# Patient Record
Sex: FEMALE | Race: WHITE | Employment: OTHER
[De-identification: names, ages, dates, MRNs, and addresses within clinical notes are randomized per-mention and may not be internally consistent; named-entity substitution may affect disease eponyms.]

---

## 2017-01-30 DIAGNOSIS — J30.9 ALLERGIC RHINITIS, UNSPECIFIED ALLERGIC RHINITIS TRIGGER, UNSPECIFIED RHINITIS SEASONALITY: Primary | ICD-10-CM

## 2017-01-30 DIAGNOSIS — I10 ESSENTIAL HYPERTENSION: ICD-10-CM

## 2017-01-30 RX ORDER — HYDROCHLOROTHIAZIDE 25 MG/1
25 TABLET ORAL DAILY
Qty: 90 TABLET | Refills: 1 | Status: SHIPPED | OUTPATIENT
Start: 2017-01-30 | End: 2017-02-23 | Stop reason: SDUPTHER

## 2017-01-30 RX ORDER — LORATADINE 10 MG/1
TABLET ORAL
Qty: 90 TABLET | Refills: 1 | Status: SHIPPED | OUTPATIENT
Start: 2017-01-30 | End: 2017-02-23 | Stop reason: SDUPTHER

## 2017-02-08 ENCOUNTER — TELEPHONE (OUTPATIENT)
Dept: FAMILY MEDICINE CLINIC | Facility: CLINIC | Age: 67
End: 2017-02-08

## 2017-02-08 DIAGNOSIS — G89.29 CHRONIC BILATERAL LOW BACK PAIN WITHOUT SCIATICA: Primary | ICD-10-CM

## 2017-02-08 DIAGNOSIS — M54.50 CHRONIC BILATERAL LOW BACK PAIN WITHOUT SCIATICA: Primary | ICD-10-CM

## 2017-02-09 RX ORDER — ACETAMINOPHEN AND CODEINE PHOSPHATE 300; 30 MG/1; MG/1
1 TABLET ORAL EVERY 6 HOURS PRN
Qty: 6 TABLET | Refills: 0 | Status: SHIPPED | OUTPATIENT
Start: 2017-02-09 | End: 2017-02-23 | Stop reason: SDUPTHER

## 2017-02-23 ENCOUNTER — OFFICE VISIT (OUTPATIENT)
Dept: FAMILY MEDICINE CLINIC | Facility: CLINIC | Age: 67
End: 2017-02-23

## 2017-02-23 VITALS
HEIGHT: 64 IN | OXYGEN SATURATION: 95 % | WEIGHT: 235 LBS | BODY MASS INDEX: 40.12 KG/M2 | DIASTOLIC BLOOD PRESSURE: 62 MMHG | HEART RATE: 81 BPM | SYSTOLIC BLOOD PRESSURE: 103 MMHG | TEMPERATURE: 98 F

## 2017-02-23 DIAGNOSIS — I10 ESSENTIAL HYPERTENSION: Primary | ICD-10-CM

## 2017-02-23 DIAGNOSIS — M54.50 CHRONIC BILATERAL LOW BACK PAIN WITHOUT SCIATICA: ICD-10-CM

## 2017-02-23 DIAGNOSIS — Z12.11 COLON CANCER SCREENING: ICD-10-CM

## 2017-02-23 DIAGNOSIS — E55.9 VITAMIN D DEFICIENCY: ICD-10-CM

## 2017-02-23 DIAGNOSIS — E78.5 HYPERLIPIDEMIA WITH TARGET LDL LESS THAN 100: ICD-10-CM

## 2017-02-23 DIAGNOSIS — M25.561 CHRONIC PAIN OF BOTH KNEES: ICD-10-CM

## 2017-02-23 DIAGNOSIS — Z91.81 AT HIGH RISK FOR FALLS: ICD-10-CM

## 2017-02-23 DIAGNOSIS — M81.0 OSTEOPOROSIS: ICD-10-CM

## 2017-02-23 DIAGNOSIS — Z11.4 SCREENING FOR HIV (HUMAN IMMUNODEFICIENCY VIRUS): ICD-10-CM

## 2017-02-23 DIAGNOSIS — G89.29 CHRONIC BILATERAL LOW BACK PAIN WITHOUT SCIATICA: ICD-10-CM

## 2017-02-23 DIAGNOSIS — Z23 NEED FOR PNEUMOCOCCAL VACCINATION: ICD-10-CM

## 2017-02-23 DIAGNOSIS — M25.562 CHRONIC PAIN OF BOTH KNEES: ICD-10-CM

## 2017-02-23 DIAGNOSIS — G89.29 CHRONIC PAIN OF BOTH KNEES: ICD-10-CM

## 2017-02-23 DIAGNOSIS — E03.9 ACQUIRED HYPOTHYROIDISM: ICD-10-CM

## 2017-02-23 DIAGNOSIS — Z12.39 ENCOUNTER FOR BREAST CANCER SCREENING OTHER THAN MAMMOGRAM: ICD-10-CM

## 2017-02-23 DIAGNOSIS — Z11.59 NEED FOR HEPATITIS C SCREENING TEST: ICD-10-CM

## 2017-02-23 DIAGNOSIS — Z29.9 PREVENTIVE MEASURE: ICD-10-CM

## 2017-02-23 DIAGNOSIS — J30.9 ALLERGIC RHINITIS, UNSPECIFIED ALLERGIC RHINITIS TRIGGER, UNSPECIFIED RHINITIS SEASONALITY: ICD-10-CM

## 2017-02-23 DIAGNOSIS — K59.01 SLOW TRANSIT CONSTIPATION: ICD-10-CM

## 2017-02-23 DIAGNOSIS — E66.01 MORBID OBESITY WITH BMI OF 40.0-44.9, ADULT (HCC): ICD-10-CM

## 2017-02-23 PROCEDURE — 90471 IMMUNIZATION ADMIN: CPT | Performed by: FAMILY MEDICINE

## 2017-02-23 PROCEDURE — 90670 PCV13 VACCINE IM: CPT | Performed by: FAMILY MEDICINE

## 2017-02-23 PROCEDURE — 99215 OFFICE O/P EST HI 40 MIN: CPT | Performed by: FAMILY MEDICINE

## 2017-02-23 RX ORDER — ALBUTEROL SULFATE 90 UG/1
2 AEROSOL, METERED RESPIRATORY (INHALATION) EVERY 6 HOURS PRN
Qty: 18 G | Refills: 2 | Status: CANCELLED | OUTPATIENT
Start: 2017-02-23

## 2017-02-23 RX ORDER — ACETAMINOPHEN AND CODEINE PHOSPHATE 300; 30 MG/1; MG/1
1 TABLET ORAL DAILY PRN
Qty: 30 TABLET | Refills: 0 | Status: SHIPPED | OUTPATIENT
Start: 2017-02-23 | End: 2017-09-14

## 2017-02-23 RX ORDER — SIMVASTATIN 40 MG
TABLET ORAL
Qty: 90 TABLET | Refills: 3 | Status: SHIPPED | OUTPATIENT
Start: 2017-02-23 | End: 2018-04-12 | Stop reason: SDUPTHER

## 2017-02-23 RX ORDER — ASPIRIN 81 MG/1
TABLET ORAL
Qty: 90 TABLET | Refills: 4 | Status: SHIPPED | OUTPATIENT
Start: 2017-02-23 | End: 2017-09-14

## 2017-02-23 RX ORDER — LIDOCAINE 40 MG/G
CREAM TOPICAL
Qty: 15 G | Refills: 11 | Status: SHIPPED | OUTPATIENT
Start: 2017-02-23 | End: 2017-04-20 | Stop reason: SDUPTHER

## 2017-02-23 RX ORDER — MELOXICAM 15 MG/1
TABLET ORAL
Qty: 30 TABLET | Refills: 0 | Status: CANCELLED | OUTPATIENT
Start: 2017-02-23

## 2017-02-23 RX ORDER — ALENDRONATE SODIUM 70 MG/1
TABLET ORAL
Qty: 8 TABLET | Refills: 11 | Status: SHIPPED | OUTPATIENT
Start: 2017-02-23 | End: 2017-03-13 | Stop reason: SDUPTHER

## 2017-02-23 RX ORDER — LOSARTAN POTASSIUM 50 MG/1
50 TABLET ORAL DAILY
Qty: 90 TABLET | Refills: 3 | Status: SHIPPED | OUTPATIENT
Start: 2017-02-23 | End: 2017-11-30 | Stop reason: SDUPTHER

## 2017-02-23 RX ORDER — LORATADINE 10 MG/1
TABLET ORAL
Qty: 90 TABLET | Refills: 3 | Status: SHIPPED | OUTPATIENT
Start: 2017-02-23 | End: 2018-03-05 | Stop reason: SDUPTHER

## 2017-02-23 RX ORDER — LEVOTHYROXINE SODIUM 0.07 MG/1
TABLET ORAL
Qty: 90 TABLET | Refills: 0 | Status: SHIPPED | OUTPATIENT
Start: 2017-02-23 | End: 2017-05-24 | Stop reason: SDUPTHER

## 2017-02-23 RX ORDER — HYDROCHLOROTHIAZIDE 25 MG/1
25 TABLET ORAL DAILY
Qty: 90 TABLET | Refills: 3 | Status: SHIPPED | OUTPATIENT
Start: 2017-02-23 | End: 2017-11-30 | Stop reason: SINTOL

## 2017-02-23 RX ORDER — DOCUSATE SODIUM 100 MG/1
100 CAPSULE, LIQUID FILLED ORAL 2 TIMES DAILY PRN
Qty: 60 CAPSULE | Refills: 2 | Status: SHIPPED | OUTPATIENT
Start: 2017-02-23 | End: 2017-03-13 | Stop reason: SDUPTHER

## 2017-02-23 ASSESSMENT — PATIENT HEALTH QUESTIONNAIRE - PHQ9
SUM OF ALL RESPONSES TO PHQ QUESTIONS 1-9: 0
1. LITTLE INTEREST OR PLEASURE IN DOING THINGS: 0
2. FEELING DOWN, DEPRESSED OR HOPELESS: 0
SUM OF ALL RESPONSES TO PHQ9 QUESTIONS 1 & 2: 0

## 2017-02-23 ASSESSMENT — ENCOUNTER SYMPTOMS
CONSTIPATION: 1
NAUSEA: 0
ABDOMINAL PAIN: 0
COUGH: 0
ABDOMINAL DISTENTION: 0
DIARRHEA: 0
WHEEZING: 0
BACK PAIN: 1
VOMITING: 0
SHORTNESS OF BREATH: 0
CHEST TIGHTNESS: 0

## 2017-03-13 DIAGNOSIS — K59.01 SLOW TRANSIT CONSTIPATION: ICD-10-CM

## 2017-03-13 DIAGNOSIS — M81.0 OSTEOPOROSIS: ICD-10-CM

## 2017-03-13 RX ORDER — DOCUSATE SODIUM 100 MG/1
100 CAPSULE, LIQUID FILLED ORAL 2 TIMES DAILY PRN
Qty: 60 CAPSULE | Refills: 2 | Status: ON HOLD | OUTPATIENT
Start: 2017-03-13 | End: 2017-10-25

## 2017-03-13 RX ORDER — ALENDRONATE SODIUM 70 MG/1
TABLET ORAL
Qty: 8 TABLET | Refills: 11 | Status: SHIPPED | OUTPATIENT
Start: 2017-03-13 | End: 2017-07-27 | Stop reason: HOSPADM

## 2017-03-20 ENCOUNTER — TELEPHONE (OUTPATIENT)
Dept: FAMILY MEDICINE CLINIC | Age: 67
End: 2017-03-20

## 2017-03-20 DIAGNOSIS — N63.20 LEFT BREAST LUMP: Primary | ICD-10-CM

## 2017-03-20 DIAGNOSIS — Z12.11 COLON CANCER SCREENING: ICD-10-CM

## 2017-03-20 DIAGNOSIS — R92.2 DENSE BREAST TISSUE ON MAMMOGRAM: ICD-10-CM

## 2017-03-20 PROBLEM — R92.30 DENSE BREAST TISSUE ON MAMMOGRAM: Status: ACTIVE | Noted: 2017-03-20

## 2017-03-20 LAB
CONTROL: PRESENT
HEMOCCULT STL QL: NEGATIVE

## 2017-03-20 PROCEDURE — 82274 ASSAY TEST FOR BLOOD FECAL: CPT | Performed by: FAMILY MEDICINE

## 2017-03-21 ENCOUNTER — HOSPITAL ENCOUNTER (OUTPATIENT)
Age: 67
Discharge: HOME | End: 2017-03-21
Payer: MEDICARE

## 2017-03-21 ENCOUNTER — HOSPITAL ENCOUNTER (OUTPATIENT)
Dept: GENERAL RADIOLOGY | Age: 67
Discharge: HOME | End: 2017-03-21
Payer: MEDICARE

## 2017-03-21 DIAGNOSIS — G89.29 CHRONIC BILATERAL LOW BACK PAIN WITHOUT SCIATICA: ICD-10-CM

## 2017-03-21 DIAGNOSIS — M54.50 CHRONIC BILATERAL LOW BACK PAIN WITHOUT SCIATICA: ICD-10-CM

## 2017-03-21 DIAGNOSIS — E55.9 VITAMIN D DEFICIENCY: ICD-10-CM

## 2017-03-21 DIAGNOSIS — Z11.59 NEED FOR HEPATITIS C SCREENING TEST: ICD-10-CM

## 2017-03-21 DIAGNOSIS — E03.9 ACQUIRED HYPOTHYROIDISM: ICD-10-CM

## 2017-03-21 DIAGNOSIS — R73.9 HYPERGLYCEMIA: ICD-10-CM

## 2017-03-21 DIAGNOSIS — I10 ESSENTIAL HYPERTENSION: ICD-10-CM

## 2017-03-21 DIAGNOSIS — Z11.4 SCREENING FOR HIV (HUMAN IMMUNODEFICIENCY VIRUS): ICD-10-CM

## 2017-03-21 DIAGNOSIS — R73.03 PREDIABETES: ICD-10-CM

## 2017-03-21 DIAGNOSIS — E55.9 VITAMIN D DEFICIENCY: Primary | ICD-10-CM

## 2017-03-21 DIAGNOSIS — E78.5 HYPERLIPIDEMIA WITH TARGET LDL LESS THAN 100: ICD-10-CM

## 2017-03-21 LAB
ALBUMIN SERPL-MCNC: 3.9 G/DL (ref 3.5–5.2)
ALBUMIN/GLOBULIN RATIO: ABNORMAL (ref 1–2.5)
ALP BLD-CCNC: 77 U/L (ref 35–104)
ALT SERPL-CCNC: 19 U/L (ref 5–33)
ANION GAP SERPL CALCULATED.3IONS-SCNC: 14 MMOL/L (ref 9–17)
AST SERPL-CCNC: 17 U/L
BILIRUB SERPL-MCNC: 0.63 MG/DL (ref 0.3–1.2)
BUN BLDV-MCNC: 14 MG/DL (ref 8–23)
BUN/CREAT BLD: ABNORMAL (ref 9–20)
CALCIUM SERPL-MCNC: 9.4 MG/DL (ref 8.6–10.4)
CHLORIDE BLD-SCNC: 99 MMOL/L (ref 98–107)
CHOLESTEROL/HDL RATIO: 3.4
CHOLESTEROL: 158 MG/DL
CO2: 27 MMOL/L (ref 20–31)
CREAT SERPL-MCNC: 0.72 MG/DL (ref 0.5–0.9)
GFR AFRICAN AMERICAN: >60 ML/MIN
GFR NON-AFRICAN AMERICAN: >60 ML/MIN
GFR SERPL CREATININE-BSD FRML MDRD: ABNORMAL ML/MIN/{1.73_M2}
GFR SERPL CREATININE-BSD FRML MDRD: ABNORMAL ML/MIN/{1.73_M2}
GLUCOSE BLD-MCNC: 125 MG/DL (ref 70–99)
HCT VFR BLD CALC: 41.6 % (ref 36–46)
HDLC SERPL-MCNC: 46 MG/DL
HEMOGLOBIN: 13.8 G/DL (ref 12–16)
HEPATITIS C ANTIBODY: NONREACTIVE
HIV AG/AB: NONREACTIVE
LDL CHOLESTEROL: 78 MG/DL (ref 0–130)
MCH RBC QN AUTO: 30 PG (ref 26–34)
MCHC RBC AUTO-ENTMCNC: 33.2 G/DL (ref 31–37)
MCV RBC AUTO: 90.3 FL (ref 80–100)
PDW BLD-RTO: 14 % (ref 11.5–14.9)
PLATELET # BLD: 254 K/UL (ref 150–450)
PMV BLD AUTO: 9.3 FL (ref 6–12)
POTASSIUM SERPL-SCNC: 4 MMOL/L (ref 3.7–5.3)
RBC # BLD: 4.61 M/UL (ref 4–5.2)
SODIUM BLD-SCNC: 140 MMOL/L (ref 135–144)
TOTAL PROTEIN: 7.6 G/DL (ref 6.4–8.3)
TRIGL SERPL-MCNC: 169 MG/DL
TSH SERPL DL<=0.05 MIU/L-ACNC: 1.8 MIU/L (ref 0.3–5)
VITAMIN D 25-HYDROXY: 13.1 NG/ML (ref 30–100)
VLDLC SERPL CALC-MCNC: ABNORMAL MG/DL (ref 1–30)
WBC # BLD: 10.2 K/UL (ref 3.5–11)

## 2017-03-21 PROCEDURE — 84443 ASSAY THYROID STIM HORMONE: CPT

## 2017-03-21 PROCEDURE — 82306 VITAMIN D 25 HYDROXY: CPT

## 2017-03-21 PROCEDURE — 83036 HEMOGLOBIN GLYCOSYLATED A1C: CPT

## 2017-03-21 PROCEDURE — 86803 HEPATITIS C AB TEST: CPT

## 2017-03-21 PROCEDURE — 85027 COMPLETE CBC AUTOMATED: CPT

## 2017-03-21 PROCEDURE — 80053 COMPREHEN METABOLIC PANEL: CPT

## 2017-03-21 PROCEDURE — 72100 X-RAY EXAM L-S SPINE 2/3 VWS: CPT

## 2017-03-21 PROCEDURE — 80061 LIPID PANEL: CPT

## 2017-03-21 PROCEDURE — 87389 HIV-1 AG W/HIV-1&-2 AB AG IA: CPT

## 2017-03-21 PROCEDURE — 36415 COLL VENOUS BLD VENIPUNCTURE: CPT

## 2017-03-21 RX ORDER — ERGOCALCIFEROL 1.25 MG/1
50000 CAPSULE ORAL WEEKLY
Qty: 4 CAPSULE | Refills: 2 | Status: SHIPPED | OUTPATIENT
Start: 2017-03-21 | End: 2017-12-18 | Stop reason: DRUGHIGH

## 2017-03-23 LAB
ESTIMATED AVERAGE GLUCOSE: 134 MG/DL
HBA1C MFR BLD: 6.3 % (ref 4–6)

## 2017-03-24 DIAGNOSIS — R73.03 PREDIABETES: Primary | ICD-10-CM

## 2017-03-24 RX ORDER — METFORMIN HYDROCHLORIDE 500 MG/1
500 TABLET, EXTENDED RELEASE ORAL
Qty: 30 TABLET | Refills: 3 | Status: SHIPPED | OUTPATIENT
Start: 2017-03-24 | End: 2017-08-28 | Stop reason: SDUPTHER

## 2017-03-28 ENCOUNTER — HOSPITAL ENCOUNTER (OUTPATIENT)
Dept: WOMENS IMAGING | Age: 67
Discharge: HOME | End: 2017-03-28
Payer: MEDICARE

## 2017-03-28 DIAGNOSIS — N63.20 LEFT BREAST LUMP: ICD-10-CM

## 2017-03-28 DIAGNOSIS — R92.2 DENSE BREAST TISSUE ON MAMMOGRAM: ICD-10-CM

## 2017-03-28 PROCEDURE — 76642 ULTRASOUND BREAST LIMITED: CPT

## 2017-03-28 PROCEDURE — G0204 DX MAMMO INCL CAD BI: HCPCS

## 2017-04-18 ENCOUNTER — TELEPHONE (OUTPATIENT)
Dept: FAMILY MEDICINE CLINIC | Age: 67
End: 2017-04-18

## 2017-04-19 ENCOUNTER — TELEPHONE (OUTPATIENT)
Dept: FAMILY MEDICINE CLINIC | Age: 67
End: 2017-04-19

## 2017-04-19 DIAGNOSIS — M15.9 OSTEOARTHRITIS INVOLVING MULTIPLE JOINTS ON BOTH SIDES OF BODY: Primary | ICD-10-CM

## 2017-04-19 DIAGNOSIS — M25.561 CHRONIC PAIN OF BOTH KNEES: ICD-10-CM

## 2017-04-19 DIAGNOSIS — M25.562 CHRONIC PAIN OF BOTH KNEES: ICD-10-CM

## 2017-04-19 DIAGNOSIS — G89.29 CHRONIC PAIN OF BOTH KNEES: ICD-10-CM

## 2017-04-19 DIAGNOSIS — M54.50 CHRONIC BILATERAL LOW BACK PAIN WITHOUT SCIATICA: ICD-10-CM

## 2017-04-19 DIAGNOSIS — G89.29 CHRONIC BILATERAL LOW BACK PAIN WITHOUT SCIATICA: ICD-10-CM

## 2017-04-19 DIAGNOSIS — M17.12 PRIMARY OSTEOARTHRITIS OF LEFT KNEE: ICD-10-CM

## 2017-04-19 RX ORDER — MELOXICAM 7.5 MG/1
7.5 TABLET ORAL DAILY PRN
Qty: 30 TABLET | Refills: 0 | Status: SHIPPED | OUTPATIENT
Start: 2017-04-19 | End: 2017-07-27 | Stop reason: HOSPADM

## 2017-04-20 ENCOUNTER — OFFICE VISIT (OUTPATIENT)
Dept: FAMILY MEDICINE CLINIC | Age: 67
End: 2017-04-20
Payer: MEDICARE

## 2017-04-20 VITALS
HEIGHT: 64 IN | HEART RATE: 76 BPM | SYSTOLIC BLOOD PRESSURE: 128 MMHG | BODY MASS INDEX: 38.65 KG/M2 | OXYGEN SATURATION: 96 % | DIASTOLIC BLOOD PRESSURE: 84 MMHG | WEIGHT: 226.4 LBS | TEMPERATURE: 97.5 F

## 2017-04-20 DIAGNOSIS — E03.9 ACQUIRED HYPOTHYROIDISM: ICD-10-CM

## 2017-04-20 DIAGNOSIS — M25.562 CHRONIC PAIN OF BOTH KNEES: ICD-10-CM

## 2017-04-20 DIAGNOSIS — R73.9 HYPERGLYCEMIA: ICD-10-CM

## 2017-04-20 DIAGNOSIS — I10 ESSENTIAL HYPERTENSION: Primary | ICD-10-CM

## 2017-04-20 DIAGNOSIS — K59.01 SLOW TRANSIT CONSTIPATION: ICD-10-CM

## 2017-04-20 DIAGNOSIS — Z91.81 AT HIGH RISK FOR FALLS: ICD-10-CM

## 2017-04-20 DIAGNOSIS — G89.29 CHRONIC BILATERAL LOW BACK PAIN WITHOUT SCIATICA: ICD-10-CM

## 2017-04-20 DIAGNOSIS — Z23 NEED FOR TDAP VACCINATION: ICD-10-CM

## 2017-04-20 DIAGNOSIS — G89.29 CHRONIC PAIN OF BOTH KNEES: ICD-10-CM

## 2017-04-20 DIAGNOSIS — M54.50 CHRONIC BILATERAL LOW BACK PAIN WITHOUT SCIATICA: ICD-10-CM

## 2017-04-20 DIAGNOSIS — E66.01 MORBID OBESITY WITH BMI OF 40.0-44.9, ADULT (HCC): ICD-10-CM

## 2017-04-20 DIAGNOSIS — M25.561 CHRONIC PAIN OF BOTH KNEES: ICD-10-CM

## 2017-04-20 DIAGNOSIS — E55.9 VITAMIN D DEFICIENCY: ICD-10-CM

## 2017-04-20 DIAGNOSIS — Z12.11 COLON CANCER SCREENING: ICD-10-CM

## 2017-04-20 DIAGNOSIS — M81.0 OSTEOPOROSIS: ICD-10-CM

## 2017-04-20 PROCEDURE — 99214 OFFICE O/P EST MOD 30 MIN: CPT | Performed by: FAMILY MEDICINE

## 2017-04-20 RX ORDER — LIDOCAINE 40 MG/G
CREAM TOPICAL
Qty: 45 G | Refills: 3 | Status: SHIPPED | OUTPATIENT
Start: 2017-04-20 | End: 2017-11-29 | Stop reason: SDUPTHER

## 2017-04-20 RX ORDER — ACETAMINOPHEN 500 MG
500 TABLET ORAL EVERY 6 HOURS PRN
Qty: 120 TABLET | Refills: 3 | Status: SHIPPED | OUTPATIENT
Start: 2017-04-20 | End: 2018-10-19 | Stop reason: SDUPTHER

## 2017-04-20 RX ORDER — BLOOD PRESSURE TEST KIT
KIT MISCELLANEOUS
Qty: 1 KIT | Refills: 0 | Status: SHIPPED | OUTPATIENT
Start: 2017-04-20 | End: 2017-05-25 | Stop reason: SDUPTHER

## 2017-04-20 RX ORDER — LIDOCAINE 40 MG/G
CREAM TOPICAL
Qty: 15 G | Refills: 11 | Status: CANCELLED | OUTPATIENT
Start: 2017-04-20

## 2017-04-20 ASSESSMENT — ENCOUNTER SYMPTOMS
NAUSEA: 0
BACK PAIN: 1
ABDOMINAL PAIN: 0
DIARRHEA: 0
VOMITING: 0
SHORTNESS OF BREATH: 0
ABDOMINAL DISTENTION: 0
CONSTIPATION: 1
CHEST TIGHTNESS: 0
COUGH: 0
WHEEZING: 0

## 2017-05-03 ENCOUNTER — HOSPITAL ENCOUNTER (OUTPATIENT)
Dept: PAIN MANAGEMENT | Age: 67
Discharge: HOME | End: 2017-05-03
Payer: MEDICARE

## 2017-05-03 VITALS
DIASTOLIC BLOOD PRESSURE: 70 MMHG | BODY MASS INDEX: 38.58 KG/M2 | HEART RATE: 67 BPM | OXYGEN SATURATION: 96 % | WEIGHT: 226 LBS | HEIGHT: 64 IN | SYSTOLIC BLOOD PRESSURE: 108 MMHG | TEMPERATURE: 98 F | RESPIRATION RATE: 17 BRPM

## 2017-05-03 DIAGNOSIS — E66.01 MORBID OBESITY WITH BMI OF 40.0-44.9, ADULT (HCC): ICD-10-CM

## 2017-05-03 DIAGNOSIS — M17.0 PRIMARY OSTEOARTHRITIS OF BOTH KNEES: ICD-10-CM

## 2017-05-03 DIAGNOSIS — M81.0 OSTEOPOROSIS: ICD-10-CM

## 2017-05-03 DIAGNOSIS — M41.9 KYPHOSCOLIOSIS DEFORMITY OF SPINE: ICD-10-CM

## 2017-05-03 DIAGNOSIS — E55.9 VITAMIN D DEFICIENCY: ICD-10-CM

## 2017-05-03 DIAGNOSIS — M47.816 ARTHROPATHY OF LUMBAR FACET JOINT: ICD-10-CM

## 2017-05-03 DIAGNOSIS — M47.816 SPONDYLOSIS OF LUMBAR REGION WITHOUT MYELOPATHY OR RADICULOPATHY: Primary | ICD-10-CM

## 2017-05-03 PROCEDURE — 99204 OFFICE O/P NEW MOD 45 MIN: CPT | Performed by: PAIN MEDICINE

## 2017-05-03 PROCEDURE — 99204 OFFICE O/P NEW MOD 45 MIN: CPT

## 2017-05-03 PROCEDURE — 80307 DRUG TEST PRSMV CHEM ANLYZR: CPT

## 2017-05-03 ASSESSMENT — PAIN DESCRIPTION - LOCATION: LOCATION: BACK;KNEE

## 2017-05-03 ASSESSMENT — PAIN DESCRIPTION - PAIN TYPE: TYPE: CHRONIC PAIN

## 2017-05-03 ASSESSMENT — ENCOUNTER SYMPTOMS
VOMITING: 0
HEARTBURN: 0
DOUBLE VISION: 0
NAUSEA: 0
CONSTIPATION: 1
BLURRED VISION: 1
SHORTNESS OF BREATH: 0
COUGH: 1
BACK PAIN: 1

## 2017-05-03 ASSESSMENT — PAIN DESCRIPTION - FREQUENCY: FREQUENCY: CONTINUOUS

## 2017-05-03 ASSESSMENT — PAIN DESCRIPTION - ORIENTATION: ORIENTATION: RIGHT;LEFT

## 2017-05-03 ASSESSMENT — PAIN DESCRIPTION - ONSET: ONSET: ON-GOING

## 2017-05-03 ASSESSMENT — PAIN SCALES - GENERAL: PAINLEVEL_OUTOF10: 6

## 2017-05-03 ASSESSMENT — PAIN DESCRIPTION - DESCRIPTORS: DESCRIPTORS: CONSTANT;ACHING;DULL

## 2017-05-03 ASSESSMENT — PAIN DESCRIPTION - PROGRESSION: CLINICAL_PROGRESSION: GRADUALLY WORSENING

## 2017-05-07 LAB
6-ACETYLMORPHINE, UR: NOT DETECTED
7-AMINOCLONAZEPAM, URINE: NOT DETECTED
ALPHA-OH-ALPRAZ, URINE: NOT DETECTED
ALPRAZOLAM, URINE: NOT DETECTED
AMPHETAMINES, URINE: NOT DETECTED
BARBITURATES, URINE: NOT DETECTED
BENZOYLECGONINE, UR: NOT DETECTED
BUPRENORPHINE URINE: NOT DETECTED
CARISOPRODOL, UR: NOT DETECTED
CLONAZEPAM, URINE: NOT DETECTED
CODEINE, URINE: NOT DETECTED
CREATININE URINE: 367.5 MG/DL (ref 20–400)
DIAZEPAM, URINE: NOT DETECTED
DRUGS EXPECTED, UR: NORMAL
EER HI RES INTERP UR: NORMAL
ETHYL GLUCURONIDE UR: NOT DETECTED
FENTANYL URINE: NOT DETECTED
HYDROCODONE, URINE: NOT DETECTED
HYDROMORPHONE, URINE: NOT DETECTED
LORAZEPAM, URINE: NOT DETECTED
MARIJUANA METAB, UR: NOT DETECTED
MDA, UR: NOT DETECTED
MDEA, EVE, UR: NOT DETECTED
MDMA URINE: NOT DETECTED
MEPERIDINE METAB, UR: NOT DETECTED
METHADONE, URINE: NOT DETECTED
METHAMPHETAMINE, URINE: NOT DETECTED
METHYLPHENIDATE: NOT DETECTED
MIDAZOLAM, URINE: NOT DETECTED
MORPHINE URINE: NOT DETECTED
NORBUPRENORPHINE, URINE: NOT DETECTED
NORDIAZEPAM, URINE: NOT DETECTED
NORFENTANYL, URINE: NOT DETECTED
NORHYDROCODONE, URINE: NOT DETECTED
NOROXYCODONE, URINE: NOT DETECTED
NOROXYMORPHONE, URINE: NOT DETECTED
OXAZEPAM, URINE: NOT DETECTED
OXYCODONE URINE: NOT DETECTED
OXYMORPHONE, URINE: NOT DETECTED
PAIN MANAGEMENT DRUG PANEL INTERP, URINE: NORMAL
PAIN MGT DRUG PANEL, HI RES, UR: NORMAL
PCP,URINE: NOT DETECTED
PHENTERMINE, UR: NOT DETECTED
PROPOXYPHENE, URINE: NOT DETECTED
TAPENTADOL, URINE: NOT DETECTED
TAPENTADOL-O-SULFATE, URINE: NOT DETECTED
TEMAZEPAM, URINE: NOT DETECTED
TRAMADOL, URINE: NOT DETECTED
ZOLPIDEM, URINE: NOT DETECTED

## 2017-05-15 ENCOUNTER — HOSPITAL ENCOUNTER (OUTPATIENT)
Dept: WOMENS IMAGING | Age: 67
Discharge: HOME | End: 2017-05-15
Payer: MEDICARE

## 2017-05-15 DIAGNOSIS — M81.0 OSTEOPOROSIS: ICD-10-CM

## 2017-05-15 PROCEDURE — 77080 DXA BONE DENSITY AXIAL: CPT

## 2017-05-16 ENCOUNTER — TELEPHONE (OUTPATIENT)
Dept: FAMILY MEDICINE CLINIC | Facility: CLINIC | Age: 67
End: 2017-05-16

## 2017-05-16 ENCOUNTER — TELEPHONE (OUTPATIENT)
Dept: FAMILY MEDICINE CLINIC | Age: 67
End: 2017-05-16

## 2017-05-16 DIAGNOSIS — M25.561 PAIN IN BOTH KNEES, UNSPECIFIED CHRONICITY: Primary | ICD-10-CM

## 2017-05-16 DIAGNOSIS — M25.562 PAIN IN BOTH KNEES, UNSPECIFIED CHRONICITY: Primary | ICD-10-CM

## 2017-05-17 ENCOUNTER — TELEPHONE (OUTPATIENT)
Dept: FAMILY MEDICINE CLINIC | Age: 67
End: 2017-05-17

## 2017-05-18 ENCOUNTER — HOSPITAL ENCOUNTER (OUTPATIENT)
Dept: GENERAL RADIOLOGY | Age: 67
Discharge: HOME | End: 2017-05-18
Payer: MEDICARE

## 2017-05-18 ENCOUNTER — HOSPITAL ENCOUNTER (OUTPATIENT)
Age: 67
Discharge: HOME | End: 2017-05-18
Payer: MEDICARE

## 2017-05-18 DIAGNOSIS — M25.561 PAIN IN BOTH KNEES, UNSPECIFIED CHRONICITY: ICD-10-CM

## 2017-05-18 DIAGNOSIS — M25.562 PAIN IN BOTH KNEES, UNSPECIFIED CHRONICITY: ICD-10-CM

## 2017-05-18 PROCEDURE — 73562 X-RAY EXAM OF KNEE 3: CPT

## 2017-05-23 ENCOUNTER — HOSPITAL ENCOUNTER (OUTPATIENT)
Dept: PHYSICAL THERAPY | Age: 67
Setting detail: THERAPIES SERIES
Discharge: HOME | End: 2017-05-23
Payer: MEDICARE

## 2017-05-23 PROCEDURE — 97162 PT EVAL MOD COMPLEX 30 MIN: CPT

## 2017-05-23 PROCEDURE — G8979 MOBILITY GOAL STATUS: HCPCS

## 2017-05-23 PROCEDURE — G8978 MOBILITY CURRENT STATUS: HCPCS

## 2017-05-24 DIAGNOSIS — E03.9 ACQUIRED HYPOTHYROIDISM: ICD-10-CM

## 2017-05-24 RX ORDER — LEVOTHYROXINE SODIUM 0.07 MG/1
TABLET ORAL
Qty: 30 TABLET | Refills: 3 | Status: SHIPPED | OUTPATIENT
Start: 2017-05-24 | End: 2017-10-09 | Stop reason: SDUPTHER

## 2017-05-24 ASSESSMENT — PAIN DESCRIPTION - LOCATION
LOCATION_3: BACK
LOCATION: KNEE

## 2017-05-24 ASSESSMENT — PAIN DESCRIPTION - INTENSITY
RATING_2: 5
RATING_3: 4

## 2017-05-24 ASSESSMENT — PAIN DESCRIPTION - ORIENTATION
ORIENTATION: LEFT
ORIENTATION_3: LOWER;MID
ORIENTATION_2: RIGHT

## 2017-05-24 ASSESSMENT — PAIN SCALES - GENERAL: PAINLEVEL_OUTOF10: 5

## 2017-05-25 ENCOUNTER — TELEPHONE (OUTPATIENT)
Dept: FAMILY MEDICINE CLINIC | Age: 67
End: 2017-05-25

## 2017-05-25 DIAGNOSIS — I10 ESSENTIAL HYPERTENSION: ICD-10-CM

## 2017-05-25 RX ORDER — BLOOD PRESSURE TEST KIT
KIT MISCELLANEOUS
Qty: 1 KIT | Refills: 0 | Status: SHIPPED | OUTPATIENT
Start: 2017-05-25 | End: 2017-07-27 | Stop reason: ALTCHOICE

## 2017-06-07 ENCOUNTER — HOSPITAL ENCOUNTER (OUTPATIENT)
Dept: PHYSICAL THERAPY | Age: 67
Setting detail: THERAPIES SERIES
Discharge: HOME | End: 2017-06-07
Payer: MEDICARE

## 2017-06-07 PROCEDURE — 97113 AQUATIC THERAPY/EXERCISES: CPT

## 2017-06-07 ASSESSMENT — PAIN DESCRIPTION - LOCATION
LOCATION_3: BACK
LOCATION_2: KNEE
LOCATION: KNEE

## 2017-06-07 ASSESSMENT — PAIN DESCRIPTION - ORIENTATION
ORIENTATION: LEFT
ORIENTATION_3: LOWER;MID
ORIENTATION_2: RIGHT

## 2017-06-07 ASSESSMENT — PAIN DESCRIPTION - INTENSITY
RATING_3: 1
RATING_2: 4

## 2017-06-07 ASSESSMENT — PAIN SCALES - GENERAL: PAINLEVEL_OUTOF10: 4

## 2017-06-09 ENCOUNTER — HOSPITAL ENCOUNTER (OUTPATIENT)
Age: 67
Setting detail: SPECIMEN
Discharge: HOME | End: 2017-06-09
Payer: MEDICARE

## 2017-06-09 ENCOUNTER — OFFICE VISIT (OUTPATIENT)
Dept: FAMILY MEDICINE CLINIC | Age: 67
End: 2017-06-09
Payer: MEDICARE

## 2017-06-09 ENCOUNTER — APPOINTMENT (OUTPATIENT)
Dept: PHYSICAL THERAPY | Age: 67
End: 2017-06-09
Payer: MEDICARE

## 2017-06-09 ENCOUNTER — TELEPHONE (OUTPATIENT)
Dept: FAMILY MEDICINE CLINIC | Age: 67
End: 2017-06-09

## 2017-06-09 VITALS
WEIGHT: 240 LBS | HEART RATE: 60 BPM | SYSTOLIC BLOOD PRESSURE: 134 MMHG | HEIGHT: 61 IN | OXYGEN SATURATION: 98 % | RESPIRATION RATE: 20 BRPM | TEMPERATURE: 97.4 F | DIASTOLIC BLOOD PRESSURE: 76 MMHG | BODY MASS INDEX: 45.31 KG/M2

## 2017-06-09 DIAGNOSIS — R53.82 CHRONIC FATIGUE: ICD-10-CM

## 2017-06-09 DIAGNOSIS — M17.0 PRIMARY OSTEOARTHRITIS OF BOTH KNEES: ICD-10-CM

## 2017-06-09 DIAGNOSIS — N76.1 SUBACUTE VAGINITIS: ICD-10-CM

## 2017-06-09 DIAGNOSIS — M25.562 CHRONIC PAIN OF BOTH KNEES: ICD-10-CM

## 2017-06-09 DIAGNOSIS — Z23 NEED FOR TDAP VACCINATION: ICD-10-CM

## 2017-06-09 DIAGNOSIS — B37.49 CANDIDIASIS OF PERINEUM: ICD-10-CM

## 2017-06-09 DIAGNOSIS — N93.9 ABNORMAL VAGINAL BLEEDING: Primary | ICD-10-CM

## 2017-06-09 DIAGNOSIS — N39.46 MIXED INCONTINENCE: ICD-10-CM

## 2017-06-09 DIAGNOSIS — G89.29 CHRONIC PAIN OF BOTH KNEES: ICD-10-CM

## 2017-06-09 DIAGNOSIS — Z12.4 CERVICAL CANCER SCREENING: ICD-10-CM

## 2017-06-09 DIAGNOSIS — M25.561 CHRONIC PAIN OF BOTH KNEES: ICD-10-CM

## 2017-06-09 DIAGNOSIS — R82.90 ABNORMAL URINE ODOR: ICD-10-CM

## 2017-06-09 DIAGNOSIS — N32.81 OAB (OVERACTIVE BLADDER): ICD-10-CM

## 2017-06-09 DIAGNOSIS — N93.9 ABNORMAL VAGINAL BLEEDING: ICD-10-CM

## 2017-06-09 LAB
DIRECT EXAM: NORMAL
HEMOCCULT STL QL: NEGATIVE
HEMOCCULT STL QL: NORMAL
HEMOCCULT STL QL: NORMAL
Lab: NORMAL
SPECIMEN DESCRIPTION: NORMAL
STATUS: NORMAL

## 2017-06-09 PROCEDURE — G8399 PT W/DXA RESULTS DOCUMENT: HCPCS | Performed by: FAMILY MEDICINE

## 2017-06-09 PROCEDURE — 99215 OFFICE O/P EST HI 40 MIN: CPT | Performed by: FAMILY MEDICINE

## 2017-06-09 PROCEDURE — G8417 CALC BMI ABV UP PARAM F/U: HCPCS | Performed by: FAMILY MEDICINE

## 2017-06-09 PROCEDURE — 1123F ACP DISCUSS/DSCN MKR DOCD: CPT | Performed by: FAMILY MEDICINE

## 2017-06-09 PROCEDURE — 1090F PRES/ABSN URINE INCON ASSESS: CPT | Performed by: FAMILY MEDICINE

## 2017-06-09 PROCEDURE — 1036F TOBACCO NON-USER: CPT | Performed by: FAMILY MEDICINE

## 2017-06-09 PROCEDURE — 3014F SCREEN MAMMO DOC REV: CPT | Performed by: FAMILY MEDICINE

## 2017-06-09 PROCEDURE — 0509F URINE INCON PLAN DOCD: CPT | Performed by: FAMILY MEDICINE

## 2017-06-09 PROCEDURE — 82272 OCCULT BLD FECES 1-3 TESTS: CPT | Performed by: FAMILY MEDICINE

## 2017-06-09 PROCEDURE — 4040F PNEUMOC VAC/ADMIN/RCVD: CPT | Performed by: FAMILY MEDICINE

## 2017-06-09 PROCEDURE — G8427 DOCREV CUR MEDS BY ELIG CLIN: HCPCS | Performed by: FAMILY MEDICINE

## 2017-06-09 PROCEDURE — 3017F COLORECTAL CA SCREEN DOC REV: CPT | Performed by: FAMILY MEDICINE

## 2017-06-09 RX ORDER — NYSTATIN 100000 [USP'U]/G
POWDER TOPICAL
Qty: 56.7 G | Refills: 3 | Status: SHIPPED | OUTPATIENT
Start: 2017-06-09 | End: 2017-07-27 | Stop reason: ALTCHOICE

## 2017-06-09 ASSESSMENT — ENCOUNTER SYMPTOMS
CHEST TIGHTNESS: 0
BACK PAIN: 1
ABDOMINAL PAIN: 0
ABDOMINAL DISTENTION: 0
BLOOD IN STOOL: 1
CONSTIPATION: 1
COUGH: 0
DIARRHEA: 0
VOMITING: 0
WHEEZING: 0
SHORTNESS OF BREATH: 0
NAUSEA: 0

## 2017-06-10 PROBLEM — N76.1 SUBACUTE VAGINITIS: Status: ACTIVE | Noted: 2017-06-10

## 2017-06-10 PROBLEM — N93.9 ABNORMAL VAGINAL BLEEDING: Status: ACTIVE | Noted: 2017-06-10

## 2017-06-10 PROBLEM — M17.0 PRIMARY OSTEOARTHRITIS OF BOTH KNEES: Status: ACTIVE | Noted: 2017-06-10

## 2017-06-10 PROBLEM — N32.81 OAB (OVERACTIVE BLADDER): Status: ACTIVE | Noted: 2017-06-10

## 2017-06-10 PROBLEM — R53.82 CHRONIC FATIGUE: Status: ACTIVE | Noted: 2017-06-10

## 2017-06-10 PROBLEM — B37.49 CANDIDIASIS OF PERINEUM: Status: ACTIVE | Noted: 2017-06-10

## 2017-06-12 ENCOUNTER — HOSPITAL ENCOUNTER (OUTPATIENT)
Age: 67
Discharge: HOME | End: 2017-06-12
Payer: MEDICARE

## 2017-06-12 ENCOUNTER — HOSPITAL ENCOUNTER (OUTPATIENT)
Dept: ULTRASOUND IMAGING | Age: 67
Discharge: HOME | End: 2017-06-12
Payer: MEDICARE

## 2017-06-12 ENCOUNTER — HOSPITAL ENCOUNTER (OUTPATIENT)
Dept: PHYSICAL THERAPY | Age: 67
Setting detail: THERAPIES SERIES
Discharge: HOME | End: 2017-06-12
Payer: MEDICARE

## 2017-06-12 ENCOUNTER — HOSPITAL ENCOUNTER (OUTPATIENT)
Dept: PREADMISSION TESTING | Age: 67
Discharge: HOME | End: 2017-06-12
Payer: MEDICARE

## 2017-06-12 VITALS
TEMPERATURE: 98.1 F | DIASTOLIC BLOOD PRESSURE: 70 MMHG | HEART RATE: 69 BPM | RESPIRATION RATE: 18 BRPM | BODY MASS INDEX: 45.5 KG/M2 | WEIGHT: 241 LBS | OXYGEN SATURATION: 99 % | HEIGHT: 61 IN | SYSTOLIC BLOOD PRESSURE: 128 MMHG

## 2017-06-12 DIAGNOSIS — N32.81 OAB (OVERACTIVE BLADDER): ICD-10-CM

## 2017-06-12 DIAGNOSIS — N93.9 ABNORMAL VAGINAL BLEEDING: ICD-10-CM

## 2017-06-12 DIAGNOSIS — R10.2 PELVIC PAIN IN FEMALE: ICD-10-CM

## 2017-06-12 DIAGNOSIS — R82.90 ABNORMAL URINE ODOR: ICD-10-CM

## 2017-06-12 DIAGNOSIS — N39.46 MIXED INCONTINENCE: ICD-10-CM

## 2017-06-12 LAB
-: ABNORMAL
ABSOLUTE EOS #: 0.2 K/UL (ref 0–0.4)
ABSOLUTE LYMPH #: 1.6 K/UL (ref 1–4.8)
ABSOLUTE MONO #: 0.5 K/UL (ref 0.1–1.3)
AMORPHOUS: ABNORMAL
ANION GAP SERPL CALCULATED.3IONS-SCNC: 13 MMOL/L (ref 9–17)
BACTERIA: ABNORMAL
BASOPHILS # BLD: 1 %
BASOPHILS ABSOLUTE: 0.1 K/UL (ref 0–0.2)
BILIRUBIN URINE: NEGATIVE
BUN BLDV-MCNC: 17 MG/DL (ref 8–23)
BUN/CREAT BLD: ABNORMAL (ref 9–20)
CALCIUM SERPL-MCNC: 9.5 MG/DL (ref 8.6–10.4)
CASTS UA: ABNORMAL /LPF
CHLORIDE BLD-SCNC: 96 MMOL/L (ref 98–107)
CO2: 28 MMOL/L (ref 20–31)
COLOR: YELLOW
COMMENT UA: ABNORMAL
CREAT SERPL-MCNC: 0.79 MG/DL (ref 0.5–0.9)
CRYSTALS, UA: ABNORMAL /HPF
DIFFERENTIAL TYPE: NORMAL
EOSINOPHILS RELATIVE PERCENT: 2 %
EPITHELIAL CELLS UA: ABNORMAL /HPF
GFR AFRICAN AMERICAN: >60 ML/MIN
GFR NON-AFRICAN AMERICAN: >60 ML/MIN
GFR SERPL CREATININE-BSD FRML MDRD: ABNORMAL ML/MIN/{1.73_M2}
GFR SERPL CREATININE-BSD FRML MDRD: ABNORMAL ML/MIN/{1.73_M2}
GLUCOSE BLD-MCNC: 116 MG/DL (ref 70–99)
GLUCOSE URINE: NEGATIVE
HCT VFR BLD CALC: 38.5 % (ref 36–46)
HEMOGLOBIN: 12.6 G/DL (ref 12–16)
KETONES, URINE: NEGATIVE
LEUKOCYTE ESTERASE, URINE: ABNORMAL
LYMPHOCYTES # BLD: 17 %
MCH RBC QN AUTO: 30.2 PG (ref 26–34)
MCHC RBC AUTO-ENTMCNC: 32.9 G/DL (ref 31–37)
MCV RBC AUTO: 92 FL (ref 80–100)
MONOCYTES # BLD: 5 %
MUCUS: ABNORMAL
NITRITE, URINE: NEGATIVE
OTHER OBSERVATIONS UA: ABNORMAL
PDW BLD-RTO: 13.7 % (ref 11.5–14.9)
PH UA: 6 (ref 5–8)
PLATELET # BLD: 255 K/UL (ref 150–450)
PLATELET ESTIMATE: NORMAL
PMV BLD AUTO: 9 FL (ref 6–12)
POTASSIUM SERPL-SCNC: 3.8 MMOL/L (ref 3.7–5.3)
PROTEIN UA: NEGATIVE
RBC # BLD: 4.18 M/UL (ref 4–5.2)
RBC # BLD: NORMAL 10*6/UL
RBC UA: ABNORMAL /HPF
RENAL EPITHELIAL, UA: ABNORMAL /HPF
SEG NEUTROPHILS: 75 %
SEGMENTED NEUTROPHILS ABSOLUTE COUNT: 7.4 K/UL (ref 1.3–9.1)
SODIUM BLD-SCNC: 137 MMOL/L (ref 135–144)
SPECIFIC GRAVITY UA: 1.02 (ref 1–1.03)
TRICHOMONAS: ABNORMAL
TURBIDITY: ABNORMAL
URINE HGB: ABNORMAL
UROBILINOGEN, URINE: NORMAL
WBC # BLD: 9.8 K/UL (ref 3.5–11)
WBC # BLD: NORMAL 10*3/UL
WBC UA: ABNORMAL /HPF
YEAST: ABNORMAL

## 2017-06-12 PROCEDURE — 93005 ELECTROCARDIOGRAM TRACING: CPT

## 2017-06-12 PROCEDURE — 87086 URINE CULTURE/COLONY COUNT: CPT

## 2017-06-12 PROCEDURE — 97113 AQUATIC THERAPY/EXERCISES: CPT

## 2017-06-12 PROCEDURE — 76830 TRANSVAGINAL US NON-OB: CPT

## 2017-06-12 PROCEDURE — 80048 BASIC METABOLIC PNL TOTAL CA: CPT

## 2017-06-12 PROCEDURE — 36415 COLL VENOUS BLD VENIPUNCTURE: CPT

## 2017-06-12 PROCEDURE — 85025 COMPLETE CBC W/AUTO DIFF WBC: CPT

## 2017-06-12 PROCEDURE — 76856 US EXAM PELVIC COMPLETE: CPT

## 2017-06-12 PROCEDURE — 81001 URINALYSIS AUTO W/SCOPE: CPT

## 2017-06-12 ASSESSMENT — PAIN DESCRIPTION - LOCATION: LOCATION: KNEE

## 2017-06-12 ASSESSMENT — PAIN SCALES - GENERAL
PAINLEVEL_OUTOF10: 3
PAINLEVEL_OUTOF10: 0

## 2017-06-12 ASSESSMENT — PAIN DESCRIPTION - ORIENTATION: ORIENTATION: RIGHT

## 2017-06-13 ENCOUNTER — ANESTHESIA EVENT (OUTPATIENT)
Dept: OPERATING ROOM | Age: 67
End: 2017-06-13
Payer: MEDICARE

## 2017-06-13 ENCOUNTER — TELEPHONE (OUTPATIENT)
Dept: FAMILY MEDICINE CLINIC | Age: 67
End: 2017-06-13

## 2017-06-13 LAB
CHLAMYDIA BY THIN PREP: NEGATIVE
CULTURE: NORMAL
CULTURE: NORMAL
Lab: NORMAL
N. GONORRHOEAE DNA, THIN PREP: NEGATIVE
SPECIMEN DESCRIPTION: NORMAL
SPECIMEN DESCRIPTION: NORMAL
STATUS: NORMAL

## 2017-06-14 ENCOUNTER — HOSPITAL ENCOUNTER (OUTPATIENT)
Dept: PHYSICAL THERAPY | Age: 67
Setting detail: THERAPIES SERIES
Discharge: HOME | End: 2017-06-14
Payer: MEDICARE

## 2017-06-14 LAB
CYTOLOGY REPORT: NORMAL
HPV SAMPLE: NORMAL
HPV SOURCE: NORMAL
HPV, GENOTYPE 16: NOT DETECTED
HPV, GENOTYPE 18: NOT DETECTED
HPV, HIGH RISK OTHER: NOT DETECTED
HPV, INTERPRETATION: NORMAL

## 2017-06-14 PROCEDURE — 97113 AQUATIC THERAPY/EXERCISES: CPT

## 2017-06-14 ASSESSMENT — PAIN SCALES - GENERAL: PAINLEVEL_OUTOF10: 3

## 2017-06-14 ASSESSMENT — PAIN DESCRIPTION - PAIN TYPE: TYPE: CHRONIC PAIN

## 2017-06-14 ASSESSMENT — PAIN DESCRIPTION - LOCATION: LOCATION: KNEE

## 2017-06-14 ASSESSMENT — PAIN DESCRIPTION - ORIENTATION: ORIENTATION: LEFT;RIGHT

## 2017-06-19 ENCOUNTER — HOSPITAL ENCOUNTER (OUTPATIENT)
Dept: PHYSICAL THERAPY | Age: 67
Setting detail: THERAPIES SERIES
Discharge: HOME | End: 2017-06-19
Payer: MEDICARE

## 2017-06-19 PROCEDURE — 97113 AQUATIC THERAPY/EXERCISES: CPT

## 2017-06-19 ASSESSMENT — PAIN DESCRIPTION - LOCATION: LOCATION: KNEE

## 2017-06-19 ASSESSMENT — PAIN DESCRIPTION - PAIN TYPE: TYPE: CHRONIC PAIN

## 2017-06-19 ASSESSMENT — PAIN DESCRIPTION - ORIENTATION: ORIENTATION: RIGHT;LEFT

## 2017-06-19 ASSESSMENT — PAIN SCALES - GENERAL: PAINLEVEL_OUTOF10: 3

## 2017-06-21 ENCOUNTER — HOSPITAL ENCOUNTER (OUTPATIENT)
Dept: PHYSICAL THERAPY | Age: 67
Setting detail: THERAPIES SERIES
Discharge: HOME | End: 2017-06-21
Payer: MEDICARE

## 2017-06-21 PROCEDURE — 97113 AQUATIC THERAPY/EXERCISES: CPT

## 2017-06-21 ASSESSMENT — PAIN SCALES - GENERAL: PAINLEVEL_OUTOF10: 3

## 2017-06-21 ASSESSMENT — PAIN DESCRIPTION - PAIN TYPE: TYPE: CHRONIC PAIN

## 2017-06-21 ASSESSMENT — PAIN DESCRIPTION - LOCATION: LOCATION: KNEE

## 2017-06-21 ASSESSMENT — PAIN DESCRIPTION - ORIENTATION: ORIENTATION: RIGHT;LEFT

## 2017-06-26 ENCOUNTER — ANESTHESIA (OUTPATIENT)
Dept: OPERATING ROOM | Age: 67
End: 2017-06-26
Payer: MEDICARE

## 2017-06-26 ENCOUNTER — HOSPITAL ENCOUNTER (OUTPATIENT)
Age: 67
Setting detail: OUTPATIENT SURGERY
Discharge: HOME | End: 2017-06-26
Attending: SURGERY | Admitting: SURGERY
Payer: MEDICARE

## 2017-06-26 VITALS
WEIGHT: 241 LBS | TEMPERATURE: 98.1 F | HEART RATE: 66 BPM | BODY MASS INDEX: 45.5 KG/M2 | OXYGEN SATURATION: 97 % | RESPIRATION RATE: 16 BRPM | DIASTOLIC BLOOD PRESSURE: 78 MMHG | HEIGHT: 61 IN | SYSTOLIC BLOOD PRESSURE: 140 MMHG

## 2017-06-26 VITALS — SYSTOLIC BLOOD PRESSURE: 138 MMHG | DIASTOLIC BLOOD PRESSURE: 77 MMHG | OXYGEN SATURATION: 100 %

## 2017-06-26 LAB — GLUCOSE BLD-MCNC: 122 MG/DL (ref 65–105)

## 2017-06-26 PROCEDURE — 2580000003 HC RX 258: Performed by: SURGERY

## 2017-06-26 PROCEDURE — 88305 TISSUE EXAM BY PATHOLOGIST: CPT

## 2017-06-26 PROCEDURE — 7100000030 HC ASPR PHASE II RECOVERY - FIRST 15 MIN: Performed by: SURGERY

## 2017-06-26 PROCEDURE — 3609010400 HC COLONOSCOPY POLYPECTOMY HOT BIOPSY: Performed by: SURGERY

## 2017-06-26 PROCEDURE — 2500000003 HC RX 250 WO HCPCS

## 2017-06-26 PROCEDURE — 82947 ASSAY GLUCOSE BLOOD QUANT: CPT

## 2017-06-26 PROCEDURE — 3700000000 HC ANESTHESIA ATTENDED CARE: Performed by: SURGERY

## 2017-06-26 PROCEDURE — 6360000002 HC RX W HCPCS

## 2017-06-26 PROCEDURE — 3700000001 HC ADD 15 MINUTES (ANESTHESIA): Performed by: SURGERY

## 2017-06-26 PROCEDURE — 7100000031 HC ASPR PHASE II RECOVERY - ADDTL 15 MIN: Performed by: SURGERY

## 2017-06-26 PROCEDURE — 7100000000 HC PACU RECOVERY - FIRST 15 MIN: Performed by: SURGERY

## 2017-06-26 PROCEDURE — 7100000001 HC PACU RECOVERY - ADDTL 15 MIN: Performed by: SURGERY

## 2017-06-26 RX ORDER — DIPHENHYDRAMINE HYDROCHLORIDE 50 MG/ML
12.5 INJECTION INTRAMUSCULAR; INTRAVENOUS
Status: DISCONTINUED | OUTPATIENT
Start: 2017-06-26 | End: 2017-06-26 | Stop reason: HOSPADM

## 2017-06-26 RX ORDER — MEPERIDINE HYDROCHLORIDE 25 MG/ML
12.5 INJECTION INTRAMUSCULAR; INTRAVENOUS; SUBCUTANEOUS EVERY 5 MIN PRN
Status: DISCONTINUED | OUTPATIENT
Start: 2017-06-26 | End: 2017-06-26 | Stop reason: HOSPADM

## 2017-06-26 RX ORDER — LIDOCAINE HYDROCHLORIDE 10 MG/ML
INJECTION, SOLUTION EPIDURAL; INFILTRATION; INTRACAUDAL; PERINEURAL PRN
Status: DISCONTINUED | OUTPATIENT
Start: 2017-06-26 | End: 2017-06-26 | Stop reason: SDUPTHER

## 2017-06-26 RX ORDER — LABETALOL HYDROCHLORIDE 5 MG/ML
5 INJECTION, SOLUTION INTRAVENOUS EVERY 10 MIN PRN
Status: DISCONTINUED | OUTPATIENT
Start: 2017-06-26 | End: 2017-06-26 | Stop reason: HOSPADM

## 2017-06-26 RX ORDER — OXYCODONE HYDROCHLORIDE AND ACETAMINOPHEN 5; 325 MG/1; MG/1
2 TABLET ORAL PRN
Status: DISCONTINUED | OUTPATIENT
Start: 2017-06-26 | End: 2017-06-26 | Stop reason: HOSPADM

## 2017-06-26 RX ORDER — OXYCODONE HYDROCHLORIDE AND ACETAMINOPHEN 5; 325 MG/1; MG/1
1 TABLET ORAL PRN
Status: DISCONTINUED | OUTPATIENT
Start: 2017-06-26 | End: 2017-06-26 | Stop reason: HOSPADM

## 2017-06-26 RX ORDER — PROPOFOL 10 MG/ML
INJECTION, EMULSION INTRAVENOUS CONTINUOUS PRN
Status: DISCONTINUED | OUTPATIENT
Start: 2017-06-26 | End: 2017-06-26 | Stop reason: SDUPTHER

## 2017-06-26 RX ORDER — PROMETHAZINE HYDROCHLORIDE 25 MG/ML
6.25 INJECTION, SOLUTION INTRAMUSCULAR; INTRAVENOUS
Status: DISCONTINUED | OUTPATIENT
Start: 2017-06-26 | End: 2017-06-26 | Stop reason: HOSPADM

## 2017-06-26 RX ORDER — SODIUM CHLORIDE 9 MG/ML
INJECTION, SOLUTION INTRAVENOUS CONTINUOUS
Status: DISCONTINUED | OUTPATIENT
Start: 2017-06-26 | End: 2017-06-26 | Stop reason: HOSPADM

## 2017-06-26 RX ORDER — MIDAZOLAM HYDROCHLORIDE 1 MG/ML
INJECTION INTRAMUSCULAR; INTRAVENOUS PRN
Status: DISCONTINUED | OUTPATIENT
Start: 2017-06-26 | End: 2017-06-26 | Stop reason: SDUPTHER

## 2017-06-26 RX ADMIN — MIDAZOLAM HYDROCHLORIDE 2 MG: 1 INJECTION, SOLUTION INTRAMUSCULAR; INTRAVENOUS at 08:31

## 2017-06-26 RX ADMIN — SODIUM CHLORIDE: 9 INJECTION, SOLUTION INTRAVENOUS at 06:48

## 2017-06-26 RX ADMIN — LIDOCAINE HYDROCHLORIDE 50 MG: 10 INJECTION, SOLUTION EPIDURAL; INFILTRATION; INTRACAUDAL; PERINEURAL at 08:34

## 2017-06-26 RX ADMIN — PROPOFOL 100 MCG/KG/MIN: 10 INJECTION, EMULSION INTRAVENOUS at 08:34

## 2017-06-26 ASSESSMENT — PAIN SCALES - GENERAL
PAINLEVEL_OUTOF10: 0

## 2017-06-26 ASSESSMENT — PAIN - FUNCTIONAL ASSESSMENT: PAIN_FUNCTIONAL_ASSESSMENT: 0-10

## 2017-06-27 ENCOUNTER — HOSPITAL ENCOUNTER (OUTPATIENT)
Dept: PHYSICAL THERAPY | Age: 67
Setting detail: THERAPIES SERIES
Discharge: HOME | End: 2017-06-27
Payer: MEDICARE

## 2017-06-27 LAB
EKG ATRIAL RATE: 65 BPM
EKG P AXIS: 51 DEGREES
EKG P-R INTERVAL: 170 MS
EKG Q-T INTERVAL: 414 MS
EKG QRS DURATION: 84 MS
EKG QTC CALCULATION (BAZETT): 430 MS
EKG R AXIS: 73 DEGREES
EKG T AXIS: 74 DEGREES
EKG VENTRICULAR RATE: 65 BPM
SURGICAL PATHOLOGY REPORT: NORMAL

## 2017-06-27 PROCEDURE — 97113 AQUATIC THERAPY/EXERCISES: CPT

## 2017-06-27 ASSESSMENT — PAIN DESCRIPTION - PAIN TYPE: TYPE: CHRONIC PAIN

## 2017-06-27 ASSESSMENT — PAIN DESCRIPTION - LOCATION: LOCATION: KNEE

## 2017-06-27 ASSESSMENT — PAIN DESCRIPTION - ORIENTATION: ORIENTATION: RIGHT;LEFT

## 2017-06-27 ASSESSMENT — PAIN SCALES - GENERAL: PAINLEVEL_OUTOF10: 3

## 2017-06-29 ENCOUNTER — HOSPITAL ENCOUNTER (OUTPATIENT)
Dept: PHYSICAL THERAPY | Age: 67
Setting detail: THERAPIES SERIES
Discharge: HOME | End: 2017-06-29
Payer: MEDICARE

## 2017-06-29 PROCEDURE — G8979 MOBILITY GOAL STATUS: HCPCS

## 2017-06-29 PROCEDURE — G8978 MOBILITY CURRENT STATUS: HCPCS

## 2017-06-29 PROCEDURE — 97113 AQUATIC THERAPY/EXERCISES: CPT

## 2017-06-29 ASSESSMENT — PAIN DESCRIPTION - DIRECTION
RADIATING_TOWARDS: MEDIAL AND LATERAL JOINT LINE
RADIATING_TOWARDS_3: T12

## 2017-06-29 ASSESSMENT — PAIN DESCRIPTION - PAIN TYPE
TYPE: CHRONIC PAIN
TYPE: CHRONIC PAIN

## 2017-06-29 ASSESSMENT — PAIN DESCRIPTION - LOCATION
LOCATION: KNEE
LOCATION: KNEE
LOCATION_2: KNEE
LOCATION_3: BACK

## 2017-06-29 ASSESSMENT — PAIN DESCRIPTION - INTENSITY
RATING_2: 4
RATING_3: 1

## 2017-06-29 ASSESSMENT — PAIN DESCRIPTION - ORIENTATION
ORIENTATION: LEFT
ORIENTATION_2: RIGHT
ORIENTATION: RIGHT;LEFT
ORIENTATION_3: LOWER;MID

## 2017-06-29 ASSESSMENT — PAIN SCALES - GENERAL
PAINLEVEL_OUTOF10: 3
PAINLEVEL_OUTOF10: 3

## 2017-07-05 ENCOUNTER — APPOINTMENT (OUTPATIENT)
Dept: PHYSICAL THERAPY | Age: 67
End: 2017-07-05
Payer: MEDICARE

## 2017-07-06 ENCOUNTER — HOSPITAL ENCOUNTER (OUTPATIENT)
Dept: PHYSICAL THERAPY | Age: 67
Setting detail: THERAPIES SERIES
Discharge: HOME | End: 2017-07-06
Payer: MEDICARE

## 2017-07-06 PROCEDURE — 97113 AQUATIC THERAPY/EXERCISES: CPT

## 2017-07-07 ENCOUNTER — HOSPITAL ENCOUNTER (OUTPATIENT)
Dept: PHYSICAL THERAPY | Age: 67
Setting detail: THERAPIES SERIES
Discharge: HOME | End: 2017-07-07
Payer: MEDICARE

## 2017-07-11 ENCOUNTER — HOSPITAL ENCOUNTER (OUTPATIENT)
Dept: PHYSICAL THERAPY | Age: 67
Setting detail: THERAPIES SERIES
Discharge: HOME | End: 2017-07-11
Payer: MEDICARE

## 2017-07-13 ENCOUNTER — HOSPITAL ENCOUNTER (OUTPATIENT)
Dept: PHYSICAL THERAPY | Age: 67
Setting detail: THERAPIES SERIES
Discharge: HOME | End: 2017-07-13
Payer: MEDICARE

## 2017-07-13 PROCEDURE — 97113 AQUATIC THERAPY/EXERCISES: CPT

## 2017-07-18 ENCOUNTER — HOSPITAL ENCOUNTER (OUTPATIENT)
Dept: PHYSICAL THERAPY | Age: 67
Setting detail: THERAPIES SERIES
Discharge: HOME | End: 2017-07-18
Payer: MEDICARE

## 2017-07-18 PROCEDURE — 97113 AQUATIC THERAPY/EXERCISES: CPT

## 2017-07-20 ENCOUNTER — OFFICE VISIT (OUTPATIENT)
Dept: OBGYN CLINIC | Age: 67
End: 2017-07-20
Payer: MEDICARE

## 2017-07-20 ENCOUNTER — TELEPHONE (OUTPATIENT)
Dept: FAMILY MEDICINE CLINIC | Age: 67
End: 2017-07-20

## 2017-07-20 VITALS
WEIGHT: 241 LBS | BODY MASS INDEX: 41.15 KG/M2 | DIASTOLIC BLOOD PRESSURE: 58 MMHG | HEART RATE: 92 BPM | SYSTOLIC BLOOD PRESSURE: 108 MMHG | HEIGHT: 64 IN

## 2017-07-20 DIAGNOSIS — N39.46 MIXED INCONTINENCE: Primary | ICD-10-CM

## 2017-07-20 DIAGNOSIS — R93.89 ENDOMETRIAL THICKENING ON ULTRA SOUND: ICD-10-CM

## 2017-07-20 DIAGNOSIS — N95.0 PMB (POSTMENOPAUSAL BLEEDING): ICD-10-CM

## 2017-07-20 PROCEDURE — 1090F PRES/ABSN URINE INCON ASSESS: CPT | Performed by: OBSTETRICS & GYNECOLOGY

## 2017-07-20 PROCEDURE — 1036F TOBACCO NON-USER: CPT | Performed by: OBSTETRICS & GYNECOLOGY

## 2017-07-20 PROCEDURE — 4040F PNEUMOC VAC/ADMIN/RCVD: CPT | Performed by: OBSTETRICS & GYNECOLOGY

## 2017-07-20 PROCEDURE — 3014F SCREEN MAMMO DOC REV: CPT | Performed by: OBSTETRICS & GYNECOLOGY

## 2017-07-20 PROCEDURE — 0509F URINE INCON PLAN DOCD: CPT | Performed by: OBSTETRICS & GYNECOLOGY

## 2017-07-20 PROCEDURE — 3017F COLORECTAL CA SCREEN DOC REV: CPT | Performed by: OBSTETRICS & GYNECOLOGY

## 2017-07-20 PROCEDURE — G8427 DOCREV CUR MEDS BY ELIG CLIN: HCPCS | Performed by: OBSTETRICS & GYNECOLOGY

## 2017-07-20 PROCEDURE — G8399 PT W/DXA RESULTS DOCUMENT: HCPCS | Performed by: OBSTETRICS & GYNECOLOGY

## 2017-07-20 PROCEDURE — 1123F ACP DISCUSS/DSCN MKR DOCD: CPT | Performed by: OBSTETRICS & GYNECOLOGY

## 2017-07-20 PROCEDURE — 99204 OFFICE O/P NEW MOD 45 MIN: CPT | Performed by: OBSTETRICS & GYNECOLOGY

## 2017-07-20 PROCEDURE — G8417 CALC BMI ABV UP PARAM F/U: HCPCS | Performed by: OBSTETRICS & GYNECOLOGY

## 2017-07-24 ENCOUNTER — TELEPHONE (OUTPATIENT)
Dept: FAMILY MEDICINE CLINIC | Age: 67
End: 2017-07-24

## 2017-07-25 ENCOUNTER — HOSPITAL ENCOUNTER (OUTPATIENT)
Dept: PHYSICAL THERAPY | Age: 67
Setting detail: THERAPIES SERIES
Discharge: HOME | End: 2017-07-25
Payer: MEDICARE

## 2017-07-25 PROCEDURE — G8978 MOBILITY CURRENT STATUS: HCPCS

## 2017-07-25 PROCEDURE — G8979 MOBILITY GOAL STATUS: HCPCS

## 2017-07-25 PROCEDURE — 97113 AQUATIC THERAPY/EXERCISES: CPT

## 2017-07-25 ASSESSMENT — PAIN DESCRIPTION - LOCATION
LOCATION: KNEE
LOCATION_2: KNEE

## 2017-07-25 ASSESSMENT — PAIN DESCRIPTION - INTENSITY: RATING_2: 2

## 2017-07-25 ASSESSMENT — PAIN SCALES - GENERAL: PAINLEVEL_OUTOF10: 3

## 2017-07-25 ASSESSMENT — PAIN DESCRIPTION - ORIENTATION
ORIENTATION: LEFT
ORIENTATION_2: RIGHT

## 2017-07-27 ENCOUNTER — HOSPITAL ENCOUNTER (OUTPATIENT)
Age: 67
Discharge: HOME | End: 2017-07-27
Payer: MEDICARE

## 2017-07-27 ENCOUNTER — OFFICE VISIT (OUTPATIENT)
Dept: FAMILY MEDICINE CLINIC | Age: 67
End: 2017-07-27
Payer: MEDICARE

## 2017-07-27 VITALS
HEART RATE: 71 BPM | WEIGHT: 241 LBS | SYSTOLIC BLOOD PRESSURE: 134 MMHG | HEIGHT: 61 IN | BODY MASS INDEX: 45.5 KG/M2 | OXYGEN SATURATION: 96 % | TEMPERATURE: 97.2 F | RESPIRATION RATE: 19 BRPM | DIASTOLIC BLOOD PRESSURE: 76 MMHG

## 2017-07-27 DIAGNOSIS — Z23 NEED FOR TDAP VACCINATION: ICD-10-CM

## 2017-07-27 DIAGNOSIS — N93.9 ABNORMAL VAGINAL BLEEDING: ICD-10-CM

## 2017-07-27 DIAGNOSIS — D12.5 ADENOMATOUS POLYP OF SIGMOID COLON: ICD-10-CM

## 2017-07-27 DIAGNOSIS — R73.9 HYPERGLYCEMIA: ICD-10-CM

## 2017-07-27 DIAGNOSIS — I10 ESSENTIAL HYPERTENSION: ICD-10-CM

## 2017-07-27 DIAGNOSIS — I10 ESSENTIAL HYPERTENSION: Primary | ICD-10-CM

## 2017-07-27 DIAGNOSIS — N39.46 MIXED INCONTINENCE: ICD-10-CM

## 2017-07-27 LAB
ALBUMIN SERPL-MCNC: 4 G/DL (ref 3.5–5.2)
ALBUMIN/GLOBULIN RATIO: ABNORMAL (ref 1–2.5)
ALP BLD-CCNC: 75 U/L (ref 35–104)
ALT SERPL-CCNC: 16 U/L (ref 5–33)
ANION GAP SERPL CALCULATED.3IONS-SCNC: 12 MMOL/L (ref 9–17)
AST SERPL-CCNC: 17 U/L
BILIRUB SERPL-MCNC: 0.31 MG/DL (ref 0.3–1.2)
BUN BLDV-MCNC: 24 MG/DL (ref 8–23)
BUN/CREAT BLD: ABNORMAL (ref 9–20)
CALCIUM SERPL-MCNC: 9.5 MG/DL (ref 8.6–10.4)
CHLORIDE BLD-SCNC: 98 MMOL/L (ref 98–107)
CO2: 28 MMOL/L (ref 20–31)
CREAT SERPL-MCNC: 0.92 MG/DL (ref 0.5–0.9)
GFR AFRICAN AMERICAN: >60 ML/MIN
GFR NON-AFRICAN AMERICAN: >60 ML/MIN
GFR SERPL CREATININE-BSD FRML MDRD: ABNORMAL ML/MIN/{1.73_M2}
GFR SERPL CREATININE-BSD FRML MDRD: ABNORMAL ML/MIN/{1.73_M2}
GLUCOSE BLD-MCNC: 100 MG/DL (ref 70–99)
HBA1C MFR BLD: 5.7 %
HCT VFR BLD CALC: 39.7 % (ref 36–46)
HEMOGLOBIN: 13.2 G/DL (ref 12–16)
MCH RBC QN AUTO: 30 PG (ref 26–34)
MCHC RBC AUTO-ENTMCNC: 33.3 G/DL (ref 31–37)
MCV RBC AUTO: 89.9 FL (ref 80–100)
PDW BLD-RTO: 13.4 % (ref 11.5–14.9)
PLATELET # BLD: 272 K/UL (ref 150–450)
PMV BLD AUTO: 8.9 FL (ref 6–12)
POTASSIUM SERPL-SCNC: 3.7 MMOL/L (ref 3.7–5.3)
RBC # BLD: 4.41 M/UL (ref 4–5.2)
SODIUM BLD-SCNC: 138 MMOL/L (ref 135–144)
TOTAL PROTEIN: 7.7 G/DL (ref 6.4–8.3)
WBC # BLD: 10.3 K/UL (ref 3.5–11)

## 2017-07-27 PROCEDURE — 1036F TOBACCO NON-USER: CPT | Performed by: FAMILY MEDICINE

## 2017-07-27 PROCEDURE — 1123F ACP DISCUSS/DSCN MKR DOCD: CPT | Performed by: FAMILY MEDICINE

## 2017-07-27 PROCEDURE — 4040F PNEUMOC VAC/ADMIN/RCVD: CPT | Performed by: FAMILY MEDICINE

## 2017-07-27 PROCEDURE — 0509F URINE INCON PLAN DOCD: CPT | Performed by: FAMILY MEDICINE

## 2017-07-27 PROCEDURE — 1090F PRES/ABSN URINE INCON ASSESS: CPT | Performed by: FAMILY MEDICINE

## 2017-07-27 PROCEDURE — 99214 OFFICE O/P EST MOD 30 MIN: CPT | Performed by: FAMILY MEDICINE

## 2017-07-27 PROCEDURE — 85027 COMPLETE CBC AUTOMATED: CPT

## 2017-07-27 PROCEDURE — G8427 DOCREV CUR MEDS BY ELIG CLIN: HCPCS | Performed by: FAMILY MEDICINE

## 2017-07-27 PROCEDURE — 83036 HEMOGLOBIN GLYCOSYLATED A1C: CPT | Performed by: FAMILY MEDICINE

## 2017-07-27 PROCEDURE — 36415 COLL VENOUS BLD VENIPUNCTURE: CPT

## 2017-07-27 PROCEDURE — G8399 PT W/DXA RESULTS DOCUMENT: HCPCS | Performed by: FAMILY MEDICINE

## 2017-07-27 PROCEDURE — G8417 CALC BMI ABV UP PARAM F/U: HCPCS | Performed by: FAMILY MEDICINE

## 2017-07-27 PROCEDURE — 3017F COLORECTAL CA SCREEN DOC REV: CPT | Performed by: FAMILY MEDICINE

## 2017-07-27 PROCEDURE — 80053 COMPREHEN METABOLIC PANEL: CPT

## 2017-07-27 PROCEDURE — 3014F SCREEN MAMMO DOC REV: CPT | Performed by: FAMILY MEDICINE

## 2017-07-27 ASSESSMENT — ENCOUNTER SYMPTOMS
CONSTIPATION: 0
SHORTNESS OF BREATH: 0
VOMITING: 0
COUGH: 0
CHEST TIGHTNESS: 0
ABDOMINAL PAIN: 0
NAUSEA: 0
ABDOMINAL DISTENTION: 0
WHEEZING: 0
DIARRHEA: 0
BLOOD IN STOOL: 0
BACK PAIN: 1

## 2017-07-30 PROBLEM — D12.5 ADENOMATOUS POLYP OF SIGMOID COLON: Status: ACTIVE | Noted: 2017-07-30

## 2017-07-31 ENCOUNTER — TELEPHONE (OUTPATIENT)
Dept: FAMILY MEDICINE CLINIC | Age: 67
End: 2017-07-31

## 2017-07-31 DIAGNOSIS — B37.49 CANDIDIASIS OF PERINEUM: ICD-10-CM

## 2017-07-31 DIAGNOSIS — N39.46 MIXED INCONTINENCE: Primary | ICD-10-CM

## 2017-07-31 RX ORDER — GREEN TEA/HOODIA GORDONII 315-12.5MG
1 CAPSULE ORAL DAILY
Qty: 30 TABLET | Refills: 3 | Status: SHIPPED | OUTPATIENT
Start: 2017-07-31 | End: 2020-10-30

## 2017-07-31 RX ORDER — PYRIDOXINE HCL (VITAMIN B6) 100 MG
500 TABLET ORAL DAILY
Qty: 30 CAPSULE | Refills: 3 | Status: SHIPPED | OUTPATIENT
Start: 2017-07-31 | End: 2019-10-03 | Stop reason: SDUPTHER

## 2017-08-01 ENCOUNTER — HOSPITAL ENCOUNTER (OUTPATIENT)
Dept: PHYSICAL THERAPY | Age: 67
Setting detail: THERAPIES SERIES
Discharge: HOME | End: 2017-08-01
Payer: MEDICARE

## 2017-08-01 PROCEDURE — 97113 AQUATIC THERAPY/EXERCISES: CPT

## 2017-08-01 ASSESSMENT — PAIN DESCRIPTION - LOCATION: LOCATION: KNEE

## 2017-08-01 ASSESSMENT — PAIN DESCRIPTION - ORIENTATION: ORIENTATION: LEFT

## 2017-08-01 ASSESSMENT — PAIN SCALES - GENERAL: PAINLEVEL_OUTOF10: 2

## 2017-08-01 ASSESSMENT — PAIN DESCRIPTION - PAIN TYPE: TYPE: CHRONIC PAIN

## 2017-08-03 ENCOUNTER — HOSPITAL ENCOUNTER (OUTPATIENT)
Dept: PHYSICAL THERAPY | Age: 67
Setting detail: THERAPIES SERIES
Discharge: HOME | End: 2017-08-03
Payer: MEDICARE

## 2017-08-03 PROCEDURE — 97113 AQUATIC THERAPY/EXERCISES: CPT

## 2017-08-05 DIAGNOSIS — E55.9 VITAMIN D DEFICIENCY: Primary | ICD-10-CM

## 2017-08-07 RX ORDER — METHOCARBAMOL 750 MG/1
TABLET ORAL
Qty: 4 CAPSULE | Refills: 0 | Status: SHIPPED | OUTPATIENT
Start: 2017-08-07 | End: 2017-09-11 | Stop reason: SDUPTHER

## 2017-08-08 ENCOUNTER — APPOINTMENT (OUTPATIENT)
Dept: PHYSICAL THERAPY | Age: 67
End: 2017-08-08
Payer: MEDICARE

## 2017-08-08 DIAGNOSIS — R73.9 HYPERGLYCEMIA: Primary | ICD-10-CM

## 2017-08-15 ENCOUNTER — TELEPHONE (OUTPATIENT)
Dept: OBGYN CLINIC | Age: 67
End: 2017-08-15

## 2017-08-15 ENCOUNTER — HOSPITAL ENCOUNTER (OUTPATIENT)
Dept: PHYSICAL THERAPY | Age: 67
Setting detail: THERAPIES SERIES
Discharge: HOME | End: 2017-08-15
Payer: MEDICARE

## 2017-08-15 PROCEDURE — 97113 AQUATIC THERAPY/EXERCISES: CPT

## 2017-08-17 ENCOUNTER — HOSPITAL ENCOUNTER (OUTPATIENT)
Dept: PHYSICAL THERAPY | Age: 67
Setting detail: THERAPIES SERIES
Discharge: HOME | End: 2017-08-17
Payer: MEDICARE

## 2017-08-17 PROCEDURE — G8980 MOBILITY D/C STATUS: HCPCS

## 2017-08-17 PROCEDURE — 97113 AQUATIC THERAPY/EXERCISES: CPT

## 2017-08-17 PROCEDURE — G8979 MOBILITY GOAL STATUS: HCPCS

## 2017-08-17 ASSESSMENT — PAIN DESCRIPTION - LOCATION
LOCATION_2: KNEE
LOCATION: KNEE
LOCATION_3: BACK

## 2017-08-17 ASSESSMENT — PAIN DESCRIPTION - ORIENTATION
ORIENTATION_2: RIGHT
ORIENTATION: LEFT
ORIENTATION_3: LOWER;MID

## 2017-08-17 ASSESSMENT — PAIN DESCRIPTION - INTENSITY
RATING_3: 1
RATING_2: 2

## 2017-08-17 ASSESSMENT — PAIN DESCRIPTION - DIRECTION: RADIATING_TOWARDS_3: T12

## 2017-08-17 ASSESSMENT — PAIN SCALES - GENERAL: PAINLEVEL_OUTOF10: 3

## 2017-08-17 ASSESSMENT — PAIN DESCRIPTION - PAIN TYPE: TYPE: CHRONIC PAIN

## 2017-08-22 ENCOUNTER — APPOINTMENT (OUTPATIENT)
Dept: PHYSICAL THERAPY | Age: 67
End: 2017-08-22
Payer: MEDICARE

## 2017-08-24 ENCOUNTER — APPOINTMENT (OUTPATIENT)
Dept: PHYSICAL THERAPY | Age: 67
End: 2017-08-24
Payer: MEDICARE

## 2017-08-28 ENCOUNTER — OFFICE VISIT (OUTPATIENT)
Dept: UROLOGY | Age: 67
End: 2017-08-28
Payer: MEDICARE

## 2017-08-28 VITALS
WEIGHT: 242.2 LBS | SYSTOLIC BLOOD PRESSURE: 121 MMHG | TEMPERATURE: 98.2 F | BODY MASS INDEX: 45.73 KG/M2 | HEIGHT: 61 IN | DIASTOLIC BLOOD PRESSURE: 64 MMHG | HEART RATE: 60 BPM

## 2017-08-28 DIAGNOSIS — R39.15 URGENCY OF URINATION: ICD-10-CM

## 2017-08-28 DIAGNOSIS — R35.1 NOCTURIA: ICD-10-CM

## 2017-08-28 DIAGNOSIS — R35.0 FREQUENCY OF URINATION: ICD-10-CM

## 2017-08-28 DIAGNOSIS — N39.46 MIXED INCONTINENCE: Primary | ICD-10-CM

## 2017-08-28 PROCEDURE — 3017F COLORECTAL CA SCREEN DOC REV: CPT | Performed by: UROLOGY

## 2017-08-28 PROCEDURE — 0509F URINE INCON PLAN DOCD: CPT | Performed by: UROLOGY

## 2017-08-28 PROCEDURE — G8417 CALC BMI ABV UP PARAM F/U: HCPCS | Performed by: UROLOGY

## 2017-08-28 PROCEDURE — 1123F ACP DISCUSS/DSCN MKR DOCD: CPT | Performed by: UROLOGY

## 2017-08-28 PROCEDURE — 1090F PRES/ABSN URINE INCON ASSESS: CPT | Performed by: UROLOGY

## 2017-08-28 PROCEDURE — 99204 OFFICE O/P NEW MOD 45 MIN: CPT | Performed by: UROLOGY

## 2017-08-28 PROCEDURE — 4040F PNEUMOC VAC/ADMIN/RCVD: CPT | Performed by: UROLOGY

## 2017-08-28 PROCEDURE — 3014F SCREEN MAMMO DOC REV: CPT | Performed by: UROLOGY

## 2017-08-28 PROCEDURE — 1036F TOBACCO NON-USER: CPT | Performed by: UROLOGY

## 2017-08-28 PROCEDURE — G8399 PT W/DXA RESULTS DOCUMENT: HCPCS | Performed by: UROLOGY

## 2017-08-28 PROCEDURE — G8427 DOCREV CUR MEDS BY ELIG CLIN: HCPCS | Performed by: UROLOGY

## 2017-08-28 RX ORDER — MIRABEGRON 50 MG/1
50 TABLET, FILM COATED, EXTENDED RELEASE ORAL DAILY
Qty: 30 TABLET | Refills: 11 | Status: SHIPPED | OUTPATIENT
Start: 2017-08-28 | End: 2018-12-03 | Stop reason: SDUPTHER

## 2017-08-28 ASSESSMENT — ENCOUNTER SYMPTOMS
COUGH: 0
VOMITING: 0
EYE PAIN: 0
COLOR CHANGE: 0
SHORTNESS OF BREATH: 0
NAUSEA: 0
ABDOMINAL PAIN: 0
EYE REDNESS: 0
WHEEZING: 0
BACK PAIN: 1

## 2017-08-29 ENCOUNTER — APPOINTMENT (OUTPATIENT)
Dept: PHYSICAL THERAPY | Age: 67
End: 2017-08-29
Payer: MEDICARE

## 2017-08-31 ENCOUNTER — APPOINTMENT (OUTPATIENT)
Dept: PHYSICAL THERAPY | Age: 67
End: 2017-08-31
Payer: MEDICARE

## 2017-09-06 ENCOUNTER — HOSPITAL ENCOUNTER (EMERGENCY)
Age: 67
Discharge: HOME | End: 2017-09-06
Attending: EMERGENCY MEDICINE
Payer: MEDICARE

## 2017-09-06 VITALS
TEMPERATURE: 98.3 F | HEIGHT: 61 IN | SYSTOLIC BLOOD PRESSURE: 143 MMHG | OXYGEN SATURATION: 95 % | HEART RATE: 73 BPM | WEIGHT: 244 LBS | RESPIRATION RATE: 17 BRPM | BODY MASS INDEX: 46.07 KG/M2 | DIASTOLIC BLOOD PRESSURE: 71 MMHG

## 2017-09-06 DIAGNOSIS — N93.9 VAGINAL BLEEDING: Primary | ICD-10-CM

## 2017-09-06 LAB
ABSOLUTE EOS #: 0.2 K/UL (ref 0–0.4)
ABSOLUTE LYMPH #: 1.8 K/UL (ref 1–4.8)
ABSOLUTE MONO #: 0.5 K/UL (ref 0.1–1.3)
BASOPHILS # BLD: 1 %
BASOPHILS ABSOLUTE: 0.1 K/UL (ref 0–0.2)
DIFFERENTIAL TYPE: NORMAL
EOSINOPHILS RELATIVE PERCENT: 2 %
HCT VFR BLD CALC: 39.1 % (ref 36–46)
HEMOGLOBIN: 12.9 G/DL (ref 12–16)
LYMPHOCYTES # BLD: 19 %
MCH RBC QN AUTO: 29.7 PG (ref 26–34)
MCHC RBC AUTO-ENTMCNC: 33.1 G/DL (ref 31–37)
MCV RBC AUTO: 89.8 FL (ref 80–100)
MONOCYTES # BLD: 5 %
PDW BLD-RTO: 13.5 % (ref 11.5–14.9)
PLATELET # BLD: 265 K/UL (ref 150–450)
PLATELET ESTIMATE: NORMAL
PMV BLD AUTO: 9 FL (ref 6–12)
RBC # BLD: 4.35 M/UL (ref 4–5.2)
RBC # BLD: NORMAL 10*6/UL
SEG NEUTROPHILS: 73 %
SEGMENTED NEUTROPHILS ABSOLUTE COUNT: 7.1 K/UL (ref 1.3–9.1)
WBC # BLD: 9.6 K/UL (ref 3.5–11)
WBC # BLD: NORMAL 10*3/UL

## 2017-09-06 PROCEDURE — 85025 COMPLETE CBC W/AUTO DIFF WBC: CPT

## 2017-09-06 PROCEDURE — 36415 COLL VENOUS BLD VENIPUNCTURE: CPT

## 2017-09-06 PROCEDURE — 99284 EMERGENCY DEPT VISIT MOD MDM: CPT

## 2017-09-06 ASSESSMENT — ENCOUNTER SYMPTOMS
BACK PAIN: 0
VOMITING: 0
EYE PAIN: 0
EYE REDNESS: 0
COUGH: 0
RHINORRHEA: 0
ABDOMINAL PAIN: 0
SHORTNESS OF BREATH: 0
NAUSEA: 0

## 2017-09-11 DIAGNOSIS — E55.9 VITAMIN D DEFICIENCY: ICD-10-CM

## 2017-09-11 RX ORDER — METHOCARBAMOL 750 MG/1
TABLET ORAL
Qty: 4 CAPSULE | Refills: 0 | Status: SHIPPED | OUTPATIENT
Start: 2017-09-11 | End: 2017-09-25 | Stop reason: SDUPTHER

## 2017-09-12 ENCOUNTER — OFFICE VISIT (OUTPATIENT)
Dept: OBGYN CLINIC | Age: 67
End: 2017-09-12
Payer: MEDICARE

## 2017-09-12 VITALS
HEART RATE: 92 BPM | DIASTOLIC BLOOD PRESSURE: 82 MMHG | SYSTOLIC BLOOD PRESSURE: 132 MMHG | HEIGHT: 64 IN | WEIGHT: 244 LBS | BODY MASS INDEX: 41.66 KG/M2

## 2017-09-12 DIAGNOSIS — N95.0 PMB (POSTMENOPAUSAL BLEEDING): Primary | ICD-10-CM

## 2017-09-12 DIAGNOSIS — R93.89 ENDOMETRIAL THICKENING ON ULTRA SOUND: ICD-10-CM

## 2017-09-12 PROCEDURE — 99213 OFFICE O/P EST LOW 20 MIN: CPT | Performed by: OBSTETRICS & GYNECOLOGY

## 2017-09-12 PROCEDURE — 4040F PNEUMOC VAC/ADMIN/RCVD: CPT | Performed by: OBSTETRICS & GYNECOLOGY

## 2017-09-12 PROCEDURE — 3017F COLORECTAL CA SCREEN DOC REV: CPT | Performed by: OBSTETRICS & GYNECOLOGY

## 2017-09-12 PROCEDURE — G8417 CALC BMI ABV UP PARAM F/U: HCPCS | Performed by: OBSTETRICS & GYNECOLOGY

## 2017-09-12 PROCEDURE — 1123F ACP DISCUSS/DSCN MKR DOCD: CPT | Performed by: OBSTETRICS & GYNECOLOGY

## 2017-09-12 PROCEDURE — G8399 PT W/DXA RESULTS DOCUMENT: HCPCS | Performed by: OBSTETRICS & GYNECOLOGY

## 2017-09-12 PROCEDURE — G8427 DOCREV CUR MEDS BY ELIG CLIN: HCPCS | Performed by: OBSTETRICS & GYNECOLOGY

## 2017-09-12 PROCEDURE — 1036F TOBACCO NON-USER: CPT | Performed by: OBSTETRICS & GYNECOLOGY

## 2017-09-12 PROCEDURE — 1090F PRES/ABSN URINE INCON ASSESS: CPT | Performed by: OBSTETRICS & GYNECOLOGY

## 2017-09-12 PROCEDURE — 3014F SCREEN MAMMO DOC REV: CPT | Performed by: OBSTETRICS & GYNECOLOGY

## 2017-09-13 ENCOUNTER — HOSPITAL ENCOUNTER (OUTPATIENT)
Dept: PREADMISSION TESTING | Age: 67
Discharge: HOME | End: 2017-09-13
Payer: MEDICARE

## 2017-09-13 VITALS
BODY MASS INDEX: 41.66 KG/M2 | HEIGHT: 64 IN | WEIGHT: 244 LBS | HEART RATE: 62 BPM | SYSTOLIC BLOOD PRESSURE: 134 MMHG | TEMPERATURE: 97.8 F | RESPIRATION RATE: 16 BRPM | DIASTOLIC BLOOD PRESSURE: 86 MMHG | OXYGEN SATURATION: 98 %

## 2017-09-13 LAB
-: ABNORMAL
ABO/RH: NORMAL
ABSOLUTE EOS #: 0.2 K/UL (ref 0–0.4)
ABSOLUTE LYMPH #: 1.9 K/UL (ref 1–4.8)
ABSOLUTE MONO #: 0.5 K/UL (ref 0.1–1.3)
AMORPHOUS: ABNORMAL
ANION GAP SERPL CALCULATED.3IONS-SCNC: 13 MMOL/L (ref 9–17)
ANTIBODY SCREEN: NEGATIVE
ARM BAND NUMBER: NORMAL
BACTERIA: ABNORMAL
BASOPHILS # BLD: 1 %
BASOPHILS ABSOLUTE: 0.1 K/UL (ref 0–0.2)
BILIRUBIN URINE: ABNORMAL
BLOOD BANK COMMENT: NORMAL
BUN BLDV-MCNC: 18 MG/DL (ref 8–23)
BUN/CREAT BLD: ABNORMAL (ref 9–20)
CALCIUM SERPL-MCNC: 9.4 MG/DL (ref 8.6–10.4)
CASTS UA: ABNORMAL /LPF
CHLORIDE BLD-SCNC: 99 MMOL/L (ref 98–107)
CO2: 27 MMOL/L (ref 20–31)
COLOR: YELLOW
COMMENT UA: ABNORMAL
CREAT SERPL-MCNC: 0.73 MG/DL (ref 0.5–0.9)
CRYSTALS, UA: ABNORMAL /HPF
DIFFERENTIAL TYPE: NORMAL
EOSINOPHILS RELATIVE PERCENT: 2 %
EPITHELIAL CELLS UA: ABNORMAL /HPF
EXPIRATION DATE: NORMAL
GFR AFRICAN AMERICAN: >60 ML/MIN
GFR NON-AFRICAN AMERICAN: >60 ML/MIN
GFR SERPL CREATININE-BSD FRML MDRD: ABNORMAL ML/MIN/{1.73_M2}
GFR SERPL CREATININE-BSD FRML MDRD: ABNORMAL ML/MIN/{1.73_M2}
GLUCOSE BLD-MCNC: 125 MG/DL (ref 70–99)
GLUCOSE URINE: NEGATIVE
HCT VFR BLD CALC: 38.4 % (ref 36–46)
HEMOGLOBIN: 12.7 G/DL (ref 12–16)
INR BLD: 0.9
KETONES, URINE: NEGATIVE
LEUKOCYTE ESTERASE, URINE: ABNORMAL
LYMPHOCYTES # BLD: 20 %
MCH RBC QN AUTO: 29.8 PG (ref 26–34)
MCHC RBC AUTO-ENTMCNC: 33 G/DL (ref 31–37)
MCV RBC AUTO: 90.4 FL (ref 80–100)
MONOCYTES # BLD: 6 %
MUCUS: ABNORMAL
NITRITE, URINE: NEGATIVE
OTHER OBSERVATIONS UA: ABNORMAL
PARTIAL THROMBOPLASTIN TIME: 25.9 SEC (ref 23–31)
PDW BLD-RTO: 13.8 % (ref 11.5–14.9)
PH UA: 7 (ref 5–8)
PLATELET # BLD: 273 K/UL (ref 150–450)
PLATELET ESTIMATE: NORMAL
PMV BLD AUTO: 8.7 FL (ref 6–12)
POTASSIUM SERPL-SCNC: 3.8 MMOL/L (ref 3.7–5.3)
PROTEIN UA: NEGATIVE
PROTHROMBIN TIME: 10 SEC (ref 9.7–12)
RBC # BLD: 4.25 M/UL (ref 4–5.2)
RBC # BLD: NORMAL 10*6/UL
RBC UA: ABNORMAL /HPF
RENAL EPITHELIAL, UA: ABNORMAL /HPF
SEG NEUTROPHILS: 71 %
SEGMENTED NEUTROPHILS ABSOLUTE COUNT: 6.7 K/UL (ref 1.3–9.1)
SODIUM BLD-SCNC: 139 MMOL/L (ref 135–144)
SPECIFIC GRAVITY UA: 1.02 (ref 1–1.03)
TRICHOMONAS: ABNORMAL
TURBIDITY: ABNORMAL
URINE HGB: ABNORMAL
UROBILINOGEN, URINE: NORMAL
WBC # BLD: 9.3 K/UL (ref 3.5–11)
WBC # BLD: NORMAL 10*3/UL
WBC UA: ABNORMAL /HPF
YEAST: ABNORMAL

## 2017-09-13 PROCEDURE — 36415 COLL VENOUS BLD VENIPUNCTURE: CPT

## 2017-09-13 PROCEDURE — 81001 URINALYSIS AUTO W/SCOPE: CPT

## 2017-09-13 PROCEDURE — 86900 BLOOD TYPING SEROLOGIC ABO: CPT

## 2017-09-13 PROCEDURE — 86901 BLOOD TYPING SEROLOGIC RH(D): CPT

## 2017-09-13 PROCEDURE — 80048 BASIC METABOLIC PNL TOTAL CA: CPT

## 2017-09-13 PROCEDURE — 85730 THROMBOPLASTIN TIME PARTIAL: CPT

## 2017-09-13 PROCEDURE — 85610 PROTHROMBIN TIME: CPT

## 2017-09-13 PROCEDURE — 87086 URINE CULTURE/COLONY COUNT: CPT

## 2017-09-13 PROCEDURE — 86850 RBC ANTIBODY SCREEN: CPT

## 2017-09-13 PROCEDURE — 85025 COMPLETE CBC W/AUTO DIFF WBC: CPT

## 2017-09-13 ASSESSMENT — PAIN DESCRIPTION - LOCATION: LOCATION: KNEE;HIP

## 2017-09-13 ASSESSMENT — PAIN DESCRIPTION - PAIN TYPE: TYPE: CHRONIC PAIN

## 2017-09-13 ASSESSMENT — PAIN SCALES - GENERAL: PAINLEVEL_OUTOF10: 2

## 2017-09-14 ENCOUNTER — HOSPITAL ENCOUNTER (EMERGENCY)
Age: 67
Discharge: HOME | End: 2017-09-14
Attending: EMERGENCY MEDICINE
Payer: MEDICARE

## 2017-09-14 ENCOUNTER — PREP FOR PROCEDURE (OUTPATIENT)
Dept: OBGYN CLINIC | Age: 67
End: 2017-09-14

## 2017-09-14 ENCOUNTER — ANESTHESIA EVENT (OUTPATIENT)
Dept: OPERATING ROOM | Age: 67
End: 2017-09-14
Payer: MEDICARE

## 2017-09-14 ENCOUNTER — APPOINTMENT (OUTPATIENT)
Dept: GENERAL RADIOLOGY | Age: 67
End: 2017-09-14
Payer: MEDICARE

## 2017-09-14 VITALS
HEIGHT: 61 IN | TEMPERATURE: 98 F | HEART RATE: 66 BPM | WEIGHT: 240 LBS | OXYGEN SATURATION: 97 % | BODY MASS INDEX: 45.31 KG/M2 | DIASTOLIC BLOOD PRESSURE: 73 MMHG | RESPIRATION RATE: 18 BRPM | SYSTOLIC BLOOD PRESSURE: 168 MMHG

## 2017-09-14 DIAGNOSIS — M25.562 CHRONIC PAIN OF BOTH KNEES: Primary | ICD-10-CM

## 2017-09-14 DIAGNOSIS — M17.0 OSTEOARTHRITIS OF BOTH KNEES, UNSPECIFIED OSTEOARTHRITIS TYPE: ICD-10-CM

## 2017-09-14 DIAGNOSIS — G89.29 CHRONIC PAIN OF BOTH KNEES: Primary | ICD-10-CM

## 2017-09-14 DIAGNOSIS — M25.561 CHRONIC PAIN OF BOTH KNEES: Primary | ICD-10-CM

## 2017-09-14 DIAGNOSIS — Z01.818 PREOP TESTING: Primary | ICD-10-CM

## 2017-09-14 LAB
CULTURE: NORMAL
CULTURE: NORMAL
Lab: NORMAL
SPECIMEN DESCRIPTION: NORMAL
SPECIMEN DESCRIPTION: NORMAL
STATUS: NORMAL

## 2017-09-14 PROCEDURE — 73562 X-RAY EXAM OF KNEE 3: CPT

## 2017-09-14 PROCEDURE — 99283 EMERGENCY DEPT VISIT LOW MDM: CPT

## 2017-09-14 PROCEDURE — 6370000000 HC RX 637 (ALT 250 FOR IP): Performed by: EMERGENCY MEDICINE

## 2017-09-14 RX ORDER — ACETAMINOPHEN AND CODEINE PHOSPHATE 300; 30 MG/1; MG/1
1 TABLET ORAL 3 TIMES DAILY PRN
Qty: 10 TABLET | Refills: 0 | Status: SHIPPED | OUTPATIENT
Start: 2017-09-14 | End: 2017-12-28 | Stop reason: ALTCHOICE

## 2017-09-14 RX ORDER — SODIUM CHLORIDE 0.9 % (FLUSH) 0.9 %
10 SYRINGE (ML) INJECTION EVERY 12 HOURS SCHEDULED
Status: CANCELLED | OUTPATIENT
Start: 2017-09-14

## 2017-09-14 RX ORDER — SODIUM CHLORIDE, SODIUM LACTATE, POTASSIUM CHLORIDE, CALCIUM CHLORIDE 600; 310; 30; 20 MG/100ML; MG/100ML; MG/100ML; MG/100ML
INJECTION, SOLUTION INTRAVENOUS CONTINUOUS
Status: CANCELLED | OUTPATIENT
Start: 2017-09-14

## 2017-09-14 RX ORDER — SODIUM CHLORIDE 0.9 % (FLUSH) 0.9 %
10 SYRINGE (ML) INJECTION PRN
Status: CANCELLED | OUTPATIENT
Start: 2017-09-14

## 2017-09-14 RX ORDER — LIDOCAINE HYDROCHLORIDE 10 MG/ML
1 INJECTION, SOLUTION EPIDURAL; INFILTRATION; INTRACAUDAL; PERINEURAL
Status: CANCELLED | OUTPATIENT
Start: 2017-09-14 | End: 2017-09-14

## 2017-09-14 RX ORDER — ACETAMINOPHEN AND CODEINE PHOSPHATE 300; 30 MG/1; MG/1
1 TABLET ORAL ONCE
Status: COMPLETED | OUTPATIENT
Start: 2017-09-14 | End: 2017-09-14

## 2017-09-14 RX ADMIN — ACETAMINOPHEN AND CODEINE PHOSPHATE 1 TABLET: 300; 30 TABLET ORAL at 09:27

## 2017-09-14 ASSESSMENT — ENCOUNTER SYMPTOMS
EYE PAIN: 0
COUGH: 0
NAUSEA: 0
SHORTNESS OF BREATH: 0
VOMITING: 0
DIARRHEA: 0
BACK PAIN: 0
SORE THROAT: 0
ABDOMINAL PAIN: 0

## 2017-09-14 ASSESSMENT — PAIN DESCRIPTION - PAIN TYPE: TYPE: CHRONIC PAIN

## 2017-09-14 ASSESSMENT — PAIN DESCRIPTION - ONSET: ONSET: ON-GOING

## 2017-09-14 ASSESSMENT — PAIN DESCRIPTION - FREQUENCY: FREQUENCY: INTERMITTENT

## 2017-09-14 ASSESSMENT — PAIN DESCRIPTION - DESCRIPTORS: DESCRIPTORS: SHARP

## 2017-09-14 ASSESSMENT — PAIN DESCRIPTION - ORIENTATION: ORIENTATION: RIGHT;LEFT

## 2017-09-14 ASSESSMENT — PAIN SCALES - GENERAL
PAINLEVEL_OUTOF10: 8
PAINLEVEL_OUTOF10: 8

## 2017-09-14 ASSESSMENT — PAIN DESCRIPTION - LOCATION: LOCATION: KNEE

## 2017-09-20 ENCOUNTER — ANESTHESIA (OUTPATIENT)
Dept: OPERATING ROOM | Age: 67
End: 2017-09-20
Payer: MEDICARE

## 2017-09-20 ENCOUNTER — HOSPITAL ENCOUNTER (OUTPATIENT)
Age: 67
Setting detail: OUTPATIENT SURGERY
Discharge: HOME | End: 2017-09-20
Attending: OBSTETRICS & GYNECOLOGY | Admitting: OBSTETRICS & GYNECOLOGY
Payer: MEDICARE

## 2017-09-20 VITALS — SYSTOLIC BLOOD PRESSURE: 96 MMHG | DIASTOLIC BLOOD PRESSURE: 56 MMHG | OXYGEN SATURATION: 95 %

## 2017-09-20 VITALS
DIASTOLIC BLOOD PRESSURE: 79 MMHG | HEART RATE: 68 BPM | WEIGHT: 244 LBS | BODY MASS INDEX: 41.66 KG/M2 | HEIGHT: 64 IN | TEMPERATURE: 97.7 F | RESPIRATION RATE: 16 BRPM | OXYGEN SATURATION: 99 % | SYSTOLIC BLOOD PRESSURE: 136 MMHG

## 2017-09-20 PROBLEM — Z98.890 S/P DILATION AND CURETTAGE: Status: ACTIVE | Noted: 2017-09-20

## 2017-09-20 PROBLEM — N95.0 POSTMENOPAUSAL BLEEDING: Status: ACTIVE | Noted: 2017-09-20

## 2017-09-20 PROBLEM — R93.89 THICKENED ENDOMETRIUM: Status: ACTIVE | Noted: 2017-09-20

## 2017-09-20 LAB
GLUCOSE BLD-MCNC: 109 MG/DL (ref 65–105)
GLUCOSE BLD-MCNC: 112 MG/DL (ref 65–105)

## 2017-09-20 PROCEDURE — 7100000031 HC ASPR PHASE II RECOVERY - ADDTL 15 MIN: Performed by: OBSTETRICS & GYNECOLOGY

## 2017-09-20 PROCEDURE — 58558 HYSTEROSCOPY BIOPSY: CPT | Performed by: OBSTETRICS & GYNECOLOGY

## 2017-09-20 PROCEDURE — 82947 ASSAY GLUCOSE BLOOD QUANT: CPT

## 2017-09-20 PROCEDURE — 2580000003 HC RX 258: Performed by: ANESTHESIOLOGY

## 2017-09-20 PROCEDURE — 6360000002 HC RX W HCPCS

## 2017-09-20 PROCEDURE — 7100000001 HC PACU RECOVERY - ADDTL 15 MIN: Performed by: OBSTETRICS & GYNECOLOGY

## 2017-09-20 PROCEDURE — 2720000010 HC SURG SUPPLY STERILE: Performed by: OBSTETRICS & GYNECOLOGY

## 2017-09-20 PROCEDURE — 3600000002 HC SURGERY LEVEL 2 BASE: Performed by: OBSTETRICS & GYNECOLOGY

## 2017-09-20 PROCEDURE — 3700000001 HC ADD 15 MINUTES (ANESTHESIA): Performed by: OBSTETRICS & GYNECOLOGY

## 2017-09-20 PROCEDURE — 88305 TISSUE EXAM BY PATHOLOGIST: CPT

## 2017-09-20 PROCEDURE — 3600000012 HC SURGERY LEVEL 2 ADDTL 15MIN: Performed by: OBSTETRICS & GYNECOLOGY

## 2017-09-20 PROCEDURE — 7100000000 HC PACU RECOVERY - FIRST 15 MIN: Performed by: OBSTETRICS & GYNECOLOGY

## 2017-09-20 PROCEDURE — 2500000003 HC RX 250 WO HCPCS

## 2017-09-20 PROCEDURE — 7100000030 HC ASPR PHASE II RECOVERY - FIRST 15 MIN: Performed by: OBSTETRICS & GYNECOLOGY

## 2017-09-20 PROCEDURE — 3700000000 HC ANESTHESIA ATTENDED CARE: Performed by: OBSTETRICS & GYNECOLOGY

## 2017-09-20 RX ORDER — OXYCODONE HYDROCHLORIDE AND ACETAMINOPHEN 5; 325 MG/1; MG/1
1 TABLET ORAL PRN
Status: DISCONTINUED | OUTPATIENT
Start: 2017-09-20 | End: 2017-09-20 | Stop reason: HOSPADM

## 2017-09-20 RX ORDER — SODIUM CHLORIDE, SODIUM LACTATE, POTASSIUM CHLORIDE, CALCIUM CHLORIDE 600; 310; 30; 20 MG/100ML; MG/100ML; MG/100ML; MG/100ML
INJECTION, SOLUTION INTRAVENOUS CONTINUOUS
Status: DISCONTINUED | OUTPATIENT
Start: 2017-09-20 | End: 2017-09-20 | Stop reason: HOSPADM

## 2017-09-20 RX ORDER — DIPHENHYDRAMINE HYDROCHLORIDE 50 MG/ML
12.5 INJECTION INTRAMUSCULAR; INTRAVENOUS
Status: DISCONTINUED | OUTPATIENT
Start: 2017-09-20 | End: 2017-09-20 | Stop reason: HOSPADM

## 2017-09-20 RX ORDER — MORPHINE SULFATE 2 MG/ML
1 INJECTION, SOLUTION INTRAMUSCULAR; INTRAVENOUS EVERY 5 MIN PRN
Status: DISCONTINUED | OUTPATIENT
Start: 2017-09-20 | End: 2017-09-20 | Stop reason: HOSPADM

## 2017-09-20 RX ORDER — LIDOCAINE HYDROCHLORIDE 10 MG/ML
INJECTION, SOLUTION EPIDURAL; INFILTRATION; INTRACAUDAL; PERINEURAL PRN
Status: DISCONTINUED | OUTPATIENT
Start: 2017-09-20 | End: 2017-09-20 | Stop reason: SDUPTHER

## 2017-09-20 RX ORDER — ROCURONIUM BROMIDE 10 MG/ML
INJECTION, SOLUTION INTRAVENOUS PRN
Status: DISCONTINUED | OUTPATIENT
Start: 2017-09-20 | End: 2017-09-20 | Stop reason: SDUPTHER

## 2017-09-20 RX ORDER — SODIUM CHLORIDE 0.9 % (FLUSH) 0.9 %
10 SYRINGE (ML) INJECTION PRN
Status: DISCONTINUED | OUTPATIENT
Start: 2017-09-20 | End: 2017-09-20 | Stop reason: HOSPADM

## 2017-09-20 RX ORDER — DEXAMETHASONE SODIUM PHOSPHATE 4 MG/ML
INJECTION, SOLUTION INTRA-ARTICULAR; INTRALESIONAL; INTRAMUSCULAR; INTRAVENOUS; SOFT TISSUE PRN
Status: DISCONTINUED | OUTPATIENT
Start: 2017-09-20 | End: 2017-09-20 | Stop reason: SDUPTHER

## 2017-09-20 RX ORDER — HYDROCODONE BITARTRATE AND ACETAMINOPHEN 5; 325 MG/1; MG/1
1 TABLET ORAL EVERY 6 HOURS PRN
Qty: 5 TABLET | Refills: 0 | Status: SHIPPED | OUTPATIENT
Start: 2017-09-20 | End: 2017-09-27

## 2017-09-20 RX ORDER — MIDAZOLAM HYDROCHLORIDE 1 MG/ML
INJECTION INTRAMUSCULAR; INTRAVENOUS PRN
Status: DISCONTINUED | OUTPATIENT
Start: 2017-09-20 | End: 2017-09-20 | Stop reason: SDUPTHER

## 2017-09-20 RX ORDER — SODIUM CHLORIDE 0.9 % (FLUSH) 0.9 %
10 SYRINGE (ML) INJECTION EVERY 12 HOURS SCHEDULED
Status: DISCONTINUED | OUTPATIENT
Start: 2017-09-20 | End: 2017-09-20 | Stop reason: HOSPADM

## 2017-09-20 RX ORDER — MEPERIDINE HYDROCHLORIDE 25 MG/ML
12.5 INJECTION INTRAMUSCULAR; INTRAVENOUS; SUBCUTANEOUS EVERY 5 MIN PRN
Status: DISCONTINUED | OUTPATIENT
Start: 2017-09-20 | End: 2017-09-20 | Stop reason: HOSPADM

## 2017-09-20 RX ORDER — PROPOFOL 10 MG/ML
INJECTION, EMULSION INTRAVENOUS PRN
Status: DISCONTINUED | OUTPATIENT
Start: 2017-09-20 | End: 2017-09-20 | Stop reason: SDUPTHER

## 2017-09-20 RX ORDER — IBUPROFEN 800 MG/1
800 TABLET ORAL EVERY 8 HOURS PRN
Qty: 30 TABLET | Refills: 0 | Status: SHIPPED | OUTPATIENT
Start: 2017-09-20 | End: 2017-09-25 | Stop reason: HOSPADM

## 2017-09-20 RX ORDER — FENTANYL CITRATE 50 UG/ML
25 INJECTION, SOLUTION INTRAMUSCULAR; INTRAVENOUS EVERY 5 MIN PRN
Status: DISCONTINUED | OUTPATIENT
Start: 2017-09-20 | End: 2017-09-20 | Stop reason: HOSPADM

## 2017-09-20 RX ORDER — SUCCINYLCHOLINE CHLORIDE 20 MG/ML
INJECTION INTRAMUSCULAR; INTRAVENOUS PRN
Status: DISCONTINUED | OUTPATIENT
Start: 2017-09-20 | End: 2017-09-20 | Stop reason: SDUPTHER

## 2017-09-20 RX ORDER — LIDOCAINE HYDROCHLORIDE 10 MG/ML
1 INJECTION, SOLUTION EPIDURAL; INFILTRATION; INTRACAUDAL; PERINEURAL
Status: DISCONTINUED | OUTPATIENT
Start: 2017-09-20 | End: 2017-09-20 | Stop reason: HOSPADM

## 2017-09-20 RX ORDER — ONDANSETRON 2 MG/ML
INJECTION INTRAMUSCULAR; INTRAVENOUS PRN
Status: DISCONTINUED | OUTPATIENT
Start: 2017-09-20 | End: 2017-09-20 | Stop reason: SDUPTHER

## 2017-09-20 RX ORDER — OXYCODONE HYDROCHLORIDE AND ACETAMINOPHEN 5; 325 MG/1; MG/1
2 TABLET ORAL PRN
Status: DISCONTINUED | OUTPATIENT
Start: 2017-09-20 | End: 2017-09-20 | Stop reason: HOSPADM

## 2017-09-20 RX ORDER — PROMETHAZINE HYDROCHLORIDE 25 MG/ML
6.25 INJECTION, SOLUTION INTRAMUSCULAR; INTRAVENOUS
Status: DISCONTINUED | OUTPATIENT
Start: 2017-09-20 | End: 2017-09-20 | Stop reason: HOSPADM

## 2017-09-20 RX ADMIN — SUCCINYLCHOLINE CHLORIDE 140 MG: 20 INJECTION, SOLUTION INTRAMUSCULAR; INTRAVENOUS at 11:56

## 2017-09-20 RX ADMIN — PROPOFOL 170 MG: 10 INJECTION, EMULSION INTRAVENOUS at 11:56

## 2017-09-20 RX ADMIN — SODIUM CHLORIDE, POTASSIUM CHLORIDE, SODIUM LACTATE AND CALCIUM CHLORIDE: 600; 310; 30; 20 INJECTION, SOLUTION INTRAVENOUS at 09:37

## 2017-09-20 RX ADMIN — ROCURONIUM BROMIDE 2.5 MG: 10 INJECTION INTRAVENOUS at 11:56

## 2017-09-20 RX ADMIN — ONDANSETRON 4 MG: 2 INJECTION INTRAMUSCULAR; INTRAVENOUS at 12:22

## 2017-09-20 RX ADMIN — DEXAMETHASONE SODIUM PHOSPHATE 4 MG: 4 INJECTION, SOLUTION INTRAMUSCULAR; INTRAVENOUS at 12:22

## 2017-09-20 RX ADMIN — MIDAZOLAM 2 MG: 1 INJECTION INTRAMUSCULAR; INTRAVENOUS at 11:53

## 2017-09-20 RX ADMIN — LIDOCAINE HYDROCHLORIDE 50 MG: 10 INJECTION, SOLUTION EPIDURAL; INFILTRATION; INTRACAUDAL; PERINEURAL at 11:56

## 2017-09-20 ASSESSMENT — PAIN DESCRIPTION - DESCRIPTORS
DESCRIPTORS: DISCOMFORT
DESCRIPTORS: DISCOMFORT

## 2017-09-20 ASSESSMENT — PAIN DESCRIPTION - FREQUENCY
FREQUENCY: INTERMITTENT
FREQUENCY: INTERMITTENT

## 2017-09-20 ASSESSMENT — PAIN SCALES - GENERAL
PAINLEVEL_OUTOF10: 0
PAINLEVEL_OUTOF10: 0
PAINLEVEL_OUTOF10: 2
PAINLEVEL_OUTOF10: 2

## 2017-09-20 ASSESSMENT — PAIN - FUNCTIONAL ASSESSMENT: PAIN_FUNCTIONAL_ASSESSMENT: 0-10

## 2017-09-20 ASSESSMENT — PAIN DESCRIPTION - PROGRESSION
CLINICAL_PROGRESSION: NOT CHANGED
CLINICAL_PROGRESSION: NOT CHANGED

## 2017-09-20 ASSESSMENT — PAIN DESCRIPTION - PAIN TYPE
TYPE: SURGICAL PAIN
TYPE: SURGICAL PAIN

## 2017-09-20 ASSESSMENT — PAIN DESCRIPTION - LOCATION
LOCATION: ABDOMEN
LOCATION: ABDOMEN

## 2017-09-25 ENCOUNTER — OFFICE VISIT (OUTPATIENT)
Dept: FAMILY MEDICINE CLINIC | Age: 67
End: 2017-09-25
Payer: MEDICARE

## 2017-09-25 VITALS
WEIGHT: 247 LBS | TEMPERATURE: 97.5 F | HEART RATE: 64 BPM | SYSTOLIC BLOOD PRESSURE: 122 MMHG | DIASTOLIC BLOOD PRESSURE: 76 MMHG | OXYGEN SATURATION: 97 % | HEIGHT: 61 IN | BODY MASS INDEX: 46.63 KG/M2 | RESPIRATION RATE: 18 BRPM

## 2017-09-25 DIAGNOSIS — E55.9 VITAMIN D DEFICIENCY: ICD-10-CM

## 2017-09-25 DIAGNOSIS — Z23 NEED FOR TDAP VACCINATION: ICD-10-CM

## 2017-09-25 DIAGNOSIS — M17.12 PRIMARY OSTEOARTHRITIS OF LEFT KNEE: ICD-10-CM

## 2017-09-25 DIAGNOSIS — R73.03 PREDIABETES: ICD-10-CM

## 2017-09-25 DIAGNOSIS — I10 ESSENTIAL HYPERTENSION: Primary | ICD-10-CM

## 2017-09-25 DIAGNOSIS — Z91.81 AT HIGH RISK FOR FALLS: ICD-10-CM

## 2017-09-25 DIAGNOSIS — N39.46 MIXED STRESS AND URGE URINARY INCONTINENCE: ICD-10-CM

## 2017-09-25 PROCEDURE — 3014F SCREEN MAMMO DOC REV: CPT | Performed by: FAMILY MEDICINE

## 2017-09-25 PROCEDURE — G8399 PT W/DXA RESULTS DOCUMENT: HCPCS | Performed by: FAMILY MEDICINE

## 2017-09-25 PROCEDURE — 4040F PNEUMOC VAC/ADMIN/RCVD: CPT | Performed by: FAMILY MEDICINE

## 2017-09-25 PROCEDURE — 3017F COLORECTAL CA SCREEN DOC REV: CPT | Performed by: FAMILY MEDICINE

## 2017-09-25 PROCEDURE — 99214 OFFICE O/P EST MOD 30 MIN: CPT | Performed by: FAMILY MEDICINE

## 2017-09-25 PROCEDURE — 0509F URINE INCON PLAN DOCD: CPT | Performed by: FAMILY MEDICINE

## 2017-09-25 PROCEDURE — G8427 DOCREV CUR MEDS BY ELIG CLIN: HCPCS | Performed by: FAMILY MEDICINE

## 2017-09-25 PROCEDURE — G8417 CALC BMI ABV UP PARAM F/U: HCPCS | Performed by: FAMILY MEDICINE

## 2017-09-25 PROCEDURE — 1123F ACP DISCUSS/DSCN MKR DOCD: CPT | Performed by: FAMILY MEDICINE

## 2017-09-25 PROCEDURE — 1036F TOBACCO NON-USER: CPT | Performed by: FAMILY MEDICINE

## 2017-09-25 PROCEDURE — 1090F PRES/ABSN URINE INCON ASSESS: CPT | Performed by: FAMILY MEDICINE

## 2017-09-25 ASSESSMENT — ENCOUNTER SYMPTOMS
VOMITING: 0
WHEEZING: 0
NAUSEA: 0
ABDOMINAL DISTENTION: 0
CHEST TIGHTNESS: 0
CONSTIPATION: 0
DIARRHEA: 0
ABDOMINAL PAIN: 0
SHORTNESS OF BREATH: 0
COUGH: 0

## 2017-09-26 PROBLEM — N39.46 MIXED STRESS AND URGE URINARY INCONTINENCE: Status: ACTIVE | Noted: 2017-09-26

## 2017-09-26 PROBLEM — N76.1 SUBACUTE VAGINITIS: Status: RESOLVED | Noted: 2017-06-10 | Resolved: 2017-09-26

## 2017-09-28 LAB — SURGICAL PATHOLOGY REPORT: NORMAL

## 2017-10-04 ENCOUNTER — TELEPHONE (OUTPATIENT)
Dept: ONCOLOGY | Age: 67
End: 2017-10-04

## 2017-10-04 NOTE — TELEPHONE ENCOUNTER
Spoke with [facility] Staff, Dr. Kyle Derek office, notified pt's case assigned to navigator & inquired if pt aware of positive bx results. Per [facility] Staff, pt scheduled for f/u tomorrow @ [phone] to discuss bx results. Requested writer notified after pt learns of results & notified of tx plan. Pam Arthur once pt aware of dx writer will  reach out to pt to navigate oncology care. Will continue to follow.

## 2017-10-05 ENCOUNTER — TELEPHONE (OUTPATIENT)
Dept: ONCOLOGY | Age: 67
End: 2017-10-05

## 2017-10-05 ENCOUNTER — OFFICE VISIT (OUTPATIENT)
Dept: OBGYN CLINIC | Age: 67
End: 2017-10-05

## 2017-10-05 ENCOUNTER — TELEPHONE (OUTPATIENT)
Dept: FAMILY MEDICINE CLINIC | Age: 67
End: 2017-10-05

## 2017-10-05 VITALS
DIASTOLIC BLOOD PRESSURE: 70 MMHG | HEART RATE: 64 BPM | WEIGHT: 244 LBS | BODY MASS INDEX: 41.66 KG/M2 | SYSTOLIC BLOOD PRESSURE: 118 MMHG | HEIGHT: 64 IN

## 2017-10-05 DIAGNOSIS — C54.1 ADENOCARCINOMA OF ENDOMETRIUM, STAGE 1 (HCC): Primary | ICD-10-CM

## 2017-10-05 DIAGNOSIS — C54.1 ADENOCARCINOMA OF ENDOMETRIUM, STAGE 1 (HCC): ICD-10-CM

## 2017-10-05 DIAGNOSIS — Z09 POSTOP CHECK: Primary | ICD-10-CM

## 2017-10-05 PROCEDURE — 99024 POSTOP FOLLOW-UP VISIT: CPT | Performed by: OBSTETRICS & GYNECOLOGY

## 2017-10-05 NOTE — MR AVS SNAPSHOT
After Visit Summary             Terry Aquino   10/5/2017 11:15 AM   Office Visit    Description:  Female : 1950   Provider:  Dennis Glass DO   Department:  [address] [facility]              Your Follow-Up and Future Appointments         Below is a list of your follow-up and future appointments. This may not be a complete list as you may have made appointments directly with providers that we are not aware of or your providers may have made some for you. Please call your providers to confirm appointments. It is important to keep your appointments. Please bring your current insurance card, photo ID, co-pay, and all medication bottles to your appointment. If self-pay, payment is expected at the time of service. Your To-Do List     Future Appointments Provider Department Dept Phone    2017 10:40 AM Paula Renee MD [facility] [phone]    Please arrive 15 minutes prior to appointment, bring photo ID and insurance card. 2017 10:45 AM Ute Gallegos MD [facility] [employer] [facility] [phone]    Please arrive 15 minutes prior to appointment, bring photo ID and insurance card. Information from Your Visit        Department     Name Address Phone Fax    [address] [facility] [address] [facility] [facility] [address] [address] [phone]      You Were Seen for:         Comments    Postop check   [[id number]]         Vital Signs     Blood Pressure Pulse Height Weight Last Menstrual Period Breastfeeding? 118/70 (Site: Right Arm, Position: Sitting, Cuff Size: Large Adult) 64 5' 4\" (1.626 m) 244 lb (110.7 kg) (LMP Unknown) No    Body Mass Index Smoking Status                41.88 kg/m2 Never Smoker          Additional Information about your Body Mass Index (BMI)           Your BMI as listed above is considered obese (30 or more).  BMI is an estimate of body fat, calculated from your height and weight. The higher your BMI, the greater your risk of heart disease, high blood pressure, type 2 diabetes, stroke, gallstones, arthritis, sleep apnea, and certain cancers. BMI is not perfect. It may overestimate body fat in athletes and people who are more muscular. Even a small weight loss (between 5 and 10 percent of your current weight) by decreasing your calorie intake and becoming more physically active will help lower your risk of developing or worsening diseases associated with obesity.      Learn more at: [employer]             Medications and Orders      Your Current Medications Are              acetaminophen-codeine (TYLENOL/CODEINE #3) 300-30 MG per tablet Take 1 tablet by mouth 3 times daily as needed for Pain    MYRBETRIQ 50 MG TB24 Take 50 mg by mouth daily    metFORMIN (GLUCOPHAGE) 500 MG tablet Take 1 tablet by mouth 2 times daily (with meals)    Cranberry 500 MG CAPS Take 500 mg by mouth daily    Lactobacillus (PROBIOTIC ACIDOPHILUS) TABS Take 1 tablet by mouth daily    levothyroxine (SYNTHROID) 75 MCG tablet TAKE ONE TABLET BY MOUTH DAILY    acetaminophen (APAP EXTRA STRENGTH) 500 MG tablet Take 1 tablet by mouth every 6 hours as needed for Pain or Fever    lidocaine (LMX) 4 % cream Apply topically every 8 hrs as needed for pain    vitamin D (ERGOCALCIFEROL) 45376 UNITS CAPS capsule Take 1 capsule by mouth once a week    docusate sodium (STOOL SOFTENER) 100 MG capsule Take 1 capsule by mouth 2 times daily as needed for Constipation    hydrochlorothiazide (HYDRODIURIL) 25 MG tablet Take 1 tablet by mouth daily    loratadine (CLARITIN) 10 MG tablet TAKE ONE TABLET BY MOUTH EVERY DAY    losartan (COZAAR) 50 MG tablet Take 1 tablet by mouth daily    simvastatin (ZOCOR) 40 MG tablet TAKE ONE TABLET BY MOUTH ONE TIME A DAY      Allergies              Sulfa Antibiotics Nausea Only    Penicillins Rash

## 2017-10-05 NOTE — PROGRESS NOTES
03/21/2017    Left breast lump 03/20/2017    Dense breast tissue on mammogram 03/20/2017    Chronic pain of both knees 02/23/2017    Slow transit constipation 02/23/2017    Allergic rhinitis 02/23/2017    Hyperlipidemia with target LDL less than 100 02/23/2017    At high risk for falls 02/23/2017    Mixed incontinence 01/09/2016    Vitamin D deficiency 01/08/2016    Prediabetes 09/10/2015    Left knee DJD 12/05/2013    Osteoarthritis involving multiple joints on both sides of body 04/24/2012    Osteopenia determined by x-ray      Per DEXA      Hypothyroidism     History of TIA (transient ischemic attack)     Lower back pain     Essential hypertension           POD# 2 weeks   Procedure: D&C/ Hysteroscopy/ Myosure resection polyp   Stable   Pathology reviewed and found to be benign.  No adenocarcinoma well differentiated FIGO grade 1    Plan:  Referral to Dr. Andry Jerome  Further treatment/ follow up per Gyn/ Oncology

## 2017-10-05 NOTE — TELEPHONE ENCOUNTER
Received VM from Ratna Whitman, Dr. Lenin Schulte, requesting writer call back. Spoke with Ratna Whitman via telephone, per Ratna Whitman positive bx results given to pt today per Dr. Lenin Schulte & okay to contact pt. Ratna Whitman stated referral for Dr. Camelia Alvarez sent today. Introductory phone call made to patient, no answer, VM left instructed patient may call writer with any questions/concerns prn @ [phone]. [address] written materials mailed to patient. Dr. Majo Bull, pt's PCP, notified writer navigating oncology care via [facility] message. Will continue to follow.

## 2017-10-09 ENCOUNTER — TELEPHONE (OUTPATIENT)
Dept: ONCOLOGY | Age: 67
End: 2017-10-09

## 2017-10-09 DIAGNOSIS — E03.9 ACQUIRED HYPOTHYROIDISM: ICD-10-CM

## 2017-10-10 RX ORDER — LEVOTHYROXINE SODIUM 0.07 MG/1
TABLET ORAL
Qty: 30 TABLET | Refills: 5 | Status: SHIPPED | OUTPATIENT
Start: 2017-10-10 | End: 2017-12-28 | Stop reason: SDUPTHER

## 2017-10-10 NOTE — TELEPHONE ENCOUNTER
Please Approve or Refuse. Next Visit Date:  12/28/2017    Hemoglobin A1C (%)   Date Value   07/27/2017 5.7   03/21/2017 6.3 (H)   09/10/2015 5.9             ( goal A1C is < 7)   Microalb/Crt.  Ratio (mcg/mg creat)   Date Value   04/14/2015 5     LDL Cholesterol (mg/dL)   Date Value   03/21/2017 78       (goal LDL is <100)   AST (U/L)   Date Value   07/27/2017 17     ALT (U/L)   Date Value   07/27/2017 16     BUN (mg/dL)   Date Value   09/13/2017 18     BP Readings from Last 3 Encounters:   10/05/17 118/70   09/25/17 122/76   09/20/17 136/79          (goal 120/80)        Patient Active Problem List:     Hypothyroidism     History of TIA (transient ischemic attack)     Lower back pain     Essential hypertension     Osteopenia determined by x-ray     Osteoarthritis involving multiple joints on both sides of body     Left knee DJD     Prediabetes     Vitamin D deficiency     Mixed incontinence     Chronic pain of both knees     Slow transit constipation     Allergic rhinitis     Hyperlipidemia with target LDL less than 100     Morbid obesity with BMI of 40.0-44.9, adult (HCC)     At high risk for falls     Left breast lump     Dense breast tissue on mammogram     Hyperglycemia     Spondylosis of lumbar region without myelopathy or radiculopathy     Abnormal vaginal bleeding     OAB (overactive bladder)     Candidiasis of perineum     Primary osteoarthritis of both knees     Chronic fatigue      Adenomatous polyp of sigmoid colon, 6/26/17     Postmenopausal bleeding     Thickened endometrium     S/P h-scope, Myosure 9/20/17     Mixed stress and urge urinary incontinence     Adenocarcinoma of endometrium, stage 1 ([facility] [address]

## 2017-10-12 ENCOUNTER — OFFICE VISIT (OUTPATIENT)
Dept: GYNECOLOGIC ONCOLOGY | Age: 67
End: 2017-10-12
Payer: MEDICARE

## 2017-10-12 ENCOUNTER — PREP FOR PROCEDURE (OUTPATIENT)
Dept: GYNECOLOGIC ONCOLOGY | Age: 67
End: 2017-10-12

## 2017-10-12 VITALS
TEMPERATURE: 97 F | HEART RATE: 63 BPM | DIASTOLIC BLOOD PRESSURE: 67 MMHG | OXYGEN SATURATION: 97 % | SYSTOLIC BLOOD PRESSURE: 118 MMHG | BODY MASS INDEX: 40.36 KG/M2 | HEIGHT: 64 IN | WEIGHT: 236.4 LBS

## 2017-10-12 DIAGNOSIS — C54.1 ENDOMETRIAL CANCER (HCC): Primary | ICD-10-CM

## 2017-10-12 PROCEDURE — 1090F PRES/ABSN URINE INCON ASSESS: CPT | Performed by: OBSTETRICS & GYNECOLOGY

## 2017-10-12 PROCEDURE — 3014F SCREEN MAMMO DOC REV: CPT | Performed by: OBSTETRICS & GYNECOLOGY

## 2017-10-12 PROCEDURE — G8484 FLU IMMUNIZE NO ADMIN: HCPCS | Performed by: OBSTETRICS & GYNECOLOGY

## 2017-10-12 PROCEDURE — 99202 OFFICE O/P NEW SF 15 MIN: CPT | Performed by: OBSTETRICS & GYNECOLOGY

## 2017-10-12 PROCEDURE — 4040F PNEUMOC VAC/ADMIN/RCVD: CPT | Performed by: OBSTETRICS & GYNECOLOGY

## 2017-10-12 PROCEDURE — 1036F TOBACCO NON-USER: CPT | Performed by: OBSTETRICS & GYNECOLOGY

## 2017-10-12 PROCEDURE — G8417 CALC BMI ABV UP PARAM F/U: HCPCS | Performed by: OBSTETRICS & GYNECOLOGY

## 2017-10-12 PROCEDURE — 3017F COLORECTAL CA SCREEN DOC REV: CPT | Performed by: OBSTETRICS & GYNECOLOGY

## 2017-10-12 PROCEDURE — G8427 DOCREV CUR MEDS BY ELIG CLIN: HCPCS | Performed by: OBSTETRICS & GYNECOLOGY

## 2017-10-12 RX ORDER — SODIUM CHLORIDE 9 MG/ML
INJECTION, SOLUTION INTRAVENOUS CONTINUOUS
Status: CANCELLED | OUTPATIENT
Start: 2017-10-12

## 2017-10-12 RX ORDER — SODIUM CHLORIDE 0.9 % (FLUSH) 0.9 %
10 SYRINGE (ML) INJECTION EVERY 12 HOURS SCHEDULED
Status: CANCELLED | OUTPATIENT
Start: 2017-10-12

## 2017-10-12 RX ORDER — SODIUM CHLORIDE 0.9 % (FLUSH) 0.9 %
10 SYRINGE (ML) INJECTION PRN
Status: CANCELLED | OUTPATIENT
Start: 2017-10-12

## 2017-10-12 ASSESSMENT — ENCOUNTER SYMPTOMS
CHOKING: 0
TROUBLE SWALLOWING: 0
ABDOMINAL DISTENTION: 0
ABDOMINAL PAIN: 0
COUGH: 0
VOICE CHANGE: 0

## 2017-10-12 NOTE — LETTER
[address]  [address]  [address] -  [facility] [address]  Phone: [phone]  Fax: [phone]    Socorro Norris        October 12, 2017       Patient: Syed Rossi   MR Number: [id number]   YOB: 1950   Date of Visit: 10/12/2017       Dear Dr. Karimi Kin: Thank you for the request for consultation for Jay Mitchell to me for the evaluation of Endometrial cancer. Below are the relevant portions of my assessment and plan of care. Assessment:     1. Endometrial cancer ([facility] [address]  CT ABDOMEN PELVIS W IV CONTRAST Additional Contrast? Oral    XR CHEST STANDARD (2 VW)   The patient has what appears to be a clinical grade 1 stage I endometrial cancer. We discussed treatment options of surgical extirpation versus high-dose progesterone therapy versus radiation therapy. At this time, the patient is inclined to proceed to surgical evaluation. I recommended a total laparoscopic hysterectomy with bilateral salpingo-oophorectomy, the organs will be submitted for frozen section and staging will be performed if indicated. The patient was counseled on the risks, benefits and alternatives of the procedure to include but not limited to: Infection, bleeding, blood transfusion, damage to internal organs such as bowel, bladder, ureters, blood vessels and nerves, deep vein thrombosis, pulmonary embolism, ICU care, anesthesia risks and death. Questions were answered, she voices understanding and desires to proceed. She will be scheduled at her earliest convenience. I have requested preoperative chest x-ray and CT the abdomen and pelvis to assess for any obvious metastatic or concurrent disease. Plan:     Return in about 2 weeks (around 10/26/2017) for 2 hour surgery, TLH/BSO, FS possible staging.   Orders Placed This Encounter   Procedures    CT ABDOMEN PELVIS W IV CONTRAST Additional Contrast? Oral    XR CHEST STANDARD (2 VW)

## 2017-10-12 NOTE — H&P
History and Physical      HPI:  HPI  74 y.o.   1 Para 1 woman, seen at the request of Dr. Angella Mckeon for endometrial cancer. The patient began experiencing spotting in May 2017. She presented for evaluation and a pelvic ultrasound was obtained on 2017. The uterus measured 7.8 x 3.5 x 4 cm and the endometrial stripe was thickened at 1.8 cm. The patient was taken for [facility]  on 2017 and the endometrial curetting showed grade 1 endometrial cancer in a background of complex atypical hyperplasia. The patient was referred for further evaluation and treatment recommendations. The pt reports before seeing Dr Angella Mckeon for a GYN exam that \"it's been so long I can't remember\" since she had a GYN exam.  She reports a hx of 1 abnormal pap back in the 1980s but she is not sure what was abnormal about the pap other than it was not cancer. The light spotting that started in May had increased to intermittent heavy bleeding. The pt is not sexually active. The patient reports that she had a normal mammogram and a normal colonoscopy in 2017. Pap smear 2017 was negative with negative high-risk HPV DNA. ROS:  Review of Systems   Constitutional: Negative for activity change and appetite change. HENT: Negative for trouble swallowing and voice change. Respiratory: Negative for cough and choking. Gastrointestinal: Negative for abdominal distention and abdominal pain. Genitourinary: Positive for difficulty urinating (urinary incontinence), frequency and vaginal bleeding. Negative for pelvic pain. Musculoskeletal: Positive for gait problem (depend on cane). Arthralgias: OA in ronan knees. Psychiatric/Behavioral: Negative for agitation and confusion.        Past Medical History:   Diagnosis Date    Allergic rhinitis 2017    At high risk for falls 2017    Colon polyp     Had colonoscopy done 20 yrs ago    Essential hypertension     History of TIA (transient ischemic Nose normal.   Eyes: Conjunctivae are normal. Pupils are equal, round, and reactive to light. No scleral icterus. Neck: Normal range of motion. Neck supple. No JVD present. No tracheal deviation present. No thyromegaly present. Cardiovascular: Normal rate, regular rhythm and normal heart sounds. Exam reveals no gallop and no friction rub. No murmur heard. Pulmonary/Chest: Effort normal and breath sounds normal. No respiratory distress. Abdominal: Soft. Bowel sounds are normal. She exhibits no distension and no mass. There is no hepatosplenomegaly. There is no tenderness. There is no rebound, no guarding and no CVA tenderness. No hernia. Hernia confirmed negative in the right inguinal area and confirmed negative in the left inguinal area. Genitourinary: Rectal exam shows no external hemorrhoid, no mass, no tenderness and anal tone normal. Pelvic exam was performed with patient supine. No labial fusion. There is no rash, tenderness or lesion on the right labia. There is no rash, tenderness or lesion on the left labia. Uterus is not deviated, not enlarged, not fixed and not tender. Cervix exhibits no motion tenderness, no discharge and no friability. Right adnexum displays no mass, no tenderness and no fullness. Left adnexum displays no mass, no tenderness and no fullness. There is bleeding (scant old blood) in the vagina. No vaginal discharge found. Genitourinary Comments: The patient has normal female genitalia including, vulva, urethra, vagina, Bartholin's and Vidor's. Bladder is non-tender, without mass; urethra is non-tender, without mass. Cervix is without lesion or nodule, uterus is normal size, shape and contour and is mobile, adnexae are non-tender and without mass. There are no palpable vaginal nodules,  pelvic masses or tenderness. Recto-vaginal examination is confirmatory.   There are no nodules of the rectovaginal septum, no rectal mass, sphincter tone is normal, no utero-sacral nodularity. Musculoskeletal: Normal range of motion. She exhibits no edema or tenderness. Lymphadenopathy:     She has no cervical adenopathy. Right: No inguinal adenopathy present. Left: No inguinal adenopathy present. Neurological: She is alert and oriented to person, place, and time. Skin: Skin is warm and dry. No rash noted. She is not diaphoretic. No erythema. Psychiatric: She has a normal mood and affect. Her behavior is normal. Judgment and thought content normal.   Vitals reviewed. Assessment:     1. Endometrial cancer ([address]  CT ABDOMEN PELVIS W IV CONTRAST Additional Contrast? Oral    XR CHEST STANDARD (2 VW)   The patient has what appears to be a clinical grade 1 stage I endometrial cancer. We discussed treatment options of surgical extirpation versus high-dose progesterone therapy versus radiation therapy. At this time, the patient is inclined to proceed to surgical evaluation. I recommended a total laparoscopic hysterectomy with bilateral salpingo-oophorectomy, the organs will be submitted for frozen section and staging will be performed if indicated. The patient was counseled on the risks, benefits and alternatives of the procedure to include but not limited to: Infection, bleeding, blood transfusion, damage to internal organs such as bowel, bladder, ureters, blood vessels and nerves, deep vein thrombosis, pulmonary embolism, ICU care, anesthesia risks and death. Questions were answered, she voices understanding and desires to proceed. She will be scheduled at her earliest convenience. I have requested preoperative chest x-ray and CT the abdomen and pelvis to assess for any obvious metastatic or concurrent disease. Plan:     Return in about 2 weeks (around 10/26/2017) for 2 hour surgery, TLH/BSO, FS possible staging.   Orders Placed This Encounter   Procedures    CT ABDOMEN PELVIS W IV CONTRAST Additional Contrast? Oral    XR CHEST STANDARD (2 VW)     No

## 2017-10-12 NOTE — COMMUNICATION BODY
Assessment:     1. Endometrial cancer ([facility] [address]  CT ABDOMEN PELVIS W IV CONTRAST Additional Contrast? Oral    XR CHEST STANDARD (2 VW)   The patient has what appears to be a clinical grade 1 stage I endometrial cancer. We discussed treatment options of surgical extirpation versus high-dose progesterone therapy versus radiation therapy. At this time, the patient is inclined to proceed to surgical evaluation. I recommended a total laparoscopic hysterectomy with bilateral salpingo-oophorectomy, the organs will be submitted for frozen section and staging will be performed if indicated. The patient was counseled on the risks, benefits and alternatives of the procedure to include but not limited to: Infection, bleeding, blood transfusion, damage to internal organs such as bowel, bladder, ureters, blood vessels and nerves, deep vein thrombosis, pulmonary embolism, ICU care, anesthesia risks and death. Questions were answered, she voices understanding and desires to proceed. She will be scheduled at her earliest convenience. I have requested preoperative chest x-ray and CT the abdomen and pelvis to assess for any obvious metastatic or concurrent disease. Plan:     Return in about 2 weeks (around 10/26/2017) for 2 hour surgery, TLH/BSO, FS possible staging. Orders Placed This Encounter   Procedures    CT ABDOMEN PELVIS W IV CONTRAST Additional Contrast? Oral    XR CHEST STANDARD (2 VW)     No orders of the defined types were placed in this encounter.

## 2017-10-12 NOTE — PROGRESS NOTES
Odalis Kiser is a 74 y.o. female that presents today for:    Chief Complaint   Patient presents with    New Patient     Adenocarcinoma of Endometrium/Spotting and passing clots       HPI:  HPI  74 y.o.   1 Para 1 woman, seen at the request of Dr. Mona Ford for endometrial cancer. The patient began experiencing spotting in May 2017. She presented for evaluation and a pelvic ultrasound was obtained on 2017. The uterus measured 7.8 x 3.5 x 4 cm and the endometrial stripe was thickened at 1.8 cm. The patient was taken for [facility]  on 2017 and the endometrial curetting showed grade 1 endometrial cancer in a background of complex atypical hyperplasia. The patient was referred for further evaluation and treatment recommendations. The pt reports before seeing Dr Mona Ford for a GYN exam that \"it's been so long I can't remember\" since she had a GYN exam.  She reports a hx of 1 abnormal pap back in the 1980s but she is not sure what was abnormal about the pap other than it was not cancer. The light spotting that started in May had increased to intermittent heavy bleeding. The pt is not sexually active. The patient reports that she had a normal mammogram and a normal colonoscopy in 2017. Pap smear 2017 was negative with negative high-risk HPV DNA. ROS:  Review of Systems   Constitutional: Negative for activity change and appetite change. HENT: Negative for trouble swallowing and voice change. Respiratory: Negative for cough and choking. Gastrointestinal: Negative for abdominal distention and abdominal pain. Genitourinary: Positive for difficulty urinating (urinary incontinence), frequency and vaginal bleeding. Negative for pelvic pain. Musculoskeletal: Positive for gait problem (depend on cane). Arthralgias: OA in ronan knees. Psychiatric/Behavioral: Negative for agitation and confusion.        Past Medical History:   Diagnosis Date    Allergic rhinitis 2/23/2017    At high risk for falls 2/23/2017    Colon polyp     Had colonoscopy done 20 yrs ago    Essential hypertension     History of TIA (transient ischemic attack)     Hyperglycemia 3/21/2017    Hyperlipidemia     Hypertension     Hypothyroidism 1980    sub total thyroidectomy goiter    Legally blind     Lower back pain     Mini stroke ([facility] [address]     Mixed incontinence 1/9/2016    Morbid obesity with BMI of 40.0-44.9, adult ([facility] [address] 2/23/2017    Osteoarthritis (arthritis due to wear and tear of joints)     ronan knee    Osteoarthritis involving multiple joints on both sides of body 4/24/2012    Osteoporosis     Tennis elbow     left       Past Surgical History:   Procedure Laterality Date    COLONOSCOPY      had polyp, she doesn't know where and when, \"20 yrs ago\"    COLONOSCOPY  06/26/2017    DILATION AND CURETTAGE OF UTERUS N/A 9/20/2017    HYSTEROSCOPY  WITH Odalys Fus performed by Heather Mejias DO at [address] [place] [address] Bilateral     CATARACTS     [facility]  [address] N/A 6/26/2017    COLONOSCOPY POLYPECTOMY / HOT SNARE performed by Pierre Christian DO at [facility]    subtotal 21 years ago   [place] TONSILLECTOMY         Family History   Problem Relation Age of Onset    Coronary Art Dis Father     Hypertension Father     Diabetes Father     High Blood Pressure Father     Thyroid Disease Father     Diabetes Mother     High Blood Pressure Mother     Thyroid Disease Mother     Thyroid Disease Other      sibs    Osteoporosis Other      family    Colon Cancer Neg Hx        Social History     Social History    Marital status: Single     Spouse name: N/A    Number of children: N/A    Years of education: N/A     Social History Main Topics    Smoking status: Never Smoker    Smokeless tobacco: Never Used    Alcohol use No    Drug use: No    Sexual activity: No     Other Topics Concern    None Social History Narrative    None       Past [address] does contribute to today's presenting complaint. Current Outpatient Prescriptions   Medication Sig Dispense Refill    levothyroxine (SYNTHROID) 75 MCG tablet TAKE ONE TABLET BY MOUTH DAILY 30 tablet 5    acetaminophen-codeine (TYLENOL/CODEINE #3) 300-30 MG per tablet Take 1 tablet by mouth 3 times daily as needed for Pain 10 tablet 0    MYRBETRIQ 50 MG TB24 Take 50 mg by mouth daily 30 tablet 11    metFORMIN (GLUCOPHAGE) 500 MG tablet Take 1 tablet by mouth 2 times daily (with meals) 60 tablet 5    Cranberry 500 MG CAPS Take 500 mg by mouth daily 30 capsule 3    Lactobacillus (PROBIOTIC ACIDOPHILUS) TABS Take 1 tablet by mouth daily 30 tablet 3    acetaminophen (APAP EXTRA STRENGTH) 500 MG tablet Take 1 tablet by mouth every 6 hours as needed for Pain or Fever 120 tablet 3    lidocaine (LMX) 4 % cream Apply topically every 8 hrs as needed for pain 45 g 3    vitamin D (ERGOCALCIFEROL) 40144 UNITS CAPS capsule Take 1 capsule by mouth once a week 4 capsule 2    docusate sodium (STOOL SOFTENER) 100 MG capsule Take 1 capsule by mouth 2 times daily as needed for Constipation 60 capsule 2    hydrochlorothiazide (HYDRODIURIL) 25 MG tablet Take 1 tablet by mouth daily 90 tablet 3    loratadine (CLARITIN) 10 MG tablet TAKE ONE TABLET BY MOUTH EVERY DAY 90 tablet 3    losartan (COZAAR) 50 MG tablet Take 1 tablet by mouth daily 90 tablet 3    simvastatin (ZOCOR) 40 MG tablet TAKE ONE TABLET BY MOUTH ONE TIME A DAY 90 tablet 3     No current facility-administered medications for this visit.         Allergies   Allergen Reactions    Sulfa Antibiotics Nausea Only    Penicillins Rash       Vitals:    10/12/17 0856   BP: 118/67   Site: Right Arm   Position: Sitting   Cuff Size: Large Adult   Pulse: 63   Temp: 97 °F (36.1 °C)   TempSrc: Oral   SpO2: 97%   Weight: 236 lb 6.4 oz (107.2 kg)   Height: 5' 4.02\" (1.626 m)       Physical Examination:  Physical Exam TLH/BSO, FS possible staging. Orders Placed This Encounter   Procedures    CT ABDOMEN PELVIS W IV CONTRAST Additional Contrast? Oral    XR CHEST STANDARD (2 VW)     No orders of the defined types were placed in this encounter.          Electronically signed by Susan Rice MD on 10/12/2017 at 10:03 AM

## 2017-10-18 ENCOUNTER — HOSPITAL ENCOUNTER (OUTPATIENT)
Dept: GENERAL RADIOLOGY | Age: 67
Discharge: HOME | End: 2017-10-18
Payer: MEDICARE

## 2017-10-18 ENCOUNTER — HOSPITAL ENCOUNTER (OUTPATIENT)
Dept: PREADMISSION TESTING | Age: 67
Discharge: HOME | End: 2017-10-18
Payer: MEDICARE

## 2017-10-18 ENCOUNTER — HOSPITAL ENCOUNTER (OUTPATIENT)
Dept: CT IMAGING | Age: 67
Discharge: HOME | End: 2017-10-18
Payer: MEDICARE

## 2017-10-18 VITALS
DIASTOLIC BLOOD PRESSURE: 73 MMHG | HEIGHT: 65 IN | TEMPERATURE: 97.6 F | HEART RATE: 60 BPM | RESPIRATION RATE: 16 BRPM | SYSTOLIC BLOOD PRESSURE: 166 MMHG | BODY MASS INDEX: 40.15 KG/M2 | WEIGHT: 241 LBS | OXYGEN SATURATION: 96 %

## 2017-10-18 DIAGNOSIS — C54.1 ENDOMETRIAL CANCER (HCC): ICD-10-CM

## 2017-10-18 LAB
-: ABNORMAL
ABO/RH: NORMAL
ABSOLUTE EOS #: 0.1 K/UL (ref 0–0.4)
ABSOLUTE IMMATURE GRANULOCYTE: NORMAL K/UL (ref 0–0.3)
ABSOLUTE LYMPH #: 1.7 K/UL (ref 1–4.8)
ABSOLUTE MONO #: 0.4 K/UL (ref 0.1–1.2)
ALBUMIN SERPL-MCNC: 4 G/DL (ref 3.5–5.2)
ALBUMIN/GLOBULIN RATIO: 1.1 (ref 1–2.5)
ALP BLD-CCNC: 65 U/L (ref 35–104)
ALT SERPL-CCNC: 16 U/L (ref 5–33)
AMORPHOUS: ABNORMAL
ANION GAP SERPL CALCULATED.3IONS-SCNC: 13 MMOL/L (ref 9–17)
ANTIBODY SCREEN: NEGATIVE
ARM BAND NUMBER: NORMAL
AST SERPL-CCNC: 27 U/L
BACTERIA: ABNORMAL
BASOPHILS # BLD: 0 %
BASOPHILS ABSOLUTE: 0 K/UL (ref 0–0.2)
BILIRUB SERPL-MCNC: 0.32 MG/DL (ref 0.3–1.2)
BILIRUBIN URINE: NEGATIVE
BUN BLDV-MCNC: 17 MG/DL (ref 8–23)
BUN/CREAT BLD: ABNORMAL (ref 9–20)
CALCIUM SERPL-MCNC: 9 MG/DL (ref 8.6–10.4)
CASTS UA: ABNORMAL /LPF (ref 0–2)
CHLORIDE BLD-SCNC: 98 MMOL/L (ref 98–107)
CO2: 25 MMOL/L (ref 20–31)
COLOR: ABNORMAL
COMMENT UA: ABNORMAL
CREAT SERPL-MCNC: 0.55 MG/DL (ref 0.5–0.9)
CRYSTALS, UA: ABNORMAL /HPF
DIFFERENTIAL TYPE: NORMAL
EOSINOPHILS RELATIVE PERCENT: 2 %
EPITHELIAL CELLS UA: ABNORMAL /HPF (ref 0–5)
EXPIRATION DATE: NORMAL
GFR AFRICAN AMERICAN: >60 ML/MIN
GFR NON-AFRICAN AMERICAN: >60 ML/MIN
GFR SERPL CREATININE-BSD FRML MDRD: ABNORMAL ML/MIN/{1.73_M2}
GFR SERPL CREATININE-BSD FRML MDRD: ABNORMAL ML/MIN/{1.73_M2}
GLUCOSE BLD-MCNC: 103 MG/DL (ref 70–99)
GLUCOSE URINE: NEGATIVE
HCT VFR BLD CALC: 38.7 % (ref 36–46)
HEMOGLOBIN: 12.9 G/DL (ref 12–16)
IMMATURE GRANULOCYTES: NORMAL %
INR BLD: 0.9
KETONES, URINE: NEGATIVE
LEUKOCYTE ESTERASE, URINE: NEGATIVE
LYMPHOCYTES # BLD: 21 %
MCH RBC QN AUTO: 29.3 PG (ref 26–34)
MCHC RBC AUTO-ENTMCNC: 33.2 G/DL (ref 31–37)
MCV RBC AUTO: 88.2 FL (ref 80–100)
MONOCYTES # BLD: 5 %
MUCUS: ABNORMAL
NITRITE, URINE: NEGATIVE
OTHER OBSERVATIONS UA: ABNORMAL
PARTIAL THROMBOPLASTIN TIME: 24.2 SEC (ref 21.3–31.3)
PDW BLD-RTO: 14.7 % (ref 12.5–15.4)
PH UA: 7.5 (ref 5–8)
PLATELET # BLD: 284 K/UL (ref 140–450)
PLATELET ESTIMATE: NORMAL
PMV BLD AUTO: 8.6 FL (ref 6–12)
POTASSIUM SERPL-SCNC: 4.3 MMOL/L (ref 3.7–5.3)
PROTEIN UA: NEGATIVE
PROTHROMBIN TIME: 10 SEC (ref 9.4–12.6)
RBC # BLD: 4.38 M/UL (ref 4–5.2)
RBC # BLD: NORMAL 10*6/UL
RBC UA: ABNORMAL /HPF (ref 0–2)
RENAL EPITHELIAL, UA: ABNORMAL /HPF
SEG NEUTROPHILS: 72 %
SEGMENTED NEUTROPHILS ABSOLUTE COUNT: 6.2 K/UL (ref 1.8–7.7)
SODIUM BLD-SCNC: 136 MMOL/L (ref 135–144)
SPECIFIC GRAVITY UA: <1.005 (ref 1–1.03)
TOTAL PROTEIN: 7.8 G/DL (ref 6.4–8.3)
TRICHOMONAS: ABNORMAL
TURBIDITY: CLEAR
URINE HGB: ABNORMAL
UROBILINOGEN, URINE: NORMAL
WBC # BLD: 8.4 K/UL (ref 3.5–11)
WBC # BLD: NORMAL 10*3/UL
WBC UA: ABNORMAL /HPF (ref 0–5)
YEAST: ABNORMAL

## 2017-10-18 PROCEDURE — 85025 COMPLETE CBC W/AUTO DIFF WBC: CPT

## 2017-10-18 PROCEDURE — 6360000004 HC RX CONTRAST MEDICATION: Performed by: OBSTETRICS & GYNECOLOGY

## 2017-10-18 PROCEDURE — 85730 THROMBOPLASTIN TIME PARTIAL: CPT

## 2017-10-18 PROCEDURE — 85610 PROTHROMBIN TIME: CPT

## 2017-10-18 PROCEDURE — 36415 COLL VENOUS BLD VENIPUNCTURE: CPT

## 2017-10-18 PROCEDURE — 86850 RBC ANTIBODY SCREEN: CPT

## 2017-10-18 PROCEDURE — 80053 COMPREHEN METABOLIC PANEL: CPT

## 2017-10-18 PROCEDURE — 71020 XR CHEST STANDARD TWO VW: CPT

## 2017-10-18 PROCEDURE — 81001 URINALYSIS AUTO W/SCOPE: CPT

## 2017-10-18 PROCEDURE — 74177 CT ABD & PELVIS W/CONTRAST: CPT

## 2017-10-18 PROCEDURE — 86900 BLOOD TYPING SEROLOGIC ABO: CPT

## 2017-10-18 PROCEDURE — 86901 BLOOD TYPING SEROLOGIC RH(D): CPT

## 2017-10-18 RX ORDER — CHLORHEXIDINE GLUCONATE 0.12 MG/ML
15 RINSE ORAL 2 TIMES DAILY
COMMUNITY
End: 2018-07-20 | Stop reason: ALTCHOICE

## 2017-10-18 RX ORDER — UREA 40 %
CREAM (GRAM) TOPICAL PRN
COMMUNITY
End: 2019-10-03 | Stop reason: ALTCHOICE

## 2017-10-18 RX ORDER — SODIUM CHLORIDE, SODIUM LACTATE, POTASSIUM CHLORIDE, CALCIUM CHLORIDE 600; 310; 30; 20 MG/100ML; MG/100ML; MG/100ML; MG/100ML
1000 INJECTION, SOLUTION INTRAVENOUS CONTINUOUS
Status: CANCELLED | OUTPATIENT
Start: 2017-10-18

## 2017-10-18 RX ADMIN — IOPAMIDOL 130 ML: 755 INJECTION, SOLUTION INTRAVENOUS at 08:58

## 2017-10-23 ENCOUNTER — ANESTHESIA EVENT (OUTPATIENT)
Dept: OPERATING ROOM | Age: 67
End: 2017-10-23
Payer: MEDICARE

## 2017-10-24 ENCOUNTER — ANESTHESIA (OUTPATIENT)
Dept: OPERATING ROOM | Age: 67
End: 2017-10-24
Payer: MEDICARE

## 2017-10-24 ENCOUNTER — HOSPITAL ENCOUNTER (OUTPATIENT)
Age: 67
Setting detail: OBSERVATION
Discharge: HOME | End: 2017-10-26
Attending: OBSTETRICS & GYNECOLOGY | Admitting: OBSTETRICS & GYNECOLOGY
Payer: MEDICARE

## 2017-10-24 VITALS — SYSTOLIC BLOOD PRESSURE: 134 MMHG | DIASTOLIC BLOOD PRESSURE: 76 MMHG | OXYGEN SATURATION: 100 % | TEMPERATURE: 96.5 F

## 2017-10-24 DIAGNOSIS — K59.01 SLOW TRANSIT CONSTIPATION: ICD-10-CM

## 2017-10-24 PROBLEM — Z90.710 S/P TOTAL HYSTERECTOMY AND BSO (BILATERAL SALPINGO-OOPHORECTOMY): Status: ACTIVE | Noted: 2017-10-24

## 2017-10-24 PROBLEM — Z90.722 S/P TOTAL HYSTERECTOMY AND BSO (BILATERAL SALPINGO-OOPHORECTOMY): Status: ACTIVE | Noted: 2017-10-24

## 2017-10-24 PROBLEM — Z90.79 S/P TOTAL HYSTERECTOMY AND BSO (BILATERAL SALPINGO-OOPHORECTOMY): Status: ACTIVE | Noted: 2017-10-24

## 2017-10-24 LAB
CASE NUMBER:: NORMAL
GFR NON-AFRICAN AMERICAN: >60 ML/MIN
GFR SERPL CREATININE-BSD FRML MDRD: >60 ML/MIN
GFR SERPL CREATININE-BSD FRML MDRD: NORMAL ML/MIN/{1.73_M2}
GLUCOSE BLD-MCNC: 109 MG/DL (ref 74–100)
GLUCOSE BLD-MCNC: 146 MG/DL (ref 65–105)
GLUCOSE BLD-MCNC: 151 MG/DL (ref 65–105)
GLUCOSE BLD-MCNC: 168 MG/DL (ref 65–105)
POC CREATININE: 0.78 MG/DL (ref 0.51–1.19)
POC POTASSIUM: 3.8 MMOL/L (ref 3.5–4.5)
SPECIMEN DESCRIPTION: NORMAL

## 2017-10-24 PROCEDURE — 88309 TISSUE EXAM BY PATHOLOGIST: CPT

## 2017-10-24 PROCEDURE — 88341 IMHCHEM/IMCYTCHM EA ADD ANTB: CPT

## 2017-10-24 PROCEDURE — 6370000000 HC RX 637 (ALT 250 FOR IP): Performed by: OBSTETRICS & GYNECOLOGY

## 2017-10-24 PROCEDURE — 88305 TISSUE EXAM BY PATHOLOGIST: CPT

## 2017-10-24 PROCEDURE — 7100000000 HC PACU RECOVERY - FIRST 15 MIN: Performed by: OBSTETRICS & GYNECOLOGY

## 2017-10-24 PROCEDURE — 6360000002 HC RX W HCPCS: Performed by: NURSE ANESTHETIST, CERTIFIED REGISTERED

## 2017-10-24 PROCEDURE — 88342 IMHCHEM/IMCYTCHM 1ST ANTB: CPT

## 2017-10-24 PROCEDURE — 82947 ASSAY GLUCOSE BLOOD QUANT: CPT

## 2017-10-24 PROCEDURE — 2720000010 HC SURG SUPPLY STERILE: Performed by: OBSTETRICS & GYNECOLOGY

## 2017-10-24 PROCEDURE — G0378 HOSPITAL OBSERVATION PER HR: HCPCS

## 2017-10-24 PROCEDURE — 6360000002 HC RX W HCPCS: Performed by: ANESTHESIOLOGY

## 2017-10-24 PROCEDURE — 2500000003 HC RX 250 WO HCPCS: Performed by: OBSTETRICS & GYNECOLOGY

## 2017-10-24 PROCEDURE — 7100000001 HC PACU RECOVERY - ADDTL 15 MIN: Performed by: OBSTETRICS & GYNECOLOGY

## 2017-10-24 PROCEDURE — 2500000003 HC RX 250 WO HCPCS: Performed by: NURSE ANESTHETIST, CERTIFIED REGISTERED

## 2017-10-24 PROCEDURE — 3700000000 HC ANESTHESIA ATTENDED CARE: Performed by: OBSTETRICS & GYNECOLOGY

## 2017-10-24 PROCEDURE — 87086 URINE CULTURE/COLONY COUNT: CPT

## 2017-10-24 PROCEDURE — 3600000004 HC SURGERY LEVEL 4 BASE: Performed by: OBSTETRICS & GYNECOLOGY

## 2017-10-24 PROCEDURE — 2580000003 HC RX 258: Performed by: OBSTETRICS & GYNECOLOGY

## 2017-10-24 PROCEDURE — 82565 ASSAY OF CREATININE: CPT

## 2017-10-24 PROCEDURE — 3700000001 HC ADD 15 MINUTES (ANESTHESIA): Performed by: OBSTETRICS & GYNECOLOGY

## 2017-10-24 PROCEDURE — 96374 THER/PROPH/DIAG INJ IV PUSH: CPT

## 2017-10-24 PROCEDURE — 81288 MLH1 GENE: CPT

## 2017-10-24 PROCEDURE — 88381 MICRODISSECTION MANUAL: CPT

## 2017-10-24 PROCEDURE — 88331 PATH CONSLTJ SURG 1 BLK 1SPC: CPT

## 2017-10-24 PROCEDURE — 6360000002 HC RX W HCPCS

## 2017-10-24 PROCEDURE — 2580000003 HC RX 258

## 2017-10-24 PROCEDURE — 2580000003 HC RX 258: Performed by: ANESTHESIOLOGY

## 2017-10-24 PROCEDURE — 99999 PR OFFICE/OUTPT VISIT,PROCEDURE ONLY: CPT | Performed by: OBSTETRICS & GYNECOLOGY

## 2017-10-24 PROCEDURE — 88307 TISSUE EXAM BY PATHOLOGIST: CPT

## 2017-10-24 PROCEDURE — 96372 THER/PROPH/DIAG INJ SC/IM: CPT

## 2017-10-24 PROCEDURE — 6360000002 HC RX W HCPCS: Performed by: OBSTETRICS & GYNECOLOGY

## 2017-10-24 PROCEDURE — 3600000014 HC SURGERY LEVEL 4 ADDTL 15MIN: Performed by: OBSTETRICS & GYNECOLOGY

## 2017-10-24 PROCEDURE — 64488 TAP BLOCK BI INJECTION: CPT | Performed by: ANESTHESIOLOGY

## 2017-10-24 PROCEDURE — 2500000003 HC RX 250 WO HCPCS

## 2017-10-24 PROCEDURE — 2780000010 HC IMPLANT OTHER: Performed by: OBSTETRICS & GYNECOLOGY

## 2017-10-24 PROCEDURE — S0028 INJECTION, FAMOTIDINE, 20 MG: HCPCS | Performed by: OBSTETRICS & GYNECOLOGY

## 2017-10-24 PROCEDURE — 96376 TX/PRO/DX INJ SAME DRUG ADON: CPT

## 2017-10-24 PROCEDURE — 96375 TX/PRO/DX INJ NEW DRUG ADDON: CPT

## 2017-10-24 PROCEDURE — 88112 CYTOPATH CELL ENHANCE TECH: CPT

## 2017-10-24 PROCEDURE — 84132 ASSAY OF SERUM POTASSIUM: CPT

## 2017-10-24 DEVICE — AGENT HEMOSTATIC SURGIFLOW MATRIX KIT W/THROMBIN: Type: IMPLANTABLE DEVICE | Status: FUNCTIONAL

## 2017-10-24 RX ORDER — SODIUM CHLORIDE 9 MG/ML
INJECTION, SOLUTION INTRAVENOUS CONTINUOUS
Status: DISCONTINUED | OUTPATIENT
Start: 2017-10-24 | End: 2017-10-24

## 2017-10-24 RX ORDER — SIMETHICONE 80 MG
80 TABLET,CHEWABLE ORAL EVERY 6 HOURS PRN
Status: DISCONTINUED | OUTPATIENT
Start: 2017-10-24 | End: 2017-10-26 | Stop reason: HOSPADM

## 2017-10-24 RX ORDER — HYDROCODONE BITARTRATE AND ACETAMINOPHEN 5; 325 MG/1; MG/1
2 TABLET ORAL EVERY 4 HOURS PRN
Status: DISCONTINUED | OUTPATIENT
Start: 2017-10-24 | End: 2017-10-26 | Stop reason: HOSPADM

## 2017-10-24 RX ORDER — PROPOFOL 10 MG/ML
INJECTION, EMULSION INTRAVENOUS PRN
Status: DISCONTINUED | OUTPATIENT
Start: 2017-10-24 | End: 2017-10-24 | Stop reason: SDUPTHER

## 2017-10-24 RX ORDER — ONDANSETRON 2 MG/ML
4 INJECTION INTRAMUSCULAR; INTRAVENOUS EVERY 8 HOURS PRN
Status: DISCONTINUED | OUTPATIENT
Start: 2017-10-24 | End: 2017-10-26 | Stop reason: HOSPADM

## 2017-10-24 RX ORDER — LORAZEPAM 2 MG/ML
0.5 INJECTION INTRAMUSCULAR
Status: ACTIVE | OUTPATIENT
Start: 2017-10-24 | End: 2017-10-24

## 2017-10-24 RX ORDER — SODIUM CHLORIDE 0.9 % (FLUSH) 0.9 %
10 SYRINGE (ML) INJECTION PRN
Status: DISCONTINUED | OUTPATIENT
Start: 2017-10-24 | End: 2017-10-26 | Stop reason: HOSPADM

## 2017-10-24 RX ORDER — HYDROCODONE BITARTRATE AND ACETAMINOPHEN 5; 325 MG/1; MG/1
1 TABLET ORAL EVERY 4 HOURS PRN
Status: DISCONTINUED | OUTPATIENT
Start: 2017-10-24 | End: 2017-10-26 | Stop reason: HOSPADM

## 2017-10-24 RX ORDER — FENTANYL CITRATE 50 UG/ML
25 INJECTION, SOLUTION INTRAMUSCULAR; INTRAVENOUS EVERY 5 MIN PRN
Status: DISCONTINUED | OUTPATIENT
Start: 2017-10-24 | End: 2017-10-24 | Stop reason: HOSPADM

## 2017-10-24 RX ORDER — BUPIVACAINE HYDROCHLORIDE AND EPINEPHRINE 5; 5 MG/ML; UG/ML
INJECTION, SOLUTION EPIDURAL; INTRACAUDAL; PERINEURAL PRN
Status: DISCONTINUED | OUTPATIENT
Start: 2017-10-24 | End: 2017-10-24 | Stop reason: HOSPADM

## 2017-10-24 RX ORDER — ACETAMINOPHEN 500 MG
1000 TABLET ORAL EVERY 6 HOURS PRN
Status: DISCONTINUED | OUTPATIENT
Start: 2017-10-24 | End: 2017-10-26 | Stop reason: HOSPADM

## 2017-10-24 RX ORDER — NICOTINE POLACRILEX 4 MG
15 LOZENGE BUCCAL PRN
Status: DISCONTINUED | OUTPATIENT
Start: 2017-10-24 | End: 2017-10-26 | Stop reason: HOSPADM

## 2017-10-24 RX ORDER — SODIUM CHLORIDE 0.9 % (FLUSH) 0.9 %
10 SYRINGE (ML) INJECTION EVERY 12 HOURS SCHEDULED
Status: DISCONTINUED | OUTPATIENT
Start: 2017-10-24 | End: 2017-10-24 | Stop reason: HOSPADM

## 2017-10-24 RX ORDER — ONDANSETRON 2 MG/ML
INJECTION INTRAMUSCULAR; INTRAVENOUS PRN
Status: DISCONTINUED | OUTPATIENT
Start: 2017-10-24 | End: 2017-10-24 | Stop reason: SDUPTHER

## 2017-10-24 RX ORDER — CETIRIZINE HYDROCHLORIDE 10 MG/1
10 TABLET ORAL DAILY
Status: DISCONTINUED | OUTPATIENT
Start: 2017-10-24 | End: 2017-10-26 | Stop reason: HOSPADM

## 2017-10-24 RX ORDER — DOCUSATE SODIUM 100 MG/1
100 CAPSULE, LIQUID FILLED ORAL 2 TIMES DAILY
Status: DISCONTINUED | OUTPATIENT
Start: 2017-10-24 | End: 2017-10-26 | Stop reason: HOSPADM

## 2017-10-24 RX ORDER — DEXTROSE MONOHYDRATE 50 MG/ML
100 INJECTION, SOLUTION INTRAVENOUS PRN
Status: DISCONTINUED | OUTPATIENT
Start: 2017-10-24 | End: 2017-10-26 | Stop reason: HOSPADM

## 2017-10-24 RX ORDER — ROCURONIUM BROMIDE 10 MG/ML
INJECTION, SOLUTION INTRAVENOUS PRN
Status: DISCONTINUED | OUTPATIENT
Start: 2017-10-24 | End: 2017-10-24 | Stop reason: SDUPTHER

## 2017-10-24 RX ORDER — SODIUM CHLORIDE, SODIUM LACTATE, POTASSIUM CHLORIDE, CALCIUM CHLORIDE 600; 310; 30; 20 MG/100ML; MG/100ML; MG/100ML; MG/100ML
1000 INJECTION, SOLUTION INTRAVENOUS CONTINUOUS
Status: DISCONTINUED | OUTPATIENT
Start: 2017-10-24 | End: 2017-10-24

## 2017-10-24 RX ORDER — SODIUM CHLORIDE 0.9 % (FLUSH) 0.9 %
10 SYRINGE (ML) INJECTION PRN
Status: DISCONTINUED | OUTPATIENT
Start: 2017-10-24 | End: 2017-10-24 | Stop reason: HOSPADM

## 2017-10-24 RX ORDER — SODIUM CHLORIDE, SODIUM LACTATE, POTASSIUM CHLORIDE, CALCIUM CHLORIDE 600; 310; 30; 20 MG/100ML; MG/100ML; MG/100ML; MG/100ML
INJECTION, SOLUTION INTRAVENOUS CONTINUOUS PRN
Status: DISCONTINUED | OUTPATIENT
Start: 2017-10-24 | End: 2017-10-24 | Stop reason: SDUPTHER

## 2017-10-24 RX ORDER — GLYCOPYRROLATE 0.2 MG/ML
INJECTION INTRAMUSCULAR; INTRAVENOUS PRN
Status: DISCONTINUED | OUTPATIENT
Start: 2017-10-24 | End: 2017-10-24 | Stop reason: SDUPTHER

## 2017-10-24 RX ORDER — SODIUM CHLORIDE 9 MG/ML
INJECTION, SOLUTION INTRAVENOUS CONTINUOUS
Status: DISCONTINUED | OUTPATIENT
Start: 2017-10-24 | End: 2017-10-25

## 2017-10-24 RX ORDER — FENTANYL CITRATE 50 UG/ML
50 INJECTION, SOLUTION INTRAMUSCULAR; INTRAVENOUS EVERY 5 MIN PRN
Status: DISCONTINUED | OUTPATIENT
Start: 2017-10-24 | End: 2017-10-24 | Stop reason: HOSPADM

## 2017-10-24 RX ORDER — ONDANSETRON 2 MG/ML
4 INJECTION INTRAMUSCULAR; INTRAVENOUS
Status: DISCONTINUED | OUTPATIENT
Start: 2017-10-24 | End: 2017-10-24 | Stop reason: HOSPADM

## 2017-10-24 RX ORDER — DEXAMETHASONE SODIUM PHOSPHATE 10 MG/ML
INJECTION INTRAMUSCULAR; INTRAVENOUS PRN
Status: DISCONTINUED | OUTPATIENT
Start: 2017-10-24 | End: 2017-10-24 | Stop reason: SDUPTHER

## 2017-10-24 RX ORDER — DEXTROSE MONOHYDRATE 25 G/50ML
12.5 INJECTION, SOLUTION INTRAVENOUS PRN
Status: DISCONTINUED | OUTPATIENT
Start: 2017-10-24 | End: 2017-10-26 | Stop reason: HOSPADM

## 2017-10-24 RX ORDER — CIPROFLOXACIN 2 MG/ML
400 INJECTION, SOLUTION INTRAVENOUS ONCE
Status: COMPLETED | OUTPATIENT
Start: 2017-10-24 | End: 2017-10-24

## 2017-10-24 RX ORDER — KETOROLAC TROMETHAMINE 30 MG/ML
15 INJECTION, SOLUTION INTRAMUSCULAR; INTRAVENOUS EVERY 6 HOURS
Status: COMPLETED | OUTPATIENT
Start: 2017-10-24 | End: 2017-10-25

## 2017-10-24 RX ORDER — MAGNESIUM HYDROXIDE 1200 MG/15ML
LIQUID ORAL CONTINUOUS PRN
Status: DISCONTINUED | OUTPATIENT
Start: 2017-10-24 | End: 2017-10-24 | Stop reason: HOSPADM

## 2017-10-24 RX ORDER — LEVOTHYROXINE SODIUM 0.07 MG/1
75 TABLET ORAL DAILY
Status: DISCONTINUED | OUTPATIENT
Start: 2017-10-25 | End: 2017-10-26 | Stop reason: HOSPADM

## 2017-10-24 RX ORDER — BISACODYL 10 MG
10 SUPPOSITORY, RECTAL RECTAL DAILY PRN
Status: DISCONTINUED | OUTPATIENT
Start: 2017-10-24 | End: 2017-10-26 | Stop reason: HOSPADM

## 2017-10-24 RX ORDER — LIDOCAINE HYDROCHLORIDE 10 MG/ML
INJECTION, SOLUTION EPIDURAL; INFILTRATION; INTRACAUDAL; PERINEURAL PRN
Status: DISCONTINUED | OUTPATIENT
Start: 2017-10-24 | End: 2017-10-24 | Stop reason: SDUPTHER

## 2017-10-24 RX ORDER — FENTANYL CITRATE 50 UG/ML
INJECTION, SOLUTION INTRAMUSCULAR; INTRAVENOUS PRN
Status: DISCONTINUED | OUTPATIENT
Start: 2017-10-24 | End: 2017-10-24 | Stop reason: SDUPTHER

## 2017-10-24 RX ADMIN — GLYCOPYRROLATE 0.2 MG: 0.2 INJECTION, SOLUTION INTRAMUSCULAR; INTRAVENOUS at 10:25

## 2017-10-24 RX ADMIN — SODIUM CHLORIDE, POTASSIUM CHLORIDE, SODIUM LACTATE AND CALCIUM CHLORIDE: 600; 310; 30; 20 INJECTION, SOLUTION INTRAVENOUS at 13:15

## 2017-10-24 RX ADMIN — INSULIN LISPRO 2 UNITS: 100 INJECTION, SOLUTION INTRAVENOUS; SUBCUTANEOUS at 18:40

## 2017-10-24 RX ADMIN — ROCURONIUM BROMIDE 10 MG: 10 INJECTION INTRAVENOUS at 11:20

## 2017-10-24 RX ADMIN — SODIUM CHLORIDE, POTASSIUM CHLORIDE, SODIUM LACTATE AND CALCIUM CHLORIDE 1000 ML: 600; 310; 30; 20 INJECTION, SOLUTION INTRAVENOUS at 09:59

## 2017-10-24 RX ADMIN — HYDROMORPHONE HYDROCHLORIDE 0.25 MG: 1 INJECTION, SOLUTION INTRAMUSCULAR; INTRAVENOUS; SUBCUTANEOUS at 14:53

## 2017-10-24 RX ADMIN — ONDANSETRON 4 MG: 2 INJECTION INTRAMUSCULAR; INTRAVENOUS at 12:57

## 2017-10-24 RX ADMIN — PHENYLEPHRINE HYDROCHLORIDE 100 MCG: 10 INJECTION INTRAMUSCULAR; INTRAVENOUS; SUBCUTANEOUS at 10:29

## 2017-10-24 RX ADMIN — PROPOFOL 200 MG: 10 INJECTION, EMULSION INTRAVENOUS at 10:14

## 2017-10-24 RX ADMIN — FLUCONAZOLE 150 MG: 100 TABLET ORAL at 20:39

## 2017-10-24 RX ADMIN — SODIUM CHLORIDE, POTASSIUM CHLORIDE, SODIUM LACTATE AND CALCIUM CHLORIDE: 600; 310; 30; 20 INJECTION, SOLUTION INTRAVENOUS at 10:10

## 2017-10-24 RX ADMIN — PHENYLEPHRINE HYDROCHLORIDE 100 MCG: 10 INJECTION INTRAMUSCULAR; INTRAVENOUS; SUBCUTANEOUS at 10:27

## 2017-10-24 RX ADMIN — FENTANYL CITRATE 100 MCG: 50 INJECTION INTRAMUSCULAR; INTRAVENOUS at 10:14

## 2017-10-24 RX ADMIN — ROCURONIUM BROMIDE 50 MG: 10 INJECTION INTRAVENOUS at 10:14

## 2017-10-24 RX ADMIN — DOCUSATE SODIUM 100 MG: 100 CAPSULE, LIQUID FILLED ORAL at 20:39

## 2017-10-24 RX ADMIN — ONDANSETRON 4 MG: 2 INJECTION INTRAMUSCULAR; INTRAVENOUS at 13:38

## 2017-10-24 RX ADMIN — PHENYLEPHRINE HYDROCHLORIDE 200 MCG: 10 INJECTION INTRAMUSCULAR; INTRAVENOUS; SUBCUTANEOUS at 10:30

## 2017-10-24 RX ADMIN — METRONIDAZOLE 500 MG: 500 INJECTION, SOLUTION INTRAVENOUS at 10:17

## 2017-10-24 RX ADMIN — LIDOCAINE HYDROCHLORIDE 50 MG: 10 INJECTION, SOLUTION EPIDURAL; INFILTRATION; INTRACAUDAL; PERINEURAL at 10:14

## 2017-10-24 RX ADMIN — FAMOTIDINE 20 MG: 10 INJECTION, SOLUTION INTRAVENOUS at 20:39

## 2017-10-24 RX ADMIN — CETIRIZINE HYDROCHLORIDE 10 MG: 10 TABLET ORAL at 20:41

## 2017-10-24 RX ADMIN — ROCURONIUM BROMIDE 20 MG: 10 INJECTION INTRAVENOUS at 10:40

## 2017-10-24 RX ADMIN — DEXAMETHASONE SODIUM PHOSPHATE 10 MG: 10 INJECTION INTRAMUSCULAR; INTRAVENOUS at 10:14

## 2017-10-24 RX ADMIN — GLYCOPYRROLATE 0.6 MG: 0.2 INJECTION, SOLUTION INTRAMUSCULAR; INTRAVENOUS at 13:15

## 2017-10-24 RX ADMIN — INSULIN LISPRO 1 UNITS: 100 INJECTION, SOLUTION INTRAVENOUS; SUBCUTANEOUS at 20:56

## 2017-10-24 RX ADMIN — FENTANYL CITRATE 50 MCG: 50 INJECTION INTRAMUSCULAR; INTRAVENOUS at 10:50

## 2017-10-24 RX ADMIN — CIPROFLOXACIN 400 MG: 2 INJECTION, SOLUTION INTRAVENOUS at 10:31

## 2017-10-24 RX ADMIN — NEOSTIGMINE METHYLSULFATE 4 MG: 1 INJECTION, SOLUTION INTRAMUSCULAR; INTRAVENOUS; SUBCUTANEOUS at 13:15

## 2017-10-24 RX ADMIN — HYDROMORPHONE HYDROCHLORIDE 0.25 MG: 1 INJECTION, SOLUTION INTRAMUSCULAR; INTRAVENOUS; SUBCUTANEOUS at 14:47

## 2017-10-24 RX ADMIN — SODIUM CHLORIDE: 9 INJECTION, SOLUTION INTRAVENOUS at 18:24

## 2017-10-24 RX ADMIN — KETOROLAC TROMETHAMINE 15 MG: 30 INJECTION, SOLUTION INTRAMUSCULAR at 18:23

## 2017-10-24 RX ADMIN — ENOXAPARIN SODIUM 30 MG: 30 INJECTION SUBCUTANEOUS at 20:39

## 2017-10-24 ASSESSMENT — PAIN SCALES - GENERAL
PAINLEVEL_OUTOF10: 5
PAINLEVEL_OUTOF10: 0
PAINLEVEL_OUTOF10: 4
PAINLEVEL_OUTOF10: 6
PAINLEVEL_OUTOF10: 3
PAINLEVEL_OUTOF10: 4

## 2017-10-24 ASSESSMENT — PAIN DESCRIPTION - PAIN TYPE: TYPE: SURGICAL PAIN

## 2017-10-24 ASSESSMENT — PAIN DESCRIPTION - LOCATION: LOCATION: ABDOMEN

## 2017-10-24 ASSESSMENT — ENCOUNTER SYMPTOMS: STRIDOR: 0

## 2017-10-24 ASSESSMENT — PAIN - FUNCTIONAL ASSESSMENT: PAIN_FUNCTIONAL_ASSESSMENT: 0-10

## 2017-10-24 NOTE — PROGRESS NOTES
Pt to room 4336 from surgery. Pt oriented to room and call light. Abdominal incisions are clean dry and intact. Family at bedside. Pt updated on plan of care.     Carol Montemayor RN

## 2017-10-24 NOTE — INTERVAL H&P NOTE
OB/GYN Pre-Op Note  [address] [facility]    Patient Name: Juanita Gage     Patient : 1950  [facility]  Admission Date/Time: 10/24/2017  8:37 AM  Primary Care Physician: Tiffanie Dalton MD  MRN: [id number]    Date: 10/24/2017  Time: 9:40 AM    The patient was seen in pre-op holding. She is here for scheduled surgery due to grade I endometrial cancer. She denies any new complaints today. CT abdomen/pelvis from 10/18/17 was previously reviewed and revealed a prominent right pelvic sidewall lymph node measuring 1.7 x1.0 cm. Imaging also illustrated an indeterminate 1.2 x 0.5cm right adrenal mass, mild nonspecific wall thickening in the distal esophagus, and gallbladder distention with gall stones. The procedure risks and complications were reviewed. The labs, Consent, and H&P were reviewed and updated. The patient was counseled on the possibility of  the need of a second surgery. The patient voiced understanding and had all of her questions answered. The possibility of incomplete removal of abnormal tissue was discussed. OBSTETRICAL HISTORY:   Obstetric History       T1      L1     SAB0   TAB0   Ectopic0   Molar0   Multiple0   Live Births0       # Outcome Date GA Lbr Gabino/2nd Weight Sex Delivery Anes PTL Lv   1 Term                   PAST MEDICAL HISTORY:   has a past medical history of Allergic rhinitis; At high risk for falls; Colon polyp; History of TIA (transient ischemic attack); Hyperglycemia; Hyperlipidemia; Hypertension; Hypothyroidism; Legally blind; Lower back pain; Mixed incontinence; Morbid obesity with BMI of 40.0-44.9, adult ([facility] [address] Osteoarthritis (arthritis due to wear and tear of joints); Osteoarthritis involving multiple joints on both sides of body; Osteoporosis; and Tennis elbow. PAST SURGICAL HISTORY:   has a past surgical history that includes Thyroid surgery (); Colonoscopy ();  Tonsillectomy; Colonoscopy (2017); colsc flx w/removal lesion by hot bx forceps (N/A, 6/26/2017); Dilation and curettage of uterus (N/A, 9/20/2017); Cataract removal with implant (Bilateral, 2015); and vitrectomy. ALLERGIES:  Allergies as of 10/12/2017 - Review Complete 10/12/2017   Allergen Reaction Noted    Sulfa antibiotics Nausea Only 04/11/2011    Penicillins Rash 04/11/2011       MEDICATIONS:  Current Facility-Administered Medications   Medication Dose Route Frequency Provider Last Rate Last Dose    lactated ringers infusion 1,000 mL  1,000 mL Intravenous Continuous Erika MD Sergio        0.9 % sodium chloride infusion   Intravenous Continuous Denita Keyes MD        ceFAZolin (ANCEF) 2 g in sterile water 20 mL IV syringe  2 g Intravenous On Call to [address] [place] [address] Avenue, MD        sodium chloride flush 0.9 % injection 10 mL  10 mL Intravenous 2 times per day Denita Keyes MD        sodium chloride flush 0.9 % injection 10 mL  10 mL Intravenous PRN Denita Keyes MD           FAMILY HISTORY:  family history includes Coronary Art Dis in her father; Diabetes in her father, maternal grandfather, and mother; Heart Attack in her father and paternal grandfather; High Blood Pressure in her brother, father, maternal grandmother, mother, and sister; Hypertension in her father; No Known Problems in her paternal grandmother; Thyroid Disease in her brother and mother. SOCIAL HISTORY:   reports that she has never smoked. She has never used smokeless tobacco. She reports that she does not drink alcohol or use drugs.     VITALS:  Vitals:    10/24/17 0928 10/24/17 0957   BP:  (!) 142/70   Pulse:  64   Resp:  17   Temp:  98.6 °F (37 °C)   TempSrc:  Temporal   SpO2:  96%   Weight: 244 lb 7.8 oz (110.9 kg)    Height: 5' 1\" (1.549 m)                                                                                                                                PHYSICAL EXAM:     Unchanged from Prior H&P    LAB Phosphatase 10/18/2017 65  35 - 104 U/L Final    ALT 10/18/2017 16  5 - 33 U/L Final    AST 10/18/2017 27  <32 U/L Final    Total Bilirubin 10/18/2017 0.32  0.3 - 1.2 mg/dL Final    Total Protein 10/18/2017 7.8  6.4 - 8.3 g/dL Final    Alb 10/18/2017 4.0  3.5 - 5.2 g/dL Final    Albumin/Globulin Ratio 10/18/2017 1.1  1.0 - 2.5 Final    GFR Non- 10/18/2017 >60  >60 mL/min Final    GFR  10/18/2017 >60  >60 mL/min Final    GFR Comment 10/18/2017        Final    Comment: Average GFR for 61-76 years old:   80 mL/min/1.73sq m  Chronic Kidney Disease:   <60 mL/min/1.73sq m  Kidney failure:   <15 mL/min/1.73sq m              eGFR calculated using average adult body mass. Additional eGFR calculator   available at:        [email or web address]        [address] [phone]      GFR Staging 10/18/2017 NOT REPORTED   Final    Protime 10/18/2017 10.0  9.4 - 12.6 sec Final    INR 10/18/2017 0.9   Final    Comment:       Therapeutic Range:    Moderate Anticoagulant Intensity:     INR = 2.0-3.0   High Anticoagulant Intensity:     INR = 2.5-3.5        [address] [phone]      Expiration Date 10/18/2017 10/27/2017   Final    Arm Band Number 10/18/2017 [id number]   Final    ABO/Rh 10/18/2017 A POSITIVE   Final    Antibody Screen 10/18/2017 NEGATIVE   Final    Color, UA 10/18/2017 STRAW* YEL Final    Turbidity UA 10/18/2017 CLEAR  CLEAR Final    Glucose, Ur 10/18/2017 NEGATIVE  NEG Final    Bilirubin Urine 10/18/2017 NEGATIVE  NEG Final    Ketones, Urine 10/18/2017 NEGATIVE  NEG Final    Specific Gravity, UA 10/18/2017 <1.005* 1.005 - 1.030 Final    Urine Hgb 10/18/2017 MODERATE* NEG Final    pH, UA 10/18/2017 7.5  5.0 - 8.0 Final    Protein, UA 10/18/2017 NEGATIVE  NEG Final    Urobilinogen, Urine 10/18/2017 Normal  NORM Final    Nitrite, Urine 10/18/2017 NEGATIVE NEG Final    Leukocyte Esterase, Urine 10/18/2017 NEGATIVE  NEG Final    Urinalysis Comments 10/18/2017 NOT REPORTED   Final    - 10/18/2017        Final    WBC, UA 10/18/2017 0 TO 2  0 - 5 /HPF Final    RBC, UA 10/18/2017 2 TO 5  0 - 2 /HPF Final    Casts UA 10/18/2017 NOT REPORTED  0 - 2 /LPF Final    Crystals UA 10/18/2017 NOT REPORTED  NONE /HPF Final    Epithelial Cells UA 10/18/2017 0 TO 2  0 - 5 /HPF Final    Renal Epithelial, Urine 10/18/2017 NOT REPORTED  0 /HPF Final    Bacteria, UA 10/18/2017 FEW* NONE Final    Mucus, UA 10/18/2017 NOT REPORTED  NONE Final    Trichomonas, UA 10/18/2017 NOT REPORTED  NONE Final    Amorphous, UA 10/18/2017 NOT REPORTED  NONE Final    Other Observations UA 10/18/2017 NOT REPORTED  NREQ Final    Yeast, UA 10/18/2017 NOT REPORTED  NONE Final       DIAGNOSTICS:    Xr Chest Standard (2 Vw)    Result Date: 10/18/2017  EXAMINATION: TWO VIEWS OF THE CHEST 10/18/2017 9:57 am COMPARISON: March 2, 2013 HISTORY: Reason for exam:->preop, endometrial cancer FINDINGS: The lungs are without acute focal process. There is no effusion or pneumothorax. Normal heart size. Calcification of the aortic knob. Mild degenerative change of the thoracic spine. Stable chest without acute process. Ct Abdomen Pelvis W Iv Contrast Additional Contrast? Oral    Result Date: 10/18/2017  EXAMINATION: CT OF THE ABDOMEN AND PELVIS WITH CONTRAST 10/18/2017 9:03 am TECHNIQUE: CT of the abdomen and pelvis was performed with the administration of intravenous contrast. Multiplanar reformatted images are provided for review. Dose modulation, iterative reconstruction, and/or weight based adjustment of the mA/kV was utilized to reduce the radiation dose to as low as reasonably achievable. COMPARISON: None.  HISTORY: ORDERING SYSTEM PROVIDED HISTORY: Endometrial cancer [facility] TECHNOLOGIST PROVIDED HISTORY: Additional Contrast?->Oral Reason for exam:->endometrial cancer staging 82-year-old female with endometrial cancer here for staging FINDINGS: Lower Chest: Mild bibasilar atelectasis with parenchymal scarring in the right lower lobe. No free intra-abdominal air identified. Small hiatal hernia. Mild wall thickening of the distal esophagus/GE junction. Organs: Diffuse fatty infiltration of the liver. Distention of the gallbladder. Probable radiolucent or cholesterol containing gallstones seen within the region of the gallbladder fundus. Spleen, pancreas, kidneys, and left adrenal gland grossly unremarkable in appearance. No hydronephrosis or hydroureter. Very small ovoid indeterminate right adrenal mass measuring 1.2 x 0.5 cm at the lateral limb of the right adrenal gland on image 77, series 601 and image 25, series 2. GI/Bowel: Oral contrast seen migrating throughout nondilated loops of small bowel without evidence for small bowel obstruction. Normal gas-filled appendix. Mild stool scattered throughout the visualized colonic segments. Pelvis:  No overt inguinal or pelvic sidewall lymphadenopathy identified. There is a prominent right pelvic side wall lymph node measuring 1.7 x 1.0 cm in greatest AP and transverse dimensions on image 81, series 2. Urinary bladder is relatively collapsed. Low-attenuation within the region of the endometrial canal may be related to patient's underlying endometrial cancer/malignancy. Visualized adnexa grossly unremarkable. No significant free fluid in the pelvis. Peritoneum/Retroperitoneum: Atherosclerotic calcification of the abdominal aorta. No retroperitoneal lymphadenopathy. Psoas muscles normal in size and symmetric in appearance. Bones/Soft Tissues: No suspicious osteolytic or osteoblastic lesions. No acute osseous abnormality identified. Small fat containing periumbilical hernia in the midline on image 59, series 2. Mild diffuse degenerative changes throughout the spine. Sclerotic focus within the L5 vertebral body likely represents a focal bone island.

## 2017-10-24 NOTE — PROGRESS NOTES
Gynecology Oncology Progress Note      Odalis Kiser is a 74 y.o. female , POD # 0 s/p TLHBSO, LND    Patient seen and examined. She is resting comfortably and reports that her pain is controlled. Patient is tolerating ice and water. Murray in place, draining clear urine. She denies any vaginal bleeding. She is not ambulating yet. She is not passing flatus. She denies Fever/Chills, Chest Pain, SOB, N/V, Calf Pain. Vitals:  Vitals:    10/24/17 1600 10/24/17 1630 10/24/17 1700 10/24/17 1719   BP: (!) 156/68  (!) 154/76 (!) 149/69   Pulse: 71 66 66 65   Resp: 16    Temp: 97.7 °F (36.5 °C)  97.5 °F (36.4 °C) 97.8 °F (36.6 °C)   TempSrc:    Oral   SpO2: 100%  100% 97%   Weight:       Height:           Intake/Output:   Last Shift: I/O last 3 completed shifts: In: 1500 [I.V.:1500]  Out: 625 [Urine:500; Blood:125]  Current Shift: I/O this shift:  In: 225 [I.V.:225]  Out: 115 [Urine:115]    650 clr UOP/ 3 hr    Physical Exam:  Gen: no acute distress, A&O x3  HEENT: NCAT, EOMI, MMM  Resp: CTABL, no WRR  Card: RRR S1S2  Abd: soft, appropriately tender, ND, no rebound, no guarding.  +hypoactive BS, abdominal binder in place  Incisions: clean, dry and intact  Ext: No LE edema, no calf tenderness or swelling, EPCs on and working      Assessment/Plan:  Odalis Kiser 74 y.o. female , POD #0 s/p TLHBSO, LND   - Doing well, vitals stable   - continue murray catheter, UOP appropriate   - Encourage ambulation and use of incentive spirometer   - Pain control: s/p TAP block, toradol and advance to norco/motrin with increased po intake   - Labs: CBC and BMP daily   - DVT Proph: Lovenox, SCDs   - Abx: s/p cipro and flagyl preop   - Diet : carb control, advance as tolerated   - IVF: NS @ 125ml/h   - Path: pending   - PT/OT/SW consulted    Prediabetes   - glucose AC/HS   - medium dose ISS   - home dose metformin held     Hypertension   - will continue to monitor BPs   - home meds held currently (HCTZ,

## 2017-10-24 NOTE — H&P (VIEW-ONLY)
History and Physical      HPI:  HPI  74 y.o.   1 Para 1 woman, seen at the request of Dr. Ceasrio Brewer for endometrial cancer. The patient began experiencing spotting in May 2017. She presented for evaluation and a pelvic ultrasound was obtained on 2017. The uterus measured 7.8 x 3.5 x 4 cm and the endometrial stripe was thickened at 1.8 cm. The patient was taken for [facility]  on 2017 and the endometrial curetting showed grade 1 endometrial cancer in a background of complex atypical hyperplasia. The patient was referred for further evaluation and treatment recommendations. The pt reports before seeing Dr Cesario Brewer for a GYN exam that \"it's been so long I can't remember\" since she had a GYN exam.  She reports a hx of 1 abnormal pap back in the 1980s but she is not sure what was abnormal about the pap other than it was not cancer. The light spotting that started in May had increased to intermittent heavy bleeding. The pt is not sexually active. The patient reports that she had a normal mammogram and a normal colonoscopy in 2017. Pap smear 2017 was negative with negative high-risk HPV DNA. ROS:  Review of Systems   Constitutional: Negative for activity change and appetite change. HENT: Negative for trouble swallowing and voice change. Respiratory: Negative for cough and choking. Gastrointestinal: Negative for abdominal distention and abdominal pain. Genitourinary: Positive for difficulty urinating (urinary incontinence), frequency and vaginal bleeding. Negative for pelvic pain. Musculoskeletal: Positive for gait problem (depend on cane). Arthralgias: OA in ronan knees. Psychiatric/Behavioral: Negative for agitation and confusion.        Past Medical History:   Diagnosis Date    Allergic rhinitis 2017    At high risk for falls 2017    Colon polyp     Had colonoscopy done 20 yrs ago    Essential hypertension     History of TIA (transient ischemic attack)     Hyperglycemia 3/21/2017    Hyperlipidemia     Hypertension     Hypothyroidism 1980    sub total thyroidectomy goiter    Legally blind     Lower back pain     Mini stroke ([facility] [address]     Mixed incontinence 1/9/2016    Morbid obesity with BMI of 40.0-44.9, adult ([facility] [address] 2/23/2017    Osteoarthritis (arthritis due to wear and tear of joints)     ronan knee    Osteoarthritis involving multiple joints on both sides of body 4/24/2012    Osteoporosis     Tennis elbow     left       Past Surgical History:   Procedure Laterality Date    COLONOSCOPY      had polyp, she doesn't know where and when, \"20 yrs ago\"    COLONOSCOPY  06/26/2017    DILATION AND CURETTAGE OF UTERUS N/A 9/20/2017    [facility]     CATARACTS     [facility] FLX W/REMOVAL LESION BY  [address] N/A 6/26/2017    COLONOSCOPY POLYPECTOMY / HOT SNARE performed by Courtney Mcarthur DO at [facility]    subtotal 21 years ago   Clydia Punt TONSILLECTOMY         Family History   Problem Relation Age of Onset    Coronary Art Dis Father     Hypertension Father     Diabetes Father     High Blood Pressure Father     Thyroid Disease Father     Diabetes Mother     High Blood Pressure Mother     Thyroid Disease Mother     Thyroid Disease Other      sibs    Osteoporosis Other      family    Colon Cancer Neg Hx        Social History     Social History    Marital status: Single     Spouse name: N/A    Number of children: N/A    Years of education: N/A     Social History Main Topics    Smoking status: Never Smoker    Smokeless tobacco: Never Used    Alcohol use No    Drug use: No    Sexual activity: No     Other Topics Concern    None     Social History Narrative    None       Past [address] does contribute to today's presenting complaint.       Current Outpatient Prescriptions   Medication Sig Dispense Refill Nose normal.   Eyes: Conjunctivae are normal. Pupils are equal, round, and reactive to light. No scleral icterus. Neck: Normal range of motion. Neck supple. No JVD present. No tracheal deviation present. No thyromegaly present. Cardiovascular: Normal rate, regular rhythm and normal heart sounds. Exam reveals no gallop and no friction rub. No murmur heard. Pulmonary/Chest: Effort normal and breath sounds normal. No respiratory distress. Abdominal: Soft. Bowel sounds are normal. She exhibits no distension and no mass. There is no hepatosplenomegaly. There is no tenderness. There is no rebound, no guarding and no CVA tenderness. No hernia. Hernia confirmed negative in the right inguinal area and confirmed negative in the left inguinal area. Genitourinary: Rectal exam shows no external hemorrhoid, no mass, no tenderness and anal tone normal. Pelvic exam was performed with patient supine. No labial fusion. There is no rash, tenderness or lesion on the right labia. There is no rash, tenderness or lesion on the left labia. Uterus is not deviated, not enlarged, not fixed and not tender. Cervix exhibits no motion tenderness, no discharge and no friability. Right adnexum displays no mass, no tenderness and no fullness. Left adnexum displays no mass, no tenderness and no fullness. There is bleeding (scant old blood) in the vagina. No vaginal discharge found. Genitourinary Comments: The patient has normal female genitalia including, vulva, urethra, vagina, Bartholin's and Marquand's. Bladder is non-tender, without mass; urethra is non-tender, without mass. Cervix is without lesion or nodule, uterus is normal size, shape and contour and is mobile, adnexae are non-tender and without mass. There are no palpable vaginal nodules,  pelvic masses or tenderness. Recto-vaginal examination is confirmatory.   There are no nodules of the rectovaginal septum, no rectal mass, sphincter tone is normal, no utero-sacral nodularity. Musculoskeletal: Normal range of motion. She exhibits no edema or tenderness. Lymphadenopathy:     She has no cervical adenopathy. Right: No inguinal adenopathy present. Left: No inguinal adenopathy present. Neurological: She is alert and oriented to person, place, and time. Skin: Skin is warm and dry. No rash noted. She is not diaphoretic. No erythema. Psychiatric: She has a normal mood and affect. Her behavior is normal. Judgment and thought content normal.   Vitals reviewed. Assessment:     1. Endometrial cancer ([address]  CT ABDOMEN PELVIS W IV CONTRAST Additional Contrast? Oral    XR CHEST STANDARD (2 VW)   The patient has what appears to be a clinical grade 1 stage I endometrial cancer. We discussed treatment options of surgical extirpation versus high-dose progesterone therapy versus radiation therapy. At this time, the patient is inclined to proceed to surgical evaluation. I recommended a total laparoscopic hysterectomy with bilateral salpingo-oophorectomy, the organs will be submitted for frozen section and staging will be performed if indicated. The patient was counseled on the risks, benefits and alternatives of the procedure to include but not limited to: Infection, bleeding, blood transfusion, damage to internal organs such as bowel, bladder, ureters, blood vessels and nerves, deep vein thrombosis, pulmonary embolism, ICU care, anesthesia risks and death. Questions were answered, she voices understanding and desires to proceed. She will be scheduled at her earliest convenience. I have requested preoperative chest x-ray and CT the abdomen and pelvis to assess for any obvious metastatic or concurrent disease. Plan:     Return in about 2 weeks (around 10/26/2017) for 2 hour surgery, TLH/BSO, FS possible staging.   Orders Placed This Encounter   Procedures    CT ABDOMEN PELVIS W IV CONTRAST Additional Contrast? Oral    XR CHEST STANDARD (2 VW)     No

## 2017-10-24 NOTE — DISCHARGE SUMMARY
Gyn Discharge Summary    Patient Name: Karina Saldana  Patient : 1950  Primary Care Physician: Marzena Esparza MD  Admit Date: 10/24/2017    Principal Diagnosis: grade I endometrial cancer    Other Diagnosis:   Postoperative state [Z98.890]  Patient Active Problem List   Diagnosis    Hypothyroidism    History of TIA (transient ischemic attack)    Lower back pain    Essential hypertension    Osteopenia determined by x-ray    Osteoarthritis involving multiple joints on both sides of body    Left knee DJD    Prediabetes    Vitamin D deficiency    Mixed incontinence    Chronic pain of both knees    Slow transit constipation    Allergic rhinitis    Hyperlipidemia with target LDL less than 100    BMI 46.3    At high risk for falls    Left breast lump    Dense breast tissue on mammogram    Hyperglycemia    Spondylosis of lumbar region without myelopathy or radiculopathy    Abnormal vaginal bleeding    OAB (overactive bladder)    Candidiasis of perineum    Primary osteoarthritis of both knees    Chronic fatigue     Adenomatous polyp of sigmoid colon, 17    Postmenopausal bleeding    Thickened endometrium    S/P h-scope, Myosure 17    Mixed stress and urge urinary incontinence    Adenocarcinoma of endometrium, stage 1 ([address]    TLHBSO, bilateral LND 10/24/17       Infection: No  Hospital Acquired: No    Surgical Operations & Procedures: TLHBSO, frozen section, bilateral LND      Consultations: Anesthesia    Pertinent Findings & Procedures:   Karina Saldana is a 74 y.o. female, admitted for scheduled surgery due to grade I endometrial cancer. She received cipro and flagyl preoperatively as well as general anesthesia. She underwent total laparoscopic hysterectomy, bilateral salpingo-oophorectomy, frozen section and staging on 10/24/17. TAP block was placed postoperatively. Post-op course normal, discharged home on POD#2. Follow up in 3-4 weeks.  Discharge instructions

## 2017-10-25 LAB
ANION GAP SERPL CALCULATED.3IONS-SCNC: 14 MMOL/L (ref 9–17)
BUN BLDV-MCNC: 15 MG/DL (ref 8–23)
BUN/CREAT BLD: ABNORMAL (ref 9–20)
CALCIUM SERPL-MCNC: 7.7 MG/DL (ref 8.6–10.4)
CHLORIDE BLD-SCNC: 97 MMOL/L (ref 98–107)
CO2: 22 MMOL/L (ref 20–31)
CREAT SERPL-MCNC: 0.76 MG/DL (ref 0.5–0.9)
CULTURE: NO GROWTH
CULTURE: NORMAL
GFR AFRICAN AMERICAN: >60 ML/MIN
GFR NON-AFRICAN AMERICAN: >60 ML/MIN
GFR SERPL CREATININE-BSD FRML MDRD: ABNORMAL ML/MIN/{1.73_M2}
GFR SERPL CREATININE-BSD FRML MDRD: ABNORMAL ML/MIN/{1.73_M2}
GLUCOSE BLD-MCNC: 105 MG/DL (ref 65–105)
GLUCOSE BLD-MCNC: 112 MG/DL (ref 70–99)
GLUCOSE BLD-MCNC: 117 MG/DL (ref 65–105)
GLUCOSE BLD-MCNC: 120 MG/DL (ref 65–105)
GLUCOSE BLD-MCNC: 127 MG/DL (ref 65–105)
HCT VFR BLD CALC: 32 % (ref 36–46)
HEMOGLOBIN: 10.4 G/DL (ref 12–16)
Lab: NORMAL
MCH RBC QN AUTO: 29.3 PG (ref 26–34)
MCHC RBC AUTO-ENTMCNC: 32.5 G/DL (ref 31–37)
MCV RBC AUTO: 90 FL (ref 80–100)
PDW BLD-RTO: 14.3 % (ref 12.5–15.4)
PLATELET # BLD: 214 K/UL (ref 140–450)
PMV BLD AUTO: 8.7 FL (ref 6–12)
POTASSIUM SERPL-SCNC: 3.6 MMOL/L (ref 3.7–5.3)
RBC # BLD: 3.55 M/UL (ref 4–5.2)
SODIUM BLD-SCNC: 133 MMOL/L (ref 135–144)
SPECIMEN DESCRIPTION: NORMAL
STATUS: NORMAL
SURGICAL PATHOLOGY REPORT: NORMAL
WBC # BLD: 12.7 K/UL (ref 3.5–11)

## 2017-10-25 PROCEDURE — 80048 BASIC METABOLIC PNL TOTAL CA: CPT

## 2017-10-25 PROCEDURE — 96376 TX/PRO/DX INJ SAME DRUG ADON: CPT

## 2017-10-25 PROCEDURE — 82947 ASSAY GLUCOSE BLOOD QUANT: CPT

## 2017-10-25 PROCEDURE — G8978 MOBILITY CURRENT STATUS: HCPCS

## 2017-10-25 PROCEDURE — 85027 COMPLETE CBC AUTOMATED: CPT

## 2017-10-25 PROCEDURE — G8980 MOBILITY D/C STATUS: HCPCS

## 2017-10-25 PROCEDURE — G0378 HOSPITAL OBSERVATION PER HR: HCPCS

## 2017-10-25 PROCEDURE — 2500000003 HC RX 250 WO HCPCS: Performed by: OBSTETRICS & GYNECOLOGY

## 2017-10-25 PROCEDURE — 99024 POSTOP FOLLOW-UP VISIT: CPT | Performed by: OBSTETRICS & GYNECOLOGY

## 2017-10-25 PROCEDURE — 2580000003 HC RX 258: Performed by: OBSTETRICS & GYNECOLOGY

## 2017-10-25 PROCEDURE — G8979 MOBILITY GOAL STATUS: HCPCS

## 2017-10-25 PROCEDURE — 6360000002 HC RX W HCPCS: Performed by: OBSTETRICS & GYNECOLOGY

## 2017-10-25 PROCEDURE — 36415 COLL VENOUS BLD VENIPUNCTURE: CPT

## 2017-10-25 PROCEDURE — 96372 THER/PROPH/DIAG INJ SC/IM: CPT

## 2017-10-25 PROCEDURE — 6370000000 HC RX 637 (ALT 250 FOR IP): Performed by: OBSTETRICS & GYNECOLOGY

## 2017-10-25 PROCEDURE — 97161 PT EVAL LOW COMPLEX 20 MIN: CPT

## 2017-10-25 PROCEDURE — S0028 INJECTION, FAMOTIDINE, 20 MG: HCPCS | Performed by: OBSTETRICS & GYNECOLOGY

## 2017-10-25 RX ORDER — IBUPROFEN 800 MG/1
800 TABLET ORAL EVERY 8 HOURS PRN
Status: DISCONTINUED | OUTPATIENT
Start: 2017-10-25 | End: 2017-10-26 | Stop reason: HOSPADM

## 2017-10-25 RX ORDER — DOCUSATE SODIUM 100 MG/1
100 CAPSULE, LIQUID FILLED ORAL 2 TIMES DAILY PRN
Qty: 60 CAPSULE | Refills: 0 | Status: SHIPPED | OUTPATIENT
Start: 2017-10-25 | End: 2017-10-30 | Stop reason: SDUPTHER

## 2017-10-25 RX ORDER — HYDROCODONE BITARTRATE AND ACETAMINOPHEN 5; 325 MG/1; MG/1
1 TABLET ORAL EVERY 4 HOURS PRN
Qty: 30 TABLET | Refills: 0 | Status: SHIPPED | OUTPATIENT
Start: 2017-10-25 | End: 2017-11-01

## 2017-10-25 RX ORDER — IBUPROFEN 800 MG/1
800 TABLET ORAL EVERY 8 HOURS PRN
Qty: 30 TABLET | Refills: 0 | Status: SHIPPED | OUTPATIENT
Start: 2017-10-25 | End: 2017-12-28 | Stop reason: ALTCHOICE

## 2017-10-25 RX ORDER — ONDANSETRON 4 MG/1
4 TABLET, FILM COATED ORAL DAILY PRN
Qty: 30 TABLET | Refills: 0 | Status: SHIPPED | OUTPATIENT
Start: 2017-10-25 | End: 2017-11-06 | Stop reason: DRUGHIGH

## 2017-10-25 RX ADMIN — Medication 10 ML: at 20:30

## 2017-10-25 RX ADMIN — KETOROLAC TROMETHAMINE 15 MG: 30 INJECTION, SOLUTION INTRAMUSCULAR at 00:08

## 2017-10-25 RX ADMIN — LEVOTHYROXINE SODIUM 75 MCG: 75 TABLET ORAL at 09:39

## 2017-10-25 RX ADMIN — SODIUM CHLORIDE: 9 INJECTION, SOLUTION INTRAVENOUS at 01:30

## 2017-10-25 RX ADMIN — FAMOTIDINE 20 MG: 10 INJECTION, SOLUTION INTRAVENOUS at 20:30

## 2017-10-25 RX ADMIN — CETIRIZINE HYDROCHLORIDE 10 MG: 10 TABLET ORAL at 09:39

## 2017-10-25 RX ADMIN — KETOROLAC TROMETHAMINE 15 MG: 30 INJECTION, SOLUTION INTRAMUSCULAR at 05:52

## 2017-10-25 RX ADMIN — DOCUSATE SODIUM 100 MG: 100 CAPSULE, LIQUID FILLED ORAL at 09:39

## 2017-10-25 RX ADMIN — DOCUSATE SODIUM 100 MG: 100 CAPSULE, LIQUID FILLED ORAL at 20:29

## 2017-10-25 RX ADMIN — ENOXAPARIN SODIUM 30 MG: 30 INJECTION SUBCUTANEOUS at 09:39

## 2017-10-25 RX ADMIN — HYDROCODONE BITARTRATE AND ACETAMINOPHEN 2 TABLET: 5; 325 TABLET ORAL at 23:16

## 2017-10-25 RX ADMIN — ENOXAPARIN SODIUM 30 MG: 30 INJECTION SUBCUTANEOUS at 20:29

## 2017-10-25 RX ADMIN — FAMOTIDINE 20 MG: 10 INJECTION, SOLUTION INTRAVENOUS at 09:39

## 2017-10-25 RX ADMIN — Medication 10 ML: at 09:43

## 2017-10-25 ASSESSMENT — PAIN SCALES - GENERAL
PAINLEVEL_OUTOF10: 0
PAINLEVEL_OUTOF10: 5
PAINLEVEL_OUTOF10: 0
PAINLEVEL_OUTOF10: 3
PAINLEVEL_OUTOF10: 0

## 2017-10-25 ASSESSMENT — PAIN DESCRIPTION - PAIN TYPE: TYPE: SURGICAL PAIN

## 2017-10-25 ASSESSMENT — PAIN DESCRIPTION - LOCATION: LOCATION: ABDOMEN

## 2017-10-25 NOTE — OP NOTE
[address]                               OPERATIVE REPORT    PATIENT NAME: Zarina Mcarthur                     :         1950  MED REC NO:   [id number]                             ROOM:       7288  ACCOUNT NO:   [de-identified]                           ADMIT DATE:  10/24/2017  PROVIDER:     Hannah Kelley    DATE OF PROCEDURE:  10/24/2017    PREOPERATIVE DIAGNOSES:  Grade 1 endometrial cancer and prominent  right pelvic lymph node on preoperative CT. POSTOPERATIVE DIAGNOSES:  Grade 1 endometrial cancer and prominent  right pelvic lymph node on preoperative CT. OPERATIONS PERFORMED:  Total laparoscopic hysterectomy, bilateral  salpingo-oophorectomy, pelvic and paraaortic lymphadenectomy, and  peritoneal biopsy. SURGEON:  Hannah Kelley MD    ASSISTANTS:  Nissa Long DO PGY-4; and Tatianna Connor MS-IV. ANESTHESIA:  General endotracheal and local with postoperative TAP  block. ESTIMATED BLOOD LOSS:  125 mL. COMPLICATIONS:  None. SPECIMENS SUBMITTED TO PATHOLOGY:  Included pelvic washings for  cytology, left pelvic sidewall, uterus with cervix, bilateral tubes  and ovaries, right pelvic lymph nodes, left pelvic lymph nodes, and  right and left aortic lymph nodes. INDICATIONS:  The patient is a 72-year-old  1, para 1 woman who  presented complaining of postmenopausal spotting since May of 2017. Ultrasound on 2017 showed a uterus measuring 7.8 x 3.5 x 4 cm  with a 1.8 cm endometrial stripe. D and C was performed on 2017  and showed a grade 1 endometrial cancer in a background of complex  atypical hyperplasia. CT of the abdomen and pelvis was performed on  10/18/2017 and showed a prominent right pelvic sidewall lymph node  measuring 1.7 x 1 cm, and no other evidence of obvious metastatic  disease. The patient was counseled to undergo surgical evaluation.     FINDINGS DURING THE LAPAROSCOPY:  The patient was status post  bilateral tubal ligation. The ovaries were normal, uterus serosa was  normal.  Appendix, liver edge, omentum appeared normal as did bowel  surfaces. The left pelvic sidewall and peritoneum appeared somewhat  whitened and thick; therefore, this was submitted for biopsy and was  negative for malignancy on frozen section. On frozen section of the  uterus, the uterus contained a 4 cm lesion with minimal myometrial  invasion, but given the size of the lesion and the findings of  prominent lymph node on preop CT, staging was performed. DESCRIPTION OF THE PROCEDURE IN DETAIL:  After informed consent was  obtained, the patient was brought to the operating room with an IV in  place. General endotracheal anesthesia was instilled. She was placed  in low anterior lithotomy position using Yellofins stirrups. She was  examined, prepped, and draped in the usual sterile fashion. A Cloud  catheter was placed to down drain, and a large Vcare uterine  manipulator was placed. After changing gloves, the abdomen was  entered in the sub-umbilical location. After intradermal injection of  Marcaine, a 5 mm incision was created at the base of the umbilicus. A  Veress needle was placed. After confirming proper placement, the  abdomen was inflated with carbon dioxide gas. Once an adequate  pneumoperitoneum was achieved, a 5 mL trocar with sleeve was advanced  under direct visualization with prompt visualization of the  intraabdominal contents. Additional trocars were placed under direct  visualization after intradermal injection of Marcaine including 5 mm  trocars in the left and right lower quadrants and an 11 mm trocar with  sleeve in the suprapubic location. Pelvic washings were obtained and  submitted for cytology. The abdomen and pelvis were surveyed with  findings as noted. Attention was then turned to the hysterectomy.    The round ligaments were serially cauterized and collected separately using the Gyrus cutting forceps,  placed into an Endo Catch bag, removed, and submitted to pathology. Attention was then turned to the aortic lymphadenectomy. The  peritoneum overlying the anterior surface of the aorta was divided  with Gyrus cutting forceps. The lymph nodes were mobilized on the  right hand side with the boundaries of the right aortic lymph node  dissection being in the retroperitoneal duodenum superiorly, psoas  muscle laterally, the anterior surface of the aorta medially, and the  bifurcation of the common iliac artery inferiorly. Left hand  dissection, boundaries were the psoas muscle laterally, the anterior  surface of the aorta medially, the inferior mesenteric artery  superiorly, and the bifurcation of the common iliac artery inferiorly. Once the lymphadenectomy had been completed, the aortic lymph node  dissection bed was coated with Surgiflo. The abdomen and pelvis were  irrigated copiously with warm normal saline, inspected and found to be  hemostatic. The suprapubic port site was closed with a needle-point  suture passer using 0 Vicryl with good approximation of the fascia  noted. The carbon dioxide gas was allowed to escape. The remaining  ports were withdrawn. The skin edges were closed with subcuticular  stitch of 4-0 Monocryl. Dermaflex was applied. Anesthesia then  performed a TAP block for postoperative pain control. The patient was  awakened and extubated, taken to postanesthesia care unit in stable  condition. Suri Benavides    D: 10/24/2017 13:50:06       T: 10/24/2017 20:26:55     [email or web address]  Job#: [id number]     Doc#: [id number]    CC:    Primary care. Referral physician.

## 2017-10-25 NOTE — PLAN OF CARE
Problem: Falls - Risk of  Goal: Absence of falls  Outcome: Met This Shift  Pt uses call light appropriately      Problem: MOBILITY  Goal: Early mobilization is achieved  Outcome: Ongoing      Problem: SKIN INTEGRITY  Goal: Skin integrity is maintained or improved  Outcome: Ongoing

## 2017-10-25 NOTE — PROGRESS NOTES
Physical Therapy    Facility/Department: [facility]  Initial Assessment    NAME: Kallie Mendez  : 1950  MRN: [id number]    Date of Service: 10/25/2017  Pre-Op Diagnosis: ADENOCARCINOMA OF ENDOMETRIUM   Procedure(s): 10/24/2017  TOTAL LAPAROSCOPIC HYSTERECTOMY, BSO, F.S. STAGING, GYRUS G400     Patient Diagnosis(es): The encounter diagnosis was Slow transit constipation. has a past medical history of Allergic rhinitis; At high risk for falls; Colon polyp; History of TIA (transient ischemic attack); Hyperglycemia; Hyperlipidemia; Hypertension; Hypothyroidism; Legally blind; Lower back pain; Mixed incontinence; Morbid obesity with BMI of 40.0-44.9, adult ([facility] [address] Osteoarthritis (arthritis due to wear and tear of joints); Osteoarthritis involving multiple joints on both sides of body; Osteoporosis; and Tennis elbow. has a past surgical history that includes Thyroid surgery (); Colonoscopy (); Tonsillectomy; Colonoscopy (2017); colsc flx w/removal lesion by hot bx forceps (N/A, 2017); Dilation and curettage of uterus (N/A, 2017); Cataract removal with implant (Bilateral, ); vitrectomy; julieta and bso (cervix removed) (10/24/2017); and Hysterectomy (N/A, 10/24/2017).     Restrictions  Restrictions/Precautions  Restrictions/Precautions: Surgical Protocols  Position Activity Restriction  Other position/activity restrictions: Abdominal Binder  Vision/Hearing  Vision: Within Functional Limits  Hearing: Within functional limits     Subjective  General  Patient assessed for rehabilitation services?: Yes  Family / Caregiver Present: No  Follows Commands: Within Functional Limits  Pain Screening  Patient Currently in Pain: Denies  Vital Signs  Patient Currently in Pain: Denies       Orientation  Orientation  Overall Orientation Status: Within Normal Limits    Social/Functional History  Social/Functional History  Lives With: Alone  Type of Home: Apartment  Home Layout: One level  Home Access: Elevator  Home Equipment: Quad cane  Receives Help From: Home health, Family  ADL Assistance: Independent  Ambulation Assistance: Independent  Transfer Assistance: Independent  Active : No  Patient's  Info: [address] [place] [address], Sister, or Wiliam  Objective    AROM RLE (degrees)  RLE AROM: WFL  AROM LLE (degrees)  LLE AROM : WFL  AROM RUE (degrees)  RUE AROM : WFL  AROM LUE (degrees)  LUE AROM : WFL  Strength RLE  Strength RLE: WFL  Strength LLE  Strength LLE: WFL  Strength RUE  Strength RUE: WFL  Strength LUE  Strength LUE: WFL  Motor Control  Gross Motor?: WFL  Sensation  Overall Sensation Status: WFL  Bed mobility  Supine to Sit: Supervision  Scooting: Supervision  Transfers  Sit to Stand: Independent  Stand to sit: Independent  Ambulation  Ambulation?: Yes  Ambulation 1  Surface: level tile  Device: Rolling Walker  Assistance: Supervision  Distance: 10 ft to bathroom  Pt then ambulated to chair without RW. Pt states she feels safer with RW. Pt declined to ambulate further d/t wanting to watch her \"show. \"  Balance  Posture: Good  Sitting - Static: Good  Sitting - Dynamic: Good  Standing - Static: Good  Standing - Dynamic: Good (-)        Assessment   Prognosis: Good  Decision Making: Low Complexity  Patient Education: PT POC  No Skilled PT: No PT goals identified  REQUIRES PT FOLLOW UP: No  Activity Tolerance  Activity Tolerance: Patient Tolerated treatment well  PT Equipment Recommendations  Equipment Needed: Yes  Mobility Devices: Clarissa Mars: Rolling     Discharge Recommendations:  Home with assist PRN, Home with Home health PT      Plan   Plan  Plan Comment: Pt is discharged from PT  Safety Devices  Type of devices: Left in chair, Call light within reach    G-Code  PT G-Codes  Functional Assessment Tool Used: [place] Tool  Score: 28  Functional Limitation: Mobility: Walking and moving around  Mobility: Walking and Moving Around Current Status (): 0 percent impaired, limited or restricted  Mobility: Walking and Moving Around Goal Status (): 0 percent impaired, limited or restricted  Mobility: Walking and Moving Around Discharge Status (): 0 percent impaired, limited or restricted  Goals  Patient Goals   Patient goals : Go Home       Therapy Time   Individual Concurrent Group Co-treatment   Time In 1310         Time Out [facility],

## 2017-10-25 NOTE — CARE COORDINATION
Case Management Initial Discharge Jeane Godoy,         Readmission Risk              Readmission Risk:        12.5       Age 72 or Greater:  1    Admitted from SNF or Requires Paid or Family Care:  0    Currently has CHF,COPD,ARF,CRI,or is on dialysis:  0    Takes more than 5 Prescription Medications:  4    Takes Digoxin,Insulin,Anticoagulants,Narcotics or ASA/Plavix:  [address] in Past 12 Months:  0    On Disability:  3    Patient Considers own Health:  2.5            Met with:patient to discuss discharge plans. Information verified: address, contacts, phone number, , insurance Yes  PCP: Moriah Davis MD  Date of last visit: 17    Insurance Provider: Gilda Simpson, Medicaid    Discharge Planning  Current Residence:  Private Residence  Living Arrangements:  Alone   Home has 1 stories/0 stairs to climb - apartment with elevator  Support Systems:  Children  Current Services PTA:  Durable Medical Equipment, Home Care Supplier:    Patient able to perform ADL's:Independent  DME used to aid ambulation prior to admission: cane /during admission: walker    Potential Assistance Needed:  N/A    Pharmacy: [facility] on [address] [facility] Medications:  No  Does patient want to participate in local refill/ meds to beds program?  No    Patient agreeable to home care: Yes  Freedom of choice provided:  yes      Type of Home Care Services:  Nursing Services, Aide Services  Patient expects to be discharged to:  Home    Prior SNF/Rehab Placement and Facility: No  Agreeable to SNF/Rehab: No  [place] of choice provided: n/a   Evaluation: n/a    Expected Discharge date:  10/26/17  Follow Up Appointment: Best Day/ Time: Thursday PM    Transportation provider: [address] [place] [address] 72, Sister, or Daughter  Transportation arrangements needed for discharge: No - family    Discharge Plan: Home with [address] [facility]        Electronically signed by Ehsan Valdovinos RN on 10/25/17 at 10:03 AM

## 2017-10-25 NOTE — PROGRESS NOTES
Gynecology Oncology Progress Note      Leo Gutierrez is a 74 y.o. female , POD # 1 s/p TLHBSO, bilateral pelvic and para-aortic LND    Patient seen and examined. She is sitting up eating an omelette for breakfast. Reports that her pain is controlled. She is tolerating po without any n/v. Cloud in place, draining clear urine. She denies any vaginal bleeding. She is ambulating without difficulty. She is not passing flatus but reports feeling the urge to defacate. She denies Fever/Chills, Chest Pain, SOB, Calf Pain. Vitals:  Vitals:    10/24/17 1700 10/24/17 1719 10/24/17 2030 10/25/17 0322   BP: (!) 154/76 (!) 149/69 139/61 (!) 108/58   Pulse: 66 65 64 64   Resp:  16   Temp: 97.5 °F (36.4 °C) 97.8 °F (36.6 °C) 99 °F (37.2 °C) 98.1 °F (36.7 °C)   TempSrc:  Oral Oral Oral   SpO2: 100% 97% 99% 95%   Weight:       Height:           Intake/Output:   Last Shift: I/O last 3 completed shifts: In: 1725 [I.V.:1725]  Out: 840 [Urine:715; Blood:125]  Current Shift: I/O this shift:  In:  [I.V.:]  Out: 325 [Urine:325]    425 clr UOP/ 11 hr    Physical Exam:  Gen: no acute distress, A&O x3  HEENT: NCAT, EOMI, MMM  Resp: CTABL, no WRR  Card: RRR S1S2  Abd: soft, appropriately tender, ND, no rebound, no guarding.  +normal BS, abdominal binder in place  Incisions: clean, dry and intact with slight ecchymoses surrounding laparoscopic port sites  Ext: No LE edema, no calf tenderness or swelling, EPCs on and working    Recent Results (from the past 6 hour(s))   Basic metabolic panel    Collection Time: 10/25/17  6:04 AM   Result Value Ref Range    Glucose 112 (H) 70 - 99 mg/dL    BUN 15 8 - 23 mg/dL    CREATININE 0.76 0.50 - 0.90 mg/dL    Bun/Cre Ratio NOT REPORTED 9 - 20    Calcium 7.7 (L) 8.6 - 10.4 mg/dL    Sodium 133 (L) 135 - 144 mmol/L    Potassium 3.6 (L) 3.7 - 5.3 mmol/L    Chloride 97 (L) 98 - 107 mmol/L    CO2 22 20 - 31 mmol/L    Anion Gap 14 9 - 17 mmol/L    GFR Non-African American >60 >60 mL/min GFR African American >60 >60 mL/min    GFR Comment          GFR Staging NOT REPORTED    CBC    Collection Time: 10/25/17  6:04 AM   Result Value Ref Range    WBC 12.7 (H) 3.5 - 11.0 k/uL    RBC 3.55 (L) 4.0 - 5.2 m/uL    Hemoglobin 10.4 (L) 12.0 - 16.0 g/dL    Hematocrit 32.0 (L) 36 - 46 %    MCV 90.0 80 - 100 fL    MCH 29.3 26 - 34 pg    MCHC 32.5 31 - 37 g/dL    RDW 14.3 12.5 - 15.4 %    Platelets 021 001 - 324 k/uL    MPV 8.7 6.0 - 12.0 fL         Assessment/Plan:  Barber Thao 74 y.o. female , POD #1 s/p TLHBSO, bilateral pelvic and para-aortic LND   - Doing well, vitals stable   - Discontinue murray catheter and saline lock IV   - Encourage ambulation and use of incentive spirometer   - Pain control: norco/motrin    - Labs: CBC and BMP daily -- reviewed    - DVT Proph: Lovenox, SCDs   - Abx: s/p cipro and flagyl preop   - Diet : carb control    - Final path: pending   - PT/OT/SW consulted    Prediabetes   - glucose AC/HS   - medium dose ISS   - home dose metformin held     Hypertension   - will continue to monitor BPs   - home meds held currently (HCTZ, losartan)    Hypothyroidism   - continue home dose synthroid 75mcg daily      Principal Problem:    TLHBSO, bilateral LND 10/24/17  Active Problems:    BMI 46.3    Hypothyroidism    History of TIA (transient ischemic attack)    Essential hypertension    Prediabetes    Adenocarcinoma of endometrium, stage 1 ([address]      Please page the resident named below with questions and concerns. See-Rachelle Long, DO   OB/Gyn PGY4  Pager: [facility] [place]  10/25/2017 6:53 AM     Pt seen and examined, Denies nausea, vomiting has had positive flatus, pain adequately controlled. Has not yet voided status post removal Murray catheter. Abdomen: Normal bowel sounds, soft, nontender, nondistended. Ecchymosis at port sites, no erythema or induration, no separation.   Reviewed chart, vitals and labs- stable, appropriate, intraoperative findings discussed with patient. Agree with above exam, assessment and treatment plan. Possible EDC later today if bladder function returns to normal.  Discharge precautions reviewed. Will call when final pathology results are available.

## 2017-10-26 VITALS
TEMPERATURE: 98 F | OXYGEN SATURATION: 96 % | HEIGHT: 61 IN | HEART RATE: 54 BPM | DIASTOLIC BLOOD PRESSURE: 64 MMHG | WEIGHT: 244.49 LBS | SYSTOLIC BLOOD PRESSURE: 123 MMHG | BODY MASS INDEX: 46.16 KG/M2 | RESPIRATION RATE: 14 BRPM

## 2017-10-26 LAB
ANION GAP SERPL CALCULATED.3IONS-SCNC: 11 MMOL/L (ref 9–17)
BUN BLDV-MCNC: 17 MG/DL (ref 8–23)
BUN/CREAT BLD: ABNORMAL (ref 9–20)
CALCIUM SERPL-MCNC: 8.1 MG/DL (ref 8.6–10.4)
CHLORIDE BLD-SCNC: 101 MMOL/L (ref 98–107)
CO2: 24 MMOL/L (ref 20–31)
CREAT SERPL-MCNC: 0.8 MG/DL (ref 0.5–0.9)
GFR AFRICAN AMERICAN: >60 ML/MIN
GFR NON-AFRICAN AMERICAN: >60 ML/MIN
GFR SERPL CREATININE-BSD FRML MDRD: ABNORMAL ML/MIN/{1.73_M2}
GFR SERPL CREATININE-BSD FRML MDRD: ABNORMAL ML/MIN/{1.73_M2}
GLUCOSE BLD-MCNC: 106 MG/DL (ref 65–105)
GLUCOSE BLD-MCNC: 112 MG/DL (ref 70–99)
GLUCOSE BLD-MCNC: 152 MG/DL (ref 65–105)
HCT VFR BLD CALC: 31.7 % (ref 36–46)
HEMOGLOBIN: 10.4 G/DL (ref 12–16)
MCH RBC QN AUTO: 29.3 PG (ref 26–34)
MCHC RBC AUTO-ENTMCNC: 32.9 G/DL (ref 31–37)
MCV RBC AUTO: 88.9 FL (ref 80–100)
PDW BLD-RTO: 14 % (ref 12.5–15.4)
PLATELET # BLD: 224 K/UL (ref 140–450)
PMV BLD AUTO: 9.3 FL (ref 6–12)
POTASSIUM SERPL-SCNC: 3.6 MMOL/L (ref 3.7–5.3)
RBC # BLD: 3.57 M/UL (ref 4–5.2)
SODIUM BLD-SCNC: 136 MMOL/L (ref 135–144)
WBC # BLD: 9.3 K/UL (ref 3.5–11)

## 2017-10-26 PROCEDURE — 6360000002 HC RX W HCPCS: Performed by: OBSTETRICS & GYNECOLOGY

## 2017-10-26 PROCEDURE — 2580000003 HC RX 258: Performed by: OBSTETRICS & GYNECOLOGY

## 2017-10-26 PROCEDURE — 99024 POSTOP FOLLOW-UP VISIT: CPT | Performed by: OBSTETRICS & GYNECOLOGY

## 2017-10-26 PROCEDURE — G0378 HOSPITAL OBSERVATION PER HR: HCPCS

## 2017-10-26 PROCEDURE — G8987 SELF CARE CURRENT STATUS: HCPCS

## 2017-10-26 PROCEDURE — 82947 ASSAY GLUCOSE BLOOD QUANT: CPT

## 2017-10-26 PROCEDURE — S0028 INJECTION, FAMOTIDINE, 20 MG: HCPCS | Performed by: OBSTETRICS & GYNECOLOGY

## 2017-10-26 PROCEDURE — 36415 COLL VENOUS BLD VENIPUNCTURE: CPT

## 2017-10-26 PROCEDURE — G8988 SELF CARE GOAL STATUS: HCPCS

## 2017-10-26 PROCEDURE — 96375 TX/PRO/DX INJ NEW DRUG ADDON: CPT

## 2017-10-26 PROCEDURE — 6370000000 HC RX 637 (ALT 250 FOR IP): Performed by: OBSTETRICS & GYNECOLOGY

## 2017-10-26 PROCEDURE — 2500000003 HC RX 250 WO HCPCS: Performed by: OBSTETRICS & GYNECOLOGY

## 2017-10-26 PROCEDURE — 97535 SELF CARE MNGMENT TRAINING: CPT

## 2017-10-26 PROCEDURE — 85027 COMPLETE CBC AUTOMATED: CPT

## 2017-10-26 PROCEDURE — 97166 OT EVAL MOD COMPLEX 45 MIN: CPT

## 2017-10-26 PROCEDURE — 96372 THER/PROPH/DIAG INJ SC/IM: CPT

## 2017-10-26 PROCEDURE — 80048 BASIC METABOLIC PNL TOTAL CA: CPT

## 2017-10-26 RX ADMIN — HYDROCODONE BITARTRATE AND ACETAMINOPHEN 2 TABLET: 5; 325 TABLET ORAL at 03:25

## 2017-10-26 RX ADMIN — DOCUSATE SODIUM 100 MG: 100 CAPSULE, LIQUID FILLED ORAL at 08:02

## 2017-10-26 RX ADMIN — CETIRIZINE HYDROCHLORIDE 10 MG: 10 TABLET ORAL at 08:02

## 2017-10-26 RX ADMIN — Medication 10 ML: at 08:02

## 2017-10-26 RX ADMIN — ENOXAPARIN SODIUM 30 MG: 30 INJECTION SUBCUTANEOUS at 08:02

## 2017-10-26 RX ADMIN — ONDANSETRON 4 MG: 2 INJECTION, SOLUTION INTRAMUSCULAR; INTRAVENOUS at 09:46

## 2017-10-26 RX ADMIN — FAMOTIDINE 20 MG: 10 INJECTION, SOLUTION INTRAVENOUS at 08:02

## 2017-10-26 RX ADMIN — HYDROCODONE BITARTRATE AND ACETAMINOPHEN 2 TABLET: 5; 325 TABLET ORAL at 07:37

## 2017-10-26 RX ADMIN — HYDROCODONE BITARTRATE AND ACETAMINOPHEN 2 TABLET: 5; 325 TABLET ORAL at 12:02

## 2017-10-26 RX ADMIN — LEVOTHYROXINE SODIUM 75 MCG: 75 TABLET ORAL at 08:02

## 2017-10-26 ASSESSMENT — PAIN SCALES - GENERAL
PAINLEVEL_OUTOF10: 7
PAINLEVEL_OUTOF10: 3
PAINLEVEL_OUTOF10: 6
PAINLEVEL_OUTOF10: 3

## 2017-10-26 NOTE — PROGRESS NOTES
Gynecology Oncology Progress Note      Kristie Genao is a 74 y.o. female , POD # 2 s/p TLHBSO, bilateral pelvic and para-aortic LND    Patient seen and examined. She is resting comfortably asking if she can go home today. She reports sleeping well and that her pain is controlled. She is tolerating po. Ambulating and voiding without difficulty. She denies any vaginal bleeding. She is passing flatus. No BM. She denies Fever/Chills, Chest Pain, SOB, N/V, Calf Pain. Vitals:  Vitals:    10/25/17 0811 10/25/17 1345 10/25/17 1930 10/26/17 0718   BP: (!) 111/57 109/64 (!) 116/57 123/64   Pulse: 65 61 74 54   Resp: 16  14   Temp: 97.4 °F (36.3 °C) 97.4 °F (36.3 °C) 99 °F (37.2 °C) 98 °F (36.7 °C)   TempSrc: Oral Oral Oral Oral   SpO2: 96% 97% 97% 96%   Weight:       Height:           Intake/Output:   Last Shift: I/O last 3 completed shifts: In: 1440 [P.O.:1440]  Out: 50 [Urine:50]  Current Shift: No intake/output data recorded. Physical Exam:  Gen: no acute distress, A&O x3  HEENT: NCAT, EOMI, MMM  Resp: CTABL, no WRR  Card: RRR S1S2  Abd: soft, appropriately tender, ND, no rebound, no guarding.  +normal BS, abdominal binder in place  Incisions: clean, dry and intact with slight ecchymoses surrounding laparoscopic port sites  Ext: No LE edema, no calf tenderness or swelling, EPCs on and working    Recent Results (from the past 6 hour(s))   Basic metabolic panel    Collection Time: 10/26/17  5:53 AM   Result Value Ref Range    Glucose 112 (H) 70 - 99 mg/dL    BUN 17 8 - 23 mg/dL    CREATININE 0.80 0.50 - 0.90 mg/dL    Bun/Cre Ratio NOT REPORTED 9 - 20    Calcium 8.1 (L) 8.6 - 10.4 mg/dL    Sodium 136 135 - 144 mmol/L    Potassium 3.6 (L) 3.7 - 5.3 mmol/L    Chloride 101 98 - 107 mmol/L    CO2 24 20 - 31 mmol/L    Anion Gap 11 9 - 17 mmol/L    GFR Non-African American >60 >60 mL/min    GFR African American >60 >60 mL/min    GFR Comment          GFR Staging NOT REPORTED    CBC    Collection Time: 10/26/17 5:53 AM   Result Value Ref Range    WBC 9.3 3.5 - 11.0 k/uL    RBC 3.57 (L) 4.0 - 5.2 m/uL    Hemoglobin 10.4 (L) 12.0 - 16.0 g/dL    Hematocrit 31.7 (L) 36 - 46 %    MCV 88.9 80 - 100 fL    MCH 29.3 26 - 34 pg    MCHC 32.9 31 - 37 g/dL    RDW 14.0 12.5 - 15.4 %    Platelets 091 788 - 490 k/uL    MPV 9.3 6.0 - 12.0 fL         Assessment/Plan:  Unk Risen 74 y.o. female , POD #2 s/p TLHBSO, bilateral pelvic and para-aortic LND   - Doing well, vitals stable   - Encourage ambulation and use of incentive spirometer   - Pain control: norco/motrin    - Labs: CBC and BMP daily -- reviewed and stable    - DVT Proph: Lovenox, SCDs   - Abx: s/p cipro and flagyl preop   - Diet : carb control    - Final path: endometrioid adenocarcinoma with superficial myometrial invasion of inner one half, adenomyosis, leiomyomata, negative nodes   - PT/OT/SW consulted    Prediabetes   - glucose AC/HS   - medium dose ISS   - home dose metformin held     Hypertension   - will continue to monitor BPs   - home meds held currently (HCTZ, losartan)    Hypothyroidism   - continue home dose synthroid 75mcg daily      Principal Problem:    TLHBSO, bilateral LND 10/24/17  Active Problems:    BMI 46.3    Hypothyroidism    History of TIA (transient ischemic attack)    Essential hypertension    Prediabetes    Adenocarcinoma of endometrium, stage 1 ([address]    Anticipate discharge to home later today. Discharge instructions reviewed and all questions answered. Please page the resident named below with questions and concerns.     See-Rachelle oLng, DO   OB/Gyn PGY4  Pager: [phone], Lisa [address]el[address]  10/26/2017 7:37 AM     Pt seen and examined  Pos flatus, voiding without difficulty, no N/v, pain adequately controlled  Abdomen:  Soft, ND appropriately tender  Reviewed chart, vitals and labs- stable/appropriate  Pathology results discussed with pt, copy given to her for her records  Agree with above exam, assessment and treatment plan. D/c home, d/c precautions reviewed.   F/u 3-4 weeks or PRN

## 2017-10-26 NOTE — CARE COORDINATION
Discharge [id number] [facility]  Clinical Case Management Department  Written by: Shekhar Terrell RN    Patient Name: Kimberly Mckeon  Attending Provider: No att. providers found  Admit Date: 10/24/2017  8:37 AM  MRN: [id number]  Account: [de-identified]                     : 1950  Discharge Date: 10/26/2017      Disposition: home with [facility]  Espiridion Glance, [place] liaison, notified of discharge - will process referral for home visit.     Shekhar Terrell RN

## 2017-10-26 NOTE — PROGRESS NOTES
History  Social/Functional History  Lives With: Alone  [facility]  Home Layout: One level  Home Access: Elevator, Level entry  Bathroom Shower/Tub: Tub/Shower unit  Bathroom Toilet: Standard  Bathroom Equipment: Grab bars in shower, Shower chair  Bathroom Accessibility: Deshawn Daigle: Quad cane, Rolling walker (using quad cane prior to admission, RW delivered this AM to hospital room)  Receives Help From:  [place], Family ([facility] 2hrs/day, 3x/wk for assist with bathing and high level IADL tasks)  ADL Assistance: Needs assistance (Pt reports HHA provides setup and assists with LB bathing and dressing tasks)  Homemaking Responsibilities: Yes  Meal Prep Responsibility: Primary  Laundry Responsibility: Secondary (HHA assists)  Cleaning Responsibility: Secondary (HHA assists)  Bill Paying/Finance Responsibility: Primary  Shopping Responsibility: Primary ([facility] assists with getting groceries inside)  Health Care Management: Primary  Ambulation Assistance: Independent (with use of quad cane at all times)  Transfer Assistance: Independent  Active : No  Patient's  Info: [facility] has a bus for weekly errands including bank and grocery store, sister or dtr also drive  Mode of Transportation: Bus, Car, Family  Occupation: Retired  Leisure & Hobbies: Enjoys attending events at the [employer] at the [facility] including [employer] of [employer], [employer], and movies     Objective   Vision: Within Functional Limits  Hearing: Within functional limits    Orientation  Overall Orientation Status: Within Functional Limits     Balance  Sitting Balance: Supervision (unsupported seated EOB and seated on toilet)  Standing Balance: Stand by assistance (with use of RW)  Standing Balance  Time: ~10 minutes  Activity: functional mobility to/from bathroom, toilet transfer/toilet hygiene, standing sink side for simple frooming tasks  Comment: SBA with use of RW.  Pt reports improved stability and safety with use of RW. Pt with no LOB and no complaint of SOB/fatigue however pt with increasing complaints of nausea while standing sink side. RN notified. As RN was retrieving nausea meds, pt vommited. Pt then returned to seated in recliner chair with cool wash cloth while RN administered meds. Pt rested for ~3 minutes then transferred from chair to bed. ADL  Feeding: Independent  Grooming: Stand by assistance (standing sink side for hand hygiene, oral care, and to wash face)  UE Bathing: Stand by assistance  LE Bathing: Minimal assistance  UE Dressing: Stand by assistance (seated EOB to [facility] gown and don pullover shirt)  LE Dressing: Minimal assistance (to don/doff socks while seated EOB; standing to doff/don pants and underpants for toileting tasks)  Toileting: Stand by assistance (for pericare)    RUE Tone: Normotonic  LUE Tone: Normotonic  Movements Are Fluid And Coordinated: Yes     Bed mobility  Supine to Sit: Stand by assistance (with use of bed rail)  Sit to Supine: Stand by assistance  Comment: with min VCs for bed mobility techniques     Transfers  Stand Step Transfers: Stand by assistance  Sit to stand: Stand by assistance  Stand to sit: Stand by assistance  Transfer Comments: with use of RW. Pt required min VCs for proper hand placement during functional transfers with use of RW. Cognition  Overall Cognitive Status: WFL     Sensation  Overall Sensation Status: WFL (pt denies numbness/tingling)      LUE AROM : WFL  RUE AROM : WFL  Gross LUE Strength: WFL  Gross RUE Strength: WFL     Assessment   Performance deficits / Impairments: Decreased functional mobility ; Decreased endurance;Decreased high-level IADLs;Decreased ADL status  Prognosis: Good  Decision Making: Medium Complexity  Patient Education: OT Services/OT POC, Safety Awareness/Fall Prevention, Bed Mobility Techniques, ADL Techniques, RW Safety/Transfer Safety, Home Safety, good return  Discharge

## 2017-10-26 NOTE — PLAN OF CARE
Problem: Falls - Risk of  Goal: Absence of falls  Outcome: Ongoing      Problem: MOBILITY  Goal: Early mobilization is achieved  Outcome: Ongoing      Problem: SKIN INTEGRITY  Goal: Skin integrity is maintained or improved  Outcome: Ongoing

## 2017-10-27 ENCOUNTER — TELEPHONE (OUTPATIENT)
Dept: FAMILY MEDICINE CLINIC | Age: 67
End: 2017-10-27

## 2017-10-30 ENCOUNTER — TELEPHONE (OUTPATIENT)
Dept: ONCOLOGY | Age: 67
End: 2017-10-30

## 2017-10-30 DIAGNOSIS — K59.01 SLOW TRANSIT CONSTIPATION: ICD-10-CM

## 2017-10-30 RX ORDER — DOCUSATE SODIUM 100 MG
CAPSULE ORAL
Qty: 60 CAPSULE | Refills: 5 | Status: SHIPPED | OUTPATIENT
Start: 2017-10-30 | End: 2017-11-06 | Stop reason: SDUPTHER

## 2017-10-30 NOTE — TELEPHONE ENCOUNTER
Attempted to contact pt for f/u call, no answer, VM left instructed pt may call writer prn. Per EPIC chart pt scheduled for Dr. Serena Charles f/u 11/7/17. Will continue to follow.

## 2017-10-30 NOTE — TELEPHONE ENCOUNTER
Please refuse or deny    Health Maintenance   Topic Date Due    Flu vaccine (1) 09/01/2017    Pneumococcal low/med risk (2 of 2 - PPSV23) 02/23/2018    Breast cancer screen  03/28/2018    Colon cancer screen colonoscopy  06/26/2020    Diabetes screen  07/27/2020    Lipid screen  03/21/2022    DTaP/Tdap/Td vaccine (2 - Td) 09/28/2027    Zostavax vaccine  Addressed    DEXA (modify frequency per FRAX score)  Completed    Hepatitis C screen  Completed    HIV screen  Completed             (applicable per patient's age: Cancer Screenings, Depression Screening, Fall Risk Screening, Immunizations)    Hemoglobin A1C (%)   Date Value   07/27/2017 5.7   03/21/2017 6.3 (H)   09/10/2015 5.9     Microalb/Crt.  Ratio (mcg/mg creat)   Date Value   04/14/2015 5     LDL Cholesterol (mg/dL)   Date Value   03/21/2017 78     AST (U/L)   Date Value   10/18/2017 27     ALT (U/L)   Date Value   10/18/2017 16     BUN (mg/dL)   Date Value   10/26/2017 17      (goal A1C is < 7)   (goal LDL is <100) need 30-50% reduction from baseline     BP Readings from Last 3 Encounters:   10/26/17 123/64   10/24/17 134/76   10/18/17 (!) 166/73    (goal /80)      All Future Testing planned in [facility]:  Lab Frequency Next Occurrence   Basic metabolic panel Once 78/31/5443   CBC auto differential Once 11/17/2017   Protime-INR Once 11/17/2017   Type and screen Once 09/28/2017   Urinalysis Once 11/17/2017   Initiate PAT Protocol Once 09/28/2017   APTT Once 11/17/2017   Urine culture Once 09/14/2017   Vitamin D 25 Hydroxy Once 15/33/1456   Basic Metabolic Panel Once 70/43/0616       Next Visit Date:  Future Appointments  Date Time Provider Dante Amaya   11/7/2017 10:00 AM SCHEDULE, [facility]   11/8/2017 10:00 AM John Nelson MD  sc [facility]   12/4/2017 10:40 AM Sandra Cortes MD [facility]   12/28/2017 10:45 AM Jeff Escudero MD fp sc [facility]            Patient Active Problem List: Hypothyroidism     History of TIA (transient ischemic attack)     Lower back pain     Essential hypertension     Osteopenia determined by x-ray     Osteoarthritis involving multiple joints on both sides of body     Left knee DJD     Prediabetes     Vitamin D deficiency     Mixed incontinence     Chronic pain of both knees     Slow transit constipation     Allergic rhinitis     Hyperlipidemia with target LDL less than 100     BMI 46.3     At high risk for falls     Left breast lump     Dense breast tissue on mammogram     Hyperglycemia     Spondylosis of lumbar region without myelopathy or radiculopathy     Abnormal vaginal bleeding     OAB (overactive bladder)     Candidiasis of perineum     Primary osteoarthritis of both knees     Chronic fatigue      Adenomatous polyp of sigmoid colon, 6/26/17     Postmenopausal bleeding     Thickened endometrium     S/P h-scope, Myosure 9/20/17     Mixed stress and urge urinary incontinence     Adenocarcinoma of endometrium, stage 1 (HCC)     TLHBSO, bilateral LND 10/24/17

## 2017-11-06 ENCOUNTER — HOSPITAL ENCOUNTER (EMERGENCY)
Age: 67
Discharge: HOME | End: 2017-11-06
Attending: EMERGENCY MEDICINE
Payer: MEDICARE

## 2017-11-06 VITALS
DIASTOLIC BLOOD PRESSURE: 58 MMHG | WEIGHT: 240 LBS | RESPIRATION RATE: 17 BRPM | OXYGEN SATURATION: 96 % | SYSTOLIC BLOOD PRESSURE: 150 MMHG | TEMPERATURE: 98.2 F | HEART RATE: 72 BPM | HEIGHT: 61 IN | BODY MASS INDEX: 45.31 KG/M2

## 2017-11-06 DIAGNOSIS — K59.01 SLOW TRANSIT CONSTIPATION: ICD-10-CM

## 2017-11-06 DIAGNOSIS — I10 HYPERTENSION, UNSPECIFIED TYPE: ICD-10-CM

## 2017-11-06 DIAGNOSIS — R11.2 INTRACTABLE VOMITING WITH NAUSEA, UNSPECIFIED VOMITING TYPE: Primary | ICD-10-CM

## 2017-11-06 LAB
-: ABNORMAL
ABSOLUTE EOS #: 0 K/UL (ref 0–0.4)
ABSOLUTE IMMATURE GRANULOCYTE: ABNORMAL K/UL (ref 0–0.3)
ABSOLUTE LYMPH #: 0.8 K/UL (ref 1–4.8)
ABSOLUTE MONO #: 0.4 K/UL (ref 0.1–1.3)
AMORPHOUS: ABNORMAL
ANION GAP SERPL CALCULATED.3IONS-SCNC: 14 MMOL/L (ref 9–17)
BACTERIA: ABNORMAL
BASOPHILS # BLD: 1 %
BASOPHILS ABSOLUTE: 0.1 K/UL (ref 0–0.2)
BILIRUBIN URINE: NEGATIVE
BUN BLDV-MCNC: 15 MG/DL (ref 8–23)
BUN/CREAT BLD: ABNORMAL (ref 9–20)
CALCIUM SERPL-MCNC: 9 MG/DL (ref 8.6–10.4)
CASTS UA: ABNORMAL /LPF
CHLORIDE BLD-SCNC: 100 MMOL/L (ref 98–107)
CO2: 25 MMOL/L (ref 20–31)
COLOR: YELLOW
COMMENT UA: ABNORMAL
CREAT SERPL-MCNC: 0.57 MG/DL (ref 0.5–0.9)
CRYSTALS, UA: ABNORMAL /HPF
DIFFERENTIAL TYPE: ABNORMAL
EOSINOPHILS RELATIVE PERCENT: 0 %
EPITHELIAL CELLS UA: ABNORMAL /HPF
GFR AFRICAN AMERICAN: >60 ML/MIN
GFR NON-AFRICAN AMERICAN: >60 ML/MIN
GFR SERPL CREATININE-BSD FRML MDRD: ABNORMAL ML/MIN/{1.73_M2}
GFR SERPL CREATININE-BSD FRML MDRD: ABNORMAL ML/MIN/{1.73_M2}
GLUCOSE BLD-MCNC: 127 MG/DL (ref 70–99)
GLUCOSE URINE: NEGATIVE
HCT VFR BLD CALC: 36.6 % (ref 36–46)
HEMOGLOBIN: 12.3 G/DL (ref 12–16)
IMMATURE GRANULOCYTES: ABNORMAL %
KETONES, URINE: ABNORMAL
LEUKOCYTE ESTERASE, URINE: NEGATIVE
LYMPHOCYTES # BLD: 6 %
MCH RBC QN AUTO: 30 PG (ref 26–34)
MCHC RBC AUTO-ENTMCNC: 33.6 G/DL (ref 31–37)
MCV RBC AUTO: 89.4 FL (ref 80–100)
MONOCYTES # BLD: 3 %
MUCUS: ABNORMAL
NITRITE, URINE: NEGATIVE
OTHER OBSERVATIONS UA: ABNORMAL
PDW BLD-RTO: 13.8 % (ref 11.5–14.9)
PH UA: 6.5 (ref 5–8)
PLATELET # BLD: 252 K/UL (ref 150–450)
PLATELET ESTIMATE: ABNORMAL
PMV BLD AUTO: 8.7 FL (ref 6–12)
POTASSIUM SERPL-SCNC: 3.8 MMOL/L (ref 3.7–5.3)
PROTEIN UA: NEGATIVE
RBC # BLD: 4.1 M/UL (ref 4–5.2)
RBC # BLD: ABNORMAL 10*6/UL
RBC UA: ABNORMAL /HPF
RENAL EPITHELIAL, UA: ABNORMAL /HPF
SEG NEUTROPHILS: 90 %
SEGMENTED NEUTROPHILS ABSOLUTE COUNT: 11.3 K/UL (ref 1.3–9.1)
SODIUM BLD-SCNC: 139 MMOL/L (ref 135–144)
SPECIFIC GRAVITY UA: 1.02 (ref 1–1.03)
TRICHOMONAS: ABNORMAL
TURBIDITY: CLEAR
URINE HGB: ABNORMAL
UROBILINOGEN, URINE: NORMAL
WBC # BLD: 12.5 K/UL (ref 3.5–11)
WBC # BLD: ABNORMAL 10*3/UL
WBC UA: ABNORMAL /HPF
YEAST: ABNORMAL

## 2017-11-06 PROCEDURE — 99284 EMERGENCY DEPT VISIT MOD MDM: CPT

## 2017-11-06 PROCEDURE — 85025 COMPLETE CBC W/AUTO DIFF WBC: CPT

## 2017-11-06 PROCEDURE — 96374 THER/PROPH/DIAG INJ IV PUSH: CPT

## 2017-11-06 PROCEDURE — 36415 COLL VENOUS BLD VENIPUNCTURE: CPT

## 2017-11-06 PROCEDURE — 80048 BASIC METABOLIC PNL TOTAL CA: CPT

## 2017-11-06 PROCEDURE — 6360000002 HC RX W HCPCS: Performed by: EMERGENCY MEDICINE

## 2017-11-06 PROCEDURE — 81001 URINALYSIS AUTO W/SCOPE: CPT

## 2017-11-06 PROCEDURE — 87086 URINE CULTURE/COLONY COUNT: CPT

## 2017-11-06 PROCEDURE — 2580000003 HC RX 258: Performed by: EMERGENCY MEDICINE

## 2017-11-06 RX ORDER — 0.9 % SODIUM CHLORIDE 0.9 %
1000 INTRAVENOUS SOLUTION INTRAVENOUS ONCE
Status: COMPLETED | OUTPATIENT
Start: 2017-11-06 | End: 2017-11-06

## 2017-11-06 RX ORDER — DOCUSATE SODIUM 100 MG
CAPSULE ORAL
Qty: 60 CAPSULE | Refills: 0 | Status: SHIPPED | OUTPATIENT
Start: 2017-11-06 | End: 2019-02-12 | Stop reason: SDUPTHER

## 2017-11-06 RX ORDER — ONDANSETRON 4 MG/1
4 TABLET, FILM COATED ORAL EVERY 8 HOURS PRN
Qty: 20 TABLET | Refills: 0 | Status: SHIPPED | OUTPATIENT
Start: 2017-11-06 | End: 2017-12-28 | Stop reason: ALTCHOICE

## 2017-11-06 RX ORDER — ONDANSETRON 2 MG/ML
4 INJECTION INTRAMUSCULAR; INTRAVENOUS ONCE
Status: COMPLETED | OUTPATIENT
Start: 2017-11-06 | End: 2017-11-06

## 2017-11-06 RX ADMIN — SODIUM CHLORIDE 1000 ML: 9 INJECTION, SOLUTION INTRAVENOUS at 18:55

## 2017-11-06 RX ADMIN — ONDANSETRON 4 MG: 2 INJECTION INTRAMUSCULAR; INTRAVENOUS at 18:55

## 2017-11-06 NOTE — ED PROVIDER NOTES
[address]  Emergency Department Encounter  Emergency Medicine Resident     Pt Name: Sai Love  MRN: [id number]  [place] 1950  Date of evaluation: 11/6/17  PCP:  Carlitos Pineda MD    [address] [place] [address]       Chief Complaint   Patient presents with    Emesis    Nausea       HISTORY OF PRESENT ILLNESS  (Location/Symptom, Timing/Onset, Context/Setting, Quality, Duration, Modifying Factors, Severity.)      Sai Love is a 74 y.o. female who presents with recent history of endometrial cancer hysterectomy on the 24th of this month acute nausea/vomitting for the last couple of days. Phenergan x1 taken with no relief. Aggravated by nothing and relieved by nothing. Is tolerating food well. Stools have been normal with last normal bowel movement last night. Her symptoms are mild-to-moderate in severity intermittent in nature onset ×1 day acute on chronic     Patient denies any fevers, nuchal rigidity, trauma, chest pain, shortness of breath, medication changes, toxin ingestion, change in urination, abdominal pain, vaginal discharge  Has all of abdominal organs    PAST MEDICAL / SURGICAL / SOCIAL / FAMILY HISTORY      has a past medical history of Allergic rhinitis; At high risk for falls; Colon polyp; Diabetes mellitus ([facility] [address] Endometrial cancer ([facility] [address] History of TIA (transient ischemic attack); Hyperglycemia; Hyperlipidemia; Hypertension; Hypothyroidism; Legally blind; Lower back pain; Mixed incontinence; Morbid obesity with BMI of 40.0-44.9, adult ([address] Obesity; Osteoarthritis (arthritis due to wear and tear of joints); Osteoarthritis involving multiple joints on both sides of body; Osteoporosis; and Tennis elbow. has a past surgical history that includes Thyroid surgery (1980); Colonoscopy (1997); Tonsillectomy; Colonoscopy (06/26/2017); colsc flx w/removal lesion by hot bx forceps (N/A, 6/26/2017); Dilation and curettage of uterus (N/A, 9/20/2017);  Cataract removal with implant (Bilateral, (SYNTHROID) 75 MCG tablet TAKE ONE TABLET BY MOUTH DAILY 10/10/17   Ellis Hannah MD   acetaminophen-codeine (TYLENOL/CODEINE #3) 300-30 MG per tablet Take 1 tablet by mouth 3 times daily as needed for Pain 9/14/17   Jenny Low MD   MYRBETRIQ 50 MG TB24 Take 50 mg by mouth daily 8/28/17   Ok Marcus MD   metFORMIN (GLUCOPHAGE) 500 MG tablet Take 1 tablet by mouth 2 times daily (with meals)  Patient taking differently: Take 500 mg by mouth daily (with breakfast)  8/8/17   Ellis Hannah MD   Cranberry 500 MG CAPS Take 500 mg by mouth daily 7/31/17   Ellis Hannah MD   Lactobacillus (PROBIOTIC ACIDOPHILUS) TABS Take 1 tablet by mouth daily 7/31/17   Ellis Hannah MD   acetaminophen (APAP EXTRA STRENGTH) 500 MG tablet Take 1 tablet by mouth every 6 hours as needed for Pain or Fever 4/20/17   Ellis Hannah MD   lidocaine (LMX) 4 % cream Apply topically every 8 hrs as needed for pain 4/20/17   Ellis Hannah MD   vitamin D (ERGOCALCIFEROL) 79045 UNITS CAPS capsule Take 1 capsule by mouth once a week  Patient taking differently: Take 50,000 Units by mouth once a week  3/21/17   Ellis Hannah MD   hydrochlorothiazide (HYDRODIURIL) 25 MG tablet Take 1 tablet by mouth daily 2/23/17   Ellis Hannah MD   loratadine (CLARITIN) 10 MG tablet TAKE ONE TABLET BY MOUTH EVERY DAY  Patient taking differently: Take 10 mg by mouth daily as needed TAKE ONE TABLET BY MOUTH EVERY DAY 2/23/17   Ellis Hannah MD   losartan (COZAAR) 50 MG tablet Take 1 tablet by mouth daily 2/23/17   Ellis Hannah MD   simvastatin (ZOCOR) 40 MG tablet TAKE ONE TABLET BY MOUTH ONE TIME A DAY  Patient taking differently: Take 40 mg by mouth nightly TAKE ONE TABLET BY MOUTH ONE TIME A DAY 2/23/17   Ellis Hannah MD       REVIEW OF SYSTEMS    (2-9 systems for level 4, 10 or more for level 5)      ROS:  Constitutional: Denied fever, weight loss  Eyes: Denied change in vision, eye pain  ENT: mouth every 8 hours as needed for Nausea     Dispense:  20 tablet     Refill:  0       DDX: alcohol intox or withdrawal, other drugs, ingestions, chemo related, obstruction, infections, gastroenteritis, gastritis/ulcers, ischemia/perforation torsion, kidney stones, vertigo, meningitis increased icp, intracranial pressure, migraine, tumors, pregnancy, cardiac ischemia, acidosis, hyponatremia, hypoglycemia/hyperglycemia, dehydration , intussusception hernia, hydrocephalus, volvulus, ruptured ectopic, uremia, hypercalcemia, digoxin, theophylline,   DIAGNOSTIC RESULTS / EMERGENCY DEPARTMENT COURSE / MDM     LABS:  Results for orders placed or performed during the hospital encounter of 11/06/17   CBC Auto Differential   Result Value Ref Range    WBC 12.5 (H) 3.5 - 11.0 k/uL    RBC 4.10 4.0 - 5.2 m/uL    Hemoglobin 12.3 12.0 - 16.0 g/dL    Hematocrit 36.6 36 - 46 %    MCV 89.4 80 - 100 fL    MCH 30.0 26 - 34 pg    MCHC 33.6 31 - 37 g/dL    RDW 13.8 11.5 - 14.9 %    Platelets 601 091 - 794 k/uL    MPV 8.7 6.0 - 12.0 fL    Differential Type NOT REPORTED     Seg Neutrophils 90 %    Lymphocytes 6 %    Monocytes 3 %    Eosinophils % 0 %    Basophils 1 %    Immature Granulocytes NOT REPORTED 0 %    Segs Absolute 11.30 (H) 1.3 - 9.1 k/uL    Absolute Lymph # 0.80 (L) 1.0 - 4.8 k/uL    Absolute Mono # 0.40 0.1 - 1.3 k/uL    Absolute Eos # 0.00 0.0 - 0.4 k/uL    Basophils # 0.10 0.0 - 0.2 k/uL    Absolute Immature Granulocyte NOT REPORTED 0.00 - 0.30 k/uL    WBC Morphology NOT REPORTED     RBC Morphology NOT REPORTED     Platelet Estimate NOT REPORTED    Basic Metabolic Panel   Result Value Ref Range    Glucose 127 (H) 70 - 99 mg/dL    BUN 15 8 - 23 mg/dL    CREATININE 0.57 0.50 - 0.90 mg/dL    Bun/Cre Ratio NOT REPORTED 9 - 20    Calcium 9.0 8.6 - 10.4 mg/dL    Sodium 139 135 - 144 mmol/L    Potassium 3.8 3.7 - 5.3 mmol/L    Chloride 100 98 - 107 mmol/L    CO2 25 20 - 31 mmol/L    Anion Gap 14 9 - 17 mmol/L    GFR Non-African American >60 >60 mL/min    GFR African American >60 >60 mL/min    GFR Comment          GFR Staging NOT REPORTED    Urinalysis   Result Value Ref Range    Color, UA YELLOW YEL    Turbidity UA CLEAR CLEAR    Glucose, Ur NEGATIVE NEG    Bilirubin Urine NEGATIVE NEG    Ketones, Urine TRACE (A) NEG    Specific Gravity, UA 1.019 1.000 - 1.030    Urine Hgb MOD (A) NEG    pH, UA 6.5 5.0 - 8.0    Protein, UA NEGATIVE NEG    Urobilinogen, Urine Normal NORM    Nitrite, Urine NEGATIVE NEG    Leukocyte Esterase, Urine NEGATIVE NEG    Urinalysis Comments NOT REPORTED    Microscopic Urinalysis   Result Value Ref Range    -          WBC, UA 2 TO 5 /HPF    RBC, UA 5 TO 10 /HPF    Casts UA NOT REPORTED /LPF    Crystals UA NOT REPORTED NONE /HPF    Epithelial Cells UA NOT REPORTED /HPF    Renal Epithelial, Urine NOT REPORTED 0 /HPF    Bacteria, UA FEW (A) NONE    Mucus, UA NOT REPORTED NONE    Trichomonas, UA NOT REPORTED NONE    Amorphous, UA NOT REPORTED NONE    Other Observations UA NOT REPORTED NREQ    Yeast, UA NOT REPORTED NONE       IMPRESSION: Nausea vomiting    RADIOLOGY:  None    EKG  None     All EKG's are interpreted by the Emergency Department Physician who either signs or Co-signs this chart in the absence of a cardiologist.    EMERGENCY DEPARTMENT COURSE:  Checking urinalysis, CBC, BMP, giving IV fluids and Zofran  Patient reassessed at 1900 and symptoms nausea had improved  Patient was reassessed at 717 by mouth challenging patient patient passed what discharged with Zofran PCP follow-up    Pre-hypertension/Hypertension: You have been informed that you may have pre-hypertension or Hypertension based on a blood pressure reading in the emergency department. I recommend you call the primary care provider listed on your discharge instructions or a physician of your choice this week to arrange follow up for further evaluation of possible pre-hypertension or Hypertension.       PROCEDURES:  None    CONSULTS:  None    CRITICAL CARE:  None    FINAL IMPRESSION      1. Intractable vomiting with nausea, unspecified vomiting type    2. Hypertension, unspecified type          DISPOSITION / PLAN     DISPOSITION Decision to Discharge    Discharge instructions Take your medication as prescribed. Avoid drinking alcohol or drinks that have caffeine it. Drink plenty of water or fluids like Gatorade. Return to the Emergency Department for worsening of nausea or vomiting, fever > 101.5, abdominal pain, blood in stool, vomiting blood, unable to tolerate oral fluids, continue to have vomiting for more than 24 hours, any other care or concern.     PATIENT REFERRED TO:  Fina Gale MD  [address] [place] [address].  [facility]  [address] [place] [address]  [phone]      this week    [facility] ED  [facility] [address]  [address] [facility] [address]  [phone]    If symptoms worsen      DISCHARGE MEDICATIONS:  Discharge Medication List as of 11/6/2017  7:25 PM      START taking these medications    Details   ondansetron (ZOFRAN) 4 MG tablet Take 1 tablet by mouth every 8 hours as needed for Nausea, Disp-20 tablet, R-0Print             Paulo Cunningham MD  Emergency Medicine Resident    (Please note that portions of this note were completed with a voice recognition program.  Efforts were made to edit the dictations but occasionally words are mis-transcribed.)               Paulo Cunningham MD  Resident  11/06/17 1860

## 2017-11-06 NOTE — ED PROVIDER NOTES
[facility]     Emergency Department     Faculty Attestation    I performed a history and physical examination of the patient and discussed management with the resident. I reviewed the residents note and agree with the documented findings and plan of care. Any areas of disagreement are noted on the chart. I was personally present for the key portions of any procedures. I have documented in the chart those procedures where I was not present during the key portions. I have reviewed the emergency nurses triage note. I agree with the chief complaint, past medical history, past surgical history, allergies, medications, social and family history as documented unless otherwise noted below. Documentation of the HPI, Physical Exam and Medical Decision Making performed by medical students or scribes is based on my personal performance of the HPI, PE and MDM. For APC cases, I have personally evaluated and examined the patient in conjunction with the APC and agree with the assessment, treatment plan, and disposition of the patient as recorded by the APCAdditional findings are as noted. CHIEF COMPLAINT       Chief Complaint   Patient presents with    Emesis    Nausea       RECENT VITALS:   BP (!) 169/58   Pulse 72   Temp 98.2 °F (36.8 °C) (Oral)   Resp 17   Ht 5' 1\" (1.549 m)   Wt 240 lb (108.9 kg)   LMP  (LMP Unknown)   SpO2 96%   BMI 45.35 kg/m²       PERTINENT ATTENDING PHYSICIAN COMMENTS:       [facility]     Emergency Department     Faculty Attestation    I performed a history and physical examination of the patient and discussed management with the resident. I reviewed the residents note and agree with the documented findings and plan of care. Any areas of disagreement are noted on the chart. I was personally present for the key portions of any procedures. I have documented in the chart those procedures where I was not present during the key portions.  I

## 2017-11-06 NOTE — TELEPHONE ENCOUNTER
PLEASE APPROVE OR REFUSE. Next Visit Date: Future Appointments  Date Time Provider Dante Faulkneri   11/7/2017 10:00 AM SCHEDULE, P [facility] GYN Oncology [facility]   11/8/2017 10:00 AM Myrna Lobo MD fp sc [facility]   12/4/2017 10:40 AM MD [facility] [facility]   12/28/2017 10:45 AM Airam Mclaughlin MD fp sc [address] Maintenance   Topic Date Due    Flu vaccine (1) 09/01/2017    Pneumococcal low/med risk (2 of 2 - PPSV23) 02/23/2018    Breast cancer screen  03/28/2018    Colon cancer screen colonoscopy  06/26/2020    Diabetes screen  07/27/2020    Lipid screen  03/21/2022    DTaP/Tdap/Td vaccine (2 - Td) 09/28/2027    Zostavax vaccine  Addressed    DEXA (modify frequency per FRAX score)  Completed    Hepatitis C screen  Completed    HIV screen  Completed       Hemoglobin A1C (%)   Date Value   07/27/2017 5.7   03/21/2017 6.3 (H)   09/10/2015 5.9             ( goal A1C is < 7)   Microalb/Crt.  Ratio (mcg/mg creat)   Date Value   04/14/2015 5     LDL Cholesterol (mg/dL)   Date Value   03/21/2017 78       (goal LDL is <100)   AST (U/L)   Date Value   10/18/2017 27     ALT (U/L)   Date Value   10/18/2017 16     BUN (mg/dL)   Date Value   10/26/2017 17     BP Readings from Last 3 Encounters:   10/26/17 123/64   10/24/17 134/76   10/18/17 (!) 166/73          (goal 120/80)    All Future Testing planned in [facility]  Lab Frequency Next Occurrence   Basic metabolic panel Once 86/10/1416   CBC auto differential Once 11/17/2017   Protime-INR Once 11/17/2017   Type and screen Once 09/28/2017   Urinalysis Once 11/17/2017   Initiate PAT Protocol Once 09/28/2017   APTT Once 11/17/2017   Urine culture Once 09/14/2017   Vitamin D 25 Hydroxy Once 71/14/3101   Basic Metabolic Panel Once 92/38/4471         Patient Active Problem List:     Hypothyroidism     History of TIA (transient ischemic attack)     Lower back pain     Essential hypertension     Osteopenia determined by x-ray

## 2017-11-07 ENCOUNTER — OFFICE VISIT (OUTPATIENT)
Dept: GYNECOLOGIC ONCOLOGY | Age: 67
End: 2017-11-07

## 2017-11-07 VITALS
SYSTOLIC BLOOD PRESSURE: 133 MMHG | OXYGEN SATURATION: 96 % | TEMPERATURE: 97.9 F | BODY MASS INDEX: 49.84 KG/M2 | WEIGHT: 247.2 LBS | DIASTOLIC BLOOD PRESSURE: 66 MMHG | HEART RATE: 57 BPM | HEIGHT: 59 IN

## 2017-11-07 DIAGNOSIS — C54.1 ENDOMETRIAL CANCER (HCC): ICD-10-CM

## 2017-11-07 DIAGNOSIS — Z08 ENCOUNTER FOR POSTOPERATIVE EXAMINATION AFTER SURGERY FOR MALIGNANT NEOPLASM: Primary | ICD-10-CM

## 2017-11-07 LAB
CULTURE: NORMAL
CULTURE: NORMAL
Lab: NORMAL
SPECIMEN DESCRIPTION: NORMAL
SPECIMEN DESCRIPTION: NORMAL
STATUS: NORMAL
SURGICAL PATHOLOGY REPORT: NORMAL

## 2017-11-07 PROCEDURE — 99024 POSTOP FOLLOW-UP VISIT: CPT | Performed by: NURSE PRACTITIONER

## 2017-11-07 ASSESSMENT — ENCOUNTER SYMPTOMS
ABDOMINAL DISTENTION: 0
BLOOD IN STOOL: 0
VOMITING: 1
NAUSEA: 1
CONSTIPATION: 0
ANAL BLEEDING: 0
SHORTNESS OF BREATH: 0
TROUBLE SWALLOWING: 0
COUGH: 0
DIARRHEA: 0
ABDOMINAL PAIN: 0

## 2017-11-07 NOTE — PROGRESS NOTES
tear of joints)     ronan knee    Osteoarthritis involving multiple joints on both sides of body 4/24/2012    Osteoporosis     Tennis elbow     left       Past Surgical History:   Procedure Laterality Date    CATARACT REMOVAL WITH IMPLANT Bilateral 2015    COLONOSCOPY  1997    had polyp, she doesn't know where and when, \"20 yrs ago\"    COLONOSCOPY  06/26/2017    DILATION AND CURETTAGE OF UTERUS N/A 9/20/2017    HYSTEROSCOPY  WITH Buzz Sicilian performed by Francoise Montoya DO at [address] N/A 10/24/2017    TOTAL LAPAROSCOPIC HYSTERECTOMY, BSO, F.S.  STAGING, GYRUS G400 performed by Dhara Palmer MD at [address] FLX W/REMOVAL LESION BY  [address] N/A 6/26/2017    COLONOSCOPY POLYPECTOMY / [address] performed by Matthew Castanon DO at [address]  10/24/2017    with pelvic lymph node dissection    THYROID SURGERY  1980    subtotal 21 years ago   Carissa Sloop TONSILLECTOMY      VITRECTOMY      FLOATERS       Family History   Problem Relation Age of Onset    Coronary Art Dis Father     Hypertension Father     Diabetes Father     High Blood Pressure Father     Heart Attack Father     Diabetes Mother     High Blood Pressure Mother     Thyroid Disease Mother     High Blood Pressure Sister     Thyroid Disease Brother     High Blood Pressure Brother     High Blood Pressure Maternal Grandmother     Diabetes Maternal Grandfather     No Known Problems Paternal Grandmother     Heart Attack Paternal Grandfather     Colon Cancer Neg Hx        Social History     Social History    Marital status:       Spouse name: N/A    Number of children: 1    Years of education: N/A     Social History Main Topics    Smoking status: Never Smoker    Smokeless tobacco: Never Used    Alcohol use No    Drug use: No    Sexual activity: No     Other Topics Concern    None     Social History Narrative    None       Past [facility] does contribute to today's presenting losartan (COZAAR) 50 MG tablet Take 1 tablet by mouth daily 90 tablet 3    simvastatin (ZOCOR) 40 MG tablet TAKE ONE TABLET BY MOUTH ONE TIME A DAY (Patient taking differently: Take 40 mg by mouth nightly TAKE ONE TABLET BY MOUTH ONE TIME A DAY) 90 tablet 3     No current facility-administered medications for this visit. Allergies   Allergen Reactions    Sulfa Antibiotics Nausea Only    Penicillins Rash       Vitals:    11/07/17 1007   BP: 133/66   Pulse: 57   Temp: 97.9 °F (36.6 °C)   TempSrc: Oral   SpO2: 96%   Weight: 247 lb 3.2 oz (112.1 kg)   Height: 4' 11.06\" (1.5 m)       Physical Examination:  Physical Exam   Constitutional: She is oriented to person, place, and time. She appears well-developed. No distress. HENT:   Head: Normocephalic and atraumatic. Cardiovascular: Normal rate and regular rhythm. No murmur heard. Pulmonary/Chest: Effort normal and breath sounds normal. She has no wheezes. Abdominal: Soft. Bowel sounds are normal. She exhibits no distension. There is no tenderness. Genitourinary:   Genitourinary Comments: Ovaries, tubes, uterus, cervix surgically removed, no palpable vaginal nodules, pelvic masses or tenderness, vaginal cuff is intact without erythema, induration, separation or granulation tissue, rectal-vaginal exam is unremarkable, the pt has normal female genitalia including vulva, urethra,vagina, Bartholin's and [place]'s glands   Musculoskeletal: She exhibits no edema or deformity. Neurological: She is alert and oriented to person, place, and time. Skin: Skin is warm and dry. No rash noted. No erythema. Surgical port site well healed without swelling or drainage, no redness other than minimal redness at LLQ port site. Psychiatric: She has a normal mood and affect. Her behavior is normal.         Assessment:     1. Encounter for postoperative examination after surgery for malignant neoplasm     2.  Endometrial cancer ([address]            Plan:    Pt recovering

## 2017-11-10 ENCOUNTER — TELEPHONE (OUTPATIENT)
Dept: ONCOLOGY | Age: 67
End: 2017-11-10

## 2017-11-14 ENCOUNTER — TELEPHONE (OUTPATIENT)
Dept: GYNECOLOGIC ONCOLOGY | Age: 67
End: 2017-11-14

## 2017-11-17 ENCOUNTER — OFFICE VISIT (OUTPATIENT)
Dept: FAMILY MEDICINE CLINIC | Age: 67
End: 2017-11-17
Payer: MEDICARE

## 2017-11-17 VITALS
OXYGEN SATURATION: 96 % | RESPIRATION RATE: 20 BRPM | HEIGHT: 63 IN | BODY MASS INDEX: 43.12 KG/M2 | TEMPERATURE: 96.6 F | HEART RATE: 64 BPM | WEIGHT: 243.4 LBS | SYSTOLIC BLOOD PRESSURE: 124 MMHG | DIASTOLIC BLOOD PRESSURE: 80 MMHG

## 2017-11-17 DIAGNOSIS — I10 ESSENTIAL HYPERTENSION: ICD-10-CM

## 2017-11-17 DIAGNOSIS — D72.829 LEUKOCYTOSIS, UNSPECIFIED TYPE: ICD-10-CM

## 2017-11-17 DIAGNOSIS — R11.2 NAUSEA AND VOMITING, INTRACTABILITY OF VOMITING NOT SPECIFIED, UNSPECIFIED VOMITING TYPE: Primary | ICD-10-CM

## 2017-11-17 PROCEDURE — G8484 FLU IMMUNIZE NO ADMIN: HCPCS | Performed by: NURSE PRACTITIONER

## 2017-11-17 PROCEDURE — 1123F ACP DISCUSS/DSCN MKR DOCD: CPT | Performed by: NURSE PRACTITIONER

## 2017-11-17 PROCEDURE — 3014F SCREEN MAMMO DOC REV: CPT | Performed by: NURSE PRACTITIONER

## 2017-11-17 PROCEDURE — 1090F PRES/ABSN URINE INCON ASSESS: CPT | Performed by: NURSE PRACTITIONER

## 2017-11-17 PROCEDURE — G8399 PT W/DXA RESULTS DOCUMENT: HCPCS | Performed by: NURSE PRACTITIONER

## 2017-11-17 PROCEDURE — 4040F PNEUMOC VAC/ADMIN/RCVD: CPT | Performed by: NURSE PRACTITIONER

## 2017-11-17 PROCEDURE — G8417 CALC BMI ABV UP PARAM F/U: HCPCS | Performed by: NURSE PRACTITIONER

## 2017-11-17 PROCEDURE — 3017F COLORECTAL CA SCREEN DOC REV: CPT | Performed by: NURSE PRACTITIONER

## 2017-11-17 PROCEDURE — 99214 OFFICE O/P EST MOD 30 MIN: CPT | Performed by: NURSE PRACTITIONER

## 2017-11-17 PROCEDURE — G8427 DOCREV CUR MEDS BY ELIG CLIN: HCPCS | Performed by: NURSE PRACTITIONER

## 2017-11-17 PROCEDURE — 1036F TOBACCO NON-USER: CPT | Performed by: NURSE PRACTITIONER

## 2017-11-17 RX ORDER — MELOXICAM 7.5 MG/1
7.5 TABLET ORAL DAILY
COMMUNITY
End: 2018-10-19 | Stop reason: ALTCHOICE

## 2017-11-17 ASSESSMENT — ENCOUNTER SYMPTOMS
ABDOMINAL PAIN: 0
NAUSEA: 0
SHORTNESS OF BREATH: 0
VOMITING: 0
COUGH: 0

## 2017-11-29 DIAGNOSIS — G89.29 CHRONIC BILATERAL LOW BACK PAIN WITHOUT SCIATICA: ICD-10-CM

## 2017-11-29 DIAGNOSIS — M25.562 CHRONIC PAIN OF BOTH KNEES: ICD-10-CM

## 2017-11-29 DIAGNOSIS — M54.50 CHRONIC BILATERAL LOW BACK PAIN WITHOUT SCIATICA: ICD-10-CM

## 2017-11-29 DIAGNOSIS — M25.561 CHRONIC PAIN OF BOTH KNEES: ICD-10-CM

## 2017-11-29 DIAGNOSIS — G89.29 CHRONIC PAIN OF BOTH KNEES: ICD-10-CM

## 2017-11-29 RX ORDER — LIDOCAINE 40 MG/G
CREAM TOPICAL
Qty: 133 G | Refills: 5 | Status: SHIPPED | OUTPATIENT
Start: 2017-11-29 | End: 2017-12-28 | Stop reason: SDUPTHER

## 2017-11-30 ENCOUNTER — HOSPITAL ENCOUNTER (OUTPATIENT)
Age: 67
Discharge: HOME | End: 2017-11-30
Payer: MEDICARE

## 2017-11-30 DIAGNOSIS — I10 ESSENTIAL HYPERTENSION: ICD-10-CM

## 2017-11-30 DIAGNOSIS — D72.829 LEUKOCYTOSIS, UNSPECIFIED TYPE: ICD-10-CM

## 2017-11-30 DIAGNOSIS — E55.9 VITAMIN D DEFICIENCY: ICD-10-CM

## 2017-11-30 LAB
ABSOLUTE EOS #: 0.1 K/UL (ref 0–0.4)
ABSOLUTE IMMATURE GRANULOCYTE: ABNORMAL K/UL (ref 0–0.3)
ABSOLUTE LYMPH #: 1.5 K/UL (ref 1–4.8)
ABSOLUTE MONO #: 0.4 K/UL (ref 0.1–1.3)
ANION GAP SERPL CALCULATED.3IONS-SCNC: 18 MMOL/L (ref 9–17)
BASOPHILS # BLD: 1 % (ref 0–2)
BASOPHILS ABSOLUTE: 0.1 K/UL (ref 0–0.2)
BUN BLDV-MCNC: 22 MG/DL (ref 8–23)
BUN/CREAT BLD: ABNORMAL (ref 9–20)
CALCIUM SERPL-MCNC: 9.5 MG/DL (ref 8.6–10.4)
CHLORIDE BLD-SCNC: 98 MMOL/L (ref 98–107)
CO2: 23 MMOL/L (ref 20–31)
CREAT SERPL-MCNC: 0.81 MG/DL (ref 0.5–0.9)
DIFFERENTIAL TYPE: ABNORMAL
EOSINOPHILS RELATIVE PERCENT: 2 % (ref 0–4)
GFR AFRICAN AMERICAN: >60 ML/MIN
GFR NON-AFRICAN AMERICAN: >60 ML/MIN
GFR SERPL CREATININE-BSD FRML MDRD: ABNORMAL ML/MIN/{1.73_M2}
GFR SERPL CREATININE-BSD FRML MDRD: ABNORMAL ML/MIN/{1.73_M2}
GLUCOSE BLD-MCNC: 183 MG/DL (ref 70–99)
HCT VFR BLD CALC: 39.3 % (ref 36–46)
HEMOGLOBIN: 13.1 G/DL (ref 12–16)
IMMATURE GRANULOCYTES: ABNORMAL %
LYMPHOCYTES # BLD: 17 % (ref 24–44)
MCH RBC QN AUTO: 30.4 PG (ref 26–34)
MCHC RBC AUTO-ENTMCNC: 33.3 G/DL (ref 31–37)
MCV RBC AUTO: 91.3 FL (ref 80–100)
MONOCYTES # BLD: 4 % (ref 1–7)
PDW BLD-RTO: 14.1 % (ref 11.5–14.9)
PLATELET # BLD: 257 K/UL (ref 150–450)
PLATELET ESTIMATE: ABNORMAL
PMV BLD AUTO: 10.1 FL (ref 6–12)
POTASSIUM SERPL-SCNC: 3.4 MMOL/L (ref 3.7–5.3)
RBC # BLD: 4.31 M/UL (ref 4–5.2)
RBC # BLD: ABNORMAL 10*6/UL
SEG NEUTROPHILS: 76 % (ref 36–66)
SEGMENTED NEUTROPHILS ABSOLUTE COUNT: 6.7 K/UL (ref 1.3–9.1)
SODIUM BLD-SCNC: 139 MMOL/L (ref 135–144)
VITAMIN D 25-HYDROXY: 35.4 NG/ML (ref 30–100)
WBC # BLD: 8.8 K/UL (ref 3.5–11)
WBC # BLD: ABNORMAL 10*3/UL

## 2017-11-30 PROCEDURE — 85025 COMPLETE CBC W/AUTO DIFF WBC: CPT

## 2017-11-30 PROCEDURE — 36415 COLL VENOUS BLD VENIPUNCTURE: CPT

## 2017-11-30 PROCEDURE — 82306 VITAMIN D 25 HYDROXY: CPT

## 2017-11-30 PROCEDURE — 80048 BASIC METABOLIC PNL TOTAL CA: CPT

## 2017-11-30 RX ORDER — LOSARTAN POTASSIUM 100 MG/1
100 TABLET ORAL DAILY
Qty: 90 TABLET | Refills: 3 | Status: SHIPPED | OUTPATIENT
Start: 2017-11-30 | End: 2018-12-14 | Stop reason: SDUPTHER

## 2017-12-01 NOTE — PROGRESS NOTES
Pt was made aware of results. Pt has no other questions at this time.   Pt understood medication changes

## 2017-12-04 ENCOUNTER — OFFICE VISIT (OUTPATIENT)
Dept: UROLOGY | Age: 67
End: 2017-12-04
Payer: MEDICARE

## 2017-12-04 VITALS
DIASTOLIC BLOOD PRESSURE: 58 MMHG | BODY MASS INDEX: 42.97 KG/M2 | SYSTOLIC BLOOD PRESSURE: 103 MMHG | WEIGHT: 242.51 LBS | HEART RATE: 57 BPM | TEMPERATURE: 97.9 F | HEIGHT: 63 IN

## 2017-12-04 DIAGNOSIS — R35.0 FREQUENCY OF URINATION: ICD-10-CM

## 2017-12-04 DIAGNOSIS — N39.46 MIXED INCONTINENCE: Primary | ICD-10-CM

## 2017-12-04 DIAGNOSIS — R39.15 URGENCY OF URINATION: ICD-10-CM

## 2017-12-04 DIAGNOSIS — R35.1 NOCTURIA: ICD-10-CM

## 2017-12-04 PROCEDURE — G8417 CALC BMI ABV UP PARAM F/U: HCPCS | Performed by: UROLOGY

## 2017-12-04 PROCEDURE — G8399 PT W/DXA RESULTS DOCUMENT: HCPCS | Performed by: UROLOGY

## 2017-12-04 PROCEDURE — 1036F TOBACCO NON-USER: CPT | Performed by: UROLOGY

## 2017-12-04 PROCEDURE — 4040F PNEUMOC VAC/ADMIN/RCVD: CPT | Performed by: UROLOGY

## 2017-12-04 PROCEDURE — G8427 DOCREV CUR MEDS BY ELIG CLIN: HCPCS | Performed by: UROLOGY

## 2017-12-04 PROCEDURE — 3014F SCREEN MAMMO DOC REV: CPT | Performed by: UROLOGY

## 2017-12-04 PROCEDURE — 1090F PRES/ABSN URINE INCON ASSESS: CPT | Performed by: UROLOGY

## 2017-12-04 PROCEDURE — 99214 OFFICE O/P EST MOD 30 MIN: CPT | Performed by: UROLOGY

## 2017-12-04 PROCEDURE — 3017F COLORECTAL CA SCREEN DOC REV: CPT | Performed by: UROLOGY

## 2017-12-04 PROCEDURE — 1123F ACP DISCUSS/DSCN MKR DOCD: CPT | Performed by: UROLOGY

## 2017-12-04 PROCEDURE — G8484 FLU IMMUNIZE NO ADMIN: HCPCS | Performed by: UROLOGY

## 2017-12-04 PROCEDURE — 0509F URINE INCON PLAN DOCD: CPT | Performed by: UROLOGY

## 2017-12-04 ASSESSMENT — ENCOUNTER SYMPTOMS
EYE REDNESS: 0
WHEEZING: 0
SHORTNESS OF BREATH: 0
VOMITING: 0
COLOR CHANGE: 0
EYE PAIN: 0
ABDOMINAL PAIN: 0
BACK PAIN: 1
NAUSEA: 0
COUGH: 0

## 2017-12-05 DIAGNOSIS — R73.9 HYPERGLYCEMIA: ICD-10-CM

## 2017-12-07 ENCOUNTER — OFFICE VISIT (OUTPATIENT)
Dept: GYNECOLOGIC ONCOLOGY | Age: 67
End: 2017-12-07

## 2017-12-07 VITALS
OXYGEN SATURATION: 97 % | TEMPERATURE: 97 F | HEIGHT: 63 IN | DIASTOLIC BLOOD PRESSURE: 78 MMHG | SYSTOLIC BLOOD PRESSURE: 141 MMHG | BODY MASS INDEX: 43.73 KG/M2 | HEART RATE: 65 BPM | WEIGHT: 246.8 LBS

## 2017-12-07 DIAGNOSIS — Z08 ENCOUNTER FOR POSTOPERATIVE EXAMINATION AFTER SURGERY FOR MALIGNANT NEOPLASM: Primary | ICD-10-CM

## 2017-12-07 DIAGNOSIS — C54.1 ENDOMETRIAL CANCER (HCC): ICD-10-CM

## 2017-12-07 PROCEDURE — 99024 POSTOP FOLLOW-UP VISIT: CPT | Performed by: NURSE PRACTITIONER

## 2017-12-07 ASSESSMENT — ENCOUNTER SYMPTOMS
ABDOMINAL DISTENTION: 0
BLOOD IN STOOL: 0
CONSTIPATION: 0
ABDOMINAL PAIN: 0
ANAL BLEEDING: 0
COUGH: 0
SHORTNESS OF BREATH: 0
TROUBLE SWALLOWING: 0

## 2017-12-07 NOTE — PROGRESS NOTES
Vaishali Grady is a 74 y.o. female that presents today for:  No chief complaint on file. HPI:  HPI  74 y.o.    1 Para Gracie Martinez, originally seen  10/12/17 at the request of Dr. Isabella Stewart. Rayne Bucio patient began experiencing spotting in May 2017.  She presented for evaluation and a pelvic ultrasound was obtained on 2017.  The uterus measured 7.8 x 3.5 x 4 cm and the endometrial stripe was thickened at 1.8 cm.  The patient was taken for [facility]  on 2017 and the endometrial curetting showed grade 1 endometrial cancer in a background of complex atypical hyperplasia.       The pt reported before seeing Dr Elliott Flores for a GYN exam that \"it's been so long I can't remember\" since she had a GYN exam.  She reported a hx of 1 abnormal pap back in the 1980s but she is not sure what was abnormal about the pap other than it was not cancer. The light spotting that started in May had increased to intermittent heavy bleeding. The pt is not sexually active.     The patient  had a normal mammogram and a normal colonoscopy in 2017.  Pap smear 2017 was negative with negative high-risk HPV DNA.     On 10/24/17 she underwent  total laparoscopic hysterectomy, bilateralsalpingo-oophorectomy, pelvic and paraaortic lymphadenectomy, and peritoneal biopsy for Grade 1 endometrial cancer.     PATHOLOGIC STAGING (PTNM [FIGO]): pT1a N0 [IA]         Her Mismatch repair testing:  MISMATCH REPAIR BY IHC WITH MHL1:  ABNORMAL   MISMATCH REPAIR BY IHC WITH MSH2:  NORMAL   MISMATCH REPAIR BY IHC WITH MSH6:  NORMAL   MISMATCH REPAIR BY IHC WITH PMS2:  ABNORMAL    - REFLEX TESTING FOR MLH1 PROMOTER METHYLATION: - POSITIVE      POSTIVE MLH1 PROMOTER METHYLATION IS USUALLY ASSOCIATED WITH SPORADIC AND NOT SYNDORMAL OCCURRENCES OF ENDOMETRIAL ADENOCARCINOMA       She is here today for her final post op visit. She has no concerns or complaints today.  Pt denies any F/C,  N/V, SOB, unexpected weight BSO, F.S.  STAGING, GYRUS G400 performed by Esperanza Cross MD at [address] [facility] [address] FLX W/REMOVAL LESION BY  [address] N/A 6/26/2017    COLONOSCOPY POLYPECTOMY / [address] performed by Parmjit Mendiola DO at [address]  10/24/2017    with pelvic lymph node dissection    THYROID SURGERY  1980    subtotal 21 years ago    TONSILLECTOMY      VITRECTOMY      FLOATERS       Family History   Problem Relation Age of Onset    Coronary Art Dis Father     Hypertension Father     Diabetes Father     High Blood Pressure Father     Heart Attack Father     Diabetes Mother     High Blood Pressure Mother     Thyroid Disease Mother     High Blood Pressure Sister     Thyroid Disease Brother     High Blood Pressure Brother     High Blood Pressure Maternal Grandmother     Diabetes Maternal Grandfather     No Known Problems Paternal Grandmother     Heart Attack Paternal Grandfather     Colon Cancer Neg Hx        Social History     Social History    Marital status:      Spouse name: N/A    Number of children: 1    Years of education: N/A     Social History Main Topics    Smoking status: Never Smoker    Smokeless tobacco: Never Used    Alcohol use No    Drug use: No    Sexual activity: No     Other Topics Concern    Not on file     Social History Narrative    No narrative on file       Past [facility] does contribute to today's presenting complaint. Current Outpatient Prescriptions   Medication Sig Dispense Refill    metFORMIN (GLUCOPHAGE) 500 MG tablet Take 1 tablet by mouth 2 times daily (with meals) 60 tablet 11    losartan (COZAAR) 100 MG tablet Take 1 tablet by mouth daily **STOP HCTZ.  INCREASE DOSE OF LOSARTAN FROM 50  MG ON 11/30/2017 ** 90 tablet 3    lidocaine (LMX) 4 % cream APPLY TO AFFECTED AREA(S) EVERY 8 HOURS AS NEEDED FOR PAIN 133 g 5    meloxicam (MOBIC) 7.5 MG tablet Take 7.5 mg by mouth daily      STOOL SOFTENER 100 MG capsule TAKE ONE CAPSULE BY MOUTH TWICE A DAY  AS NEEDED FOR CONSTIPATION 60 capsule 0    ondansetron (ZOFRAN) 4 MG tablet Take 1 tablet by mouth every 8 hours as needed for Nausea 20 tablet 0    ibuprofen (ADVIL;MOTRIN) 800 MG tablet Take 1 tablet by mouth every 8 hours as needed (pain) 30 tablet 0    chlorhexidine (PERIDEX) 0.12 % solution Take 15 mLs by mouth 2 times daily      Urea (CARMOL) 40 % cream Apply topically as needed      levothyroxine (SYNTHROID) 75 MCG tablet TAKE ONE TABLET BY MOUTH DAILY 30 tablet 5    acetaminophen-codeine (TYLENOL/CODEINE #3) 300-30 MG per tablet Take 1 tablet by mouth 3 times daily as needed for Pain 10 tablet 0    MYRBETRIQ 50 MG TB24 Take 50 mg by mouth daily 30 tablet 11    Cranberry 500 MG CAPS Take 500 mg by mouth daily 30 capsule 3    Lactobacillus (PROBIOTIC ACIDOPHILUS) TABS Take 1 tablet by mouth daily 30 tablet 3    acetaminophen (APAP EXTRA STRENGTH) 500 MG tablet Take 1 tablet by mouth every 6 hours as needed for Pain or Fever 120 tablet 3    vitamin D (ERGOCALCIFEROL) 43243 UNITS CAPS capsule Take 1 capsule by mouth once a week (Patient taking differently: Take 50,000 Units by mouth once a week ) 4 capsule 2    loratadine (CLARITIN) 10 MG tablet TAKE ONE TABLET BY MOUTH EVERY DAY (Patient taking differently: Take 10 mg by mouth daily as needed TAKE ONE TABLET BY MOUTH EVERY DAY) 90 tablet 3    simvastatin (ZOCOR) 40 MG tablet TAKE ONE TABLET BY MOUTH ONE TIME A DAY (Patient taking differently: Take 40 mg by mouth nightly TAKE ONE TABLET BY MOUTH ONE TIME A DAY) 90 tablet 3     No current facility-administered medications for this visit. Allergies   Allergen Reactions    Sulfa Antibiotics Nausea Only    Penicillins Rash       There were no vitals filed for this visit. Physical Examination:  Physical Exam   Constitutional: She is oriented to person, place, and time. She appears well-developed. No distress. HENT:   Head: Normocephalic and atraumatic.    Cardiovascular: Normal rate and regular rhythm. No murmur heard. Pulmonary/Chest: Effort normal and breath sounds normal. She has no wheezes. Abdominal: Soft. Bowel sounds are normal. She exhibits no distension. There is no tenderness. Genitourinary:   Genitourinary Comments: Ovaries, tubes, uterus, cervix surgically removed, no palpable vaginal nodules, pelvic masses or tenderness, vaginal cuff is intact without erythema, induration, separation or granulation tissue, rectal-vaginal exam is unremarkable, the pt has normal female genitalia including vulva, urethra,vagina, Bartholin's and [place]'s glands   Musculoskeletal: She exhibits no edema or deformity. Neurological: She is alert and oriented to person, place, and time. Skin: Skin is warm and dry. No rash noted. No erythema. Psychiatric: She has a normal mood and affect. Her behavior is normal.         Assessment:     1. Encounter for postoperative examination after surgery for malignant neoplasm     2. Endometrial cancer ([facility] [address]            Plan:    The pt has recovered form her recent TLH/BSO for grade 1 stage 1 endometrial cancer. No adjuvant treatment was recommended. She may resume her normal activity level as tolerated. The pt denies any unanswered questions and voices understanding and agreement with Plan of Care. No Follow-up on file. No orders of the defined types were placed in this encounter. No orders of the defined types were placed in this encounter.          Electronically signed by Miles Doyle CNP on 12/7/2017 at 10:02 AM

## 2017-12-18 ENCOUNTER — TELEPHONE (OUTPATIENT)
Dept: FAMILY MEDICINE CLINIC | Age: 67
End: 2017-12-18

## 2017-12-18 DIAGNOSIS — E55.9 VITAMIN D DEFICIENCY: Primary | ICD-10-CM

## 2017-12-18 RX ORDER — METHOCARBAMOL 750 MG/1
TABLET ORAL
Qty: 4 CAPSULE | Refills: 0 | OUTPATIENT
Start: 2017-12-18

## 2017-12-18 NOTE — TELEPHONE ENCOUNTER
She has appointment coming up  I didn't see her in awhile, but saw [facility], however she didn't mention anything about pain, gait or her knees    Can I order physical therapy at her appointment?     Future Appointments  Date Time Provider Dante Monique   12/28/2017 10:45 AM [facility]   4/9/2018 11:00 AM SCHEDULE, [facility]   6/4/2018 10:20 AM Teo Julian MD [address] [place] [address]

## 2017-12-28 ENCOUNTER — OFFICE VISIT (OUTPATIENT)
Dept: FAMILY MEDICINE CLINIC | Age: 67
End: 2017-12-28
Payer: MEDICARE

## 2017-12-28 VITALS
OXYGEN SATURATION: 97 % | BODY MASS INDEX: 46.59 KG/M2 | WEIGHT: 253.2 LBS | SYSTOLIC BLOOD PRESSURE: 139 MMHG | TEMPERATURE: 97.8 F | HEIGHT: 62 IN | HEART RATE: 69 BPM | DIASTOLIC BLOOD PRESSURE: 71 MMHG

## 2017-12-28 DIAGNOSIS — E55.9 VITAMIN D DEFICIENCY: ICD-10-CM

## 2017-12-28 DIAGNOSIS — C54.1 ADENOCARCINOMA OF ENDOMETRIUM, STAGE 1 (HCC): ICD-10-CM

## 2017-12-28 DIAGNOSIS — M47.816 SPONDYLOSIS OF LUMBAR REGION WITHOUT MYELOPATHY OR RADICULOPATHY: ICD-10-CM

## 2017-12-28 DIAGNOSIS — M17.0 PRIMARY OSTEOARTHRITIS OF BOTH KNEES: Primary | ICD-10-CM

## 2017-12-28 DIAGNOSIS — M15.9 OSTEOARTHRITIS INVOLVING MULTIPLE JOINTS ON BOTH SIDES OF BODY: ICD-10-CM

## 2017-12-28 DIAGNOSIS — G89.29 CHRONIC BILATERAL LOW BACK PAIN WITHOUT SCIATICA: ICD-10-CM

## 2017-12-28 DIAGNOSIS — I10 ESSENTIAL HYPERTENSION: ICD-10-CM

## 2017-12-28 DIAGNOSIS — R73.9 HYPERGLYCEMIA: ICD-10-CM

## 2017-12-28 DIAGNOSIS — M54.50 CHRONIC BILATERAL LOW BACK PAIN WITHOUT SCIATICA: ICD-10-CM

## 2017-12-28 DIAGNOSIS — Z91.81 AT HIGH RISK FOR FALLS: ICD-10-CM

## 2017-12-28 DIAGNOSIS — R73.03 PREDIABETES: ICD-10-CM

## 2017-12-28 DIAGNOSIS — E03.9 ACQUIRED HYPOTHYROIDISM: ICD-10-CM

## 2017-12-28 PROBLEM — N93.9 ABNORMAL VAGINAL BLEEDING: Status: RESOLVED | Noted: 2017-06-10 | Resolved: 2017-12-28

## 2017-12-28 LAB — HBA1C MFR BLD: 5.3 %

## 2017-12-28 PROCEDURE — 83036 HEMOGLOBIN GLYCOSYLATED A1C: CPT | Performed by: FAMILY MEDICINE

## 2017-12-28 PROCEDURE — 4040F PNEUMOC VAC/ADMIN/RCVD: CPT | Performed by: FAMILY MEDICINE

## 2017-12-28 PROCEDURE — G8417 CALC BMI ABV UP PARAM F/U: HCPCS | Performed by: FAMILY MEDICINE

## 2017-12-28 PROCEDURE — 99215 OFFICE O/P EST HI 40 MIN: CPT | Performed by: FAMILY MEDICINE

## 2017-12-28 PROCEDURE — 1036F TOBACCO NON-USER: CPT | Performed by: FAMILY MEDICINE

## 2017-12-28 PROCEDURE — 3014F SCREEN MAMMO DOC REV: CPT | Performed by: FAMILY MEDICINE

## 2017-12-28 PROCEDURE — 1090F PRES/ABSN URINE INCON ASSESS: CPT | Performed by: FAMILY MEDICINE

## 2017-12-28 PROCEDURE — 3017F COLORECTAL CA SCREEN DOC REV: CPT | Performed by: FAMILY MEDICINE

## 2017-12-28 PROCEDURE — 1123F ACP DISCUSS/DSCN MKR DOCD: CPT | Performed by: FAMILY MEDICINE

## 2017-12-28 PROCEDURE — G8484 FLU IMMUNIZE NO ADMIN: HCPCS | Performed by: FAMILY MEDICINE

## 2017-12-28 PROCEDURE — G8399 PT W/DXA RESULTS DOCUMENT: HCPCS | Performed by: FAMILY MEDICINE

## 2017-12-28 PROCEDURE — G8427 DOCREV CUR MEDS BY ELIG CLIN: HCPCS | Performed by: FAMILY MEDICINE

## 2017-12-28 RX ORDER — LEVOTHYROXINE SODIUM 0.07 MG/1
TABLET ORAL
Qty: 30 TABLET | Refills: 5 | Status: SHIPPED | OUTPATIENT
Start: 2017-12-28 | End: 2018-08-25 | Stop reason: SDUPTHER

## 2017-12-28 RX ORDER — LIDOCAINE 40 MG/G
CREAM TOPICAL
Qty: 120 G | Refills: 3 | Status: SHIPPED | OUTPATIENT
Start: 2017-12-28 | End: 2018-10-19 | Stop reason: SDUPTHER

## 2017-12-28 ASSESSMENT — ENCOUNTER SYMPTOMS
ABDOMINAL PAIN: 0
VOMITING: 0
CHEST TIGHTNESS: 0
CONSTIPATION: 0
DIARRHEA: 0
SHORTNESS OF BREATH: 0
NAUSEA: 0
ABDOMINAL DISTENTION: 0
COUGH: 0
WHEEZING: 0
BACK PAIN: 1

## 2017-12-28 NOTE — PATIENT INSTRUCTIONS
Patient Education        Learning About High Blood Sugar  What is high blood sugar? Your body turns the food you eat into glucose (sugar), which it uses for energy. But if your body isn't able to use the sugar right away, it can build up in your blood and lead to high blood sugar. When the amount of sugar in your blood stays too high for too much of the time, you may have diabetes. Diabetes is a disease that can cause serious health problems. The good news is that lifestyle changes may help you get your blood sugar back to normal and avoid or delay diabetes. What causes high blood sugar? Sugar (glucose) can build up in your blood if you:  · Are overweight. · Have a family history of diabetes. · Take certain medicines, such as steroids. What are the symptoms? Having high blood sugar may not cause any symptoms at all. Or it may make you feel very thirsty or very hungry. You may also urinate more often than usual, have blurry vision, or lose weight without trying. How is high blood sugar treated? You can take steps to lower your blood sugar level if you understand what makes it get higher. Your doctor may want you to learn how to test your blood sugar level at home. Then you can see how illness, stress, or different kinds of food or medicine raise or lower your blood sugar level. Other tests may be needed to see if you have diabetes. How can you prevent high blood sugar? · Watch your weight. If you're overweight, losing just a small amount of weight may help. Reducing fat around your waist is most important. · Limit the amount of calories, sweets, and unhealthy fat you eat. Ask your doctor if a dietitian can help you. A registered dietitian can help you create meal plans that fit your lifestyle. · Get at least 30 minutes of exercise on most days of the week. Exercise helps control your blood sugar. It also helps you maintain a healthy weight. Walking is a good choice.  You also may want to do other good idea to know your test results and keep a list of the medicines you take. How can you prevent falls outdoors? · Wear shoes with firm soles and low heels. If you have to walk on an icy surface, use grippers that can be worn over your shoes in bad weather. · Be extra careful if weather is bad. Walk on the grass when the sidewalks are slick. If you live in a place that gets snow and ice in the winter, sprinkle salt on slippery stairs and sidewalks. · Be careful getting on or off buses and trains or getting in and out of cars. If handrails are available, use them. · Be careful when you cross the street. Look for crosswalks or places where curb cuts or ramps are present. · Try not to hurry, especially if you are carrying something. · Be cautious in parking lots or garages. There may be curbs or changes in pavement, or the height of the pavement may vary. · Make sure to wear the correct eyeglasses, if you need them. Reading glasses or bifocals can make it harder to see hazards that might be in your way. · If you are walking outdoors for exercise, try to:  ¨ Walk in well-lighted, well-maintained areas. These include high school or college tracks, shopping malls, and public spaces. ¨ Walk with a partner. ¨ Watch out for cracked sidewalks, curbs, changes in the height of the pavement, exposed tree roots, and debris such as fallen leaves or branches. Where can you learn more? Go to https://MediaBrixjovanna.BlogBus. org and sign in to your Xeros account. Enter J113 in the [employer] [facility] [facility] box to learn more about \"Preventing Outdoor Falls: Care Instructions. \"     If you do not have an account, please click on the \"Sign Up Now\" link. Current as of: May 12, 2017  Content Version: 11.4  © 7349-0827 Healthwise, Incorporated. Care instructions adapted under license by [facility] ([facility]).  If you have questions about a medical condition or this instruction, always ask your healthcare professional. [employer] disclaims any warranty or liability for your use of this information. Patient Education        Preventing Falls: Care Instructions  Your Care Instructions    Getting around your home safely can be a challenge if you have injuries or health problems that make it easy for you to fall. Loose rugs and furniture in walkways are among the dangers for many older people who have problems walking or who have poor eyesight. People who have conditions such as arthritis, osteoporosis, or dementia also have to be careful not to fall. You can make your home safer with a few simple measures. Follow-up care is a key part of your treatment and safety. Be sure to make and go to all appointments, and call your doctor if you are having problems. It's also a good idea to know your test results and keep a list of the medicines you take. How can you care for yourself at home? Taking care of yourself  · You may get dizzy if you do not drink enough water. To prevent dehydration, drink plenty of fluids, enough so that your urine is light yellow or clear like water. Choose water and other caffeine-free clear liquids. If you have kidney, heart, or liver disease and have to limit fluids, talk with your doctor before you increase the amount of fluids you drink. · Exercise regularly to improve your strength, muscle tone, and balance. Walk if you can. Swimming may be a good choice if you cannot walk easily. · Have your vision and hearing checked each year or any time you notice a change. If you have trouble seeing and hearing, you might not be able to avoid objects and could lose your balance. · Know the side effects of the medicines you take. Ask your doctor or pharmacist whether the medicines you take can affect your balance. Sleeping pills or sedatives can affect your balance. · Limit the amount of alcohol you drink. Alcohol can impair your balance and other senses.   · Ask your doctor whether calluses or corns on your feet need to be removed. If you wear loose-fitting shoes because of calluses or corns, you can lose your balance and fall. · Talk to your doctor if you have numbness in your feet. Preventing falls at home  · Remove raised doorway thresholds, throw rugs, and clutter. Repair loose carpet or raised areas in the floor. · Move furniture and electrical cords to keep them out of walking paths. · Use nonskid floor wax, and wipe up spills right away, especially on ceramic tile floors. · If you use a walker or cane, put rubber tips on it. If you use crutches, clean the bottoms of them regularly with an abrasive pad, such as steel wool. · Keep your house well lit, especially [place], and outside walkways. Use night-lights in areas such as hallways and bathrooms. Add extra light switches or use remote switches (such as switches that go on or off when you clap your hands) to make it easier to turn lights on if you have to get up during the night. · Install sturdy handrails on stairways. · Move items in your cabinets so that the things you use a lot are on the lower shelves (about waist level). · Keep a cordless phone and a flashlight with new batteries by your bed. If possible, put a phone in each of the main rooms of your house, or carry a cell phone in case you fall and cannot reach a phone. Or, you can wear a device around your neck or wrist. You push a button that sends a signal for help. · Wear low-heeled shoes that fit well and give your feet good support. Use footwear with nonskid soles. Check the heels and soles of your shoes for wear. Repair or replace worn heels or soles. · Do not wear socks without shoes on wood floors. · Walk on the grass when the sidewalks are slippery. If you live in an area that gets snow and ice in the winter, sprinkle salt on slippery steps and sidewalks.   Preventing falls in the bath  · Install grab bars and nonskid mats inside and outside your shower or tub and near the toilet and sinks. · Use shower chairs and bath benches. · Use a hand-held shower head that will allow you to sit while showering. · Get into a tub or shower by putting the weaker leg in first. Get out of a tub or shower with your strong side first.  · Repair loose toilet seats and consider installing a raised toilet seat to make getting on and off the toilet easier. · Keep your bathroom door unlocked while you are in the shower. Where can you learn more? Go to https://Twin Willows Constructionpepiceweb.soup.me. org and sign in to your [employer] account. Enter [id number] [id number] [phone] in the [employer] box to learn more about \"Preventing Falls: Care Instructions. \"     If you do not have an account, please click on the \"Sign Up Now\" link. Current as of: May 12, 2017  Content Version: 11.4  © 5160-6348 [employer]. Care instructions adapted under license by [facility] ([facility]). If you have questions about a medical condition or this instruction, always ask your healthcare professional. [place] [address] any warranty or liability for your use of this information. Patient Education        How to Get Up Safely After a Fall: Care Instructions  Your Care Instructions    If you have injuries, health problems, or other reasons that may make it easy for you to fall at home, it is a good idea to learn how to get up safely after a fall. Learning how to get up correctly can help you avoid making an injury worse. Also, knowing what to do if you cannot get up can help you stay safe until help arrives. Follow-up care is a key part of your treatment and safety. Be sure to make and go to all appointments, and call your doctor if you are having problems. It's also a good idea to know your test results and keep a list of the medicines you take. How can you care for yourself after a fall? If you think you can get up  First lie still for a few minutes and think about how you feel.  If your body feels okay and you think you

## 2017-12-28 NOTE — PROGRESS NOTES
 Breast cancer screen  03/28/2018    Colon cancer screen colonoscopy  06/26/2020    Diabetes screen  07/27/2020    Lipid screen  03/21/2022    DTaP/Tdap/Td vaccine (2 - Td) 09/28/2027    Zostavax vaccine  Addressed    DEXA (modify frequency per FRAX score)  Completed    Hepatitis C screen  Completed    HIV screen  Completed

## 2017-12-28 NOTE — PROGRESS NOTES
Chief Complaint   Patient presents with    Hypertension    Hyperglycemia    Arthritis     Knee pain bilaterally, and back pain       Dipika Anderson  here today for follow up on chronic medical problems, go over labs and/or diagnostic studies, and medication refills. Hypertension; Hyperglycemia; and Arthritis (Knee pain bilaterally, and back pain)    Roxane Warner is Requesting referral to aqua therapy . She doesn't drive. [facility] brought her in. Has home health aid 3 times/week. Using walker to ambulate. Uses Cane around the apartment. Lives on her own. Has emergency pendant. Has chronic back pain on and off. Seeing chiropractor for intermittent back pain which has been helping. Taking Tylenol 650 mg q6 hrs and has been helping. She doesn't take ibuprofen anymore. She does take meloxicam as needed, rarely. X-ray lumbar spine done on 3/21/17 shows mild to moderate degenerative disc disease in the lumbar spine and low thoracic spine    Has chronic bilateral knee pain, patellar, on and off. Today, Intensity of pain is 2/10. Denies swelling. Left knee is worse. Denies falls. X-rays of the knees done on 9/14/17 shows osteoarthritis. Patient was recently diagnosed with endometrial cancer, stage I. Had CAITLIN with BSO. She is cancer free now  Has follow-up with GYN oncology every 4 mo for 4 yrs. No adjuvant was recommended. Since she had the surgery, she says she doesn't have vaginal bleeding anymore. Has known Urine incontinence on Myrbetric and cranberry pills  Says she wears a pad, but symptoms have tremendously improved. Denies frequency, urgency of the dysuria. Hypertension: Patient here for follow-up of elevated blood pressure. She is not exercising and is adherent to low salt diet most of the times, except for when eating out. Blood pressure is well controlled At the [facility] when checking. Does not have blood pressure cuff.    Cardiac symptoms fatigue and lower Range   Deficiency              <20 ng/mL   Mild Deficiency       20-30 ng/mL   Sufficiency           ng/mL   Toxicity               >100 ng/mL  Performed at [address] [place] [address] [phone]      Glucose 11/30/2017 183* 70 - 99 mg/dL Final    BUN 11/30/2017 22  8 - 23 mg/dL Final    CREATININE 11/30/2017 0.81  0.50 - 0.90 mg/dL Final    Bun/Cre Ratio 11/30/2017 NOT REPORTED  9 - 20 Final    Calcium 11/30/2017 9.5  8.6 - 10.4 mg/dL Final    Sodium 11/30/2017 139  135 - 144 mmol/L Final    Potassium 11/30/2017 3.4* 3.7 - 5.3 mmol/L Final    Chloride 11/30/2017 98  98 - 107 mmol/L Final    CO2 11/30/2017 23  20 - 31 mmol/L Final    Anion Gap 11/30/2017 18* 9 - 17 mmol/L Final    GFR Non- 11/30/2017 >60  >60 mL/min Final    GFR  11/30/2017 >60  >60 mL/min Final    GFR Comment 11/30/2017        Final    Comment: Average GFR for 61-76 years old:   80 mL/min/1.73sq m  Chronic Kidney Disease:   <60 mL/min/1.73sq m  Kidney failure:   <15 mL/min/1.73sq m              eGFR calculated using average adult body mass. Additional eGFR calculator   available at:        [email or web address]        Performed at [facility]: [facility] [address]   [phone]      GFR Staging 11/30/2017 NOT REPORTED   Final    WBC 11/30/2017 8.8  3.5 - 11.0 k/uL Final    RBC 11/30/2017 4.31  4.0 - 5.2 m/uL Final    Hemoglobin 11/30/2017 13.1  12.0 - 16.0 g/dL Final    Hematocrit 11/30/2017 39.3  36 - 46 % Final    MCV 11/30/2017 91.3  80 - 100 fL Final    MCH 11/30/2017 30.4  26 - 34 pg Final    MCHC 11/30/2017 33.3  31 - 37 g/dL Final    RDW 11/30/2017 14.1  11.5 - 14.9 % Final    Platelets 82/99/1944 257  150 - 450 k/uL Final    MPV 11/30/2017 10.1  6.0 - 12.0 fL Final    Differential Type 11/30/2017 NOT REPORTED   Final    Seg Neutrophils 11/30/2017 76* 36 - 66 % Final    Lymphocytes TIMOTHYHDYVONNE 4.2 04/14/2015         No results found for: IGXPPQCP02  No results found for: FOLATE  Lab Results   Component Value Date    VITD25 35.4 11/30/2017             Current Outpatient Prescriptions   Medication Sig Dispense Refill    vitamin D (CHOLECALCIFEROL) 1000 UNIT TABS tablet Take 1 tablet by mouth daily 90 tablet 3    metFORMIN (GLUCOPHAGE) 500 MG tablet Take 1 tablet by mouth 2 times daily (with meals) 60 tablet 11    losartan (COZAAR) 100 MG tablet Take 1 tablet by mouth daily **STOP HCTZ.  INCREASE DOSE OF LOSARTAN FROM 50  MG ON 11/30/2017 ** 90 tablet 3    lidocaine (LMX) 4 % cream APPLY TO AFFECTED AREA(S) EVERY 8 HOURS AS NEEDED FOR PAIN 133 g 5    meloxicam (MOBIC) 7.5 MG tablet Take 7.5 mg by mouth daily      STOOL SOFTENER 100 MG capsule TAKE ONE CAPSULE BY MOUTH TWICE A DAY  AS NEEDED FOR CONSTIPATION 60 capsule 0    ondansetron (ZOFRAN) 4 MG tablet Take 1 tablet by mouth every 8 hours as needed for Nausea 20 tablet 0    ibuprofen (ADVIL;MOTRIN) 800 MG tablet Take 1 tablet by mouth every 8 hours as needed (pain) 30 tablet 0    chlorhexidine (PERIDEX) 0.12 % solution Take 15 mLs by mouth 2 times daily      Urea (CARMOL) 40 % cream Apply topically as needed      levothyroxine (SYNTHROID) 75 MCG tablet TAKE ONE TABLET BY MOUTH DAILY 30 tablet 5    acetaminophen-codeine (TYLENOL/CODEINE #3) 300-30 MG per tablet Take 1 tablet by mouth 3 times daily as needed for Pain 10 tablet 0    MYRBETRIQ 50 MG TB24 Take 50 mg by mouth daily 30 tablet 11    Cranberry 500 MG CAPS Take 500 mg by mouth daily 30 capsule 3    Lactobacillus (PROBIOTIC ACIDOPHILUS) TABS Take 1 tablet by mouth daily 30 tablet 3    acetaminophen (APAP EXTRA STRENGTH) 500 MG tablet Take 1 tablet by mouth every 6 hours as needed for Pain or Fever 120 tablet 3    loratadine (CLARITIN) 10 MG tablet TAKE ONE TABLET BY MOUTH EVERY DAY (Patient taking differently: Take 10 mg by mouth daily as needed TAKE ONE TABLET BY MOUTH EVERY DAY) 90 tablet 3    simvastatin (ZOCOR) 40 MG tablet TAKE ONE TABLET BY MOUTH ONE TIME A DAY (Patient taking differently: Take 40 mg by mouth nightly TAKE ONE TABLET BY MOUTH ONE TIME A DAY) 90 tablet 3     No current facility-administered medications for this visit. Past Surgical History:   Procedure Laterality Date    CATARACT REMOVAL WITH IMPLANT Bilateral 2015    COLONOSCOPY  1997    had polyp, she doesn't know where and when, \"20 yrs ago\"    COLONOSCOPY  06/26/2017    DILATION AND CURETTAGE OF UTERUS N/A 9/20/2017    HYSTEROSCOPY  WITH MYOSURE performed by Sean Roland DO at [address] N/A 10/24/2017    TOTAL LAPAROSCOPIC HYSTERECTOMY, BSO, F.S.  STAGING, GYRUS G400 performed by Korina Ernst MD at [address] N/A 6/26/2017    COLONOSCOPY POLYPECTOMY / HOT SNARE performed by Kristy Davidson DO at [address]  10/24/2017    with pelvic lymph node dissection    THYROID SURGERY  1980    subtotal 20 years ago    TONSILLECTOMY      VITRECTOMY      FLOATERS         Social History     Social History    Marital status:      Spouse name: N/A    Number of children: 1    Years of education: N/A     Occupational History    Not on file. Social History Main Topics    Smoking status: Never Smoker    Smokeless tobacco: Never Used    Alcohol use No    Drug use: No    Sexual activity: No     Other Topics Concern    Not on file     Social History Narrative    No narrative on file     Counseling given: Yes                   [x] Negative depression screening.    PHQ Scores 2/23/2017   PHQ2 Score 0   PHQ9 Score 0     Interpretation of Total Score Depression Severity: 1-4 = Minimal depression, 5-9 = Mild depression, 10-14 = Moderate depression, 15-19 = Moderately severe depression, 20-27 = Severe depression    The patient's past medical, surgical, social, and family history as well as her lidocaine (LMX) 4 % cream; Apply 2-3 times a day as needed for pain  Dispense: 120 g; Refill: 3    7. Adenocarcinoma of endometrium, stage 1 (HCC)  Resolved  status post CAITLIN with BSO  Has follow up every 4 mo with GYN oncology    8. Chronic bilateral low back pain without sciatica  - [facility]  - lidocaine (LMX) 4 % cream; Apply 2-3 times a day as needed for pain  Dispense: 120 g; Refill: 3    9. Prediabetes  Resolved  continue Metformin      10. Vitamin D deficiency  Continue Vitamin D 1000 units daily      11. Acquired hypothyroidism  - levothyroxine (SYNTHROID) 75 MCG tablet; TAKE ONE TABLET BY MOUTH DAILY  Dispense: 30 tablet; Refill: 5  - TSH without Reflex; Future      Will obtain flu shot record from [address] [place] [address] This Encounter   Procedures    Basic Metabolic Panel     Standing Status:   Future     Standing Expiration Date:   12/28/2018    TSH without Reflex     Standing Status:   Future     Standing Expiration Date:   12/29/2018   [address]     Referral Priority:   Routine     Referral Type:   Eval and Treat     Referral Reason:   Specialty Services Required     Referred to Provider:   Natasha Moncada PT     Requested Specialty:   Physical Therapy     Number of Visits Requested:   1    POCT glycosylated hemoglobin (Hb A1C)         Medications Discontinued During This Encounter   Medication Reason    acetaminophen-codeine (TYLENOL/CODEINE #3) 300-30 MG per tablet Therapy completed    ibuprofen (ADVIL;MOTRIN) 800 MG tablet Therapy completed    ondansetron (ZOFRAN) 4 MG tablet Therapy completed    lidocaine (LMX) 4 % cream Reorder    levothyroxine (SYNTHROID) 75 MCG tablet Reorder       Harper Buckley received counseling on the following healthy behaviors: nutrition, exercise, medication adherence and weight loss  Reviewed prior labs and health maintenance  Continue current medications, diet and exercise.   Discussed use, benefit, and side effects of prescribed medications. Barriers to medication compliance addressed. Patient given educational materials - see patient instructions  Was a self-tracking handout given in paper form or via [employer]? Yes    Requested Prescriptions     Signed Prescriptions Disp Refills    diclofenac sodium (VOLTAREN) 1 % GEL 1 Tube 3     Sig: Apply 2 g topically 4 times daily as needed for Pain    lidocaine (LMX) 4 % cream 120 g 3     Sig: Apply 2-3 times a day as needed for pain    levothyroxine (SYNTHROID) 75 MCG tablet 30 tablet 5     Sig: TAKE ONE TABLET BY MOUTH DAILY       All patient questions answered. Patient voiced understanding. Quality Measures    Body mass index is 46.31 kg/m². Elevated. Weight control planned discussed conventional weight loss and Healthy diet and regular exercise. BP: 139/71. Blood pressure is normal-high. Treatment plan consists of Weight Reduction, DASH Eating Plan, Dietary Sodium Restriction, Increased Physical Activity, Patient In-home Blood Pressure Monitoring and No treatment change needed. Fall Risk 2/23/2017   2 or more falls in past year? no   Fall with injury in past year? no     The patient does not have a history of falls. I did , complete a risk assessment for falls. A plan of care for falls in-office gait and balance testing performed using The Timed Up and Go Test was positive for increased falls risk, home safety tips provided, referral to physical therapy provided for strength and balance training      LDL controlled , continue statin   Lab Results   Component Value Date    LDLCHOLESTEROL 78 03/21/2017    (goal LDL reduction with dx if diabetes is 50% LDL reduction)      Negative depression screening.     PHQ Scores 2/23/2017   PHQ2 Score 0   PHQ9 Score 0     Interpretation of Total Score Depression Severity: 1-4 = Minimal depression, 5-9 = Mild depression, 10-14 = Moderate depression, 15-19 = Moderately severe depression, 20-27 = Severe depression          The

## 2018-01-04 ENCOUNTER — TELEPHONE (OUTPATIENT)
Dept: FAMILY MEDICINE CLINIC | Age: 68
End: 2018-01-04

## 2018-01-04 NOTE — TELEPHONE ENCOUNTER
Phone call received from Philip Jang from [facility]. They had health screening at [facility] where they met with this patient . Patient expressed that she would like to have some Home Care service from them( skilled nursing OT PT) . Fanta Jg is calling to find out if patient would qualify for these services? Please  Advice   [phone]  Next Visit Date:  Future Appointments  Date Time Provider Dante Amaya   1/11/2018 10:00 AM Tawanna Galeas, PT [facility] [address]   3/28/2018 10:30 AM Oscar Blankenship MD fp sc [facility]   4/9/2018 11:00 AM SCHEDULE, [facility] GYN ONCOLOGY GYN Oncology [facility]   6/4/2018 10:20 AM MD HELEN Jose 55 Maintenance   Topic Date Due    Flu vaccine (1) 11/30/2018 (Originally 9/1/2017)    Pneumococcal low/med risk (2 of 2 - PPSV23) 02/23/2018    TSH testing  03/21/2018    Breast cancer screen  03/28/2018    Potassium monitoring  10/24/2018    Creatinine monitoring  10/24/2018    A1C test (Diabetic or Prediabetic)  12/28/2018    Colon cancer screen colonoscopy  06/26/2020    Diabetes screen  12/28/2020    Lipid screen  03/21/2022    DTaP/Tdap/Td vaccine (2 - Td) 09/28/2027    Zostavax vaccine  Addressed    DEXA (modify frequency per FRAX score)  Completed    Hepatitis C screen  Completed    HIV screen  Completed       Hemoglobin A1C (%)   Date Value   12/28/2017 5.3   07/27/2017 5.7   03/21/2017 6.3 (H)             ( goal A1C is < 7)   Microalb/Crt.  Ratio (mcg/mg creat)   Date Value   04/14/2015 5     LDL Cholesterol (mg/dL)   Date Value   03/21/2017 78       (goal LDL is <100)   AST (U/L)   Date Value   10/18/2017 27     ALT (U/L)   Date Value   10/18/2017 16     BUN (mg/dL)   Date Value   11/30/2017 22     BP Readings from Last 3 Encounters:   12/28/17 139/71   12/07/17 (!) 141/78   12/04/17 (!) 103/58          (goal 120/80)    All Future Testing planned in [facility]  Lab Frequency Next Occurrence   CBC auto differential Once 11/17/2017   Protime-INR Once 11/17/2017   Type and screen Once 09/28/2017   Urinalysis Once 11/17/2017   Initiate PAT Protocol Once 09/28/2017   APTT Once 00/52/7089   Basic Metabolic Panel Once 80/93/9744   TSH without Reflex Once 03/28/2018               Patient Active Problem List:     Hypothyroidism     History of TIA (transient ischemic attack)     Lower back pain     Essential hypertension     Osteopenia determined by x-ray     Osteoarthritis involving multiple joints on both sides of body     Left knee DJD     Prediabetes     Vitamin D deficiency     Mixed incontinence     Chronic pain of both knees     Slow transit constipation     Allergic rhinitis     Hyperlipidemia with target LDL less than 100     BMI 46.3     At high risk for falls     Left breast lump     Dense breast tissue on mammogram     Hyperglycemia     Spondylosis of lumbar region without myelopathy or radiculopathy     OAB (overactive bladder)     Candidiasis of perineum     Primary osteoarthritis of both knees     Chronic fatigue      Adenomatous polyp of sigmoid colon, 6/26/17     Mixed stress and urge urinary incontinence     Adenocarcinoma of endometrium, stage 1 (HCC)     TLHBSO, bilateral LND 10/24/17

## 2018-01-04 NOTE — TELEPHONE ENCOUNTER
I spoke with patient at her last appointment and she wanted to go to physical therapy outpatient. As long as she goes to outpatient physical therapy, she doesn't qualify for home care as she is not homebound. She does have appointment with physical therapy. I would suggest her to continue with outpatient physical therapy and we'll reevaluate her at the next appointment.     Future Appointments  Date Time Provider Dante Monique   1/11/2018 10:00 AM Zoya Rogers PT [facility]   3/28/2018 10:30 AM Tonya Holcomb MD fp sc [facility]   4/9/2018 11:00 AM SCHEDULE, [facility] GYN ONCOLOGY GYN Oncology [facility]   6/4/2018 10:20 AM Jaimee Cabral MD [address] [place] [address]

## 2018-01-09 ENCOUNTER — TELEPHONE (OUTPATIENT)
Dept: FAMILY MEDICINE CLINIC | Age: 68
End: 2018-01-09

## 2018-01-10 NOTE — TELEPHONE ENCOUNTER
I did request all signed and faxed documents by us. Patient states she does go to PT tomorrow . \  Patient states she is legally blind and [facility] should have that record (per patient ).  Her next appt with [facility] is 2/16/18

## 2018-01-11 ENCOUNTER — HOSPITAL ENCOUNTER (OUTPATIENT)
Dept: PHYSICAL THERAPY | Age: 68
Setting detail: THERAPIES SERIES
Discharge: HOME | End: 2018-01-11
Payer: MEDICARE

## 2018-01-11 PROCEDURE — 97110 THERAPEUTIC EXERCISES: CPT

## 2018-01-11 PROCEDURE — 97162 PT EVAL MOD COMPLEX 30 MIN: CPT

## 2018-01-11 PROCEDURE — G8979 MOBILITY GOAL STATUS: HCPCS

## 2018-01-11 PROCEDURE — G8978 MOBILITY CURRENT STATUS: HCPCS

## 2018-01-11 NOTE — TELEPHONE ENCOUNTER
Please let her know I would gladly sign her paper if I have her documentation related to legally blind  I will addend my last note ASAP I get the documentation. FYI  We just need the documentation that she is legally blind from the ophthalmology or disability papers or from her .   Let's try to obtain the documentation for \"legally blind status\" that she apparently has per patient's report.

## 2018-01-12 ASSESSMENT — PAIN DESCRIPTION - INTENSITY
RATING_2: 4
RATING_3: 3

## 2018-01-12 ASSESSMENT — PAIN DESCRIPTION - ORIENTATION
ORIENTATION_3: MID;LOWER
ORIENTATION: LEFT
ORIENTATION_2: RIGHT

## 2018-01-12 ASSESSMENT — PAIN DESCRIPTION - LOCATION
LOCATION: KNEE
LOCATION_3: BACK
LOCATION_2: KNEE

## 2018-01-12 ASSESSMENT — PAIN SCALES - GENERAL: PAINLEVEL_OUTOF10: 3

## 2018-01-12 NOTE — PROGRESS NOTES
[facility] Fall Risk Assessment    Risk Factor Scale  Score   History of Falls [x] Yes  [] No 25  0 25   Secondary Diagnosis [x] Yes  [] No 15  0 15   Ambulatory Aid [] Furniture  [x] Crutches/cane/walker  [] None/bedrest/wheelchair/nurse 30  15  0 15   IV/Heparin Lock [] Yes  [x] No 20  0    Gait/Transferring [] Impaired  [] Weak  [x] Normal/bedrest/immobile 20  10  0    Mental Status [] Forgets limitations  [x] Oriented to own ability 15  0       Total:55     Based on the Assessment score: check the appropriate box.     []  No intervention needed   Low =   Score of 0-24    []  Use standard prevention interventions Moderate =  Score of 24-44   [] Give patient handout and discuss fall prevention strategies   [] Establish goal of education for patient/family RE: fall prevention strategies    [x]  Use high risk prevention interventions High = Score of 45 and higher   [] Give patient handout and discuss fall prevention strategies   [] Establish goal of education for patient/family Re: fall prevention strategies   [] Discuss lifeline / other resources    Electronically signed by:   Valerie Jaramillo PT  Date: 1/12/2018

## 2018-01-16 ENCOUNTER — HOSPITAL ENCOUNTER (OUTPATIENT)
Dept: PHYSICAL THERAPY | Age: 68
Setting detail: THERAPIES SERIES
Discharge: HOME | End: 2018-01-16
Payer: MEDICARE

## 2018-01-18 ENCOUNTER — HOSPITAL ENCOUNTER (OUTPATIENT)
Dept: PHYSICAL THERAPY | Age: 68
Setting detail: THERAPIES SERIES
Discharge: HOME | End: 2018-01-18
Payer: MEDICARE

## 2018-01-18 PROCEDURE — 97113 AQUATIC THERAPY/EXERCISES: CPT

## 2018-01-18 NOTE — PROGRESS NOTES
Precautions:  Medical Precautions:  [] C-9 dates  [] Occ Med   [] Medicare       Date 1/18/18       Visit # 2/12       Walk F/L/R 2 laps ea       Marching 10x       Squats 10x5\"       Heel toe raises 10x       SLR F/L/R 10x ea       HS Curl 10x       Step-Ups F/L Low 10x ea       Knee/Flex /Ext                Kickboard Ex. Iso Abd. verticle        Push-pull        Paddling                Balance        SLS        DEEP H2O        Cycling                Stretches        Achllies 2x20\"       Hamstring 2x20\"       Psoas        Quad                                Cool down 2 laps       Pain Rating 0         Assessment:  Treatment Diagnosis: (B) knee pain M25.561, M25,562; low back pain without sciatica M54.5  Prognosis: Good  Patient Education: Initial aquatics visit: Educated patient on PN and posture.   Instructed in warm-ups, exercises and stretches at patient's tolerance per log  REQUIRES PT FOLLOW UP: Yes  Activity Tolerance: Patient Tolerated treatment well (Slight pain in (B) knees noted with HS curls)    Plan:  Plan: Continue with current plan (Assess patient response and progress as able)    Therapy Time:  Time In: 1355  Time Out: 1432  Minutes: 37  Timed Code Treatment Minutes: 32 Minutes    Treatment Charges: Minutes Units   []  Ultrasound     []  Electrical-Stim     []  Iontophoresis     []  Traction     []  Massage       []  Eval     []  Gait     []  Ther Exercise       []  Manual Therapy       []  Ther Activities       [x]  Aquatics 32 2   []  Neuro Re-Ed       []  Other       Total Treatment Time: 32 2     Electronically signed by Erin Vaz PTA on 1/18/2018 at 2:55 PM

## 2018-01-23 ENCOUNTER — APPOINTMENT (OUTPATIENT)
Dept: PHYSICAL THERAPY | Age: 68
End: 2018-01-23
Payer: MEDICARE

## 2018-01-25 ENCOUNTER — HOSPITAL ENCOUNTER (OUTPATIENT)
Dept: PHYSICAL THERAPY | Age: 68
Setting detail: THERAPIES SERIES
Discharge: HOME | End: 2018-01-25
Payer: MEDICARE

## 2018-01-25 NOTE — PROGRESS NOTES
[facility]   Outpatient Physical Therapy  Cancel/No Show Note    Date: 18    Patient Name: Tony Skinner        MRN: [id number]  Account: [de-identified] : 1950      General Information:  Chart Reviewed: Yes  Patient assessed for rehabilitation services?: Yes  Additional Pertinent Hx: multiple year history of back pain without surgical intervention, recent increase in symptoms prompted MD appointment. Patient with insidious onset (B) anterior and medial knee pain. XRAYs showed significant arthritic issues of knee joint (B), back XRAY with issues in lower thoracic spine with minimal issues in lumbar spine.    Referring Practitioner: Caren Rojas MD  Diagnosis: (B) knee pain M25.561, M25,562; low back pain without sciatica M54.5  Follows Commands: Within Functional Limits  PT Insurance Information: Medicare/Caid 2  Total # of Visits Approved: 12  Total # of Visits to Date: 2  Plan of Care/Certification Expiration Date: 18  No Show: 0  Canceled Appointment: 2  Progress Note Counter: 2/10        Comments: Per  pt cancelled due to not having pool clothes with her    Mayelin Crews, PTA

## 2018-02-16 ENCOUNTER — OFFICE VISIT (OUTPATIENT)
Dept: FAMILY MEDICINE CLINIC | Age: 68
End: 2018-02-16
Payer: MEDICARE

## 2018-02-16 VITALS
HEIGHT: 62 IN | SYSTOLIC BLOOD PRESSURE: 136 MMHG | DIASTOLIC BLOOD PRESSURE: 68 MMHG | HEART RATE: 65 BPM | BODY MASS INDEX: 45.23 KG/M2 | WEIGHT: 245.8 LBS

## 2018-02-16 DIAGNOSIS — E66.01 MORBID OBESITY WITH BMI OF 40.0-44.9, ADULT (HCC): ICD-10-CM

## 2018-02-16 DIAGNOSIS — E03.9 ACQUIRED HYPOTHYROIDISM: Primary | ICD-10-CM

## 2018-02-16 DIAGNOSIS — R13.12 OROPHARYNGEAL DYSPHAGIA: ICD-10-CM

## 2018-02-16 PROCEDURE — G8427 DOCREV CUR MEDS BY ELIG CLIN: HCPCS | Performed by: NURSE PRACTITIONER

## 2018-02-16 PROCEDURE — 99214 OFFICE O/P EST MOD 30 MIN: CPT | Performed by: NURSE PRACTITIONER

## 2018-02-16 PROCEDURE — 3014F SCREEN MAMMO DOC REV: CPT | Performed by: NURSE PRACTITIONER

## 2018-02-16 PROCEDURE — 4040F PNEUMOC VAC/ADMIN/RCVD: CPT | Performed by: NURSE PRACTITIONER

## 2018-02-16 PROCEDURE — 1090F PRES/ABSN URINE INCON ASSESS: CPT | Performed by: NURSE PRACTITIONER

## 2018-02-16 PROCEDURE — 1123F ACP DISCUSS/DSCN MKR DOCD: CPT | Performed by: NURSE PRACTITIONER

## 2018-02-16 PROCEDURE — G8484 FLU IMMUNIZE NO ADMIN: HCPCS | Performed by: NURSE PRACTITIONER

## 2018-02-16 PROCEDURE — 1036F TOBACCO NON-USER: CPT | Performed by: NURSE PRACTITIONER

## 2018-02-16 PROCEDURE — 3017F COLORECTAL CA SCREEN DOC REV: CPT | Performed by: NURSE PRACTITIONER

## 2018-02-16 PROCEDURE — G8399 PT W/DXA RESULTS DOCUMENT: HCPCS | Performed by: NURSE PRACTITIONER

## 2018-02-16 PROCEDURE — G8417 CALC BMI ABV UP PARAM F/U: HCPCS | Performed by: NURSE PRACTITIONER

## 2018-02-16 ASSESSMENT — ENCOUNTER SYMPTOMS
ABDOMINAL PAIN: 0
WHEEZING: 0
VOMITING: 0
TROUBLE SWALLOWING: 1
SHORTNESS OF BREATH: 0
NAUSEA: 0

## 2018-02-16 NOTE — PROGRESS NOTES
Visit Information    Have you changed or started any medications since your last visit including any over-the-counter medicines, vitamins, or herbal medicines? no   Have you stopped taking any of your medications? Is so, why? -  no  Are you having any side effects from any of your medications? - no    Have you seen any other physician or provider since your last visit? yes - eye md   Have you had any other diagnostic tests since your last visit?  no   Have you been seen in the emergency room and/or had an admission in a hospital since we last saw you?  no   Have you had your routine dental cleaning in the past 6 months?  no     Do you have an active MyChart account? If no, what is the barrier?   Yes    Patient Care Team:  Rogelio Gotti MD as PCP - General (Family Medicine)  Marya Farrell MD as Referring Physician (Orthopedic Surgery)  Jennifer Bernard MD as Surgeon (Orthopedic Surgery)  Alexis Reyes MD as Consulting Physician (Endocrinology)  Lety Mccollum DO as Consulting Physician (Obstetrics & Gynecology)  Candie Cid DO as Consulting Physician (General Surgery)  Dario Cherry MD as Consulting Physician (Urology)  Karl Lin RN as Nurse Navigator (Oncology)  El Blackwell MD as Obstetrician (Gynecologic Oncology)  Laurie Tran CNP as Nurse Practitioner (Certified Nurse Practitioner)    Medical History Review  Past Medical, Family, and Social History reviewed and does contribute to the patient presenting condition    Health Maintenance   Topic Date Due    Flu vaccine (1) 11/30/2018 (Originally 9/1/2017)    Pneumococcal low/med risk (2 of 2 - PPSV23) 02/23/2018    TSH testing  03/21/2018    Breast cancer screen  03/28/2018    Potassium monitoring  11/30/2018    Creatinine monitoring  11/30/2018    A1C test (Diabetic or Prediabetic)  12/28/2018    Colon cancer screen colonoscopy  06/26/2020    Lipid screen  03/21/2022    DTaP/Tdap/Td vaccine (2 - Td) 09/28/2027    Zostavax vaccine  Addressed    DEXA (modify frequency per FRAX score)  Completed    Hepatitis C screen  Completed

## 2018-02-16 NOTE — PROGRESS NOTES
Subjective:      Patient ID: Stacey Funez is a 79 y.o. female. HPI  79year old white female presents with dysphagia which is going on for 2 weeks. States she feels something got stuck in her throat and has difficulty swallowing. States she has hx of goiter and had thyroid surgery. Currently is on levothyroxine. Hx of hysterectomy secondary to uterine cancer. Denies recent fever chills sore throat or any appetite change or weight loss   States she is increasing her activity and weight has been stable. Review of Systems   Constitutional: Negative for appetite change, chills, fever and unexpected weight change. HENT: Positive for trouble swallowing. Respiratory: Negative for shortness of breath and wheezing. Cardiovascular: Negative for chest pain and leg swelling. Gastrointestinal: Negative for abdominal pain, nausea and vomiting. Neurological: Negative for dizziness, weakness and numbness. Psychiatric/Behavioral: Negative for dysphoric mood. Objective:   Physical Exam   Constitutional: She appears well-nourished. No distress. HENT:   Nose: Nose normal.   Mouth/Throat: Oropharynx is clear and moist.   Eyes: Conjunctivae are normal.   Neck: Neck supple. Cardiovascular: Normal rate, regular rhythm and normal heart sounds. Pulmonary/Chest: Effort normal and breath sounds normal. No respiratory distress. Abdominal: Soft. There is no tenderness. Musculoskeletal: Normal range of motion. Ambulates with a walker   Lymphadenopathy:     She has no cervical adenopathy. Neurological: She is alert. No cranial nerve deficit. Skin: Skin is warm and dry. Psychiatric: She has a normal mood and affect. Her behavior is normal.   Nursing note and vitals reviewed. Assessment:      1. Acquired hypothyroidism    2. Oropharyngeal dysphagia    3.  Morbid obesity with BMI of 40.0-44.9, adult ([facility] [address]            Plan:      BP Readings from Last 3 Encounters:   02/16/18 136/68   12/28/17 139/71 12/07/17 (!) 141/78     /68   Pulse 65   Ht 5' 2\" (1.575 m)   Wt 245 lb 12.8 oz (111.5 kg)   LMP  (LMP Unknown)   Breastfeeding? No   BMI 44.96 kg/m²   Lab Results   Component Value Date    WBC 8.8 11/30/2017    HGB 13.1 11/30/2017    HCT 39.3 11/30/2017     11/30/2017    CHOL 158 03/21/2017    TRIG 169 (H) 03/21/2017    HDL 46 03/21/2017    ALT 16 10/18/2017    AST 27 10/18/2017     11/30/2017    K 3.4 (L) 11/30/2017    CL 98 11/30/2017    CREATININE 0.81 11/30/2017    BUN 22 11/30/2017    CO2 23 11/30/2017    TSH 1.80 03/21/2017    INR 0.9 10/18/2017    LABA1C 5.3 12/28/2017    LABMICR 5 04/14/2015     Lab Results   Component Value Date    CALCIUM 9.5 11/30/2017     Lab Results   Component Value Date    LDLCHOLESTEROL 78 03/21/2017         1. Acquired hypothyroidism  - blood tests reprinted out for pain    2. Oropharyngeal dysphagia  - US Head Neck Soft Tissue Thyroid; Future    3. Morbid obesity with BMI of 40.0-44.9, adult ([facility] [address]  - - extensive discussion with pt/parent about his current diet and exercise habits, and personalized advice was provided regarding recommended lifestyle changes, diet and exercise. Provider spent 10 minutes counseling patient    Requested Prescriptions      No prescriptions requested or ordered in this encounter       There are no discontinued medications. Art Farris received counseling on the following healthy behaviors: nutrition, exercise and weight reduction  Reviewed prior labs and health maintenance. Continue current medications, diet and exercise. Discussed use, benefit, and side effects of prescribed medications. Barriers to medication compliance addressed. Patient given educational materials - see patient instructions. All patient questions answered. Patient voiced understanding.

## 2018-02-19 ENCOUNTER — TELEPHONE (OUTPATIENT)
Dept: FAMILY MEDICINE CLINIC | Age: 68
End: 2018-02-19

## 2018-02-20 ENCOUNTER — TELEPHONE (OUTPATIENT)
Dept: FAMILY MEDICINE CLINIC | Age: 68
End: 2018-02-20

## 2018-02-21 ENCOUNTER — HOSPITAL ENCOUNTER (OUTPATIENT)
Dept: ULTRASOUND IMAGING | Age: 68
Discharge: HOME | End: 2018-02-23
Payer: MEDICARE

## 2018-02-21 ENCOUNTER — HOSPITAL ENCOUNTER (OUTPATIENT)
Age: 68
Discharge: HOME | End: 2018-02-21
Payer: MEDICARE

## 2018-02-21 DIAGNOSIS — E03.9 ACQUIRED HYPOTHYROIDISM: ICD-10-CM

## 2018-02-21 DIAGNOSIS — I10 ESSENTIAL HYPERTENSION: ICD-10-CM

## 2018-02-21 DIAGNOSIS — R13.12 OROPHARYNGEAL DYSPHAGIA: ICD-10-CM

## 2018-02-21 LAB
ANION GAP SERPL CALCULATED.3IONS-SCNC: 14 MMOL/L (ref 9–17)
BUN BLDV-MCNC: 18 MG/DL (ref 8–23)
BUN/CREAT BLD: ABNORMAL (ref 9–20)
CALCIUM SERPL-MCNC: 9.3 MG/DL (ref 8.6–10.4)
CHLORIDE BLD-SCNC: 98 MMOL/L (ref 98–107)
CO2: 25 MMOL/L (ref 20–31)
CREAT SERPL-MCNC: 0.78 MG/DL (ref 0.5–0.9)
GFR AFRICAN AMERICAN: >60 ML/MIN
GFR NON-AFRICAN AMERICAN: >60 ML/MIN
GFR SERPL CREATININE-BSD FRML MDRD: ABNORMAL ML/MIN/{1.73_M2}
GFR SERPL CREATININE-BSD FRML MDRD: ABNORMAL ML/MIN/{1.73_M2}
GLUCOSE BLD-MCNC: 104 MG/DL (ref 70–99)
POTASSIUM SERPL-SCNC: 4.1 MMOL/L (ref 3.7–5.3)
SODIUM BLD-SCNC: 137 MMOL/L (ref 135–144)
TSH SERPL DL<=0.05 MIU/L-ACNC: 2.03 MIU/L (ref 0.3–5)

## 2018-02-21 PROCEDURE — 84443 ASSAY THYROID STIM HORMONE: CPT

## 2018-02-21 PROCEDURE — 80048 BASIC METABOLIC PNL TOTAL CA: CPT

## 2018-02-21 PROCEDURE — 76536 US EXAM OF HEAD AND NECK: CPT

## 2018-02-21 PROCEDURE — 36415 COLL VENOUS BLD VENIPUNCTURE: CPT

## 2018-02-22 ENCOUNTER — TELEPHONE (OUTPATIENT)
Dept: ADMINISTRATIVE | Age: 68
End: 2018-02-22

## 2018-02-22 NOTE — PROGRESS NOTES
PLEASE NOTIFY PATIENT. The ultrasound thyroid is within normal limits except for a very small cyst in the thyroid, 4 mm, nothing to worry about. If she has difficulty swallowing , I would suggest referral to ENT. Please let me know and I'll make the referral.  I reviewed the note from Mrs. Seymour Loja

## 2018-02-23 DIAGNOSIS — R13.11 ORAL PHASE DYSPHAGIA: Primary | ICD-10-CM

## 2018-02-27 ENCOUNTER — TELEPHONE (OUTPATIENT)
Dept: FAMILY MEDICINE CLINIC | Age: 68
End: 2018-02-27

## 2018-02-27 DIAGNOSIS — M54.50 CHRONIC BILATERAL LOW BACK PAIN WITHOUT SCIATICA: ICD-10-CM

## 2018-02-27 DIAGNOSIS — M25.562 CHRONIC PAIN OF BOTH KNEES: ICD-10-CM

## 2018-02-27 DIAGNOSIS — H26.8 OTHER CATARACT OF BOTH EYES: ICD-10-CM

## 2018-02-27 DIAGNOSIS — M25.561 CHRONIC PAIN OF BOTH KNEES: ICD-10-CM

## 2018-02-27 DIAGNOSIS — G89.29 CHRONIC BILATERAL LOW BACK PAIN WITHOUT SCIATICA: ICD-10-CM

## 2018-02-27 DIAGNOSIS — H47.20 OPTIC ATROPHY OF BOTH EYES: Primary | ICD-10-CM

## 2018-02-27 DIAGNOSIS — M17.0 PRIMARY OSTEOARTHRITIS OF BOTH KNEES: ICD-10-CM

## 2018-02-27 DIAGNOSIS — G89.29 CHRONIC PAIN OF BOTH KNEES: ICD-10-CM

## 2018-02-27 PROBLEM — H26.9 CATARACTA: Status: ACTIVE | Noted: 2018-02-27

## 2018-02-27 NOTE — TELEPHONE ENCOUNTER
Spoke directly with the nurse at [facility], they did confirm that she is NOT considered legally blind and they stated that they also informed the patient of that. She does have visual impairment of 20/60 and 20/50 but this is not considered legally blind.

## 2018-03-05 DIAGNOSIS — J30.9 ALLERGIC RHINITIS: ICD-10-CM

## 2018-03-05 RX ORDER — LORATADINE 10 MG/1
TABLET ORAL
Qty: 90 TABLET | Refills: 3 | Status: SHIPPED | OUTPATIENT
Start: 2018-03-05 | End: 2019-02-12 | Stop reason: SDUPTHER

## 2018-03-07 ENCOUNTER — TELEPHONE (OUTPATIENT)
Dept: ADMINISTRATIVE | Age: 68
End: 2018-03-07

## 2018-03-14 ENCOUNTER — TELEPHONE (OUTPATIENT)
Dept: FAMILY MEDICINE CLINIC | Age: 68
End: 2018-03-14
Payer: MEDICARE

## 2018-03-14 DIAGNOSIS — H26.9 CATARACT OF BOTH EYES, UNSPECIFIED CATARACT TYPE: ICD-10-CM

## 2018-03-14 DIAGNOSIS — R73.03 PREDIABETES: ICD-10-CM

## 2018-03-14 DIAGNOSIS — H47.20 OPTIC ATROPHY OF BOTH EYES: ICD-10-CM

## 2018-03-14 DIAGNOSIS — M17.0 PRIMARY OSTEOARTHRITIS OF BOTH KNEES: Primary | ICD-10-CM

## 2018-03-14 DIAGNOSIS — Z91.81 AT HIGH RISK FOR FALLS: ICD-10-CM

## 2018-03-14 DIAGNOSIS — I10 ESSENTIAL HYPERTENSION: ICD-10-CM

## 2018-03-14 DIAGNOSIS — M15.9 OSTEOARTHRITIS INVOLVING MULTIPLE JOINTS ON BOTH SIDES OF BODY: ICD-10-CM

## 2018-03-14 PROCEDURE — G0180 MD CERTIFICATION HHA PATIENT: HCPCS | Performed by: FAMILY MEDICINE

## 2018-03-29 DIAGNOSIS — Z29.9 PREVENTIVE MEASURE: Primary | ICD-10-CM

## 2018-03-29 RX ORDER — ASPIRIN 81 MG/1
TABLET ORAL
Qty: 90 TABLET | Refills: 3 | Status: SHIPPED | OUTPATIENT
Start: 2018-03-29 | End: 2019-02-12 | Stop reason: SDUPTHER

## 2018-04-05 ENCOUNTER — TELEPHONE (OUTPATIENT)
Dept: GYNECOLOGIC ONCOLOGY | Age: 68
End: 2018-04-05

## 2018-04-05 DIAGNOSIS — Z13.89 SCREENING FOR GENITOURINARY CONDITION: ICD-10-CM

## 2018-04-05 DIAGNOSIS — C54.1 ENDOMETRIAL CANCER (HCC): Primary | ICD-10-CM

## 2018-04-05 DIAGNOSIS — E27.8 MASS OF RIGHT ADRENAL GLAND (HCC): ICD-10-CM

## 2018-04-12 ENCOUNTER — HOSPITAL ENCOUNTER (OUTPATIENT)
Dept: CT IMAGING | Age: 68
Discharge: HOME | End: 2018-04-14
Payer: MEDICARE

## 2018-04-12 ENCOUNTER — HOSPITAL ENCOUNTER (OUTPATIENT)
Age: 68
Discharge: HOME | End: 2018-04-12
Payer: MEDICARE

## 2018-04-12 DIAGNOSIS — E78.5 HYPERLIPIDEMIA WITH TARGET LDL LESS THAN 100: ICD-10-CM

## 2018-04-12 DIAGNOSIS — C54.1 ENDOMETRIAL CANCER (HCC): ICD-10-CM

## 2018-04-12 DIAGNOSIS — E27.8 MASS OF RIGHT ADRENAL GLAND (HCC): ICD-10-CM

## 2018-04-12 DIAGNOSIS — Z13.89 SCREENING FOR GENITOURINARY CONDITION: ICD-10-CM

## 2018-04-12 LAB
BUN BLDV-MCNC: 18 MG/DL (ref 8–23)
CREAT SERPL-MCNC: 0.76 MG/DL (ref 0.5–0.9)
GFR AFRICAN AMERICAN: >60 ML/MIN
GFR NON-AFRICAN AMERICAN: >60 ML/MIN
GFR SERPL CREATININE-BSD FRML MDRD: NORMAL ML/MIN/{1.73_M2}
GFR SERPL CREATININE-BSD FRML MDRD: NORMAL ML/MIN/{1.73_M2}

## 2018-04-12 PROCEDURE — 6360000004 HC RX CONTRAST MEDICATION: Performed by: NURSE PRACTITIONER

## 2018-04-12 PROCEDURE — 82565 ASSAY OF CREATININE: CPT

## 2018-04-12 PROCEDURE — 84520 ASSAY OF UREA NITROGEN: CPT

## 2018-04-12 PROCEDURE — 74177 CT ABD & PELVIS W/CONTRAST: CPT

## 2018-04-12 PROCEDURE — 71260 CT THORAX DX C+: CPT

## 2018-04-12 RX ADMIN — IOHEXOL 50 ML: 240 INJECTION, SOLUTION INTRATHECAL; INTRAVASCULAR; INTRAVENOUS; ORAL at 12:56

## 2018-04-12 RX ADMIN — IOPAMIDOL 75 ML: 755 INJECTION, SOLUTION INTRAVENOUS at 12:57

## 2018-04-13 RX ORDER — SIMVASTATIN 40 MG
TABLET ORAL
Qty: 90 TABLET | Refills: 1 | Status: SHIPPED | OUTPATIENT
Start: 2018-04-13 | End: 2018-04-20 | Stop reason: ALTCHOICE

## 2018-04-20 ENCOUNTER — OFFICE VISIT (OUTPATIENT)
Dept: FAMILY MEDICINE CLINIC | Age: 68
End: 2018-04-20
Payer: MEDICARE

## 2018-04-20 VITALS
TEMPERATURE: 97.7 F | HEART RATE: 69 BPM | HEIGHT: 62 IN | SYSTOLIC BLOOD PRESSURE: 139 MMHG | BODY MASS INDEX: 46.89 KG/M2 | DIASTOLIC BLOOD PRESSURE: 81 MMHG | WEIGHT: 254.8 LBS | OXYGEN SATURATION: 97 %

## 2018-04-20 DIAGNOSIS — G89.29 CHRONIC BILATERAL LOW BACK PAIN WITHOUT SCIATICA: ICD-10-CM

## 2018-04-20 DIAGNOSIS — Z90.710 S/P TOTAL HYSTERECTOMY AND BSO (BILATERAL SALPINGO-OOPHORECTOMY): ICD-10-CM

## 2018-04-20 DIAGNOSIS — Z91.81 AT HIGH RISK FOR FALLS: ICD-10-CM

## 2018-04-20 DIAGNOSIS — Z12.31 ENCOUNTER FOR SCREENING MAMMOGRAM FOR BREAST CANCER: ICD-10-CM

## 2018-04-20 DIAGNOSIS — M54.50 CHRONIC BILATERAL LOW BACK PAIN WITHOUT SCIATICA: ICD-10-CM

## 2018-04-20 DIAGNOSIS — M17.0 PRIMARY OSTEOARTHRITIS OF BOTH KNEES: ICD-10-CM

## 2018-04-20 DIAGNOSIS — R73.9 HYPERGLYCEMIA: ICD-10-CM

## 2018-04-20 DIAGNOSIS — E66.01 MORBID OBESITY WITH BMI OF 45.0-49.9, ADULT (HCC): ICD-10-CM

## 2018-04-20 DIAGNOSIS — Z90.79 S/P TOTAL HYSTERECTOMY AND BSO (BILATERAL SALPINGO-OOPHORECTOMY): ICD-10-CM

## 2018-04-20 DIAGNOSIS — I10 ESSENTIAL HYPERTENSION: Primary | ICD-10-CM

## 2018-04-20 DIAGNOSIS — C54.1 ADENOCARCINOMA OF ENDOMETRIUM, STAGE 1 (HCC): ICD-10-CM

## 2018-04-20 DIAGNOSIS — Z23 NEED FOR PROPHYLACTIC VACCINATION AND INOCULATION AGAINST VARICELLA: ICD-10-CM

## 2018-04-20 DIAGNOSIS — Z90.722 S/P TOTAL HYSTERECTOMY AND BSO (BILATERAL SALPINGO-OOPHORECTOMY): ICD-10-CM

## 2018-04-20 DIAGNOSIS — Z23 NEED FOR PROPHYLACTIC VACCINATION AGAINST STREPTOCOCCUS PNEUMONIAE (PNEUMOCOCCUS): ICD-10-CM

## 2018-04-20 DIAGNOSIS — R26.2 DIFFICULTY WALKING: ICD-10-CM

## 2018-04-20 DIAGNOSIS — E78.5 HYPERLIPIDEMIA WITH TARGET LDL LESS THAN 100: ICD-10-CM

## 2018-04-20 LAB — HBA1C MFR BLD: 5.8 %

## 2018-04-20 PROCEDURE — 3017F COLORECTAL CA SCREEN DOC REV: CPT | Performed by: FAMILY MEDICINE

## 2018-04-20 PROCEDURE — G8427 DOCREV CUR MEDS BY ELIG CLIN: HCPCS | Performed by: FAMILY MEDICINE

## 2018-04-20 PROCEDURE — 1090F PRES/ABSN URINE INCON ASSESS: CPT | Performed by: FAMILY MEDICINE

## 2018-04-20 PROCEDURE — G8399 PT W/DXA RESULTS DOCUMENT: HCPCS | Performed by: FAMILY MEDICINE

## 2018-04-20 PROCEDURE — 99214 OFFICE O/P EST MOD 30 MIN: CPT | Performed by: FAMILY MEDICINE

## 2018-04-20 PROCEDURE — 83036 HEMOGLOBIN GLYCOSYLATED A1C: CPT | Performed by: FAMILY MEDICINE

## 2018-04-20 PROCEDURE — 90732 PPSV23 VACC 2 YRS+ SUBQ/IM: CPT | Performed by: FAMILY MEDICINE

## 2018-04-20 PROCEDURE — G8417 CALC BMI ABV UP PARAM F/U: HCPCS | Performed by: FAMILY MEDICINE

## 2018-04-20 PROCEDURE — 1123F ACP DISCUSS/DSCN MKR DOCD: CPT | Performed by: FAMILY MEDICINE

## 2018-04-20 PROCEDURE — 1036F TOBACCO NON-USER: CPT | Performed by: FAMILY MEDICINE

## 2018-04-20 PROCEDURE — 4040F PNEUMOC VAC/ADMIN/RCVD: CPT | Performed by: FAMILY MEDICINE

## 2018-04-20 PROCEDURE — G0009 ADMIN PNEUMOCOCCAL VACCINE: HCPCS | Performed by: FAMILY MEDICINE

## 2018-04-20 RX ORDER — ATORVASTATIN CALCIUM 40 MG/1
40 TABLET, FILM COATED ORAL DAILY
Qty: 90 TABLET | Refills: 3 | Status: SHIPPED | OUTPATIENT
Start: 2018-04-20 | End: 2018-07-31 | Stop reason: SDUPTHER

## 2018-04-20 ASSESSMENT — ENCOUNTER SYMPTOMS
NAUSEA: 0
CHEST TIGHTNESS: 0
DIARRHEA: 0
ABDOMINAL PAIN: 0
WHEEZING: 0
ABDOMINAL DISTENTION: 0
CONSTIPATION: 0
SHORTNESS OF BREATH: 1
COUGH: 0
VOMITING: 0
BACK PAIN: 1

## 2018-04-20 ASSESSMENT — PATIENT HEALTH QUESTIONNAIRE - PHQ9
SUM OF ALL RESPONSES TO PHQ9 QUESTIONS 1 & 2: 0
2. FEELING DOWN, DEPRESSED OR HOPELESS: 0
SUM OF ALL RESPONSES TO PHQ QUESTIONS 1-9: 0
1. LITTLE INTEREST OR PLEASURE IN DOING THINGS: 0

## 2018-04-23 ENCOUNTER — OFFICE VISIT (OUTPATIENT)
Dept: GYNECOLOGIC ONCOLOGY | Age: 68
End: 2018-04-23
Payer: MEDICARE

## 2018-04-23 VITALS
OXYGEN SATURATION: 95 % | HEART RATE: 69 BPM | HEIGHT: 62 IN | TEMPERATURE: 98.7 F | DIASTOLIC BLOOD PRESSURE: 74 MMHG | SYSTOLIC BLOOD PRESSURE: 138 MMHG | WEIGHT: 250.8 LBS | BODY MASS INDEX: 46.15 KG/M2

## 2018-04-23 DIAGNOSIS — Z90.722 STATUS POST TOTAL HYSTERECTOMY AND BILATERAL SALPINGO-OOPHORECTOMY: ICD-10-CM

## 2018-04-23 DIAGNOSIS — Z85.42 PERSONAL HISTORY OF MALIGNANT NEOPLASM OF UTERUS: ICD-10-CM

## 2018-04-23 DIAGNOSIS — Z08 ENCOUNTER FOR FOLLOW-UP SURVEILLANCE OF ENDOMETRIAL CANCER: Primary | ICD-10-CM

## 2018-04-23 DIAGNOSIS — Z90.710 STATUS POST TOTAL HYSTERECTOMY AND BILATERAL SALPINGO-OOPHORECTOMY: ICD-10-CM

## 2018-04-23 DIAGNOSIS — Z85.42 ENCOUNTER FOR FOLLOW-UP SURVEILLANCE OF ENDOMETRIAL CANCER: Primary | ICD-10-CM

## 2018-04-23 DIAGNOSIS — Z90.79 STATUS POST TOTAL HYSTERECTOMY AND BILATERAL SALPINGO-OOPHORECTOMY: ICD-10-CM

## 2018-04-23 PROCEDURE — G8427 DOCREV CUR MEDS BY ELIG CLIN: HCPCS | Performed by: NURSE PRACTITIONER

## 2018-04-23 PROCEDURE — 3017F COLORECTAL CA SCREEN DOC REV: CPT | Performed by: NURSE PRACTITIONER

## 2018-04-23 PROCEDURE — 1123F ACP DISCUSS/DSCN MKR DOCD: CPT | Performed by: NURSE PRACTITIONER

## 2018-04-23 PROCEDURE — 1090F PRES/ABSN URINE INCON ASSESS: CPT | Performed by: NURSE PRACTITIONER

## 2018-04-23 PROCEDURE — 99213 OFFICE O/P EST LOW 20 MIN: CPT | Performed by: NURSE PRACTITIONER

## 2018-04-23 PROCEDURE — 4040F PNEUMOC VAC/ADMIN/RCVD: CPT | Performed by: NURSE PRACTITIONER

## 2018-04-23 PROCEDURE — G8399 PT W/DXA RESULTS DOCUMENT: HCPCS | Performed by: NURSE PRACTITIONER

## 2018-04-23 PROCEDURE — G8417 CALC BMI ABV UP PARAM F/U: HCPCS | Performed by: NURSE PRACTITIONER

## 2018-04-23 PROCEDURE — 1036F TOBACCO NON-USER: CPT | Performed by: NURSE PRACTITIONER

## 2018-04-23 ASSESSMENT — ENCOUNTER SYMPTOMS
ABDOMINAL DISTENTION: 0
SHORTNESS OF BREATH: 0
TROUBLE SWALLOWING: 0
ANAL BLEEDING: 0
COUGH: 0
BLOOD IN STOOL: 0
ABDOMINAL PAIN: 0
CONSTIPATION: 0

## 2018-04-24 ENCOUNTER — TELEPHONE (OUTPATIENT)
Dept: FAMILY MEDICINE CLINIC | Age: 68
End: 2018-04-24
Payer: MEDICARE

## 2018-04-24 DIAGNOSIS — M25.561 CHRONIC PAIN OF BOTH KNEES: ICD-10-CM

## 2018-04-24 DIAGNOSIS — E55.9 VITAMIN D DEFICIENCY: ICD-10-CM

## 2018-04-24 DIAGNOSIS — M54.50 CHRONIC BILATERAL LOW BACK PAIN WITHOUT SCIATICA: ICD-10-CM

## 2018-04-24 DIAGNOSIS — M17.0 PRIMARY OSTEOARTHRITIS OF BOTH KNEES: Primary | ICD-10-CM

## 2018-04-24 DIAGNOSIS — H47.20 OPTIC ATROPHY OF BOTH EYES: ICD-10-CM

## 2018-04-24 DIAGNOSIS — C54.1 ADENOCARCINOMA OF ENDOMETRIUM, STAGE 1 (HCC): ICD-10-CM

## 2018-04-24 DIAGNOSIS — M15.9 OSTEOARTHRITIS INVOLVING MULTIPLE JOINTS ON BOTH SIDES OF BODY: ICD-10-CM

## 2018-04-24 DIAGNOSIS — G89.29 CHRONIC BILATERAL LOW BACK PAIN WITHOUT SCIATICA: ICD-10-CM

## 2018-04-24 DIAGNOSIS — G89.29 CHRONIC PAIN OF BOTH KNEES: ICD-10-CM

## 2018-04-24 DIAGNOSIS — N39.46 MIXED INCONTINENCE: ICD-10-CM

## 2018-04-24 DIAGNOSIS — E66.01 MORBID OBESITY WITH BMI OF 45.0-49.9, ADULT (HCC): ICD-10-CM

## 2018-04-24 DIAGNOSIS — N32.81 OAB (OVERACTIVE BLADDER): ICD-10-CM

## 2018-04-24 DIAGNOSIS — R73.03 PREDIABETES: ICD-10-CM

## 2018-04-24 DIAGNOSIS — Z91.81 AT HIGH RISK FOR FALLS: ICD-10-CM

## 2018-04-24 DIAGNOSIS — H26.9 CATARACT OF BOTH EYES, UNSPECIFIED CATARACT TYPE: ICD-10-CM

## 2018-04-24 DIAGNOSIS — M25.562 CHRONIC PAIN OF BOTH KNEES: ICD-10-CM

## 2018-04-24 DIAGNOSIS — E78.5 HYPERLIPIDEMIA WITH TARGET LDL LESS THAN 100: ICD-10-CM

## 2018-04-24 PROBLEM — B37.49 CANDIDIASIS OF PERINEUM: Status: RESOLVED | Noted: 2017-06-10 | Resolved: 2018-04-24

## 2018-04-24 PROCEDURE — G0179 MD RECERTIFICATION HHA PT: HCPCS | Performed by: FAMILY MEDICINE

## 2018-04-28 ENCOUNTER — TELEPHONE (OUTPATIENT)
Dept: FAMILY MEDICINE CLINIC | Age: 68
End: 2018-04-28

## 2018-04-28 PROBLEM — R26.2 DIFFICULTY WALKING: Status: ACTIVE | Noted: 2018-04-28

## 2018-05-01 ENCOUNTER — APPOINTMENT (OUTPATIENT)
Dept: PHYSICAL THERAPY | Age: 68
End: 2018-05-01
Payer: MEDICARE

## 2018-05-03 ENCOUNTER — HOSPITAL ENCOUNTER (OUTPATIENT)
Dept: WOMENS IMAGING | Age: 68
Discharge: HOME | End: 2018-05-05
Payer: MEDICARE

## 2018-05-03 DIAGNOSIS — Z12.31 ENCOUNTER FOR SCREENING MAMMOGRAM FOR BREAST CANCER: ICD-10-CM

## 2018-05-03 PROCEDURE — 77063 BREAST TOMOSYNTHESIS BI: CPT

## 2018-05-08 ENCOUNTER — HOSPITAL ENCOUNTER (OUTPATIENT)
Dept: PHYSICAL THERAPY | Age: 68
Setting detail: THERAPIES SERIES
Discharge: HOME | End: 2018-05-08
Payer: MEDICARE

## 2018-05-08 PROCEDURE — G8979 MOBILITY GOAL STATUS: HCPCS

## 2018-05-08 PROCEDURE — G8978 MOBILITY CURRENT STATUS: HCPCS

## 2018-05-08 PROCEDURE — 97110 THERAPEUTIC EXERCISES: CPT

## 2018-05-08 PROCEDURE — 97162 PT EVAL MOD COMPLEX 30 MIN: CPT

## 2018-05-22 ENCOUNTER — HOSPITAL ENCOUNTER (OUTPATIENT)
Dept: PHYSICAL THERAPY | Age: 68
Setting detail: THERAPIES SERIES
Discharge: HOME | End: 2018-05-22
Payer: MEDICARE

## 2018-05-22 PROCEDURE — 97113 AQUATIC THERAPY/EXERCISES: CPT

## 2018-05-24 ENCOUNTER — HOSPITAL ENCOUNTER (OUTPATIENT)
Dept: PHYSICAL THERAPY | Age: 68
Setting detail: THERAPIES SERIES
Discharge: HOME | End: 2018-05-24
Payer: MEDICARE

## 2018-05-24 PROCEDURE — 97113 AQUATIC THERAPY/EXERCISES: CPT

## 2018-05-29 ENCOUNTER — APPOINTMENT (OUTPATIENT)
Dept: PHYSICAL THERAPY | Age: 68
End: 2018-05-29
Payer: MEDICARE

## 2018-05-31 ENCOUNTER — HOSPITAL ENCOUNTER (OUTPATIENT)
Dept: PHYSICAL THERAPY | Age: 68
Setting detail: THERAPIES SERIES
Discharge: HOME | End: 2018-05-31
Payer: MEDICARE

## 2018-05-31 PROCEDURE — 97113 AQUATIC THERAPY/EXERCISES: CPT

## 2018-06-04 ENCOUNTER — OFFICE VISIT (OUTPATIENT)
Dept: UROLOGY | Age: 68
End: 2018-06-04
Payer: MEDICARE

## 2018-06-04 VITALS
WEIGHT: 250.4 LBS | DIASTOLIC BLOOD PRESSURE: 75 MMHG | HEIGHT: 62 IN | BODY MASS INDEX: 46.08 KG/M2 | HEART RATE: 60 BPM | TEMPERATURE: 97.9 F | SYSTOLIC BLOOD PRESSURE: 141 MMHG

## 2018-06-04 DIAGNOSIS — R35.1 NOCTURIA: ICD-10-CM

## 2018-06-04 DIAGNOSIS — R39.15 URGENCY OF URINATION: ICD-10-CM

## 2018-06-04 DIAGNOSIS — N39.46 MIXED INCONTINENCE: Primary | ICD-10-CM

## 2018-06-04 DIAGNOSIS — R35.0 FREQUENCY OF URINATION: ICD-10-CM

## 2018-06-04 PROCEDURE — 3017F COLORECTAL CA SCREEN DOC REV: CPT | Performed by: UROLOGY

## 2018-06-04 PROCEDURE — 1123F ACP DISCUSS/DSCN MKR DOCD: CPT | Performed by: UROLOGY

## 2018-06-04 PROCEDURE — G8427 DOCREV CUR MEDS BY ELIG CLIN: HCPCS | Performed by: UROLOGY

## 2018-06-04 PROCEDURE — 99214 OFFICE O/P EST MOD 30 MIN: CPT | Performed by: UROLOGY

## 2018-06-04 PROCEDURE — 4040F PNEUMOC VAC/ADMIN/RCVD: CPT | Performed by: UROLOGY

## 2018-06-04 PROCEDURE — 1036F TOBACCO NON-USER: CPT | Performed by: UROLOGY

## 2018-06-04 PROCEDURE — 0509F URINE INCON PLAN DOCD: CPT | Performed by: UROLOGY

## 2018-06-04 PROCEDURE — 1090F PRES/ABSN URINE INCON ASSESS: CPT | Performed by: UROLOGY

## 2018-06-04 PROCEDURE — G8417 CALC BMI ABV UP PARAM F/U: HCPCS | Performed by: UROLOGY

## 2018-06-04 PROCEDURE — G8399 PT W/DXA RESULTS DOCUMENT: HCPCS | Performed by: UROLOGY

## 2018-06-04 ASSESSMENT — ENCOUNTER SYMPTOMS
BACK PAIN: 0
COUGH: 0
SHORTNESS OF BREATH: 0
EYE PAIN: 0
EYE REDNESS: 0
WHEEZING: 0
COLOR CHANGE: 0

## 2018-06-05 ENCOUNTER — TELEPHONE (OUTPATIENT)
Dept: ADMINISTRATIVE | Age: 68
End: 2018-06-05

## 2018-06-05 ENCOUNTER — HOSPITAL ENCOUNTER (OUTPATIENT)
Dept: PHYSICAL THERAPY | Age: 68
Setting detail: THERAPIES SERIES
Discharge: HOME | End: 2018-06-05
Payer: MEDICARE

## 2018-06-05 PROCEDURE — 97113 AQUATIC THERAPY/EXERCISES: CPT

## 2018-06-07 ENCOUNTER — HOSPITAL ENCOUNTER (OUTPATIENT)
Dept: PHYSICAL THERAPY | Age: 68
Setting detail: THERAPIES SERIES
Discharge: HOME | End: 2018-06-07
Payer: MEDICARE

## 2018-06-07 PROCEDURE — G8978 MOBILITY CURRENT STATUS: HCPCS

## 2018-06-07 PROCEDURE — 97110 THERAPEUTIC EXERCISES: CPT

## 2018-06-07 PROCEDURE — 97113 AQUATIC THERAPY/EXERCISES: CPT

## 2018-06-07 PROCEDURE — G8979 MOBILITY GOAL STATUS: HCPCS

## 2018-06-08 ENCOUNTER — HOSPITAL ENCOUNTER (OUTPATIENT)
Dept: GENERAL RADIOLOGY | Age: 68
Discharge: HOME | End: 2018-06-10
Payer: MEDICARE

## 2018-06-08 DIAGNOSIS — R13.12 DYSPHAGIA, OROPHARYNGEAL PHASE: ICD-10-CM

## 2018-06-08 PROCEDURE — 74220 X-RAY XM ESOPHAGUS 1CNTRST: CPT

## 2018-06-08 PROCEDURE — 2500000003 HC RX 250 WO HCPCS: Performed by: OTOLARYNGOLOGY

## 2018-06-08 RX ADMIN — BARIUM SULFATE 160 ML: 960 POWDER, FOR SUSPENSION ORAL at 09:53

## 2018-06-08 RX ADMIN — BARIUM SULFATE 140 ML: 980 POWDER, FOR SUSPENSION ORAL at 09:52

## 2018-06-12 ENCOUNTER — APPOINTMENT (OUTPATIENT)
Dept: PHYSICAL THERAPY | Age: 68
End: 2018-06-12
Payer: MEDICARE

## 2018-06-14 ENCOUNTER — HOSPITAL ENCOUNTER (OUTPATIENT)
Dept: PHYSICAL THERAPY | Age: 68
Setting detail: THERAPIES SERIES
Discharge: HOME | End: 2018-06-14
Payer: MEDICARE

## 2018-06-14 PROCEDURE — 97113 AQUATIC THERAPY/EXERCISES: CPT

## 2018-06-15 ENCOUNTER — TELEPHONE (OUTPATIENT)
Dept: ONCOLOGY | Age: 68
End: 2018-06-15

## 2018-06-19 ENCOUNTER — HOSPITAL ENCOUNTER (OUTPATIENT)
Dept: PHYSICAL THERAPY | Age: 68
Setting detail: THERAPIES SERIES
Discharge: HOME | End: 2018-06-19
Payer: MEDICARE

## 2018-06-21 ENCOUNTER — HOSPITAL ENCOUNTER (OUTPATIENT)
Dept: PHYSICAL THERAPY | Age: 68
Setting detail: THERAPIES SERIES
Discharge: HOME | End: 2018-06-21
Payer: MEDICARE

## 2018-06-21 PROCEDURE — 97113 AQUATIC THERAPY/EXERCISES: CPT

## 2018-06-26 ENCOUNTER — HOSPITAL ENCOUNTER (OUTPATIENT)
Dept: PHYSICAL THERAPY | Age: 68
Setting detail: THERAPIES SERIES
Discharge: HOME | End: 2018-06-26
Payer: MEDICARE

## 2018-06-26 PROCEDURE — 97113 AQUATIC THERAPY/EXERCISES: CPT

## 2018-06-28 ENCOUNTER — HOSPITAL ENCOUNTER (OUTPATIENT)
Dept: PHYSICAL THERAPY | Age: 68
Setting detail: THERAPIES SERIES
Discharge: HOME | End: 2018-06-28
Payer: MEDICARE

## 2018-06-28 PROCEDURE — 97113 AQUATIC THERAPY/EXERCISES: CPT

## 2018-07-03 ENCOUNTER — HOSPITAL ENCOUNTER (OUTPATIENT)
Dept: PHYSICAL THERAPY | Age: 68
Setting detail: THERAPIES SERIES
Discharge: HOME | End: 2018-07-03
Payer: MEDICARE

## 2018-07-03 PROCEDURE — 97113 AQUATIC THERAPY/EXERCISES: CPT

## 2018-07-03 PROCEDURE — 97110 THERAPEUTIC EXERCISES: CPT

## 2018-07-03 PROCEDURE — G8979 MOBILITY GOAL STATUS: HCPCS

## 2018-07-03 PROCEDURE — G8978 MOBILITY CURRENT STATUS: HCPCS

## 2018-07-03 NOTE — PROGRESS NOTES
Physical Therapy        [facility]   [address]   Phone: [phone]   Fax: [phone]    Physical Therapy Daily Treatment and Progress Note      Date:  7/3/2018  Patient Name:  Mary Ford    :  1950  MRN: [id number]  Physician: Melva Horta MD                               Insurance: Medicare              Eligibility Status:  Eligible        Secondary Insurance (if applicable) [place] Medicaid               Eligibility Status: Eligible        DOS: 18  Medical Diagnosis: OA bilateral Knees, chronic LBP, Fall risk, Difficulty walking                Rehab Codes: M54.5, M62.81, M25.561, M25.562, M25.461, M25.462, R26.2  Onset Date:     18                              Next 's appt: 18        MEDICARE CAP VISIT MAINTAINED IN NAVIGATOR      Visit# / total visits: -  Cancels/No Shows: 0/0    Subjective:  Patient states she is progressing with PT. Feels stronger with better tolerance for daily activity. States she is ready to transition to land based PT. Would like to do one pool and one land.   Pain:  [] Yes  [x] No Location:  N/A Pain Rating: (0-10 scale) 0/10  Pain altered Tx:  [] No  [] Yes  Action:  Comments:    Objective:        TINETTI BALANCE ASSESSMENT TOOL    Patient name:  Mary Ford     Date:  7/3/2018  Completed by:  Tico Chan    BALANCE SECTION    Patient is seated in hard, armless chair;  Sitting Balance     Score: [] 0 [x] 1  Leans or slides in chair = 0     Steady, safe = 1    Rises from chair     Score: [] 0 [x] 1 [] 2  Unable to without help = 0  Able, uses arms to help = 1  Able without use of arms = 2    Attempts to rise     Score: [] 0 [] 1 [x] 2  Unable to without help = 0  Able, requires > 1 attempt = 1  Able to rise, 1 attempt = 2    Immediate standing Balance (first 5 seconds) Score: [] 0 [x] 1 [] 2  Unsteady (staggers, moves feet, trunk sway) = 0  Steady but uses walker or other support = 1  Steady without walker or other support = 2    Standing balance     Score: [] 0 [x] 1 [] 2  Unsteady = 0  Steady but wide stance and uses support = 1  Narrow stance without support = 2    Nudged (sternal nudge)    Score: [] 0 [] 1 [x]   2  Begins to fall = 0  Staggers, grabs, catches self = 1  Steady = 2    Eyes closed      Score: [] 0 [x] 1  Unsteady = 0  Steady = 1    Turning 360 degrees    Score: [] 0 [x] 1  (A)  (A) Discontinuous steps = 0      (A) Continuous = 1  (B) Unsteady= 0     Score: [] 0 [x] 1  (B)  (B) Steady = 1    Sitting down     Score: [] 0 [x] 1 [] 2  Unsafe (misjudged distance, falls into chair) = 0  Uses arms or not a smooth motion = 1  Safe, smooth motion = 2    Balance Total Score    Score:    12 /16            GAIT SECTION    Patient stands with therapist, walks across room (+/- aids), first at usual pace, then at rapid pace. Indication of gait (Immediately after told to [facility] Score: [] 0 [x] 1  Any hesitancy or multiple attempts = 0  No hesitancy = 1    Step length and height    Score: [] 0 [x] L    [x] R  Step to = 0  Step through R = 1  Step through L = 1    Foot clearance     Score: [] 0 [x] L     [x] R  Foot drop = 0  L foot clears floor = 1  R foot clears floor = 1    Step symmetry     Score: [] 0 [x] 1  Right and left step length not equal = 0  Right and left step length appear equal = 1    Step continuity     Score: [] 0 [x] 1  Stopping or discontinuity between steps = 0  Steps appear continuous = 1    Path (Excursion)     Score: [] 0 [x] 1 [] 2  Marked deviation = 0  Mild/moderate deviation or uses w. aid = 1  Straight without w. aid = 2    Trunk       Score: [] 0 [x] 1 [] 2  Marked sway or uses w. aid = 0  No sway but flex. knees or back or  uses arms for stability = 1  No sway, flex. , use of arms or w. aid = 2    Walking time     Score: [x] 0 [] 1  Heels apart = 0  Heels almost touching while walking = 1    Gait Total Score     Score:     9 /12              TOTAL SCORE = BALANCE + GAIT  Score:    21 /28         Risk Indicators:   Score 18 or less = High risk of falls  Score 19-23 = Moderate risk of falls  Score 24 or more = Low risk of falls    Debbie AVERY, Isra Ayers, Rashaun R, Fall Risk Index for elderly patients based on number of chronic disabilities. Am J Med [id number]    Lumbar ROM:   ROM grossly 80% normal in all planes x extension 30 degrees. No pain reported. Strength:  Gross strength 5/5  Modalities:   Exercises:  Exercise Reps/ Time Weight/ Level Comments   Seated abd/add 10   Pillow and yellow tband   Seated hip flexion 10       TKE 10                           Other: Reassessment of progress toward goals in objective      Specific Instructions for next treatment:  Patient to transition to land PT and aqua to establish land based HEP and progress as tolerated. Treatment Charges: Mins Units   []  Modalities     [x]  Ther Exercise 25 2   []  Manual Therapy     []  Ther Activities     []  Aquatics     []  Neuromuscular     []  Other     Total Treatment time 25 2       Assessment: [x] Progressing toward goals. [] No change. [] Other:    STG/LTG  STG: (to be met in 10 treatments)  1. ? Pain:  Back and knee pain 4/10. Goal met  2. ? ROM:  Full ROM left and right knee, Lumbar ROM 60% normal with decreased pain. Goal met  3. ? Strength:  Bilateral LE and core 5- Goal met  4. ? Function:  LEFS 30%. Goal not met  5. Independent with Home Exercise Programs  6. Demonstrate Knowledge of fall prevention  LTG: (to be met in 18-20 treatments)  1. Back and knee pain 0-3/10. Ongoing  2. LEFS 15% or less. Ongoing  3. Tinetti 24/28. Ongoing     Patient goals:  Walk and move better        G-CODE     Functional Limitation: Mobility: walking and moving around  Functional Assessment Used: LEFS - 45%  Current Status Modifier: CK  Goal Status Modifier: CI  Discharge Status Modifier:        Pt.  Education:  [x] Yes  [] No  [] Reviewed Prior HEP/Ed  Method of

## 2018-07-10 ENCOUNTER — HOSPITAL ENCOUNTER (OUTPATIENT)
Dept: PHYSICAL THERAPY | Age: 68
Setting detail: THERAPIES SERIES
Discharge: HOME | End: 2018-07-10
Payer: MEDICARE

## 2018-07-10 PROCEDURE — 97110 THERAPEUTIC EXERCISES: CPT

## 2018-07-10 NOTE — FLOWSHEET NOTE
Aquatics     []  Neuromuscular     []  Other     Total Treatment time 40 3       Assessment: [x] Progressing toward goals. Patient demonstrates decreased endurance with therex. Also LOB with foam standing. Needs to continue to develop strength and balance in safe environment. Needs reinforcement with HEP. Back and knee pain post tx:  0/10    [] No change. [] Other:    STG/LTG  STG: (to be met in 10 treatments)  1. ? Pain:  Back and knee pain 4/10. Goal met  2. ? ROM:  Full ROM left and right knee, Lumbar ROM 60% normal with decreased pain. Goal met  3. ? Strength:  Bilateral LE and core 5- Goal met  4. ? Function:  LEFS 30%. Goal not met  5. Independent with Home Exercise Programs  6. Demonstrate Knowledge of fall prevention  LTG: (to be met in 18-20 treatments)  1. Back and knee pain 0-3/10. Ongoing  2. LEFS 15% or less. Ongoing  3. Tinetti 24/28. Ongoing     Patient goals:  Walk and move better        G-CODE     Functional Limitation: Mobility: walking and moving around  Functional Assessment Used: LEFS - 45%  Current Status Modifier: CK  Goal Status Modifier: CI  Discharge Status Modifier:        Pt. Education:  [x] Yes  [] No  [] Reviewed Prior HEP/Ed  Method of Education: [x] Verbal  [x] Demo  [x] Written  Comprehension of Education:  [x] Verbalizes understanding. [x] Demonstrates understanding. [x] Needs review. [] Demonstrates/verbalizes HEP/Ed previously given. Plan: [x] Continue per plan of care.    [] Other:      Time In:1115            Time Out: 1155    Electronically signed by:  Kvng Munoz PT

## 2018-07-12 ENCOUNTER — HOSPITAL ENCOUNTER (OUTPATIENT)
Dept: PHYSICAL THERAPY | Age: 68
Setting detail: THERAPIES SERIES
Discharge: HOME | End: 2018-07-12
Payer: MEDICARE

## 2018-07-12 PROCEDURE — 97113 AQUATIC THERAPY/EXERCISES: CPT

## 2018-07-12 NOTE — PROGRESS NOTES
Cool Down  2 Laps 2 Laps    Pain Rating  0 0        Specific Instructions for next treatment: Progress WB with LE exercise to challenge balance & core stability    Treatment Charges: Mins Units   []  Modalities     []  Ther Exercise     []  Manual Therapy     []  Ther Activities     [x]  Aquatics 38 3   []  Neuromuscular     []  Other     Total Treatment time 38 3       Assessment: [x] Progressing toward goals. [] No change. [] Other:     STG: (to be met in 10 treatments)  1. ? Pain:  Back and knee pain 4/10  2. ? ROM:  Full ROM left and right knee, Lumbar ROM 60% normal with decreased pain  3. ? Strength:  Bilateral LE and core 5-  4. ? Function:  LEFS 30%  5. Independent with Home Exercise Programs  6. Demonstrate Knowledge of fall prevention  LTG: (to be met in 18-20 treatments)  1. Back and knee pain 0-3/10  2. LEFS 15% or less  3. Tinetti 24/28     Patient goals:  Walk and move better       Pt. Education:  [x] Yes  [] No  [] Reviewed Prior HEP/Ed  Method of Education: [x] Verbal  [x] Demo  [] Written  Comprehension of Education:  [x] Verbalizes understanding. [x] Demonstrates understanding. [x] Needs review. [] Demonstrates/verbalizes HEP/Ed previously given. Plan: [x] Continue per plan of care.    [] Other:      Time In: 1013        Time Out: 1100    Electronically signed by:  Casie Oconnell PTA

## 2018-07-17 ENCOUNTER — HOSPITAL ENCOUNTER (OUTPATIENT)
Dept: PHYSICAL THERAPY | Age: 68
Setting detail: THERAPIES SERIES
Discharge: HOME | End: 2018-07-17
Payer: MEDICARE

## 2018-07-17 PROCEDURE — 97110 THERAPEUTIC EXERCISES: CPT

## 2018-07-17 NOTE — FLOWSHEET NOTE
Physical Therapy        [address] [facility]   [address]   Phone: [phone]   Fax: [phone]    Physical Therapy Daily Treatment Note      Date:  2018  Patient Name:  Jerome Tubbs    :  1950  MRN: [id number]  Physician: Marixa Benjamin MD                               Insurance: Medicare              Eligibility Status:  Eligible        Secondary Insurance (if applicable) [place] Medicaid               Eligibility Status: Eligible        DOS: 18  Medical Diagnosis: OA bilateral Knees, chronic LBP, Fall risk, Difficulty walking                Rehab Codes: M54.5, M62.81, M25.561, M25.562, M25.461, M25.462, R26.2  Onset Date:     18                              Next 's appt: 18        MEDICARE CAP VISIT MAINTAINED IN NAVIGATOR      Visit# / total visits: -  Cancels/No Shows: 0/0    Subjective:  No pain complaints today. No falls. Feeling pretty good. Pain:  [] Yes  [x] No Location:  N/A Pain Rating: (0-10 scale) 0/10  Pain altered Tx:  [] No  [] Yes  Action:  Comments:    Objective:    Lumbar ROM:   ROM grossly 80% normal in all planes x extension 30 degrees. No pain reported.     Strength:  Gross strength 5/5  Modalities:   Exercises:  Exercise Reps/ Time Weight/ Level Comments   NuStep 10 min L3 Hot pack               Seated abd/add 15   Pillow and red tband   Seated hip flexion 15    Not today   TKE 15        Hip IR/ER  15        Stand 3way hip      Parallel bars for stability   Foam 30sec x 2  CGA for balance   Fitter      Other:      Specific Instructions for next treatment:  Transition strength, flexibility, balance and proprioception in sitting and standing as tolerated    Treatment Charges: Mins Units   []  Modalities     [x]  Ther Exercise 40 3   []  Manual Therapy     []  Ther Activities     []  Aquatics     []  Neuromuscular     []  Other     Total Treatment time 40 3       Assessment: [x] Progressing toward goals.  Progressing with tolerance for therex. Standing therex with some SOB, right knee pain with squat. Progress as tolerated, continue to educate HEP, DC soon. Back and knee pain post tx:  0/10    [] No change. [] Other:    STG/LTG  STG: (to be met in 10 treatments)  1. ? Pain:  Back and knee pain 4/10. Goal met  2. ? ROM:  Full ROM left and right knee, Lumbar ROM 60% normal with decreased pain. Goal met  3. ? Strength:  Bilateral LE and core 5- Goal met  4. ? Function:  LEFS 30%. Goal not met  5. Independent with Home Exercise Programs  6. Demonstrate Knowledge of fall prevention  LTG: (to be met in 18-20 treatments)  1. Back and knee pain 0-3/10. Ongoing  2. LEFS 15% or less. Ongoing  3. Tinetti 24/28. Ongoing     Patient goals:  Walk and move better        G-CODE     Functional Limitation: Mobility: walking and moving around  Functional Assessment Used: LEFS - 45%  Current Status Modifier: CK  Goal Status Modifier: CI  Discharge Status Modifier:        Pt. Education:  [x] Yes  [] No  [] Reviewed Prior HEP/Ed  Method of Education: [x] Verbal  [x] Demo  [x] Written  Comprehension of Education:  [x] Verbalizes understanding. [x] Demonstrates understanding. [x] Needs review. [] Demonstrates/verbalizes HEP/Ed previously given. Plan: [x] Continue per plan of care.    [] Other:      Time In:1010            Time Out: 1050    Electronically signed by:  Kvng Munoz, PT

## 2018-07-19 ENCOUNTER — HOSPITAL ENCOUNTER (OUTPATIENT)
Dept: PHYSICAL THERAPY | Age: 68
Setting detail: THERAPIES SERIES
Discharge: HOME | End: 2018-07-19
Payer: MEDICARE

## 2018-07-19 PROCEDURE — 97113 AQUATIC THERAPY/EXERCISES: CPT

## 2018-07-20 ENCOUNTER — OFFICE VISIT (OUTPATIENT)
Dept: FAMILY MEDICINE CLINIC | Age: 68
End: 2018-07-20
Payer: MEDICARE

## 2018-07-20 VITALS
OXYGEN SATURATION: 98 % | DIASTOLIC BLOOD PRESSURE: 82 MMHG | HEIGHT: 62 IN | SYSTOLIC BLOOD PRESSURE: 138 MMHG | HEART RATE: 72 BPM | TEMPERATURE: 97.3 F | WEIGHT: 256.4 LBS | BODY MASS INDEX: 47.18 KG/M2

## 2018-07-20 DIAGNOSIS — R73.03 PREDIABETES: ICD-10-CM

## 2018-07-20 DIAGNOSIS — I10 ESSENTIAL HYPERTENSION: Primary | ICD-10-CM

## 2018-07-20 DIAGNOSIS — M54.50 CHRONIC BILATERAL LOW BACK PAIN WITHOUT SCIATICA: ICD-10-CM

## 2018-07-20 DIAGNOSIS — E03.9 ACQUIRED HYPOTHYROIDISM: ICD-10-CM

## 2018-07-20 DIAGNOSIS — G89.29 CHRONIC BILATERAL LOW BACK PAIN WITHOUT SCIATICA: ICD-10-CM

## 2018-07-20 DIAGNOSIS — E66.01 MORBID OBESITY WITH BMI OF 45.0-49.9, ADULT (HCC): ICD-10-CM

## 2018-07-20 DIAGNOSIS — R26.2 DIFFICULTY WALKING: ICD-10-CM

## 2018-07-20 DIAGNOSIS — Z91.81 AT HIGH RISK FOR FALLS: ICD-10-CM

## 2018-07-20 DIAGNOSIS — H47.20 OPTIC ATROPHY OF BOTH EYES: ICD-10-CM

## 2018-07-20 DIAGNOSIS — Z85.42 HISTORY OF UTERINE CANCER: ICD-10-CM

## 2018-07-20 DIAGNOSIS — E78.5 HYPERLIPIDEMIA WITH TARGET LDL LESS THAN 100: ICD-10-CM

## 2018-07-20 DIAGNOSIS — M17.0 PRIMARY OSTEOARTHRITIS OF BOTH KNEES: ICD-10-CM

## 2018-07-20 LAB — HBA1C MFR BLD: 6 %

## 2018-07-20 PROCEDURE — 1123F ACP DISCUSS/DSCN MKR DOCD: CPT | Performed by: FAMILY MEDICINE

## 2018-07-20 PROCEDURE — G8399 PT W/DXA RESULTS DOCUMENT: HCPCS | Performed by: FAMILY MEDICINE

## 2018-07-20 PROCEDURE — G8427 DOCREV CUR MEDS BY ELIG CLIN: HCPCS | Performed by: FAMILY MEDICINE

## 2018-07-20 PROCEDURE — 1036F TOBACCO NON-USER: CPT | Performed by: FAMILY MEDICINE

## 2018-07-20 PROCEDURE — 1101F PT FALLS ASSESS-DOCD LE1/YR: CPT | Performed by: FAMILY MEDICINE

## 2018-07-20 PROCEDURE — G8417 CALC BMI ABV UP PARAM F/U: HCPCS | Performed by: FAMILY MEDICINE

## 2018-07-20 PROCEDURE — 1090F PRES/ABSN URINE INCON ASSESS: CPT | Performed by: FAMILY MEDICINE

## 2018-07-20 PROCEDURE — 83036 HEMOGLOBIN GLYCOSYLATED A1C: CPT | Performed by: FAMILY MEDICINE

## 2018-07-20 PROCEDURE — 4040F PNEUMOC VAC/ADMIN/RCVD: CPT | Performed by: FAMILY MEDICINE

## 2018-07-20 PROCEDURE — 99214 OFFICE O/P EST MOD 30 MIN: CPT | Performed by: FAMILY MEDICINE

## 2018-07-20 PROCEDURE — 3017F COLORECTAL CA SCREEN DOC REV: CPT | Performed by: FAMILY MEDICINE

## 2018-07-20 ASSESSMENT — ENCOUNTER SYMPTOMS
CONSTIPATION: 0
SHORTNESS OF BREATH: 0
BACK PAIN: 1
WHEEZING: 0
ABDOMINAL PAIN: 0
VOMITING: 0
NAUSEA: 0
CHEST TIGHTNESS: 0
ABDOMINAL DISTENTION: 0
COUGH: 0
DIARRHEA: 0

## 2018-07-20 NOTE — PROGRESS NOTES
with metformin    Prediabetes is worsening    Prior A1c was 5.8 on 7/20/18  Lab Results   Component Value Date    LABA1C 6.0 07/20/2018       Wt Readings from Last 3 Encounters:   07/20/18 256 lb 6.4 oz (116.3 kg)   06/04/18 250 lb 6.4 oz (113.6 kg)   04/23/18 250 lb 12.8 oz (113.8 kg)       Wt Readings from Last 3 Encounters:   04/20/18 254 lb 12.8 oz (115.6 kg)   02/16/18 245 lb 12.8 oz (111.5 kg)   12/28/17 253 lb 3.2 oz (114.9 kg)     Patient has chronic back pain, located in the lower back, nonradiating, and bilateral knee pains secondary to osteoarthritis. She tells me that she doesn't  have back pain now, but when she has back pain can go as high as 8/10. [facility] and has been helping with pain and helping her be more active. Patient reports she has difficulty getting up from sitting position and she uses the walker at all times. Patient lives alone. Denies falls since the last time. She uses walker at all times. Has optic atrophy bilateral and visual impairment  Has Aid 3 times a week, helps her with shower, laundry and cleaning  Patient says she never received the heating pad but thinks it will help her back pain.           Allergies   Allergen Reactions    Sulfa Antibiotics Nausea Only    Penicillins Rash        Current Outpatient Prescriptions   Medication Sig Dispense Refill    ASPIR-LOW 81 MG EC tablet TAKE ONE TABLET BY MOUTH DAILY 90 tablet 3    loratadine (CLARITIN) 10 MG tablet TAKE ONE TABLET BY MOUTH DAILY 90 tablet 3    diclofenac sodium (VOLTAREN) 1 % GEL Apply 2 g topically 4 times daily as needed for Pain 1 Tube 3    lidocaine (LMX) 4 % cream Apply 2-3 times a day as needed for pain 120 g 3    levothyroxine (SYNTHROID) 75 MCG tablet TAKE ONE TABLET BY MOUTH DAILY 30 tablet 5    vitamin D (CHOLECALCIFEROL) 1000 UNIT TABS tablet Take 1 tablet by mouth daily 90 tablet 3    metFORMIN (GLUCOPHAGE) 500 MG tablet Take 1 tablet by mouth 2 times daily (with meals) 60 tablet 11    losartan (COZAAR) 100 MG tablet Take 1 tablet by mouth daily **STOP HCTZ. INCREASE DOSE OF LOSARTAN FROM 50  MG ON 11/30/2017 ** 90 tablet 3    meloxicam (MOBIC) 7.5 MG tablet Take 7.5 mg by mouth daily      STOOL SOFTENER 100 MG capsule TAKE ONE CAPSULE BY MOUTH TWICE A DAY  AS NEEDED FOR CONSTIPATION 60 capsule 0    chlorhexidine (PERIDEX) 0.12 % solution Take 15 mLs by mouth 2 times daily      Urea (CARMOL) 40 % cream Apply topically as needed      MYRBETRIQ 50 MG TB24 Take 50 mg by mouth daily 30 tablet 11    Cranberry 500 MG CAPS Take 500 mg by mouth daily 30 capsule 3    Lactobacillus (PROBIOTIC ACIDOPHILUS) TABS Take 1 tablet by mouth daily 30 tablet 3    acetaminophen (APAP EXTRA STRENGTH) 500 MG tablet Take 1 tablet by mouth every 6 hours as needed for Pain or Fever 120 tablet 3    Heating Pads (HEATING PAD DRY HEAT) PADS Needs heating pad 1 each 0    atorvastatin (LIPITOR) 40 MG tablet Take 1 tablet by mouth daily STOP SIMVASTATIN 90 tablet 3     No current facility-administered medications for this visit. Social History     Social History    Marital status:      Spouse name: N/A    Number of children: 1    Years of education: N/A     Occupational History    Not on file. Social History Main Topics    Smoking status: Never Smoker    Smokeless tobacco: Never Used    Alcohol use No    Drug use: No    Sexual activity: No     Other Topics Concern    Not on file     Social History Narrative    No narrative on file     Counseling given: Yes           [x] Negative depression screening.    PHQ Scores 4/20/2018 2/23/2017   PHQ2 Score 0 0   PHQ9 Score 0 0     Interpretation of Total Score Depression Severity: 1-4 = Minimal depression, 5-9 = Mild depression, 10-14 = Moderate depression, 15-19 = Moderately severe depression, 20-27 = Severe depression    The patient's past medical, surgical, social, and family history as well as her distal pulses. Pulmonary/Chest: Effort normal and breath sounds normal. No respiratory distress. She has no wheezes. She has no rales. She exhibits no tenderness. Abdominal: Soft. Bowel sounds are normal. She exhibits no distension. There is no tenderness. Obese abdomen   Musculoskeletal: She exhibits edema (trace pitting edema bilateral ankles) and tenderness. Right knee: Tenderness found. Patellar tendon tenderness noted. Left knee: Tenderness found. Patellar tendon tenderness noted. Lumbar back: She exhibits decreased range of motion, tenderness, bony tenderness, pain and spasm. Up and go test more than 12 seconds. High risk for falls. Unable to get up without physical support. She walks with walker at all times and needs physical support at all times. Very high risk for falls. Continue physical therapy. Coarse crepitus bilateral knees   Neurological: She is alert and oriented to person, place, and time. No cranial nerve deficit. She exhibits normal muscle tone. Skin: Skin is warm and dry. No rash noted. She is not diaphoretic. Psychiatric: She has a normal mood and affect. Her behavior is normal. Judgment and thought content normal.   Nursing note and vitals reviewed. Discussed testing with the patient and all questions fully answered. hyperglycemia   Prediabetes has been worsening.   She reports compliance with metformin    Lab Results   Component Value Date    LABA1C 6.0 07/20/2018     Lab Results   Component Value Date     03/21/2017       Office Visit on 04/20/2018   Component Date Value Ref Range Status    Hemoglobin A1C 04/20/2018 5.8  % Final       Lab Results   Component Value Date    WBC 8.8 11/30/2017    HGB 13.1 11/30/2017    HCT 39.3 11/30/2017    MCV 91.3 11/30/2017     11/30/2017       Lab Results   Component Value Date     02/21/2018    K 4.1 02/21/2018    CL 98 02/21/2018    CO2 25 02/21/2018    BUN 18 04/12/2018    CREATININE Future     Standing Expiration Date:   7/20/2019    Comprehensive Metabolic Panel     Standing Status:   Future     Standing Expiration Date:   7/20/2019    Lipid Panel     Standing Status:   Future     Standing Expiration Date:   7/20/2019     Order Specific Question:   Is Patient Fasting?/# of Hours     Answer:   8-10 Hours    POCT glycosylated hemoglobin (Hb A1C)         Medications Discontinued During This Encounter   Medication Reason    chlorhexidine (PERIDEX) 0.12 % solution Therapy completed    Heating Pads (HEATING PAD DRY HEAT) PADS REORDER    Heating Pads (HEATING PAD DRY HEAT) PADS REORDER       Everardo Heller received counseling on the following healthy behaviors: nutrition, exercise, medication adherence and weight loss    Reviewed prior labs and health maintenance  Continue current medications, diet and exercise. Discussed use, benefit, and side effects of prescribed medications. Barriers to medication compliance addressed. Patient given educational materials - see patient instructions  Was a self-tracking handout given in paper form or via [employer]? Yes    Requested Prescriptions     Signed Prescriptions Disp Refills    Heating Pads (HEATING PAD DRY HEAT) PADS 1 each 0     Sig: Needs heating pad       All patient questions answered. Patient voiced understanding. Quality Measures    Body mass index is 46.9 kg/m². Elevated. Weight control planned discussed conventional weight loss, Metformin, and Healthy diet and regular exercise. BP: 138/82. Blood pressure is normal. Treatment plan consists of Weight Reduction, DASH Eating Plan, Dietary Sodium Restriction, Increased Physical Activity, Patient In-home Blood Pressure Monitoring and No treatment change needed. Fall Risk 4/20/2018 2/23/2017   2 or more falls in past year? no no   Fall with injury in past year? no no     The patient does not have a history of falls. I did , complete a risk assessment for falls.  A plan of care for falls

## 2018-07-20 NOTE — PROGRESS NOTES
Visit Information    Have you changed or started any medications since your last visit including any over-the-counter medicines, vitamins, or herbal medicines? no   Have you stopped taking any of your medications? Is so, why? -  no  Are you having any side effects from any of your medications? - no    Have you seen any other physician or provider since your last visit?  no   Have you had any other diagnostic tests since your last visit?  no   Have you been seen in the emergency room and/or had an admission in a hospital since we last saw you?  no   Have you had your routine dental cleaning in the past 6 months?  no     Do you have an active MyChart account? If no, what is the barrier?   Yes    Patient Care Team:  Dave Florian MD as PCP - General (Family Medicine)  José Luis Diego MD as Referring Physician (Orthopedic Surgery)  Maverick Gamble MD as Surgeon (Orthopedic Surgery)  Susanne Redd MD as Consulting Physician (Endocrinology)  Talya Criag DO as Consulting Physician (Obstetrics & Gynecology)  Amish Mejía DO as Consulting Physician (General Surgery)  Sivan Pablo MD as Consulting Physician (Urology)  Elisabet Khan MD as Obstetrician (Gynecologic Oncology)  MAX Diaz CNP as Nurse Practitioner (Certified Nurse Practitioner)  Sukhi Parada MD as Consulting Physician (Otolaryngology)    Medical History Review  Past Medical, Family, and Social History reviewed and does contribute to the patient presenting condition    Health Maintenance   Topic Date Due    Shingles Vaccine (1 of 2 - 2 Dose Series) 01/08/2002    Flu vaccine (1) 11/30/2018 (Originally 9/1/2018)    TSH testing  02/21/2019    Potassium monitoring  02/21/2019    Creatinine monitoring  04/12/2019    A1C test (Diabetic or Prediabetic)  04/20/2019    Breast cancer screen  05/03/2019    Colon cancer screen colonoscopy  06/26/2020    Lipid screen  03/21/2022    DTaP/Tdap/Td vaccine (2 - Td) 09/28/2027    DEXA (modify frequency per FRAX score)  Completed    Pneumococcal low/med risk  Completed    Hepatitis C screen  Completed

## 2018-07-21 ENCOUNTER — TELEPHONE (OUTPATIENT)
Dept: FAMILY MEDICINE CLINIC | Age: 68
End: 2018-07-21

## 2018-07-21 PROBLEM — Z85.42 HISTORY OF UTERINE CANCER: Status: ACTIVE | Noted: 2018-07-21

## 2018-07-23 NOTE — TELEPHONE ENCOUNTER
Yes Tita Mcleod did give me order  I have it , just waiting for patient to call me back with her new medicare card ID #.   Patient told me on Friday she would call me back when she gets home , she never did , I left her 2 messages on Friday

## 2018-07-24 ENCOUNTER — HOSPITAL ENCOUNTER (OUTPATIENT)
Dept: PHYSICAL THERAPY | Age: 68
Setting detail: THERAPIES SERIES
Discharge: HOME | End: 2018-07-24
Payer: MEDICARE

## 2018-07-24 PROCEDURE — 97110 THERAPEUTIC EXERCISES: CPT

## 2018-07-24 NOTE — FLOWSHEET NOTE
Physical Therapy        [address] [facility]   [address] [place] [address]   Phone: [phone]   Fax: [phone]    Physical Therapy Daily Treatment Note      Date:  2018  Patient Name:  Jerome Tubbs    :  1950  MRN: [id number]  Physician: Marixa Benjamin MD                               Insurance: Medicare              Eligibility Status:  Eligible        Secondary Insurance (if applicable) [place] Medicaid               Eligibility Status: Eligible        DOS: 18  Medical Diagnosis: OA bilateral Knees, chronic LBP, Fall risk, Difficulty walking                Rehab Codes: M54.5, M62.81, M25.561, M25.562, M25.461, M25.462, R26.2  Onset Date:     18                              Next 's appt: 18        MEDICARE CAP VISIT MAINTAINED IN NAVIGATOR      Visit# / total visits: 15/18-  Cancels/No Shows: 0/0    Subjective:  Pt denies any pain this date. No new complaints this date. Pain:  [] Yes  [x] No Location:  N/A Pain Rating: (0-10 scale) 0/10  Pain altered Tx:  [] No  [] Yes  Action:  Comments:    Objective:    Lumbar ROM:   ROM grossly 80% normal in all planes x extension 30 degrees. No pain reported. Strength:  Gross strength 5/5  Modalities:   Exercises:  Exercise Reps/ Time Weight/ Level Comments   NuStep 10 min L3 Hot pack   Sit to/from stands 10  vc's for safe hand placement. Poor to fair carryover throughout all transfers this date. LAQ's 15     Seated abd/add 15   ball and orange tband   Seated hip flexion 15       TKE 15    Not today    Hip IR/ER  15    Not today    Stand 3way hip      Parallel bars for stability; BUE support required. vc's for technique with fair follow through. Foam 30sec x 2  CGA for balance; Gait belt. Pt reports increase L knee pain during second stand on foam. Pt requires seated rest break d/t pain. Pt requires intermittent BUE support on // bars to maintain standing stability.     Amb 30'x2 RW Pt educated Other:       Time In:1003            Time Out: 1100    Electronically signed by:  Dawood Adams PTA

## 2018-07-25 ENCOUNTER — TELEPHONE (OUTPATIENT)
Dept: FAMILY MEDICINE CLINIC | Age: 68
End: 2018-07-25

## 2018-07-26 ENCOUNTER — HOSPITAL ENCOUNTER (OUTPATIENT)
Dept: PHYSICAL THERAPY | Age: 68
Setting detail: THERAPIES SERIES
Discharge: HOME | End: 2018-07-26
Payer: MEDICARE

## 2018-07-26 PROCEDURE — 97113 AQUATIC THERAPY/EXERCISES: CPT

## 2018-07-26 NOTE — PROGRESS NOTES
Physical Therapy     [address] [facility]             [address]             Phone: [phone]             Fax: [phone]     Physical Therapy Daily Treatment Note     Date:  2018  Patient Name:  Renaldo Guo                        :  1950                   MRN: [id number]  Physician: Mili Castelan MD                               Insurance: Medicare- tracked in flowsheet              Eligibility Status:  Eligible        Secondary Insurance (if applicable) [place]               Eligibility Status: Eligible        DOS: 18  Medical Diagnosis: OA bilateral Knees, chronic LBP, Fall risk, Difficulty walking                Rehab Codes: M54.5, M62.81, M25.561, M25.562, M25.461, M25.462, R26.2  Onset Date:     18                              Next 's appt: 18  Visit# / total visits:              Cancels/No Shows: 0/0     Subjective:  Denies pain this AM.  Pain:  [] Yes  [x] No       Location: N/A  Pain Rating: (0-10 scale) 0/10  Pain altered Tx:  [x] No  [] Yes  Action:  Comments:Patient arrived 11' late for appt     Objective:  [address] [facility] Exercise Log  Aquatic, Hip & DLS Program- Phase 1     Date of Eval:                                Primary PT: Kyler Hickey PT  Diagnosis: Things to Focus On (goals):   Surgical Precautions:  Medical Precautions:   [] C-9 dates  [] Occ Med   [x] Medicare      Date 6/28/18 7/3/18 7/12/18 7/19/18    Visit # 10/18 11/18 13/18  15/18   Walk F/L/R 2 Laps 2 Laps 2 Laps  2 Laps   Marching 2 Laps 2 Laps 2 Laps  2 Laps     Low Box Low box Low box  Low Box   Squats 15x5\" 15x5\" 15x5\"  15x5\"   Step-Ups F/L Tall 15x Tall 15x Low 15x  Low 15x   Heel-toe raises 15x 15x 15x  15x   SLR F/L/R 15x 15x 15x  15x   Knee/Flex/Ext 15x 15x 15x  15x   F/L Lunges           Kickboard Ex.  Lg Lg Lg  Lg    Iso Abd. 15x5\" 15x5\" 15x5\"  15x5\"   Push-pull 20x 20x 20x  20x   Paddling 15x 15x

## 2018-07-31 ENCOUNTER — HOSPITAL ENCOUNTER (OUTPATIENT)
Dept: PHYSICAL THERAPY | Age: 68
Setting detail: THERAPIES SERIES
Discharge: HOME | End: 2018-07-31
Payer: MEDICARE

## 2018-07-31 ENCOUNTER — HOSPITAL ENCOUNTER (OUTPATIENT)
Age: 68
Setting detail: SPECIMEN
Discharge: HOME | End: 2018-07-31
Payer: MEDICARE

## 2018-07-31 DIAGNOSIS — E66.01 MORBID OBESITY WITH BMI OF 45.0-49.9, ADULT (HCC): ICD-10-CM

## 2018-07-31 DIAGNOSIS — E78.5 HYPERLIPIDEMIA WITH TARGET LDL LESS THAN 100: ICD-10-CM

## 2018-07-31 DIAGNOSIS — I10 ESSENTIAL HYPERTENSION: ICD-10-CM

## 2018-07-31 LAB
ALBUMIN SERPL-MCNC: 3.9 G/DL (ref 3.5–5.2)
ALBUMIN/GLOBULIN RATIO: 1.2 (ref 1–2.5)
ALP BLD-CCNC: 73 U/L (ref 35–104)
ALT SERPL-CCNC: 20 U/L (ref 5–33)
ANION GAP SERPL CALCULATED.3IONS-SCNC: 14 MMOL/L (ref 9–17)
AST SERPL-CCNC: 17 U/L
BILIRUB SERPL-MCNC: 0.44 MG/DL (ref 0.3–1.2)
BUN BLDV-MCNC: 18 MG/DL (ref 8–23)
BUN/CREAT BLD: ABNORMAL (ref 9–20)
CALCIUM SERPL-MCNC: 9.2 MG/DL (ref 8.6–10.4)
CHLORIDE BLD-SCNC: 102 MMOL/L (ref 98–107)
CHOLESTEROL/HDL RATIO: 6.5
CHOLESTEROL: 287 MG/DL
CO2: 25 MMOL/L (ref 20–31)
CREAT SERPL-MCNC: 0.79 MG/DL (ref 0.5–0.9)
GFR AFRICAN AMERICAN: >60 ML/MIN
GFR NON-AFRICAN AMERICAN: >60 ML/MIN
GFR SERPL CREATININE-BSD FRML MDRD: ABNORMAL ML/MIN/{1.73_M2}
GFR SERPL CREATININE-BSD FRML MDRD: ABNORMAL ML/MIN/{1.73_M2}
GLUCOSE BLD-MCNC: 111 MG/DL (ref 70–99)
HCT VFR BLD CALC: 41.7 % (ref 36.3–47.1)
HDLC SERPL-MCNC: 44 MG/DL
HEMOGLOBIN: 13.1 G/DL (ref 11.9–15.1)
LDL CHOLESTEROL: 179 MG/DL (ref 0–130)
MCH RBC QN AUTO: 28.5 PG (ref 25.2–33.5)
MCHC RBC AUTO-ENTMCNC: 31.4 G/DL (ref 28.4–34.8)
MCV RBC AUTO: 90.8 FL (ref 82.6–102.9)
NRBC AUTOMATED: 0 PER 100 WBC
PDW BLD-RTO: 13.8 % (ref 11.8–14.4)
PLATELET # BLD: 265 K/UL (ref 138–453)
PMV BLD AUTO: 10.8 FL (ref 8.1–13.5)
POTASSIUM SERPL-SCNC: 4 MMOL/L (ref 3.7–5.3)
RBC # BLD: 4.59 M/UL (ref 3.95–5.11)
SODIUM BLD-SCNC: 141 MMOL/L (ref 135–144)
TOTAL PROTEIN: 7.2 G/DL (ref 6.4–8.3)
TRIGL SERPL-MCNC: 318 MG/DL
VLDLC SERPL CALC-MCNC: ABNORMAL MG/DL (ref 1–30)
WBC # BLD: 9.1 K/UL (ref 3.5–11.3)

## 2018-07-31 PROCEDURE — G8980 MOBILITY D/C STATUS: HCPCS

## 2018-07-31 PROCEDURE — 97110 THERAPEUTIC EXERCISES: CPT

## 2018-07-31 PROCEDURE — G8979 MOBILITY GOAL STATUS: HCPCS

## 2018-07-31 RX ORDER — ATORVASTATIN CALCIUM 40 MG/1
40 TABLET, FILM COATED ORAL DAILY
Qty: 90 TABLET | Refills: 3 | Status: SHIPPED | OUTPATIENT
Start: 2018-07-31 | End: 2019-02-12 | Stop reason: SDUPTHER

## 2018-08-02 ENCOUNTER — TELEPHONE (OUTPATIENT)
Dept: FAMILY MEDICINE CLINIC | Age: 68
End: 2018-08-02

## 2018-08-14 ENCOUNTER — OFFICE VISIT (OUTPATIENT)
Dept: GYNECOLOGIC ONCOLOGY | Age: 68
End: 2018-08-14
Payer: MEDICARE

## 2018-08-14 VITALS
DIASTOLIC BLOOD PRESSURE: 74 MMHG | WEIGHT: 262.4 LBS | HEIGHT: 62 IN | OXYGEN SATURATION: 97 % | BODY MASS INDEX: 48.29 KG/M2 | HEART RATE: 68 BPM | SYSTOLIC BLOOD PRESSURE: 154 MMHG | TEMPERATURE: 97.3 F

## 2018-08-14 DIAGNOSIS — Z90.722 STATUS POST TOTAL HYSTERECTOMY AND BILATERAL SALPINGO-OOPHORECTOMY: ICD-10-CM

## 2018-08-14 DIAGNOSIS — Z85.42 PERSONAL HISTORY OF MALIGNANT NEOPLASM OF UTERUS: ICD-10-CM

## 2018-08-14 DIAGNOSIS — Z90.710 STATUS POST TOTAL HYSTERECTOMY AND BILATERAL SALPINGO-OOPHORECTOMY: ICD-10-CM

## 2018-08-14 DIAGNOSIS — Z85.42 ENCOUNTER FOR FOLLOW-UP SURVEILLANCE OF ENDOMETRIAL CANCER: Primary | ICD-10-CM

## 2018-08-14 DIAGNOSIS — Z08 ENCOUNTER FOR FOLLOW-UP SURVEILLANCE OF ENDOMETRIAL CANCER: Primary | ICD-10-CM

## 2018-08-14 DIAGNOSIS — Z90.79 STATUS POST TOTAL HYSTERECTOMY AND BILATERAL SALPINGO-OOPHORECTOMY: ICD-10-CM

## 2018-08-14 PROCEDURE — G8417 CALC BMI ABV UP PARAM F/U: HCPCS | Performed by: NURSE PRACTITIONER

## 2018-08-14 PROCEDURE — 3017F COLORECTAL CA SCREEN DOC REV: CPT | Performed by: NURSE PRACTITIONER

## 2018-08-14 PROCEDURE — 99213 OFFICE O/P EST LOW 20 MIN: CPT | Performed by: NURSE PRACTITIONER

## 2018-08-14 PROCEDURE — 1090F PRES/ABSN URINE INCON ASSESS: CPT | Performed by: NURSE PRACTITIONER

## 2018-08-14 PROCEDURE — 1036F TOBACCO NON-USER: CPT | Performed by: NURSE PRACTITIONER

## 2018-08-14 PROCEDURE — G8427 DOCREV CUR MEDS BY ELIG CLIN: HCPCS | Performed by: NURSE PRACTITIONER

## 2018-08-14 PROCEDURE — 1123F ACP DISCUSS/DSCN MKR DOCD: CPT | Performed by: NURSE PRACTITIONER

## 2018-08-14 PROCEDURE — G8399 PT W/DXA RESULTS DOCUMENT: HCPCS | Performed by: NURSE PRACTITIONER

## 2018-08-14 PROCEDURE — 1101F PT FALLS ASSESS-DOCD LE1/YR: CPT | Performed by: NURSE PRACTITIONER

## 2018-08-14 PROCEDURE — 4040F PNEUMOC VAC/ADMIN/RCVD: CPT | Performed by: NURSE PRACTITIONER

## 2018-08-14 ASSESSMENT — ENCOUNTER SYMPTOMS
CONSTIPATION: 0
ABDOMINAL PAIN: 0
ABDOMINAL DISTENTION: 0
SHORTNESS OF BREATH: 0
BLOOD IN STOOL: 0
TROUBLE SWALLOWING: 0
ANAL BLEEDING: 0
COUGH: 0

## 2018-08-14 NOTE — PROGRESS NOTES
Bela Andrew is a 79 y.o. female that presents today for:    Chief Complaint   Patient presents with    Follow-up     4 month surveillance       HPI:  HPI  79 y.o.  Dg Newell, originally seen  10/12/17 at the request of Dr. Aubree Alford Sacha Vidal patient began experiencing spotting in May 2017.  She presented for evaluation and a pelvic ultrasound was obtained on June 12, 2017.  The uterus measured 7.8 x 3.5 x 4 cm and the endometrial stripe was thickened at 1.8 cm.  The patient was taken for [facility]  on September 20, 2017 and the endometrial curetting showed grade 1 endometrial cancer in a background of complex atypical hyperplasia.         The patient Saffell Issact aa normal colonoscopy in 2017.  Pap smear June 9, 2017 was negative with negative high-risk HPV DNA. Her most recent mammogram done 5/3/18 was normal.     On 10/24/17 she underwent  total laparoscopic hysterectomy, bilateralsalpingo-oophorectomy, pelvic and paraaortic lymphadenectomy, and peritoneal biopsy for Grade 1 endometrial cancer.     PATHOLOGIC STAGING (PTNM [FIGO]): pT1a N0 [IA]         Her Mismatch repair testing:  MISMATCH REPAIR BY IHC WITH MHL1:  ABNORMAL   MISMATCH REPAIR BY IHC WITH MSH2:  NORMAL   MISMATCH REPAIR BY IHC WITH MSH6:  NORMAL   MISMATCH REPAIR BY IHC WITH PMS2:  ABNORMAL    - REFLEX TESTING FOR MLH1 PROMOTER METHYLATION: - POSITIVE      POSTIVE MLH1 PROMOTER METHYLATION IS USUALLY ASSOCIATED WITH SPORADIC AND NOT SYNDORMAL OCCURRENCES OF ENDOMETRIAL ADENOCARCINOMA. Per NCCN guidelines she did not require any adjuvant treatment.       She was last seen 4/23/18 and is here today for a routine surveillance visit. Pt denies any F/C,  N/V, SOB, unexpected weight loss, uncontrolled pain, difficulty or change with bladder or bowel function or vaginal bleeding.     She BP is slightly elevated today. She is asymptomatic and reports she is taking  her BP medications as prescribed.   She was encouraged to discuss her elevated BP with her PCP. ROS:  Review of Systems   Constitutional: Negative for chills, fatigue and fever. HENT: Negative for congestion and trouble swallowing. Respiratory: Negative for cough and shortness of breath. Cardiovascular: Negative for chest pain and leg swelling. Gastrointestinal: Negative for abdominal distention, abdominal pain, anal bleeding, blood in stool and constipation. Genitourinary: Negative for difficulty urinating, dysuria, urgency, vaginal bleeding and vaginal discharge. Neurological: Negative for dizziness and weakness. Psychiatric/Behavioral: Negative for confusion and dysphoric mood. Past Medical History:   Diagnosis Date    Adenocarcinoma of endometrium, stage 1 ([facility] [address] 10/5/2017    Well differentiated grade 1 adenocarcinoma arising in complex hyperplasia    Allergic rhinitis 2/23/2017    At high risk for falls 2/23/2017    Colon polyp     Had colonoscopy done 20 yrs ago    Diabetes mellitus ([facility] [address]     Endometrial cancer ([facility] [address]     History of TIA (transient ischemic attack) '80's    on Baby ASA    Hyperglycemia 3/21/2017    Hyperlipidemia     Hypertension since 2015    on Rx.     Hypothyroidism 1980    sub total thyroidectomy goiter    Legally blind since born    both eyes, cord prolapse; \"not legally blind\" per \"associated eye care\" please see telephone encounter from 2/27/18    Lower back pain     Mixed incontinence 1/9/2016    Morbid obesity with BMI of 40.0-44.9, adult ([facility] [address] 2/23/2017    Obesity     Oral phase dysphagia 2/23/2018    Osteoarthritis (arthritis due to wear and tear of joints)     ronan knee    Osteoarthritis involving multiple joints on both sides of body 4/24/2012    Osteoporosis     Postmenopausal bleeding 9/20/2017    S/P h-scope, Myosure 9/20/17 9/20/2017    Pathology pending    Tennis elbow     left    Thickened endometrium 9/20/2017    TLHBSO, bilateral LND 10/24/17 10/24/2017       Past Surgical History: Procedure Laterality Date    CATARACT REMOVAL WITH IMPLANT Bilateral 2015    COLONOSCOPY  1997    had polyp, she doesn't know where and when, \"20 yrs ago\"    COLONOSCOPY  06/26/2017    DILATION AND CURETTAGE OF UTERUS N/A 9/20/2017    HYSTEROSCOPY  WITH Marlon Sandhoff performed by Nisha Gupta DO at [address] N/A 10/24/2017    TOTAL LAPAROSCOPIC HYSTERECTOMY, BSO, F.S.  STAGING, GYRUS G400 performed by Junie Lee MD at [address] [facility] N/A 6/26/2017    COLONOSCOPY POLYPECTOMY / HOT SNARE performed by Manpreet Gale DO at [address]  10/24/2017    with pelvic lymph node dissection    THYROID SURGERY  1980    subtotal 21 years ago   Natasha Nightingale TONSILLECTOMY      VITRECTOMY      FLOATERS       Family History   Problem Relation Age of Onset    Coronary Art Dis Father     Hypertension Father     Diabetes Father     High Blood Pressure Father     Heart Attack Father     Diabetes Mother     High Blood Pressure Mother     Thyroid Disease Mother     High Blood Pressure Sister     Thyroid Disease Brother     High Blood Pressure Brother     High Blood Pressure Maternal Grandmother     Diabetes Maternal Grandfather     No Known Problems Paternal Grandmother     Heart Attack Paternal Grandfather     Colon Cancer Neg Hx        Social History     Social History    Marital status:      Spouse name: N/A    Number of children: 1    Years of education: N/A     Social History Main Topics    Smoking status: Never Smoker    Smokeless tobacco: Never Used    Alcohol use No    Drug use: No    Sexual activity: No     Other Topics Concern    None     Social History Narrative    None       Past [address] does contribute to today's presenting complaint.       Current Outpatient Prescriptions   Medication Sig Dispense Refill    atorvastatin (LIPITOR) 40 MG tablet Take 1 tablet by mouth daily STOP SIMVASTATIN 90 tablet 3   

## 2018-08-25 DIAGNOSIS — E03.9 ACQUIRED HYPOTHYROIDISM: ICD-10-CM

## 2018-08-27 RX ORDER — LEVOTHYROXINE SODIUM 0.07 MG/1
TABLET ORAL
Qty: 90 TABLET | Refills: 3 | Status: SHIPPED | OUTPATIENT
Start: 2018-08-27 | End: 2019-02-12 | Stop reason: SDUPTHER

## 2018-08-27 NOTE — TELEPHONE ENCOUNTER
Please Approve or Refuse.   Send to Pharmacy per Pt's Request:     Next Visit Date:  10/19/2018   Last Visit Date: 7/20/2018    Hemoglobin A1C (%)   Date Value   07/20/2018 6.0   04/20/2018 5.8   12/28/2017 5.3             ( goal A1C is < 7)   BP Readings from Last 3 Encounters:   08/14/18 (!) 154/74   07/20/18 138/82   06/04/18 (!) 141/75          (goal 120/80)  Lab Results   Component Value Date    BUN 18 07/31/2018     Lab Results   Component Value Date    CREATININE 0.79 07/31/2018     Lab Results   Component Value Date    K 4.0 07/31/2018     @[id number])@

## 2018-09-17 ENCOUNTER — TELEPHONE (OUTPATIENT)
Dept: FAMILY MEDICINE CLINIC | Age: 68
End: 2018-09-17

## 2018-09-17 DIAGNOSIS — J01.80 ACUTE NON-RECURRENT SINUSITIS OF OTHER SINUS: Primary | ICD-10-CM

## 2018-09-17 RX ORDER — AZITHROMYCIN 250 MG/1
TABLET, FILM COATED ORAL
Qty: 6 TABLET | Refills: 0 | Status: SHIPPED | OUTPATIENT
Start: 2018-09-17 | End: 2018-09-22

## 2018-09-17 RX ORDER — GUAIFENESIN 600 MG/1
600 TABLET, EXTENDED RELEASE ORAL 2 TIMES DAILY PRN
Qty: 30 TABLET | Refills: 0 | Status: SHIPPED | OUTPATIENT
Start: 2018-09-17 | End: 2019-10-03

## 2018-09-24 ENCOUNTER — TELEPHONE (OUTPATIENT)
Dept: FAMILY MEDICINE CLINIC | Age: 68
End: 2018-09-24

## 2018-09-24 DIAGNOSIS — J20.9 ACUTE BRONCHITIS DUE TO INFECTION: Primary | ICD-10-CM

## 2018-09-24 NOTE — TELEPHONE ENCOUNTER
Patient still has a cough , states she finished antibiotic yesterday. Patient wants to know if she should have another antibiotic or something else. She states she is taking her mucinex as well.

## 2018-09-26 ENCOUNTER — TELEPHONE (OUTPATIENT)
Dept: FAMILY MEDICINE CLINIC | Age: 68
End: 2018-09-26

## 2018-09-26 DIAGNOSIS — R82.90 ABNORMAL URINE ODOR: Primary | ICD-10-CM

## 2018-09-27 ENCOUNTER — HOSPITAL ENCOUNTER (OUTPATIENT)
Dept: GENERAL RADIOLOGY | Facility: CLINIC | Age: 68
Discharge: HOME | End: 2018-09-29
Payer: MEDICARE

## 2018-09-27 ENCOUNTER — HOSPITAL ENCOUNTER (OUTPATIENT)
Facility: CLINIC | Age: 68
Discharge: HOME | End: 2018-09-29
Payer: MEDICARE

## 2018-09-27 DIAGNOSIS — J20.9 ACUTE BRONCHITIS DUE TO INFECTION: ICD-10-CM

## 2018-09-27 PROCEDURE — 71046 X-RAY EXAM CHEST 2 VIEWS: CPT

## 2018-10-19 ENCOUNTER — TELEPHONE (OUTPATIENT)
Dept: FAMILY MEDICINE CLINIC | Age: 68
End: 2018-10-19

## 2018-10-19 ENCOUNTER — OFFICE VISIT (OUTPATIENT)
Dept: FAMILY MEDICINE CLINIC | Age: 68
End: 2018-10-19
Payer: COMMERCIAL

## 2018-10-19 VITALS
BODY MASS INDEX: 48.25 KG/M2 | DIASTOLIC BLOOD PRESSURE: 88 MMHG | TEMPERATURE: 97 F | SYSTOLIC BLOOD PRESSURE: 152 MMHG | HEART RATE: 66 BPM | WEIGHT: 262.2 LBS | HEIGHT: 62 IN | OXYGEN SATURATION: 97 %

## 2018-10-19 DIAGNOSIS — R73.9 HYPERGLYCEMIA: ICD-10-CM

## 2018-10-19 DIAGNOSIS — J98.01 COUGH DUE TO BRONCHOSPASM: ICD-10-CM

## 2018-10-19 DIAGNOSIS — E78.5 HYPERLIPIDEMIA WITH TARGET LDL LESS THAN 100: ICD-10-CM

## 2018-10-19 DIAGNOSIS — Z86.73 HISTORY OF TIA (TRANSIENT ISCHEMIC ATTACK): ICD-10-CM

## 2018-10-19 DIAGNOSIS — M17.0 PRIMARY OSTEOARTHRITIS OF BOTH KNEES: Primary | ICD-10-CM

## 2018-10-19 DIAGNOSIS — M54.50 CHRONIC BILATERAL LOW BACK PAIN WITHOUT SCIATICA: ICD-10-CM

## 2018-10-19 DIAGNOSIS — G89.29 CHRONIC BILATERAL LOW BACK PAIN WITHOUT SCIATICA: ICD-10-CM

## 2018-10-19 DIAGNOSIS — M25.561 CHRONIC PAIN OF BOTH KNEES: ICD-10-CM

## 2018-10-19 DIAGNOSIS — N39.46 MIXED STRESS AND URGE URINARY INCONTINENCE: ICD-10-CM

## 2018-10-19 DIAGNOSIS — E66.01 MORBID OBESITY WITH BMI OF 45.0-49.9, ADULT (HCC): ICD-10-CM

## 2018-10-19 DIAGNOSIS — M25.562 CHRONIC PAIN OF BOTH KNEES: ICD-10-CM

## 2018-10-19 DIAGNOSIS — N32.81 OAB (OVERACTIVE BLADDER): ICD-10-CM

## 2018-10-19 DIAGNOSIS — G89.29 CHRONIC PAIN OF BOTH KNEES: ICD-10-CM

## 2018-10-19 DIAGNOSIS — I10 ESSENTIAL HYPERTENSION: ICD-10-CM

## 2018-10-19 DIAGNOSIS — Z91.81 AT HIGH RISK FOR FALLS: ICD-10-CM

## 2018-10-19 PROCEDURE — 99215 OFFICE O/P EST HI 40 MIN: CPT | Performed by: FAMILY MEDICINE

## 2018-10-19 RX ORDER — LIDOCAINE 40 MG/G
CREAM TOPICAL
Qty: 120 G | Refills: 3 | Status: ON HOLD
Start: 2018-10-19 | End: 2020-06-22 | Stop reason: HOSPADM

## 2018-10-19 RX ORDER — BENZONATATE 100 MG/1
100 CAPSULE ORAL 3 TIMES DAILY PRN
Qty: 21 CAPSULE | Refills: 0 | Status: SHIPPED | OUTPATIENT
Start: 2018-10-19 | End: 2018-10-26

## 2018-10-19 RX ORDER — ACETAMINOPHEN 500 MG
500 TABLET ORAL EVERY 6 HOURS PRN
Qty: 120 TABLET | Refills: 3 | Status: SHIPPED | OUTPATIENT
Start: 2018-10-19 | End: 2019-02-12 | Stop reason: SDUPTHER

## 2018-10-19 RX ORDER — ALBUTEROL SULFATE 90 UG/1
2 AEROSOL, METERED RESPIRATORY (INHALATION) EVERY 6 HOURS PRN
Qty: 18 G | Refills: 0 | Status: SHIPPED | OUTPATIENT
Start: 2018-10-19 | End: 2020-01-21

## 2018-10-19 ASSESSMENT — ENCOUNTER SYMPTOMS
NAUSEA: 0
ABDOMINAL PAIN: 0
CONSTIPATION: 0
ABDOMINAL DISTENTION: 0
CHEST TIGHTNESS: 0
SHORTNESS OF BREATH: 0
DIARRHEA: 0
WHEEZING: 0
COUGH: 1
VOMITING: 0
BACK PAIN: 1

## 2018-10-19 NOTE — PROGRESS NOTES
Chief Complaint   Patient presents with    Back Pain     better, didn't get heating pad    Knee Pain     better    Hypertension    Discuss Labs    Cough       Vesta Lehman today for follow up on chronic medical problems, go over labs and/or diagnostic studies, and medication refills. Back Pain (better, didn't get heating pad); Knee Pain (better); Hypertension; Discuss Labs; and Cough    Comes alone. Has chronic knee pain and lower back pain. The knee pain is located over the patellar. The pain is worse after a period of prolonged sitting and when she first starts walking or when climbing up the stairs. He is avoiding stairs. Left knee is worse. Intensity of pain today is 2 out of 10, but goes up to 8 out of 10. Lower back pain is midline and bilaterally, across the lower back, nonradiating. Has difficulty getting up and initiating gait. Walks with small steps. Intensity of pain today is 2 out of 10 but goes up to 8/10. She never received the heating pad. Patient reports she is not getting any therapy anymore. She used to go for aquatherapy, but doesn't have transportation available. She uses a walker now at all times. She denies any falls recently. She says \"that is why I am using the walker\", to prevent falls. .  She also has difficulty with her vision and vision impairment, due to optic atrophy of both eyes per Ophthalmology evaluation. She lives a lone. Ambulates with walker. Says she has [employer] and [employer]. Fadia Dyer is providing her just aid 3 times a week. Idris No complains of cough. Onset of symptoms was 3 weeks ago. Symptoms have been unchanged since that time. Ryan Benton on 9/17/18, everythign got better, but the cough. She says the cough is mostly dry, but sometimes brings  clear and greenish sputum. The cough is productive of clear  and green sputum , and is aggravated by nothing. Associated symptoms include: sputum production.  She denies shortness MG capsule; Take 1 capsule by mouth 3 times daily as needed for Cough  Dispense: 21 capsule; Refill: 0  - albuterol sulfate HFA (PROAIR HFA) 108 (90 Base) MCG/ACT inhaler; Inhale 2 puffs into the lungs every 6 hours as needed for Wheezing or Shortness of Breath (cough)  Dispense: 18 g; Refill: 0  - [address]  Might have some acid reflux associated  Call or return if symptoms persist or fail to improve. 4. Essential hypertension  Not controlled  Discussed low salt diet and BP and pulse monitoring daily, BP log given  Continue current treatment. Needs nurse visit appointment for BP check in 1 week   - Comprehensive Metabolic Panel; Future  - [address]    5. At high risk for falls  - [address]    6. Hyperglycemia  - Hemoglobin A1C; Future  - [address]  Continue Metformin     7. History of TIA (transient ischemic attack)  - [address]  Continue statin and aspirin  Needs good BP control    8. Hyperlipidemia with target LDL less than 100  Worsening    - Lipid Panel; Future  - [address]  Continue lipitor 40 mg which was started in July    9. Chronic pain of both knees  - [address] acetaminophen (APAP EXTRA STRENGTH) 500 MG tablet; Take 1 tablet by mouth every 6 hours as needed for Pain or Fever  Dispense: 120 tablet; Refill: 3  - diclofenac sodium (VOLTAREN) 1 % GEL; Apply 2 g topically 4 times daily as needed for Pain  Dispense: 1 Tube; Refill: 3  - lidocaine (LMX) 4 % cream; Apply 2-3 times a day as needed for pain  Dispense: 120 g; Refill: 3    10.  Morbid obesity with BMI of 45.0-49.9, adult (HCC)  Worsening  Wt Readings from Last 3 Encounters:   10/19/18 262 lb 3.2 oz (118.9 kg)   08/14/18 262 lb 6.4 oz (119 kg)   07/20/18 256 lb 6.4 oz (116.3 kg)   - [address] -

## 2018-10-19 NOTE — PROGRESS NOTES
per FRAX score)  Completed    Flu vaccine  Completed    Pneumococcal low/med risk  Completed    Hepatitis C screen  Completed

## 2018-10-21 ENCOUNTER — TELEPHONE (OUTPATIENT)
Dept: FAMILY MEDICINE CLINIC | Age: 68
End: 2018-10-21

## 2018-10-22 NOTE — TELEPHONE ENCOUNTER
First time:4/30/18 Spoke with the pt. She has the order and is picking up the heating pad tomorrow. I told her to call us if she has any issues. Second time :7/21/18 Yes Hermilo Jack did give me order  I have it , just waiting for patient to call me back with her new medicare card ID #. Patient told me on Friday she would call me back when she gets home , she never did , I left her 2 messages on Friday         Today I faxed order to [facility], patient informed and phone # given to her to find out if they would deliver , this is closes [id number] [facility] for her .  Patient asked to call us if any issues getting it

## 2018-11-02 ENCOUNTER — TELEPHONE (OUTPATIENT)
Dept: FAMILY MEDICINE CLINIC | Age: 68
End: 2018-11-02

## 2018-11-02 NOTE — TELEPHONE ENCOUNTER
Esteban Bond from [facility] called letting us know that PT/OT going to be starting for the patient. They will be in a couple times a week. She did question if pt should be checking her sugars since she is taking metformin. Also wants to be sure pt is supposed to be taking myrbetriq but pt states she hasn't been taking it. Pharmacy will be requesting a refill.

## 2018-11-07 ENCOUNTER — TELEPHONE (OUTPATIENT)
Dept: FAMILY MEDICINE CLINIC | Age: 68
End: 2018-11-07

## 2018-11-07 NOTE — TELEPHONE ENCOUNTER
Malcolm Blackburn asking for verbal order for Lidocaine ointment and a list of allergies-ref # [id number]

## 2018-11-09 ENCOUNTER — TELEPHONE (OUTPATIENT)
Dept: FAMILY MEDICINE CLINIC | Age: 68
End: 2018-11-09

## 2018-11-14 ENCOUNTER — HOSPITAL ENCOUNTER (OUTPATIENT)
Age: 68
Discharge: HOME | End: 2018-11-14
Payer: COMMERCIAL

## 2018-11-14 ENCOUNTER — NURSE ONLY (OUTPATIENT)
Dept: FAMILY MEDICINE CLINIC | Age: 68
End: 2018-11-14
Payer: COMMERCIAL

## 2018-11-14 VITALS — SYSTOLIC BLOOD PRESSURE: 138 MMHG | OXYGEN SATURATION: 96 % | DIASTOLIC BLOOD PRESSURE: 82 MMHG | HEART RATE: 57 BPM

## 2018-11-14 DIAGNOSIS — R82.90 ABNORMAL URINE ODOR: ICD-10-CM

## 2018-11-14 DIAGNOSIS — R73.9 HYPERGLYCEMIA: ICD-10-CM

## 2018-11-14 DIAGNOSIS — I10 ESSENTIAL HYPERTENSION: ICD-10-CM

## 2018-11-14 DIAGNOSIS — E78.5 HYPERLIPIDEMIA WITH TARGET LDL LESS THAN 100: ICD-10-CM

## 2018-11-14 DIAGNOSIS — I10 ESSENTIAL HYPERTENSION: Primary | ICD-10-CM

## 2018-11-14 LAB
-: ABNORMAL
ALBUMIN SERPL-MCNC: 4.2 G/DL (ref 3.5–5.2)
ALBUMIN/GLOBULIN RATIO: ABNORMAL (ref 1–2.5)
ALP BLD-CCNC: 72 U/L (ref 35–104)
ALT SERPL-CCNC: 17 U/L (ref 5–33)
AMORPHOUS: ABNORMAL
ANION GAP SERPL CALCULATED.3IONS-SCNC: 11 MMOL/L (ref 9–17)
AST SERPL-CCNC: 13 U/L
BACTERIA: ABNORMAL
BILIRUB SERPL-MCNC: 0.21 MG/DL (ref 0.3–1.2)
BILIRUBIN URINE: NEGATIVE
BUN BLDV-MCNC: 24 MG/DL (ref 8–23)
BUN/CREAT BLD: ABNORMAL (ref 9–20)
CALCIUM SERPL-MCNC: 9.7 MG/DL (ref 8.6–10.4)
CASTS UA: ABNORMAL /LPF
CHLORIDE BLD-SCNC: 101 MMOL/L (ref 98–107)
CHOLESTEROL/HDL RATIO: 4
CHOLESTEROL: 188 MG/DL
CO2: 29 MMOL/L (ref 20–31)
COLOR: YELLOW
COMMENT UA: ABNORMAL
CREAT SERPL-MCNC: 1.12 MG/DL (ref 0.5–0.9)
CRYSTALS, UA: ABNORMAL /HPF
EPITHELIAL CELLS UA: ABNORMAL /HPF
ESTIMATED AVERAGE GLUCOSE: 128 MG/DL
GFR AFRICAN AMERICAN: 59 ML/MIN
GFR NON-AFRICAN AMERICAN: 49 ML/MIN
GFR SERPL CREATININE-BSD FRML MDRD: ABNORMAL ML/MIN/{1.73_M2}
GFR SERPL CREATININE-BSD FRML MDRD: ABNORMAL ML/MIN/{1.73_M2}
GLUCOSE BLD-MCNC: 110 MG/DL (ref 70–99)
GLUCOSE URINE: NEGATIVE
HBA1C MFR BLD: 6.1 % (ref 4–6)
HDLC SERPL-MCNC: 47 MG/DL
KETONES, URINE: NEGATIVE
LDL CHOLESTEROL: 105 MG/DL (ref 0–130)
LEUKOCYTE ESTERASE, URINE: NEGATIVE
MUCUS: ABNORMAL
NITRITE, URINE: NEGATIVE
OTHER OBSERVATIONS UA: ABNORMAL
PH UA: 6 (ref 5–8)
POTASSIUM SERPL-SCNC: 3.9 MMOL/L (ref 3.7–5.3)
PROTEIN UA: NEGATIVE
RBC UA: ABNORMAL /HPF
RENAL EPITHELIAL, UA: ABNORMAL /HPF
SODIUM BLD-SCNC: 141 MMOL/L (ref 135–144)
SPECIFIC GRAVITY UA: 1.02 (ref 1–1.03)
TOTAL PROTEIN: 7.6 G/DL (ref 6.4–8.3)
TRICHOMONAS: ABNORMAL
TRIGL SERPL-MCNC: 179 MG/DL
TURBIDITY: ABNORMAL
URINE HGB: ABNORMAL
UROBILINOGEN, URINE: NORMAL
VLDLC SERPL CALC-MCNC: ABNORMAL MG/DL (ref 1–30)
WBC UA: ABNORMAL /HPF
YEAST: ABNORMAL

## 2018-11-14 PROCEDURE — 83036 HEMOGLOBIN GLYCOSYLATED A1C: CPT

## 2018-11-14 PROCEDURE — 80053 COMPREHEN METABOLIC PANEL: CPT

## 2018-11-14 PROCEDURE — 99212 OFFICE O/P EST SF 10 MIN: CPT | Performed by: NURSE PRACTITIONER

## 2018-11-14 PROCEDURE — 81001 URINALYSIS AUTO W/SCOPE: CPT

## 2018-11-14 PROCEDURE — 36415 COLL VENOUS BLD VENIPUNCTURE: CPT

## 2018-11-14 PROCEDURE — 80061 LIPID PANEL: CPT

## 2018-12-03 ENCOUNTER — OFFICE VISIT (OUTPATIENT)
Dept: UROLOGY | Age: 68
End: 2018-12-03
Payer: COMMERCIAL

## 2018-12-03 VITALS — HEART RATE: 63 BPM | TEMPERATURE: 97.6 F | SYSTOLIC BLOOD PRESSURE: 125 MMHG | DIASTOLIC BLOOD PRESSURE: 67 MMHG

## 2018-12-03 DIAGNOSIS — R35.1 NOCTURIA: ICD-10-CM

## 2018-12-03 DIAGNOSIS — R39.15 URGENCY OF URINATION: ICD-10-CM

## 2018-12-03 DIAGNOSIS — R35.0 FREQUENCY OF URINATION: ICD-10-CM

## 2018-12-03 PROCEDURE — 99214 OFFICE O/P EST MOD 30 MIN: CPT | Performed by: UROLOGY

## 2018-12-03 ASSESSMENT — ENCOUNTER SYMPTOMS
EYE PAIN: 0
COLOR CHANGE: 0
SHORTNESS OF BREATH: 0
NAUSEA: 0
BACK PAIN: 1
COUGH: 0
EYE REDNESS: 0
ABDOMINAL PAIN: 0
VOMITING: 0
WHEEZING: 0

## 2018-12-14 DIAGNOSIS — I10 ESSENTIAL HYPERTENSION: ICD-10-CM

## 2018-12-14 RX ORDER — LOSARTAN POTASSIUM 100 MG/1
TABLET ORAL
Qty: 90 TABLET | Refills: 2 | Status: SHIPPED | OUTPATIENT
Start: 2018-12-14 | End: 2019-01-18 | Stop reason: RX

## 2018-12-14 NOTE — TELEPHONE ENCOUNTER
Please Approve or Refuse.   Send to Pharmacy per Pt's Request:      Next Visit Date:  1/22/2019   Last Visit Date: 10/19/2018    Hemoglobin A1C (%)   Date Value   11/14/2018 6.1 (H)   07/20/2018 6.0   04/20/2018 5.8             ( goal A1C is < 7)   BP Readings from Last 3 Encounters:   12/03/18 125/67   11/14/18 138/82   10/19/18 (!) 152/88          (goal 120/80)  BUN   Date Value Ref Range Status   11/14/2018 24 (H) 8 - 23 mg/dL Final     CREATININE   Date Value Ref Range Status   11/14/2018 1.12 (H) 0.50 - 0.90 mg/dL Final     Potassium   Date Value Ref Range Status   11/14/2018 3.9 3.7 - 5.3 mmol/L Final

## 2018-12-17 DIAGNOSIS — R73.9 HYPERGLYCEMIA: ICD-10-CM

## 2019-01-09 ENCOUNTER — TELEPHONE (OUTPATIENT)
Dept: FAMILY MEDICINE CLINIC | Age: 69
End: 2019-01-09

## 2019-01-09 DIAGNOSIS — I49.9 IRREGULAR HEART BEAT: Primary | ICD-10-CM

## 2019-01-09 DIAGNOSIS — E55.9 VITAMIN D DEFICIENCY: ICD-10-CM

## 2019-01-10 ENCOUNTER — HOSPITAL ENCOUNTER (OUTPATIENT)
Age: 69
Discharge: HOME | End: 2019-01-10
Payer: COMMERCIAL

## 2019-01-10 DIAGNOSIS — I49.9 IRREGULAR HEART BEAT: ICD-10-CM

## 2019-01-10 LAB
EKG ATRIAL RATE: 64 BPM
EKG P AXIS: 67 DEGREES
EKG P-R INTERVAL: 166 MS
EKG Q-T INTERVAL: 424 MS
EKG QRS DURATION: 88 MS
EKG QTC CALCULATION (BAZETT): 437 MS
EKG R AXIS: 78 DEGREES
EKG T AXIS: 82 DEGREES
EKG VENTRICULAR RATE: 64 BPM

## 2019-01-10 PROCEDURE — 93005 ELECTROCARDIOGRAM TRACING: CPT

## 2019-01-18 ENCOUNTER — TELEPHONE (OUTPATIENT)
Dept: FAMILY MEDICINE CLINIC | Age: 69
End: 2019-01-18

## 2019-01-18 DIAGNOSIS — I10 ESSENTIAL HYPERTENSION: Primary | ICD-10-CM

## 2019-01-18 RX ORDER — LISINOPRIL 40 MG/1
40 TABLET ORAL DAILY
Qty: 90 TABLET | Refills: 1 | Status: SHIPPED | OUTPATIENT
Start: 2019-01-18 | End: 2019-02-12 | Stop reason: SDUPTHER

## 2019-01-25 ENCOUNTER — APPOINTMENT (OUTPATIENT)
Dept: GENERAL RADIOLOGY | Age: 69
End: 2019-01-25
Payer: MEDICARE

## 2019-01-25 ENCOUNTER — HOSPITAL ENCOUNTER (EMERGENCY)
Age: 69
Discharge: HOME | End: 2019-01-25
Attending: EMERGENCY MEDICINE
Payer: MEDICARE

## 2019-01-25 VITALS
BODY MASS INDEX: 46.01 KG/M2 | RESPIRATION RATE: 16 BRPM | WEIGHT: 250 LBS | HEART RATE: 82 BPM | DIASTOLIC BLOOD PRESSURE: 75 MMHG | OXYGEN SATURATION: 98 % | HEIGHT: 62 IN | TEMPERATURE: 98.3 F | SYSTOLIC BLOOD PRESSURE: 185 MMHG

## 2019-01-25 DIAGNOSIS — M17.12 OSTEOARTHRITIS OF LEFT KNEE, UNSPECIFIED OSTEOARTHRITIS TYPE: Primary | ICD-10-CM

## 2019-01-25 PROCEDURE — 73562 X-RAY EXAM OF KNEE 3: CPT

## 2019-01-25 PROCEDURE — 6370000000 HC RX 637 (ALT 250 FOR IP): Performed by: NURSE PRACTITIONER

## 2019-01-25 PROCEDURE — 99283 EMERGENCY DEPT VISIT LOW MDM: CPT

## 2019-01-25 RX ORDER — ACETAMINOPHEN AND CODEINE PHOSPHATE 300; 30 MG/1; MG/1
1 TABLET ORAL EVERY 6 HOURS PRN
Qty: 10 TABLET | Refills: 0 | Status: SHIPPED | OUTPATIENT
Start: 2019-01-25 | End: 2019-01-28

## 2019-01-25 RX ORDER — HYDROCODONE BITARTRATE AND ACETAMINOPHEN 5; 325 MG/1; MG/1
2 TABLET ORAL ONCE
Status: COMPLETED | OUTPATIENT
Start: 2019-01-25 | End: 2019-01-25

## 2019-01-25 RX ADMIN — HYDROCODONE BITARTRATE AND ACETAMINOPHEN 2 TABLET: 5; 325 TABLET ORAL at 14:43

## 2019-01-25 ASSESSMENT — PAIN DESCRIPTION - ORIENTATION: ORIENTATION: LEFT

## 2019-01-25 ASSESSMENT — PAIN DESCRIPTION - PAIN TYPE: TYPE: ACUTE PAIN

## 2019-01-25 ASSESSMENT — ENCOUNTER SYMPTOMS
COUGH: 0
TROUBLE SWALLOWING: 0
NAUSEA: 0
SHORTNESS OF BREATH: 0
VOMITING: 0

## 2019-01-25 ASSESSMENT — PAIN SCALES - GENERAL
PAINLEVEL_OUTOF10: 6
PAINLEVEL_OUTOF10: 6

## 2019-01-25 ASSESSMENT — PAIN DESCRIPTION - DESCRIPTORS: DESCRIPTORS: ACHING

## 2019-01-25 ASSESSMENT — PAIN DESCRIPTION - FREQUENCY: FREQUENCY: CONTINUOUS

## 2019-01-25 ASSESSMENT — PAIN DESCRIPTION - LOCATION: LOCATION: KNEE

## 2019-01-28 ENCOUNTER — TELEPHONE (OUTPATIENT)
Dept: FAMILY MEDICINE CLINIC | Age: 69
End: 2019-01-28

## 2019-02-12 DIAGNOSIS — G89.29 CHRONIC BILATERAL LOW BACK PAIN WITHOUT SCIATICA: ICD-10-CM

## 2019-02-12 DIAGNOSIS — E78.5 HYPERLIPIDEMIA WITH TARGET LDL LESS THAN 100: ICD-10-CM

## 2019-02-12 DIAGNOSIS — M25.561 CHRONIC PAIN OF BOTH KNEES: ICD-10-CM

## 2019-02-12 DIAGNOSIS — M25.562 CHRONIC PAIN OF BOTH KNEES: ICD-10-CM

## 2019-02-12 DIAGNOSIS — Z29.9 PREVENTIVE MEASURE: ICD-10-CM

## 2019-02-12 DIAGNOSIS — I10 ESSENTIAL HYPERTENSION: ICD-10-CM

## 2019-02-12 DIAGNOSIS — E03.9 ACQUIRED HYPOTHYROIDISM: ICD-10-CM

## 2019-02-12 DIAGNOSIS — J30.89 NON-SEASONAL ALLERGIC RHINITIS DUE TO OTHER ALLERGIC TRIGGER: Primary | ICD-10-CM

## 2019-02-12 DIAGNOSIS — R11.0 NAUSEA: ICD-10-CM

## 2019-02-12 DIAGNOSIS — M17.0 PRIMARY OSTEOARTHRITIS OF BOTH KNEES: ICD-10-CM

## 2019-02-12 DIAGNOSIS — E55.9 VITAMIN D DEFICIENCY: ICD-10-CM

## 2019-02-12 DIAGNOSIS — G89.29 CHRONIC PAIN OF BOTH KNEES: ICD-10-CM

## 2019-02-12 DIAGNOSIS — M54.50 CHRONIC BILATERAL LOW BACK PAIN WITHOUT SCIATICA: ICD-10-CM

## 2019-02-12 DIAGNOSIS — K59.01 SLOW TRANSIT CONSTIPATION: ICD-10-CM

## 2019-02-12 RX ORDER — ONDANSETRON 4 MG/1
4 TABLET, FILM COATED ORAL EVERY 8 HOURS PRN
Qty: 60 TABLET | Refills: 3 | Status: SHIPPED | OUTPATIENT
Start: 2019-02-12 | End: 2020-01-14

## 2019-02-12 RX ORDER — LEVOTHYROXINE SODIUM 0.07 MG/1
75 TABLET ORAL
Qty: 90 TABLET | Refills: 3 | Status: SHIPPED | OUTPATIENT
Start: 2019-02-12 | End: 2020-01-21

## 2019-02-12 RX ORDER — ATORVASTATIN CALCIUM 40 MG/1
40 TABLET, FILM COATED ORAL DAILY
Qty: 90 TABLET | Refills: 3 | Status: SHIPPED | OUTPATIENT
Start: 2019-02-12 | End: 2019-03-08 | Stop reason: SDUPTHER

## 2019-02-12 RX ORDER — LISINOPRIL 40 MG/1
40 TABLET ORAL DAILY
Qty: 90 TABLET | Refills: 3 | Status: SHIPPED | OUTPATIENT
Start: 2019-02-12 | End: 2019-04-06 | Stop reason: SDUPTHER

## 2019-02-12 RX ORDER — DOCUSATE SODIUM 100 MG/1
100 CAPSULE, LIQUID FILLED ORAL 2 TIMES DAILY
Qty: 180 CAPSULE | Refills: 3 | Status: SHIPPED | OUTPATIENT
Start: 2019-02-12 | End: 2019-12-14 | Stop reason: SDUPTHER

## 2019-02-12 RX ORDER — LORATADINE 10 MG/1
10 TABLET ORAL DAILY
Qty: 90 TABLET | Refills: 3 | Status: SHIPPED | OUTPATIENT
Start: 2019-02-12 | End: 2020-01-21

## 2019-02-12 RX ORDER — ASPIRIN 81 MG/1
81 TABLET ORAL DAILY
Qty: 90 TABLET | Refills: 3 | Status: SHIPPED | OUTPATIENT
Start: 2019-02-12 | End: 2020-01-21

## 2019-02-12 RX ORDER — ACETAMINOPHEN 500 MG
500 TABLET ORAL EVERY 6 HOURS PRN
Qty: 360 TABLET | Refills: 3 | Status: SHIPPED | OUTPATIENT
Start: 2019-02-12 | End: 2020-09-23

## 2019-03-01 ENCOUNTER — TELEPHONE (OUTPATIENT)
Dept: FAMILY MEDICINE CLINIC | Age: 69
End: 2019-03-01

## 2019-03-01 ENCOUNTER — APPOINTMENT (OUTPATIENT)
Dept: GENERAL RADIOLOGY | Age: 69
End: 2019-03-01
Payer: MEDICARE

## 2019-03-01 ENCOUNTER — APPOINTMENT (OUTPATIENT)
Dept: CT IMAGING | Age: 69
End: 2019-03-01
Payer: MEDICARE

## 2019-03-01 ENCOUNTER — HOSPITAL ENCOUNTER (EMERGENCY)
Age: 69
Discharge: HOME | End: 2019-03-01
Attending: EMERGENCY MEDICINE
Payer: MEDICARE

## 2019-03-01 VITALS
WEIGHT: 250 LBS | RESPIRATION RATE: 20 BRPM | TEMPERATURE: 97.4 F | HEART RATE: 77 BPM | DIASTOLIC BLOOD PRESSURE: 77 MMHG | OXYGEN SATURATION: 96 % | SYSTOLIC BLOOD PRESSURE: 193 MMHG | HEIGHT: 62 IN | BODY MASS INDEX: 46.01 KG/M2

## 2019-03-01 DIAGNOSIS — R07.9 RIGHT-SIDED CHEST PAIN: Primary | ICD-10-CM

## 2019-03-01 DIAGNOSIS — K80.20 CALCULUS OF GALLBLADDER WITHOUT CHOLECYSTITIS WITHOUT OBSTRUCTION: ICD-10-CM

## 2019-03-01 LAB
ABSOLUTE EOS #: 0.2 K/UL (ref 0–0.4)
ABSOLUTE IMMATURE GRANULOCYTE: ABNORMAL K/UL (ref 0–0.3)
ABSOLUTE LYMPH #: 1.5 K/UL (ref 1–4.8)
ABSOLUTE MONO #: 0.6 K/UL (ref 0.1–1.3)
ALBUMIN SERPL-MCNC: 3.8 G/DL (ref 3.5–5.2)
ALBUMIN/GLOBULIN RATIO: ABNORMAL (ref 1–2.5)
ALP BLD-CCNC: 73 U/L (ref 35–104)
ALT SERPL-CCNC: 21 U/L (ref 5–33)
ANION GAP SERPL CALCULATED.3IONS-SCNC: 12 MMOL/L (ref 9–17)
AST SERPL-CCNC: 16 U/L
BASOPHILS # BLD: 1 % (ref 0–2)
BASOPHILS ABSOLUTE: 0.1 K/UL (ref 0–0.2)
BILIRUB SERPL-MCNC: 0.32 MG/DL (ref 0.3–1.2)
BUN BLDV-MCNC: 13 MG/DL (ref 8–23)
BUN/CREAT BLD: ABNORMAL (ref 9–20)
CALCIUM SERPL-MCNC: 9.5 MG/DL (ref 8.6–10.4)
CHLORIDE BLD-SCNC: 102 MMOL/L (ref 98–107)
CO2: 26 MMOL/L (ref 20–31)
CREAT SERPL-MCNC: 0.76 MG/DL (ref 0.5–0.9)
D-DIMER QUANTITATIVE: 0.8 MG/L FEU (ref 0–0.59)
DIFFERENTIAL TYPE: ABNORMAL
EOSINOPHILS RELATIVE PERCENT: 2 % (ref 0–4)
GFR AFRICAN AMERICAN: >60 ML/MIN
GFR NON-AFRICAN AMERICAN: >60 ML/MIN
GFR SERPL CREATININE-BSD FRML MDRD: ABNORMAL ML/MIN/{1.73_M2}
GFR SERPL CREATININE-BSD FRML MDRD: ABNORMAL ML/MIN/{1.73_M2}
GLUCOSE BLD-MCNC: 152 MG/DL (ref 70–99)
HCT VFR BLD CALC: 38.4 % (ref 36–46)
HEMOGLOBIN: 12.6 G/DL (ref 12–16)
IMMATURE GRANULOCYTES: ABNORMAL %
LYMPHOCYTES # BLD: 16 % (ref 24–44)
MCH RBC QN AUTO: 29.9 PG (ref 26–34)
MCHC RBC AUTO-ENTMCNC: 32.8 G/DL (ref 31–37)
MCV RBC AUTO: 91.4 FL (ref 80–100)
MONOCYTES # BLD: 6 % (ref 1–7)
NRBC AUTOMATED: ABNORMAL PER 100 WBC
PDW BLD-RTO: 14.3 % (ref 11.5–14.9)
PLATELET # BLD: 265 K/UL (ref 150–450)
PLATELET ESTIMATE: ABNORMAL
PMV BLD AUTO: 8.4 FL (ref 6–12)
POTASSIUM SERPL-SCNC: 3.7 MMOL/L (ref 3.7–5.3)
RBC # BLD: 4.2 M/UL (ref 4–5.2)
RBC # BLD: ABNORMAL 10*6/UL
SEG NEUTROPHILS: 75 % (ref 36–66)
SEGMENTED NEUTROPHILS ABSOLUTE COUNT: 7.2 K/UL (ref 1.3–9.1)
SODIUM BLD-SCNC: 140 MMOL/L (ref 135–144)
TOTAL PROTEIN: 7.2 G/DL (ref 6.4–8.3)
TROPONIN INTERP: NORMAL
TROPONIN INTERP: NORMAL
TROPONIN T: NORMAL NG/ML
TROPONIN T: NORMAL NG/ML
TROPONIN, HIGH SENSITIVITY: 11 NG/L (ref 0–14)
TROPONIN, HIGH SENSITIVITY: 12 NG/L (ref 0–14)
WBC # BLD: 9.6 K/UL (ref 3.5–11)
WBC # BLD: ABNORMAL 10*3/UL

## 2019-03-01 PROCEDURE — 71260 CT THORAX DX C+: CPT

## 2019-03-01 PROCEDURE — 93005 ELECTROCARDIOGRAM TRACING: CPT

## 2019-03-01 PROCEDURE — 71101 X-RAY EXAM UNILAT RIBS/CHEST: CPT

## 2019-03-01 PROCEDURE — 6370000000 HC RX 637 (ALT 250 FOR IP): Performed by: EMERGENCY MEDICINE

## 2019-03-01 PROCEDURE — 6360000004 HC RX CONTRAST MEDICATION: Performed by: EMERGENCY MEDICINE

## 2019-03-01 PROCEDURE — 84484 ASSAY OF TROPONIN QUANT: CPT

## 2019-03-01 PROCEDURE — 2580000003 HC RX 258: Performed by: EMERGENCY MEDICINE

## 2019-03-01 PROCEDURE — 85025 COMPLETE CBC W/AUTO DIFF WBC: CPT

## 2019-03-01 PROCEDURE — 99284 EMERGENCY DEPT VISIT MOD MDM: CPT

## 2019-03-01 PROCEDURE — 36415 COLL VENOUS BLD VENIPUNCTURE: CPT

## 2019-03-01 PROCEDURE — 80053 COMPREHEN METABOLIC PANEL: CPT

## 2019-03-01 PROCEDURE — 85379 FIBRIN DEGRADATION QUANT: CPT

## 2019-03-01 RX ORDER — ACETAMINOPHEN 325 MG/1
650 TABLET ORAL ONCE
Status: COMPLETED | OUTPATIENT
Start: 2019-03-01 | End: 2019-03-01

## 2019-03-01 RX ORDER — SODIUM CHLORIDE 0.9 % (FLUSH) 0.9 %
10 SYRINGE (ML) INJECTION PRN
Status: DISCONTINUED | OUTPATIENT
Start: 2019-03-01 | End: 2019-03-01 | Stop reason: HOSPADM

## 2019-03-01 RX ORDER — CYCLOBENZAPRINE HCL 10 MG
10 TABLET ORAL NIGHTLY PRN
Qty: 30 TABLET | Refills: 0 | Status: SHIPPED | OUTPATIENT
Start: 2019-03-01 | End: 2019-03-11

## 2019-03-01 RX ORDER — 0.9 % SODIUM CHLORIDE 0.9 %
80 INTRAVENOUS SOLUTION INTRAVENOUS ONCE
Status: COMPLETED | OUTPATIENT
Start: 2019-03-01 | End: 2019-03-01

## 2019-03-01 RX ORDER — HYDROCODONE BITARTRATE AND ACETAMINOPHEN 5; 325 MG/1; MG/1
2 TABLET ORAL ONCE
Status: DISCONTINUED | OUTPATIENT
Start: 2019-03-01 | End: 2019-03-01 | Stop reason: HOSPADM

## 2019-03-01 RX ADMIN — IOVERSOL 75 ML: 741 INJECTION INTRA-ARTERIAL; INTRAVENOUS at 11:37

## 2019-03-01 RX ADMIN — Medication 10 ML: at 11:37

## 2019-03-01 RX ADMIN — SODIUM CHLORIDE 80 ML: 9 INJECTION, SOLUTION INTRAVENOUS at 11:37

## 2019-03-01 RX ADMIN — ACETAMINOPHEN 650 MG: 325 TABLET, FILM COATED ORAL at 12:40

## 2019-03-01 ASSESSMENT — PAIN DESCRIPTION - ORIENTATION: ORIENTATION: RIGHT

## 2019-03-01 ASSESSMENT — ENCOUNTER SYMPTOMS
ABDOMINAL PAIN: 0
NAUSEA: 0
COUGH: 0
BACK PAIN: 0
SHORTNESS OF BREATH: 0
VOMITING: 0

## 2019-03-01 ASSESSMENT — PAIN SCALES - GENERAL
PAINLEVEL_OUTOF10: 5
PAINLEVEL_OUTOF10: 8

## 2019-03-01 ASSESSMENT — PAIN DESCRIPTION - LOCATION: LOCATION: RIB CAGE

## 2019-03-01 ASSESSMENT — PAIN DESCRIPTION - PAIN TYPE: TYPE: ACUTE PAIN

## 2019-03-02 LAB
EKG ATRIAL RATE: 76 BPM
EKG P AXIS: 46 DEGREES
EKG P-R INTERVAL: 158 MS
EKG Q-T INTERVAL: 398 MS
EKG QRS DURATION: 82 MS
EKG QTC CALCULATION (BAZETT): 447 MS
EKG R AXIS: 70 DEGREES
EKG T AXIS: 72 DEGREES
EKG VENTRICULAR RATE: 76 BPM

## 2019-03-08 ENCOUNTER — OFFICE VISIT (OUTPATIENT)
Dept: FAMILY MEDICINE CLINIC | Age: 69
End: 2019-03-08
Payer: MEDICARE

## 2019-03-08 ENCOUNTER — HOSPITAL ENCOUNTER (OUTPATIENT)
Age: 69
Discharge: HOME | End: 2019-03-08
Payer: COMMERCIAL

## 2019-03-08 VITALS
SYSTOLIC BLOOD PRESSURE: 130 MMHG | WEIGHT: 254 LBS | BODY MASS INDEX: 46.74 KG/M2 | HEIGHT: 62 IN | OXYGEN SATURATION: 98 % | HEART RATE: 70 BPM | DIASTOLIC BLOOD PRESSURE: 80 MMHG

## 2019-03-08 DIAGNOSIS — M17.0 PRIMARY OSTEOARTHRITIS OF BOTH KNEES: Primary | ICD-10-CM

## 2019-03-08 DIAGNOSIS — E66.01 MORBID OBESITY WITH BMI OF 45.0-49.9, ADULT (HCC): ICD-10-CM

## 2019-03-08 DIAGNOSIS — I10 ESSENTIAL HYPERTENSION: ICD-10-CM

## 2019-03-08 DIAGNOSIS — R73.9 HYPERGLYCEMIA: ICD-10-CM

## 2019-03-08 DIAGNOSIS — M89.9 BONE DISORDER: ICD-10-CM

## 2019-03-08 DIAGNOSIS — Z51.81 MEDICATION MONITORING ENCOUNTER: ICD-10-CM

## 2019-03-08 DIAGNOSIS — K80.20 CALCULUS OF GALLBLADDER WITHOUT CHOLECYSTITIS WITHOUT OBSTRUCTION: ICD-10-CM

## 2019-03-08 DIAGNOSIS — M17.0 PRIMARY OSTEOARTHRITIS OF BOTH KNEES: ICD-10-CM

## 2019-03-08 DIAGNOSIS — E03.9 ACQUIRED HYPOTHYROIDISM: ICD-10-CM

## 2019-03-08 DIAGNOSIS — Z12.31 ENCOUNTER FOR SCREENING MAMMOGRAM FOR BREAST CANCER: ICD-10-CM

## 2019-03-08 DIAGNOSIS — E78.5 HYPERLIPIDEMIA WITH TARGET LDL LESS THAN 100: ICD-10-CM

## 2019-03-08 DIAGNOSIS — Z91.81 AT HIGH RISK FOR FALLS: ICD-10-CM

## 2019-03-08 DIAGNOSIS — Z23 NEED FOR PROPHYLACTIC VACCINATION AND INOCULATION AGAINST VARICELLA: ICD-10-CM

## 2019-03-08 DIAGNOSIS — I70.0 AORTIC CALCIFICATION (HCC): ICD-10-CM

## 2019-03-08 LAB
FOLATE: 9.4 NG/ML
HBA1C MFR BLD: 5.4 %
TSH SERPL DL<=0.05 MIU/L-ACNC: 3.8 MIU/L (ref 0.3–5)
VITAMIN B-12: 229 PG/ML (ref 232–1245)
VITAMIN D 25-HYDROXY: 27.7 NG/ML (ref 30–100)

## 2019-03-08 PROCEDURE — 83036 HEMOGLOBIN GLYCOSYLATED A1C: CPT | Performed by: FAMILY MEDICINE

## 2019-03-08 PROCEDURE — 36415 COLL VENOUS BLD VENIPUNCTURE: CPT

## 2019-03-08 PROCEDURE — 99214 OFFICE O/P EST MOD 30 MIN: CPT | Performed by: FAMILY MEDICINE

## 2019-03-08 PROCEDURE — 82746 ASSAY OF FOLIC ACID SERUM: CPT

## 2019-03-08 PROCEDURE — 82607 VITAMIN B-12: CPT

## 2019-03-08 PROCEDURE — 82306 VITAMIN D 25 HYDROXY: CPT

## 2019-03-08 PROCEDURE — 84443 ASSAY THYROID STIM HORMONE: CPT

## 2019-03-08 ASSESSMENT — ENCOUNTER SYMPTOMS
VOMITING: 0
CHEST TIGHTNESS: 0
WHEEZING: 0
ABDOMINAL PAIN: 0
DIARRHEA: 0
SHORTNESS OF BREATH: 0
BACK PAIN: 1
ABDOMINAL DISTENTION: 0
NAUSEA: 0
COUGH: 0
CONSTIPATION: 0

## 2019-03-08 ASSESSMENT — PATIENT HEALTH QUESTIONNAIRE - PHQ9
SUM OF ALL RESPONSES TO PHQ9 QUESTIONS 1 & 2: 0
1. LITTLE INTEREST OR PLEASURE IN DOING THINGS: 0
SUM OF ALL RESPONSES TO PHQ QUESTIONS 1-9: 0
SUM OF ALL RESPONSES TO PHQ QUESTIONS 1-9: 0
2. FEELING DOWN, DEPRESSED OR HOPELESS: 0

## 2019-03-09 DIAGNOSIS — E53.8 VITAMIN B 12 DEFICIENCY: ICD-10-CM

## 2019-03-09 DIAGNOSIS — E55.9 VITAMIN D DEFICIENCY: Primary | ICD-10-CM

## 2019-03-09 RX ORDER — CHOLECALCIFEROL (VITAMIN D3) 50 MCG
2000 TABLET ORAL DAILY
Qty: 90 TABLET | Refills: 3 | Status: SHIPPED | OUTPATIENT
Start: 2019-03-09 | End: 2020-09-23 | Stop reason: SDUPTHER

## 2019-03-09 RX ORDER — LANOLIN ALCOHOL/MO/W.PET/CERES
1000 CREAM (GRAM) TOPICAL DAILY
Qty: 90 TABLET | Refills: 3 | Status: SHIPPED | OUTPATIENT
Start: 2019-03-09 | End: 2020-11-09 | Stop reason: SDUPTHER

## 2019-03-10 PROBLEM — K75.81 NASH (NONALCOHOLIC STEATOHEPATITIS): Status: ACTIVE | Noted: 2019-03-10

## 2019-03-10 PROBLEM — N63.20 LEFT BREAST LUMP: Status: RESOLVED | Noted: 2017-03-20 | Resolved: 2019-03-10

## 2019-03-10 PROBLEM — I70.0 AORTIC CALCIFICATION (HCC): Status: ACTIVE | Noted: 2019-03-10

## 2019-03-10 PROBLEM — K80.20 CALCULUS OF GALLBLADDER WITHOUT CHOLECYSTITIS WITHOUT OBSTRUCTION: Status: ACTIVE | Noted: 2019-03-10

## 2019-03-11 RX ORDER — ATORVASTATIN CALCIUM 40 MG/1
TABLET, FILM COATED ORAL
Qty: 90 TABLET | Refills: 3 | Status: SHIPPED | OUTPATIENT
Start: 2019-03-11 | End: 2020-02-19

## 2019-03-21 ENCOUNTER — OFFICE VISIT (OUTPATIENT)
Dept: GYNECOLOGIC ONCOLOGY | Age: 69
End: 2019-03-21
Payer: MEDICARE

## 2019-03-21 VITALS
TEMPERATURE: 97.1 F | BODY MASS INDEX: 46.01 KG/M2 | OXYGEN SATURATION: 96 % | HEART RATE: 63 BPM | DIASTOLIC BLOOD PRESSURE: 73 MMHG | HEIGHT: 62 IN | WEIGHT: 250 LBS | SYSTOLIC BLOOD PRESSURE: 137 MMHG

## 2019-03-21 DIAGNOSIS — Z85.42 ENCOUNTER FOR FOLLOW-UP SURVEILLANCE OF ENDOMETRIAL CANCER: Primary | ICD-10-CM

## 2019-03-21 DIAGNOSIS — Z08 ENCOUNTER FOR FOLLOW-UP SURVEILLANCE OF ENDOMETRIAL CANCER: Primary | ICD-10-CM

## 2019-03-21 PROCEDURE — 99214 OFFICE O/P EST MOD 30 MIN: CPT | Performed by: PHYSICIAN ASSISTANT

## 2019-03-21 ASSESSMENT — ENCOUNTER SYMPTOMS
EYES NEGATIVE: 1
RESPIRATORY NEGATIVE: 1
GASTROINTESTINAL NEGATIVE: 1

## 2019-04-06 DIAGNOSIS — I10 ESSENTIAL HYPERTENSION: ICD-10-CM

## 2019-04-08 RX ORDER — LISINOPRIL 40 MG/1
TABLET ORAL
Qty: 90 TABLET | Refills: 3 | Status: SHIPPED | OUTPATIENT
Start: 2019-04-08 | End: 2019-10-03 | Stop reason: ALTCHOICE

## 2019-04-08 NOTE — TELEPHONE ENCOUNTER
Please Approve or Refuse.   Send to Pharmacy per Pt's Request:      Next Visit Date:  6/18/2019   Last Visit Date: 3/8/2019    Hemoglobin A1C (%)   Date Value   03/08/2019 5.4   11/14/2018 6.1 (H)   07/20/2018 6.0             ( goal A1C is < 7)   BP Readings from Last 3 Encounters:   03/21/19 137/73   03/08/19 130/80   03/01/19 (!) 193/77          (goal 120/80)  BUN   Date Value Ref Range Status   03/01/2019 13 8 - 23 mg/dL Final     CREATININE   Date Value Ref Range Status   03/01/2019 0.76 0.50 - 0.90 mg/dL Final     Potassium   Date Value Ref Range Status   03/01/2019 3.7 3.7 - 5.3 mmol/L Final

## 2019-04-16 ENCOUNTER — HOSPITAL ENCOUNTER (OUTPATIENT)
Dept: PHYSICAL THERAPY | Age: 69
Setting detail: THERAPIES SERIES
Discharge: HOME | End: 2019-04-16
Payer: MEDICARE

## 2019-04-16 PROCEDURE — 97110 THERAPEUTIC EXERCISES: CPT

## 2019-04-16 PROCEDURE — 97161 PT EVAL LOW COMPLEX 20 MIN: CPT

## 2019-04-16 ASSESSMENT — PAIN DESCRIPTION - LOCATION: LOCATION: KNEE

## 2019-04-16 ASSESSMENT — PAIN DESCRIPTION - FREQUENCY: FREQUENCY: CONTINUOUS

## 2019-04-16 ASSESSMENT — PAIN DESCRIPTION - ORIENTATION: ORIENTATION: RIGHT;LEFT

## 2019-04-16 ASSESSMENT — PAIN DESCRIPTION - PAIN TYPE: TYPE: CHRONIC PAIN

## 2019-04-16 ASSESSMENT — PAIN SCALES - GENERAL: PAINLEVEL_OUTOF10: 8

## 2019-04-16 ASSESSMENT — PAIN DESCRIPTION - DESCRIPTORS: DESCRIPTORS: CONSTANT

## 2019-04-16 NOTE — PROGRESS NOTES
[facility] Fall Risk Assessment    Risk Factor Scale  Score   History of Falls [] Yes  [x] No 25  0 0   Secondary Diagnosis [] Yes  [x] No 15  0 0   Ambulatory Aid [] Furniture  [x] Crutches/cane/walker  [] None/bedrest/wheelchair/nurse 30  15  0 15   IV/Heparin Lock [] Yes  [x] No 20  0 0   Gait/Transferring [] Impaired  [x] Weak  [] Normal/bedrest/immobile 20  10  0 10   Mental Status [] Forgets limitations  [x] Oriented to own ability 15  0 0      Total:25     Based on the Assessment score: check the appropriate box.     []  No intervention needed   Low =   Score of 0-24    [x]  Use standard prevention interventions Moderate =  Score of 24-44   [x] Give patient handout and discuss fall prevention strategies   [x] Establish goal of education for patient/family RE: fall prevention strategies    []  Use high risk prevention interventions High = Score of 45 and higher   [] Give patient handout and discuss fall prevention strategies   [] Establish goal of education for patient/family Re: fall prevention strategies   [] Discuss lifeline / other resources    Electronically signed by:   Miladys Darby PT  Date: 4/16/2019

## 2019-04-16 NOTE — PROGRESS NOTES
Physical Therapy  Initial Assessment  Date: 2019  Patient Name: Blanca Hills  MRN: [id number]  : 1950     Treatment Diagnosis: stiffness B knees M25.661,M25.662 weakness B knees M62.561,M62.562 difficulty walking R26.2 NEC    Restrictions  Restrictions/Precautions  Restrictions/Precautions: Fall Risk    Subjective   General  Chart Reviewed: Yes  Patient assessed for rehabilitation services?: Yes  Additional Pertinent Hx: pain B knees worst in the last month,no injury  Family / Caregiver Present: No  Referring Practitioner: Dr Dewey Du  Referral Date : 19  Diagnosis: OA B knees M17.0 high risk for falls Z91.81  Follows Commands: Within Functional Limits  PT Visit Information  Onset Date: 19  PT Insurance Information: [facility] medicare  Total # of Visits Approved: 12  Total # of Visits to Date: 1  Subjective  Subjective: pain on both knees  Pain Screening  Patient Currently in Pain: Yes  Pain Assessment  Pain Assessment: 0-10  Pain Level: 8  Pain Type: Chronic pain  Pain Location: Knee  Pain Orientation: Right;Left  Pain Descriptors: Constant  Pain Frequency: Continuous  Vital Signs  Patient Currently in Pain: Yes    Vision/Hearing  Vision  Vision: Impaired(wear glasses)  Hearing  Hearing: Within functional limits    Orientation  Orientation  Overall Orientation Status: Within Normal Limits    Social/Functional History  Social/Functional History  Lives With: Alone  Type of Home: Apartment  Home Access: Elevator  Bathroom Shower/Tub: Shower chair without back  Bathroom Toilet: Handicap height  Bathroom Equipment: Grab bars in shower  Home Equipment: Rolling walker;Quad cane  Receives Help From: Home health  ADL Assistance: Needs assistance  Homemaking Assistance: Needs assistance  Ambulation Assistance: Independent  Transfer Assistance: Independent  Active : No  Mode of Transportation: [employer]  Additional Comments: lives in [place] [address] [address]    Objective  Observation/Palpation  Observation: B genu valgus  AROM RLE (degrees)  RLE AROM: Exceptions  R Knee Flexion 0-145: 0-100  R Knee Extension 0: 0  AROM LLE (degrees)  LLE AROM : Exceptions  L Knee Flexion 0-145: 10-95  L Knee Extension 0: 10  Strength RLE  Comment: hip 3/5 knee 4-/5 ankle 4/5  Strength LLE  Comment: hip 3/5 knee 4-/5 ankle 4/5  Bed mobility  Rolling to Left: Independent  Rolling to Right: Independent  Supine to Sit: Independent  Sit to Supine: Independent  Transfers  Sit to Stand: Independent  Stand to sit: Independent  Bed to Chair: Independent  Stand Pivot Transfers: Independent  Ambulation  Ambulation?: Yes  Ambulation 1  Surface: level tile  Device: Rolling Walker  Assistance: Independent  Comments: TUG 26 secs  Stairs/Curb  Stairs?: No     Exercises  Exercise 1: B LAQ's 10x 5\" hold  Exercise 2: standing holding on RW 3 way hip BLE 10x  Exercise 3: B heel/toe raise 20x holding on RW  Exercise 4: B marching 10x holding on RW  Exercise 5: B knee flexion 10x    Assessment   Conditions Requiring Skilled Therapeutic Intervention  Body structures, Functions, Activity limitations: Decreased functional mobility ; Decreased ADL status; Decreased ROM; Decreased strength; Increased Pain  Assessment: pain both knees limiting function  Treatment Diagnosis: stiffness B knees M25.661,M25.662 weakness B knees M62.561,M62.562 difficulty walking R26.2 NEC  Prognosis: Good  Decision Making: Low Complexity  History: pain both knees got worst 1 month ago,no injury  Exam: limited ROM and strength B knees  Clinical Presentation: optimal score 8/10  Patient Education: active ex B knees in sitting and standing  Barriers to Learning: none  REQUIRES PT FOLLOW UP: Yes  Treatment Initiated : therapeutic ex B knees  Discharge Recommendations: Home independently;Home with assist PRN  Activity Tolerance  Activity Tolerance: Patient Tolerated treatment well         Plan   Plan  Times per week: 2x/week  Plan weeks: 6 weeks  Specific instructions for Next Treatment: Aquatic PT knee protocol level 1  Current Treatment Recommendations: Aquatics, Strengthening, ROM, Home Exercise Program  Plan Comment: instructed in HEP    Goals  Short term goals  Time Frame for Short term goals: 6 visits  Short term goal 1: decrease pain on both knees by 50% so patient can tolerate walking longer than 10 min  Short term goal 2: increase AROM both knees by 10-20  Short term goal 3: increase strength BLE by 1/2 grade or better  Short term goal 4: indep with HEP  Long term goals  Time Frame for Long term goals : 12 visits  Long term goal 1: improve TUG from 26 secs to 10 secs to prevent falls  Long term goal 2: improve optimal score from 8/10 to 2/10( walk long distance 4 outdoor 4)  Patient Goals   Patient goals : relieve pain on both knees     Treatment Charges: Minutes Units   []  Ultrasound     []  Electrical-Stim     []  Iontophoresis     []  Traction     []  Massage       [x]  Eval 20 1   []  Gait     [x]  Ther Exercise 20  1    []  Manual Therapy       []  Ther Activities       []  Aquatics     []  Vasopneumatic Device     []  Neuro Re-Ed       []  Other       Total Treatment Time: 40 2        Therapy Time   Individual Concurrent Group Co-treatment   Time In 1020         Time Out 1100         Minutes 40         Timed Code Treatment Minutes: 20 Minutes    Patient Goals:relieve pain on both knees    Comments/Assessment:    Rehab Potential:  [x] Good  [] Fair  [] Poor   Suggested Professional Referral:  [x] No  [] Yes:  Barriers to Goal Achievement:  [] No  [x] Yes:chronic  Domestic Concerns:  [x] No  [] Yes:    Treatment Plan:  [x] Therapeutic Exercise    [] Modalities:  [] Therapeutic Activity    [] Ultrasound  [] Electrical Stimulation  [] Gait Training     [] Massage       [] Lumbar/Cervical Traction  [] Neuromuscular Re-education [] Cold/hot pack [] Other:  [x] Instruction in HEP              [] Work Conditioning                                  [] Manual Therapy                     [x] Aquatic Therapy     [] Vasocompression  [] Iontophoresis: 4 mg/mL                      Dexamethasone Sodium      Phosphate 40-80mAmin (Allergies Reviewed)     Frequency:         2  X/wk x        6  wk's      [x] Plans/Goals, Risk/Benefits discussed with pt/family  Comprehension of Education [x] yes  [] Needs Review  Pt/Family Education: [x] Verbal  [x] Demo  [] Written    More objective information is available upon request.  Thank you for this referral.        Medicare/Regulatory Requirements:  I have reviewed this plan of care and certify a need for   Medically necessary rehabilitation services.   [] Physician Signature    Date:     Electronically signed by: Lola Givens, [id number] @ [address], [facility].  Phone [phone]  Fax [phone]

## 2019-04-16 NOTE — PROGRESS NOTES
[facility] Services Exercise Log  Aquatic Knee phase 1    Date of Eval: 4/15/19                               Primary PT:Andres  Diagnosis:OA B knees  Things to Focus On (goals): relieve pain B knees  Surgical Precautions:  Medical Precautions:DM,HTN  [] C-9 dates  [] Occ Med   [x] Medicare       Date        Visit #        Walk F/L/R        Marching        Squats        Heel toe raises        SLR F/L/R        HS Curl        Step-Ups F/L        Knee/Flex /Ext                Kickboard Ex.         Iso Abd. verticle        Push-pull        Paddling                Balance        SLS        DEEP H2O        Cycling                Stretches        Achllies        Hamstring        Psoas        Quad                                                                                Pain Rating

## 2019-04-25 ENCOUNTER — HOSPITAL ENCOUNTER (OUTPATIENT)
Dept: PHYSICAL THERAPY | Age: 69
Setting detail: THERAPIES SERIES
Discharge: HOME | End: 2019-04-25
Payer: MEDICARE

## 2019-04-25 PROCEDURE — 97113 AQUATIC THERAPY/EXERCISES: CPT

## 2019-04-25 ASSESSMENT — PAIN DESCRIPTION - ORIENTATION: ORIENTATION: RIGHT;LEFT

## 2019-04-25 ASSESSMENT — PAIN DESCRIPTION - LOCATION: LOCATION: KNEE

## 2019-04-25 ASSESSMENT — PAIN SCALES - GENERAL: PAINLEVEL_OUTOF10: 3

## 2019-04-25 ASSESSMENT — PAIN DESCRIPTION - PAIN TYPE: TYPE: CHRONIC PAIN

## 2019-04-25 ASSESSMENT — PAIN DESCRIPTION - FREQUENCY: FREQUENCY: CONTINUOUS

## 2019-04-25 ASSESSMENT — PAIN DESCRIPTION - DESCRIPTORS: DESCRIPTORS: CONSTANT;ACHING

## 2019-04-25 NOTE — PROGRESS NOTES
Physical Therapy  [address]   Outpatient Physical Therapy  [address] [facility]. [address]  Phone: [phone]  Fax: [phone]  Daily Progress Note    Date: 19    Patient Name: Mickey Villalobos        MRN: [id number]  Account: [de-identified] : 1950      General Information:  Chart Reviewed: Yes  Patient assessed for rehabilitation services?: Yes  Additional Pertinent Hx: pain B knees worst in the last month,no injury  Family / Caregiver Present: No  Referring Practitioner: Dr Benji Edmondson  Referral Date : 19  Diagnosis: OA B knees M17.0 high risk for falls Z91.81  Follows Commands: Within Functional Limits  Onset Date: 19  PT Insurance Information: [employer] medicare  Total # of Visits Approved: 12  Total # of Visits to Date: 2  No Show: 0  Canceled Appointment: 0    Subjective:  Subjective: reports both knees hurting this morning but right is hurting a little more. states having trouble getting in and out of car/vechicles lately     Pain:  Patient Currently in Pain: Yes  Pain Assessment: 0-10  Pain Level: 3  Pain Type: Chronic pain  Pain Location: Knee  Pain Orientation: Right;Left(R>L)  Pain Descriptors: Constant; Aching  Pain Frequency: Continuous       Objective:    [address] Services Exercise Log  Aquatic Knee phase 1     Date of Eval: 4/15/19                               Primary PT:Andres  Diagnosis:OA B knees  Things to Focus On (goals): relieve pain B knees  Surgical Precautions:  Medical Precautions:DM,HTN  [] C-9 dates  [] Occ Med   [x] Medicare         Date 19            Visit # 2/12            Walk F/L/R  2 Laps           Marching  10x           Squats  10x5\"           Heel toe raises  10x           SLR F/L/R  10x           HS Curl  10x           Step-Ups F/L             Knee/Flex /Ext  10x                         Kickboard Ex.  Sm           Iso Abd. verticle  10x5\"           Push-pull  10x           Paddling  10x

## 2019-04-30 ENCOUNTER — TELEPHONE (OUTPATIENT)
Dept: UROLOGY | Age: 69
End: 2019-04-30

## 2019-04-30 ENCOUNTER — HOSPITAL ENCOUNTER (OUTPATIENT)
Dept: PHYSICAL THERAPY | Age: 69
Setting detail: THERAPIES SERIES
Discharge: HOME | End: 2019-04-30
Payer: MEDICARE

## 2019-04-30 PROCEDURE — 97113 AQUATIC THERAPY/EXERCISES: CPT

## 2019-04-30 ASSESSMENT — PAIN DESCRIPTION - LOCATION: LOCATION: KNEE

## 2019-04-30 ASSESSMENT — PAIN DESCRIPTION - DESCRIPTORS: DESCRIPTORS: CONSTANT;ACHING

## 2019-04-30 ASSESSMENT — PAIN DESCRIPTION - ORIENTATION: ORIENTATION: RIGHT;LEFT

## 2019-04-30 ASSESSMENT — PAIN DESCRIPTION - PAIN TYPE: TYPE: CHRONIC PAIN

## 2019-04-30 ASSESSMENT — PAIN SCALES - GENERAL: PAINLEVEL_OUTOF10: 2

## 2019-04-30 ASSESSMENT — PAIN DESCRIPTION - FREQUENCY: FREQUENCY: CONTINUOUS

## 2019-04-30 NOTE — PROGRESS NOTES
Physical Therapy      [address] [place] [address]   Outpatient Physical Therapy  [address] [place]. [address]  Phone: [phone]  Fax: [phone]  Daily Progress Note    Date: 19    Patient Name: Mick Varghese        MRN: [id number]  Account: [de-identified] : 1950      General Information:  Chart Reviewed: Yes  Patient assessed for rehabilitation services?: Yes  Additional Pertinent Hx: pain B knees worst in the last month,no injury  Referring Practitioner: Dr Aniceto Downey  Referral Date : 19  Diagnosis: OA B knees M17.0 high risk for falls Z91.81  Follows Commands: Within Functional Limits  Onset Date: 19  PT Insurance Information: [employer] medicare  Total # of Visits Approved: 12  Total # of Visits to Date: 3  No Show: 0  Canceled Appointment: 0    Subjective:  Subjective: PAtient reports low back hurting this morning more then (B) knees. states (B) knee have a mild ache today. Notes fo increased pain or soreness after initial aquatic therapy visit.  states felt better after the pool. Pain:  Patient Currently in Pain: Yes  Pain Assessment: 0-10  Pain Level: 2  Pain Type: Chronic pain  Pain Location: Knee  Pain Orientation: Right;Left  Pain Descriptors: Constant; Aching  Pain Frequency: Continuous     Objective:    [address] Services Exercise Log  Aquatic Knee phase 1     Date of Eval: 4/15/19                               Primary PT:Andres  Diagnosis:OA B knees  Things to Focus On (goals): relieve pain B knees  Surgical Precautions:  Medical Precautions:DM,HTN  [] C-9 dates  [] Occ Med   [x] Medicare         Date 19         Visit # 2/12   3/12         Walk F/L/R  2 Laps  2 Laps         Marching  10x  2 Laps         Squats  10x5\"  15x5\"         Heel toe raises  10x  15x         SLR F/L/R  10x  15x         HS Curl  10x  15x         Step-Ups F/L    Low 10x         Knee/Flex /Ext  10x  15x                       Kickboard Ex.  Sm  Sm         Iso Abd. verticle  10x5\"  15x5\"         Push-pull  10x  15x         Paddling  10x  15x                       Balance             SLS             DEEP H2O             Cycling                           Stretches             Achllies  2x20\"  2x20\"         Hamstring  2x20\"  2x20\"         Psoas             Quad                                                                                                                Cool Down 2 Laps   2 Laps                       Pain Rating  3 4-5            Comment:  Comments: reviewed postural awareness and core stability. Assessment: Body structures, Functions, Activity limitations: Decreased functional mobility ; Decreased ADL status; Decreased ROM; Decreased strength; Increased Pain  Treatment Diagnosis: stiffness B knees M25.661,M25.662 weakness B knees M62.561,M62.562 difficulty walking R26.2 NEC  Prognosis: Good  REQUIRES PT FOLLOW UP: Yes  Activity Tolerance: Patient Tolerated treatment well    Plan:  Plan: Continue with current plan    Therapy Time:  Time In: 1005  Time Out: 1040  Minutes: 35  Timed Code Treatment Minutes: 30 Minutes    Treatment Charges: Minutes Units   []  Ultrasound     []  Electrical-Stim     []  Iontophoresis     []  Traction     []  Massage       []  Eval     []  Gait     []  Vasopneumatic Device     []  Ther Exercise       []  Manual Therapy       []  Ther Activities       [x]  Aquatics 30 2   []  Neuro Re-Ed       []  Other       Total Treatment Time: 30  2     Billie Degroot PTA

## 2019-04-30 NOTE — TELEPHONE ENCOUNTER
Called pt regarding appt on 6/3/19. Due to provider being out of office, new appt is on 6/5/19 at 10:40 AM. Pt did not answer, left voicemail. Letter sent.

## 2019-05-02 ENCOUNTER — HOSPITAL ENCOUNTER (OUTPATIENT)
Dept: PHYSICAL THERAPY | Age: 69
Setting detail: THERAPIES SERIES
Discharge: HOME | End: 2019-05-02
Payer: MEDICARE

## 2019-05-02 PROCEDURE — 97113 AQUATIC THERAPY/EXERCISES: CPT

## 2019-05-02 NOTE — PROGRESS NOTES
Physical Therapy    [address]   Outpatient Physical Therapy  [address] [facility]. [address]  Phone: [phone]  Fax: [phone]  Daily Progress Note    Date: 19    Patient Name: Marimar Richards        MRN: [id number]  Account: [de-identified] : 1950      General Information:  Chart Reviewed: Yes  Patient assessed for rehabilitation services?: Yes  Additional Pertinent Hx: pain B knees worst in the last month,no injury  Referring Practitioner: Dr Adrienne Pedersen  Referral Date : 19  Diagnosis: OA B knees M17.0 high risk for falls Z91.81  Follows Commands: Within Functional Limits  Onset Date: 19  PT Insurance Information: [employer] medicare  Total # of Visits Approved: 12  Total # of Visits to Date: 4  No Show: 0  Canceled Appointment: 0    Subjective:  Subjective: Pt reports no pain today. states felt tired after last visit but no pain.      Pain:  Patient Currently in Pain: Denies     Objective:    [address] Services Exercise Log  Aquatic Knee phase 1     Date of Eval: 4/15/19                               Primary PT:Andres  Diagnosis:OA B knees  Things to Focus On (goals): relieve pain B knees  Surgical Precautions:  Medical Precautions:DM,HTN  [] C-9 dates  [] Occ Med   [x] Medicare         Date 19       Visit # 2/12   3/12 4/12        Walk F/L/R  2 Laps  2 Laps 2 Laps       Marching  10x  2 Laps  2 Laps       Squats  10x5\"  15x5\" 15x5\"       Heel toe raises  10x  15x 15x       SLR F/L/R  10x  15x 15x       HS Curl  10x  15x 15x       Step-Ups F/L    Low 10x Low 10x       Knee/Flex /Ext  10x  15x 15x                     Kickboard Ex.  Sm  Sm Sm       Iso Abd. verticle  10x5\"  15x5\" 15x5\"       Push-pull  10x  15x 15x       Paddling  10x  15x 15x                     Balance             SLS             DEEP H2O             Cycling                           Stretches             Achllies  2x20\"  2x20\" 2x20\"       Hamstring  2x20\"  2x20\" 2x20\"       Psoas             Quad                                          deep water cycling     1 noodle  2'                                                                 Cool Down 2 Laps   2 Laps 2 Laps                     Pain Rating  3 4-5  0          Assessment: Body structures, Functions, Activity limitations: Decreased functional mobility ; Decreased ADL status; Decreased ROM; Decreased strength; Increased Pain  Treatment Diagnosis: stiffness B knees M25.661,M25.662 weakness B knees M62.561,M62.562 difficulty walking R26.2 NEC  Prognosis: Good  REQUIRES PT FOLLOW UP: Yes  Activity Tolerance: Patient Tolerated treatment well    Plan:  Plan: Continue with current plan    Therapy Time:  Time In: 1008  Time Out: 1050  Minutes: 42  Timed Code Treatment Minutes: 30 Minutes    Treatment Charges: Minutes Units   []  Ultrasound     []  Electrical-Stim     []  Iontophoresis     []  Traction     []  Massage       []  Eval     []  Gait     []  Vasopneumatic Device     []  Ther Exercise       []  Manual Therapy       []  Ther Activities       [x]  Aquatics 30 2   []  Neuro Re-Ed       []  Other       Total Treatment Time: 30  2     Severiano Leonard, DON

## 2019-05-06 ENCOUNTER — HOSPITAL ENCOUNTER (OUTPATIENT)
Dept: WOMENS IMAGING | Age: 69
Discharge: HOME | End: 2019-05-08
Payer: MEDICARE

## 2019-05-06 ENCOUNTER — HOSPITAL ENCOUNTER (OUTPATIENT)
Dept: ULTRASOUND IMAGING | Age: 69
Discharge: HOME | End: 2019-05-08
Payer: MEDICARE

## 2019-05-06 DIAGNOSIS — K82.8 GALLBLADDER MASS: Primary | ICD-10-CM

## 2019-05-06 DIAGNOSIS — Z12.31 ENCOUNTER FOR SCREENING MAMMOGRAM FOR BREAST CANCER: ICD-10-CM

## 2019-05-06 DIAGNOSIS — K80.20 CALCULUS OF GALLBLADDER WITHOUT CHOLECYSTITIS WITHOUT OBSTRUCTION: ICD-10-CM

## 2019-05-06 PROCEDURE — 77063 BREAST TOMOSYNTHESIS BI: CPT

## 2019-05-06 PROCEDURE — 76705 ECHO EXAM OF ABDOMEN: CPT

## 2019-05-07 ENCOUNTER — HOSPITAL ENCOUNTER (OUTPATIENT)
Dept: PHYSICAL THERAPY | Age: 69
Setting detail: THERAPIES SERIES
Discharge: HOME | End: 2019-05-07
Payer: MEDICARE

## 2019-05-07 PROCEDURE — 97113 AQUATIC THERAPY/EXERCISES: CPT

## 2019-05-07 NOTE — PROGRESS NOTES
Physical Therapy  [address] [facility]   Outpatient Physical Therapy  [address] [facility]  Phone: [phone]  Fax: [phone]  Daily Progress Note    Date: 19    Patient Name: Linn Hoang        MRN: [id number]  Account: [de-identified] : 1950      General Information:  Chart Reviewed: Yes  Patient assessed for rehabilitation services?: Yes  Additional Pertinent Hx: pain B knees worst in the last month,no injury  Referring Practitioner: Dr Marie Phillip  Referral Date : 19  Diagnosis: OA B knees M17.0 high risk for falls Z91.81  Follows Commands: Within Functional Limits  Onset Date: 19  PT Insurance Information: [employer] medicare  Total # of Visits Approved: 12  Total # of Visits to Date: 5  No Show: 0  Canceled Appointment: 0    Subjective:  Subjective: Pt reports being fatigued after last visit.      Pain:  Patient Currently in Pain: Denies     Objective:      [address] Services Exercise Log  Aquatic Knee phase 1     Date of Eval: 4/15/19                               Primary PT:Andres  Diagnosis:OA B knees  Things to Focus On (goals): relieve pain B knees  Surgical Precautions:  Medical Precautions:DM,HTN  [] C-9 dates  [] Occ Med   [x] Medicare        Date 19     Visit # 2/12   3/12 4/12  5/12     Walk F/L/R  2 Laps  2 Laps 2 Laps  2 Laps     Marching  10x  2 Laps  2 Laps  2 Laps     Squats  10x5\"  15x5\" 15x5\"  15x5\"     Heel toe raises  10x  15x 15x  15x     SLR F/L/R  10x  15x 15x  15x     HS Curl  10x  15x 15x  15x     Step-Ups F/L    Low 10x Low 10x  Low 15x     Knee/Flex /Ext  10x  15x 15x  15x                   Kickboard Ex.  Sm  Sm Sm  Med     Iso Abd. verticle  10x5\"  15x5\" 15x5\" 15x5\"     Push-pull  10x  15x 15x  15x     Paddling  10x  15x 15x  15x                   Balance             SLS             DEEP H2O             Cycling                           Stretches             Achllies  2x20\"  2x20\" 2x20\"  2x20\"   Hamstring  2x20\"  2x20\" 2x20\"  2x20\"     Psoas             Quad                                          deep water cycling     1 noodle  2'   1 Noodle  2'                                                              Cool Down 2 Laps   2 Laps 2 Laps  2 Laps                   Pain Rating  3 4-5  0  0        Assessment: Body structures, Functions, Activity limitations: Decreased functional mobility ; Decreased ADL status; Decreased ROM; Decreased strength; Increased Pain  Treatment Diagnosis: stiffness B knees M25.661,M25.662 weakness B knees M62.561,M62.562 difficulty walking R26.2 NEC  Prognosis: Good  REQUIRES PT FOLLOW UP: Yes  Activity Tolerance: Patient Tolerated treatment well    Plan:  Plan: Continue with current plan    Therapy Time:  Time In: 1005  Time Out: 1045  Minutes: 40  Timed Code Treatment Minutes: 30 Minutes    Treatment Charges: Minutes Units   []  Ultrasound     []  Electrical-Stim     []  Iontophoresis     []  Traction     []  Massage       []  Eval     []  Gait     []  Vasopneumatic Device     []  Ther Exercise       []  Manual Therapy       []  Ther Activities       [x]  Aquatics 30 2   []  Neuro Re-Ed       []  Other       Total Treatment Time: 30 2      Frankey Kinsman Revill, PTA

## 2019-05-09 ENCOUNTER — HOSPITAL ENCOUNTER (OUTPATIENT)
Dept: PHYSICAL THERAPY | Age: 69
Setting detail: THERAPIES SERIES
Discharge: HOME | End: 2019-05-09
Payer: MEDICARE

## 2019-05-09 ENCOUNTER — TELEPHONE (OUTPATIENT)
Dept: FAMILY MEDICINE CLINIC | Age: 69
End: 2019-05-09

## 2019-05-09 DIAGNOSIS — K82.8 GALLBLADDER MASS: Primary | ICD-10-CM

## 2019-05-09 PROCEDURE — 97113 AQUATIC THERAPY/EXERCISES: CPT

## 2019-05-09 NOTE — PROGRESS NOTES
Physical Therapy    [address] [facility],[address]   Outpatient Physical Therapy  [address] [facility]. [address]  Phone: [phone]  Fax: [phone]  Daily Progress Note    Date: 19    Patient Name: Jony Bellamy        MRN: [id number]  Account: [de-identified] : 1950      General Information:  Chart Reviewed: Yes  Patient assessed for rehabilitation services?: Yes  Additional Pertinent Hx: pain B knees worst in the last month,no injury  Referring Practitioner: Dr Heydi Fischer  Referral Date : 19  Diagnosis: OA B knees M17.0 high risk for falls Z91.81  Follows Commands: Within Functional Limits  Onset Date: 19  PT Insurance Information: [employer] medicare  Total # of Visits Approved: 12  Total # of Visits to Date: 6  No Show: 0  Canceled Appointment: 0    Subjective:  Subjective: Pt reports no pain this morning. notes some fatigue after last therapy session.      Pain:  Patient Currently in Pain: Denies     Objective:    [facility].    Rehabilitation Services Exercise Log  Aquatic Knee phase 1     Date of Eval: 4/15/19                               Primary PT:Andres  Diagnosis:OA B knees  Things to Focus On (goals): relieve pain B knees  Surgical Precautions:  Medical Precautions:DM,HTN  [] C-9 dates  [] Occ Med   [x] Medicare         Date 19   Visit # 2/12   3/12 4/12  5/12  6/12   Walk F/L/R  2 Laps  2 Laps 2 Laps  2 Laps  2 Laps   Marching  10x  2 Laps  2 Laps  2 Laps  2 Laps   Squats  10x5\"  15x5\" 15x5\"  15x5\"  15x5\"   Heel toe raises  10x  15x 15x  15x  15x   SLR F/L/R  10x  15x 15x  15x  15x   HS Curl  10x  15x 15x  15x  15x   Step-Ups F/L    Low 10x Low 10x  Low 15x  Tall 10x   Knee/Flex /Ext  10x  15x 15x  15x  15x                 Kickboard Ex.  Sm  Sm Sm  Med  Med   Iso Abd. verticle  10x5\"  15x5\" 15x5\" 15x5\"  15x5\"   Push-pull  10x  15x 15x  15x  15x   Paddling  10x  15x 15x  15x  15x                 Balance             SLS             DEEP H2O             Cycling                           Stretches             Achllies  2x20\"  2x20\" 2x20\"  2x20\"  2x20\"   Hamstring  2x20\"  2x20\" 2x20\"  2x20\"  2x20\"   Psoas             Quad                                          deep water cycling     1 noodle  2'   1 Noodle  2' 1 Noodle  2'                                                            Cool Down 2 Laps   2 Laps 2 Laps  2 Laps 2 Laps                  Pain Rating  3 4-5  0  0  0        Assessment: Body structures, Functions, Activity limitations: Decreased functional mobility ; Decreased ADL status; Decreased ROM; Decreased strength; Increased Pain  Treatment Diagnosis: stiffness B knees M25.661,M25.662 weakness B knees M62.561,M62.562 difficulty walking R26.2 NEC  Prognosis: Good  REQUIRES PT FOLLOW UP: Yes  Activity Tolerance: Patient Tolerated treatment well    Plan:  Plan: Continue with current plan    Therapy Time:  Time In: 1000  Time Out: 1064  Minutes: 43       Treatment Charges: Minutes Units   []  Ultrasound     []  Electrical-Stim     []  Iontophoresis     []  Traction     []  Massage       []  Eval     []  Gait     []  Vasopneumatic Device     []  Ther Exercise       []  Manual Therapy       []  Ther Activities       [x]  Aquatics 38 3   []  Neuro Re-Ed       []  Other       Total Treatment Time: 38 3        Lonnie Albarran, DON

## 2019-05-09 NOTE — TELEPHONE ENCOUNTER
Please attach note to Dr. Louie Russell referral and close referral   New referral placed     Diagnosis Orders   1.  Gallbladder mass  Pilekrogen 53

## 2019-05-14 ENCOUNTER — HOSPITAL ENCOUNTER (OUTPATIENT)
Dept: PHYSICAL THERAPY | Age: 69
Setting detail: THERAPIES SERIES
Discharge: HOME | End: 2019-05-14
Payer: MEDICARE

## 2019-05-14 NOTE — PROGRESS NOTES
Physical Therapy  [facility]   Outpatient Physical Therapy  Cancel/No Show Note    Date: 19    Patient Name: Robyn Cespedes        MRN: [id number]  Account: [de-identified] : 1950      General Information:  Referring Practitioner: Dr Gomez Díaz  Referral Date : 19  Diagnosis: OA B knees M17.0 high risk for falls Z91.81  Onset Date: 19  PT Insurance Information: [employer] medicare  Total # of Visits Approved: 12  Total # of Visits to Date: 6  No Show: 0  Canceled Appointment: 1    Subjective: Pt cancelled appointment today.  No reason given        Luli Viera, PTA

## 2019-05-16 ENCOUNTER — HOSPITAL ENCOUNTER (OUTPATIENT)
Dept: PHYSICAL THERAPY | Age: 69
Setting detail: THERAPIES SERIES
Discharge: HOME | End: 2019-05-16
Payer: MEDICARE

## 2019-05-16 PROCEDURE — 97113 AQUATIC THERAPY/EXERCISES: CPT

## 2019-05-16 ASSESSMENT — PAIN DESCRIPTION - PAIN TYPE: TYPE: CHRONIC PAIN

## 2019-05-16 ASSESSMENT — PAIN DESCRIPTION - DESCRIPTORS: DESCRIPTORS: CONSTANT;ACHING

## 2019-05-16 ASSESSMENT — PAIN DESCRIPTION - LOCATION: LOCATION: KNEE

## 2019-05-16 ASSESSMENT — PAIN DESCRIPTION - FREQUENCY: FREQUENCY: CONTINUOUS

## 2019-05-16 ASSESSMENT — PAIN SCALES - GENERAL: PAINLEVEL_OUTOF10: 3

## 2019-05-16 ASSESSMENT — PAIN DESCRIPTION - ORIENTATION: ORIENTATION: RIGHT;LEFT

## 2019-05-21 ENCOUNTER — APPOINTMENT (OUTPATIENT)
Dept: PHYSICAL THERAPY | Age: 69
End: 2019-05-21
Payer: MEDICARE

## 2019-05-23 ENCOUNTER — HOSPITAL ENCOUNTER (OUTPATIENT)
Dept: PHYSICAL THERAPY | Age: 69
Setting detail: THERAPIES SERIES
Discharge: HOME | End: 2019-05-23
Payer: MEDICARE

## 2019-05-23 PROCEDURE — 97113 AQUATIC THERAPY/EXERCISES: CPT

## 2019-05-23 PROCEDURE — 97110 THERAPEUTIC EXERCISES: CPT

## 2019-05-23 NOTE — PROGRESS NOTES
Physical Therapy Re-evaluation Note    Date: 2019  Patient Name: Miki Meyer  MRN: [id number]  : 1950     Treatment Diagnosis: stiffness B knees M25.661,M25.662 weakness B knees M62.561,M62.562 difficulty walking R26.2 NEC    Subjective   General  Referring Practitioner: Dr Skinny Gates  Referral Date : 19  Diagnosis: OA B knees M17.0 high risk for falls Z91.81  PT Visit Information  Onset Date: 19  PT Insurance Information: [facility] medicare  Total # of Visits Approved: 12  Total # of Visits to Date: 8  Subjective  Subjective: no complains of pain on both knees,able to stand more than 10'  Pain Screening  Patient Currently in Pain: No  Vital Signs  Patient Currently in Pain: No    Objective  AROM RLE (degrees)  R Knee Flexion 0-145: 0-100  R Knee Extension 0: 0  AROM LLE (degrees)  L Knee Flexion 0-145:   L Knee Extension 0: 10  Strength RLE  Comment: hip 4-/5 knee 4/5 ankle 5/5  Strength LLE  Comment: hip 4-/5 knee 4/5 ankle 5/5  Ambulation 1  Surface: level tile  Device: Rolling Walker  Assistance: Independent  Comments: TUG 25 secs  Stairs/Curb  Stairs?: No     Assessment   Conditions Requiring Skilled Therapeutic Intervention  Assessment: SHORT TERM GOALS MET 3/4 LONG TERM GOAL MET 0/2  Treatment Diagnosis: stiffness B knees M25.661,M25.662 weakness B knees M62.561,M62.562 difficulty walking R26.2 NEC  REQUIRES PT FOLLOW UP: Yes  Discharge Recommendations: Home independently;Home with assist PRN  Activity Tolerance  Activity Tolerance: Patient Tolerated treatment well         Plan   Plan  Times per week: 2x/week  Current Treatment Recommendations: Aquatics, Strengthening, ROM, Home Exercise Program  Plan Comment: TO FINISH LAST 4 AQUATIC PT VISITS THEN DISCONTINUE PT WITH PATIENT TO CONTINUE HEP    Goals  Short term goals  Short term goal 1: decrease pain on both knees by 50% so patient can tolerate walking longer than 10 min(met)  Short term goal 2: increase AROM both knees by 10-20(not met)  Short term goal 3: increase strength BLE by 1/2 grade or better(met)  Short term goal 4: indep with HEP(met)  Long term goals  Long term goal 1: improve TUG from 26 secs to 10 secs to prevent falls(not met TUG 25 secs)  Long term goal 2: improve optimal score from 8/10 to 2/10( walk long distance 4 outdoor 4)not met optimal score 8/10     Treatment Charges: Minutes Units   []  Ultrasound     []  Electrical-Stim     []  Iontophoresis     []  Traction     []  Massage       []  Eval     []  Gait     [x]  Ther Exercise 15  1    []  Manual Therapy       []  Ther Activities       []  Aquatics     []  Vasopneumatic Device     []  Neuro Re-Ed       []  Other       Total Treatment Time: 15 1        Therapy Time   Individual Concurrent Group Co-treatment   Time In 1120         Time Out 1135         Minutes 15         Timed Code Treatment Minutes: 15 Minutes     Electronically signed by: Veleta Fabry, PT

## 2019-05-23 NOTE — PROGRESS NOTES
2x20\" 2x20\"  2x20\"   Psoas           Quad                                    deep water cycling  1 Noodle  2' 1 Noodle  2' 1 Noodle  3' 1 noodle  3'                                                    Cool Down  2 Laps 2 Laps  2 Laps  2 Laps               Pain Rating  0  0 3  0      Assessment: Body structures, Functions, Activity limitations: Decreased functional mobility ; Decreased ADL status; Decreased ROM; Decreased strength; Increased Pain  Treatment Diagnosis: stiffness B knees M25.661,M25.662 weakness B knees M62.561,M62.562 difficulty walking R26.2 NEC  Prognosis: Good  REQUIRES PT FOLLOW UP: Yes  Activity Tolerance: Patient Tolerated treatment well    Plan:  Plan: Continue with current plan    Therapy Time:  Time In: 1005  Time Out: 1048  Minutes: 43  Timed Code Treatment Minutes: 35 Minutes    Treatment Charges: Minutes Units   []  Ultrasound     []  Electrical-Stim     []  Iontophoresis     []  Traction     []  Massage       []  Eval     []  Gait     []  Vasopneumatic Device     []  Ther Exercise       []  Manual Therapy       []  Ther Activities       [x]  Aquatics 35 2   []  Neuro Re-Ed       []  Other       Total Treatment Time: 35  2     Ector Albarran, PTA

## 2019-05-28 ENCOUNTER — HOSPITAL ENCOUNTER (OUTPATIENT)
Dept: PHYSICAL THERAPY | Age: 69
Setting detail: THERAPIES SERIES
Discharge: HOME | End: 2019-05-28
Payer: MEDICARE

## 2019-05-28 NOTE — PROGRESS NOTES
Physical Therapy  [address]   Outpatient Physical Therapy  Cancel/No Show Note    Date: 19    Patient Name: [place] Jose        MRN: [id number]  Account: [de-identified] : 1950      General Information:  Referring Practitioner: Dr Andrea Vu  Referral Date : 19  Diagnosis: OA B knees M17.0 high risk for falls Z91.81  Onset Date: 19  PT Insurance Information: [employer] medicare  Total # of Visits Approved: 12  Total # of Visits to Date: 8  No Show: 0  Canceled Appointment: 2        Comments: Patient cancelled appointment today due to unable to make it today.     Marilee Kwon, PTA

## 2019-05-29 ENCOUNTER — INITIAL CONSULT (OUTPATIENT)
Dept: SURGERY | Age: 69
End: 2019-05-29
Payer: MEDICARE

## 2019-05-29 VITALS
HEIGHT: 62 IN | TEMPERATURE: 97.5 F | WEIGHT: 245 LBS | SYSTOLIC BLOOD PRESSURE: 103 MMHG | BODY MASS INDEX: 45.08 KG/M2 | HEART RATE: 71 BPM | OXYGEN SATURATION: 97 % | DIASTOLIC BLOOD PRESSURE: 66 MMHG

## 2019-05-29 DIAGNOSIS — K82.4 GALLBLADDER POLYP: Primary | ICD-10-CM

## 2019-05-29 PROCEDURE — G8417 CALC BMI ABV UP PARAM F/U: HCPCS | Performed by: STUDENT IN AN ORGANIZED HEALTH CARE EDUCATION/TRAINING PROGRAM

## 2019-05-29 PROCEDURE — 1036F TOBACCO NON-USER: CPT | Performed by: STUDENT IN AN ORGANIZED HEALTH CARE EDUCATION/TRAINING PROGRAM

## 2019-05-29 PROCEDURE — 3017F COLORECTAL CA SCREEN DOC REV: CPT | Performed by: STUDENT IN AN ORGANIZED HEALTH CARE EDUCATION/TRAINING PROGRAM

## 2019-05-29 PROCEDURE — 4040F PNEUMOC VAC/ADMIN/RCVD: CPT | Performed by: STUDENT IN AN ORGANIZED HEALTH CARE EDUCATION/TRAINING PROGRAM

## 2019-05-29 PROCEDURE — G8427 DOCREV CUR MEDS BY ELIG CLIN: HCPCS | Performed by: STUDENT IN AN ORGANIZED HEALTH CARE EDUCATION/TRAINING PROGRAM

## 2019-05-29 PROCEDURE — G8598 ASA/ANTIPLAT THER USED: HCPCS | Performed by: STUDENT IN AN ORGANIZED HEALTH CARE EDUCATION/TRAINING PROGRAM

## 2019-05-29 PROCEDURE — 1090F PRES/ABSN URINE INCON ASSESS: CPT | Performed by: STUDENT IN AN ORGANIZED HEALTH CARE EDUCATION/TRAINING PROGRAM

## 2019-05-29 PROCEDURE — 1123F ACP DISCUSS/DSCN MKR DOCD: CPT | Performed by: STUDENT IN AN ORGANIZED HEALTH CARE EDUCATION/TRAINING PROGRAM

## 2019-05-29 PROCEDURE — 99203 OFFICE O/P NEW LOW 30 MIN: CPT | Performed by: STUDENT IN AN ORGANIZED HEALTH CARE EDUCATION/TRAINING PROGRAM

## 2019-05-29 PROCEDURE — G8399 PT W/DXA RESULTS DOCUMENT: HCPCS | Performed by: STUDENT IN AN ORGANIZED HEALTH CARE EDUCATION/TRAINING PROGRAM

## 2019-05-29 NOTE — PATIENT INSTRUCTIONS
Thank you letting us take care of you today. We hope that all your questions were addressed. If a question was overlooked or something else comes to mind after you return home, please call our office at [phone]. If you need to cancel or change an appointment, surgery or procedure, please contact the office at [phone].

## 2019-05-30 ENCOUNTER — HOSPITAL ENCOUNTER (OUTPATIENT)
Dept: PHYSICAL THERAPY | Age: 69
Setting detail: THERAPIES SERIES
Discharge: HOME | End: 2019-05-30
Payer: MEDICARE

## 2019-05-30 PROCEDURE — 97113 AQUATIC THERAPY/EXERCISES: CPT

## 2019-05-30 NOTE — PROGRESS NOTES
Physical Therapy  [address]   Outpatient Physical Therapy  [address] [facility]  Phone: [phone]  Fax: [phone]  Daily Progress Note    Date: 19    Patient Name: Alexia Talavera        MRN: [id number]  Account: [de-identified] : 1950      General Information:  Referring Practitioner: Dr Toan Benjamin  Referral Date : 19  Diagnosis: OA B knees M17.0 high risk for falls Z91.81  Follows Commands: Within Functional Limits  Onset Date: 19  PT Insurance Information: [employer] medicare  Total # of Visits Approved: 12  Total # of Visits to Date: 9  No Show: 0  Canceled Appointment: 2    Subjective:  Subjective: Pt reports no pain today. Pain:  Patient Currently in Pain: No    Objective:     [address] Services Exercise Log  Aquatic Knee phase 1     Date of Eval: 4/15/19                               Primary PT:Andres  Diagnosis:OA B knees  Things to Focus On (goals): relieve pain B knees  Surgical Precautions:  Medical Precautions:DM,HTN  [] C-9 dates  [] Occ Med   [x] Medicare         Date 19    Visit # 7/12 8    Walk F/L/R 2 Laps  2 Laps 2 Laps    Marching 2 Laps  2 Laps 2 Laps    Squats 15x5\" 15x5\" 15x5\"    Heel toe raises 15x 15x 15x    SLR F/L/R 15x 15x 15x    HS Curl 15x 15x 15x    Step-Ups F/L Tall 10x Low 15x Low 15x    Knee/Flex /Ext 15x 15x 15x              Kickboard Ex. Med Med Med    Iso Abd. verticle 15x5\" 15x5\"     Push-pull 15x 15x     Paddling 15x 15x               Balance         SLS         DEEP H2O         Cycling                   Stretches         Achllies 2x20\"  2x20\"     Hamstring 2x20\"  2x20\"     Psoas         Quad                              deep water cycling 1 Noodle  3' 1 noodle  3'                                              Cool Down 2 Laps  2 Laps               Pain Rating 3  0          Assessment:   Body structures, Functions, Activity limitations: Decreased functional mobility ;Decreased ADL status; Decreased ROM; Decreased strength; Increased Pain  Treatment Diagnosis: stiffness B knees M25.661,M25.662 weakness B knees M62.561,M62.562 difficulty walking R26.2 NEC  Prognosis: Good  REQUIRES PT FOLLOW UP: Yes  Activity Tolerance: Patient Tolerated treatment well    Plan:  Plan: Continue with current plan    Therapy Time:  Time In: 1010  Time Out: 1100  Minutes: 50  Timed Code Treatment Minutes: 35 Minutes    Treatment Charges: Minutes Units   []  Ultrasound     []  Electrical-Stim     []  Iontophoresis     []  Traction     []  Massage       []  Eval     []  Gait     []  Vasopneumatic Device     []  Ther Exercise       []  Manual Therapy       []  Ther Activities       [x]  Aquatics 35 2   []  Neuro Re-Ed       []  Other       Total Treatment Time: 35  2     Ector Albarran, PTA

## 2019-06-04 ENCOUNTER — HOSPITAL ENCOUNTER (OUTPATIENT)
Dept: PHYSICAL THERAPY | Age: 69
Setting detail: THERAPIES SERIES
Discharge: HOME | End: 2019-06-04
Payer: MEDICARE

## 2019-06-04 PROCEDURE — 97113 AQUATIC THERAPY/EXERCISES: CPT

## 2019-06-04 NOTE — PROGRESS NOTES
Physical Therapy  [address] [facility]   Outpatient Physical Therapy  [address] [facility]  Phone: [phone]  Fax: [phone]  Daily Progress Note    Date: 19    Patient Name: Marimar Richards        MRN: [id number]  Account: [de-identified] : 1950      General Information:  Referring Practitioner: Dr Adrienne Pedersen  Referral Date : 19  Diagnosis: OA B knees M17.0 high risk for falls Z91.81  Follows Commands: Within Functional Limits  Onset Date: 19  PT Insurance Information: [employer] medicare  Total # of Visits Approved: 12  Total # of Visits to Date: 10  No Show: 0  Canceled Appointment: 2    Subjective:  Subjective: No complaints of pain today. Pain:  Patient Currently in Pain: No     Objective:        [facility] [employer] Exercise Log  Aquatic Knee phase 1     Date of Eval: 4/15/19                               Primary PT:Andres  Diagnosis:OA B knees  Things to Focus On (goals): relieve pain B knees  Surgical Precautions:  Medical Precautions:DM,HTN  [] C-9 dates  [] Occ Med   [x] Medicare         Date 19    Visit # 712 8/12  9/12  10/12   Walk F/L/R 2 Laps  2 Laps 2 Laps  2 Laps   Marching 2 Laps  2 Laps 2 Laps  2 Laps       Low box all LE exercises   Squats 15x5\" 15x5\" 15x5\" 15x5\"   Heel toe raises 15x 15x 15x 15x   SLR F/L/R 15x 15x 15x  15x   HS Curl 15x 15x 15x  15x   Step-Ups F/L Tall 10x Low 15x Low 15x Low 15x   Knee/Flex /Ext 15x 15x 15x  15x               Kickboard Ex.  Med Med Lg Lg   Iso Abd. verticle 15x5\" 15x5\"  15x5\"  15x5\"   Push-pull 15x 15x  15x  15x   Paddling 15x 15x  15x  15x               Balance           SLS           DEEP H2O           Cycling                       Stretches           Achllies 2x20\"  2x20\"    2x20\"   Hamstring 2x20\"  2x20\"    2x20\"   Psoas           Quad                                    deep water cycling 1 Noodle  3' 1 noodle  3'    1 Noodle  3'                                                  Cool Down 2 Laps  2 Laps    2 Laps               Pain Rating 3  0  0  0      Assessment: Body structures, Functions, Activity limitations: Decreased functional mobility ; Decreased ADL status; Decreased ROM; Decreased strength; Increased Pain  Treatment Diagnosis: stiffness B knees M25.661,M25.662 weakness B knees M62.561,M62.562 difficulty walking R26.2 NEC  Prognosis: Good  REQUIRES PT FOLLOW UP: Yes  Activity Tolerance: Patient Tolerated treatment well    Plan:  Plan: Continue with current plan    Therapy Time:  Time In: 1015  Time Out: 1100  Minutes: 45  Timed Code Treatment Minutes: 30 Minutes    Treatment Charges: Minutes Units   []  Ultrasound     []  Electrical-Stim     []  Iontophoresis     []  Traction     []  Massage       []  Eval     []  Gait     []  Vasopneumatic Device     []  Ther Exercise       []  Manual Therapy       []  Ther Activities       [x]  Aquatics 30 2   []  Neuro Re-Ed       []  Other       Total Treatment Time: 30 2      Landen Albarran, PTA

## 2019-06-05 ENCOUNTER — OFFICE VISIT (OUTPATIENT)
Dept: UROLOGY | Age: 69
End: 2019-06-05
Payer: MEDICARE

## 2019-06-05 VITALS
DIASTOLIC BLOOD PRESSURE: 66 MMHG | HEART RATE: 70 BPM | BODY MASS INDEX: 46.01 KG/M2 | WEIGHT: 250 LBS | SYSTOLIC BLOOD PRESSURE: 135 MMHG | TEMPERATURE: 98 F | HEIGHT: 62 IN

## 2019-06-05 DIAGNOSIS — R39.15 URGENCY OF URINATION: ICD-10-CM

## 2019-06-05 DIAGNOSIS — R35.1 NOCTURIA: ICD-10-CM

## 2019-06-05 DIAGNOSIS — R35.0 FREQUENCY OF URINATION: Primary | ICD-10-CM

## 2019-06-05 PROCEDURE — 4040F PNEUMOC VAC/ADMIN/RCVD: CPT | Performed by: UROLOGY

## 2019-06-05 PROCEDURE — 99214 OFFICE O/P EST MOD 30 MIN: CPT | Performed by: UROLOGY

## 2019-06-05 PROCEDURE — 1123F ACP DISCUSS/DSCN MKR DOCD: CPT | Performed by: UROLOGY

## 2019-06-05 PROCEDURE — 1090F PRES/ABSN URINE INCON ASSESS: CPT | Performed by: UROLOGY

## 2019-06-05 PROCEDURE — G8427 DOCREV CUR MEDS BY ELIG CLIN: HCPCS | Performed by: UROLOGY

## 2019-06-05 PROCEDURE — 3017F COLORECTAL CA SCREEN DOC REV: CPT | Performed by: UROLOGY

## 2019-06-05 PROCEDURE — G8399 PT W/DXA RESULTS DOCUMENT: HCPCS | Performed by: UROLOGY

## 2019-06-05 PROCEDURE — 1036F TOBACCO NON-USER: CPT | Performed by: UROLOGY

## 2019-06-05 PROCEDURE — G8417 CALC BMI ABV UP PARAM F/U: HCPCS | Performed by: UROLOGY

## 2019-06-05 PROCEDURE — G8598 ASA/ANTIPLAT THER USED: HCPCS | Performed by: UROLOGY

## 2019-06-05 ASSESSMENT — ENCOUNTER SYMPTOMS
ABDOMINAL PAIN: 0
SHORTNESS OF BREATH: 0
EYE PAIN: 0
NAUSEA: 0
COUGH: 0
VOMITING: 0
BACK PAIN: 1
EYE REDNESS: 0
WHEEZING: 0

## 2019-06-05 NOTE — PROGRESS NOTES
[facility]  [facility] UROLOGY SPECIALISTS - [place]  [address]  [address]  [address] [facility] [address]  Dept: [address] [facility] Urology Office Note - Established    Patient:  Jes Nicholas  YOB: 1950  Date: 6/5/2019    The patient is a 81 y.o. female whopresents today for evaluation of the following problems:   Chief Complaint   Patient presents with    Follow-up     6 month mybetriq        HPI  Overactive Bladder:  Patient is here today for an overactive bladder which started several year(s) ago. Recently the OAB symptoms: are improving  Current medical Rx for OAB: myrbetriq  Frequency: 2 hours  Nocturia x 2   Consumption of bladder irritants? no  Incontinence? Stress incontinence: Severity = not present. Urge Incontinence:  Severity = mild. Number of pads per day: 0                  Summary of old records: N/A    Additional History: N/A    Procedures Today: N/A    Urinalysis today:  No results found for this visit on 06/05/19.     Imaging Reviewed during this Office Visit: none  (results were independently reviewed by physician and radiology report verified)    AUA Symptom Score (6/5/2019):  INCOMPLETE EMPTYING: How often have you had the sensation of not emptying your bladder?: Not at all  FREQUENCY: How often do you have to urinate less than every two hours?: Less than 1 to 5 times  INTERMITTENCY: How often have you found you stopped and started again several times when you urinated?: Not at all  URGENCY: How often have you found it difficult to postpone urination?: Not at all  WEAK STREAM: How often have you had a weak urinary stream?: Not at all  STRAINING: How often have you had to strain to start  urination?: Not at all  @(0539)@  TOTAL I-PSS SCORE[de-identified] 3  How would you feel if you were to spend the rest of your life with your urinary condition?: Pleased    Last BUN and creatinine:  Lab Results   Component Value Date    BUN 13 03/01/2019     Lab Results   Component Value Date CREATININE 0.76 03/01/2019       Additional Lab/Culture results: none    PAST MEDICAL, FAMILY AND SOCIAL HISTORY UPDATE:  Past Medical History:   Diagnosis Date    Adenocarcinoma of endometrium, stage 1 ([facility] [address] 10/5/2017    Well differentiated grade 1 adenocarcinoma arising in complex hyperplasia    Allergic rhinitis 2/23/2017    At high risk for falls 2/23/2017    Colon polyp     Had colonoscopy done 20 yrs ago    Diabetes mellitus ([facility] [address]     Endometrial cancer ([facility] [address]     History of TIA (transient ischemic attack) '80's    on Baby ASA    Hyperglycemia 3/21/2017    Hyperlipidemia     Hypertension since 2015    on Rx.     Hypothyroidism 1980    sub total thyroidectomy goiter    Legally blind since born    both eyes, cord prolapse; \"not legally blind\" per \"associated eye care\" please see telephone encounter from 2/27/18    Lower back pain     Mixed incontinence 1/9/2016    Morbid obesity with BMI of 40.0-44.9, adult ([facility] [address] 2/23/2017    CLAROS (nonalcoholic steatohepatitis) 3/10/2019    Obesity     Oral phase dysphagia 2/23/2018    Osteoarthritis (arthritis due to wear and tear of joints)     ronan knee    Osteoarthritis involving multiple joints on both sides of body 4/24/2012    Osteoporosis     Postmenopausal bleeding 9/20/2017    S/P h-scope, Myosure 9/20/17 9/20/2017    Pathology pending    Tennis elbow     left    Thickened endometrium 9/20/2017    TLHBSO, bilateral LND 10/24/17 10/24/2017     Past Surgical History:   Procedure Laterality Date    CATARACT REMOVAL WITH IMPLANT Bilateral 2015    COLONOSCOPY  1997    had polyp, she doesn't know where and when, \"20 yrs ago\"    COLONOSCOPY  06/26/2017    DILATION AND CURETTAGE OF UTERUS N/A 9/20/2017    HYSTEROSCOPY  WITH MYOSURE performed by Leah Sorto DO at [address] N/A 10/24/2017    TOTAL LAPAROSCOPIC HYSTERECTOMY, BSO, F.S.  STAGING, GYRUS G400 performed by Tom Palmer MD at [address] [place] [address] W/REMOVAL LESION BY HOT BX FORCEPS N/A 6/26/2017    COLONOSCOPY POLYPECTOMY / HOT SNARE performed by Nathalie Li DO at [address]  10/24/2017    with pelvic lymph node dissection    THYROID SURGERY  1980    subtotal 20 years ago   [facility] TONSILLECTOMY      VITRECTOMY      FLOATERS     Family History   Problem Relation Age of Onset    Coronary Art Dis Father     Hypertension Father     Diabetes Father     High Blood Pressure Father     Heart Attack Father     Diabetes Mother     High Blood Pressure Mother     Thyroid Disease Mother     High Blood Pressure Sister     Thyroid Disease Brother     High Blood Pressure Brother     High Blood Pressure Maternal Grandmother     Diabetes Maternal Grandfather     No Known Problems Paternal Grandmother     Heart Attack Paternal Grandfather     Colon Cancer Neg Hx      Outpatient Medications Marked as Taking for the 6/5/19 encounter (Office Visit) with Carry MD Mickey   Medication Sig Dispense Refill    lisinopril (PRINIVIL;ZESTRIL) 40 MG tablet TAKE ONE TABLET BY MOUTH ONCE DAILY ---STOP LOSARTAN DUE TO RECALL 90 tablet 3    atorvastatin (LIPITOR) 40 MG tablet TAKE ONE TABLET BY MOUTH ONCE DAILY --STOP SIMVASTATIN-- 90 tablet 3    Cholecalciferol (VITAMIN D) 2000 units TABS tablet Take 1 tablet by mouth daily 90 tablet 3    vitamin B-12 (CYANOCOBALAMIN) 1000 MCG tablet Take 1 tablet by mouth daily 90 tablet 3    zoster recombinant adjuvanted vaccine (SHINGRIX) 50 MCG/0.5ML SUSR injection 50 MCG IM then repeat 2-6 months.  0.5 mL 1    acetaminophen (APAP EXTRA STRENGTH) 500 MG tablet Take 1 tablet by mouth every 6 hours as needed for Pain or Fever 360 tablet 3    aspirin (ASPIR-LOW) 81 MG EC tablet Take 1 tablet by mouth daily 90 tablet 3    levothyroxine (SYNTHROID) 75 MCG tablet Take 1 tablet by mouth every morning (before breakfast) 90 tablet 3    loratadine (CLARITIN) 10 MG tablet Take 1 tablet by mouth daily 90 tablet 3    docusate sodium (STOOL SOFTENER) 100 MG capsule Take 1 capsule by mouth 2 times daily 180 capsule 3    ondansetron (ZOFRAN) 4 MG tablet Take 1 tablet by mouth every 8 hours as needed for Nausea 60 tablet 3    metFORMIN (GLUCOPHAGE) 500 MG tablet TAKE ONE TABLET BY MOUTH TWICE A DAY WITH A MEAL 60 tablet 10    Mirabegron ER (MYRBETRIQ) 50 MG TB24 Take 50 mg by mouth daily 30 tablet 11    albuterol sulfate HFA (PROAIR HFA) 108 (90 Base) MCG/ACT inhaler Inhale 2 puffs into the lungs every 6 hours as needed for Wheezing or Shortness of Breath (cough) 18 g 0    lidocaine (LMX) 4 % cream Apply 2-3 times a day as needed for pain 120 g 3    guaiFENesin (MUCINEX) 600 MG extended release tablet Take 1 tablet by mouth 2 times daily as needed for Congestion 30 tablet 0    Urea (CARMOL) 40 % cream Apply topically as needed      Cranberry 500 MG CAPS Take 500 mg by mouth daily 30 capsule 3    Lactobacillus (PROBIOTIC ACIDOPHILUS) TABS Take 1 tablet by mouth daily 30 tablet 3      (All medications reviewed and updated by provider sincelast office visit or hospitalization)   Sulfa antibiotics and Penicillins  Social History     Tobacco Use   Smoking Status Never Smoker   Smokeless Tobacco Never Used      (If patient a smoker, smoking cessation counseling offered)     Social History     Substance and Sexual Activity   Alcohol Use No    Alcohol/week: 0.0 oz       REVIEW OF SYSTEMS:  Review of Systems      Physical Exam:      Vitals:    06/05/19 1037   BP: 135/66   Pulse: 70   Temp: 98 °F (36.7 °C)     Body mass index is 45.73 kg/m². Patient is a 81 y.o. female in noacute distress and alert and oriented to person, place and time. Physical Exam  Constitutional: Patient in no acute distress. Neuro: Alert andoriented to person, place and time.   Psych: Mood normal, affect normal  Skin: No rash noted  HEENT: Head: Normocephalic and atraumatic  Conjunctivae and EOM are normal. Pupils are equal, round  Nose: Normal  Right External Ear: Normal; Left External Ear: Normal  Mouth: Mucosa Moist  Neck: Supple  Lungs: Respiratory effort is normal  Cardiovascular: Warm & Pink  Abdomen: Soft, non-tender, non-distended with no CVA,  No flank tenderness,  Or hepatosplenomegaly   Lymphatics: No palpable lymphadenopathy. Bladder non-tender and not distended. Musculoskeletal: Normalgait and station      Assessment and Plan      1. Frequency of urination    2. Urgency of urination    3. Nocturia           Plan:   Cont myrbetriq  F/u 6 mo     Return in about 6 months (around 12/5/2019). Prescriptions Ordered:  No orders of the defined types were placed in this encounter. Orders Placed:  No orders of the defined types were placed in this encounter. Brooke Kern MD    Agree with the ROS entered by the MA.

## 2019-06-06 ENCOUNTER — HOSPITAL ENCOUNTER (OUTPATIENT)
Dept: PHYSICAL THERAPY | Age: 69
Setting detail: THERAPIES SERIES
Discharge: HOME | End: 2019-06-06
Payer: MEDICARE

## 2019-06-11 ENCOUNTER — HOSPITAL ENCOUNTER (OUTPATIENT)
Dept: PHYSICAL THERAPY | Age: 69
Setting detail: THERAPIES SERIES
Discharge: HOME | End: 2019-06-11
Payer: MEDICARE

## 2019-06-11 PROCEDURE — 97113 AQUATIC THERAPY/EXERCISES: CPT

## 2019-06-11 NOTE — PROGRESS NOTES
Physical Therapy   [address] [facility]   Outpatient Physical Therapy  [address] [facility]  Phone: [phone]  Fax: [phone]  Daily Progress Note    Date: 19    Patient Name: Mickey Villalobos        MRN: [id number]  Account: [de-identified] : 1950      General Information:  Referring Practitioner: Dr Benji Edmondson  Referral Date : 19  Diagnosis: OA B knees M17.0 high risk for falls Z91.81  Follows Commands: Within Functional Limits  Onset Date: 19  PT Insurance Information: [employer] medicare  Total # of Visits Approved: 12  Total # of Visits to Date: 11  No Show: 0  Canceled Appointment: 3    Subjective:  Subjective: Pt reports no pain today. notes decreased      Pain:  Patient Currently in Pain: No     Objective:         [facility] Exercise Log  Aquatic Knee phase 1     Date of Eval: 4/15/19                               Primary PT:Andres  Diagnosis:OA B knees  Things to Focus On (goals): relieve pain B knees  Surgical Precautions:  Medical Precautions:DM,HTN  [] C-9 dates  [] Occ Med   [x] Medicare         Date 19    Visit # 9/12  10/12 11/12    Walk F/L/R 2 Laps  2 Laps 2 Laps    Marching 2 Laps  2 Laps 2 Laps        Low box all LE exercises Low box All LE ex    Squats 15x5\" 15x5\" 15x5\"    Heel toe raises 15x 15x 15x    SLR F/L/R 15x  15x 15x    HS Curl 15x  15x 15x    Step-Ups F/L Low 15x Low 15x Tall 10x    Knee/Flex /Ext 15x  15x 15x              Kickboard Ex.  Lg Lg Lg    Iso Abd. verticle  15x5\"  15x5\" 15x5\"    Push-pull  15x  15x 15x    Paddling  15x  15x 15x              Balance         SLS         DEEP H2O         Cycling                   Stretches         Achllies    2x20\" 2x20\"    Hamstring    2x20\" 2x20\"    Psoas         Quad                              deep water cycling    1 Noodle  3' 1 noodle  3'                                             Cool Down    2 Laps 2 Laps               Pain Rating  0  0 0    Assessment: Body structures, Functions, Activity limitations: Decreased functional mobility ; Decreased ADL status; Decreased ROM; Decreased strength; Increased Pain  Treatment Diagnosis: stiffness B knees M25.661,M25.662 weakness B knees M62.561,M62.562 difficulty walking R26.2 NEC  Prognosis: Good  REQUIRES PT FOLLOW UP: Yes  Activity Tolerance: Patient Tolerated treatment well    Plan:  Plan: Continue with current plan    Therapy Time:  Time In: 1004  Time Out: 1045  Minutes: 41  Timed Code Treatment Minutes: 30 Minutes    Treatment Charges: Minutes Units   []  Ultrasound     []  Electrical-Stim     []  Iontophoresis     []  Traction     []  Massage       []  Eval     []  Gait     []  Vasopneumatic Device     []  Ther Exercise       []  Manual Therapy       []  Ther Activities       [x]  Aquatics 30 2   []  Neuro Re-Ed       []  Other       Total Treatment Time: 30  2     Ani Busch PTA

## 2019-06-13 ENCOUNTER — HOSPITAL ENCOUNTER (OUTPATIENT)
Dept: PHYSICAL THERAPY | Age: 69
Setting detail: THERAPIES SERIES
Discharge: HOME | End: 2019-06-13
Payer: MEDICARE

## 2019-06-13 PROCEDURE — 97113 AQUATIC THERAPY/EXERCISES: CPT

## 2019-06-13 NOTE — PROGRESS NOTES
Noodle  3' 1 noodle  3'                                                      Cool Down    2 Laps 2 Laps                  Pain Rating  0  0 0        Assessment: Body structures, Functions, Activity limitations: Decreased functional mobility ; Decreased ADL status; Decreased ROM; Decreased strength; Increased Pain  Treatment Diagnosis: stiffness B knees M25.661,M25.662 weakness B knees M62.561,M62.562 difficulty walking R26.2 NEC  Prognosis: Good  REQUIRES PT FOLLOW UP: Yes  Activity Tolerance: Patient Tolerated treatment well    Plan:  Plan: to discontinue PT and patient to continue HEP    Therapy Time:  Time In: 1003  Time Out: 1052  Minutes: 49  Timed Code Treatment Minutes: 30 Minutes    Treatment Charges: Minutes Units   []  Ultrasound     []  Electrical-Stim     []  Iontophoresis     []  Traction     []  Massage       []  Eval     []  Gait     []  Vasopneumatic Device     []  Ther Exercise       []  Manual Therapy       []  Ther Activities       [x]  Aquatics 30 2   []  Neuro Re-Ed       []  Other       Total Treatment Time: 30 2      Solange Albarran, DON

## 2019-06-18 ENCOUNTER — TELEPHONE (OUTPATIENT)
Dept: FAMILY MEDICINE CLINIC | Age: 69
End: 2019-06-18

## 2019-06-18 ENCOUNTER — OFFICE VISIT (OUTPATIENT)
Dept: FAMILY MEDICINE CLINIC | Age: 69
End: 2019-06-18
Payer: MEDICARE

## 2019-06-18 VITALS
SYSTOLIC BLOOD PRESSURE: 127 MMHG | DIASTOLIC BLOOD PRESSURE: 78 MMHG | WEIGHT: 251.2 LBS | BODY MASS INDEX: 46.22 KG/M2 | OXYGEN SATURATION: 97 % | HEIGHT: 62 IN | HEART RATE: 70 BPM

## 2019-06-18 DIAGNOSIS — E66.01 MORBID OBESITY WITH BMI OF 45.0-49.9, ADULT (HCC): ICD-10-CM

## 2019-06-18 DIAGNOSIS — K80.20 CALCULUS OF GALLBLADDER WITHOUT CHOLECYSTITIS WITHOUT OBSTRUCTION: ICD-10-CM

## 2019-06-18 DIAGNOSIS — I10 ESSENTIAL HYPERTENSION: Primary | ICD-10-CM

## 2019-06-18 DIAGNOSIS — Z91.81 AT HIGH RISK FOR FALLS: ICD-10-CM

## 2019-06-18 DIAGNOSIS — R73.03 PREDIABETES: ICD-10-CM

## 2019-06-18 DIAGNOSIS — M54.50 CHRONIC BILATERAL LOW BACK PAIN WITHOUT SCIATICA: ICD-10-CM

## 2019-06-18 DIAGNOSIS — G89.29 CHRONIC BILATERAL LOW BACK PAIN WITHOUT SCIATICA: ICD-10-CM

## 2019-06-18 DIAGNOSIS — R73.9 HYPERGLYCEMIA: ICD-10-CM

## 2019-06-18 DIAGNOSIS — M17.0 PRIMARY OSTEOARTHRITIS OF BOTH KNEES: ICD-10-CM

## 2019-06-18 DIAGNOSIS — E03.9 ACQUIRED HYPOTHYROIDISM: ICD-10-CM

## 2019-06-18 LAB — HBA1C MFR BLD: 6.1 %

## 2019-06-18 PROCEDURE — G8598 ASA/ANTIPLAT THER USED: HCPCS | Performed by: FAMILY MEDICINE

## 2019-06-18 PROCEDURE — G8417 CALC BMI ABV UP PARAM F/U: HCPCS | Performed by: FAMILY MEDICINE

## 2019-06-18 PROCEDURE — 1090F PRES/ABSN URINE INCON ASSESS: CPT | Performed by: FAMILY MEDICINE

## 2019-06-18 PROCEDURE — 83036 HEMOGLOBIN GLYCOSYLATED A1C: CPT | Performed by: FAMILY MEDICINE

## 2019-06-18 PROCEDURE — 99214 OFFICE O/P EST MOD 30 MIN: CPT | Performed by: FAMILY MEDICINE

## 2019-06-18 PROCEDURE — 1123F ACP DISCUSS/DSCN MKR DOCD: CPT | Performed by: FAMILY MEDICINE

## 2019-06-18 PROCEDURE — G8399 PT W/DXA RESULTS DOCUMENT: HCPCS | Performed by: FAMILY MEDICINE

## 2019-06-18 PROCEDURE — G8427 DOCREV CUR MEDS BY ELIG CLIN: HCPCS | Performed by: FAMILY MEDICINE

## 2019-06-18 PROCEDURE — 3017F COLORECTAL CA SCREEN DOC REV: CPT | Performed by: FAMILY MEDICINE

## 2019-06-18 PROCEDURE — 1036F TOBACCO NON-USER: CPT | Performed by: FAMILY MEDICINE

## 2019-06-18 PROCEDURE — 4040F PNEUMOC VAC/ADMIN/RCVD: CPT | Performed by: FAMILY MEDICINE

## 2019-06-18 ASSESSMENT — ENCOUNTER SYMPTOMS
ABDOMINAL DISTENTION: 0
NAUSEA: 0
SHORTNESS OF BREATH: 0
COUGH: 0
WHEEZING: 0
CONSTIPATION: 0
CHEST TIGHTNESS: 0
DIARRHEA: 0
ABDOMINAL PAIN: 0
VOMITING: 0
BACK PAIN: 1

## 2019-06-18 NOTE — PATIENT INSTRUCTIONS
Patient Education        Prediabetes: Care Instructions  Your Care Instructions    Prediabetes is a warning sign that you are at risk for getting type 2 diabetes. It means that your blood sugar is higher than it should be. The food you eat turns into sugar, which your body uses for energy. Normally, an organ called the pancreas makes insulin, which allows the sugar in your blood to get into your body's cells. But when your body can't use insulin the right way, the sugar doesn't move into cells. It stays in your blood instead. This is called insulin resistance. The buildup of sugar in the blood causes prediabetes. The good news is that lifestyle changes may help you get your blood sugar back to normal and help you avoid or delay diabetes. Follow-up care is a key part of your treatment and safety. Be sure to make and go to all appointments, and call your doctor if you are having problems. It's also a good idea to know your test results and keep a list of the medicines you take. How can you care for yourself at home? · Watch your weight. A healthy weight helps your body use insulin properly. · Limit the amount of calories, sweets, and unhealthy fat you eat. Ask your doctor if you should see a dietitian. A registered dietitian can help you create meal plans that fit your lifestyle. · Get at least 30 minutes of exercise on most days of the week. Exercise helps control your blood sugar. It also helps you maintain a healthy weight. Walking is a good choice. You also may want to do other activities, such as running, swimming, cycling, or playing tennis or team sports. · Do not smoke. Smoking can make prediabetes worse. If you need help quitting, talk to your doctor about stop-smoking programs and medicines. These can increase your chances of quitting for good. · If your doctor prescribed medicines, take them exactly as prescribed. Call your doctor if you think you are having a problem with your medicine.  You will get more details on the specific medicines your doctor prescribes. When should you call for help? Watch closely for changes in your health, and be sure to contact your doctor if:    · You have any symptoms of diabetes. These may include:  ? Being thirsty more often. ? Urinating more. ? Being hungrier. ? Losing weight. ? Being very tired. ? Having blurry vision.     · You have a wound that will not heal.     · You have an infection that will not go away.     · You have problems with your blood pressure.     · You want more information about diabetes and how you can keep from getting it. Where can you learn more? Go to https://JetabroadpeTRELYSeb.Unifysquare. org and sign in to your BitWall account. Enter I222 in the SimPrints box to learn more about \"Prediabetes: Care Instructions. \"     If you do not have an account, please click on the \"Sign Up Now\" link. Current as of: July 25, 2018  Content Version: 12.0  © 1787-2074 Healthwise, Incorporated. Care instructions adapted under license by [facility] ([facility]). If you have questions about a medical condition or this instruction, always ask your healthcare professional. [address] any warranty or liability for your use of this information.

## 2019-06-18 NOTE — RESULT ENCOUNTER NOTE
Addressed during office visit today, A1c 6.1, worsening, continue treatment recommended during the office visit

## 2019-06-18 NOTE — PROGRESS NOTES
screen  11/14/2023    DTaP/Tdap/Td vaccine (2 - Td) 09/28/2027    DEXA (modify frequency per FRAX score)  Completed    Flu vaccine  Completed    Pneumococcal 65+ years Vaccine  Completed    Hepatitis C screen  Completed

## 2019-06-18 NOTE — PROGRESS NOTES
Chief Complaint   Patient presents with    Hypertension    Discuss Labs    Results    Knee Pain    Lower Back Pain       Светлана Velasquez here today for follow up on chronic medical problems, go over labs and/or diagnostic studies, and medication refills. Hypertension; Discuss Labs; Results; Knee Pain; and Lower Back Pain      Hypertension: Patient here for follow-up of elevated blood pressure. She is exercising, doing home PT once a week, and some exercising at home,  and is adherent to low salt diet. Blood pressure is well controlled at home. Cardiac symptoms fatigue. Patient denies fatigue. Cardiovascular risk factors: advanced age (older than 54 for men, 72 for women), dyslipidemia, hypertension and obesity (BMI >= 30 kg/m2). Use of agents associated with hypertension: thyroid hormones. History of target organ damage: none. EKG done on 3/1/2019 was normal  BP Readings from Last 3 Encounters:   06/18/19 127/78   06/05/19 135/66   05/29/19 103/66      Pulse controlled. Pulse Readings from Last 3 Encounters:   06/18/19 70   06/05/19 70   05/29/19 71     She lost 3 pounds since the last appointment with me on 3/8/2019    Wt Readings from Last 3 Encounters:   06/18/19 251 lb 3.2 oz (113.9 kg)   06/05/19 250 lb (113.4 kg)   05/29/19 245 lb (111.1 kg)     She was referred to general surgery due to abnormal ultrasound of the gallbladder. Will have another ultrasound gallbladder in 6 mo for polyp  Denies pain, nausea, vomiting, diarrhea or constipation. Has gallstones      Has chronic back pain, midline and on the sides, doesn't radiate. Pain is worse with activities. Also has bilateral knee pain and osteoarthritis. The pain started several years ago. She has crepitus, the pain is on the patella and the lateral sides. The pain is aggravated when getting up from sitting position, when walking or standing for too long, or when climbing up and down the stairs.   Intensity of pain is 0/10, up to 8/10  She is currently doing home PT once a week and exercising at the [facility] every day of weekday  5/7     Has Walker without seat, which is broken and wobbly. Wants to have more aquatherapy. Wants a new walker with seat as sometimes needs to take breaks when walking. Tray Ortega Hypothyroidism: Recent symptoms: fatigue. She denies weight gain, cold intolerance, heat intolerance, hair loss, dry skin, constipation, diarrhea, edema, anxiety, tremor and palpitations. Patient is  taking her medication consistently on an empty stomach. No results found for: [facility]  Lab Results   Component Value Date    TSH 3.80 03/08/2019    TSH 2.03 02/21/2018    TSH 1.80 03/21/2017     Prediabetes is worsening  She does admit she likes her sweets.   She reports compliance with metformin    Lab Results   Component Value Date    LABA1C 6.1 06/18/2019    LABA1C 5.4 03/08/2019    LABA1C 6.1 (H) 11/14/2018         Allergies   Allergen Reactions    Sulfa Antibiotics Nausea Only    Penicillins Rash        Current Outpatient Medications   Medication Sig Dispense Refill    lisinopril (PRINIVIL;ZESTRIL) 40 MG tablet TAKE ONE TABLET BY MOUTH ONCE DAILY ---STOP LOSARTAN DUE TO RECALL 90 tablet 3    atorvastatin (LIPITOR) 40 MG tablet TAKE ONE TABLET BY MOUTH ONCE DAILY --STOP SIMVASTATIN-- 90 tablet 3    Cholecalciferol (VITAMIN D) 2000 units TABS tablet Take 1 tablet by mouth daily 90 tablet 3    vitamin B-12 (CYANOCOBALAMIN) 1000 MCG tablet Take 1 tablet by mouth daily 90 tablet 3    acetaminophen (APAP EXTRA STRENGTH) 500 MG tablet Take 1 tablet by mouth every 6 hours as needed for Pain or Fever 360 tablet 3    aspirin (ASPIR-LOW) 81 MG EC tablet Take 1 tablet by mouth daily 90 tablet 3    levothyroxine (SYNTHROID) 75 MCG tablet Take 1 tablet by mouth every morning (before breakfast) 90 tablet 3    loratadine (CLARITIN) 10 MG tablet Take 1 tablet by mouth daily 90 tablet 3    docusate sodium (STOOL SOFTENER) 100 MG capsule Take 1 capsule by mouth 2 times daily 180 capsule 3    ondansetron (ZOFRAN) 4 MG tablet Take 1 tablet by mouth every 8 hours as needed for Nausea 60 tablet 3    metFORMIN (GLUCOPHAGE) 500 MG tablet TAKE ONE TABLET BY MOUTH TWICE A DAY WITH A MEAL 60 tablet 10    Mirabegron ER (MYRBETRIQ) 50 MG TB24 Take 50 mg by mouth daily 30 tablet 11    albuterol sulfate HFA (PROAIR HFA) 108 (90 Base) MCG/ACT inhaler Inhale 2 puffs into the lungs every 6 hours as needed for Wheezing or Shortness of Breath (cough) 18 g 0    lidocaine (LMX) 4 % cream Apply 2-3 times a day as needed for pain 120 g 3    guaiFENesin (MUCINEX) 600 MG extended release tablet Take 1 tablet by mouth 2 times daily as needed for Congestion 30 tablet 0    Urea (CARMOL) 40 % cream Apply topically as needed      Cranberry 500 MG CAPS Take 500 mg by mouth daily 30 capsule 3    Lactobacillus (PROBIOTIC ACIDOPHILUS) TABS Take 1 tablet by mouth daily 30 tablet 3     No current facility-administered medications for this visit. Social History     Tobacco Use    Smoking status: Never Smoker    Smokeless tobacco: Never Used   Substance Use Topics    Alcohol use: No     Alcohol/week: 0.0 oz    Drug use: No       Counseling given: Yes                    The patient's past medical, surgical, social, and family history as well as her current medications and allergies were reviewed as documented in today's encounter. Restof complaints with corresponding details per ROS    Review of Systems   Constitutional: Positive for fatigue. Negative for activity change, appetite change, chills, diaphoresis, fever and unexpected weight change. Eyes: Positive for visual disturbance (wears glasses). Respiratory: Negative for cough, chest tightness, shortness of breath and wheezing. Cardiovascular: Negative for chest pain, palpitations and leg swelling.    Gastrointestinal: Negative for abdominal distention, abdominal pain, constipation, diarrhea, nausea and Left knee: She exhibits normal range of motion. Tenderness found. Patellar tendon tenderness noted. Lumbar back: She exhibits decreased range of motion, tenderness, bony tenderness and pain. Up and go test more than 12 seconds. High risk for falls. Patient ambulates with walker without seat, she looks unstable when using it, she needs to stop in between and take a seat if a chair is available  Coarse crepitus bilateral knees   Neurological: She is alert and oriented to person, place, and time. No cranial nerve deficit. She exhibits normal muscle tone. Skin: Skin is warm and dry. Capillary refill takes less than 2 seconds. No rash noted. She is not diaphoretic. Psychiatric: Her behavior is normal. Judgment and thought content normal.   Nursing note and vitals reviewed. Discussed testing with the patient and all questions fully answered. I personally reviewed testing with patient.   Vitamin B12 deficiency  Vitamin D deficiency  hyperglycemia  Prediabetes worsening  Hyperlipidemia    She says she has been having more energy since taking B12 and vitamin D    Otherwise labs within normal limits            Hospital Outpatient Visit on 03/08/2019   Component Date Value Ref Range Status    TSH 03/08/2019 3.80  0.30 - 5.00 mIU/L Final    Vitamin B-12 03/08/2019 229* 232 - 1245 pg/mL Final    Folate 03/08/2019 9.4  >4.8 ng/mL Final    Vit D, 25-Hydroxy 03/08/2019 27.7* 30.0 - 100.0 ng/mL Final    Comment:    Reference Range:  Vitamin D status         Range   Deficiency              <20 ng/mL   Mild Deficiency       20-30 ng/mL   Sufficiency           ng/mL   Toxicity               >100 ng/mL           Lab Results   Component Value Date    WBC 9.6 03/01/2019    HGB 12.6 03/01/2019    HCT 38.4 03/01/2019    MCV 91.4 03/01/2019     03/01/2019       Lab Results   Component Value Date     03/01/2019    K 3.7 03/01/2019     03/01/2019    CO2 26 03/01/2019    BUN 13 03/01/2019 CREATININE 0.76 03/01/2019    GLUCOSE 152 03/01/2019    GLUCOSE 114 03/20/2012    CALCIUM 9.5 03/01/2019          Lab Results   Component Value Date    ALT 21 03/01/2019    AST 16 03/01/2019    ALKPHOS 73 03/01/2019    BILITOT 0.32 03/01/2019       Lab Results   Component Value Date    TSH 3.80 03/08/2019       Lab Results   Component Value Date    CHOL 188 11/14/2018    CHOL 287 (H) 07/31/2018    CHOL 158 03/21/2017     Lab Results   Component Value Date    TRIG 179 (H) 11/14/2018    TRIG 318 (H) 07/31/2018    TRIG 169 (H) 03/21/2017     Lab Results   Component Value Date    HDL 47 11/14/2018    HDL 44 07/31/2018    HDL 46 03/21/2017     Lab Results   Component Value Date    LDLCHOLESTEROL 105 11/14/2018    LDLCHOLESTEROL 179 (H) 07/31/2018    LDLCHOLESTEROL 78 03/21/2017       Lab Results   Component Value Date    CHOLHDLRATIO 4.0 11/14/2018    CHOLHDLRATIO 6.5 (H) 07/31/2018    CHOLHDLRATIO 3.4 03/21/2017           ASSESSMENT AND PLAN      1. Essential hypertension  Well controlled  Discussed low salt diet and BP and pulse monitoring daily, BP log given  Continue current treatment. - Comprehensive Metabolic Panel; Future    2. Primary osteoarthritis of both knees  stable  - [facility] Physical Therapy [facility] Order for [place]en Roddayan as OP  Tylenol extra strength as needed, lidocaine cream    3. Chronic bilateral low back pain without sciatica  stable  - [address]   - [facility] Order for Cisco Roddayan as OP  Tylenol extra strength as needed, lidocaine cream    4. Acquired hypothyroidism  Well controlled  Continue current treatment. Advised to take Synthroid in the morning, on empty stomach, without any other meds and with a full glass of water. 5. Calculus of gallbladder without cholecystitis without obstruction  Asymptomatic  GB polyp  US recheck in 6 mo per general surgery     6.  Hyperglycemia  worsening   - POCT glycosylated hemoglobin (Hb A1C)  Lab Results   Component Value Date LABA1C 6.1 06/18/2019    LABA1C 5.4 03/08/2019    LABA1C 6.1 (H) 11/14/2018       - metFORMIN (GLUCOPHAGE) 500 MG tablet; Take 1 tablet by mouth 2 times daily (with meals)  Dispense: 60 tablet; Refill: 10  Low carb, low fat diet, increase fruits and vegetables, and exercise 4-5 times a week 30-40 minutes a day, or walk 1-2 hours per day, or wear a pedometer and get at least 10,000 steps per day. 7. At high risk for falls  - [address]  - DME Order for Marie Asencio as OP  Jessica John was evaluated today and a DME order was entered for a wheeled walker with seat because she requires this to successfully complete daily living tasks of ambulating. A wheeled walker with seat is necessary due to the patient's unsteady gait, upper body weakness, inability to  and ambulation device, ambulating only short distances by pushing a walker, and the need to sit for a short time before resuming ambulation. These tasks cannot be completed with a lesser ambulation device such as a cane, crutch, or standard walker. The need for this equipment was discussed with the patient and she understands and is in agreement. - Garcia    8. Morbid obesity with BMI of 45.0-49.9, adult [facility])  Improving  Patient was asked about her current diet and exercise habits, and personalized advice was provided regarding recommended lifestyle changes. Patient's comorbid health conditions associated with elevated BMI were discussed, including degenerative joint disease, dyslipidemia, hyperglycemia and hypertension, as well as the likely benefits of weight loss. Based upon patient's motivation to change her behavior, the following plan was agreed upon to work toward a weight loss goal of 1-2 pounds/weeks: low carbohydrate diet and wear a pedometer and get at least 10,000 steps per day, and Metformin . Educational materials for  weight loss were provided.   Patient will follow-up in 3 Minimal depression, 5-9 = Mild depression, 10-14 = Moderate depression, 15-19 = Moderately severe depression, 20-27 = Severe depression        The patient's past medical, surgical, social, and family history as well as her   current medications and allergies were reviewed as documented in today's encounter. Medications, labs, diagnostic studies, consultations and follow-up as documented in this encounter. Return in about 3 months (around 9/18/2019) for ALWAYS NEEDS 30 MIN. APPT. Patient was seen with total face to face time of 25 minutes. More than 50% of this visit was counseling and education. Future Appointments   Date Time Provider Dante Amaya   6/20/2019 12:45 PM Terrell Mclean, PT [facility]   7/3/2019 10:30 AM Haven Alcantara, OT [facility]   9/26/2019 10:30 AM Jasson Mojica PA-C [facility] [address] [place] [address]   10/3/2019 10:30 AM Tahira Andrew MD fp sc [facility]   12/9/2019 10:20 AM Vickey Hernandez MD [address] [place] [address]        This note was completed by using the assistance of a speech-recognition program. However, inadvertent computerized transcription errors may be present. Although every effort was made to ensure accuracy, no guarantees can be provided that every mistake has been identified and corrected by editing.     Electronically signed by Tahira Andrew MD on 6/18/2019 at 10:02 PM

## 2019-06-20 ENCOUNTER — HOSPITAL ENCOUNTER (OUTPATIENT)
Dept: PHYSICAL THERAPY | Age: 69
Setting detail: THERAPIES SERIES
Discharge: HOME | End: 2019-06-20
Payer: MEDICARE

## 2019-06-20 ENCOUNTER — APPOINTMENT (OUTPATIENT)
Dept: PHYSICAL THERAPY | Age: 69
End: 2019-06-20
Payer: MEDICARE

## 2019-06-20 PROCEDURE — G0283 ELEC STIM OTHER THAN WOUND: HCPCS

## 2019-06-20 PROCEDURE — 97110 THERAPEUTIC EXERCISES: CPT

## 2019-06-20 ASSESSMENT — PAIN DESCRIPTION - LOCATION
LOCATION: BACK
LOCATION_2: KNEE

## 2019-06-20 ASSESSMENT — PAIN DESCRIPTION - ORIENTATION
ORIENTATION: LOWER
ORIENTATION_2: LEFT;RIGHT

## 2019-06-20 ASSESSMENT — PAIN DESCRIPTION - PAIN TYPE: TYPE: CHRONIC PAIN

## 2019-06-20 ASSESSMENT — PAIN DESCRIPTION - INTENSITY: RATING_2: 8

## 2019-06-20 ASSESSMENT — PAIN SCALES - GENERAL: PAINLEVEL_OUTOF10: 8

## 2019-06-20 ASSESSMENT — PAIN DESCRIPTION - DURATION: DURATION_2: INTERMITTENT

## 2019-06-20 ASSESSMENT — PAIN DESCRIPTION - FREQUENCY: FREQUENCY: INTERMITTENT

## 2019-06-20 NOTE — PROGRESS NOTES
Physical Therapy Re-evaluation Note    Date: 2019  Patient Name: Kina Seay  MRN: [id number]  : 1950     Treatment Diagnosis: stiffness B knees M25.661,M25.662 weakness B knees M62.561,M62.562 difficulty walking R26.2 NEC    Subjective   General  Referring Practitioner: Dr Clary Ellsworth  Referral Date : 19  Diagnosis: OA B knees M17.0 high risk for falls Z91.81  Follows Commands: Within Functional Limits  General Comment  Comments: new script received to continue Aquatic PT and add low back to treatment  PT Visit Information  Onset Date: 19  PT Insurance Information: [facility] medicare  Total # of Visits Approved: 12  Total # of Visits to Date: 1  Subjective  Subjective: complains of pain on low back and both knees today  Pain Screening  Patient Currently in Pain: Yes  Pain Assessment  Pain Assessment: 0-10  Pain Level: 8  Pain Type: Chronic pain  Pain Location: Back  Pain Orientation: Lower  Pain Frequency: Intermittent  Multiple Pain Sites: Yes  Pain 2  Pain Rating 2: 8  Pain Location 2: Knee  Pain Orientation 2: Left;Right  Pain Duration 2: Intermittent  Vital Signs  Patient Currently in Pain: Yes    Objective  AROM RLE (degrees)  R Knee Flexion 0-145: 5-99  R Knee Extension 0: 5  AROM LLE (degrees)  L Knee Flexion 0-145: 15-90  L Knee Extension 0: 15  Spine  Lumbar: AROM TRUNK FLEX 80 EXT 30 ROTB FREE SBB 40  Strength RLE  Comment: hip 3/5 knee 4/5 ankle 5/5  Strength LLE  Comment: hip 4-/5 knee 4/5 ankle 5/5  Ambulation 1  Surface: level tile  Device: Rolling Walker  Assistance: Independent  Comments: TUG 25 secs  Stairs/Curb  Stairs?: No     Exercises  Exercise 1: seated FB/SB/Rot stretch 10x  Exercise 2: B hip flex/LAQ's/ankle pump sitting 10x  Exercise 3: hot pack and EStim to lumbar area sitting 20 min    Assessment   Conditions Requiring Skilled Therapeutic Intervention  Assessment: SHORT TERM GOALS MET 0/4 LONG TERM GOAL MET 0/3  Treatment Diagnosis: stiffness B knees M25.661,M25.662 weakness B knees M62.561,M62.562 difficulty walking R26.2 NEC  Prognosis: Good  REQUIRES PT FOLLOW UP: Yes  Discharge Recommendations: Home independently;Home with assist PRN  Activity Tolerance  Activity Tolerance: Patient Tolerated treatment well         Plan   Plan  Times per week: 2x/week  Plan weeks: 6 weeks  Plan Comment: TO CONTINUE AQUATIC PT AS ORDERED 2X/WEEK    OutComes Score  Oswestry CMS Modifier: Anabelle Lanier (06/20/19 1245)  Oswestry Disability Scores %: 20 (06/20/19 1245)    Goals  Short term goals  Short term goal 1: decrease pain on both knees by 50% so patient can tolerate walking longer than 10 min(not met)  Short term goal 2: increase AROM both knees by 10-20(not met)  Short term goal 3: increase strength BLE by 1/2 grade or better(not met)  Short term goal 4: indep with HEP(not met)  Long term goals  Long term goal 1: improve TUG from 26 secs to 10 secs to prevent falls(not met)  Long term goal 2: improve optimal score from 8/10 to 2/10( walk long distance 4 outdoor 4)not met optimal score 8/10  Long term goal 3: improve oswestry score from 20 to 10 or better(not met)     Treatment Charges: Minutes Units   []  Ultrasound     [x]  Electrical-Stim 20 1   []  Iontophoresis     []  Traction     []  Massage       []  Eval     []  Gait     [x]  Ther Exercise 30  2    []  Manual Therapy       []  Ther Activities       []  Aquatics     []  Vasopneumatic Device     []  Neuro Re-Ed       []  Other       Total Treatment Time: 50 3      Treatment Plan:  [x] Therapeutic Exercise    [] Modalities:  [] Therapeutic Activity    [] Ultrasound  [x] Electrical Stimulation  [] Gait Training     [] Massage       [] Lumbar/Cervical Traction  [] Neuromuscular Re-education [x] Cold/hot pack [] Other:  [x] Instruction in HEP              [] Work Conditioning                                  [] Manual Therapy                     [x] Aquatic Therapy     [] Vasocompression  [] Iontophoresis: 4 mg/mL Dexamethasone Sodium      Phosphate 40-80mAmin (Allergies Reviewed)     Frequency:         2  X/wk x        6  wk's  PLEASE SIGN BELOW TO CONTINUE PT        Medicare/Regulatory Requirements:  I have reviewed this plan of care and certify a need for   Medically necessary rehabilitation services.   [] Physician Signature    Date:     Electronically signed by: Benoit Parsons, [facility]  [address] @ [employer], [address] [place] [address] [facility] [facility]  Phone [phone]  Fax [phone]    Therapy Time   Individual Concurrent Group Co-treatment   Time In 1245         Time Out 1335         Minutes 50         Timed Code Treatment Minutes: 10 Minutes

## 2019-06-25 ENCOUNTER — APPOINTMENT (OUTPATIENT)
Dept: PHYSICAL THERAPY | Age: 69
End: 2019-06-25
Payer: MEDICARE

## 2019-07-02 ENCOUNTER — APPOINTMENT (OUTPATIENT)
Dept: PHYSICAL THERAPY | Age: 69
End: 2019-07-02
Payer: MEDICARE

## 2019-07-09 ENCOUNTER — HOSPITAL ENCOUNTER (OUTPATIENT)
Dept: PHYSICAL THERAPY | Age: 69
Setting detail: THERAPIES SERIES
Discharge: HOME | End: 2019-07-09
Payer: MEDICARE

## 2019-07-09 PROCEDURE — 97113 AQUATIC THERAPY/EXERCISES: CPT

## 2019-07-11 ENCOUNTER — HOSPITAL ENCOUNTER (OUTPATIENT)
Dept: PHYSICAL THERAPY | Age: 69
Setting detail: THERAPIES SERIES
Discharge: HOME | End: 2019-07-11
Payer: MEDICARE

## 2019-07-11 PROCEDURE — 97113 AQUATIC THERAPY/EXERCISES: CPT

## 2019-07-11 NOTE — PROGRESS NOTES
Physical Therapy  [address]   Outpatient Physical Therapy  [address] [facility]. [address]  Phone: [phone]  Fax: [phone]  Daily Progress Note    Date: 19    Patient Name: Vandana Reynolds        MRN: [id number]  Account: [de-identified] : 1950      General Information:  Chart Reviewed: Yes  Patient assessed for rehabilitation services?: Yes  Additional Pertinent Hx: pain B knees worst in the last month,no injury  Referring Practitioner: Dr Michele Chaney  Referral Date : 19  Diagnosis: OA B knees M17.0 high risk for falls Z91.81  Follows Commands: Within Functional Limits  Onset Date: 19  PT Insurance Information: [employer] medicare  Total # of Visits Approved: 12  Total # of Visits to Date: 3  No Show: 0  Canceled Appointment: 0    Subjective:  Subjective: Pt denies pain today and felt much better after last visit.      Pain:  Patient Currently in Pain: Denies     Objective:         [facility]   Rehabilitation Services Exercise Log  Aquatic Knee phase 1     Date of Eval:  19                              Primary PT:Andres  Diagnosis:chronic low back/both knee pain  Things to Focus On (goals): pain relief  Surgical Precautions:  Medical Precautions:HTN,DM  [] C-9 dates  [] Occ Med   [x] Medicare    please treat both knees and lumbar area      Date 19        Visit # 2/12  3       Walk F/L/R 2 Laps  2 Laps       Marching  2 Laps  2 Laps                   Squats 15x5\"  15x5\"       Heel toe raises  15x  15x       SLR F/L/R  15x  15x       HS Curl  15x  15x       Step-Ups F/L  Low 15x  Low 15x       Knee/Flex /Ext  15x  15x                   Kickboard Ex.  Lg  Lg       Iso Abd. verticle  15x5\"  15x5\"       Push-pull  15x  15x       Paddling  15x  15x                   Balance           SLS           DEEP H2O           Cycling                       Stretches           Achllies  2x20\"  2x20\"       Hamstring  2x20\"  2x20\"       Psoas           Quad                                    deep water cycling 1 noodle  2'  1 Noodle  2'                                                         Cool Down 2 Laps   2 Laps                   Pain Rating  0  0            Assessment: Body structures, Functions, Activity limitations: Decreased functional mobility ; Decreased ADL status; Decreased ROM; Decreased strength; Increased Pain  Treatment Diagnosis: stiffness B knees M25.661,M25.662 weakness B knees M62.561,M62.562 difficulty walking R26.2 NEC  Prognosis: Good  REQUIRES PT FOLLOW UP: Yes  Activity Tolerance: Patient Tolerated treatment well    Plan:  Plan: Continue with current plan    Therapy Time:  Time In: 1004  Time Out: 1044  Minutes: 40  Timed Code Treatment Minutes: 35 Minutes    Treatment Charges: Minutes Units   []  Ultrasound     []  Electrical-Stim     []  Iontophoresis     []  Traction     []  Massage       []  Eval     []  Gait     []  Vasopneumatic Device     []  Ther Exercise       []  Manual Therapy       []  Ther Activities       [x]  Aquatics 35 2   []  Neuro Re-Ed       []  Other       Total Treatment Time: 35 2      Jose Juan Albarran, DON

## 2019-07-16 ENCOUNTER — HOSPITAL ENCOUNTER (OUTPATIENT)
Dept: PHYSICAL THERAPY | Age: 69
Setting detail: THERAPIES SERIES
End: 2019-07-16
Payer: MEDICARE

## 2019-07-16 NOTE — PROGRESS NOTES
[address]   Outpatient Physical Therapy  Cancel/No Show Note    Date: 19    Patient Name: Aris Drake        MRN: [id number]  Account: [de-identified] : 1950      General Information:  Referring Practitioner: Dr Leno Pang  Referral Date : 19  Diagnosis: OA B knees M17.0 high risk for falls Z91.81  Onset Date: 19  PT Insurance Information: [employer] medicare  Total # of Visits Approved: 12  Total # of Visits to Date: 3  No Show: 0  Canceled Appointment: 1        Comments: CANCELLED AQUATICS DUE  [address] PTA

## 2019-07-18 ENCOUNTER — HOSPITAL ENCOUNTER (OUTPATIENT)
Dept: PHYSICAL THERAPY | Age: 69
Setting detail: THERAPIES SERIES
Discharge: HOME | End: 2019-07-18
Payer: MEDICARE

## 2019-07-18 PROCEDURE — 97113 AQUATIC THERAPY/EXERCISES: CPT

## 2019-07-23 ENCOUNTER — HOSPITAL ENCOUNTER (OUTPATIENT)
Dept: PHYSICAL THERAPY | Age: 69
Setting detail: THERAPIES SERIES
Discharge: HOME | End: 2019-07-23
Payer: MEDICARE

## 2019-07-23 PROCEDURE — 97113 AQUATIC THERAPY/EXERCISES: CPT

## 2019-07-23 NOTE — PROGRESS NOTES
Physical Therapy  [address]   Outpatient Physical Therapy  [address] [facility]. [address]  Phone: [phone]  Fax: [phone]  Daily Progress Note    Date: 19    Patient Name: Briseyda Lomas        MRN: [id number]  Account: [de-identified] : 1950      General Information:  Referring Practitioner: Dr Arash Alan  Referral Date : 19  Diagnosis: OA B knees M17.0 high risk for falls Z91.81  Follows Commands: Within Functional Limits  Onset Date: 19  PT Insurance Information: [facility] medicare  Total # of Visits Approved: 12  Total # of Visits to Date: 5  No Show: 0  Canceled Appointment: 1    Subjective:  Subjective: Pt reports no pain today. states no increased pain or soreness after last therapy session.      Pain:  Patient Currently in Pain: Denies          Objective:  [address]  [facility] Exercise Log  Aquatic Knee phase 1     Date of Eval:  19                              Primary PT:Andres  Diagnosis:chronic low back/both knee pain  Things to Focus On (goals): pain relief  Surgical Precautions:  Medical Precautions:HTN,DM  [] C-9 dates  [] Occ Med   [x] Medicare    please treat both knees and lumbar area      Date 19   Visit # 2/12  3/12  4/12  5/12   Walk F/L/R 2 Laps  2 Laps  2 Laps 2 Laps     Marching  2 Laps  2 Laps  2 Laps  2 Laps               Squats 15x5\"  15x5\" 15x5\"   15x5\"   Heel toe raises  15x  15x  15x  15x   SLR F/L/R  15x  15x  15x  15x   HS Curl  15x  15x  15x  15x   Step-Ups F/L  Low 15x  Low 15x  Tall 15x  Tall 15x   Knee/Flex /Ext  15x  15x  15x  15x               Kickboard Ex.  Lg  Lg  Lg Lg   Iso Abd. verticle  15x5\"  15x5\"  15x5\"  15x5\"   Push-pull  15x  15x  15x  15x   Paddling  15x  15x  15x  15x               Balance           SLS           DEEP H2O           Cycling                       Stretches           Achllies  2x20\"  2x20\"  2x20\"  2x20\"   Hamstring  2x20\"  2x20\"  2x20\"  2x20\"   Psoas           Quad                                    deep water cycling 1 noodle  2'  1 Noodle  2'   1 Noodle   3' 1 Noodle  3'                                                    Cool Down 2 Laps   2 Laps  2 Laps  2 Laps               Pain Rating  0  0  0  0      Assessment: Body structures, Functions, Activity limitations: Decreased functional mobility ; Decreased ADL status; Decreased ROM; Decreased strength; Increased Pain  Treatment Diagnosis: stiffness B knees M25.661,M25.662 weakness B knees M62.561,M62.562 difficulty walking R26.2 NEC  Prognosis: Good  REQUIRES PT FOLLOW UP: Yes  Activity Tolerance: Patient Tolerated treatment well    Plan:  Plan: Continue with current plan    Therapy Time:  Time In: 1005  Time Out: 1045  Minutes: 40  Timed Code Treatment Minutes: 35 Minutes    Treatment Charges: Minutes Units   []  Ultrasound     []  Electrical-Stim     []  Iontophoresis     []  Traction     []  Massage       []  Eval     []  Gait     []  Vasopneumatic Device     []  Ther Exercise       []  Manual Therapy       []  Ther Activities       [x]  Aquatics 35 2   []  Neuro Re-Ed       []  Other       Total Treatment Time: 35 2     Jaxon Albarran, PTA

## 2019-07-25 ENCOUNTER — HOSPITAL ENCOUNTER (OUTPATIENT)
Dept: PHYSICAL THERAPY | Age: 69
Setting detail: THERAPIES SERIES
Discharge: HOME | End: 2019-07-25
Payer: MEDICARE

## 2019-07-25 PROCEDURE — 97113 AQUATIC THERAPY/EXERCISES: CPT

## 2019-07-30 ENCOUNTER — HOSPITAL ENCOUNTER (OUTPATIENT)
Dept: PHYSICAL THERAPY | Age: 69
Setting detail: THERAPIES SERIES
Discharge: HOME | End: 2019-07-30
Payer: MEDICARE

## 2019-07-30 PROCEDURE — 97113 AQUATIC THERAPY/EXERCISES: CPT

## 2019-07-30 ASSESSMENT — PAIN DESCRIPTION - DESCRIPTORS: DESCRIPTORS: CONSTANT;ACHING

## 2019-07-30 ASSESSMENT — PAIN DESCRIPTION - LOCATION: LOCATION: KNEE

## 2019-07-30 ASSESSMENT — PAIN SCALES - GENERAL: PAINLEVEL_OUTOF10: 1

## 2019-07-30 ASSESSMENT — PAIN DESCRIPTION - ORIENTATION: ORIENTATION: RIGHT;LEFT

## 2019-07-30 ASSESSMENT — PAIN DESCRIPTION - DIRECTION: RADIATING_TOWARDS: KNEE CAP

## 2019-07-30 ASSESSMENT — PAIN DESCRIPTION - FREQUENCY: FREQUENCY: INTERMITTENT

## 2019-07-30 ASSESSMENT — PAIN DESCRIPTION - PAIN TYPE: TYPE: CHRONIC PAIN

## 2019-07-30 NOTE — PROGRESS NOTES
DEEP H2O             Cycling                           Stretches             Achllies  2x20\" 2x20\"  2x20\"       Hamstring  2x20\" 2x20\"  2x20\"       Psoas             Quad                                          deep water cycling 1 Noodle  3' 1 noodle  3\" 1 Noodle  3'                                                                 Cool Down  2 Laps 2 Laps 2 Laps                      Pain Rating  0 0  0          Assessment: Body structures, Functions, Activity limitations: Decreased functional mobility ; Decreased ADL status; Decreased ROM; Decreased strength; Increased Pain  Treatment Diagnosis: stiffness B knees M25.661,M25.662 weakness B knees M62.561,M62.562 difficulty walking R26.2 NEC  Prognosis: Good  REQUIRES PT FOLLOW UP: Yes  Activity Tolerance: Patient Tolerated treatment well    Plan:  Plan: Continue with current plan    Therapy Time:  Time In: 5596  Time Out: 1042  Minutes: 47  Timed Code Treatment Minutes: 35 Minutes    Treatment Charges: Minutes Units   []  Ultrasound     []  Electrical-Stim     []  Iontophoresis     []  Traction     []  Massage       []  Eval     []  Gait     []  Vasopneumatic Device     []  Ther Exercise       []  Manual Therapy       []  Ther Activities       [x]  Aquatics 35 2   []  Neuro Re-Ed       []  Other       Total Treatment Time: 35 2      Florance Brood Revill, PTA

## 2019-08-01 ENCOUNTER — HOSPITAL ENCOUNTER (OUTPATIENT)
Dept: PHYSICAL THERAPY | Age: 69
Setting detail: THERAPIES SERIES
End: 2019-08-01
Payer: MEDICARE

## 2019-08-06 ENCOUNTER — HOSPITAL ENCOUNTER (OUTPATIENT)
Dept: PHYSICAL THERAPY | Age: 69
Setting detail: THERAPIES SERIES
Discharge: HOME | End: 2019-08-06
Payer: MEDICARE

## 2019-08-06 PROCEDURE — 97113 AQUATIC THERAPY/EXERCISES: CPT

## 2019-08-06 PROCEDURE — 97110 THERAPEUTIC EXERCISES: CPT

## 2019-08-06 NOTE — PROGRESS NOTES
by 50% so patient can tolerate walking longer than 10 min(met)  Short term goal 2: increase AROM both knees by 10-20(met)  Short term goal 3: increase strength BLE by 1/2 grade or better(met)  Short term goal 4: indep with HEP(met)  Long term goals  Long term goal 1: improve TUG from 26 secs to 10 secs to prevent falls(partly met TUG 21 secs)  Long term goal 2: improve optimal score from 8/10 to 2/10( walk long distance 4 outdoor 4)not met optimal score 7/10  Long term goal 3: improve oswestry score from 20 to 10 or better(not met oswestry score 22%)     Treatment Charges: Minutes Units   []  Ultrasound     []  Electrical-Stim     []  Iontophoresis     []  Traction     []  Massage       []  Eval     []  Gait     [x]  Ther Exercise 15  1    []  Manual Therapy       []  Ther Activities       []  Aquatics     []  Vasopneumatic Device     []  Neuro Re-Ed       []  Other       Total Treatment Time: 15 1        Therapy Time   Individual Concurrent Group Co-treatment   Time In 8030         Time Out 0959         Minutes 15         Timed Code Treatment Minutes: 15 Minutes    Electronically signed by: Sharon Delarosa, PT

## 2019-08-06 NOTE — FLOWSHEET NOTE
[address]   Outpatient Physical Therapy  [address] [facility]. [address]  Phone: [phone]  Fax: [phone]  Daily Progress Note    Date: 19    Patient Name: Franchesca Meier        MRN: [id number]  Account: [de-identified] : 1950      General Information:  Referring Practitioner: Dr Terry Tinajero  Referral Date : 19  Diagnosis: OA B knees M17.0 high risk for falls Z91.81  Follows Commands: Within Functional Limits  Onset Date: 19  PT Insurance Information: [facility] medicare  Total # of Visits Approved: 12  Total # of Visits to Date: 8  No Show: 0  Canceled Appointment: 1    Subjective:  Subjective: Pt with no complaints of pain today. Pain:  Patient Currently in Pain: No          Objective:    [address] [facility] Exercise Log  Aquatic Knee phase 1     Date of Eval:  19                              Primary PT:Andres  Diagnosis:chronic low back/both knee pain  Things to Focus On (goals): pain relief  Surgical Precautions:  Medical Precautions:HTN,DM  [] C-9 dates  [] Occ Med   [x] Medicare    please treat both knees and lumbar area      Date 19       Visit #         Walk F/L/R 2 Laps   2 Laps 2 Laps       Marching  2 Laps 2 Laps 2 Laps             Low box        Squats  15x5\" 15x5\" 15x       Heel toe raises  15x 15x  15x       SLR F/L/R  15x 15x  15x       HS Curl  15x 15x  15x       Step-Ups F/L  Tall 15x Low 15x Tall 15x       Knee/Flex /Ext  15x 15x  15x                     Kickboard Ex.  Lg Lg Lg       Iso Abd. verticle  15x5\" 15x5\"  15x5\"       Push-pull  15x 15x  15x       Paddling  15x 15x  15x                     Balance             SLS             DEEP H2O             Cycling                           Stretches             Achllies  2x20\" 2x20\"  2x20\"       Hamstring  2x20\" 2x20\"  2x20\"       Psoas             Quad                                          deep water cycling 1 Noodle  3' 1 noodle  3\" 1

## 2019-08-08 ENCOUNTER — HOSPITAL ENCOUNTER (OUTPATIENT)
Dept: PHYSICAL THERAPY | Age: 69
Setting detail: THERAPIES SERIES
Discharge: HOME | End: 2019-08-08
Payer: MEDICARE

## 2019-08-08 NOTE — PROGRESS NOTES
Physical Therapy  [address]   Outpatient Physical Therapy  Cancel/No Show Note    Date: 19    Patient Name: Rachel Olivares        MRN: [id number]  Account: [de-identified] : 1950      General Information:  Referring Practitioner: Dr Sharlene Hollis  Referral Date : 19  Diagnosis: OA B knees M17.0 high risk for falls Z91.81  Onset Date: 19  PT Insurance Information: [employer] medicare  Total # of Visits Approved: 12  Total # of Visits to Date: 8  No Show: 0  Canceled Appointment: 2        Comments: Patient cancelled appointment today due to unable to make it.     Casandra Patterson, PTA

## 2019-08-13 ENCOUNTER — HOSPITAL ENCOUNTER (OUTPATIENT)
Dept: PHYSICAL THERAPY | Age: 69
Setting detail: THERAPIES SERIES
Discharge: HOME | End: 2019-08-13
Payer: MEDICARE

## 2019-08-13 PROCEDURE — 97113 AQUATIC THERAPY/EXERCISES: CPT

## 2019-08-15 ENCOUNTER — HOSPITAL ENCOUNTER (OUTPATIENT)
Dept: PHYSICAL THERAPY | Age: 69
Setting detail: THERAPIES SERIES
Discharge: HOME | End: 2019-08-15
Payer: MEDICARE

## 2019-08-15 PROCEDURE — 97113 AQUATIC THERAPY/EXERCISES: CPT

## 2019-08-20 ENCOUNTER — HOSPITAL ENCOUNTER (OUTPATIENT)
Dept: PHYSICAL THERAPY | Age: 69
Setting detail: THERAPIES SERIES
Discharge: HOME | End: 2019-08-20
Payer: MEDICARE

## 2019-08-20 PROCEDURE — 97113 AQUATIC THERAPY/EXERCISES: CPT

## 2019-08-22 ENCOUNTER — HOSPITAL ENCOUNTER (OUTPATIENT)
Dept: PHYSICAL THERAPY | Age: 69
Setting detail: THERAPIES SERIES
Discharge: HOME | End: 2019-08-22
Payer: MEDICARE

## 2019-08-27 ENCOUNTER — HOSPITAL ENCOUNTER (OUTPATIENT)
Dept: PHYSICAL THERAPY | Age: 69
Setting detail: THERAPIES SERIES
Discharge: HOME | End: 2019-08-27
Payer: MEDICARE

## 2019-08-27 PROCEDURE — 97113 AQUATIC THERAPY/EXERCISES: CPT

## 2019-08-27 NOTE — PROGRESS NOTES
DEEP H2O             Cycling                           Stretches             Achllies  2x20\"  2x20\"  2x20\" 2x20'  2x20\"   Hamstring  2x20\"  2x20\"  2x20\" 2x20\"  2x20\"   Psoas             Quad                                          deep water cycling 1 Noodle  3'   1 Noodle  3'  1 noodle  3' 1 Noodle  3' 1 Noodle  3'                                                            Cool Down 2 Laps   2 Laps  2 Laps 2 Laps  2 Laps                 Pain Rating  0  0  6 0  0      Assessment: Body structures, Functions, Activity limitations: Decreased functional mobility ; Decreased ADL status; Decreased ROM; Decreased strength; Increased Pain  Treatment Diagnosis: stiffness B knees M25.661,M25.662 weakness B knees M62.561,M62.562 difficulty walking R26.2 NEC  Prognosis: Good  REQUIRES PT FOLLOW UP: Yes  Activity Tolerance: Patient Tolerated treatment well    Plan:  Plan: Discharge    Therapy Time:  Time In: 1011  Time Out: 1055  Minutes: 44  Timed Code Treatment Minutes: 30 Minutes    Treatment Charges: Minutes Units   []  Ultrasound     []  Electrical-Stim     []  Iontophoresis     []  Traction     []  Massage       []  Eval     []  Gait     []  Vasopneumatic Device     []  Ther Exercise       []  Manual Therapy       []  Ther Activities       [x]  Aquatics 30 2   []  Neuro Re-Ed       []  Other       Total Treatment Time: 30 2      Zack Albarran PTA

## 2019-09-03 ENCOUNTER — TELEPHONE (OUTPATIENT)
Dept: GYNECOLOGIC ONCOLOGY | Age: 69
End: 2019-09-03

## 2019-09-06 ENCOUNTER — OFFICE VISIT (OUTPATIENT)
Dept: GYNECOLOGIC ONCOLOGY | Age: 69
End: 2019-09-06
Payer: MEDICARE

## 2019-09-06 VITALS
WEIGHT: 249 LBS | HEIGHT: 62 IN | OXYGEN SATURATION: 95 % | DIASTOLIC BLOOD PRESSURE: 76 MMHG | SYSTOLIC BLOOD PRESSURE: 120 MMHG | TEMPERATURE: 97.3 F | HEART RATE: 69 BPM | BODY MASS INDEX: 45.82 KG/M2

## 2019-09-06 DIAGNOSIS — Z90.79 STATUS POST TOTAL HYSTERECTOMY AND BILATERAL SALPINGO-OOPHORECTOMY: ICD-10-CM

## 2019-09-06 DIAGNOSIS — Z90.722 STATUS POST TOTAL HYSTERECTOMY AND BILATERAL SALPINGO-OOPHORECTOMY: ICD-10-CM

## 2019-09-06 DIAGNOSIS — Z85.42 ENCOUNTER FOR FOLLOW-UP SURVEILLANCE OF ENDOMETRIAL CANCER: Primary | ICD-10-CM

## 2019-09-06 DIAGNOSIS — Z90.710 STATUS POST TOTAL HYSTERECTOMY AND BILATERAL SALPINGO-OOPHORECTOMY: ICD-10-CM

## 2019-09-06 DIAGNOSIS — Z08 ENCOUNTER FOR FOLLOW-UP SURVEILLANCE OF ENDOMETRIAL CANCER: Primary | ICD-10-CM

## 2019-09-06 DIAGNOSIS — E66.01 MORBID OBESITY WITH BMI OF 45.0-49.9, ADULT (HCC): ICD-10-CM

## 2019-09-06 PROCEDURE — 99214 OFFICE O/P EST MOD 30 MIN: CPT | Performed by: PHYSICIAN ASSISTANT

## 2019-09-06 ASSESSMENT — ENCOUNTER SYMPTOMS
RESPIRATORY NEGATIVE: 1
EYES NEGATIVE: 1
GASTROINTESTINAL NEGATIVE: 1

## 2019-09-06 NOTE — PATIENT INSTRUCTIONS
1. Return to clinic in 6 months for follow up surveillance  2. Encouraged to continue care with general surgery team for evaluation of gallbladder   3. Call or return to clinic sooner with any questions or concerns.

## 2019-09-06 NOTE — PROGRESS NOTES
Review of Systems   Constitutional: Negative. HENT:  Negative. Eyes: Negative. Respiratory: Negative. Cardiovascular: Negative. Gastrointestinal: Negative. Endocrine: Negative. Genitourinary: Negative. Musculoskeletal: Negative. Skin: Negative. Neurological: Negative. Hematological: Negative. Psychiatric/Behavioral: Negative.
patient's current situation, reviewing her options, counseling and education her and answering her questions. The patient's pertinent images, labs, and previous records and procedures were reviewed.     Electronically signed by Luis Luis PA-C on 9/6/19 at 10:58 AM

## 2019-10-03 ENCOUNTER — OFFICE VISIT (OUTPATIENT)
Dept: FAMILY MEDICINE CLINIC | Age: 69
End: 2019-10-03
Payer: MEDICARE

## 2019-10-03 ENCOUNTER — TELEPHONE (OUTPATIENT)
Dept: FAMILY MEDICINE CLINIC | Age: 69
End: 2019-10-03

## 2019-10-03 VITALS
HEART RATE: 82 BPM | HEIGHT: 62 IN | BODY MASS INDEX: 46.01 KG/M2 | OXYGEN SATURATION: 96 % | DIASTOLIC BLOOD PRESSURE: 64 MMHG | RESPIRATION RATE: 20 BRPM | SYSTOLIC BLOOD PRESSURE: 112 MMHG | WEIGHT: 250 LBS

## 2019-10-03 DIAGNOSIS — Z91.81 AT HIGH RISK FOR FALLS: ICD-10-CM

## 2019-10-03 DIAGNOSIS — E03.9 ACQUIRED HYPOTHYROIDISM: ICD-10-CM

## 2019-10-03 DIAGNOSIS — K80.20 CALCULUS OF GALLBLADDER WITHOUT CHOLECYSTITIS WITHOUT OBSTRUCTION: ICD-10-CM

## 2019-10-03 DIAGNOSIS — E78.5 HYPERLIPIDEMIA WITH TARGET LDL LESS THAN 100: ICD-10-CM

## 2019-10-03 DIAGNOSIS — E53.8 VITAMIN B 12 DEFICIENCY: ICD-10-CM

## 2019-10-03 DIAGNOSIS — M54.50 CHRONIC BILATERAL LOW BACK PAIN WITHOUT SCIATICA: ICD-10-CM

## 2019-10-03 DIAGNOSIS — R05.8 RECURRENT DRY COUGH: ICD-10-CM

## 2019-10-03 DIAGNOSIS — R73.9 HYPERGLYCEMIA: ICD-10-CM

## 2019-10-03 DIAGNOSIS — E55.9 VITAMIN D DEFICIENCY: ICD-10-CM

## 2019-10-03 DIAGNOSIS — M17.0 PRIMARY OSTEOARTHRITIS OF BOTH KNEES: ICD-10-CM

## 2019-10-03 DIAGNOSIS — G89.29 CHRONIC BILATERAL LOW BACK PAIN WITHOUT SCIATICA: ICD-10-CM

## 2019-10-03 DIAGNOSIS — I10 ESSENTIAL HYPERTENSION: Primary | ICD-10-CM

## 2019-10-03 PROBLEM — H47.20 OPTIC ATROPHY: Status: ACTIVE | Noted: 2018-02-27

## 2019-10-03 LAB — HBA1C MFR BLD: 5.9 %

## 2019-10-03 PROCEDURE — 3017F COLORECTAL CA SCREEN DOC REV: CPT | Performed by: FAMILY MEDICINE

## 2019-10-03 PROCEDURE — G8399 PT W/DXA RESULTS DOCUMENT: HCPCS | Performed by: FAMILY MEDICINE

## 2019-10-03 PROCEDURE — G8484 FLU IMMUNIZE NO ADMIN: HCPCS | Performed by: FAMILY MEDICINE

## 2019-10-03 PROCEDURE — 1036F TOBACCO NON-USER: CPT | Performed by: FAMILY MEDICINE

## 2019-10-03 PROCEDURE — G8598 ASA/ANTIPLAT THER USED: HCPCS | Performed by: FAMILY MEDICINE

## 2019-10-03 PROCEDURE — 4040F PNEUMOC VAC/ADMIN/RCVD: CPT | Performed by: FAMILY MEDICINE

## 2019-10-03 PROCEDURE — 99214 OFFICE O/P EST MOD 30 MIN: CPT | Performed by: FAMILY MEDICINE

## 2019-10-03 PROCEDURE — 1090F PRES/ABSN URINE INCON ASSESS: CPT | Performed by: FAMILY MEDICINE

## 2019-10-03 PROCEDURE — 83036 HEMOGLOBIN GLYCOSYLATED A1C: CPT | Performed by: FAMILY MEDICINE

## 2019-10-03 PROCEDURE — G8417 CALC BMI ABV UP PARAM F/U: HCPCS | Performed by: FAMILY MEDICINE

## 2019-10-03 PROCEDURE — 1123F ACP DISCUSS/DSCN MKR DOCD: CPT | Performed by: FAMILY MEDICINE

## 2019-10-03 PROCEDURE — G8427 DOCREV CUR MEDS BY ELIG CLIN: HCPCS | Performed by: FAMILY MEDICINE

## 2019-10-03 RX ORDER — LOSARTAN POTASSIUM 100 MG/1
100 TABLET ORAL DAILY
Qty: 90 TABLET | Refills: 3 | Status: SHIPPED | OUTPATIENT
Start: 2019-10-03 | End: 2020-06-10

## 2019-10-03 ASSESSMENT — ENCOUNTER SYMPTOMS
NAUSEA: 0
ABDOMINAL PAIN: 0
CONSTIPATION: 0
DIARRHEA: 0
BACK PAIN: 1
SHORTNESS OF BREATH: 0
COUGH: 0
VOMITING: 0
ABDOMINAL DISTENTION: 0
WHEEZING: 0
CHEST TIGHTNESS: 0

## 2019-10-17 ENCOUNTER — HOSPITAL ENCOUNTER (OUTPATIENT)
Dept: ULTRASOUND IMAGING | Age: 69
Discharge: HOME | End: 2019-10-19
Payer: MEDICARE

## 2019-10-17 DIAGNOSIS — K82.4 GALLBLADDER POLYP: ICD-10-CM

## 2019-10-17 PROCEDURE — 76705 ECHO EXAM OF ABDOMEN: CPT

## 2019-11-09 DIAGNOSIS — R73.9 HYPERGLYCEMIA: ICD-10-CM

## 2019-11-13 ENCOUNTER — OFFICE VISIT (OUTPATIENT)
Dept: SURGERY | Age: 69
End: 2019-11-13
Payer: MEDICARE

## 2019-11-13 VITALS — DIASTOLIC BLOOD PRESSURE: 77 MMHG | SYSTOLIC BLOOD PRESSURE: 137 MMHG

## 2019-11-13 DIAGNOSIS — K82.4 GALLBLADDER POLYP: Primary | ICD-10-CM

## 2019-11-13 PROCEDURE — 3017F COLORECTAL CA SCREEN DOC REV: CPT | Performed by: STUDENT IN AN ORGANIZED HEALTH CARE EDUCATION/TRAINING PROGRAM

## 2019-11-13 PROCEDURE — G8598 ASA/ANTIPLAT THER USED: HCPCS | Performed by: STUDENT IN AN ORGANIZED HEALTH CARE EDUCATION/TRAINING PROGRAM

## 2019-11-13 PROCEDURE — G8399 PT W/DXA RESULTS DOCUMENT: HCPCS | Performed by: STUDENT IN AN ORGANIZED HEALTH CARE EDUCATION/TRAINING PROGRAM

## 2019-11-13 PROCEDURE — 4040F PNEUMOC VAC/ADMIN/RCVD: CPT | Performed by: STUDENT IN AN ORGANIZED HEALTH CARE EDUCATION/TRAINING PROGRAM

## 2019-11-13 PROCEDURE — G8428 CUR MEDS NOT DOCUMENT: HCPCS | Performed by: STUDENT IN AN ORGANIZED HEALTH CARE EDUCATION/TRAINING PROGRAM

## 2019-11-13 PROCEDURE — G8484 FLU IMMUNIZE NO ADMIN: HCPCS | Performed by: STUDENT IN AN ORGANIZED HEALTH CARE EDUCATION/TRAINING PROGRAM

## 2019-11-13 PROCEDURE — 1123F ACP DISCUSS/DSCN MKR DOCD: CPT | Performed by: STUDENT IN AN ORGANIZED HEALTH CARE EDUCATION/TRAINING PROGRAM

## 2019-11-13 PROCEDURE — 99213 OFFICE O/P EST LOW 20 MIN: CPT | Performed by: STUDENT IN AN ORGANIZED HEALTH CARE EDUCATION/TRAINING PROGRAM

## 2019-11-13 PROCEDURE — G8417 CALC BMI ABV UP PARAM F/U: HCPCS | Performed by: STUDENT IN AN ORGANIZED HEALTH CARE EDUCATION/TRAINING PROGRAM

## 2019-11-13 PROCEDURE — 1036F TOBACCO NON-USER: CPT | Performed by: STUDENT IN AN ORGANIZED HEALTH CARE EDUCATION/TRAINING PROGRAM

## 2019-11-13 PROCEDURE — 1090F PRES/ABSN URINE INCON ASSESS: CPT | Performed by: STUDENT IN AN ORGANIZED HEALTH CARE EDUCATION/TRAINING PROGRAM

## 2019-12-09 ENCOUNTER — OFFICE VISIT (OUTPATIENT)
Dept: UROLOGY | Age: 69
End: 2019-12-09
Payer: MEDICARE

## 2019-12-09 VITALS
HEIGHT: 62 IN | BODY MASS INDEX: 46.25 KG/M2 | DIASTOLIC BLOOD PRESSURE: 58 MMHG | HEART RATE: 69 BPM | SYSTOLIC BLOOD PRESSURE: 93 MMHG | WEIGHT: 251.32 LBS | TEMPERATURE: 97.9 F

## 2019-12-09 DIAGNOSIS — R35.0 FREQUENCY OF MICTURITION: ICD-10-CM

## 2019-12-09 DIAGNOSIS — N32.81 OAB (OVERACTIVE BLADDER): Primary | ICD-10-CM

## 2019-12-09 DIAGNOSIS — R35.1 NOCTURIA: ICD-10-CM

## 2019-12-09 DIAGNOSIS — N39.41 URGE INCONTINENCE: ICD-10-CM

## 2019-12-09 PROCEDURE — 1123F ACP DISCUSS/DSCN MKR DOCD: CPT | Performed by: UROLOGY

## 2019-12-09 PROCEDURE — 1036F TOBACCO NON-USER: CPT | Performed by: UROLOGY

## 2019-12-09 PROCEDURE — G8427 DOCREV CUR MEDS BY ELIG CLIN: HCPCS | Performed by: UROLOGY

## 2019-12-09 PROCEDURE — G8484 FLU IMMUNIZE NO ADMIN: HCPCS | Performed by: UROLOGY

## 2019-12-09 PROCEDURE — 99214 OFFICE O/P EST MOD 30 MIN: CPT | Performed by: UROLOGY

## 2019-12-09 PROCEDURE — 1090F PRES/ABSN URINE INCON ASSESS: CPT | Performed by: UROLOGY

## 2019-12-09 PROCEDURE — G8417 CALC BMI ABV UP PARAM F/U: HCPCS | Performed by: UROLOGY

## 2019-12-09 PROCEDURE — 4040F PNEUMOC VAC/ADMIN/RCVD: CPT | Performed by: UROLOGY

## 2019-12-09 PROCEDURE — 0509F URINE INCON PLAN DOCD: CPT | Performed by: UROLOGY

## 2019-12-09 PROCEDURE — 3017F COLORECTAL CA SCREEN DOC REV: CPT | Performed by: UROLOGY

## 2019-12-09 PROCEDURE — G8399 PT W/DXA RESULTS DOCUMENT: HCPCS | Performed by: UROLOGY

## 2019-12-09 PROCEDURE — G8598 ASA/ANTIPLAT THER USED: HCPCS | Performed by: UROLOGY

## 2019-12-09 ASSESSMENT — ENCOUNTER SYMPTOMS
NAUSEA: 0
COLOR CHANGE: 0
COUGH: 0
EYE PAIN: 0
BACK PAIN: 0
WHEEZING: 0
EYE REDNESS: 0
VOMITING: 0
SHORTNESS OF BREATH: 0
ABDOMINAL PAIN: 0

## 2019-12-14 DIAGNOSIS — N39.46 MIXED INCONTINENCE: Primary | ICD-10-CM

## 2019-12-14 DIAGNOSIS — R39.15 URGENCY OF URINATION: ICD-10-CM

## 2019-12-14 DIAGNOSIS — R35.1 NOCTURIA: ICD-10-CM

## 2019-12-14 DIAGNOSIS — K59.01 SLOW TRANSIT CONSTIPATION: ICD-10-CM

## 2019-12-14 DIAGNOSIS — R35.0 FREQUENCY OF URINATION: ICD-10-CM

## 2019-12-14 DIAGNOSIS — N32.81 OAB (OVERACTIVE BLADDER): ICD-10-CM

## 2019-12-16 RX ORDER — MIRABEGRON 50 MG/1
TABLET, FILM COATED, EXTENDED RELEASE ORAL
Qty: 90 TABLET | Refills: 3 | Status: SHIPPED | OUTPATIENT
Start: 2019-12-16 | End: 2021-10-13 | Stop reason: SDUPTHER

## 2019-12-16 RX ORDER — DOCUSATE SODIUM 100 MG/1
100 CAPSULE, LIQUID FILLED ORAL 2 TIMES DAILY
Qty: 180 CAPSULE | Refills: 3 | Status: ON HOLD | OUTPATIENT
Start: 2019-12-16 | End: 2020-08-13 | Stop reason: SDUPTHER

## 2020-01-09 ENCOUNTER — HOSPITAL ENCOUNTER (OUTPATIENT)
Age: 70
Setting detail: SPECIMEN
Discharge: HOME | End: 2020-01-09
Payer: MEDICARE

## 2020-01-09 ENCOUNTER — OFFICE VISIT (OUTPATIENT)
Dept: FAMILY MEDICINE CLINIC | Age: 70
End: 2020-01-09
Payer: MEDICARE

## 2020-01-09 VITALS
DIASTOLIC BLOOD PRESSURE: 86 MMHG | HEIGHT: 62 IN | SYSTOLIC BLOOD PRESSURE: 124 MMHG | HEART RATE: 68 BPM | BODY MASS INDEX: 44.68 KG/M2 | WEIGHT: 242.8 LBS | OXYGEN SATURATION: 98 %

## 2020-01-09 LAB
-: ABNORMAL
ALBUMIN SERPL-MCNC: 4 G/DL (ref 3.5–5.2)
ALBUMIN/GLOBULIN RATIO: ABNORMAL (ref 1–2.5)
ALP BLD-CCNC: 76 U/L (ref 35–104)
ALT SERPL-CCNC: 19 U/L (ref 5–33)
AMORPHOUS: ABNORMAL
ANION GAP SERPL CALCULATED.3IONS-SCNC: 15 MMOL/L (ref 9–17)
AST SERPL-CCNC: 16 U/L
BACTERIA: ABNORMAL
BILIRUB SERPL-MCNC: 0.38 MG/DL (ref 0.3–1.2)
BILIRUBIN URINE: NEGATIVE
BUN BLDV-MCNC: 21 MG/DL (ref 8–23)
BUN/CREAT BLD: ABNORMAL (ref 9–20)
CALCIUM SERPL-MCNC: 9.9 MG/DL (ref 8.6–10.4)
CASTS UA: ABNORMAL /LPF
CHLORIDE BLD-SCNC: 102 MMOL/L (ref 98–107)
CO2: 25 MMOL/L (ref 20–31)
COLOR: YELLOW
COMMENT UA: ABNORMAL
CREAT SERPL-MCNC: 0.9 MG/DL (ref 0.5–0.9)
CRYSTALS, UA: ABNORMAL /HPF
EPITHELIAL CELLS UA: ABNORMAL /HPF
FOLATE: 7 NG/ML
GFR AFRICAN AMERICAN: >60 ML/MIN
GFR NON-AFRICAN AMERICAN: >60 ML/MIN
GFR SERPL CREATININE-BSD FRML MDRD: ABNORMAL ML/MIN/{1.73_M2}
GFR SERPL CREATININE-BSD FRML MDRD: ABNORMAL ML/MIN/{1.73_M2}
GLUCOSE BLD-MCNC: 127 MG/DL (ref 70–99)
GLUCOSE URINE: NEGATIVE
HBA1C MFR BLD: 5.6 %
HCT VFR BLD CALC: 40.8 % (ref 36–46)
HEMOGLOBIN: 13.4 G/DL (ref 12–16)
KETONES, URINE: NEGATIVE
LEUKOCYTE ESTERASE, URINE: NEGATIVE
MCH RBC QN AUTO: 29.7 PG (ref 26–34)
MCHC RBC AUTO-ENTMCNC: 32.7 G/DL (ref 31–37)
MCV RBC AUTO: 90.6 FL (ref 80–100)
MUCUS: ABNORMAL
NITRITE, URINE: NEGATIVE
NRBC AUTOMATED: NORMAL PER 100 WBC
OTHER OBSERVATIONS UA: ABNORMAL
PDW BLD-RTO: 14.3 % (ref 11.5–14.9)
PH UA: 6.5 (ref 5–8)
PLATELET # BLD: 289 K/UL (ref 150–450)
PMV BLD AUTO: 9.6 FL (ref 6–12)
POTASSIUM SERPL-SCNC: 4.2 MMOL/L (ref 3.7–5.3)
PROTEIN UA: NEGATIVE
RBC # BLD: 4.5 M/UL (ref 4–5.2)
RBC UA: ABNORMAL /HPF
RENAL EPITHELIAL, UA: ABNORMAL /HPF
SODIUM BLD-SCNC: 142 MMOL/L (ref 135–144)
SPECIFIC GRAVITY UA: 1.01 (ref 1–1.03)
TOTAL PROTEIN: 7.4 G/DL (ref 6.4–8.3)
TRICHOMONAS: ABNORMAL
TSH SERPL DL<=0.05 MIU/L-ACNC: 4.69 MIU/L (ref 0.3–5)
TURBIDITY: ABNORMAL
URINE HGB: NEGATIVE
UROBILINOGEN, URINE: NORMAL
VITAMIN B-12: 273 PG/ML (ref 232–1245)
VITAMIN D 25-HYDROXY: 31.4 NG/ML (ref 30–100)
WBC # BLD: 8.6 K/UL (ref 3.5–11)
WBC UA: ABNORMAL /HPF
YEAST: ABNORMAL

## 2020-01-09 PROCEDURE — 83036 HEMOGLOBIN GLYCOSYLATED A1C: CPT | Performed by: FAMILY MEDICINE

## 2020-01-09 PROCEDURE — 4040F PNEUMOC VAC/ADMIN/RCVD: CPT | Performed by: FAMILY MEDICINE

## 2020-01-09 PROCEDURE — 1123F ACP DISCUSS/DSCN MKR DOCD: CPT | Performed by: FAMILY MEDICINE

## 2020-01-09 PROCEDURE — 82746 ASSAY OF FOLIC ACID SERUM: CPT

## 2020-01-09 PROCEDURE — G8484 FLU IMMUNIZE NO ADMIN: HCPCS | Performed by: FAMILY MEDICINE

## 2020-01-09 PROCEDURE — G8417 CALC BMI ABV UP PARAM F/U: HCPCS | Performed by: FAMILY MEDICINE

## 2020-01-09 PROCEDURE — 84443 ASSAY THYROID STIM HORMONE: CPT

## 2020-01-09 PROCEDURE — 82607 VITAMIN B-12: CPT

## 2020-01-09 PROCEDURE — 80053 COMPREHEN METABOLIC PANEL: CPT

## 2020-01-09 PROCEDURE — G8427 DOCREV CUR MEDS BY ELIG CLIN: HCPCS | Performed by: FAMILY MEDICINE

## 2020-01-09 PROCEDURE — 0509F URINE INCON PLAN DOCD: CPT | Performed by: FAMILY MEDICINE

## 2020-01-09 PROCEDURE — 85027 COMPLETE CBC AUTOMATED: CPT

## 2020-01-09 PROCEDURE — 87086 URINE CULTURE/COLONY COUNT: CPT

## 2020-01-09 PROCEDURE — 82306 VITAMIN D 25 HYDROXY: CPT

## 2020-01-09 PROCEDURE — 1036F TOBACCO NON-USER: CPT | Performed by: FAMILY MEDICINE

## 2020-01-09 PROCEDURE — 36415 COLL VENOUS BLD VENIPUNCTURE: CPT

## 2020-01-09 PROCEDURE — 1090F PRES/ABSN URINE INCON ASSESS: CPT | Performed by: FAMILY MEDICINE

## 2020-01-09 PROCEDURE — 81001 URINALYSIS AUTO W/SCOPE: CPT

## 2020-01-09 PROCEDURE — 99214 OFFICE O/P EST MOD 30 MIN: CPT | Performed by: FAMILY MEDICINE

## 2020-01-09 PROCEDURE — 3017F COLORECTAL CA SCREEN DOC REV: CPT | Performed by: FAMILY MEDICINE

## 2020-01-09 PROCEDURE — G8399 PT W/DXA RESULTS DOCUMENT: HCPCS | Performed by: FAMILY MEDICINE

## 2020-01-09 ASSESSMENT — PATIENT HEALTH QUESTIONNAIRE - PHQ9
1. LITTLE INTEREST OR PLEASURE IN DOING THINGS: 0
SUM OF ALL RESPONSES TO PHQ QUESTIONS 1-9: 0
SUM OF ALL RESPONSES TO PHQ9 QUESTIONS 1 & 2: 0
2. FEELING DOWN, DEPRESSED OR HOPELESS: 0
SUM OF ALL RESPONSES TO PHQ QUESTIONS 1-9: 0

## 2020-01-09 ASSESSMENT — ENCOUNTER SYMPTOMS
BACK PAIN: 1
CHEST TIGHTNESS: 0
COUGH: 0
WHEEZING: 0
ABDOMINAL DISTENTION: 0
VOMITING: 0
NAUSEA: 1
SHORTNESS OF BREATH: 0
CONSTIPATION: 0
ABDOMINAL PAIN: 0
DIARRHEA: 0

## 2020-01-09 NOTE — RESULT ENCOUNTER NOTE
Addressed during office visit today, A1c 5.6, within normal limits, improved, continue metformin  Continue current treatment discussed during visit

## 2020-01-09 NOTE — PROGRESS NOTES
Chief Complaint   Patient presents with    Hypertension    Back Pain     less frequent    Knee Pain    Cough    GI Problem       Shama Fischer here today for follow up on chronic medical problems, go over labs and/or diagnostic studies, and medication refills. Hypertension; Back Pain (less frequent); Knee Pain; Cough; and GI Problem      Hypertension: Patient here for follow-up of elevated blood pressure. She is exercising, doing home PT , but staying active, and is adherent to low salt diet. Blood pressure is well controlled at home. Cardiac symptoms fatigue. Patient denies chest pain, chest pressure/discomfort, claudication, dyspnea, exertional chest pressure/discomfort, irregular heart beat, lower extremity edema, near-syncope, orthopnea, palpitations and paroxysmal nocturnal dyspnea. Cardiovascular risk factors: advanced age (older than 54 for men, 72 for women), dyslipidemia, hypertension and obesity (BMI >= 30 kg/m2). Use of agents associated with hypertension: none. History of target organ damage: none. EKG done 3/1/2019 was normal    BP controlled. Colette Acevedo reports compliance with BP medications, and tolerates them well, denies side effects. BP Readings from Last 3 Encounters:   01/09/20 124/86   12/09/19 (!) 93/58   11/13/19 137/77          Pulse is Normal.    Pulse Readings from Last 3 Encounters:   01/09/20 68   12/09/19 69   10/03/19 82         María complains of dry cough. I changed the Lisinopril at last visit, but she is still coughing, but she says it did improve a bit. Onset of the cough was about 1 year ago  The cough is dry, no phlegm. She denies wheezing, chest pain or hemoptysis.   She denies shortness of breath    CT chest from 3/1/2019 was within normal limits just some fibrotic changes at the right base    Patient complains of having a \"QUIZZY STOMACH\" for a few weeks  And does have history of gallstones and she was referred previously to general surgeon, she was supposed to repeat the ultrasound liver and gallbladder. Denies vomiting, constipation, diarrhea or abdominal pain. Patient did complete ultrasound of the liver 10/17/2019 which showed fatty liver but no gallstones anymore        Armida Singh complains of chronic back pain, lower back, located on the midline and on the sides, does not radiate, intermittent   Worse when sitting for too long, in cold weather  Intensity of pain is 8/10, goes down to 2/10      Armida Singh complains of Chronic knee pains, for several years  Intensity of pain is 1-2/10, goes up to 8/10  Worse when climbing up the stairs. Knees don't swell and don't give out  Denies falls. She uses her walker with seat at all times  Had home PT, but wants aqua therapy as it helped her before. Hyperglycemia and prediabetes improved  Has been on Metformin   Tolerates medications well, denies side effects. She has been eating low-carb diet as well, as much as she could    Lab Results   Component Value Date    LABA1C 5.6 01/09/2020    LABA1C 5.9 10/03/2019    LABA1C 6.1 06/18/2019     Morbid obesity per BMI. Body mass index is 44.41 kg/m². Weight has been decreasing, she did lose 8 pound since the last visit with me    Wt Readings from Last 3 Encounters:   01/09/20 242 lb 12.8 oz (110.1 kg)   12/09/19 251 lb 5.2 oz (114 kg)   10/03/19 250 lb (113.4 kg)        PHQ Scores 1/9/2020 3/8/2019 4/20/2018 2/23/2017   PHQ2 Score 0 0 0 0   PHQ9 Score 0 0 0 0     [x] Negative depression screening. Urine incontinence  Using 2-3 briefs a day, max 5 per day  Sometimes she has episodes of incontinence at nighttime  Has frequency, urgency. Patient reports sometimes she does not make it to the bathroom. Patient also reports that her urine incontinence seems to be worse when coughing or sneezing      Hyperlipidemia:  No new myalgias ,  on atorvastatin (Lipitor). Medication compliance: compliant most of the time.   Patient is  following a low fat, low cholesterol (90 Base) MCG/ACT inhaler Inhale 2 puffs into the lungs every 6 hours as needed for Wheezing or Shortness of Breath (cough) 18 g 0    lidocaine (LMX) 4 % cream Apply 2-3 times a day as needed for pain 120 g 3    Lactobacillus (PROBIOTIC ACIDOPHILUS) TABS Take 1 tablet by mouth daily 30 tablet 3     No current facility-administered medications for this visit. Social History     Tobacco Use    Smoking status: Never Smoker    Smokeless tobacco: Never Used   Substance Use Topics    Alcohol use: No     Alcohol/week: 0.0 standard drinks    Drug use: No       Counseling given: Yes                    The patient's past medical, surgical, social, and family history as well as her current medications and allergies were reviewed as documented in today's encounter. Restof complaints with corresponding details per ROS    Review of Systems   Constitutional: Positive for fatigue. Negative for activity change, appetite change, chills, diaphoresis, fever and unexpected weight change. Eyes: Positive for visual disturbance (wears glasses). Respiratory: Negative for cough, chest tightness, shortness of breath and wheezing. Cardiovascular: Negative for chest pain, palpitations and leg swelling. Gastrointestinal: Positive for nausea. Negative for abdominal distention, abdominal pain, constipation, diarrhea and vomiting. Endocrine: Negative for cold intolerance, heat intolerance, polydipsia, polyphagia and polyuria. Genitourinary: Positive for frequency and urgency. Negative for difficulty urinating, dysuria and hematuria. Urine incontinence    Musculoskeletal: Positive for arthralgias, back pain and gait problem. Ambulates with walker with seat   Allergic/Immunologic: Positive for environmental allergies. Neurological: Negative for weakness and numbness. Hematological: Does not bruise/bleed easily. Psychiatric/Behavioral: Negative for dysphoric mood and sleep disturbance.  The patient is nervous/anxious.           -vital signs stable and within normal limits except Morbid obesity per BMI. /86   Pulse 68   Ht 5' 2\" (1.575 m)   Wt 242 lb 12.8 oz (110.1 kg)   LMP  (LMP Unknown)   SpO2 98%   BMI 44.41 kg/m²        Physical Exam  Vitals signs and nursing note reviewed. Constitutional:       General: She is not in acute distress. Appearance: She is well-developed. She is obese. She is not diaphoretic. HENT:      Head: Normocephalic and atraumatic. Right Ear: External ear normal.      Left Ear: External ear normal.      Nose: Nose normal. No mucosal edema. Mouth/Throat:      Mouth: Mucous membranes are moist.      Pharynx: No oropharyngeal exudate or posterior oropharyngeal erythema. Eyes:      General: Lids are normal. No scleral icterus. Right eye: No discharge. Left eye: No discharge. Conjunctiva/sclera: Conjunctivae normal.   Neck:      Musculoskeletal: Normal range of motion and neck supple. Thyroid: No thyromegaly. Cardiovascular:      Rate and Rhythm: Normal rate and regular rhythm. Heart sounds: Murmur present. Crescendo  systolic murmur present with a grade of 2/6. Pulmonary:      Effort: Pulmonary effort is normal. No respiratory distress. Breath sounds: Normal breath sounds. No wheezing or rales. Chest:      Chest wall: No tenderness. Abdominal:      General: Bowel sounds are normal. There is no distension. Palpations: Abdomen is soft. There is no hepatomegaly or splenomegaly. Tenderness: There is tenderness in the epigastric area. There is no guarding or rebound. Comments: Obese abdomen. Musculoskeletal:         General: Tenderness present. Right knee: She exhibits normal range of motion. Tenderness found. Patellar tendon tenderness noted. Left knee: She exhibits normal range of motion. Tenderness found. Patellar tendon tenderness noted.       Lumbar back: She exhibits decreased range of 03/21/2017     Lab Results   Component Value Date    TRIG 179 (H) 11/14/2018    TRIG 318 (H) 07/31/2018    TRIG 169 (H) 03/21/2017     Lab Results   Component Value Date    HDL 47 11/14/2018    HDL 44 07/31/2018    HDL 46 03/21/2017     Lab Results   Component Value Date    LDLCHOLESTEROL 105 11/14/2018    LDLCHOLESTEROL 179 (H) 07/31/2018    LDLCHOLESTEROL 78 03/21/2017       Lab Results   Component Value Date    CHOLHDLRATIO 4.0 11/14/2018    CHOLHDLRATIO 6.5 (H) 07/31/2018    CHOLHDLRATIO 3.4 03/21/2017         Lab Results   Component Value Date    ICJWYTRU13 229 (L) 03/08/2019     Lab Results   Component Value Date    FOLATE 9.4 03/08/2019     Lab Results   Component Value Date    VITD25 27.7 (L) 03/08/2019         Results for POC orders placed in visit on 01/09/20   POCT glycosylated hemoglobin (Hb A1C)   Result Value Ref Range    Hemoglobin A1C 5.6 %           ASSESSMENT AND PLAN      1. Essential hypertension  Well controlled. Continue current treatment. Will recheck labs. - Urinalysis Reflex to Culture; Future  - CBC; Future  - Comprehensive Metabolic Panel; Future  - TSH without Reflex; Future  Discussed low salt diet and BP and pulse monitoring daily, BP log given      2. Cough present for greater than 3 weeks  Failing to change as expected. - XR CHEST STANDARD (2 VW); Future    3. Dyspepsia  Failing to change as expected. - H. Pylori Breath Test, Adult; Future  Could try PPIs    4. Chronic bilateral low back pain without sciatica  Failing to change as expected. - [facility] [place]  Tylenol Extra Strength as needed    5. Primary osteoarthritis of both knees  Likely worsening due to wear and tear nature of the disease  Continue Tylenol Extra Strength as needed  - [address]    6.  Hyperglycemia  Improving  Continue metformin and low-carb diet  - POCT glycosylated hemoglobin (Hb A1C)  Lab Results   Component Value Date    LABA1C 5.6 01/09/2020    LABA1C 5.9 10/03/2019    LABA1C 6.1 06/18/2019       7. Morbid obesity with BMI of 45.0-49.9, adult [facility])  Improving   Patient was asked about her current diet and exercise habits, and personalized advice was provided regarding recommended lifestyle changes. Patient's comorbid health conditions associated with elevated BMI were discussed, including degenerative joint disease, dyslipidemia, GERD, hyperglycemia, hypertension and non-alcoholic steatohepatitis (CLAROS), as well as the likely benefits of weight loss. Based upon patient's motivation to change her behavior, the following plan was agreed upon to work toward a weight loss goal of 1-2 pounds per week: low carbohydrate diet, increase physical activity as follows: As tolerated, start aqua therapy and Metformin. Educational materials for  weight loss were provided. Patient will follow-up in 3 month(s) with PCP. Provider spent  10 minutes counseling patient. 8. Mixed incontinence  Stable  Recheck UA  Will refill her briefs when the prescription comes    9. Difficulty walking  stable  - [facility] [place]  She would benefit from doing aqua therapy    10. Hyperlipidemia with target LDL less than 100  Improving  Continue Lipitor    11. Vitamin B 12 deficiency  Unsure if improving or not. Will recheck level  Continue supplementation.   - Vitamin B12 & Folate; Future    12. Vitamin D deficiency  Unsure if improving or not. Will recheck level  Continue supplementation.  - Vitamin D 25 Hydroxy; Future          Patient is eligible and due for Medicare AWV, schedule as separate visit.  PLEASE GIVE HAT AND A CUP FOR URINE TO TAKE HOME      Orders Placed This Encounter   Procedures    XR CHEST STANDARD (2 VW)     Standing Status:   Future     Standing Expiration Date:   1/8/2021     Order Specific Question:   Reason for exam:     Answer:   Cough    Urinalysis Reflex to Culture     Standing Status:   Future     Number of Occurrences:   1     Standing Healthy diet and regular exercise. BP: 124/86. Blood pressure is normal. Treatment plan consists of Weight Reduction, DASH Eating Plan, Dietary Sodium Restriction, Increased Physical Activity, Patient In-home Blood Pressure Monitoring and No treatment change needed. Fall Risk 3/8/2019 4/20/2018 2/23/2017   2 or more falls in past year? no no no   Fall with injury in past year? no no no     The patient does not have a history of falls. I did , complete a risk assessment for falls. A plan of care for falls in-office gait and balance testing performed using The Timed Up and Go Test was positive for increased falls risk, home safety tips provided, referral to physical therapy provided for strength and balance training    Lab Results   Component Value Date    LDLCHOLESTEROL 105 11/14/2018    (goal LDL reduction with dx if diabetes is 50% LDL reduction)      Negative depression screening. PHQ Scores 1/9/2020 3/8/2019 4/20/2018 2/23/2017   PHQ2 Score 0 0 0 0   PHQ9 Score 0 0 0 0       The patient's past medical, surgical, social, and family history as well as her   current medications and allergies were reviewed as documented in today's encounter. Medications, labs, diagnostic studies, consultations and follow-up as documented in this encounter. Return in about 3 months (around 4/9/2020) for AWM. Patient was seen with total face to face time of  25 minutes. More than 50% of this visit was counseling and education. Future Appointments   Date Time Provider Dante Amaya   1/16/2020 11:00 AM [facility] Velazco PT   3/6/2020  9:45 AM Lani Mcclellan PA-C GYN Oncology [facility]   4/16/2020 10:00 AM [facility]        This note was completed by using the assistance of a speech-recognition program. However, inadvertent computerized transcription errors may be present.  Although every effort was made to ensure accuracy, no guarantees can be provided that every mistake has been identified and corrected by editing.     Electronically signed by Jimy Gonzalez MD on 1/9/2020 at 11:07 AM

## 2020-01-10 LAB
CULTURE: NORMAL
Lab: NORMAL
SPECIMEN DESCRIPTION: NORMAL

## 2020-01-12 PROBLEM — R10.13 DYSPEPSIA: Status: ACTIVE | Noted: 2020-01-12

## 2020-01-14 ENCOUNTER — HOSPITAL ENCOUNTER (INPATIENT)
Age: 70
LOS: 2 days | Discharge: HOME | DRG: 309 | End: 2020-01-16
Attending: EMERGENCY MEDICINE | Admitting: INTERNAL MEDICINE
Payer: MEDICARE

## 2020-01-14 ENCOUNTER — APPOINTMENT (OUTPATIENT)
Dept: CT IMAGING | Age: 70
DRG: 309 | End: 2020-01-14
Payer: MEDICARE

## 2020-01-14 ENCOUNTER — APPOINTMENT (OUTPATIENT)
Dept: GENERAL RADIOLOGY | Age: 70
DRG: 309 | End: 2020-01-14
Payer: MEDICARE

## 2020-01-14 PROBLEM — I48.91 A-FIB (HCC): Status: ACTIVE | Noted: 2020-01-14

## 2020-01-14 PROBLEM — E11.9 TYPE 2 DIABETES MELLITUS, WITHOUT LONG-TERM CURRENT USE OF INSULIN (HCC): Status: ACTIVE | Noted: 2020-01-14

## 2020-01-14 LAB
ABSOLUTE EOS #: 0.1 K/UL (ref 0–0.4)
ABSOLUTE IMMATURE GRANULOCYTE: ABNORMAL K/UL (ref 0–0.3)
ABSOLUTE LYMPH #: 1.9 K/UL (ref 1–4.8)
ABSOLUTE MONO #: 0.4 K/UL (ref 0.1–1.3)
ALBUMIN SERPL-MCNC: 4.1 G/DL (ref 3.5–5.2)
ALBUMIN/GLOBULIN RATIO: ABNORMAL (ref 1–2.5)
ALP BLD-CCNC: 77 U/L (ref 35–104)
ALT SERPL-CCNC: 17 U/L (ref 5–33)
ANION GAP SERPL CALCULATED.3IONS-SCNC: 15 MMOL/L (ref 9–17)
AST SERPL-CCNC: 16 U/L
BASOPHILS # BLD: 1 % (ref 0–2)
BASOPHILS ABSOLUTE: 0.1 K/UL (ref 0–0.2)
BILIRUB SERPL-MCNC: 0.68 MG/DL (ref 0.3–1.2)
BNP INTERPRETATION: ABNORMAL
BUN BLDV-MCNC: 22 MG/DL (ref 8–23)
BUN/CREAT BLD: ABNORMAL (ref 9–20)
CALCIUM SERPL-MCNC: 9.8 MG/DL (ref 8.6–10.4)
CHLORIDE BLD-SCNC: 101 MMOL/L (ref 98–107)
CO2: 22 MMOL/L (ref 20–31)
CREAT SERPL-MCNC: 0.83 MG/DL (ref 0.5–0.9)
D-DIMER QUANTITATIVE: 0.54 MG/L FEU (ref 0–0.59)
DIFFERENTIAL TYPE: ABNORMAL
EOSINOPHILS RELATIVE PERCENT: 1 % (ref 0–4)
GFR AFRICAN AMERICAN: >60 ML/MIN
GFR NON-AFRICAN AMERICAN: >60 ML/MIN
GFR SERPL CREATININE-BSD FRML MDRD: ABNORMAL ML/MIN/{1.73_M2}
GFR SERPL CREATININE-BSD FRML MDRD: ABNORMAL ML/MIN/{1.73_M2}
GLUCOSE BLD-MCNC: 126 MG/DL (ref 65–105)
GLUCOSE BLD-MCNC: 136 MG/DL (ref 70–99)
HCT VFR BLD CALC: 42.2 % (ref 36–46)
HEMOGLOBIN: 13.8 G/DL (ref 12–16)
IMMATURE GRANULOCYTES: ABNORMAL %
LACTIC ACID: 1.5 MMOL/L (ref 0.5–2.2)
LYMPHOCYTES # BLD: 21 % (ref 24–44)
MCH RBC QN AUTO: 30.1 PG (ref 26–34)
MCHC RBC AUTO-ENTMCNC: 32.8 G/DL (ref 31–37)
MCV RBC AUTO: 91.8 FL (ref 80–100)
MONOCYTES # BLD: 5 % (ref 1–7)
NRBC AUTOMATED: ABNORMAL PER 100 WBC
PDW BLD-RTO: 14.3 % (ref 11.5–14.9)
PLATELET # BLD: 280 K/UL (ref 150–450)
PLATELET ESTIMATE: ABNORMAL
PMV BLD AUTO: 8.9 FL (ref 6–12)
POTASSIUM SERPL-SCNC: 3.9 MMOL/L (ref 3.7–5.3)
PRO-BNP: 1731 PG/ML
RBC # BLD: 4.59 M/UL (ref 4–5.2)
RBC # BLD: ABNORMAL 10*6/UL
SEG NEUTROPHILS: 72 % (ref 36–66)
SEGMENTED NEUTROPHILS ABSOLUTE COUNT: 6.5 K/UL (ref 1.3–9.1)
SODIUM BLD-SCNC: 138 MMOL/L (ref 135–144)
TOTAL PROTEIN: 7.4 G/DL (ref 6.4–8.3)
TROPONIN INTERP: ABNORMAL
TROPONIN T: ABNORMAL NG/ML
TROPONIN, HIGH SENSITIVITY: 15 NG/L (ref 0–14)
TROPONIN, HIGH SENSITIVITY: 15 NG/L (ref 0–14)
TROPONIN, HIGH SENSITIVITY: 20 NG/L (ref 0–14)
TSH SERPL DL<=0.05 MIU/L-ACNC: 3.88 MIU/L (ref 0.3–5)
WBC # BLD: 9.1 K/UL (ref 3.5–11)
WBC # BLD: ABNORMAL 10*3/UL

## 2020-01-14 PROCEDURE — 84443 ASSAY THYROID STIM HORMONE: CPT

## 2020-01-14 PROCEDURE — 96374 THER/PROPH/DIAG INJ IV PUSH: CPT

## 2020-01-14 PROCEDURE — 71260 CT THORAX DX C+: CPT

## 2020-01-14 PROCEDURE — 85025 COMPLETE CBC W/AUTO DIFF WBC: CPT

## 2020-01-14 PROCEDURE — 80053 COMPREHEN METABOLIC PANEL: CPT

## 2020-01-14 PROCEDURE — 36415 COLL VENOUS BLD VENIPUNCTURE: CPT

## 2020-01-14 PROCEDURE — 6370000000 HC RX 637 (ALT 250 FOR IP): Performed by: EMERGENCY MEDICINE

## 2020-01-14 PROCEDURE — 6360000002 HC RX W HCPCS: Performed by: STUDENT IN AN ORGANIZED HEALTH CARE EDUCATION/TRAINING PROGRAM

## 2020-01-14 PROCEDURE — 85379 FIBRIN DEGRADATION QUANT: CPT

## 2020-01-14 PROCEDURE — 99285 EMERGENCY DEPT VISIT HI MDM: CPT

## 2020-01-14 PROCEDURE — 2580000003 HC RX 258: Performed by: STUDENT IN AN ORGANIZED HEALTH CARE EDUCATION/TRAINING PROGRAM

## 2020-01-14 PROCEDURE — 93005 ELECTROCARDIOGRAM TRACING: CPT | Performed by: EMERGENCY MEDICINE

## 2020-01-14 PROCEDURE — 71046 X-RAY EXAM CHEST 2 VIEWS: CPT

## 2020-01-14 PROCEDURE — 6360000004 HC RX CONTRAST MEDICATION: Performed by: EMERGENCY MEDICINE

## 2020-01-14 PROCEDURE — 96375 TX/PRO/DX INJ NEW DRUG ADDON: CPT

## 2020-01-14 PROCEDURE — 2500000003 HC RX 250 WO HCPCS: Performed by: EMERGENCY MEDICINE

## 2020-01-14 PROCEDURE — 83605 ASSAY OF LACTIC ACID: CPT

## 2020-01-14 PROCEDURE — 2580000003 HC RX 258: Performed by: EMERGENCY MEDICINE

## 2020-01-14 PROCEDURE — 84484 ASSAY OF TROPONIN QUANT: CPT

## 2020-01-14 PROCEDURE — 83880 ASSAY OF NATRIURETIC PEPTIDE: CPT

## 2020-01-14 PROCEDURE — 99223 1ST HOSP IP/OBS HIGH 75: CPT | Performed by: INTERNAL MEDICINE

## 2020-01-14 PROCEDURE — 82947 ASSAY GLUCOSE BLOOD QUANT: CPT

## 2020-01-14 PROCEDURE — 2060000000 HC ICU INTERMEDIATE R&B

## 2020-01-14 PROCEDURE — 6370000000 HC RX 637 (ALT 250 FOR IP): Performed by: STUDENT IN AN ORGANIZED HEALTH CARE EDUCATION/TRAINING PROGRAM

## 2020-01-14 RX ORDER — DEXTROSE MONOHYDRATE 25 G/50ML
12.5 INJECTION, SOLUTION INTRAVENOUS PRN
Status: DISCONTINUED | OUTPATIENT
Start: 2020-01-14 | End: 2020-01-16 | Stop reason: HOSPADM

## 2020-01-14 RX ORDER — LOSARTAN POTASSIUM 100 MG/1
100 TABLET ORAL DAILY
Status: DISCONTINUED | OUTPATIENT
Start: 2020-01-14 | End: 2020-01-14

## 2020-01-14 RX ORDER — POTASSIUM CHLORIDE 20 MEQ/1
40 TABLET, EXTENDED RELEASE ORAL PRN
Status: DISCONTINUED | OUTPATIENT
Start: 2020-01-14 | End: 2020-01-16 | Stop reason: HOSPADM

## 2020-01-14 RX ORDER — SODIUM CHLORIDE 0.9 % (FLUSH) 0.9 %
10 SYRINGE (ML) INJECTION PRN
Status: DISCONTINUED | OUTPATIENT
Start: 2020-01-14 | End: 2020-01-16 | Stop reason: HOSPADM

## 2020-01-14 RX ORDER — SODIUM CHLORIDE 0.9 % (FLUSH) 0.9 %
10 SYRINGE (ML) INJECTION EVERY 12 HOURS SCHEDULED
Status: DISCONTINUED | OUTPATIENT
Start: 2020-01-14 | End: 2020-01-16 | Stop reason: HOSPADM

## 2020-01-14 RX ORDER — DEXTROSE MONOHYDRATE 50 MG/ML
100 INJECTION, SOLUTION INTRAVENOUS PRN
Status: DISCONTINUED | OUTPATIENT
Start: 2020-01-14 | End: 2020-01-16 | Stop reason: HOSPADM

## 2020-01-14 RX ORDER — ATORVASTATIN CALCIUM 40 MG/1
40 TABLET, FILM COATED ORAL NIGHTLY
Status: DISCONTINUED | OUTPATIENT
Start: 2020-01-14 | End: 2020-01-16 | Stop reason: HOSPADM

## 2020-01-14 RX ORDER — LEVOTHYROXINE SODIUM 0.07 MG/1
75 TABLET ORAL
Status: DISCONTINUED | OUTPATIENT
Start: 2020-01-15 | End: 2020-01-16 | Stop reason: HOSPADM

## 2020-01-14 RX ORDER — NICOTINE POLACRILEX 4 MG
15 LOZENGE BUCCAL PRN
Status: DISCONTINUED | OUTPATIENT
Start: 2020-01-14 | End: 2020-01-16 | Stop reason: HOSPADM

## 2020-01-14 RX ORDER — POTASSIUM CHLORIDE 7.45 MG/ML
10 INJECTION INTRAVENOUS PRN
Status: DISCONTINUED | OUTPATIENT
Start: 2020-01-14 | End: 2020-01-16 | Stop reason: HOSPADM

## 2020-01-14 RX ORDER — 0.9 % SODIUM CHLORIDE 0.9 %
80 INTRAVENOUS SOLUTION INTRAVENOUS ONCE
Status: COMPLETED | OUTPATIENT
Start: 2020-01-14 | End: 2020-01-14

## 2020-01-14 RX ORDER — SODIUM CHLORIDE 0.9 % (FLUSH) 0.9 %
10 SYRINGE (ML) INJECTION ONCE
Status: COMPLETED | OUTPATIENT
Start: 2020-01-14 | End: 2020-01-14

## 2020-01-14 RX ORDER — ONDANSETRON 2 MG/ML
4 INJECTION INTRAMUSCULAR; INTRAVENOUS EVERY 6 HOURS PRN
Status: DISCONTINUED | OUTPATIENT
Start: 2020-01-14 | End: 2020-01-16 | Stop reason: HOSPADM

## 2020-01-14 RX ORDER — METOPROLOL TARTRATE 50 MG/1
25 TABLET, FILM COATED ORAL ONCE
Status: COMPLETED | OUTPATIENT
Start: 2020-01-14 | End: 2020-01-14

## 2020-01-14 RX ORDER — LOSARTAN POTASSIUM 50 MG/1
100 TABLET ORAL DAILY
Status: DISCONTINUED | OUTPATIENT
Start: 2020-01-14 | End: 2020-01-16 | Stop reason: HOSPADM

## 2020-01-14 RX ORDER — METOPROLOL TARTRATE 5 MG/5ML
5 INJECTION INTRAVENOUS ONCE
Status: COMPLETED | OUTPATIENT
Start: 2020-01-14 | End: 2020-01-14

## 2020-01-14 RX ORDER — DILTIAZEM HYDROCHLORIDE 5 MG/ML
10 INJECTION INTRAVENOUS ONCE
Status: COMPLETED | OUTPATIENT
Start: 2020-01-14 | End: 2020-01-14

## 2020-01-14 RX ORDER — ACETAMINOPHEN 325 MG/1
650 TABLET ORAL EVERY 4 HOURS PRN
Status: DISCONTINUED | OUTPATIENT
Start: 2020-01-14 | End: 2020-01-16 | Stop reason: HOSPADM

## 2020-01-14 RX ADMIN — METOPROLOL TARTRATE 25 MG: 50 TABLET, FILM COATED ORAL at 12:06

## 2020-01-14 RX ADMIN — Medication 10 ML: at 15:57

## 2020-01-14 RX ADMIN — METOPROLOL TARTRATE 5 MG: 5 INJECTION INTRAVENOUS at 12:06

## 2020-01-14 RX ADMIN — SODIUM CHLORIDE 80 ML: 9 INJECTION, SOLUTION INTRAVENOUS at 15:57

## 2020-01-14 RX ADMIN — METOPROLOL TARTRATE 25 MG: 50 TABLET, FILM COATED ORAL at 14:19

## 2020-01-14 RX ADMIN — DILTIAZEM HYDROCHLORIDE 5 MG/HR: 5 INJECTION INTRAVENOUS at 15:08

## 2020-01-14 RX ADMIN — IOVERSOL 75 ML: 741 INJECTION INTRA-ARTERIAL; INTRAVENOUS at 15:58

## 2020-01-14 RX ADMIN — DILTIAZEM HYDROCHLORIDE 10 MG: 5 INJECTION INTRAVENOUS at 15:06

## 2020-01-14 RX ADMIN — ENOXAPARIN SODIUM 105 MG: 120 INJECTION SUBCUTANEOUS at 19:33

## 2020-01-14 RX ADMIN — ATORVASTATIN CALCIUM 40 MG: 40 TABLET, FILM COATED ORAL at 22:39

## 2020-01-14 RX ADMIN — Medication 10 ML: at 22:39

## 2020-01-14 ASSESSMENT — ENCOUNTER SYMPTOMS
NAUSEA: 1
NAUSEA: 0
ABDOMINAL DISTENTION: 0
ABDOMINAL PAIN: 0
CHEST TIGHTNESS: 0
DIARRHEA: 0
VOMITING: 0
SHORTNESS OF BREATH: 1
COUGH: 1

## 2020-01-14 ASSESSMENT — PAIN SCALES - GENERAL
PAINLEVEL_OUTOF10: 0

## 2020-01-14 NOTE — ED NOTES
Pt sitting up at side of the bed eating dinner tray.   Pt and family deny any needs at this time     Jatinder Hollis RN  01/14/20 [id number]

## 2020-01-14 NOTE — ED PROVIDER NOTES
[facility]  eMERGENCY dEPARTMENT eNCOUnter      Pt Name: Mali Escalante  MRN: [id number]  [place] 1950  Date of evaluation: 1/14/20    CHIEF COMPLAINT       Chief Complaint   Patient presents with    Shortness of Breath    Abdominal Pain     HISTORY OF PRESENT ILLNESS   HPI 78 y.o. female was to the emergency department with complaints of exertional shortness of breath. Symptoms have been going on for the last few days. She denies any history of atrial fibrillation. She reports no history of palpitations. She does states that she has been having nausea for the last few days, but denies any abdominal pain (as noted in the triage note). Exertional sob rated as severe in intensity, progressively worsening, improved with rest.  No clear provocative factor. She saw PCP who ordered a cxr and some blood work. Symptoms worsened so she called ems today. REVIEW OF SYSTEMS     Review of Systems   Constitutional: Positive for activity change and fatigue. Negative for chills, diaphoresis and fever. HENT: Negative for congestion. Eyes: Negative for visual disturbance. Respiratory: Positive for cough (non-productive) and shortness of breath. Cardiovascular: Negative for chest pain, palpitations and leg swelling. Gastrointestinal: Positive for nausea. Negative for abdominal pain, diarrhea and vomiting. Genitourinary: Negative for dysuria. Skin: Negative for rash. Neurological: Negative for headaches. Hematological: Does not bruise/bleed easily.          PAST MEDICAL HISTORY     Past Medical History:   Diagnosis Date    Adenocarcinoma of endometrium, stage 1 ([facility] 10/5/2017    Well differentiated grade 1 adenocarcinoma arising in complex hyperplasia    Allergic rhinitis 2/23/2017    At high risk for falls 2/23/2017    Colon polyp     Had colonoscopy done 20 yrs ago    Diabetes mellitus ([address]     Endometrial cancer ([address]     History of TIA (transient ischemic attack) '80's    on Baby ASA    Hyperglycemia 3/21/2017    Hyperlipidemia     Hypertension since 2015    on Rx.     Hypothyroidism 1980    sub total thyroidectomy goiter    Legally blind since born    both eyes, cord prolapse; \"not legally blind\" per \"associated eye care\" please see telephone encounter from 2/27/18    Lower back pain     Mixed incontinence 1/9/2016    Morbid obesity with BMI of 40.0-44.9, adult ([address] 2/23/2017    CLAROS (nonalcoholic steatohepatitis) 3/10/2019    Obesity     Oral phase dysphagia 2/23/2018    Osteoarthritis (arthritis due to wear and tear of joints)     ronan knee    Osteoarthritis involving multiple joints on both sides of body 4/24/2012    Osteoporosis     Postmenopausal bleeding 9/20/2017    S/P h-scope, Myosure 9/20/17 9/20/2017    Pathology pending    Tennis elbow     left    Thickened endometrium 9/20/2017    TLHBSO, bilateral LND 10/24/17 10/24/2017    Type 2 diabetes mellitus, without long-term current use of insulin ([facility] [address] 1/14/2020       SURGICAL HISTORY       Past Surgical History:   Procedure Laterality Date    CATARACT REMOVAL WITH IMPLANT Bilateral 2015    COLONOSCOPY  1997    had polyp, she doesn't know where and when, \"20 yrs ago\"    COLONOSCOPY  06/26/2017    DILATION AND CURETTAGE OF UTERUS N/A 9/20/2017    HYSTEROSCOPY  WITH Alexandra Jose performed by Xiomara Shields DO at [address] N/A 10/24/2017    TOTAL LAPAROSCOPIC HYSTERECTOMY, BSO, F.S.  STAGING, GYRUS G400 performed by Palomo Reyes MD at [address] N/A 6/26/2017    COLONOSCOPY POLYPECTOMY / HOT SNARE performed by Frantz Veliz DO at [address]  10/24/2017    with pelvic lymph node dissection    THYROID SURGERY  1980    subtotal 20 years ago    TONSILLECTOMY      VITRECTOMY      FLOATERS       CURRENT MEDICATIONS       Current Discharge Medication List      CONTINUE these medications which have NOT CHANGED NAD  Head: Normocephalic, atraumatic  Eye: Pupils equal round reactive to light, no conjunctivitis  NecK: No appreciated JVD  Heart: Regular rate and rhythm no murmurs  Lungs: Clear to auscultation bilaterally, no respiratory distress  Chest wall: No crepitus, no tenderness palpation  Abdomen: Soft, nontender, nondistended, with no peritoneal signs  Neurologic: Patient is alert and oriented x3, motor and sensation is intact in all 4 extremities, cerebellar function is normal  Extremities: Full range of motion, no cyanosis, no edema, no signs of trauma, no tenderness to palpation    MEDICAL DECISION MAKING:     MDM  Number of Diagnoses or Management Options  Atrial fibrillation with RVR [facility]):     73-year-old female presenting with A. fib with RVR. Nausea and exertional shortness of breath. Will check for any anemia, hypothyroidism, renal dysfunction electrolyte abnormalities, atypical ACS, will get a chest x-ray, will start treatment with IV metoprolol and oral metoprolol and reassess. Hospital Course:     1:12 PM  HR improved to 110's. Giving a second dose of oral metoprolol. TSH normal.  Troponin at 15, likely secondary to afib. Renal function normal.  Hemoglobin, lactic acid, wbc count normal. CXR shows cardiomegaly. Admitting to hospital.       1:18 PM  Spoke with Dr. Cookie Singh who accepted admission, updated patient. 2:45 PM  Pt remains tachycardic. Starting cardizem drip. 4:30 PM  Pt converted to sinus rhythm. Cardizem drip stopped.      DIAGNOSTIC RESULTS     EKG: All EKG's are interpreted by the Emergency Department Physician who either signs or Co-signs this chart in the absence of a cardiologist.    EKG 1 shows a atrial fibrillation with RVR HR is 139, QRS 82, , no SHARAD, STD v4-6, No TWI, the axis is normal.    EKG #2 shows a sinus bradycardia HR is 51, , QRS 84, , no SHARAD, No STD, No TWI, the axis is normal.      RADIOLOGY:All plain film, CT, MRI, and formal ultrasound 104/77 (!) 111/57   Pulse: 57 59 61 61   Resp: 16 15 20 20   Temp:   98.1 °F (36.7 °C) 97.3 °F (36.3 °C)   TempSrc:   Oral Oral   SpO2: 95% 94% 98% 97%   Weight:    238 lb 1.6 oz (108 kg)   Height:           The patient was given the following medications while in the emergency department:  Orders Placed This Encounter   Medications    metoprolol (LOPRESSOR) injection 5 mg    metoprolol tartrate (LOPRESSOR) tablet 25 mg    metoprolol tartrate (LOPRESSOR) tablet 25 mg    diltiazem injection 10 mg    diltiazem 125 mg in dextrose 5 % 125 mL infusion    atorvastatin (LIPITOR) tablet 40 mg    losartan (COZAAR) tablet 100 mg    sodium chloride flush 0.9 % injection 10 mL    sodium chloride flush 0.9 % injection 10 mL    magnesium hydroxide (MILK OF MAGNESIA) 400 MG/5ML suspension 30 mL    ondansetron (ZOFRAN) injection 4 mg    OR Linked Order Group     potassium chloride (KLOR-CON M) extended release tablet 40 mEq     potassium bicarb-citric acid (EFFER-K) effervescent tablet 40 mEq     potassium chloride 10 mEq/100 mL IVPB (Peripheral Line)    enoxaparin (LOVENOX) injection 105 mg    acetaminophen (TYLENOL) tablet 650 mg    glucose (GLUTOSE) 40 % oral gel 15 g    dextrose 50 % IV solution    glucagon (rDNA) injection 1 mg    dextrose 5 % solution    insulin lispro (HUMALOG) injection vial 0-6 Units    insulin lispro (HUMALOG) injection vial 0-3 Units    sodium chloride flush 0.9 % injection 10 mL    0.9 % sodium chloride bolus    ioversol (OPTIRAY) 74 % injection 75 mL    levothyroxine (SYNTHROID) tablet 75 mcg     -------------------------  CRITICAL CARE:   CONSULTS: IP CONSULT TO INTERNAL MEDICINE  IP CONSULT TO CARDIOLOGY  IP CONSULT TO SOCIAL WORK  PROCEDURES: Critical Care  Performed by: Chelita Meek MD  Authorized by: Thai De La Cruz MD     Critical care provider statement:     Critical care time (minutes):  32    Critical care was necessary to treat or prevent imminent or life-threatening deterioration of the following conditions:  Cardiac failure    Critical care was time spent personally by me on the following activities:  Ordering and performing treatments and interventions, ordering and review of laboratory studies, ordering and review of radiographic studies, pulse oximetry, re-evaluation of patient's condition, evaluation of patient's response to treatment, discussions with primary provider, discussions with consultants, development of treatment plan with patient or surrogate, examination of patient and review of old charts         FINAL IMPRESSION      1.  Atrial fibrillation with RVR [facility])          DISPOSITION/PLAN   DISPOSITION Admitted 01/14/2020 03:21:44 PM      PATIENT REFERRED TO:  Christin Og MD  [address] [facility]  [address]  [phone]    DISCHARGE MEDICATIONS:  Current Discharge Medication List            Gloria Hair MD  Attending Emergency Physician                      Gloria Hair MD  01/14/20 [address] Baljinder Tarango MD  01/14/20 3039

## 2020-01-14 NOTE — H&P
[address]      [address] [facility]     HISTORY AND PHYSICAL EXAMINATION            Date:   1/14/2020  Patient name:  Yasmeen Abbasi  Date of admission:  1/14/2020 11:31 AM  MRN:   [id number]  Account:  [de-identified]  YOB: 1950  PCP:    Kendal Nichols MD  Room:   05/05  Code Status:    Prior    Chief Complaint:     Chief Complaint   Patient presents with    Shortness of Breath    Abdominal Pain       History Obtained From:     patient    History of Present Illness: The patient is a 78 y.o. Non-/non  female who presents withShortness of Breath and Abdominal Pain   and she is admitted to the hospital for the management of atrial fibrillation. This is a 82-year-old female with past medical history of hypertension, diabetes mellitus, hypothyroidism on levothyroxine, no known cardiac history who presented to the emergency department due to exertional shortness of breath over the past few days. She denies any chest pain or palpitations. She admits to very vague symptoms of some fatigue, nausea, abdominal pain. She reports that her nausea and abdominal pain have improved but she continues to feel fatigued with some shortness of breath on exertion. She reports no history of atrial fibrillation. She states that she does have a family history of Afib. She denies alcohol, tobacco, or drug use. In the ED, EKG demonstrated Afib with RVR. Patient was initially given lopressor which did not control the rate. She was started on cardizem drip. Trop was mildly elevated at 15. BNP elevated at 1700. TSH was wnl at 3.9.        Past Medical History:     Past Medical History:   Diagnosis Date    Adenocarcinoma of endometrium, stage 1 ([facility] 10/5/2017    Well differentiated grade 1 adenocarcinoma arising in complex hyperplasia    Allergic rhinitis 2/23/2017    At high risk for falls 2/23/2017    Colon polyp     Had colonoscopy done 20 yrs ago    Diabetes mellitus ([facility]     Endometrial cancer ([facility]     History of TIA (transient ischemic attack) '80's    on Baby ASA    Hyperglycemia 3/21/2017    Hyperlipidemia     Hypertension since 2015    on Rx.     Hypothyroidism 1980    sub total thyroidectomy goiter    Legally blind since born    both eyes, cord prolapse; \"not legally blind\" per \"associated eye care\" please see telephone encounter from 2/27/18    Lower back pain     Mixed incontinence 1/9/2016    Morbid obesity with BMI of 40.0-44.9, adult ([address] 2/23/2017    CLAROS (nonalcoholic steatohepatitis) 3/10/2019    Obesity     Oral phase dysphagia 2/23/2018    Osteoarthritis (arthritis due to wear and tear of joints)     ronan knee    Osteoarthritis involving multiple joints on both sides of body 4/24/2012    Osteoporosis     Postmenopausal bleeding 9/20/2017    S/P h-scope, Myosure 9/20/17 9/20/2017    Pathology pending    Tennis elbow     left    Thickened endometrium 9/20/2017    TLHBSO, bilateral LND 10/24/17 10/24/2017    Type 2 diabetes mellitus, without long-term current use of insulin ([facility] [address] 1/14/2020        Past SurgicalHistory:     Past Surgical History:   Procedure Laterality Date    CATARACT REMOVAL WITH IMPLANT Bilateral 2015    COLONOSCOPY  1997    had polyp, she doesn't know where and when, \"20 yrs ago\"    COLONOSCOPY  06/26/2017    DILATION AND CURETTAGE OF UTERUS N/A 9/20/2017    HYSTEROSCOPY  WITH MYOSURE performed by Salima Blanco DO at [place] N/A 10/24/2017    TOTAL LAPAROSCOPIC HYSTERECTOMY, BSO, F.S.  STAGING, GYRUS G400 performed by Armaan Wilder MD at [address] N/A 6/26/2017    COLONOSCOPY POLYPECTOMY / HOT SNARE performed by Omkar Azevedo DO at [address]  10/24/2017    with pelvic lymph node dissection    THYROID SURGERY  1980    subtotal 20 years ago   Desiree Lout TONSILLECTOMY      VITRECTOMY      FLOATERS        Medications Prior to Admission:        Prior to Admission medications    Medication Sig Start Date End Date Taking? Authorizing Provider   docusate sodium (COLACE) 100 MG capsule TAKE 1 CAPSULE BY MOUTH 2 TIMES DAILY 12/16/19  Yes Bashir Began, MD   MYRBETRIQ 50 MG TB24 TAKE ONE TABLET BY MOUTH ONCE DAILY 12/16/19  Yes Bashir Began, MD   metFORMIN (GLUCOPHAGE) 500 MG tablet TAKE ONE TABLET BY MOUTH TWICE A DAY WITH A MEAL 11/11/19  Yes McAllister Began, MD   Cranberry, Vacc oxycoccus, (SM CRANBERRY) 500 MG TABS Take 500 mg by mouth daily  7/31/17  Yes Historical Provider, MD   losartan (COZAAR) 100 MG tablet Take 1 tablet by mouth daily Losartan if not recalled.  **stop Lisinopril** 10/3/19  Yes Bashir Began, MD   atorvastatin (LIPITOR) 40 MG tablet TAKE ONE TABLET BY MOUTH ONCE DAILY --STOP SIMVASTATIN-- 3/11/19  Yes Bashir Began, MD   Cholecalciferol (VITAMIN D) 2000 units TABS tablet Take 1 tablet by mouth daily 3/9/19  Yes Bashir Began, MD   vitamin B-12 (CYANOCOBALAMIN) 1000 MCG tablet Take 1 tablet by mouth daily 3/9/19  Yes McAllister Began, MD   acetaminophen (APAP EXTRA STRENGTH) 500 MG tablet Take 1 tablet by mouth every 6 hours as needed for Pain or Fever 2/12/19  Yes McAllister Began, MD   aspirin (ASPIR-LOW) 81 MG EC tablet Take 1 tablet by mouth daily 2/12/19  Yes Bashir Began, MD   levothyroxine (SYNTHROID) 75 MCG tablet Take 1 tablet by mouth every morning (before breakfast) 2/12/19  Yes McAllister Began, MD   loratadine (CLARITIN) 10 MG tablet Take 1 tablet by mouth daily 2/12/19  Yes McAllister Began, MD   albuterol sulfate HFA (PROAIR HFA) 108 (90 Base) MCG/ACT inhaler Inhale 2 puffs into the lungs every 6 hours as needed for Wheezing or Shortness of Breath (cough) 10/19/18  Yes Bashir Began, MD   lidocaine (LMX) 4 % cream Apply 2-3 times a day Monocytes 5 1 - 7 %    Eosinophils % 1 0 - 4 %    Basophils 1 0 - 2 %    Immature Granulocytes NOT REPORTED 0 %    Segs Absolute 6.50 1.3 - 9.1 k/uL    Absolute Lymph # 1.90 1.0 - 4.8 k/uL    Absolute Mono # 0.40 0.1 - 1.3 k/uL    Absolute Eos # 0.10 0.0 - 0.4 k/uL    Basophils Absolute 0.10 0.0 - 0.2 k/uL    Absolute Immature Granulocyte NOT REPORTED 0.00 - 0.30 k/uL    WBC Morphology NOT REPORTED     RBC Morphology NOT REPORTED     Platelet Estimate NOT REPORTED    Comprehensive Metabolic Panel    Collection Time: 01/14/20 11:43 AM   Result Value Ref Range    Glucose 136 (H) 70 - 99 mg/dL    BUN 22 8 - 23 mg/dL    CREATININE 0.83 0.50 - 0.90 mg/dL    Bun/Cre Ratio NOT REPORTED 9 - 20    Calcium 9.8 8.6 - 10.4 mg/dL    Sodium 138 135 - 144 mmol/L    Potassium 3.9 3.7 - 5.3 mmol/L    Chloride 101 98 - 107 mmol/L    CO2 22 20 - 31 mmol/L    Anion Gap 15 9 - 17 mmol/L    Alkaline Phosphatase 77 35 - 104 U/L    ALT 17 5 - 33 U/L    AST 16 <32 U/L    Total Bilirubin 0.68 0.3 - 1.2 mg/dL    Total Protein 7.4 6.4 - 8.3 g/dL    Alb 4.1 3.5 - 5.2 g/dL    Albumin/Globulin Ratio NOT REPORTED 1.0 - 2.5    GFR Non-African American >60 >60 mL/min    GFR African American >60 >60 mL/min    GFR Comment          GFR Staging NOT REPORTED    TSH w/reflex to FT4    Collection Time: 01/14/20 11:43 AM   Result Value Ref Range    TSH 3.88 0.30 - 5.00 mIU/L   Brain Natriuretic Peptide    Collection Time: 01/14/20 11:43 AM   Result Value Ref Range    Pro-BNP 1,731 (H) <300 pg/mL    BNP Interpretation Pro-BNP Reference Range:    Troponin    Collection Time: 01/14/20 11:43 AM   Result Value Ref Range    Troponin, High Sensitivity 15 (H) 0 - 14 ng/L    Troponin T NOT REPORTED <0.03 ng/mL    Troponin Interp NOT REPORTED    D-Dimer Test    Collection Time: 01/14/20 11:43 AM   Result Value Ref Range    D-Dimer, Quant 0.54 0.00 - 0.59 mg/L FEU   Lactic Acid    Collection Time: 01/14/20 11:59 AM   Result Value Ref Range    Lactic Acid 1.5 0.5 - 2.2 prophylaxis  -Full dose lovenox    Dispo  -Social work for [employer] planning    PT/OT      Consultations:   IP CONSULT TO INTERNAL MEDICINE  IP CONSULT TO CARDIOLOGY  IP CONSULT TO SOCIAL WORK    Patient is admitted as inpatient status because of co-morbiditieslisted above, severity of signs and symptoms as outlined, requirement for current medical therapies and most importantly because of direct risk to patient if care not provided in a hospital setting. Navi Matthew MD  1/14/2020  3:36 PM    Copy sent to Dr. Marc Rodriguez MD  Attending Physician Statement  I have discussed the care of Mali Escalante and I have examined the patient myselft and taken ros and hpi , including pertinent history and exam findings,  with the resident. I have reviewed the key elements of all parts of the encounter with the resident. I agree with the assessment, plan and orders as documented by the resident.     A. fib with RVR new onset  No signs of infection no PE normal TSH  Unclear cause likely age versus hypertension  Echocardiogram cardiology consult Cardizem drip  Electronically signed by Kayla Benavides MD

## 2020-01-14 NOTE — CARE COORDINATION
CASE MANAGEMENT NOTE:    Admission Date:  1/14/2020 Mckinley Eubanks is a 78 y.o.  female    Admitted for : No admission diagnoses are documented for this encounter. Met with:  Patient    PCP:  Dr Mason Cost:  [facility] Medicare      Current Residence/ Living Arrangements:  independently at home  apt   With elevator      Current Services PTA:  Yes aide 3x/wk and meals through [employer]  [facility]  Is patient agreeable to VNS: Yes    Freedom of choice provided: Yes    List of [address] provided: No    VNS chosen:  Yes pt is current with [facility]    DME:  straight cane, shower chair and raised toilet seat and grab bars, rollator    Home Oxygen: No    Nebulizer: No    CPAP/BIPAP: No    Supplier: N/A    Potential Assistance Needed: Yes    SNF needed: No    Pharmacy:  [facility] on [address]       Is the Patient an [facility] with Readmission Risk Score greater than 14%? No  If yes, pt needs a follow up appointment made within 7 days. Does Patient want to use MEDS to BEDS? Yes    Family Members/Caregivers that pt would like involved in their care:    Yes    If yes, list name here:  Jaime Hearn sister and daughter Angie Vivar    Transportation Provider:  [employer] or [employer]            Is patient in Isolation/One on One/Altered Mental Status? No  If yes, skip next question. If no, would they like an I-Pad to  use? No  If yes, call [phone]. Discharge Plan:  1/14/2020  DME: rollator, cane, shower chair, raised toilet seat and grab bars. Pt lives independently in a 2nd story apt with elevator. Pt is current with [facility] and receives an aid and food through [employer]. Pt is interested in Meds to Beds. Pt expects to return home with out additional needs.                   Electronically signed by: Juan Younger RN on 1/14/2020 at 1:24 PM

## 2020-01-14 NOTE — PROGRESS NOTES
Medication History completed:    New medications: none    Medications discontinued: ondansetron    Changes to dosing:   Lidocaine cream is applied to bilateral knees    Stated allergies: As listed    Other pertinent information: Medications confirmed with [facility].      Thank you,  Lee Ann Velasco, PharmD, BCPS  [phone]

## 2020-01-14 NOTE — ED NOTES
Cardizem drip paused per Dr. Delaney Ordonez d/t low HR. Primary RN notified.      Jorge Alcantar, JANINA  01/14/20 [id number]

## 2020-01-15 PROBLEM — N17.9 AKI (ACUTE KIDNEY INJURY) (HCC): Status: ACTIVE | Noted: 2020-01-15

## 2020-01-15 LAB
ABSOLUTE EOS #: 0.2 K/UL (ref 0–0.4)
ABSOLUTE IMMATURE GRANULOCYTE: ABNORMAL K/UL (ref 0–0.3)
ABSOLUTE LYMPH #: 3 K/UL (ref 1–4.8)
ABSOLUTE MONO #: 0.7 K/UL (ref 0.1–1.3)
ALBUMIN SERPL-MCNC: 3.7 G/DL (ref 3.5–5.2)
ALBUMIN/GLOBULIN RATIO: ABNORMAL (ref 1–2.5)
ALP BLD-CCNC: 66 U/L (ref 35–104)
ALT SERPL-CCNC: 18 U/L (ref 5–33)
AMPHETAMINE SCREEN URINE: NEGATIVE
ANION GAP SERPL CALCULATED.3IONS-SCNC: 14 MMOL/L (ref 9–17)
AST SERPL-CCNC: 16 U/L
BARBITURATE SCREEN URINE: NEGATIVE
BASOPHILS # BLD: 2 % (ref 0–2)
BASOPHILS ABSOLUTE: 0.2 K/UL (ref 0–0.2)
BENZODIAZEPINE SCREEN, URINE: NEGATIVE
BILIRUB SERPL-MCNC: 0.36 MG/DL (ref 0.3–1.2)
BILIRUBIN URINE: NEGATIVE
BNP INTERPRETATION: ABNORMAL
BUN BLDV-MCNC: 33 MG/DL (ref 8–23)
BUN/CREAT BLD: ABNORMAL (ref 9–20)
BUPRENORPHINE URINE: NORMAL
CALCIUM SERPL-MCNC: 9.1 MG/DL (ref 8.6–10.4)
CANNABINOID SCREEN URINE: NEGATIVE
CHLORIDE BLD-SCNC: 101 MMOL/L (ref 98–107)
CO2: 25 MMOL/L (ref 20–31)
COCAINE METABOLITE, URINE: NEGATIVE
COLOR: YELLOW
COMMENT UA: ABNORMAL
CREAT SERPL-MCNC: 1.27 MG/DL (ref 0.5–0.9)
CREATININE URINE: 160 MG/DL (ref 28–217)
DIFFERENTIAL TYPE: ABNORMAL
EOSINOPHILS RELATIVE PERCENT: 2 % (ref 0–4)
GFR AFRICAN AMERICAN: 51 ML/MIN
GFR NON-AFRICAN AMERICAN: 42 ML/MIN
GFR SERPL CREATININE-BSD FRML MDRD: ABNORMAL ML/MIN/{1.73_M2}
GFR SERPL CREATININE-BSD FRML MDRD: ABNORMAL ML/MIN/{1.73_M2}
GLUCOSE BLD-MCNC: 110 MG/DL (ref 70–99)
GLUCOSE BLD-MCNC: 116 MG/DL (ref 65–105)
GLUCOSE BLD-MCNC: 127 MG/DL (ref 65–105)
GLUCOSE BLD-MCNC: 131 MG/DL (ref 65–105)
GLUCOSE BLD-MCNC: 228 MG/DL (ref 65–105)
GLUCOSE URINE: NEGATIVE
HCT VFR BLD CALC: 34.9 % (ref 36–46)
HEMOGLOBIN: 11.7 G/DL (ref 12–16)
IMMATURE GRANULOCYTES: ABNORMAL %
KETONES, URINE: NEGATIVE
LEUKOCYTE ESTERASE, URINE: NEGATIVE
LV EF: 55 %
LVEF MODALITY: NORMAL
LYMPHOCYTES # BLD: 30 % (ref 24–44)
MCH RBC QN AUTO: 30.5 PG (ref 26–34)
MCHC RBC AUTO-ENTMCNC: 33.5 G/DL (ref 31–37)
MCV RBC AUTO: 91 FL (ref 80–100)
MDMA URINE: NORMAL
METHADONE SCREEN, URINE: NEGATIVE
METHAMPHETAMINE, URINE: NORMAL
MONOCYTES # BLD: 7 % (ref 1–7)
NITRITE, URINE: NEGATIVE
NRBC AUTOMATED: ABNORMAL PER 100 WBC
OPIATES, URINE: NEGATIVE
OXYCODONE SCREEN URINE: NEGATIVE
PDW BLD-RTO: 14.2 % (ref 11.5–14.9)
PH UA: 6 (ref 5–8)
PHENCYCLIDINE, URINE: NEGATIVE
PLATELET # BLD: 250 K/UL (ref 150–450)
PLATELET ESTIMATE: ABNORMAL
PMV BLD AUTO: 8.8 FL (ref 6–12)
POTASSIUM SERPL-SCNC: 3.6 MMOL/L (ref 3.7–5.3)
PRO-BNP: 1429 PG/ML
PROPOXYPHENE, URINE: NORMAL
PROTEIN UA: NEGATIVE
RBC # BLD: 3.83 M/UL (ref 4–5.2)
RBC # BLD: ABNORMAL 10*6/UL
SEG NEUTROPHILS: 59 % (ref 36–66)
SEGMENTED NEUTROPHILS ABSOLUTE COUNT: 6 K/UL (ref 1.3–9.1)
SODIUM BLD-SCNC: 140 MMOL/L (ref 135–144)
SODIUM,UR: 99 MMOL/L
SPECIFIC GRAVITY UA: 1.04 (ref 1–1.03)
TEST INFORMATION: NORMAL
TOTAL PROTEIN: 6.6 G/DL (ref 6.4–8.3)
TRICYCLIC ANTIDEPRESSANTS, UR: NORMAL
TURBIDITY: CLEAR
URINE HGB: NEGATIVE
UROBILINOGEN, URINE: NORMAL
WBC # BLD: 10 K/UL (ref 3.5–11)
WBC # BLD: ABNORMAL 10*3/UL

## 2020-01-15 PROCEDURE — 81003 URINALYSIS AUTO W/O SCOPE: CPT

## 2020-01-15 PROCEDURE — 6370000000 HC RX 637 (ALT 250 FOR IP): Performed by: INTERNAL MEDICINE

## 2020-01-15 PROCEDURE — 2580000003 HC RX 258: Performed by: STUDENT IN AN ORGANIZED HEALTH CARE EDUCATION/TRAINING PROGRAM

## 2020-01-15 PROCEDURE — 6370000000 HC RX 637 (ALT 250 FOR IP): Performed by: STUDENT IN AN ORGANIZED HEALTH CARE EDUCATION/TRAINING PROGRAM

## 2020-01-15 PROCEDURE — 93306 TTE W/DOPPLER COMPLETE: CPT

## 2020-01-15 PROCEDURE — 80053 COMPREHEN METABOLIC PANEL: CPT

## 2020-01-15 PROCEDURE — 2060000000 HC ICU INTERMEDIATE R&B

## 2020-01-15 PROCEDURE — 84300 ASSAY OF URINE SODIUM: CPT

## 2020-01-15 PROCEDURE — 36415 COLL VENOUS BLD VENIPUNCTURE: CPT

## 2020-01-15 PROCEDURE — 6360000002 HC RX W HCPCS: Performed by: STUDENT IN AN ORGANIZED HEALTH CARE EDUCATION/TRAINING PROGRAM

## 2020-01-15 PROCEDURE — 80307 DRUG TEST PRSMV CHEM ANLYZR: CPT

## 2020-01-15 PROCEDURE — 99233 SBSQ HOSP IP/OBS HIGH 50: CPT | Performed by: INTERNAL MEDICINE

## 2020-01-15 PROCEDURE — 85025 COMPLETE CBC W/AUTO DIFF WBC: CPT

## 2020-01-15 PROCEDURE — 82570 ASSAY OF URINE CREATININE: CPT

## 2020-01-15 PROCEDURE — 82947 ASSAY GLUCOSE BLOOD QUANT: CPT

## 2020-01-15 RX ORDER — CETIRIZINE HYDROCHLORIDE 10 MG/1
10 TABLET ORAL DAILY
Status: DISCONTINUED | OUTPATIENT
Start: 2020-01-15 | End: 2020-01-16 | Stop reason: HOSPADM

## 2020-01-15 RX ORDER — ASPIRIN 81 MG/1
81 TABLET ORAL DAILY
Status: DISCONTINUED | OUTPATIENT
Start: 2020-01-15 | End: 2020-01-16 | Stop reason: HOSPADM

## 2020-01-15 RX ORDER — AMIODARONE HYDROCHLORIDE 200 MG/1
200 TABLET ORAL DAILY
Status: DISCONTINUED | OUTPATIENT
Start: 2020-01-15 | End: 2020-01-16 | Stop reason: HOSPADM

## 2020-01-15 RX ORDER — DOCUSATE SODIUM 100 MG/1
100 CAPSULE, LIQUID FILLED ORAL 2 TIMES DAILY
Status: DISCONTINUED | OUTPATIENT
Start: 2020-01-15 | End: 2020-01-16 | Stop reason: HOSPADM

## 2020-01-15 RX ORDER — LOSARTAN POTASSIUM 100 MG/1
100 TABLET ORAL DAILY
Status: DISCONTINUED | OUTPATIENT
Start: 2020-01-15 | End: 2020-01-15 | Stop reason: SDUPTHER

## 2020-01-15 RX ORDER — LORATADINE 10 MG/1
10 TABLET ORAL DAILY
Status: DISCONTINUED | OUTPATIENT
Start: 2020-01-15 | End: 2020-01-15

## 2020-01-15 RX ORDER — SODIUM CHLORIDE 9 MG/ML
INJECTION, SOLUTION INTRAVENOUS CONTINUOUS
Status: DISCONTINUED | OUTPATIENT
Start: 2020-01-15 | End: 2020-01-16 | Stop reason: HOSPADM

## 2020-01-15 RX ORDER — VITAMIN B COMPLEX
1000 TABLET ORAL DAILY
Status: DISCONTINUED | OUTPATIENT
Start: 2020-01-15 | End: 2020-01-16 | Stop reason: HOSPADM

## 2020-01-15 RX ADMIN — ENOXAPARIN SODIUM 105 MG: 120 INJECTION SUBCUTANEOUS at 10:17

## 2020-01-15 RX ADMIN — SODIUM CHLORIDE: 9 INJECTION, SOLUTION INTRAVENOUS at 10:41

## 2020-01-15 RX ADMIN — ASPIRIN 81 MG: 81 TABLET, COATED ORAL at 13:49

## 2020-01-15 RX ADMIN — VITAMIN D, TAB 1000IU (100/BT) 1000 UNITS: 25 TAB at 13:49

## 2020-01-15 RX ADMIN — LOSARTAN POTASSIUM 100 MG: 50 TABLET, FILM COATED ORAL at 07:54

## 2020-01-15 RX ADMIN — CETIRIZINE HYDROCHLORIDE 10 MG: 10 TABLET, FILM COATED ORAL at 13:49

## 2020-01-15 RX ADMIN — Medication 10 ML: at 07:55

## 2020-01-15 RX ADMIN — RIVAROXABAN 15 MG: 15 TABLET, FILM COATED ORAL at 20:46

## 2020-01-15 RX ADMIN — DOCUSATE SODIUM 100 MG: 100 CAPSULE, LIQUID FILLED ORAL at 20:46

## 2020-01-15 RX ADMIN — AMIODARONE HYDROCHLORIDE 200 MG: 200 TABLET ORAL at 13:49

## 2020-01-15 RX ADMIN — LEVOTHYROXINE SODIUM 75 MCG: 75 TABLET ORAL at 06:13

## 2020-01-15 RX ADMIN — ATORVASTATIN CALCIUM 40 MG: 40 TABLET, FILM COATED ORAL at 20:46

## 2020-01-15 ASSESSMENT — ENCOUNTER SYMPTOMS
ABDOMINAL PAIN: 0
NAUSEA: 0
SHORTNESS OF BREATH: 0
VOMITING: 0
CHEST TIGHTNESS: 0
ABDOMINAL DISTENTION: 0

## 2020-01-15 NOTE — ED NOTES
Dr. Margene Goldmann and residents in room assessing pt.       Amber Ivory, RN  01/14/20 1937 [facility], RN  01/14/20 1913

## 2020-01-15 NOTE — FLOWSHEET NOTE
01/15/20 1340   Encounter Summary   Services provided to: Patient   Referral/Consult From: [facility]   (1-15-20)   Complexity of Encounter Moderate   Length of Encounter 15 minutes   Spiritual Assessment Completed Yes   Spiritual/Temple   Type Spiritual support   Assessment Calm; Approachable; Hopeful; Anxious; Coping   Intervention Active listening;Explored feelings, thoughts, concerns;Prayer;Provided reading materials/devotional materials;Sustaining presence/ Ministry of presence   Outcome Comfort;Expressed gratitude;Engaged in conversation;Coping; Hopeful;Encouraged;Receptive

## 2020-01-15 NOTE — PROGRESS NOTES
Received report from Azalia Harada , patient is stable and has no c/o pain or discomfort at this time . Will continue to monitor and assess.

## 2020-01-15 NOTE — CARE COORDINATION
Writer spoke with Dr Alicia Pires via telephone regarding order for Xarelto for discharge. Dr Lorie Hahn would like patient on 15 mg of Xarelto daily for 30 days. And cant incase to 20 mg yet due to kidney function, patient will need to be seen in his office in order to get. additional refills for 15 mg or to increase Xarelto to 20 mg. It will all depend on her GFR function. Writer called [facility] pharmacy at [phone] and called Xarelto 15 mg daily for 30 days only under Alicia Pires to [place] pharmacist.  Pharmacist advised the OOP cost is $0.    Medication is ready for . Pharmacy asking if patient will still need aspirin at discharge. Will need to verify this information as well as get lab scripts for patient to check GFR results. Tim Orellana RN caring for patient of the above info. Electronically signed by Marco A Haney RN on 1/15/2020 at 2:26 PM     Update: Follow up appt made with Dr Lorie Hahn on 1/29/2020 with Alicia Pires in [place] office  At 3:30p.m.      Electronically signed by Marco A Haney RN on 1/15/2020 at 2:52 PM

## 2020-01-15 NOTE — PROGRESS NOTES
AC    losartan  100 mg Oral Daily     Continuous Infusions:    sodium chloride      diltiazem (CARDIZEM) 125 mg in dextrose 5% 125 mL infusion Stopped (01/14/20 1619)    dextrose       PRN Meds: perflutren lipid microspheres, sodium chloride flush, magnesium hydroxide, ondansetron, potassium chloride **OR** potassium alternative oral replacement **OR** potassium chloride, acetaminophen, glucose, dextrose, glucagon (rDNA), dextrose    Data:     Past Medical History:   has a past medical history of Adenocarcinoma of endometrium, stage 1 ([facility] Allergic rhinitis, At high risk for falls, Colon polyp, Diabetes mellitus ([facility] Endometrial cancer ([facility] History of TIA (transient ischemic attack), Hyperglycemia, Hyperlipidemia, Hypertension, Hypothyroidism, Legally blind, Lower back pain, Mixed incontinence, Morbid obesity with BMI of 40.0-44.9, adult ([facility] CLAROS (nonalcoholic steatohepatitis), Obesity, Oral phase dysphagia, Osteoarthritis (arthritis due to wear and tear of joints), Osteoarthritis involving multiple joints on both sides of body, Osteoporosis, Postmenopausal bleeding, S/P h-scope, Myosure 9/20/17, Tennis elbow, Thickened endometrium, TLHBSO, bilateral LND 10/24/17, and Type 2 diabetes mellitus, without long-term current use of insulin ([facility] Social History:   reports that she has never smoked. She has never used smokeless tobacco. She reports that she does not drink alcohol or use drugs.      Family History:   Family History   Problem Relation Age of Onset    Coronary Art Dis Father     Hypertension Father     Diabetes Father     High Blood Pressure Father     Heart Attack Father     Diabetes Mother     High Blood Pressure Mother     Thyroid Disease Mother     High Blood Pressure Sister     Thyroid Disease Brother     High Blood Pressure Brother     High Blood Pressure Maternal Grandmother     Diabetes Maternal Grandfather     No Known Problems Paternal Grandmother     Heart Attack radiation dose to as low as reasonably achievable. COMPARISON: CT chest 03/01/2019. HISTORY: ORDERING SYSTEM PROVIDED HISTORY: Atrial fibrillation with RVR, SOB, elevated D-dimer FINDINGS: Pulmonary Arteries: Pulmonary arteries are adequately opacified for evaluation. No evidence of intraluminal filling defect to suggest pulmonary embolism. Main pulmonary artery is normal in caliber, 2.7 cm. Mediastinum: No evidence of mediastinal lymphadenopathy. The heart and pericardium demonstrate no acute abnormality. There is no acute abnormality of the thoracic aorta. The ascending thoracic aorta is 3.7 cm, descending thoracic aorta 2.2 cm. Lungs/pleura: The lungs are without acute process. Small triangular for band of subsegmental atelectasis or scarring posterolateral right lower lung. No focal consolidation or pulmonary edema. No evidence of pleural effusion or pneumothorax. Upper Abdomen: Dilated gallbladder with gallstones. Soft Tissues/Bones: No acute bone or soft tissue abnormality. 1. No pulmonary embolism. 2. No CT evidence of acute cardiopulmonary disease. 3. Mild calcific atherosclerosis aorta and coronary arteries. 4. Cholelithiasis. The gallbladder is dilated. Physical Examination:        Physical Exam  Constitutional:       Appearance: She is obese. HENT:      Head: Normocephalic and atraumatic. Mouth/Throat:      Mouth: Mucous membranes are moist.      Pharynx: Oropharynx is clear. Eyes:      Extraocular Movements: Extraocular movements intact. Pupils: Pupils are equal, round, and reactive to light. Cardiovascular:      Rate and Rhythm: Normal rate and regular rhythm. Pulses: Normal pulses. Pulmonary:      Effort: Pulmonary effort is normal.      Breath sounds: Normal breath sounds. Abdominal:      General: Abdomen is flat. There is no distension. Tenderness: There is no tenderness. Musculoskeletal:         General: No swelling.       Right lower leg: No

## 2020-01-15 NOTE — ED NOTES
This RN preformed bladder scan on pt. 270ml in bladder currently. Dr. Michelle Fulton stated to measure post residual when pt urinates again.       Connie Mcardle, JANINA  01/14/20 1929

## 2020-01-15 NOTE — PROGRESS NOTES
Interval Progress Note    Patient was seen and examined at bedside. She is resting comfortably in bed. No acute events overnight. She has no new complaints this morning. Her afib converted to sinus colby yesterday with cardizem drip. She is currently off the drip. She is receiving full dose lovenox. She developed an JOSHUA with Cr from 0.8 up to 1.3. We will treat with gentle hydration and f/u FENa. Patient is to have an echo today and will see cardiology. CLINICAL COURSE ---          clinical course has improved,        Medications: Allergies:     Allergies   Allergen Reactions    Sulfa Antibiotics Nausea Only    Penicillins Rash       Current Meds:   Scheduled Meds:    atorvastatin  40 mg Oral Nightly    sodium chloride flush  10 mL Intravenous 2 times per day    enoxaparin  1 mg/kg Subcutaneous BID    insulin lispro  0-6 Units Subcutaneous TID WC    insulin lispro  0-3 Units Subcutaneous Nightly    levothyroxine  75 mcg Oral QAM AC    losartan  100 mg Oral Daily     Continuous Infusions:    sodium chloride      diltiazem (CARDIZEM) 125 mg in dextrose 5% 125 mL infusion Stopped (01/14/20 1619)    dextrose       PRN Meds: perflutren lipid microspheres, sodium chloride flush, magnesium hydroxide, ondansetron, potassium chloride **OR** potassium alternative oral replacement **OR** potassium chloride, acetaminophen, glucose, dextrose, glucagon (rDNA), dextrose    Electronically signed by Kalee Mendiola MD on 1/15/2020 at 10:08 AM

## 2020-01-15 NOTE — CONSULTS
[facility]   Consult Note         Today's Date: 1/15/2020  Patient Name: Jj Rosales  Date of admission: 1/14/2020 11:31 AM  Patient's age: 78 y. o., 1950  Admission Dx: A-fib ([facility] [I48.91]    Reason for Consult:  Cardiac evaluation    Requesting Physician: Alfonso Guzman MD    REASON FOR CONSULT:  New Afib    History Obtained From:  Patient, chart, staff, records    HISTORY OF PRESENT ILLNESS:      The patient is a 78 y.o. female who is admitted to the hospital for abdominal pain and chest pain, found to be in afib. Converted spontaneously. Doing well now. Past Medical History:   has a past medical history of Adenocarcinoma of endometrium, stage 1 ([facility] Allergic rhinitis, At high risk for falls, Colon polyp, Diabetes mellitus ([facility] Endometrial cancer ([facility] History of TIA (transient ischemic attack), Hyperglycemia, Hyperlipidemia, Hypertension, Hypothyroidism, Legally blind, Lower back pain, Mixed incontinence, Morbid obesity with BMI of 40.0-44.9, adult ([facility] CLAROS (nonalcoholic steatohepatitis), Obesity, Oral phase dysphagia, Osteoarthritis (arthritis due to wear and tear of joints), Osteoarthritis involving multiple joints on both sides of body, Osteoporosis, Postmenopausal bleeding, S/P h-scope, Myosure 9/20/17, Tennis elbow, Thickened endometrium, TLHBSO, bilateral LND 10/24/17, and Type 2 diabetes mellitus, without long-term current use of insulin ([facility] Past Surgical History:   has a past surgical history that includes Thyroid surgery (1980); Colonoscopy (1997); Tonsillectomy; Colonoscopy (06/26/2017); pr colsc flx w/removal lesion by hot bx forceps (N/A, 6/26/2017); Dilation and curettage of uterus (N/A, 9/20/2017); Cataract removal with implant (Bilateral, 2015); vitrectomy; julieta and bso (cervix removed) (10/24/2017); and Hysterectomy (N/A, 10/24/2017). Home Medications:    Prior to Admission medications    Medication Sig Start Date End Date Taking?  Authorizing Provider   docusate sodium (COLACE) 100 MG capsule TAKE 1 CAPSULE BY MOUTH 2 TIMES DAILY 12/16/19  Yes Beronica Chávez MD   MYRBETRIQ 50 MG TB24 TAKE ONE TABLET BY MOUTH ONCE DAILY 12/16/19  Yes Beronica Chávez MD   metFORMIN (GLUCOPHAGE) 500 MG tablet TAKE ONE TABLET BY MOUTH TWICE A DAY WITH A MEAL 11/11/19  Yes Beronica Chávez MD   Cranberry, Vacc oxycoccus, (SM CRANBERRY) 500 MG TABS Take 500 mg by mouth daily  7/31/17  Yes Historical Provider, MD   losartan (COZAAR) 100 MG tablet Take 1 tablet by mouth daily Losartan if not recalled.  **stop Lisinopril** 10/3/19  Yes Beronica Chávez MD   atorvastatin (LIPITOR) 40 MG tablet TAKE ONE TABLET BY MOUTH ONCE DAILY --STOP SIMVASTATIN-- 3/11/19  Yes Beronica Chávez MD   Cholecalciferol (VITAMIN D) 2000 units TABS tablet Take 1 tablet by mouth daily 3/9/19  Yes Beronica Chávez MD   vitamin B-12 (CYANOCOBALAMIN) 1000 MCG tablet Take 1 tablet by mouth daily 3/9/19  Yes Beronica Chávez MD   acetaminophen (APAP EXTRA STRENGTH) 500 MG tablet Take 1 tablet by mouth every 6 hours as needed for Pain or Fever 2/12/19  Yes Beronica Chávez MD   aspirin (ASPIR-LOW) 81 MG EC tablet Take 1 tablet by mouth daily 2/12/19  Yes Beronica Chávez MD   levothyroxine (SYNTHROID) 75 MCG tablet Take 1 tablet by mouth every morning (before breakfast) 2/12/19  Yes Beronica Chávez MD   loratadine (CLARITIN) 10 MG tablet Take 1 tablet by mouth daily 2/12/19  Yes Beronica Chávez MD   albuterol sulfate HFA (PROAIR HFA) 108 (90 Base) MCG/ACT inhaler Inhale 2 puffs into the lungs every 6 hours as needed for Wheezing or Shortness of Breath (cough) 10/19/18  Yes Beronica Chávez MD   lidocaine (LMX) 4 % cream Apply 2-3 times a day as needed for pain 10/19/18  Yes Beronica Chávez MD   Lactobacillus (PROBIOTIC ACIDOPHILUS) TABS Take 1 tablet by mouth daily 7/31/17  Yes Beronica Chávez MD   UNABLE TO FIND Needs right walker brake fixed 10/3/19 Lisa Melendrez MD     atorvastatin, 40 mg, Oral, Nightly    sodium chloride flush, 10 mL, Intravenous, 2 times per day    enoxaparin, 1 mg/kg, Subcutaneous, BID    insulin lispro, 0-6 Units, Subcutaneous, TID WC    insulin lispro, 0-3 Units, Subcutaneous, Nightly    levothyroxine, 75 mcg, Oral, QAM AC    losartan, 100 mg, Oral, Daily      Allergies:  Sulfa antibiotics and Penicillins    Social History:   reports that she has never smoked. She has never used smokeless tobacco. She reports that she does not drink alcohol or use drugs. Family History: family history includes Coronary Art Dis in her father; Diabetes in her father, maternal grandfather, and mother; Heart Attack in her father and paternal grandfather; High Blood Pressure in her brother, father, maternal grandmother, mother, and sister; Hypertension in her father; No Known Problems in her paternal grandmother; Thyroid Disease in her brother and mother. No h/o sudden cardiac death. REVIEW OF SYSTEMS:    · Constitutional: there has been no unanticipated weight loss. There's been No change in energy level, No change in activity level. · Eyes: No visual changes or diplopia. No scleral icterus. · ENT: No Headaches, hearing loss or vertigo. No mouth sores or sore throat. · Cardiovascular: No cardiac history  · Respiratory: No previous pulmonary problems  · Gastrointestinal: No abdominal pain, appetite loss, blood in stools. No change in bowel or bladder habits. · Genitourinary: No dysuria, trouble voiding, or hematuria. · Musculoskeletal:  No gait disturbance, No weakness or joint complaints. · Integumentary: No rash or pruritis. · Neurological: No headache, diplopia, change in muscle strength, numbness or tingling. No change in gait, balance, coordination, mood, affect, memory, mentation, behavior. · Psychiatric: No anxiety, or depression. · Endocrine: No temperature intolerance. No excessive thirst, fluid intake, or urination.  No tremor. · Hematologic/Lymphatic: No abnormal bruising or bleeding, blood clots or swollen lymph nodes. · Allergic/Immunologic: No nasal congestion or hives. PHYSICAL EXAM:      /62   Pulse 67   Temp 97.4 °F (36.3 °C) (Oral)   Resp 18   Ht 5' 2\" (1.575 m)   Wt 238 lb 1.6 oz (108 kg)   LMP  (LMP Unknown)   SpO2 99%   BMI 43.55 kg/m²    Constitutional and General Appearance: alert, cooperative, no distress and appears stated age  HEENT: PERRL, no cervical lymphadenopathy. No masses palpable. Normal oral mucosa  Respiratory:  · Normal excursion and expansion without use of accessory muscles  · Resp Auscultation: Good respiratory effort. No for increased work of breathing. On auscultation: clear to auscultation bilaterally  Cardiovascular:  · The apical impulse is not displaced  · Heart tones are crisp and normal. regular S1 and S2.  · Jugular venous pulsation Normal  · The carotid upstroke is normal in amplitude and contour without delay or bruit  · Peripheral pulses are symmetrical and full   Abdomen:   · No masses or tenderness  · Bowel sounds present  Extremities:  ·  No Cyanosis or Clubbing  ·  Lower extremity edema: No  ·  Skin: Warm and dry  Neurological:  · Alert and oriented. · Moves all extremities well  · No abnormalities of mood, affect, memory, mentation, or behavior are noted        EKG:    Date: 01/15/20  Reading: No acute ischemia      LAST ECHO:  Date:  Findings Summary:      LAST Stress Test:   Date of last ST:  Major Findings:    LAST Cardiac Angiography:.  Date:  Findings:      Labs:     CBC:   Recent Labs     01/14/20  1143 01/15/20  0517   WBC 9.1 10.0   HGB 13.8 11.7*   HCT 42.2 34.9*    250     BMP:   Recent Labs     01/14/20  1143 01/15/20  0517    140   K 3.9 3.6*   CO2 22 25   BUN 22 33*   CREATININE 0.83 1.27*   LABGLOM >60 42*   GLUCOSE 136* 110*     BNP: No results for input(s): BNP in the last 72 hours.   PT/INR: No results for input(s): PROTIME, INR in the

## 2020-01-15 NOTE — PLAN OF CARE
Problem: Falls - Risk of:  Goal: Will remain free from falls  Description  Will remain free from falls  Outcome: Ongoing     Problem: Falls - Risk of:  Goal: Absence of physical injury  Description  Absence of physical injury  Outcome: Ongoing     Problem: Risk for Impaired Skin Integrity  Goal: Tissue integrity - skin and mucous membranes  Description  Structural intactness and normal physiological function of skin and  mucous membranes. Outcome: Ongoing     Problem: Breathing Pattern - Ineffective:  Goal: Ability to achieve and maintain a regular respiratory rate will improve  Description  Ability to achieve and maintain a regular respiratory rate will improve  Outcome: Ongoing     Problem: Pain:  Goal: Pain level will decrease  Description  Pain level will decrease  Outcome: Ongoing     Problem: Cardiac:  Goal: Ability to maintain an adequate cardiac output will improve  Description  Ability to maintain an adequate cardiac output will improve  Outcome: Ongoing     Problem: Cardiac:  Goal: Hemodynamic stability will improve  Description  Hemodynamic stability will improve      Patient remains free of falls and injury this shift. Denies pain this shift. VSS this shift. Continue to monitor cardiac status.

## 2020-01-15 NOTE — PLAN OF CARE
Problem: Falls - Risk of:  Goal: Will remain free from falls  Description  Will remain free from falls  1/15/2020 0049 by Sd Jiménez RN  Outcome: Met This Shift  1/15/2020 0039 by Sd Jiménez RN  Outcome: Met This Shift  Goal: Absence of physical injury  Description  Absence of physical injury  1/15/2020 0049 by Sd Jiménez RN  Outcome: Met This Shift  1/15/2020 0039 by Sd Jiménez RN  Outcome: Met This Shift     Problem: Breathing Pattern - Ineffective:  Goal: Ability to achieve and maintain a regular respiratory rate will improve  Description  Ability to achieve and maintain a regular respiratory rate will improve  Outcome: Met This Shift     Problem: Pain:  Goal: Pain level will decrease  Description  Pain level will decrease  Outcome: Met This Shift     Problem: Cardiac:  Goal: Ability to maintain an adequate cardiac output will improve  Description  Ability to maintain an adequate cardiac output will improve  Outcome: Met This Shift  Goal: Hemodynamic stability will improve  Description  Hemodynamic stability will improve  Outcome: Met This Shift

## 2020-01-16 VITALS
BODY MASS INDEX: 43.82 KG/M2 | DIASTOLIC BLOOD PRESSURE: 43 MMHG | HEART RATE: 65 BPM | HEIGHT: 62 IN | WEIGHT: 238.1 LBS | SYSTOLIC BLOOD PRESSURE: 99 MMHG | OXYGEN SATURATION: 97 % | RESPIRATION RATE: 18 BRPM | TEMPERATURE: 98.7 F

## 2020-01-16 LAB
ABSOLUTE EOS #: 0.3 K/UL (ref 0–0.4)
ABSOLUTE IMMATURE GRANULOCYTE: ABNORMAL K/UL (ref 0–0.3)
ABSOLUTE LYMPH #: 3 K/UL (ref 1–4.8)
ABSOLUTE MONO #: 0.6 K/UL (ref 0.1–1.3)
ALBUMIN SERPL-MCNC: 3.7 G/DL (ref 3.5–5.2)
ALBUMIN/GLOBULIN RATIO: ABNORMAL (ref 1–2.5)
ALP BLD-CCNC: 68 U/L (ref 35–104)
ALT SERPL-CCNC: 18 U/L (ref 5–33)
ANION GAP SERPL CALCULATED.3IONS-SCNC: 15 MMOL/L (ref 9–17)
AST SERPL-CCNC: 14 U/L
BASOPHILS # BLD: 1 % (ref 0–2)
BASOPHILS ABSOLUTE: 0.1 K/UL (ref 0–0.2)
BILIRUB SERPL-MCNC: 0.22 MG/DL (ref 0.3–1.2)
BUN BLDV-MCNC: 23 MG/DL (ref 8–23)
BUN/CREAT BLD: ABNORMAL (ref 9–20)
CALCIUM SERPL-MCNC: 9 MG/DL (ref 8.6–10.4)
CHLORIDE BLD-SCNC: 101 MMOL/L (ref 98–107)
CO2: 24 MMOL/L (ref 20–31)
CREAT SERPL-MCNC: 0.85 MG/DL (ref 0.5–0.9)
DIFFERENTIAL TYPE: ABNORMAL
EKG ATRIAL RATE: 51 BPM
EKG ATRIAL RATE: 98 BPM
EKG P AXIS: 40 DEGREES
EKG P-R INTERVAL: 178 MS
EKG Q-T INTERVAL: 300 MS
EKG Q-T INTERVAL: 468 MS
EKG QRS DURATION: 82 MS
EKG QRS DURATION: 84 MS
EKG QTC CALCULATION (BAZETT): 431 MS
EKG QTC CALCULATION (BAZETT): 456 MS
EKG R AXIS: 60 DEGREES
EKG R AXIS: 69 DEGREES
EKG T AXIS: 4 DEGREES
EKG T AXIS: 64 DEGREES
EKG VENTRICULAR RATE: 139 BPM
EKG VENTRICULAR RATE: 51 BPM
EOSINOPHILS RELATIVE PERCENT: 3 % (ref 0–4)
GFR AFRICAN AMERICAN: >60 ML/MIN
GFR NON-AFRICAN AMERICAN: >60 ML/MIN
GFR SERPL CREATININE-BSD FRML MDRD: ABNORMAL ML/MIN/{1.73_M2}
GFR SERPL CREATININE-BSD FRML MDRD: ABNORMAL ML/MIN/{1.73_M2}
GLUCOSE BLD-MCNC: 104 MG/DL (ref 65–105)
GLUCOSE BLD-MCNC: 105 MG/DL (ref 70–99)
GLUCOSE BLD-MCNC: 121 MG/DL (ref 65–105)
GLUCOSE BLD-MCNC: 91 MG/DL (ref 65–105)
HCT VFR BLD CALC: 36.3 % (ref 36–46)
HEMOGLOBIN: 11.9 G/DL (ref 12–16)
IMMATURE GRANULOCYTES: ABNORMAL %
LYMPHOCYTES # BLD: 36 % (ref 24–44)
MAGNESIUM: 1.8 MG/DL (ref 1.6–2.6)
MCH RBC QN AUTO: 30.7 PG (ref 26–34)
MCHC RBC AUTO-ENTMCNC: 32.7 G/DL (ref 31–37)
MCV RBC AUTO: 93.8 FL (ref 80–100)
MONOCYTES # BLD: 7 % (ref 1–7)
NRBC AUTOMATED: ABNORMAL PER 100 WBC
PDW BLD-RTO: 14.2 % (ref 11.5–14.9)
PLATELET # BLD: 232 K/UL (ref 150–450)
PLATELET ESTIMATE: ABNORMAL
PMV BLD AUTO: 8.7 FL (ref 6–12)
POTASSIUM SERPL-SCNC: 3.5 MMOL/L (ref 3.7–5.3)
RBC # BLD: 3.87 M/UL (ref 4–5.2)
RBC # BLD: ABNORMAL 10*6/UL
SEG NEUTROPHILS: 53 % (ref 36–66)
SEGMENTED NEUTROPHILS ABSOLUTE COUNT: 4.5 K/UL (ref 1.3–9.1)
SODIUM BLD-SCNC: 140 MMOL/L (ref 135–144)
TOTAL PROTEIN: 6.9 G/DL (ref 6.4–8.3)
WBC # BLD: 8.5 K/UL (ref 3.5–11)
WBC # BLD: ABNORMAL 10*3/UL

## 2020-01-16 PROCEDURE — 80053 COMPREHEN METABOLIC PANEL: CPT

## 2020-01-16 PROCEDURE — 93010 ELECTROCARDIOGRAM REPORT: CPT | Performed by: INTERNAL MEDICINE

## 2020-01-16 PROCEDURE — 82947 ASSAY GLUCOSE BLOOD QUANT: CPT

## 2020-01-16 PROCEDURE — 6370000000 HC RX 637 (ALT 250 FOR IP): Performed by: INTERNAL MEDICINE

## 2020-01-16 PROCEDURE — 97166 OT EVAL MOD COMPLEX 45 MIN: CPT

## 2020-01-16 PROCEDURE — 36415 COLL VENOUS BLD VENIPUNCTURE: CPT

## 2020-01-16 PROCEDURE — 99233 SBSQ HOSP IP/OBS HIGH 50: CPT | Performed by: INTERNAL MEDICINE

## 2020-01-16 PROCEDURE — 6370000000 HC RX 637 (ALT 250 FOR IP): Performed by: STUDENT IN AN ORGANIZED HEALTH CARE EDUCATION/TRAINING PROGRAM

## 2020-01-16 PROCEDURE — 85025 COMPLETE CBC W/AUTO DIFF WBC: CPT

## 2020-01-16 PROCEDURE — 83735 ASSAY OF MAGNESIUM: CPT

## 2020-01-16 RX ORDER — AMIODARONE HYDROCHLORIDE 200 MG/1
200 TABLET ORAL DAILY
Qty: 30 TABLET | Refills: 0 | Status: SHIPPED | OUTPATIENT
Start: 2020-01-17 | End: 2020-01-21

## 2020-01-16 RX ADMIN — AMIODARONE HYDROCHLORIDE 200 MG: 200 TABLET ORAL at 11:15

## 2020-01-16 RX ADMIN — LEVOTHYROXINE SODIUM 75 MCG: 75 TABLET ORAL at 05:52

## 2020-01-16 RX ADMIN — VITAMIN D, TAB 1000IU (100/BT) 1000 UNITS: 25 TAB at 11:15

## 2020-01-16 RX ADMIN — CETIRIZINE HYDROCHLORIDE 10 MG: 10 TABLET, FILM COATED ORAL at 11:15

## 2020-01-16 RX ADMIN — DOCUSATE SODIUM 100 MG: 100 CAPSULE, LIQUID FILLED ORAL at 11:14

## 2020-01-16 RX ADMIN — POTASSIUM CHLORIDE 40 MEQ: 1500 TABLET, EXTENDED RELEASE ORAL at 11:15

## 2020-01-16 RX ADMIN — METFORMIN HYDROCHLORIDE 500 MG: 500 TABLET ORAL at 11:16

## 2020-01-16 RX ADMIN — LOSARTAN POTASSIUM 100 MG: 50 TABLET, FILM COATED ORAL at 11:14

## 2020-01-16 RX ADMIN — ASPIRIN 81 MG: 81 TABLET, COATED ORAL at 11:14

## 2020-01-16 ASSESSMENT — ENCOUNTER SYMPTOMS
ABDOMINAL DISTENTION: 0
ABDOMINAL PAIN: 0
VOMITING: 0
SHORTNESS OF BREATH: 0
NAUSEA: 0
CHEST TIGHTNESS: 0

## 2020-01-16 ASSESSMENT — PAIN SCALES - GENERAL: PAINLEVEL_OUTOF10: 0

## 2020-01-16 NOTE — DISCHARGE INSTR - COC
Continuity of Care Form    Patient Name: Rolo Pearce   :  1950  MRN:  [id number]    Admit date:  2020  Discharge date:  2020    Code Status Order: Full Code   Advance Directives:   [facility] Documentation     Date/Time Healthcare Directive Type of Healthcare Directive Copy in [address] [place] [address] [address] Agent's Name Healthcare Agent's Phone Number    20  No, patient does not have an advance directive for healthcare treatment -- -- -- -- --          Admitting Physician:  Monae Miller MD  PCP: Viridiana Barboza MD    Discharging Nurse: Gordy Hernandez RN  [address] Unit/Room#: 2096/2096-01  Discharging Unit Phone Number: [phone]    Emergency Contact:   Extended Emergency Contact Information  Primary Emergency Contact: Donna Dennis  Address: [address] Phone: [phone]  Mobile Phone: [phone]  Relation: Brother/Sister  Hearing or visual needs: None  Other needs: None  Preferred language: English   needed? No  Secondary Emergency Contact: Keith Pabon [facility] [address] [place] [address] Phone: [phone]  Relation: Child  Hearing or visual needs: None  Other needs: None  Preferred language: English   needed?  No    Past Surgical History:  Past Surgical History:   Procedure Laterality Date    CATARACT REMOVAL WITH IMPLANT Bilateral     COLONOSCOPY      had polyp, she doesn't know where and when, \"20 yrs ago\"    COLONOSCOPY  2017    DILATION AND CURETTAGE OF UTERUS N/A 2017    HYSTEROSCOPY  WITH MYOSURE performed by Rosemary Lagunas DO at [address] [address] N/A 10/24/2017    TOTAL LAPAROSCOPIC HYSTERECTOMY, BSO, F.S.  STAGING, GYRUS G400 performed by Julianna Bateman MD at [address] N/A 2017    COLONOSCOPY POLYPECTOMY / HOT SNARE performed [facility] Teja Colvin DO at [facility]    CAITLIN AND BSO  10/24/2017    with pelvic lymph node dissection    THYROID SURGERY  1980    subtotal 20 years ago   [facility] TONSILLECTOMY      VITRECTOMY      FLOATERS       Immunization History:   Immunization History   Administered Date(s) Administered    Influenza Vaccine, unspecified formulation 10/01/2016    Influenza Virus Vaccine 10/01/2019    Influenza, Triv, inactivated, subunit, adjuvanted, IM (Fluad 65 yrs and older) 09/14/2017, 10/02/2018    Pneumococcal Conjugate 13-valent (Dcyhour33) 02/23/2017    Pneumococcal Polysaccharide (Czbohhjoz70) 04/20/2018    Tdap (Boostrix, Adacel) 09/28/2017       Active Problems:  Patient Active Problem List   Diagnosis Code    Acquired hypothyroidism E03.9    History of TIA (transient ischemic attack) Z86.73    Chronic bilateral low back pain without sciatica M54.5, G89.29    Essential hypertension I10    Osteopenia determined by x-ray M85.80    Osteoarthritis involving multiple joints on both sides of body M15.9    Left knee DJD M17.12    Prediabetes R73.03    Vitamin D deficiency E55.9    Mixed incontinence N39.46    Chronic pain of both knees M25.561, M25.562, G89.29    Slow transit constipation K59.01    Allergic rhinitis J30.9    Hyperlipidemia with target LDL less than 100 E78.5    Morbid obesity with BMI of 40.0-44.9, adult (HCC) E66.01, Z68.41    At high risk for falls Z91.81    Dense breast tissue on mammogram R92.2    Hyperglycemia R73.9    Spondylosis of lumbar region without myelopathy or radiculopathy M47.816    OAB (overactive bladder) N32.81    Primary osteoarthritis of both knees M17.0    Chronic fatigue  R53.82    Adenomatous polyp of sigmoid colon, 6/26/17 D12.5    Mixed stress and urge urinary incontinence N39.46    TLHBSO, bilateral LND 10/24/17 Z90.710, Z90.79, Z90.722    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    Cataracta b/l eyes H26.9    Difficulty walking R26.2    Esotropia H50.00    Nystagmus H55.00    History of

## 2020-01-16 NOTE — PROGRESS NOTES
[address] [facility].    PROGRESS NOTE             1/16/2020    7:31 AM    Name:   Jin Avalos  MRN:     [id number]     Acct:      [de-identified]   Room:   2096/2096-01  IP Day:  2  Admit Date:  1/14/2020 11:31 AM    PCP:  Jimy Gonzalez MD  Code Status:  Full Code    Subjective:     C/C:   Chief Complaint   Patient presents with    Shortness of Breath    Abdominal Pain     Interval History Status: improved. Patient was seen and examined at bedside. She is hemodynamically stable. No acute events overnight. No new complaints this morning. She states that she feels much better today compared to admission. Her JOSHUA has resolved with gentle hydration. Cardiology has signed off with instructions to start Xarelto 15 mg for 30 days and f/u outpatient. Condition    [x] ill ,     [] high risk , [] critical ,          [] Iseptic---[] delirium ,      [x] afib            [] I----     Unit  [] ICU    [x] PCU    [] MED_SRG     []  Other      Condition is improving            Brief History:     Patient presented with new onset afib. Converted to NSR/NS colby with cardene drip. Echo demonstrated mild-moderate MR, EF>55%. Review of Systems:     Review of Systems   Constitutional: Negative for chills, diaphoresis, fatigue and fever. HENT: Negative. Respiratory: Negative for chest tightness and shortness of breath. Cardiovascular: Negative for chest pain, palpitations and leg swelling. Gastrointestinal: Negative for abdominal distention, abdominal pain, nausea and vomiting. Endocrine: Negative. Genitourinary: Negative. Musculoskeletal: Negative. Skin: Negative. Neurological: Negative for dizziness and weakness. Psychiatric/Behavioral: Negative. Medications: Allergies:     Allergies   Allergen Reactions    Sulfa Antibiotics Nausea Only    Penicillins Rash       Current Meds:   Scheduled Meds:    amiodarone  200 mg Oral Daily    rivaroxaban  15 mg Oral Daily    aspirin  81 mg Oral Daily    metFORMIN  500 mg Oral Daily with breakfast    docusate sodium  100 mg Oral BID    Vitamin D  1,000 Units Oral Daily    cetirizine  10 mg Oral Daily    atorvastatin  40 mg Oral Nightly    sodium chloride flush  10 mL Intravenous 2 times per day    insulin lispro  0-6 Units Subcutaneous TID WC    insulin lispro  0-3 Units Subcutaneous Nightly    levothyroxine  75 mcg Oral QAM AC    losartan  100 mg Oral Daily     Continuous Infusions:    sodium chloride 75 mL/hr at 01/15/20 1041    diltiazem (CARDIZEM) 125 mg in dextrose 5% 125 mL infusion Stopped (01/14/20 1619)    dextrose       PRN Meds: perflutren lipid microspheres, sodium chloride flush, magnesium hydroxide, ondansetron, potassium chloride **OR** potassium alternative oral replacement **OR** potassium chloride, acetaminophen, glucose, dextrose, glucagon (rDNA), dextrose    Data:     Past Medical History:   has a past medical history of Adenocarcinoma of endometrium, stage 1 ([facility] JOSHUA (acute kidney injury) ([facility] Allergic rhinitis, At high risk for falls, Colon polyp, [facility] Endometrial cancer ([facility] History of TIA (transient ischemic attack), Hyperglycemia, Hyperlipidemia, Hypertension, Hypothyroidism, Legally blind, Lower back pain, Mixed incontinence, Morbid obesity with BMI of 40.0-44.9, adult ([facility] CLAROS (nonalcoholic steatohepatitis), Obesity, Oral phase dysphagia, Osteoarthritis (arthritis due to wear and tear of joints), Osteoarthritis involving multiple joints on both sides of body, Osteoporosis, Postmenopausal bleeding, S/P h-scope, Myosure 9/20/17, Tennis elbow, Thickened endometrium, TLHBSO, bilateral LND 10/24/17, and Type 2 diabetes mellitus, without long-term current use of insulin ([facility] Social History:   reports that she has never smoked.  She has never used smokeless tobacco. She reports that she does not drink alcohol or use drugs. Family History:   Family History   Problem Relation Age of Onset    Coronary Art Dis Father     Hypertension Father     Diabetes Father     High Blood Pressure Father     Heart Attack Father     Diabetes Mother     High Blood Pressure Mother     Thyroid Disease Mother     High Blood Pressure Sister     Thyroid Disease Brother     High Blood Pressure Brother     High Blood Pressure Maternal Grandmother     Diabetes Maternal Grandfather     No Known Problems Paternal Grandmother     Heart Attack Paternal Grandfather     Colon Cancer Neg Hx        Vitals:  BP (!) 101/44   Pulse 63   Temp 97.9 °F (36.6 °C) (Oral)   Resp 16   Ht 5' 2\" (1.575 m)   Wt 238 lb 1.6 oz (108 kg)   LMP  (LMP Unknown)   SpO2 94%   BMI 43.55 kg/m²   Temp (24hrs), Av °F (36.7 °C), Min:97.6 °F (36.4 °C), Max:98.5 °F (36.9 °C)    Recent Labs     01/15/20  0818 01/15/20  1113 01/15/20  1605 01/15/20  2050   POCGLU 228* 116* 131* 127*       I/O(24Hr): Intake/Output Summary (Last 24 hours) at 2020 0731  Last data filed at 2020 0700  Gross per 24 hour   Intake 429 ml   Output 1200 ml   Net -771 ml       Labs:  [unfilled]    Lab Results   Component Value Date/Time    SPECIAL NOT REPORTED 2020 02:32 PM     Lab Results   Component Value Date/Time    CULTURE NO SIGNIFICANT GROWTH 2020 02:32 PM       [facility]    Radiology:    Xr Chest Standard (2 Vw)    Result Date: 2020  EXAMINATION: TWO XRAY VIEWS OF THE CHEST 2020 12:25 pm COMPARISON: None. HISTORY: ORDERING SYSTEM PROVIDED HISTORY: sob TECHNOLOGIST PROVIDED HISTORY: sob Reason for Exam: shortness of breath, abdominal pain Acuity: Unknown Type of Exam: Initial Additional history of uterine cancer, hypertension, diabetes and obesity. FINDINGS: Overlying ECG monitor leads. Mildly enlarged but stable appearing cardiac silhouette. Mediastinal structures midline and stable.  Probable right basilar atelectasis with a small nodular density that appears related to the end of right 6th rib. No consolidation or sizable pleural effusion. Mild kyphoscoliosis and DJD thoracic spine. No acute cardiopulmonary disease. Probable right basilar atelectasis. Stable mild cardiomegaly. Ct Chest Pulmonary Embolism W Contrast    Result Date: 1/14/2020  EXAMINATION: CTA OF THE CHEST 1/14/2020 3:50 pm TECHNIQUE: CTA of the chest was performed after the administration of intravenous contrast.  Multiplanar reformatted images are provided for review. MIP images are provided for review. Dose modulation, iterative reconstruction, and/or weight based adjustment of the mA/kV was utilized to reduce the radiation dose to as low as reasonably achievable. COMPARISON: CT chest 03/01/2019. HISTORY: ORDERING SYSTEM PROVIDED HISTORY: Atrial fibrillation with RVR, SOB, elevated D-dimer FINDINGS: Pulmonary Arteries: Pulmonary arteries are adequately opacified for evaluation. No evidence of intraluminal filling defect to suggest pulmonary embolism. Main pulmonary artery is normal in caliber, 2.7 cm. Mediastinum: No evidence of mediastinal lymphadenopathy. The heart and pericardium demonstrate no acute abnormality. There is no acute abnormality of the thoracic aorta. The ascending thoracic aorta is 3.7 cm, descending thoracic aorta 2.2 cm. Lungs/pleura: The lungs are without acute process. Small triangular for band of subsegmental atelectasis or scarring posterolateral right lower lung. No focal consolidation or pulmonary edema. No evidence of pleural effusion or pneumothorax. Upper Abdomen: Dilated gallbladder with gallstones. Soft Tissues/Bones: No acute bone or soft tissue abnormality. 1. No pulmonary embolism. 2. No CT evidence of acute cardiopulmonary disease. 3. Mild calcific atherosclerosis aorta and coronary arteries. 4. Cholelithiasis. The gallbladder is dilated.          Physical Examination:        Physical Exam  Constitutional:       Appearance: She is obese. HENT:      Head: Normocephalic and atraumatic. Mouth/Throat:      Mouth: Mucous membranes are moist.      Pharynx: Oropharynx is clear. Eyes:      Extraocular Movements: Extraocular movements intact. Pupils: Pupils are equal, round, and reactive to light. Cardiovascular:      Rate and Rhythm: Normal rate and regular rhythm. Pulses: Normal pulses. Pulmonary:      Effort: Pulmonary effort is normal.      Breath sounds: Normal breath sounds. Abdominal:      General: Abdomen is flat. There is no distension. Tenderness: There is no tenderness. Musculoskeletal:         General: No swelling. Right lower leg: No edema. Left lower leg: No edema. Skin:     General: Skin is warm and dry. Neurological:      General: No focal deficit present. Mental Status: She is alert and oriented to person, place, and time.            Assessment:        Primary Problem  A-fib [facility])    Active Hospital Problems    Diagnosis Date Noted    Morbid obesity with BMI of 40.0-44.9, adult ([facility] [E66.01, Z68.41] 02/23/2017     Priority: High    JOSHUA (acute kidney [facility]) ([facility] [N17.9] 01/15/2020    A-fib ([facility] [I48.91] 01/14/2020    Type 2 diabetes mellitus, without long-term current use of insulin ([facility] [E11.9] 01/14/2020    Hyperlipidemia with target LDL less than 100 [E78.5] 02/23/2017    Acquired hypothyroidism [E03.9]     Essential hypertension [I10]           CLINICAL COURSE ---          clinical course has improved,        Plan:      New Onset Afib with RVR-improved  -EKG: afib w/ RVR, now NSR  -Trop 15  -TSH 3.88  -Given metoprolol in ED without rate contro  -Started on cardizem drip, now dc'd  -CHADS VASc score at least 4  -UA, urine drug screen-negative  -ECHO-EF>55%; mild to moderate MR  -Cardiology consult, appreciate recs  -increase Xarelto to 20 daily when GFR normal  -Ok to D/c on Xarelto 15 mg and follow outpatient     JOSHUA 2/2 Dehydration  -Cr 0.83-->1.27-->0.85  -FENa

## 2020-01-16 NOTE — CARE COORDINATION
Writer received a verbal order for a BMP to be drawn in one week  To follow patients GFR rate. Patient will follow up with Dr Jaciel Simms in the [place] office on 1/29/2020 at 3;30p.m. Writer advised patient the importance of getting lab work done next week and following up with dr Karen Lan. Patient states she understands.     Electronically signed by Steven Castro RN on 1/16/2020 at 11:55 AM

## 2020-01-16 NOTE — PROGRESS NOTES
impairments, states that she can read the whiteboard in her room and uses glasses for paperwork. Occasionally appeared to have difficulty with depth perception but did not require cues to avoid obstacles when completing functional mobility. Cognition  Overall Cognitive Status: WFL   Perception  Overall Perceptual Status: WFL  Sensation  Overall Sensation Status: WFL   ADL  Feeding: Setup  Grooming: Setup  UE Bathing: Setup  LE Bathing: Minimal assistance  UE Dressing: Setup  LE Dressing: Moderate assistance(Able to don L sock, needed A for R sock)  Toileting: Supervision  Additional Comments: Pt ambulated to BR with 4WW and SBA, completed toileting with supervision. Other levels based on pt report, observation, and clinical reasoninig. UE Function  LUE Strength  Gross LUE Strength: WFL  L Hand General: 4/5     LUE Tone: Normotonic     LUE AROM (degrees)  LUE AROM : WFL     Left Hand AROM (degrees)  Left Hand AROM: WFL  RUE Strength  Gross RUE Strength: WFL  R Hand General: 4/5      RUE Tone: Normotonic     RUE AROM (degrees)  RUE AROM : WFL     Right Hand AROM (degrees)  Right Hand AROM: WFL    Fine Motor Skills  Coordination  Movements Are Fluid And Coordinated: Yes     Mobility  Supine to Sit: Minimal assistance       Balance  Sitting Balance: Supervision  Standing Balance: Stand by assistance     Functional Mobility  Functional Mobility Comments: Pt ambulated to bathroom and in adkins with 4WW and SBA  Bed mobility  Supine to Sit: Minimal assistance  Scooting: Independent  Comment: Pt left sitting up in bedside chair at end of session.      Transfers  Stand Step Transfers: Stand by assistance  Stand Pivot Transfers: Stand by assistance  Sit to stand: Stand by assistance  Stand to sit: Supervision(Poor technique, misjudged distance and \"plopped\")  Transfer Comments: w/4WW (has personal walker in hospital)  Toilet Transfers  Toilet - Technique: Ambulating  Equipment Used: Grab bars  Toilet Transfer: Stand by assistance  Functional Activity Tolerance  Functional Activity Tolerance: Tolerates 10 - 20 min exercise with multiple rests   Assessment  Performance deficits / Impairments: Decreased functional mobility , Decreased ADL status, Decreased safe awareness, Decreased endurance, Decreased balance  Treatment Diagnosis: Impaired self-care status  Prognosis: Good  Decision Making: Medium Complexity  REQUIRES OT FOLLOW UP: Yes  Activity Tolerance: Patient Tolerated treatment well    Goals  Patient Goals   Patient goals : Return home with A as needed from family and HHA  Short term goals  Time Frame for Short term goals: By Discharge  Short term goal 1: Pt will complete toilet transfer and toileting with Mod I and Good safety using appropriate DME. Short term goal 2: Pt will actively participate in BADL routine and complete tasks with set-up A only using AE/modified techniques as needed. Short term goal 3: Pt will actively participate in 15-20 minutes of therapeutic exercise/activity to promote increased independence and safety with self-care and mobility.     Plan  Safety Devices  Safety Devices in place: Yes  Type of devices: Patient at risk for falls, Gait belt, Left in chair, Call light within reach     Plan  Times per week: 5-7  Times per day: Daily  Current Treatment Recommendations: Balance Training, Functional Mobility Training, Endurance Training, Safety Education & Training, Patient/Caregiver Education & Training, Equipment Evaluation, Education, & procurement, Self-Care / ADL    Equipment Recommendations  Equipment Needed: (TBD)  OT Individual Minutes  Time In: 5744  Time Out: 8564  Minutes: 20    Electronically signed by Bettie Rudolph on 1/16/20 at 11:02 AM

## 2020-01-16 NOTE — PROGRESS NOTES
Physical Therapy    Facility/Department: [facility] PROGRESSIVE CARE  Initial Assessment    NAME: Cheryle Hermosillo  : 1950  MRN: [id number]    Date of Service: 2020    Discharge Recommendations:  Patient would benefit from continued therapy after discharge   PT Equipment Recommendations  Equipment Needed: No    Assessment   Body structures, Functions, Activity limitations: Decreased functional mobility ; Decreased endurance;Decreased balance;Decreased vision/visual deficit  Assessment: continue per POC to maxmize potential for safe D/C  Treatment Diagnosis: impaired mobility  Specific instructions for Next Treatment: 2020 gait w/ rollator 10', 50' and 61' w/ SBA x 1, Min x 1 supine > sit, FALL RISK, instruct in HEP for strengthening  Prognosis: Good  Decision Making: Medium Complexity  History: admitted due to A-Fib   Exam: ROM, MMT, balance and mobility assessment  Clinical Presentation: gait w/ rollator 10', 50' and 60' w/ SBA x 1, Min x 1 supine > sit, FALL RISK, instruct in HEP for strengthening  PT Education: Goals;PT Role;Plan of Care  Barriers to Learning: none  REQUIRES PT FOLLOW UP: Yes  Activity Tolerance  Activity Tolerance: Patient Tolerated treatment well       Patient Diagnosis(es): The encounter diagnosis was Atrial fibrillation with RVR ([facility]      has a past medical history of Adenocarcinoma of endometrium, stage 1 ([facility] JOSHUA (acute kidney injury) ([facility] Allergic rhinitis, At high risk for falls, Colon polyp, Diabetes mellitus ([facility] Endometrial cancer ([facility] History of TIA (transient ischemic attack), Hyperglycemia, Hyperlipidemia, Hypertension, Hypothyroidism, Legally blind, Lower back pain, Mixed incontinence, Morbid obesity with BMI of 40.0-44.9, adult ([facility] CLAROS (nonalcoholic steatohepatitis), Obesity, Oral phase dysphagia, Osteoarthritis (arthritis due to wear and tear of joints), Osteoarthritis involving multiple joints on both sides of body, Osteoporosis, Postmenopausal bleeding, S/P h-scope, Myosure 9/20/17, Tennis elbow, Thickened endometrium, TLHBSO, bilateral LND 10/24/17, and Type 2 diabetes mellitus, without long-term current use of insulin ([facility] has a past surgical history that includes Thyroid surgery (1980); Colonoscopy (1997); Tonsillectomy; Colonoscopy (06/26/2017); pr colsc flx w/removal lesion by hot bx forceps (N/A, 6/26/2017); Dilation and curettage of uterus (N/A, 9/20/2017); Cataract removal with implant (Bilateral, 2015); vitrectomy; julieta and bso (cervix removed) (10/24/2017); and Hysterectomy (N/A, 10/24/2017).     Restrictions  Restrictions/Precautions  Restrictions/Precautions: Fall Risk, General Precautions(peripheral IV right hand, troponins 20 on 1-)  Required Braces or Orthoses?: No  Vision/Hearing  Vision: Impaired  Vision Exceptions: Wears glasses for reading(Pt reports she is \"visually impaired\" - NOT \"legally blind\")  Hearing: Within functional limits     Subjective  General  Patient assessed for rehabilitation services?: Yes  Response To Previous Treatment: Not applicable  Family / Caregiver Present: No  Referring Practitioner: Dr. Rashad Medina  Referral Date : 01/14/20  Diagnosis: A-Fib  Follows Commands: Within Functional Limits  Other (Comment): OK per nurse Jluis Divers to proceed w/ PT evaluation  Subjective  Subjective: pt in good spirits- requests to walk to the bathroom prior to walking in the hallway  Pain Screening  Patient Currently in Pain: Denies  Vital Signs  Patient Currently in Pain: Denies       Orientation  Orientation  Overall Orientation Status: Within Normal Limits  Social/Functional History  Social/Functional History  Lives With: Alone  Type of Home: Apartment(2nd floor)  Home Layout: One level  Home Access: Level entry, Elevator  Bathroom Shower/Tub: Tub/Shower unit  Bathroom Toilet: Standard  Bathroom Equipment: Shower chair, Grab bars in shower, Hand-held shower, Toilet raiser, Grab bars around toilet  Bathroom Accessibility: Scarlett Chen Deviations: (increased lateral sway)  Distance: 10' to bathroom then 48' and 60' in hallway w/ 3 standing turns  Comments: has depth perception issues  Stairs/Curb  Stairs?: No     Balance  Sitting - Static: Good  Sitting - Dynamic: Good  Standing - Static: Good(used rollator)  Standing - Dynamic: Good;-(used rollator)        Plan   Plan  Times per week: 3-5 treatments/ 5 days  Times per day: (3-5 treatments/ 5 days)  Specific instructions for Next Treatment: 1- gait w/ rollator 10', 50' and 61' w/ SBA x 1, Min x 1 supine > sit, FALL RISK, instruct in [facility] for strengthening  Current Treatment Recommendations: Strengthening, Gait Training, Patient/Caregiver Education & Training, Balance Training, Endurance Training, Functional Mobility Training, Transfer Training, Safety Education & Training  Safety Devices  Type of devices:  All fall risk precautions in place, Call light within reach, Gait belt, Patient at risk for falls, Left in chair, Nurse notified(nurse January Ramirez)    G-Code       OutComes Score                                                  AM-PAC Score     AM-PAC Inpatient Mobility without Stair Climbing Raw Score : 15 (01/16/20 0856)  AM-PAC Inpatient without Stair Climbing T-Scale Score : 43.03 (01/16/20 0856)  Mobility Inpatient CMS 0-100% Score: 47.43 (01/16/20 0856)  Mobility Inpatient without Stair CMS G-Code Modifier : CK (01/16/20 0856)       Goals  Short term goals  Time Frame for Short term goals: 3-5 treatments/ 5 days  Short term goal 1: pt to tolerate 1/2 hour of therapuetic exercise and activity  Short term goal 2: pt to demonstrate good technique for LE strengthening and balance activities  Short term goal 3: pt to demonstrate independent bed mobility for rolling and supine <> sit for position change  Short term goal 4: pt to demonstrate MOD I for transfers sit <> stand and bed <> chair using rollator  Short term goal 5: pt to demonstrate MOD I for gait 150' using rollator  Short term goal 6:

## 2020-01-16 NOTE — FLOWSHEET NOTE
Patient discharged home accompanied by sister. All belongings sent with the patient. Discharge instructions reviewed and all questions answered.

## 2020-01-17 ENCOUNTER — TELEPHONE (OUTPATIENT)
Dept: FAMILY MEDICINE CLINIC | Age: 70
End: 2020-01-17

## 2020-01-17 NOTE — CARE COORDINATION
Continuity of Care Form    Patient Name: Ebony Prescott   :  1950  MRN:  [id number]    Admit date:  2020  Discharge date:  2020    Code Status Order: Full Code   Advance Directives:   [facility] Documentation     Date/Time Healthcare Directive Type of Healthcare Directive Copy in [facility] [address] Agent's Name Healthcare Agent's Phone Number    20  No, patient does not have an advance directive for healthcare treatment -- -- -- -- --          Admitting Physician:  Luli Verduzco MD  PCP: Richard Hopper MD    Discharging Nurse: Darlene Braga RN  [address] Unit/Room#: 2096/2096-01  Discharging Unit Phone Number: [phone]    Emergency Contact:   Extended Emergency Contact Information  Primary Emergency Contact: Donna Dennis  Address: [address] Phone: [phone]  Mobile Phone: [phone]  Relation: Brother/Sister  Hearing or visual needs: None  Other needs: None  Preferred language: English   needed? No  Secondary Emergency Contact: Frankie Khan [address] Phone: [phone]  Relation: Child  Hearing or visual needs: None  Other needs: None  Preferred language: English   needed?  No    Past Surgical History:  Past Surgical History:   Procedure Laterality Date    CATARACT REMOVAL WITH IMPLANT Bilateral     COLONOSCOPY      had polyp, she doesn't know where and when, \"20 yrs ago\"    COLONOSCOPY  2017    DILATION AND CURETTAGE OF UTERUS N/A 2017    HYSTEROSCOPY  WITH MYOSURE performed by Taylor Osorio DO at [facility] N/A 10/24/2017    TOTAL LAPAROSCOPIC HYSTERECTOMY, BSO, F.S.  STAGING, GYRUS G400 performed by Arabella Mora MD at [facility] N/A 2017    COLONOSCOPY POLYPECTOMY / HOT SNARE performed by Claudio Danielle DO at [facility] uterine cancer, Well differentiated grade 1 adenocarcinoma arising in complex hyperplasia, 2017 Z85.42    Vitamin B 12 deficiency E53.8    Aortic calcification (HCC) I70.0    Calculus of gallbladder without cholecystitis without obstruction K80.20    CLAROS (nonalcoholic steatohepatitis) K75.81    Optic atrophy H47.20    Cough present for greater than 3 weeks R05    Dyspepsia R10.13    A-fib (HCC) I48.91    Type 2 diabetes mellitus, without long-term current use of insulin (HCC) E11.9    JOSHUA (acute kidney injury) ([facility] N17.9       Isolation/Infection:   Isolation          No Isolation        Patient Infection Status     None to display          Nurse Assessment:  Last Vital Signs: /71   Pulse 60   Temp 97.6 °F (36.4 °C) (Oral)   Resp 12   Ht 5' 2\" (1.575 m)   Wt 238 lb 1.6 oz (108 kg)   LMP  (LMP Unknown)   SpO2 98%   BMI 43.55 kg/m²     Last documented pain score (0-10 scale): Pain Level: 0  Last Weight:   Wt Readings from Last 1 Encounters:   01/14/20 238 lb 1.6 oz (108 kg)     Mental Status:  oriented and alert    IV Access:  - None    Nursing Mobility/ADLs:  Walking   Assisted  Transfer  Assisted  Bathing  Independent  Dressing  Independent  Toileting  Independent  Feeding  Independent  Med Admin  Independent  Med Delivery   whole    Wound Care Documentation and Therapy:  Incision 10/24/17 Abdomen (Active)   Number of days: 813        Elimination:  Continence:   · Bowel: Yes  · Bladder: Yes  Urinary Catheter: None   Colostomy/Ileostomy/Ileal Conduit: No       Date of Last BM:     Intake/Output Summary (Last 24 hours) at 1/16/2020 1156  Last data filed at 1/16/2020 0700  Gross per 24 hour   Intake 429 ml   Output 1200 ml   Net -771 ml     I/O last 3 completed shifts: In: 429 [I.V.:429]  Out: 1200 [Urine:1200]    Safety Concerns:      At Risk for Falls    Impairments/Disabilities:      None    Nutrition Therapy:  Current Nutrition Therapy:   - Oral Diet:  General    Routes of Feeding: Discharge Medication List     START taking these medications     Medication Dose   rivaroxaban (XARELTO) 15 MG TABS tablet 15 mg   Take 1 tablet by mouth daily   Quantity: 30 tablet Refills: 0       amiodarone (CORDARONE) 200 MG tablet 200 mg   Take 1 tablet by mouth daily   Quantity: 30 tablet Refills: 0           CONTINUE these medications which have NOT CHANGED     Medication Dose   docusate sodium (COLACE) 100 MG capsule 100 mg   TAKE 1 CAPSULE BY MOUTH 2 TIMES DAILY   Quantity: 180 capsule Refills: 3       MYRBETRIQ 50 MG TB24    TAKE ONE TABLET BY MOUTH ONCE DAILY   Quantity: 90 tablet Refills: 3       metFORMIN (GLUCOPHAGE) 500 MG tablet    TAKE ONE TABLET BY MOUTH TWICE A DAY WITH A MEAL   Quantity: 60 tablet Refills: 10       Cranberry, Vacc oxycoccus, (SM CRANBERRY) 500 MG TABS 500 mg   Take 500 mg by mouth daily        losartan (COZAAR) 100 MG tablet 100 mg   Take 1 tablet by mouth daily Losartan if not recalled.  **stop Lisinopril**   Quantity: 90 tablet Refills: 3       atorvastatin (LIPITOR) 40 MG tablet    TAKE ONE TABLET BY MOUTH ONCE DAILY --STOP SIMVASTATIN--   Quantity: 90 tablet Refills: 3       Cholecalciferol (VITAMIN D) 2000 units TABS tablet 2,000 Units   Take 1 tablet by mouth daily   Quantity: 90 tablet Refills: 3       vitamin B-12 (CYANOCOBALAMIN) 1000 MCG tablet 1,000 mcg   Take 1 tablet by mouth daily   Quantity: 90 tablet Refills: 3       acetaminophen (APAP EXTRA STRENGTH) 500 MG tablet 500 mg   Take 1 tablet by mouth every 6 hours as needed for Pain or Fever   Quantity: 360 tablet Refills: 3       aspirin (ASPIR-LOW) 81 MG EC tablet 81 mg   Take 1 tablet by mouth daily   Quantity: 90 tablet Refills: 3       levothyroxine (SYNTHROID) 75 MCG tablet 75 mcg   Take 1 tablet by mouth every morning (before breakfast)   Quantity: 90 tablet Refills: 3       loratadine (CLARITIN) 10 MG tablet 10 mg   Take 1 tablet by mouth daily   Quantity: 90 tablet Refills: 3       albuterol sulfate HFA (PROAIR HFA) 108 (90 Base) MCG/ACT inhaler 2 puffs   Inhale 2 puffs into the lungs every 6 hours as needed for Wheezing or Shortness of Breath (cough)   Quantity: 18 g Refills: 0       lidocaine (LMX) 4 % cream    Apply 2-3 times a day as needed for pain   Quantity: 120 g Refills: 3       Lactobacillus (PROBIOTIC ACIDOPHILUS) TABS 1 tablet   Take 1 tablet by mouth daily   Quantity: 30 tablet Refills: 3       UNABLE TO FIND    Needs right walker brake fixed   Quantity: 1 each Refills: 0           STOP taking these previous medications     Medication Dose Reason for Stopping Comments   (STOP TAKING) ondansetron (ZOFRAN) 4 MG tablet 4 mg

## 2020-01-17 NOTE — DISCHARGE SUMMARY
[address]    Discharge Summary     Patient ID: Herman Rodriguez  :  1950   MRN: [id number]     ACCOUNT:  [de-identified]   Patient's PCP: Josette Peterson MD  Admit Date: 2020   Discharge Date: 2020    Length of Stay: 2  Code Status:  Prior  Admitting Physician: Belem Pleitez MD  Discharge Physician: Iveth Almonte MD     Active Discharge Diagnoses:       Primary Problem  [facility])      [place] Problems    Diagnosis Date Noted    Morbid obesity with BMI of 40.0-44.9, adult ([facility] [E66.01, Z68.41] 2017     Priority: High    JOSHUA (acute kidney injury) ([facility] [N17.9] 01/15/2020    [facility] [I48.91] 2020    Type 2 diabetes mellitus, without long-term current use of insulin ([facility] [E11.9] 2020    Hyperlipidemia with target LDL less than 100 [E78.5] 2017    Acquired hypothyroidism [E03.9]     Essential hypertension [I10]        Admission Condition:  poor     Discharged Condition: good    Hospital Stay:       Hospital Course: Herman Rodriguez is a 78 y.o. female who was admitted for the management of   [facility]) , presented to ER with Shortness of Breath and Abdominal Pain    This is a 77 y/o female with no pmh of afib who presented with new onset afib. Patient converted to NSR/sinus colby with cardizem drip. Unsure of what incited the episode of Afib. ECHO demonstrated mild to moderate MR. Normal LVEF. Cardiology was consulted and started the patient on Xarelto and Amiodarone. Today on the day of discharge, the patient had no new complaints. She is in NSR. She will be discharged home with Xarelto and amiodarone.  She is to follow up outpatient with cardiology    Significant therapeutic interventions: Cardizem drip, IVF    Significant Diagnostic Studies:   Labs / Micro:  CBC:   Lab Results   Component Value Date    WBC 8.5 2020    RBC 3.87 2020    RBC 4.23 03/20/2012    HGB 11.9 01/16/2020    HCT 36.3 01/16/2020    MCV 93.8 01/16/2020    MCH 30.7 01/16/2020    MCHC 32.7 01/16/2020    RDW 14.2 01/16/2020     01/16/2020     03/20/2012     BMP:    Lab Results   Component Value Date    GLUCOSE 105 01/16/2020    GLUCOSE 114 03/20/2012     01/16/2020    K 3.5 01/16/2020     01/16/2020    CO2 24 01/16/2020    ANIONGAP 15 01/16/2020    BUN 23 01/16/2020    CREATININE 0.85 01/16/2020    BUNCRER NOT REPORTED 01/16/2020    CALCIUM 9.0 01/16/2020    LABGLOM >60 01/16/2020    GFRAA >60 01/16/2020    GFR      01/16/2020    GFR NOT REPORTED 01/16/2020       Radiology:    Xr Chest Standard (2 Vw)    Result Date: 1/14/2020  EXAMINATION: TWO XRAY VIEWS OF THE CHEST 1/14/2020 12:25 pm COMPARISON: None. HISTORY: ORDERING SYSTEM PROVIDED HISTORY: sob TECHNOLOGIST PROVIDED HISTORY: sob Reason for Exam: shortness of breath, abdominal pain Acuity: Unknown Type of Exam: Initial Additional history of uterine cancer, hypertension, diabetes and obesity. FINDINGS: Overlying ECG monitor leads. Mildly enlarged but stable appearing cardiac silhouette. Mediastinal structures midline and stable. Probable right basilar atelectasis with a small nodular density that appears related to the end of right 6th rib. No consolidation or sizable pleural effusion. Mild kyphoscoliosis and DJD thoracic spine. No acute cardiopulmonary disease. Probable right basilar atelectasis. Stable mild cardiomegaly. Ct Chest Pulmonary Embolism W Contrast    Result Date: 1/14/2020  EXAMINATION: CTA OF THE CHEST 1/14/2020 3:50 pm TECHNIQUE: CTA of the chest was performed after the administration of intravenous contrast.  Multiplanar reformatted images are provided for review. MIP images are provided for review. Dose modulation, iterative reconstruction, and/or weight based adjustment of the mA/kV was utilized to reduce the radiation dose to as low as reasonably achievable.  COMPARISON: CT

## 2020-01-17 NOTE — TELEPHONE ENCOUNTER
[facility]    Outreach made within 2 business days of discharge: Yes    Patient: Jj Rosales Patient : 1950   MRN: [id number]  Reason for Admission: AFIB  Discharge Date: 20       Spoke with: PATIENT     Discharge department/facility: [facility]: IBIS KENNEY      TCM Interactive Patient Contact:  Was patient able to fill all prescriptions: Yes  Was patient instructed to bring all medications to the follow-up visit: Yes  Is patient taking all medications as directed in the discharge summary?  Yes  Does patient understand their discharge instructions: Yes  Does patient have questions or concerns that need addressed prior to 7-14 day follow up office visit: no    Scheduled appointment with PCP within 7-14 days    Follow Up  Future Appointments   Date Time Provider Dante Amaya   2020  1:45 PM [facility] ECHO RM 1 [facility] [address]    2020  1:00 PM MAX Barker - CNP fp [facility]   2020 11:00 AM Napoleon Shaw PT [facility] PT 1 [facility]   3/6/2020  9:45 AM Tiffanie Naik PA-C GYN Oncology [facility]   2020 10:00 AM Judith Pop MD fp sc Magy Alicia MA

## 2020-01-20 PROBLEM — I48.91 ATRIAL FIBRILLATION, NEW ONSET (HCC): Status: ACTIVE | Noted: 2020-01-20

## 2020-01-21 ENCOUNTER — OFFICE VISIT (OUTPATIENT)
Dept: FAMILY MEDICINE CLINIC | Age: 70
End: 2020-01-21
Payer: MEDICARE

## 2020-01-21 VITALS
HEIGHT: 62 IN | HEART RATE: 79 BPM | DIASTOLIC BLOOD PRESSURE: 81 MMHG | BODY MASS INDEX: 44.72 KG/M2 | WEIGHT: 243 LBS | OXYGEN SATURATION: 96 % | SYSTOLIC BLOOD PRESSURE: 118 MMHG

## 2020-01-21 PROBLEM — Z74.09 MOBILITY IMPAIRED: Status: ACTIVE | Noted: 2020-01-21

## 2020-01-21 PROCEDURE — 1111F DSCHRG MED/CURRENT MED MERGE: CPT | Performed by: FAMILY MEDICINE

## 2020-01-21 PROCEDURE — 99495 TRANSJ CARE MGMT MOD F2F 14D: CPT | Performed by: FAMILY MEDICINE

## 2020-01-21 RX ORDER — LORATADINE 10 MG/1
TABLET ORAL
Qty: 90 TABLET | Refills: 3 | Status: ON HOLD | OUTPATIENT
Start: 2020-01-21 | End: 2020-08-13 | Stop reason: HOSPADM

## 2020-01-21 RX ORDER — AMIODARONE HYDROCHLORIDE 200 MG/1
200 TABLET ORAL DAILY
Qty: 30 TABLET | Refills: 1 | Status: SHIPPED | OUTPATIENT
Start: 2020-01-21 | End: 2020-02-19

## 2020-01-21 RX ORDER — MELATONIN
Qty: 90 TABLET | Refills: 3 | OUTPATIENT
Start: 2020-01-21

## 2020-01-21 RX ORDER — RIVAROXABAN 15 MG/1
TABLET, FILM COATED ORAL
Qty: 30 TABLET | Refills: 1 | Status: SHIPPED | OUTPATIENT
Start: 2020-01-21 | End: 2020-02-19

## 2020-01-21 RX ORDER — LEVOTHYROXINE SODIUM 0.07 MG/1
75 TABLET ORAL
Qty: 90 TABLET | Refills: 3 | Status: SHIPPED | OUTPATIENT
Start: 2020-01-21 | End: 2021-01-25

## 2020-01-21 RX ORDER — ASPIRIN 81 MG/1
TABLET, COATED ORAL
Qty: 90 TABLET | Refills: 3 | Status: ON HOLD | OUTPATIENT
Start: 2020-01-21 | End: 2020-06-22 | Stop reason: HOSPADM

## 2020-01-21 ASSESSMENT — ENCOUNTER SYMPTOMS
BACK PAIN: 1
DIARRHEA: 0
COUGH: 0
WHEEZING: 0
ABDOMINAL PAIN: 0
CONSTIPATION: 0
SHORTNESS OF BREATH: 0
ABDOMINAL DISTENTION: 0
VOMITING: 0
CHEST TIGHTNESS: 0

## 2020-01-21 NOTE — TELEPHONE ENCOUNTER
38/53/2432   Basic Metabolic Panel Once 46/15/9005               Patient Active Problem List:     Acquired hypothyroidism     History of TIA (transient ischemic attack)     Chronic bilateral low back pain without sciatica     Essential hypertension     Osteopenia determined by x-ray     Osteoarthritis involving multiple joints on both sides of body     Left knee DJD     Prediabetes     Vitamin D deficiency     Mixed incontinence     Chronic pain of both knees     Slow transit constipation     Allergic rhinitis     Hyperlipidemia with target LDL less than 100     Morbid obesity with BMI of 40.0-44.9, adult ([address]     At high risk for falls     Dense breast tissue on mammogram     Hyperglycemia     Spondylosis of lumbar region without myelopathy or radiculopathy     OAB (overactive bladder)     Primary osteoarthritis of both knees     Chronic fatigue      Adenomatous polyp of sigmoid colon, 6/26/17     Mixed stress and urge urinary incontinence     TLHBSO, bilateral LND 10/24/17     Optic atrophy of both eyes     Cataracta b/l eyes     Difficulty walking     Esotropia     Nystagmus     History of uterine cancer, Well differentiated grade 1 adenocarcinoma arising in complex hyperplasia, 2017     Vitamin B 12 deficiency     Aortic calcification (HCC)     Calculus of gallbladder without cholecystitis without obstruction     CLAROS (nonalcoholic steatohepatitis)     Optic atrophy     Cough present for greater than 3 weeks     Dyspepsia     A-fib ([address]     Type 2 diabetes mellitus, without long-term current use of insulin (HCC)     JOSHUA (acute kidney injury) ([address]     Atrial fibrillation, new onset ([address]           Please address the medication refill and close the encounter. If I can be of assistance, please route to the applicable pool. Thank you.

## 2020-01-21 NOTE — PATIENT INSTRUCTIONS
pneumococcal vaccine shot. If you have had one before, ask your doctor whether you need another dose. Get a flu shot every year. If you must be around people with colds or flu, wash your hands often. Activity    · If your doctor recommends it, get more exercise. Walking is a good choice. Bit by bit, increase the amount you walk every day. Try for at least 30 minutes on most days of the week. You also may want to swim, bike, or do other activities. Your doctor may suggest that you join a cardiac rehabilitation program so that you can have help increasing your physical activity safely.     · Start light exercise if your doctor says it is okay. Even a small amount will help you get stronger, have more energy, and manage stress. Walking is an easy way to get exercise. Start out by walking a little more than you did in the hospital. Gradually increase the amount you walk.     · When you exercise, watch for signs that your heart is working too hard. You are pushing too hard if you cannot talk while you are exercising. If you become short of breath or dizzy or have chest pain, sit down and rest immediately.     · Check your pulse regularly. Place two fingers on the artery at the palm side of your wrist, in line with your thumb. If your heartbeat seems uneven or fast, talk to your doctor. When should you call for help? Call 911 anytime you think you may need emergency care. For example, call if:    · You have symptoms of a heart attack. These may include:  ? Chest pain or pressure, or a strange feeling in the chest.  ? Sweating. ? Shortness of breath. ? Nausea or vomiting. ? Pain, pressure, or a strange feeling in the back, neck, jaw, or upper belly or in one or both shoulders or arms. ? Lightheadedness or sudden weakness. ? A fast or irregular heartbeat. After you call  911, the  may tell you to chew 1 adult-strength or 2 to 4 low-dose aspirin. Wait for an ambulance.  Do not try to drive yourself.     · You have symptoms of a stroke. These may include:  ? Sudden numbness, tingling, weakness, or loss of movement in your face, arm, or leg, especially on only one side of your body. ? Sudden vision changes. ? Sudden trouble speaking. ? Sudden confusion or trouble understanding simple statements. ? Sudden problems with walking or balance. ? A sudden, severe headache that is different from past headaches.     · You passed out (lost consciousness).    Call your doctor now or seek immediate medical care if:    · You have new or increased shortness of breath.     · You feel dizzy or lightheaded, or you feel like you may faint.     · Your heart rate becomes irregular.     · You can feel your heart flutter in your chest or skip heartbeats. Tell your doctor if these symptoms are new or worse.    Watch closely for changes in your health, and be sure to contact your doctor if you have any problems. Where can you learn more? Go to https://[employer].Departing. org and sign in to your [employer] account. Enter U020 in the [employer] box to learn more about \"Atrial Fibrillation: Care Instructions. \"     If you do not have an account, please click on the \"Sign Up Now\" link. Current as of: April 9, 2019  Content Version: 12.3  © 2148-6677 Healthwise, Incorporated. Care instructions adapted under license by [facility] ([facility]). If you have questions about a medical condition or this instruction, always ask your healthcare professional. [place] [address] any warranty or liability for your use of this information.

## 2020-01-21 NOTE — TELEPHONE ENCOUNTER
90/98/5257   Basic Metabolic Panel Once 29/77/9056               Patient Active Problem List:     Acquired hypothyroidism     History of TIA (transient ischemic attack)     Chronic bilateral low back pain without sciatica     Essential hypertension     Osteopenia determined by x-ray     Osteoarthritis involving multiple joints on both sides of body     Left knee DJD     Prediabetes     Vitamin D deficiency     Mixed incontinence     Chronic pain of both knees     Slow transit constipation     Allergic rhinitis     Hyperlipidemia with target LDL less than 100     Morbid obesity with BMI of 40.0-44.9, adult ([address]     At high risk for falls     Dense breast tissue on mammogram     Hyperglycemia     Spondylosis of lumbar region without myelopathy or radiculopathy     OAB (overactive bladder)     Primary osteoarthritis of both knees     Chronic fatigue      Adenomatous polyp of sigmoid colon, 6/26/17     Mixed stress and urge urinary incontinence     TLHBSO, bilateral LND 10/24/17     Optic atrophy of both eyes     Cataracta b/l eyes     Difficulty walking     Esotropia     Nystagmus     History of uterine cancer, Well differentiated grade 1 adenocarcinoma arising in complex hyperplasia, 2017     Vitamin B 12 deficiency     Aortic calcification (HCC)     Calculus of gallbladder without cholecystitis without obstruction     CLAROS (nonalcoholic steatohepatitis)     Optic atrophy     Cough present for greater than 3 weeks     Dyspepsia     A-fib ([address]     Type 2 diabetes mellitus, without long-term current use of insulin (HCC)     JOSHUA (acute kidney injury) ([address]     Atrial fibrillation, new onset ([address]           Please address the medication refill and close the encounter. If I can be of assistance, please route to the applicable pool. Thank you.

## 2020-01-21 NOTE — PROGRESS NOTES
Visit Information    Have you changed or started any medications since your last visit including any over-the-counter medicines, vitamins, or herbal medicines? no   Have you stopped taking any of your medications? Is so, why? -  no  Are you having any side effects from any of your medications? - no    Have you seen any other physician or provider since your last visit?  no   Have you had any other diagnostic tests since your last visit?  no   Have you been seen in the emergency room and/or had an admission in a hospital since we last saw you?  no   Have you had your routine dental cleaning in the past 6 months?  no     Do you have an active MyChart account? If no, what is the barrier?   Yes    Patient Care Team:  Osmani Lentz MD as PCP - General (Family Medicine)  Osmani Lentz MD as PCP - [facility]  Marva Dickens MD as Referring Physician (Orthopedic Surgery)  Shana Jo MD as Surgeon (Orthopedic Surgery)  Laney Bernal MD as Consulting Physician (Endocrinology)  Sheryle Amos, DO as Consulting Physician (Obstetrics & Gynecology)  Laurence Walls MD as Consulting Physician (Urology)  MAX Avelar CNP as Nurse Practitioner (Certified Nurse Practitioner)  Shaye Hernández MD as Consulting Physician (Otolaryngology)  Mary Matute MD as Consulting Physician (Obstetrics & Gynecology)  Som Sauer DO as Consulting Physician (General Surgery)    Medical History Review  Past Medical, Family, and Social History reviewed and does contribute to the patient presenting condition    Health Maintenance   Topic Date Due    Diabetic retinal exam  01/08/1962    Diabetic microalbuminuria test  04/14/2016    Diabetic foot exam  02/27/2018    Annual Wellness Visit (AWV)  05/29/2019    Lipid screen  11/14/2019    Shingles Vaccine (1 of 2) 06/18/2020 (Originally 1/8/2002)    Breast cancer screen  05/06/2020    Colon cancer screen colonoscopy  06/26/2020    A1C test (Diabetic or Prediabetic)  01/09/2021    TSH testing  01/14/2021    Potassium monitoring  01/16/2021    Creatinine monitoring  01/16/2021    DTaP/Tdap/Td vaccine (2 - Td) 09/28/2027    DEXA (modify frequency per FRAX score)  Completed    Flu vaccine  Completed    Pneumococcal 65+ years Vaccine  Completed    Hepatitis C screen  Completed

## 2020-01-21 NOTE — PROGRESS NOTES
this therapy. Patient reports home glucose monitoring as Stable readings. Patient denieshypoglycemia episodes such as{symptoms. Patient denies blurred vision. The patient has seen an ophthalmologist with in one last year. Will be due for foot exam    Inpatient course: Discharge summary reviewed- see chart. Interval history/Current status:   She was recently admitted to the hospital for a new onset atrial fibrillation with RVR. Patient was converted with a diltiazem drip from the emergency room. Patient was seen by the cardiologist while in the hospital Dr. Gilberto Morgan. Was started on an amiodarone and Xarelto. Sent home. Patient denies having any chest pain, shortness of breath or dyspnea on exertion. Patient also denies any unusual weight gain, dizziness, or lightheadedness. Blood pressure had been stable. Patient lives in a Senior living on her own. Andrew [place]   . Patient recently has some home health services started. She reports home health aide visits  three times a week, Nurse visit twice a week. PT twice a week. [facility] health. OSTEOARTHRITIS/MOBILITY POOR  Patient with multiple arthritis. She's been using a walker for awhile. She denies any recent fall. Cardiopulmonary assessment and compliance for new medications, changes in previous medications, safety concerns  Physical Therapy Evaluate and Treat  [address] (specify):  assist with Activities of Daily Living (ADLs)    Due to taxing effort, patient unable to leave home without caregiver, needs assistive  medical device walker at home,  walker, to leave the home; needs special transportation,  and , neuropathy,  leaving home exacerbates symptoms and is medically contraindicated due to increased shortness of breath, pain, anxiety, confusion, fatigue- patient is considered home bound. Review of Systems   Constitutional: Positive for activity change and fatigue. Negative for appetite change and unexpected weight change.    Eyes: Positive for visual disturbance (wears glasses). Respiratory: Negative for cough, chest tightness, shortness of breath and wheezing. Cardiovascular: Negative for chest pain, palpitations and leg swelling. Gastrointestinal: Negative for abdominal distention, abdominal pain, constipation, diarrhea and vomiting. Endocrine: Negative for cold intolerance, heat intolerance, polydipsia, polyphagia and polyuria. Genitourinary: Positive for frequency and urgency. Negative for difficulty urinating, dysuria and hematuria. Urine incontinence - chronic   Musculoskeletal: Positive for arthralgias, back pain and gait problem. Ambulates with walker with seat   Allergic/Immunologic: Negative for environmental allergies. Neurological: Negative for weakness and numbness. Hematological: Does not bruise/bleed easily. Psychiatric/Behavioral: Negative for dysphoric mood and sleep disturbance. The patient is nervous/anxious. Vitals:    01/21/20 1258   BP: 118/81   Pulse: 79   SpO2: 96%   Weight: 243 lb (110.2 kg)   Height: 5' 2\" (1.575 m)     Body mass index is 44.45 kg/m². Wt Readings from Last 3 Encounters:   01/21/20 243 lb (110.2 kg)   01/14/20 238 lb 1.6 oz (108 kg)   01/09/20 242 lb 12.8 oz (110.1 kg)     BP Readings from Last 3 Encounters:   01/21/20 118/81   01/16/20 (!) 99/43   01/09/20 124/86       Physical Exam  Vitals signs and nursing note reviewed. Constitutional:       General: She is not in acute distress. Appearance: She is well-developed. She is obese. She is not diaphoretic. HENT:      Head: Normocephalic and atraumatic. Right Ear: External ear normal.      Left Ear: External ear normal.      Nose: Nose normal. No mucosal edema. Mouth/Throat:      Mouth: Mucous membranes are moist.      Pharynx: No oropharyngeal exudate or posterior oropharyngeal erythema. Eyes:      General: Lids are normal. No scleral icterus. Right eye: No discharge.          Left eye: and Aspirin  Keep appointment with cardiologist  Discussed serious causes of CP. Advised to go to the ER for worsening CP/SOB       - DC DISCHARGE MEDS RECONCILED W/ CURRENT OUTPATIENT MED LIST    2. Essential hypertension  Ongoing  Continue current therapy. Cozaar  Cut down on your salt intake. Cut down on caffeinated drinks, sports drinks. Instructed to check BP at home regularly. Report for any chest pains, shortness of breath, headaches, and lightheadedness. Call the office if your blood pressure continue to be higher than 140/90 or 90/50.    - Creatinine, Random Urine; Future  - DC DISCHARGE MEDS RECONCILED W/ CURRENT OUTPATIENT MED LIST    3. Type 2 diabetes mellitus with hyperglycemia, without long-term current use of insulin (HCC)  Ongoing  Continue therapy. Metformin,   Continue to check Glucose levels at home. Report increased and low levels as discussed. Decrease carbohydrates, sugary drinks, desserts in your diet. We will recheck Hemoglobin A1c in 3 months. Exercise regularly, as tolerated. Try to lose weight. - Microalbumin, Ur; Future  - Creatinine, Random Urine; Future  - DC DISCHARGE MEDS RECONCILED W/ CURRENT OUTPATIENT MED LIST    4. Hyperlipidemia with target LDL less than 100/ASCVD Score 18.7% November 2018  Ongoing  COntinue Lipitor  Advised to decrease the consumption of red meats, fried foods, trans fats, sweets, sugary beverages. Advised to increase fish, vegetables, and fruits consumption. Advised to add fiber or OTC supplements in diet. Discussed weight loss which will result in improvement of lipids levels. Advised to increase daily physical activities and add regular exercises. - Lipid Panel; Future  - DC DISCHARGE MEDS RECONCILED W/ CURRENT OUTPATIENT MED LIST    5. Osteoarthritis involving multiple joints on both sides of body  Ongoing  Continue current therapy. Advised to stay at a healthy weight.   Continue exercises/PT  Stretch to help prevent stiffness and to

## 2020-01-27 ENCOUNTER — HOSPITAL ENCOUNTER (EMERGENCY)
Age: 70
Discharge: HOME | End: 2020-01-27
Attending: EMERGENCY MEDICINE
Payer: MEDICARE

## 2020-01-27 ENCOUNTER — APPOINTMENT (OUTPATIENT)
Dept: GENERAL RADIOLOGY | Age: 70
End: 2020-01-27
Payer: MEDICARE

## 2020-01-27 VITALS
HEIGHT: 62 IN | TEMPERATURE: 98.7 F | HEART RATE: 67 BPM | OXYGEN SATURATION: 95 % | SYSTOLIC BLOOD PRESSURE: 129 MMHG | BODY MASS INDEX: 44.16 KG/M2 | RESPIRATION RATE: 13 BRPM | WEIGHT: 240 LBS | DIASTOLIC BLOOD PRESSURE: 79 MMHG

## 2020-01-27 LAB
ABSOLUTE EOS #: 0.13 K/UL (ref 0–0.44)
ABSOLUTE IMMATURE GRANULOCYTE: 0.04 K/UL (ref 0–0.3)
ABSOLUTE LYMPH #: 1.5 K/UL (ref 1.1–3.7)
ABSOLUTE MONO #: 0.44 K/UL (ref 0.1–1.2)
ANION GAP SERPL CALCULATED.3IONS-SCNC: 17 MMOL/L (ref 9–17)
BASOPHILS # BLD: 1 % (ref 0–2)
BASOPHILS ABSOLUTE: 0.07 K/UL (ref 0–0.2)
BUN BLDV-MCNC: 25 MG/DL (ref 8–23)
BUN/CREAT BLD: ABNORMAL (ref 9–20)
CALCIUM SERPL-MCNC: 9.8 MG/DL (ref 8.6–10.4)
CHLORIDE BLD-SCNC: 102 MMOL/L (ref 98–107)
CO2: 22 MMOL/L (ref 20–31)
CREAT SERPL-MCNC: 1.13 MG/DL (ref 0.5–0.9)
DIFFERENTIAL TYPE: ABNORMAL
EOSINOPHILS RELATIVE PERCENT: 1 % (ref 1–4)
GFR AFRICAN AMERICAN: 58 ML/MIN
GFR NON-AFRICAN AMERICAN: 48 ML/MIN
GFR SERPL CREATININE-BSD FRML MDRD: ABNORMAL ML/MIN/{1.73_M2}
GFR SERPL CREATININE-BSD FRML MDRD: ABNORMAL ML/MIN/{1.73_M2}
GLUCOSE BLD-MCNC: 136 MG/DL (ref 70–99)
HCT VFR BLD CALC: 42.3 % (ref 36.3–47.1)
HEMOGLOBIN: 13.5 G/DL (ref 11.9–15.1)
IMMATURE GRANULOCYTES: 0 %
LYMPHOCYTES # BLD: 14 % (ref 24–43)
MCH RBC QN AUTO: 29.6 PG (ref 25.2–33.5)
MCHC RBC AUTO-ENTMCNC: 31.9 G/DL (ref 28.4–34.8)
MCV RBC AUTO: 92.8 FL (ref 82.6–102.9)
MONOCYTES # BLD: 4 % (ref 3–12)
NRBC AUTOMATED: 0 PER 100 WBC
PDW BLD-RTO: 13.4 % (ref 11.8–14.4)
PLATELET # BLD: 303 K/UL (ref 138–453)
PLATELET ESTIMATE: ABNORMAL
PMV BLD AUTO: 11.3 FL (ref 8.1–13.5)
POTASSIUM SERPL-SCNC: 3.8 MMOL/L (ref 3.7–5.3)
RBC # BLD: 4.56 M/UL (ref 3.95–5.11)
RBC # BLD: ABNORMAL 10*6/UL
SEG NEUTROPHILS: 80 % (ref 36–65)
SEGMENTED NEUTROPHILS ABSOLUTE COUNT: 8.27 K/UL (ref 1.5–8.1)
SODIUM BLD-SCNC: 141 MMOL/L (ref 135–144)
TROPONIN INTERP: NORMAL
TROPONIN INTERP: NORMAL
TROPONIN T: NORMAL NG/ML
TROPONIN T: NORMAL NG/ML
TROPONIN, HIGH SENSITIVITY: 12 NG/L (ref 0–14)
TROPONIN, HIGH SENSITIVITY: 13 NG/L (ref 0–14)
WBC # BLD: 10.5 K/UL (ref 3.5–11.3)
WBC # BLD: ABNORMAL 10*3/UL

## 2020-01-27 PROCEDURE — 93005 ELECTROCARDIOGRAM TRACING: CPT | Performed by: STUDENT IN AN ORGANIZED HEALTH CARE EDUCATION/TRAINING PROGRAM

## 2020-01-27 PROCEDURE — 84484 ASSAY OF TROPONIN QUANT: CPT

## 2020-01-27 PROCEDURE — 80048 BASIC METABOLIC PNL TOTAL CA: CPT

## 2020-01-27 PROCEDURE — 71046 X-RAY EXAM CHEST 2 VIEWS: CPT

## 2020-01-27 PROCEDURE — 85025 COMPLETE CBC W/AUTO DIFF WBC: CPT

## 2020-01-27 PROCEDURE — 99285 EMERGENCY DEPT VISIT HI MDM: CPT

## 2020-01-27 NOTE — ED NOTES
Pt placed on cardiac monitor, BP cuff, and pulse ox. Alarms set.       Mukesh Savage RN  01/27/20 0992

## 2020-01-27 NOTE — ED PROVIDER NOTES
[facility] ED  Emergency Department Encounter  Emergency Medicine Resident     Pt Name: Lashay Marino  MRN: [id number]  [place] 1950  Date of evaluation: 1/27/20  PCP:  Sharon Keith MD    [address] [place] [address]       Chief Complaint   Patient presents with    Shortness of Breath       HISTORY [facility]  (Location/Symptom, Timing/Onset, Context/Setting, Quality, Duration, Modifying Factors,Severity.)      Lashay Marino is a 78year old female who presents with shortness of breath. The patient was driving her friend to the hospital and decided to come to the emergency department for evaluation the experiencing shortness of breath while walking. The patient denies any chest pain or palpitations. The patient was admitted to the hospital on 1/14 for atrial fibrillation. Patient was started on a Cardizem drip. Echo showed mild to moderate mitral regurgitation and normal ejection fraction. Cardiology started patient on Xarelto and amiodarone. The patient reports compliance with her medications. The patient is concerned that she might be in atrial fibrillation.      PAST MEDICAL / SURGICAL / SOCIAL / FAMILY HISTORY      has a past medical history of Adenocarcinoma of endometrium, stage 1 ([address] JOSHUA (acute kidney injury) ([address] Allergic rhinitis, At high risk for falls, Colon polyp, Diabetes mellitus ([address] Endometrial cancer ([address] History of TIA (transient ischemic attack), Hyperglycemia, Hyperlipidemia, Hypertension, Hypothyroidism, Legally blind, Lower back pain, Mixed incontinence, Morbid obesity with BMI of 40.0-44.9, adult ([address] CLAROS (nonalcoholic steatohepatitis), Obesity, Oral phase dysphagia, Osteoarthritis (arthritis due to wear and tear of joints), Osteoarthritis involving multiple joints on both sides of body, Osteoporosis, Postmenopausal bleeding, S/P h-scope, Myosure 9/20/17, Tennis elbow, Thickened endometrium, TLHBSO, bilateral LND 10/24/17, and Type 2 diabetes mellitus, without long-term current use of insulin ([facility] has a past surgical history that includes Thyroid surgery (1980); Colonoscopy (1997); Tonsillectomy; Colonoscopy (06/26/2017); pr colsc flx w/removal lesion by hot bx forceps (N/A, 6/26/2017); Dilation and curettage of uterus (N/A, 9/20/2017); Cataract removal with implant (Bilateral, 2015); vitrectomy; jluieta and bso (cervix removed) (10/24/2017); and Hysterectomy (N/A, 10/24/2017). Social History     Socioeconomic History    Marital status:       Spouse name: Not on file    Number of children: 1    Years of education: Not on file    Highest education level: Not on file   Occupational History    Not on file   Social Needs    Financial resource strain: Not on file    Food insecurity:     Worry: Not on file     Inability: Not on file    Transportation needs:     Medical: Not on file     Non-medical: Not on file   Tobacco Use    Smoking status: Never Smoker    Smokeless tobacco: Never Used   Substance and Sexual Activity    Alcohol use: No     Alcohol/week: 0.0 standard drinks    Drug use: No    Sexual activity: Never   Lifestyle    Physical activity:     Days per week: Not on file     Minutes per session: Not on file    Stress: Not on file   Relationships    Social connections:     Talks on phone: Not on file     Gets together: Not on file     Attends [employer] service: Not on file     Active member of club or organization: Not on file     Attends meetings of clubs or organizations: Not on file     Relationship status: Not on file    Intimate partner violence:     Fear of current or ex partner: Not on file     Emotionally abused: Not on file     Physically abused: Not on file     Forced sexual activity: Not on file   Other Topics Concern    Not on file   Social History Narrative    Not on file       Family History   Problem Relation Age of Onset    Coronary Art Dis Father     Hypertension Father     Diabetes Father    Lonita Apt High Take 1 tablet by mouth daily 3/9/19   Sharon Keith MD   vitamin B-12 (CYANOCOBALAMIN) 1000 MCG tablet Take 1 tablet by mouth daily 3/9/19   Sharon Keith MD   acetaminophen (APAP EXTRA STRENGTH) 500 MG tablet Take 1 tablet by mouth every 6 hours as needed for Pain or Fever 2/12/19   Sharon Keith MD   lidocaine (LMX) 4 % cream Apply 2-3 times a day as needed for pain 10/19/18   Sharon Keith MD   Lactobacillus (PROBIOTIC ACIDOPHILUS) TABS Take 1 tablet by mouth daily 7/31/17   Sharon Keith MD       REVIEW OFSYSTEMS    (2-9 systems for level 4, 10 or more for level 5)      Review of Systems   Constitutional: Negative for chills and fever. Respiratory: Positive for shortness of breath. Negative for cough. Cardiovascular: Negative for chest pain, palpitations and leg swelling. Gastrointestinal: Negative for abdominal pain, nausea and vomiting. Musculoskeletal: Negative for back pain and neck pain. Skin: Negative for rash and wound. Neurological: Negative for weakness, light-headedness and headaches. Hematological: Negative for adenopathy. PHYSICAL EXAM   (up to 7 for level 4, 8 or more forlevel 5)      INITIAL VITALS:   ED Triage Vitals [01/27/20 1347]   BP Temp Temp Source Pulse Resp SpO2 Height Weight   134/86 98.7 °F (37.1 °C) Oral 74 18 98 % 5' 2\" (1.575 m) 240 lb (108.9 kg)       Physical Exam  Constitutional:       General: She is not in acute distress. Appearance: She is well-developed. She is not diaphoretic. HENT:      Head: Normocephalic and atraumatic. Eyes:      Conjunctiva/sclera: Conjunctivae normal.      Pupils: Pupils are equal, round, and reactive to light. Neck:      Musculoskeletal: Neck supple. Cardiovascular:      Rate and Rhythm: Normal rate and regular rhythm. Pulses: Normal pulses. Heart sounds: No murmur. No friction rub. No gallop. Pulmonary:      Effort: Pulmonary effort is normal. No respiratory distress. Breath sounds: Normal breath sounds. No wheezing or rales. Abdominal:      General: There is no distension. Palpations: Abdomen is soft. Tenderness: There is no abdominal tenderness. There is no guarding. Musculoskeletal:      Comments: No lower extremity edema, no calf tenderness   Skin:     General: Skin is warm. Neurological:      Mental Status: She is alert and oriented to person, place, and time. DIFFERENTIAL  DIAGNOSIS     PLAN (LABS / IMAGING / EKG):  Orders Placed This Encounter   Procedures    XR CHEST STANDARD (2 VW)    CBC WITH AUTO DIFFERENTIAL    BASIC METABOLIC PANEL    Troponin    EKG 12 Lead    EKG REPORT       MEDICATIONS ORDERED:  No orders of the defined types were placed in this encounter. Initial MDM/Plan: 78 y.o. female who presents with shortness of breath. The patient is saturating well on room air. She is able to complete full sentences. Her lungs were clear to auscultation bilaterally. No murmurs, rubs or gallops. Intact distal pulses. The patient is overall non-toxic in appearance. The patient is in sinus rhythm on telemetry. Plan for EKG, serial troponins and chest x-ray. DIAGNOSTIC RESULTS / EMERGENCYDEPARTMENT COURSE / MDM     LABS:  Labs Reviewed   CBC WITH AUTO DIFFERENTIAL - Abnormal; Notable for the following components:       Result Value    Seg Neutrophils 80 (*)     Lymphocytes 14 (*)     Segs Absolute 8.27 (*)     All other components within normal limits   BASIC METABOLIC PANEL - Abnormal; Notable for the following components:    Glucose 136 (*)     BUN 25 (*)     CREATININE 1.13 (*)     GFR Non- 48 (*)     GFR  58 (*)     All other components within normal limits   TROPONIN   TROPONIN         RADIOLOGY:  No results found.       EKG  EKG Interpretation    Interpreted by emergency department physician    Rhythm: normal sinus   Rate: normal  Axis: normal  Ectopy: none  Conduction: normal  ST

## 2020-01-28 ENCOUNTER — TELEPHONE (OUTPATIENT)
Dept: FAMILY MEDICINE CLINIC | Age: 70
End: 2020-01-28

## 2020-01-28 ENCOUNTER — HOSPITAL ENCOUNTER (OUTPATIENT)
Age: 70
Setting detail: SPECIMEN
Discharge: HOME | End: 2020-01-28
Payer: MEDICARE

## 2020-01-28 LAB
CHOLESTEROL/HDL RATIO: 3.1
CHOLESTEROL: 145 MG/DL
EKG ATRIAL RATE: 76 BPM
EKG P AXIS: 35 DEGREES
EKG P-R INTERVAL: 164 MS
EKG Q-T INTERVAL: 408 MS
EKG QRS DURATION: 76 MS
EKG QTC CALCULATION (BAZETT): 459 MS
EKG R AXIS: 59 DEGREES
EKG T AXIS: 63 DEGREES
EKG VENTRICULAR RATE: 76 BPM
HDLC SERPL-MCNC: 47 MG/DL
LDL CHOLESTEROL: 66 MG/DL (ref 0–130)
TRIGL SERPL-MCNC: 158 MG/DL
VLDLC SERPL CALC-MCNC: ABNORMAL MG/DL (ref 1–30)

## 2020-01-28 PROCEDURE — 93010 ELECTROCARDIOGRAM REPORT: CPT | Performed by: INTERNAL MEDICINE

## 2020-01-28 PROCEDURE — 80061 LIPID PANEL: CPT

## 2020-01-28 PROCEDURE — 36415 COLL VENOUS BLD VENIPUNCTURE: CPT

## 2020-01-29 ENCOUNTER — TELEPHONE (OUTPATIENT)
Dept: FAMILY MEDICINE CLINIC | Age: 70
End: 2020-01-29

## 2020-01-29 NOTE — TELEPHONE ENCOUNTER
[facility] ([facility]) ED Follow up Call    Reason for ED visit:             FU appts/Provider:    Future Appointments   Date Time Provider Dante Monique   3/6/2020  9:45 AM Angelica Hinojosa PA-C GYN Oncology [facility]   4/16/2020 10:00 AM Nidia Quezada MD [facility]       VOICEMAIL DOCUMENTATION - ERASE IF NOT USED  Hi, this message is for  Debbie Meneses   This is Sylvia Ortiz from [address] [place] [address] office. Just calling to see how you are doing after your recent visit to the Emergency Room. [address] [place] [address] wants to make sure you were able to fill any prescriptions and that you understand your discharge instructions. Please return our call if you need to make a follow up appointment with your provider or have any further needs. Our phone number is [phone]. Have a great day.

## 2020-01-30 ENCOUNTER — HOSPITAL ENCOUNTER (OUTPATIENT)
Age: 70
Setting detail: SPECIMEN
Discharge: HOME | End: 2020-01-30
Payer: MEDICARE

## 2020-01-30 LAB
CREATININE URINE: 134.6 MG/DL (ref 28–217)
MICROALBUMIN/CREAT 24H UR: <12 MG/L
MICROALBUMIN/CREAT UR-RTO: NORMAL MCG/MG CREAT

## 2020-01-30 PROCEDURE — 36415 COLL VENOUS BLD VENIPUNCTURE: CPT

## 2020-01-30 PROCEDURE — 82043 UR ALBUMIN QUANTITATIVE: CPT

## 2020-01-30 PROCEDURE — 82570 ASSAY OF URINE CREATININE: CPT

## 2020-02-03 ASSESSMENT — ENCOUNTER SYMPTOMS
VOMITING: 0
SHORTNESS OF BREATH: 1
COUGH: 0
BACK PAIN: 0
NAUSEA: 0
ABDOMINAL PAIN: 0

## 2020-02-19 RX ORDER — ATORVASTATIN CALCIUM 40 MG/1
TABLET, FILM COATED ORAL
Qty: 90 TABLET | Refills: 3 | Status: SHIPPED | OUTPATIENT
Start: 2020-02-19 | End: 2020-03-16

## 2020-02-19 RX ORDER — AMIODARONE HYDROCHLORIDE 200 MG/1
200 TABLET ORAL DAILY
Qty: 30 TABLET | Refills: 1 | Status: SHIPPED | OUTPATIENT
Start: 2020-02-19 | End: 2020-05-11

## 2020-02-19 RX ORDER — RIVAROXABAN 15 MG/1
TABLET, FILM COATED ORAL
Qty: 30 TABLET | Refills: 5 | Status: ON HOLD | OUTPATIENT
Start: 2020-02-19 | End: 2020-06-22 | Stop reason: HOSPADM

## 2020-02-19 NOTE — TELEPHONE ENCOUNTER
Please Approve or Refuse.   Send to Pharmacy per Pt's Request:     RX: [facility]     Next Visit Date:  4/16/2020   Last Visit Date: 1/21/2020    Hemoglobin A1C (%)   Date Value   01/09/2020 5.6   10/03/2019 5.9   06/18/2019 6.1             ( goal A1C is < 7)   BP Readings from Last 3 Encounters:   01/27/20 129/79   01/21/20 118/81   01/16/20 (!) 99/43          (goal 120/80)  BUN   Date Value Ref Range Status   01/27/2020 25 (H) 8 - 23 mg/dL Final     CREATININE   Date Value Ref Range Status   01/27/2020 1.13 (H) 0.50 - 0.90 mg/dL Final     Potassium   Date Value Ref Range Status   01/27/2020 3.8 3.7 - 5.3 mmol/L Final

## 2020-03-13 ENCOUNTER — OFFICE VISIT (OUTPATIENT)
Dept: GYNECOLOGIC ONCOLOGY | Age: 70
End: 2020-03-13
Payer: MEDICARE

## 2020-03-13 VITALS
TEMPERATURE: 97.5 F | HEIGHT: 62 IN | HEART RATE: 59 BPM | BODY MASS INDEX: 43.94 KG/M2 | WEIGHT: 238.8 LBS | DIASTOLIC BLOOD PRESSURE: 79 MMHG | SYSTOLIC BLOOD PRESSURE: 133 MMHG

## 2020-03-13 PROCEDURE — G8399 PT W/DXA RESULTS DOCUMENT: HCPCS | Performed by: PHYSICIAN ASSISTANT

## 2020-03-13 PROCEDURE — 99214 OFFICE O/P EST MOD 30 MIN: CPT | Performed by: PHYSICIAN ASSISTANT

## 2020-03-13 PROCEDURE — G8417 CALC BMI ABV UP PARAM F/U: HCPCS | Performed by: PHYSICIAN ASSISTANT

## 2020-03-13 PROCEDURE — 4040F PNEUMOC VAC/ADMIN/RCVD: CPT | Performed by: PHYSICIAN ASSISTANT

## 2020-03-13 PROCEDURE — 1123F ACP DISCUSS/DSCN MKR DOCD: CPT | Performed by: PHYSICIAN ASSISTANT

## 2020-03-13 PROCEDURE — 1036F TOBACCO NON-USER: CPT | Performed by: PHYSICIAN ASSISTANT

## 2020-03-13 PROCEDURE — 3017F COLORECTAL CA SCREEN DOC REV: CPT | Performed by: PHYSICIAN ASSISTANT

## 2020-03-13 PROCEDURE — G8484 FLU IMMUNIZE NO ADMIN: HCPCS | Performed by: PHYSICIAN ASSISTANT

## 2020-03-13 PROCEDURE — G8427 DOCREV CUR MEDS BY ELIG CLIN: HCPCS | Performed by: PHYSICIAN ASSISTANT

## 2020-03-13 PROCEDURE — 1090F PRES/ABSN URINE INCON ASSESS: CPT | Performed by: PHYSICIAN ASSISTANT

## 2020-03-13 RX ORDER — NYSTATIN 100000 [USP'U]/G
POWDER TOPICAL
Qty: 60 G | Refills: 1 | Status: ON HOLD | OUTPATIENT
Start: 2020-03-13 | End: 2020-06-22 | Stop reason: HOSPADM

## 2020-03-13 NOTE — PROGRESS NOTES
[address] [facility] [address], [facility], [address] [place] [address] present for her endometrial cancer surveillance visit. CC/HPI: She is a  female with a history of stage 1A grade 1 endometrioid adenocarcinoma and has undergone surgery of total laparoscopic hysterectomy, bilateral salpingo-oophorectomy, with pelvic and paraaortic lymphadenectomy with peritoneal biopsy completed in 2017. Mismatch repair testing did confirm \"positive MLH1 promoter methylation is usually associated with sporadic and not syndromal occurrences of endometrial adenocarcinoma\". There is no pre operative  antigen level on file. Therefore she did not require any further adjuvant treatment. She has continued on routine surveillance and continues to do well. Per chart, the patient has recently had two ED visits secondary to Afib and SOB in 2020. She was started on Xarelto and amiodarone. Today, she states she is feeling well. Denies abdominal or pelvic pain. She denies vaginal bleeding, irritation, or odor. No new lumps or bumps. She denies changes to her urination or bowel habits, no blood in urine or stool. She is ambulating at baseline with her walker. States she followed up with Cardiology Dr. Aravind Shi post ED visit, she was advised to return in 6 months. She was previously following with general surgery for evaluation of sludge/possibly polyp or mass within the gallbladder. She elected to continue on surveillance and declined surgery. Pt is asymptomatic, and had follow up U/S gallbladder in 2019 and advised to follow up as needed due to negative findings. She is up to date on mammogram screening most recently obtained in May 6 2019, resulted as BIRADS-2 Benign. She is also up to date on colonoscopy most recently in .     ROS:  I have personally reviewed and agree with the review of systems done by my ancillary staff in the [facility]

## 2020-03-16 RX ORDER — ATORVASTATIN CALCIUM 40 MG/1
TABLET, FILM COATED ORAL
Qty: 90 TABLET | Refills: 3 | Status: SHIPPED | OUTPATIENT
Start: 2020-03-16 | End: 2021-03-23

## 2020-03-24 ENCOUNTER — TELEPHONE (OUTPATIENT)
Dept: FAMILY MEDICINE CLINIC | Age: 70
End: 2020-03-24

## 2020-03-24 RX ORDER — GUAIFENESIN AND DEXTROMETHORPHAN HYDROBROMIDE 100; 10 MG/5ML; MG/5ML
10 SOLUTION ORAL EVERY 4 HOURS PRN
Qty: 120 ML | Refills: 0 | Status: ON HOLD | OUTPATIENT
Start: 2020-03-24 | End: 2020-06-22 | Stop reason: HOSPADM

## 2020-05-11 RX ORDER — AMIODARONE HYDROCHLORIDE 200 MG/1
200 TABLET ORAL DAILY
Qty: 30 TABLET | Refills: 1 | Status: SHIPPED | OUTPATIENT
Start: 2020-05-11 | End: 2020-06-10

## 2020-06-09 ENCOUNTER — HOSPITAL ENCOUNTER (EMERGENCY)
Age: 70
Discharge: HOME | End: 2020-06-09
Attending: EMERGENCY MEDICINE
Payer: MEDICARE

## 2020-06-09 ENCOUNTER — APPOINTMENT (OUTPATIENT)
Dept: GENERAL RADIOLOGY | Age: 70
End: 2020-06-09
Payer: MEDICARE

## 2020-06-09 VITALS
WEIGHT: 230 LBS | HEIGHT: 62 IN | RESPIRATION RATE: 18 BRPM | OXYGEN SATURATION: 99 % | TEMPERATURE: 97.9 F | SYSTOLIC BLOOD PRESSURE: 134 MMHG | BODY MASS INDEX: 42.33 KG/M2 | HEART RATE: 77 BPM | DIASTOLIC BLOOD PRESSURE: 80 MMHG

## 2020-06-09 LAB
ABSOLUTE EOS #: 0.1 K/UL (ref 0–0.4)
ABSOLUTE IMMATURE GRANULOCYTE: ABNORMAL K/UL (ref 0–0.3)
ABSOLUTE LYMPH #: 1.5 K/UL (ref 1–4.8)
ABSOLUTE MONO #: 0.4 K/UL (ref 0.1–1.3)
ALBUMIN SERPL-MCNC: 3.9 G/DL (ref 3.5–5.2)
ALBUMIN/GLOBULIN RATIO: ABNORMAL (ref 1–2.5)
ALP BLD-CCNC: 69 U/L (ref 35–104)
ALT SERPL-CCNC: 14 U/L (ref 5–33)
ANION GAP SERPL CALCULATED.3IONS-SCNC: 16 MMOL/L (ref 9–17)
AST SERPL-CCNC: 15 U/L
BASOPHILS # BLD: 1 % (ref 0–2)
BASOPHILS ABSOLUTE: 0.1 K/UL (ref 0–0.2)
BILIRUB SERPL-MCNC: 0.43 MG/DL (ref 0.3–1.2)
BNP INTERPRETATION: ABNORMAL
BUN BLDV-MCNC: 19 MG/DL (ref 8–23)
BUN/CREAT BLD: ABNORMAL (ref 9–20)
C-REACTIVE PROTEIN: 2.6 MG/L (ref 0–5)
CALCIUM SERPL-MCNC: 9.6 MG/DL (ref 8.6–10.4)
CHLORIDE BLD-SCNC: 100 MMOL/L (ref 98–107)
CO2: 22 MMOL/L (ref 20–31)
CREAT SERPL-MCNC: 0.99 MG/DL (ref 0.5–0.9)
D-DIMER QUANTITATIVE: 0.28 MG/L FEU (ref 0–0.59)
DIFFERENTIAL TYPE: ABNORMAL
EOSINOPHILS RELATIVE PERCENT: 2 % (ref 0–4)
GFR AFRICAN AMERICAN: >60 ML/MIN
GFR NON-AFRICAN AMERICAN: 56 ML/MIN
GFR SERPL CREATININE-BSD FRML MDRD: ABNORMAL ML/MIN/{1.73_M2}
GFR SERPL CREATININE-BSD FRML MDRD: ABNORMAL ML/MIN/{1.73_M2}
GLUCOSE BLD-MCNC: 120 MG/DL (ref 70–99)
HCT VFR BLD CALC: 41.7 % (ref 36–46)
HEMOGLOBIN: 13.8 G/DL (ref 12–16)
IMMATURE GRANULOCYTES: ABNORMAL %
LACTATE DEHYDROGENASE: 145 U/L (ref 135–214)
LYMPHOCYTES # BLD: 19 % (ref 24–44)
MCH RBC QN AUTO: 30.1 PG (ref 26–34)
MCHC RBC AUTO-ENTMCNC: 33 G/DL (ref 31–37)
MCV RBC AUTO: 91.2 FL (ref 80–100)
MONOCYTES # BLD: 4 % (ref 1–7)
NRBC AUTOMATED: ABNORMAL PER 100 WBC
PDW BLD-RTO: 14.5 % (ref 11.5–14.9)
PLATELET # BLD: 274 K/UL (ref 150–450)
PLATELET ESTIMATE: ABNORMAL
PMV BLD AUTO: 8.9 FL (ref 6–12)
POTASSIUM SERPL-SCNC: 3.8 MMOL/L (ref 3.7–5.3)
PRO-BNP: 1180 PG/ML
RBC # BLD: 4.57 M/UL (ref 4–5.2)
RBC # BLD: ABNORMAL 10*6/UL
SEG NEUTROPHILS: 74 % (ref 36–66)
SEGMENTED NEUTROPHILS ABSOLUTE COUNT: 6 K/UL (ref 1.3–9.1)
SODIUM BLD-SCNC: 138 MMOL/L (ref 135–144)
TOTAL PROTEIN: 7.1 G/DL (ref 6.4–8.3)
TROPONIN INTERP: NORMAL
TROPONIN INTERP: NORMAL
TROPONIN T: NORMAL NG/ML
TROPONIN T: NORMAL NG/ML
TROPONIN, HIGH SENSITIVITY: 11 NG/L (ref 0–14)
TROPONIN, HIGH SENSITIVITY: 12 NG/L (ref 0–14)
WBC # BLD: 8 K/UL (ref 3.5–11)
WBC # BLD: ABNORMAL 10*3/UL

## 2020-06-09 PROCEDURE — 93005 ELECTROCARDIOGRAM TRACING: CPT | Performed by: EMERGENCY MEDICINE

## 2020-06-09 PROCEDURE — 84484 ASSAY OF TROPONIN QUANT: CPT

## 2020-06-09 PROCEDURE — 99285 EMERGENCY DEPT VISIT HI MDM: CPT

## 2020-06-09 PROCEDURE — 85379 FIBRIN DEGRADATION QUANT: CPT

## 2020-06-09 PROCEDURE — 80053 COMPREHEN METABOLIC PANEL: CPT

## 2020-06-09 PROCEDURE — 85025 COMPLETE CBC W/AUTO DIFF WBC: CPT

## 2020-06-09 PROCEDURE — 83615 LACTATE (LD) (LDH) ENZYME: CPT

## 2020-06-09 PROCEDURE — 83880 ASSAY OF NATRIURETIC PEPTIDE: CPT

## 2020-06-09 PROCEDURE — 36415 COLL VENOUS BLD VENIPUNCTURE: CPT

## 2020-06-09 PROCEDURE — 71045 X-RAY EXAM CHEST 1 VIEW: CPT

## 2020-06-09 PROCEDURE — 86140 C-REACTIVE PROTEIN: CPT

## 2020-06-09 RX ORDER — PREDNISONE 10 MG/1
TABLET ORAL
Qty: 20 TABLET | Refills: 0 | Status: SHIPPED | OUTPATIENT
Start: 2020-06-09 | End: 2020-06-15

## 2020-06-09 ASSESSMENT — ENCOUNTER SYMPTOMS
COUGH: 0
BLOOD IN STOOL: 0
ABDOMINAL PAIN: 0
DIARRHEA: 0
SORE THROAT: 0
BACK PAIN: 0
COLOR CHANGE: 0
NAUSEA: 0
SHORTNESS OF BREATH: 1
TROUBLE SWALLOWING: 0
CONSTIPATION: 0
VOMITING: 0

## 2020-06-09 NOTE — ED PROVIDER NOTES
[address]  eMERGENCY dEPARTMENT eNCOUnter    Pt Name: Minerva Harrison  MRN: [id number]  [place]: 1950  Date of evaluation:6/9/20  PCP: Juan Hebert MD    [address] [place] [address]       Chief Complaint   Patient presents with    Shortness of [address] [place] [address]    Minerva Harrison is a 78 y.o. female who presents with a chief complaint of shortness of breath. Patient states she woke up this morning and started ambulating and got short of breath. This improved with rest.  Denies any chest pain, palpitations, dizziness, lightheadedness. Has not felt ill recently. No recent fevers, chills. No GI symptoms. No dysuria. Symptoms are acute. Symptoms are moderate. Nothing make symptoms better or worse other than stated above. Patient has no other complaints at this time. REVIEW OF SYSTEMS       Review of Systems   Constitutional: Negative for chills, fatigue and fever. HENT: Negative for congestion, ear pain, sore throat and trouble swallowing. Eyes: Negative for visual disturbance. Respiratory: Positive for shortness of breath. Negative for cough. Cardiovascular: Negative for chest pain, palpitations and leg swelling. Gastrointestinal: Negative for abdominal pain, blood in stool, constipation, diarrhea, nausea and vomiting. Genitourinary: Negative for dysuria and flank pain. Musculoskeletal: Negative for arthralgias, back pain, myalgias and neck pain. Skin: Negative for color change, rash and wound. Neurological: Negative for dizziness, weakness, light-headedness, numbness and headaches. Psychiatric/Behavioral: Negative for confusion. All other systems reviewed and are negative. Negativein 10 essential Systems except as mentioned above and in the HPI.         PAST MEDICAL HISTORY     Past Medical History:   Diagnosis Date    Adenocarcinoma of endometrium, stage 1 ([facility] 10/5/2017    Well differentiated grade 1 adenocarcinoma arising in complex hyperplasia sounds. Abdominal:      General: Bowel sounds are normal. There is no distension. Palpations: Abdomen is soft. Tenderness: There is no abdominal tenderness. Musculoskeletal:         General: No tenderness. Lymphadenopathy:      Cervical: No cervical adenopathy. Skin:     General: Skin is warm and dry. Findings: No rash. Neurological:      Mental Status: She is alert and oriented to person, place, and time. Psychiatric:         Judgment: Judgment normal.           DIFFERENTIAL DIAGNOSIS/MDM:   82-year-old female presents with shortness of breath. She is afebrile, nontoxic, normal vital signs. Not any acute distress. Oxygen saturation is 95% on room air. Differential includes ACS, stable versus unstable angina, dysrhythmia, PE, pneumonia, dehydration. We will get cardiac work-up. DIAGNOSTIC RESULTS     EKG: All EKG's are interpreted by the Emergency Department Physician who either signs or Co-signs this chart in the absence of a cardiologist.    EKG Interpretation    Interpreted by me    Rhythm: normal sinus   Rate: normal  Axis: normal  Ectopy: none  Conduction: normal  ST Segments: no acute change  T Waves: no acute change  Q Waves: none    Clinical Impression: no acute changes and normal EKG    RADIOLOGY:   I directly visualized the following  images and reviewed the radiologist interpretations:  XR CHEST PORTABLE   Final Result   1. No active pulmonary disease. 2. Stable cardiomegaly without overt failure.                  ED BEDSIDE ULTRASOUND:      LABS:  Labs Reviewed   CBC WITH AUTO DIFFERENTIAL - Abnormal; Notable for the following components:       Result Value    Seg Neutrophils 74 (*)     Lymphocytes 19 (*)     All other components within normal limits   COMPREHENSIVE METABOLIC PANEL - Abnormal; Notable for the following components:    Glucose 120 (*)     CREATININE 0.99 (*)     GFR Non- 56 (*)     All other components within normal limits   BRAIN

## 2020-06-09 NOTE — ED NOTES
Mode of arrival (squad #, walk in, police, etc) : [place] complaint(s): Shortness of breath        Arrival Note (brief scenario, treatment PTA, etc). : Pt arrives to ED c/o shortness of breath. Patient states that she woke up around 0900 this morning and when she went to use the bathroom she noticed she had a hard time taking catching her breath. Patient denies chest pain. Patient denies and fever, cough, chills or shortness of breath. Patient is placed on cardiac monitor and continuous pulse oximetry. Patient is resting on stretcher at this time with no s/s of distress. C= \"Have you ever felt that you should Cut down on your drinking? \"  No  A= \"Have people Annoyed you by criticizing your drinking? \"  No  G= \"Have you ever felt bad or Guilty about your drinking? \"  No  E= \"Have you ever had a drink as an Eye-opener first thing in the morning to steady your nerves or to help a hangover? \"  No      Deferred []      Reason for deferring: N/A    *If yes to two or more: probable alcohol abuse. Raul Dyer RN  06/09/20 [id number]

## 2020-06-10 ENCOUNTER — TELEPHONE (OUTPATIENT)
Dept: FAMILY MEDICINE CLINIC | Age: 70
End: 2020-06-10

## 2020-06-10 ENCOUNTER — CARE COORDINATION (OUTPATIENT)
Dept: CARE COORDINATION | Age: 70
End: 2020-06-10

## 2020-06-10 LAB
EKG ATRIAL RATE: 67 BPM
EKG P AXIS: 32 DEGREES
EKG P-R INTERVAL: 170 MS
EKG Q-T INTERVAL: 434 MS
EKG QRS DURATION: 82 MS
EKG QTC CALCULATION (BAZETT): 458 MS
EKG R AXIS: 45 DEGREES
EKG T AXIS: 64 DEGREES
EKG VENTRICULAR RATE: 67 BPM

## 2020-06-10 PROCEDURE — 93010 ELECTROCARDIOGRAM REPORT: CPT | Performed by: INTERNAL MEDICINE

## 2020-06-10 RX ORDER — LOSARTAN POTASSIUM 100 MG/1
100 TABLET ORAL DAILY
Qty: 90 TABLET | Refills: 3 | Status: ON HOLD | OUTPATIENT
Start: 2020-06-10 | End: 2020-06-22 | Stop reason: HOSPADM

## 2020-06-10 RX ORDER — AMIODARONE HYDROCHLORIDE 200 MG/1
200 TABLET ORAL DAILY
Qty: 30 TABLET | Refills: 1 | Status: ON HOLD | OUTPATIENT
Start: 2020-06-10 | End: 2020-06-22 | Stop reason: HOSPADM

## 2020-06-10 NOTE — CARE COORDINATION
Patient/family/caregiver given information for [facility] and agrees to enroll no  Patient's preferred e-mail: N/A   Patient's preferred phone number: N/A  Based on Loop alert triggers, patient will be contacted by nurse care manager for worsening symptoms. Plan for follow-up call in 5-7 days based on severity of symptoms and risk factors. Advance Care Planning  People with COVID-19 may have no symptoms, mild symptoms, such as fever, cough, and shortness of breath or they may have more severe illness, developing severe and fatal pneumonia. As a result, Advance Care Planning with attention to naming a health care decision maker (someone you trust to make healthcare decisions for you if you could not speak for yourself) and sharing other health care preferences is important BEFORE a possible health crisis. Please contact your Primary Care Provider to discuss Advance Care Planning. Preventing the Spread of Coronavirus Disease 2019 in Homes and Residential Communities  For the most recent information go to [email or web address]    Prevention steps for People with confirmed or suspected COVID-19 (including persons under investigation) who do not need to be hospitalized  and   People with confirmed COVID-19 who were hospitalized and determined to be medically stable to go home    Your healthcare provider and public health staff will evaluate whether you can be cared for at home. If it is determined that you do not need to be hospitalized and can be isolated at home, you will be monitored by staff from your local or state health department. You should follow the prevention steps below until a healthcare provider or local or state health department says you can return to your normal activities. Stay home except to get medical care  People who are mildly ill with COVID-19 are able to isolate at home during their illness.  You should restrict activities with soap and water for at least 20 seconds or, if soap and water are not available, clean your hands with an alcohol-based hand  that contains at least 60% alcohol. Clean your hands often  Wash your hands often with soap and water for at least 20 seconds, especially after blowing your nose, coughing, or sneezing; going to the bathroom; and before eating or preparing food. If soap and water are not readily available, use an alcohol-based hand  with at least 60% alcohol, covering all surfaces of your hands and rubbing them together until they feel dry. Soap and water are the best option if hands are visibly dirty. Avoid touching your eyes, nose, and mouth with unwashed hands. Avoid sharing personal household items  You should not share dishes, drinking glasses, cups, eating utensils, towels, or bedding with other people or pets in your home. After using these items, they should be washed thoroughly with soap and water. Clean all high-touch surfaces everyday  High touch surfaces include counters, tabletops, doorknobs, bathroom fixtures, toilets, phones, keyboards, tablets, and bedside tables. Also, clean any surfaces that may have blood, stool, or body fluids on them. Use a household cleaning spray or wipe, according to the label instructions. Labels contain instructions for safe and effective use of the cleaning product including precautions you should take when applying the product, such as wearing gloves and making sure you have good ventilation during use of the product. Monitor your symptoms  Seek prompt medical attention if your illness is worsening (e.g., difficulty breathing). Before seeking care, call your healthcare provider and tell them that you have, or are being evaluated for, COVID-19. Put on a facemask before you enter the facility. These steps will help the healthcare providers office to keep other people in the office or waiting room from getting infected or exposed.  Ask your healthcare provider to call the local or state health department. Persons who are placed under active monitoring or facilitated self-monitoring should follow instructions provided by their local health department or occupational health professionals, as appropriate. When working with your local health department check their available hours. If you have a medical emergency and need to call 911, notify the dispatch personnel that you have, or are being evaluated for COVID-19. If possible, put on a facemask before emergency medical services arrive. Discontinuing home isolation  Patients with confirmed COVID-19 should remain under home isolation precautions until the risk of secondary transmission to others is thought to be low. The decision to discontinue home isolation precautions should be made on a case-by-case basis, in consultation with healthcare providers and state and local health departments.

## 2020-06-15 ENCOUNTER — HOSPITAL ENCOUNTER (INPATIENT)
Age: 70
LOS: 7 days | Discharge: NURSING, REHAB OR LONG-TERM CARE | DRG: 392 | End: 2020-06-22
Attending: EMERGENCY MEDICINE | Admitting: INTERNAL MEDICINE
Payer: MEDICARE

## 2020-06-15 ENCOUNTER — APPOINTMENT (OUTPATIENT)
Dept: CT IMAGING | Age: 70
DRG: 392 | End: 2020-06-15
Payer: MEDICARE

## 2020-06-15 PROBLEM — K57.80 PERFORATED DIVERTICULUM: Status: ACTIVE | Noted: 2020-06-15

## 2020-06-15 LAB
-: NORMAL
ABSOLUTE EOS #: 0.1 K/UL (ref 0–0.4)
ABSOLUTE IMMATURE GRANULOCYTE: ABNORMAL K/UL (ref 0–0.3)
ABSOLUTE LYMPH #: 1.5 K/UL (ref 1–4.8)
ABSOLUTE MONO #: 0.2 K/UL (ref 0.1–1.3)
ALBUMIN SERPL-MCNC: 3.6 G/DL (ref 3.5–5.2)
ALBUMIN/GLOBULIN RATIO: ABNORMAL (ref 1–2.5)
ALP BLD-CCNC: 52 U/L (ref 35–104)
ALT SERPL-CCNC: 18 U/L (ref 5–33)
ANION GAP SERPL CALCULATED.3IONS-SCNC: 15 MMOL/L (ref 9–17)
AST SERPL-CCNC: 17 U/L
BASOPHILS # BLD: 0 % (ref 0–2)
BASOPHILS ABSOLUTE: 0 K/UL (ref 0–0.2)
BILIRUB SERPL-MCNC: 0.31 MG/DL (ref 0.3–1.2)
BUN BLDV-MCNC: 24 MG/DL (ref 8–23)
BUN/CREAT BLD: ABNORMAL (ref 9–20)
CALCIUM SERPL-MCNC: 9.2 MG/DL (ref 8.6–10.4)
CHLORIDE BLD-SCNC: 102 MMOL/L (ref 98–107)
CO2: 25 MMOL/L (ref 20–31)
CREAT SERPL-MCNC: 1.01 MG/DL (ref 0.5–0.9)
DIFFERENTIAL TYPE: ABNORMAL
EOSINOPHILS RELATIVE PERCENT: 1 % (ref 0–4)
GFR AFRICAN AMERICAN: >60 ML/MIN
GFR NON-AFRICAN AMERICAN: 55 ML/MIN
GFR SERPL CREATININE-BSD FRML MDRD: ABNORMAL ML/MIN/{1.73_M2}
GFR SERPL CREATININE-BSD FRML MDRD: ABNORMAL ML/MIN/{1.73_M2}
GLUCOSE BLD-MCNC: 120 MG/DL (ref 70–99)
HCT VFR BLD CALC: 39.9 % (ref 36–46)
HEMOGLOBIN: 13.6 G/DL (ref 12–16)
IMMATURE GRANULOCYTES: ABNORMAL %
LACTIC ACID: 2.3 MMOL/L (ref 0.5–2.2)
LACTIC ACID: 3.2 MMOL/L (ref 0.5–2.2)
LIPASE: 25 U/L (ref 13–60)
LYMPHOCYTES # BLD: 11 % (ref 24–44)
MCH RBC QN AUTO: 31.1 PG (ref 26–34)
MCHC RBC AUTO-ENTMCNC: 34 G/DL (ref 31–37)
MCV RBC AUTO: 91.3 FL (ref 80–100)
MONOCYTES # BLD: 2 % (ref 1–7)
NRBC AUTOMATED: ABNORMAL PER 100 WBC
PDW BLD-RTO: 14.4 % (ref 11.5–14.9)
PLATELET # BLD: 323 K/UL (ref 150–450)
PLATELET ESTIMATE: ABNORMAL
PMV BLD AUTO: 8.5 FL (ref 6–12)
POTASSIUM SERPL-SCNC: 3.7 MMOL/L (ref 3.7–5.3)
RBC # BLD: 4.38 M/UL (ref 4–5.2)
RBC # BLD: ABNORMAL 10*6/UL
REASON FOR REJECTION: NORMAL
SEG NEUTROPHILS: 86 % (ref 36–66)
SEGMENTED NEUTROPHILS ABSOLUTE COUNT: 11.9 K/UL (ref 1.3–9.1)
SODIUM BLD-SCNC: 142 MMOL/L (ref 135–144)
TOTAL PROTEIN: 6.4 G/DL (ref 6.4–8.3)
WBC # BLD: 13.7 K/UL (ref 3.5–11)
WBC # BLD: ABNORMAL 10*3/UL
ZZ NTE CLEAN UP: ORDERED TEST: NORMAL
ZZ NTE WITH NAME CLEAN UP: SPECIMEN SOURCE: NORMAL

## 2020-06-15 PROCEDURE — 2580000003 HC RX 258: Performed by: EMERGENCY MEDICINE

## 2020-06-15 PROCEDURE — 96375 TX/PRO/DX INJ NEW DRUG ADDON: CPT

## 2020-06-15 PROCEDURE — 6360000002 HC RX W HCPCS: Performed by: EMERGENCY MEDICINE

## 2020-06-15 PROCEDURE — 74177 CT ABD & PELVIS W/CONTRAST: CPT

## 2020-06-15 PROCEDURE — 99285 EMERGENCY DEPT VISIT HI MDM: CPT

## 2020-06-15 PROCEDURE — 2500000003 HC RX 250 WO HCPCS: Performed by: EMERGENCY MEDICINE

## 2020-06-15 PROCEDURE — 83605 ASSAY OF LACTIC ACID: CPT

## 2020-06-15 PROCEDURE — 99232 SBSQ HOSP IP/OBS MODERATE 35: CPT | Performed by: INTERNAL MEDICINE

## 2020-06-15 PROCEDURE — 96374 THER/PROPH/DIAG INJ IV PUSH: CPT

## 2020-06-15 PROCEDURE — 83690 ASSAY OF LIPASE: CPT

## 2020-06-15 PROCEDURE — 85025 COMPLETE CBC W/AUTO DIFF WBC: CPT

## 2020-06-15 PROCEDURE — 80053 COMPREHEN METABOLIC PANEL: CPT

## 2020-06-15 PROCEDURE — 1200000000 HC SEMI PRIVATE

## 2020-06-15 PROCEDURE — 36415 COLL VENOUS BLD VENIPUNCTURE: CPT

## 2020-06-15 PROCEDURE — 6360000004 HC RX CONTRAST MEDICATION: Performed by: EMERGENCY MEDICINE

## 2020-06-15 RX ORDER — ONDANSETRON 2 MG/ML
4 INJECTION INTRAMUSCULAR; INTRAVENOUS ONCE
Status: COMPLETED | OUTPATIENT
Start: 2020-06-15 | End: 2020-06-15

## 2020-06-15 RX ORDER — 0.9 % SODIUM CHLORIDE 0.9 %
80 INTRAVENOUS SOLUTION INTRAVENOUS ONCE
Status: COMPLETED | OUTPATIENT
Start: 2020-06-15 | End: 2020-06-15

## 2020-06-15 RX ORDER — MORPHINE SULFATE 4 MG/ML
4 INJECTION, SOLUTION INTRAMUSCULAR; INTRAVENOUS ONCE
Status: COMPLETED | OUTPATIENT
Start: 2020-06-15 | End: 2020-06-15

## 2020-06-15 RX ORDER — 0.9 % SODIUM CHLORIDE 0.9 %
1000 INTRAVENOUS SOLUTION INTRAVENOUS ONCE
Status: COMPLETED | OUTPATIENT
Start: 2020-06-15 | End: 2020-06-15

## 2020-06-15 RX ORDER — SODIUM CHLORIDE, SODIUM LACTATE, POTASSIUM CHLORIDE, CALCIUM CHLORIDE 600; 310; 30; 20 MG/100ML; MG/100ML; MG/100ML; MG/100ML
INJECTION, SOLUTION INTRAVENOUS CONTINUOUS
Status: DISCONTINUED | OUTPATIENT
Start: 2020-06-15 | End: 2020-06-16

## 2020-06-15 RX ORDER — SODIUM CHLORIDE 0.9 % (FLUSH) 0.9 %
10 SYRINGE (ML) INJECTION ONCE
Status: COMPLETED | OUTPATIENT
Start: 2020-06-15 | End: 2020-06-15

## 2020-06-15 RX ADMIN — METRONIDAZOLE 500 MG: 500 INJECTION, SOLUTION INTRAVENOUS at 22:27

## 2020-06-15 RX ADMIN — Medication 10 ML: at 21:09

## 2020-06-15 RX ADMIN — SODIUM CHLORIDE 1000 ML: 9 INJECTION, SOLUTION INTRAVENOUS at 22:01

## 2020-06-15 RX ADMIN — IOPAMIDOL 75 ML: 755 INJECTION, SOLUTION INTRAVENOUS at 21:09

## 2020-06-15 RX ADMIN — SODIUM CHLORIDE 80 ML: 9 INJECTION, SOLUTION INTRAVENOUS at 21:09

## 2020-06-15 RX ADMIN — MORPHINE SULFATE 4 MG: 4 INJECTION, SOLUTION INTRAMUSCULAR; INTRAVENOUS at 19:16

## 2020-06-15 RX ADMIN — CEFEPIME 2 G: 2 INJECTION, POWDER, FOR SOLUTION INTRAVENOUS at 22:14

## 2020-06-15 RX ADMIN — ONDANSETRON 4 MG: 2 INJECTION INTRAMUSCULAR; INTRAVENOUS at 19:16

## 2020-06-15 RX ADMIN — SODIUM CHLORIDE 1000 ML: 9 INJECTION, SOLUTION INTRAVENOUS at 19:15

## 2020-06-15 RX ADMIN — SODIUM CHLORIDE, POTASSIUM CHLORIDE, SODIUM LACTATE AND CALCIUM CHLORIDE: 600; 310; 30; 20 INJECTION, SOLUTION INTRAVENOUS at 23:06

## 2020-06-15 ASSESSMENT — PAIN DESCRIPTION - ORIENTATION: ORIENTATION: LOWER

## 2020-06-15 ASSESSMENT — PAIN SCALES - GENERAL
PAINLEVEL_OUTOF10: 7
PAINLEVEL_OUTOF10: 7

## 2020-06-15 ASSESSMENT — PAIN DESCRIPTION - LOCATION: LOCATION: ABDOMEN

## 2020-06-15 ASSESSMENT — PAIN DESCRIPTION - DESCRIPTORS: DESCRIPTORS: ACHING

## 2020-06-15 NOTE — ED PROVIDER NOTES
EMERGENCY DEPARTMENT ENCOUNTER    Pt Name: Sari Michael  MRN: [id number]  [place] 1950  Date of evaluation: 6/15/20  CHIEF COMPLAINT       Chief Complaint   Patient presents with    Abdominal Pain     LLQ    Emesis    Nausea     HISTORY OF PRESENT ILLNESS   The pt presents for evaluation of llq abd pain. The pain is sharp, started at 0400. She has had some nausea and vomiting. The history is provided by the patient. Abdominal Pain   Pain location:  LLQ  Pain quality: sharp    Pain severity:  Moderate  Onset quality:  Sudden  Timing:  Constant  Progression:  Unchanged  Chronicity:  New  Relieved by:  Nothing  Worsened by:  Nothing  Ineffective treatments:  None tried  Associated symptoms: nausea and vomiting    Associated symptoms: no chest pain, no chills, no cough, no diarrhea, no fever and no shortness of breath        REVIEW OF SYSTEMS     Review of Systems   Constitutional: Negative. Negative for chills and fever. HENT: Negative. Negative for congestion. Eyes: Negative. Respiratory: Negative. Negative for cough and shortness of breath. Cardiovascular: Negative. Negative for chest pain. Gastrointestinal: Positive for abdominal pain, nausea and vomiting. Negative for diarrhea. Genitourinary: Negative. Musculoskeletal: Negative. Negative for back pain. Skin: Negative. Negative for rash. Neurological: Negative. Negative for headaches. All other systems reviewed and are negative.     PASTMEDICAL HISTORY     Past Medical History:   Diagnosis Date    Adenocarcinoma of endometrium, stage 1 ([address] 10/5/2017    Well differentiated grade 1 adenocarcinoma arising in complex hyperplasia    JOSHUA (acute kidney injury) ([address] 1/15/2020    Allergic rhinitis 2/23/2017    At high risk for falls 2/23/2017    Colon polyp     Had colonoscopy done 20 yrs ago    Diabetes mellitus ([address]     Endometrial cancer ([address]     History of TIA (transient ischemic attack) '80's    on Baby ASA    Hyperglycemia 3/21/2017    Hyperlipidemia     Hypertension since 2015    on Rx.     Hypothyroidism 1980    sub total thyroidectomy goiter    Legally blind since born    both eyes, cord prolapse; \"not legally blind\" per \"associated eye care\" please see telephone encounter from 2/27/18    Lower back pain     Mixed incontinence 1/9/2016    Morbid obesity with BMI of 40.0-44.9, adult ([facility] 2/23/2017    CLAROS (nonalcoholic steatohepatitis) 3/10/2019    Obesity     Oral phase dysphagia 2/23/2018    Osteoarthritis (arthritis due to wear and tear of joints)     ronan knee    Osteoarthritis involving multiple joints on both sides of body 4/24/2012    Osteoporosis     Postmenopausal bleeding 9/20/2017    S/P h-scope, Myosure 9/20/17 9/20/2017    Pathology pending    Tennis elbow     left    Thickened endometrium 9/20/2017    TLHBSO, bilateral LND 10/24/17 10/24/2017    Type 2 diabetes mellitus, without long-term current use of insulin [facility] 1/14/2020     Past Problem List  Patient Active Problem List   Diagnosis Code    Acquired hypothyroidism E03.9    History of TIA (transient ischemic attack) Z86.73    Chronic bilateral low back pain without sciatica M54.5, G89.29    Essential hypertension I10    Osteopenia determined by x-ray M85.80    Osteoarthritis involving multiple joints on both sides of body M15.9    Left knee DJD M17.12    Prediabetes R73.03    Vitamin D deficiency E55.9    Mixed incontinence N39.46    Chronic pain of both knees M25.561, M25.562, G89.29    Slow transit constipation K59.01    Allergic rhinitis J30.9    Hyperlipidemia with target LDL less than 100 E78.5    Morbid obesity with BMI of 40.0-44.9, adult (HCC) E66.01, Z68.41    At high risk for falls Z91.81    Dense breast tissue on mammogram R92.2    Hyperglycemia R73.9    Spondylosis of lumbar region without myelopathy or radiculopathy M47.816    OAB (overactive bladder) N32.81    Primary osteoarthritis of both knees M17.0    Chronic fatigue  R53.82    Adenomatous polyp of sigmoid colon, 6/26/17 D12.5    Mixed stress and urge urinary incontinence N39.46    TLHBSO, bilateral LND 10/24/17 Z90.710, Z90.79, Z90.722    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    Cataracta b/l eyes H26.9    Difficulty walking R26.2    Esotropia H50.00    Nystagmus H55.00    History of uterine cancer, Well differentiated grade 1 adenocarcinoma arising in complex hyperplasia, 2017 Z85.42    Vitamin B 12 deficiency E53.8    Aortic calcification (HCC) I70.0    Calculus of gallbladder without cholecystitis without obstruction K80.20    CLAROS (nonalcoholic steatohepatitis) K75.81    Optic atrophy H47.20    Cough present for greater than 3 weeks R05    Dyspepsia R10.13    A-fib (HCC) I48.91    Type 2 diabetes mellitus, without long-term current use of insulin (HCC) E11.9    JOSHUA (acute kidney injury) ([facility] N17.9    Atrial fibrillation, new onset ([facility] I48.91    Mobility impaired Z74.09    Perforated diverticulum K57.80     SURGICAL HISTORY       Past Surgical History:   Procedure Laterality Date    CATARACT REMOVAL WITH IMPLANT Bilateral 2015    COLONOSCOPY  1997    had polyp, she doesn't know where and when, \"20 yrs ago\"    COLONOSCOPY  06/26/2017    DILATION AND CURETTAGE OF UTERUS N/A 9/20/2017    HYSTEROSCOPY  WITH MYOSURE performed by Mairo Palm DO at [address] N/A 10/24/2017    TOTAL LAPAROSCOPIC HYSTERECTOMY, BSO, F.S.  STAGING, GYRUS G400 performed by Julian Benavidez MD at [address] N/A 6/26/2017    COLONOSCOPY POLYPECTOMY / HOT SNARE performed by Kaiden Hooker DO at [address]  10/24/2017    with pelvic lymph node dissection    THYROID SURGERY  1980    subtotal 20 years ago    TONSILLECTOMY      VITRECTOMY      FLOATERS     CURRENT MEDICATIONS       Current Discharge Medication List      CONTINUE these medications which have NOT CHANGED    Details   amiodarone (CORDARONE) 200 MG tablet TAKE 1 TABLET BY MOUTH DAILY  Qty: 30 tablet, Refills: 1    Associated Diagnoses: Atrial fibrillation, new onset (HCC)      losartan (COZAAR) 100 MG tablet TAKE 1 TABLET BY MOUTH DAILY LOSARTAN IF NOT RECALLED.  **STOP LISINOPRIL**  Qty: 90 tablet, Refills: 3    Associated Diagnoses: Essential hypertension      atorvastatin (LIPITOR) 40 MG tablet TAKE 1 TABLET BY MOUTH DAILY STOP SIMVASTATIN  Qty: 90 tablet, Refills: 3    Associated Diagnoses: Hyperlipidemia with target LDL less than 100      XARELTO 15 MG TABS tablet TAKE 1 TABLET BY MOUTH DAILY  Qty: 30 tablet, Refills: 5    Associated Diagnoses: Atrial fibrillation, new onset (HCC)      levothyroxine (SYNTHROID) 75 MCG tablet TAKE 1 TABLET BY MOUTH EVERY MORNING (BEFORE BREAKFAST)  Qty: 90 tablet, Refills: 3    Associated Diagnoses: Acquired hypothyroidism      ASPIRIN LOW DOSE 81 MG EC tablet TAKE 1 TABLET BY MOUTH DAILY  Qty: 90 tablet, Refills: 3    Associated Diagnoses: Preventive measure      HM LORATADINE 10 MG tablet TAKE 1 TABLET BY MOUTH DAILY  Qty: 90 tablet, Refills: 3    Associated Diagnoses: Non-seasonal allergic rhinitis due to other allergic trigger      docusate sodium (COLACE) 100 MG capsule TAKE 1 CAPSULE BY MOUTH 2 TIMES DAILY  Qty: 180 capsule, Refills: 3    Associated Diagnoses: Slow transit constipation      MYRBETRIQ 50 MG TB24 TAKE ONE TABLET BY MOUTH ONCE DAILY  Qty: 90 tablet, Refills: 3    Associated Diagnoses: Mixed incontinence; OAB (overactive bladder)      metFORMIN (GLUCOPHAGE) 500 MG tablet TAKE ONE TABLET BY MOUTH TWICE A DAY WITH A MEAL  Qty: 60 tablet, Refills: 10    Associated Diagnoses: Hyperglycemia      Cranberry, Vacc oxycoccus, (SM CRANBERRY) 500 MG TABS Take 500 mg by mouth daily       Cholecalciferol (VITAMIN D) 2000 units TABS tablet Take 1 tablet by mouth daily  Qty: 90 tablet, Refills: 3    Associated Diagnoses: Vitamin D deficiency      vitamin B-12 IV CONTRAST Additional Contrast? None   Preliminary Result   1. Perforated diverticulitis at the junction of the descending and sigmoid   colon with trace extraluminal gas and small volume ascites. 2. Cholelithiasis and gallbladder sludge. The gallbladder is distended   without additional secondary evidence of acute cholecystitis. 3. Small hiatal hernia with circumferential thickening of the distal   esophagus, likely related to reflux esophagitis. This can be correlated with   outpatient endoscopy as clinically appropriate. 4. Coronary artery disease. LABS: All lab results were reviewed by myself, and all abnormals are listed below.   Labs Reviewed   CBC WITH AUTO DIFFERENTIAL - Abnormal; Notable for the following components:       Result Value    WBC 13.7 (*)     Seg Neutrophils 86 (*)     Lymphocytes 11 (*)     Segs Absolute 11.90 (*)     All other components within normal limits   LACTIC ACID - Abnormal; Notable for the following components:    Lactic Acid 3.2 (*)     All other components within normal limits   LACTIC ACID - Abnormal; Notable for the following components:    Lactic Acid 2.3 (*)     All other components within normal limits   COMPREHENSIVE METABOLIC PANEL - Abnormal; Notable for the following components:    Glucose 120 (*)     BUN 24 (*)     CREATININE 1.01 (*)     GFR Non- 55 (*)     All other components within normal limits   BASIC METABOLIC PANEL W/ REFLEX TO MG FOR LOW K - Abnormal; Notable for the following components:    Glucose 129 (*)     Calcium 8.2 (*)     All other components within normal limits   CBC - Abnormal; Notable for the following components:    WBC 15.1 (*)     RBC 3.81 (*)     Hemoglobin 11.6 (*)     Hematocrit 35.3 (*)     All other components within normal limits   PROTIME-INR - Abnormal; Notable for the following components:    Protime 15.9 (*)     All other components within normal limits   SPECIMEN REJECTION   LIPASE   LACTIC ACID glucagon (rDNA) injection 1 mg    dextrose 5 % solution    acetaminophen (TYLENOL) suppository 650 mg     CONSULTS:  IP CONSULT TO GENERAL SURGERY  IP CONSULT TO GENERAL SURGERY  PHARMACY TO DOSE VANCOMYCIN    FINAL IMPRESSION      1.  Diverticulitis          DISPOSITION/PLAN   DISPOSITION Admitted 06/15/2020 10:21:27 PM      PATIENT REFERRED TO:  Zeina Engel MD  [address] [facility].  [address] [place] [address]  [phone]          DISCHARGE MEDICATIONS:  Current Discharge Medication List        Leisa Marshall MD  Attending Emergency Physician                    Carla Silvestre MD  06/16/20 0372

## 2020-06-16 LAB
-: ABNORMAL
AMORPHOUS: ABNORMAL
ANION GAP SERPL CALCULATED.3IONS-SCNC: 13 MMOL/L (ref 9–17)
BACTERIA: ABNORMAL
BILIRUBIN URINE: NEGATIVE
BUN BLDV-MCNC: 20 MG/DL (ref 8–23)
BUN/CREAT BLD: ABNORMAL (ref 9–20)
CALCIUM SERPL-MCNC: 8.2 MG/DL (ref 8.6–10.4)
CASTS UA: ABNORMAL /LPF
CHLORIDE BLD-SCNC: 101 MMOL/L (ref 98–107)
CO2: 23 MMOL/L (ref 20–31)
COLOR: YELLOW
COMMENT UA: ABNORMAL
CREAT SERPL-MCNC: 0.9 MG/DL (ref 0.5–0.9)
CRYSTALS, UA: ABNORMAL /HPF
EPITHELIAL CELLS UA: ABNORMAL /HPF
GFR AFRICAN AMERICAN: >60 ML/MIN
GFR NON-AFRICAN AMERICAN: >60 ML/MIN
GFR SERPL CREATININE-BSD FRML MDRD: ABNORMAL ML/MIN/{1.73_M2}
GFR SERPL CREATININE-BSD FRML MDRD: ABNORMAL ML/MIN/{1.73_M2}
GLUCOSE BLD-MCNC: 107 MG/DL (ref 65–105)
GLUCOSE BLD-MCNC: 129 MG/DL (ref 70–99)
GLUCOSE URINE: NEGATIVE
HCT VFR BLD CALC: 35.3 % (ref 36–46)
HEMOGLOBIN: 11.6 G/DL (ref 12–16)
INR BLD: 1.3
KETONES, URINE: NEGATIVE
LACTIC ACID: 2.1 MMOL/L (ref 0.5–2.2)
LEUKOCYTE ESTERASE, URINE: NEGATIVE
MCH RBC QN AUTO: 30.3 PG (ref 26–34)
MCHC RBC AUTO-ENTMCNC: 32.8 G/DL (ref 31–37)
MCV RBC AUTO: 92.6 FL (ref 80–100)
MUCUS: ABNORMAL
NITRITE, URINE: NEGATIVE
NRBC AUTOMATED: ABNORMAL PER 100 WBC
OTHER OBSERVATIONS UA: ABNORMAL
PDW BLD-RTO: 14.6 % (ref 11.5–14.9)
PH UA: 5.5 (ref 5–8)
PLATELET # BLD: 259 K/UL (ref 150–450)
PMV BLD AUTO: 8.5 FL (ref 6–12)
POTASSIUM SERPL-SCNC: 4.1 MMOL/L (ref 3.7–5.3)
PROTEIN UA: ABNORMAL
PROTHROMBIN TIME: 15.9 SEC (ref 11.8–14.6)
RBC # BLD: 3.81 M/UL (ref 4–5.2)
RBC UA: ABNORMAL /HPF
RENAL EPITHELIAL, UA: ABNORMAL /HPF
SARS-COV-2, PCR: NORMAL
SARS-COV-2, RAPID: NOT DETECTED
SARS-COV-2: NORMAL
SODIUM BLD-SCNC: 137 MMOL/L (ref 135–144)
SOURCE: NORMAL
SPECIFIC GRAVITY UA: 1.04 (ref 1–1.03)
TRICHOMONAS: ABNORMAL
TURBIDITY: ABNORMAL
URINE HGB: NEGATIVE
UROBILINOGEN, URINE: NORMAL
WBC # BLD: 15.1 K/UL (ref 3.5–11)
WBC UA: ABNORMAL /HPF
YEAST: ABNORMAL

## 2020-06-16 PROCEDURE — 2580000003 HC RX 258: Performed by: EMERGENCY MEDICINE

## 2020-06-16 PROCEDURE — 82947 ASSAY GLUCOSE BLOOD QUANT: CPT

## 2020-06-16 PROCEDURE — 6360000002 HC RX W HCPCS: Performed by: SURGERY

## 2020-06-16 PROCEDURE — 83605 ASSAY OF LACTIC ACID: CPT

## 2020-06-16 PROCEDURE — 2580000003 HC RX 258: Performed by: NURSE PRACTITIONER

## 2020-06-16 PROCEDURE — 6360000002 HC RX W HCPCS: Performed by: NURSE PRACTITIONER

## 2020-06-16 PROCEDURE — 81001 URINALYSIS AUTO W/SCOPE: CPT

## 2020-06-16 PROCEDURE — 36415 COLL VENOUS BLD VENIPUNCTURE: CPT

## 2020-06-16 PROCEDURE — 2580000003 HC RX 258: Performed by: SURGERY

## 2020-06-16 PROCEDURE — U0002 COVID-19 LAB TEST NON-CDC: HCPCS

## 2020-06-16 PROCEDURE — 85610 PROTHROMBIN TIME: CPT

## 2020-06-16 PROCEDURE — 85027 COMPLETE CBC AUTOMATED: CPT

## 2020-06-16 PROCEDURE — 6370000000 HC RX 637 (ALT 250 FOR IP): Performed by: INTERNAL MEDICINE

## 2020-06-16 PROCEDURE — 6360000002 HC RX W HCPCS: Performed by: EMERGENCY MEDICINE

## 2020-06-16 PROCEDURE — 2500000003 HC RX 250 WO HCPCS: Performed by: NURSE PRACTITIONER

## 2020-06-16 PROCEDURE — 80048 BASIC METABOLIC PNL TOTAL CA: CPT

## 2020-06-16 PROCEDURE — 2060000000 HC ICU INTERMEDIATE R&B

## 2020-06-16 RX ORDER — SODIUM CHLORIDE 0.9 % (FLUSH) 0.9 %
10 SYRINGE (ML) INJECTION EVERY 12 HOURS SCHEDULED
Status: DISCONTINUED | OUTPATIENT
Start: 2020-06-16 | End: 2020-06-22 | Stop reason: HOSPADM

## 2020-06-16 RX ORDER — ONDANSETRON 2 MG/ML
4 INJECTION INTRAMUSCULAR; INTRAVENOUS EVERY 6 HOURS PRN
Status: DISCONTINUED | OUTPATIENT
Start: 2020-06-16 | End: 2020-06-22 | Stop reason: HOSPADM

## 2020-06-16 RX ORDER — SODIUM CHLORIDE 9 MG/ML
INJECTION, SOLUTION INTRAVENOUS CONTINUOUS
Status: DISCONTINUED | OUTPATIENT
Start: 2020-06-16 | End: 2020-06-19

## 2020-06-16 RX ORDER — MORPHINE SULFATE 2 MG/ML
2 INJECTION, SOLUTION INTRAMUSCULAR; INTRAVENOUS EVERY 4 HOURS PRN
Status: DISCONTINUED | OUTPATIENT
Start: 2020-06-16 | End: 2020-06-22 | Stop reason: HOSPADM

## 2020-06-16 RX ORDER — PROMETHAZINE HYDROCHLORIDE 25 MG/1
12.5 TABLET ORAL EVERY 6 HOURS PRN
Status: DISCONTINUED | OUTPATIENT
Start: 2020-06-16 | End: 2020-06-22 | Stop reason: HOSPADM

## 2020-06-16 RX ORDER — SODIUM CHLORIDE 0.9 % (FLUSH) 0.9 %
10 SYRINGE (ML) INJECTION PRN
Status: DISCONTINUED | OUTPATIENT
Start: 2020-06-16 | End: 2020-06-22 | Stop reason: HOSPADM

## 2020-06-16 RX ORDER — MAGNESIUM SULFATE 1 G/100ML
1 INJECTION INTRAVENOUS PRN
Status: DISCONTINUED | OUTPATIENT
Start: 2020-06-16 | End: 2020-06-22 | Stop reason: HOSPADM

## 2020-06-16 RX ORDER — MORPHINE SULFATE 4 MG/ML
4 INJECTION, SOLUTION INTRAMUSCULAR; INTRAVENOUS EVERY 4 HOURS PRN
Status: DISCONTINUED | OUTPATIENT
Start: 2020-06-16 | End: 2020-06-22 | Stop reason: HOSPADM

## 2020-06-16 RX ORDER — 0.9 % SODIUM CHLORIDE 0.9 %
1000 INTRAVENOUS SOLUTION INTRAVENOUS ONCE
Status: COMPLETED | OUTPATIENT
Start: 2020-06-16 | End: 2020-06-16

## 2020-06-16 RX ORDER — NICOTINE 21 MG/24HR
1 PATCH, TRANSDERMAL 24 HOURS TRANSDERMAL DAILY PRN
Status: DISCONTINUED | OUTPATIENT
Start: 2020-06-16 | End: 2020-06-22 | Stop reason: HOSPADM

## 2020-06-16 RX ORDER — DEXTROSE MONOHYDRATE 25 G/50ML
12.5 INJECTION, SOLUTION INTRAVENOUS PRN
Status: DISCONTINUED | OUTPATIENT
Start: 2020-06-16 | End: 2020-06-22 | Stop reason: HOSPADM

## 2020-06-16 RX ORDER — ACETAMINOPHEN 650 MG/1
650 SUPPOSITORY RECTAL EVERY 4 HOURS PRN
Status: DISCONTINUED | OUTPATIENT
Start: 2020-06-16 | End: 2020-06-17

## 2020-06-16 RX ORDER — DEXTROSE MONOHYDRATE 50 MG/ML
100 INJECTION, SOLUTION INTRAVENOUS PRN
Status: DISCONTINUED | OUTPATIENT
Start: 2020-06-16 | End: 2020-06-22 | Stop reason: HOSPADM

## 2020-06-16 RX ORDER — NICOTINE POLACRILEX 4 MG
15 LOZENGE BUCCAL PRN
Status: DISCONTINUED | OUTPATIENT
Start: 2020-06-16 | End: 2020-06-22 | Stop reason: HOSPADM

## 2020-06-16 RX ADMIN — MORPHINE SULFATE 4 MG: 4 INJECTION, SOLUTION INTRAMUSCULAR; INTRAVENOUS at 03:50

## 2020-06-16 RX ADMIN — CEFEPIME 1 G: 1 INJECTION, POWDER, FOR SOLUTION INTRAMUSCULAR; INTRAVENOUS at 17:09

## 2020-06-16 RX ADMIN — VANCOMYCIN HYDROCHLORIDE 2500 MG: 1.5 INJECTION, POWDER, LYOPHILIZED, FOR SOLUTION INTRAVENOUS at 00:47

## 2020-06-16 RX ADMIN — MORPHINE SULFATE 4 MG: 4 INJECTION, SOLUTION INTRAMUSCULAR; INTRAVENOUS at 23:38

## 2020-06-16 RX ADMIN — MORPHINE SULFATE 4 MG: 4 INJECTION, SOLUTION INTRAMUSCULAR; INTRAVENOUS at 20:30

## 2020-06-16 RX ADMIN — METRONIDAZOLE 500 MG: 500 INJECTION, SOLUTION INTRAVENOUS at 21:22

## 2020-06-16 RX ADMIN — CEFEPIME 1 G: 1 INJECTION, POWDER, FOR SOLUTION INTRAMUSCULAR; INTRAVENOUS at 07:19

## 2020-06-16 RX ADMIN — MEROPENEM 1 G: 1 INJECTION, POWDER, FOR SOLUTION INTRAVENOUS at 19:47

## 2020-06-16 RX ADMIN — ONDANSETRON 4 MG: 2 INJECTION INTRAMUSCULAR; INTRAVENOUS at 16:05

## 2020-06-16 RX ADMIN — MORPHINE SULFATE 4 MG: 4 INJECTION, SOLUTION INTRAMUSCULAR; INTRAVENOUS at 10:31

## 2020-06-16 RX ADMIN — ACETAMINOPHEN 650 MG: 650 SUPPOSITORY RECTAL at 08:55

## 2020-06-16 RX ADMIN — SODIUM CHLORIDE: 9 INJECTION, SOLUTION INTRAVENOUS at 03:51

## 2020-06-16 RX ADMIN — SODIUM CHLORIDE: 9 INJECTION, SOLUTION INTRAVENOUS at 21:22

## 2020-06-16 RX ADMIN — METRONIDAZOLE 500 MG: 500 INJECTION, SOLUTION INTRAVENOUS at 15:23

## 2020-06-16 RX ADMIN — SODIUM CHLORIDE 1000 ML: 9 INJECTION, SOLUTION INTRAVENOUS at 19:48

## 2020-06-16 RX ADMIN — METRONIDAZOLE 500 MG: 500 INJECTION, SOLUTION INTRAVENOUS at 06:18

## 2020-06-16 RX ADMIN — MORPHINE SULFATE 2 MG: 2 INJECTION, SOLUTION INTRAMUSCULAR; INTRAVENOUS at 14:34

## 2020-06-16 ASSESSMENT — ENCOUNTER SYMPTOMS
BACK PAIN: 0
DIARRHEA: 0
NAUSEA: 1
CONSTIPATION: 1
EYES NEGATIVE: 1
WHEEZING: 0
VOMITING: 1
ABDOMINAL PAIN: 1
SHORTNESS OF BREATH: 0
CONSTIPATION: 0
SORE THROAT: 0
COUGH: 0
RESPIRATORY NEGATIVE: 1
NAUSEA: 0
ANAL BLEEDING: 0

## 2020-06-16 ASSESSMENT — PAIN DESCRIPTION - ORIENTATION
ORIENTATION: LOWER

## 2020-06-16 ASSESSMENT — PAIN SCALES - GENERAL
PAINLEVEL_OUTOF10: 7
PAINLEVEL_OUTOF10: 0
PAINLEVEL_OUTOF10: 6
PAINLEVEL_OUTOF10: 5
PAINLEVEL_OUTOF10: 5
PAINLEVEL_OUTOF10: 7
PAINLEVEL_OUTOF10: 7
PAINLEVEL_OUTOF10: 3
PAINLEVEL_OUTOF10: 7

## 2020-06-16 ASSESSMENT — PAIN DESCRIPTION - PAIN TYPE: TYPE: ACUTE PAIN

## 2020-06-16 ASSESSMENT — PAIN DESCRIPTION - LOCATION
LOCATION: ABDOMEN

## 2020-06-16 ASSESSMENT — PAIN DESCRIPTION - DESCRIPTORS: DESCRIPTORS: ACHING

## 2020-06-16 NOTE — PROGRESS NOTES
Nutrition Assessment    Type and Reason for Visit: Positive Nutrition Screen(Unplanned weight loss Dietitian consult needed: weight loss)    Nutrition Recommendations: Continue NPO status. Consider nutrition intervention (TPN) if diet can not be advanced in the next 4 days. Nutrition Assessment: Patient admission is related to perorated diverticulum. Patient reports abdominal pain, constipation (last bowel movement 4 days ago) and not much flatus. Patient reports she has been eating 1-2 meals a day due to not feeling hungry and decreased appetite. Patient thinks she has lost about 20 lbs but weight records do not support the claim. Patient is currently NPO for bowel rest. Monitor for nutrition progression. If diet can not be initiated in the next 4 days consider nutrition intervention. Malnutrition Assessment:  · Malnutrition Status: Insufficient data  · Context: Acute illness or injury  · Findings of the 6 clinical characteristics of malnutrition (Minimum of 2 out of 6 clinical characteristics is required to make the diagnosis of moderate or severe Protein Calorie Malnutrition based on AND/ASPEN Guidelines):  1. Energy Intake-Less than or equal to 50% of estimated energy requirement, Greater than or equal to 7 days      Nutrition Risk Level: High    Nutrient Needs:  · Estimated Daily Total Kcal: 1398-5862 kcal based on 13-15 kcal/kg of admission weight   · Estimated Daily Protein (g): 75-90 gm of protein based on 1.5-1.8 gm/kg of ideal weight     Nutrition Diagnosis:   · Problem: Altered GI function  · Etiology: related to Alteration in GI function(Perforated diverticulum)     Signs and symptoms:  as evidenced by NPO status due to medical condition(abdominal pain, constipation)    Objective Information:  · Nutrition-Focused Physical Findings: No edema. Constipation (LBM 4 days ago), Abdominal pain .    · Current Nutrition Therapies:  · Oral Diet Orders: NPO   · Oral Diet intake: NPO  · Oral Nutrition Supplement (ONS) Orders: None  · Anthropometric Measures:  · Ht: 5' 2\" (157.5 cm)   · Current Body Wt: 250 lb (113.4 kg)  · Admission Body Wt: 250 lb (113.4 kg)  · Usual Body Wt: 250 lb (113.4 kg)  · % Weight Change:  ,  Patient thinks she weighs 230 lbs but current bedscale weight indicates patient is at usual weight    · Ideal Body Wt: 110 lb (49.9 kg), % Ideal Body 227%  · BMI Classification: BMI > or equal to 40.0 Obese Class III    Nutrition Interventions:   Continue current diet  Continued Inpatient Monitoring, Education Not Indicated    Nutrition Evaluation:   · Evaluation: Goals set   · Goals: Initiate oral intake or nutrition support     · Monitoring: Nutrition Progression, Weight, Pertinent Labs, Monitor Bowel Function, Constipation, I&O, Skin Integrity      Some areas of assessment maybe incomplete due to COVID-19 precautions.     Ariel HAM, R.D, L.D,  Clinical Dietitian  Office # [phone]

## 2020-06-16 NOTE — CARE COORDINATION
CASE MANAGEMENT NOTE:    Admission Date:  6/15/2020 Ruel Duncan is a 78 y.o.  female    Admitted for : Perforated diverticulum [K57.80]    Met with:  Patient    PCP:  Dr Margot Nichole:  [facility] Medicare      Current Residence/ Living Arrangements:  at home dependent on nursing care, Lives Alone            Current Services PTA:  Yes, [facility], [facility], [facility] 3 X a week, from [facility]    Is patient agreeable to VNS: Yes    Freedom of choice provided:  No    List of [address] provided: No    VNS chosen:  Yes, Will continue w/ [facility], Per [facility], they just DC her on May 26, Pt. Would like services again. DME:  straight cane and shower chair, Raised toilet seat, GB, Rollator    Home Oxygen: No    Nebulizer: No    CPAP/BIPAP: No    Supplier: N/A    Potential Assistance Needed: Yes    SNF needed: Will follow rec    Freedom of choice and list provided: No    Pharmacy:  [facility] [address]       Does Patient want to use MEDS to BEDS? No    Is the Patient an [facility] with Readmission Risk Score greater than 14%? No  If yes, pt needs a follow up appointment made within 7 days. Family Members/Caregivers that pt would like involved in their care:    Yes    If yes, list name here:  Mary Aden, Daughter Sandhya    Transportation Provider:  Family             Is patient in Isolation/One on One/Altered Mental Status? No  If yes, skip next question. If no, would they like an I-Pad to  use? No  If yes, call [phone]. Discharge Plan:  6/16/20 [facility] Medicare Pt. Lives alone in [place] [id number] apt w/ Elevator. DME Cane, SC, Raised toilet seat, GB, Rollator. Currently w/ [facility] 3 X a week from [facility], Was just DC from [facility], [facility], would like again. On Xarelto PTA, IV Cefepime,Flagyl,Vanco. Temp 101.9. Darin will need signed/completed, Will follow. //KB                 Electronically signed by: Renetta Jones RN on 6/16/2020 at 9:53 AM

## 2020-06-16 NOTE — PROGRESS NOTES
[facility]    Progress Note    6/16/2020    9:42 AM    Name:   Sari Michael  MRN:     [id number]     Acct:      [de-identified]   Room:   2064/2064-01   Day:  1  Admit Date:  6/15/2020  6:48 PM    PCP:   Gilma Dowling MD  Code Status:  Full Code    Subjective:     C/C: Abdominal pain  Interval HistoryStatus: not changed. Patient seen and examined at bedside. No acute events overnight. Patient is laying in bed in mild distress. Reports that she continues to have abdominal pain which has not improved. She has not had any further episodes of emesis and denies nausea. She has not had a BM in several days. Brief History:     77 yo female with PMH significant for Afib, HTN, DM2, morbid obesity presented to the ED complaining of sudden onset abdominal pain as well as 3 episodes of emesis which started around 4pm on 6/15. In the ED CT abdomen revealed perforated diverticulum. Patient started on fluids, abx and GS consulted. Review of Systems:     Review of Systems   Constitutional: Negative for chills and fever. Respiratory: Negative for cough, shortness of breath and wheezing. Cardiovascular: Negative for chest pain. Gastrointestinal: Positive for abdominal pain, constipation and vomiting (prior to arriaval ). Negative for anal bleeding, diarrhea and nausea. Genitourinary: Negative for difficulty urinating, dysuria, frequency and urgency. Neurological: Negative for dizziness, weakness, light-headedness and headaches. Medications: Allergies:     Allergies   Allergen Reactions    Sulfa Antibiotics Nausea Only    Penicillins Rash       Current Meds:   Scheduled Meds:    sodium chloride flush  10 mL Intravenous 2 times per day    metroNIDAZOLE  500 mg Intravenous Q8H    cefepime  1 g Intravenous Q8H    [START ON 6/17/2020] vancomycin  1,750 mg Intravenous Q24H    insulin lispro  0-12 Units Subcutaneous TID WC    Grandfather     Colon Cancer Neg Hx        Vitals:  BP (!) 141/52   Pulse 78   Temp 100 °F (37.8 °C) (Oral)   Resp 16   Ht 5' 2\" (1.575 m)   Wt 250 lb 3.6 oz (113.5 kg)   LMP  (LMP Unknown)   SpO2 95%   BMI 45.77 kg/m²   Temp (24hrs), Av.9 °F (37.7 °C), Min:98.3 °F (36.8 °C), Max:101.9 °F (38.8 °C)    No results for input(s): POCGLU in the last 72 hours. I/O (24Hr): Intake/Output Summary (Last 24 hours) at 2020 0942  Last data filed at 2020 1276  Gross per 24 hour   Intake 1180 ml   Output --   Net 1180 ml       Labs:    [unfilled]    Lab Results   Component Value Date/Time    SPECIAL NOT REPORTED 2020 02:32 PM     Lab Results   Component Value Date/Time    CULTURE NO SIGNIFICANT GROWTH 2020 02:32 PM       [facility]    Radiology:     Ct Abdomen Pelvis W Iv Contrast Additional Contrast? None    Result Date: 6/15/2020  EXAMINATION: CT OF THE ABDOMEN AND PELVIS WITH CONTRAST 6/15/2020 8:42 pm TECHNIQUE: CT of the abdomen and pelvis was performed with the administration of intravenous contrast. Multiplanar reformatted images are provided for review. Dose modulation, iterative reconstruction, and/or weight based adjustment of the mA/kV was utilized to reduce the radiation dose to as low as reasonably achievable. COMPARISON: None. HISTORY: ORDERING SYSTEM PROVIDED HISTORY: llq abd pain TECHNOLOGIST PROVIDED HISTORY: llq abd pain Reason for Exam: pt c/o llq abd pain Acuity: Unknown Type of Exam: Unknown Additional signs and symptoms: pts IV infiltrated during scan, repeated scan with new IV FINDINGS: Lower Chest:  Visualized portion of the lower chest demonstrates no acute abnormality. There are coronary artery calcifications. Circumferential thickening of the distal esophagus is noted. Organs: The liver, spleen, pancreas, kidneys and adrenal glands are without acute findings. The gallbladder is distended and there is cholelithiasis and gallbladder sludge.   No secondary

## 2020-06-16 NOTE — H&P
[facility]     HISTORY AND PHYSICAL EXAMINATION            Date:   6/16/2020  Patientname:  Mortimer Lars  Date of admission:  6/15/2020  6:48 PM  MRN:   [id number]  Account:  [de-identified]  YOB: 1950  PCP:    Bang De La O MD  Room:   2064/2064-01  Code Status:    Full Code    CHIEF COMPLAINT     Chief Complaint   Patient presents with    Abdominal Pain     LLQ    Emesis    Nausea       HISTORY OF PRESENT ILLNESS  (Character, Onset, Location, Duration,  Exacerbating/RelievingFactors, Radiation,   Associated Symptoms, Severity )      The patient is a 78 y.o.  female, with a history of thyroidism, atrial fibrillation-anticoagulated on Xarelto, morbid obesity, CLAROS, endometrial cancer, and diabetes mellitus type 2, who presents with abdominal pain, nausea, and vomiting. According to patient, symptoms started abruptly at 4 PM today, she developed sudden left lower quadrant abdominal pain, followed by nausea and vomiting. Symptoms are associated with low-grade fever of 99.0 degrees. Denies chest pain, cough, diarrhea, and urinary symptoms. There are no aggravating or alleviating factors. Symptoms are reported as constant and moderate.     PAST MEDICAL HISTORY   Patient  has a past medical history of Adenocarcinoma of endometrium, stage 1 ([address] JOSHUA (acute kidney injury) ([address] Allergic rhinitis, At high risk for falls, Colon polyp, Diabetes mellitus ([address] Endometrial cancer ([address] History of TIA (transient ischemic attack), Hyperglycemia, Hyperlipidemia, Hypertension, Hypothyroidism, Legally blind, Lower back pain, Mixed incontinence, Morbid obesity with BMI of 40.0-44.9, adult ([address] CLAROS (nonalcoholic steatohepatitis), Obesity, Oral phase dysphagia, Osteoarthritis (arthritis due to wear and tear of joints), Osteoarthritis involving multiple joints on both sides of body, Osteoporosis, Postmenopausal bleeding, S/P h-scope, Myosure 9/20/17, Tennis elbow, of the abdomen and pelvis was performed with the administration of intravenous contrast. Multiplanar reformatted images are provided for review. Dose modulation, iterative reconstruction, and/or weight based adjustment of the mA/kV was utilized to reduce the radiation dose to as low as reasonably achievable. COMPARISON: None. HISTORY: ORDERING SYSTEM PROVIDED HISTORY: llq abd pain TECHNOLOGIST PROVIDED HISTORY: llq abd pain Reason for Exam: pt c/o llq abd pain Acuity: Unknown Type of Exam: Unknown Additional signs and symptoms: pts IV infiltrated during scan, repeated scan with new IV FINDINGS: Lower Chest:  Visualized portion of the lower chest demonstrates no acute abnormality. There are coronary artery calcifications. Circumferential thickening of the distal esophagus is noted. Organs: The liver, spleen, pancreas, kidneys and adrenal glands are without acute findings. The gallbladder is distended and there is cholelithiasis and gallbladder sludge. No secondary evidence of acute cholecystitis. There is a 1.5 cm right adrenal adenoma. GI/Bowel: Small hiatal hernia. No mechanical bowel obstruction. There is diverticulosis. An inflamed diverticulum is seen at the junction of the descending and sigmoid colon with surrounding inflammatory changes consistent with acute diverticulitis. There is small extraluminal gas. Pelvis: Status post hysterectomy. No adnexal mass. The urinary bladder is partially distended without contour abnormality. Peritoneum/Retroperitoneum: Trace abdominopelvic ascites. Mild calcified atherosclerotic plaque. No lymphadenopathy. Bones/Soft Tissues: No acute or aggressive osseous lesions. 1. Perforated diverticulitis at the junction of the descending and sigmoid colon with trace extraluminal gas and small volume ascites. 2. Cholelithiasis and gallbladder sludge. The gallbladder is distended without additional secondary evidence of acute cholecystitis.  3. Small hiatal hernia with low-grade fever no signs of sepsis will continue with IV antibiotics surgery consulted we anticipate a conservative treatment daughter present in the room who is a nurse all explained all questions answered to their satisfaction    Electronically signed by Chrissy Aggarwal MD

## 2020-06-16 NOTE — PROGRESS NOTES
Admitted to room 2064 from ED per cart. Oriented to room and call light. Vitals and assessment completed. No distress noted.

## 2020-06-16 NOTE — CONSULTS
incontinence, Morbid obesity with BMI of 40.0-44.9, adult ([facility] CLAROS (nonalcoholic steatohepatitis), Obesity, Oral phase dysphagia, Osteoarthritis (arthritis due to wear and tear of joints), Osteoarthritis involving multiple joints on both sides of body, Osteoporosis, Postmenopausal bleeding, S/P h-scope, Myosure 9/20/17, Tennis elbow, Thickened endometrium, TLHBSO, bilateral LND 10/24/17, and Type 2 diabetes mellitus, without long-term current use of insulin ([facility] [address] Past Surgical History   has a past surgical history that includes Thyroid surgery (1980); Colonoscopy (1997); Tonsillectomy; Colonoscopy (06/26/2017); pr colsc flx w/removal lesion by hot bx forceps (N/A, 6/26/2017); Dilation and curettage of uterus (N/A, 9/20/2017); Cataract removal with implant (Bilateral, 2015); vitrectomy; julieta and bso (cervix removed) (10/24/2017); and Hysterectomy (N/A, 10/24/2017). Medications  Prior to Admission medications    Medication Sig Start Date End Date Taking? Authorizing Provider   amiodarone (CORDARONE) 200 MG tablet TAKE 1 TABLET BY MOUTH DAILY 6/10/20  Yes Ginny Ramirez MD   losartan (COZAAR) 100 MG tablet TAKE 1 TABLET BY MOUTH DAILY LOSARTAN IF NOT RECALLED.  **STOP LISINOPRIL** 6/10/20  Yes Ginny Ramirez MD   atorvastatin (LIPITOR) 40 MG tablet TAKE 1 TABLET BY MOUTH DAILY STOP SIMVASTATIN 3/16/20  Yes Ginny Ramirez MD   XARELTO 15 MG TABS tablet TAKE 1 TABLET BY MOUTH DAILY 2/19/20  Yes Ginny Ramirez MD   levothyroxine (SYNTHROID) 75 MCG tablet TAKE 1 TABLET BY MOUTH EVERY MORNING (BEFORE BREAKFAST) 1/21/20  Yes Ginny Ramirez MD   ASPIRIN LOW DOSE 81 MG EC tablet TAKE 1 TABLET BY MOUTH DAILY 1/21/20  Yes Ginny Ramirez MD   HM LORATADINE 10 MG tablet TAKE 1 TABLET BY MOUTH DAILY 1/21/20  Yes Ginny Ramirez MD   docusate sodium (COLACE) 100 MG capsule TAKE 1 CAPSULE BY MOUTH 2 TIMES DAILY 12/16/19  Yes Ginny Ramirez MD   MYRBETRIQ 50 MG TB24 TAKE ONE TABLET BY MOUTH ONCE DAILY 12/16/19  Yes Ze Danielle MD   metFORMIN (GLUCOPHAGE) 500 MG tablet TAKE ONE TABLET BY MOUTH TWICE A DAY WITH A MEAL 11/11/19  Yes Ze Danielle MD   Cranberry Vacc oxycoccus, (SM CRANBERRY) 500 MG TABS Take 500 mg by mouth daily  7/31/17  Yes Historical Provider, MD   Cholecalciferol (VITAMIN D) 2000 units TABS tablet Take 1 tablet by mouth daily 3/9/19  Yes Ze Danielle MD   vitamin B-12 (CYANOCOBALAMIN) 1000 MCG tablet Take 1 tablet by mouth daily 3/9/19  Yes Ze Danielle MD   acetaminophen (APAP EXTRA STRENGTH) 500 MG tablet Take 1 tablet by mouth every 6 hours as needed for Pain or Fever 2/12/19  Yes Ze Danielle MD   Lactobacillus (PROBIOTIC ACIDOPHILUS) TABS Take 1 tablet by mouth daily 7/31/17  Yes Ze Danielle MD   Dextromethorphan-guaiFENesin  MG/5ML SYRP Take 10 mLs by mouth every 4 hours as needed for Cough 3/24/20   Ze Danielle MD   nystatin (MYCOSTATIN) 789074 UNIT/GM powder Apply twice daily as needed for skin irritation 3/13/20   Nithya Vidal PA-C   UNABLE TO FIND Needs right walker brake fixed 10/3/19   Ze Danielle MD   lidocaine (LMX) 4 % cream Apply 2-3 times a day as needed for pain 10/19/18   Ze Danielle MD     Allergies  is allergic to sulfa antibiotics and penicillins. Family History  family history includes Coronary Art Dis in her father; Diabetes in her father, maternal grandfather, and mother; Heart Attack in her father and paternal grandfather; High Blood Pressure in her brother, father, maternal grandmother, mother, and sister; Hypertension in her father; No Known Problems in her paternal grandmother; Thyroid Disease in her brother and mother. Social History   reports that she has never smoked. She has never used smokeless tobacco. She reports that she does not drink alcohol or use drugs. Review of Systems:  All 10 system review was conducted. Please refer to chart.     OBJECTIVE:   VITALS: lower chest demonstrates no acute abnormality. There are coronary artery calcifications. Circumferential thickening of the distal esophagus is noted. Organs: The liver, spleen, pancreas, kidneys and adrenal glands are without acute findings. The gallbladder is distended and there is cholelithiasis and gallbladder sludge. No secondary evidence of acute cholecystitis. There is a 1.5 cm right adrenal adenoma. GI/Bowel: Small hiatal hernia. No mechanical bowel obstruction. There is diverticulosis. An inflamed diverticulum is seen at the junction of the descending and sigmoid colon with surrounding inflammatory changes consistent with acute diverticulitis. There is small extraluminal gas. Pelvis: Status post hysterectomy. No adnexal mass. The urinary bladder is partially distended without contour abnormality. Peritoneum/Retroperitoneum: Trace abdominopelvic ascites. Mild calcified atherosclerotic plaque. No lymphadenopathy. Bones/Soft Tissues: No acute or aggressive osseous lesions. 1. Perforated diverticulitis at the junction of the descending and sigmoid colon with trace extraluminal gas and small volume ascites. 2. Cholelithiasis and gallbladder sludge. The gallbladder is distended without additional secondary evidence of acute cholecystitis. 3. Small hiatal hernia with circumferential thickening of the distal esophagus, likely related to reflux esophagitis. This can be correlated with outpatient endoscopy as clinically appropriate. 4. Coronary artery disease. IMPRESSION:   1. Acute sigmoid diverticulitis with microperforation. Trace amount of fluid in the belly. CT scan report and images were reviewed with radiologist this evening. Again findings are consistent with microperforation. No large amount of free air seen in the abdomen. No significant fluid seen on the CT scan. 2. Previous hysterectomy for uterine cancer. 3. Morbid obesity.     does not have any pertinent problems on file.    PLAN:   1. Admission to the hospital.  Bowel rest.  Blood work noted and antibiotic has been changed. Patient will now receive Primaxin and Flagyl. COVID-19 testing. IV hydration along with fluid bolus. Strict I's and O's. Cloud catheter was inserted for strict I's and O's. Transfer to stepdown unit. GI/DVT prophylaxis. Repeat blood work and clinical exam in the morning. We will follow closely. 2. Discussed with patient and her daughter who is a nurse at length for several minutes. Daughter was updated with all the findings. Possibility of surgical intervention depending on her progress discussed at length. Patient and the family understand and agree with the current management plan. Thank you for this interesting consult and for allowing us to participate in the care of this patient. If you have any questions please don't hesitate to call.           Electronically signed by Du Prather MD  on 6/16/2020 at 7:36 PM

## 2020-06-16 NOTE — ED NOTES
Report given to Shelly Gamboa RN from [facility]. Report method by phone   The following was reviewed with receiving RN:   Current vital signs:  BP (!) 125/47   Pulse 82   Temp 99.6 °F (37.6 °C) (Oral)   Resp 17   Ht 5' 2\" (1.575 m)   Wt 230 lb (104.3 kg)   LMP  (LMP Unknown)   SpO2 99%   BMI 42.07 kg/m²                MEWS Score: 2     Any medication or safety alerts were reviewed. Any pending diagnostics and notifications were also reviewed, as well as any safety concerns or issues, abnormal labs, abnormal imaging, and abnormal assessment findings. Questions were answered.           Abdias Sam RN  06/15/20 [id number]

## 2020-06-17 LAB
ABSOLUTE EOS #: 0.1 K/UL (ref 0–0.4)
ABSOLUTE IMMATURE GRANULOCYTE: ABNORMAL K/UL (ref 0–0.3)
ABSOLUTE LYMPH #: 1 K/UL (ref 1–4.8)
ABSOLUTE MONO #: 0.5 K/UL (ref 0.1–1.3)
ANION GAP SERPL CALCULATED.3IONS-SCNC: 13 MMOL/L (ref 9–17)
ANTI-XA UNFRAC HEPARIN: 0.52 IU/L (ref 0.3–0.7)
BASOPHILS # BLD: 0 % (ref 0–2)
BASOPHILS ABSOLUTE: 0 K/UL (ref 0–0.2)
BUN BLDV-MCNC: 18 MG/DL (ref 8–23)
BUN/CREAT BLD: ABNORMAL (ref 9–20)
CALCIUM SERPL-MCNC: 7.4 MG/DL (ref 8.6–10.4)
CHLORIDE BLD-SCNC: 104 MMOL/L (ref 98–107)
CO2: 21 MMOL/L (ref 20–31)
CREAT SERPL-MCNC: 0.77 MG/DL (ref 0.5–0.9)
DIFFERENTIAL TYPE: ABNORMAL
EKG ATRIAL RATE: 127 BPM
EKG Q-T INTERVAL: 276 MS
EKG QRS DURATION: 82 MS
EKG QTC CALCULATION (BAZETT): 431 MS
EKG R AXIS: 89 DEGREES
EKG T AXIS: -64 DEGREES
EKG VENTRICULAR RATE: 147 BPM
EOSINOPHILS RELATIVE PERCENT: 1 % (ref 0–4)
GFR AFRICAN AMERICAN: >60 ML/MIN
GFR NON-AFRICAN AMERICAN: >60 ML/MIN
GFR SERPL CREATININE-BSD FRML MDRD: ABNORMAL ML/MIN/{1.73_M2}
GFR SERPL CREATININE-BSD FRML MDRD: ABNORMAL ML/MIN/{1.73_M2}
GLUCOSE BLD-MCNC: 106 MG/DL (ref 70–99)
GLUCOSE BLD-MCNC: 109 MG/DL (ref 65–105)
GLUCOSE BLD-MCNC: 111 MG/DL (ref 65–105)
GLUCOSE BLD-MCNC: 119 MG/DL (ref 65–105)
GLUCOSE BLD-MCNC: 96 MG/DL (ref 65–105)
HCT VFR BLD CALC: 32.2 % (ref 36–46)
HCT VFR BLD CALC: 32.8 % (ref 36–46)
HEMOGLOBIN: 10.7 G/DL (ref 12–16)
HEMOGLOBIN: 10.9 G/DL (ref 12–16)
IMMATURE GRANULOCYTES: ABNORMAL %
INR BLD: 1.3
LYMPHOCYTES # BLD: 8 % (ref 24–44)
MCH RBC QN AUTO: 31.1 PG (ref 26–34)
MCH RBC QN AUTO: 31.4 PG (ref 26–34)
MCHC RBC AUTO-ENTMCNC: 33.2 G/DL (ref 31–37)
MCHC RBC AUTO-ENTMCNC: 33.4 G/DL (ref 31–37)
MCV RBC AUTO: 93.7 FL (ref 80–100)
MCV RBC AUTO: 94.1 FL (ref 80–100)
MONOCYTES # BLD: 4 % (ref 1–7)
NRBC AUTOMATED: ABNORMAL PER 100 WBC
NRBC AUTOMATED: ABNORMAL PER 100 WBC
PARTIAL THROMBOPLASTIN TIME: 34.8 SEC (ref 24–36)
PDW BLD-RTO: 14.7 % (ref 11.5–14.9)
PDW BLD-RTO: 14.9 % (ref 11.5–14.9)
PLATELET # BLD: 183 K/UL (ref 150–450)
PLATELET # BLD: 191 K/UL (ref 150–450)
PLATELET ESTIMATE: ABNORMAL
PMV BLD AUTO: 7.6 FL (ref 6–12)
PMV BLD AUTO: 7.8 FL (ref 6–12)
POTASSIUM SERPL-SCNC: 3.9 MMOL/L (ref 3.7–5.3)
PROTHROMBIN TIME: 16.2 SEC (ref 11.8–14.6)
RBC # BLD: 3.44 M/UL (ref 4–5.2)
RBC # BLD: 3.48 M/UL (ref 4–5.2)
RBC # BLD: ABNORMAL 10*6/UL
SEG NEUTROPHILS: 87 % (ref 36–66)
SEGMENTED NEUTROPHILS ABSOLUTE COUNT: 11.6 K/UL (ref 1.3–9.1)
SODIUM BLD-SCNC: 138 MMOL/L (ref 135–144)
TSH SERPL DL<=0.05 MIU/L-ACNC: 3.28 MIU/L (ref 0.3–5)
WBC # BLD: 12.3 K/UL (ref 3.5–11)
WBC # BLD: 13.2 K/UL (ref 3.5–11)
WBC # BLD: ABNORMAL 10*3/UL

## 2020-06-17 PROCEDURE — 2060000000 HC ICU INTERMEDIATE R&B

## 2020-06-17 PROCEDURE — 6360000002 HC RX W HCPCS: Performed by: NURSE PRACTITIONER

## 2020-06-17 PROCEDURE — 6360000002 HC RX W HCPCS: Performed by: SURGERY

## 2020-06-17 PROCEDURE — 2580000003 HC RX 258: Performed by: INTERNAL MEDICINE

## 2020-06-17 PROCEDURE — 84443 ASSAY THYROID STIM HORMONE: CPT

## 2020-06-17 PROCEDURE — 6370000000 HC RX 637 (ALT 250 FOR IP): Performed by: INTERNAL MEDICINE

## 2020-06-17 PROCEDURE — 93010 ELECTROCARDIOGRAM REPORT: CPT | Performed by: INTERNAL MEDICINE

## 2020-06-17 PROCEDURE — 2500000003 HC RX 250 WO HCPCS: Performed by: INTERNAL MEDICINE

## 2020-06-17 PROCEDURE — 2580000003 HC RX 258: Performed by: SURGERY

## 2020-06-17 PROCEDURE — 2500000003 HC RX 250 WO HCPCS: Performed by: NURSE PRACTITIONER

## 2020-06-17 PROCEDURE — 85025 COMPLETE CBC W/AUTO DIFF WBC: CPT

## 2020-06-17 PROCEDURE — 85520 HEPARIN ASSAY: CPT

## 2020-06-17 PROCEDURE — 6370000000 HC RX 637 (ALT 250 FOR IP): Performed by: PHYSICIAN ASSISTANT

## 2020-06-17 PROCEDURE — 82947 ASSAY GLUCOSE BLOOD QUANT: CPT

## 2020-06-17 PROCEDURE — 85730 THROMBOPLASTIN TIME PARTIAL: CPT

## 2020-06-17 PROCEDURE — 6360000002 HC RX W HCPCS: Performed by: INTERNAL MEDICINE

## 2020-06-17 PROCEDURE — 99233 SBSQ HOSP IP/OBS HIGH 50: CPT | Performed by: INTERNAL MEDICINE

## 2020-06-17 PROCEDURE — 2580000003 HC RX 258: Performed by: NURSE PRACTITIONER

## 2020-06-17 PROCEDURE — 80048 BASIC METABOLIC PNL TOTAL CA: CPT

## 2020-06-17 PROCEDURE — 93005 ELECTROCARDIOGRAM TRACING: CPT | Performed by: NURSE PRACTITIONER

## 2020-06-17 PROCEDURE — 36415 COLL VENOUS BLD VENIPUNCTURE: CPT

## 2020-06-17 PROCEDURE — 85610 PROTHROMBIN TIME: CPT

## 2020-06-17 PROCEDURE — 85027 COMPLETE CBC AUTOMATED: CPT

## 2020-06-17 RX ORDER — HEPARIN SODIUM 5000 [USP'U]/ML
30 INJECTION, SOLUTION INTRAVENOUS; SUBCUTANEOUS PRN
Status: DISCONTINUED | OUTPATIENT
Start: 2020-06-17 | End: 2020-06-17

## 2020-06-17 RX ORDER — HEPARIN SODIUM 10000 [USP'U]/100ML
8.81 INJECTION, SOLUTION INTRAVENOUS CONTINUOUS
Status: DISCONTINUED | OUTPATIENT
Start: 2020-06-17 | End: 2020-06-21

## 2020-06-17 RX ORDER — HEPARIN SODIUM 5000 [USP'U]/ML
60 INJECTION, SOLUTION INTRAVENOUS; SUBCUTANEOUS ONCE
Status: DISCONTINUED | OUTPATIENT
Start: 2020-06-17 | End: 2020-06-17

## 2020-06-17 RX ORDER — HEPARIN SODIUM 10000 [USP'U]/100ML
12 INJECTION, SOLUTION INTRAVENOUS CONTINUOUS
Status: DISCONTINUED | OUTPATIENT
Start: 2020-06-17 | End: 2020-06-17

## 2020-06-17 RX ORDER — HEPARIN SODIUM 5000 [USP'U]/ML
4000 INJECTION, SOLUTION INTRAVENOUS; SUBCUTANEOUS PRN
Status: DISCONTINUED | OUTPATIENT
Start: 2020-06-17 | End: 2020-06-21

## 2020-06-17 RX ORDER — ACETAMINOPHEN 650 MG/1
650 SUPPOSITORY RECTAL EVERY 4 HOURS PRN
Status: DISCONTINUED | OUTPATIENT
Start: 2020-06-17 | End: 2020-06-22 | Stop reason: HOSPADM

## 2020-06-17 RX ORDER — HEPARIN SODIUM 5000 [USP'U]/ML
2000 INJECTION, SOLUTION INTRAVENOUS; SUBCUTANEOUS PRN
Status: DISCONTINUED | OUTPATIENT
Start: 2020-06-17 | End: 2020-06-21

## 2020-06-17 RX ORDER — ACETAMINOPHEN 325 MG/1
650 TABLET ORAL EVERY 4 HOURS PRN
Status: DISCONTINUED | OUTPATIENT
Start: 2020-06-17 | End: 2020-06-22 | Stop reason: HOSPADM

## 2020-06-17 RX ORDER — HEPARIN SODIUM 5000 [USP'U]/ML
60 INJECTION, SOLUTION INTRAVENOUS; SUBCUTANEOUS PRN
Status: DISCONTINUED | OUTPATIENT
Start: 2020-06-17 | End: 2020-06-17

## 2020-06-17 RX ORDER — HEPARIN SODIUM 5000 [USP'U]/ML
4000 INJECTION, SOLUTION INTRAVENOUS; SUBCUTANEOUS ONCE
Status: COMPLETED | OUTPATIENT
Start: 2020-06-17 | End: 2020-06-17

## 2020-06-17 RX ADMIN — METRONIDAZOLE 500 MG: 500 INJECTION, SOLUTION INTRAVENOUS at 06:05

## 2020-06-17 RX ADMIN — SODIUM CHLORIDE: 9 INJECTION, SOLUTION INTRAVENOUS at 14:58

## 2020-06-17 RX ADMIN — METRONIDAZOLE 500 MG: 500 INJECTION, SOLUTION INTRAVENOUS at 21:39

## 2020-06-17 RX ADMIN — METRONIDAZOLE 500 MG: 500 INJECTION, SOLUTION INTRAVENOUS at 14:56

## 2020-06-17 RX ADMIN — DILTIAZEM HYDROCHLORIDE 5 MG/ML: 5 INJECTION INTRAVENOUS at 12:12

## 2020-06-17 RX ADMIN — MEROPENEM 1 G: 1 INJECTION, POWDER, FOR SOLUTION INTRAVENOUS at 19:42

## 2020-06-17 RX ADMIN — ONDANSETRON 4 MG: 2 INJECTION INTRAMUSCULAR; INTRAVENOUS at 10:53

## 2020-06-17 RX ADMIN — AMIODARONE HYDROCHLORIDE 1 MG/MIN: 1.8 INJECTION, SOLUTION INTRAVENOUS at 06:04

## 2020-06-17 RX ADMIN — DEXTROSE MONOHYDRATE 150 MG: 50 INJECTION, SOLUTION INTRAVENOUS at 11:57

## 2020-06-17 RX ADMIN — ACETAMINOPHEN 650 MG: 650 SUPPOSITORY RECTAL at 23:50

## 2020-06-17 RX ADMIN — HEPARIN SODIUM 8.81 UNITS/KG/HR: 10000 INJECTION, SOLUTION INTRAVENOUS at 12:38

## 2020-06-17 RX ADMIN — DILTIAZEM HYDROCHLORIDE 15 MG/HR: 5 INJECTION INTRAVENOUS at 23:10

## 2020-06-17 RX ADMIN — AMIODARONE HYDROCHLORIDE 0.5 MG/MIN: 1.8 INJECTION, SOLUTION INTRAVENOUS at 22:43

## 2020-06-17 RX ADMIN — HEPARIN SODIUM 4000 UNITS: 5000 INJECTION INTRAVENOUS; SUBCUTANEOUS at 12:38

## 2020-06-17 RX ADMIN — MORPHINE SULFATE 2 MG: 2 INJECTION, SOLUTION INTRAMUSCULAR; INTRAVENOUS at 22:57

## 2020-06-17 RX ADMIN — ACETAMINOPHEN 650 MG: 650 SUPPOSITORY RECTAL at 04:54

## 2020-06-17 RX ADMIN — MEROPENEM 1 G: 1 INJECTION, POWDER, FOR SOLUTION INTRAVENOUS at 10:41

## 2020-06-17 RX ADMIN — MORPHINE SULFATE 4 MG: 4 INJECTION, SOLUTION INTRAMUSCULAR; INTRAVENOUS at 05:57

## 2020-06-17 RX ADMIN — MORPHINE SULFATE 4 MG: 4 INJECTION, SOLUTION INTRAMUSCULAR; INTRAVENOUS at 10:41

## 2020-06-17 RX ADMIN — MEROPENEM 1 G: 1 INJECTION, POWDER, FOR SOLUTION INTRAVENOUS at 02:04

## 2020-06-17 RX ADMIN — AMIODARONE HYDROCHLORIDE 0.5 MG/MIN: 1.8 INJECTION, SOLUTION INTRAVENOUS at 11:26

## 2020-06-17 RX ADMIN — AMIODARONE HYDROCHLORIDE 150 MG: 1.5 INJECTION, SOLUTION INTRAVENOUS at 05:49

## 2020-06-17 ASSESSMENT — ENCOUNTER SYMPTOMS
SHORTNESS OF BREATH: 0
NAUSEA: 0
WHEEZING: 0
VOMITING: 1
DIARRHEA: 0
CONSTIPATION: 1
COUGH: 0
ABDOMINAL PAIN: 1
ANAL BLEEDING: 0

## 2020-06-17 ASSESSMENT — PAIN SCALES - GENERAL
PAINLEVEL_OUTOF10: 4
PAINLEVEL_OUTOF10: 0
PAINLEVEL_OUTOF10: 6
PAINLEVEL_OUTOF10: 0
PAINLEVEL_OUTOF10: 7

## 2020-06-17 NOTE — PROGRESS NOTES
 meropenem  1 g Intravenous Q8H    nystatin  5 mL Oral 4x Daily     Continuous Infusions:    Amiodarone 0.5 mg/min (06/17/20 1126)    dilTIAZem (CARDIZEM) 125 mg in dextrose 5% 125 mL infusion 5 mg/mL (06/17/20 1212)    heparin (porcine)      sodium chloride 150 mL/hr at 06/16/20 2122    dextrose       PRN Meds: heparin (porcine), heparin (porcine), sodium chloride flush, magnesium sulfate, promethazine **OR** ondansetron, nicotine, morphine **OR** morphine, glucose, dextrose, glucagon (rDNA), dextrose, acetaminophen    Data:     Past Medical History:   has a past medical history of Adenocarcinoma of endometrium, stage 1 ([facility] [address] JOSHUA (acute kidney injury) ([facility] [address] Allergic rhinitis, At high risk for falls, Colon polyp, Diabetes mellitus ([facility] [address] Endometrial cancer ([facility] [address] History of TIA (transient ischemic attack), Hyperglycemia, Hyperlipidemia, Hypertension, Hypothyroidism, Legally blind, Lower back pain, Mixed incontinence, Morbid obesity with BMI of 40.0-44.9, adult ([facility] [address] CLAROS (nonalcoholic steatohepatitis), Obesity, Oral phase dysphagia, Osteoarthritis (arthritis due to wear and tear of joints), Osteoarthritis involving multiple joints on both sides of body, Osteoporosis, Postmenopausal bleeding, S/P h-scope, Myosure 9/20/17, Tennis elbow, Thickened endometrium, TLHBSO, bilateral LND 10/24/17, and Type 2 diabetes mellitus, without long-term current use of insulin ([facility] [address] Social History:   reports that she has never smoked. She has never used smokeless tobacco. She reports that she does not drink alcohol or use drugs.      Family History:   Family History   Problem Relation Age of Onset    Coronary Art Dis Father     Hypertension Father     Diabetes Father     High Blood Pressure Father     Heart Attack Father     Diabetes Mother     High Blood Pressure Mother     Thyroid Disease Mother     High Blood Pressure Sister     Thyroid Disease Brother     High Blood Pressure Brother     High Blood Pressure Maternal Grandmother     Diabetes Maternal Grandfather     No Known Problems Paternal Grandmother     Heart Attack Paternal Grandfather     Colon Cancer Neg Hx        Vitals:  BP (!) 158/87   Pulse 103   Temp 97.7 °F (36.5 °C) (Oral)   Resp 16   Ht 5' 2\" (1.575 m)   Wt 250 lb 3.6 oz (113.5 kg)   LMP  (LMP Unknown)   SpO2 95%   BMI 45.77 kg/m²   Temp (24hrs), Av °F (37.2 °C), Min:97.7 °F (36.5 °C), Max:100.4 °F (38 °C)    Recent Labs     20  2118 20  0701 20  1118   POCGLU 107* 111* 96       I/O (24Hr): Intake/Output Summary (Last 24 hours) at 2020 1215  Last data filed at 2020 0800  Gross per 24 hour   Intake 0 ml   Output 700 ml   Net -700 ml       Labs:    [unfilled]    Lab Results   Component Value Date/Time    SPECIAL NOT REPORTED 2020 02:32 PM     Lab Results   Component Value Date/Time    CULTURE NO SIGNIFICANT GROWTH 2020 02:32 PM       [facility]    Radiology:     Ct Abdomen Pelvis W Iv Contrast Additional Contrast? None    Result Date: 6/15/2020  EXAMINATION: CT OF THE ABDOMEN AND PELVIS WITH CONTRAST 6/15/2020 8:42 pm TECHNIQUE: CT of the abdomen and pelvis was performed with the administration of intravenous contrast. Multiplanar reformatted images are provided for review. Dose modulation, iterative reconstruction, and/or weight based adjustment of the mA/kV was utilized to reduce the radiation dose to as low as reasonably achievable. COMPARISON: None. HISTORY: ORDERING SYSTEM PROVIDED HISTORY: llq abd pain TECHNOLOGIST PROVIDED HISTORY: llq abd pain Reason for Exam: pt c/o llq abd pain Acuity: Unknown Type of Exam: Unknown Additional signs and symptoms: pts IV infiltrated during scan, repeated scan with new IV FINDINGS: Lower Chest:  Visualized portion of the lower chest demonstrates no acute abnormality. There are coronary artery calcifications. Circumferential thickening of the distal esophagus is noted. Organs:  The liver, spleen, Physical Exam  Constitutional:       General: She is not in acute distress. Appearance: She is well-developed. She is not diaphoretic. HENT:      Head: Normocephalic and atraumatic. Eyes:      Pupils: Pupils are equal, round, and reactive to light. Neck:      Thyroid: No thyromegaly. Vascular: No JVD. Cardiovascular:      Rate and Rhythm: Tachycardia present. Rhythm irregular. Heart sounds: Normal heart sounds. No murmur. No friction rub. No gallop. Pulmonary:      Effort: Pulmonary effort is normal. No respiratory distress. Breath sounds: Normal breath sounds. No wheezing. Abdominal:      General: Bowel sounds are normal. There is no distension. Palpations: Abdomen is soft. Tenderness: There is abdominal tenderness (LLQ). Skin:     General: Skin is warm and dry. Findings: No erythema or rash. Neurological:      Mental Status: She is alert and oriented to person, place, and time.            Assessment:        Primary Problem  Perforated diverticulum    Active Hospital Problems    Diagnosis Date Noted    Perforated diverticulum [K57.80] 06/15/2020    Type 2 diabetes mellitus, without long-term current use of insulin ([facility] [address] [E11.9] 01/14/2020       Plan:        Perforated diverticulum  Diverticulitis  DM2  HTN  Afib on Xarelto, last taken yesterday morning  Leukocytosis, lactic acidosis    Cefepime, Vanco and Flagyl   General surgery consulted   Pain control  IVF  NPO  SSI  June 17  afib rvr  On amidarone drip  Iv abx  covid testing negative  Multiple comorbid problems including morbid obesity A. fib with RVR perforated diverticulum complicated case with multiple severe medical problems     Davey Sherwood MD  6/17/2020  12:15 PM

## 2020-06-17 NOTE — PROGRESS NOTES
[facility] [address]                 PATIENT NAME: Johanna Timmons     TODAY'S DATE: 6/17/2020, 10:10 AM    SUBJECTIVE:    Pt seen and examined. Afebrile, VSS. Patient went into Afib with RVR this morning, amiodarone bolus and drip started per cardiology. Abdominal pain has improved but is still present. Last bowel movement two days ago, she is passing gas. Adequate urine output. No N/V.      OBJECTIVE:   VITALS:  /79   Pulse 103   Temp 97.7 °F (36.5 °C) (Oral)   Resp 16   Ht 5' 2\" (1.575 m)   Wt 250 lb 3.6 oz (113.5 kg)   LMP  (LMP Unknown)   SpO2 97%   BMI 45.77 kg/m²      INTAKE/OUTPUT:      Intake/Output Summary (Last 24 hours) at 6/17/2020 1010  Last data filed at 6/17/2020 0800  Gross per 24 hour   Intake 0 ml   Output 700 ml   Net -700 ml                 CONSTITUTIONAL:  awake and alert.   No acute distress  HEART:   Afib with RVR  LUNGS:   Decreased air entry at bases  ABDOMEN:   Abdomen soft, LLQ tenderness, non-distended  EXTREMITIES:   Pedal edema    Data:  CBC:   Lab Results   Component Value Date    WBC 13.2 06/17/2020    RBC 3.48 06/17/2020    RBC 4.23 03/20/2012    HGB 10.9 06/17/2020    HCT 32.8 06/17/2020    MCV 94.1 06/17/2020    MCH 31.4 06/17/2020    MCHC 33.4 06/17/2020    RDW 14.7 06/17/2020     06/17/2020     03/20/2012    MPV 7.8 06/17/2020     BMP:    Lab Results   Component Value Date     06/17/2020    K 3.9 06/17/2020     06/17/2020    CO2 21 06/17/2020    BUN 18 06/17/2020    LABALBU 3.6 06/15/2020    LABALBU 4.3 03/20/2012    CREATININE 0.77 06/17/2020    CALCIUM 7.4 06/17/2020    GFRAA >60 06/17/2020    LABGLOM >60 06/17/2020    GLUCOSE 106 06/17/2020    GLUCOSE 114 03/20/2012       Radiology Review:  No new images to review      ASSESSMENT     Principal Problem:    Perforated diverticulum  Active Problems:    Type 2 diabetes mellitus, without long-term current use of insulin (HCC)  Resolved Problems:    * No resolved

## 2020-06-17 NOTE — CONSULTS
lesion by hot bx forceps (N/A, 6/26/2017); Dilation and curettage of uterus (N/A, 9/20/2017); Cataract removal with implant (Bilateral, 2015); vitrectomy; julieta and bso (cervix removed) (10/24/2017); and Hysterectomy (N/A, 10/24/2017). Home Medications:    Prior to Admission medications    Medication Sig Start Date End Date Taking? Authorizing Provider   amiodarone (CORDARONE) 200 MG tablet TAKE 1 TABLET BY MOUTH DAILY 6/10/20  Yes Price Morales MD   losartan (COZAAR) 100 MG tablet TAKE 1 TABLET BY MOUTH DAILY LOSARTAN IF NOT RECALLED.  **STOP LISINOPRIL** 6/10/20  Yes Price Morales MD   atorvastatin (LIPITOR) 40 MG tablet TAKE 1 TABLET BY MOUTH DAILY STOP SIMVASTATIN 3/16/20  Yes Price Morales MD   XARELTO 15 MG TABS tablet TAKE 1 TABLET BY MOUTH DAILY 2/19/20  Yes Price Morales MD   levothyroxine (SYNTHROID) 75 MCG tablet TAKE 1 TABLET BY MOUTH EVERY MORNING (BEFORE BREAKFAST) 1/21/20  Yes Price Morales MD   ASPIRIN LOW DOSE 81 MG EC tablet TAKE 1 TABLET BY MOUTH DAILY 1/21/20  Yes Price Morales MD   HM LORATADINE 10 MG tablet TAKE 1 TABLET BY MOUTH DAILY 1/21/20  Yes Price Morales MD   docusate sodium (COLACE) 100 MG capsule TAKE 1 CAPSULE BY MOUTH 2 TIMES DAILY 12/16/19  Yes Price Morales MD   MYRBETRIQ 50 MG TB24 TAKE ONE TABLET BY MOUTH ONCE DAILY 12/16/19  Yes Price Morales MD   metFORMIN (GLUCOPHAGE) 500 MG tablet TAKE ONE TABLET BY MOUTH TWICE A DAY WITH A MEAL 11/11/19  Yes Price Morales MD   Cranberry, Vacc oxycoccus, (SM CRANBERRY) 500 MG TABS Take 500 mg by mouth daily  7/31/17  Yes Historical Provider, MD   Cholecalciferol (VITAMIN D) 2000 units TABS tablet Take 1 tablet by mouth daily 3/9/19  Yes Price Morales MD   vitamin B-12 (CYANOCOBALAMIN) 1000 MCG tablet Take 1 tablet by mouth daily 3/9/19  Yes Price Morales MD   acetaminophen (APAP EXTRA STRENGTH) 500 MG tablet Take 1 tablet by mouth every 6 hours as needed for Pain or

## 2020-06-18 LAB
ANION GAP SERPL CALCULATED.3IONS-SCNC: 11 MMOL/L (ref 9–17)
ANTI-XA UNFRAC HEPARIN: 0.43 IU/L (ref 0.3–0.7)
ANTI-XA UNFRAC HEPARIN: 0.45 IU/L (ref 0.3–0.7)
BUN BLDV-MCNC: 11 MG/DL (ref 8–23)
BUN/CREAT BLD: ABNORMAL (ref 9–20)
CALCIUM SERPL-MCNC: 7.1 MG/DL (ref 8.6–10.4)
CHLORIDE BLD-SCNC: 101 MMOL/L (ref 98–107)
CO2: 23 MMOL/L (ref 20–31)
CREAT SERPL-MCNC: 0.6 MG/DL (ref 0.5–0.9)
GFR AFRICAN AMERICAN: >60 ML/MIN
GFR NON-AFRICAN AMERICAN: >60 ML/MIN
GFR SERPL CREATININE-BSD FRML MDRD: ABNORMAL ML/MIN/{1.73_M2}
GFR SERPL CREATININE-BSD FRML MDRD: ABNORMAL ML/MIN/{1.73_M2}
GLUCOSE BLD-MCNC: 103 MG/DL (ref 65–105)
GLUCOSE BLD-MCNC: 112 MG/DL (ref 65–105)
GLUCOSE BLD-MCNC: 118 MG/DL (ref 70–99)
GLUCOSE BLD-MCNC: 119 MG/DL (ref 65–105)
GLUCOSE BLD-MCNC: 186 MG/DL (ref 65–105)
HCT VFR BLD CALC: 31.1 % (ref 36–46)
HEMOGLOBIN: 10.3 G/DL (ref 12–16)
MAGNESIUM: 1.3 MG/DL (ref 1.6–2.6)
MCH RBC QN AUTO: 30.8 PG (ref 26–34)
MCHC RBC AUTO-ENTMCNC: 33.3 G/DL (ref 31–37)
MCV RBC AUTO: 92.4 FL (ref 80–100)
NRBC AUTOMATED: ABNORMAL PER 100 WBC
PDW BLD-RTO: 14.6 % (ref 11.5–14.9)
PLATELET # BLD: 184 K/UL (ref 150–450)
PMV BLD AUTO: 7.9 FL (ref 6–12)
POTASSIUM SERPL-SCNC: 3 MMOL/L (ref 3.7–5.3)
RBC # BLD: 3.36 M/UL (ref 4–5.2)
SODIUM BLD-SCNC: 135 MMOL/L (ref 135–144)
WBC # BLD: 10.7 K/UL (ref 3.5–11)

## 2020-06-18 PROCEDURE — 6360000002 HC RX W HCPCS: Performed by: NURSE PRACTITIONER

## 2020-06-18 PROCEDURE — 6360000002 HC RX W HCPCS: Performed by: INTERNAL MEDICINE

## 2020-06-18 PROCEDURE — 2580000003 HC RX 258: Performed by: PHYSICIAN ASSISTANT

## 2020-06-18 PROCEDURE — 2580000003 HC RX 258: Performed by: NURSE PRACTITIONER

## 2020-06-18 PROCEDURE — 2060000000 HC ICU INTERMEDIATE R&B

## 2020-06-18 PROCEDURE — 6360000002 HC RX W HCPCS: Performed by: SURGERY

## 2020-06-18 PROCEDURE — 82947 ASSAY GLUCOSE BLOOD QUANT: CPT

## 2020-06-18 PROCEDURE — 36415 COLL VENOUS BLD VENIPUNCTURE: CPT

## 2020-06-18 PROCEDURE — 2580000003 HC RX 258: Performed by: SURGERY

## 2020-06-18 PROCEDURE — 2500000003 HC RX 250 WO HCPCS: Performed by: NURSE PRACTITIONER

## 2020-06-18 PROCEDURE — 99233 SBSQ HOSP IP/OBS HIGH 50: CPT | Performed by: INTERNAL MEDICINE

## 2020-06-18 PROCEDURE — 85027 COMPLETE CBC AUTOMATED: CPT

## 2020-06-18 PROCEDURE — 6370000000 HC RX 637 (ALT 250 FOR IP): Performed by: NURSE PRACTITIONER

## 2020-06-18 PROCEDURE — 2580000003 HC RX 258: Performed by: INTERNAL MEDICINE

## 2020-06-18 PROCEDURE — 80048 BASIC METABOLIC PNL TOTAL CA: CPT

## 2020-06-18 PROCEDURE — 85520 HEPARIN ASSAY: CPT

## 2020-06-18 PROCEDURE — 6370000000 HC RX 637 (ALT 250 FOR IP): Performed by: INTERNAL MEDICINE

## 2020-06-18 PROCEDURE — 83735 ASSAY OF MAGNESIUM: CPT

## 2020-06-18 PROCEDURE — 2500000003 HC RX 250 WO HCPCS: Performed by: INTERNAL MEDICINE

## 2020-06-18 PROCEDURE — 6360000002 HC RX W HCPCS: Performed by: PHYSICIAN ASSISTANT

## 2020-06-18 RX ORDER — POTASSIUM CHLORIDE 7.45 MG/ML
10 INJECTION INTRAVENOUS PRN
Status: DISCONTINUED | OUTPATIENT
Start: 2020-06-18 | End: 2020-06-20

## 2020-06-18 RX ORDER — DIGOXIN 0.25 MG/ML
125 INJECTION INTRAMUSCULAR; INTRAVENOUS EVERY 4 HOURS
Status: COMPLETED | OUTPATIENT
Start: 2020-06-18 | End: 2020-06-18

## 2020-06-18 RX ADMIN — DIGOXIN 125 MCG: 0.25 INJECTION INTRAMUSCULAR; INTRAVENOUS at 15:16

## 2020-06-18 RX ADMIN — AMIODARONE HYDROCHLORIDE 0.5 MG/MIN: 1.8 INJECTION, SOLUTION INTRAVENOUS at 09:58

## 2020-06-18 RX ADMIN — MEROPENEM 1 G: 1 INJECTION, POWDER, FOR SOLUTION INTRAVENOUS at 02:20

## 2020-06-18 RX ADMIN — DILTIAZEM HYDROCHLORIDE 15 MG/HR: 5 INJECTION INTRAVENOUS at 09:21

## 2020-06-18 RX ADMIN — METRONIDAZOLE 500 MG: 500 INJECTION, SOLUTION INTRAVENOUS at 22:02

## 2020-06-18 RX ADMIN — METRONIDAZOLE 500 MG: 500 INJECTION, SOLUTION INTRAVENOUS at 05:50

## 2020-06-18 RX ADMIN — HEPARIN SODIUM 8.81 UNITS/KG/HR: 10000 INJECTION, SOLUTION INTRAVENOUS at 12:48

## 2020-06-18 RX ADMIN — NYSTATIN 500000 UNITS: 100000 SUSPENSION ORAL at 21:07

## 2020-06-18 RX ADMIN — NYSTATIN 500000 UNITS: 100000 SUSPENSION ORAL at 08:41

## 2020-06-18 RX ADMIN — INSULIN LISPRO 1 UNITS: 100 INJECTION, SOLUTION INTRAVENOUS; SUBCUTANEOUS at 22:02

## 2020-06-18 RX ADMIN — DIGOXIN 125 MCG: 0.25 INJECTION INTRAMUSCULAR; INTRAVENOUS at 18:21

## 2020-06-18 RX ADMIN — MAGNESIUM SULFATE 1 G: 1 INJECTION INTRAVENOUS at 21:08

## 2020-06-18 RX ADMIN — SODIUM CHLORIDE: 9 INJECTION, SOLUTION INTRAVENOUS at 08:41

## 2020-06-18 RX ADMIN — DILTIAZEM HYDROCHLORIDE 15 MG/HR: 5 INJECTION INTRAVENOUS at 01:22

## 2020-06-18 RX ADMIN — SODIUM CHLORIDE: 9 INJECTION, SOLUTION INTRAVENOUS at 01:16

## 2020-06-18 RX ADMIN — MAGNESIUM SULFATE 1 G: 1 INJECTION INTRAVENOUS at 22:46

## 2020-06-18 RX ADMIN — MEROPENEM 1 G: 1 INJECTION, POWDER, FOR SOLUTION INTRAVENOUS at 10:14

## 2020-06-18 RX ADMIN — POTASSIUM CHLORIDE: 2 INJECTION, SOLUTION, CONCENTRATE INTRAVENOUS at 11:24

## 2020-06-18 RX ADMIN — MAGNESIUM SULFATE 1 G: 1 INJECTION INTRAVENOUS at 18:20

## 2020-06-18 RX ADMIN — MEROPENEM 1 G: 1 INJECTION, POWDER, FOR SOLUTION INTRAVENOUS at 18:20

## 2020-06-18 RX ADMIN — NYSTATIN 500000 UNITS: 100000 SUSPENSION ORAL at 15:16

## 2020-06-18 RX ADMIN — AMIODARONE HYDROCHLORIDE 0.5 MG/MIN: 1.8 INJECTION, SOLUTION INTRAVENOUS at 21:04

## 2020-06-18 RX ADMIN — DILTIAZEM HYDROCHLORIDE 15 MG/HR: 5 INJECTION INTRAVENOUS at 16:28

## 2020-06-18 RX ADMIN — METRONIDAZOLE 500 MG: 500 INJECTION, SOLUTION INTRAVENOUS at 15:16

## 2020-06-18 RX ADMIN — MAGNESIUM SULFATE 1 G: 1 INJECTION INTRAVENOUS at 17:07

## 2020-06-18 ASSESSMENT — ENCOUNTER SYMPTOMS
ABDOMINAL PAIN: 1
CONSTIPATION: 1
COUGH: 0
WHEEZING: 0
NAUSEA: 0
DIARRHEA: 0
SHORTNESS OF BREATH: 0
ANAL BLEEDING: 0
VOMITING: 1

## 2020-06-18 NOTE — CARE COORDINATION
DISCHARGE PLANNING NOTE:    Plan is for this patient to either return to home with [facility] vs SNF. Patient is currently NPO and Gen surgery is waiting until patient is more stable to perform surgery. Currently on IV amio, IV heparin and IV cardizem gtts. Replacing Mag and K+    Will need PT/OT board soon as patient may require SNF. Will continue to follow along.      Electronically signed by Marta Davidson RN on 6/18/2020 at 2:09 PM

## 2020-06-18 NOTE — PLAN OF CARE
Problem: Falls - Risk of:  Goal: Will remain free from falls  Description: Will remain free from falls  6/18/2020 1540 by Dre Allen RN  Outcome: Ongoing     Problem: Falls - Risk of:  Goal: Absence of physical injury  Description: Absence of physical injury  6/18/2020 1540 by Dre Allen RN  Outcome: Ongoing     Problem: Pain:  Goal: Pain level will decrease  Description: Pain level will decrease  6/18/2020 1540 by Dre Allen RN  Outcome: Ongoing     Problem: Pain:  Goal: Control of acute pain  Description: Control of acute pain  6/18/2020 1540 by Dre Allen RN  Outcome: Ongoing     Problem: Pain:  Goal: Control of chronic pain  Description: Control of chronic pain  6/18/2020 1540 by Dre Allen RN  Outcome: Ongoing     Problem: Nutrition  Goal: Optimal nutrition therapy  6/18/2020 1540 by Dre Allen RN  Outcome: Ongoing       Patient remains free of falls and injury this shift. Pull away alarm and bed alarm used. Patient up with assist and walker. Patient denies pain this shift. Patient remains NPO.

## 2020-06-18 NOTE — PROGRESS NOTES
[facility]    Progress Note    6/18/2020    10:36 AM    Name:   Nelly Denton  MRN:     [id number]     Acct:      [de-identified]   Room:   2103/2103-[facility] Day:  3  Admit Date:  6/15/2020  6:48 PM    PCP:   Harvinder Mclaughlin MD  Code Status:  Full Code    Subjective:     C/C: Abdominal pain  Interval HistoryStatus: not changed. Patient seen and examined at bedside. No acute events overnight. Patient is laying in bed in mild distress. Reports that she continues to have abdominal pain which has not improved. She has not had any further episodes of emesis and denies nausea. She has not had a BM in several days. Brief History:     79 yo female with PMH significant for Afib, HTN, DM2, morbid obesity presented to the ED complaining of sudden onset abdominal pain as well as 3 episodes of emesis which started around 4pm on 6/15. In the ED CT abdomen revealed perforated diverticulum. Patient started on fluids, abx and GS consulted. Review of Systems:     Review of Systems   Constitutional: Negative for chills and fever. Respiratory: Negative for cough, shortness of breath and wheezing. Cardiovascular: Negative for chest pain. Gastrointestinal: Positive for abdominal pain, constipation and vomiting (prior to arriaval ). Negative for anal bleeding, diarrhea and nausea. Genitourinary: Negative for difficulty urinating, dysuria, frequency and urgency. Neurological: Negative for dizziness, weakness, light-headedness and headaches. Medications: Allergies:     Allergies   Allergen Reactions    Sulfa Antibiotics Nausea Only    Penicillins Rash       Current Meds:   Scheduled Meds:    IVPB builder   Intravenous Once    sodium chloride flush  10 mL Intravenous 2 times per day    metroNIDAZOLE  500 mg Intravenous Q8H    insulin lispro  0-12 Units Subcutaneous TID     insulin lispro  0-6 Units Subcutaneous Nightly    meropenem  1 g Intravenous Q8H    nystatin  5 mL Oral 4x Daily     Continuous Infusions:    Amiodarone 0.5 mg/min (06/18/20 0958)    dilTIAZem (CARDIZEM) 125 mg in dextrose 5% 125 mL infusion 15 mg/hr (06/18/20 0921)    heparin (porcine) 8.81 Units/kg/hr (06/18/20 0657)    sodium chloride 150 mL/hr at 06/18/20 0841    dextrose       PRN Meds: potassium chloride, heparin (porcine), heparin (porcine), acetaminophen, acetaminophen, sodium chloride flush, magnesium sulfate, promethazine **OR** ondansetron, nicotine, morphine **OR** morphine, glucose, dextrose, glucagon (rDNA), dextrose    Data:     Past Medical History:   has a past medical history of Adenocarcinoma of endometrium, stage 1 ([facility] [address] JOSHUA (acute kidney injury) ([facility] Allergic rhinitis, At high risk for falls, Colon polyp, Diabetes mellitus ([facility] [address] Endometrial cancer ([facility] History of TIA (transient ischemic attack), Hyperglycemia, Hyperlipidemia, Hypertension, Hypothyroidism, Legally blind, Lower back pain, Mixed incontinence, Morbid obesity with BMI of 40.0-44.9, adult ([facility] [address] CLAROS (nonalcoholic steatohepatitis), Obesity, Oral phase dysphagia, Osteoarthritis (arthritis due to wear and tear of joints), Osteoarthritis involving multiple joints on both sides of body, Osteoporosis, Postmenopausal bleeding, S/P h-scope, Myosure 9/20/17, Tennis elbow, Thickened endometrium, TLHBSO, bilateral LND 10/24/17, and Type 2 diabetes mellitus, without long-term current use of insulin ([facility] Social History:   reports that she has never smoked. She has never used smokeless tobacco. She reports that she does not drink alcohol or use drugs.      Family History:   Family History   Problem Relation Age of Onset    Coronary Art Dis Father     Hypertension Father     Diabetes Father     High Blood Pressure Father     Heart Attack Father     Diabetes Mother     High Blood Pressure Mother     Thyroid Disease Mother     High Blood Pressure Sister     Thyroid Disease Brother    [facility] cardiomegaly without overt failure. Physical Examination:        Physical Exam  Constitutional:       General: She is not in acute distress. Appearance: She is well-developed. She is not diaphoretic. HENT:      Head: Normocephalic and atraumatic. Eyes:      Pupils: Pupils are equal, round, and reactive to light. Neck:      Thyroid: No thyromegaly. Vascular: No JVD. Cardiovascular:      Rate and Rhythm: Tachycardia present. Rhythm irregular. Heart sounds: Normal heart sounds. No murmur. No friction rub. No gallop. Pulmonary:      Effort: Pulmonary effort is normal. No respiratory distress. Breath sounds: Normal breath sounds. No wheezing. Abdominal:      General: Bowel sounds are normal. There is no distension. Palpations: Abdomen is soft. Tenderness: There is abdominal tenderness (LLQ). Skin:     General: Skin is warm and dry. Findings: No erythema or rash. Neurological:      Mental Status: She is alert and oriented to person, place, and time.            Assessment:        Primary Problem  Perforated diverticulum    Active Hospital Problems    Diagnosis Date Noted    Perforated diverticulum [K57.80] 06/15/2020    Type 2 diabetes mellitus, without long-term current use of insulin ([facility] [address] [E11.9] 01/14/2020       Plan:        Perforated diverticulum  Diverticulitis  DM2  HTN  Afib on Xarelto, last taken yesterday morning  Leukocytosis, lactic acidosis    Cefepime, Vanco and Flagyl   General surgery consulted   Pain control  IVF  NPO  SSI  June 17  afib rvr  On amidarone drip  Iv abx  covid testing negative  Multiple comorbid problems including morbid obesity A. fib with RVR perforated diverticulum complicated case with multiple severe medical problems  June 18  A. fib with RVR on amiodarone drip Cardizem drip rate is improved but not optimal IV meropenem week off incentive spirometry abdomen tenderness is improved continue conservative treatment surgical input appreciated     Kassandra Jacobson MD  6/18/2020  10:36 AM

## 2020-06-19 ENCOUNTER — APPOINTMENT (OUTPATIENT)
Dept: GENERAL RADIOLOGY | Age: 70
DRG: 392 | End: 2020-06-19
Payer: MEDICARE

## 2020-06-19 LAB
ANION GAP SERPL CALCULATED.3IONS-SCNC: 12 MMOL/L (ref 9–17)
ANTI-XA UNFRAC HEPARIN: 0.27 IU/L (ref 0.3–0.7)
ANTI-XA UNFRAC HEPARIN: 0.42 IU/L (ref 0.3–0.7)
BUN BLDV-MCNC: 6 MG/DL (ref 8–23)
BUN/CREAT BLD: ABNORMAL (ref 9–20)
CALCIUM SERPL-MCNC: 7.3 MG/DL (ref 8.6–10.4)
CHLORIDE BLD-SCNC: 102 MMOL/L (ref 98–107)
CO2: 22 MMOL/L (ref 20–31)
CREAT SERPL-MCNC: 0.55 MG/DL (ref 0.5–0.9)
GFR AFRICAN AMERICAN: >60 ML/MIN
GFR NON-AFRICAN AMERICAN: >60 ML/MIN
GFR SERPL CREATININE-BSD FRML MDRD: ABNORMAL ML/MIN/{1.73_M2}
GFR SERPL CREATININE-BSD FRML MDRD: ABNORMAL ML/MIN/{1.73_M2}
GLUCOSE BLD-MCNC: 112 MG/DL (ref 70–99)
GLUCOSE BLD-MCNC: 119 MG/DL (ref 65–105)
GLUCOSE BLD-MCNC: 130 MG/DL (ref 65–105)
GLUCOSE BLD-MCNC: 224 MG/DL (ref 65–105)
HCT VFR BLD CALC: 31.8 % (ref 36–46)
HEMOGLOBIN: 10.8 G/DL (ref 12–16)
MAGNESIUM: 2.1 MG/DL (ref 1.6–2.6)
MAGNESIUM: 2.1 MG/DL (ref 1.6–2.6)
MCH RBC QN AUTO: 31.1 PG (ref 26–34)
MCHC RBC AUTO-ENTMCNC: 34.1 G/DL (ref 31–37)
MCV RBC AUTO: 91.2 FL (ref 80–100)
NRBC AUTOMATED: ABNORMAL PER 100 WBC
PDW BLD-RTO: 14.4 % (ref 11.5–14.9)
PLATELET # BLD: 213 K/UL (ref 150–450)
PMV BLD AUTO: 8.1 FL (ref 6–12)
POTASSIUM SERPL-SCNC: 3.2 MMOL/L (ref 3.7–5.3)
POTASSIUM SERPL-SCNC: 3.4 MMOL/L (ref 3.7–5.3)
POTASSIUM SERPL-SCNC: 3.7 MMOL/L (ref 3.7–5.3)
RBC # BLD: 3.48 M/UL (ref 4–5.2)
SODIUM BLD-SCNC: 136 MMOL/L (ref 135–144)
WBC # BLD: 7.4 K/UL (ref 3.5–11)

## 2020-06-19 PROCEDURE — 6370000000 HC RX 637 (ALT 250 FOR IP): Performed by: INTERNAL MEDICINE

## 2020-06-19 PROCEDURE — 93005 ELECTROCARDIOGRAM TRACING: CPT | Performed by: INTERNAL MEDICINE

## 2020-06-19 PROCEDURE — 6370000000 HC RX 637 (ALT 250 FOR IP): Performed by: NURSE PRACTITIONER

## 2020-06-19 PROCEDURE — 6360000002 HC RX W HCPCS: Performed by: INTERNAL MEDICINE

## 2020-06-19 PROCEDURE — 2060000000 HC ICU INTERMEDIATE R&B

## 2020-06-19 PROCEDURE — 2500000003 HC RX 250 WO HCPCS: Performed by: INTERNAL MEDICINE

## 2020-06-19 PROCEDURE — 2580000003 HC RX 258: Performed by: INTERNAL MEDICINE

## 2020-06-19 PROCEDURE — 99232 SBSQ HOSP IP/OBS MODERATE 35: CPT | Performed by: INTERNAL MEDICINE

## 2020-06-19 PROCEDURE — 85520 HEPARIN ASSAY: CPT

## 2020-06-19 PROCEDURE — 6360000002 HC RX W HCPCS: Performed by: SURGERY

## 2020-06-19 PROCEDURE — 83735 ASSAY OF MAGNESIUM: CPT

## 2020-06-19 PROCEDURE — 82947 ASSAY GLUCOSE BLOOD QUANT: CPT

## 2020-06-19 PROCEDURE — 2580000003 HC RX 258: Performed by: NURSE PRACTITIONER

## 2020-06-19 PROCEDURE — 97162 PT EVAL MOD COMPLEX 30 MIN: CPT

## 2020-06-19 PROCEDURE — 97530 THERAPEUTIC ACTIVITIES: CPT

## 2020-06-19 PROCEDURE — 6360000002 HC RX W HCPCS: Performed by: PHYSICIAN ASSISTANT

## 2020-06-19 PROCEDURE — 71045 X-RAY EXAM CHEST 1 VIEW: CPT

## 2020-06-19 PROCEDURE — 84132 ASSAY OF SERUM POTASSIUM: CPT

## 2020-06-19 PROCEDURE — 36415 COLL VENOUS BLD VENIPUNCTURE: CPT

## 2020-06-19 PROCEDURE — 80048 BASIC METABOLIC PNL TOTAL CA: CPT

## 2020-06-19 PROCEDURE — 2580000003 HC RX 258: Performed by: SURGERY

## 2020-06-19 PROCEDURE — 2500000003 HC RX 250 WO HCPCS: Performed by: NURSE PRACTITIONER

## 2020-06-19 PROCEDURE — 85027 COMPLETE CBC AUTOMATED: CPT

## 2020-06-19 RX ORDER — ZOLPIDEM TARTRATE 5 MG/1
5 TABLET ORAL NIGHTLY PRN
Status: DISCONTINUED | OUTPATIENT
Start: 2020-06-19 | End: 2020-06-22 | Stop reason: HOSPADM

## 2020-06-19 RX ORDER — AMIODARONE HYDROCHLORIDE 200 MG/1
200 TABLET ORAL 2 TIMES DAILY
Status: DISCONTINUED | OUTPATIENT
Start: 2020-06-19 | End: 2020-06-22 | Stop reason: HOSPADM

## 2020-06-19 RX ORDER — DILTIAZEM HYDROCHLORIDE 180 MG/1
360 CAPSULE, COATED, EXTENDED RELEASE ORAL DAILY
Status: DISCONTINUED | OUTPATIENT
Start: 2020-06-19 | End: 2020-06-22 | Stop reason: HOSPADM

## 2020-06-19 RX ORDER — FUROSEMIDE 10 MG/ML
20 INJECTION INTRAMUSCULAR; INTRAVENOUS ONCE
Status: COMPLETED | OUTPATIENT
Start: 2020-06-19 | End: 2020-06-19

## 2020-06-19 RX ADMIN — AMIODARONE HYDROCHLORIDE 200 MG: 200 TABLET ORAL at 09:22

## 2020-06-19 RX ADMIN — POTASSIUM CHLORIDE 10 MEQ: 7.46 INJECTION, SOLUTION INTRAVENOUS at 01:15

## 2020-06-19 RX ADMIN — HEPARIN SODIUM 2000 UNITS: 5000 INJECTION INTRAVENOUS; SUBCUTANEOUS at 06:28

## 2020-06-19 RX ADMIN — AMIODARONE HYDROCHLORIDE 200 MG: 200 TABLET ORAL at 21:44

## 2020-06-19 RX ADMIN — NYSTATIN 500000 UNITS: 100000 SUSPENSION ORAL at 08:40

## 2020-06-19 RX ADMIN — METRONIDAZOLE 500 MG: 500 INJECTION, SOLUTION INTRAVENOUS at 14:32

## 2020-06-19 RX ADMIN — SODIUM CHLORIDE: 9 INJECTION, SOLUTION INTRAVENOUS at 15:28

## 2020-06-19 RX ADMIN — DILTIAZEM HYDROCHLORIDE 15 MG/HR: 5 INJECTION INTRAVENOUS at 08:16

## 2020-06-19 RX ADMIN — METRONIDAZOLE 500 MG: 500 INJECTION, SOLUTION INTRAVENOUS at 21:44

## 2020-06-19 RX ADMIN — METOPROLOL TARTRATE 25 MG: 25 TABLET, FILM COATED ORAL at 21:44

## 2020-06-19 RX ADMIN — SODIUM CHLORIDE: 9 INJECTION, SOLUTION INTRAVENOUS at 06:27

## 2020-06-19 RX ADMIN — ZOLPIDEM TARTRATE 5 MG: 5 TABLET ORAL at 22:27

## 2020-06-19 RX ADMIN — NYSTATIN 500000 UNITS: 100000 SUSPENSION ORAL at 12:15

## 2020-06-19 RX ADMIN — Medication 10 ML: at 21:58

## 2020-06-19 RX ADMIN — NYSTATIN 500000 UNITS: 100000 SUSPENSION ORAL at 17:44

## 2020-06-19 RX ADMIN — METRONIDAZOLE 500 MG: 500 INJECTION, SOLUTION INTRAVENOUS at 05:14

## 2020-06-19 RX ADMIN — POTASSIUM CHLORIDE 10 MEQ: 7.46 INJECTION, SOLUTION INTRAVENOUS at 05:14

## 2020-06-19 RX ADMIN — POTASSIUM CHLORIDE 10 MEQ: 7.46 INJECTION, SOLUTION INTRAVENOUS at 03:11

## 2020-06-19 RX ADMIN — FUROSEMIDE 20 MG: 10 INJECTION, SOLUTION INTRAMUSCULAR; INTRAVENOUS at 15:54

## 2020-06-19 RX ADMIN — POTASSIUM CHLORIDE 10 MEQ: 7.46 INJECTION, SOLUTION INTRAVENOUS at 06:28

## 2020-06-19 RX ADMIN — HEPARIN SODIUM 10.84 UNITS/KG/HR: 10000 INJECTION, SOLUTION INTRAVENOUS at 14:23

## 2020-06-19 RX ADMIN — MEROPENEM 1 G: 1 INJECTION, POWDER, FOR SOLUTION INTRAVENOUS at 02:53

## 2020-06-19 RX ADMIN — DILTIAZEM HYDROCHLORIDE 360 MG: 180 CAPSULE, COATED, EXTENDED RELEASE ORAL at 09:22

## 2020-06-19 RX ADMIN — MEROPENEM 1 G: 1 INJECTION, POWDER, FOR SOLUTION INTRAVENOUS at 17:44

## 2020-06-19 RX ADMIN — DILTIAZEM HYDROCHLORIDE 15 MG/HR: 5 INJECTION INTRAVENOUS at 02:24

## 2020-06-19 RX ADMIN — NYSTATIN 500000 UNITS: 100000 SUSPENSION ORAL at 21:44

## 2020-06-19 RX ADMIN — INSULIN LISPRO 2 UNITS: 100 INJECTION, SOLUTION INTRAVENOUS; SUBCUTANEOUS at 21:53

## 2020-06-19 RX ADMIN — METOPROLOL TARTRATE 25 MG: 25 TABLET, FILM COATED ORAL at 09:22

## 2020-06-19 RX ADMIN — MEROPENEM 1 G: 1 INJECTION, POWDER, FOR SOLUTION INTRAVENOUS at 12:16

## 2020-06-19 ASSESSMENT — ENCOUNTER SYMPTOMS
DIARRHEA: 0
SHORTNESS OF BREATH: 0
ABDOMINAL PAIN: 1
COUGH: 0
CONSTIPATION: 1
WHEEZING: 0
VOMITING: 1
NAUSEA: 0
ANAL BLEEDING: 0

## 2020-06-19 ASSESSMENT — PAIN SCALES - GENERAL
PAINLEVEL_OUTOF10: 5
PAINLEVEL_OUTOF10: 0

## 2020-06-19 ASSESSMENT — PAIN DESCRIPTION - LOCATION: LOCATION: CHEST

## 2020-06-19 ASSESSMENT — PAIN DESCRIPTION - PAIN TYPE: TYPE: ACUTE PAIN

## 2020-06-19 NOTE — PROGRESS NOTES
Patient still refusing to let RN remove murray catheter. She states \" let's figure out why my heart hurts first and then we will talk about that \".

## 2020-06-19 NOTE — PROGRESS NOTES
Physical Therapy    Facility/Department: \[facility]\"" PROGRESSIVE CARE  Initial Assessment    NAME: Dereje Dos Santos  : 1950  MRN: [id number]    Date of Service: 2020    Discharge Recommendations:  Patient would benefit from continued therapy after discharge   PT Equipment Recommendations  Equipment Needed: No    Assessment   Body structures, Functions, Activity limitations: Decreased functional mobility ; Decreased strength;Decreased balance;Decreased endurance;Decreased safe awareness  Assessment: Impaired mobility due to decreased tolerance to activity and safety issues  Decision Making: Medium Complexity  History: TIA  Exam: decreased strength, endurance, balance, mobility  Clinical Presentation: evolving  REQUIRES PT FOLLOW UP: Yes  Activity Tolerance  Activity Tolerance: Patient limited by endurance       Patient Diagnosis(es): The encounter diagnosis was Diverticulitis. has a past medical history of Adenocarcinoma of endometrium, stage 1 ([facility] [address] JOSHUA (acute kidney injury) ([facility] [address] Allergic rhinitis, At high risk for falls, Colon polyp, Diabetes mellitus ([facility] [address] Endometrial cancer ([facility] [address] History of TIA (transient ischemic attack), Hyperglycemia, Hyperlipidemia, Hypertension, Hypothyroidism, Legally blind, Lower back pain, Mixed incontinence, Morbid obesity with BMI of 40.0-44.9, adult ([facility] [address] CLAROS (nonalcoholic steatohepatitis), Obesity, Oral phase dysphagia, Osteoarthritis (arthritis due to wear and tear of joints), Osteoarthritis involving multiple joints on both sides of body, Osteoporosis, Postmenopausal bleeding, S/P h-scope, Myosure 17, Tennis elbow, Thickened endometrium, TLHBSO, bilateral LND 10/24/17, and Type 2 diabetes mellitus, without long-term current use of insulin ([facility] [address] has a past surgical history that includes Thyroid surgery (); Colonoscopy (); Tonsillectomy; Colonoscopy (2017); pr colsc flx w/removal lesion by hot bx forceps (N/A, 2017);  Dilation and curettage of uterus physical)  Gait Deviations: Decreased step length  Distance: 2ft forward and back  Comments: pt had one episode of R knee buckle and sat abruptly on bed  Stairs/Curb  Stairs?: No     Balance  Sitting - Static: Good  Sitting - Dynamic: Fair;+(SBA)  Standing - Static: Fair(CGA with RW)  Standing - Dynamic: Fair;-(Lacey with RW)        Plan   Plan  Times per week: 5-6x/wk  Current Treatment Recommendations: Strengthening, Balance Training, Functional Mobility Training, Transfer Training, Endurance Training, Gait Training, Safety Education & Training  Safety Devices  Type of devices: Call light within reach, Patient at risk for falls, Left in bed, Gait belt, Nurse notified      Goals  Short term goals  Time Frame for Short term goals: 4-5 days  Short term goal 1: mod-I bed mobility  Short term goal 2: mod-I transfers  Short term goal 3: mod-I gait with RW/Rollator x 150ft       Therapy Time   Individual Concurrent Group Co-treatment   Time In 1335         Time Out 1400         Minutes [address] [facility],

## 2020-06-19 NOTE — PLAN OF CARE
Nutrition Problem: Altered GI function  Intervention: Food and/or Nutrient Delivery: Continue current diet, Start ONS  Nutritional Goals: Initiate oral intake or nutrition support

## 2020-06-19 NOTE — PROGRESS NOTES
Nutrition Assessment    Type and Reason for Visit: Reassess    Nutrition Recommendations: Continue Full liquid diet. Start Ensure Clear 2x/day. Nutrition Assessment: Patient tolerated clear liquid diet and it was advanced to full liquid for dinner. Patient continues to have constipation. Will start Ensure Clear 2x/day. Monitor for nutrition progression, Ensure Clear acceptance and labs. Malnutrition Assessment:  · Malnutrition Status: Insufficient data    Nutrition Risk Level: High    Nutrient Needs:  · Estimated Daily Total Kcal: 1554-3337 kcal based on 13-15 kcal/kg of admission weight   · Estimated Daily Protein (g): 75-90 gm of protein based on 1.5-1.8 gm/kg of ideal weight     Nutrition Diagnosis:   · Problem: Altered GI function  · Etiology: related to Alteration in GI function(Perforated diverticulum)     Signs and symptoms:  as evidenced by GI abnormality, Intake 25-50%, Intake 0-25%(constipated)    Objective Information:  · Nutrition-Focused Physical Findings: No edema. Constipated.    · Current Nutrition Therapies:  · Oral Diet Orders: Full Liquid   · Oral Diet intake: 26-50%(Clear liquid diet)  · Oral Nutrition Supplement (ONS) Orders: None  · Anthropometric Measures:  · Ht: 5' 2\" (157.5 cm)   · Current Body Wt: 256 lb (116.1 kg)  · Admission Body Wt: 250 lb (113.4 kg)  · Usual Body Wt: 250 lb (113.4 kg)  · % Weight Change:  ,  Patient thinks she weighs 230 lbs but current bedscale weight indicates patient is at usual weight    · Ideal Body Wt: 110 lb (49.9 kg), % Ideal Body 227%  · BMI Classification: BMI > or equal to 40.0 Obese Class III    Nutrition Interventions:   Continue current diet, Start ONS  Continued Inpatient Monitoring    Nutrition Evaluation:   · Evaluation: Progressing toward goals   · Goals: Initiate oral intake or nutrition support     · Monitoring: Meal Intake, Supplement Intake, Nutrition Progression, Weight, Pertinent Labs, Monitor Bowel Function, Diet Tolerance, Skin

## 2020-06-19 NOTE — PROGRESS NOTES
 insulin lispro  0-6 Units Subcutaneous Nightly    meropenem  1 g Intravenous Q8H    nystatin  5 mL Oral 4x Daily     Continuous Infusions:    heparin (porcine) 10.8 Units/kg/hr (06/19/20 0630)    sodium chloride 150 mL/hr at 06/19/20 0627    dextrose       PRN Meds: potassium chloride, heparin (porcine), heparin (porcine), acetaminophen, acetaminophen, sodium chloride flush, magnesium sulfate, promethazine **OR** ondansetron, nicotine, morphine **OR** morphine, glucose, dextrose, glucagon (rDNA), dextrose    Data:     Past Medical History:   has a past medical history of Adenocarcinoma of endometrium, stage 1 ([facility] JOSHUA (acute kidney injury) ([facility] Allergic rhinitis, At high risk for falls, Colon polyp, Diabetes mellitus ([facility] Endometrial cancer ([facility] History of TIA (transient ischemic attack), Hyperglycemia, Hyperlipidemia, Hypertension, Hypothyroidism, Legally blind, Lower back pain, Mixed incontinence, Morbid obesity with BMI of 40.0-44.9, adult ([facility] CLAROS (nonalcoholic steatohepatitis), Obesity, Oral phase dysphagia, Osteoarthritis (arthritis due to wear and tear of joints), Osteoarthritis involving multiple joints on both sides of body, Osteoporosis, Postmenopausal bleeding, S/P h-scope, Myosure 9/20/17, Tennis elbow, Thickened endometrium, TLHBSO, bilateral LND 10/24/17, and Type 2 diabetes mellitus, without long-term current use of insulin ([facility] Social History:   reports that she has never smoked. She has never used smokeless tobacco. She reports that she does not drink alcohol or use drugs.      Family History:   Family History   Problem Relation Age of Onset    Coronary Art Dis Father     Hypertension Father     Diabetes Father     High Blood Pressure Father     Heart Attack Father     Diabetes Mother     High Blood Pressure Mother     Thyroid Disease Mother     High Blood Pressure Sister     Thyroid Disease Brother     High Blood Pressure Brother     High Blood Pressure Maternal Grandmother     Diabetes Maternal Grandfather     No Known Problems Paternal Grandmother     Heart Attack Paternal Grandfather     Colon Cancer Neg Hx        Vitals:  /82   Pulse 60   Temp 98.7 °F (37.1 °C) (Oral)   Resp 20   Ht 5' 2\" (1.575 m)   Wt 256 lb 2.8 oz (116.2 kg)   LMP  (LMP Unknown)   SpO2 95%   BMI 46.85 kg/m²   Temp (24hrs), Av.8 °F (37.1 °C), Min:98.4 °F (36.9 °C), Max:99.1 °F (37.3 °C)    Recent Labs     20  1101 20  1701 20  1953 20  1101   POCGLU 119* 112* 186* 130*       I/O (24Hr): Intake/Output Summary (Last 24 hours) at 2020 1134  Last data filed at 2020 1038  Gross per 24 hour   Intake 4282 ml   Output 3050 ml   Net 1232 ml       Labs:    [unfilled]    Lab Results   Component Value Date/Time    SPECIAL NOT REPORTED 2020 02:32 PM     Lab Results   Component Value Date/Time    CULTURE NO SIGNIFICANT GROWTH 2020 02:32 PM       [facility]    Radiology:     Ct Abdomen Pelvis W Iv Contrast Additional Contrast? None    Result Date: 6/15/2020  EXAMINATION: CT OF THE ABDOMEN AND PELVIS WITH CONTRAST 6/15/2020 8:42 pm TECHNIQUE: CT of the abdomen and pelvis was performed with the administration of intravenous contrast. Multiplanar reformatted images are provided for review. Dose modulation, iterative reconstruction, and/or weight based adjustment of the mA/kV was utilized to reduce the radiation dose to as low as reasonably achievable. COMPARISON: None. HISTORY: ORDERING SYSTEM PROVIDED HISTORY: llq abd pain TECHNOLOGIST PROVIDED HISTORY: llq abd pain Reason for Exam: pt c/o llq abd pain Acuity: Unknown Type of Exam: Unknown Additional signs and symptoms: pts IV infiltrated during scan, repeated scan with new IV FINDINGS: Lower Chest:  Visualized portion of the lower chest demonstrates no acute abnormality. There are coronary artery calcifications. Circumferential thickening of the distal esophagus is noted. Organs:  The liver, spleen, pancreas, kidneys and adrenal glands are without acute findings. The gallbladder is distended and there is cholelithiasis and gallbladder sludge. No secondary evidence of acute cholecystitis. There is a 1.5 cm right adrenal adenoma. GI/Bowel: Small hiatal hernia. No mechanical bowel obstruction. There is diverticulosis. An inflamed diverticulum is seen at the junction of the descending and sigmoid colon with surrounding inflammatory changes consistent with acute diverticulitis. There is small extraluminal gas. Pelvis: Status post hysterectomy. No adnexal mass. The urinary bladder is partially distended without contour abnormality. Peritoneum/Retroperitoneum: Trace abdominopelvic ascites. Mild calcified atherosclerotic plaque. No lymphadenopathy. Bones/Soft Tissues: No acute or aggressive osseous lesions. 1. Perforated diverticulitis at the junction of the descending and sigmoid colon with trace extraluminal gas and small volume ascites. 2. Cholelithiasis and gallbladder sludge. The gallbladder is distended without additional secondary evidence of acute cholecystitis. 3. Small hiatal hernia with circumferential thickening of the distal esophagus, likely related to reflux esophagitis. This can be correlated with outpatient endoscopy as clinically appropriate. 4. Coronary artery disease. Xr Chest Portable    Result Date: 6/9/2020  EXAMINATION: ONE XRAY VIEW OF THE CHEST 6/9/2020 11:11 am COMPARISON: 01/27/2020. HISTORY: ORDERING SYSTEM PROVIDED HISTORY: SOB TECHNOLOGIST PROVIDED HISTORY: SOB Reason for Exam: Droplet plus patient. SOB Acuity: Unknown Type of Exam: Unknown FINDINGS: The heart size is enlarged but unchanged. The pulmonary vasculature is also within normal limits. No acute infiltrates are seen. The costophrenic angles are sharp bilaterally. No pneumothoraces are noted. 1. No active pulmonary disease. 2. Stable cardiomegaly without overt failure.          Physical Examination:        Physical Exam  Constitutional:       General: She is not in acute distress. Appearance: She is well-developed. She is not diaphoretic. HENT:      Head: Normocephalic and atraumatic. Eyes:      Pupils: Pupils are equal, round, and reactive to light. Neck:      Thyroid: No thyromegaly. Vascular: No JVD. Cardiovascular:      Rate and Rhythm: Tachycardia present. Rhythm irregular. Heart sounds: Normal heart sounds. No murmur. No friction rub. No gallop. Pulmonary:      Effort: Pulmonary effort is normal. No respiratory distress. Breath sounds: Normal breath sounds. No wheezing. Abdominal:      General: Bowel sounds are normal. There is no distension. Palpations: Abdomen is soft. Tenderness: There is abdominal tenderness (LLQ). Skin:     General: Skin is warm and dry. Findings: No erythema or rash. Neurological:      Mental Status: She is alert and oriented to person, place, and time.            Assessment:        Primary Problem  Perforated diverticulum    Active Hospital Problems    Diagnosis Date Noted    Perforated diverticulum [K57.80] 06/15/2020    Type 2 diabetes mellitus, without long-term current use of insulin ([facility] [E11.9] 01/14/2020       Plan:        Perforated diverticulum  Diverticulitis  DM2  HTN  Afib on Xarelto, last taken yesterday morning  Leukocytosis, lactic acidosis    Cefepime, Vanco and Flagyl   General surgery consulted   Pain control  IVF  NPO  SSI  June 17  afib rvr  On amidarone drip  Iv abx  covid testing negative  Multiple comorbid problems including morbid obesity A. fib with RVR perforated diverticulum complicated case with multiple severe medical problems  June 18  A. fib with RVR on amiodarone drip Cardizem drip rate is improved but not optimal IV meropenem week off incentive spirometry abdomen tenderness is improved continue conservative treatment surgical input appreciated  June 19  Pain is improved A. fib rate controlled on oral amiodarone  IV antibiotic for perforated diverticulitis       Colletta Heritage, MD  6/19/2020  11:34 AM

## 2020-06-19 NOTE — PROGRESS NOTES
[facility]                 PATIENT NAME: Evelyne John     TODAY'S DATE: 6/19/2020, 8:45 AM    SUBJECTIVE:    Pt seen and examined. Afebrile, VSS. Patient is doing well today. Pain is controlled. She is passing gas, no bowel movement. Tolerating clear liquid diet but c/o early satiety. No N/V. Abdomen benign. Adequate urine output overnight. OBJECTIVE:   VITALS:  /78   Pulse 106   Temp 98.7 °F (37.1 °C) (Oral)   Resp 17   Ht 5' 2\" (1.575 m)   Wt 256 lb 2.8 oz (116.2 kg)   LMP  (LMP Unknown)   SpO2 95%   BMI 46.85 kg/m²      INTAKE/OUTPUT:      Intake/Output Summary (Last 24 hours) at 6/19/2020 0845  Last data filed at 6/19/2020 0454  Gross per 24 hour   Intake 4162 ml   Output 3400 ml   Net 762 ml                 CONSTITUTIONAL:  awake and alert. No acute distress  HEART:   RRR  LUNGS:   Decreased air entry at bases, no wheezing  ABDOMEN:   Abdomen soft, LLQ tenderness, non-distended  EXTREMITIES:   Pedal edema    Data:  CBC:   Lab Results   Component Value Date    WBC 7.4 06/19/2020    RBC 3.48 06/19/2020    RBC 4.23 03/20/2012    HGB 10.8 06/19/2020    HCT 31.8 06/19/2020    MCV 91.2 06/19/2020    MCH 31.1 06/19/2020    MCHC 34.1 06/19/2020    RDW 14.4 06/19/2020     06/19/2020     03/20/2012    MPV 8.1 06/19/2020     BMP:    Lab Results   Component Value Date     06/19/2020    K 3.4 06/19/2020     06/19/2020    CO2 22 06/19/2020    BUN 6 06/19/2020    LABALBU 3.6 06/15/2020    LABALBU 4.3 03/20/2012    CREATININE 0.55 06/19/2020    CALCIUM 7.3 06/19/2020    GFRAA >60 06/19/2020    LABGLOM >60 06/19/2020    GLUCOSE 112 06/19/2020    GLUCOSE 114 03/20/2012       Radiology Review:  No new images to review      ASSESSMENT     Principal Problem:    Perforated diverticulum  Active Problems:    Type 2 diabetes mellitus, without long-term current use of insulin (HCC)  Resolved Problems:    * No resolved hospital problems.

## 2020-06-20 LAB
ANION GAP SERPL CALCULATED.3IONS-SCNC: 11 MMOL/L (ref 9–17)
ANTI-XA UNFRAC HEPARIN: 0.26 IU/L (ref 0.3–0.7)
ANTI-XA UNFRAC HEPARIN: 0.5 IU/L (ref 0.3–0.7)
ANTI-XA UNFRAC HEPARIN: 0.54 IU/L (ref 0.3–0.7)
BUN BLDV-MCNC: 6 MG/DL (ref 8–23)
BUN/CREAT BLD: ABNORMAL (ref 9–20)
CALCIUM SERPL-MCNC: 8 MG/DL (ref 8.6–10.4)
CHLORIDE BLD-SCNC: 103 MMOL/L (ref 98–107)
CO2: 24 MMOL/L (ref 20–31)
CREAT SERPL-MCNC: 0.65 MG/DL (ref 0.5–0.9)
EKG ATRIAL RATE: 234 BPM
EKG Q-T INTERVAL: 316 MS
EKG QRS DURATION: 88 MS
EKG QTC CALCULATION (BAZETT): 376 MS
EKG R AXIS: 67 DEGREES
EKG T AXIS: 113 DEGREES
EKG VENTRICULAR RATE: 85 BPM
GFR AFRICAN AMERICAN: >60 ML/MIN
GFR NON-AFRICAN AMERICAN: >60 ML/MIN
GFR SERPL CREATININE-BSD FRML MDRD: ABNORMAL ML/MIN/{1.73_M2}
GFR SERPL CREATININE-BSD FRML MDRD: ABNORMAL ML/MIN/{1.73_M2}
GLUCOSE BLD-MCNC: 108 MG/DL (ref 65–105)
GLUCOSE BLD-MCNC: 119 MG/DL (ref 65–105)
GLUCOSE BLD-MCNC: 136 MG/DL (ref 70–99)
GLUCOSE BLD-MCNC: 142 MG/DL (ref 65–105)
GLUCOSE BLD-MCNC: 190 MG/DL (ref 65–105)
HCT VFR BLD CALC: 30.7 % (ref 36–46)
HCT VFR BLD CALC: 31.2 % (ref 36–46)
HEMOGLOBIN: 10.4 G/DL (ref 12–16)
HEMOGLOBIN: 10.4 G/DL (ref 12–16)
MAGNESIUM: 1.7 MG/DL (ref 1.6–2.6)
MCH RBC QN AUTO: 30.6 PG (ref 26–34)
MCH RBC QN AUTO: 30.9 PG (ref 26–34)
MCHC RBC AUTO-ENTMCNC: 33.2 G/DL (ref 31–37)
MCHC RBC AUTO-ENTMCNC: 33.9 G/DL (ref 31–37)
MCV RBC AUTO: 91.2 FL (ref 80–100)
MCV RBC AUTO: 92 FL (ref 80–100)
NRBC AUTOMATED: ABNORMAL PER 100 WBC
NRBC AUTOMATED: ABNORMAL PER 100 WBC
PDW BLD-RTO: 14.6 % (ref 11.5–14.9)
PDW BLD-RTO: 14.8 % (ref 11.5–14.9)
PLATELET # BLD: 240 K/UL (ref 150–450)
PLATELET # BLD: 240 K/UL (ref 150–450)
PMV BLD AUTO: 7.8 FL (ref 6–12)
PMV BLD AUTO: 8 FL (ref 6–12)
POTASSIUM SERPL-SCNC: 3.2 MMOL/L (ref 3.7–5.3)
POTASSIUM SERPL-SCNC: 3.4 MMOL/L (ref 3.7–5.3)
RBC # BLD: 3.37 M/UL (ref 4–5.2)
RBC # BLD: 3.4 M/UL (ref 4–5.2)
SODIUM BLD-SCNC: 138 MMOL/L (ref 135–144)
WBC # BLD: 7.4 K/UL (ref 3.5–11)
WBC # BLD: 7.6 K/UL (ref 3.5–11)

## 2020-06-20 PROCEDURE — 80048 BASIC METABOLIC PNL TOTAL CA: CPT

## 2020-06-20 PROCEDURE — 6360000002 HC RX W HCPCS: Performed by: INTERNAL MEDICINE

## 2020-06-20 PROCEDURE — 6360000002 HC RX W HCPCS: Performed by: PHYSICIAN ASSISTANT

## 2020-06-20 PROCEDURE — 6370000000 HC RX 637 (ALT 250 FOR IP): Performed by: INTERNAL MEDICINE

## 2020-06-20 PROCEDURE — 93010 ELECTROCARDIOGRAM REPORT: CPT | Performed by: INTERNAL MEDICINE

## 2020-06-20 PROCEDURE — 85520 HEPARIN ASSAY: CPT

## 2020-06-20 PROCEDURE — 83735 ASSAY OF MAGNESIUM: CPT

## 2020-06-20 PROCEDURE — 36415 COLL VENOUS BLD VENIPUNCTURE: CPT

## 2020-06-20 PROCEDURE — 82947 ASSAY GLUCOSE BLOOD QUANT: CPT

## 2020-06-20 PROCEDURE — 6370000000 HC RX 637 (ALT 250 FOR IP): Performed by: NURSE PRACTITIONER

## 2020-06-20 PROCEDURE — 2580000003 HC RX 258: Performed by: SURGERY

## 2020-06-20 PROCEDURE — 99232 SBSQ HOSP IP/OBS MODERATE 35: CPT | Performed by: INTERNAL MEDICINE

## 2020-06-20 PROCEDURE — 2500000003 HC RX 250 WO HCPCS: Performed by: NURSE PRACTITIONER

## 2020-06-20 PROCEDURE — 2060000000 HC ICU INTERMEDIATE R&B

## 2020-06-20 PROCEDURE — 85027 COMPLETE CBC AUTOMATED: CPT

## 2020-06-20 PROCEDURE — 84132 ASSAY OF SERUM POTASSIUM: CPT

## 2020-06-20 PROCEDURE — 6360000002 HC RX W HCPCS: Performed by: SURGERY

## 2020-06-20 RX ORDER — POTASSIUM CHLORIDE 7.45 MG/ML
10 INJECTION INTRAVENOUS PRN
Status: DISCONTINUED | OUTPATIENT
Start: 2020-06-20 | End: 2020-06-22 | Stop reason: HOSPADM

## 2020-06-20 RX ORDER — POTASSIUM CHLORIDE 20 MEQ/1
40 TABLET, EXTENDED RELEASE ORAL PRN
Status: DISCONTINUED | OUTPATIENT
Start: 2020-06-20 | End: 2020-06-22 | Stop reason: HOSPADM

## 2020-06-20 RX ADMIN — METRONIDAZOLE 500 MG: 500 INJECTION, SOLUTION INTRAVENOUS at 22:02

## 2020-06-20 RX ADMIN — NYSTATIN 500000 UNITS: 100000 SUSPENSION ORAL at 11:26

## 2020-06-20 RX ADMIN — POTASSIUM CHLORIDE 10 MEQ: 7.46 INJECTION, SOLUTION INTRAVENOUS at 07:25

## 2020-06-20 RX ADMIN — METRONIDAZOLE 500 MG: 500 INJECTION, SOLUTION INTRAVENOUS at 05:31

## 2020-06-20 RX ADMIN — METRONIDAZOLE 500 MG: 500 INJECTION, SOLUTION INTRAVENOUS at 14:38

## 2020-06-20 RX ADMIN — INSULIN LISPRO 1 UNITS: 100 INJECTION, SOLUTION INTRAVENOUS; SUBCUTANEOUS at 22:03

## 2020-06-20 RX ADMIN — NYSTATIN 500000 UNITS: 100000 SUSPENSION ORAL at 16:30

## 2020-06-20 RX ADMIN — MEROPENEM 1 G: 1 INJECTION, POWDER, FOR SOLUTION INTRAVENOUS at 11:26

## 2020-06-20 RX ADMIN — HEPARIN SODIUM 2000 UNITS: 5000 INJECTION INTRAVENOUS; SUBCUTANEOUS at 07:25

## 2020-06-20 RX ADMIN — POTASSIUM CHLORIDE 10 MEQ: 7.46 INJECTION, SOLUTION INTRAVENOUS at 09:37

## 2020-06-20 RX ADMIN — MEROPENEM 1 G: 1 INJECTION, POWDER, FOR SOLUTION INTRAVENOUS at 02:43

## 2020-06-20 RX ADMIN — INSULIN LISPRO 2 UNITS: 100 INJECTION, SOLUTION INTRAVENOUS; SUBCUTANEOUS at 11:26

## 2020-06-20 RX ADMIN — METOPROLOL TARTRATE 25 MG: 25 TABLET, FILM COATED ORAL at 22:02

## 2020-06-20 RX ADMIN — AMIODARONE HYDROCHLORIDE 200 MG: 200 TABLET ORAL at 22:02

## 2020-06-20 RX ADMIN — METOPROLOL TARTRATE 25 MG: 25 TABLET, FILM COATED ORAL at 09:43

## 2020-06-20 RX ADMIN — NYSTATIN 500000 UNITS: 100000 SUSPENSION ORAL at 09:38

## 2020-06-20 RX ADMIN — AMIODARONE HYDROCHLORIDE 200 MG: 200 TABLET ORAL at 09:38

## 2020-06-20 RX ADMIN — POTASSIUM CHLORIDE 10 MEQ: 7.46 INJECTION, SOLUTION INTRAVENOUS at 11:26

## 2020-06-20 RX ADMIN — POTASSIUM CHLORIDE 40 MEQ: 1500 TABLET, EXTENDED RELEASE ORAL at 17:40

## 2020-06-20 RX ADMIN — NYSTATIN 500000 UNITS: 100000 SUSPENSION ORAL at 22:02

## 2020-06-20 RX ADMIN — MEROPENEM 1 G: 1 INJECTION, POWDER, FOR SOLUTION INTRAVENOUS at 17:41

## 2020-06-20 RX ADMIN — POTASSIUM CHLORIDE 10 MEQ: 7.46 INJECTION, SOLUTION INTRAVENOUS at 10:45

## 2020-06-20 RX ADMIN — HEPARIN SODIUM 14.5 UNITS/KG/HR: 10000 INJECTION, SOLUTION INTRAVENOUS at 10:45

## 2020-06-20 RX ADMIN — ZOLPIDEM TARTRATE 5 MG: 5 TABLET ORAL at 22:03

## 2020-06-20 RX ADMIN — DILTIAZEM HYDROCHLORIDE 360 MG: 180 CAPSULE, COATED, EXTENDED RELEASE ORAL at 09:38

## 2020-06-20 ASSESSMENT — ENCOUNTER SYMPTOMS
CONSTIPATION: 1
SHORTNESS OF BREATH: 0
ABDOMINAL PAIN: 1
ANAL BLEEDING: 0
DIARRHEA: 0
COUGH: 0
NAUSEA: 0
VOMITING: 1
WHEEZING: 0

## 2020-06-20 ASSESSMENT — PAIN SCALES - GENERAL: PAINLEVEL_OUTOF10: 0

## 2020-06-20 NOTE — PROGRESS NOTES
amiodarone  IV antibiotic for perforated diverticulitis  Abdomen pain is improving A. fib is rate controlled diet advanced to low fiber plan for repeat CT today     Yan Malhotra MD  6/20/2020  11:39 AM

## 2020-06-20 NOTE — PLAN OF CARE
Problem: Falls - Risk of:  Goal: Will remain free from falls  Description: Will remain free from falls  6/20/2020 0331 by Sharifa Tillman RN  Outcome: Ongoing  6/20/2020 0330 by Sharifa Tillman RN  Outcome: Ongoing  6/19/2020 1845 by Raheem Mayberry RN  Outcome: Ongoing  Note: No falls noted this shift. Patient ambulates with x1 staff assistance without difficulty. Bed kept in low position. Safe environment maintained. Bedside table & call light in reach. Uses call light appropriately when needing assistance. Goal: Absence of physical injury  Description: Absence of physical injury  6/20/2020 0331 by Sharifa Tillman RN  Outcome: Ongoing  6/20/2020 0330 by Sharifa Tillman RN  Outcome: Ongoing  6/19/2020 1845 by Raheem Mayberry RN  Outcome: Ongoing     Problem: Pain:  Goal: Pain level will decrease  Description: Pain level will decrease  6/20/2020 0331 by Sharifa Tillman RN  Outcome: Ongoing  6/20/2020 0330 by Sharifa Tillman RN  Outcome: Ongoing  6/19/2020 1845 by Raheem Mayberry RN  Outcome: Ongoing  Note: No pain at this time. 0/10 pain scale   Goal: Control of acute pain  Description: Control of acute pain  6/20/2020 0331 by Sharifa Tillman RN  Outcome: Ongoing  6/20/2020 0330 by Sharifa Tillman RN  Outcome: Ongoing  6/19/2020 1845 by Raheem Mayberry RN  Outcome: Ongoing  Goal: Control of chronic pain  Description: Control of chronic pain  6/20/2020 0331 by Sharifa Tillman RN  Outcome: Ongoing  6/19/2020 1845 by Raheem Mayberry RN  Outcome: Ongoing     Problem: Nutrition  Goal: Optimal nutrition therapy  6/20/2020 0331 by Sharifa Tillman RN  Outcome: Ongoing  6/20/2020 0330 by Sharifa Tillman RN  Outcome: Ongoing  6/19/2020 1845 by Raheem Mayberry RN  Outcome: Ongoing  Note: Diet was advanced to full liquid this shift.       Problem: Mobility - Impaired:  Goal: Mobility will improve  Description: Mobility will improve  6/20/2020 0331 by Sacha Seals

## 2020-06-20 NOTE — CARE COORDINATION
ONGOING DISCHARGE PLAN:    Spoke with patient regarding discharge plan and patient wants to go to [facility]. Patient to have [facility] once she returns home from rehab. She would like  a LSW call her Daughter Gonzalo Bates to discuss the discharge plan. Will need a pre cert    Writer notified cari OLIVAS of this plan. On Xarelto PTA    PT/Ot rec SNF -     Remains on IV Merrem, IV flagyl, Heparin gtt. Will continue to follow for additional discharge needs.     Electronically signed by Dilip Davenport RN on 6/20/2020 at 2:32 PM

## 2020-06-20 NOTE — FLOWSHEET NOTE
Patient greeted writer with \"so good to see you and so glad I am better today. \" She indicated she will be going to an ECF for rehab.     06/20/20 7875   Encounter Summary   Services provided to: Patient   Referral/Consult From: [address] [place] [address] Street Family members; Children   Continue Visiting   (6-20-20)   Complexity of Encounter Low   Length of Encounter 15 minutes   Spiritual Assessment Completed Yes   Routine   Type Initial   Spiritual/Advent   Type Spiritual support   Assessment Calm; Approachable   Intervention Active listening;Explored feelings, thoughts, concerns;Prayer;Sustaining presence/ Ministry of presence; Discussed illness/injury and it's impact   Outcome Expressed gratitude;Engaged in conversation;Expressed feelings/needs/concerns;Coping

## 2020-06-21 ENCOUNTER — APPOINTMENT (OUTPATIENT)
Dept: CT IMAGING | Age: 70
DRG: 392 | End: 2020-06-21
Payer: MEDICARE

## 2020-06-21 LAB
ANION GAP SERPL CALCULATED.3IONS-SCNC: 12 MMOL/L (ref 9–17)
ANTI-XA UNFRAC HEPARIN: 0.42 IU/L (ref 0.3–0.7)
BUN BLDV-MCNC: 6 MG/DL (ref 8–23)
BUN/CREAT BLD: ABNORMAL (ref 9–20)
CALCIUM SERPL-MCNC: 8.3 MG/DL (ref 8.6–10.4)
CHLORIDE BLD-SCNC: 105 MMOL/L (ref 98–107)
CO2: 23 MMOL/L (ref 20–31)
CREAT SERPL-MCNC: 0.63 MG/DL (ref 0.5–0.9)
GFR AFRICAN AMERICAN: >60 ML/MIN
GFR NON-AFRICAN AMERICAN: >60 ML/MIN
GFR SERPL CREATININE-BSD FRML MDRD: ABNORMAL ML/MIN/{1.73_M2}
GFR SERPL CREATININE-BSD FRML MDRD: ABNORMAL ML/MIN/{1.73_M2}
GLUCOSE BLD-MCNC: 111 MG/DL (ref 65–105)
GLUCOSE BLD-MCNC: 114 MG/DL (ref 70–99)
GLUCOSE BLD-MCNC: 118 MG/DL (ref 65–105)
GLUCOSE BLD-MCNC: 145 MG/DL (ref 65–105)
GLUCOSE BLD-MCNC: 226 MG/DL (ref 65–105)
HCT VFR BLD CALC: 32.7 % (ref 36–46)
HEMOGLOBIN: 10.6 G/DL (ref 12–16)
MCH RBC QN AUTO: 29.8 PG (ref 26–34)
MCHC RBC AUTO-ENTMCNC: 32.5 G/DL (ref 31–37)
MCV RBC AUTO: 91.5 FL (ref 80–100)
NRBC AUTOMATED: ABNORMAL PER 100 WBC
PDW BLD-RTO: 15.1 % (ref 11.5–14.9)
PLATELET # BLD: 274 K/UL (ref 150–450)
PMV BLD AUTO: 8.5 FL (ref 6–12)
POTASSIUM SERPL-SCNC: 3.6 MMOL/L (ref 3.7–5.3)
RBC # BLD: 3.57 M/UL (ref 4–5.2)
SODIUM BLD-SCNC: 140 MMOL/L (ref 135–144)
WBC # BLD: 7.9 K/UL (ref 3.5–11)

## 2020-06-21 PROCEDURE — 2060000000 HC ICU INTERMEDIATE R&B

## 2020-06-21 PROCEDURE — 97110 THERAPEUTIC EXERCISES: CPT

## 2020-06-21 PROCEDURE — 2500000003 HC RX 250 WO HCPCS: Performed by: NURSE PRACTITIONER

## 2020-06-21 PROCEDURE — 6370000000 HC RX 637 (ALT 250 FOR IP): Performed by: NURSE PRACTITIONER

## 2020-06-21 PROCEDURE — 2580000003 HC RX 258: Performed by: PHYSICIAN ASSISTANT

## 2020-06-21 PROCEDURE — 6370000000 HC RX 637 (ALT 250 FOR IP): Performed by: PHYSICIAN ASSISTANT

## 2020-06-21 PROCEDURE — 80048 BASIC METABOLIC PNL TOTAL CA: CPT

## 2020-06-21 PROCEDURE — 85520 HEPARIN ASSAY: CPT

## 2020-06-21 PROCEDURE — 82947 ASSAY GLUCOSE BLOOD QUANT: CPT

## 2020-06-21 PROCEDURE — 6360000002 HC RX W HCPCS: Performed by: SURGERY

## 2020-06-21 PROCEDURE — 99232 SBSQ HOSP IP/OBS MODERATE 35: CPT | Performed by: INTERNAL MEDICINE

## 2020-06-21 PROCEDURE — 2580000003 HC RX 258: Performed by: SURGERY

## 2020-06-21 PROCEDURE — 36415 COLL VENOUS BLD VENIPUNCTURE: CPT

## 2020-06-21 PROCEDURE — 6360000002 HC RX W HCPCS: Performed by: INTERNAL MEDICINE

## 2020-06-21 PROCEDURE — 85027 COMPLETE CBC AUTOMATED: CPT

## 2020-06-21 PROCEDURE — 6370000000 HC RX 637 (ALT 250 FOR IP): Performed by: INTERNAL MEDICINE

## 2020-06-21 PROCEDURE — 6360000004 HC RX CONTRAST MEDICATION: Performed by: PHYSICIAN ASSISTANT

## 2020-06-21 PROCEDURE — 2580000003 HC RX 258: Performed by: NURSE PRACTITIONER

## 2020-06-21 PROCEDURE — 74177 CT ABD & PELVIS W/CONTRAST: CPT

## 2020-06-21 PROCEDURE — 97530 THERAPEUTIC ACTIVITIES: CPT

## 2020-06-21 RX ORDER — 0.9 % SODIUM CHLORIDE 0.9 %
80 INTRAVENOUS SOLUTION INTRAVENOUS ONCE
Status: COMPLETED | OUTPATIENT
Start: 2020-06-21 | End: 2020-06-21

## 2020-06-21 RX ORDER — SODIUM CHLORIDE 0.9 % (FLUSH) 0.9 %
10 SYRINGE (ML) INJECTION PRN
Status: DISCONTINUED | OUTPATIENT
Start: 2020-06-21 | End: 2020-06-22 | Stop reason: HOSPADM

## 2020-06-21 RX ORDER — FUROSEMIDE 10 MG/ML
40 INJECTION INTRAMUSCULAR; INTRAVENOUS ONCE
Status: COMPLETED | OUTPATIENT
Start: 2020-06-21 | End: 2020-06-21

## 2020-06-21 RX ADMIN — INSULIN LISPRO 4 UNITS: 100 INJECTION, SOLUTION INTRAVENOUS; SUBCUTANEOUS at 12:11

## 2020-06-21 RX ADMIN — NYSTATIN 500000 UNITS: 100000 SUSPENSION ORAL at 08:38

## 2020-06-21 RX ADMIN — PROMETHAZINE HYDROCHLORIDE 12.5 MG: 25 TABLET ORAL at 22:33

## 2020-06-21 RX ADMIN — DILTIAZEM HYDROCHLORIDE 360 MG: 180 CAPSULE, COATED, EXTENDED RELEASE ORAL at 08:38

## 2020-06-21 RX ADMIN — Medication 10 ML: at 22:35

## 2020-06-21 RX ADMIN — FUROSEMIDE 40 MG: 10 INJECTION, SOLUTION INTRAMUSCULAR; INTRAVENOUS at 14:10

## 2020-06-21 RX ADMIN — AMIODARONE HYDROCHLORIDE 200 MG: 200 TABLET ORAL at 08:37

## 2020-06-21 RX ADMIN — Medication 10 ML: at 12:37

## 2020-06-21 RX ADMIN — ACETAMINOPHEN 650 MG: 325 TABLET, FILM COATED ORAL at 11:00

## 2020-06-21 RX ADMIN — NYSTATIN 500000 UNITS: 100000 SUSPENSION ORAL at 12:11

## 2020-06-21 RX ADMIN — INSULIN LISPRO 2 UNITS: 100 INJECTION, SOLUTION INTRAVENOUS; SUBCUTANEOUS at 17:22

## 2020-06-21 RX ADMIN — METOPROLOL TARTRATE 25 MG: 25 TABLET, FILM COATED ORAL at 22:34

## 2020-06-21 RX ADMIN — NYSTATIN 500000 UNITS: 100000 SUSPENSION ORAL at 22:33

## 2020-06-21 RX ADMIN — HEPARIN SODIUM 12.78 UNITS/KG/HR: 10000 INJECTION, SOLUTION INTRAVENOUS at 03:12

## 2020-06-21 RX ADMIN — SODIUM CHLORIDE 80 ML: 9 INJECTION, SOLUTION INTRAVENOUS at 12:36

## 2020-06-21 RX ADMIN — RIVAROXABAN 15 MG: 15 TABLET, FILM COATED ORAL at 17:16

## 2020-06-21 RX ADMIN — METRONIDAZOLE 500 MG: 500 INJECTION, SOLUTION INTRAVENOUS at 14:10

## 2020-06-21 RX ADMIN — MEROPENEM 1 G: 1 INJECTION, POWDER, FOR SOLUTION INTRAVENOUS at 17:17

## 2020-06-21 RX ADMIN — MEROPENEM 1 G: 1 INJECTION, POWDER, FOR SOLUTION INTRAVENOUS at 11:00

## 2020-06-21 RX ADMIN — MEROPENEM 1 G: 1 INJECTION, POWDER, FOR SOLUTION INTRAVENOUS at 03:11

## 2020-06-21 RX ADMIN — IOHEXOL 50 ML: 240 INJECTION, SOLUTION INTRATHECAL; INTRAVASCULAR; INTRAVENOUS; ORAL at 11:01

## 2020-06-21 RX ADMIN — METOPROLOL TARTRATE 25 MG: 25 TABLET, FILM COATED ORAL at 11:00

## 2020-06-21 RX ADMIN — AMIODARONE HYDROCHLORIDE 200 MG: 200 TABLET ORAL at 22:33

## 2020-06-21 RX ADMIN — METRONIDAZOLE 500 MG: 500 INJECTION, SOLUTION INTRAVENOUS at 22:34

## 2020-06-21 RX ADMIN — IOVERSOL 75 ML: 741 INJECTION INTRA-ARTERIAL; INTRAVENOUS at 12:36

## 2020-06-21 RX ADMIN — METRONIDAZOLE 500 MG: 500 INJECTION, SOLUTION INTRAVENOUS at 05:41

## 2020-06-21 ASSESSMENT — PAIN DESCRIPTION - INTENSITY: RATING_2: 5

## 2020-06-21 ASSESSMENT — PAIN DESCRIPTION - DESCRIPTORS
DESCRIPTORS: ACHING;DULL
DESCRIPTORS_2: ACHING;SORE

## 2020-06-21 ASSESSMENT — PAIN DESCRIPTION - PAIN TYPE
TYPE_2: ACUTE PAIN
TYPE: ACUTE PAIN

## 2020-06-21 ASSESSMENT — PAIN DESCRIPTION - ORIENTATION
ORIENTATION_2: LOWER
ORIENTATION: LEFT;PROXIMAL;OUTER

## 2020-06-21 ASSESSMENT — PAIN SCALES - GENERAL
PAINLEVEL_OUTOF10: 2
PAINLEVEL_OUTOF10: 5
PAINLEVEL_OUTOF10: 4

## 2020-06-21 ASSESSMENT — ENCOUNTER SYMPTOMS
VOMITING: 1
WHEEZING: 0
COUGH: 0
ABDOMINAL PAIN: 1
CONSTIPATION: 1
ANAL BLEEDING: 0
DIARRHEA: 0
SHORTNESS OF BREATH: 0
NAUSEA: 0

## 2020-06-21 ASSESSMENT — PAIN - FUNCTIONAL ASSESSMENT: PAIN_FUNCTIONAL_ASSESSMENT: PREVENTS OR INTERFERES SOME ACTIVE ACTIVITIES AND ADLS

## 2020-06-21 ASSESSMENT — PAIN DESCRIPTION - LOCATION
LOCATION: LEG;HIP
LOCATION_2: ABDOMEN

## 2020-06-21 ASSESSMENT — PAIN DESCRIPTION - FREQUENCY: FREQUENCY: CONTINUOUS

## 2020-06-21 ASSESSMENT — PAIN DESCRIPTION - DURATION: DURATION_2: CONTINUOUS

## 2020-06-21 NOTE — PROGRESS NOTES
Fall Risk    Subjective: Pt talking on phone upon writer arrival, pt is agreeable to therapy. Pt reports she's wating for her \"CAT scan today\"  Comments: JANINA Chung pt for therapy this date. Vital Signs  Pulse: 100(sitting EOB)  Heart Rate Source: Monitor  Patient Currently in Pain: Yes  Pain Assessment: 0-10  Pain Level: 2  Pain Type: Acute pain  Pain Location: Leg;Hip  Pain Orientation: Left;Proximal;Outer  Pain Descriptors: Aching;Dull  Pain Frequency: Continuous  Functional Pain Assessment: Prevents or interferes some active activities and ADLs  Non-Pharmaceutical Pain Intervention(s): Ambulation/Increased Activity; Distraction; Emotional support;Rest;Repositioned  Response to Pain Intervention: Patient Satisfied  Multiple Pain Sites: Yes     Oxygen Therapy  SpO2: 96 %(sitting EOB)  Pulse Oximeter Device Mode: Intermittent  Pulse Oximeter Device Location: Finger  O2 Device: None (Room air)  Patient Observation  Observations: Pt is very talkative and asily distracted-pt will keep eyes almost closed throughout therapy tx and pt reports it's d/t fatigue. Bed Mobility  Rolling: Moderate assistance;Rolling Right(heavy use of bed rail)  Supine to Sit: Moderate assistance(Mod A x1)  Sit to Supine: Minimal assistance(assisted B LE's into bed)  Scooting: Dependent/Total;2 Person assistance(scoot to [facility])  Comment: Pt reports family might be putting bed rails onto bed at home when she D/C's      Transfers:  Sit to Stand: Minimal Assistance  Stand to sit: Minimal Assistance  Bed to Chair: Minimal assistance  Comments: Pt utilized RW and prefers it vs Rollator              Ambulation 1  Surface: level tile  Device: Rolling Walker  Other Apparatus: (IV line management/catheter tube management)  Assistance: Minimal assistance  Quality of Gait: small steps, with narrow KENNETH, no LOB but pt is still unsteady (pt throughout reports fear of knee buckle)  Gait Deviations: Decreased step length; Slow Kassy;Decreased step

## 2020-06-21 NOTE — PROGRESS NOTES
[facility]   Progress Note                   Date:   6/20/20  Patient name: Desirae Mayen  Date of admission:  6/15/2020  6:48 PM  MRN:   [id number]  YOB: 1950  PCP: Manuel Beard MD    Reason for Admission:     Subjective:       Clinical Changes / Abnormalities:  Comfortable at rest, with good control of abdominal pain; HR  bpm with persistent AF      Medications:   Scheduled Meds:   dilTIAZem  360 mg Oral Daily    amiodarone  200 mg Oral BID    metoprolol tartrate  25 mg Oral BID    sodium chloride flush  10 mL Intravenous 2 times per day    metroNIDAZOLE  500 mg Intravenous Q8H    insulin lispro  0-12 Units Subcutaneous TID WC    insulin lispro  0-6 Units Subcutaneous Nightly    meropenem  1 g Intravenous Q8H    nystatin  5 mL Oral 4x Daily     Continuous Infusions:   heparin (porcine) 12.775 Units/kg/hr (06/21/20 0312)    dextrose       CBC:   Recent Labs     06/20/20  0559 06/20/20  1033 06/21/20  0618   WBC 7.6 7.4 7.9   HGB 10.4* 10.4* 10.6*    240 274     BMP:    Recent Labs     06/19/20  0524  06/20/20  0559 06/20/20  1549 06/21/20  0618     --  138  --  140   K 3.4*   < > 3.2* 3.4* 3.6*     --  103  --  105   CO2 22  --  24  --  23   BUN 6*  --  6*  --  6*   CREATININE 0.55  --  0.65  --  0.63   GLUCOSE 112*  --  136*  --  114*    < > = values in this interval not displayed. Hepatic: No results for input(s): AST, ALT, ALB, BILITOT, ALKPHOS in the last 72 hours. Troponin: No results for input(s): TROPONINI in the last 72 hours. BNP: No results for input(s): BNP in the last 72 hours. Lipids: No results for input(s): CHOL, HDL in the last 72 hours. Invalid input(s): LDLCALCU  INR: No results for input(s): INR in the last 72 hours.     Objective:   Vitals: /68   Pulse 74   Temp 98.2 °F (36.8 °C) (Oral)   Resp 16   Ht 5' 2\" (1.575 m)   Wt 256 lb 2.8 oz (116.2 kg)   LMP  (LMP Unknown)   SpO2 95%   BMI 46.85 kg/m² General appearance: alert and cooperative with exam  HEENT: Head: Normocephalic, no lesions, without obvious abnormality. Neck: no adenopathy, no carotid bruit, no JVD, supple, symmetrical, trachea midline and thyroid not enlarged, symmetric, no tenderness/mass/nodules  Lungs: clear to auscultation bilaterally  Heart: Irregular rate and rhythm, S1, S2 normal, no murmur, click, rub or gallop  Abdomen: soft, non-tender; bowel sounds normal; no masses,  no organomegaly  Extremities: extremities normal, atraumatic, no cyanosis or edema  Neurologic: Mental status: Alert, oriented, thought content appropriate      2D ECHO ( 1/14/20)  Normal left ventricle size and function with an estimated EF > 55%. Moderate left ventricular hypertrophy. Grade I (mild) left ventricular diastolic dysfunction. Left atrial dilatation. Moderate mitral regurgitation. Mild tricuspid regurgitation. Normal right ventricular systolic pressure. No significant pericardial effusion is seen.         Assessment / Acute Cardiac Problems:   - Paroxysmal AF with RVR  - Perforated diverticulum  - Abd pain, nausea, vomiting, improved on IV antibiotics  - Obesity  - DM2  - H/o TIA in past  - CLAROS  - HTN  - DL  - Hypothyroid    Patient Active Problem List:     Acquired hypothyroidism     History of TIA (transient ischemic attack)     Chronic bilateral low back pain without sciatica     Essential hypertension     Osteopenia determined by x-ray     Osteoarthritis involving multiple joints on both sides of body     Left knee DJD     Prediabetes     Vitamin D deficiency     Mixed incontinence     Chronic pain of both knees     Slow transit constipation     Allergic rhinitis     Hyperlipidemia with target LDL less than 100     Morbid obesity with BMI of 40.0-44.9, adult (HCC)     At high risk for falls     Dense breast tissue on mammogram     Hyperglycemia     Spondylosis of lumbar region without myelopathy or radiculopathy     OAB (overactive bladder)

## 2020-06-21 NOTE — PROGRESS NOTES
insulin lispro  0-12 Units Subcutaneous TID     insulin lispro  0-6 Units Subcutaneous Nightly    meropenem  1 g Intravenous Q8H    nystatin  5 mL Oral 4x Daily     Continuous Infusions:    heparin (porcine) 12.775 Units/kg/hr (06/21/20 0312)    dextrose       PRN Meds: ioversol, sodium chloride flush, potassium chloride **OR** potassium alternative oral replacement **OR** potassium chloride, zolpidem, heparin (porcine), heparin (porcine), acetaminophen, acetaminophen, sodium chloride flush, magnesium sulfate, promethazine **OR** ondansetron, nicotine, morphine **OR** morphine, glucose, dextrose, glucagon (rDNA), dextrose    Data:     Past Medical History:   has a past medical history of Adenocarcinoma of endometrium, stage 1 ([facility] JOSHUA (acute kidney injury) ([facility] Allergic rhinitis, At high risk for falls, Colon polyp, Diabetes mellitus ([facility] Endometrial cancer ([facility] History of TIA (transient ischemic attack), Hyperglycemia, Hyperlipidemia, Hypertension, Hypothyroidism, Legally blind, Lower back pain, Mixed incontinence, Morbid obesity with BMI of 40.0-44.9, adult ([facility] CLAROS (nonalcoholic steatohepatitis), Obesity, Oral phase dysphagia, Osteoarthritis (arthritis due to wear and tear of joints), Osteoarthritis involving multiple joints on both sides of body, Osteoporosis, Postmenopausal bleeding, S/P h-scope, Myosure 9/20/17, Tennis elbow, Thickened endometrium, TLHBSO, bilateral LND 10/24/17, and Type 2 diabetes mellitus, without long-term current use of insulin ([facility] Social History:   reports that she has never smoked. She has never used smokeless tobacco. She reports that she does not drink alcohol or use drugs.      Family History:   Family History   Problem Relation Age of Onset    Coronary Art Dis Father     Hypertension Father     Diabetes Father     High Blood Pressure Father     Heart Attack Father     Diabetes Mother     High Blood Pressure Mother     Thyroid Disease Mother     High Blood Pressure Sister     Thyroid Disease Brother     High Blood Pressure Brother     High Blood Pressure Maternal Grandmother     Diabetes Maternal Grandfather     No Known Problems Paternal Grandmother     Heart Attack Paternal Grandfather     Colon Cancer Neg Hx        Vitals:  /65   Pulse 92   Temp 98 °F (36.7 °C) (Oral)   Resp 20   Ht 5' 2\" (1.575 m)   Wt 256 lb 2.8 oz (116.2 kg)   LMP  (LMP Unknown)   SpO2 96% Comment: sitting EOB  BMI 46.85 kg/m²   Temp (24hrs), Av.2 °F (36.8 °C), Min:98 °F (36.7 °C), Max:98.4 °F (36.9 °C)    Recent Labs     20  1105 20  1615 20  1912 20  0715   POCGLU 142* 119* 190* 111*       I/O (24Hr): Intake/Output Summary (Last 24 hours) at 2020 1110  Last data filed at 2020 8381  Gross per 24 hour   Intake 2751.68 ml   Output 2000 ml   Net 751.68 ml       Labs:    [unfilled]    Lab Results   Component Value Date/Time    SPECIAL NOT REPORTED 2020 02:32 PM     Lab Results   Component Value Date/Time    CULTURE NO SIGNIFICANT GROWTH 2020 02:32 PM       [facility]    Radiology:     Ct Abdomen Pelvis W Iv Contrast Additional Contrast? None    Result Date: 6/15/2020  EXAMINATION: CT OF THE ABDOMEN AND PELVIS WITH CONTRAST 6/15/2020 8:42 pm TECHNIQUE: CT of the abdomen and pelvis was performed with the administration of intravenous contrast. Multiplanar reformatted images are provided for review. Dose modulation, iterative reconstruction, and/or weight based adjustment of the mA/kV was utilized to reduce the radiation dose to as low as reasonably achievable. COMPARISON: None.  HISTORY: ORDERING SYSTEM PROVIDED HISTORY: llq abd pain TECHNOLOGIST PROVIDED HISTORY: llq abd pain Reason for Exam: pt c/o llq abd pain Acuity: Unknown Type of Exam: Unknown Additional signs and symptoms: pts IV infiltrated during scan, repeated scan with new IV FINDINGS: Lower Chest:  Visualized portion of the lower chest demonstrates no acute abnormality. There are coronary artery calcifications. Circumferential thickening of the distal esophagus is noted. Organs: The liver, spleen, pancreas, kidneys and adrenal glands are without acute findings. The gallbladder is distended and there is cholelithiasis and gallbladder sludge. No secondary evidence of acute cholecystitis. There is a 1.5 cm right adrenal adenoma. GI/Bowel: Small hiatal hernia. No mechanical bowel obstruction. There is diverticulosis. An inflamed diverticulum is seen at the junction of the descending and sigmoid colon with surrounding inflammatory changes consistent with acute diverticulitis. There is small extraluminal gas. Pelvis: Status post hysterectomy. No adnexal mass. The urinary bladder is partially distended without contour abnormality. Peritoneum/Retroperitoneum: Trace abdominopelvic ascites. Mild calcified atherosclerotic plaque. No lymphadenopathy. Bones/Soft Tissues: No acute or aggressive osseous lesions. 1. Perforated diverticulitis at the junction of the descending and sigmoid colon with trace extraluminal gas and small volume ascites. 2. Cholelithiasis and gallbladder sludge. The gallbladder is distended without additional secondary evidence of acute cholecystitis. 3. Small hiatal hernia with circumferential thickening of the distal esophagus, likely related to reflux esophagitis. This can be correlated with outpatient endoscopy as clinically appropriate. 4. Coronary artery disease. Xr Chest Portable    Result Date: 6/9/2020  EXAMINATION: ONE XRAY VIEW OF THE CHEST 6/9/2020 11:11 am COMPARISON: 01/27/2020. HISTORY: ORDERING SYSTEM PROVIDED HISTORY: SOB TECHNOLOGIST PROVIDED HISTORY: SOB Reason for Exam: Droplet plus patient. SOB Acuity: Unknown Type of Exam: Unknown FINDINGS: The heart size is enlarged but unchanged. The pulmonary vasculature is also within normal limits. No acute infiltrates are seen. The costophrenic angles are sharp bilaterally.

## 2020-06-22 VITALS
OXYGEN SATURATION: 95 % | HEIGHT: 62 IN | TEMPERATURE: 98.5 F | RESPIRATION RATE: 18 BRPM | SYSTOLIC BLOOD PRESSURE: 118 MMHG | WEIGHT: 256.17 LBS | BODY MASS INDEX: 47.14 KG/M2 | DIASTOLIC BLOOD PRESSURE: 71 MMHG | HEART RATE: 96 BPM

## 2020-06-22 LAB
ANION GAP SERPL CALCULATED.3IONS-SCNC: 11 MMOL/L (ref 9–17)
BUN BLDV-MCNC: 10 MG/DL (ref 8–23)
BUN/CREAT BLD: ABNORMAL (ref 9–20)
CALCIUM SERPL-MCNC: 8.6 MG/DL (ref 8.6–10.4)
CHLORIDE BLD-SCNC: 102 MMOL/L (ref 98–107)
CO2: 25 MMOL/L (ref 20–31)
CREAT SERPL-MCNC: 0.74 MG/DL (ref 0.5–0.9)
GFR AFRICAN AMERICAN: >60 ML/MIN
GFR NON-AFRICAN AMERICAN: >60 ML/MIN
GFR SERPL CREATININE-BSD FRML MDRD: ABNORMAL ML/MIN/{1.73_M2}
GFR SERPL CREATININE-BSD FRML MDRD: ABNORMAL ML/MIN/{1.73_M2}
GLUCOSE BLD-MCNC: 107 MG/DL (ref 70–99)
GLUCOSE BLD-MCNC: 112 MG/DL (ref 65–105)
GLUCOSE BLD-MCNC: 137 MG/DL (ref 65–105)
GLUCOSE BLD-MCNC: 95 MG/DL (ref 65–105)
HCT VFR BLD CALC: 30.2 % (ref 36–46)
HEMOGLOBIN: 9.7 G/DL (ref 12–16)
MCH RBC QN AUTO: 29.5 PG (ref 26–34)
MCHC RBC AUTO-ENTMCNC: 32.2 G/DL (ref 31–37)
MCV RBC AUTO: 91.6 FL (ref 80–100)
NRBC AUTOMATED: ABNORMAL PER 100 WBC
PDW BLD-RTO: 15.1 % (ref 11.5–14.9)
PLATELET # BLD: 279 K/UL (ref 150–450)
PMV BLD AUTO: 8.3 FL (ref 6–12)
POTASSIUM SERPL-SCNC: 3.6 MMOL/L (ref 3.7–5.3)
RBC # BLD: 3.29 M/UL (ref 4–5.2)
SODIUM BLD-SCNC: 138 MMOL/L (ref 135–144)
WBC # BLD: 9.9 K/UL (ref 3.5–11)

## 2020-06-22 PROCEDURE — 6360000002 HC RX W HCPCS: Performed by: SURGERY

## 2020-06-22 PROCEDURE — 99239 HOSP IP/OBS DSCHRG MGMT >30: CPT | Performed by: INTERNAL MEDICINE

## 2020-06-22 PROCEDURE — 6370000000 HC RX 637 (ALT 250 FOR IP): Performed by: PHYSICIAN ASSISTANT

## 2020-06-22 PROCEDURE — 2580000003 HC RX 258: Performed by: NURSE PRACTITIONER

## 2020-06-22 PROCEDURE — 82947 ASSAY GLUCOSE BLOOD QUANT: CPT

## 2020-06-22 PROCEDURE — 80048 BASIC METABOLIC PNL TOTAL CA: CPT

## 2020-06-22 PROCEDURE — 36415 COLL VENOUS BLD VENIPUNCTURE: CPT

## 2020-06-22 PROCEDURE — 2500000003 HC RX 250 WO HCPCS: Performed by: NURSE PRACTITIONER

## 2020-06-22 PROCEDURE — 6370000000 HC RX 637 (ALT 250 FOR IP): Performed by: INTERNAL MEDICINE

## 2020-06-22 PROCEDURE — 85027 COMPLETE CBC AUTOMATED: CPT

## 2020-06-22 PROCEDURE — 97165 OT EVAL LOW COMPLEX 30 MIN: CPT

## 2020-06-22 PROCEDURE — 2580000003 HC RX 258: Performed by: SURGERY

## 2020-06-22 PROCEDURE — 6370000000 HC RX 637 (ALT 250 FOR IP): Performed by: SURGERY

## 2020-06-22 RX ORDER — CIPROFLOXACIN 500 MG/1
500 TABLET, FILM COATED ORAL EVERY 12 HOURS SCHEDULED
Status: DISCONTINUED | OUTPATIENT
Start: 2020-06-22 | End: 2020-06-22 | Stop reason: HOSPADM

## 2020-06-22 RX ORDER — METRONIDAZOLE 500 MG/1
500 TABLET ORAL EVERY 8 HOURS SCHEDULED
Status: DISCONTINUED | OUTPATIENT
Start: 2020-06-22 | End: 2020-06-22 | Stop reason: HOSPADM

## 2020-06-22 RX ORDER — AMIODARONE HYDROCHLORIDE 200 MG/1
200 TABLET ORAL DAILY
Qty: 30 TABLET | Refills: 3 | Status: ON HOLD | OUTPATIENT
Start: 2020-06-22 | End: 2020-08-13 | Stop reason: HOSPADM

## 2020-06-22 RX ORDER — CIPROFLOXACIN 500 MG/1
500 TABLET, FILM COATED ORAL 2 TIMES DAILY
Qty: 28 TABLET | Refills: 0 | Status: SHIPPED | OUTPATIENT
Start: 2020-06-22 | End: 2020-07-06

## 2020-06-22 RX ORDER — DILTIAZEM HYDROCHLORIDE 360 MG/1
360 CAPSULE, EXTENDED RELEASE ORAL DAILY
Qty: 30 CAPSULE | Refills: 3 | Status: ON HOLD | OUTPATIENT
Start: 2020-06-23 | End: 2020-07-27 | Stop reason: HOSPADM

## 2020-06-22 RX ORDER — METRONIDAZOLE 500 MG/1
500 TABLET ORAL 3 TIMES DAILY
Qty: 42 TABLET | Refills: 0 | Status: SHIPPED | OUTPATIENT
Start: 2020-06-22 | End: 2020-07-06

## 2020-06-22 RX ADMIN — MEROPENEM 1 G: 1 INJECTION, POWDER, FOR SOLUTION INTRAVENOUS at 09:57

## 2020-06-22 RX ADMIN — ACETAMINOPHEN 650 MG: 325 TABLET, FILM COATED ORAL at 03:00

## 2020-06-22 RX ADMIN — RIVAROXABAN 15 MG: 15 TABLET, FILM COATED ORAL at 17:40

## 2020-06-22 RX ADMIN — Medication 10 ML: at 09:46

## 2020-06-22 RX ADMIN — AMIODARONE HYDROCHLORIDE 200 MG: 200 TABLET ORAL at 09:48

## 2020-06-22 RX ADMIN — METOPROLOL TARTRATE 12.5 MG: 25 TABLET, FILM COATED ORAL at 11:47

## 2020-06-22 RX ADMIN — CIPROFLOXACIN 500 MG: 500 TABLET, FILM COATED ORAL at 17:59

## 2020-06-22 RX ADMIN — NYSTATIN 500000 UNITS: 100000 SUSPENSION ORAL at 17:40

## 2020-06-22 RX ADMIN — NYSTATIN 500000 UNITS: 100000 SUSPENSION ORAL at 12:51

## 2020-06-22 RX ADMIN — NYSTATIN 500000 UNITS: 100000 SUSPENSION ORAL at 09:43

## 2020-06-22 RX ADMIN — METRONIDAZOLE 500 MG: 500 TABLET ORAL at 17:59

## 2020-06-22 RX ADMIN — METRONIDAZOLE 500 MG: 500 INJECTION, SOLUTION INTRAVENOUS at 05:53

## 2020-06-22 RX ADMIN — DILTIAZEM HYDROCHLORIDE 360 MG: 180 CAPSULE, COATED, EXTENDED RELEASE ORAL at 09:43

## 2020-06-22 RX ADMIN — ACETAMINOPHEN 650 MG: 325 TABLET, FILM COATED ORAL at 12:51

## 2020-06-22 RX ADMIN — MEROPENEM 1 G: 1 INJECTION, POWDER, FOR SOLUTION INTRAVENOUS at 03:03

## 2020-06-22 ASSESSMENT — PAIN SCALES - GENERAL
PAINLEVEL_OUTOF10: 2
PAINLEVEL_OUTOF10: 3
PAINLEVEL_OUTOF10: 2
PAINLEVEL_OUTOF10: 4

## 2020-06-22 NOTE — PROGRESS NOTES
PT transferred over to stretcher, PT A &Ox4 no sings of distress noted. Pt IV removed, and heart monitor removed in previous shift. Pt wheeled out by life star. RR even at 18.

## 2020-06-22 NOTE — FLOWSHEET NOTE
06/22/20 1022   Provider Notification   Reason for Communication Review case   Provider Name Dr. Natalie Salomon   Provider Notification Physician   Method of Communication Face to face   Response At bedside   Notification Time 1020     RN spoke with Dr. Natalie Salomon. Reviewed that HR has been 54 and BP was 101/71. RN asked if he would like metoprolol 25mg given this morning. Dr. Natalie Salomon changed metoprolol to 12.5mg and to hold for HR < 50. RN also reviewed telemetry with Dr. Natalie Salomon and that RN is unsure how to measure pts TX interval as it is hard to define. Dr. Natalie Salomon said to get another EKG to confirm pt is now in SR.      Electronically signed by Xiomara Parry RN on 6/22/2020 at 10:27 AM

## 2020-06-22 NOTE — PROGRESS NOTES
LUE Strength: WFL           LUE AROM (degrees)  LUE AROM : WFL        RUE Strength  Gross RUE Strength: WFL            RUE AROM (degrees)  RUE AROM : WFL          Fine Motor Skills  Coordination  Movements Are Fluid And Coordinated: Yes                           Mobility             Standing Balance  Comment: mobility deferred due to pt just back to bed post 2 hours up in chair. pt mildly SOB with talking. pt notes A x 2 with transfer chair to bed (5 feet apart), notes walker was not used. per PT note 6-1 min A x 1 amb 5 feet with RW                  Assessment  Performance deficits / Impairments: Decreased functional mobility , Decreased high-level IADLs, Decreased ADL status, Decreased endurance, Decreased balance  Prognosis: Good  Decision Making: Low Complexity  History:  Perforated diverticulum- on IV antibiotic for perforated diverticulitis, Paroxysmal A. fib known monitor is normal sinus rhythm ordered EKG started on xarelto  Exam: 5 performance deficits  Assistance / Modification: low  REQUIRES OT FOLLOW UP: Yes  Activity Tolerance: Patient limited by fatigue                   Goals  Patient Goals   Patient goals : regain strength and indep to return home   Short term goals  Time Frame for Short term goals: 1 week  Short term goal 1: pt to power 4-6 min stand, amb for adls with RW and CGA  Short term goal 2: mod A LE self care and toileting  Short term goal 3: CGA adl transfers    Plan        Plan  Times per week: 4-6  Times per day: Daily  Current Treatment Recommendations: Safety Education & Training, Balance Training, Patient/Caregiver Education & Training, Self-Care / ADL, Functional Mobility Training, Endurance Training, Home Management Training  OT Education  OT Education: OT Role, Plan of Care  Patient Education: \"It's weird, it's my body but it won't work the way it did. \"  ed re diagnosis and work tolerance affecting adls and need for continued therapy to regain work power and safety with mobility    OT Equipment Recommendations  Equipment Needed: Yes  Other: needs RW, has 4 WW with broken brake- is not safe  OT Individual Minutes  Time In: 3941  Time Out: [address] [place]  Minutes: 18    Electronically signed by Billie Claudio OT on 6/22/20 at 2:45 PM EDT

## 2020-06-22 NOTE — PROGRESS NOTES
Spoke with morteza Kline to change amiodarone to daily, xarelto to 20 mg daily since GFR is normal. Follow up in office outpatient.

## 2020-06-22 NOTE — PLAN OF CARE
Problem: Falls - Risk of:  Goal: Will remain free from falls  Description: Will remain free from falls  Outcome: Ongoing  Note: The patient remained free from falls this shift, call light within reach, bed in locked and lowest position. Side rails up x2. Continue to monitor closely. Problem: Pain:  Goal: Pain level will decrease  Description: Pain level will decrease  Outcome: Ongoing  Note: Pt medicated with pain medication prn. Assessed all pain characteristics including level, type, location, frequency, and onset. Non-pharmacologic interventions offered to pt as well. Pt states pain is tolerable at this time.  Will continue to monitor       Problem: Mobility - Impaired:  Goal: Mobility will improve  Description: Mobility will improve  Outcome: Ongoing  Note: Pt got up to the chair today with 1 assist. Pt tolerated well

## 2020-06-22 NOTE — PROGRESS NOTES
[facility] [address]                 PATIENT NAME: Rhonda Yun     TODAY'S DATE: 6/22/2020, 10:28 AM    SUBJECTIVE:    Pt seen and examined. Afebrile, VSS. Patient is doing well today, sitting up in chair. No abdominal pain. She had another bowel movement yesterday evening. Tolerating low fiber diet. No N/V. Heparin drip off, started on PO Xarelto. CT abdomen/pelvis yesterday revealed contained perforated sigmoid diverticulitis; no drainable abscess. OBJECTIVE:   VITALS:  /71   Pulse 54   Temp 97.4 °F (36.3 °C) (Oral)   Resp 16   Ht 5' 2\" (1.575 m)   Wt 256 lb 2.8 oz (116.2 kg)   LMP  (LMP Unknown)   SpO2 97%   BMI 46.85 kg/m²      INTAKE/OUTPUT:      Intake/Output Summary (Last 24 hours) at 6/22/2020 1028  Last data filed at 6/22/2020 0606  Gross per 24 hour   Intake 2269 ml   Output 2850 ml   Net -581 ml                 CONSTITUTIONAL:  awake and alert.   No acute distress  HEART:   RRR  LUNGS:   CTA, no wheezing  ABDOMEN:   Abdomen soft, non-tender, non-distended  EXTREMITIES:   Pedal edema    Data:  CBC:   Lab Results   Component Value Date    WBC 9.9 06/22/2020    RBC 3.29 06/22/2020    RBC 4.23 03/20/2012    HGB 9.7 06/22/2020    HCT 30.2 06/22/2020    MCV 91.6 06/22/2020    MCH 29.5 06/22/2020    MCHC 32.2 06/22/2020    RDW 15.1 06/22/2020     06/22/2020     03/20/2012    MPV 8.3 06/22/2020     BMP:    Lab Results   Component Value Date     06/22/2020    K 3.6 06/22/2020     06/22/2020    CO2 25 06/22/2020    BUN 10 06/22/2020    LABALBU 3.6 06/15/2020    LABALBU 4.3 03/20/2012    CREATININE 0.74 06/22/2020    CALCIUM 8.6 06/22/2020    GFRAA >60 06/22/2020    LABGLOM >60 06/22/2020    GLUCOSE 107 06/22/2020    GLUCOSE 114 03/20/2012       Radiology Review:      CT ABDOMEN PELVIS W IV CONTRAST Additional Contrast? Oral [[id number]] Collected: 06/21/20 1244      Order Status: Completed Updated: 06/21/20 1307     Narrative:       EXAMINATION:  CT OF THE ABDOMEN AND PELVIS WITH CONTRAST 6/21/2020 12:08 pm    TECHNIQUE:  CT of the abdomen and pelvis was performed with the administration of  intravenous contrast. Multiplanar reformatted images are provided for review. Dose modulation, iterative reconstruction, and/or weight based adjustment of  the mA/kV was utilized to reduce the radiation dose to as low as reasonably  achievable. COMPARISON:  CT abdomen and pelvis Magy 15, 2020. HISTORY:  ORDERING SYSTEM PROVIDED HISTORY: diverticulitis perforation  TECHNOLOGIST PROVIDED HISTORY:  diverticulitis perforation    Reason for Exam: patient with diverticulitis states that she continues to  have perforations  Acuity: Unknown  Type of Exam: Unknown    FINDINGS:  Lower Chest: Partially visualized small right and trace left pleural  effusions with associated compressive atelectasis involving the lower lobes. Right-sided pleural calcifications.  Minimal pericardial fluid. Organs: Liver, spleen, portal vein, pancreas and left adrenal gland all  appear unremarkable.  Right adrenal gland adenoma, previously described on  the prior study.  Hyperdense material is once again identified with stones  noted within the distended gallbladder lumen.  No CT evidence of acute  cholecystitis. [place] demonstrate no evidence of enhancing mass or  hydronephrosis.  2 mm nonobstructing calculus right kidney.  Abdominal aorta  appears normal in caliber. GI/Bowel: Thickening involving the distal esophagus.  Stomach is grossly  unremarkable.  Small bowel appears nondilated.  Oral contrast reached the  distal small bowel.  Inflammatory changes seen along the sigmoid consist with  the patient's known acute diverticulitis.  Small amount of contained fluid  and air is identified adjacent to this inflammatory change suggestive of a  contained perforation.  No drainable abscess is noted.  No additional fluid  collections are seen.  No colonic obstruction.     Pelvis:

## 2020-06-22 NOTE — FLOWSHEET NOTE
Patient was restless and having trouble getting comfortable in bed. She expressed needing to get to rehab for strength so she can get home.      06/22/20 1618   Encounter Summary   Services provided to: Patient   Referral/Consult From: [facility]   (6-22-20)   Complexity of Encounter Low   Length of Encounter 15 minutes   Routine   Type Follow up   Assessment Approachable   Intervention Active listening;[place];Sustaining presence/ [facility]; Discussed illness/injury and it's impact   Outcome Expressed gratitude;Engaged in conversation;Expressed feelings/needs/concerns;Receptive

## 2020-06-22 NOTE — PLAN OF CARE
Problem: Falls - Risk of:  Goal: Will remain free from falls  Description: Will remain free from falls  6/22/2020 0051 by Jayy Mccormack RN  Outcome: Ongoing  6/21/2020 1829 by Karen Winters RN  Outcome: Ongoing     Problem: Falls - Risk of:  Goal: Absence of physical injury  Description: Absence of physical injury  6/22/2020 0051 by Jayy Mccormack RN  Outcome: Ongoing  6/21/2020 1829 by Karen Winters RN  Outcome: Ongoing   Pt has had zero falls so far this shift.

## 2020-06-22 NOTE — CARE COORDINATION
FAVIOLA spoke with Sophia Hays about being able to get this pre-cert on this date. She thought that they may be able to complete this. FAVIOLA received a call from Sophia Hays later in the day reporting that she has received authorization for this patient to admit. FAVIOLA informed Erle Holter, RN, Clinical lead of this information and then FAVIOLA received final DC orders. FAVIOLA set up transportation and informed the staff here, the facility, and FAVIOLA also called the patient 's sister Angel Fournier and informed her of the DC/transport time. The patient is scheduled to be picked up at 7:00 pm via stretcher by Paulina Emanuel. FAVIOLA completed and submitted the 7000 form via [employer]. FAVIOLA provided the number for report to this patient's nurse, Suly Frazier. ([phone]).

## 2020-06-22 NOTE — PROGRESS NOTES
[facility]   Progress Note                   Date:   6/22/2020  Patient name: Rubi Melgoza  Date of admission:  6/15/2020  6:48 PM  MRN:   [id number]  YOB: 1950  PCP: Ze Danielle MD    Reason for Admission:      Subjective:       Clinical Changes / Abnormalities: Patient denies any heart fluttering racing chest pain shortness of breath  On monitor it is normal sinus rhythm      Medications:   Scheduled Meds:   metoprolol tartrate  12.5 mg Oral BID    rivaroxaban  15 mg Oral Daily    dilTIAZem  360 mg Oral Daily    amiodarone  200 mg Oral BID    sodium chloride flush  10 mL Intravenous 2 times per day    metroNIDAZOLE  500 mg Intravenous Q8H    insulin lispro  0-12 Units Subcutaneous TID WC    insulin lispro  0-6 Units Subcutaneous Nightly    meropenem  1 g Intravenous Q8H    nystatin  5 mL Oral 4x Daily     Continuous Infusions:   dextrose       CBC:   Recent Labs     06/20/20  1033 06/21/20  0618 06/22/20  0519   WBC 7.4 7.9 9.9   HGB 10.4* 10.6* 9.7*    274 279     BMP:    Recent Labs     06/20/20  0559 06/20/20  1549 06/21/20  0618 06/22/20  0519     --  140 138   K 3.2* 3.4* 3.6* 3.6*     --  105 102   CO2 24  --  23 25   BUN 6*  --  6* 10   CREATININE 0.65  --  0.63 0.74   GLUCOSE 136*  --  114* 107*     Hepatic: No results for input(s): AST, ALT, ALB, BILITOT, ALKPHOS in the last 72 hours. Troponin: No results for input(s): TROPONINI in the last 72 hours. BNP: No results for input(s): BNP in the last 72 hours. Lipids: No results for input(s): CHOL, HDL in the last 72 hours. Invalid input(s): LDLCALCU  INR: No results for input(s): INR in the last 72 hours. Objective:   Vitals: /71   Pulse 54   Temp 97.4 °F (36.3 °C) (Oral)   Resp 16   Ht 5' 2\" (1.575 m)   Wt 256 lb 2.8 oz (116.2 kg)   LMP  (LMP Unknown)   SpO2 97%   BMI 46.85 kg/m²   I/O last 3 completed shifts: In: 2609 [P.O.:1860;  I.V.:449; IV Piggyback:300]  Out:

## 2020-06-23 NOTE — DISCHARGE SUMMARY
[place] [address] [facility]    Discharge Summary     Patient ID: Roya Bustillos  :  1950   MRN: [id number]     ACCOUNT:  [de-identified]   Patient's PCP: Karyle Rake, MD  Admit Date: 6/15/2020   Discharge Date: 2020   Length of Stay: 7  Code Status:  Prior  Admitting Physician: Delmi Melendez MD  Discharge Physician: Delmi Melendez MD     Active Discharge Diagnoses:     Primary Problem  Perforated diverticulum      Hospital Problems  Active Hospital Problems    Diagnosis Date Noted    Perforated diverticulum [K57.80] 06/15/2020    Type 2 diabetes mellitus, without long-term current use of insulin [facility] [E11.9] 2020       Admission Condition:  fair     Discharged Condition: good    Hospital Stay:     Hospital Course: Roya Bustillos is a 78 y.o. female who was admitted for the management of   .     , presented with Abdominal Pain (LLQ); Emesis; and Nausea      ,                         Perforated diverticulum;                               Principal Problem:    Perforated diverticulum  Active Problems:    Type 2 diabetes mellitus, without long-term current use of insulin ([facility])  Resolved Problems:    * No resolved hospital problems. *       Significant therapeutic interventions:     79 yo female with PMH significant for Afib, HTN, DM2, morbid obesity presented to the ED complaining of sudden onset abdominal pain as well as 3 episodes of emesis which started around 4pm on 6/15. In the ED CT abdomen revealed perforated diverticulum.  Patient started on fluids, abx and GS consulted    Patient developed atrial fibrillation  Treated with amiodarone Cardizem metoprolol    Converted to sinus rhythm    Paroxysmal A. fib known monitor is normal sinus rhythm ordered EKG  Patient was started on Xarelto, will continue Cardizem metoprolol and amiodarone  Patient amiodarone should be 200 mg once a day and then need further titration accordingly as an mmol/L    Alkaline Phosphatase 52 35 - 104 U/L    ALT 18 5 - 33 U/L    AST 17 <32 U/L    Total Bilirubin 0.31 0.3 - 1.2 mg/dL    Total Protein 6.4 6.4 - 8.3 g/dL    Alb 3.6 3.5 - 5.2 g/dL    Albumin/Globulin Ratio NOT REPORTED 1.0 - 2.5    GFR Non-African American 55 (L) >60 mL/min    GFR African American >60 >60 mL/min    GFR Comment          GFR Staging NOT REPORTED    Lipase   Result Value Ref Range    Lipase 25 13 - 60 U/L   Lactic Acid   Result Value Ref Range    Lactic Acid 2.1 0.5 - 2.2 mmol/L   Basic Metabolic Panel w/ Reflex to MG   Result Value Ref Range    Glucose 129 (H) 70 - 99 mg/dL    BUN 20 8 - 23 mg/dL    CREATININE 0.90 0.50 - 0.90 mg/dL    Bun/Cre Ratio NOT REPORTED 9 - 20    Calcium 8.2 (L) 8.6 - 10.4 mg/dL    Sodium 137 135 - 144 mmol/L    Potassium 4.1 3.7 - 5.3 mmol/L    Chloride 101 98 - 107 mmol/L    CO2 23 20 - 31 mmol/L    Anion Gap 13 9 - 17 mmol/L    GFR Non-African American >60 >60 mL/min    GFR African American >60 >60 mL/min    GFR Comment          GFR Staging NOT REPORTED    CBC   Result Value Ref Range    WBC 15.1 (H) 3.5 - 11.0 k/uL    RBC 3.81 (L) 4.0 - 5.2 m/uL    Hemoglobin 11.6 (L) 12.0 - 16.0 g/dL    Hematocrit 35.3 (L) 36 - 46 %    MCV 92.6 80 - 100 fL    MCH 30.3 26 - 34 pg    MCHC 32.8 31 - 37 g/dL    RDW 14.6 11.5 - 14.9 %    Platelets 291 489 - 628 k/uL    MPV 8.5 6.0 - 12.0 fL    NRBC Automated NOT REPORTED per 100 WBC   Protime-INR   Result Value Ref Range    Protime 15.9 (H) 11.8 - 14.6 sec    INR 1.3    Basic Metabolic Panel w/ Reflex to MG   Result Value Ref Range    Glucose 106 (H) 70 - 99 mg/dL    BUN 18 8 - 23 mg/dL    CREATININE 0.77 0.50 - 0.90 mg/dL    Bun/Cre Ratio NOT REPORTED 9 - 20    Calcium 7.4 (L) 8.6 - 10.4 mg/dL    Sodium 138 135 - 144 mmol/L    Potassium 3.9 3.7 - 5.3 mmol/L    Chloride 104 98 - 107 mmol/L    CO2 21 20 - 31 mmol/L    Anion Gap 13 9 - 17 mmol/L    GFR Non-African American >60 >60 mL/min    GFR African American >60 >60 mL/min    GFR Comment          GFR Staging NOT REPORTED    CBC Auto Differential   Result Value Ref Range    WBC 13.2 (H) 3.5 - 11.0 k/uL    RBC 3.48 (L) 4.0 - 5.2 m/uL    Hemoglobin 10.9 (L) 12.0 - 16.0 g/dL    Hematocrit 32.8 (L) 36 - 46 %    MCV 94.1 80 - 100 fL    MCH 31.4 26 - 34 pg    MCHC 33.4 31 - 37 g/dL    RDW 14.7 11.5 - 14.9 %    Platelets 802 663 - 237 k/uL    MPV 7.8 6.0 - 12.0 fL    NRBC Automated NOT REPORTED per 100 WBC    Differential Type NOT REPORTED     Immature Granulocytes NOT REPORTED 0 %    Absolute Immature Granulocyte NOT REPORTED 0.00 - 0.30 k/uL    WBC Morphology NOT REPORTED     RBC Morphology NOT REPORTED     Platelet Estimate NOT REPORTED     Seg Neutrophils 87 (H) 36 - 66 %    Lymphocytes 8 (L) 24 - 44 %    Monocytes 4 1 - 7 %    Eosinophils % 1 0 - 4 %    Basophils 0 0 - 2 %    Segs Absolute 11.60 (H) 1.3 - 9.1 k/uL    Absolute Lymph # 1.00 1.0 - 4.8 k/uL    Absolute Mono # 0.50 0.1 - 1.3 k/uL    Absolute Eos # 0.10 0.0 - 0.4 k/uL    Basophils Absolute 0.00 0.0 - 0.2 k/uL   COVID-19   Result Value Ref Range    SARS-CoV-2          SARS-CoV-2, Rapid Not Detected Not Detected    Source . NASOPHARYNGEAL SWAB     SARS-CoV-2, PCR         Urinalysis Reflex to Culture   Result Value Ref Range    Color, UA YELLOW YELLOW    Turbidity UA CLOUDY (A) CLEAR    Glucose, Ur NEGATIVE NEGATIVE    Bilirubin Urine NEGATIVE NEGATIVE    Ketones, Urine NEGATIVE NEGATIVE    Specific Gravity, UA 1.036 (H) 1.000 - 1.030    Urine Hgb NEGATIVE NEGATIVE    pH, UA 5.5 5.0 - 8.0    Protein, UA TRACE (A) NEGATIVE    Urobilinogen, Urine Normal Normal    Nitrite, Urine NEGATIVE NEGATIVE    Leukocyte Esterase, Urine NEGATIVE NEGATIVE    Urinalysis Comments NOT REPORTED    Microscopic Urinalysis   Result Value Ref Range    -          WBC, UA 2 TO 5 /HPF    RBC, UA 2 TO 5 /HPF    Casts UA NOT REPORTED /LPF    Crystals, UA NOT REPORTED None /HPF    Epithelial Cells UA NOT REPORTED /HPF    Renal Epithelial, UA NOT REPORTED 0 /HPF Bacteria, UA FEW (A) None    Mucus, UA NOT REPORTED None    Trichomonas, UA NOT REPORTED None    Amorphous, UA 1+ (A) None    Other Observations UA NOT REPORTED NOT REQ.     Yeast, UA FEW (A) None   TSH with Reflex   Result Value Ref Range    TSH 3.28 0.30 - 5.00 mIU/L   CBC   Result Value Ref Range    WBC 12.3 (H) 3.5 - 11.0 k/uL    RBC 3.44 (L) 4.0 - 5.2 m/uL    Hemoglobin 10.7 (L) 12.0 - 16.0 g/dL    Hematocrit 32.2 (L) 36 - 46 %    MCV 93.7 80 - 100 fL    MCH 31.1 26 - 34 pg    MCHC 33.2 31 - 37 g/dL    RDW 14.9 11.5 - 14.9 %    Platelets 776 459 - 181 k/uL    MPV 7.6 6.0 - 12.0 fL    NRBC Automated NOT REPORTED per 100 WBC   APTT   Result Value Ref Range    PTT 34.8 24.0 - 36.0 sec   Protime-INR   Result Value Ref Range    Protime 16.2 (H) 11.8 - 14.6 sec    INR 1.3    HEPARIN LEVEL/ANTI-XA   Result Value Ref Range    Anti-XA Unfrac Heparin 0.52 0.30 - 0.70 IU/L   HEPARIN LEVEL/ANTI-XA   Result Value Ref Range    Anti-XA Unfrac Heparin 0.43 0.30 - 0.70 IU/L   Basic Metabolic Panel w/ Reflex to MG   Result Value Ref Range    Glucose 118 (H) 70 - 99 mg/dL    BUN 11 8 - 23 mg/dL    CREATININE 0.60 0.50 - 0.90 mg/dL    Bun/Cre Ratio NOT REPORTED 9 - 20    Calcium 7.1 (L) 8.6 - 10.4 mg/dL    Sodium 135 135 - 144 mmol/L    Potassium 3.0 (L) 3.7 - 5.3 mmol/L    Chloride 101 98 - 107 mmol/L    CO2 23 20 - 31 mmol/L    Anion Gap 11 9 - 17 mmol/L    GFR Non-African American >60 >60 mL/min    GFR African American >60 >60 mL/min    GFR Comment          GFR Staging NOT REPORTED    CBC   Result Value Ref Range    WBC 10.7 3.5 - 11.0 k/uL    RBC 3.36 (L) 4.0 - 5.2 m/uL    Hemoglobin 10.3 (L) 12.0 - 16.0 g/dL    Hematocrit 31.1 (L) 36 - 46 %    MCV 92.4 80 - 100 fL    MCH 30.8 26 - 34 pg    MCHC 33.3 31 - 37 g/dL    RDW 14.6 11.5 - 14.9 %    Platelets 627 760 - 767 k/uL    MPV 7.9 6.0 - 12.0 fL    NRBC Automated NOT REPORTED per 100 WBC   Anti-Xa Unfract Heparin   Result Value Ref Range    Anti-XA Unfrac Heparin 0.45 0.30 - mL/min    GFR Comment          GFR Staging NOT REPORTED    CBC   Result Value Ref Range    WBC 7.6 3.5 - 11.0 k/uL    RBC 3.37 (L) 4.0 - 5.2 m/uL    Hemoglobin 10.4 (L) 12.0 - 16.0 g/dL    Hematocrit 30.7 (L) 36 - 46 %    MCV 91.2 80 - 100 fL    MCH 30.9 26 - 34 pg    MCHC 33.9 31 - 37 g/dL    RDW 14.6 11.5 - 14.9 %    Platelets 944 332 - 480 k/uL    MPV 8.0 6.0 - 12.0 fL    NRBC Automated NOT REPORTED per 100 WBC   HEPARIN LEVEL/ANTI-XA   Result Value Ref Range    Anti-XA Unfrac Heparin 0.26 (L) 0.30 - 0.70 IU/L   CBC   Result Value Ref Range    WBC 7.4 3.5 - 11.0 k/uL    RBC 3.40 (L) 4.0 - 5.2 m/uL    Hemoglobin 10.4 (L) 12.0 - 16.0 g/dL    Hematocrit 31.2 (L) 36 - 46 %    MCV 92.0 80 - 100 fL    MCH 30.6 26 - 34 pg    MCHC 33.2 31 - 37 g/dL    RDW 14.8 11.5 - 14.9 %    Platelets 684 140 - 394 k/uL    MPV 7.8 6.0 - 12.0 fL    NRBC Automated NOT REPORTED per 100 WBC   Magnesium   Result Value Ref Range    Magnesium 1.7 1.6 - 2.6 mg/dL   Anti-Xa Unfract Heparin   Result Value Ref Range    Anti-XA Unfrac Heparin 0.54 0.30 - 0.70 IU/L   Anti-Xa Unfract Heparin   Result Value Ref Range    Anti-XA Unfrac Heparin 0.50 0.30 - 0.70 IU/L   K (Potassium)   Result Value Ref Range    Potassium 3.4 (L) 3.7 - 5.3 mmol/L   Basic Metabolic Panel w/ Reflex to MG   Result Value Ref Range    Glucose 114 (H) 70 - 99 mg/dL    BUN 6 (L) 8 - 23 mg/dL    CREATININE 0.63 0.50 - 0.90 mg/dL    Bun/Cre Ratio NOT REPORTED 9 - 20    Calcium 8.3 (L) 8.6 - 10.4 mg/dL    Sodium 140 135 - 144 mmol/L    Potassium 3.6 (L) 3.7 - 5.3 mmol/L    Chloride 105 98 - 107 mmol/L    CO2 23 20 - 31 mmol/L    Anion Gap 12 9 - 17 mmol/L    GFR Non-African American >60 >60 mL/min    GFR African American >60 >60 mL/min    GFR Comment          GFR Staging NOT REPORTED    CBC   Result Value Ref Range    WBC 7.9 3.5 - 11.0 k/uL    RBC 3.57 (L) 4.0 - 5.2 m/uL    Hemoglobin 10.6 (L) 12.0 - 16.0 g/dL    Hematocrit 32.7 (L) 36 - 46 %    MCV 91.5 80 - 100 fL    MCH 29.8 26 Fingerstick   Result Value Ref Range    POC Glucose 130 (H) 65 - 105 mg/dL   POC Glucose Fingerstick   Result Value Ref Range    POC Glucose 119 (H) 65 - 105 mg/dL   POC Glucose Fingerstick   Result Value Ref Range    POC Glucose 224 (H) 65 - 105 mg/dL   POC Glucose Fingerstick   Result Value Ref Range    POC Glucose 108 (H) 65 - 105 mg/dL   POC Glucose Fingerstick   Result Value Ref Range    POC Glucose 142 (H) 65 - 105 mg/dL   POC Glucose Fingerstick   Result Value Ref Range    POC Glucose 119 (H) 65 - 105 mg/dL   POC Glucose Fingerstick   Result Value Ref Range    POC Glucose 190 (H) 65 - 105 mg/dL   POC Glucose Fingerstick   Result Value Ref Range    POC Glucose 111 (H) 65 - 105 mg/dL   POC Glucose Fingerstick   Result Value Ref Range    POC Glucose 226 (H) 65 - 105 mg/dL   POC Glucose Fingerstick   Result Value Ref Range    POC Glucose 145 (H) 65 - 105 mg/dL   POC Glucose Fingerstick   Result Value Ref Range    POC Glucose 118 (H) 65 - 105 mg/dL   POC Glucose Fingerstick   Result Value Ref Range    POC Glucose 95 65 - 105 mg/dL   POC Glucose Fingerstick   Result Value Ref Range    POC Glucose 137 (H) 65 - 105 mg/dL   POC Glucose Fingerstick   Result Value Ref Range    POC Glucose 112 (H) 65 - 105 mg/dL   EKG 12 Lead   Result Value Ref Range    Ventricular Rate 147 BPM    Atrial Rate 127 BPM    QRS Duration 82 ms    Q-T Interval 276 ms    QTc Calculation (Bazett) 431 ms    R Axis 89 degrees    T Axis -64 degrees   EKG 12 Lead   Result Value Ref Range    Ventricular Rate 85 BPM    Atrial Rate 234 BPM    QRS Duration 88 ms    Q-T Interval 316 ms    QTc Calculation (Bazett) 376 ms    R Axis 67 degrees    T Axis 113 degrees        {Radiology:    Ct Abdomen Pelvis W Iv Contrast Additional Contrast? Oral    Result Date: 6/21/2020  EXAMINATION: CT OF THE ABDOMEN AND PELVIS WITH CONTRAST 6/21/2020 12:08 pm TECHNIQUE: CT of the abdomen and pelvis was performed with the administration of intravenous contrast. Multiplanar anasarca type changes. Osseous structures demonstrate degenerative changes. 1. CT evidence of contained perforated sigmoid diverticulitis. No drainable abscess is noted. 2. Partially visualized small right and trace left pleural effusions. Findings are new compared to the prior study. Body wall anasarca is noted also new from the prior study. Findings suggestive of fluid overload. 3. Right-sided pleural calcifications suggestive of prior asbestos exposure. 4. Distended gallbladder with hyperdense material and stones. Findings are similar the prior study. No CT evidence of acute cholecystitis. 5. Thickening involving the distal esophagus. Finding could relate to esophagitis. Correlation with endoscopy is suggested. 6. 2 mm nonobstructing calculus right kidney. Ct Abdomen Pelvis W Iv Contrast Additional Contrast? None    Result Date: 6/17/2020  EXAMINATION: CT OF THE ABDOMEN AND PELVIS WITH CONTRAST 6/15/2020 8:42 pm TECHNIQUE: CT of the abdomen and pelvis was performed with the administration of intravenous contrast. Multiplanar reformatted images are provided for review. Dose modulation, iterative reconstruction, and/or weight based adjustment of the mA/kV was utilized to reduce the radiation dose to as low as reasonably achievable. COMPARISON: None. HISTORY: ORDERING SYSTEM PROVIDED HISTORY: llq abd pain TECHNOLOGIST PROVIDED HISTORY: llq abd pain Reason for Exam: pt c/o llq abd pain Acuity: Unknown Type of Exam: Unknown Additional signs and symptoms: pts IV infiltrated during scan, repeated scan with new IV FINDINGS: Lower Chest:  Visualized portion of the lower chest demonstrates no acute abnormality. There are coronary artery calcifications. Circumferential thickening of the distal esophagus is noted. Organs: The liver, spleen, pancreas, kidneys and adrenal glands are without acute findings. The gallbladder is distended and there is cholelithiasis and gallbladder sludge.   No secondary evidence

## 2020-06-24 ENCOUNTER — HOSPITAL ENCOUNTER (OUTPATIENT)
Age: 70
Setting detail: SPECIMEN
Discharge: HOME | End: 2020-06-24
Payer: MEDICARE

## 2020-06-24 LAB
ANION GAP SERPL CALCULATED.3IONS-SCNC: 13 MMOL/L (ref 9–17)
BUN BLDV-MCNC: 13 MG/DL (ref 8–23)
BUN/CREAT BLD: ABNORMAL (ref 9–20)
CALCIUM SERPL-MCNC: 8.5 MG/DL (ref 8.6–10.4)
CHLORIDE BLD-SCNC: 100 MMOL/L (ref 98–107)
CO2: 26 MMOL/L (ref 20–31)
CREAT SERPL-MCNC: 0.65 MG/DL (ref 0.5–0.9)
GFR AFRICAN AMERICAN: >60 ML/MIN
GFR NON-AFRICAN AMERICAN: >60 ML/MIN
GFR SERPL CREATININE-BSD FRML MDRD: ABNORMAL ML/MIN/{1.73_M2}
GFR SERPL CREATININE-BSD FRML MDRD: ABNORMAL ML/MIN/{1.73_M2}
GLUCOSE BLD-MCNC: 103 MG/DL (ref 70–99)
HCT VFR BLD CALC: 34.5 % (ref 36.3–47.1)
HEMOGLOBIN: 10.9 G/DL (ref 11.9–15.1)
MCH RBC QN AUTO: 29.9 PG (ref 25.2–33.5)
MCHC RBC AUTO-ENTMCNC: 31.6 G/DL (ref 28.4–34.8)
MCV RBC AUTO: 94.8 FL (ref 82.6–102.9)
NRBC AUTOMATED: 0 PER 100 WBC
PDW BLD-RTO: 15.5 % (ref 11.8–14.4)
PLATELET # BLD: 391 K/UL (ref 138–453)
PMV BLD AUTO: 10.7 FL (ref 8.1–13.5)
POTASSIUM SERPL-SCNC: 3.5 MMOL/L (ref 3.7–5.3)
RBC # BLD: 3.64 M/UL (ref 3.95–5.11)
SODIUM BLD-SCNC: 139 MMOL/L (ref 135–144)
WBC # BLD: 9 K/UL (ref 3.5–11.3)

## 2020-06-24 PROCEDURE — 80048 BASIC METABOLIC PNL TOTAL CA: CPT

## 2020-06-24 PROCEDURE — 36415 COLL VENOUS BLD VENIPUNCTURE: CPT

## 2020-06-24 PROCEDURE — P9603 ONE-WAY ALLOW PRORATED MILES: HCPCS

## 2020-06-24 PROCEDURE — 85027 COMPLETE CBC AUTOMATED: CPT

## 2020-07-06 ENCOUNTER — TELEPHONE (OUTPATIENT)
Dept: UROLOGY | Age: 70
End: 2020-07-06

## 2020-07-06 NOTE — TELEPHONE ENCOUNTER
Pt left voicemail on nurse triage line stating that she is awaiting surgery from another doctor but her urinary symptoms are worsening and wondering if she needs to schedule an appointment. Returned call and left voicemail stating that if pt feels her urinary symptoms are worsening, to please call back and the staff would be happy to schedule an Appt.

## 2020-07-11 ENCOUNTER — APPOINTMENT (OUTPATIENT)
Dept: CT IMAGING | Age: 70
End: 2020-07-11
Payer: MEDICARE

## 2020-07-11 ENCOUNTER — HOSPITAL ENCOUNTER (EMERGENCY)
Age: 70
Discharge: HOME | End: 2020-07-11
Attending: EMERGENCY MEDICINE
Payer: MEDICARE

## 2020-07-11 ENCOUNTER — APPOINTMENT (OUTPATIENT)
Dept: GENERAL RADIOLOGY | Age: 70
End: 2020-07-11
Payer: MEDICARE

## 2020-07-11 VITALS
SYSTOLIC BLOOD PRESSURE: 101 MMHG | OXYGEN SATURATION: 93 % | HEART RATE: 99 BPM | DIASTOLIC BLOOD PRESSURE: 69 MMHG | TEMPERATURE: 97.8 F | RESPIRATION RATE: 16 BRPM

## 2020-07-11 LAB
ABSOLUTE EOS #: 0.1 K/UL (ref 0–0.4)
ABSOLUTE IMMATURE GRANULOCYTE: ABNORMAL K/UL (ref 0–0.3)
ABSOLUTE LYMPH #: 1.8 K/UL (ref 1–4.8)
ABSOLUTE MONO #: 0.6 K/UL (ref 0.1–1.3)
ANION GAP SERPL CALCULATED.3IONS-SCNC: 13 MMOL/L (ref 9–17)
BASOPHILS # BLD: 1 % (ref 0–2)
BASOPHILS ABSOLUTE: 0 K/UL (ref 0–0.2)
BNP INTERPRETATION: ABNORMAL
BUN BLDV-MCNC: 16 MG/DL (ref 8–23)
CHLORIDE BLD-SCNC: 101 MMOL/L (ref 98–107)
CO2: 22 MMOL/L (ref 20–31)
CREAT SERPL-MCNC: 0.87 MG/DL (ref 0.5–0.9)
DIFFERENTIAL TYPE: ABNORMAL
EKG ATRIAL RATE: 127 BPM
EKG Q-T INTERVAL: 354 MS
EKG QRS DURATION: 82 MS
EKG QTC CALCULATION (BAZETT): 476 MS
EKG R AXIS: 55 DEGREES
EKG T AXIS: 72 DEGREES
EKG VENTRICULAR RATE: 109 BPM
EOSINOPHILS RELATIVE PERCENT: 1 % (ref 0–4)
GFR AFRICAN AMERICAN: >60 ML/MIN
GFR NON-AFRICAN AMERICAN: >60 ML/MIN
GFR SERPL CREATININE-BSD FRML MDRD: NORMAL ML/MIN/{1.73_M2}
GFR SERPL CREATININE-BSD FRML MDRD: NORMAL ML/MIN/{1.73_M2}
GLUCOSE BLD-MCNC: 129 MG/DL (ref 70–99)
HCT VFR BLD CALC: 42.7 % (ref 36–46)
HEMOGLOBIN: 14.4 G/DL (ref 12–16)
IMMATURE GRANULOCYTES: ABNORMAL %
INR BLD: 1
LYMPHOCYTES # BLD: 19 % (ref 24–44)
MCH RBC QN AUTO: 30.9 PG (ref 26–34)
MCHC RBC AUTO-ENTMCNC: 33.7 G/DL (ref 31–37)
MCV RBC AUTO: 91.7 FL (ref 80–100)
MONOCYTES # BLD: 6 % (ref 1–7)
NRBC AUTOMATED: ABNORMAL PER 100 WBC
PARTIAL THROMBOPLASTIN TIME: 28.7 SEC (ref 24–36)
PDW BLD-RTO: 15.4 % (ref 11.5–14.9)
PLATELET # BLD: 315 K/UL (ref 150–450)
PLATELET ESTIMATE: ABNORMAL
PMV BLD AUTO: 9.2 FL (ref 6–12)
POTASSIUM SERPL-SCNC: 4.1 MMOL/L (ref 3.7–5.3)
PRO-BNP: 523 PG/ML
PROTHROMBIN TIME: 13.1 SEC (ref 11.8–14.6)
RBC # BLD: 4.66 M/UL (ref 4–5.2)
RBC # BLD: ABNORMAL 10*6/UL
SEG NEUTROPHILS: 73 % (ref 36–66)
SEGMENTED NEUTROPHILS ABSOLUTE COUNT: 7.2 K/UL (ref 1.3–9.1)
SODIUM BLD-SCNC: 136 MMOL/L (ref 135–144)
TROPONIN INTERP: NORMAL
TROPONIN INTERP: NORMAL
TROPONIN T: NORMAL NG/ML
TROPONIN T: NORMAL NG/ML
TROPONIN, HIGH SENSITIVITY: 12 NG/L (ref 0–14)
TROPONIN, HIGH SENSITIVITY: 13 NG/L (ref 0–14)
WBC # BLD: 9.8 K/UL (ref 3.5–11)
WBC # BLD: ABNORMAL 10*3/UL

## 2020-07-11 PROCEDURE — 85025 COMPLETE CBC W/AUTO DIFF WBC: CPT

## 2020-07-11 PROCEDURE — 6370000000 HC RX 637 (ALT 250 FOR IP): Performed by: EMERGENCY MEDICINE

## 2020-07-11 PROCEDURE — 85730 THROMBOPLASTIN TIME PARTIAL: CPT

## 2020-07-11 PROCEDURE — 71260 CT THORAX DX C+: CPT

## 2020-07-11 PROCEDURE — 82947 ASSAY GLUCOSE BLOOD QUANT: CPT

## 2020-07-11 PROCEDURE — 99285 EMERGENCY DEPT VISIT HI MDM: CPT

## 2020-07-11 PROCEDURE — 71045 X-RAY EXAM CHEST 1 VIEW: CPT

## 2020-07-11 PROCEDURE — 83880 ASSAY OF NATRIURETIC PEPTIDE: CPT

## 2020-07-11 PROCEDURE — 36415 COLL VENOUS BLD VENIPUNCTURE: CPT

## 2020-07-11 PROCEDURE — 6360000004 HC RX CONTRAST MEDICATION: Performed by: EMERGENCY MEDICINE

## 2020-07-11 PROCEDURE — 85610 PROTHROMBIN TIME: CPT

## 2020-07-11 PROCEDURE — 84520 ASSAY OF UREA NITROGEN: CPT

## 2020-07-11 PROCEDURE — 82565 ASSAY OF CREATININE: CPT

## 2020-07-11 PROCEDURE — 84484 ASSAY OF TROPONIN QUANT: CPT

## 2020-07-11 PROCEDURE — 2580000003 HC RX 258: Performed by: EMERGENCY MEDICINE

## 2020-07-11 PROCEDURE — 80051 ELECTROLYTE PANEL: CPT

## 2020-07-11 RX ORDER — LEVOTHYROXINE SODIUM 0.07 MG/1
75 TABLET ORAL DAILY
Status: DISCONTINUED | OUTPATIENT
Start: 2020-07-11 | End: 2020-07-11 | Stop reason: HOSPADM

## 2020-07-11 RX ORDER — DOCUSATE SODIUM 100 MG/1
100 CAPSULE, LIQUID FILLED ORAL DAILY
Status: DISCONTINUED | OUTPATIENT
Start: 2020-07-11 | End: 2020-07-11 | Stop reason: HOSPADM

## 2020-07-11 RX ORDER — SODIUM CHLORIDE 0.9 % (FLUSH) 0.9 %
10 SYRINGE (ML) INJECTION PRN
Status: DISCONTINUED | OUTPATIENT
Start: 2020-07-11 | End: 2020-07-11 | Stop reason: HOSPADM

## 2020-07-11 RX ORDER — 0.9 % SODIUM CHLORIDE 0.9 %
80 INTRAVENOUS SOLUTION INTRAVENOUS ONCE
Status: COMPLETED | OUTPATIENT
Start: 2020-07-11 | End: 2020-07-11

## 2020-07-11 RX ORDER — METOPROLOL TARTRATE 50 MG/1
25 TABLET, FILM COATED ORAL ONCE
Status: COMPLETED | OUTPATIENT
Start: 2020-07-11 | End: 2020-07-11

## 2020-07-11 RX ORDER — DILTIAZEM HYDROCHLORIDE 180 MG/1
360 CAPSULE, COATED, EXTENDED RELEASE ORAL ONCE
Status: COMPLETED | OUTPATIENT
Start: 2020-07-11 | End: 2020-07-11

## 2020-07-11 RX ORDER — AMIODARONE HYDROCHLORIDE 200 MG/1
200 TABLET ORAL ONCE
Status: COMPLETED | OUTPATIENT
Start: 2020-07-11 | End: 2020-07-11

## 2020-07-11 RX ADMIN — IOVERSOL 75 ML: 741 INJECTION INTRA-ARTERIAL; INTRAVENOUS at 02:18

## 2020-07-11 RX ADMIN — METOPROLOL TARTRATE 25 MG: 50 TABLET, FILM COATED ORAL at 10:06

## 2020-07-11 RX ADMIN — METFORMIN HYDROCHLORIDE 500 MG: 500 TABLET ORAL at 10:06

## 2020-07-11 RX ADMIN — AMIODARONE HYDROCHLORIDE 200 MG: 200 TABLET ORAL at 10:07

## 2020-07-11 RX ADMIN — RIVAROXABAN 20 MG: 20 TABLET, FILM COATED ORAL at 10:07

## 2020-07-11 RX ADMIN — SODIUM CHLORIDE 80 ML: 9 INJECTION, SOLUTION INTRAVENOUS at 02:18

## 2020-07-11 RX ADMIN — Medication 10 ML: at 02:18

## 2020-07-11 RX ADMIN — DILTIAZEM HYDROCHLORIDE 360 MG: 180 CAPSULE, COATED, EXTENDED RELEASE ORAL at 10:07

## 2020-07-11 RX ADMIN — DOCUSATE SODIUM 100 MG: 100 CAPSULE, LIQUID FILLED ORAL at 10:07

## 2020-07-11 NOTE — ED PROVIDER NOTES
Received pt in signout:     The patient's initial evaluation and plan have been discussed with the prior provider, nursing Notes, Past Medical Hx, Past Surgical Hx, Social Hx, Allergies, and Family Hx were all reviewed. I performed a repeat evaluation of the patient and reviewed tests completed so far. Comes emergency department with complaints of shortness of breath. Patient states that she had just gotten home from rehab yesterday. She says that when she was going back into her apartment, there was no air conditioning on, she was doing a lot of activity she became very winded fatigued and short of breath. She came to the emergency department, she had laboratory studies that were unremarkable, she also had a CT scan that shows some cardiomegaly but no sign of any pulmonary embolism or pneumonia. No sign of ACS. When I assessed the patient, she states that she is feeling better and that she would like to go home. She says that she thinks that she just \" overdid it\" when she got home. She does have a mild tachycardia, she has known atrial fibrillation. We will give her her morning dose of medications. She is concerned that when she goes home she may confuse the different tablets and accidentally take too much her heart medications. D/w pt the results, treatment plan, warning precautions for prompt ED return and importance of close OP FU, she verbalizes understanding and agrees with the treatment plan.         Oc Salazar MD  07/11/20 0109

## 2020-07-11 NOTE — ED NOTES
RN helped pt to the bedside commode. After being on the commode for 2 min pt called out and requested RN to come back into the room. RN went into the room pt stated she needed that 'thing that you pee in that it sucks it into the tube because I cant seem to go sitting here'. RN helped pt into the bed and placed an external catheter.       Cayden Corona, JANINA  07/11/20 [id number]

## 2020-07-11 NOTE — ED NOTES
Pt called out and stated 'I urinated all over my self'. RN went in pts room, pt urinated on the bed and her pants. RN explained to pt RN was in pts room 5 min before she urinated and pt needed to call out when she needs to urinate. Pt cleaned and linen changed. Pt placed in a brief.       Francis Wren RN  07/11/20 [id number]

## 2020-07-11 NOTE — ED PROVIDER NOTES
EMERGENCY DEPARTMENT ENCOUNTER    Pt Name: Otis Pires  MRN: [id number]  [place] 1950  Date of evaluation: 7/11/20  CHIEF COMPLAINT       Chief Complaint   Patient presents with    Shortness of Breath     HISTORY OF PRESENT ILLNESS   HPI    HISTORY OF PRESENT ILLNESS:   Past medical history of recent perforated bowel presents for chief complaint of shortness of breath. Patient has had shortness of breath for the past day. Worse on exertion. No chest pain. No light headedness or dizziness. No hemoptysis. Severity is moderate. Worse on exertion, relieved on rest. Timing is 1 day. Courses intermittent. Worse on exertion.  Context is recent surgery   -----------------------   -----------------------   REVIEW OF SYSTEMS   ED Caveat: [none]   Gen: No fever, no chills   CV: No CP, no palpitations   Resp: + SOB, no respiratory distress   GI: No V/D, no abd pain   : No dysuria, no increased frequency   Skin: No rash, no purulent lesions   Eyes: No blurry vision, No double vision   MSK: No back pain, no joint pain   Neuro: No HA, no sensation changes   Psych: No SI/HI   -----------------------   -----------------------   ALLERGIES   -per nursing records, reviewed   PAST MEDICAL HISTORY   -See HPI   SOCIAL HISTORY   -No daily drinking, no IV drugs   -----------------------   -----------------------   PHYSICAL EXAM   Gen: Alert, no acute distress   Skin: Warm, no rashes   Head: Normocephalic, atraumatic   Neck: No midline tenderness, no nuchal rigidity   Eye: EOMI, PERRLA, normal conjunctiva   ENT: Mucous membranes moist, no pharyngeal erythema   CV: Tachycardic, no rubs   Resp: Respirations unlabored, lungs clear to auscultation   GI: Soft, non distended, no large abdominal masses, non tender   MSK: No midline back pain, no large joint effusions   Neuro: Alert and oriented, no focal neurological deficits observed   Psych: Cooperative, appropriate mood and affect   -----------------------   ----------------------- MEDICAL DECISION MAKING   Differential Diagnosis:   - Consideration is given for bronchospasm, pulmonary edema, pneumonia, pneumothorax, pericardial effusion, PE, ACS,   -   #Impression/Plan:   - Clinically patient's presentation is most consistent with possible pulmonary embolism. We will get laboratory testing and imaging. . Clinically patient is well-appearing at this time in no acute distress.   -   ##Reevaluation/Conversations on care:   -Patient signed out to oncoming team for reevaluation and final disposition. -   -----------------------   -----------------------   Faye Simmonds, MD, RAMON   Emergency Medicine Attending   Questions? Please contact my cell phone anytime. [phone]   *This charting supersedes any ED resident or staff charting and was written using speech recognition software        PASTMEDICAL HISTORY     Past Medical History:   Diagnosis Date    Adenocarcinoma of endometrium, stage 1 ([facility] [address] [facility] 10/5/2017    Well differentiated grade 1 adenocarcinoma arising in complex hyperplasia    JOSHUA (acute kidney injury) ([facility] [address] [facility] 1/15/2020    Allergic rhinitis 2/23/2017    At high risk for falls 2/23/2017    Colon polyp     Had colonoscopy done 20 yrs ago    Diabetes mellitus ([facility]     Endometrial cancer ([facility]     History of TIA (transient ischemic attack) '80's    on Baby ASA    Hyperglycemia 3/21/2017    Hyperlipidemia     Hypertension since 2015    on Rx.     Hypothyroidism 1980    sub total thyroidectomy goiter    Legally blind since born    both eyes, cord prolapse; \"not legally blind\" per \"associated eye care\" please see telephone encounter from 2/27/18    Lower back pain     Mixed incontinence 1/9/2016    Morbid obesity with BMI of 40.0-44.9, adult ([facility] 2/23/2017    CLAROS (nonalcoholic steatohepatitis) 3/10/2019    Obesity     Oral phase dysphagia 2/23/2018    Osteoarthritis (arthritis due to wear and tear of joints)     ronan knee    Osteoarthritis involving multiple joints on both sides of body 4/24/2012    Osteoporosis     Postmenopausal bleeding 9/20/2017    S/P h-scope, Myosure 9/20/17 9/20/2017    Pathology pending    Tennis elbow     left    Thickened endometrium 9/20/2017    TLHBSO, bilateral LND 10/24/17 10/24/2017    Type 2 diabetes mellitus, without long-term current use of insulin ([facility] [address] 1/14/2020     SURGICAL HISTORY       Past Surgical History:   Procedure Laterality Date    CATARACT REMOVAL WITH IMPLANT Bilateral 2015    COLONOSCOPY  1997    had polyp, she doesn't know where and when, \"20 yrs ago\"    COLONOSCOPY  06/26/2017    DILATION AND CURETTAGE OF UTERUS N/A 9/20/2017    HYSTEROSCOPY  WITH Moo Mckeoner performed by Ling Gonzalez DO at [address] N/A 10/24/2017    TOTAL LAPAROSCOPIC HYSTERECTOMY, BSO, F.S.  STAGING, GYRUS G400 performed by Katelyn Chaudhary MD at [address] N/A 6/26/2017    COLONOSCOPY POLYPECTOMY / [address] performed by Pamela Monroe DO at [address]  10/24/2017    with pelvic lymph node dissection    THYROID SURGERY  1980    subtotal 20 years ago    TONSILLECTOMY      VITRECTOMY      FLOATERS     CURRENT MEDICATIONS       Discharge Medication List as of 7/11/2020 10:54 AM      CONTINUE these medications which have NOT CHANGED    Details   metoprolol tartrate (LOPRESSOR) 25 MG tablet Take 0.5 tablets by mouth 2 times daily, Disp-60 tablet, R-3Normal      dilTIAZem (CARDIZEM CD) 360 MG extended release capsule Take 1 capsule by mouth daily, Disp-30 capsule, R-3Normal      amiodarone (CORDARONE) 200 MG tablet Take 1 tablet by mouth daily, Disp-30 tablet, R-3Normal      rivaroxaban (XARELTO) 20 MG TABS tablet Take 1 tablet by mouth daily (with breakfast), Disp-30 tablet, R-3Normal      atorvastatin (LIPITOR) 40 MG tablet TAKE 1 TABLET BY MOUTH DAILY STOP SIMVASTATIN, Disp-90 tablet, R-3Normal      levothyroxine (SYNTHROID) 75 MCG tablet TAKE 1 TABLET BY INITIAL VITALS: /69   Pulse 99   Temp 97.8 °F (36.6 °C) (Oral)   Resp 16   LMP  (LMP Unknown)   SpO2 93%    Physical Exam    MEDICAL DECISION MAKING:            Labs Reviewed   TROPONIN   TROPONIN     EMERGENCY DEPARTMENTCOURSE:         Vitals:    Vitals:    07/11/20 1015 07/11/20 1030 07/11/20 1045 07/11/20 1100   BP: (!) 133/91 95/73 110/84 101/69   Pulse: 113 120 99 99   Resp: 13 18 17 16   Temp:       TempSrc:       SpO2: 96% 93% 97% 93%       The patient was given the following medications while in the emergency department:  Orders Placed This Encounter   Medications    0.9 % sodium chloride bolus    ioversol (OPTIRAY) 74 % injection 75 mL    DISCONTD: sodium chloride flush 0.9 % injection 10 mL    metoprolol tartrate (LOPRESSOR) tablet 25 mg    rivaroxaban (XARELTO) tablet 20 mg    DISCONTD: metFORMIN (GLUCOPHAGE) tablet 500 mg    DISCONTD: levothyroxine (SYNTHROID) tablet 75 mcg    DISCONTD: docusate sodium (COLACE) capsule 100 mg    dilTIAZem (CARDIZEM CD) extended release capsule 360 mg    amiodarone (CORDARONE) tablet 200 mg     CONSULTS:  None    FINAL IMPRESSION      1. Dyspnea, unspecified type    2.  Atrial fibrillation, unspecified type [facility])          DISPOSITION/PLAN   DISPOSITION Decision To Discharge 07/11/2020 10:44:57 AM      PATIENT REFERRED TO:  Karthik Arreola MD  [address]  [address]  [phone]    In 2 days      [address]  [facility] [address]  [phone]    If symptoms worsen    DISCHARGE MEDICATIONS:  Discharge Medication List as of 7/11/2020 10:54 AM        Estil Goodpasture, MD  Attending Emergency Physician                    Dewayne Claude, MD  07/11/20 4030

## 2020-07-13 ENCOUNTER — CARE COORDINATION (OUTPATIENT)
Dept: CARE COORDINATION | Age: 70
End: 2020-07-13

## 2020-07-13 ENCOUNTER — TELEPHONE (OUTPATIENT)
Dept: FAMILY MEDICINE CLINIC | Age: 70
End: 2020-07-13

## 2020-07-13 NOTE — TELEPHONE ENCOUNTER
[place] [facility] ([facility]) ED Follow up Call    Reason for ED visit:  Shortness of Breath     7/13/2020         FU appts/Provider:    Future Appointments   Date Time Provider Dante Monique   7/20/2020  9:00 AM Pastora Oviedo MD [address] [place] [address]   9/18/2020  2:45 PM Isela Moeller PA-C GYN Oncology [facility]         VOICEMAIL DOCUMENTATION - ERASE IF NOT USED  Hi, this message is for [facility]. This is Tati Díaz from [address] [place] [address] office. Just calling to see how you are doing after your recent visit to the Emergency Room. [address] [place] [address] wants to make sure you were able to fill any prescriptions and that you understand your discharge instructions. Please return our call if you need to make a follow up appointment with your provider or have any further needs. Our phone number is [phone]. Have a great day.

## 2020-07-14 ENCOUNTER — CARE COORDINATION (OUTPATIENT)
Dept: CARE COORDINATION | Age: 70
End: 2020-07-14

## 2020-07-14 NOTE — CARE COORDINATION
Patient contacted regarding recent discharge and COVID-19 risk. Discussed COVID-19 related testing which was not done at this time. Test results were N/A. Patient informed of results, if available? N/A     Care Transition Nurse/ Ambulatory Care Manager contacted the patient by telephone to perform post discharge assessment. Verified name and  with patient as identifiers. Patient has following risk factors of: diabetes and BMI > 40 kg/m2. CTN/ACM reviewed discharge instructions, medical action plan and red flags related to discharge diagnosis. Reviewed and educated them on any new and changed medications related to discharge diagnosis. Advised obtaining a 90-day supply of all daily and as-needed medications. Education provided regarding infection prevention, and signs and symptoms of COVID-19 and when to seek medical attention with patient who verbalized understanding. Discussed exposure protocols and [facility] you at higher risk for severe illness  and given an opportunity for questions and concerns. The patient agrees to contact the COVID-19 hotline [phone] or PCP office for questions related to their healthcare. CTN/ACM provided contact information for future reference. From CDC: Are you at higher risk for severe illness?  Wash your hands often.  Avoid close contact (6 feet, which is about two arm lengths) with people who are sick.  Put distance between yourself and other people if COVID-19 is spreading in your community.  Clean and disinfect frequently touched surfaces.  Avoid all cruise travel and non-essential air travel.  Call your healthcare professional if you have concerns about COVID-19 and your underlying condition or if you are sick. For more information on steps you can take to protect yourself, see CDC's How to [address] [place] [address] for follow-up call in 7-14 days based on severity of symptoms and risk factors.     Advance Care Planning  People with COVID-19 may have no symptoms, mild symptoms, such as fever, cough, and shortness of breath or they may have more severe illness, developing severe and fatal pneumonia. As a result, Advance Care Planning with attention to naming a health care decision maker (someone you trust to make healthcare decisions for you if you could not speak for yourself) and sharing other health care preferences is important BEFORE a possible health crisis. Please contact your Primary Care Provider to discuss Advance Care Planning. Preventing the Spread of Coronavirus Disease 2019 in Homes and Residential Communities  For the most recent information go to [employer]    Prevention steps for People with confirmed or suspected COVID-19 (including persons under investigation) who do not need to be hospitalized  and   People with confirmed COVID-19 who were hospitalized and determined to be medically stable to go home    Your healthcare provider and public health staff will evaluate whether you can be cared for at home. If it is determined that you do not need to be hospitalized and can be isolated at home, you will be monitored by staff from your local or state health department. You should follow the prevention steps below until a healthcare provider or local or state health department says you can return to your normal activities. Stay home except to get medical care  People who are mildly ill with COVID-19 are able to isolate at home during their illness. You should restrict activities outside your home, except for getting medical care. Do not go to work, school, or public areas. Avoid using public transportation, ride-sharing, or taxis. Separate yourself from other people and animals in your home  People: As much as possible, you should stay in a specific room and away from other people in your home.  Also, you should use a separate bathroom, if available. Animals: You should restrict contact with pets and other animals while you are sick with COVID-19, just like you would around other people. Although there have not been reports of pets or other animals becoming sick with COVID-19, it is still recommended that people sick with COVID-19 limit contact with animals until more information is known about the virus. When possible, have another member of your household care for your animals while you are sick. If you are sick with COVID-19, avoid contact with your pet, including petting, snuggling, being kissed or licked, and sharing food. If you must care for your pet or be around animals while you are sick, wash your hands before and after you interact with pets and wear a facemask. Call ahead before visiting your doctor  If you have a medical appointment, call the healthcare provider and tell them that you have or may have COVID-19. This will help the healthcare providers office take steps to keep other people from getting infected or exposed. Wear a facemask  You should wear a facemask when you are around other people (e.g., sharing a room or vehicle) or pets and before you enter a healthcare providers office. If you are not able to wear a facemask (for example, because it causes trouble breathing), then people who live with you should not stay in the same room with you, or they should wear a facemask if they enter your room. Cover your coughs and sneezes  Cover your mouth and nose with a tissue when you cough or sneeze. Throw used tissues in a lined trash can. Immediately wash your hands with soap and water for at least 20 seconds or, if soap and water are not available, clean your hands with an alcohol-based hand  that contains at least 60% alcohol.   Clean your hands often  Wash your hands often with soap and water for at least 20 seconds, especially after blowing your nose, coughing, or sneezing; going to the bathroom; and before eating or preparing food. If soap and water are not readily available, use an alcohol-based hand  with at least 60% alcohol, covering all surfaces of your hands and rubbing them together until they feel dry. Soap and water are the best option if hands are visibly dirty. Avoid touching your eyes, nose, and mouth with unwashed hands. Avoid sharing personal household items  You should not share dishes, drinking glasses, cups, eating utensils, towels, or bedding with other people or pets in your home. After using these items, they should be washed thoroughly with soap and water. Clean all high-touch surfaces everyday  High touch surfaces include counters, tabletops, doorknobs, bathroom fixtures, toilets, phones, keyboards, tablets, and bedside tables. Also, clean any surfaces that may have blood, stool, or body fluids on them. Use a household cleaning spray or wipe, according to the label instructions. Labels contain instructions for safe and effective use of the cleaning product including precautions you should take when applying the product, such as wearing gloves and making sure you have good ventilation during use of the product. Monitor your symptoms  Seek prompt medical attention if your illness is worsening (e.g., difficulty breathing). Before seeking care, call your healthcare provider and tell them that you have, or are being evaluated for, COVID-19. Put on a facemask before you enter the facility. These steps will help the healthcare providers office to keep other people in the office or waiting room from getting infected or exposed. Ask your healthcare provider to call the local or state health department. Persons who are placed under active monitoring or facilitated self-monitoring should follow instructions provided by their local health department or occupational health professionals, as appropriate. When working with your local health department check their available hours.   If you have a medical

## 2020-07-16 ENCOUNTER — TELEPHONE (OUTPATIENT)
Dept: FAMILY MEDICINE CLINIC | Age: 70
End: 2020-07-16

## 2020-07-16 NOTE — TELEPHONE ENCOUNTER
Received fax from [facility]  office asking for medical clearance for patient. She is scheduled for EGD. Cscope for 08/03/2020. Please advise.   PAT: 07/21/20  Next Visit Date:  7/16/2020   Last Visit Date: 1/21/2020    Hemoglobin A1C (%)   Date Value   01/09/2020 5.6   10/03/2019 5.9   06/18/2019 6.1             ( goal A1C is < 7)   BP Readings from Last 3 Encounters:   07/11/20 101/69   06/22/20 118/71   06/09/20 134/80          (goal 120/80)  BUN   Date Value Ref Range Status   07/11/2020 16 8 - 23 mg/dL Final     CREATININE   Date Value Ref Range Status   07/11/2020 0.87 0.50 - 0.90 mg/dL Final     Potassium   Date Value Ref Range Status   07/11/2020 4.1 3.7 - 5.3 mmol/L Final

## 2020-07-19 ENCOUNTER — HOSPITAL ENCOUNTER (EMERGENCY)
Age: 70
Discharge: HOME | End: 2020-07-19
Attending: EMERGENCY MEDICINE
Payer: MEDICARE

## 2020-07-19 ENCOUNTER — APPOINTMENT (OUTPATIENT)
Dept: GENERAL RADIOLOGY | Age: 70
End: 2020-07-19
Payer: MEDICARE

## 2020-07-19 VITALS
WEIGHT: 226 LBS | RESPIRATION RATE: 20 BRPM | DIASTOLIC BLOOD PRESSURE: 64 MMHG | SYSTOLIC BLOOD PRESSURE: 129 MMHG | BODY MASS INDEX: 41.59 KG/M2 | OXYGEN SATURATION: 97 % | HEART RATE: 53 BPM | TEMPERATURE: 98.2 F | HEIGHT: 62 IN

## 2020-07-19 PROCEDURE — 6370000000 HC RX 637 (ALT 250 FOR IP): Performed by: EMERGENCY MEDICINE

## 2020-07-19 PROCEDURE — 73562 X-RAY EXAM OF KNEE 3: CPT

## 2020-07-19 PROCEDURE — 99283 EMERGENCY DEPT VISIT LOW MDM: CPT

## 2020-07-19 RX ORDER — ACETAMINOPHEN 325 MG/1
650 TABLET ORAL ONCE
Status: COMPLETED | OUTPATIENT
Start: 2020-07-19 | End: 2020-07-19

## 2020-07-19 RX ADMIN — ACETAMINOPHEN 650 MG: 325 TABLET, FILM COATED ORAL at 17:33

## 2020-07-19 ASSESSMENT — PAIN DESCRIPTION - LOCATION: LOCATION: KNEE

## 2020-07-19 ASSESSMENT — ENCOUNTER SYMPTOMS
ABDOMINAL PAIN: 0
COUGH: 0
SHORTNESS OF BREATH: 0

## 2020-07-19 ASSESSMENT — PAIN DESCRIPTION - PAIN TYPE: TYPE: ACUTE PAIN

## 2020-07-19 ASSESSMENT — PAIN SCALES - GENERAL
PAINLEVEL_OUTOF10: 5
PAINLEVEL_OUTOF10: 3

## 2020-07-19 ASSESSMENT — PAIN DESCRIPTION - ORIENTATION: ORIENTATION: LEFT

## 2020-07-19 NOTE — ED NOTES
Mode of arrival:  Medic 40      Residence prior to admit: home      Chief complaint on admission: L knee pain  Pt states that she was lying in bed last night and moved her knee. Pt states that she felt and heard a \"pop. \"  Pt states that she can't bear any weight on L leg. Pt is Aox4, ambulates with a walker, and does not appear to be in any distress at this time. C= \"Have you ever felt that you should Cut down on your drinking? \"  No  A= \"Have people Annoyed you by criticizing your drinking? \"  No  G= \"Have you ever felt bad or Guilty about your drinking? \"  No  E= \"Have you ever had a drink as an Eye-opener first thing in the morning to steady your nerves or to help a hangover? \"  No      Deferred []      Reason for deferring: N/A    *If yes to two or more: probable alcohol abuse. [address] [place] [facility], RN  07/19/20 2251

## 2020-07-19 NOTE — ED NOTES
Bed: 09  Expected date:   Expected time:   Means of arrival:   Comments:     Angélica Douglas RN  07/19/20 3837

## 2020-07-19 NOTE — ED PROVIDER NOTES
[address]  eMERGENCY dEPARTMENT eNCOUnter      Pt Name: Yvan Montano  MRN: [id number]  [place] 1950  Date of evaluation: 7/19/20      CHIEF COMPLAINT       Chief Complaint   Patient presents with    Knee Pain     HISTORY OF PRESENT ILLNESS   HPI 78 y.o. female presents with c/o left knee pain. Symptoms started last night. Pain is described as throbbing / popping in character, severe in severity  / 10). The pain is located primarily in the left knee with no radiation. The pain has been constant in course worsened by activity. The patient tried tylenol prior to arrival with some relief of symptoms. The patient reports a history of similar symptoms. Review of the electronic medical record shows xrays from June 2019 that showed tricompartmental osteoarthritis without signs of fracture. Pt recently seen for shortness of breath. She states that her breathing is much better. REVIEW OF SYSTEMS     Review of Systems   Constitutional: Negative for fever. HENT: Negative for congestion. Eyes: Negative for visual disturbance. Respiratory: Negative for cough and shortness of breath. Cardiovascular: Negative for chest pain. Gastrointestinal: Negative for abdominal pain. Musculoskeletal: Positive for arthralgias and gait problem. Skin: Negative for rash. Neurological: Negative for weakness. PAST MEDICAL HISTORY     Past Medical History:   Diagnosis Date    Adenocarcinoma of endometrium, stage 1 ([address] 10/5/2017    Well differentiated grade 1 adenocarcinoma arising in complex hyperplasia    JOSHUA (acute kidney injury) ([address] 1/15/2020    Allergic rhinitis 2/23/2017    At high risk for falls 2/23/2017    Colon polyp     Had colonoscopy done 20 yrs ago    Diabetes mellitus ([address]     Endometrial cancer ([address]     History of TIA (transient ischemic attack) '80's    on Baby ASA    Hyperglycemia 3/21/2017    Hyperlipidemia     Hypertension since 2015    on Rx.     Hypothyroidism 1980    sub total thyroidectomy goiter    Legally blind since born    both eyes, cord prolapse; \"not legally blind\" per \"associated eye care\" please see telephone encounter from 2/27/18    Lower back pain     Mixed incontinence 1/9/2016    Morbid obesity with BMI of 40.0-44.9, adult ([facility] 2/23/2017    CLAROS (nonalcoholic steatohepatitis) 3/10/2019    Obesity     Oral phase dysphagia 2/23/2018    Osteoarthritis (arthritis due to wear and tear of joints)     ronan knee    Osteoarthritis involving multiple joints on both sides of body 4/24/2012    Osteoporosis     Postmenopausal bleeding 9/20/2017    S/P h-scope, Myosure 9/20/17 9/20/2017    Pathology pending    Tennis elbow     left    Thickened endometrium 9/20/2017    TLHBSO, bilateral LND 10/24/17 10/24/2017    Type 2 diabetes mellitus, without long-term current use of insulin ([facility] 1/14/2020       SURGICAL HISTORY       Past Surgical History:   Procedure Laterality Date    CATARACT REMOVAL WITH IMPLANT Bilateral 2015    COLONOSCOPY  1997    had polyp, she doesn't know where and when, \"20 yrs ago\"    COLONOSCOPY  06/26/2017    DILATION AND CURETTAGE OF UTERUS N/A 9/20/2017    HYSTEROSCOPY  WITH Matt Reusing performed by Andre Carbone DO at [address] [facility] N/A 10/24/2017    TOTAL LAPAROSCOPIC HYSTERECTOMY, BSO, F.S.  STAGING, GYRUS G400 performed by Claudia Palmer MD at [address] N/A 6/26/2017    COLONOSCOPY POLYPECTOMY / HOT SNARE performed by Janie Persaud DO at [address]  10/24/2017    with pelvic lymph node dissection    THYROID SURGERY  1980    subtotal 20 years ago    TONSILLECTOMY      VITRECTOMY      FLOATERS       CURRENT MEDICATIONS       Previous Medications    ACETAMINOPHEN (APAP EXTRA STRENGTH) 500 MG TABLET    Take 1 tablet by mouth every 6 hours as needed for Pain or Fever    AMIODARONE (CORDARONE) 200 MG TABLET    Take 1 tablet by mouth daily    ATORVASTATIN (LIPITOR) 40 MG TABLET    TAKE 1 TABLET BY MOUTH DAILY STOP SIMVASTATIN    CHOLECALCIFEROL (VITAMIN D) 2000 UNITS TABS TABLET    Take 1 tablet by mouth daily    CRANBERRY, VACC OXYCOCCUS, (SM CRANBERRY) 500 MG TABS    Take 500 mg by mouth daily     DILTIAZEM (CARDIZEM CD) 360 MG EXTENDED RELEASE CAPSULE    Take 1 capsule by mouth daily    DOCUSATE SODIUM (COLACE) 100 MG CAPSULE    TAKE 1 CAPSULE BY MOUTH 2 TIMES DAILY    HM LORATADINE 10 MG TABLET    TAKE 1 TABLET BY MOUTH DAILY    LACTOBACILLUS (PROBIOTIC ACIDOPHILUS) TABS    Take 1 tablet by mouth daily    LEVOTHYROXINE (SYNTHROID) 75 MCG TABLET    TAKE 1 TABLET BY MOUTH EVERY MORNING (BEFORE BREAKFAST)    METFORMIN (GLUCOPHAGE) 500 MG TABLET    TAKE ONE TABLET BY MOUTH TWICE A DAY WITH A MEAL    METOPROLOL TARTRATE (LOPRESSOR) 25 MG TABLET    Take 0.5 tablets by mouth 2 times daily    MYRBETRIQ 50 MG TB24    TAKE ONE TABLET BY MOUTH ONCE DAILY    RIVAROXABAN (XARELTO) 20 MG TABS TABLET    Take 1 tablet by mouth daily (with breakfast)    UNABLE TO FIND    Needs right walker brake fixed    VITAMIN B-12 (CYANOCOBALAMIN) 1000 MCG TABLET    Take 1 tablet by mouth daily       ALLERGIES     is allergic to sulfa antibiotics and penicillins. FAMILY HISTORY     She indicated that her mother is . She indicated that her father is . She indicated that her sister is alive. She indicated that her brother is alive. She indicated that her maternal grandmother is . She indicated that her maternal grandfather is . She indicated that her paternal grandmother is . She indicated that her paternal grandfather is . She indicated that her daughter is alive. She indicated that the status of her neg hx is unknown. SOCIAL HISTORY      reports that she has never smoked. She has never used smokeless tobacco. She reports that she does not drink alcohol or use drugs.     PHYSICAL EXAM     INITIAL VITALS: /64 Pulse 53   Temp 98.2 °F (36.8 °C) (Oral)   Resp 20   Ht 5' 2\" (1.575 m)   Wt 226 lb (102.5 kg)   LMP  (LMP Unknown)   SpO2 97%   BMI 41.34 kg/m²   Gen: NAD  Head: NC/AT  Eyes: Anicteric  CVS: RRR  PULM: CTA  ABD: Soft, no ttp. MSK: Normal muscle bulk. Tenderness with flex/ext of the left knee. No joint laxity. Negative ant/post drawer test.   Neuro: A&Ox3, fluent sensation intact in bilateral lower extremities. Extr: DP/PT pulses intact. MEDICAL DECISION MAKING:     MDM  79 yo F with acute flare of chronic left knee pain. Xray to ensure no fracture or dislocation. Providing analges       Emergency Department course:  Xray shows chronic osteoarthritis. No fracture or dislocation. D/w pt treatment plan, warning precautions for prompt ED return and importance of close OP FU, she verbalizes understanding and agrees with the treatment plan. DIAGNOSTIC RESULTS   RADIOLOGY:All plain film, CT, MRI, and formal ultrasound images (except ED bedside ultrasound) are read by the radiologist and the images and interpretations are directly viewed by the emergency physician. XR KNEE LEFT (3 VIEWS)   Final Result   No acute osseous abnormality. Severe tricompartmental arthritic changes   described above with small suprapatellar effusion. Vitals:    Vitals:    07/19/20 1558   BP: 129/64   Pulse: 53   Resp: 20   Temp: 98.2 °F (36.8 °C)   TempSrc: Oral   SpO2: 97%   Weight: 226 lb (102.5 kg)   Height: 5' 2\" (1.575 m)       The patient was given the following medications while in the emergency department:  Orders Placed This Encounter   Medications    acetaminophen (TYLENOL) tablet 650 mg     -------------------------  CRITICAL CARE:   CONSULTS: None  PROCEDURES: Procedures     FINAL IMPRESSION      1.  Osteoarthritis of left knee, unspecified osteoarthritis type          DISPOSITION/PLAN   DISPOSITION Decision To Discharge 07/19/2020 05:25:27 PM      PATIENT REFERRED TO:  Yaakov Sánchez MD  [address]  [address]  [address] [facility],[address]    In 3 days      [facility] ED  [facility] [address]  [address] [facility] [id number] [phone]    If symptoms worsen      DISCHARGE MEDICATIONS:  New Prescriptions    No medications on file         Shagufta Mukherjee MD  Attending Emergency Physician                      Shagufta Mukherjee MD  07/19/20 7839

## 2020-07-20 ENCOUNTER — TELEPHONE (OUTPATIENT)
Dept: FAMILY MEDICINE CLINIC | Age: 70
End: 2020-07-20

## 2020-07-20 ENCOUNTER — OFFICE VISIT (OUTPATIENT)
Dept: UROLOGY | Age: 70
End: 2020-07-20
Payer: MEDICARE

## 2020-07-20 VITALS — TEMPERATURE: 97.8 F

## 2020-07-20 PROCEDURE — 4040F PNEUMOC VAC/ADMIN/RCVD: CPT | Performed by: UROLOGY

## 2020-07-20 PROCEDURE — 99214 OFFICE O/P EST MOD 30 MIN: CPT | Performed by: UROLOGY

## 2020-07-20 PROCEDURE — 1111F DSCHRG MED/CURRENT MED MERGE: CPT | Performed by: UROLOGY

## 2020-07-20 PROCEDURE — 1036F TOBACCO NON-USER: CPT | Performed by: UROLOGY

## 2020-07-20 PROCEDURE — 0509F URINE INCON PLAN DOCD: CPT | Performed by: UROLOGY

## 2020-07-20 PROCEDURE — G8427 DOCREV CUR MEDS BY ELIG CLIN: HCPCS | Performed by: UROLOGY

## 2020-07-20 PROCEDURE — 1123F ACP DISCUSS/DSCN MKR DOCD: CPT | Performed by: UROLOGY

## 2020-07-20 PROCEDURE — 3017F COLORECTAL CA SCREEN DOC REV: CPT | Performed by: UROLOGY

## 2020-07-20 PROCEDURE — G8417 CALC BMI ABV UP PARAM F/U: HCPCS | Performed by: UROLOGY

## 2020-07-20 PROCEDURE — G8399 PT W/DXA RESULTS DOCUMENT: HCPCS | Performed by: UROLOGY

## 2020-07-20 PROCEDURE — 1090F PRES/ABSN URINE INCON ASSESS: CPT | Performed by: UROLOGY

## 2020-07-20 RX ORDER — OXYBUTYNIN CHLORIDE 10 MG/1
10 TABLET, EXTENDED RELEASE ORAL DAILY
Qty: 30 TABLET | Refills: 5 | Status: SHIPPED | OUTPATIENT
Start: 2020-07-20 | End: 2020-09-30

## 2020-07-20 ASSESSMENT — ENCOUNTER SYMPTOMS
CONSTIPATION: 0
EYE PAIN: 0
DIARRHEA: 0
EYE REDNESS: 0
SHORTNESS OF BREATH: 0
COUGH: 0
WHEEZING: 0
VOMITING: 0
NAUSEA: 0
BACK PAIN: 0
ABDOMINAL PAIN: 0

## 2020-07-20 NOTE — TELEPHONE ENCOUNTER
[facility] ED Follow up Call    Reason for ED visit:           Suze Soliz , this is Isabela Varghese from Dr. Pino Cobos office, just calling to see how you are doing after your recent ED visit. Did you receive discharge instructions? Yes  Do you understand the discharge instructions? Yes  Did the ED give you any new prescriptions? Yes  Were you able to fill your prescriptions? Yes      Do you have one of our red, yellow and green  Zone sheets that help you to determine when you should go to the ED? Yes      Do you need or want to make a follow up appt with your PCP? Yes    Do you have any further needs in the home i.e. Equipment?   No        FU appts/Provider:    Future Appointments   Date Time Provider Dante Amaya   7/21/2020  1:15 PM [facility] PAT RM 2 [facility] PAT [facility]   7/22/2020  1:15 PM MAX Barker - CNP fp sc [facility]   7/30/2020  1:20 PM SCHEDULE, [facility] COVID19 PAT SCREENING [facility] PAT [facility]   8/31/2020  9:20 AM Eliane Keith MD [place] [facility]   9/18/2020  2:45 PM Bess Thompson PA-C GYN Oncology [facility]

## 2020-07-20 NOTE — PROGRESS NOTES
[facility]  [facility] - [facility]  [address] [place] [id number]  Dept:  Mary Olson [facility] Urology Office Note - Established    Patient:  Ivy Tobar  YOB: 1950  Date: 7/20/2020    The patient is a 78 y.o. female whopresents today for evaluation of the following problems:   Chief Complaint   Patient presents with    Incontinence    Other     OAB       HPI  Overactive Bladder:  Patient is here today for an overactive bladder which started a few year(s) ago. Recently the OAB symptoms: are worsening  Current medical Rx for OAB: myrbetriq  Frequency: 1.5 hours  Nocturia x 2   Consumption of bladder irritants? no  Incontinence? Stress incontinence: Severity = mild. Urge Incontinence:  Severity = fairly severe. Pt wears 3-4 pullups per day                  Summary of old records: N/A    Additional History: N/A    Procedures Today: N/A    Urinalysis today:  No results found for this visit on 07/20/20.     Imaging Reviewed during this Office Visit: none  (results were independently reviewed by physician and radiology report verified)    AUA Symptom Score (7/20/2020):  INCOMPLETE EMPTYING: How often have you had the sensation of not emptying your bladder?: Not at all  FREQUENCY: How often do you have to urinate less than every two hours?: Less than 1 to 5 times  INTERMITTENCY: How often have you found you stopped and started again several times when you urinated?: Not at all  URGENCY: How often have you found it difficult to postpone urination?: Almost always(only at night)  WEAK STREAM: How often have you had a weak urinary stream?: Not at all  STRAINING: How often have you had to strain to start  urination?: Not at all  NOCTURIA: How many times did you typically get up at night to uriniate?: 2 Times  TOTAL I-PSS SCORE[de-identified] 8  How would you feel if you were to spend the rest of your life with your urinary condition?: Mostly Dissatisfied    Last BUN and creatinine:  Lab Results   Component Value Date    BUN 16 07/11/2020     Lab Results   Component Value Date    CREATININE 0.87 07/11/2020       Additional Lab/Culture results: none    PAST MEDICAL, FAMILY AND SOCIAL HISTORY UPDATE:  Past Medical History:   Diagnosis Date    Adenocarcinoma of endometrium, stage 1 ([address] 10/5/2017    Well differentiated grade 1 adenocarcinoma arising in complex hyperplasia    JOSHUA (acute kidney injury) ([address] 1/15/2020    Allergic rhinitis 2/23/2017    At high risk for falls 2/23/2017    Colon polyp     Had colonoscopy done 20 yrs ago    Diabetes mellitus ([address]     Endometrial cancer ([address]     History of TIA (transient ischemic attack) '80's    on Baby ASA    Hyperglycemia 3/21/2017    Hyperlipidemia     Hypertension since 2015    on Rx.     Hypothyroidism 1980    sub total thyroidectomy goiter    Legally blind since born    both eyes, cord prolapse; \"not legally blind\" per \"associated eye care\" please see telephone encounter from 2/27/18    Lower back pain     Mixed incontinence 1/9/2016    Morbid obesity with BMI of 40.0-44.9, adult ([address] 2/23/2017    CLAROS (nonalcoholic steatohepatitis) 3/10/2019    Obesity     Oral phase dysphagia 2/23/2018    Osteoarthritis (arthritis due to wear and tear of joints)     ronan knee    Osteoarthritis involving multiple joints on both sides of body 4/24/2012    Osteoporosis     Postmenopausal bleeding 9/20/2017    S/P h-scope, Myosure 9/20/17 9/20/2017    Pathology pending    Tennis elbow     left    Thickened endometrium 9/20/2017    TLHBSO, bilateral LND 10/24/17 10/24/2017    Type 2 diabetes mellitus, without long-term current use of insulin ([address] 1/14/2020     Past Surgical History:   Procedure Laterality Date    CATARACT REMOVAL WITH IMPLANT Bilateral 2015    COLONOSCOPY  1997    had polyp, she doesn't know where and when, \"20 yrs ago\"    COLONOSCOPY  06/26/2017    DILATION AND CURETTAGE OF UTERUS N/A 9/20/2017 HYSTEROSCOPY  WITH MYOSURE performed by Hussain Durant DO at [address] N/A 10/24/2017    TOTAL LAPAROSCOPIC HYSTERECTOMY, BSO, F.S.  STAGING, GYRUS G400 performed by Isadora Benton MD at [address] FLX W/REMOVAL LESION BY  [address] N/A 6/26/2017    COLONOSCOPY POLYPECTOMY / HOT SNARE performed by Susannah Kuo DO at [address]  10/24/2017    with pelvic lymph node dissection    THYROID SURGERY  1980    subtotal 20 years ago    TONSILLECTOMY      VITRECTOMY      FLOATERS     Family History   Problem Relation Age of Onset    Coronary Art Dis Father     Hypertension Father     Diabetes Father     High Blood Pressure Father     Heart Attack Father     Diabetes Mother     High Blood Pressure Mother     Thyroid Disease Mother     High Blood Pressure Sister     Thyroid Disease Brother     High Blood Pressure Brother     High Blood Pressure Maternal Grandmother     Diabetes Maternal Grandfather     No Known Problems Paternal Grandmother     Heart Attack Paternal Grandfather     Colon Cancer Neg Hx      Outpatient Medications Marked as Taking for the 7/20/20 encounter (Office Visit) with Duane Ko MD   Medication Sig Dispense Refill    oxybutynin (DITROPAN-XL) 10 MG extended release tablet Take 1 tablet by mouth daily 30 tablet 5    metoprolol tartrate (LOPRESSOR) 25 MG tablet Take 0.5 tablets by mouth 2 times daily 60 tablet 3    dilTIAZem (CARDIZEM CD) 360 MG extended release capsule Take 1 capsule by mouth daily 30 capsule 3    amiodarone (CORDARONE) 200 MG tablet Take 1 tablet by mouth daily 30 tablet 3    rivaroxaban (XARELTO) 20 MG TABS tablet Take 1 tablet by mouth daily (with breakfast) 30 tablet 3    atorvastatin (LIPITOR) 40 MG tablet TAKE 1 TABLET BY MOUTH DAILY STOP SIMVASTATIN 90 tablet 3    levothyroxine (SYNTHROID) 75 MCG tablet TAKE 1 TABLET BY MOUTH EVERY MORNING (BEFORE BREAKFAST) 90 tablet 3    HM LORATADINE 10 MG tablet TAKE 1 TABLET BY MOUTH DAILY 90 tablet 3    docusate sodium (COLACE) 100 MG capsule TAKE 1 CAPSULE BY MOUTH 2 TIMES DAILY 180 capsule 3    MYRBETRIQ 50 MG TB24 TAKE ONE TABLET BY MOUTH ONCE DAILY 90 tablet 3    metFORMIN (GLUCOPHAGE) 500 MG tablet TAKE ONE TABLET BY MOUTH TWICE A DAY WITH A MEAL 60 tablet 10    Cranberry, Vacc oxycoccus, (SM CRANBERRY) 500 MG TABS Take 500 mg by mouth daily       UNABLE TO FIND Needs right walker brake fixed 1 each 0    Cholecalciferol (VITAMIN D) 2000 units TABS tablet Take 1 tablet by mouth daily 90 tablet 3    vitamin B-12 (CYANOCOBALAMIN) 1000 MCG tablet Take 1 tablet by mouth daily 90 tablet 3    acetaminophen (APAP EXTRA STRENGTH) 500 MG tablet Take 1 tablet by mouth every 6 hours as needed for Pain or Fever 360 tablet 3    Lactobacillus (PROBIOTIC ACIDOPHILUS) TABS Take 1 tablet by mouth daily 30 tablet 3      (All medications reviewed and updated by provider sincelast office visit or hospitalization)   Sulfa antibiotics and Penicillins  Social History     Tobacco Use   Smoking Status Never Smoker   Smokeless Tobacco Never Used      (If patient a smoker, smoking cessation counseling offered)     Social History     Substance and Sexual Activity   Alcohol Use No    Alcohol/week: 0.0 standard drinks       REVIEW OF SYSTEMS:  Review of Systems      Physical Exam:      Vitals:    07/20/20 0859   Temp: 97.8 °F (36.6 °C)     There is no height or weight on file to calculate BMI. Patient is a 78 y.o. female in noacute distress and alert and oriented to person, place and time. Physical Exam  Constitutional: Patient in no acute distress. Neuro: Alert andoriented to person, place and time.   Psych: Mood normal, affect normal  Skin: No rash noted  HEENT: Head: Normocephalic and atraumatic  Conjunctivae and EOM are normal. Pupils are equal, round  Nose: Normal  Right External Ear: Normal; Left External Ear: Normal  Mouth: Mucosa Moist  Neck: Supple  Lungs:

## 2020-07-20 NOTE — PROGRESS NOTES
Review of Systems   Constitutional: Negative for appetite change, chills and fever. Eyes: Negative for pain, redness and visual disturbance. Respiratory: Negative for cough, shortness of breath and wheezing. Cardiovascular: Negative for chest pain and leg swelling. Gastrointestinal: Negative for abdominal pain, constipation, diarrhea, nausea and vomiting. Genitourinary: Positive for urgency. Negative for difficulty urinating, dysuria, flank pain, frequency and hematuria. Musculoskeletal: Negative for back pain, joint swelling and myalgias. Skin: Negative for rash and wound. Neurological: Negative for dizziness, tremors and numbness. Hematological: Does not bruise/bleed easily.

## 2020-07-21 ENCOUNTER — HOSPITAL ENCOUNTER (OUTPATIENT)
Dept: PREADMISSION TESTING | Age: 70
Discharge: HOME | DRG: 309 | End: 2020-07-25
Payer: MEDICARE

## 2020-07-21 VITALS
HEART RATE: 49 BPM | RESPIRATION RATE: 20 BRPM | HEIGHT: 62 IN | BODY MASS INDEX: 41.59 KG/M2 | SYSTOLIC BLOOD PRESSURE: 116 MMHG | DIASTOLIC BLOOD PRESSURE: 56 MMHG | TEMPERATURE: 97.6 F | OXYGEN SATURATION: 99 % | WEIGHT: 226 LBS

## 2020-07-21 LAB
ABO/RH: NORMAL
ABSOLUTE EOS #: 0.1 K/UL (ref 0–0.4)
ABSOLUTE IMMATURE GRANULOCYTE: ABNORMAL K/UL (ref 0–0.3)
ABSOLUTE LYMPH #: 1.8 K/UL (ref 1–4.8)
ABSOLUTE MONO #: 0.4 K/UL (ref 0.1–1.3)
ALBUMIN SERPL-MCNC: 4.1 G/DL (ref 3.5–5.2)
ALBUMIN/GLOBULIN RATIO: ABNORMAL (ref 1–2.5)
ALP BLD-CCNC: 54 U/L (ref 35–104)
ALT SERPL-CCNC: 16 U/L (ref 5–33)
AMYLASE: 61 U/L (ref 28–100)
ANION GAP SERPL CALCULATED.3IONS-SCNC: 15 MMOL/L (ref 9–17)
ANTIBODY SCREEN: NEGATIVE
ARM BAND NUMBER: NORMAL
AST SERPL-CCNC: 15 U/L
BASOPHILS # BLD: 1 % (ref 0–2)
BASOPHILS ABSOLUTE: 0.1 K/UL (ref 0–0.2)
BILIRUB SERPL-MCNC: 0.2 MG/DL (ref 0.3–1.2)
BUN BLDV-MCNC: 18 MG/DL (ref 8–23)
BUN/CREAT BLD: ABNORMAL (ref 9–20)
CALCIUM SERPL-MCNC: 9.7 MG/DL (ref 8.6–10.4)
CHLORIDE BLD-SCNC: 100 MMOL/L (ref 98–107)
CO2: 26 MMOL/L (ref 20–31)
CREAT SERPL-MCNC: 0.95 MG/DL (ref 0.5–0.9)
DIFFERENTIAL TYPE: ABNORMAL
EOSINOPHILS RELATIVE PERCENT: 1 % (ref 0–4)
EXPIRATION DATE: NORMAL
GFR AFRICAN AMERICAN: >60 ML/MIN
GFR NON-AFRICAN AMERICAN: 59 ML/MIN
GFR SERPL CREATININE-BSD FRML MDRD: ABNORMAL ML/MIN/{1.73_M2}
GFR SERPL CREATININE-BSD FRML MDRD: ABNORMAL ML/MIN/{1.73_M2}
GLUCOSE BLD-MCNC: 106 MG/DL (ref 70–99)
HCT VFR BLD CALC: 40.2 % (ref 36–46)
HEMOGLOBIN: 12.9 G/DL (ref 12–16)
IMMATURE GRANULOCYTES: ABNORMAL %
LIPASE: 42 U/L (ref 13–60)
LYMPHOCYTES # BLD: 20 % (ref 24–44)
MCH RBC QN AUTO: 29.8 PG (ref 26–34)
MCHC RBC AUTO-ENTMCNC: 32.1 G/DL (ref 31–37)
MCV RBC AUTO: 92.9 FL (ref 80–100)
MONOCYTES # BLD: 4 % (ref 1–7)
NRBC AUTOMATED: ABNORMAL PER 100 WBC
PDW BLD-RTO: 15.1 % (ref 11.5–14.9)
PLATELET # BLD: 234 K/UL (ref 150–450)
PLATELET ESTIMATE: ABNORMAL
PMV BLD AUTO: 9.3 FL (ref 6–12)
POTASSIUM SERPL-SCNC: 4.4 MMOL/L (ref 3.7–5.3)
RBC # BLD: 4.33 M/UL (ref 4–5.2)
RBC # BLD: ABNORMAL 10*6/UL
SEG NEUTROPHILS: 74 % (ref 36–66)
SEGMENTED NEUTROPHILS ABSOLUTE COUNT: 6.8 K/UL (ref 1.3–9.1)
SODIUM BLD-SCNC: 141 MMOL/L (ref 135–144)
TOTAL PROTEIN: 7.2 G/DL (ref 6.4–8.3)
WBC # BLD: 9.1 K/UL (ref 3.5–11)
WBC # BLD: ABNORMAL 10*3/UL

## 2020-07-21 PROCEDURE — 86850 RBC ANTIBODY SCREEN: CPT

## 2020-07-21 PROCEDURE — 82150 ASSAY OF AMYLASE: CPT

## 2020-07-21 PROCEDURE — 85025 COMPLETE CBC W/AUTO DIFF WBC: CPT

## 2020-07-21 PROCEDURE — 86901 BLOOD TYPING SEROLOGIC RH(D): CPT

## 2020-07-21 PROCEDURE — 83690 ASSAY OF LIPASE: CPT

## 2020-07-21 PROCEDURE — 36415 COLL VENOUS BLD VENIPUNCTURE: CPT

## 2020-07-21 PROCEDURE — 80053 COMPREHEN METABOLIC PANEL: CPT

## 2020-07-21 PROCEDURE — 86900 BLOOD TYPING SEROLOGIC ABO: CPT

## 2020-07-21 ASSESSMENT — ENCOUNTER SYMPTOMS
NAUSEA: 1
VOMITING: 0
SORE THROAT: 0
DIARRHEA: 0
RHINORRHEA: 0
COUGH: 0
BACK PAIN: 1
ABDOMINAL PAIN: 1
SINUS PRESSURE: 0
ABDOMINAL DISTENTION: 1
WHEEZING: 0
SINUS PAIN: 0
TROUBLE SWALLOWING: 0
SHORTNESS OF BREATH: 1
CONSTIPATION: 1

## 2020-07-21 NOTE — PROGRESS NOTES
Cardiac & medical clearance requested per Dr. Alphonso Rodriguez office note. Patient relates has appointment for both clearances. Surgery scheduling will notify Dr. Alphonso Rodriguez office who will be responsible for making sure the clearance is obtained and is in the chart for surgery.

## 2020-07-21 NOTE — H&P
HISTORY and Zach Newman [id number]       NAME:  Patty Arredondo  MRN: [id number]   YOB: 1950   Date: 7/21/2020   Age: 78 y.o. Gender: female       COMPLAINT AND PRESENT HISTORY:     Patty Arredondo is 78 y.o. , female, Preadmission Testing for upcoming EGD/colonoscopy on 8/3/2020 and cholecystectomy, open sigmoid colectomy on 8/4/2020. She last had colonoscopy in 2017, with polyp removal. Patient was admitted to the hospital 6/15/2020 due to nausea, vomiting, abdominal pain. Patient also had a low-grade temperature at that time of admission. Surgery was consulted due to perforated diverticulum, and patient patient was started on cefepime, vancomycin, Flagyl. Patient seen by Dr. Casper Kearney while inpatient due to diverticulitis and microperforation. Pt has a previous history of uterine cancer treated with hysterectomy. No chemo or radiation. Pt also seen by Dr. Alli Cabello for atrial fib, on Xarelto. Pt reports since discharge she continues to have left upper and lower quadrant pain intermittently, aching, moderate in severity. Patient reports pain is worse with movement or dietary changes. Nothing improves the pain. She is taking Tylenol for pain relief. Patient also has constipation for she takes stool softener. Patient denies diarrhea. Occasional nausea, no vomiting. She has a history of GERD, controlled with medication. No fever or chills since discharge from the hospital. Pt seen by urologist yesterday, Dr. Maria G Oreilly due to OAB, urinary incontinence. She is on Myrbetriq and oxybutynin. She has home nursing, [facility]. She reports she has PT/OT. No fevers or chills. No chest pain, occasional shortness of breath. She has appt for medical clearance tomorrow. She has appt for cardiac clearance with Dr. Alli Cabello this week.      Narrative    EXAMINATION:    CT OF THE ABDOMEN AND PELVIS WITH CONTRAST 6/21/2020 12:08 pm         TECHNIQUE:    CT of the abdomen and pelvis was performed with the administration of    intravenous contrast. Multiplanar reformatted images are provided for review. Dose modulation, iterative reconstruction, and/or weight based adjustment of    the mA/kV was utilized to reduce the radiation dose to as low as reasonably    achievable.         COMPARISON:    CT abdomen and pelvis Magy 15, 2020.         HISTORY:    ORDERING SYSTEM PROVIDED HISTORY: diverticulitis perforation    TECHNOLOGIST PROVIDED HISTORY:    diverticulitis perforation         Reason for Exam: patient with diverticulitis states that she continues to    have perforations    Acuity: Unknown    Type of Exam: Unknown         FINDINGS:    Lower Chest: Partially visualized small right and trace left pleural    effusions with associated compressive atelectasis involving the lower lobes. Right-sided pleural calcifications.  Minimal pericardial fluid.         Organs: Liver, spleen, portal vein, pancreas and left adrenal gland all    appear unremarkable.  Right adrenal gland adenoma, previously described on    the prior study.  Hyperdense material is once again identified with stones    noted within the distended gallbladder lumen.  No CT evidence of acute    cholecystitis. Chanda Lusty demonstrate no evidence of enhancing mass or    hydronephrosis.  2 mm nonobstructing calculus right kidney.  Abdominal aorta    appears normal in caliber.         GI/Bowel:  Thickening involving the distal esophagus.  Stomach is grossly    unremarkable.  Small bowel appears nondilated.  Oral contrast reached the    distal small bowel.  Inflammatory changes seen along the sigmoid consist with    the patient's known acute diverticulitis.  Small amount of contained fluid    and air is identified adjacent to this inflammatory change suggestive of a    contained perforation.  No drainable abscess is noted.  No additional fluid    collections are seen.  No colonic obstruction.         Pelvis: Urinary bladder demonstrates small amount of air within its lumen. Uterus has been removed.  No adnexal mass.         Peritoneum/Retroperitoneum: No free air.  No free fluid.  No lymphadenopathy.         Bones/Soft Tissues: Abdominal wall demonstrates anasarca type changes. Osseous structures demonstrate degenerative changes.              Impression    1. CT evidence of contained perforated sigmoid diverticulitis.  No drainable    abscess is noted. 2. Partially visualized small right and trace left pleural effusions. Findings are new compared to the prior study.  Body wall anasarca is noted    also new from the prior study.  Findings suggestive of fluid overload. 3. Right-sided pleural calcifications suggestive of prior asbestos exposure. 4. Distended gallbladder with hyperdense material and stones.  Findings are    similar the prior study.  No CT evidence of acute cholecystitis. 5. Thickening involving the distal esophagus.  Finding could relate to    esophagitis.  Correlation with endoscopy is suggested. 6. 2 mm nonobstructing calculus right kidney. PAST MEDICAL HISTORY     Past Medical History:   Diagnosis Date    Adenocarcinoma of endometrium, stage 1 ([facility] 10/5/2017    Well differentiated grade 1 adenocarcinoma arising in complex hyperplasia    JOSHUA (acute kidney injury) ([facility] 1/15/2020    Allergic rhinitis 2/23/2017    At high risk for falls 2/23/2017    Atrial fibrillation [facility])     Jan 2020    CHF (congestive heart failure) ([address]     \"little\"    Colon polyp     Had colonoscopy done 20 yrs ago    Diabetes mellitus ([address]     Diverticulitis     Endometrial cancer ([facility]     History of TIA (transient ischemic attack) '80's    on Baby ASA    Hyperglycemia 3/21/2017    Hyperlipidemia     Hypertension since 2015    on Rx.     Hypothyroidism 1980    sub total thyroidectomy goiter    Legally blind since born    both eyes, cord prolapse; \"not legally blind\" per \"associated eye care\" please see telephone encounter from 2/27/18    Lower back pain     Mixed incontinence 1/9/2016    Morbid obesity with BMI of 40.0-44.9, adult ([facility] 2/23/2017    CLAROS (nonalcoholic steatohepatitis) 3/10/2019    Obesity     Oral phase dysphagia 2/23/2018    Osteoarthritis (arthritis due to wear and tear of joints)     ronan knee    Osteoarthritis involving multiple joints on both sides of body 4/24/2012    Osteoporosis     Perforated bowel ([facility]     Postmenopausal bleeding 9/20/2017    S/P h-scope, Myosure 9/20/17 9/20/2017    Pathology pending    Tennis elbow     left    Thickened endometrium 9/20/2017    TIA (transient ischemic attack)     TLHBSO, bilateral LND 10/24/17 10/24/2017    Type 2 diabetes mellitus, without long-term current use of insulin ([facility] 1/14/2020       SURGICAL HISTORY       Past Surgical History:   Procedure Laterality Date    CATARACT REMOVAL WITH IMPLANT Bilateral 2015    COLONOSCOPY  1997    had polyp, she doesn't know where and when, \"20 yrs ago\"    COLONOSCOPY  06/26/2017    DILATION AND CURETTAGE OF UTERUS N/A 9/20/2017    HYSTEROSCOPY  WITH Beth Ortega performed by Irvin Barr DO at [id number] N/A 10/24/2017    TOTAL LAPAROSCOPIC HYSTERECTOMY, BSO, F.S.  STAGING, GYRUS G400 performed by Joce Goff MD at [address] N/A 6/26/2017    COLONOSCOPY POLYPECTOMY / HOT SNARE performed by Dagmar Sanchez DO at [address]  10/24/2017    with pelvic lymph node dissection    THYROID SURGERY  1980    subtotal 20 years ago    TONSILLECTOMY      VITRECTOMY      FLOATERS     SOCIAL HISTORY       Social History     Socioeconomic History    Marital status:       Spouse name: None    Number of children: 1    Years of education: None    Highest education level: None   Occupational History    None   Social Needs    Financial resource strain: None    Food insecurity     Worry: None     Inability: None    Transportation needs     Medical: None     Non-medical: None   Tobacco Use    Smoking status: Never Smoker    Smokeless tobacco: Never Used   Substance and Sexual Activity    Alcohol use: No     Alcohol/week: 0.0 standard drinks    Drug use: No    Sexual activity: Never   Lifestyle    Physical activity     Days per week: None     Minutes per session: None    Stress: None   Relationships    Social connections     Talks on phone: None     Gets together: None     Attends Che[employer] service: None     Active member of club or organization: None     Attends meetings of clubs or organizations: None     Relationship status: None    Intimate partner violence     Fear of current or ex partner: None     Emotionally abused: None     Physically abused: None     Forced sexual activity: None   Other Topics Concern    None   Social History Narrative    None           REVIEW OF SYSTEMS      Allergies   Allergen Reactions    Sulfa Antibiotics Nausea Only    Penicillins Rash       Current Outpatient Medications on File Prior to Encounter   Medication Sig Dispense Refill    oxybutynin (DITROPAN-XL) 10 MG extended release tablet Take 1 tablet by mouth daily 30 tablet 5    metoprolol tartrate (LOPRESSOR) 25 MG tablet Take 0.5 tablets by mouth 2 times daily 60 tablet 3    dilTIAZem (CARDIZEM CD) 360 MG extended release capsule Take 1 capsule by mouth daily 30 capsule 3    amiodarone (CORDARONE) 200 MG tablet Take 1 tablet by mouth daily 30 tablet 3    rivaroxaban (XARELTO) 20 MG TABS tablet Take 1 tablet by mouth daily (with breakfast) 30 tablet 3    atorvastatin (LIPITOR) 40 MG tablet TAKE 1 TABLET BY MOUTH DAILY STOP SIMVASTATIN 90 tablet 3    levothyroxine (SYNTHROID) 75 MCG tablet TAKE 1 TABLET BY MOUTH EVERY MORNING (BEFORE BREAKFAST) 90 tablet 3    HM LORATADINE 10 MG tablet TAKE 1 TABLET BY MOUTH DAILY 90 tablet 3    docusate sodium (COLACE) 100 MG capsule TAKE 1 CAPSULE BY MOUTH 2 TIMES DAILY 180 capsule 3    MYRBETRIQ 50 MG TB24 TAKE ONE TABLET BY MOUTH ONCE DAILY 90 tablet 3    metFORMIN (GLUCOPHAGE) 500 MG tablet TAKE ONE TABLET BY MOUTH TWICE A DAY WITH A MEAL 60 tablet 10    Cranberry, Vacc oxycoccus, (SM CRANBERRY) 500 MG TABS Take 500 mg by mouth daily       Cholecalciferol (VITAMIN D) 2000 units TABS tablet Take 1 tablet by mouth daily 90 tablet 3    vitamin B-12 (CYANOCOBALAMIN) 1000 MCG tablet Take 1 tablet by mouth daily 90 tablet 3    acetaminophen (APAP EXTRA STRENGTH) 500 MG tablet Take 1 tablet by mouth every 6 hours as needed for Pain or Fever 360 tablet 3    Lactobacillus (PROBIOTIC ACIDOPHILUS) TABS Take 1 tablet by mouth daily 30 tablet 3    UNABLE TO FIND Needs right walker brake fixed 1 each 0     No current facility-administered medications on file prior to encounter. Review of Systems   Constitutional: Positive for fatigue. Negative for chills and fever. HENT: Negative for congestion, postnasal drip, rhinorrhea, sinus pressure, sinus pain, sore throat and trouble swallowing. Respiratory: Positive for shortness of breath. Negative for cough and wheezing. Cardiovascular: Positive for leg swelling. Negative for chest pain and palpitations. Gastrointestinal: Positive for abdominal distention, abdominal pain, constipation and nausea. Negative for diarrhea and vomiting. Genitourinary: Positive for frequency and urgency. Negative for difficulty urinating, dysuria, hematuria and pelvic pain. Musculoskeletal: Positive for arthralgias, back pain and myalgias. Neurological: Positive for weakness and headaches. Negative for dizziness and seizures. Psychiatric/Behavioral: Negative. GENERAL PHYSICAL EXAM     Vitals: BP (!) 116/56   Pulse (!) 49   Temp 97.6 °F (36.4 °C)   Resp 20   Ht 5' 2\" (1.575 m)   Wt 226 lb (102.5 kg)   LMP  (LMP Unknown)   SpO2 99%   BMI 41.34 kg/m²  Body mass index is 41.34 kg/m².      GENERAL APPEARANCE:   Inna Zuniga is 78 y.o., female, severely obese, nourished, conscious, alert. Does not appear to be distress or pain at this time. Physical Exam   Constitutional: She is oriented to person, place, and time. She is cooperative. No distress. HENT:   Head: Normocephalic and atraumatic. Right Ear: Hearing and external ear normal.   Left Ear: Hearing and external ear normal.   Nose: Nose normal.   Mouth/Throat: Uvula is midline, oropharynx is clear and moist and mucous membranes are normal.   Eyes: Pupils are equal, round, and reactive to light. Conjunctivae and lids are normal. No scleral icterus. Right eye unable to follow examiner's finger for right lateral gaze. No nystagmus. Neck: Trachea normal. Neck supple. Cardiovascular: Normal heart sounds. An irregularly irregular rhythm present. No extrasystoles are present. Bradycardia present. Exam reveals no gallop. No murmur heard. 1+ pitting lower extremity edema   Pulmonary/Chest: Effort normal. She has decreased breath sounds in the right lower field and the left lower field. She has no wheezes. She has no rhonchi. She has no rales. Abdominal: Soft. Normal appearance and bowel sounds are normal. She exhibits distension. There is abdominal tenderness in the left upper quadrant and left lower quadrant. There is no rigidity and no guarding. Musculoskeletal:      Comments: Antalgic gait, uses walker for ambulation. Lymphadenopathy:     She has no cervical adenopathy. Neurological: She is alert and oriented to person, place, and time. No cranial nerve deficit. She exhibits abnormal muscle tone. Gait abnormal.   Skin: Skin is warm, dry and intact. PROVISIONAL DIAGNOSES / SURGERY:      Reflux, Esophagitis, Colitis; Diverticulitis, Chronic Cholecystitis   EGD/Colonoscopy (8/3/2020);  Cholecystectomy Laparoscopic Robotic, Open Sigmoid Colectomy (8/4/2020)    MAX Joseph - CNP on 7/21/2020 at 1:56 PM

## 2020-07-22 ENCOUNTER — OFFICE VISIT (OUTPATIENT)
Dept: FAMILY MEDICINE CLINIC | Age: 70
End: 2020-07-22
Payer: MEDICARE

## 2020-07-22 VITALS
DIASTOLIC BLOOD PRESSURE: 78 MMHG | BODY MASS INDEX: 43.06 KG/M2 | HEART RATE: 55 BPM | OXYGEN SATURATION: 98 % | WEIGHT: 234 LBS | TEMPERATURE: 97.2 F | SYSTOLIC BLOOD PRESSURE: 120 MMHG | HEIGHT: 62 IN

## 2020-07-22 PROBLEM — E66.01 CLASS 3 SEVERE OBESITY DUE TO EXCESS CALORIES WITHOUT SERIOUS COMORBIDITY WITH BODY MASS INDEX (BMI) OF 40.0 TO 44.9 IN ADULT (HCC): Status: ACTIVE | Noted: 2020-07-22

## 2020-07-22 PROBLEM — K81.1 CHRONIC CHOLECYSTITIS: Status: ACTIVE | Noted: 2020-07-22

## 2020-07-22 PROBLEM — D22.9 MULTIPLE ATYPICAL SKIN MOLES: Status: ACTIVE | Noted: 2020-07-22

## 2020-07-22 PROBLEM — K52.9 COLITIS: Status: ACTIVE | Noted: 2020-07-22

## 2020-07-22 PROBLEM — E66.813 CLASS 3 SEVERE OBESITY DUE TO EXCESS CALORIES WITHOUT SERIOUS COMORBIDITY WITH BODY MASS INDEX (BMI) OF 40.0 TO 44.9 IN ADULT: Status: ACTIVE | Noted: 2020-07-22

## 2020-07-22 LAB — HBA1C MFR BLD: 5.3 %

## 2020-07-22 PROCEDURE — G8399 PT W/DXA RESULTS DOCUMENT: HCPCS | Performed by: FAMILY MEDICINE

## 2020-07-22 PROCEDURE — 4040F PNEUMOC VAC/ADMIN/RCVD: CPT | Performed by: FAMILY MEDICINE

## 2020-07-22 PROCEDURE — 99214 OFFICE O/P EST MOD 30 MIN: CPT | Performed by: FAMILY MEDICINE

## 2020-07-22 PROCEDURE — 1036F TOBACCO NON-USER: CPT | Performed by: FAMILY MEDICINE

## 2020-07-22 PROCEDURE — 2022F DILAT RTA XM EVC RTNOPTHY: CPT | Performed by: FAMILY MEDICINE

## 2020-07-22 PROCEDURE — 1123F ACP DISCUSS/DSCN MKR DOCD: CPT | Performed by: FAMILY MEDICINE

## 2020-07-22 PROCEDURE — 1090F PRES/ABSN URINE INCON ASSESS: CPT | Performed by: FAMILY MEDICINE

## 2020-07-22 PROCEDURE — G8417 CALC BMI ABV UP PARAM F/U: HCPCS | Performed by: FAMILY MEDICINE

## 2020-07-22 PROCEDURE — 1111F DSCHRG MED/CURRENT MED MERGE: CPT | Performed by: FAMILY MEDICINE

## 2020-07-22 PROCEDURE — G8427 DOCREV CUR MEDS BY ELIG CLIN: HCPCS | Performed by: FAMILY MEDICINE

## 2020-07-22 PROCEDURE — 83036 HEMOGLOBIN GLYCOSYLATED A1C: CPT | Performed by: FAMILY MEDICINE

## 2020-07-22 PROCEDURE — 3017F COLORECTAL CA SCREEN DOC REV: CPT | Performed by: FAMILY MEDICINE

## 2020-07-22 PROCEDURE — 3044F HG A1C LEVEL LT 7.0%: CPT | Performed by: FAMILY MEDICINE

## 2020-07-22 RX ORDER — LANCETS 30 GAUGE
1 EACH MISCELLANEOUS 2 TIMES DAILY
Qty: 300 EACH | Refills: 11 | Status: SHIPPED | OUTPATIENT
Start: 2020-07-22 | End: 2022-03-22

## 2020-07-22 RX ORDER — BLOOD-GLUCOSE METER
KIT MISCELLANEOUS
Qty: 1 KIT | Refills: 0 | Status: SHIPPED | OUTPATIENT
Start: 2020-07-22 | End: 2022-03-22

## 2020-07-22 RX ORDER — PEN NEEDLE, DIABETIC 31 GX5/16"
NEEDLE, DISPOSABLE MISCELLANEOUS
Qty: 100 EACH | Refills: 11 | Status: SHIPPED | OUTPATIENT
Start: 2020-07-22 | End: 2022-03-22

## 2020-07-22 RX ORDER — GLUCOSAMINE HCL/CHONDROITIN SU 500-400 MG
CAPSULE ORAL
Qty: 100 STRIP | Refills: 11 | Status: SHIPPED | OUTPATIENT
Start: 2020-07-22 | End: 2022-03-22

## 2020-07-22 RX ORDER — BLOOD PRESSURE TEST KIT
KIT MISCELLANEOUS
Qty: 1 KIT | Refills: 1 | Status: SHIPPED | OUTPATIENT
Start: 2020-07-22 | End: 2022-03-22

## 2020-07-22 ASSESSMENT — ENCOUNTER SYMPTOMS
CHEST TIGHTNESS: 0
ABDOMINAL DISTENTION: 0
DIARRHEA: 0
COUGH: 0
SHORTNESS OF BREATH: 0
CONSTIPATION: 0
VOMITING: 0
WHEEZING: 0
ABDOMINAL PAIN: 0
BACK PAIN: 1

## 2020-07-22 NOTE — PATIENT INSTRUCTIONS
Patient Education        Atrial Fibrillation: Care Instructions  Your Care Instructions     Atrial fibrillation is an irregular and often fast heartbeat. Treating this condition is important for several reasons. It can cause blood clots, which can travel from your heart to your brain and cause a stroke. If you have a fast heartbeat, you may feel lightheaded, dizzy, and weak. An irregular heartbeat can also increase your risk for heart failure. Atrial fibrillation is often the result of another heart condition, such as high blood pressure or coronary artery disease. Making changes to improve your heart condition will help you stay healthy and active. Follow-up care is a key part of your treatment and safety. Be sure to make and go to all appointments, and call your doctor if you are having problems. It's also a good idea to know your test results and keep a list of the medicines you take. How can you care for yourself at home? Medicines  · Take your medicines exactly as prescribed. Call your doctor if you think you are having a problem with your medicine. You will get more details on the specific medicines your doctor prescribes. · If your doctor has given you a blood thinner to prevent a stroke, be sure you get instructions about how to take your medicine safely. Blood thinners can cause serious bleeding problems. · Do not take any vitamins, over-the-counter drugs, or herbal products without talking to your doctor first.  Lifestyle changes  · Do not smoke. Smoking can increase your chance of a stroke and heart attack. If you need help quitting, talk to your doctor about stop-smoking programs and medicines. These can increase your chances of quitting for good. · Eat a heart-healthy diet. · Stay at a healthy weight. Lose weight if you need to. · Limit alcohol to 2 drinks a day for men and 1 drink a day for women. Too much alcohol can cause health problems. · Avoid colds and flu.  Get a pneumococcal vaccine shot. If you have had one before, ask your doctor whether you need another dose. Get a flu shot every year. If you must be around people with colds or flu, wash your hands often. Activity  · If your doctor recommends it, get more exercise. Walking is a good choice. Bit by bit, increase the amount you walk every day. Try for at least 30 minutes on most days of the week. You also may want to swim, bike, or do other activities. Your doctor may suggest that you join a cardiac rehabilitation program so that you can have help increasing your physical activity safely. · Start light exercise if your doctor says it is okay. Even a small amount will help you get stronger, have more energy, and manage stress. Walking is an easy way to get exercise. Start out by walking a little more than you did in the hospital. Gradually increase the amount you walk. · When you exercise, watch for signs that your heart is working too hard. You are pushing too hard if you cannot talk while you are exercising. If you become short of breath or dizzy or have chest pain, sit down and rest immediately. · Check your pulse regularly. Place two fingers on the artery at the palm side of your wrist, in line with your thumb. If your heartbeat seems uneven or fast, talk to your doctor. When should you call for help? [id number] anytime you think you may need emergency care. For example, call if:  · You have symptoms of a heart attack. These may include:  ? Chest pain or pressure, or a strange feeling in the chest.  ? Sweating. ? Shortness of breath. ? Nausea or vomiting. ? Pain, pressure, or a strange feeling in the back, neck, jaw, or upper belly or in one or both shoulders or arms. ? Lightheadedness or sudden weakness. ? A fast or irregular heartbeat. After you call 911, the  may tell you to chew 1 adult-strength or 2 to 4 low-dose aspirin. Wait for an ambulance. Do not try to drive yourself. · You have symptoms of a stroke.  These may include:  ? Sudden numbness, tingling, weakness, or loss of movement in your face, arm, or leg, especially on only one side of your body. ? Sudden vision changes. ? Sudden trouble speaking. ? Sudden confusion or trouble understanding simple statements. ? Sudden problems with walking or balance. ? A sudden, severe headache that is different from past headaches. · You passed out (lost consciousness). Call your doctor now or seek immediate medical care if:  · You have new or increased shortness of breath. · You feel dizzy or lightheaded, or you feel like you may faint. · Your heart rate becomes irregular. · You can feel your heart flutter in your chest or skip heartbeats. Tell your doctor if these symptoms are new or worse. Watch closely for changes in your health, and be sure to contact your doctor if you have any problems. Where can you learn more? Go to https://SeriositypeWoowUpewContratan.do.Contratan.do. org and sign in to your [employer] account. Enter U020 in the [employer] box to learn more about \"Atrial Fibrillation: Care Instructions. \"     If you do not have an account, please click on the \"Sign Up Now\" link. Current as of: December 16, 2019               Content Version: 12.5  © 2399-6220 [employer], [employer]. Care instructions adapted under license by [email or web address] ([facility]). If you have questions about a medical condition or this instruction, always ask your healthcare professional. [place] [address] any warranty or liability for your use of this information.

## 2020-07-22 NOTE — PROGRESS NOTES
cholecystitis    Colitis    Multiple atypical skin moles    Class 3 severe obesity due to excess calories without serious comorbidity with body mass index (BMI) of 40.0 to 44.9 in adult [facility])      Past Medical History:   Diagnosis Date    Adenocarcinoma of endometrium, stage 1 ([address] 10/5/2017    Well differentiated grade 1 adenocarcinoma arising in complex hyperplasia    JOSHUA (acute kidney injury) ([address] 1/15/2020    Allergic rhinitis 2/23/2017    At high risk for falls 2/23/2017    Atrial fibrillation [facility])     Jan 2020    CHF (congestive heart failure) ([address]     \"little\"    Colon polyp     Had colonoscopy done 20 yrs ago    Diabetes mellitus ([address]     Diverticulitis     Endometrial cancer ([address]     History of TIA (transient ischemic attack) '80's    on Baby ASA    Hyperglycemia 3/21/2017    Hyperlipidemia     Hypertension since 2015    on Rx.     Hypothyroidism 1980    sub total thyroidectomy goiter    Legally blind since born    both eyes, cord prolapse; \"not legally blind\" per \"associated eye care\" please see telephone encounter from 2/27/18    Lower back pain     Mixed incontinence 1/9/2016    Morbid obesity with BMI of 40.0-44.9, adult ([address] 2/23/2017    CLAROS (nonalcoholic steatohepatitis) 3/10/2019    Obesity     Oral phase dysphagia 2/23/2018    Osteoarthritis (arthritis due to wear and tear of joints)     ronan knee    Osteoarthritis involving multiple joints on both sides of body 4/24/2012    Osteoporosis     Perforated bowel ([address]     Postmenopausal bleeding 9/20/2017    S/P h-scope, Myosure 9/20/17 9/20/2017    Pathology pending    Tennis elbow     left    Thickened endometrium 9/20/2017    TIA (transient ischemic attack)     TLHBSO, bilateral LND 10/24/17 10/24/2017    Type 2 diabetes mellitus, without long-term current use of insulin ([address] 1/14/2020      Past Surgical History:   Procedure Laterality Date    CATARACT REMOVAL WITH IMPLANT Bilateral 2015    COLONOSCOPY times daily 60 tablet 3    dilTIAZem (CARDIZEM CD) 360 MG extended release capsule Take 1 capsule by mouth daily 30 capsule 3    amiodarone (CORDARONE) 200 MG tablet Take 1 tablet by mouth daily 30 tablet 3    rivaroxaban (XARELTO) 20 MG TABS tablet Take 1 tablet by mouth daily (with breakfast) 30 tablet 3    atorvastatin (LIPITOR) 40 MG tablet TAKE 1 TABLET BY MOUTH DAILY STOP SIMVASTATIN 90 tablet 3    levothyroxine (SYNTHROID) 75 MCG tablet TAKE 1 TABLET BY MOUTH EVERY MORNING (BEFORE BREAKFAST) 90 tablet 3    HM LORATADINE 10 MG tablet TAKE 1 TABLET BY MOUTH DAILY 90 tablet 3    docusate sodium (COLACE) 100 MG capsule TAKE 1 CAPSULE BY MOUTH 2 TIMES DAILY 180 capsule 3    MYRBETRIQ 50 MG TB24 TAKE ONE TABLET BY MOUTH ONCE DAILY 90 tablet 3    metFORMIN (GLUCOPHAGE) 500 MG tablet TAKE ONE TABLET BY MOUTH TWICE A DAY WITH A MEAL 60 tablet 10    Cranberry, Vacc oxycoccus, (SM CRANBERRY) 500 MG TABS Take 500 mg by mouth daily       UNABLE TO FIND Needs right walker brake fixed 1 each 0    Cholecalciferol (VITAMIN D) 2000 units TABS tablet Take 1 tablet by mouth daily 90 tablet 3    vitamin B-12 (CYANOCOBALAMIN) 1000 MCG tablet Take 1 tablet by mouth daily 90 tablet 3    acetaminophen (APAP EXTRA STRENGTH) 500 MG tablet Take 1 tablet by mouth every 6 hours as needed for Pain or Fever 360 tablet 3    Lactobacillus (PROBIOTIC ACIDOPHILUS) TABS Take 1 tablet by mouth daily 30 tablet 3     No current facility-administered medications for this visit. Medication List          Accurate as of July 22, 2020  8:44 PM. If you have any questions, ask your nurse or doctor. START taking these medications    Alcohol Prep Pads  Use as directed  Started by:  MAX Hinojosa CNP     blood glucose test strips  Test 2-3 times a day & as needed for symptoms of irregular blood glucose. BRAND OF CHOICE INSURANCE ALLOWS.   Started by:  MAX Hinojosa CNP     Blood Pressure Monitor Kit  Use as directed. Started by:  MAX Velez CNP     glucose monitoring kit monitoring kit  Use as directed. BRAND OF CHOICE INSURANCE ALLOWS.   Started by:  MAX Velez CNP     Lancets Misc  1 each by Does not apply route 2 times daily  Started by:  MAX Velez CNP        CONTINUE taking these medications    acetaminophen 500 MG tablet  Commonly known as:  APAP Extra Strength  Take 1 tablet by mouth every 6 hours as needed for Pain or Fever     amiodarone 200 MG tablet  Commonly known as:  CORDARONE  Take 1 tablet by mouth daily     atorvastatin 40 MG tablet  Commonly known as:  LIPITOR  TAKE 1 TABLET BY MOUTH DAILY STOP SIMVASTATIN     dilTIAZem 360 MG extended release capsule  Commonly known as:  CARDIZEM CD  Take 1 capsule by mouth daily     docusate sodium 100 MG capsule  Commonly known as:  COLACE  TAKE 1 CAPSULE BY MOUTH 2 TIMES DAILY     HM Loratadine 10 MG tablet  Generic drug:  loratadine  TAKE 1 TABLET BY MOUTH DAILY     levothyroxine 75 MCG tablet  Commonly known as:  SYNTHROID  TAKE 1 TABLET BY MOUTH EVERY MORNING (BEFORE BREAKFAST)     metFORMIN 500 MG tablet  Commonly known as:  GLUCOPHAGE  TAKE ONE TABLET BY MOUTH TWICE A DAY WITH A MEAL     metoprolol tartrate 25 MG tablet  Commonly known as:  LOPRESSOR  Take 0.5 tablets by mouth 2 times daily     Myrbetriq 50 MG Tb24  Generic drug:  mirabegron  TAKE ONE TABLET BY MOUTH ONCE DAILY     oxybutynin 10 MG extended release tablet  Commonly known as:  DITROPAN-XL  Take 1 tablet by mouth daily     Probiotic Acidophilus Tabs  Take 1 tablet by mouth daily     rivaroxaban 20 MG Tabs tablet  Commonly known as:  Xarelto  Take 1 tablet by mouth daily (with breakfast)     SM Cranberry 500 MG Tabs  Generic drug:  Cranberry (Vacc oxycoccus)     UNABLE TO FIND  Needs right walker brake fixed     vitamin B-12 1000 MCG tablet  Commonly known as:  CYANOCOBALAMIN  Take 1 tablet by mouth daily     vitamin D 50 MCG (2000 UT) Tabs tablet  Commonly known as:  CHOLECALCIFEROL  Take 1 tablet by mouth daily           Where to Get Your Medications      These medications were sent to [address] [place] [address], [address] [facility], [address] [facility] [address]    Phone:  [phone]   · Alcohol Prep Pads  · Lancets Misc     You can get these medications from any pharmacy    Bring a paper prescription for each of these medications  · blood glucose test strips  · Blood Pressure Monitor Kit  · glucose monitoring kit monitoring kit       Review of Systems   Constitutional: Positive for activity change and fatigue. Negative for appetite change and unexpected weight change. Eyes: Positive for visual disturbance (wears glasses). Respiratory: Negative for cough, chest tightness, shortness of breath and wheezing. Cardiovascular: Negative for chest pain, palpitations and leg swelling. Gastrointestinal: Negative for abdominal distention, abdominal pain, constipation, diarrhea and vomiting. Endocrine: Negative for cold intolerance, heat intolerance, polydipsia, polyphagia and polyuria. Genitourinary: Negative for difficulty urinating, dysuria, frequency, hematuria and urgency. Urine incontinence - chronic   Musculoskeletal: Positive for arthralgias, back pain, gait problem and myalgias. Ambulates with walker with seat   Allergic/Immunologic: Negative for environmental allergies. Neurological: Negative for weakness and numbness. Hematological: Does not bruise/bleed easily. Psychiatric/Behavioral: Positive for sleep disturbance. Negative for dysphoric mood. The patient is nervous/anxious. Prior to Visit Medications    Medication Sig Taking? Authorizing Provider   Blood Pressure Monitor KIT Use as directed. Yes MAX Barker CNP   glucose monitoring kit (FREESTYLE) monitoring kit Use as directed. BRAND OF CHOICE INSURANCE ALLOWS.  Yes MAX Barker CNP blood glucose monitor strips Test 2-3 times a day & as needed for symptoms of irregular blood glucose. BRAND OF CHOICE INSURANCE ALLOWS.  Yes MAX Barker CNP   Alcohol Swabs (ALCOHOL PREP) PADS Use as directed Yes MAX Barker CNP   Lancets MISC 1 each by Does not apply route 2 times daily Yes MAX Barker CNP   oxybutynin (DITROPAN-XL) 10 MG extended release tablet Take 1 tablet by mouth daily Yes Sae Hernandez MD   metoprolol tartrate (LOPRESSOR) 25 MG tablet Take 0.5 tablets by mouth 2 times daily Yes Kamini Fong MD   dilTIAZem (CARDIZEM CD) 360 MG extended release capsule Take 1 capsule by mouth daily Yes Kamini Fong MD   amiodarone (CORDARONE) 200 MG tablet Take 1 tablet by mouth daily Yes Kamini Fong MD   rivaroxaban (XARELTO) 20 MG TABS tablet Take 1 tablet by mouth daily (with breakfast) Yes Kamini Fong MD   atorvastatin (LIPITOR) 40 MG tablet TAKE 1 TABLET BY MOUTH DAILY STOP SIMVASTATIN Yes Delores Tyson MD   levothyroxine (SYNTHROID) 75 MCG tablet TAKE 1 TABLET BY MOUTH EVERY MORNING (BEFORE BREAKFAST) Yes Delores Tyson MD   HM LORATADINE 10 MG tablet TAKE 1 TABLET BY MOUTH DAILY Yes Delores Tyson MD   docusate sodium (COLACE) 100 MG capsule TAKE 1 CAPSULE BY MOUTH 2 TIMES DAILY Yes Dleores Tyson MD   MYRBETRIQ 50 MG TB24 TAKE ONE TABLET BY MOUTH ONCE DAILY Yes Delores Tyson MD   metFORMIN (GLUCOPHAGE) 500 MG tablet TAKE ONE TABLET BY MOUTH TWICE A DAY WITH A MEAL Yes Delores Tyson MD   Cranberry, Vacc oxycoccus, (SM CRANBERRY) 500 MG TABS Take 500 mg by mouth daily  Yes Historical Provider, MD   UNABLE TO FIND Needs right walker brake fixed Yes Delores Tyson MD   Cholecalciferol (VITAMIN D) 2000 units TABS tablet Take 1 tablet by mouth daily Yes Delores Tyson MD   vitamin B-12 (CYANOCOBALAMIN) 1000 MCG tablet Take 1 tablet by mouth daily Yes Delores Tyson MD   acetaminophen (APAP EXTRA distension. Palpations: Abdomen is soft. There is no hepatomegaly or splenomegaly. Tenderness: There is no abdominal tenderness. There is no guarding or rebound. Comments: Obese abdomen. Musculoskeletal:         General: Tenderness present. Right knee: She exhibits normal range of motion. Tenderness found. Patellar tendon tenderness noted. Left knee: She exhibits normal range of motion. Tenderness found. Patellar tendon tenderness noted. Lumbar back: She exhibits decreased range of motion, tenderness, bony tenderness and pain. Right lower leg: No edema. Left lower leg: No edema. Comments: Patient ambulates with walker with seat  Coarse crepitus bilateral knees. Skin:     General: Skin is warm and dry. Capillary Refill: Capillary refill takes less than 2 seconds. Findings: No rash. Neurological:      Mental Status: She is alert and oriented to person, place, and time. Cranial Nerves: No cranial nerve deficit. Motor: No abnormal muscle tone. Gait: Gait abnormal.   Psychiatric:         Mood and Affect: Mood is anxious. Behavior: Behavior normal.         Thought Content: Thought content normal.         Judgment: Judgment normal.       Most recent labs reviewed with patient, and all questions answered.   Lab Results   Component Value Date    WBC 9.1 07/21/2020    HGB 12.9 07/21/2020    HCT 40.2 07/21/2020    MCV 92.9 07/21/2020     07/21/2020     Lab Results   Component Value Date     07/21/2020    K 4.4 07/21/2020     07/21/2020    CO2 26 07/21/2020    BUN 18 07/21/2020    CREATININE 0.95 07/21/2020    GLUCOSE 106 07/21/2020    GLUCOSE 114 03/20/2012    CALCIUM 9.7 07/21/2020      Lab Results   Component Value Date    ALT 16 07/21/2020    AST 15 07/21/2020    ALKPHOS 54 07/21/2020    BILITOT 0.20 (L) 07/21/2020     Lab Results   Component Value Date    TSH 3.28 06/17/2020     Lab Results   Component Value Date    CHOL 145 01/28/2020    CHOL 188 11/14/2018    CHOL 287 (H) 07/31/2018     Lab Results   Component Value Date    TRIG 158 (H) 01/28/2020    TRIG 179 (H) 11/14/2018    TRIG 318 (H) 07/31/2018     Lab Results   Component Value Date    HDL 47 01/28/2020    HDL 47 11/14/2018    HDL 44 07/31/2018     Lab Results   Component Value Date    LDLCHOLESTEROL 66 01/28/2020    LDLCHOLESTEROL 105 11/14/2018    LDLCHOLESTEROL 179 (H) 07/31/2018     Lab Results   Component Value Date    CHOLHDLRATIO 3.1 01/28/2020    CHOLHDLRATIO 4.0 11/14/2018    CHOLHDLRATIO 6.5 (H) 07/31/2018     Lab Results   Component Value Date    LABA1C 5.3 07/22/2020      Lab Results   Component Value Date    DUKNKVBC08 273 01/09/2020     Lab Results   Component Value Date    FOLATE 7.0 01/09/2020     Lab Results   Component Value Date    VITD25 31.4 01/09/2020     ASSESSMENT/PLAN:  1. Atrial fibrillation, new onset ([facility]  Failure to Improve  See Dr. Ruddy North on Friday  Continue therapy  Go to the ER for palpitations    2. Essential hypertension  Stable  Continue current therapy. DISCUSSED AND ADVISED TO:  Cut down on your salt intake. Cut down on caffeinated drinks, sports drinks. Instructed to check BP at home regularly. Report for any chest pains, shortness of breath, headaches, and lightheadedness. Call the office if your blood pressure continue to be higher than 140/90 or 90/50. - Blood Pressure Monitor KIT; Use as directed. Dispense: 1 kit; Refill: 1    3. Type 2 diabetes mellitus with hyperglycemia, without long-term current use of insulin (HCC)  Stable  Continue therapy. Metformin  DISCUSSED and ADVISED TO:  Continue to check Glucose levels at home. Report increased and low levels as discussed. Decrease carbohydrates, sugary drinks, desserts in your diet. Exercise regularly, as tolerated. Try to lose weight.    - glucose monitoring kit (FREESTYLE) monitoring kit; Use as directed. BRAND OF CHOICE INSURANCE ALLOWS. Dispense: 1 kit;  Refill: 0  - blood glucose monitor strips; Test 2-3 times a day & as needed for symptoms of irregular blood glucose. BRAND OF CHOICE INSURANCE ALLOWS. Dispense: 100 strip; Refill: 11  - Alcohol Swabs (ALCOHOL PREP) PADS; Use as directed  Dispense: 100 each; Refill: 11  - Lancets MISC; 1 each by Does not apply route 2 times daily  Dispense: 300 each; Refill: 11  - POCT glycosylated hemoglobin (Hb A1C)    4. Colitis  Failure to Improve  Keep surgery as scheduled    5. Diverticulitis  Failure to Improve  Keep surgery as scheduled      6. Chronic cholecystitis  Failure to Improve  Keep surgery as scheduled      7. Primary osteoarthritis of both knees  WOrsening  SEE THE ORTHOPEDIC SPECIALIST  Continue current therapy. DISCUSSED and ADVISED TO:  Stay at a healthy weight. Continue exercises/PT  Stretch to help prevent stiffness and to prevent injury before exercise. Gentle forms of yoga help keep joints and muscles flexible. Walk instead of jog, ride a bike, swim, and water exercise. Lift weights as tolerated. strong muscles help reduce stress on joints. Take pain medicines exactly as directed and only as needed. - Rosy Hashimoto, MD, Orthopedic Surgery, [place]    8. Multiple atypical skin moles  Stable  See the dermatologist  - Edilia Albarran MD, Dermatology, [place]    9. History of TIA (transient ischemic attack)  historical    10. Class 3 severe obesity due to excess calories without serious comorbidity with body mass index (BMI) of 40.0 to 44.9 in adult (HCC)  BMI increasing  DISCUSSED AND ADVISED TO:  Eat a low-fat and low carbohydrates diet. Avoid fried foods especially fast food. Choose healthier options for snacks. Have 5-6 servings of fruits and vegetables per day. Cut down on eating processed food. Add 30 minutes to 1 hour aerobic exercise for 3-4 days a week.          Controlled Substance Monitoring:  Acute and Chronic Pain Monitoring:   RX Monitoring 9/25/2017   Attestation The Prescription Monitoring Report for this patient was reviewed today. Periodic Controlled Substance Monitoring No signs of potential drug abuse or diversion identified. Orders Placed This Encounter   Procedures   Marie Christensen MD, Orthopedic Surgery, [place]     Referral Priority:   Routine     Referral Type:   Eval and Treat     Referral Reason:   Specialty Services Required     Referred to Provider:   Jean Carlos Amaya MD     Requested Specialty:   Orthopedic Surgery     Number of Visits Requested:   Scarlett Webber MD, Dermatology, [facility]     Referral Priority:   Routine     Referral Type:   Eval and Treat     Referral Reason:   Specialty Services Required     Referred to Provider:   Marc Wolf MD     Requested Specialty:   Dermatology     Number of Visits Requested:   1    POCT glycosylated hemoglobin (Hb A1C)     Orders Placed This Encounter   Medications    Blood Pressure Monitor KIT     Sig: Use as directed. Dispense:  1 kit     Refill:  1    glucose monitoring kit (FREESTYLE) monitoring kit     Sig: Use as directed. BRAND OF CHOICE INSURANCE ALLOWS. Dispense:  1 kit     Refill:  0    blood glucose monitor strips     Sig: Test 2-3 times a day & as needed for symptoms of irregular blood glucose. BRAND OF CHOICE INSURANCE ALLOWS. Dispense:  100 strip     Refill:  11    Alcohol Swabs (ALCOHOL PREP) PADS     Sig: Use as directed     Dispense:  100 each     Refill:  11    Lancets MISC     Si each by Does not apply route 2 times daily     Dispense:  300 each     Refill:  11      There are no discontinued medications.    Health Maintenance Due   Topic Date Due    Diabetic retinal exam  1962    Shingles Vaccine (1 of 2) 2002    Diabetic foot exam  2018    Annual Wellness Visit (AWV)  2019    Breast cancer screen  2020    Colon cancer screen colonoscopy  2020      Return in about 4 weeks (around 2020) for Appt w/ Dr. Rosalba Martínez, Chronic conditions. Yeimi Sibley received counseling on the following healthy behaviors: nutrition, exercise and medication adherence  Reviewed prior labs and health maintenance  Continue current medications, diet and exercise. Discussed use, benefit, and side effects of prescribed medications. Barriers to medication compliance addressed. Patient given educational materials - see patient instructions  Was a self-tracking handout given in paper form or via [employer]? Yes    Requested Prescriptions     Signed Prescriptions Disp Refills    Blood Pressure Monitor KIT 1 kit 1     Sig: Use as directed.  glucose monitoring kit (FREESTYLE) monitoring kit 1 kit 0     Sig: Use as directed. BRAND OF CHOICE INSURANCE ALLOWS.  blood glucose monitor strips 100 strip 11     Sig: Test 2-3 times a day & as needed for symptoms of irregular blood glucose. BRAND OF CHOICE INSURANCE ALLOWS.    Alcohol Swabs (ALCOHOL PREP) PADS 100 each 11     Sig: Use as directed    Lancets MISC 300 each 11     Si each by Does not apply route 2 times daily       All patient questions answered. Patient voiced understanding. Quality Measures    Body mass index is 42.8 kg/m². Elevated. Weight control planned discussed Healthy diet and regular exercise. BP: 120/78. Blood pressure is normal. Treatment plan consists of No treatment change needed. Fall Risk 2020 3/8/2019 2018 2017   2 or more falls in past year? no no no no   Fall with injury in past year? no no no no     The patient does not have a history of falls. I did not - not indicated , complete a risk assessment for falls.  A plan of care for falls PT/OT    Lab Results   Component Value Date    LDLCHOLESTEROL 66 2020    (goal LDL reduction with dx if diabetes is 50% LDL reduction)    PHQ Scores 2020 3/8/2019 2018 2017   PHQ2 Score 0 0 0 0   PHQ9 Score 0 0 0 0     Interpretation of Total Score Depression Severity: 1-4 = Minimal depression, 5-9 = Mild

## 2020-07-24 ENCOUNTER — APPOINTMENT (OUTPATIENT)
Dept: GENERAL RADIOLOGY | Age: 70
DRG: 309 | End: 2020-07-24
Payer: MEDICARE

## 2020-07-24 ENCOUNTER — HOSPITAL ENCOUNTER (INPATIENT)
Age: 70
LOS: 3 days | Discharge: HOME | DRG: 309 | End: 2020-07-27
Attending: EMERGENCY MEDICINE | Admitting: INTERNAL MEDICINE
Payer: MEDICARE

## 2020-07-24 PROBLEM — I50.32 CHF NYHA CLASS I, CHRONIC, DIASTOLIC (HCC): Status: ACTIVE | Noted: 2020-07-24

## 2020-07-24 PROBLEM — R00.1 SYMPTOMATIC BRADYCARDIA: Status: ACTIVE | Noted: 2020-07-24

## 2020-07-24 LAB
ABSOLUTE EOS #: 0.1 K/UL (ref 0–0.4)
ABSOLUTE IMMATURE GRANULOCYTE: ABNORMAL K/UL (ref 0–0.3)
ABSOLUTE LYMPH #: 1.7 K/UL (ref 1–4.8)
ABSOLUTE MONO #: 0.5 K/UL (ref 0.1–1.3)
ALBUMIN SERPL-MCNC: 4.3 G/DL (ref 3.5–5.2)
ALBUMIN/GLOBULIN RATIO: ABNORMAL (ref 1–2.5)
ALP BLD-CCNC: 50 U/L (ref 35–104)
ALT SERPL-CCNC: 16 U/L (ref 5–33)
ANION GAP SERPL CALCULATED.3IONS-SCNC: 14 MMOL/L (ref 9–17)
AST SERPL-CCNC: 12 U/L
BASOPHILS # BLD: 1 % (ref 0–2)
BASOPHILS ABSOLUTE: 0.1 K/UL (ref 0–0.2)
BILIRUB SERPL-MCNC: 0.24 MG/DL (ref 0.3–1.2)
BNP INTERPRETATION: ABNORMAL
BUN BLDV-MCNC: 25 MG/DL (ref 8–23)
BUN/CREAT BLD: ABNORMAL (ref 9–20)
CALCIUM SERPL-MCNC: 9.7 MG/DL (ref 8.6–10.4)
CHLORIDE BLD-SCNC: 103 MMOL/L (ref 98–107)
CO2: 24 MMOL/L (ref 20–31)
CREAT SERPL-MCNC: 0.98 MG/DL (ref 0.5–0.9)
DIFFERENTIAL TYPE: ABNORMAL
EOSINOPHILS RELATIVE PERCENT: 1 % (ref 0–4)
GFR AFRICAN AMERICAN: >60 ML/MIN
GFR NON-AFRICAN AMERICAN: 56 ML/MIN
GFR SERPL CREATININE-BSD FRML MDRD: ABNORMAL ML/MIN/{1.73_M2}
GFR SERPL CREATININE-BSD FRML MDRD: ABNORMAL ML/MIN/{1.73_M2}
GLUCOSE BLD-MCNC: 101 MG/DL (ref 65–105)
GLUCOSE BLD-MCNC: 79 MG/DL (ref 70–99)
GLUCOSE BLD-MCNC: 90 MG/DL (ref 65–105)
HCT VFR BLD CALC: 39.3 % (ref 36–46)
HEMOGLOBIN: 12.9 G/DL (ref 12–16)
IMMATURE GRANULOCYTES: ABNORMAL %
LYMPHOCYTES # BLD: 22 % (ref 24–44)
MAGNESIUM: 1.7 MG/DL (ref 1.6–2.6)
MCH RBC QN AUTO: 30.1 PG (ref 26–34)
MCHC RBC AUTO-ENTMCNC: 32.7 G/DL (ref 31–37)
MCV RBC AUTO: 92 FL (ref 80–100)
MONOCYTES # BLD: 6 % (ref 1–7)
NRBC AUTOMATED: ABNORMAL PER 100 WBC
PDW BLD-RTO: 15.2 % (ref 11.5–14.9)
PLATELET # BLD: 221 K/UL (ref 150–450)
PLATELET ESTIMATE: ABNORMAL
PMV BLD AUTO: 9.2 FL (ref 6–12)
POTASSIUM SERPL-SCNC: 3.8 MMOL/L (ref 3.7–5.3)
PRO-BNP: 617 PG/ML
RBC # BLD: 4.28 M/UL (ref 4–5.2)
RBC # BLD: ABNORMAL 10*6/UL
SEG NEUTROPHILS: 70 % (ref 36–66)
SEGMENTED NEUTROPHILS ABSOLUTE COUNT: 5.5 K/UL (ref 1.3–9.1)
SODIUM BLD-SCNC: 141 MMOL/L (ref 135–144)
TOTAL PROTEIN: 7.5 G/DL (ref 6.4–8.3)
TROPONIN INTERP: NORMAL
TROPONIN T: NORMAL NG/ML
TROPONIN, HIGH SENSITIVITY: 12 NG/L (ref 0–14)
WBC # BLD: 7.9 K/UL (ref 3.5–11)
WBC # BLD: ABNORMAL 10*3/UL

## 2020-07-24 PROCEDURE — 83735 ASSAY OF MAGNESIUM: CPT

## 2020-07-24 PROCEDURE — 85025 COMPLETE CBC W/AUTO DIFF WBC: CPT

## 2020-07-24 PROCEDURE — 6370000000 HC RX 637 (ALT 250 FOR IP): Performed by: STUDENT IN AN ORGANIZED HEALTH CARE EDUCATION/TRAINING PROGRAM

## 2020-07-24 PROCEDURE — 84484 ASSAY OF TROPONIN QUANT: CPT

## 2020-07-24 PROCEDURE — 2060000000 HC ICU INTERMEDIATE R&B

## 2020-07-24 PROCEDURE — 99285 EMERGENCY DEPT VISIT HI MDM: CPT

## 2020-07-24 PROCEDURE — 80053 COMPREHEN METABOLIC PANEL: CPT

## 2020-07-24 PROCEDURE — 82947 ASSAY GLUCOSE BLOOD QUANT: CPT

## 2020-07-24 PROCEDURE — 71045 X-RAY EXAM CHEST 1 VIEW: CPT

## 2020-07-24 PROCEDURE — 99223 1ST HOSP IP/OBS HIGH 75: CPT | Performed by: INTERNAL MEDICINE

## 2020-07-24 PROCEDURE — 93005 ELECTROCARDIOGRAM TRACING: CPT | Performed by: EMERGENCY MEDICINE

## 2020-07-24 PROCEDURE — 36415 COLL VENOUS BLD VENIPUNCTURE: CPT

## 2020-07-24 PROCEDURE — 2580000003 HC RX 258: Performed by: STUDENT IN AN ORGANIZED HEALTH CARE EDUCATION/TRAINING PROGRAM

## 2020-07-24 PROCEDURE — 83880 ASSAY OF NATRIURETIC PEPTIDE: CPT

## 2020-07-24 RX ORDER — LANOLIN ALCOHOL/MO/W.PET/CERES
1000 CREAM (GRAM) TOPICAL DAILY
Status: DISCONTINUED | OUTPATIENT
Start: 2020-07-24 | End: 2020-07-27 | Stop reason: HOSPADM

## 2020-07-24 RX ORDER — ACETAMINOPHEN 325 MG/1
650 TABLET ORAL EVERY 4 HOURS PRN
Status: DISCONTINUED | OUTPATIENT
Start: 2020-07-24 | End: 2020-07-26 | Stop reason: SDUPTHER

## 2020-07-24 RX ORDER — SODIUM CHLORIDE 0.9 % (FLUSH) 0.9 %
10 SYRINGE (ML) INJECTION PRN
Status: DISCONTINUED | OUTPATIENT
Start: 2020-07-24 | End: 2020-07-27 | Stop reason: HOSPADM

## 2020-07-24 RX ORDER — POLYETHYLENE GLYCOL 3350 17 G/17G
17 POWDER, FOR SOLUTION ORAL DAILY PRN
Status: DISCONTINUED | OUTPATIENT
Start: 2020-07-24 | End: 2020-07-27 | Stop reason: HOSPADM

## 2020-07-24 RX ORDER — AMIODARONE HYDROCHLORIDE 200 MG/1
200 TABLET ORAL DAILY
Status: DISCONTINUED | OUTPATIENT
Start: 2020-07-24 | End: 2020-07-27 | Stop reason: HOSPADM

## 2020-07-24 RX ORDER — POTASSIUM CHLORIDE 20 MEQ/1
40 TABLET, EXTENDED RELEASE ORAL PRN
Status: DISCONTINUED | OUTPATIENT
Start: 2020-07-24 | End: 2020-07-27 | Stop reason: HOSPADM

## 2020-07-24 RX ORDER — DEXTROSE MONOHYDRATE 25 G/50ML
12.5 INJECTION, SOLUTION INTRAVENOUS PRN
Status: DISCONTINUED | OUTPATIENT
Start: 2020-07-24 | End: 2020-07-27 | Stop reason: HOSPADM

## 2020-07-24 RX ORDER — ACETAMINOPHEN 650 MG/1
650 SUPPOSITORY RECTAL EVERY 6 HOURS PRN
Status: DISCONTINUED | OUTPATIENT
Start: 2020-07-24 | End: 2020-07-27 | Stop reason: HOSPADM

## 2020-07-24 RX ORDER — ONDANSETRON 2 MG/ML
4 INJECTION INTRAMUSCULAR; INTRAVENOUS EVERY 6 HOURS PRN
Status: DISCONTINUED | OUTPATIENT
Start: 2020-07-24 | End: 2020-07-27 | Stop reason: HOSPADM

## 2020-07-24 RX ORDER — LEVOTHYROXINE SODIUM 0.07 MG/1
75 TABLET ORAL
Status: DISCONTINUED | OUTPATIENT
Start: 2020-07-25 | End: 2020-07-27 | Stop reason: HOSPADM

## 2020-07-24 RX ORDER — POTASSIUM CHLORIDE 7.45 MG/ML
10 INJECTION INTRAVENOUS PRN
Status: DISCONTINUED | OUTPATIENT
Start: 2020-07-24 | End: 2020-07-27 | Stop reason: HOSPADM

## 2020-07-24 RX ORDER — SODIUM CHLORIDE 0.9 % (FLUSH) 0.9 %
10 SYRINGE (ML) INJECTION PRN
Status: DISCONTINUED | OUTPATIENT
Start: 2020-07-24 | End: 2020-07-26 | Stop reason: SDUPTHER

## 2020-07-24 RX ORDER — PROMETHAZINE HYDROCHLORIDE 25 MG/1
12.5 TABLET ORAL EVERY 6 HOURS PRN
Status: DISCONTINUED | OUTPATIENT
Start: 2020-07-24 | End: 2020-07-27 | Stop reason: HOSPADM

## 2020-07-24 RX ORDER — ATORVASTATIN CALCIUM 40 MG/1
40 TABLET, FILM COATED ORAL DAILY
Status: DISCONTINUED | OUTPATIENT
Start: 2020-07-24 | End: 2020-07-27 | Stop reason: HOSPADM

## 2020-07-24 RX ORDER — FAMOTIDINE 20 MG/1
20 TABLET, FILM COATED ORAL 2 TIMES DAILY
Status: DISCONTINUED | OUTPATIENT
Start: 2020-07-24 | End: 2020-07-27 | Stop reason: HOSPADM

## 2020-07-24 RX ORDER — SODIUM CHLORIDE 0.9 % (FLUSH) 0.9 %
10 SYRINGE (ML) INJECTION EVERY 12 HOURS SCHEDULED
Status: DISCONTINUED | OUTPATIENT
Start: 2020-07-24 | End: 2020-07-26 | Stop reason: SDUPTHER

## 2020-07-24 RX ORDER — NICOTINE POLACRILEX 4 MG
15 LOZENGE BUCCAL PRN
Status: DISCONTINUED | OUTPATIENT
Start: 2020-07-24 | End: 2020-07-27 | Stop reason: HOSPADM

## 2020-07-24 RX ORDER — VITAMIN B COMPLEX
2000 TABLET ORAL DAILY
Status: DISCONTINUED | OUTPATIENT
Start: 2020-07-24 | End: 2020-07-27 | Stop reason: HOSPADM

## 2020-07-24 RX ORDER — SODIUM CHLORIDE 0.9 % (FLUSH) 0.9 %
10 SYRINGE (ML) INJECTION EVERY 12 HOURS SCHEDULED
Status: DISCONTINUED | OUTPATIENT
Start: 2020-07-24 | End: 2020-07-27 | Stop reason: HOSPADM

## 2020-07-24 RX ORDER — DEXTROSE MONOHYDRATE 50 MG/ML
100 INJECTION, SOLUTION INTRAVENOUS PRN
Status: DISCONTINUED | OUTPATIENT
Start: 2020-07-24 | End: 2020-07-27 | Stop reason: HOSPADM

## 2020-07-24 RX ORDER — ACETAMINOPHEN 325 MG/1
650 TABLET ORAL EVERY 6 HOURS PRN
Status: DISCONTINUED | OUTPATIENT
Start: 2020-07-24 | End: 2020-07-27 | Stop reason: HOSPADM

## 2020-07-24 RX ADMIN — FAMOTIDINE 20 MG: 20 TABLET ORAL at 22:49

## 2020-07-24 RX ADMIN — Medication 10 ML: at 22:50

## 2020-07-24 ASSESSMENT — ENCOUNTER SYMPTOMS
CHOKING: 0
COUGH: 0
VOMITING: 0
NAUSEA: 0
CHEST TIGHTNESS: 0
ABDOMINAL DISTENTION: 0
WHEEZING: 0
SHORTNESS OF BREATH: 0
DIARRHEA: 0
ABDOMINAL PAIN: 0

## 2020-07-24 ASSESSMENT — PAIN SCALES - GENERAL: PAINLEVEL_OUTOF10: 2

## 2020-07-24 NOTE — ED TRIAGE NOTES
Mode of arrival (squad #, walk in, police, etc) : walk in        Chief complaint(s): sent by cardiologist        Arrival Note (brief scenario, treatment PTA, etc). : Pt was at her cardiologist for cardiac clearance for a surgery coming up. Pt's HR was found to be 41 and in a junctional rhythm. Pt denies complaints at this time. Dr. Casandra Moura is concerned that pt is on too many cardiac meds. Pt is A&Ox4, in no acute distress, respirations even and unlabored, ambulatory with steady gait. C= \"Have you ever felt that you should Cut down on your drinking? \"  No  A= \"Have people Annoyed you by criticizing your drinking? \"  No  G= \"Have you ever felt bad or Guilty about your drinking? \"  No  E= \"Have you ever had a drink as an Eye-opener first thing in the morning to steady your nerves or to help a hangover? \"  No      Deferred []      Reason for deferring: N/A    *If yes to two or more: probable alcohol abuse. *

## 2020-07-24 NOTE — PROGRESS NOTES
Patient admitted into room 2125. Vitals taken, stable. Patient denies pain, SOB, and dizziness. Call light within reach. Bed wheels locked. Orders reviewed. Patient on telemetry.

## 2020-07-24 NOTE — ED NOTES
Pt is brought to this ER by her daughter from her cardiologist's office with bradycardia. Pt states she was at the office for pre-surgery clearance. Pt states she was unaware that her heart rate was slow, and her only complaint is feeling tired more than usual.      Pt arrives A+O x 4, GCS = 15, PMS x 4 intact, eupneic, and PWD. Pt's pulse is bradycardic and regular. Pt has diminished lung sounds t/o bilat. Pt has no visible peripheral edema. Pt is having appropriate conversation with this nurse.      Migue Acosta RN  07/24/20 [id number]

## 2020-07-24 NOTE — H&P
[address]      [facility]     HISTORY AND PHYSICAL EXAMINATION            Date:   7/24/2020  Patient name:  Omar Friday  Date of admission:  7/24/2020  4:24 PM  MRN:   [id number]  Account:  [de-identified]  YOB: 1950  PCP:    Fanny Lee MD  Room:   14/14  Code Status:    Prior    Chief Complaint:     Chief Complaint   Patient presents with    Bradycardia     sent by cardiologist       History Obtained From:     patient    History of Present Illness: The patient is a 78 y.o. Non-/non  female who presents with Bradycardia (sent by cardiologist)   and she is admitted to the hospital for the management of bradycardia. Patient with a PMhx of atrial fibrillation, type 2 DM, HTN, CHF, osteoarthritis in her bilateral knees, was sent to the ER by her cardiologist Dr. Getachew Garcia for bradycardia. Patient only reports fatigue. Denies lightheadedness, SOB, chest pain, palpitations, headache, fever, chills. Her atrial fibrillation was diagnosed in January 2020, with RVR that has been difficult to control. She is currently on amiodarone, Cardizem, metoprolol, and Xarelto for management of her atrial fibrillation. Of note, she was in her cardiologist office to obtain surgical clearance. She is scheduled to have a surgery for her perforated diverticulum and cholecystectomy for her chronic cholecystitis. Denies hx of smoking or alcohol consumption. While in the ER, her heart rate measures at 45. Troponin 12, Magnesium 1.7. EKG showed sinus bradycardia.     Past Medical History:     Past Medical History:   Diagnosis Date    Adenocarcinoma of endometrium, stage 1 ([facility] [address] 10/5/2017    Well differentiated grade 1 adenocarcinoma arising in complex hyperplasia    JOSHUA (acute kidney injury) ([facility] [address] 1/15/2020    Allergic rhinitis 2/23/2017    At high tablet Take 1 tablet by mouth daily 3/9/19  Yes David Raygoza MD   acetaminophen (APAP EXTRA STRENGTH) 500 MG tablet Take 1 tablet by mouth every 6 hours as needed for Pain or Fever 2/12/19  Yes David Raygoza MD   Lactobacillus (PROBIOTIC ACIDOPHILUS) TABS Take 1 tablet by mouth daily 7/31/17  Yes David Raygoza MD   Blood Pressure Monitor KIT Use as directed. 7/22/20   RenMAX Morrow - CNP   glucose monitoring kit (FREESTYLE) monitoring kit Use as directed. BRAND OF CHO[employer] INSURANCE ALLOWS. 7/22/20   Renella MAX Minor - CNP   blood glucose monitor strips Test 2-3 times a day & as needed for symptoms of irregular blood glucose. BR[employer] CHO[employer] INSURANCE ALLOWS. 7/22/20   Renella A MAX Torres - CNP   Alcohol Swabs (ALCOHOL PREP) PADS Use as directed 7/22/20   Feroz Torres APRN - CNP   Lancets MISC 1 each by Does not apply route 2 times daily 7/22/20   Daxella DAISY Torres APRN - CNP   UNABLE TO FIND Needs right walker brake fixed 10/3/19   David Raygoza MD        Allergies:     Sulfa antibiotics and Penicillins    Social History:     Tobacco:    reports that she has never smoked. She has never used smokeless tobacco.  Alcohol:      reports no history of alcohol use. Drug Use:  reports no history of drug use. Family History:     Family History   Problem Relation Age of Onset    Coronary Art Dis Father     Hypertension Father     Diabetes Father     High Blood Pressure Father     Heart Attack Father     Diabetes Mother     High Blood Pressure Mother     Thyroid Disease Mother     High Blood Pressure Sister     Thyroid Disease Brother     High Blood Pressure Brother     High Blood Pressure Maternal Grandmother     Diabetes Maternal Grandfather     No Known Problems Paternal Grandmother     Heart Attack Paternal Grandfather     Colon Cancer Neg Hx        Review of Systems:     Positive and Negative as described in HPI.     Review of Systems Constitutional: Positive for fatigue. Negative for activity change, appetite change, chills and fever. HENT: Negative for congestion. Respiratory: Negative for cough, choking, chest tightness, shortness of breath and wheezing. Cardiovascular: Negative for chest pain, palpitations and leg swelling. Gastrointestinal: Negative for abdominal distention, abdominal pain, diarrhea, nausea and vomiting. Skin: Negative for rash. Neurological: Negative for headaches. Physical Exam:   BP (!) 129/57   Pulse 53   Temp 98 °F (36.7 °C) (Oral)   Resp 20   Ht 5' 2\" (1.575 m)   Wt 233 lb (105.7 kg)   LMP  (LMP Unknown)   SpO2 98%   BMI 42.62 kg/m²   Temp (24hrs), Av °F (36.7 °C), Min:98 °F (36.7 °C), Max:98 °F (36.7 °C)    No results for input(s): POCGLU in the last 72 hours. No intake or output data in the 24 hours ending 203    Physical Exam  Constitutional:       General: She is not in acute distress. Appearance: She is obese. HENT:      Head: Normocephalic. Mouth/Throat:      Mouth: Mucous membranes are moist.   Cardiovascular:      Rate and Rhythm: Regular rhythm. Bradycardia present. Heart sounds: Normal heart sounds. No murmur. Pulmonary:      Effort: Pulmonary effort is normal.      Breath sounds: Normal breath sounds. No wheezing or rales. Abdominal:      General: Bowel sounds are normal. There is no distension. Palpations: Abdomen is soft. Tenderness: There is no abdominal tenderness. There is no guarding. Musculoskeletal:      Right lower leg: No edema. Left lower leg: No edema. Skin:     General: Skin is warm and dry. Capillary Refill: Capillary refill takes less than 2 seconds. Neurological:      Mental Status: She is alert and oriented to person, place, and time.          Investigations:     Laboratory Testing:  Recent Results (from the past 24 hour(s))   EKG 12 Lead    Collection Time: 20  4:59 PM   Result Value Ref Range Ventricular Rate 53 BPM    Atrial Rate 53 BPM    P-R Interval 188 ms    QRS Duration 90 ms    Q-T Interval 494 ms    QTc Calculation (Bazett) 463 ms    P Axis 51 degrees    R Axis 69 degrees    T Axis 70 degrees   CBC Auto Differential    Collection Time: 07/24/20  5:20 PM   Result Value Ref Range    WBC 7.9 3.5 - 11.0 k/uL    RBC 4.28 4.0 - 5.2 m/uL    Hemoglobin 12.9 12.0 - 16.0 g/dL    Hematocrit 39.3 36 - 46 %    MCV 92.0 80 - 100 fL    MCH 30.1 26 - 34 pg    MCHC 32.7 31 - 37 g/dL    RDW 15.2 (H) 11.5 - 14.9 %    Platelets 142 174 - 434 k/uL    MPV 9.2 6.0 - 12.0 fL    NRBC Automated NOT REPORTED per 100 WBC    Differential Type NOT REPORTED     Seg Neutrophils 70 (H) 36 - 66 %    Lymphocytes 22 (L) 24 - 44 %    Monocytes 6 1 - 7 %    Eosinophils % 1 0 - 4 %    Basophils 1 0 - 2 %    Immature Granulocytes NOT REPORTED 0 %    Segs Absolute 5.50 1.3 - 9.1 k/uL    Absolute Lymph # 1.70 1.0 - 4.8 k/uL    Absolute Mono # 0.50 0.1 - 1.3 k/uL    Absolute Eos # 0.10 0.0 - 0.4 k/uL    Basophils Absolute 0.10 0.0 - 0.2 k/uL    Absolute Immature Granulocyte NOT REPORTED 0.00 - 0.30 k/uL    WBC Morphology NOT REPORTED     RBC Morphology NOT REPORTED     Platelet Estimate NOT REPORTED    Comprehensive Metabolic Panel    Collection Time: 07/24/20  5:20 PM   Result Value Ref Range    Glucose 79 70 - 99 mg/dL    BUN 25 (H) 8 - 23 mg/dL    CREATININE 0.98 (H) 0.50 - 0.90 mg/dL    Bun/Cre Ratio NOT REPORTED 9 - 20    Calcium 9.7 8.6 - 10.4 mg/dL    Sodium 141 135 - 144 mmol/L    Potassium 3.8 3.7 - 5.3 mmol/L    Chloride 103 98 - 107 mmol/L    CO2 24 20 - 31 mmol/L    Anion Gap 14 9 - 17 mmol/L    Alkaline Phosphatase 50 35 - 104 U/L    ALT 16 5 - 33 U/L    AST 12 <32 U/L    Total Bilirubin 0.24 (L) 0.3 - 1.2 mg/dL    Total Protein 7.5 6.4 - 8.3 g/dL    Alb 4.3 3.5 - 5.2 g/dL    Albumin/Globulin Ratio NOT REPORTED 1.0 - 2.5    GFR Non- 56 (L) >60 mL/min    GFR African American >60 >60 mL/min    GFR Comment GFR Staging NOT REPORTED    Magnesium    Collection Time: 07/24/20  5:20 PM   Result Value Ref Range    Magnesium 1.7 1.6 - 2.6 mg/dL   Troponin    Collection Time: 07/24/20  5:20 PM   Result Value Ref Range    Troponin, High Sensitivity 12 0 - 14 ng/L    Troponin T NOT REPORTED <0.03 ng/mL    Troponin Interp NOT REPORTED        Imaging/Diagnostics:  Xr Knee Left (3 Views)    Result Date: 7/19/2020  EXAMINATION: THREE XRAY VIEWS OF THE LEFT KNEE 7/19/2020 4:17 pm COMPARISON: 25 January 2019 HISTORY: ORDERING SYSTEM PROVIDED HISTORY: pain TECHNOLOGIST PROVIDED HISTORY: pain Reason for Exam: pain Acuity: Acute Type of Exam: Initial FINDINGS: Osteopenia is noted. The patient has severe tricompartmental arthritic changes with medial compartment narrowing. Arthritic changes are most severe in the patellofemoral aspect of the joint. Lateral and patellar marginal spurring are noted. No acute fracture, or dislocation is noted. Small suprapatellar effusion is noted. Atherosclerotic calcification of the popliteal and calf arteries is noted. No acute osseous abnormality. Severe tricompartmental arthritic changes described above with small suprapatellar effusion. Xr Chest Portable    Result Date: 7/24/2020  EXAMINATION: ONE XRAY VIEW OF THE CHEST 7/24/2020 5:37 pm COMPARISON: 07/11/2020. HISTORY: ORDERING SYSTEM PROVIDED HISTORY: bradycardia TECHNOLOGIST PROVIDED HISTORY: bradycardia Reason for Exam: bradycardia Acuity: Unknown Type of Exam: Unknown FINDINGS: The exam is mildly rotated. The cardiomediastinal silhouette is stable. Aortic vascular calcification. Mild elevation of the right hemidiaphragm. No acute infiltrate, pleural fluid or evidence of overt failure. No acute cardiopulmonary disease. Xr Chest Portable    Result Date: 7/11/2020  EXAMINATION: SINGLE-VIEW CHEST RADIOGRAPH DATE 07/11/2020 COMPARISON: None HISTORY: Shortness of breath.  FINDINGS: No pneumothorax, pleural effusion or focal airspace consolidation. Normal heart size and mediastinal contours. No acute osseous abnormality. No acute findings. Ct Chest Pulmonary Embolism W Contrast    Result Date: 7/12/2020  EXAMINATION: CT CHEST PULMONARY EMBOLISM WITH CONTRAST TECHNIQUE: Dose modulation, iterative reconstruction, and/or weight-based adjustment of the mA/kV was utilized to reduce the radiation dose to as low as reasonably achievable. COMPARISON: None available HISTORY: Shortness of breath for 1 day. FINDINGS: Angiogram: The pulmonary arterial system is adequately opacified by contrast for evaluation. The main pulmonary artery, left and right main pulmonary arteries and segmental branches demonstrate normal contrast filling without evidence of defect to suggest presence of pulmonary embolism. Caliber of the main pulmonary artery is unremarkable. The heart is mildly enlarged with otherwise unremarkable configuration. No evidence of pericardial effusion is seen. No evidence of mediastinal or hilar lymphadenopathy or mass lesion is identified. The lungs are well aerated without evidence of consolidation. The pleural surfaces are unremarkable without evidence of pleural thickening or pleural effusion identified. Visualized upper abdominal organs on chest CT examination are grossly unremarkable in appearance. The bones, skeletal muscle bundles, fascial planes and subcutaneous soft tissues are unremarkable in appearance. 1. No evidence of acute pulmonary embolism. 2. Mild cardiomegaly.        Assessment :      Primary Problem  Symptomatic bradycardia    Active Hospital Problems    Diagnosis Date Noted    Symptomatic bradycardia [R00.1] 07/24/2020       Plan:     Patient status Admit as inpatient in the  Progressive Unit/Step down    Sinus Bradycardia  - Upon admission Heart rate: 47  - Magnesium 1.7  - Troponin 12, trend troponin  - Consult cardiology  - Cardiac telemetry to monitor rhythm and rate    Atrial fibrillation  - Continue Amiodarone 200mg daily   - Consult cardiology  - Cardiac telemetry to monitor rhythm and rate    CHF, diastolic dysfunction  - ECHO (1/14/20): Grade 1 left ventricular diastolic dysfunction  - Order BNP  - Consult cardiology    Type 2 Diabetes Mellitus, without the use of long-term insulin  - Upon admission blood glucose: 79, HgbA1c: 5.3 (7/22)  - Hypoglycemia protocol  - POCT glucose  - Low dose insulin correction scale      Consultations:   IP CONSULT TO CARDIOLOGY  IP CONSULT TO INTERNAL MEDICINE  IP CONSULT TO SOCIAL WORK    Patient is admitted as inpatient status because of co-morbiditieslisted above, severity of signs and symptoms as outlined, requirement for current medical therapies and most importantly because of direct risk to patient if care not provided in a hospital setting. Kennedy Rosario MD  7/24/2020  6:13 PM    Copy sent to Dr. Kathe Martinez MD    Attestation and add on       I have discussed the care of Darryl Gardner , including pertinent history and exam findings,     7/24/2020    with the resident. I have seen and examined the patient and the key elements of all parts of the encounter have been performed by me . I agree with the assessment, plan and orders as documented by the resident. Principal Problem:    Symptomatic bradycardia  Active Problems: Morbid obesity with BMI of 40.0-44.9, adult ([facility] [address]    Essential hypertension    A-fib (HCC)    Type 2 diabetes mellitus, without long-term current use of insulin (HCC)    CHF NYHA class I, chronic, diastolic (HCC)  Resolved Problems:    * No resolved hospital problems. *            ---- ;        Medications: Allergies:     Allergies   Allergen Reactions    Sulfa Antibiotics Nausea Only    Penicillins Rash       Current Meds:   Scheduled Meds:    sodium chloride flush  10 mL Intravenous 2 times per day    vitamin B-12  1,000 mcg Oral Daily    levothyroxine  75 mcg Oral QAM AC    Vitamin D  2,000 Units Oral Daily   

## 2020-07-24 NOTE — ED PROVIDER NOTES
atrophy H47.20    Cough present for greater than 3 weeks R05    Dyspepsia R10.13    A-fib ([facility]) I48.91    Type 2 diabetes mellitus, without long-term current use of insulin ([facility]) E11.9    JOSHUA (acute kidney injury) ([facility] [address] N17.9    Atrial fibrillation, new onset ([facility] [address] I48.91    Mobility impaired Z74.09    Perforated diverticulum K57.80    Diverticulitis K57.92    Chronic cholecystitis K81.1    Colitis K52.9    Multiple atypical skin moles D22.9    Class 3 severe obesity due to excess calories without serious comorbidity with body mass index (BMI) of 40.0 to 44.9 in adult ([facility]) E66.01, Z68.41    Symptomatic bradycardia R00.1    CHF NYHA class I, chronic, diastolic ([facility]) F63.86     SURGICAL HISTORY       Past Surgical History:   Procedure Laterality Date    CATARACT REMOVAL WITH IMPLANT Bilateral 2015    COLONOSCOPY  1997    had polyp, she doesn't know where and when, \"20 yrs ago\"    COLONOSCOPY  06/26/2017    DILATION AND CURETTAGE OF UTERUS N/A 9/20/2017    HYSTEROSCOPY  WITH MYOSURE performed by Meseret Camejo DO at [address] [facility] [place] N/A 10/24/2017    TOTAL LAPAROSCOPIC HYSTERECTOMY, BSO, F.S.  STAGING, GYRUS G400 performed by Fanny Han MD at [address] N/A 6/26/2017    COLONOSCOPY POLYPECTOMY / HOT SNARE performed by Torin Yousif DO at [address]  10/24/2017    with pelvic lymph node dissection    THYROID SURGERY  1980    subtotal 20 years ago    TONSILLECTOMY      VITRECTOMY      FLOATERS     CURRENT MEDICATIONS       Current Discharge Medication List      CONTINUE these medications which have NOT CHANGED    Details   oxybutynin (DITROPAN-XL) 10 MG extended release tablet Take 1 tablet by mouth daily  Qty: 30 tablet, Refills: 5      metoprolol tartrate (LOPRESSOR) 25 MG tablet Take 0.5 tablets by mouth 2 times daily  Qty: 60 tablet, Refills: 3      dilTIAZem (CARDIZEM CD) 360 MG extended release capsule Take 1 capsule by mouth daily  Qty: 30 capsule, Refills: 3      amiodarone (CORDARONE) 200 MG tablet Take 1 tablet by mouth daily  Qty: 30 tablet, Refills: 3      rivaroxaban (XARELTO) 20 MG TABS tablet Take 1 tablet by mouth daily (with breakfast)  Qty: 30 tablet, Refills: 3      atorvastatin (LIPITOR) 40 MG tablet TAKE 1 TABLET BY MOUTH DAILY STOP SIMVASTATIN  Qty: 90 tablet, Refills: 3    Associated Diagnoses: Hyperlipidemia with target LDL less than 100      levothyroxine (SYNTHROID) 75 MCG tablet TAKE 1 TABLET BY MOUTH EVERY MORNING (BEFORE BREAKFAST)  Qty: 90 tablet, Refills: 3    Associated Diagnoses: Acquired hypothyroidism      HM LORATADINE 10 MG tablet TAKE 1 TABLET BY MOUTH DAILY  Qty: 90 tablet, Refills: 3    Associated Diagnoses: Non-seasonal allergic rhinitis due to other allergic trigger      docusate sodium (COLACE) 100 MG capsule TAKE 1 CAPSULE BY MOUTH 2 TIMES DAILY  Qty: 180 capsule, Refills: 3    Associated Diagnoses: Slow transit constipation      MYRBETRIQ 50 MG TB24 TAKE ONE TABLET BY MOUTH ONCE DAILY  Qty: 90 tablet, Refills: 3    Associated Diagnoses: Mixed incontinence; OAB (overactive bladder)      metFORMIN (GLUCOPHAGE) 500 MG tablet TAKE ONE TABLET BY MOUTH TWICE A DAY WITH A MEAL  Qty: 60 tablet, Refills: 10    Associated Diagnoses: Hyperglycemia      Cranberry, Vacc oxycoccus, (SM CRANBERRY) 500 MG TABS Take 500 mg by mouth daily       Cholecalciferol (VITAMIN D) 2000 units TABS tablet Take 1 tablet by mouth daily  Qty: 90 tablet, Refills: 3    Associated Diagnoses: Vitamin D deficiency      vitamin B-12 (CYANOCOBALAMIN) 1000 MCG tablet Take 1 tablet by mouth daily  Qty: 90 tablet, Refills: 3    Associated Diagnoses: Vitamin B 12 deficiency      acetaminophen (APAP EXTRA STRENGTH) 500 MG tablet Take 1 tablet by mouth every 6 hours as needed for Pain or Fever  Qty: 360 tablet, Refills: 3    Associated Diagnoses: Chronic pain of both knees;  Chronic bilateral low back pain without sciatica; Primary osteoarthritis of both knees      Lactobacillus (PROBIOTIC ACIDOPHILUS) TABS Take 1 tablet by mouth daily  Qty: 30 tablet, Refills: 3    Associated Diagnoses: Mixed incontinence; Candidiasis of perineum      Blood Pressure Monitor KIT Use as directed. Qty: 1 kit, Refills: 1    Associated Diagnoses: Essential hypertension      glucose monitoring kit (FREESTYLE) monitoring kit Use as directed. BRAND OF CHOICE INSURANCE ALLOWS.  Qty: 1 kit, Refills: 0    Associated Diagnoses: Type 2 diabetes mellitus with hyperglycemia, without long-term current use of insulin ([facility])      blood glucose monitor strips Test 2-3 times a day & as needed for symptoms of irregular blood glucose. BRAND OF CHOICE INSURANCE ALLOWS.  Qty: 100 strip, Refills: 11    Associated Diagnoses: Type 2 diabetes mellitus with hyperglycemia, without long-term current use of insulin ([facility])      Alcohol Swabs (ALCOHOL PREP) PADS Use as directed  Qty: 100 each, Refills: 11    Associated Diagnoses: Type 2 diabetes mellitus with hyperglycemia, without long-term current use of insulin ([facility])      Lancets MISC 1 each by Does not apply route 2 times daily  Qty: 300 each, Refills: 11    Associated Diagnoses: Type 2 diabetes mellitus with hyperglycemia, without long-term current use of insulin ([facility] [address]      UNABLE TO FIND Needs right walker brake fixed  Qty: 1 each, Refills: 0    Associated Diagnoses: Primary osteoarthritis of both knees; Chronic bilateral low back pain without sciatica; At high risk for falls           ALLERGIES     is allergic to sulfa antibiotics and penicillins. FAMILY HISTORY     She indicated that her mother is . She indicated that her father is . She indicated that her sister is alive. She indicated that her brother is alive. She indicated that her maternal grandmother is . She indicated that her maternal grandfather is . She indicated that her paternal grandmother is .  She indicated that her paternal grandfather is . She indicated that her daughter is alive. She indicated that the status of her neg hx is unknown. SOCIAL HISTORY       Social History     Tobacco Use    Smoking status: Never Smoker    Smokeless tobacco: Never Used   Substance Use Topics    Alcohol use: No     Alcohol/week: 0.0 standard drinks    Drug use: No     PHYSICAL EXAM     INITIAL VITALS: BP (!) 104/56   Pulse 50   Temp 97.7 °F (36.5 °C) (Oral)   Resp 16   Ht 5' 2\" (1.575 m)   Wt 243 lb 14.4 oz (110.6 kg)   LMP  (LMP Unknown)   SpO2 98%   BMI 44.61 kg/m²    Physical Exam  Vitals signs reviewed. Constitutional:       General: She is not in acute distress. Appearance: Normal appearance. She is well-developed. She is not diaphoretic. HENT:      Head: Normocephalic and atraumatic. Right Ear: External ear normal.      Left Ear: External ear normal.      Nose: Nose normal. No congestion. Mouth/Throat:      Mouth: Mucous membranes are moist.      Pharynx: Oropharynx is clear. Eyes:      General:         Right eye: No discharge. Left eye: No discharge. Conjunctiva/sclera: Conjunctivae normal.      Pupils: Pupils are equal, round, and reactive to light. Neck:      Musculoskeletal: Normal range of motion and neck supple. Trachea: No tracheal deviation. Cardiovascular:      Rate and Rhythm: Regular rhythm. Bradycardia present. Pulses: Normal pulses. Heart sounds: Normal heart sounds. Pulmonary:      Effort: Pulmonary effort is normal. No respiratory distress. Breath sounds: Normal breath sounds. No stridor. No wheezing or rales. Abdominal:      Palpations: Abdomen is soft. Tenderness: There is no abdominal tenderness. There is no guarding or rebound. Musculoskeletal: Normal range of motion. General: No tenderness or deformity. Skin:     General: Skin is warm and dry.       Capillary Refill: Capillary refill takes less than 2 FOR LOW K - Abnormal; Notable for the following components:    Potassium 3.3 (*)     All other components within normal limits   CBC - Abnormal; Notable for the following components:    RBC 3.89 (*)     Hemoglobin 11.5 (*)     RDW 15.2 (*)     All other components within normal limits   MAGNESIUM - Abnormal; Notable for the following components:    Magnesium 1.4 (*)     All other components within normal limits   POC GLUCOSE FINGERSTICK - Abnormal; Notable for the following components:    POC Glucose 181 (*)     All other components within normal limits   MAGNESIUM   TROPONIN   TROPONIN   POC GLUCOSE FINGERSTICK   POC GLUCOSE FINGERSTICK   POC GLUCOSE FINGERSTICK   POCT GLUCOSE   POCT GLUCOSE   POCT GLUCOSE   POCT GLUCOSE   POCT GLUCOSE       EMERGENCY DEPARTMENTCOURSE:         Vitals:    Vitals:    07/24/20 1945 07/24/20 2315 07/25/20 0656 07/25/20 1144   BP: (!) 141/64 (!) 144/56 (!) 152/65 (!) 104/56   Pulse: 51 50 50 50   Resp: 18 18 16 16   Temp: 97.4 °F (36.3 °C) 97.5 °F (36.4 °C) 97.7 °F (36.5 °C) 97.7 °F (36.5 °C)   TempSrc:   Oral Oral   SpO2: 98% 96% 97% 98%   Weight: 243 lb 14.4 oz (110.6 kg)      Height: 5' 2\" (1.575 m)          CONSULTS:  IP CONSULT TO CARDIOLOGY  IP CONSULT TO INTERNAL MEDICINE  IP CONSULT TO SOCIAL WORK  IP CONSULT TO CARDIOLOGY    FINAL IMPRESSION      1.  Bradycardia          DISPOSITION/PLAN   DISPOSITION Admitted 07/24/2020 04:58:39 PM      PATIENT REFERRED TO:  Kiersten Guthrie MD  [address] [place] [address].  [address] [address]  [phone]          DISCHARGE MEDICATIONS:  Current Discharge Medication List        Holden Miranda MD  Attending Emergency Physician                    Holden Miranda MD  07/25/20 0745

## 2020-07-25 LAB
ANION GAP SERPL CALCULATED.3IONS-SCNC: 11 MMOL/L (ref 9–17)
BUN BLDV-MCNC: 20 MG/DL (ref 8–23)
BUN/CREAT BLD: ABNORMAL (ref 9–20)
CALCIUM SERPL-MCNC: 8.8 MG/DL (ref 8.6–10.4)
CHLORIDE BLD-SCNC: 103 MMOL/L (ref 98–107)
CO2: 26 MMOL/L (ref 20–31)
CREAT SERPL-MCNC: 0.73 MG/DL (ref 0.5–0.9)
GFR AFRICAN AMERICAN: >60 ML/MIN
GFR NON-AFRICAN AMERICAN: >60 ML/MIN
GFR SERPL CREATININE-BSD FRML MDRD: ABNORMAL ML/MIN/{1.73_M2}
GFR SERPL CREATININE-BSD FRML MDRD: ABNORMAL ML/MIN/{1.73_M2}
GLUCOSE BLD-MCNC: 107 MG/DL (ref 65–105)
GLUCOSE BLD-MCNC: 112 MG/DL (ref 65–105)
GLUCOSE BLD-MCNC: 181 MG/DL (ref 65–105)
GLUCOSE BLD-MCNC: 86 MG/DL (ref 65–105)
GLUCOSE BLD-MCNC: 90 MG/DL (ref 70–99)
HCT VFR BLD CALC: 36.1 % (ref 36–46)
HEMOGLOBIN: 11.5 G/DL (ref 12–16)
MAGNESIUM: 1.4 MG/DL (ref 1.6–2.6)
MCH RBC QN AUTO: 29.6 PG (ref 26–34)
MCHC RBC AUTO-ENTMCNC: 31.9 G/DL (ref 31–37)
MCV RBC AUTO: 93 FL (ref 80–100)
NRBC AUTOMATED: ABNORMAL PER 100 WBC
PDW BLD-RTO: 15.2 % (ref 11.5–14.9)
PLATELET # BLD: 209 K/UL (ref 150–450)
PMV BLD AUTO: 9.3 FL (ref 6–12)
POTASSIUM SERPL-SCNC: 3.3 MMOL/L (ref 3.7–5.3)
RBC # BLD: 3.89 M/UL (ref 4–5.2)
SODIUM BLD-SCNC: 140 MMOL/L (ref 135–144)
WBC # BLD: 8.8 K/UL (ref 3.5–11)

## 2020-07-25 PROCEDURE — 2580000003 HC RX 258: Performed by: STUDENT IN AN ORGANIZED HEALTH CARE EDUCATION/TRAINING PROGRAM

## 2020-07-25 PROCEDURE — 97162 PT EVAL MOD COMPLEX 30 MIN: CPT

## 2020-07-25 PROCEDURE — 2060000000 HC ICU INTERMEDIATE R&B

## 2020-07-25 PROCEDURE — 36415 COLL VENOUS BLD VENIPUNCTURE: CPT

## 2020-07-25 PROCEDURE — 2580000003 HC RX 258: Performed by: INTERNAL MEDICINE

## 2020-07-25 PROCEDURE — 85027 COMPLETE CBC AUTOMATED: CPT

## 2020-07-25 PROCEDURE — 97116 GAIT TRAINING THERAPY: CPT

## 2020-07-25 PROCEDURE — 82947 ASSAY GLUCOSE BLOOD QUANT: CPT

## 2020-07-25 PROCEDURE — 6360000002 HC RX W HCPCS: Performed by: STUDENT IN AN ORGANIZED HEALTH CARE EDUCATION/TRAINING PROGRAM

## 2020-07-25 PROCEDURE — 99233 SBSQ HOSP IP/OBS HIGH 50: CPT | Performed by: INTERNAL MEDICINE

## 2020-07-25 PROCEDURE — 6370000000 HC RX 637 (ALT 250 FOR IP): Performed by: STUDENT IN AN ORGANIZED HEALTH CARE EDUCATION/TRAINING PROGRAM

## 2020-07-25 PROCEDURE — 83735 ASSAY OF MAGNESIUM: CPT

## 2020-07-25 PROCEDURE — 80048 BASIC METABOLIC PNL TOTAL CA: CPT

## 2020-07-25 RX ADMIN — Medication 10 ML: at 21:19

## 2020-07-25 RX ADMIN — Medication 10 ML: at 21:15

## 2020-07-25 RX ADMIN — MELATONIN 2000 UNITS: at 09:16

## 2020-07-25 RX ADMIN — Medication 10 ML: at 09:15

## 2020-07-25 RX ADMIN — INSULIN LISPRO 1 UNITS: 100 INJECTION, SOLUTION INTRAVENOUS; SUBCUTANEOUS at 12:11

## 2020-07-25 RX ADMIN — ATORVASTATIN CALCIUM 40 MG: 40 TABLET, FILM COATED ORAL at 09:15

## 2020-07-25 RX ADMIN — POTASSIUM CHLORIDE 40 MEQ: 1500 TABLET, EXTENDED RELEASE ORAL at 06:25

## 2020-07-25 RX ADMIN — FAMOTIDINE 20 MG: 20 TABLET ORAL at 09:15

## 2020-07-25 RX ADMIN — FAMOTIDINE 20 MG: 20 TABLET ORAL at 21:15

## 2020-07-25 RX ADMIN — LEVOTHYROXINE SODIUM 75 MCG: 75 TABLET ORAL at 05:02

## 2020-07-25 RX ADMIN — RIVAROXABAN 20 MG: 20 TABLET, FILM COATED ORAL at 09:15

## 2020-07-25 RX ADMIN — CYANOCOBALAMIN TAB 1000 MCG 1000 MCG: 1000 TAB at 09:15

## 2020-07-25 RX ADMIN — MAGNESIUM SULFATE HEPTAHYDRATE 2 G: 500 INJECTION, SOLUTION INTRAMUSCULAR; INTRAVENOUS at 13:15

## 2020-07-25 ASSESSMENT — ENCOUNTER SYMPTOMS
ABDOMINAL DISTENTION: 0
DIARRHEA: 0
CHOKING: 0
NAUSEA: 0
COUGH: 0
CHEST TIGHTNESS: 0
WHEEZING: 0
ABDOMINAL PAIN: 0
VOMITING: 0
SHORTNESS OF BREATH: 0

## 2020-07-25 ASSESSMENT — PAIN SCALES - GENERAL
PAINLEVEL_OUTOF10: 8
PAINLEVEL_OUTOF10: 3

## 2020-07-25 ASSESSMENT — PAIN DESCRIPTION - ORIENTATION: ORIENTATION: RIGHT;LEFT

## 2020-07-25 ASSESSMENT — PAIN DESCRIPTION - PAIN TYPE
TYPE: CHRONIC PAIN
TYPE: CHRONIC PAIN

## 2020-07-25 ASSESSMENT — PAIN DESCRIPTION - FREQUENCY: FREQUENCY: INTERMITTENT

## 2020-07-25 ASSESSMENT — PAIN DESCRIPTION - ONSET: ONSET: ON-GOING

## 2020-07-25 ASSESSMENT — PAIN DESCRIPTION - LOCATION
LOCATION: KNEE
LOCATION: KNEE

## 2020-07-25 ASSESSMENT — PAIN DESCRIPTION - PROGRESSION: CLINICAL_PROGRESSION: RAPIDLY WORSENING

## 2020-07-25 NOTE — CARE COORDINATION
Writer received a call that family dropped off a brown paper bag with patients hair  Brush and cell phone  at [facility]. Writer picked up paper bag and delivered it to patient and placed on her bedside table.     Electronically signed by Sasha Bowden RN on 7/25/2020 at 4:01 PM

## 2020-07-25 NOTE — PROGRESS NOTES
Physical Therapy    Facility/Department: [address] [place] [address] CARE  Initial Assessment    NAME: Darryl Gardner  : 1950  MRN: [id number]    Date of Service: 2020    Discharge Recommendations:  Continue to assess pending progress   PT Equipment Recommendations  Equipment Needed: No    Assessment   Body structures, Functions, Activity limitations: Decreased functional mobility ; Decreased balance;Decreased strength  Assessment: continue per POC to maxmize potential for safe D/C  Treatment Diagnosis: impaired mobility & endurance due to bradycardia  Specific instructions for Next Treatment: progress gait distance w/ rollator- watch for knee buckling, instruct in HEP  Prognosis: Good  Decision Making: Medium Complexity  History: admitted due to bradycardia  Exam: ROM, MMT, balance and mobility assessments  Clinical Presentation: gait w/ rollator 3\" w/ CGA x 1- fall risk due to left knee buckling, CGA x 1 sit <> stand  PT Education: Goals;PT Role;Plan of Care  Barriers to Learning: none  REQUIRES PT FOLLOW UP: Yes  Activity Tolerance  Activity Tolerance: Patient limited by fatigue;Patient limited by endurance; Patient limited by pain; Other(left knee buckling)       Patient Diagnosis(es): The encounter diagnosis was Bradycardia.      has a past medical history of Adenocarcinoma of endometrium, stage 1 ([address] JOSHUA (acute kidney injury) ([address] Allergic rhinitis, At high risk for falls, Atrial fibrillation ([address] CHF (congestive heart failure) ([address] Colon polyp, Diabetes mellitus ([address] Diverticulitis, Endometrial cancer ([address] History of TIA (transient ischemic attack), Hyperglycemia, Hyperlipidemia, Hypertension, Hypothyroidism, Legally blind, Lower back pain, Mixed incontinence, Morbid obesity with BMI of 40.0-44.9, adult ([address] CLAROS (nonalcoholic steatohepatitis), Obesity, Oral phase dysphagia, Osteoarthritis (arthritis due to wear and tear of joints), Osteoarthritis involving multiple joints on both sides of body, Osteoporosis, Perforated bowel ([facility] Postmenopausal bleeding, S/P h-scope, Myosure 9/20/17, Tennis elbow, Thickened endometrium, TIA (transient ischemic attack), TLHBSO, bilateral LND 10/24/17, and Type 2 diabetes mellitus, without long-term current use of insulin ([facility] has a past surgical history that includes Thyroid surgery (1980); Colonoscopy (1997); Tonsillectomy; Colonoscopy (06/26/2017); pr colsc flx w/removal lesion by hot bx forceps (N/A, 6/26/2017); Dilation and curettage of uterus (N/A, 9/20/2017); Cataract removal with implant (Bilateral, 2015); vitrectomy; julieta and bso (cervix removed) (10/24/2017); and Hysterectomy (N/A, 10/24/2017). Restrictions  Restrictions/Precautions  Restrictions/Precautions: Fall Risk, Up as Tolerated(peripheral IV left forearm)  Required Braces or Orthoses?: No  Vision/Hearing  Vision: Impaired  Vision Exceptions: Wears glasses for reading((Pt reports she is \"visually impaired\" - NOT \"legally blind\"))  Hearing: Within functional limits     Subjective  General  Patient assessed for rehabilitation services?: Yes  Response To Previous Treatment: Not applicable  Family / Caregiver Present: No  Referring Practitioner: Dr. Casandra Olson  Referral Date : 07/24/20  Diagnosis: bradycardia  Follows Commands: Within Functional Limits  Other (Comment): OK per nurse Praveena Em to proceed w/ PT evaluation  General Comment  Comments: heart rate 41 on admit to ER. Subjective  Subjective: pt reports that she was admitted due to a slow HR. Pt reporting increased fatigue. Pt went to  the cardiology office for cardiac clearance for a surgical procedure but was sent to the ER due to bradycardia. Pt reports that she is here to have her medications adjusted. Pt reports that she popped her left knee out of place last Sunday and couldn't walk.   Pain Screening  Patient Currently in Pain: Yes  Pain Assessment  Pain Assessment: 0-10  Pain Level: 8(0/10 at rest, 8/10 w/ standing and walking)  Patient's Stated Pain Goal: No pain  Pain Type: Chronic pain  Pain Location: Knee  Pain Orientation: Right;Left  Pain Frequency: Intermittent  Pain Onset: On-going  Clinical Progression: Rapidly worsening  Non-Pharmaceutical Pain Intervention(s): Ambulation/Increased Activity;Repositioned  Response to Pain Intervention: Patient Satisfied  Multiple Pain Sites: No  Vital Signs  Patient Currently in Pain: Yes       Orientation  Orientation  Overall Orientation Status: Within Functional Limits  Social/Functional History  Social/Functional History  Lives With: Alone  Type of Home: Apartment(2nd floor apartment at [facility])  Home Layout: One level  Home Access: Elevator, Level entry  Bathroom Shower/Tub: Tub/Shower unit, Curtain, Shower chair with back  [employer]: Standard(has toilet raiser on top w/ grab bars)  Bathroom Equipment: Shower chair, Grab bars in shower, Hand-held shower, Grab bars around toilet, Toilet raiser  Bathroom Accessibility: Walker accessible  Home Equipment: Quad cane, Rolling walker, Reacher, Alert Button(rollator)  Receives Help From: Home health([facility] 3 days/week for 2-3 hours/day)  ADL Assistance: Needs assistance(HHA provides assist for bathing and dressing (mainly lower body bathing and dressing))  Homemaking Assistance: Needs assistance(sister does the grocery shopping,  HHA does cleaning & laundry, MOW/ meals at [facility] when open)  Homemaking Responsibilities: No  Ambulation Assistance: Independent(uses rollator for limited distances due to buckling knees- will sit on her rollator or nearby chair)  Transfer Assistance: Independent  Active : No  Mode of Transportation: Cab(: [facility] has a bus for weekly errands including bank and grocery store, sister or dtr also drive)  Occupation: Retired  Additional Comments: Pt reports that she goes to the [employer] 5 days/week for 4-6 hours each day - gets meals there and participates in activities prior to the pandemic.   Pt reports that her sister and daughter both live within 10-15 minutes and can provide A as needed  Pt reports that home PT was to start but had to be cancelled due to her admission to the hospital.  Cognition        Objective     Observation/Palpation  Observation: peripheral IV left forearm    AROM RLE (degrees)  RLE AROM: WFL  AROM LLE (degrees)  LLE AROM : WFL  AROM RUE (degrees)  RUE General AROM: see OT for UE assessment  AROM LUE (degrees)  LUE General AROM: see OT for UE assessment  Strength RLE  Comment: grossly 4-/5  Strength LLE  Comment: grossly 4-/5  Strength RUE  Comment: see OT for UE assessment  Strength LUE  Comment: see OT for UE assessment     Sensation  Overall Sensation Status: WFL  Bed mobility  Rolling to Right: Modified independent(used rail, head of bed slightly elevated)  Supine to Sit: Minimal assistance(pt extened her left arm and asked for assist to be pulled up into position)  Scooting: Supervision  Comment: dangled at the EOB w/ supervision  Transfers  Sit to Stand: Contact guard assistance  Stand to sit: Contact guard assistance  Stand Pivot Transfers: Contact guard assistance(used rollator)  Ambulation  Ambulation?: Yes  Ambulation 1  Surface: level tile  Device: Rollator  Assistance: Contact guard assistance  Gait Deviations: Increased KENNETH  Distance: 3' forward and then turning steps towards the chair  Comments: deferred further distance as her Left knee had \"popped earlier in bed\"  and pt had C/O increased pain and hx of knee buckling when this chronically occurrs.   Stairs/Curb  Stairs?: No     Balance  Sitting - Static: Good  Sitting - Dynamic: Good  Standing - Static: Good;-(used rollator)  Standing - Dynamic: Fair;+(used rollator)        Plan   Plan  Times per week: 5-7 treatments/ week  Times per day: (5-7 treatments/ week)  Specific instructions for Next Treatment: progress gait distance w/ rollator- watch for knee buckling, instruct in HEP  Current Treatment Recommendations: Strengthening, Transfer Training, Endurance Training, Patient/Caregiver Education & Training, Balance Training, Gait Training, Safety Education & Training, Functional Mobility Training  Safety Devices  Type of devices:  All fall risk precautions in place, Gait belt, Patient at risk for falls, Call light within reach, Left in chair, Nurse notified(nurse Morris)    G-Code       OutComes Score                                                  AM-PAC Score     AM-PAC Inpatient Mobility without Stair Climbing Raw Score : 16 (07/25/20 0950)  AM-PAC Inpatient without Stair Climbing T-Scale Score : 45.54 (07/25/20 0950)  Mobility Inpatient CMS 0-100% Score: 40.64 (07/25/20 0950)  Mobility Inpatient without Stair CMS G-Code Modifier : CK (07/25/20 0950)       Goals  Short term goals  Time Frame for Short term goals: 5-7 treatments/ week  Short term goal 1: pt to tolerate 1/2 hour of therapuetic exercise and activity  Short term goal 2: pt to demonstrate good technique for LE strengthening and balance activities  Short term goal 3: pt to demonstrate independent bed mobility for rolling and supine <> sit for position change  Short term goal 4: pt to demonstrate MOD I for transfers sit <> stand and bed <> chair using rollator  Short term goal 5: pt to demonstrate MOD I for gait 50'- 80'  using rollator  Short term goal 6: pt to demonstrate good balance for ambulation  using rollator  Patient Goals   Patient goals : return home to her apartment       Therapy Time   Individual Concurrent Group Co-treatment   Time In 0950         Time Out 1028         Minutes 38         Timed Code Treatment Minutes: [address], PT

## 2020-07-25 NOTE — PLAN OF CARE
Problem: Falls - Risk of:  Goal: Will remain free from falls  Description: Will remain free from falls  Outcome: Ongoing  Note: Patient alert and oriented. Up  with walker and one assist to bathroom. Using call light appropriately. Goal: Absence of physical injury  Description: Absence of physical injury  Outcome: Ongoing     Problem: Skin Integrity:  Goal: Will show no infection signs and symptoms  Description: Will show no infection signs and symptoms  Outcome: Ongoing  Note: Skin intact. Able to turn and reposition self in bed. Goal: Absence of new skin breakdown  Description: Absence of new skin breakdown  Outcome: Ongoing     Problem: Cardiac Output - Decreased:  Goal: Hemodynamic stability will improve  Description: Hemodynamic stability will improve  Outcome: Ongoing  Note: Patient Sinus Taba Mercedez wit heart rate in 40s and 50s. Cardiology consulted and meds adjusted. BP stable . Patient asymptomatic with bradycardia.

## 2020-07-25 NOTE — PROGRESS NOTES
[facility] [email or web address]    PROGRESS NOTE             7/25/2020    2:08 PM    Name:   Rafa Angel  MRN:     [id number]     [place]:      [de-identified]   Room:   [address] [place] [address] Day:  1  Admit Date:  7/24/2020  4:24 PM    PCP:  Christian Luevano MD  Code Status:  Full Code    Subjective:     C/C:   Chief Complaint   Patient presents with    Bradycardia     sent by cardiologist     Interval History Status:     Patient was seen and examined at bedside. No acute events overnight. Heart rate mostly in the 50s, with one episode in the 40s overnight. Patient is afebrile, and is alert and oriented x4. Reports mild fatigue and headache. Denies shortness of breath, chest pain, palpitations, headache, swelling in extremities. Brief History:     Please review H&P    Review of Systems:     Review of Systems   Constitutional: Positive for fatigue. Negative for activity change, appetite change, chills and fever. HENT: Negative for congestion. Respiratory: Negative for cough, choking, chest tightness, shortness of breath and wheezing. Cardiovascular: Negative for chest pain, palpitations and leg swelling. Gastrointestinal: Negative for abdominal distention, abdominal pain, diarrhea, nausea and vomiting. Skin: Negative for rash. Neurological: Positive for headaches. Negative for dizziness and light-headedness. Psychiatric/Behavioral: Negative for behavioral problems. Medications: Allergies:     Allergies   Allergen Reactions    Sulfa Antibiotics Nausea Only    Penicillins Rash       Current Meds:   Scheduled Meds:    sodium chloride flush  10 mL Intravenous 2 times per day    vitamin B-12  1,000 mcg Oral Daily    levothyroxine  75 mcg Oral QAM AC    Vitamin D  2,000 Units Oral Daily    atorvastatin  40 mg Oral Daily    amiodarone  200 mg Oral Daily    sodium chloride flush  10 mL Intravenous 2 times per day    famotidine  20 mg Oral BID    rivaroxaban  20 mg Oral Daily with breakfast    insulin lispro  0-6 Units Subcutaneous TID WC    insulin lispro  0-3 Units Subcutaneous Nightly     Continuous Infusions:    dextrose       PRN Meds: sodium chloride flush, acetaminophen, sodium chloride flush, acetaminophen **OR** acetaminophen, polyethylene glycol, promethazine **OR** ondansetron, potassium chloride **OR** potassium alternative oral replacement **OR** potassium chloride, magnesium sulfate, glucose, dextrose, glucagon (rDNA), dextrose    Data:     Past Medical History:   has a past medical history of Adenocarcinoma of endometrium, stage 1 ([facility] JOSHUA (acute kidney injury) ([facility] Allergic rhinitis, At high risk for falls, Atrial fibrillation ([facility] CHF (congestive heart failure) ([facility] Colon polyp, Diabetes mellitus ([facility] Diverticulitis, Endometrial cancer ([facility] History of TIA (transient ischemic attack), Hyperglycemia, Hyperlipidemia, Hypertension, Hypothyroidism, Legally blind, Lower back pain, Mixed incontinence, Morbid obesity with BMI of 40.0-44.9, adult ([facility] CLAROS (nonalcoholic steatohepatitis), Obesity, Oral phase dysphagia, Osteoarthritis (arthritis due to wear and tear of joints), Osteoarthritis involving multiple joints on both sides of body, Osteoporosis, Perforated bowel ([facility] Postmenopausal bleeding, S/P h-scope, Myosure 9/20/17, Tennis elbow, Thickened endometrium, TIA (transient ischemic attack), TLHBSO, bilateral LND 10/24/17, and Type 2 diabetes mellitus, without long-term current use of insulin ([facility] Social History:   reports that she has never smoked. She has never used smokeless tobacco. She reports that she does not drink alcohol or use drugs.      Family History:   Family History   Problem Relation Age of Onset    Coronary Art Dis Father     Hypertension Father     Diabetes Father     High Blood Pressure Father     Heart Attack Father     Diabetes Mother     High Blood Pressure Mother     Thyroid Disease Mother     High Blood Pressure Sister     Thyroid Disease Brother     High Blood Pressure Brother     High Blood Pressure Maternal Grandmother     Diabetes Maternal Grandfather     No Known Problems Paternal Grandmother     Heart Attack Paternal Grandfather     Colon Cancer Neg Hx        Vitals:  BP (!) 104/56   Pulse 50   Temp 97.7 °F (36.5 °C) (Oral)   Resp 16   Ht 5' 2\" (1.575 m)   Wt 243 lb 14.4 oz (110.6 kg)   LMP  (LMP Unknown)   SpO2 98%   BMI 44.61 kg/m²   Temp (24hrs), Av.7 °F (36.5 °C), Min:97.4 °F (36.3 °C), Max:98 °F (36.7 °C)    Recent Labs     20  1840 20  2115 20  0654 20  1141   POCGLU 90 101 86 181*       I/O(24Hr):     Intake/Output Summary (Last 24 hours) at 2020 1408  Last data filed at 2020 0854  Gross per 24 hour   Intake 620 ml   Output --   Net 620 ml       Labs:    CBC:   Lab Results   Component Value Date    WBC 8.8 2020    RBC 3.89 2020    RBC 4.23 2012    HGB 11.5 2020    HCT 36.1 2020    MCV 93.0 2020    MCH 29.6 2020    MCHC 31.9 2020    RDW 15.2 2020     2020     2012    MPV 9.3 2020     BMP:    Lab Results   Component Value Date     2020    K 3.3 2020     2020    CO2 26 2020    BUN 20 2020    LABALBU 4.3 2020    LABALBU 4.3 2012    CREATININE 0.73 2020    CALCIUM 8.8 2020    GFRAA >60 2020    LABGLOM >60 2020    GLUCOSE 90 2020    GLUCOSE 114 2012       Lab Results   Component Value Date/Time    SPECIAL NOT REPORTED 2020 02:32 PM     Lab Results   Component Value Date/Time    CULTURE NO SIGNIFICANT GROWTH 2020 02:32 PM         Radiology:    Xr Knee Left (3 Views)    Result Date: 2020  EXAMINATION: THREE XRAY VIEWS OF THE LEFT KNEE 2020 4:17 pm COMPARISON: 2019 HISTORY: ORDERING SYSTEM PROVIDED HISTORY: pain TECHNOLOGIST PROVIDED HISTORY: pain Reason for Exam: pain Acuity: Acute Type of Exam: Initial FINDINGS: Osteopenia is noted. The patient has severe tricompartmental arthritic changes with medial compartment narrowing. Arthritic changes are most severe in the patellofemoral aspect of the joint. Lateral and patellar marginal spurring are noted. No acute fracture, or dislocation is noted. Small suprapatellar effusion is noted. Atherosclerotic calcification of the popliteal and calf arteries is noted. No acute osseous abnormality. Severe tricompartmental arthritic changes described above with small suprapatellar effusion. Xr Chest Portable    Result Date: 7/24/2020  EXAMINATION: ONE XRAY VIEW OF THE CHEST 7/24/2020 5:37 pm COMPARISON: 07/11/2020. HISTORY: ORDERING SYSTEM PROVIDED HISTORY: bradycardia TECHNOLOGIST PROVIDED HISTORY: bradycardia Reason for Exam: bradycardia Acuity: Unknown Type of Exam: Unknown FINDINGS: The exam is mildly rotated. The cardiomediastinal silhouette is stable. Aortic vascular calcification. Mild elevation of the right hemidiaphragm. No acute infiltrate, pleural fluid or evidence of overt failure. No acute cardiopulmonary disease. Xr Chest Portable    Result Date: 7/11/2020  EXAMINATION: SINGLE-VIEW CHEST RADIOGRAPH DATE 07/11/2020 COMPARISON: None HISTORY: Shortness of breath. FINDINGS: No pneumothorax, pleural effusion or focal airspace consolidation. Normal heart size and mediastinal contours. No acute osseous abnormality. No acute findings. Ct Chest Pulmonary Embolism W Contrast    Result Date: 7/12/2020  EXAMINATION: CT CHEST PULMONARY EMBOLISM WITH CONTRAST TECHNIQUE: Dose modulation, iterative reconstruction, and/or weight-based adjustment of the mA/kV was utilized to reduce the radiation dose to as low as reasonably achievable. COMPARISON: None available HISTORY: Shortness of breath for 1 day.  FINDINGS: Angiogram: The pulmonary arterial system is adequately opacified by contrast for evaluation. The main pulmonary artery, left and right main pulmonary arteries and segmental branches demonstrate normal contrast filling without evidence of defect to suggest presence of pulmonary embolism. Caliber of the main pulmonary artery is unremarkable. The heart is mildly enlarged with otherwise unremarkable configuration. No evidence of pericardial effusion is seen. No evidence of mediastinal or hilar lymphadenopathy or mass lesion is identified. The lungs are well aerated without evidence of consolidation. The pleural surfaces are unremarkable without evidence of pleural thickening or pleural effusion identified. Visualized upper abdominal organs on chest CT examination are grossly unremarkable in appearance. The bones, skeletal muscle bundles, fascial planes and subcutaneous soft tissues are unremarkable in appearance. 1. No evidence of acute pulmonary embolism. 2. Mild cardiomegaly. Physical Examination:        Physical Exam  Constitutional:       General: She is not in acute distress. Appearance: She is obese. HENT:      Head: Normocephalic. Mouth/Throat:      Mouth: Mucous membranes are moist.   Cardiovascular:      Rate and Rhythm: Regular rhythm. Bradycardia present. Heart sounds: Normal heart sounds. No murmur. Pulmonary:      Effort: Pulmonary effort is normal.      Breath sounds: Normal breath sounds. No wheezing or rales. Abdominal:      General: Bowel sounds are normal. There is no distension. Palpations: Abdomen is soft. Tenderness: There is no abdominal tenderness. There is no guarding. Musculoskeletal:      Right lower leg: No edema. Left lower leg: No edema. Skin:     General: Skin is warm and dry. Capillary Refill: Capillary refill takes less than 2 seconds. Neurological:      Mental Status: She is alert and oriented to person, place, and time.          Assessment:        Primary Problem  Symptomatic

## 2020-07-25 NOTE — DISCHARGE INSTR - COC
Continuity of Care Form    Patient Name: Rafa Angel   :  1950  MRN:  [id number]    Admit date:  2020  Discharge date:  2020    Code Status Order: Full Code   Advance Directives:   [facility] Documentation     Date/Time Healthcare Directive Type of Healthcare Directive Copy in [facility] [address] Agent's Name Healthcare Agent's Phone Number    20  No, patient does not have an advance directive for healthcare treatment -- -- -- -- --          Admitting Physician:  Dago Arreola MD  PCP: Christian Luevano MD    Discharging Nurse: [facility] Unit/Room#: 2125/2125-01  Discharging Unit Phone Number: [phone]    Emergency Contact:   Extended Emergency Contact Information  Primary Emergency Contact: Donna Dennis  Address: [address] Phone: [phone]  Mobile Phone: [phone]  Relation: Brother/Sister  Hearing or visual needs: None  Other needs: None  Preferred language: English   needed? No  Secondary Emergency Contact: Valeriy Blas [address] Phone: [phone]  Relation: Child  Hearing or visual needs: None  Other needs: None  Preferred language: English   needed?  No    Past Surgical History:  Past Surgical History:   Procedure Laterality Date    CATARACT REMOVAL WITH IMPLANT Bilateral     COLONOSCOPY      had polyp, she doesn't know where and when, \"20 yrs ago\"    COLONOSCOPY  2017    DILATION AND CURETTAGE OF UTERUS N/A 2017    HYSTEROSCOPY  WITH MYOSURE performed by Kerry Moeller DO at [facility] N/A 10/24/2017    TOTAL LAPAROSCOPIC HYSTERECTOMY, BSO, F.S.  STAGING, GYRUS G400 performed by Brenda Oliva MD at [address] N/A 2017    COLONOSCOPY POLYPECTOMY / HOT SNARE performed by Basil Weaver DO at [address] [place] [address] CAITLIN AND BSO  10/24/2017    with pelvic lymph node dissection    THYROID SURGERY  1980    subtotal 20 years ago    TONSILLECTOMY      VITRECTOMY      FLOATERS       Immunization History:   Immunization History   Administered Date(s) Administered    Influenza Vaccine, unspecified formulation 10/01/2016    Influenza Virus Vaccine 10/01/2019    Influenza, Triv, inactivated, subunit, adjuvanted, IM (Fluad 65 yrs and older) 09/14/2017, 10/02/2018    Pneumococcal Conjugate 13-valent ([id number]) 02/23/2017    Pneumococcal Polysaccharide ([id number]) 04/20/2018    Tdap (Boostrix, Adacel) 09/28/2017       Active Problems:  Patient Active Problem List   Diagnosis Code    Acquired hypothyroidism E03.9    History of TIA (transient ischemic attack) Z86.73    Chronic bilateral low back pain without sciatica M54.5, G89.29    Essential hypertension I10    Osteopenia determined by x-ray M85.80    Osteoarthritis involving multiple joints on both sides of body M15.9    Left knee DJD M17.12    Prediabetes R73.03    Vitamin D deficiency E55.9    Mixed incontinence N39.46    Chronic pain of both knees M25.561, M25.562, G89.29    Slow transit constipation K59.01    Allergic rhinitis J30.9    Hyperlipidemia with target LDL less than 100 E78.5    Morbid obesity with BMI of 40.0-44.9, adult (HCC) E66.01, Z68.41    At high risk for falls Z91.81    Dense breast tissue on mammogram R92.2    Hyperglycemia R73.9    Spondylosis of lumbar region without myelopathy or radiculopathy M47.816    OAB (overactive bladder) N32.81    Primary osteoarthritis of both knees M17.0    Chronic fatigue  R53.82    Adenomatous polyp of sigmoid colon, 6/26/17 D12.5    Mixed stress and urge urinary incontinence N39.46    TLHBSO, bilateral LND 10/24/17 Z90.710, Z90.79, Z90.722    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    Cataracta b/l eyes H26.9    Difficulty walking R26.2    Esotropia H50.00    Nystagmus H55.00    History of uterine cancer, Well differentiated grade 1 adenocarcinoma arising in complex hyperplasia, 2017 Z85.42    Vitamin B 12 deficiency E53.8    Aortic calcification (HCC) I70.0    Calculus of gallbladder without cholecystitis without obstruction K80.20    CLAROS (nonalcoholic steatohepatitis) K75.81    Optic atrophy H47.20    Cough present for greater than 3 weeks R05    Dyspepsia R10.13    A-fib ([facility]) I48.91    Type 2 diabetes mellitus, without long-term current use of insulin (HCC) E11.9    JOSHUA (acute kidney injury) ([facility] N17.9    Atrial fibrillation, new onset ([facility]) I48.91    Mobility impaired Z74.09    Perforated diverticulum K57.80    Diverticulitis K57.92    Chronic cholecystitis K81.1    Colitis K52.9    Multiple atypical skin moles D22.9    Class 3 severe obesity due to excess calories without serious comorbidity with body mass index (BMI) of 40.0 to 44.9 in adult ([facility]) E66.01, Z68.41    Symptomatic bradycardia R00.1    CHF NYHA class I, chronic, diastolic ([facility]) S21.91       Isolation/Infection:   Isolation          No Isolation        Patient Infection Status     None to display          Nurse Assessment:  Last Vital Signs: BP (!) 104/56   Pulse 50   Temp 97.7 °F (36.5 °C) (Oral)   Resp 16   Ht 5' 2\" (1.575 m)   Wt 243 lb 14.4 oz (110.6 kg)   LMP  (LMP Unknown)   SpO2 98%   BMI 44.61 kg/m²     Last documented pain score (0-10 scale): Pain Level: 8(0/10 at rest, 8/10 w/ standing and walking)  Last Weight:   Wt Readings from Last 1 Encounters:   07/24/20 243 lb 14.4 oz (110.6 kg)     Mental Status:  oriented and alert    IV Access:  - None    Nursing Mobility/ADLs:  Walking   Assisted  Transfer  Assisted  Bathing  Assisted  Dressing  Assisted  Toileting  Independent  Feeding  Independent  Med Admin  Independent  Med Delivery   whole    Wound Care Documentation and Therapy:        Elimination:  Continence:   · Bowel:  Yes  · Bladder: Yes  Urinary Catheter: None   Colostomy/Ileostomy/Ileal Conduit: Potential (if transferring to Rehab): Good    Recommended Labs or Other Treatments After Discharge:     Physician Certification: I certify the above information and transfer of Shannon Brown  is necessary for the continuing treatment of the diagnosis listed and that she requires Home Care for less 30 days.      Update Admission H&P: No change in H&P    PHYSICIAN SIGNATURE:  Electronically signed by Edd Bullock MD on 7/27/20 at 1:35 PM EDT

## 2020-07-25 NOTE — PLAN OF CARE
Problem: Falls - Risk of:  Goal: Will remain free from falls  Description: Will remain free from falls  Outcome: Ongoing     Problem: Falls - Risk of:  Goal: Absence of physical injury  Description: Absence of physical injury  Outcome: Ongoing     Problem: Skin Integrity:  Goal: Will show no infection signs and symptoms  Description: Will show no infection signs and symptoms  Outcome: Ongoing     Problem: Skin Integrity:  Goal: Absence of new skin breakdown  Description: Absence of new skin breakdown  Outcome: Ongoing     Problem: Cardiac Output - Decreased:  Goal: Hemodynamic stability will improve  Description: Hemodynamic stability will improve  Outcome: Ongoing     Problem: Musculor/Skeletal Functional Status  Goal: Highest potential functional level  Outcome: Ongoing     Problem: Musculor/Skeletal Functional Status  Goal: Absence of falls  Outcome: Ongoing     Problem: Pain:  Goal: Pain level will decrease  Description: Pain level will decrease  Outcome: Ongoing     Problem: Pain:  Goal: Control of acute pain  Description: Control of acute pain  Outcome: Ongoing     Problem: Pain:  Goal: Control of chronic pain  Description: Control of chronic pain  Outcome: Ongoing

## 2020-07-25 NOTE — PROGRESS NOTES
Attestation and add on     Patient seen and examined with residents on rounds today . Diagnosis treatment plans reviewed  Resident final note is pending       ---- ;       I have discussed the care of Yasir Ayala , including pertinent history and exam findings,      7/25/20    with the resident. I have seen and examined the patient and the key elements of all parts of the encounter have been performed by me . I agree with the assessment, plan and orders as documented by the resident. Principal Problem:    Symptomatic bradycardia  Active Problems: Morbid obesity with BMI of 40.0-44.9, adult ([address]    Essential hypertension    A-fib (HCC)    Type 2 diabetes mellitus, without long-term current use of insulin (HCC)    CHF NYHA class I, chronic, diastolic (HCC)  Resolved Problems:    * No resolved hospital problems. *            Medications: Allergies: Allergies   Allergen Reactions    Sulfa Antibiotics Nausea Only    Penicillins Rash       Current Meds:   Scheduled Meds:    sodium chloride flush  10 mL Intravenous 2 times per day    vitamin B-12  1,000 mcg Oral Daily    levothyroxine  75 mcg Oral QAM AC    Vitamin D  2,000 Units Oral Daily    atorvastatin  40 mg Oral Daily    amiodarone  200 mg Oral Daily    sodium chloride flush  10 mL Intravenous 2 times per day    famotidine  20 mg Oral BID    rivaroxaban  20 mg Oral Daily with breakfast    insulin lispro  0-6 Units Subcutaneous TID WC    insulin lispro  0-3 Units Subcutaneous Nightly     Continuous Infusions:    dextrose       PRN Meds: sodium chloride flush, acetaminophen, sodium chloride flush, acetaminophen **OR** acetaminophen, polyethylene glycol, promethazine **OR** ondansetron, potassium chloride **OR** potassium alternative oral replacement **OR** potassium chloride, magnesium sulfate, glucose, dextrose, glucagon (rDNA), dextrose        [facility] [address], [address] [place] [address]. Phone [phone]   Fax: [phone]  Answering Service: [phone]

## 2020-07-26 LAB
ANION GAP SERPL CALCULATED.3IONS-SCNC: 11 MMOL/L (ref 9–17)
BUN BLDV-MCNC: 19 MG/DL (ref 8–23)
BUN/CREAT BLD: ABNORMAL (ref 9–20)
CALCIUM SERPL-MCNC: 8.7 MG/DL (ref 8.6–10.4)
CHLORIDE BLD-SCNC: 105 MMOL/L (ref 98–107)
CO2: 26 MMOL/L (ref 20–31)
CREAT SERPL-MCNC: 1.07 MG/DL (ref 0.5–0.9)
EKG ATRIAL RATE: 53 BPM
EKG P AXIS: 51 DEGREES
EKG P-R INTERVAL: 188 MS
EKG Q-T INTERVAL: 494 MS
EKG QRS DURATION: 90 MS
EKG QTC CALCULATION (BAZETT): 463 MS
EKG R AXIS: 69 DEGREES
EKG T AXIS: 70 DEGREES
EKG VENTRICULAR RATE: 53 BPM
GFR AFRICAN AMERICAN: >60 ML/MIN
GFR NON-AFRICAN AMERICAN: 51 ML/MIN
GFR SERPL CREATININE-BSD FRML MDRD: ABNORMAL ML/MIN/{1.73_M2}
GFR SERPL CREATININE-BSD FRML MDRD: ABNORMAL ML/MIN/{1.73_M2}
GLUCOSE BLD-MCNC: 107 MG/DL (ref 70–99)
GLUCOSE BLD-MCNC: 115 MG/DL (ref 65–105)
GLUCOSE BLD-MCNC: 94 MG/DL (ref 65–105)
GLUCOSE BLD-MCNC: 94 MG/DL (ref 65–105)
HCT VFR BLD CALC: 36 % (ref 36–46)
HEMOGLOBIN: 11.6 G/DL (ref 12–16)
MAGNESIUM: 1.8 MG/DL (ref 1.6–2.6)
MCH RBC QN AUTO: 29.9 PG (ref 26–34)
MCHC RBC AUTO-ENTMCNC: 32.3 G/DL (ref 31–37)
MCV RBC AUTO: 92.8 FL (ref 80–100)
NRBC AUTOMATED: ABNORMAL PER 100 WBC
PDW BLD-RTO: 15 % (ref 11.5–14.9)
PHOSPHORUS: 3.5 MG/DL (ref 2.6–4.5)
PLATELET # BLD: 202 K/UL (ref 150–450)
PMV BLD AUTO: 9.3 FL (ref 6–12)
POTASSIUM SERPL-SCNC: 3.8 MMOL/L (ref 3.7–5.3)
RBC # BLD: 3.88 M/UL (ref 4–5.2)
SODIUM BLD-SCNC: 142 MMOL/L (ref 135–144)
TSH SERPL DL<=0.05 MIU/L-ACNC: 3.21 MIU/L (ref 0.3–5)
WBC # BLD: 7.9 K/UL (ref 3.5–11)

## 2020-07-26 PROCEDURE — 6370000000 HC RX 637 (ALT 250 FOR IP): Performed by: STUDENT IN AN ORGANIZED HEALTH CARE EDUCATION/TRAINING PROGRAM

## 2020-07-26 PROCEDURE — 2580000003 HC RX 258: Performed by: INTERNAL MEDICINE

## 2020-07-26 PROCEDURE — 80048 BASIC METABOLIC PNL TOTAL CA: CPT

## 2020-07-26 PROCEDURE — 83735 ASSAY OF MAGNESIUM: CPT

## 2020-07-26 PROCEDURE — 36415 COLL VENOUS BLD VENIPUNCTURE: CPT

## 2020-07-26 PROCEDURE — 2580000003 HC RX 258: Performed by: STUDENT IN AN ORGANIZED HEALTH CARE EDUCATION/TRAINING PROGRAM

## 2020-07-26 PROCEDURE — 84100 ASSAY OF PHOSPHORUS: CPT

## 2020-07-26 PROCEDURE — 6360000002 HC RX W HCPCS: Performed by: STUDENT IN AN ORGANIZED HEALTH CARE EDUCATION/TRAINING PROGRAM

## 2020-07-26 PROCEDURE — 2060000000 HC ICU INTERMEDIATE R&B

## 2020-07-26 PROCEDURE — 82947 ASSAY GLUCOSE BLOOD QUANT: CPT

## 2020-07-26 PROCEDURE — 6370000000 HC RX 637 (ALT 250 FOR IP): Performed by: INTERNAL MEDICINE

## 2020-07-26 PROCEDURE — 99233 SBSQ HOSP IP/OBS HIGH 50: CPT | Performed by: INTERNAL MEDICINE

## 2020-07-26 PROCEDURE — 84443 ASSAY THYROID STIM HORMONE: CPT

## 2020-07-26 PROCEDURE — 85027 COMPLETE CBC AUTOMATED: CPT

## 2020-07-26 PROCEDURE — 93010 ELECTROCARDIOGRAM REPORT: CPT | Performed by: INTERNAL MEDICINE

## 2020-07-26 RX ADMIN — MAGNESIUM SULFATE HEPTAHYDRATE 2 G: 500 INJECTION, SOLUTION INTRAMUSCULAR; INTRAVENOUS at 12:12

## 2020-07-26 RX ADMIN — RIVAROXABAN 20 MG: 20 TABLET, FILM COATED ORAL at 10:44

## 2020-07-26 RX ADMIN — Medication 10 ML: at 21:15

## 2020-07-26 RX ADMIN — CYANOCOBALAMIN TAB 1000 MCG 1000 MCG: 1000 TAB at 10:43

## 2020-07-26 RX ADMIN — MELATONIN 2000 UNITS: at 10:42

## 2020-07-26 RX ADMIN — AMIODARONE HYDROCHLORIDE 200 MG: 200 TABLET ORAL at 10:49

## 2020-07-26 RX ADMIN — ACETAMINOPHEN 650 MG: 325 TABLET, FILM COATED ORAL at 21:15

## 2020-07-26 RX ADMIN — FAMOTIDINE 20 MG: 20 TABLET ORAL at 10:43

## 2020-07-26 RX ADMIN — FAMOTIDINE 20 MG: 20 TABLET ORAL at 21:15

## 2020-07-26 RX ADMIN — ACETAMINOPHEN 650 MG: 325 TABLET, FILM COATED ORAL at 10:43

## 2020-07-26 RX ADMIN — LEVOTHYROXINE SODIUM 75 MCG: 75 TABLET ORAL at 07:55

## 2020-07-26 RX ADMIN — Medication 10 ML: at 10:54

## 2020-07-26 RX ADMIN — Medication 10 ML: at 08:04

## 2020-07-26 RX ADMIN — ATORVASTATIN CALCIUM 40 MG: 40 TABLET, FILM COATED ORAL at 10:42

## 2020-07-26 ASSESSMENT — PAIN DESCRIPTION - LOCATION: LOCATION: HEAD

## 2020-07-26 ASSESSMENT — ENCOUNTER SYMPTOMS
WHEEZING: 0
NAUSEA: 0
SHORTNESS OF BREATH: 0
CHOKING: 0
COUGH: 0
VOMITING: 0
ABDOMINAL PAIN: 0
DIARRHEA: 0
CHEST TIGHTNESS: 0
ABDOMINAL DISTENTION: 0

## 2020-07-26 ASSESSMENT — PAIN SCALES - GENERAL
PAINLEVEL_OUTOF10: 2
PAINLEVEL_OUTOF10: 0
PAINLEVEL_OUTOF10: 2
PAINLEVEL_OUTOF10: 0

## 2020-07-26 ASSESSMENT — PAIN DESCRIPTION - PAIN TYPE: TYPE: ACUTE PAIN

## 2020-07-26 NOTE — PROGRESS NOTES
[address] [employer]    PROGRESS NOTE             7/26/2020    8:26 AM    Name:   Rafa Angel  MRN:     [id number]     Nasiride:      [de-identified]   Room:   [address] [place] [address] Day:  2  Admit Date:  7/24/2020  4:24 PM    PCP:  Christian Luevano MD  Code Status:  Full Code    Subjective:     C/C:   Chief Complaint   Patient presents with    Bradycardia     sent by cardiologist     Interval History Status:     Patient was seen and examined at bedside. No acute events overnight. Her heart rate was in the 50s-60s, improving compared to yesterday's. Patient was afebrile, and alert and oriented x4. She reports a mild headache, otherwise was laying comfortably in bed watching TV. Denies lightheadedness, dizziness, vision changes, shortness of breath, chest pain, palpitations. Brief History:     Please review H&P    Review of Systems:     Review of Systems   Constitutional: Positive for fatigue. Negative for activity change, appetite change, chills and fever. HENT: Negative for congestion. Respiratory: Negative for cough, choking, chest tightness, shortness of breath and wheezing. Cardiovascular: Negative for chest pain, palpitations and leg swelling. Gastrointestinal: Negative for abdominal distention, abdominal pain, diarrhea, nausea and vomiting. Skin: Negative for rash. Neurological: Positive for headaches. Negative for dizziness and light-headedness. Psychiatric/Behavioral: Negative for behavioral problems. Medications: Allergies:     Allergies   Allergen Reactions    Sulfa Antibiotics Nausea Only    Penicillins Rash       Current Meds:   Scheduled Meds:    sodium chloride flush  10 mL Intravenous 2 times per day    vitamin B-12  1,000 mcg Oral Daily    levothyroxine  75 mcg Oral QAM AC    Vitamin D  2,000 Units Oral Daily    atorvastatin  40 mg Oral Daily    amiodarone  200 mg Oral Daily    sodium chloride flush  10 mL Intravenous 2 times per day    famotidine  20 mg Oral BID    rivaroxaban  20 mg Oral Daily with breakfast    insulin lispro  0-6 Units Subcutaneous TID WC    insulin lispro  0-3 Units Subcutaneous Nightly     Continuous Infusions:    dextrose       PRN Meds: sodium chloride flush, acetaminophen, sodium chloride flush, acetaminophen **OR** acetaminophen, polyethylene glycol, promethazine **OR** ondansetron, potassium chloride **OR** potassium alternative oral replacement **OR** potassium chloride, magnesium sulfate, glucose, dextrose, glucagon (rDNA), dextrose    Data:     Past Medical History:   has a past medical history of Adenocarcinoma of endometrium, stage 1 ([facility] JOSHUA (acute kidney injury) ([facility] Allergic rhinitis, At high risk for falls, Atrial fibrillation ([facility] CHF (congestive heart failure) ([facility] Colon polyp, Diabetes mellitus ([facility] Diverticulitis, Endometrial cancer ([facility] History of TIA (transient ischemic attack), Hyperglycemia, Hyperlipidemia, Hypertension, Hypothyroidism, Legally blind, Lower back pain, Mixed incontinence, Morbid obesity with BMI of 40.0-44.9, adult ([facility] CLAROS (nonalcoholic steatohepatitis), Obesity, Oral phase dysphagia, Osteoarthritis (arthritis due to wear and tear of joints), Osteoarthritis involving multiple joints on both sides of body, Osteoporosis, Perforated bowel ([facility] Postmenopausal bleeding, S/P h-scope, Myosure 9/20/17, Tennis elbow, Thickened endometrium, TIA (transient ischemic attack), TLHBSO, bilateral LND 10/24/17, and Type 2 diabetes mellitus, without long-term current use of insulin ([facility] Social History:   reports that she has never smoked. She has never used smokeless tobacco. She reports that she does not drink alcohol or use drugs.      Family History:   Family History   Problem Relation Age of Onset    Coronary Art Dis Father     Hypertension Father     Diabetes Father     High Blood Pressure Father     Heart Attack Father     Diabetes Angiogram: The pulmonary arterial system is adequately opacified by contrast for evaluation. The main pulmonary artery, left and right main pulmonary arteries and segmental branches demonstrate normal contrast filling without evidence of defect to suggest presence of pulmonary embolism. Caliber of the main pulmonary artery is unremarkable. The heart is mildly enlarged with otherwise unremarkable configuration. No evidence of pericardial effusion is seen. No evidence of mediastinal or hilar lymphadenopathy or mass lesion is identified. The lungs are well aerated without evidence of consolidation. The pleural surfaces are unremarkable without evidence of pleural thickening or pleural effusion identified. Visualized upper abdominal organs on chest CT examination are grossly unremarkable in appearance. The bones, skeletal muscle bundles, fascial planes and subcutaneous soft tissues are unremarkable in appearance. 1. No evidence of acute pulmonary embolism. 2. Mild cardiomegaly. Physical Examination:        Physical Exam  Constitutional:       General: She is not in acute distress. Appearance: She is obese. HENT:      Head: Normocephalic. Mouth/Throat:      Mouth: Mucous membranes are moist.   Cardiovascular:      Rate and Rhythm: Regular rhythm. Bradycardia present. Heart sounds: Normal heart sounds. No murmur. Pulmonary:      Effort: Pulmonary effort is normal.      Breath sounds: Normal breath sounds. No wheezing or rales. Abdominal:      General: Bowel sounds are normal. There is no distension. Palpations: Abdomen is soft. Tenderness: There is no abdominal tenderness. There is no guarding. Musculoskeletal:      Right lower leg: No edema. Left lower leg: No edema. Skin:     General: Skin is warm and dry. Capillary Refill: Capillary refill takes less than 2 seconds. Neurological:      Mental Status: She is alert and oriented to person, place, and time. Assessment:        Primary Problem  Symptomatic bradycardia    Active Hospital Problems    Diagnosis Date Noted    Morbid obesity with BMI of 40.0-44.9, adult ([facility] [E66.01, Z68.41] 02/23/2017     Priority: High    Symptomatic bradycardia [R00.1] 07/24/2020    CHF NYHA class I, chronic, diastolic (HCC) [G48.73] 69/70/8042    A-fib ([facility] [I48.91] 01/14/2020    Type 2 diabetes mellitus, without long-term current use of insulin ([facility] [E11.9] 01/14/2020    Essential hypertension [I10]        Plan:        Sinus Bradycardia  - Upon admission Heart rate: 47  - Magnesium 1.7  - Troponin 12, trend troponin  - Cardiac telemetry to monitor rhythm and rate  - Cardiology onboard: Continue amiodarone and xarelto. Remain off metoprolol and cardizem. Lexiscan stress test scheduled for tomorrow for pre-operative evaluation.     Atrial fibrillation  - Continue Amiodarone 200mg daily, held cardizem, metoprolol  - Cardiac telemetry to monitor rhythm and rate  - Cardiology onboard: Continue amiodarone and xarelto. Remain off metoprolol and cardizem    CHF, diastolic dysfunction  - ECHO (1/14/20): LVEF>55%. Grade 1 left ventricular diastolic dysfunction, Moderate left vetricular hypertrophy, Moderate atrial dilatation, Moderate mitral regurgitation.  - BNP: 617  - Consulted cardiology     Type 2 Diabetes Mellitus, without the use of long-term insulin  - Upon admission blood glucose: 79, HgbA1c: 5.3 (7/22)  - Hypoglycemia protocol  - POCT glucose  - Low dose insulin correction scale    GI PPX: Pepcid 20 mg BID PO   DVT PPX: Xarelto 20 mg PO    Ely Conner MD  7/26/2020  8:26 AM     Attestation and add on       I have discussed the care of Patty Arredondo , including pertinent history and exam findings,      7/26/20    with the resident. I have seen and examined the patient and the key elements of all parts of the encounter have been performed by me .    I agree with the assessment, plan and orders as documented by the

## 2020-07-26 NOTE — CONSULTS
[facility]  In PatientCardiology Consult             Date:   7/26/2020  Patient name: Kaleigh Raymond  Date of admission:  7/24/2020  4:24 PM  MRN:   [id number]  YOB: 1950      Reason for Admission:  Symptomatic bradycardia    CHIEF COMPLAINT:  Fatigue    History Obtained From:  Patient and medical record    HISTORY OF PRESENT ILLNESS:      The patient is a 78 y.o woman with h/o HTN, DM, HLP and PAF, sent to the ER from the [facility] office on 7/24/20 for symptomatic sinus and junctional bradycardia. She has a h/o PAF and RVR since Jan 2020, requiring additional rate control during acute illness last month during hospitalization for a perforated diverticulum, and was discharged on amiodarone, diltiazem and metoprolol. She had noted the onset of fatigue over the past two weeks, with no SOB, CP, lightheadedness or syncope. Her EKG showed sinus bradycardia at 41 bpm, with no acute changes. After holding metoprolol and diltiazem over the past 36 hr, her HR is now 55-60 bpm.   Labs show normal TSH of 3.28. She remains on Xarelto with no bleeding issues, and is anticipating bowel surgery and cholecystectomy soon.       Past Medical History:   has a past medical history of Adenocarcinoma of endometrium, stage 1 ([address] JOSHUA (acute kidney injury) ([address] Allergic rhinitis, At high risk for falls, Atrial fibrillation ([address] CHF (congestive heart failure) ([address] Colon polyp, Diabetes mellitus ([address] Diverticulitis, Endometrial cancer ([address] History of TIA (transient ischemic attack), Hyperglycemia, Hyperlipidemia, Hypertension, Hypothyroidism, Legally blind, Lower back pain, Mixed incontinence, Morbid obesity with BMI of 40.0-44.9, adult ([address] CLAROS (nonalcoholic steatohepatitis), Obesity, Oral phase dysphagia, Osteoarthritis (arthritis due to wear and tear of joints), Osteoarthritis involving multiple joints on both sides of body, Osteoporosis, Perforated bowel ([address] Postmenopausal bleeding, S/P h-scope, Myosure 9/20/17, Tennis elbow, Thickened endometrium, TIA (transient ischemic attack), TLHBSO, bilateral LND 10/24/17, and Type 2 diabetes mellitus, without long-term current use of insulin ([facility] Past Surgical History:   has a past surgical history that includes Thyroid surgery (1980); Colonoscopy (1997); Tonsillectomy; Colonoscopy (06/26/2017); pr colsc flx w/removal lesion by hot bx forceps (N/A, 6/26/2017); Dilation and curettage of uterus (N/A, 9/20/2017); Cataract removal with implant (Bilateral, 2015); vitrectomy; julieta and bso (cervix removed) (10/24/2017); and Hysterectomy (N/A, 10/24/2017). Home Medications:    Prior to Admission medications    Medication Sig Start Date End Date Taking?  Authorizing Provider   oxybutynin (DITROPAN-XL) 10 MG extended release tablet Take 1 tablet by mouth daily 7/20/20  Yes Jacques Cates MD   metoprolol tartrate (LOPRESSOR) 25 MG tablet Take 0.5 tablets by mouth 2 times daily 6/22/20  Yes Kathia Sarmiento MD   dilTIAZem (CARDIZEM CD) 360 MG extended release capsule Take 1 capsule by mouth daily 6/23/20  Yes Kathia Sarmiento MD   amiodarone (CORDARONE) 200 MG tablet Take 1 tablet by mouth daily 6/22/20  Yes Kathia Sarmiento MD   rivaroxaban (XARELTO) 20 MG TABS tablet Take 1 tablet by mouth daily (with breakfast) 6/22/20  Yes Kathia Sarmiento MD   atorvastatin (LIPITOR) 40 MG tablet TAKE 1 TABLET BY MOUTH DAILY STOP SIMVASTATIN 3/16/20  Yes Arnoldo Jim MD   levothyroxine (SYNTHROID) 75 MCG tablet TAKE 1 TABLET BY MOUTH EVERY MORNING (BEFORE BREAKFAST) 1/21/20  Yes Arnoldo Jim MD   HM LORATADINE 10 MG tablet TAKE 1 TABLET BY MOUTH DAILY 1/21/20  Yes Arnoldo Jim MD   docusate sodium (COLACE) 100 MG capsule TAKE 1 CAPSULE BY MOUTH 2 TIMES DAILY 12/16/19  Yes Arnoldo Jim MD   MYRBETRIQ 50 MG TB24 TAKE ONE TABLET BY MOUTH ONCE DAILY 12/16/19  Yes Arnoldo Jim MD   metFORMIN (GLUCOPHAGE) 500 MG tablet TAKE ONE TABLET BY MOUTH TWICE A DAY WITH A MEAL 11/11/19  Yes Marian Fry MD   Cranberry, Vacc oxycoccus, (SM CRANBERRY) 500 MG TABS Take 500 mg by mouth daily  7/31/17  Yes Historical Provider, MD   Cholecalciferol (VITAMIN D) 2000 units TABS tablet Take 1 tablet by mouth daily 3/9/19  Yes Marian Fry MD   vitamin B-12 (CYANOCOBALAMIN) 1000 MCG tablet Take 1 tablet by mouth daily 3/9/19  Yes Marian Fry MD   acetaminophen (APAP EXTRA STRENGTH) 500 MG tablet Take 1 tablet by mouth every 6 hours as needed for Pain or Fever 2/12/19  Yes Marian Fry MD   Lactobacillus (PROBIOTIC ACIDOPHILUS) TABS Take 1 tablet by mouth daily 7/31/17  Yes Marian Fry MD   Blood Pressure Monitor KIT Use as directed. 7/22/20   MAX Barker CNP   glucose monitoring kit (FREESTYLE) monitoring kit Use as directed. BRAND OF CHOICE INSURANCE ALLOWS. 7/22/20   MAX Barker CNP   blood glucose monitor strips Test 2-3 times a day & as needed for symptoms of irregular blood glucose. BRAND OF CHO[employer] INSURANCE ALLOWS. 7/22/20   MAX Barker CNP   Alcohol Swabs (ALCOHOL PREP) PADS Use as directed 7/22/20   MAX Barker CNP   Lancets MISC 1 each by Does not apply route 2 times daily 7/22/20   MAX Barker CNP   UNABLE TO FIND Needs right walker brake fixed 10/3/19   Marian Fry MD       Allergies:  Sulfa antibiotics and Penicillins    Social History:   reports that she has never smoked. She has never used smokeless tobacco. She reports that she does not drink alcohol or use drugs. Family History:   Positive for early CAD    REVIEW OF SYSTEMS:    · Constitutional: there has been no unanticipated weight loss. There's been No change in energy level, No change in activity level. · Eyes: No visual changes or diplopia. No scleral icterus. · ENT: No Headaches, hearing loss or vertigo.  No mouth sores or sore throat. · Cardiovascular: No problem  · Respiratory: No previous reported problems  · Gastrointestinal: No abdominal pain, appetite loss, blood in stools. No change in bowel or bladder habits. · Genitourinary: No dysuria, trouble voiding, or hematuria. · Musculoskeletal:  No gait disturbance, No weakness or joint complaints. · Integumentary: No rash or pruritis. · Neurological: No headache, diplopia, change in muscle strength, numbness or tingling. No change in gait, balance, coordination, mood, affect, memory, mentation, behavior. · Psychiatric: No anxiety, or depression. · Endocrine: No temperature intolerance. No excessive thirst, fluid intake, or urination. No tremor. · Hematologic/Lymphatic: No abnormal bruising or bleeding, blood clots or swollen lymph nodes. · Allergic/Immunologic: No nasal congestion or hives. PHYSICAL EXAM:    Physical Examination:    BP (!) 150/79   Pulse 59   Temp 98.2 °F (36.8 °C) (Oral)   Resp 18   Ht 5' 2\" (1.575 m)   Wt 243 lb 15.3 oz (110.7 kg)   LMP  (LMP Unknown)   SpO2 97%   BMI 44.62 kg/m²    Constitutional and General Appearance: alert, cooperative, no distress and appears stated age  HEENT: PERRL, no cervical lymphadenopathy. No masses palpable. Normal oral mucosa  Respiratory:  · Normal excursion and expansion without use of accessory muscles  · Resp Auscultation: Good respiratory effort. No for increased work of breathing.  On auscultation: clear to auscultation bilaterally  Cardiovascular:  · The apical impulse is not displaced  · Heart tones are crisp and normal. regular S1 and S2. Murmurs:  None  · Jugular venous pulsation Normal  · The carotid upstroke is normal in amplitude and contour without delay or bruit  · Peripheral pulses are symmetrical and full   Abdomen:  · No masses or tenderness  · Bowel sounds present  Extremities:  ·  No Cyanosis or Clubbing  ·  Lower extremity edema: None  ·  Skin: Warm and dry  Neurological:  · Alert and Osteoarthritis involving multiple joints on both sides of body    Left knee DJD    Prediabetes    Vitamin D deficiency    Mixed incontinence    Chronic pain of both knees    Slow transit constipation    Allergic rhinitis    Hyperlipidemia with target LDL less than 100    Morbid obesity with BMI of 40.0-44.9, adult ([facility])    At high risk for falls    Dense breast tissue on mammogram    Hyperglycemia    Spondylosis of lumbar region without myelopathy or radiculopathy    OAB (overactive bladder)    Primary osteoarthritis of both knees    Chronic fatigue     Adenomatous polyp of sigmoid colon, 6/26/17    Mixed stress and urge urinary incontinence    TLHBSO, bilateral LND 10/24/17    Optic atrophy of both eyes    Cataracta b/l eyes    Difficulty walking    Esotropia    Nystagmus    History of uterine cancer, Well differentiated grade 1 adenocarcinoma arising in complex hyperplasia, 2017    Vitamin B 12 deficiency    Aortic calcification (HCC)    Calculus of gallbladder without cholecystitis without obstruction    CLAROS (nonalcoholic steatohepatitis)    Optic atrophy    Cough present for greater than 3 weeks    Dyspepsia    A-fib ([facility])    Type 2 diabetes mellitus, without long-term current use of insulin ([facility])    JOSHUA (acute kidney injury) ([address]    Atrial fibrillation, new onset ([address]    Mobility impaired    Perforated diverticulum    Diverticulitis    Chronic cholecystitis    Colitis    Multiple atypical skin moles    Class 3 severe obesity due to excess calories without serious comorbidity with body mass index (BMI) of 40.0 to 44.9 in adult ([facility])    Symptomatic bradycardia    CHF NYHA class I, chronic, diastolic ([facility])       RECOMMENDATIONS:  1. Continue amiodarone 200 mg po daily and pt should remain off metoprolol and diltiazem  2. No indication for permanent pacing at the present time  3. Continue Xarelto 20 mg daily  4.  Lexiscan stress test tomorrow for pre-operative evaluation    Discussed with patient and nursing.     Electronically signed by Shiela Jain MD on 7/26/2020 at 8:07 AM.  [facility] Consult[facility]

## 2020-07-26 NOTE — PLAN OF CARE
Problem: Falls - Risk of:  Goal: Will remain free from falls  Description: Will remain free from falls  Outcome: Met This Shift  Note: Patient calls out for assistance. Bed locked and in lowest position. Call light within reach.  Guardrails up x2     Problem: Falls - Risk of:  Goal: Absence of physical injury  Description: Absence of physical injury  Outcome: Met This Shift     Problem: Skin Integrity:  Goal: Will show no infection signs and symptoms  Description: Will show no infection signs and symptoms  Outcome: Ongoing     Problem: Skin Integrity:  Goal: Absence of new skin breakdown  Description: Absence of new skin breakdown  Outcome: Ongoing     Problem: Cardiac Output - Decreased:  Goal: Hemodynamic stability will improve  Description: Hemodynamic stability will improve  Outcome: Ongoing     Problem: Musculor/Skeletal Functional Status  Goal: Highest potential functional level  Outcome: Ongoing     Problem: Musculor/Skeletal Functional Status  Goal: Absence of falls  Outcome: Ongoing     Problem: Pain:  Goal: Pain level will decrease  Description: Pain level will decrease  Outcome: Ongoing     Problem: Pain:  Goal: Control of acute pain  Description: Control of acute pain  Outcome: Ongoing     Problem: Pain:  Goal: Control of acute pain  Description: Control of acute pain  Outcome: Ongoing     Problem: Pain:  Goal: Control of chronic pain  Description: Control of chronic pain  Outcome: Ongoing

## 2020-07-27 ENCOUNTER — APPOINTMENT (OUTPATIENT)
Dept: NUCLEAR MEDICINE | Age: 70
DRG: 309 | End: 2020-07-27
Payer: MEDICARE

## 2020-07-27 VITALS
HEART RATE: 58 BPM | RESPIRATION RATE: 18 BRPM | SYSTOLIC BLOOD PRESSURE: 140 MMHG | BODY MASS INDEX: 44.89 KG/M2 | HEIGHT: 62 IN | TEMPERATURE: 97.7 F | DIASTOLIC BLOOD PRESSURE: 73 MMHG | WEIGHT: 243.96 LBS | OXYGEN SATURATION: 100 %

## 2020-07-27 LAB
ANION GAP SERPL CALCULATED.3IONS-SCNC: 9 MMOL/L (ref 9–17)
BUN BLDV-MCNC: 20 MG/DL (ref 8–23)
BUN/CREAT BLD: NORMAL (ref 9–20)
CALCIUM SERPL-MCNC: 8.9 MG/DL (ref 8.6–10.4)
CHLORIDE BLD-SCNC: 105 MMOL/L (ref 98–107)
CO2: 26 MMOL/L (ref 20–31)
CREAT SERPL-MCNC: 0.88 MG/DL (ref 0.5–0.9)
GFR AFRICAN AMERICAN: >60 ML/MIN
GFR NON-AFRICAN AMERICAN: >60 ML/MIN
GFR SERPL CREATININE-BSD FRML MDRD: NORMAL ML/MIN/{1.73_M2}
GFR SERPL CREATININE-BSD FRML MDRD: NORMAL ML/MIN/{1.73_M2}
GLUCOSE BLD-MCNC: 108 MG/DL (ref 65–105)
GLUCOSE BLD-MCNC: 86 MG/DL (ref 65–105)
GLUCOSE BLD-MCNC: 95 MG/DL (ref 65–105)
GLUCOSE BLD-MCNC: 98 MG/DL (ref 70–99)
HCT VFR BLD CALC: 36.3 % (ref 36–46)
HEMOGLOBIN: 11.9 G/DL (ref 12–16)
LV EF: 55 %
LV EF: 64 %
LVEF MODALITY: NORMAL
LVEF MODALITY: NORMAL
MCH RBC QN AUTO: 30.3 PG (ref 26–34)
MCHC RBC AUTO-ENTMCNC: 32.8 G/DL (ref 31–37)
MCV RBC AUTO: 92.6 FL (ref 80–100)
NRBC AUTOMATED: ABNORMAL PER 100 WBC
PDW BLD-RTO: 15.2 % (ref 11.5–14.9)
PLATELET # BLD: 215 K/UL (ref 150–450)
PMV BLD AUTO: 8.9 FL (ref 6–12)
POTASSIUM SERPL-SCNC: 4.2 MMOL/L (ref 3.7–5.3)
RBC # BLD: 3.93 M/UL (ref 4–5.2)
SODIUM BLD-SCNC: 140 MMOL/L (ref 135–144)
WBC # BLD: 6.8 K/UL (ref 3.5–11)

## 2020-07-27 PROCEDURE — 2580000003 HC RX 258: Performed by: STUDENT IN AN ORGANIZED HEALTH CARE EDUCATION/TRAINING PROGRAM

## 2020-07-27 PROCEDURE — 97166 OT EVAL MOD COMPLEX 45 MIN: CPT

## 2020-07-27 PROCEDURE — 99239 HOSP IP/OBS DSCHRG MGMT >30: CPT | Performed by: INTERNAL MEDICINE

## 2020-07-27 PROCEDURE — 78452 HT MUSCLE IMAGE SPECT MULT: CPT

## 2020-07-27 PROCEDURE — 6370000000 HC RX 637 (ALT 250 FOR IP): Performed by: STUDENT IN AN ORGANIZED HEALTH CARE EDUCATION/TRAINING PROGRAM

## 2020-07-27 PROCEDURE — 93017 CV STRESS TEST TRACING ONLY: CPT

## 2020-07-27 PROCEDURE — 97535 SELF CARE MNGMENT TRAINING: CPT

## 2020-07-27 PROCEDURE — 3430000000 HC RX DIAGNOSTIC RADIOPHARMACEUTICAL: Performed by: INTERNAL MEDICINE

## 2020-07-27 PROCEDURE — 97116 GAIT TRAINING THERAPY: CPT

## 2020-07-27 PROCEDURE — 97110 THERAPEUTIC EXERCISES: CPT

## 2020-07-27 PROCEDURE — 85027 COMPLETE CBC AUTOMATED: CPT

## 2020-07-27 PROCEDURE — A9500 TC99M SESTAMIBI: HCPCS | Performed by: INTERNAL MEDICINE

## 2020-07-27 PROCEDURE — 80048 BASIC METABOLIC PNL TOTAL CA: CPT

## 2020-07-27 PROCEDURE — 6360000002 HC RX W HCPCS: Performed by: INTERNAL MEDICINE

## 2020-07-27 PROCEDURE — 82947 ASSAY GLUCOSE BLOOD QUANT: CPT

## 2020-07-27 PROCEDURE — 36415 COLL VENOUS BLD VENIPUNCTURE: CPT

## 2020-07-27 PROCEDURE — 93306 TTE W/DOPPLER COMPLETE: CPT

## 2020-07-27 PROCEDURE — 2580000003 HC RX 258: Performed by: INTERNAL MEDICINE

## 2020-07-27 RX ORDER — SODIUM CHLORIDE 0.9 % (FLUSH) 0.9 %
10 SYRINGE (ML) INJECTION PRN
Status: ACTIVE | OUTPATIENT
Start: 2020-07-27 | End: 2020-07-27

## 2020-07-27 RX ORDER — AMINOPHYLLINE DIHYDRATE 25 MG/ML
50 INJECTION, SOLUTION INTRAVENOUS PRN
Status: ACTIVE | OUTPATIENT
Start: 2020-07-27 | End: 2020-07-27

## 2020-07-27 RX ORDER — SODIUM CHLORIDE 9 MG/ML
500 INJECTION, SOLUTION INTRAVENOUS CONTINUOUS PRN
Status: ACTIVE | OUTPATIENT
Start: 2020-07-27 | End: 2020-07-27

## 2020-07-27 RX ORDER — NITROGLYCERIN 0.4 MG/1
0.4 TABLET SUBLINGUAL EVERY 5 MIN PRN
Status: ACTIVE | OUTPATIENT
Start: 2020-07-27 | End: 2020-07-27

## 2020-07-27 RX ORDER — SODIUM CHLORIDE 0.9 % (FLUSH) 0.9 %
10 SYRINGE (ML) INJECTION PRN
Status: DISCONTINUED | OUTPATIENT
Start: 2020-07-27 | End: 2020-07-27 | Stop reason: HOSPADM

## 2020-07-27 RX ORDER — ATROPINE SULFATE 0.1 MG/ML
0.5 INJECTION INTRAVENOUS EVERY 5 MIN PRN
Status: ACTIVE | OUTPATIENT
Start: 2020-07-27 | End: 2020-07-27

## 2020-07-27 RX ORDER — METOPROLOL SUCCINATE 25 MG/1
25 TABLET, EXTENDED RELEASE ORAL DAILY
Status: DISCONTINUED | OUTPATIENT
Start: 2020-07-27 | End: 2020-07-27 | Stop reason: HOSPADM

## 2020-07-27 RX ORDER — METOPROLOL TARTRATE 5 MG/5ML
5 INJECTION INTRAVENOUS EVERY 5 MIN PRN
Status: ACTIVE | OUTPATIENT
Start: 2020-07-27 | End: 2020-07-27

## 2020-07-27 RX ADMIN — CYANOCOBALAMIN TAB 1000 MCG 1000 MCG: 1000 TAB at 11:26

## 2020-07-27 RX ADMIN — Medication 10 ML: at 10:13

## 2020-07-27 RX ADMIN — AMIODARONE HYDROCHLORIDE 200 MG: 200 TABLET ORAL at 11:26

## 2020-07-27 RX ADMIN — ATORVASTATIN CALCIUM 40 MG: 40 TABLET, FILM COATED ORAL at 11:26

## 2020-07-27 RX ADMIN — TETRAKIS(2-METHOXYISOBUTYLISOCYANIDE)COPPER(I) TETRAFLUOROBORATE 13.2 MILLICURIE: 1 INJECTION, POWDER, LYOPHILIZED, FOR SOLUTION INTRAVENOUS at 07:02

## 2020-07-27 RX ADMIN — MELATONIN 2000 UNITS: at 11:29

## 2020-07-27 RX ADMIN — REGADENOSON 0.4 MG: 0.08 INJECTION, SOLUTION INTRAVENOUS at 10:13

## 2020-07-27 RX ADMIN — RIVAROXABAN 20 MG: 20 TABLET, FILM COATED ORAL at 11:26

## 2020-07-27 RX ADMIN — Medication 10 ML: at 11:31

## 2020-07-27 RX ADMIN — ACETAMINOPHEN 650 MG: 325 TABLET, FILM COATED ORAL at 11:26

## 2020-07-27 RX ADMIN — Medication 10 ML: at 09:08

## 2020-07-27 RX ADMIN — FAMOTIDINE 20 MG: 20 TABLET ORAL at 11:25

## 2020-07-27 RX ADMIN — TETRAKIS(2-METHOXYISOBUTYLISOCYANIDE)COPPER(I) TETRAFLUOROBORATE 32.5 MILLICURIE: 1 INJECTION, POWDER, LYOPHILIZED, FOR SOLUTION INTRAVENOUS at 10:15

## 2020-07-27 RX ADMIN — Medication 10 ML: at 07:03

## 2020-07-27 ASSESSMENT — ENCOUNTER SYMPTOMS
NAUSEA: 0
ABDOMINAL PAIN: 0
WHEEZING: 0
VOMITING: 0
CHEST TIGHTNESS: 0
SHORTNESS OF BREATH: 0
CHOKING: 0
COUGH: 0
ABDOMINAL DISTENTION: 0
DIARRHEA: 0

## 2020-07-27 ASSESSMENT — PAIN SCALES - GENERAL
PAINLEVEL_OUTOF10: 3
PAINLEVEL_OUTOF10: 3

## 2020-07-27 ASSESSMENT — PAIN DESCRIPTION - LOCATION: LOCATION: HEAD

## 2020-07-27 ASSESSMENT — PAIN DESCRIPTION - DESCRIPTORS: DESCRIPTORS: HEADACHE

## 2020-07-27 NOTE — PROCEDURES
[address] [place], [place] [address]                              CARDIAC STRESS TEST    PATIENT NAME: She Armijo                     :        1950  MED REC NO:   [id number]                              ROOM:       2125  ACCOUNT NO:   [de-identified]                           ADMIT DATE: 2020  PROVIDER:     Markus Gaitan    DATE OF STUDY:  2020    TEST TYPE: LEXISCAN CARDIOLYTE STRESS TEST  INDICATION: EKG ABNORMALITIES, PRE-OP EVAL  REFERRING PHYSICIAN: Ave Noel    RESTING HEART RATE: 56 BEATS PER MINUTE  RESTING BLOOD PRESSURE: 136/78    MEDICATION(S) GIVEN: 0.4MG IV LEXISCAN  REASON FOR TERMINATION: MEDICATION INFUSION COMPLETE    RESTING EKG: SINUS RHYTHM,. 56 BEATS PER MINUTE  STRESS HEART RESPONSE: NORMAL RESPONSE  BLOOD PRESSURE RESPONSE: HYPER  STRESS EKGs: HEART RATE INCREASED   CHEST DISCOMFORT: NO PAIN DURING STRESS, SHORTNESS OF BREATH  ISCHEMIC EKG CHANGES: NONE    EKG IMPRESSION: ELECTROCARDIOGRAPHICALLY NEGATIVE LEXISCAN STRESS TEST. RADIOISOTOPE RESULTS TO FOLLOW FROM THE DEPARTMENT OF NUCLEAR MEDICINE.     Mino Coulter    D: 2020 13:29:39       T: 2020 13:34:19     AS/YOLA  Job#: [id number]     Doc#: Unknown    CC:    (Retain this field even if not dictated or not decipherable)

## 2020-07-27 NOTE — CARE COORDINATION
ONGOING DISCHARGE PLAN:    Spoke with patient regarding discharge plan and patient confirms that plan is still to return to her [employer] APT, alone. Pt. Is current w/ [facility] & will continue. Writer Faxed, DME order for wheeled walker/Face Sheet, to [facility], to her , Devan Varma, at [phone], She can be reached at [phone]. Riya Marino is getting this walker thru pt's Waiver Program & Has already been following for a Bed Rail at home. Pt. Has been informed. Pt. Had Stress/Echo, Cardio following. Anticipate DC today w/ no other needs. Will continue to follow for additional discharge needs.     Electronically signed by Nadia Lainez RN on 7/27/2020 at 12:05 PM

## 2020-07-27 NOTE — PROGRESS NOTES
[address]    Occupational Therapy Evaluation  Date: 20  Patient Name: Darryl Gardner       Room: 8227/5104-24  MRN: [id number]  Account: [de-identified]   : 1950  (78 y.o.) Gender: female     Discharge Recommendations:  Further Occupational  Therapy is recommended upon facility discharge. Referring Practitioner: Dr. Faby Woodruff  Diagnosis: Symptomatic bradycardia    Treatment Diagnosis: Impaired self-care status. Past Medical History:  has a past medical history of Adenocarcinoma of endometrium, stage 1 ([address] JOSHUA (acute kidney injury) ([address] Allergic rhinitis, At high risk for falls, Atrial fibrillation ([address] CHF (congestive heart failure) ([address] Colon polyp, Diabetes mellitus ([address] Diverticulitis, Endometrial cancer ([address] History of TIA (transient ischemic attack), Hyperglycemia, Hyperlipidemia, Hypertension, Hypothyroidism, Legally blind, Lower back pain, Mixed incontinence, Morbid obesity with BMI of 40.0-44.9, adult ([address] CLAROS (nonalcoholic steatohepatitis), Obesity, Oral phase dysphagia, Osteoarthritis (arthritis due to wear and tear of joints), Osteoarthritis involving multiple joints on both sides of body, Osteoporosis, Perforated bowel ([address] Postmenopausal bleeding, S/P h-scope, Myosure 17, Tennis elbow, Thickened endometrium, TIA (transient ischemic attack), TLHBSO, bilateral LND 10/24/17, and Type 2 diabetes mellitus, without long-term current use of insulin ([address] Past Surgical History:   has a past surgical history that includes Thyroid surgery (); Colonoscopy (); Tonsillectomy; Colonoscopy (2017); pr colsc flx w/removal lesion by hot bx forceps (N/A, 2017); Dilation and curettage of uterus (N/A, 2017); Cataract removal with implant (Bilateral, ); vitrectomy; julieta and bso (cervix removed) (10/24/2017); and Hysterectomy (N/A, 10/24/2017).     Restrictions  Restrictions/Precautions: Fall Risk, Up as Tolerated(peripheral IV left forearm)  Required Braces or Orthoses?: No     Vitals  Temp: 97.7 °F (36.5 °C)  Pulse: 58  Resp: 18  BP: (!) 140/73  Height: 5' 2\" (157.5 cm)  Weight: 243 lb 15.3 oz (110.7 kg)  BMI (Calculated): 44.7  Oxygen Therapy  SpO2: 100 %  Pulse Oximeter Device Mode: Intermittent  Pulse Oximeter Device Location: Finger  O2 Device: None (Room air)  Level of Consciousness: Alert    Subjective  Subjective: \"I'll probably be going home with that one\" Pt reports that she will have four-wheeled walker at home.      Vision  Vision: Impaired  Vision Exceptions: Wears glasses for reading((Pt reports she is \"visually impaired\" - NOT \"legally blind\"))  Hearing  Hearing: Within functional limits  Social/Functional History  Lives With: Alone  Type of Home: Apartment(2nd floor apartment at [employer])  Home Layout: One level  Home Access: Elevator, Level entry  Bathroom Shower/Tub: Tub/Shower unit, Curtain, Shower chair with back  [employer]: Standard(has toilet raiser on top w/ grab bars)  Bathroom Equipment: Shower chair, Grab bars in shower, Hand-held shower, Grab bars around toilet, Toilet raiser  Bathroom Accessibility: Walker accessible  Home Equipment: Quad cane, Reacher, Alert Button(rollator)  Receives Help From: Home health([facility] 3 days/week for 2-3 hours/day)  ADL Assistance: Needs assistance([facility] provides assist for bathing and dressing (mainly lower body bathing and dressing); assist for personal hygiene after bowel movement)  Homemaking Assistance: Needs assistance(sister does the grocery shopping,  [facility] does cleaning & laundry, MOW/ meals at [employer] when open)  Homemaking Responsibilities: No  Ambulation Assistance: Independent(uses rollator for limited distances due to buckling knees- will sit on her rollator or nearby chair)  Transfer Assistance: Independent  Active : No  Mode of Transportation: Cab(: [facility] has a bus for weekly errands including bank and grocery store, sister or dtr also drive)  Occupation: Retired  Additional Comments: Pt reports that she typically goes to the [employer] 5 days/week for 4-6 hours each day - gets meals there and participates in activities prior to the pandemic. Pt reports that her sister and daughter both live within 10-15 minutes and can provide A as needed  Pt reports that home PT was to start but had to be cancelled due to her admission to the hospital.  Pain Assessment  Response to Pain Intervention: Patient Satisfied    Objective  Vision - Basic Assessment  Prior Vision: Wears glasses only for reading  Vision Comments: Pt reports low vision   Cognition  Overall Cognitive Status: WFL   Perception  Overall Perceptual Status: WFL  Sensation  Overall Sensation Status: WFL(denies)   ADL  Feeding: Setup  Grooming: Supervision, Setup(while standing at sink to wash hands)  UE Bathing: Supervision, Setup  LE Bathing: Moderate assistance, Setup(per pt report)  UE Dressing: Supervision, Setup  LE Dressing: Maximum assistance, Setup(per pt report)  Toileting: Moderate assistance(assist with personal hygiene after bowel movement)  Additional Comments: OT facilitated Pt engagement in toileting and grooming tasks this date. Pt required assist to perform personal hygiene after bowel movement. Pt reports that she frequently requires this assist at home too. Pt states that she tries her best, but often utilizes the home health aid for assist. OT POC to address compensatory strategies/techniques for personal hygiene.   Shower Transfers: Stand by assistance  Shower Transfers Comments: per pt report    UE Function           LUE Strength  Gross LUE Strength: WFL     LUE Tone: Normotonic     LUE AROM (degrees)  LUE AROM : WFL     Left Hand AROM (degrees)  Left Hand AROM: WFL  RUE Strength  Gross RUE Strength: WFL      RUE Tone: Normotonic     RUE AROM (degrees)  RUE AROM : WFL     Right Hand AROM (degrees)  Right Hand AROM: WFL    Fine Motor Skills  Coordination  Movements Are Fluid And Coordinated: Yes                           Mobility  Supine to Sit: Modified independent(with use of bed rail)       Balance  Sitting Balance: Modified independent   Standing Balance: Supervision     Functional Mobility  Functional - Mobility Device: 4-Wheeled Walker  Activity: To/from bathroom  Assist Level: Supervision  Functional Mobility Comments: Fair safety awareness with use of brakes prior to transfers. 1 verbal cue utilized to lock brakes prior to sitting with Good return  Bed mobility  Supine to Sit: Modified independent(with use of bed rail)  Scooting: Modified independent  Comment: Pt seated in chair at end of session     Transfers  Sit to stand: Supervision  Stand to sit: Supervision  Toilet Transfers  Toilet - Technique: Ambulating  Equipment Used: Grab bars  Toilet Transfer: Supervision  Toilet Transfers Comments: with four-wheeled walker  Shower Transfers  Shower - Transfer From: Jimmy Lisa - Transfer Type: To and From  Shower - Transfer To: Shower seat with back  Shower - Technique: Ambulating  Shower Transfers: Stand by assistance  Shower Transfers Comments: per pt report  Functional Activity Tolerance  Functional Activity Tolerance: Tolerates 30 min exercise with multiple rests   Assessment  Performance deficits / Impairments: Decreased functional mobility , Decreased ADL status, Decreased endurance, Decreased balance, Decreased high-level IADLs  Treatment Diagnosis: Impaired self-care status.   Prognosis: Good  Decision Making: Medium Complexity  REQUIRES OT FOLLOW UP: Yes  Discharge Recommendations: Patient would benefit from continued therapy after discharge  Activity Tolerance: Patient Tolerated treatment well         Functional Outcome Measures  AM-PAC Daily Activity Inpatient   How much help for putting on and taking off regular lower body clothing?: A Lot  How much help for Bathing?: A Lot  How much help for Toileting?: A Lot  How much help for putting on and taking off regular

## 2020-07-27 NOTE — PROGRESS NOTES
[address]   OCCUPATIONAL THERAPY MISSED TREATMENT NOTE   INPATIENT   Date: 20  Patient Name: Chandrika García       Room: 6830/0454-80  MRN: [id number]   Account #: [de-identified]    : 1950  (78 y.o.)  Gender: female                 REASON FOR MISSED TREATMENT:  Patient at testing and/or off the floor   -   Testing - Pt at stress lab at 935 AM per JANINA Elizalde. Will re-attempt as able.      Sandra Montano, OT

## 2020-07-27 NOTE — DISCHARGE INSTR - DIET

## 2020-07-27 NOTE — PROGRESS NOTES
LEXISCAN COMPLETED, PATIENT C/O SLIGHT SHORTNESS OF BREATH, DENIES PAIN, CAFFEINE GIVEN AFTER ONE MINUTE.   HR 86, /82

## 2020-07-27 NOTE — PLAN OF CARE
Problem: Falls - Risk of:  Goal: Will remain free from falls  Description: Will remain free from falls  Outcome: Completed  Goal: Absence of physical injury  Description: Absence of physical injury  Outcome: Completed     Problem: Skin Integrity:  Goal: Will show no infection signs and symptoms  Description: Will show no infection signs and symptoms  Outcome: Completed  Goal: Absence of new skin breakdown  Description: Absence of new skin breakdown  Outcome: Completed     Problem: Cardiac Output - Decreased:  Goal: Hemodynamic stability will improve  Description: Hemodynamic stability will improve  Outcome: Completed     Problem: Musculor/Skeletal Functional Status  Goal: Highest potential functional level  Outcome: Completed  Goal: Absence of falls  Outcome: Completed     Problem: Pain:  Goal: Pain level will decrease  Description: Pain level will decrease  Outcome: Completed  Goal: Control of acute pain  Description: Control of acute pain  Outcome: Completed  Goal: Control of chronic pain  Description: Control of chronic pain  Outcome: Completed     Problem: Musculor/Skeletal Functional Status  Goal: Highest potential functional level  Outcome: Completed  Goal: Absence of falls  Outcome: Completed

## 2020-07-27 NOTE — PROGRESS NOTES
[facility]        Date:   7/27/2020  Patient name: Wilber Youngblood  Date of admission:  7/24/2020  4:24 PM  MRN:   [id number]  YOB: 1950  PCP: Jayy Vail MD    Reason for Consult:       Subjective: No new cardiac issues. No overnight events. Chart reviewed. Physical Exam:   Vitals: BP (!) 140/73   Pulse 58   Temp 97.7 °F (36.5 °C) (Oral)   Resp 18   Ht 5' 2\" (1.575 m)   Wt 243 lb 15.3 oz (110.7 kg)   LMP  (LMP Unknown)   SpO2 100%   BMI 44.62 kg/m²   General appearance: alert and cooperative with exam  HEENT: Head: Normocephalic, no lesions, without obvious abnormality. Neck: no adenopathy, no carotid bruit, no JVD, supple, symmetrical, trachea midline and thyroid not enlarged, symmetric, no tenderness/mass/nodules  Lungs: clear to auscultation bilaterally  Heart: regular rate and rhythm, S1, S2 normal, no murmur, click, rub or gallop  Abdomen: soft, non-tender; bowel sounds normal; no masses,  no organomegaly  Extremities: extremities normal, atraumatic, no cyanosis or edema  Neurologic: Mental status: Alert, oriented, thought content appropriate      Lab work, imaging, nursing, and chart reviewed extensively.     Medications:   Scheduled Meds:   vitamin B-12  1,000 mcg Oral Daily    levothyroxine  75 mcg Oral QAM AC    Vitamin D  2,000 Units Oral Daily    atorvastatin  40 mg Oral Daily    amiodarone  200 mg Oral Daily    sodium chloride flush  10 mL Intravenous 2 times per day    famotidine  20 mg Oral BID    rivaroxaban  20 mg Oral Daily with breakfast    insulin lispro  0-6 Units Subcutaneous TID WC    insulin lispro  0-3 Units Subcutaneous Nightly     Continuous Infusions:   dextrose           Labs:     CBC:   Recent Labs     07/25/20 0418 07/26/20  0436 07/27/20  0404   WBC 8.8 7.9 6.8   HGB 11.5* 11.6* 11.9*    202 215     BMP:    Recent Labs     07/25/20 0418 07/26/20  0436 07/27/20  0404    142 140   K 3.3* 3.8 4.2    105 105   CO2 26 26 26   BUN 20 19 20   CREATININE 0.73 1.07* 0.88   GLUCOSE 90 107* 98     Hepatic:   Recent Labs     07/24/20  1720   AST 12   ALT 16   BILITOT 0.24*   ALKPHOS 50     Troponin: No results for input(s): TROPONINI in the last 72 hours. BNP: No results for input(s): BNP in the last 72 hours. Lipids: No results for input(s): CHOL, HDL in the last 72 hours. Invalid input(s): LDLCALCU  INR: No results for input(s): INR in the last 72 hours.     Other active acute problems:  Patient Active Problem List   Diagnosis    Acquired hypothyroidism    History of TIA (transient ischemic attack)    Chronic bilateral low back pain without sciatica    Essential hypertension    Osteopenia determined by x-ray    Osteoarthritis involving multiple joints on both sides of body    Left knee DJD    Prediabetes    Vitamin D deficiency    Mixed incontinence    Chronic pain of both knees    Slow transit constipation    Allergic rhinitis    Hyperlipidemia with target LDL less than 100    Morbid obesity with BMI of 40.0-44.9, adult ([address]    At high risk for falls    Dense breast tissue on mammogram    Hyperglycemia    Spondylosis of lumbar region without myelopathy or radiculopathy    OAB (overactive bladder)    Primary osteoarthritis of both knees    Chronic fatigue     Adenomatous polyp of sigmoid colon, 6/26/17    Mixed stress and urge urinary incontinence    TLHBSO, bilateral LND 10/24/17    Optic atrophy of both eyes    Cataracta b/l eyes    Difficulty walking    Esotropia    Nystagmus    History of uterine cancer, Well differentiated grade 1 adenocarcinoma arising in complex hyperplasia, 2017    Vitamin B 12 deficiency    Aortic calcification (HCC)    Calculus of gallbladder without cholecystitis without obstruction    CLAROS (nonalcoholic steatohepatitis)    Optic atrophy    Cough present for greater than 3 weeks    Dyspepsia    A-fib (HCC)    Type 2 diabetes mellitus, without long-term current use of insulin (HCC)    [facility]UA [facility]) [facility]    Atrial fibrillation, new onset ([facility]    Mobility impaired    Perforated diverticulum    Diverticulitis    Chronic cholecystitis    Colitis    Multiple atypical skin moles    Class 3 severe obesity due to excess calories without serious comorbidity with body mass index (BMI) of 40.0 to 44.9 in adult [facility])    Symptomatic bradycardia    CHF NYHA class I, chronic, diastolic (HCC)         IMPRESSION & Recommendations:      1. Sinus and junctional bradycardia on amiodarone, metoprolol and diltiazem; improved after holding beta and calcium channel blockers. 2. Recent PAF and RVR- optimize meds, restart low dose BB to avoid BB withdrawal tachycardia  3. Essential HTN with preserved LVEF, LVH and mild diastolic dysfunction  4. Type II DM  5. Hyperlipidemia  6. Normal stress today, ok for surgery  7. Please follow up with us in clinic after discharge  8. Will follow peripherally, please call back if needed. 9.       Discussed with patient, family, and Nurse. Electronically signed by Jose Juan Mathew DO on 7/27/2020 at 2:21 PM    Jose Juan Mathew, [id number] [facility]  [facility] [place]  [phone]

## 2020-07-27 NOTE — PROGRESS NOTES
[address] [facility]    PROGRESS NOTE             7/27/2020    10:01 AM    Name:   Lebron Perez  MRN:     [id number]     Kimdominiklyside:      [de-identified]   Room:   [address] [place] [address] Day:  3  Admit Date:  7/24/2020  4:24 PM    PCP:  Franca Balderas MD  Code Status:  Full Code    Subjective:     C/C:   Chief Complaint   Patient presents with    Bradycardia     sent by cardiologist     Interval History Status:     Patient was seen and examined at bedside. No acute events overnight. Her bradycardia has been improving, with a heart rate mostly in the high 50s to low 60s. No other complaints from the patient. Denies lightheadedness, dizziness, shortness of breath, chest pain, nausea/vomiting. Cardiology has seen and evaluated her, her results show negative Lexiscan stress test.  Cardiology has cleared for cholecystectomy and bowel surgery scheduled for beginning of August.     Brief History:     Please review H&P    Review of Systems:     Review of Systems   Constitutional: Positive for fatigue. Negative for activity change, appetite change, chills and fever. HENT: Negative for congestion. Respiratory: Negative for cough, choking, chest tightness, shortness of breath and wheezing. Cardiovascular: Negative for chest pain, palpitations and leg swelling. Gastrointestinal: Negative for abdominal distention, abdominal pain, diarrhea, nausea and vomiting. Skin: Negative for rash. Neurological: Negative for dizziness, light-headedness and headaches. Psychiatric/Behavioral: Negative for behavioral problems. Medications: Allergies:     Allergies   Allergen Reactions    Sulfa Antibiotics Nausea Only    Penicillins Rash       Current Meds:   Scheduled Meds:    vitamin B-12  1,000 mcg Oral Daily    levothyroxine  75 mcg Oral QAM AC    Vitamin D  2,000 Units Oral Daily    atorvastatin  40 mg Oral Daily    amiodarone  200 mg Oral Daily  sodium chloride flush  10 mL Intravenous 2 times per day    famotidine  20 mg Oral BID    rivaroxaban  20 mg Oral Daily with breakfast    insulin lispro  0-6 Units Subcutaneous TID WC    insulin lispro  0-3 Units Subcutaneous Nightly     Continuous Infusions:    sodium chloride      dextrose       PRN Meds: sodium chloride flush, sodium chloride, atropine, nitroGLYCERIN, metoprolol, aminophylline, sodium chloride flush, perflutren lipid microspheres, regadenoson, sodium chloride flush, acetaminophen **OR** acetaminophen, polyethylene glycol, promethazine **OR** ondansetron, potassium chloride **OR** potassium alternative oral replacement **OR** potassium chloride, magnesium sulfate, glucose, dextrose, glucagon (rDNA), dextrose    Data:     Past Medical History:   has a past medical history of Adenocarcinoma of endometrium, stage 1 ([facility] JOSHUA (acute kidney injury) ([facility] Allergic rhinitis, At high risk for falls, Atrial fibrillation ([facility] CHF (congestive heart failure) ([facility] Colon polyp, Diabetes mellitus ([facility] Diverticulitis, Endometrial cancer ([facility] History of TIA (transient ischemic attack), Hyperglycemia, Hyperlipidemia, Hypertension, Hypothyroidism, Legally blind, Lower back pain, Mixed incontinence, Morbid obesity with BMI of 40.0-44.9, adult ([facility] CLAROS (nonalcoholic steatohepatitis), Obesity, Oral phase dysphagia, Osteoarthritis (arthritis due to wear and tear of joints), Osteoarthritis involving multiple joints on both sides of body, Osteoporosis, Perforated bowel ([facility] Postmenopausal bleeding, S/P h-scope, Myosure 9/20/17, Tennis elbow, Thickened endometrium, TIA (transient ischemic attack), TLHBSO, bilateral LND 10/24/17, and Type 2 diabetes mellitus, without long-term current use of insulin ([facility] Social History:   reports that she has never smoked. She has never used smokeless tobacco. She reports that she does not drink alcohol or use drugs.      Family History:   Family History Problem Relation Age of Onset    Coronary Art Dis Father     Hypertension Father     Diabetes Father     High Blood Pressure Father     Heart Attack Father     Diabetes Mother     High Blood Pressure Mother     Thyroid Disease Mother     High Blood Pressure Sister     Thyroid Disease Brother     High Blood Pressure Brother     High Blood Pressure Maternal Grandmother     Diabetes Maternal Grandfather     No Known Problems Paternal Grandmother     Heart Attack Paternal Grandfather     Colon Cancer Neg Hx        Vitals:  /78   Pulse 56   Temp 97.7 °F (36.5 °C) (Oral)   Resp 16   Ht 5' 2\" (1.575 m)   Wt 243 lb 15.3 oz (110.7 kg)   LMP  (LMP Unknown)   SpO2 98%   BMI 44.62 kg/m²   Temp (24hrs), Av.2 °F (36.8 °C), Min:97.7 °F (36.5 °C), Max:99.4 °F (37.4 °C)    Recent Labs     20  0705 20  1138 20  2114 20  0642   POCGLU 94 94 115* 95       I/O(24Hr):     Intake/Output Summary (Last 24 hours) at 2020 1001  Last data filed at 2020 0730  Gross per 24 hour   Intake --   Output 450 ml   Net -450 ml       Labs:    CBC:   Lab Results   Component Value Date    WBC 6.8 2020    RBC 3.93 2020    RBC 4.23 2012    HGB 11.9 2020    HCT 36.3 2020    MCV 92.6 2020    MCH 30.3 2020    MCHC 32.8 2020    RDW 15.2 2020     2020     2012    MPV 8.9 2020     BMP:    Lab Results   Component Value Date     2020    K 4.2 2020     2020    CO2 26 2020    BUN 20 2020    LABALBU 4.3 2020    LABALBU 4.3 2012    CREATININE 0.88 2020    CALCIUM 8.9 2020    GFRAA >60 2020    LABGLOM >60 2020    GLUCOSE 98 2020    GLUCOSE 114 2012       Lab Results   Component Value Date/Time    SPECIAL NOT REPORTED 2020 02:32 PM     Lab Results   Component Value Date/Time    CULTURE NO SIGNIFICANT GROWTH 2020 02:32 PM         Radiology:    Xr Knee Left (3 Views)    Result Date: 7/19/2020  EXAMINATION: THREE XRAY VIEWS OF THE LEFT KNEE 7/19/2020 4:17 pm COMPARISON: 25 January 2019 HISTORY: ORDERING SYSTEM PROVIDED HISTORY: pain TECHNOLOGIST PROVIDED HISTORY: pain Reason for Exam: pain Acuity: Acute Type of Exam: Initial FINDINGS: Osteopenia is noted. The patient has severe tricompartmental arthritic changes with medial compartment narrowing. Arthritic changes are most severe in the patellofemoral aspect of the joint. Lateral and patellar marginal spurring are noted. No acute fracture, or dislocation is noted. Small suprapatellar effusion is noted. Atherosclerotic calcification of the popliteal and calf arteries is noted. No acute osseous abnormality. Severe tricompartmental arthritic changes described above with small suprapatellar effusion. Xr Chest Portable    Result Date: 7/24/2020  EXAMINATION: ONE XRAY VIEW OF THE CHEST 7/24/2020 5:37 pm COMPARISON: 07/11/2020. HISTORY: ORDERING SYSTEM PROVIDED HISTORY: bradycardia TECHNOLOGIST PROVIDED HISTORY: bradycardia Reason for Exam: bradycardia Acuity: Unknown Type of Exam: Unknown FINDINGS: The exam is mildly rotated. The cardiomediastinal silhouette is stable. Aortic vascular calcification. Mild elevation of the right hemidiaphragm. No acute infiltrate, pleural fluid or evidence of overt failure. No acute cardiopulmonary disease. Xr Chest Portable    Result Date: 7/11/2020  EXAMINATION: SINGLE-VIEW CHEST RADIOGRAPH DATE 07/11/2020 COMPARISON: None HISTORY: Shortness of breath. FINDINGS: No pneumothorax, pleural effusion or focal airspace consolidation. Normal heart size and mediastinal contours. No acute osseous abnormality. No acute findings.      Ct Chest Pulmonary Embolism W Contrast    Result Date: 7/12/2020  EXAMINATION: CT CHEST PULMONARY EMBOLISM WITH CONTRAST TECHNIQUE: Dose modulation, iterative reconstruction, and/or weight-based adjustment of the mA/kV was utilized to reduce the radiation dose to as low as reasonably achievable. COMPARISON: None available HISTORY: Shortness of breath for 1 day. FINDINGS: Angiogram: The pulmonary arterial system is adequately opacified by contrast for evaluation. The main pulmonary artery, left and right main pulmonary arteries and segmental branches demonstrate normal contrast filling without evidence of defect to suggest presence of pulmonary embolism. Caliber of the main pulmonary artery is unremarkable. The heart is mildly enlarged with otherwise unremarkable configuration. No evidence of pericardial effusion is seen. No evidence of mediastinal or hilar lymphadenopathy or mass lesion is identified. The lungs are well aerated without evidence of consolidation. The pleural surfaces are unremarkable without evidence of pleural thickening or pleural effusion identified. Visualized upper abdominal organs on chest CT examination are grossly unremarkable in appearance. The bones, skeletal muscle bundles, fascial planes and subcutaneous soft tissues are unremarkable in appearance. 1. No evidence of acute pulmonary embolism. 2. Mild cardiomegaly. Physical Examination:        Physical Exam  Constitutional:       General: She is not in acute distress. Appearance: She is obese. HENT:      Head: Normocephalic. Mouth/Throat:      Mouth: Mucous membranes are moist.   Cardiovascular:      Rate and Rhythm: Regular rhythm. Bradycardia present. Heart sounds: Normal heart sounds. No murmur. Pulmonary:      Effort: Pulmonary effort is normal.      Breath sounds: Normal breath sounds. No wheezing or rales. Abdominal:      General: Bowel sounds are normal. There is no distension. Palpations: Abdomen is soft. Tenderness: There is no abdominal tenderness. There is no guarding. Musculoskeletal:      Right lower leg: No edema. Left lower leg: No edema.    Skin:     General: Skin is warm and dry. Capillary Refill: Capillary refill takes less than 2 seconds. Neurological:      Mental Status: She is alert and oriented to person, place, and time. Assessment:        Primary Problem  Symptomatic bradycardia    Active Hospital Problems    Diagnosis Date Noted    Morbid obesity with BMI of 40.0-44.9, adult ([facility] [E66.01, Z68.41] 02/23/2017     Priority: High    Symptomatic bradycardia [R00.1] 07/24/2020    CHF NYHA class I, chronic, diastolic (HCC) [P26.51] 17/47/2447    A-fib ([facility] [I48.91] 01/14/2020    Type 2 diabetes mellitus, without long-term current use of insulin ([facility] [E11.9] 01/14/2020    Essential hypertension [I10]        Plan:        Sinus Bradycardia  - Upon admission Heart rate: 47  - Magnesium 1.7  - Troponin 12, trend troponin  - Cardiac telemetry to monitor rhythm and rate  - Cardiology onboard: Continue amiodarone and xarelto. Stop Cardizem. Restarting her on low-dose beta-blocker. Lexiscan stress test was negative. Echo showed LVEF > 55%     Atrial fibrillation  - Continue Amiodarone 200mg daily  - Cardiac telemetry to monitor rhythm and rate  - Cardiology onboard: Continue amiodarone and xarelto. Remain off Cardizem, restart low-dose metoprolol.     CHF with preserved LVEF, LVH and mild diastolic dyfunction  - ECHO (1/14/20): LVEF>55%. Grade 1 left ventricular diastolic dysfunction, Moderate left vetricular hypertrophy, Moderate atrial dilatation, Moderate mitral regurgitation.  - BNP: 617  - Consulted cardiology  - ECHO (7/27) showed LVEF greater than 55%.      Type 2 Diabetes Mellitus, without the use of long-term insulin  - Upon admission blood glucose: 79, HgbA1c: 5.3 (7/22)  - Hypoglycemia protocol  - POCT glucose  - Low dose insulin correction scale     GI PPX: Pepcid 20 mg BID PO   DVT PPX: Xarelto 20 mg PO    Anabelle Flannery MD  7/27/2020  10:01 AM

## 2020-07-27 NOTE — PROGRESS NOTES
[address]    Physical Therapy Progress Note    Date: 20  Patient Name: Tracey Jones       Room: 55-  MRN: [id number]   Account: [de-identified]   : 1950  (78 y.o.)   Gender: female     Discharge Recommendations   Continue to assess pending progress  Equipment Needed: Yes  Mobility Devices: Devan Hands: Rolling          Restrictions/Precautions: Fall Risk, Up as Tolerated(peripheral IV left forearm)   Past Medical History:  has a past medical history of Adenocarcinoma of endometrium, stage 1 ([address] JOSHUA (acute kidney injury) ([address] Allergic rhinitis, At high risk for falls, Atrial fibrillation ([address] CHF (congestive heart failure) ([address] Colon polyp, Diabetes mellitus ([address] Diverticulitis, Endometrial cancer ([address] History of TIA (transient ischemic attack), Hyperglycemia, Hyperlipidemia, Hypertension, Hypothyroidism, Legally blind, Lower back pain, Mixed incontinence, Morbid obesity with BMI of 40.0-44.9, adult ([address] CLAROS (nonalcoholic steatohepatitis), Obesity, Oral phase dysphagia, Osteoarthritis (arthritis due to wear and tear of joints), Osteoarthritis involving multiple joints on both sides of body, Osteoporosis, Perforated bowel ([address] Postmenopausal bleeding, S/P h-scope, Myosure 17, Tennis elbow, Thickened endometrium, TIA (transient ischemic attack), TLHBSO, bilateral LND 10/24/17, and Type 2 diabetes mellitus, without long-term current use of insulin ([address] Past Surgical History:   has a past surgical history that includes Thyroid surgery (); Colonoscopy (); Tonsillectomy; Colonoscopy (2017); pr colsc flx w/removal lesion by hot bx forceps (N/A, 2017); Dilation and curettage of uterus (N/A, 2017); Cataract removal with implant (Bilateral, ); vitrectomy; julieta and bso (cervix removed) (10/24/2017); and Hysterectomy (N/A, 10/24/2017).        Overall Orientation Status: Within Functional Limits  Restrictions/Precautions  Restrictions/Precautions: Fall Risk;Up as Tolerated(peripheral IV left forearm)  Required Braces or Orthoses?: No    Subjective: Pt reports just getting back from stress test  Comments: Pt sitting in bedside chair upon arrival.  Pt is pleasant and cooperative with PT. Vital Signs  Patient Currently in Pain: Yes  Pain Assessment: 0-10  Pain Level: 3  Pain Location: Head  Pain Descriptors: Headache  Non-Pharmaceutical Pain Intervention(s): Ambulation/Increased Activity; Distraction;Repositioned(RN in pt's room giving meds)  Response to Pain Intervention: Patient Satisfied                Bed Mobility:   Bed Mobility  Rolling: Unable to assess  Supine to Sit: Unable to assess  Sit to Supine: Unable to assess  Scooting: Unable to assess  Comment: pt sitting in bedside chair upon arrival and exit  Bed mobility  Scooting: Unable to assess    Transfers:  Sit to Stand: Contact guard assistance  Stand to sit: Contact guard assistance                 Ambulation 1  Surface: level tile  Device: Rollator;Rolling Walker;Hand-Held Assist  Assistance: Contact guard assistance  Gait Deviations: Slow Kassy  Distance: 80ft  Comments: vc's for standing inside the [employer] for safety, knee did not buckle, pt educated to use rolling walker since pt reports knee kwaku \"sometimes\"        Stairs/Curb  Stairs?: No                     Posture: Fair  Sitting - Static: Good  Sitting - Dynamic: Good  Standing - Static: Good  Standing - Dynamic: Fair;+  Comments: seated in bedside chair with back and arm support, standing with rolling walker     Other exercises?: Yes  Other exercises 1: Seated bilat LE exercises, LAQ with 5 sec hold, ankle pumps, marching with 5 sec hold,  [id number]  Other exercises 2: Rolling Walker vs Rollator and how to use safely incase of left knee buckling           Activity Tolerance: Patient limited by fatigue;Patient limited by endurance; Patient limited by pain; Other  Activity Tolerance: Left knee did not buckle this tx. PT Equipment Recommendations  Equipment Needed: Yes  Mobility Devices: Lowella Deutscher: Rolling       Assessment  Activity Tolerance: Patient limited by fatigue;Patient limited by endurance; Patient limited by pain; Other   Body structures, Functions, Activity limitations: Decreased functional mobility ; Decreased balance;Decreased strength  Prognosis: Good  Discharge Recommendations: Continue to assess pending progress     Type of devices: All fall risk precautions in place;Gait belt;Patient at risk for falls;Call light within reach; Left in chair;Nurse notified(JANINA Leahy and [address])     Plan  Times per week: 5-7 treatments/ week  Times per day: (5-7 treatments/ week)  Current Treatment Recommendations: Strengthening, Transfer Training, Endurance Training, Patient/Caregiver Education & Training, Balance Training, Gait Training, Safety Education & Training, Functional Mobility Training    Patient Education  New Education Provided:  Safe use of Rolling Delores Lashae  Method: demonstration and explanation       Outcome: acknowledged understanding of Safe use of Rolling Walker and demonstrated understanding     Goals  Short term goals  Time Frame for Short term goals: 5-7 treatments/ week  Short term goal 1: pt to tolerate 1/2 hour of therapuetic exercise and activity  Short term goal 2: pt to demonstrate good technique for LE strengthening and balance activities  Short term goal 3: pt to demonstrate independent bed mobility for rolling and supine <> sit for position change  Short term goal 4: pt to demonstrate MOD I for transfers sit <> stand and bed <> chair using rollator  Short term goal 5: pt to demonstrate MOD I for gait 50'- 80'  using rollator  Short term goal 6: pt to demonstrate good balance for ambulation  using rollator    PT Individual Minutes  Time In: 1109  Time Out: [address]  Minutes: 31    Electronically signed by Avi Beach PTA on 7/27/20 at 12:56 PM EDT

## 2020-07-27 NOTE — DISCHARGE SUMMARY
silhouette is stable. Aortic vascular calcification. Mild elevation of the right hemidiaphragm. No acute infiltrate, pleural fluid or evidence of overt failure. No acute cardiopulmonary disease. Xr Chest Portable    Result Date: 7/11/2020  EXAMINATION: SINGLE-VIEW CHEST RADIOGRAPH DATE 07/11/2020 COMPARISON: None HISTORY: Shortness of breath. FINDINGS: No pneumothorax, pleural effusion or focal airspace consolidation. Normal heart size and mediastinal contours. No acute osseous abnormality. No acute findings. Ct Chest Pulmonary Embolism W Contrast    Result Date: 7/12/2020  EXAMINATION: CT CHEST PULMONARY EMBOLISM WITH CONTRAST TECHNIQUE: Dose modulation, iterative reconstruction, and/or weight-based adjustment of the mA/kV was utilized to reduce the radiation dose to as low as reasonably achievable. COMPARISON: None available HISTORY: Shortness of breath for 1 day. FINDINGS: Angiogram: The pulmonary arterial system is adequately opacified by contrast for evaluation. The main pulmonary artery, left and right main pulmonary arteries and segmental branches demonstrate normal contrast filling without evidence of defect to suggest presence of pulmonary embolism. Caliber of the main pulmonary artery is unremarkable. The heart is mildly enlarged with otherwise unremarkable configuration. No evidence of pericardial effusion is seen. No evidence of mediastinal or hilar lymphadenopathy or mass lesion is identified. The lungs are well aerated without evidence of consolidation. The pleural surfaces are unremarkable without evidence of pleural thickening or pleural effusion identified. Visualized upper abdominal organs on chest CT examination are grossly unremarkable in appearance. The bones, skeletal muscle bundles, fascial planes and subcutaneous soft tissues are unremarkable in appearance. 1. No evidence of acute pulmonary embolism. 2. Mild cardiomegaly.      Nm Myocardial Spect Rest Exercise Or Rx    Result Date: 7/27/2020  EXAMINATION: MYOCARDIAL PERFUSION IMAGING 7/27/2020 7:03 am TECHNIQUE: For the rest study, 13.2 mCi of Tc 99 labeled sestamibi were injected. SPECT images were acquired. Under cardiology supervision, 0.4mg [place] was infused. After pharmacologic stress, 32.5 mCi of Tc 99 labeled sestamibi were injected. SPECT images with ECG gating were acquired. COMPARISON: None Available. HISTORY: ORDERING SYSTEM PROVIDED HISTORY: PAF; pre-operative evaluation TECHNOLOGIST PROVIDED HISTORY: PAF; pre-operative evaluation Reason for Exam: EKG abnormalities Procedure Type->Rx Reason for Exam: PAF, pre-op evaluation, EKG abnormalities Acuity: Unknown Type of Exam: Unknown FINDINGS: There is normal accumulation of tracer throughout the myocardium on rest images and stress images. There are no fixed or reversible defects. End diastolic volume is 79 ml. The ejection fraction equals 64% with no focal wall motion abnormalities. There is no left ventricular dilatation. 1. No pharmacologically induced reversible perfusion defects to suggest ischemia 2. Ejection fraction of 64% 3. No focal wall motion abnormality         Consultations:    Consults:     Final Specialist Recommendations/Findings:   IP CONSULT TO CARDIOLOGY  IP CONSULT TO INTERNAL MEDICINE  IP CONSULT TO SOCIAL WORK  IP CONSULT TO CARDIOLOGY      The patient was seen and examined on day of discharge and this discharge summary is in conjunction with any daily progress note from day of discharge. Discharge plan:       Disposition: Home    Physician Follow Up:     Diana Ross MD  [address] [facility]  [address] [facility] [place] [address]  [phone]          [address] [facility] Cancel [phone]    They will call you at home to set up a time to come to your house. Area Office on [phone], [phone]    She is following for your new Sophia Blackman for home.  Call her with any

## 2020-07-27 NOTE — CARE COORDINATION
Continuity of Care Form    Patient Name: Lillie Cherry   :  1950  MRN:  [id number]    Admit date:  2020  Discharge date:  2020    Code Status Order: Full Code   Advance Directives:   [facility] Documentation     Date/Time Healthcare Directive Type of Healthcare Directive Copy in [facility] [address] Agent's Name Healthcare Agent's Phone Number    20  No, patient does not have an advance directive for healthcare treatment -- -- -- -- --          Admitting Physician:  Johnny Pham MD  PCP: Naima Jurado MD    Discharging Nurse: [facility] Unit/Room#: 2125/2125-01  Discharging Unit Phone Number: [phone]    Emergency Contact:   Extended Emergency Contact Information  Primary Emergency Contact: Donna Dennis  Address: [address] Phone: [phone]  Mobile Phone: [phone]  Relation: Brother/Sister  Hearing or visual needs: None  Other needs: None  Preferred language: English   needed? No  Secondary Emergency Contact: Kristiman Sandhoff [address] Phone: [phone]  Relation: Child  Hearing or visual needs: None  Other needs: None  Preferred language: English   needed?  No    Past Surgical History:  Past Surgical History:   Procedure Laterality Date    CATARACT REMOVAL WITH IMPLANT Bilateral     COLONOSCOPY      had polyp, she doesn't know where and when, \"20 yrs ago\"    COLONOSCOPY  2017    DILATION AND CURETTAGE OF UTERUS N/A 2017    HYSTEROSCOPY  WITH MYOSURE performed by Jose Alfredo Rios DO at [address] N/A 10/24/2017    TOTAL LAPAROSCOPIC HYSTERECTOMY, BSO, F.S.  STAGING, GYRUS G400 performed by Lucio Valdez MD at [address] N/A 2017    COLONOSCOPY POLYPECTOMY / HOT SNARE performed by Solis Augustin DO at [facility] CAITLIN AND BSO  10/24/2017    with pelvic lymph node dissection    THYROID SURGERY  1980    subtotal 20 years ago    TONSILLECTOMY      VITRECTOMY      FLOATERS       Immunization History:   Immunization History   Administered Date(s) Administered    Influenza Vaccine, unspecified formulation 10/01/2016    Influenza Virus Vaccine 10/01/2019    Influenza, Triv, inactivated, subunit, adjuvanted, IM (Fluad 65 yrs and older) 09/14/2017, 10/02/2018    Pneumococcal Conjugate 13-valent ([id number]) 02/23/2017    Pneumococcal Polysaccharide ([id number]) 04/20/2018    Tdap (Boostrix, Adacel) 09/28/2017       Active Problems:  Patient Active Problem List   Diagnosis Code    Acquired hypothyroidism E03.9    History of TIA (transient ischemic attack) Z86.73    Chronic bilateral low back pain without sciatica M54.5, G89.29    Essential hypertension I10    Osteopenia determined by x-ray M85.80    Osteoarthritis involving multiple joints on both sides of body M15.9    Left knee DJD M17.12    Prediabetes R73.03    Vitamin D deficiency E55.9    Mixed incontinence N39.46    Chronic pain of both knees M25.561, M25.562, G89.29    Slow transit constipation K59.01    Allergic rhinitis J30.9    Hyperlipidemia with target LDL less than 100 E78.5    Morbid obesity with BMI of 40.0-44.9, adult (HCC) E66.01, Z68.41    At high risk for falls Z91.81    Dense breast tissue on mammogram R92.2    Hyperglycemia R73.9    Spondylosis of lumbar region without myelopathy or radiculopathy M47.816    OAB (overactive bladder) N32.81    Primary osteoarthritis of both knees M17.0    Chronic fatigue  R53.82    Adenomatous polyp of sigmoid colon, 6/26/17 D12.5    Mixed stress and urge urinary incontinence N39.46    TLHBSO, bilateral LND 10/24/17 Z90.710, Z90.79, Z90.722    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    Cataracta b/l eyes H26.9    Difficulty walking R26.2    Esotropia H50.00    Nystagmus H55.00    History of uterine cancer, Well differentiated grade 1 adenocarcinoma arising in complex hyperplasia, 2017 Z85.42    Vitamin B 12 deficiency E53.8    Aortic calcification (HCC) I70.0    Calculus of gallbladder without cholecystitis without obstruction K80.20    CLAROS (nonalcoholic steatohepatitis) K75.81    Optic atrophy H47.20    Cough present for greater than 3 weeks R05    Dyspepsia R10.13    A-fib ([facility]) I48.91    Type 2 diabetes mellitus, without long-term current use of insulin (HCC) E11.9    JOSHUA (acute kidney injury) ([facility] [address] N17.9    Atrial fibrillation, new onset ([facility]) I48.91    Mobility impaired Z74.09    Perforated diverticulum K57.80    Diverticulitis K57.92    Chronic cholecystitis K81.1    Colitis K52.9    Multiple atypical skin moles D22.9    Class 3 severe obesity due to excess calories without serious comorbidity with body mass index (BMI) of 40.0 to 44.9 in adult ([facility]) E66.01, Z68.41    Symptomatic bradycardia R00.1    CHF NYHA class I, chronic, diastolic ([facility]) T67.72       Isolation/Infection:   Isolation          No Isolation        Patient Infection Status     None to display          Nurse Assessment:  Last Vital Signs: BP (!) 104/56   Pulse 50   Temp 97.7 °F (36.5 °C) (Oral)   Resp 16   Ht 5' 2\" (1.575 m)   Wt 243 lb 14.4 oz (110.6 kg)   LMP  (LMP Unknown)   SpO2 98%   BMI 44.61 kg/m²     Last documented pain score (0-10 scale): Pain Level: 8(0/10 at rest, 8/10 w/ standing and walking)  Last Weight:   Wt Readings from Last 1 Encounters:   07/24/20 243 lb 14.4 oz (110.6 kg)     Mental Status:  oriented and alert    IV Access:  - None    Nursing Mobility/ADLs:  Walking   Assisted  Transfer  Assisted  Bathing  Assisted  Dressing  Assisted  Toileting  Independent  Feeding  Independent  Med Admin  Independent  Med Delivery   whole    Wound Care Documentation and Therapy:        Elimination:  Continence:   · Bowel:  Yes  · Bladder: Yes  Urinary Catheter: None   Colostomy/Ileostomy/Ileal Conduit: No       Date of Last BM: 7/26/2020    Intake/Output Summary (Last 24 hours) at 7/25/2020 1226  Last data filed at 7/25/2020 0854  Gross per 24 hour   Intake 620 ml   Output --   Net 620 ml     I/O last 3 completed shifts: In: 200 [P.O.:380]  Out: -     Safety Concerns: At Risk for Falls    Impairments/Disabilities:      Vision    Nutrition Therapy:  Current Nutrition Therapy:   - Oral Diet:  Carb Control 4 carbs/meal (1800kcals/day)    Routes of Feeding: Oral  Liquids: No Restrictions  Daily Fluid Restriction: no  Last Modified Barium Swallow with Video (Video Swallowing Test): not done    Treatments at the Time of Hospital Discharge:   Respiratory Treatments: See [facility]  Oxygen Therapy:  is not on home oxygen therapy. Ventilator:    - No ventilator support    Rehab Therapies: Physical Therapy and Occupational Therapy  Weight Bearing Status/Restrictions: No weight bearing restirctions  Other Medical Equipment (for information only, NOT a DME order):  cane and walker  Other Treatments: skilled Nursing assessment and monitoring, medication Edcuation    Patient's personal belongings (please select all that are sent with patient):  Kayy Rosales RN SIGNATURE:  Electronically signed by Bing Hicks RN on 7/27/20 at 10:33 AM EDT    CASE MANAGEMENT/SOCIAL WORK SECTION    Inpatient Status Date: 7/24/2020    Readmission Risk Assessment Score:  Readmission Risk              Risk of Unplanned Readmission:        25           Discharging to Facility/ Agency:    Julio Cross [facility]  [address] [address]  Phone [phone]  Fax  [phone]    Home health care agency's  to evaluate patient two weeks prior to discharge from home health to determine post-discharge services.      / signature: Electronically signed by Torres Watson RN on 7/25/20 at 12:27 PM EDT    PHYSICIAN SECTION    Prognosis: Good    Condition at Discharge: Stable    Rehab Potential (if transferring to Rehab): Good    Recommended Labs or Other Treatments After Discharge:     Physician Certification: I certify the above information and transfer of Omar Friday  is necessary for the continuing treatment of the diagnosis listed and that she requires Home Care for less 30 days.      Update Admission H&P: No change in H&P    PHYSICIAN SIGNATURE:  Electronically signed by Aditya Martinez MD on 7/27/20 at 1:35 PM EDT      Current Discharge Medication List     CONTINUE these medications which have NOT CHANGED     Medication  Dose    oxybutynin (DITROPAN-XL) 10 MG extended release tablet  10 mg    Take 1 tablet by mouth daily    Quantity: 30 tablet  Refills: 5        amiodarone (CORDARONE) 200 MG tablet  200 mg    Take 1 tablet by mouth daily    Quantity: 30 tablet  Refills: 3        rivaroxaban (XARELTO) 20 MG TABS tablet  20 mg    Take 1 tablet by mouth daily (with breakfast)    Quantity: 30 tablet  Refills: 3        atorvastatin (LIPITOR) 40 MG tablet     TAKE 1 TABLET BY MOUTH DAILY STOP SIMVASTATIN    Quantity: 90 tablet  Refills: 3        levothyroxine (SYNTHROID) 75 MCG tablet  75 mcg    TAKE 1 TABLET BY MOUTH EVERY MORNING (BEFORE BREAKFAST)    Quantity: 90 tablet  Refills: 3        HM LORATADINE 10 MG tablet     TAKE 1 TABLET BY MOUTH DAILY    Quantity: 90 tablet  Refills: 3        docusate sodium (COLACE) 100 MG capsule  100 mg    TAKE 1 CAPSULE BY MOUTH 2 TIMES DAILY    Quantity: 180 capsule  Refills: 3        MYRBETRIQ 50 MG TB24     TAKE ONE TABLET BY MOUTH ONCE DAILY    Quantity: 90 tablet  Refills: 3        metFORMIN (GLUCOPHAGE) 500 MG tablet     TAKE ONE TABLET BY MOUTH TWICE A DAY WITH A MEAL    Quantity: 60 tablet  Refills: 10        Cranberry, Vacc oxycoccus, (SM CRANBERRY) 500 MG TABS  500 mg    Take 500 mg by mouth daily        Cholecalciferol (VITAMIN D) 2000 units TABS tablet  2,000 Units    Take 1 tablet by mouth daily    Quantity: 90 tablet  Refills: 3 vitamin B-12 (CYANOCOBALAMIN) 1000 MCG tablet  1,000 mcg    Take 1 tablet by mouth daily    Quantity: 90 tablet  Refills: 3        acetaminophen (APAP EXTRA STRENGTH) 500 MG tablet  500 mg    Take 1 tablet by mouth every 6 hours as needed for Pain or Fever    Quantity: 360 tablet  Refills: 3        Lactobacillus (PROBIOTIC ACIDOPHILUS) TABS  1 tablet    Take 1 tablet by mouth daily    Quantity: 30 tablet  Refills: 3        Blood Pressure Monitor KIT     Use as directed. Quantity: 1 kit  Refills: 1        glucose monitoring kit (FREESTYLE) monitoring kit     Use as directed. BRAND OF CHO[employer] INSURANCE ALLOWS. Quantity: 1 kit  Refills: 0        blood glucose monitor strips     Test 2-3 times a day & as needed for symptoms of irregular blood glucose. BRAND OF CHOICE INSURANCE ALLOWS.     Quantity: 100 strip  Refills: 11        Alcohol Swabs (ALCOHOL PREP) PADS     Use as directed    Quantity: 100 each  Refills: 11        Lancets MISC  1 each    1 each by Does not apply route 2 times daily    Quantity: 300 each  Refills: 11        UNABLE TO FIND     Needs right walker brake fixed    Quantity: 1 each  Refills: 0            STOP taking these previous medications     Medication  Dose  Reason for Stopping  Comments    (STOP TAKING) metoprolol tartrate (LOPRESSOR) 25 MG tablet  12.5 mg            (STOP TAKING) dilTIAZem (CARDIZEM CD) 360 MG extended release capsule  360 mg            Printing Report     Report ID  Report Name  Print    [id number]  Discharge Meds  Print

## 2020-07-27 NOTE — PLAN OF CARE
Problem: Falls - Risk of:  Goal: Will remain free from falls  Description: Will remain free from falls  7/27/2020 0349 by Fara Humphries RN  Outcome: Ongoing     Problem: Skin Integrity:  Goal: Will show no infection signs and symptoms  Description: Will show no infection signs and symptoms  7/27/2020 0349 by Fara Humphries RN  Outcome: Ongoing     Problem: Cardiac Output - Decreased:  Goal: Hemodynamic stability will improve  Description: Hemodynamic stability will improve  7/27/2020 0349 by Fara Humphries RN  Outcome: Ongoing     Problem: Musculor/Skeletal Functional Status  Goal: Highest potential functional level  7/27/2020 0349 by Fara Humphries RN  Outcome: Ongoing     Problem: Pain:  Goal: Pain level will decrease  Description: Pain level will decrease  7/27/2020 0349 by Fara Humphries RN  Outcome: Ongoing     Pain and needs assessed on rounds. ROM encouraged while in bed. Heart monitor applied entire shift.  Full assessment charted

## 2020-07-27 NOTE — CARE COORDINATION
Writer notified, \"Lizz\" from [place], that pt. Will DC to home today & will need continuation of services. Writer faxed Darin/DC med list to office & instructed to call writer w/ any questions.

## 2020-07-28 ENCOUNTER — TELEPHONE (OUTPATIENT)
Dept: FAMILY MEDICINE CLINIC | Age: 70
End: 2020-07-28

## 2020-07-28 ENCOUNTER — CARE COORDINATION (OUTPATIENT)
Dept: CARE COORDINATION | Age: 70
End: 2020-07-28

## 2020-07-28 ENCOUNTER — OFFICE VISIT (OUTPATIENT)
Dept: ORTHOPEDIC SURGERY | Age: 70
End: 2020-07-28
Payer: MEDICARE

## 2020-07-28 VITALS — TEMPERATURE: 96.8 F | WEIGHT: 232 LBS | HEIGHT: 62 IN | BODY MASS INDEX: 42.69 KG/M2

## 2020-07-28 LAB — GLUCOSE BLD-MCNC: 125 MG/DL (ref 65–105)

## 2020-07-28 PROCEDURE — G8399 PT W/DXA RESULTS DOCUMENT: HCPCS | Performed by: ORTHOPAEDIC SURGERY

## 2020-07-28 PROCEDURE — G8417 CALC BMI ABV UP PARAM F/U: HCPCS | Performed by: ORTHOPAEDIC SURGERY

## 2020-07-28 PROCEDURE — 20610 DRAIN/INJ JOINT/BURSA W/O US: CPT | Performed by: ORTHOPAEDIC SURGERY

## 2020-07-28 PROCEDURE — 4040F PNEUMOC VAC/ADMIN/RCVD: CPT | Performed by: ORTHOPAEDIC SURGERY

## 2020-07-28 PROCEDURE — 1090F PRES/ABSN URINE INCON ASSESS: CPT | Performed by: ORTHOPAEDIC SURGERY

## 2020-07-28 PROCEDURE — 1036F TOBACCO NON-USER: CPT | Performed by: ORTHOPAEDIC SURGERY

## 2020-07-28 PROCEDURE — 1123F ACP DISCUSS/DSCN MKR DOCD: CPT | Performed by: ORTHOPAEDIC SURGERY

## 2020-07-28 PROCEDURE — 99203 OFFICE O/P NEW LOW 30 MIN: CPT | Performed by: ORTHOPAEDIC SURGERY

## 2020-07-28 PROCEDURE — 1111F DSCHRG MED/CURRENT MED MERGE: CPT | Performed by: ORTHOPAEDIC SURGERY

## 2020-07-28 PROCEDURE — G8427 DOCREV CUR MEDS BY ELIG CLIN: HCPCS | Performed by: ORTHOPAEDIC SURGERY

## 2020-07-28 PROCEDURE — 3017F COLORECTAL CA SCREEN DOC REV: CPT | Performed by: ORTHOPAEDIC SURGERY

## 2020-07-28 RX ORDER — TRIAMCINOLONE ACETONIDE 40 MG/ML
40 INJECTION, SUSPENSION INTRA-ARTICULAR; INTRAMUSCULAR ONCE
Status: COMPLETED | OUTPATIENT
Start: 2020-07-28 | End: 2020-07-28

## 2020-07-28 RX ORDER — LIDOCAINE HYDROCHLORIDE 10 MG/ML
4 INJECTION, SOLUTION INFILTRATION; PERINEURAL ONCE
Status: COMPLETED | OUTPATIENT
Start: 2020-07-28 | End: 2020-07-28

## 2020-07-28 RX ADMIN — LIDOCAINE HYDROCHLORIDE 4 ML: 10 INJECTION, SOLUTION INFILTRATION; PERINEURAL at 13:40

## 2020-07-28 RX ADMIN — TRIAMCINOLONE ACETONIDE 40 MG: 40 INJECTION, SUSPENSION INTRA-ARTICULAR; INTRAMUSCULAR at 13:40

## 2020-07-28 NOTE — TELEPHONE ENCOUNTER
[facility] Transitions Initial Follow Up Call    Outreach made within 2 business days of discharge: Yes    Patient: Kaleigh Raymond Patient : 1950   MRN: [id number]  Reason for Admission: There are no discharge diagnoses documented for the most recent discharge. Discharge Date: 20       Spoke with: Marlene Vicente     Discharge department/facility: [place] [address] Interactive Patient Contact:  Was patient able to fill all prescriptions: Yes  Was patient instructed to bring all medications to the follow-up visit: Yes  Is patient taking all medications as directed in the discharge summary?  Yes  Does patient understand their discharge instructions: Yes  Does patient have questions or concerns that need addressed prior to 7-14 day follow up office visit: no    Scheduled appointment with PCP within 7-14 days    Follow Up  Future Appointments   Date Time Provider [facility]   2020  1:00 PM Kurtis Bullock MD [place] Ortho [facility]   2020  1:20 PM SCHEDULE, [address] [place] [address]   2020  9:20 AM Amish Nieves MD [place] [facility]   2020  2:45 PM Chucho Venegas PA-C GYN Oncology [facility]   2020  2:00 PM Angie Blas MD  derm [facility]   10/30/2020 10:30 AM Yaakov Sánchez MD  sc [address], [place]    Declined scheduling at this time

## 2020-07-28 NOTE — TELEPHONE ENCOUNTER
Daughter called in regards to pt surgery that she is having next week. Informed daughter that Dr. Palmer Booth needs a Mennie Rounds from cardiology before she makes her decision. Daughter will contact cardiology.

## 2020-07-28 NOTE — CARE COORDINATION
You Patient resolved from the Care Transitions episode on 14 July 2020    Patient was discharged from [facility] on Monday, 27 July following admission for bradycardia. Patient denies any complaints. Patient is scheduled for OP procedures next week and will have pre-op Covid-19 testing completed on Thursday, 30 July. Will close this episode but continue to monitor labs until test results are posted. Patient/family has been provided the following resources and education related to COVID-19:                         Signs, symptoms and red flags related to COVID-19            CDC exposure and quarantine guidelines            Conduit exposure contact - [phone]            Contact for their local Department of Health                 Patient currently reports that the following symptoms have improved:  NONE     No further outreach scheduled with this CTN/ACM. Episode of Care resolved. Patient has this CTN/ACM contact information if future needs arise.

## 2020-07-28 NOTE — LETTER
7/28/2020    Lydia Escamilla, APRN - CNP  [address]  [address] [facility], [address] [place] [address]    RE: Yvan Montano    Dear Dr. Kaden Winn,    Thank you for allowing me to participate in the care of Ms. Loraine Hampton. I had the opportunity to evaluate the patient on 7/28/2020. Attached you will find my evaluation and recommendations. Thanks again for the confidence you have expressed in me by allowing my participation in the care of your patient. I will keep you apprised of further developments in the patients treatment course as it progresses. If I can be of further assistance in any fashion, please feel free to contact me at your convenience.     Sincerely,        Zack Thompson  Shoulder and Elbow Surgery

## 2020-07-28 NOTE — PROGRESS NOTES
Orthopedic Knee Encounter Note     Chief complaint: Bilateral knee pain    HPI: Omar Friday is a 78 y.o. female who presents for evaluation of bilateral knees. she is been having pain for approximately 20 years. She a has been previously diagnosed with bilateral knee osteoarthritis. It is progressively getting worse. Her left hurts more than the right. Her pain is primarily over the anterior aspect of both knees but can be medial and lateral as well. It hurts mostly with walking but at times she does have some pain at nighttime as well. She reports having painful popping in both knees but especially the left where he feels like it is popping out of place. She reports having bilateral knee stiffness as well. She is unable to take anti-inflammatories as a result of being on Xarelto. She does take Tylenol instead.     Previous treatment:    NSAIDs: None    Injections:  None    Physical therapy: Yes    Surgeries: None    Review of Systems:     Constitution: no fever or chills   Pain level: 3/10  Musculoskeletal: As noted in the HPI   Neurologic: no neurologic symptoms    Past Medical History  Natalie Cole  has a past medical history of Adenocarcinoma of endometrium, stage 1 ([address] JOSHUA (acute kidney injury) ([address] Allergic rhinitis, At high risk for falls, Atrial fibrillation ([address] CHF (congestive heart failure) ([address] Colon polyp, Diabetes mellitus ([address] Diverticulitis, Endometrial cancer ([address] History of TIA (transient ischemic attack), Hyperglycemia, Hyperlipidemia, Hypertension, Hypothyroidism, Legally blind, Lower back pain, Mixed incontinence, Morbid obesity with BMI of 40.0-44.9, adult ([address] CLAROS (nonalcoholic steatohepatitis), Obesity, Oral phase dysphagia, Osteoarthritis (arthritis due to wear and tear of joints), Osteoarthritis involving multiple joints on both sides of body, Osteoporosis, Perforated bowel ([address] Postmenopausal bleeding, S/P h-scope, Myosure 9/20/17, Tennis elbow, Thickened DAILY 90 tablet 3    metFORMIN (GLUCOPHAGE) 500 MG tablet TAKE ONE TABLET BY MOUTH TWICE A DAY WITH A MEAL 60 tablet 10    Cranberry, Vacc oxycoccus, (SM CRANBERRY) 500 MG TABS Take 500 mg by mouth daily       UNABLE TO FIND Needs right walker brake fixed 1 each 0    Cholecalciferol (VITAMIN D) 2000 units TABS tablet Take 1 tablet by mouth daily 90 tablet 3    vitamin B-12 (CYANOCOBALAMIN) 1000 MCG tablet Take 1 tablet by mouth daily 90 tablet 3    acetaminophen (APAP EXTRA STRENGTH) 500 MG tablet Take 1 tablet by mouth every 6 hours as needed for Pain or Fever 360 tablet 3    Lactobacillus (PROBIOTIC ACIDOPHILUS) TABS Take 1 tablet by mouth daily 30 tablet 3     No current facility-administered medications for this visit. Allergies  Allergies have been reviewed. Shiloh John is allergic to sulfa antibiotics and penicillins. Social History  Shiloh John  reports that she has never smoked. She has never used smokeless tobacco. She reports that she does not drink alcohol or use drugs. Family History  María's family history includes Coronary Art Dis in her father; Diabetes in her father, maternal grandfather, and mother; Heart Attack in her father and paternal grandfather; High Blood Pressure in her brother, father, maternal grandmother, mother, and sister; Hypertension in her father; No Known Problems in her paternal grandmother; Thyroid Disease in her brother and mother. Physical Exam:     LMP  (LMP Unknown)    General Appearance: alert, well appearing, and in no distress  Mental Status: alert, oriented to person, place, and time  Gait: Antalgic, slow gait with a walker.   Hips: Good pain-free ROM without crepitation    Knee: Bilateral    Skin: warm and dry, no rash or erythema  Vasculature: 2+ pedal pulses bilaterally  Neuro: Sensation grossly intact to light touch diffusely  Alignment: Valgus alignment of the right knee and varus alignment of the left  Tenderness: See below    ROM: (Degrees)    Right   A P   Left   A P    Extension  20    Extension  15   Flexion   90    Flexion   90      Crepitation  Yes    Crepitation  Yes      Muscle strength:    Right       Left    Flexion   5    Flexion   5  Extension  5    Extension  5  SLR   5    SLR   5    Extensor lag   n    Extensor lag  n      Special testing:    Right          Left    y    Pain with deep knee flexion   y  y    Patellar grind     y  n    Patellar apprehension    n  n    Patellar glide     n    n    Lachman     n  n    Anterior drawer    n  n    Pivot shift     n  n    Posterior drawer    n  n    Dial test     n  n    Posterolateral drawer    n  n    Posterior Sag     n  n    MCL      n  n    LCL      n    y    Medial joint line tenderness   y  y    Lateral joint line tenderness   y  y    Appley's     y      Imagin x-ray views of bilateral knees completed on 2020 demonstrate essentially complete lateral compartment joint space loss in the right knee and medial compartment joint space loss in the left knee associated with tricompartmental osteophytic change. She also has significant patellofemoral joint space loss especially laterally in the left knee associated with osteophytic change. Impression/Plan:     Amandeep Paniagua is a 78 y.o. old female with severe bilateral knee osteoarthritis. I had a discussion with the patient with regards to the nature and extent of her problem. We also discussed treatment options available to her including non-operative and operative intervention. To this end we discussed use of NSAIDs, cortisone and viscosupplementation injections, weight loss, activity modification, physical therapy, bracing, and use of assistive walking devices. We also had discussions about total knee arthroplasties. As outlined above she has attempted treatment to include use of limited therapy as well as a walker.  At this time she would like to proceed with a cortisone injection in addition to aquatic therapy which is been beneficial for her in the past.  Due to the fact that she is diabetic we will stage her cortisone injection starting with the left knee. This was administered as outlined below and a prescription for therapy was provided. Procedure: left intraarticular knee injection  Following an appropriate discussion with the patient regarding the risks and benefits of the procedure she consented to proceed. her left knee was prepped using chlorhexadine solution. Using aseptic technique and through a superolateral approach, her left knee joint was injected with a 5 cc mixture of 1cc 40mg/ml kenalog and 4 cc of 1% lidocaine without epinephrine. A band aid was applied to the injection site. she tolerated the injection with no immediate adverse reactions. The patient was counseled about the risks of obtaining a cortisone injection as a diabetic and was instructed to monitor their blood sugar levels for the next 7 to 10 days adjusting their medication and/or notifying their PCP as needed.         NA = Not assessed  n = No  y = Yes  SLR = Straight leg raise  MCL = Medial collateral ligament  LCL = Lateral collateral ligament\

## 2020-07-29 ENCOUNTER — TELEPHONE (OUTPATIENT)
Dept: FAMILY MEDICINE CLINIC | Age: 70
End: 2020-07-29

## 2020-07-30 ENCOUNTER — HOSPITAL ENCOUNTER (OUTPATIENT)
Dept: PREADMISSION TESTING | Age: 70
Setting detail: SPECIMEN
Discharge: HOME | End: 2020-08-03
Payer: MEDICARE

## 2020-07-30 PROCEDURE — U0003 INFECTIOUS AGENT DETECTION BY NUCLEIC ACID (DNA OR RNA); SEVERE ACUTE RESPIRATORY SYNDROME CORONAVIRUS 2 (SARS-COV-2) (CORONAVIRUS DISEASE [COVID-19]), AMPLIFIED PROBE TECHNIQUE, MAKING USE OF HIGH THROUGHPUT TECHNOLOGIES AS DESCRIBED BY CMS-2020-01-R: HCPCS

## 2020-07-31 ENCOUNTER — TELEPHONE (OUTPATIENT)
Dept: ORTHOPEDIC SURGERY | Age: 70
End: 2020-07-31

## 2020-07-31 NOTE — TELEPHONE ENCOUNTER
Called patient back to clarify, she states she feels her knee pop and cant get it back in. I asked if can walk on it and she states barley. Patient did very well after injection at last appointment but pain and popping has came back. Would you like to bring patient back in for another injection?

## 2020-07-31 NOTE — TELEPHONE ENCOUNTER
Unfortunately she only just received the injection.  At this time I would recommend a surgical consultation with Dr. Tracie Maurer

## 2020-08-01 LAB — SARS-COV-2, NAA: NOT DETECTED

## 2020-08-02 ENCOUNTER — TELEPHONE (OUTPATIENT)
Dept: PRIMARY CARE CLINIC | Age: 70
End: 2020-08-02

## 2020-08-03 ENCOUNTER — ANESTHESIA (OUTPATIENT)
Dept: ENDOSCOPY | Age: 70
DRG: 329 | End: 2020-08-03
Payer: MEDICARE

## 2020-08-03 ENCOUNTER — HOSPITAL ENCOUNTER (OUTPATIENT)
Age: 70
Setting detail: OUTPATIENT SURGERY
Discharge: HOME | DRG: 329 | End: 2020-08-03
Attending: SURGERY | Admitting: SURGERY
Payer: MEDICARE

## 2020-08-03 ENCOUNTER — ANESTHESIA EVENT (OUTPATIENT)
Dept: ENDOSCOPY | Age: 70
DRG: 329 | End: 2020-08-03
Payer: MEDICARE

## 2020-08-03 VITALS
HEIGHT: 62 IN | SYSTOLIC BLOOD PRESSURE: 140 MMHG | BODY MASS INDEX: 41.59 KG/M2 | OXYGEN SATURATION: 99 % | RESPIRATION RATE: 16 BRPM | TEMPERATURE: 97.4 F | HEART RATE: 77 BPM | DIASTOLIC BLOOD PRESSURE: 69 MMHG | WEIGHT: 226 LBS

## 2020-08-03 VITALS — OXYGEN SATURATION: 100 % | SYSTOLIC BLOOD PRESSURE: 166 MMHG | DIASTOLIC BLOOD PRESSURE: 74 MMHG | TEMPERATURE: 95.7 F

## 2020-08-03 LAB
GLUCOSE BLD-MCNC: 107 MG/DL (ref 65–105)
GLUCOSE BLD-MCNC: 121 MG/DL (ref 65–105)

## 2020-08-03 PROCEDURE — 6370000000 HC RX 637 (ALT 250 FOR IP): Performed by: SURGERY

## 2020-08-03 PROCEDURE — 7100000000 HC PACU RECOVERY - FIRST 15 MIN: Performed by: SURGERY

## 2020-08-03 PROCEDURE — 3700000000 HC ANESTHESIA ATTENDED CARE: Performed by: SURGERY

## 2020-08-03 PROCEDURE — 2580000003 HC RX 258: Performed by: ANESTHESIOLOGY

## 2020-08-03 PROCEDURE — 3609012400 HC EGD TRANSORAL BIOPSY SINGLE/MULTIPLE: Performed by: SURGERY

## 2020-08-03 PROCEDURE — 3700000001 HC ADD 15 MINUTES (ANESTHESIA): Performed by: SURGERY

## 2020-08-03 PROCEDURE — 2500000003 HC RX 250 WO HCPCS: Performed by: NURSE ANESTHETIST, CERTIFIED REGISTERED

## 2020-08-03 PROCEDURE — 82947 ASSAY GLUCOSE BLOOD QUANT: CPT

## 2020-08-03 PROCEDURE — 7100000010 HC PHASE II RECOVERY - FIRST 15 MIN: Performed by: SURGERY

## 2020-08-03 PROCEDURE — 2709999900 HC NON-CHARGEABLE SUPPLY: Performed by: SURGERY

## 2020-08-03 PROCEDURE — 3609010600 HC COLONOSCOPY POLYPECTOMY SNARE/COLD BIOPSY: Performed by: SURGERY

## 2020-08-03 PROCEDURE — 7100000001 HC PACU RECOVERY - ADDTL 15 MIN: Performed by: SURGERY

## 2020-08-03 PROCEDURE — 2500000003 HC RX 250 WO HCPCS: Performed by: ANESTHESIOLOGY

## 2020-08-03 PROCEDURE — 7100000030 HC ASPR PHASE II RECOVERY - FIRST 15 MIN: Performed by: SURGERY

## 2020-08-03 PROCEDURE — 7100000011 HC PHASE II RECOVERY - ADDTL 15 MIN: Performed by: SURGERY

## 2020-08-03 PROCEDURE — 88305 TISSUE EXAM BY PATHOLOGIST: CPT

## 2020-08-03 PROCEDURE — 6360000002 HC RX W HCPCS: Performed by: NURSE ANESTHETIST, CERTIFIED REGISTERED

## 2020-08-03 PROCEDURE — 7100000031 HC ASPR PHASE II RECOVERY - ADDTL 15 MIN: Performed by: SURGERY

## 2020-08-03 RX ORDER — PROPOFOL 10 MG/ML
INJECTION, EMULSION INTRAVENOUS PRN
Status: DISCONTINUED | OUTPATIENT
Start: 2020-08-03 | End: 2020-08-03 | Stop reason: SDUPTHER

## 2020-08-03 RX ORDER — EPHEDRINE SULFATE/0.9% NACL/PF 50 MG/5 ML
SYRINGE (ML) INTRAVENOUS PRN
Status: DISCONTINUED | OUTPATIENT
Start: 2020-08-03 | End: 2020-08-03 | Stop reason: SDUPTHER

## 2020-08-03 RX ORDER — ONDANSETRON 2 MG/ML
INJECTION INTRAMUSCULAR; INTRAVENOUS PRN
Status: DISCONTINUED | OUTPATIENT
Start: 2020-08-03 | End: 2020-08-03 | Stop reason: SDUPTHER

## 2020-08-03 RX ORDER — LIDOCAINE HYDROCHLORIDE 10 MG/ML
INJECTION, SOLUTION EPIDURAL; INFILTRATION; INTRACAUDAL; PERINEURAL PRN
Status: DISCONTINUED | OUTPATIENT
Start: 2020-08-03 | End: 2020-08-03 | Stop reason: SDUPTHER

## 2020-08-03 RX ORDER — DEXAMETHASONE SODIUM PHOSPHATE 4 MG/ML
INJECTION, SOLUTION INTRA-ARTICULAR; INTRALESIONAL; INTRAMUSCULAR; INTRAVENOUS; SOFT TISSUE PRN
Status: DISCONTINUED | OUTPATIENT
Start: 2020-08-03 | End: 2020-08-03 | Stop reason: SDUPTHER

## 2020-08-03 RX ORDER — FENTANYL CITRATE 50 UG/ML
INJECTION, SOLUTION INTRAMUSCULAR; INTRAVENOUS PRN
Status: DISCONTINUED | OUTPATIENT
Start: 2020-08-03 | End: 2020-08-03 | Stop reason: SDUPTHER

## 2020-08-03 RX ORDER — GLYCOPYRROLATE 1 MG/5 ML
SYRINGE (ML) INTRAVENOUS PRN
Status: DISCONTINUED | OUTPATIENT
Start: 2020-08-03 | End: 2020-08-03 | Stop reason: SDUPTHER

## 2020-08-03 RX ORDER — SODIUM CHLORIDE 9 MG/ML
INJECTION, SOLUTION INTRAVENOUS CONTINUOUS
Status: DISCONTINUED | OUTPATIENT
Start: 2020-08-03 | End: 2020-08-03 | Stop reason: HOSPADM

## 2020-08-03 RX ORDER — PHENYLEPHRINE HYDROCHLORIDE 10 MG/ML
INJECTION INTRAVENOUS PRN
Status: DISCONTINUED | OUTPATIENT
Start: 2020-08-03 | End: 2020-08-03 | Stop reason: SDUPTHER

## 2020-08-03 RX ORDER — SUCCINYLCHOLINE/SOD CL,ISO/PF 200MG/10ML
SYRINGE (ML) INTRAVENOUS PRN
Status: DISCONTINUED | OUTPATIENT
Start: 2020-08-03 | End: 2020-08-03 | Stop reason: SDUPTHER

## 2020-08-03 RX ORDER — LIDOCAINE HYDROCHLORIDE 20 MG/ML
15 SOLUTION OROPHARYNGEAL ONCE
Status: COMPLETED | OUTPATIENT
Start: 2020-08-03 | End: 2020-08-03

## 2020-08-03 RX ADMIN — Medication 20 MG: at 08:51

## 2020-08-03 RX ADMIN — Medication 25 MG: at 09:21

## 2020-08-03 RX ADMIN — Medication 15 MG: at 08:46

## 2020-08-03 RX ADMIN — Medication 15 MG: at 09:03

## 2020-08-03 RX ADMIN — Medication 0.2 MG: at 09:30

## 2020-08-03 RX ADMIN — SODIUM CHLORIDE: 9 INJECTION, SOLUTION INTRAVENOUS at 09:45

## 2020-08-03 RX ADMIN — Medication 120 MG: at 08:41

## 2020-08-03 RX ADMIN — FENTANYL CITRATE 50 MCG: 50 INJECTION, SOLUTION INTRAMUSCULAR; INTRAVENOUS at 08:39

## 2020-08-03 RX ADMIN — DEXAMETHASONE SODIUM PHOSPHATE 4 MG: 4 INJECTION, SOLUTION INTRA-ARTICULAR; INTRALESIONAL; INTRAMUSCULAR; INTRAVENOUS; SOFT TISSUE at 08:38

## 2020-08-03 RX ADMIN — LIDOCAINE HYDROCHLORIDE 50 MG: 10 INJECTION, SOLUTION EPIDURAL; INFILTRATION; INTRACAUDAL; PERINEURAL at 08:40

## 2020-08-03 RX ADMIN — PROPOFOL 180 MG: 10 INJECTION, EMULSION INTRAVENOUS at 08:40

## 2020-08-03 RX ADMIN — SODIUM CHLORIDE: 9 INJECTION, SOLUTION INTRAVENOUS at 08:21

## 2020-08-03 RX ADMIN — PHENYLEPHRINE HYDROCHLORIDE 200 MCG: 10 INJECTION INTRAVENOUS at 09:45

## 2020-08-03 RX ADMIN — Medication 25 MG: at 09:15

## 2020-08-03 RX ADMIN — LIDOCAINE HYDROCHLORIDE 15 ML: 20 SOLUTION ORAL; TOPICAL at 08:09

## 2020-08-03 RX ADMIN — FAMOTIDINE 20 MG: 10 INJECTION, SOLUTION INTRAVENOUS at 08:32

## 2020-08-03 RX ADMIN — ONDANSETRON 4 MG: 2 INJECTION INTRAMUSCULAR; INTRAVENOUS at 08:39

## 2020-08-03 ASSESSMENT — PULMONARY FUNCTION TESTS
PIF_VALUE: 20
PIF_VALUE: 18
PIF_VALUE: 18
PIF_VALUE: 20
PIF_VALUE: 20
PIF_VALUE: 23
PIF_VALUE: 19
PIF_VALUE: 19
PIF_VALUE: 18
PIF_VALUE: 23
PIF_VALUE: 20
PIF_VALUE: 18
PIF_VALUE: 19
PIF_VALUE: 19
PIF_VALUE: 20
PIF_VALUE: 18
PIF_VALUE: 19
PIF_VALUE: 18
PIF_VALUE: 19
PIF_VALUE: 24
PIF_VALUE: 0
PIF_VALUE: 18
PIF_VALUE: 20
PIF_VALUE: 20
PIF_VALUE: 28
PIF_VALUE: 19
PIF_VALUE: 18
PIF_VALUE: 19
PIF_VALUE: 3
PIF_VALUE: 28
PIF_VALUE: 19
PIF_VALUE: 1
PIF_VALUE: 20
PIF_VALUE: 19
PIF_VALUE: 20
PIF_VALUE: 20
PIF_VALUE: 18
PIF_VALUE: 20
PIF_VALUE: 18
PIF_VALUE: 2
PIF_VALUE: 19
PIF_VALUE: 23
PIF_VALUE: 19
PIF_VALUE: 19
PIF_VALUE: 20
PIF_VALUE: 18
PIF_VALUE: 17
PIF_VALUE: 18
PIF_VALUE: 19
PIF_VALUE: 18
PIF_VALUE: 19
PIF_VALUE: 19
PIF_VALUE: 18
PIF_VALUE: 18
PIF_VALUE: 11
PIF_VALUE: 18
PIF_VALUE: 1
PIF_VALUE: 25
PIF_VALUE: 19
PIF_VALUE: 18
PIF_VALUE: 19
PIF_VALUE: 20
PIF_VALUE: 18
PIF_VALUE: 19
PIF_VALUE: 18
PIF_VALUE: 0
PIF_VALUE: 2
PIF_VALUE: 19
PIF_VALUE: 0
PIF_VALUE: 20
PIF_VALUE: 17
PIF_VALUE: 19
PIF_VALUE: 19
PIF_VALUE: 1
PIF_VALUE: 4
PIF_VALUE: 3
PIF_VALUE: 23
PIF_VALUE: 19

## 2020-08-03 ASSESSMENT — PAIN SCALES - GENERAL
PAINLEVEL_OUTOF10: 0
PAINLEVEL_OUTOF10: 0

## 2020-08-03 ASSESSMENT — ENCOUNTER SYMPTOMS: SHORTNESS OF BREATH: 0

## 2020-08-03 ASSESSMENT — LIFESTYLE VARIABLES: SMOKING_STATUS: 0

## 2020-08-03 NOTE — OP NOTE
Operative Note      Patient: Amandeep Paniagua  YOB: 1950  MRN: [id number]    Date of Procedure: 8/3/2020    PROCEDURE NOTE    DATE OF PROCEDURE: 8/3/2020    SURGEON: Blayne Camacho MD    ASSISTANT: None    PREOPERATIVE DIAGNOSIS: History of sigmoid diverticulitis with contained perforation    POSTOPERATIVE DIAGNOSIS: Sigmoid diverticulosis/polyp at 39 cm/transverse colon polyp/right colon polyps/lipomatous prominent ileocecal valve    OPERATION: Total colonoscopy to cecum with multiple polypectomies with hot snare polypectomy technique    ANESTHESIA: General    ESTIMATED BLOOD LOSS: None    COMPLICATIONS: None     SPECIMENS:  Was Obtained: Polyp at 39 cm/transverse colon polyps/right colon polyps    HISTORY: The patient is a 78y.o. year old female with history of above preop diagnosis. I recommended colonoscopy with possible biopsy or polypectomy and I explained the risk, benefits, expected outcome, and alternatives to the procedure. Risks included but are not limited to bleeding, infection, respiratory distress, hypotension, and perforation of the colon and possibility of missing a lesion. The patient understands and is in agreement. PROCEDURE: The patient was given IV conscious sedation. The patient's SPO2 remained above 90% throughout the procedure. Digital rectal exam was normal.  The colonoscope was inserted through the anus into the rectum and advanced under direct vision to the cecum without difficulty. Terminal ileum was examined for approximately 2 inches. The prep was fair. Findings:    Cecum/Ascending colon: abnormal: Cecal polyp and right colon polyp both removed using hot snare polypectomy technique. Transverse colon: abnormal: Transverse colon polyps removed with hot snare polypectomy technique. Descending/Sigmoid colon: abnormal: Sigmoid diverticulosis. Polyp at 45 cm removed with hot snare polypectomy technique. Somewhat redundant sigmoid.     Rectum/Anus: examined in normal and retroflexed positions and was normal    Withdrawal Time was (minutes): 20      Next screening colonoscopy: 3 years. If screening is less than 10 years the recommended reason is due:polyps    The colon was decompressed. While withdrawing the scope the above findings were verified and the scope was removed. The patient tolerated the procedure and conscious sedation without unusual events. In the recovery room patient was examined and remains hemodynamically stable. Discharge home when criteria met. Recommendations/Plan:   1. F/U Biopsies  2. F/U In Office as instructed  3. Discussed with the family  4. High fiber diet   5. Precautions to avoid constipation  6. To proceed with sigmoid colectomy tomorrow and cholecystectomy as scheduled.     Electronically signed by Lizette Parisi MD  on 8/3/2020 at 9:51 AM

## 2020-08-03 NOTE — ANESTHESIA PRE PROCEDURE
MYRBETRIQ 50 MG TB24 TAKE ONE TABLET BY MOUTH ONCE DAILY 12/16/19   Keely Dempsey MD   metFORMIN (GLUCOPHAGE) 500 MG tablet TAKE ONE TABLET BY MOUTH TWICE A DAY WITH A MEAL 11/11/19   Keely Dempsey MD   Cranberry, Vacc oxycoccus, (SM CRANBERRY) 500 MG TABS Take 500 mg by mouth daily  7/31/17   Historical Provider, MD   UNABLE TO FIND Needs right walker brake fixed 10/3/19   Keely Dempsey MD   Cholecalciferol (VITAMIN D) 2000 units TABS tablet Take 1 tablet by mouth daily 3/9/19   Keely Dempsey MD   vitamin B-12 (CYANOCOBALAMIN) 1000 MCG tablet Take 1 tablet by mouth daily 3/9/19   Keely Dempsey MD   acetaminophen (APAP EXTRA STRENGTH) 500 MG tablet Take 1 tablet by mouth every 6 hours as needed for Pain or Fever 2/12/19   Keely Dempsey MD   Lactobacillus (PROBIOTIC ACIDOPHILUS) TABS Take 1 tablet by mouth daily 7/31/17   Keely Dempsey MD       Current medications:    No current facility-administered medications for this encounter. Allergies:     Allergies   Allergen Reactions    Sulfa Antibiotics Nausea Only    Penicillins Rash       Problem List:    Patient Active Problem List   Diagnosis Code    Acquired hypothyroidism E03.9    History of TIA (transient ischemic attack) Z86.73    Chronic bilateral low back pain without sciatica M54.5, G89.29    Essential hypertension I10    Osteopenia determined by x-ray M85.80    Osteoarthritis involving multiple joints on both sides of body M15.9    Left knee DJD M17.12    Prediabetes R73.03    Vitamin D deficiency E55.9    Mixed incontinence N39.46    Chronic pain of both knees M25.561, M25.562, G89.29    Slow transit constipation K59.01    Allergic rhinitis J30.9    Hyperlipidemia with target LDL less than 100 E78.5    Morbid obesity with BMI of 40.0-44.9, adult (HCC) E66.01, Z68.41    At high risk for falls Z91.81    Dense breast tissue on mammogram R92.2    Hyperglycemia R73.9    Spondylosis of lumbar region without myelopathy or radiculopathy M47.816    OAB (overactive bladder) N32.81    Primary osteoarthritis of both knees M17.0    Chronic fatigue  R53.82    Adenomatous polyp of sigmoid colon, 6/26/17 D12.5    Mixed stress and urge urinary incontinence N39.46    TLHBSO, bilateral LND 10/24/17 Z90.710, Z90.79, Z90.722    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    Cataracta b/l eyes H26.9    Difficulty walking R26.2    Esotropia H50.00    Nystagmus H55.00    History of uterine cancer, Well differentiated grade 1 adenocarcinoma arising in complex hyperplasia, 2017 Z85.42    Vitamin B 12 deficiency E53.8    Aortic calcification (HCC) I70.0    Calculus of gallbladder without cholecystitis without obstruction K80.20    CLAROS (nonalcoholic steatohepatitis) K75.81    Optic atrophy H47.20    Cough present for greater than 3 weeks R05    Dyspepsia R10.13    A-fib ([facility]) I48.91    Type 2 diabetes mellitus, without long-term current use of insulin ([facility]) E11.9    JOSHUA (acute kidney injury) ([facility] N17.9    Atrial fibrillation, new onset ([facility] I48.91    Mobility impaired Z74.09    Perforated diverticulum K57.80    Diverticulitis K57.92    Chronic cholecystitis K81.1    Colitis K52.9    Multiple atypical skin moles D22.9    Class 3 severe obesity due to excess calories without serious comorbidity with body mass index (BMI) of 40.0 to 44.9 in adult (HCC) E66.01, Z68.41    Symptomatic bradycardia R00.1    CHF NYHA class I, chronic, diastolic (HCC) N84.32       Past Medical History:        Diagnosis Date    Adenocarcinoma of endometrium, stage 1 ([address] 10/5/2017    Well differentiated grade 1 adenocarcinoma arising in complex hyperplasia    JOSHUA (acute kidney injury) ([address] 1/15/2020    Allergic rhinitis 2/23/2017    At high risk for falls 2/23/2017    Atrial fibrillation [facility])     Jan 2020    CHF (congestive heart failure) ([facility]     \"little\"    Colon polyp     Had colonoscopy done 20 yrs ago   Tru Abel Diabetes mellitus ([address]     Diverticulitis     Endometrial cancer ([address]     History of TIA (transient ischemic attack) '80's    on Baby ASA    Hyperglycemia 3/21/2017    Hyperlipidemia     Hypertension since 2015    on Rx.     Hypothyroidism 1980    sub total thyroidectomy goiter    Legally blind since born    both eyes, cord prolapse; \"not legally blind\" per \"associated eye care\" please see telephone encounter from 2/27/18    Lower back pain     Mixed incontinence 1/9/2016    Morbid obesity with BMI of 40.0-44.9, adult ([address] 2/23/2017    CLAROS (nonalcoholic steatohepatitis) 3/10/2019    Obesity     Oral phase dysphagia 2/23/2018    Osteoarthritis (arthritis due to wear and tear of joints)     ronan knee    Osteoarthritis involving multiple joints on both sides of body 4/24/2012    Osteoporosis     Perforated bowel ([address]     Postmenopausal bleeding 9/20/2017    S/P h-scope, Myosure 9/20/17 9/20/2017    Pathology pending    Tennis elbow     left    Thickened endometrium 9/20/2017    TIA (transient ischemic attack)     TLHBSO, bilateral LND 10/24/17 10/24/2017    Type 2 diabetes mellitus, without long-term current use of insulin ([address] 1/14/2020       Past Surgical History:        Procedure Laterality Date    CATARACT REMOVAL WITH IMPLANT Bilateral 2015    COLONOSCOPY  1997    had polyp, she doesn't know where and when, \"20 yrs ago\"    COLONOSCOPY  06/26/2017    DILATION AND CURETTAGE OF UTERUS N/A 9/20/2017    HYSTEROSCOPY  WITH MYOSURE performed by Roberto Newell DO at [address] N/A 10/24/2017    TOTAL LAPAROSCOPIC HYSTERECTOMY, BSO, F.S.  STAGING, GYRUS G400 performed by Heather Castro MD at [address] N/A 6/26/2017    COLONOSCOPY POLYPECTOMY / HOT SNARE performed by Scott Thorpe DO at [address]  10/24/2017    with pelvic lymph node dissection    THYROID SURGERY  1980    subtotal 20 years ago    TONSILLECTOMY      VITRECTOMY      FLOATERS       Social History:    Social History     Tobacco Use    Smoking status: Never Smoker    Smokeless tobacco: Never Used   Substance Use Topics    Alcohol use: No     Alcohol/week: 0.0 standard drinks                                Counseling given: Not Answered      Vital Signs (Current): There were no vitals filed for this visit. BP Readings from Last 3 Encounters:   07/27/20 (!) 140/73   07/21/20 (!) 116/56   07/22/20 120/78       NPO Status:                                                                                 BMI:   Wt Readings from Last 3 Encounters:   07/28/20 232 lb (105.2 kg)   07/26/20 243 lb 15.3 oz (110.7 kg)   07/21/20 226 lb (102.5 kg)     There is no height or weight on file to calculate BMI.    CBC:   Lab Results   Component Value Date    WBC 6.8 07/27/2020    RBC 3.93 07/27/2020    RBC 4.23 03/20/2012    HGB 11.9 07/27/2020    HCT 36.3 07/27/2020    MCV 92.6 07/27/2020    RDW 15.2 07/27/2020     07/27/2020     03/20/2012       CMP:   Lab Results   Component Value Date     07/27/2020    K 4.2 07/27/2020     07/27/2020    CO2 26 07/27/2020    BUN 20 07/27/2020    CREATININE 0.88 07/27/2020    GFRAA >60 07/27/2020    LABGLOM >60 07/27/2020    GLUCOSE 98 07/27/2020    GLUCOSE 114 03/20/2012    PROT 7.5 07/24/2020    CALCIUM 8.9 07/27/2020    BILITOT 0.24 07/24/2020    ALKPHOS 50 07/24/2020    AST 12 07/24/2020    ALT 16 07/24/2020       POC Tests: No results for input(s): POCGLU, POCNA, POCK, POCCL, POCBUN, POCHEMO, POCHCT in the last 72 hours.     Coags:   Lab Results   Component Value Date    PROTIME 13.1 07/11/2020    INR 1.0 07/11/2020    APTT 28.7 07/11/2020       HCG (If Applicable): No results found for: PREGTESTUR, PREGSERUM, HCG, HCGQUANT     ABGs: No results found for: PHART, PO2ART, YJZ5NAD, NUO8VPO, BEART, M1GNMJQM     Type & Screen (If Applicable):  No results

## 2020-08-03 NOTE — OP NOTE
Operative Note      Patient: Nereyda Swartz  YOB: 1950  MRN: [id number]    Date of Procedure: 8/3/2020    ESOPHAGOGASTRODUODENOSCOPY   ( EGD )  DATE OF PROCEDURE: 8/3/2020     SURGEON: Mireya Russo MD    ASSISTANT: None    PREOPERATIVE DIAGNOSIS: Abnormal CT abdomen    POSTOPERATIVE DIAGNOSIS: Irregular Z line possible Farooq's esophagus/gastric polyp/antral gastritis    OPERATION: Upper GI endoscopy with Biopsy    ANESTHESIA: Moderate Sedation     ESTIMATED BLOOD LOSS: None    COMPLICATIONS: None. SPECIMENS:  Was Obtained: GE junction biopsy/gastric polyp biopsy/antral biopsy    HISTORY: The patient is a 78y.o. year old female with history of above preop diagnosis. I recommended esophagogastroduodenoscopy with possible biopsy and I explained the risk, benefits, expected outcome, and alternatives to the procedure. Risks included but are not limited to bleeding, infection, respiratory distress, hypotension, and perforation of the esophagus, stomach, or duodenum. Patient understands and is in agreement. PROCEDURE: The patient was given IV conscious sedation. The patient's SPO2 remained above 90% throughout the procedure. Cetacaine spray given. Patient placed in left lateral position. Olympus  videogastroscope was inserted orally under vision into the esophagus without difficulty and advanced into the stomach then through the pylorus up to the second part of duodenum. Findings:    Retropharyngeal area was grossly normal appearing    Esophagus: Irregular Z line. Biopsy obtained to rule out Farooq's esophagus. No mass or active esophagitis. Stomach:    Fundus and Cardia Examined in Retroflexed View: normal    Body: abnormal: Gastric polyp likely fundic gland polyp. Biopsy obtained. Antrum: abnormal: Antral gastritis. Biopsy obtained.     Duodenum:     Descending: normal    Bulb: normal    While withdrawing the scope the above findings were verified and the scope was removed. The patient tolerated the procedure and conscious sedation without unusual events. In the recovery room patient was examined and remains hemodynamically stable. Discharge home when criteria met. Recommendations/Plan:   1. F/U Biopsies  2. F/U In Office as instructed  3. Discussed with the family  4. To proceed with colonoscopy as scheduled.     Electronically signed by Alphonso Ahumada MD  on 8/3/2020 at 9:50 AM

## 2020-08-03 NOTE — ANESTHESIA POSTPROCEDURE EVALUATION
POST- ANESTHESIA EVALUATION       Pt Name: Bryn Cranker  MRN: [id number]  [place]: 1950  Date of evaluation: 8/3/2020  Time:  1:58 PM      BP (!) 140/69   Pulse 77   Temp 97.4 °F (36.3 °C) (Temporal)   Resp 16   Ht 5' 2\" (1.575 m)   Wt 226 lb (102.5 kg)   LMP  (LMP Unknown)   SpO2 99%   BMI 41.34 kg/m²      Consciousness Level  Awake  Cardiopulmonary Status  Stable  Pain Adequately Treated YES  Nausea / Vomiting  NO  Adequate Hydration  YES  Anesthesia Related Complications NONE      Electronically signed by Austen Alan MD on 8/3/2020 at 1:58 PM       Department of Anesthesiology  Postprocedure Note    Patient: Bryn Cranker  MRN: [id number]  [place]: 1950  Date of evaluation: 8/3/2020  Time:  1:57 PM     Procedure Summary     Date:  08/03/20 Room / [facility]:  [facility]    Anesthesia Start:  3201 Anesthesia Stop:  1000    Procedures:       EGD ESOPHAGOGASTRODUODENOSCOPY (N/A Esophagus)      COLONOSCOPY POLYPECTOMY SNARE (N/A ) Diagnosis:  (REFLUX ESOPHAGITIS COLITIS)    Surgeon:  Lizzy Ricardo MD Responsible Provider:  Aftab Bustillos MD    Anesthesia Type:  general ASA Status:  3          Anesthesia Type: general    Luz Phase I: Luz Score: 10    Luz Phase II: Luz Score: 10    Last vitals: Reviewed and per EMR flowsheets.        Anesthesia Post Evaluation

## 2020-08-03 NOTE — H&P
HISTORY and Zach Newman [id number]       NAME:  Darryl Gardner  MRN: [id number]   YOB: 1950   Date: 8/3/2020   Age: 78 y.o. Gender: female       COMPLAINT AND PRESENT HISTORY:   Darryl Gardner is 78 y.o.,  female, will be having a Colonoscopy and EGD. Pt has prior Colonoscopy was done 5 years ago with polyp removed. Pt has history of Reflux Esophagitis Colitis. Patient has hx of Colon Polyps, Diverticulitis. Patient denies any  FH of Colon or esophogeal  Cancer. Patient reports always constipated, and use stool softener. No GI /Rectal bleeding, experiencing red/ black/ BRBPR stools. Patient Denies abd pain. Pt reports dysphagia and difficulty with food getting stuck. Pt has to resolve to drinking more fluids to assist with swallowing. Pt denies hx of GERD . Patient denies any other GI symptoms. No nausea / vomiting. No fever or chills, no chest pain , no SOB, no heart palpitation. Pt  Report she finished her colonoscopy prep , her bowel movement today is clear liquids as yellow. Pt denies any problems with anesthesia before, no history of infection MRSA. PT NPO since past midnight, took her BP am medication with sip of water , pt stopped her blood thinner medication Xarelto  3 days ago.        PAST MEDICAL HISTORY     Past Medical History:   Diagnosis Date    Adenocarcinoma of endometrium, stage 1 ([address] 10/5/2017    Well differentiated grade 1 adenocarcinoma arising in complex hyperplasia    JOSHUA (acute kidney injury) ([address] 1/15/2020    Allergic rhinitis 2/23/2017    At high risk for falls 2/23/2017    Atrial fibrillation [facility])     Jan 2020    CHF (congestive heart failure) ([address]     \"little\"    Colon polyp     Had colonoscopy done 20 yrs ago    Diabetes mellitus ([address]     Diverticulitis     Endometrial cancer ([address]     History of TIA (transient ischemic attack) '80's    on Baby ASA    Hyperglycemia 3/21/2017    Hyperlipidemia     Hypertension since 2015    on Rx.    Hypothyroidism 1980    sub total thyroidectomy goiter    Legally blind since born    both eyes, cord prolapse; \"not legally blind\" per \"associated eye care\" please see telephone encounter from 2/27/18    Lower back pain     Mixed incontinence 1/9/2016    Morbid obesity with BMI of 40.0-44.9, adult ([address] 2/23/2017    CLAROS (nonalcoholic steatohepatitis) 3/10/2019    Obesity     Oral phase dysphagia 2/23/2018    Osteoarthritis (arthritis due to wear and tear of joints)     ronan knee    Osteoarthritis involving multiple joints on both sides of body 4/24/2012    Osteoporosis     Perforated bowel ([address]     Postmenopausal bleeding 9/20/2017    S/P h-scope, Myosure 9/20/17 9/20/2017    Pathology pending    Tennis elbow     left    Thickened endometrium 9/20/2017    TIA (transient ischemic attack)     TLHBSO, bilateral LND 10/24/17 10/24/2017    Type 2 diabetes mellitus, without long-term current use of insulin ([facility] 1/14/2020       SURGICAL HISTORY       Past Surgical History:   Procedure Laterality Date    CATARACT REMOVAL WITH IMPLANT Bilateral 2015    COLONOSCOPY  1997    had polyp, she doesn't know where and when, \"20 yrs ago\"    COLONOSCOPY  06/26/2017    DILATION AND CURETTAGE OF UTERUS N/A 9/20/2017    HYSTEROSCOPY  WITH Tobias Owusu performed by Domingo Bingham DO at [address] N/A 10/24/2017    TOTAL LAPAROSCOPIC HYSTERECTOMY, BSO, F.S.  STAGING, GYRUS G400 performed by Gretchen Figueroa MD at [address] FLX W/REMOVAL LESION BY HOT BX FORCEPS N/A 6/26/2017    COLONOSCOPY POLYPECTOMY / HOT SNARE performed by Beatriz Wright DO at [address]  10/24/2017    with pelvic lymph node dissection    THYROID SURGERY  1980    subtotal 20 years ago    TONSILLECTOMY      VITRECTOMY      FLOATERS       FAMILY HISTORY       Family History   Problem Relation Age of Onset    Coronary Art Dis Father     Hypertension Father     Diabetes Father    Yannick Doe (CORDARONE) 200 MG tablet Take 1 tablet by mouth daily 30 tablet 3    rivaroxaban (XARELTO) 20 MG TABS tablet Take 1 tablet by mouth daily (with breakfast) 30 tablet 3    atorvastatin (LIPITOR) 40 MG tablet TAKE 1 TABLET BY MOUTH DAILY STOP SIMVASTATIN 90 tablet 3    levothyroxine (SYNTHROID) 75 MCG tablet TAKE 1 TABLET BY MOUTH EVERY MORNING (BEFORE BREAKFAST) 90 tablet 3    HM LORATADINE 10 MG tablet TAKE 1 TABLET BY MOUTH DAILY 90 tablet 3    docusate sodium (COLACE) 100 MG capsule TAKE 1 CAPSULE BY MOUTH 2 TIMES DAILY 180 capsule 3    MYRBETRIQ 50 MG TB24 TAKE ONE TABLET BY MOUTH ONCE DAILY 90 tablet 3    metFORMIN (GLUCOPHAGE) 500 MG tablet TAKE ONE TABLET BY MOUTH TWICE A DAY WITH A MEAL 60 tablet 10    Cranberry, Vacc oxycoccus, (SM CRANBERRY) 500 MG TABS Take 500 mg by mouth daily       Cholecalciferol (VITAMIN D) 2000 units TABS tablet Take 1 tablet by mouth daily 90 tablet 3    vitamin B-12 (CYANOCOBALAMIN) 1000 MCG tablet Take 1 tablet by mouth daily 90 tablet 3    Lactobacillus (PROBIOTIC ACIDOPHILUS) TABS Take 1 tablet by mouth daily 30 tablet 3    UNABLE TO FIND Needs right walker brake fixed 1 each 0    acetaminophen (APAP EXTRA STRENGTH) 500 MG tablet Take 1 tablet by mouth every 6 hours as needed for Pain or Fever 360 tablet 3       Negative except for what is mentioned in the HPI. GENERAL PHYSICAL EXAM     Vitals: BP (!) 157/77   Pulse 53   Temp 97.3 °F (36.3 °C)   Resp 18   Ht 5' 2\" (1.575 m)   Wt 226 lb (102.5 kg)   LMP  (LMP Unknown)   SpO2 98%   BMI 41.34 kg/m²  Body mass index is 41.34 kg/m². GENERAL APPEARANCE:   Jaxon Vallecillo is 78 y.o.,  female, moderately obese, nourished, conscious, alert. Does not appear to be distress or pain at this time. SKIN:  Warm, dry, no cyanosis or jaundice. HEAD:  Normocephalic, atraumatic, no swelling or tenderness. EYES:  Pupils equal, reactive to light. EARS:  No discharge, no marked hearing loss. NOSE:  No rhinorrhea, epistaxis or septal deformity. THROAT:  Not congested. No ulceration bleeding or discharge. NECK:  No stiffness, trachea central.  No palpable masses or L.N.                 CHEST:  Symmetrical and equal on expansion. HEART:  RRR S1 > S2. No audible murmurs or gallops. LUNGS:  Equal on expansion, normal breath sounds. No adventitious sounds. ABDOMEN:  Obese. Soft on palpation. No dysphagia, No localized tenderness. No guarding or rigidity. No palpable hepatosplenomegaly. LYMPHATICS:  No palpable cervical lymphadenopathy. LOCOMOTOR, BACK AND SPINE:  No tenderness or deformities. EXTREMITIES:  Symmetrical, no pretibial edema. Veronicas sign negative. No discoloration or ulcerations. NEUROLOGIC:  The patient is conscious, alert, oriented,Cranial nerve II-XII intact, taste and smell were not examined. No apparent focal sensory or motor deficits.              PROVISIONAL DIAGNOSES / SURGERY:           REFLUX ESOPHAGITIS COLITIS   EGD ESOPHAGOGASTRODUODENOSCOPY    COLONOSCOPY DIAGNOSTIC         Patient Active Problem List    Diagnosis Date Noted    Morbid obesity with BMI of 40.0-44.9, adult ([address] 02/23/2017     Priority: High    Symptomatic bradycardia 07/24/2020    CHF NYHA class I, chronic, diastolic ([address] 29/24/9254    Chronic cholecystitis 07/22/2020    Colitis 07/22/2020    Multiple atypical skin moles 07/22/2020    Class 3 severe obesity due to excess calories without serious comorbidity with body mass index (BMI) of 40.0 to 44.9 in adult [facility]) 07/22/2020    Diverticulitis     Perforated diverticulum 06/15/2020    Mobility impaired 01/21/2020    Atrial fibrillation, new onset ([address] 01/20/2020    JOSHUA (acute kidney injury) ([address] 01/15/2020    A-fib ([address] 01/14/2020    Type 2 diabetes mellitus, without long-term current use of insulin ([facility] 01/14/2020    Dyspepsia 01/12/2020    Cough present for greater than 3 weeks 10/03/2019    Aortic calcification ([facility] 03/10/2019    Calculus of gallbladder without cholecystitis without obstruction 03/10/2019    CLAROS (nonalcoholic steatohepatitis) 03/10/2019    Vitamin B 12 deficiency 03/09/2019    History of uterine cancer, Well differentiated grade 1 adenocarcinoma arising in complex hyperplasia, 2017 07/21/2018    Difficulty walking 04/28/2018    Optic atrophy of both eyes 02/27/2018    Cataracta b/l eyes 02/27/2018    Optic atrophy 02/27/2018    TLHBSO, bilateral LND 10/24/17 10/24/2017    Mixed stress and urge urinary incontinence 09/26/2017    Adenomatous polyp of sigmoid colon, 6/26/17 07/30/2017    OAB (overactive bladder) 06/10/2017    Primary osteoarthritis of both knees 06/10/2017    Chronic fatigue  06/10/2017    Spondylosis of lumbar region without myelopathy or radiculopathy     Hyperglycemia 03/21/2017    Dense breast tissue on mammogram 03/20/2017    Chronic pain of both knees 02/23/2017    Slow transit constipation 02/23/2017    Allergic rhinitis 02/23/2017    Hyperlipidemia with target LDL less than 100 02/23/2017    At high risk for falls 02/23/2017    Mixed incontinence 01/09/2016    Vitamin D deficiency 01/08/2016    Prediabetes 09/10/2015    Left knee DJD 12/05/2013    Esotropia 05/03/2013    Nystagmus 05/03/2013    Osteoarthritis involving multiple joints on both sides of body 04/24/2012    Osteopenia determined by x-ray     Acquired hypothyroidism     History of TIA (transient ischemic attack)     Chronic bilateral low back pain without sciatica     Essential hypertension            MAX Leiva CNP on 8/3/2020 at 8:14 AM

## 2020-08-03 NOTE — ANESTHESIA PRE PROCEDURE
Department of Anesthesiology  Preprocedure Note       Name:  Weston Phillips   Age:  78 y.o.  :  1950                                          MRN:  [id number]         Date:  8/3/2020      Surgeon: Alexx Arcos):  Cisco Mckinley MD    Procedure: Procedure(s):  EGD ESOPHAGOGASTRODUODENOSCOPY  COLONOSCOPY DIAGNOSTIC    Medications prior to admission:   Prior to Admission medications    Medication Sig Start Date End Date Taking? Authorizing Provider   metoprolol tartrate (LOPRESSOR) 25 MG tablet Take 0.5 tablets by mouth 2 times daily 20   Melany Germain MD   Blood Pressure Monitor KIT Use as directed. 20   MAX Barker CNP   glucose monitoring kit (FREESTYLE) monitoring kit Use as directed. BRAND OF CHOICE INSURANCE ALLOWS. 20   MAX Barker CNP   blood glucose monitor strips Test 2-3 times a day & as needed for symptoms of irregular blood glucose.   BRAND OF CHOICE INSURANCE ALLOWS. 20   MAX Barker CNP   Alcohol Swabs (ALCOHOL PREP) PADS Use as directed 20   MAX Barker CNP   Lancets MISC 1 each by Does not apply route 2 times daily 20   MAX Barker CNP   oxybutynin (DITROPAN-XL) 10 MG extended release tablet Take 1 tablet by mouth daily 20   Eric Timmons MD   amiodarone (CORDARONE) 200 MG tablet Take 1 tablet by mouth daily 20   Jaime Goodrich MD   rivaroxaban (XARELTO) 20 MG TABS tablet Take 1 tablet by mouth daily (with breakfast) 20   Jaime Goodrich MD   atorvastatin (LIPITOR) 40 MG tablet TAKE 1 TABLET BY MOUTH DAILY STOP SIMVASTATIN 3/16/20   Shabbir Jacinto MD   levothyroxine (SYNTHROID) 75 MCG tablet TAKE 1 TABLET BY MOUTH EVERY MORNING (BEFORE BREAKFAST) 20   Shabbir Jacinto MD    LORATADINE 10 MG tablet TAKE 1 TABLET BY MOUTH DAILY 20   Shabbir Jacinto MD   docusate sodium (COLACE) 100 MG capsule TAKE 1 CAPSULE BY MOUTH 2 TIMES DAILY 19   Shabbir Jacinto MD high risk for falls Z91.81    Dense breast tissue on mammogram R92.2    Hyperglycemia R73.9    Spondylosis of lumbar region without myelopathy or radiculopathy M47.816    OAB (overactive bladder) N32.81    Primary osteoarthritis of both knees M17.0    Chronic fatigue  R53.82    Adenomatous polyp of sigmoid colon, 6/26/17 D12.5    Mixed stress and urge urinary incontinence N39.46    TLHBSO, bilateral LND 10/24/17 Z90.710, Z90.79, Z90.722    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    Cataracta b/l eyes H26.9    Difficulty walking R26.2    Esotropia H50.00    Nystagmus H55.00    History of uterine cancer, Well differentiated grade 1 adenocarcinoma arising in complex hyperplasia, 2017 Z85.42    Vitamin B 12 deficiency E53.8    Aortic calcification (HCC) I70.0    Calculus of gallbladder without cholecystitis without obstruction K80.20    CLAROS (nonalcoholic steatohepatitis) K75.81    Optic atrophy H47.20    Cough present for greater than 3 weeks R05    Dyspepsia R10.13    A-fib (HCC) I48.91    Type 2 diabetes mellitus, without long-term current use of insulin (HCC) E11.9    JOSHUA (acute kidney injury) ([address] N17.9    Atrial fibrillation, new onset ([address] I48.91    Mobility impaired Z74.09    Perforated diverticulum K57.80    Diverticulitis K57.92    Chronic cholecystitis K81.1    Colitis K52.9    Multiple atypical skin moles D22.9    Class 3 severe obesity due to excess calories without serious comorbidity with body mass index (BMI) of 40.0 to 44.9 in adult (HCC) E66.01, Z68.41    Symptomatic bradycardia R00.1    CHF NYHA class I, chronic, diastolic (HCC) L22.76       Past Medical History:        Diagnosis Date    Adenocarcinoma of endometrium, stage 1 ([address] 10/5/2017    Well differentiated grade 1 adenocarcinoma arising in complex hyperplasia    JOSHUA (acute kidney injury) ([facility] 1/15/2020    Allergic rhinitis 2/23/2017    At high risk for falls 2/23/2017    Atrial fibrillation [facility])     Isaias 2020    CHF (congestive heart failure) (HCC)     \"little\"    Colon polyp     Had colonoscopy done 20 yrs ago    Diabetes mellitus ([address]     Diverticulitis     Endometrial cancer ([address]     History of TIA (transient ischemic attack) '80's    on Baby ASA    Hyperglycemia 3/21/2017    Hyperlipidemia     Hypertension since 2015    on Rx.     Hypothyroidism 1980    sub total thyroidectomy goiter    Legally blind since born    both eyes, cord prolapse; \"not legally blind\" per \"associated eye care\" please see telephone encounter from 2/27/18    Lower back pain     Mixed incontinence 1/9/2016    Morbid obesity with BMI of 40.0-44.9, adult ([address] 2/23/2017    CLAROS (nonalcoholic steatohepatitis) 3/10/2019    Obesity     Oral phase dysphagia 2/23/2018    Osteoarthritis (arthritis due to wear and tear of joints)     ronan knee    Osteoarthritis involving multiple joints on both sides of body 4/24/2012    Osteoporosis     Perforated bowel ([address]     Postmenopausal bleeding 9/20/2017    S/P h-scope, Myosure 9/20/17 9/20/2017    Pathology pending    Tennis elbow     left    Thickened endometrium 9/20/2017    TIA (transient ischemic attack)     TLHBSO, bilateral LND 10/24/17 10/24/2017    Type 2 diabetes mellitus, without long-term current use of insulin ([address] 1/14/2020       Past Surgical History:        Procedure Laterality Date    CATARACT REMOVAL WITH IMPLANT Bilateral 2015    COLONOSCOPY  1997    had polyp, she doesn't know where and when, \"20 yrs ago\"    COLONOSCOPY  06/26/2017    DILATION AND CURETTAGE OF UTERUS N/A 9/20/2017    HYSTEROSCOPY  WITH MYOSURE performed by Cl Hernández DO at [address] [facility] N/A 10/24/2017    TOTAL LAPAROSCOPIC HYSTERECTOMY, BSO, F.S.  STAGING, GYRUS G400 performed by Inez Russell MD at [address] N/A 6/26/2017    COLONOSCOPY POLYPECTOMY / HOT SNARE performed by Donna Amaya DO at [address] [facility] OR    CAITLIN AND BSO  10/24/2017    with pelvic lymph node dissection    THYROID SURGERY  1980    subtotal 20 years ago    TONSILLECTOMY      VITRECTOMY      FLOATERS       Social History:    Social History     Tobacco Use    Smoking status: Never Smoker    Smokeless tobacco: Never Used   Substance Use Topics    Alcohol use: No     Alcohol/week: 0.0 standard drinks                                Counseling given: Not Answered      Vital Signs (Current):   Vitals:    08/03/20 0801   BP: (!) 157/77   Pulse: 53   Resp: 18   Temp: 97.3 °F (36.3 °C)   SpO2: 98%   Weight: 226 lb (102.5 kg)   Height: 5' 2\" (1.575 m)                                              BP Readings from Last 3 Encounters:   08/03/20 (!) 157/77   07/27/20 (!) 140/73   07/21/20 (!) 116/56       NPO Status:                                                                                 BMI:   Wt Readings from Last 3 Encounters:   08/03/20 226 lb (102.5 kg)   07/28/20 232 lb (105.2 kg)   07/26/20 243 lb 15.3 oz (110.7 kg)     Body mass index is 41.34 kg/m². CBC:   Lab Results   Component Value Date    WBC 6.8 07/27/2020    RBC 3.93 07/27/2020    RBC 4.23 03/20/2012    HGB 11.9 07/27/2020    HCT 36.3 07/27/2020    MCV 92.6 07/27/2020    RDW 15.2 07/27/2020     07/27/2020     03/20/2012       CMP:   Lab Results   Component Value Date     07/27/2020    K 4.2 07/27/2020     07/27/2020    CO2 26 07/27/2020    BUN 20 07/27/2020    CREATININE 0.88 07/27/2020    GFRAA >60 07/27/2020    LABGLOM >60 07/27/2020    GLUCOSE 98 07/27/2020    GLUCOSE 114 03/20/2012    PROT 7.5 07/24/2020    CALCIUM 8.9 07/27/2020    BILITOT 0.24 07/24/2020    ALKPHOS 50 07/24/2020    AST 12 07/24/2020    ALT 16 07/24/2020       POC Tests: No results for input(s): POCGLU, POCNA, POCK, POCCL, POCBUN, POCHEMO, POCHCT in the last 72 hours.     Coags:   Lab Results   Component Value Date    PROTIME 13.1 07/11/2020    INR 1.0 07/11/2020    APTT 28.7

## 2020-08-04 ENCOUNTER — HOSPITAL ENCOUNTER (INPATIENT)
Age: 70
LOS: 9 days | Discharge: NURSING, REHAB OR LONG-TERM CARE | DRG: 329 | End: 2020-08-13
Attending: SURGERY | Admitting: SURGERY
Payer: MEDICARE

## 2020-08-04 ENCOUNTER — ANESTHESIA (OUTPATIENT)
Dept: OPERATING ROOM | Age: 70
DRG: 329 | End: 2020-08-04
Payer: MEDICARE

## 2020-08-04 ENCOUNTER — ANESTHESIA EVENT (OUTPATIENT)
Dept: OPERATING ROOM | Age: 70
DRG: 329 | End: 2020-08-04
Payer: MEDICARE

## 2020-08-04 VITALS
TEMPERATURE: 96.1 F | OXYGEN SATURATION: 100 % | RESPIRATION RATE: 21 BRPM | DIASTOLIC BLOOD PRESSURE: 88 MMHG | SYSTOLIC BLOOD PRESSURE: 174 MMHG

## 2020-08-04 PROBLEM — K57.32 SIGMOID DIVERTICULITIS: Status: ACTIVE | Noted: 2020-08-04

## 2020-08-04 LAB
-: NORMAL
AMORPHOUS: NORMAL
BACTERIA: NORMAL
BILIRUBIN URINE: NEGATIVE
CASTS UA: NORMAL /LPF
COLOR: YELLOW
COMMENT UA: ABNORMAL
CRYSTALS, UA: NORMAL /HPF
EPITHELIAL CELLS UA: NORMAL /HPF
GLUCOSE BLD-MCNC: 117 MG/DL (ref 65–105)
GLUCOSE BLD-MCNC: 133 MG/DL (ref 65–105)
GLUCOSE BLD-MCNC: 136 MG/DL (ref 65–105)
GLUCOSE BLD-MCNC: 146 MG/DL (ref 65–105)
GLUCOSE BLD-MCNC: 156 MG/DL (ref 65–105)
GLUCOSE URINE: NEGATIVE
KETONES, URINE: NEGATIVE
LEUKOCYTE ESTERASE, URINE: NEGATIVE
MUCUS: NORMAL
NITRITE, URINE: NEGATIVE
OTHER OBSERVATIONS UA: NORMAL
PH UA: 5.5 (ref 5–8)
PROTEIN UA: NEGATIVE
RBC UA: NORMAL /HPF
RENAL EPITHELIAL, UA: NORMAL /HPF
SPECIFIC GRAVITY UA: 1.01 (ref 1–1.03)
TRICHOMONAS: NORMAL
TURBIDITY: CLEAR
URINE HGB: ABNORMAL
UROBILINOGEN, URINE: NORMAL
WBC UA: NORMAL /HPF
YEAST: NORMAL

## 2020-08-04 PROCEDURE — 6360000002 HC RX W HCPCS: Performed by: SURGERY

## 2020-08-04 PROCEDURE — 6360000002 HC RX W HCPCS: Performed by: NURSE ANESTHETIST, CERTIFIED REGISTERED

## 2020-08-04 PROCEDURE — 2500000003 HC RX 250 WO HCPCS: Performed by: SURGERY

## 2020-08-04 PROCEDURE — 7100000001 HC PACU RECOVERY - ADDTL 15 MIN: Performed by: SURGERY

## 2020-08-04 PROCEDURE — 44140 PARTIAL REMOVAL OF COLON: CPT | Performed by: PHYSICIAN ASSISTANT

## 2020-08-04 PROCEDURE — 3700000001 HC ADD 15 MINUTES (ANESTHESIA): Performed by: SURGERY

## 2020-08-04 PROCEDURE — 2500000003 HC RX 250 WO HCPCS: Performed by: NURSE ANESTHETIST, CERTIFIED REGISTERED

## 2020-08-04 PROCEDURE — 44139 MOBILIZATION OF COLON: CPT | Performed by: PHYSICIAN ASSISTANT

## 2020-08-04 PROCEDURE — 2580000003 HC RX 258: Performed by: SURGERY

## 2020-08-04 PROCEDURE — 99222 1ST HOSP IP/OBS MODERATE 55: CPT | Performed by: INTERNAL MEDICINE

## 2020-08-04 PROCEDURE — 6370000000 HC RX 637 (ALT 250 FOR IP): Performed by: INTERNAL MEDICINE

## 2020-08-04 PROCEDURE — C1765 ADHESION BARRIER: HCPCS | Performed by: SURGERY

## 2020-08-04 PROCEDURE — 3600000004 HC SURGERY LEVEL 4 BASE: Performed by: SURGERY

## 2020-08-04 PROCEDURE — 81001 URINALYSIS AUTO W/SCOPE: CPT

## 2020-08-04 PROCEDURE — 2700000000 HC OXYGEN THERAPY PER DAY

## 2020-08-04 PROCEDURE — 88307 TISSUE EXAM BY PATHOLOGIST: CPT

## 2020-08-04 PROCEDURE — C9113 INJ PANTOPRAZOLE SODIUM, VIA: HCPCS | Performed by: SURGERY

## 2020-08-04 PROCEDURE — 2709999900 HC NON-CHARGEABLE SUPPLY: Performed by: SURGERY

## 2020-08-04 PROCEDURE — 3700000000 HC ANESTHESIA ATTENDED CARE: Performed by: SURGERY

## 2020-08-04 PROCEDURE — 0DTN0ZZ RESECTION OF SIGMOID COLON, OPEN APPROACH: ICD-10-PCS | Performed by: SURGERY

## 2020-08-04 PROCEDURE — 2060000000 HC ICU INTERMEDIATE R&B

## 2020-08-04 PROCEDURE — 88305 TISSUE EXAM BY PATHOLOGIST: CPT

## 2020-08-04 PROCEDURE — 3600000014 HC SURGERY LEVEL 4 ADDTL 15MIN: Performed by: SURGERY

## 2020-08-04 PROCEDURE — 2720000010 HC SURG SUPPLY STERILE: Performed by: SURGERY

## 2020-08-04 PROCEDURE — 7100000000 HC PACU RECOVERY - FIRST 15 MIN: Performed by: SURGERY

## 2020-08-04 PROCEDURE — 6360000002 HC RX W HCPCS: Performed by: ANESTHESIOLOGY

## 2020-08-04 PROCEDURE — 94761 N-INVAS EAR/PLS OXIMETRY MLT: CPT

## 2020-08-04 PROCEDURE — 82947 ASSAY GLUCOSE BLOOD QUANT: CPT

## 2020-08-04 RX ORDER — 0.9 % SODIUM CHLORIDE 0.9 %
1000 INTRAVENOUS SOLUTION INTRAVENOUS ONCE
Status: COMPLETED | OUTPATIENT
Start: 2020-08-04 | End: 2020-08-04

## 2020-08-04 RX ORDER — PROPOFOL 10 MG/ML
INJECTION, EMULSION INTRAVENOUS PRN
Status: DISCONTINUED | OUTPATIENT
Start: 2020-08-04 | End: 2020-08-04 | Stop reason: SDUPTHER

## 2020-08-04 RX ORDER — HYDRALAZINE HYDROCHLORIDE 20 MG/ML
5 INJECTION INTRAMUSCULAR; INTRAVENOUS EVERY 10 MIN PRN
Status: DISCONTINUED | OUTPATIENT
Start: 2020-08-04 | End: 2020-08-04 | Stop reason: HOSPADM

## 2020-08-04 RX ORDER — SODIUM CHLORIDE 9 MG/ML
INJECTION, SOLUTION INTRAVENOUS CONTINUOUS
Status: DISCONTINUED | OUTPATIENT
Start: 2020-08-04 | End: 2020-08-04

## 2020-08-04 RX ORDER — LABETALOL 20 MG/4 ML (5 MG/ML) INTRAVENOUS SYRINGE
5 EVERY 10 MIN PRN
Status: DISCONTINUED | OUTPATIENT
Start: 2020-08-04 | End: 2020-08-04 | Stop reason: HOSPADM

## 2020-08-04 RX ORDER — ONDANSETRON 2 MG/ML
INJECTION INTRAMUSCULAR; INTRAVENOUS PRN
Status: DISCONTINUED | OUTPATIENT
Start: 2020-08-04 | End: 2020-08-04 | Stop reason: SDUPTHER

## 2020-08-04 RX ORDER — ONDANSETRON 2 MG/ML
4 INJECTION INTRAMUSCULAR; INTRAVENOUS EVERY 6 HOURS PRN
Status: DISCONTINUED | OUTPATIENT
Start: 2020-08-04 | End: 2020-08-13 | Stop reason: HOSPADM

## 2020-08-04 RX ORDER — ROCURONIUM BROMIDE 10 MG/ML
INJECTION, SOLUTION INTRAVENOUS PRN
Status: DISCONTINUED | OUTPATIENT
Start: 2020-08-04 | End: 2020-08-04 | Stop reason: SDUPTHER

## 2020-08-04 RX ORDER — FENTANYL CITRATE 50 UG/ML
INJECTION, SOLUTION INTRAMUSCULAR; INTRAVENOUS PRN
Status: DISCONTINUED | OUTPATIENT
Start: 2020-08-04 | End: 2020-08-04 | Stop reason: SDUPTHER

## 2020-08-04 RX ORDER — METOCLOPRAMIDE HYDROCHLORIDE 5 MG/ML
10 INJECTION INTRAMUSCULAR; INTRAVENOUS
Status: DISCONTINUED | OUTPATIENT
Start: 2020-08-04 | End: 2020-08-04 | Stop reason: HOSPADM

## 2020-08-04 RX ORDER — METOPROLOL TARTRATE 5 MG/5ML
5 INJECTION INTRAVENOUS EVERY 6 HOURS PRN
Status: DISCONTINUED | OUTPATIENT
Start: 2020-08-04 | End: 2020-08-06

## 2020-08-04 RX ORDER — DIPHENHYDRAMINE HYDROCHLORIDE 50 MG/ML
12.5 INJECTION INTRAMUSCULAR; INTRAVENOUS
Status: DISCONTINUED | OUTPATIENT
Start: 2020-08-04 | End: 2020-08-04 | Stop reason: HOSPADM

## 2020-08-04 RX ORDER — KETOROLAC TROMETHAMINE 30 MG/ML
15 INJECTION, SOLUTION INTRAMUSCULAR; INTRAVENOUS EVERY 6 HOURS PRN
Status: DISPENSED | OUTPATIENT
Start: 2020-08-04 | End: 2020-08-09

## 2020-08-04 RX ORDER — PROMETHAZINE HYDROCHLORIDE 25 MG/ML
6.25 INJECTION, SOLUTION INTRAMUSCULAR; INTRAVENOUS
Status: DISCONTINUED | OUTPATIENT
Start: 2020-08-04 | End: 2020-08-04 | Stop reason: CLARIF

## 2020-08-04 RX ORDER — MIDAZOLAM HYDROCHLORIDE 1 MG/ML
INJECTION INTRAMUSCULAR; INTRAVENOUS PRN
Status: DISCONTINUED | OUTPATIENT
Start: 2020-08-04 | End: 2020-08-04 | Stop reason: SDUPTHER

## 2020-08-04 RX ORDER — OXYCODONE HYDROCHLORIDE AND ACETAMINOPHEN 5; 325 MG/1; MG/1
1 TABLET ORAL
Status: DISCONTINUED | OUTPATIENT
Start: 2020-08-04 | End: 2020-08-04 | Stop reason: HOSPADM

## 2020-08-04 RX ORDER — METOCLOPRAMIDE HYDROCHLORIDE 5 MG/ML
10 INJECTION INTRAMUSCULAR; INTRAVENOUS EVERY 6 HOURS
Status: DISCONTINUED | OUTPATIENT
Start: 2020-08-04 | End: 2020-08-07

## 2020-08-04 RX ORDER — PANTOPRAZOLE SODIUM 40 MG/10ML
40 INJECTION, POWDER, LYOPHILIZED, FOR SOLUTION INTRAVENOUS DAILY
Status: DISCONTINUED | OUTPATIENT
Start: 2020-08-04 | End: 2020-08-10

## 2020-08-04 RX ORDER — HYDROMORPHONE HCL 110MG/55ML
PATIENT CONTROLLED ANALGESIA SYRINGE INTRAVENOUS PRN
Status: DISCONTINUED | OUTPATIENT
Start: 2020-08-04 | End: 2020-08-04 | Stop reason: SDUPTHER

## 2020-08-04 RX ORDER — SODIUM CHLORIDE 0.9 % (FLUSH) 0.9 %
10 SYRINGE (ML) INJECTION PRN
Status: DISCONTINUED | OUTPATIENT
Start: 2020-08-04 | End: 2020-08-13 | Stop reason: HOSPADM

## 2020-08-04 RX ORDER — SODIUM CHLORIDE 9 MG/ML
10 INJECTION INTRAVENOUS DAILY
Status: DISCONTINUED | OUTPATIENT
Start: 2020-08-04 | End: 2020-08-10

## 2020-08-04 RX ORDER — FENTANYL CITRATE 50 UG/ML
25 INJECTION, SOLUTION INTRAMUSCULAR; INTRAVENOUS EVERY 5 MIN PRN
Status: DISCONTINUED | OUTPATIENT
Start: 2020-08-04 | End: 2020-08-04 | Stop reason: HOSPADM

## 2020-08-04 RX ORDER — 0.9 % SODIUM CHLORIDE 0.9 %
500 INTRAVENOUS SOLUTION INTRAVENOUS
Status: DISCONTINUED | OUTPATIENT
Start: 2020-08-04 | End: 2020-08-04 | Stop reason: HOSPADM

## 2020-08-04 RX ORDER — SODIUM CHLORIDE 9 MG/ML
INJECTION, SOLUTION INTRAVENOUS CONTINUOUS
Status: DISCONTINUED | OUTPATIENT
Start: 2020-08-04 | End: 2020-08-08

## 2020-08-04 RX ORDER — KETOROLAC TROMETHAMINE 30 MG/ML
INJECTION, SOLUTION INTRAMUSCULAR; INTRAVENOUS PRN
Status: DISCONTINUED | OUTPATIENT
Start: 2020-08-04 | End: 2020-08-04 | Stop reason: SDUPTHER

## 2020-08-04 RX ORDER — LIDOCAINE HYDROCHLORIDE 20 MG/ML
INJECTION, SOLUTION EPIDURAL; INFILTRATION; INTRACAUDAL; PERINEURAL PRN
Status: DISCONTINUED | OUTPATIENT
Start: 2020-08-04 | End: 2020-08-04 | Stop reason: SDUPTHER

## 2020-08-04 RX ORDER — SODIUM CHLORIDE 0.9 % (FLUSH) 0.9 %
10 SYRINGE (ML) INJECTION EVERY 12 HOURS SCHEDULED
Status: DISCONTINUED | OUTPATIENT
Start: 2020-08-04 | End: 2020-08-13 | Stop reason: HOSPADM

## 2020-08-04 RX ORDER — HEPARIN SODIUM 5000 [USP'U]/ML
5000 INJECTION, SOLUTION INTRAVENOUS; SUBCUTANEOUS ONCE
Status: COMPLETED | OUTPATIENT
Start: 2020-08-04 | End: 2020-08-04

## 2020-08-04 RX ORDER — DEXAMETHASONE SODIUM PHOSPHATE 4 MG/ML
INJECTION, SOLUTION INTRA-ARTICULAR; INTRALESIONAL; INTRAMUSCULAR; INTRAVENOUS; SOFT TISSUE PRN
Status: DISCONTINUED | OUTPATIENT
Start: 2020-08-04 | End: 2020-08-04 | Stop reason: SDUPTHER

## 2020-08-04 RX ADMIN — DEXAMETHASONE SODIUM PHOSPHATE 4 MG: 4 INJECTION, SOLUTION INTRA-ARTICULAR; INTRALESIONAL; INTRAMUSCULAR; INTRAVENOUS; SOFT TISSUE at 10:15

## 2020-08-04 RX ADMIN — SODIUM CHLORIDE: 9 INJECTION, SOLUTION INTRAVENOUS at 10:33

## 2020-08-04 RX ADMIN — HYDROMORPHONE HYDROCHLORIDE 0.5 MG: 1 INJECTION, SOLUTION INTRAMUSCULAR; INTRAVENOUS; SUBCUTANEOUS at 11:31

## 2020-08-04 RX ADMIN — MIDAZOLAM 1 MG: 1 INJECTION INTRAMUSCULAR; INTRAVENOUS at 08:04

## 2020-08-04 RX ADMIN — SODIUM CHLORIDE: 9 INJECTION, SOLUTION INTRAVENOUS at 09:07

## 2020-08-04 RX ADMIN — KETOROLAC TROMETHAMINE 15 MG: 30 INJECTION, SOLUTION INTRAMUSCULAR; INTRAVENOUS at 20:12

## 2020-08-04 RX ADMIN — HEPARIN SODIUM 5000 UNITS: 5000 INJECTION INTRAVENOUS; SUBCUTANEOUS at 07:10

## 2020-08-04 RX ADMIN — KETOROLAC TROMETHAMINE 15 MG: 30 INJECTION, SOLUTION INTRAMUSCULAR at 10:19

## 2020-08-04 RX ADMIN — LIDOCAINE HYDROCHLORIDE 60 MG: 20 INJECTION, SOLUTION EPIDURAL; INFILTRATION; INTRACAUDAL; PERINEURAL at 08:04

## 2020-08-04 RX ADMIN — ROCURONIUM BROMIDE 20 MG: 10 INJECTION INTRAVENOUS at 09:19

## 2020-08-04 RX ADMIN — PANTOPRAZOLE SODIUM 40 MG: 40 INJECTION, POWDER, FOR SOLUTION INTRAVENOUS at 12:48

## 2020-08-04 RX ADMIN — METRONIDAZOLE 500 MG: 500 INJECTION, SOLUTION INTRAVENOUS at 12:48

## 2020-08-04 RX ADMIN — SODIUM CHLORIDE: 9 INJECTION, SOLUTION INTRAVENOUS at 07:44

## 2020-08-04 RX ADMIN — CEFAZOLIN 2 G: 1 INJECTION, POWDER, FOR SOLUTION INTRAMUSCULAR; INTRAVENOUS at 07:59

## 2020-08-04 RX ADMIN — ROCURONIUM BROMIDE 50 MG: 10 INJECTION INTRAVENOUS at 08:05

## 2020-08-04 RX ADMIN — METOCLOPRAMIDE 10 MG: 5 INJECTION, SOLUTION INTRAMUSCULAR; INTRAVENOUS at 12:48

## 2020-08-04 RX ADMIN — SODIUM CHLORIDE: 9 INJECTION, SOLUTION INTRAVENOUS at 12:38

## 2020-08-04 RX ADMIN — ONDANSETRON 4 MG: 2 INJECTION INTRAMUSCULAR; INTRAVENOUS at 10:15

## 2020-08-04 RX ADMIN — Medication 10 ML: at 12:48

## 2020-08-04 RX ADMIN — FENTANYL CITRATE 50 MCG: 50 INJECTION, SOLUTION INTRAMUSCULAR; INTRAVENOUS at 08:24

## 2020-08-04 RX ADMIN — HYDROMORPHONE HYDROCHLORIDE 0.5 MG: 1 INJECTION, SOLUTION INTRAMUSCULAR; INTRAVENOUS; SUBCUTANEOUS at 18:39

## 2020-08-04 RX ADMIN — SODIUM CHLORIDE 1000 ML: 9 INJECTION, SOLUTION INTRAVENOUS at 19:38

## 2020-08-04 RX ADMIN — HYDROMORPHONE HYDROCHLORIDE 0.5 MG: 1 INJECTION, SOLUTION INTRAMUSCULAR; INTRAVENOUS; SUBCUTANEOUS at 12:38

## 2020-08-04 RX ADMIN — SUGAMMADEX 400 MG: 100 INJECTION, SOLUTION INTRAVENOUS at 10:33

## 2020-08-04 RX ADMIN — METRONIDAZOLE 500 MG: 500 INJECTION, SOLUTION INTRAVENOUS at 18:33

## 2020-08-04 RX ADMIN — Medication 10 ML: at 20:14

## 2020-08-04 RX ADMIN — PROPOFOL 160 MG: 10 INJECTION, EMULSION INTRAVENOUS at 08:04

## 2020-08-04 RX ADMIN — HYDROMORPHONE HYDROCHLORIDE 0.5 MG: 1 INJECTION, SOLUTION INTRAMUSCULAR; INTRAVENOUS; SUBCUTANEOUS at 11:09

## 2020-08-04 RX ADMIN — FENTANYL CITRATE 50 MCG: 50 INJECTION, SOLUTION INTRAMUSCULAR; INTRAVENOUS at 08:04

## 2020-08-04 RX ADMIN — MIDAZOLAM 1 MG: 1 INJECTION INTRAMUSCULAR; INTRAVENOUS at 07:59

## 2020-08-04 RX ADMIN — METOCLOPRAMIDE 10 MG: 5 INJECTION, SOLUTION INTRAMUSCULAR; INTRAVENOUS at 23:09

## 2020-08-04 RX ADMIN — CEFAZOLIN 2 G: 1 INJECTION, POWDER, FOR SOLUTION INTRAMUSCULAR; INTRAVENOUS at 14:50

## 2020-08-04 RX ADMIN — METRONIDAZOLE 500 MG: 500 INJECTION, SOLUTION INTRAVENOUS at 08:30

## 2020-08-04 RX ADMIN — INSULIN LISPRO 1 UNITS: 100 INJECTION, SOLUTION INTRAVENOUS; SUBCUTANEOUS at 23:18

## 2020-08-04 RX ADMIN — ROCURONIUM BROMIDE 20 MG: 10 INJECTION INTRAVENOUS at 08:34

## 2020-08-04 RX ADMIN — METRONIDAZOLE 500 MG: 500 INJECTION, SOLUTION INTRAVENOUS at 23:10

## 2020-08-04 RX ADMIN — CEFAZOLIN 2 G: 1 INJECTION, POWDER, FOR SOLUTION INTRAMUSCULAR; INTRAVENOUS at 23:09

## 2020-08-04 RX ADMIN — METOCLOPRAMIDE 10 MG: 5 INJECTION, SOLUTION INTRAMUSCULAR; INTRAVENOUS at 18:33

## 2020-08-04 RX ADMIN — HYDROMORPHONE HYDROCHLORIDE 0.5 MG: 2 INJECTION, SOLUTION INTRAMUSCULAR; INTRAVENOUS; SUBCUTANEOUS at 08:42

## 2020-08-04 ASSESSMENT — PULMONARY FUNCTION TESTS
PIF_VALUE: 23
PIF_VALUE: 22
PIF_VALUE: 20
PIF_VALUE: 17
PIF_VALUE: 24
PIF_VALUE: 22
PIF_VALUE: 17
PIF_VALUE: 21
PIF_VALUE: 23
PIF_VALUE: 20
PIF_VALUE: 24
PIF_VALUE: 20
PIF_VALUE: 2
PIF_VALUE: 23
PIF_VALUE: 22
PIF_VALUE: 23
PIF_VALUE: 24
PIF_VALUE: 19
PIF_VALUE: 21
PIF_VALUE: 23
PIF_VALUE: 19
PIF_VALUE: 19
PIF_VALUE: 24
PIF_VALUE: 17
PIF_VALUE: 21
PIF_VALUE: 22
PIF_VALUE: 23
PIF_VALUE: 19
PIF_VALUE: 20
PIF_VALUE: 24
PIF_VALUE: 23
PIF_VALUE: 21
PIF_VALUE: 17
PIF_VALUE: 2
PIF_VALUE: 23
PIF_VALUE: 23
PIF_VALUE: 20
PIF_VALUE: 23
PIF_VALUE: 18
PIF_VALUE: 23
PIF_VALUE: 20
PIF_VALUE: 24
PIF_VALUE: 23
PIF_VALUE: 23
PIF_VALUE: 4
PIF_VALUE: 21
PIF_VALUE: 23
PIF_VALUE: 24
PIF_VALUE: 20
PIF_VALUE: 24
PIF_VALUE: 19
PIF_VALUE: 19
PIF_VALUE: 22
PIF_VALUE: 20
PIF_VALUE: 17
PIF_VALUE: 19
PIF_VALUE: 24
PIF_VALUE: 19
PIF_VALUE: 24
PIF_VALUE: 23
PIF_VALUE: 16
PIF_VALUE: 23
PIF_VALUE: 21
PIF_VALUE: 19
PIF_VALUE: 19
PIF_VALUE: 17
PIF_VALUE: 23
PIF_VALUE: 17
PIF_VALUE: 23
PIF_VALUE: 17
PIF_VALUE: 23
PIF_VALUE: 3
PIF_VALUE: 23
PIF_VALUE: 23
PIF_VALUE: 24
PIF_VALUE: 1
PIF_VALUE: 4
PIF_VALUE: 22
PIF_VALUE: 22
PIF_VALUE: 23
PIF_VALUE: 20
PIF_VALUE: 2
PIF_VALUE: 23
PIF_VALUE: 17
PIF_VALUE: 2
PIF_VALUE: 17
PIF_VALUE: 21
PIF_VALUE: 23
PIF_VALUE: 16
PIF_VALUE: 17
PIF_VALUE: 24
PIF_VALUE: 24
PIF_VALUE: 19
PIF_VALUE: 22
PIF_VALUE: 17
PIF_VALUE: 25
PIF_VALUE: 18
PIF_VALUE: 28
PIF_VALUE: 23
PIF_VALUE: 20
PIF_VALUE: 22
PIF_VALUE: 22
PIF_VALUE: 19
PIF_VALUE: 22
PIF_VALUE: 23
PIF_VALUE: 19
PIF_VALUE: 4
PIF_VALUE: 23
PIF_VALUE: 16
PIF_VALUE: 18
PIF_VALUE: 3
PIF_VALUE: 17
PIF_VALUE: 17
PIF_VALUE: 24
PIF_VALUE: 20
PIF_VALUE: 17
PIF_VALUE: 17
PIF_VALUE: 20
PIF_VALUE: 27
PIF_VALUE: 17
PIF_VALUE: 6
PIF_VALUE: 17
PIF_VALUE: 23
PIF_VALUE: 20
PIF_VALUE: 23
PIF_VALUE: 23
PIF_VALUE: 19
PIF_VALUE: 17
PIF_VALUE: 20
PIF_VALUE: 17
PIF_VALUE: 23
PIF_VALUE: 23
PIF_VALUE: 24
PIF_VALUE: 20
PIF_VALUE: 23
PIF_VALUE: 19
PIF_VALUE: 23
PIF_VALUE: 24
PIF_VALUE: 22
PIF_VALUE: 23
PIF_VALUE: 17
PIF_VALUE: 20
PIF_VALUE: 24
PIF_VALUE: 19
PIF_VALUE: 21
PIF_VALUE: 19
PIF_VALUE: 23
PIF_VALUE: 23
PIF_VALUE: 21
PIF_VALUE: 23
PIF_VALUE: 24
PIF_VALUE: 17

## 2020-08-04 ASSESSMENT — PAIN DESCRIPTION - DESCRIPTORS
DESCRIPTORS: ACHING

## 2020-08-04 ASSESSMENT — PAIN SCALES - GENERAL
PAINLEVEL_OUTOF10: 10
PAINLEVEL_OUTOF10: 7
PAINLEVEL_OUTOF10: 0
PAINLEVEL_OUTOF10: 2
PAINLEVEL_OUTOF10: 3
PAINLEVEL_OUTOF10: 5
PAINLEVEL_OUTOF10: 8
PAINLEVEL_OUTOF10: 0
PAINLEVEL_OUTOF10: 3
PAINLEVEL_OUTOF10: 0

## 2020-08-04 ASSESSMENT — PAIN DESCRIPTION - PAIN TYPE
TYPE: SURGICAL PAIN

## 2020-08-04 ASSESSMENT — PAIN DESCRIPTION - LOCATION
LOCATION: ABDOMEN

## 2020-08-04 ASSESSMENT — PAIN DESCRIPTION - PROGRESSION: CLINICAL_PROGRESSION: GRADUALLY IMPROVING

## 2020-08-04 ASSESSMENT — PAIN - FUNCTIONAL ASSESSMENT: PAIN_FUNCTIONAL_ASSESSMENT: 0-10

## 2020-08-04 ASSESSMENT — PAIN DESCRIPTION - ORIENTATION
ORIENTATION: ANTERIOR
ORIENTATION: ANTERIOR

## 2020-08-04 NOTE — ANESTHESIA PRE PROCEDURE
Department of Anesthesiology  Preprocedure Note       Name:  Paty Wilcox   Age:  78 y.o.  :  1950                                          MRN:  [id number]         Date:  2020      Surgeon: Evie Loyd):  MD Saeed Vaughan MD    Procedure: Procedure(s):  EGD ESOPHAGOGASTRODUODENOSCOPY  COLONOSCOPY DIAGNOSTIC    Medications prior to admission:   Prior to Admission medications    Medication Sig Start Date End Date Taking? Authorizing Provider   metoprolol tartrate (LOPRESSOR) 25 MG tablet Take 0.5 tablets by mouth 2 times daily 20   Velvet Armando MD   Blood Pressure Monitor KIT Use as directed. 20   MAX Barker CNP   glucose monitoring kit (FREESTYLE) monitoring kit Use as directed. BRAND OF CHOICE INSURANCE ALLOWS. 20   MAX Barker CNP   blood glucose monitor strips Test 2-3 times a day & as needed for symptoms of irregular blood glucose.   BRAND OF CHOICE INSURANCE ALLOWS. 20   MAX Barker CNP   Alcohol Swabs (ALCOHOL PREP) PADS Use as directed 20   MAX Barker CNP   Lancets MISC 1 each by Does not apply route 2 times daily 20   MAX Barker CNP   oxybutynin (DITROPAN-XL) 10 MG extended release tablet Take 1 tablet by mouth daily 20   Pastora Oviedo MD   amiodarone (CORDARONE) 200 MG tablet Take 1 tablet by mouth daily 20   Paco Alonzo MD   rivaroxaban (XARELTO) 20 MG TABS tablet Take 1 tablet by mouth daily (with breakfast) 20   Paco Alonzo MD   atorvastatin (LIPITOR) 40 MG tablet TAKE 1 TABLET BY MOUTH DAILY STOP SIMVASTATIN 3/16/20   Radha Vallecillo MD   levothyroxine (SYNTHROID) 75 MCG tablet TAKE 1 TABLET BY MOUTH EVERY MORNING (BEFORE BREAKFAST) 20   Hosea Hagen MD   HM LORATADINE 10 MG tablet TAKE 1 TABLET BY MOUTH DAILY 20   Radha Vallecillo MD   docusate sodium (COLACE) 100 MG capsule TAKE 1 CAPSULE BY MOUTH 2 TIMES DAILY 19 Lydia Mckay MD   MYRBETRIQ 50 MG TB24 TAKE ONE TABLET BY MOUTH ONCE DAILY 12/16/19   Lydia Mckay MD   metFORMIN (GLUCOPHAGE) 500 MG tablet TAKE ONE TABLET BY MOUTH TWICE A DAY WITH A MEAL 11/11/19   Lydia Mckay MD   Cranberry, Vacc oxycoccus, (SM CRANBERRY) 500 MG TABS Take 500 mg by mouth daily  7/31/17   Historical Provider, MD   UNABLE TO FIND Needs right walker brake fixed 10/3/19   Lydia Mckay MD   Cholecalciferol (VITAMIN D) 2000 units TABS tablet Take 1 tablet by mouth daily 3/9/19   Lydia Mckay MD   vitamin B-12 (CYANOCOBALAMIN) 1000 MCG tablet Take 1 tablet by mouth daily 3/9/19   Lydia Mckay MD   acetaminophen (APAP EXTRA STRENGTH) 500 MG tablet Take 1 tablet by mouth every 6 hours as needed for Pain or Fever 2/12/19   Lydia Mckay MD   Lactobacillus (PROBIOTIC ACIDOPHILUS) TABS Take 1 tablet by mouth daily 7/31/17   Lydia Mckay MD       Current medications:    No current facility-administered medications for this visit. No current outpatient medications on file. Facility-Administered Medications Ordered in Other Visits   Medication Dose Route Frequency Provider Last Rate Last Dose    0.9 % sodium chloride infusion   Intravenous Continuous Kourtney Meyer MD        ceFAZolin (ANCEF) 2 g in dextrose 5 % 50 mL IVPB  2 g Intravenous Once Kourtney Meyer MD        heparin (porcine) injection 5,000 Units  5,000 Units Subcutaneous Once Kourtney Meyer MD           Allergies:     Allergies   Allergen Reactions    Sulfa Antibiotics Nausea Only    Penicillins Rash       Problem List:    Patient Active Problem List   Diagnosis Code    Acquired hypothyroidism E03.9    History of TIA (transient ischemic attack) Z86.73    Chronic bilateral low back pain without sciatica M54.5, G89.29    Essential hypertension I10    Osteopenia determined by x-ray M85.80    Osteoarthritis involving multiple joints on both sides of body M15.9    Left knee DJD M17.12    Prediabetes R73.03    Vitamin D deficiency E55.9    Mixed incontinence N39.46    Chronic pain of both knees M25.561, M25.562, G89.29    Slow transit constipation K59.01    Allergic rhinitis J30.9    Hyperlipidemia with target LDL less than 100 E78.5    Morbid obesity with BMI of 40.0-44.9, adult (HCC) E66.01, Z68.41    At high risk for falls Z91.81    Dense breast tissue on mammogram R92.2    Hyperglycemia R73.9    Spondylosis of lumbar region without myelopathy or radiculopathy M47.816    OAB (overactive bladder) N32.81    Primary osteoarthritis of both knees M17.0    Chronic fatigue  R53.82    Adenomatous polyp of sigmoid colon, 6/26/17 D12.5    Mixed stress and urge urinary incontinence N39.46    TLHBSO, bilateral LND 10/24/17 Z90.710, Z90.79, Z90.722    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    Cataracta b/l eyes H26.9    Difficulty walking R26.2    Esotropia H50.00    Nystagmus H55.00    History of uterine cancer, Well differentiated grade 1 adenocarcinoma arising in complex hyperplasia, 2017 Z85.42    Vitamin B 12 deficiency E53.8    Aortic calcification ([facility]) I70.0    Calculus of gallbladder without cholecystitis without obstruction K80.20    CLAROS (nonalcoholic steatohepatitis) K75.81    Optic atrophy H47.20    Cough present for greater than 3 weeks R05    Dyspepsia R10.13    A-fib ([facility]) I48.91    Type 2 diabetes mellitus, without long-term current use of insulin ([facility]) E11.9    JOSHUA (acute kidney injury) ([facility] N17.9    Atrial fibrillation, new onset ([facility]) I48.91    Mobility impaired Z74.09    Perforated diverticulum K57.80    Diverticulitis K57.92    Chronic cholecystitis K81.1    Colitis K52.9    Multiple atypical skin moles D22.9    Class 3 severe obesity due to excess calories without serious comorbidity with body mass index (BMI) of 40.0 to 44.9 in adult ([facility]) E66.01, Z68.41    Symptomatic bradycardia R00.1    CHF NYHA class I, chronic, diastolic ([facility] I50.32       Past Medical History:        Diagnosis Date    Adenocarcinoma of endometrium, stage 1 ([address] 10/5/2017    Well differentiated grade 1 adenocarcinoma arising in complex hyperplasia    JOSHUA (acute kidney injury) ([address] 1/15/2020    Allergic rhinitis 2/23/2017    At high risk for falls 2/23/2017    Atrial fibrillation [facility])     Jan 2020    CHF (congestive heart failure) ([address]     \"little\"    Colon polyp     Had colonoscopy done 20 yrs ago    Diabetes mellitus ([address]     Diverticulitis     Endometrial cancer ([address]     History of TIA (transient ischemic attack) '80's    on Baby ASA    Hyperglycemia 3/21/2017    Hyperlipidemia     Hypertension since 2015    on Rx.     Hypothyroidism 1980    sub total thyroidectomy goiter    Legally blind since born    both eyes, cord prolapse; \"not legally blind\" per \"associated eye care\" please see telephone encounter from 2/27/18    Lower back pain     Mixed incontinence 1/9/2016    Morbid obesity with BMI of 40.0-44.9, adult ([address] 2/23/2017    CLAROS (nonalcoholic steatohepatitis) 3/10/2019    Obesity     Oral phase dysphagia 2/23/2018    Osteoarthritis (arthritis due to wear and tear of joints)     ronan knee    Osteoarthritis involving multiple joints on both sides of body 4/24/2012    Osteoporosis     Perforated bowel ([address]     Postmenopausal bleeding 9/20/2017    S/P h-scope, Myosure 9/20/17 9/20/2017    Pathology pending    Tennis elbow     left    Thickened endometrium 9/20/2017    TIA (transient ischemic attack)     TLHBSO, bilateral LND 10/24/17 10/24/2017    Type 2 diabetes mellitus, without long-term current use of insulin ([address] 1/14/2020       Past Surgical History:        Procedure Laterality Date    CATARACT REMOVAL WITH IMPLANT Bilateral 2015    COLONOSCOPY  1997    had polyp, she doesn't know where and when, \"20 yrs ago\"    COLONOSCOPY  06/26/2017    COLONOSCOPY N/A 8/3/2020    COLONOSCOPY POLYPECTOMY SNARE performed by >60 07/27/2020    LABGLOM >60 07/27/2020    GLUCOSE 98 07/27/2020    GLUCOSE 114 03/20/2012    PROT 7.5 07/24/2020    CALCIUM 8.9 07/27/2020    BILITOT 0.24 07/24/2020    ALKPHOS 50 07/24/2020    AST 12 07/24/2020    ALT 16 07/24/2020       POC Tests:   Recent Labs     08/03/20  1031   POCGLU 121*       Coags:   Lab Results   Component Value Date    PROTIME 13.1 07/11/2020    INR 1.0 07/11/2020    APTT 28.7 07/11/2020       HCG (If Applicable): No results found for: PREGTESTUR, PREGSERUM, HCG, HCGQUANT     ABGs: No results found for: PHART, PO2ART, IKO6CKO, HXG6ZMC, BEART, K7IOQJGW     Type & Screen (If Applicable):  No results found for: LABABO, LABRH    Drug/Infectious Status (If Applicable):  Lab Results   Component Value Date    HEPCAB NONREACTIVE 03/21/2017       COVID-19 Screening (If Applicable):   Lab Results   Component Value Date    COVID19 Not Detected 07/30/2020         Anesthesia Evaluation  Patient summary reviewed and Nursing notes reviewed no history of anesthetic complications:   Airway: Mallampati: III  TM distance: <3 FB   Neck ROM: full  Mouth opening: > = 3 FB Dental:    (+) partials  Comment: Many missing teeth    Pulmonary:Negative Pulmonary ROS and normal exam  breath sounds clear to auscultation                            ROS comment: Allergic rhinitis   Cardiovascular:    (+) hypertension:, valvular problems/murmurs (Mild to moderate mitral Regurgitation): MR, dysrhythmias (h/o Bradycardia): atrial fibrillation, CHF:, hyperlipidemia      ECG reviewed  Rhythm: regular  Rate: normal           Beta Blocker:  Dose within 24 Hrs      ROS comment: EKG   Sinus bradycardia  Non-specific T wave abnormality  ECHO  Normal left ventricle size, wall thickness and function with an estimated EF > 55%. No segmental wall motion abnormalities seen. Normal right ventricular size and function. Left atrium is normal in size. Right atrium is normal in size. Aortic valve is trileaflet.  Aortic valve sclerosis without stenosis. No aortic insufficiency. Normal aortic root dimension. Thickened anterior mitral valve leaflet. Mild to moderate mitral regurgitation. Normal tricuspid valve structure and function. Insignificant tricuspid regurgitation, unable to estimate RVSP. Neuro/Psych:   (+) TIA,              ROS comment: Back Pain  Legally blind - bilateral GI/Hepatic/Renal:   (+) GERD:, liver disease (CLAROS (nonalcoholic steatohepatitis) ):, renal disease (Overactive Bladder):, morbid obesity         ROS comment: Diverticulitis  Dysphagia. Endo/Other:    (+) Diabetes (FBS -107)Type II DM, , hypothyroidism, blood dyscrasia: anticoagulation therapy, arthritis: OA., malignancy/cancer (Endometrial cancer ). Abdominal:           Vascular: negative vascular ROS. Anesthesia Plan      general     ASA 3       Induction: intravenous. MIPS: Postoperative opioids intended and Prophylactic antiemetics administered. Anesthetic plan and risks discussed with patient. Plan discussed with CRNA.                 Ji Marinelli MD   8/4/2020

## 2020-08-04 NOTE — H&P
HISTORY and [id number]       NAME:  Inetta Babinski  MRN: [id number]   YOB: 1950   Date: 8/4/2020   Age: 78 y.o. Gender: female       COMPLAINT AND PRESENT HISTORY:   Inetta Babinski is 78 y.o.,  female, will be having a Colonoscopy and EGD. Pt has prior Colonoscopy was done 5 years ago with polyp removed. Pt has history of Reflux Esophagitis Colitis. Patient has hx of Colon Polyps, Diverticulitis. Patient denies any  FH of Colon or esophogeal  Cancer. Patient reports always constipated, and use stool softener. No GI /Rectal bleeding, experiencing red/ black/ BRBPR stools. Patient Denies abd pain. Pt reports dysphagia and difficulty with food getting stuck. Pt has to resolve to drinking more fluids to assist with swallowing. Pt denies hx of GERD . Patient denies any other GI symptoms. No nausea / vomiting. No fever or chills, no chest pain , no SOB, no heart palpitation. Pt  Report she finished her colonoscopy prep , her bowel movement today is clear liquids as yellow. Pt denies any problems with anesthesia before, no history of infection MRSA. PT NPO since past midnight, took her BP am medication with sip of water , pt stopped her blood thinner medication Xarelto  3 days ago.    S/p EGD with Bx and colonoscopy and polypectomy yesterday for sigmoid colectomy today    PAST MEDICAL HISTORY     Past Medical History:   Diagnosis Date    Adenocarcinoma of endometrium, stage 1 ([facility] 10/5/2017    Well differentiated grade 1 adenocarcinoma arising in complex hyperplasia    JOSHUA (acute kidney injury) ([address] 1/15/2020    Allergic rhinitis 2/23/2017    At high risk for falls 2/23/2017    Atrial fibrillation [facility])     Jan 2020    CHF (congestive heart failure) ([facility] [address] [facility]     \"little\"    Colon polyp     Had colonoscopy done 20 yrs ago    Diabetes mellitus ([address]     Diverticulitis     Endometrial cancer ([facility] [address] [facility]     History of TIA (transient ischemic attack) '80's    on Baby ASA    Hyperglycemia 3/21/2017    Hyperlipidemia     Hypertension since 2015    on Rx.     Hypothyroidism 1980    sub total thyroidectomy goiter    Legally blind since born    both eyes, cord prolapse; \"not legally blind\" per \"associated eye care\" please see telephone encounter from 2/27/18    Lower back pain     Mixed incontinence 1/9/2016    Morbid obesity with BMI of 40.0-44.9, adult ([address] 2/23/2017    CLAROS (nonalcoholic steatohepatitis) 3/10/2019    Obesity     Oral phase dysphagia 2/23/2018    Osteoarthritis (arthritis due to wear and tear of joints)     ronan knee    Osteoarthritis involving multiple joints on both sides of body 4/24/2012    Osteoporosis     Perforated bowel ([address]     Postmenopausal bleeding 9/20/2017    S/P h-scope, Myosure 9/20/17 9/20/2017    Pathology pending    Tennis elbow     left    Thickened endometrium 9/20/2017    TIA (transient ischemic attack)     TLHBSO, bilateral LND 10/24/17 10/24/2017    Type 2 diabetes mellitus, without long-term current use of insulin ([facility] 1/14/2020       SURGICAL HISTORY       Past Surgical History:   Procedure Laterality Date    CATARACT REMOVAL WITH IMPLANT Bilateral 2015    COLONOSCOPY  1997    had polyp, she doesn't know where and when, \"20 yrs ago\"    COLONOSCOPY  06/26/2017    COLONOSCOPY N/A 8/3/2020    COLONOSCOPY POLYPECTOMY SNARE performed by David Braxton MD at [address] [facility] N/A 9/20/2017    HYSTEROSCOPY  WITH MYOSURE performed by Anil Keys DO at [address] N/A 10/24/2017    TOTAL LAPAROSCOPIC HYSTERECTOMY, BSO, F.S.  STAGING, GYRUS G400 performed by Machelle Durham MD at [address] N/A 6/26/2017    COLONOSCOPY POLYPECTOMY / HOT SNARE performed by Tereza Cid DO at [address]  10/24/2017    with pelvic lymph node dissection    THYROID SURGERY  1980    subtotal 20 years ago   [facility] TONSILLECTOMY      UPPER GASTROINTESTINAL ENDOSCOPY N/A 8/3/2020    [facility]       Family History   Problem Relation Age of Onset    Coronary Art Dis Father     Hypertension Father     Diabetes Father     High Blood Pressure Father     Heart Attack Father     Diabetes Mother     High Blood Pressure Mother     Thyroid Disease Mother     High Blood Pressure Sister     Thyroid Disease Brother     High Blood Pressure Brother     High Blood Pressure Maternal Grandmother     Diabetes Maternal Grandfather     No Known Problems Paternal Grandmother     Heart Attack Paternal Grandfather     Colon Cancer Neg Hx        SOCIAL HISTORY       Social History     Socioeconomic History    Marital status:       Spouse name: None    Number of children: 1    Years of education: None    Highest education level: None   Occupational History    None   Social Needs    Financial resource strain: None    Food insecurity     Worry: None     Inability: None    Transportation needs     Medical: None     Non-medical: None   Tobacco Use    Smoking status: Never Smoker    Smokeless tobacco: Never Used   Substance and Sexual Activity    Alcohol use: No     Alcohol/week: 0.0 standard drinks    Drug use: No    Sexual activity: Never   Lifestyle    Physical activity     Days per week: None     Minutes per session: None    Stress: None   Relationships    Social connections     Talks on phone: None     Gets together: None     Attends Shinto service: None     Active member of club or organization: None     Attends meetings of clubs or organizations: None     Relationship status: None    Intimate partner violence     Fear of current or ex partner: None     Emotionally abused: None     Physically abused: None     Forced sexual activity: None   Other Topics Concern    None   Social History Narrative    None REVIEW OF SYSTEMS      Allergies   Allergen Reactions    Sulfa Antibiotics Nausea Only    Penicillins Rash       No current facility-administered medications on file prior to encounter. Current Outpatient Medications on File Prior to Encounter   Medication Sig Dispense Refill    amiodarone (CORDARONE) 200 MG tablet Take 1 tablet by mouth daily 30 tablet 3    atorvastatin (LIPITOR) 40 MG tablet TAKE 1 TABLET BY MOUTH DAILY STOP SIMVASTATIN 90 tablet 3    metFORMIN (GLUCOPHAGE) 500 MG tablet TAKE ONE TABLET BY MOUTH TWICE A DAY WITH A MEAL 60 tablet 10    Lactobacillus (PROBIOTIC ACIDOPHILUS) TABS Take 1 tablet by mouth daily 30 tablet 3    rivaroxaban (XARELTO) 20 MG TABS tablet Take 1 tablet by mouth daily (with breakfast) 30 tablet 3    levothyroxine (SYNTHROID) 75 MCG tablet TAKE 1 TABLET BY MOUTH EVERY MORNING (BEFORE BREAKFAST) 90 tablet 3    HM LORATADINE 10 MG tablet TAKE 1 TABLET BY MOUTH DAILY 90 tablet 3    docusate sodium (COLACE) 100 MG capsule TAKE 1 CAPSULE BY MOUTH 2 TIMES DAILY 180 capsule 3    MYRBETRIQ 50 MG TB24 TAKE ONE TABLET BY MOUTH ONCE DAILY 90 tablet 3    Cranberry, Vacc oxycoccus, (SM CRANBERRY) 500 MG TABS Take 500 mg by mouth daily       UNABLE TO FIND Needs right walker brake fixed 1 each 0    Cholecalciferol (VITAMIN D) 2000 units TABS tablet Take 1 tablet by mouth daily 90 tablet 3    vitamin B-12 (CYANOCOBALAMIN) 1000 MCG tablet Take 1 tablet by mouth daily 90 tablet 3    acetaminophen (APAP EXTRA STRENGTH) 500 MG tablet Take 1 tablet by mouth every 6 hours as needed for Pain or Fever 360 tablet 3       Negative except for what is mentioned in the HPI. GENERAL PHYSICAL EXAM     Vitals: /60   Pulse 58   Temp 98.2 °F (36.8 °C) (Infrared)   Resp 18   Ht 5' 2\" (1.575 m)   Wt 226 lb (102.5 kg)   LMP  (LMP Unknown)   SpO2 99%   BMI 41.34 kg/m²  Body mass index is 41.34 kg/m².      GENERAL APPEARANCE:   Marito Sanchez is 78 y.o.,  female, moderately obese, nourished, conscious, alert. Does not appear to be distress or pain at this time. SKIN:  Warm, dry, no cyanosis or jaundice. HEAD:  Normocephalic, atraumatic, no swelling or tenderness. EYES:  Pupils equal, reactive to light. EARS:  No discharge, no marked hearing loss. NOSE:  No rhinorrhea, epistaxis or septal deformity. THROAT:  Not congested. No ulceration bleeding or discharge. NECK:  No stiffness, trachea central.  No palpable masses or L.N.                 CHEST:  Symmetrical and equal on expansion. HEART:  RRR S1 > S2. No audible murmurs or gallops. LUNGS:  Equal on expansion, normal breath sounds. No adventitious sounds. ABDOMEN:  Obese. Soft on palpation. No dysphagia, No localized tenderness. No guarding or rigidity. No palpable hepatosplenomegaly. LYMPHATICS:  No palpable cervical lymphadenopathy. LOCOMOTOR, BACK AND SPINE:  No tenderness or deformities. EXTREMITIES:  Symmetrical, no pretibial edema. Veronicas sign negative. No discoloration or ulcerations. NEUROLOGIC:  The patient is conscious, alert, oriented,Cranial nerve II-XII intact, taste and smell were not examined. No apparent focal sensory or motor deficits.              PROVISIONAL DIAGNOSES / SURGERY:            Complicated sigmoid diverticulitis for sigmoid colectomy today        Patient Active Problem List    Diagnosis Date Noted    Morbid obesity with BMI of 40.0-44.9, adult ([address] 02/23/2017     Priority: High    Symptomatic bradycardia 07/24/2020    CHF NYHA class I, chronic, diastolic ([address] 50/92/8205    Chronic cholecystitis 07/22/2020    Colitis 07/22/2020    Multiple atypical skin moles 07/22/2020    Class 3 severe obesity due to excess calories without serious comorbidity with body mass index (BMI) of 40.0 to 44.9 in  Essential hypertension            Nancy Person MD on 8/4/2020 at 8:21 AM

## 2020-08-04 NOTE — ANESTHESIA POSTPROCEDURE EVALUATION
Department of Anesthesiology  Postprocedure Note    Patient: Yasir Ayala  MRN: [id number]  [place]: 1950  Date of evaluation: 8/4/2020  Time:  12:27 PM     Procedure Summary     Date:  08/04/20 Room / Location:  [address] [place] [address] Lance Johnson 10 / [facility]: IBIS KENNEY    Anesthesia Start:  0575 Anesthesia Stop:  8115    Procedure:  OPEN SIGMOID COLECTOMY; PRIMARY ANASTOMOSIS & MOBILIZATION OF SPLENIC FLEXURE (N/A Abdomen) Diagnosis:  (DIVERTICULITIS COLITIS CHRONIC CHOLECYSTITIS    UPDATE OFFICE TO FAX)    Surgeon:  Radha Mccauley MD Responsible Provider:  Devan Salomon MD    Anesthesia Type:  general ASA Status:  3          Anesthesia Type: general    Luz Phase I: Luz Score: 9    Luz Phase II:      Last vitals: Reviewed and per EMR flowsheets.        Anesthesia Post Evaluation    Comments: POST- ANESTHESIA EVALUATION       Pt Name: Yasir Ayala  MRN: [id number]  [place]: 1950  Date of evaluation: 8/4/2020  Time:  12:27 PM      BP (!) 116/49   Pulse 54   Temp 97.1 °F (36.2 °C)   Resp 16   Ht 5' 2\" (1.575 m)   Wt 226 lb (102.5 kg)   LMP  (LMP Unknown)   SpO2 98%   BMI 41.34 kg/m²      Consciousness Level  Awake  Cardiopulmonary Status  Stable  Pain Adequately Treated YES  Nausea / Vomiting  NO  Adequate Hydration  YES  Anesthesia Related Complications NONE      Electronically signed by Devan Salomon MD on 8/4/2020 at 12:27 PM

## 2020-08-04 NOTE — CONSULTS
[place] [address] Internal Medicine    CONSULTATION / HISTORY AND PHYSICAL EXAMINATION            Date:   8/4/2020  Patient name:  Keira Parkinson  Date of admission:  8/4/2020  5:43 AM  MRN:   [id number]  Account:  [de-identified]  YOB: 1950  PCP:    Arnoldo Jim MD  Room:   2116/2116-01  Code Status:    Full Code    Physician Requesting Consult: Cipriano Llamas MD    Reason for Consult: Medical management    Chief Complaint:     No chief complaint on file. History Obtained From:     patient, electronic medical record    History of Present Illness:   Patient past medical history multiple medical problems includes morbid obesity, hypertension, diabetes, hypothyroidism, atrial fibrillation on anticoagulation, patient admitted to hospital for planned open sigmoid colectomy and cholecystectomy  She underwent EGD and colonoscopy yesterday  Patient has history of uterine cancer in the past status post hysterectomy    Past Medical History:     Past Medical History:   Diagnosis Date    Adenocarcinoma of endometrium, stage 1 ([facility] 10/5/2017    Well differentiated grade 1 adenocarcinoma arising in complex hyperplasia    JOSHUA (acute kidney injury) ([facility] 1/15/2020    Allergic rhinitis 2/23/2017    At high risk for falls 2/23/2017    Atrial fibrillation [facility])     Jan 2020    CHF (congestive heart failure) ([facility]     \"little\"    Colon polyp     Had colonoscopy done 20 yrs ago    Diabetes mellitus ([facility]     Diverticulitis     Endometrial cancer ([facility]     History of TIA (transient ischemic attack) '80's    on Baby ASA    Hyperglycemia 3/21/2017    Hyperlipidemia     Hypertension since 2015    on Rx.     Hypothyroidism 1980    sub total thyroidectomy goiter    Legally blind since born    both eyes, cord prolapse; \"not legally blind\" per \"associated eye care\" please see telephone encounter from 2/27/18    Lower back pain     Mixed incontinence 1/9/2016    Morbid obesity with BMI of 40.0-44.9, adult ([facility] 2/23/2017    CLAROS (nonalcoholic steatohepatitis) 3/10/2019    Obesity     Oral phase dysphagia 2/23/2018    Osteoarthritis (arthritis due to wear and tear of joints)     ronan knee    Osteoarthritis involving multiple joints on both sides of body 4/24/2012    Osteoporosis     Perforated bowel ([facility]     Postmenopausal bleeding 9/20/2017    S/P h-scope, Myosure 9/20/17 9/20/2017    Pathology pending    Tennis elbow     left    Thickened endometrium 9/20/2017    TIA (transient ischemic attack)     TLHBSO, bilateral LND 10/24/17 10/24/2017    Type 2 diabetes mellitus, without long-term current use of insulin ([facility] 1/14/2020        Past Surgical History:     Past Surgical History:   Procedure Laterality Date    CATARACT REMOVAL WITH IMPLANT Bilateral 2015    COLONOSCOPY  1997    had polyp, she doesn't know where and when, \"20 yrs ago\"    COLONOSCOPY  06/26/2017    COLONOSCOPY N/A 8/3/2020    COLONOSCOPY POLYPECTOMY SNARE performed by Cipriano Llamas MD at [address] [facility] N/A 9/20/2017    HYSTEROSCOPY  [address] performed by Jose Alfredo Frias DO at [address] N/A 10/24/2017    TOTAL LAPAROSCOPIC HYSTERECTOMY, BSO, F.S.  STAGING, GYRUS G400 performed by Tavon Delatorre MD at [address] N/A 6/26/2017    COLONOSCOPY POLYPECTOMY / HOT SNARE performed by Quinton Santiago DO at [address]  10/24/2017    with pelvic lymph node dissection    THYROID SURGERY  1980    subtotal 20 years ago    TONSILLECTOMY      UPPER GASTROINTESTINAL ENDOSCOPY N/A 8/3/2020    EGD BIOPSY performed by Cipriano Llamas MD at [address] [facility] VITRECTOMY      FLOATERS        Medications Prior to Admission:     Prior to Admission medications    Medication Sig Start Date End Date Taking?  Authorizing Provider   metoprolol tartrate (LOPRESSOR) 25 MG tablet Take 0.5 tablets by mouth 2 times daily 7/27/20  Yes Gia Seyd MD   amiodarone (CORDARONE) 200 MG tablet Take 1 tablet by mouth daily 6/22/20  Yes Johnny Pham MD   atorvastatin (LIPITOR) 40 MG tablet TAKE 1 TABLET BY MOUTH DAILY STOP SIMVASTATIN 3/16/20  Yes Naima Jurado MD   metFORMIN (GLUCOPHAGE) 500 MG tablet TAKE ONE TABLET BY MOUTH TWICE A DAY WITH A MEAL 11/11/19  Yes Naima Jurado MD   Lactobacillus (PROBIOTIC ACIDOPHILUS) TABS Take 1 tablet by mouth daily 7/31/17  Yes Naima Jurado MD   Blood Pressure Monitor KIT Use as directed. 7/22/20   MAX Barker CNP   glucose monitoring kit (FREESTYLE) monitoring kit Use as directed. BRAND OF CHOICE INSURANCE ALLOWS. 7/22/20   MAX Barker CNP   blood glucose monitor strips Test 2-3 times a day & as needed for symptoms of irregular blood glucose.   BRAND OF CHO[employer] INSURANCE ALLOWS. 7/22/20   MAX Barker CNP   Alcohol Swabs (ALCOHOL PREP) PADS Use as directed 7/22/20   MAX Barker CNP   Lancets MISC 1 each by Does not apply route 2 times daily 7/22/20   MAX Barker CNP   oxybutynin (DITROPAN-XL) 10 MG extended release tablet Take 1 tablet by mouth daily 7/20/20   Roya Salinas MD   rivaroxaban (XARELTO) 20 MG TABS tablet Take 1 tablet by mouth daily (with breakfast) 6/22/20   Johnny Pham MD   levothyroxine (SYNTHROID) 75 MCG tablet TAKE 1 TABLET BY MOUTH EVERY MORNING (BEFORE BREAKFAST) 1/21/20   Naima Jurado MD   HM LORATADINE 10 MG tablet TAKE 1 TABLET BY MOUTH DAILY 1/21/20   Radha Vallecillo MD   docusate sodium (COLACE) 100 MG capsule TAKE 1 CAPSULE BY MOUTH 2 TIMES DAILY 12/16/19   Naima Jurado MD   MYRBETRIQ 50 MG TB24 TAKE ONE TABLET BY MOUTH ONCE DAILY 12/16/19   Naima Jurado MD   Cranberry, Vacc oxycoccus, (SM CRANBERRY) 500 MG TABS Take 500 mg by mouth daily  7/31/17   Historical Provider, MD   UNABLE TO FIND Needs right walker brake fixed 10/3/19 hour(s))   POC Glucose Fingerstick    Collection Time: 08/04/20  7:38 AM   Result Value Ref Range    POC Glucose 117 (H) 65 - 105 mg/dL   Urinalysis Reflex to Culture    Collection Time: 08/04/20  9:05 AM    Specimen: Bladder; Urine   Result Value Ref Range    Color, UA YELLOW YELLOW    Turbidity UA CLEAR CLEAR    Glucose, Ur NEGATIVE NEGATIVE    Bilirubin Urine NEGATIVE NEGATIVE    Ketones, Urine NEGATIVE NEGATIVE    Specific Gravity, UA 1.007 1.000 - 1.030    Urine Hgb TRACE (A) NEGATIVE    pH, UA 5.5 5.0 - 8.0    Protein, UA NEGATIVE NEGATIVE    Urobilinogen, Urine Normal Normal    Nitrite, Urine NEGATIVE NEGATIVE    Leukocyte Esterase, Urine NEGATIVE NEGATIVE    Urinalysis Comments NOT REPORTED    Microscopic Urinalysis    Collection Time: 08/04/20  9:05 AM   Result Value Ref Range    -          WBC, UA 2 TO 5 /HPF    RBC, UA 0 TO 2 /HPF    Casts UA NOT REPORTED /LPF    Crystals, UA NOT REPORTED None /HPF    Epithelial Cells UA 0 TO 2 /HPF    Renal Epithelial, UA NOT REPORTED 0 /HPF    Bacteria, UA NOT REPORTED None    Mucus, UA NOT REPORTED None    Trichomonas, UA NOT REPORTED None    Amorphous, UA NOT REPORTED None    Other Observations UA NOT REPORTED NOT REQ.     Yeast, UA NOT REPORTED None   POC Glucose Fingerstick    Collection Time: 08/04/20 10:51 AM   Result Value Ref Range    POC Glucose 133 (H) 65 - 105 mg/dL   POC Glucose Fingerstick    Collection Time: 08/04/20 12:41 PM   Result Value Ref Range    POC Glucose 136 (H) 65 - 105 mg/dL       Imaging/Diagonstics:      Assessment :      Primary Problem  <principal problem not specified>    Active Hospital Problems    Diagnosis Date Noted    Morbid obesity with BMI of 40.0-44.9, adult ([facility] [E66.01, Z68.41] 02/23/2017     Priority: High    Sigmoid diverticulitis [K57.32] 08/04/2020    Class 3 severe obesity due to excess calories without serious comorbidity with body mass index (BMI) of 40.0 to 44.9 in adult [facility]) [E66.01, Z68.41] 07/22/2020    Atrial fibrillation, new onset ([facility] [I48.91] 01/20/2020    A-fib ([facility] [I48.91] 01/14/2020    Type 2 diabetes mellitus, without long-term current use of insulin ([facility] [E11.9] 01/14/2020    Adenomatous polyp of sigmoid colon, 6/26/17 [D12.5] 07/30/2017    Acquired hypothyroidism [E03.9]        Plan:     1. Perforated sigmoid diverticulitis status post open sigmoid colectomy and cholecystectomy, patient is on Dilaudid pain is controlled  2. Diabetes, sugar controlled, low-dose sliding scale, point-of-care glucose every 6  3. Hypertension, controlled, starting patient on Lopressor 5 mg every 6 as needed for systolic blood pressure more than 150, hold if heart rate is less than 60  4. Hypothyroidism, TSH is okay  5. Congestive heart failure, last expression 55%, has high BNP tested in July, likely has heart failure with preserved ejection fraction, will reduce fluid to 100 mL per she is n.p.o., will evaluate tomorrow about reducing fluid  6. Had stress test recently which was negative  7. Patient has history of A. fib, rate controlled, was on Xarelto  8. We will discuss with operating surgeon when it is okay to change Lovenox prophylactic dose to therapeutic dose  9. Consultations:   IP CONSULT TO INTERNAL MEDICINE  IP CONSULT TO CASE MANAGEMENT  IP CONSULT TO SOCIAL WORK      Dionicio Fernandez MD  8/4/2020  1:52 PM    Copy sent to Dr. Gio Gorman MD    Please note that this chart was generated using voice recognition Dragon dictation software. Although every effort was made to ensure the accuracy of this automated transcription, some errors in transcription may have occurred.

## 2020-08-04 NOTE — PLAN OF CARE
Problem: Infection:  Goal: Will remain free from infection  Outcome: Ongoing     Problem: Safety:  Goal: Free from accidental physical injury  Outcome: Ongoing  Goal: Free from intentional harm  Outcome: Ongoing     Problem: Daily Care:  Goal: Daily care needs are met  Outcome: Ongoing     Problem: Pain:  Goal: Patient's pain/discomfort is manageable  Outcome: Ongoing  Goal: Pain level will decrease  Outcome: Ongoing  Goal: Control of acute pain  Outcome: Ongoing  Goal: Control of chronic pain  Outcome: Ongoing     Problem: Skin Integrity:  Goal: Skin integrity will stabilize  Outcome: Ongoing     Problem: Discharge Planning:  Goal: Patients continuum of care needs are met  Outcome: Ongoing     Problem: Falls - Risk of:  Goal: Will remain free from falls  Outcome: Ongoing  Goal: Absence of physical injury  Outcome: Ongoing

## 2020-08-04 NOTE — OP NOTE
was left in the pelvis and below and above the omentum. After confirming sponge needle instrument count fascia was approximated using looped PDS suture x2. Wound was irrigated. Subcutaneous drain was left in place was brought out and secured. Skin was approximated using staples. Clean dressing was applied. Urine output remained adequate and clear. Patient tolerated procedure well and was transferred to the recovery room in a stable condition. Recommendations: Operative findings are discussed with the patient's family at length. Postoperative care recovery restrictions were discussed. Patient will be admitted to the hospital for further care.       Electronically signed by Cisco Mckinley MD on 8/4/2020 at 10:46 AM

## 2020-08-05 LAB
ABSOLUTE BANDS #: 0.33 K/UL (ref 0–1)
ABSOLUTE EOS #: 0 K/UL (ref 0–0.4)
ABSOLUTE IMMATURE GRANULOCYTE: ABNORMAL K/UL (ref 0–0.3)
ABSOLUTE LYMPH #: 0.22 K/UL (ref 1–4.8)
ABSOLUTE MONO #: 0.22 K/UL (ref 0.1–1.3)
ANION GAP SERPL CALCULATED.3IONS-SCNC: 11 MMOL/L (ref 9–17)
BANDS: 3 % (ref 0–10)
BASOPHILS # BLD: 0 % (ref 0–2)
BASOPHILS ABSOLUTE: 0 K/UL (ref 0–0.2)
BUN BLDV-MCNC: 15 MG/DL (ref 8–23)
BUN/CREAT BLD: ABNORMAL (ref 9–20)
CALCIUM SERPL-MCNC: 7.3 MG/DL (ref 8.6–10.4)
CHLORIDE BLD-SCNC: 105 MMOL/L (ref 98–107)
CO2: 20 MMOL/L (ref 20–31)
CREAT SERPL-MCNC: 0.97 MG/DL (ref 0.5–0.9)
DIFFERENTIAL TYPE: ABNORMAL
EOSINOPHILS RELATIVE PERCENT: 0 % (ref 0–4)
GFR AFRICAN AMERICAN: >60 ML/MIN
GFR NON-AFRICAN AMERICAN: 57 ML/MIN
GFR SERPL CREATININE-BSD FRML MDRD: ABNORMAL ML/MIN/{1.73_M2}
GFR SERPL CREATININE-BSD FRML MDRD: ABNORMAL ML/MIN/{1.73_M2}
GLUCOSE BLD-MCNC: 101 MG/DL (ref 65–105)
GLUCOSE BLD-MCNC: 109 MG/DL (ref 65–105)
GLUCOSE BLD-MCNC: 113 MG/DL (ref 65–105)
GLUCOSE BLD-MCNC: 115 MG/DL (ref 65–105)
GLUCOSE BLD-MCNC: 132 MG/DL (ref 70–99)
HCT VFR BLD CALC: 33.9 % (ref 36–46)
HEMOGLOBIN: 10.9 G/DL (ref 12–16)
IMMATURE GRANULOCYTES: ABNORMAL %
LYMPHOCYTES # BLD: 2 % (ref 24–44)
MCH RBC QN AUTO: 30.1 PG (ref 26–34)
MCHC RBC AUTO-ENTMCNC: 32.3 G/DL (ref 31–37)
MCV RBC AUTO: 93.3 FL (ref 80–100)
MONOCYTES # BLD: 2 % (ref 1–7)
MORPHOLOGY: ABNORMAL
NRBC AUTOMATED: ABNORMAL PER 100 WBC
PDW BLD-RTO: 15.2 % (ref 11.5–14.9)
PLATELET # BLD: 205 K/UL (ref 150–450)
PLATELET ESTIMATE: ABNORMAL
PMV BLD AUTO: 8.7 FL (ref 6–12)
POTASSIUM SERPL-SCNC: 3.8 MMOL/L (ref 3.7–5.3)
RBC # BLD: 3.63 M/UL (ref 4–5.2)
RBC # BLD: ABNORMAL 10*6/UL
SEG NEUTROPHILS: 93 % (ref 36–66)
SEGMENTED NEUTROPHILS ABSOLUTE COUNT: 10.13 K/UL (ref 1.3–9.1)
SODIUM BLD-SCNC: 136 MMOL/L (ref 135–144)
SURGICAL PATHOLOGY REPORT: NORMAL
WBC # BLD: 10.9 K/UL (ref 3.5–11)
WBC # BLD: ABNORMAL 10*3/UL

## 2020-08-05 PROCEDURE — 2580000003 HC RX 258: Performed by: SURGERY

## 2020-08-05 PROCEDURE — 2060000000 HC ICU INTERMEDIATE R&B

## 2020-08-05 PROCEDURE — 97162 PT EVAL MOD COMPLEX 30 MIN: CPT

## 2020-08-05 PROCEDURE — 97166 OT EVAL MOD COMPLEX 45 MIN: CPT

## 2020-08-05 PROCEDURE — 80048 BASIC METABOLIC PNL TOTAL CA: CPT

## 2020-08-05 PROCEDURE — 85025 COMPLETE CBC W/AUTO DIFF WBC: CPT

## 2020-08-05 PROCEDURE — 6360000002 HC RX W HCPCS: Performed by: SURGERY

## 2020-08-05 PROCEDURE — 97530 THERAPEUTIC ACTIVITIES: CPT

## 2020-08-05 PROCEDURE — C9113 INJ PANTOPRAZOLE SODIUM, VIA: HCPCS | Performed by: SURGERY

## 2020-08-05 PROCEDURE — 82947 ASSAY GLUCOSE BLOOD QUANT: CPT

## 2020-08-05 PROCEDURE — 2580000003 HC RX 258: Performed by: INTERNAL MEDICINE

## 2020-08-05 PROCEDURE — 6370000000 HC RX 637 (ALT 250 FOR IP): Performed by: PHYSICIAN ASSISTANT

## 2020-08-05 PROCEDURE — 36415 COLL VENOUS BLD VENIPUNCTURE: CPT

## 2020-08-05 PROCEDURE — 99233 SBSQ HOSP IP/OBS HIGH 50: CPT | Performed by: INTERNAL MEDICINE

## 2020-08-05 RX ORDER — DEXTROSE MONOHYDRATE 25 G/50ML
12.5 INJECTION, SOLUTION INTRAVENOUS PRN
Status: DISCONTINUED | OUTPATIENT
Start: 2020-08-05 | End: 2020-08-13 | Stop reason: HOSPADM

## 2020-08-05 RX ORDER — ACETAMINOPHEN 325 MG/1
650 TABLET ORAL EVERY 4 HOURS PRN
Status: DISCONTINUED | OUTPATIENT
Start: 2020-08-05 | End: 2020-08-09 | Stop reason: SDUPTHER

## 2020-08-05 RX ORDER — NICOTINE POLACRILEX 4 MG
15 LOZENGE BUCCAL PRN
Status: DISCONTINUED | OUTPATIENT
Start: 2020-08-05 | End: 2020-08-13 | Stop reason: HOSPADM

## 2020-08-05 RX ORDER — DEXTROSE MONOHYDRATE 50 MG/ML
100 INJECTION, SOLUTION INTRAVENOUS PRN
Status: DISCONTINUED | OUTPATIENT
Start: 2020-08-05 | End: 2020-08-13 | Stop reason: HOSPADM

## 2020-08-05 RX ADMIN — ENOXAPARIN SODIUM 30 MG: 30 INJECTION SUBCUTANEOUS at 21:01

## 2020-08-05 RX ADMIN — HYDROMORPHONE HYDROCHLORIDE 0.5 MG: 1 INJECTION, SOLUTION INTRAMUSCULAR; INTRAVENOUS; SUBCUTANEOUS at 12:42

## 2020-08-05 RX ADMIN — HYDROMORPHONE HYDROCHLORIDE 0.5 MG: 1 INJECTION, SOLUTION INTRAMUSCULAR; INTRAVENOUS; SUBCUTANEOUS at 09:16

## 2020-08-05 RX ADMIN — Medication 10 ML: at 09:16

## 2020-08-05 RX ADMIN — METOCLOPRAMIDE 10 MG: 5 INJECTION, SOLUTION INTRAMUSCULAR; INTRAVENOUS at 12:42

## 2020-08-05 RX ADMIN — SODIUM CHLORIDE: 9 INJECTION, SOLUTION INTRAVENOUS at 04:50

## 2020-08-05 RX ADMIN — HYDROMORPHONE HYDROCHLORIDE 0.5 MG: 1 INJECTION, SOLUTION INTRAMUSCULAR; INTRAVENOUS; SUBCUTANEOUS at 21:01

## 2020-08-05 RX ADMIN — SODIUM CHLORIDE: 9 INJECTION, SOLUTION INTRAVENOUS at 18:41

## 2020-08-05 RX ADMIN — METOCLOPRAMIDE 10 MG: 5 INJECTION, SOLUTION INTRAMUSCULAR; INTRAVENOUS at 05:30

## 2020-08-05 RX ADMIN — Medication 10 ML: at 21:01

## 2020-08-05 RX ADMIN — ACETAMINOPHEN 650 MG: 325 TABLET, FILM COATED ORAL at 14:23

## 2020-08-05 RX ADMIN — ENOXAPARIN SODIUM 30 MG: 30 INJECTION SUBCUTANEOUS at 09:16

## 2020-08-05 RX ADMIN — CEFAZOLIN 2 G: 1 INJECTION, POWDER, FOR SOLUTION INTRAMUSCULAR; INTRAVENOUS at 12:46

## 2020-08-05 RX ADMIN — METOCLOPRAMIDE 10 MG: 5 INJECTION, SOLUTION INTRAMUSCULAR; INTRAVENOUS at 18:41

## 2020-08-05 RX ADMIN — CEFAZOLIN 2 G: 1 INJECTION, POWDER, FOR SOLUTION INTRAMUSCULAR; INTRAVENOUS at 05:23

## 2020-08-05 RX ADMIN — PANTOPRAZOLE SODIUM 40 MG: 40 INJECTION, POWDER, FOR SOLUTION INTRAVENOUS at 09:16

## 2020-08-05 RX ADMIN — KETOROLAC TROMETHAMINE 15 MG: 30 INJECTION, SOLUTION INTRAMUSCULAR; INTRAVENOUS at 04:37

## 2020-08-05 RX ADMIN — CEFAZOLIN 2 G: 1 INJECTION, POWDER, FOR SOLUTION INTRAMUSCULAR; INTRAVENOUS at 21:06

## 2020-08-05 ASSESSMENT — PAIN SCALES - GENERAL
PAINLEVEL_OUTOF10: 5
PAINLEVEL_OUTOF10: 2
PAINLEVEL_OUTOF10: 5
PAINLEVEL_OUTOF10: 2
PAINLEVEL_OUTOF10: 4
PAINLEVEL_OUTOF10: 2
PAINLEVEL_OUTOF10: 8
PAINLEVEL_OUTOF10: 0
PAINLEVEL_OUTOF10: 8

## 2020-08-05 ASSESSMENT — PAIN DESCRIPTION - DESCRIPTORS
DESCRIPTORS: ACHING;DULL
DESCRIPTORS: ACHING

## 2020-08-05 ASSESSMENT — PAIN DESCRIPTION - FREQUENCY
FREQUENCY: INTERMITTENT

## 2020-08-05 ASSESSMENT — PAIN DESCRIPTION - LOCATION
LOCATION: ABDOMEN

## 2020-08-05 ASSESSMENT — PAIN DESCRIPTION - ONSET: ONSET: ON-GOING

## 2020-08-05 ASSESSMENT — PAIN DESCRIPTION - PAIN TYPE
TYPE: ACUTE PAIN;SURGICAL PAIN
TYPE: SURGICAL PAIN
TYPE: ACUTE PAIN
TYPE: SURGICAL PAIN

## 2020-08-05 ASSESSMENT — PAIN DESCRIPTION - ORIENTATION
ORIENTATION: ANTERIOR
ORIENTATION: ANTERIOR

## 2020-08-05 ASSESSMENT — PAIN DESCRIPTION - PROGRESSION: CLINICAL_PROGRESSION: NOT CHANGED

## 2020-08-05 ASSESSMENT — PAIN - FUNCTIONAL ASSESSMENT: PAIN_FUNCTIONAL_ASSESSMENT: PREVENTS OR INTERFERES WITH MANY ACTIVE NOT PASSIVE ACTIVITIES

## 2020-08-05 NOTE — PROGRESS NOTES
[facility]                 PATIENT NAME: Huber Hernandez     TODAY'S DATE: 8/5/2020, 10:53 AM    SUBJECTIVE:  POD#1  Pt seen and examined. Afebrile, VSS. S/p open sigmoid colectomy with primary anastomosis. Patient is doing well postoperatively. Rates abdominal pain 2/10 this morning. Denies flatus, no bowel movement. No N/V. Midline dressing clean, dry, intact. NORMA x 2 serosanguinous. Urine output improved after 1L fluid bolus yesterday evening. OBJECTIVE:   VITALS:  BP (!) 115/47   Pulse 62   Temp 97.6 °F (36.4 °C) (Oral)   Resp 16   Ht 5' 2\" (1.575 m)   Wt 234 lb 10.4 oz (106.4 kg)   LMP  (LMP Unknown)   SpO2 98%   BMI 42.92 kg/m²      INTAKE/OUTPUT:      Intake/Output Summary (Last 24 hours) at 8/5/2020 1053  Last data filed at 8/5/2020 0908  Gross per 24 hour   Intake 2299 ml   Output 1405 ml   Net 894 ml                 CONSTITUTIONAL:  awake and alert. No acute distress  HEART:  Irregular rhythm, rate controlled  LUNGS:   Decreased air entry at bases, no wheezing  ABDOMEN:   Abdomen soft, tender along incision, non-distended  EXTREMITIES:   Pedal edema b/l, nontender    Data:  CBC:   Lab Results   Component Value Date    WBC 10.9 08/05/2020    RBC 3.63 08/05/2020    RBC 4.23 03/20/2012    HGB 10.9 08/05/2020    HCT 33.9 08/05/2020    MCV 93.3 08/05/2020    MCH 30.1 08/05/2020    MCHC 32.3 08/05/2020    RDW 15.2 08/05/2020     08/05/2020     03/20/2012    MPV 8.7 08/05/2020     BMP:    Lab Results   Component Value Date     08/05/2020    K 3.8 08/05/2020     08/05/2020    CO2 20 08/05/2020    BUN 15 08/05/2020    LABALBU 4.3 07/24/2020    LABALBU 4.3 03/20/2012    CREATININE 0.97 08/05/2020    CALCIUM 7.3 08/05/2020    GFRAA >60 08/05/2020    LABGLOM 57 08/05/2020    GLUCOSE 132 08/05/2020    GLUCOSE 114 03/20/2012       Radiology Review:  No new images to review      ASSESSMENT     Active Problems:     Morbid obesity with BMI of

## 2020-08-05 NOTE — PROGRESS NOTES
[address] [facility]   Occupational Therapy Evaluation  Date: 20  Patient Name: Rafa Angel       Room: 6-  MRN: [id number]  Account: [de-identified]   : 1950  (78 y.o.) Gender: female     Discharge Recommendations:  Further Occupational  Therapy is recommended upon facility discharge. Referring Practitioner: Lizette Parisi MD  Diagnosis: Sigmoid diverticulitis  Additional Pertinent Hx: s/p OPEN SIGMOID COLECTOMY; PRIMARY ANASTOMOSIS & MOBILIZATION OF SPLENIC FLEXURE on 20; Hx: Afib, CHF, TIA, 20 colectomy    Treatment Diagnosis: impaired self care status  Past Medical History:  has a past medical history of Adenocarcinoma of endometrium, stage 1 ([address] JOSHUA (acute kidney injury) ([address] Allergic rhinitis, At high risk for falls, Atrial fibrillation ([address] CHF (congestive heart failure) ([address] Colon polyp, Diabetes mellitus ([address] Diverticulitis, Endometrial cancer ([address] History of TIA (transient ischemic attack), Hyperglycemia, Hyperlipidemia, Hypertension, Hypothyroidism, Legally blind, Lower back pain, Mixed incontinence, Morbid obesity with BMI of 40.0-44.9, adult ([address] CLAROS (nonalcoholic steatohepatitis), Obesity, Oral phase dysphagia, Osteoarthritis (arthritis due to wear and tear of joints), Osteoarthritis involving multiple joints on both sides of body, Osteoporosis, Perforated bowel ([address] Postmenopausal bleeding, S/P h-scope, Myosure 17, Tennis elbow, Thickened endometrium, TIA (transient ischemic attack), TLHBSO, bilateral LND 10/24/17, and Type 2 diabetes mellitus, without long-term current use of insulin ([address] Past Surgical History:   has a past surgical history that includes Thyroid surgery (); Colonoscopy (); Tonsillectomy; Colonoscopy (2017); pr colsc flx w/removal lesion by hot bx forceps (N/A, 2017); Dilation and curettage of uterus (N/A, 2017);  Cataract removal with implant (Bilateral, 2015); vitrectomy; julieta and bso (cervix removed) (10/24/2017); Hysterectomy (N/A, 10/24/2017); Upper gastrointestinal endoscopy (N/A, 8/3/2020); Colonoscopy (N/A, 8/3/2020); and Small intestine surgery (N/A, 8/4/2020). Restrictions  Restrictions/Precautions: General Precautions, Fall Risk, Surgical Protocols(NPO except sips with meds; ice chips)  Other position/activity restrictions: ambulate patient  Required Braces or Orthoses  Other: Abdominal Binder  Required Braces or Orthoses?: Yes       Subjective  Subjective: \"can I have a sip of that water? \" patient educated on NPO status - ice chips and sips with meds, refilled patients cup with ice chips  Comments: RN ok'd OT/PT evaluation this date. Patient pleasant and agreeable to therapy.   Overall Orientation Status: Within Functional Limits  Vision  Vision: Impaired  Vision Exceptions: Wears glasses for reading  Hearing  Hearing: Within functional limits  Social/Functional History  Lives With: Alone  Type of Home: Apartment(2nd floor)  Home Layout: One level  Home Access: Elevator, Level entry  Bathroom Shower/Tub: Tub/Shower unit, Curtain, Shower chair with back  Bathroom Toilet: Standard  Bathroom Equipment: Toilet raiser, Shower chair, Grab bars in shower, Grab bars around toilet, Hand-held shower  Bathroom Accessibility: Accessible, Walker accessible  Home Equipment: Quad cane, 4 wheeled walker, Alert [employer] Help From: Home health, Family(Aide 3x/week for 2-3 hours/sister)  ADL Assistance: Independent  Homemaking Assistance: Needs assistance(pt does cooking, aide does cleaning/laundry)  Homemaking Responsibilities: Yes(sister does grocery shopping)  Ambulation Assistance: Independent(uses rollator)  Transfer Assistance: Independent  Active : No  Patient's  Info: [facility] has a bus for weekly errands including bank and grocery store, sister or dtr also drive  Occupation: Retired  Type of occupation: homemaker  IADL Comments: sleeps in flat bed  Additional Comments: Sister and daughter checks in on patient. Daughter in an RN. Sister going back to work as . Pain Assessment  Pain Assessment: 0-10  Pain Level: 2  Pain Type: Acute pain  Pain Orientation: Anterior  Pain Descriptors: Aching  Pain Frequency: Intermittent    Objective      Cognition  Overall Cognitive Status: WFL   Sensation  Overall Sensation Status: WFL(pt denies)   ADL  Feeding: Setup, NPO(ice chips- able to scoop and bring spoon to mouth)  Grooming: Minimal assistance  UE Bathing: Moderate assistance  LE Bathing: Maximum assistance  UE Dressing: Moderate assistance  LE Dressing: Maximum assistance(assist to don footies)  Toileting: Dependent/Total(murray)  Additional Comments: Patient currently limited by pain from recent abdominal surgery. Patient required assistance to don footies and currently has a murray. Other assist levels based on clinical observation/reasoning    UE Function           LUE Strength  Gross LUE Strength: WFL  L Hand General: 4+/5  LUE Strength Comment: not formally tested due to recent abdominal surgery; appears functional during activity (bed mobiliy, transfer, walker management)     LUE Tone: Normotonic     LUE AROM (degrees)  LUE AROM : WFL     Left Hand AROM (degrees)  Left Hand AROM: WFL  RUE Strength  Gross RUE Strength: WFL  RUE Strength Comment: not formally tested due to recent abdominal surgery; appears functional during activity (bed mobiliy, transfer, walker management)      RUE Tone: Normotonic     RUE AROM (degrees)  RUE AROM : WFL  Right Hand PROM (degrees)  Right Hand PROM: WFL  Right Hand AROM (degrees)  Right Hand AROM: WFL    Fine Motor Skills  Coordination  Movements Are Fluid And Coordinated:  Yes                           Mobility  Supine to Sit: Moderate assistance, Minimal assistance, 2 Person assistance  Sit to Supine: Unable to assess       Balance  Sitting Balance: Stand by assistance(seated edge of bed prior to transfer to bedside chair)  Standing Balance: Inpatient CMS 0-100% Score: 63.03  ADL Inpatient CMS G-Code Modifier : CL       Goals  Short term goals  Time Frame for Short term goals: by discharge, patient will  Short term goal 1: perform functional transfer during ADL routine with contact guard assistance using rolling walker with Good safety  Short term goal 2: verbalize/demonstrate Good understanding of compensatory techniques / adaptive equipment for increased participation in lower body self care  Short term goal 3: verbalize/demonstrate Good understanding of wearing schedule / donning and doffing of abdominal binder for self care purposes  Short term goal 4: perform toileting routine with contact guard assistance with Good safety  Short term goal 5: actively participate in 15+ minutes of therapeutic activity/self care    Plan  Safety Devices  Safety Devices in place: Yes  Type of devices: Call light within reach, Gait belt, Left in chair, Nurse notified     Plan  Times per week: 4-5  Current Treatment Recommendations: Endurance Training, Patient/Caregiver Education & Training, Equipment Evaluation, Education, & procurement, Self-Care / ADL, Balance Training, Functional Mobility Training, Safety Education & Training  OT Education  OT Education: OT Role, Plan of Care, Precautions, ADL Adaptive Strategies, Transfer Training, Equipment  Patient Education: log roll technique, activity promotion       OT Individual Minutes  Time In: 3005  Time Out: 1024  Minutes: 26    Electronically signed by Dulce Cassidy OT on 8/5/20 at 4:17 PM EDT

## 2020-08-05 NOTE — ANESTHESIA POST-OP
Doing well postop day 1 without any anesthesia complications. Pain was under control.  Anenesthesia  Signed off

## 2020-08-05 NOTE — PROGRESS NOTES
[facility]    CONSULTATION / HISTORY AND PHYSICAL EXAMINATION            Date:   8/5/2020  Patient name:  Naif Leonard  Date of admission:  8/4/2020  5:43 AM  MRN:   [id number]  Account:  [de-identified]  YOB: 1950  PCP:    Keely Dempsey MD  Room:   [facility]/2116-01  Code Status:    Full Code    Physician Requesting Consult: Hattie Egan MD    Reason for Consult: Medical management    Chief Complaint:     No chief complaint on file. History Obtained From:     patient, electronic medical record    History of Present Illness:   Patient past medical history multiple medical problems includes morbid obesity, hypertension, diabetes, hypothyroidism, atrial fibrillation on anticoagulation, patient admitted to hospital for planned open sigmoid colectomy and cholecystectomy  She underwent EGD and colonoscopy yesterday  Patient has history of uterine cancer in the past status post hysterectomy    Past Medical History:     Past Medical History:   Diagnosis Date    Adenocarcinoma of endometrium, stage 1 ([facility] 10/5/2017    Well differentiated grade 1 adenocarcinoma arising in complex hyperplasia    JOSHUA (acute kidney injury) ([facility] 1/15/2020    Allergic rhinitis 2/23/2017    At high risk for falls 2/23/2017    Atrial fibrillation [facility])     Jan 2020    CHF (congestive heart failure) ([facility]     \"little\"    Colon polyp     Had colonoscopy done 20 yrs ago    Diabetes mellitus ([facility]     Diverticulitis     Endometrial cancer ([facility]     History of TIA (transient ischemic attack) '80's    on Baby ASA    Hyperglycemia 3/21/2017    Hyperlipidemia     Hypertension since 2015    on Rx.     Hypothyroidism 1980    sub total thyroidectomy goiter    Legally blind since born    both eyes, cord prolapse; \"not legally blind\" per \"associated eye care\" please see telephone encounter from 2/27/18    Lower back pain     Mixed incontinence 1/9/2016    Morbid obesity with BMI of 40.0-44.9, adult ([facility] 2/23/2017    CLAROS (nonalcoholic steatohepatitis) 3/10/2019    Obesity     Oral phase dysphagia 2/23/2018    Osteoarthritis (arthritis due to wear and tear of joints)     ronan knee    Osteoarthritis involving multiple joints on both sides of body 4/24/2012    Osteoporosis     Perforated bowel ([address]     Postmenopausal bleeding 9/20/2017    S/P h-scope, Myosure 9/20/17 9/20/2017    Pathology pending    Tennis elbow     left    Thickened endometrium 9/20/2017    TIA (transient ischemic attack)     TLHBSO, bilateral LND 10/24/17 10/24/2017    Type 2 diabetes mellitus, without long-term current use of insulin ([facility] 1/14/2020        Past Surgical History:     Past Surgical History:   Procedure Laterality Date    CATARACT REMOVAL WITH IMPLANT Bilateral 2015    COLONOSCOPY  1997    had polyp, she doesn't know where and when, \"20 yrs ago\"    COLONOSCOPY  06/26/2017    COLONOSCOPY N/A 8/3/2020    COLONOSCOPY POLYPECTOMY SNARE performed by David Braxton MD at [address] [facility] N/A 9/20/2017    HYSTEROSCOPY  WITH MYOSURE performed by Anil Keys DO at [address] [facility] N/A 10/24/2017    TOTAL LAPAROSCOPIC HYSTERECTOMY, BSO, F.S.  STAGING, GYRUS G400 performed by Machelle Durham MD at [address] N/A 6/26/2017    COLONOSCOPY POLYPECTOMY / HOT SNARE performed by Tereza Cid DO at [place] [address] N/A 8/4/2020    OPEN SIGMOID COLECTOMY; PRIMARY ANASTOMOSIS & MOBILIZATION OF SPLENIC FLEXURE performed by David Braxton MD at [address]  10/24/2017    with pelvic lymph node dissection    THYROID SURGERY  1980    subtotal 20 years ago    TONSILLECTOMY      UPPER GASTROINTESTINAL ENDOSCOPY N/A 8/3/2020    EGD BIOPSY performed by David Braxton MD at [facility]      FLOATERS        Medications Prior to Admission: Prior to Admission medications    Medication Sig Start Date End Date Taking? Authorizing Provider   metoprolol tartrate (LOPRESSOR) 25 MG tablet Take 0.5 tablets by mouth 2 times daily 7/27/20  Yes Pushpa Rae MD   amiodarone (CORDARONE) 200 MG tablet Take 1 tablet by mouth daily 6/22/20  Yes Edd Bullock MD   atorvastatin (LIPITOR) 40 MG tablet TAKE 1 TABLET BY MOUTH DAILY STOP SIMVASTATIN 3/16/20  Yes Diana Ross MD   metFORMIN (GLUCOPHAGE) 500 MG tablet TAKE ONE TABLET BY MOUTH TWICE A DAY WITH A MEAL 11/11/19  Yes Diana Ross MD   Lactobacillus (PROBIOTIC ACIDOPHILUS) TABS Take 1 tablet by mouth daily 7/31/17  Yes Diana Ross MD   Blood Pressure Monitor KIT Use as directed. 7/22/20   MAX Barker CNP   glucose monitoring kit (FREESTYLE) monitoring kit Use as directed. BRAND OF CHO[employer] INSURANCE ALLOWS. 7/22/20   MAX Barker CNP   blood glucose monitor strips Test 2-3 times a day & as needed for symptoms of irregular blood glucose.   BRAND OF CHOICE INSURANCE ALLOWS. 7/22/20   MAX Barker CNP   Alcohol Swabs (ALCOHOL PREP) PADS Use as directed 7/22/20   MAX Barker CNP   Lancets MISC 1 each by Does not apply route 2 times daily 7/22/20   MAX Barker CNP   oxybutynin (DITROPAN-XL) 10 MG extended release tablet Take 1 tablet by mouth daily 7/20/20   Duane Ko MD   rivaroxaban (XARELTO) 20 MG TABS tablet Take 1 tablet by mouth daily (with breakfast) 6/22/20   Edd Bullock MD   levothyroxine (SYNTHROID) 75 MCG tablet TAKE 1 TABLET BY MOUTH EVERY MORNING (BEFORE BREAKFAST) 1/21/20   Diana Ross MD   HM LORATADINE 10 MG tablet TAKE 1 TABLET BY MOUTH DAILY 1/21/20   Diana Ross MD   docusate sodium (COLACE) 100 MG capsule TAKE 1 CAPSULE BY MOUTH 2 TIMES DAILY 12/16/19   Diana Ross MD   MYRBETRIQ 50 MG TB24 TAKE ONE TABLET BY MOUTH ONCE DAILY 12/16/19   MD Almas Riddle HEMATOLOGIC/LYMPHATIC:  negative for swelling/edema   ALLERGIC/IMMUNOLOGIC:  negative for urticaria , itching  ENDOCRINE:  negative increase in drinking, increase in urination, hot or cold intolerance  MUSCULOSKELETAL:  negative joint pains, muscle aches, swelling of joints  NEUROLOGICAL:  negative for headaches, dizziness, lightheadedness, numbness, pain, tingling extremities  BEHAVIOR/PSYCH:  negative for depression, anxiety    Physical Exam:     BP (!) 115/47   Pulse 62   Temp 97.6 °F (36.4 °C) (Oral)   Resp 16   Ht 5' 2\" (1.575 m)   Wt 234 lb 10.4 oz (106.4 kg)   LMP  (LMP Unknown)   SpO2 100%   BMI 42.92 kg/m²   Temp (24hrs), Av.2 °F (35.7 °C), Min:95.7 °F (35.4 °C), Max:98.6 °F (37 °C)    Recent Labs     20  1241 20  1612 20  2132 20  0702   POCGLU 136* 146* 156* 113*       Intake/Output Summary (Last 24 hours) at 2020 0932  Last data filed at 2020 0908  Gross per 24 hour   Intake 3299 ml   Output 1530 ml   Net 1769 ml       General Appearance:  alert, well appearing, and in no acute distress. Obesity present  Mental status: oriented to person, place, and time with normal affect  Head:  normocephalic, atraumatic. Eye: no icterus, redness, pupils equal and reactive, extraocular eye movements intact, conjunctiva clear  Ear: normal external ear, no discharge, hearing intact  Nose:  no drainage noted  Mouth: mucous membranes moist  Neck: supple, no carotid bruits, thyroid not palpable  Lungs: Bilateral equal air entry, clear to ausculation, no wheezing, rales or rhonchi, normal effort  Cardiovascular: normal rate, regular rhythm, no murmur, gallop, rub.   Abdomen: Surgical changes present in abdominal  Neurologic: There are no new focal motor or sensory deficits, normal muscle tone and bulk, no abnormal sensation, normal speech, cranial nerves II through XII grossly intact  Skin: No gross lesions, rashes, bruising or bleeding on exposed skin area  Extremities: peripheral pulses palpable, no pedal edema or calf pain with palpation  Psych: normal affect    Investigations:      Laboratory Testing:  Recent Results (from the past 24 hour(s))   POC Glucose Fingerstick    Collection Time: 08/04/20 10:51 AM   Result Value Ref Range    POC Glucose 133 (H) 65 - 105 mg/dL   POC Glucose Fingerstick    Collection Time: 08/04/20 12:41 PM   Result Value Ref Range    POC Glucose 136 (H) 65 - 105 mg/dL   POC Glucose Fingerstick    Collection Time: 08/04/20  4:12 PM   Result Value Ref Range    POC Glucose 146 (H) 65 - 105 mg/dL   POC Glucose Fingerstick    Collection Time: 08/04/20  9:32 PM   Result Value Ref Range    POC Glucose 156 (H) 65 - 105 mg/dL   CBC Auto Differential    Collection Time: 08/05/20  5:08 AM   Result Value Ref Range    WBC 10.9 3.5 - 11.0 k/uL    RBC 3.63 (L) 4.0 - 5.2 m/uL    Hemoglobin 10.9 (L) 12.0 - 16.0 g/dL    Hematocrit 33.9 (L) 36 - 46 %    MCV 93.3 80 - 100 fL    MCH 30.1 26 - 34 pg    MCHC 32.3 31 - 37 g/dL    RDW 15.2 (H) 11.5 - 14.9 %    Platelets 242 078 - 990 k/uL    MPV 8.7 6.0 - 12.0 fL    NRBC Automated NOT REPORTED per 100 WBC    Differential Type NOT REPORTED     Immature Granulocytes NOT REPORTED 0 %    Absolute Immature Granulocyte NOT REPORTED 0.00 - 0.30 k/uL    WBC Morphology NOT REPORTED     RBC Morphology NOT REPORTED     Platelet Estimate NOT REPORTED     Seg Neutrophils 93 (H) 36 - 66 %    Lymphocytes 2 (L) 24 - 44 %    Monocytes 2 1 - 7 %    Eosinophils % 0 0 - 4 %    Basophils 0 0 - 2 %    Bands 3 0 - 10 %    Segs Absolute 10.13 (H) 1.3 - 9.1 k/uL    Absolute Lymph # 0.22 (L) 1.0 - 4.8 k/uL    Absolute Mono # 0.22 0.1 - 1.3 k/uL    Absolute Eos # 0.00 0.0 - 0.4 k/uL    Basophils Absolute 0.00 0.0 - 0.2 k/uL    Absolute Bands # 0.33 0.0 - 1.0 k/uL    Morphology ANISOCYTOSIS PRESENT    Basic Metabolic Panel    Collection Time: 08/05/20  5:08 AM   Result Value Ref Range    Glucose 132 (H) 70 - 99 mg/dL    BUN 15 8 - 23 mg/dL    CREATININE 0.97 (H) 0.50 - 0.90 mg/dL    Bun/Cre Ratio NOT REPORTED 9 - 20    Calcium 7.3 (L) 8.6 - 10.4 mg/dL    Sodium 136 135 - 144 mmol/L    Potassium 3.8 3.7 - 5.3 mmol/L    Chloride 105 98 - 107 mmol/L    CO2 20 20 - 31 mmol/L    Anion Gap 11 9 - 17 mmol/L    GFR Non-African American 57 (L) >60 mL/min    GFR African American >60 >60 mL/min    GFR Comment          GFR Staging NOT REPORTED    POC Glucose Fingerstick    Collection Time: 08/05/20  7:02 AM   Result Value Ref Range    POC Glucose 113 (H) 65 - 105 mg/dL       Imaging/Diagonstics:      Assessment :      Primary Problem  <principal problem not specified>    Active Hospital Problems    Diagnosis Date Noted    Morbid obesity with BMI of 40.0-44.9, adult ([facility] [E66.01, Z68.41] 02/23/2017     Priority: High    Sigmoid diverticulitis [K57.32] 08/04/2020    Class 3 severe obesity due to excess calories without serious comorbidity with body mass index (BMI) of 40.0 to 44.9 in adult ([facility] [E66.01, Z68.41] 07/22/2020    Atrial fibrillation, new onset ([facility] [I48.91] 01/20/2020    [facility] [I48.91] 01/14/2020    Type 2 diabetes mellitus, without long-term current use of insulin ([facility] [E11.9] 01/14/2020    Adenomatous polyp of sigmoid colon, 6/26/17 [D12.5] 07/30/2017    Acquired hypothyroidism [E03.9]        Plan:     1. Perforated sigmoid diverticulitis status post open sigmoid colectomy and cholecystectomy, patient is on Dilaudid pain is controlled  2. Diabetes, sugar controlled, low-dose sliding scale, point-of-care glucose every 6  3. Hypertension, controlled, starting patient on Lopressor 5 mg every 6 as needed for systolic blood pressure more than 150, hold if heart rate is less than 60  4. Hypothyroidism, TSH is okay  5. Congestive heart failure, last expression 55%, has high BNP tested in July, likely has heart failure with preserved ejection fraction, will reduce fluid to 100 mL per she is n.p.o., will evaluate tomorrow about reducing fluid  6.  Had stress test recently which was negative  7. Patient has history of A. fib, rate controlled, was on Xarelto  8. We will discuss with operating surgeon when it is okay to change Lovenox prophylactic dose to therapeutic dose  9.     8/5  Patient is doing better, complaining of some pain at the local site  Has reduced urine output, given  1 L of fluid bolus  We will discuss with operating surgeon, if he can change Lovenox to therapeutic dose with history of A. fib  Consultations:   IP CONSULT TO INTERNAL MEDICINE  IP CONSULT TO CASE MANAGEMENT  IP CONSULT TO Alfonso Salazar MD  8/5/2020  9:32 AM    Copy sent to Dr. Jayy Vail MD    Please note that this chart was generated using voice recognition Dragon dictation software. Although every effort was made to ensure the accuracy of this automated transcription, some errors in transcription may have occurred.

## 2020-08-05 NOTE — PLAN OF CARE
Problem: Infection:  Goal: Will remain free from infection  Outcome: Ongoing  Note: Tylenol used for 102.5 temp this shift. Problem: Safety:  Goal: Free from accidental physical injury  Outcome: Ongoing  Note: No falls noted this shift. Patient ambulates with x1 staff assistance without difficulty. Bed kept in low position. Safe environment maintained. Bedside table & call light in reach. Uses call light appropriately when needing assistance. She sat up in the chair for an hour and a half on this shift. Problem: Safety:  Goal: Free from intentional harm  Outcome: Ongoing     Problem: Daily Care:  Goal: Daily care needs are met  Outcome: Ongoing     Problem: Pain:  Goal: Patient's pain/discomfort is manageable  Outcome: Ongoing  Note: Pt medicated with pain medication prn. Assessed all pain characteristics including level, type, location, frequency, and onset. Non-pharmacologic interventions offered to pt as well. Pt states pain is tolerable at this time.  Will continue to monitor      Problem: Pain:  Goal: Pain level will decrease  Outcome: Ongoing     Problem: HEMODYNAMIC STATUS  Goal: Patient has stable vital signs and fluid balance  Outcome: Ongoing     Problem: Skin Integrity:  Goal: Will show no infection signs and symptoms  Outcome: Ongoing

## 2020-08-05 NOTE — PROGRESS NOTES
Physical Therapy    Facility/Department: [facility]  Initial Assessment    NAME: Darryl Gardner  : 1950  MRN: [id number]    Date of Service: 2020    Discharge Recommendations:  Patient would benefit from continued therapy after discharge, Continue to assess pending progress   PT Equipment Recommendations  Other: TBD - may need RW    Assessment   Body structures, Functions, Activity limitations: Decreased functional mobility ; Decreased ADL status; Decreased strength;Decreased endurance;Decreased balance; Increased pain;Decreased posture  Assessment: Pt requiring 1-2 assist for mobility safely. Pt is having increased pain and requiring assist with RW. Pt up to chair this date. Pt would benefit from continued PT upon d/c. Treatment Diagnosis: Impaired functional mobility 2* increased pain and decreased tolerance to activity  Specific instructions for Next Treatment: progress gait distance, strengthening, pain mgmt  Prognosis: Good;Fair  Decision Making: Medium Complexity  History: s/p OPEN SIGMOID COLECTOMY; PRIMARY ANASTOMOSIS & MOBILIZATION OF SPLENIC FLEXURE on 20  Exam: ROM, MMT, bed mobility, transfers, amb, balance, activity tolerance  Clinical Presentation: Pt alert, pleasant, agreeable to PT. Barriers to Learning: pain  REQUIRES PT FOLLOW UP: Yes  Activity Tolerance  Activity Tolerance: Patient limited by pain; Patient limited by endurance       Patient Diagnosis(es): There were no encounter diagnoses.      has a past medical history of Adenocarcinoma of endometrium, stage 1 ([address] JOSHUA (acute kidney injury) ([address] Allergic rhinitis, At high risk for falls, Atrial fibrillation ([address] CHF (congestive heart failure) ([address] Colon polyp, Diabetes mellitus ([address] Diverticulitis, Endometrial cancer ([address] History of TIA (transient ischemic attack), Hyperglycemia, Hyperlipidemia, Hypertension, Hypothyroidism, Legally blind, Lower back pain, Mixed incontinence, Morbid obesity with BMI of 40.0-44.9, 8(0/10 at rest)  Pain Type: Acute pain;Surgical pain  Pain Location: Abdomen  Pain Orientation: Anterior  Pain Descriptors: Aching  Pain Frequency: Intermittent(with movement)  Pain Onset: On-going  Clinical Progression: Not changed  Functional Pain Assessment: Prevents or interferes with many active not passive activities  Non-Pharmaceutical Pain Intervention(s): Ambulation/Increased Activity; Distraction;Rest;Repositioned  Response to Pain Intervention: Patient Satisfied  Vital Signs  Patient Currently in Pain: Yes  Oxygen Therapy  SpO2: 98 %  O2 Device: Nasal cannula  O2 Flow Rate (L/min): 2 L/min  Patient Observation  Observations: O2 sats remained WNL with all activity on 2L O2, abdominal binder on       Orientation  Orientation  Overall Orientation Status: Within Normal Limits  Social/Functional History  Social/Functional History  Lives With: Alone  Type of Home: Apartment(2nd floor)  Home Layout: One level  Home Access: Elevator, Level entry  Bathroom Shower/Tub: Tub/Shower unit, Curtain, Shower chair with back  Bathroom Toilet: Standard  Bathroom Equipment: Toilet raiser, Shower chair, Grab bars in shower, Grab bars around toilet, Hand-held shower  Bathroom Accessibility: Accessible, Walker accessible  Home Equipment: Quad cane, 4 wheeled walker, Alert [employer] Help From: Home health, Family(Aide 3x/week for 2-3 hours/sister)  ADL Assistance: Independent  Homemaking Assistance: Needs assistance(pt does cooking, aide does cleaning/laundry)  Homemaking Responsibilities: Yes(sister does grocery shopping)  Ambulation Assistance: Independent(uses rollator)  Transfer Assistance: Independent  Active : No  Patient's  Info: [facility] has a bus for weekly errands including bank and grocery store, sister or dtr also drive  Occupation: Retired  Type of occupation: homemaker  IADL Comments: sleeps in flat bed  Additional Comments: Sister and daughter checks in on patient. Daughter in an RN.  Sister mgmt  Current Treatment Recommendations: Strengthening, Balance Training, Functional Mobility Training, Transfer Training, Endurance Training, Gait Training, Equipment Evaluation, Education, & procurement, Patient/Caregiver Education & Training, Safety Education & Training, Positioning  Safety Devices  Type of devices: All fall risk precautions in place, Call light within reach, Gait belt, Patient at risk for falls, Left in chair, Nurse notified(RN [facility] notified of transfer)  Restraints  Initially in place: No    G-Code       OutComes Score                                                  AM-PAC Score     AM-PAC Inpatient Mobility without Stair Climbing Raw Score : 7 (08/05/20 1000)  AM-PAC Inpatient without Stair Climbing T-Scale Score : 28.66 (08/05/20 1000)  Mobility Inpatient CMS 0-100% Score: 86.29 (08/05/20 1000)  Mobility Inpatient without Stair CMS G-Code Modifier : CM (08/05/20 1000)       Goals  Short term goals  Time Frame for Short term goals: 3-4 days  Short term goal 1: Pt to demonstrate min x1 bed mobility with good log rolling technique. Short term goal 2: Pt to demonstrate CGA transfers. Short term goal 3: Pt to amb 50'-75' with RW min to CGA x1. Short term goal 4: Pt to reduce pain to 2-3/10 with movement to increased independence for mobility. Short term goal 5: Pt to improve BLE strength by 1/2 MMG. Patient Goals   Patient goals :  To move better/reduce pain       Therapy Time   Individual Concurrent Group Co-treatment   Time In 0958         Time Out 1024         Minutes 26         Timed Code Treatment Minutes: 9 Minutes       Shawn Aguilar, PT

## 2020-08-05 NOTE — CARE COORDINATION
CASE MANAGEMENT NOTE:    Admission Date:  8/4/2020 Harrison Garcia is a 78 y.o.  female    Admitted for : Sigmoid diverticulitis [K57.32]    Met with:  Patient    PCP:  [address]:  [facility] Medicare      Current Residence/ Living Arrangements: Lives alone in [place]. Current Services PTA:  Yes, [facility], HHA Thru [employer], 3X a week    Is patient agreeable to VNS: Yes, [facility]    [place] of choice provided:  Yes    List of [address] provided: No    VNS chosen:  Yes, Continue w/ [facility]    DME:  straight cane and shower chair, GB, Raised Toilet seat, Rollator. CM from [address] [place] [address] Road, working on getting her a Wheeled walker    Home Oxygen: No    Nebulizer: No    CPAP/BIPAP: No    Supplier: N/A    Potential Assistance Needed: Yes VNS, [facility]    SNF needed: No    Freedom of choice and list provided: NA    Pharmacy:  [facility] [address]       Does Patient want to use MEDS to BEDS? No    Is the Patient an [facility] with Readmission Risk Score greater than 14%? No  If yes, pt needs a follow up appointment made within 7 days. Family Members/Caregivers that pt would like involved in their care:    Yes    If yes, list name here:  Sister, Aman Kearney, Daughter Sandhya    Transportation Provider:  [address] [place] [address] 70, Family             Is patient in Isolation/One on One/Altered Mental Status? No  If yes, skip next question. If no, would they like an I-Pad to  use? No  If yes, call [phone]. Discharge Plan:  8/5/20 [facility] Medicare Pt. Lives alone in [place]. DME, Cane, rollator, SC, GB, raised toilet seat. CM from [address] [place] [address] Road is working on getting her a wheeled walker. Current w/ VNS, [facility], & Has HHA 3 X a week from [facility] services. POD # 1 Open Sigmoid Colectomy w/ Anastomosis, NPO. IV fluids, PT/OT. Xarelto PTA. Darin will need signed/completed.  Will follow//KB                 Electronically signed by: Pamella Harper RN on 8/5/2020 at 9:22 AM

## 2020-08-05 NOTE — PLAN OF CARE
Problem: Infection:  Goal: Will remain free from infection  Description: Will remain free from infection  8/4/2020 2257 by Linda Muhammad RN  Outcome: Met This Shift  8/4/2020 1840 by Jess Barthel, RN  Outcome: Ongoing     Problem: Safety:  Goal: Free from accidental physical injury  Description: Free from accidental physical injury  8/4/2020 2257 by Linda Muhammad RN  Outcome: Met This Shift  8/4/2020 1840 by Jess Barthel, RN  Outcome: Ongoing  Goal: Free from intentional harm  Description: Free from intentional harm  8/4/2020 2257 by Linda Muhammad RN  Outcome: Met This Shift  8/4/2020 1840 by Jess Barthel, RN  Outcome: Ongoing     Problem: Daily Care:  Goal: Daily care needs are met  Description: Daily care needs are met  8/4/2020 2257 by Linda Muhammad RN  Outcome: Met This Shift  8/4/2020 1840 by Jess Barthel, RN  Outcome: Ongoing     Problem: Pain:  Goal: Patient's pain/discomfort is manageable  Description: Patient's pain/discomfort is manageable  8/4/2020 2257 by Linda Muhammad RN  Outcome: Met This Shift  8/4/2020 1840 by Jess Barthel, RN  Outcome: Ongoing  Goal: Pain level will decrease  Description: Pain level will decrease  8/4/2020 2257 by Linda Muhammad RN  Outcome: Met This Shift  8/4/2020 1840 by Jess Barthel, RN  Outcome: Ongoing  Goal: Control of acute pain  Description: Control of acute pain  8/4/2020 2257 by Linda Muhammad RN  Outcome: Met This Shift  8/4/2020 1840 by Jess Barthel, RN  Outcome: Ongoing  Goal: Control of chronic pain  Description: Control of chronic pain  8/4/2020 2257 by Linda Muhammad RN  Outcome: Met This Shift  8/4/2020 1840 by Jess Barthel, RN  Outcome: Ongoing     Problem: Skin Integrity:  Goal: Skin integrity will stabilize  Description: Skin integrity will stabilize  8/4/2020 2257 by Linda Muhammad RN  Outcome: Met This Shift  8/4/2020 1840 by Jess Barthel, RN  Outcome: Ongoing     Problem: Discharge Planning:  Goal: Patients continuum of care needs are met  Description: Patients continuum of care needs are met  8/4/2020 2257 by Michell Alva RN  Outcome: Met This Shift  8/4/2020 1840 by Juliette Su RN  Outcome: Ongoing     Problem: Falls - Risk of:  Goal: Will remain free from falls  Description: Will remain free from falls  8/4/2020 2257 by Michell Alva RN  Outcome: Met This Shift  8/4/2020 1840 by Juliette Su RN  Outcome: Ongoing  Goal: Absence of physical injury  Description: Absence of physical injury  8/4/2020 2257 by Michell Alva RN  Outcome: Met This Shift  8/4/2020 1840 by Juliette Su RN  Outcome: Ongoing

## 2020-08-05 NOTE — DISCHARGE INSTR - COC
Continuity of Care Form    Patient Name: Chandrika García   :  1950  MRN:  [id number]    Admit date:  2020  Discharge date:  20    Code Status Order: Full Code   Advance Directives:   Advance Care Flowsheet Documentation     Date/Time Healthcare Directive Type of Healthcare Directive Copy in [address] Agent's Name Healthcare Agent's Phone Number    [phone]  No, patient does not have an advance directive for healthcare treatment declined info -- -- -- -- --          Admitting Physician:  Srinath Weber MD  PCP: Becky Sommers MD    Discharging Nurse: [facility] Unit/Room#: 2116/2116-01  Discharging Unit Phone Number: [phone]    Emergency Contact:   Extended Emergency Contact Information  Primary Emergency Contact: Donna Dennis  Address: [address] Phone: [phone]  Mobile Phone: [phone]  Relation: Brother/Sister  Hearing or visual needs: None  Other needs: None  Preferred language: English   needed? No  Secondary Emergency Contact: Carry Cassis [facility] [place] [facility] [address] [place] [address] Phone: [phone]  Relation: Child  Hearing or visual needs: None  Other needs: None  Preferred language: English   needed?  No    Past Surgical History:  Past Surgical History:   Procedure Laterality Date    CATARACT REMOVAL WITH IMPLANT Bilateral     COLONOSCOPY      had polyp, she doesn't know where and when, \"20 yrs ago\"    COLONOSCOPY  2017    COLONOSCOPY N/A 8/3/2020    COLONOSCOPY POLYPECTOMY SNARE performed by Srinath Weber MD at [address] [place] [address] N/A 2017    HYSTEROSCOPY  WITH MYOSURE performed by Haily Camacho DO at [address] [place] [address] N/A 10/24/2017    TOTAL LAPAROSCOPIC HYSTERECTOMY, BSO, F.S.  STAGING, GYRUS G400 performed by Tano Ruiz MD at [address] W/REMOVAL LESION BY HOT BX FORCEPS N/A 6/26/2017    COLONOSCOPY POLYPECTOMY / HOT SNARE performed by Jerrod Zhou DO at [address] N/A 8/4/2020    OPEN SIGMOID COLECTOMY; PRIMARY ANASTOMOSIS & MOBILIZATION OF SPLENIC FLEXURE performed by Evens Andrade MD at [address]  10/24/2017    with pelvic lymph node dissection    THYROID SURGERY  1980    subtotal 20 years ago    TONSILLECTOMY      UPPER GASTROINTESTINAL ENDOSCOPY N/A 8/3/2020    EGD BIOPSY performed by Evens Andrade MD at [address] [facility]       Immunization History:   Immunization History   Administered Date(s) Administered    Influenza Vaccine, unspecified formulation 10/01/2016    Influenza Virus Vaccine 10/01/2019    Influenza, Triv, inactivated, subunit, adjuvanted, IM (Fluad 65 yrs and older) 09/14/2017, 10/02/2018    Pneumococcal Conjugate 13-valent ([id number]) 02/23/2017    Pneumococcal Polysaccharide ([id number]) 04/20/2018    Tdap (Boostrix, Adacel) 09/28/2017       Active Problems:  Patient Active Problem List   Diagnosis Code    Acquired hypothyroidism E03.9    History of TIA (transient ischemic attack) Z86.73    Chronic bilateral low back pain without sciatica M54.5, G89.29    Essential hypertension I10    Osteopenia determined by x-ray M85.80    Osteoarthritis involving multiple joints on both sides of body M15.9    Left knee DJD M17.12    Prediabetes R73.03    Vitamin D deficiency E55.9    Mixed incontinence N39.46    Chronic pain of both knees M25.561, M25.562, G89.29    Slow transit constipation K59.01    Allergic rhinitis J30.9    Hyperlipidemia with target LDL less than 100 E78.5    Morbid obesity with BMI of 40.0-44.9, adult ([facility] E66.01, Z68.41    At high risk for falls Z91.81    Dense breast tissue on mammogram R92.2    Hyperglycemia R73.9    Spondylosis of lumbar region without myelopathy or radiculopathy M47.816    OAB (overactive bladder) N32.81    Primary osteoarthritis of both knees M17.0    Chronic fatigue  R53.82    Adenomatous polyp of sigmoid colon, 6/26/17 D12.5    Mixed stress and urge urinary incontinence N39.46    TLHBSO, bilateral LND 10/24/17 Z90.710, Z90.79, Z90.722    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    Cataracta b/l eyes H26.9    Difficulty walking R26.2    Esotropia H50.00    Nystagmus H55.00    History of uterine cancer, Well differentiated grade 1 adenocarcinoma arising in complex hyperplasia, 2017 Z85.42    Vitamin B 12 deficiency E53.8    Aortic calcification (HCC) I70.0    Calculus of gallbladder without cholecystitis without obstruction K80.20    CLAROS (nonalcoholic steatohepatitis) K75.81    Optic atrophy H47.20    Cough present for greater than 3 weeks R05    Dyspepsia R10.13    A-fib ([facility]) I48.91    Type 2 diabetes mellitus, without long-term current use of insulin ([facility]) E11.9    JOSHUA (acute kidney injury) ([facility] N17.9    Atrial fibrillation, new onset ([facility]) I48.91    Mobility impaired Z74.09    Perforated diverticulum K57.80    Diverticulitis K57.92    Chronic cholecystitis K81.1    Colitis K52.9    Multiple atypical skin moles D22.9    Class 3 severe obesity due to excess calories without serious comorbidity with body mass index (BMI) of 40.0 to 44.9 in adult ([facility]) E66.01, Z68.41    Symptomatic bradycardia R00.1    CHF NYHA class I, chronic, diastolic ([facility]) W40.19    Sigmoid diverticulitis K57.32       Isolation/Infection:   Isolation          No Isolation        Patient Infection Status     Infection Onset Added Last Indicated Last Indicated By Review Planned Expiration Resolved Resolved By    None active    Resolved    COVID-19 Rule Out 07/30/20 07/31/20 07/30/20 Covid-19 Ambulatory (Ordered)   08/01/20 Rule-Out Test Resulted          Nurse Assessment:  Last Vital Signs: BP (!) 115/47   Pulse 62   Temp 97.6 °F (36.4 °C) (Oral)   Resp 16   Ht 5' 2\" (1.575 m)   Wt 234 lb 10.4 oz (106.4 kg)   LMP  (LMP Unknown)   SpO2 100%   BMI 42.92 kg/m²     Last documented pain score (0-10 scale): Pain Level: 5  Last Weight:   Wt Readings from Last 1 Encounters:   08/05/20 234 lb 10.4 oz (106.4 kg)     Mental Status:  oriented and alert    IV Access:  - None    Nursing Mobility/ADLs:  Walking   Assisted  Transfer  Assisted  Bathing  Assisted  Dressing  Assisted  Toileting  Assisted  Feeding  Assisted  Med Admin  Assisted  Med Delivery   whole    Wound Care Documentation and Therapy:        Elimination:  Continence:   · Bowel: Yes  · Bladder: Yes  Urinary Catheter: None   Colostomy/Ileostomy/Ileal Conduit: No       Date of Last BM: 8/12/20    Intake/Output Summary (Last 24 hours) at 8/5/2020 0931  Last data filed at 8/5/2020 0908  Gross per 24 hour   Intake 3299 ml   Output 1530 ml   Net 1769 ml     I/O last 3 completed shifts: In: 4279 [P.O.:65; I.V.:4214]  Out: 6061 [Urine:1305; Drains:350; Blood:100]    Safety Concerns: At Risk for Falls    Impairments/Disabilities:      None    Nutrition Therapy:  Current Nutrition Therapy:   - Oral Diet:  General    Routes of Feeding: Oral  Liquids: No Restrictions  Daily Fluid Restriction: no  Last Modified Barium Swallow with Video (Video Swallowing Test): not done    Treatments at the Time of Hospital Discharge:   Respiratory Treatments: N/A  Oxygen Therapy:  is not on home oxygen therapy. Ventilator:    - No ventilator support    Rehab Therapies: Physical Therapy and Occupational Therapy  Weight Bearing Status/Restrictions: No weight bearing restirctions  Other Medical Equipment (for information only, NOT a DME order):  cane and walker, SC  Other Treatments: Skilled Nursing Assessment Per Protocol. Medication Education & Monitoring. HHA 3 X a week as prior to admission. Resume previous services as prior to admission.     Patient's personal belongings (please select all that are sent with patient):  None    RN SIGNATURE:  Electronically signed by Alejandro Elmore RN on 8/13/20 at 9: 62 AM EDT    CASE MANAGEMENT/SOCIAL WORK SECTION    Inpatient Status Date: 8/4/20    Readmission Risk Assessment Score:  Readmission Risk              Risk of Unplanned Readmission:        20           Discharging to Facility/ Agency:   [address] [facility]  Dialysis Facility (if applicable)   · Name:  · Address:  · Dialysis Schedule:  · Phone:  · Fax:    / signature: Electronically signed by Yari Jorgensen RN on 8/5/20 at 9:32 AM EDT    PHYSICIAN SECTION    Prognosis: Fair    Condition at Discharge: Stable    Rehab Potential (if transferring to Rehab): Fair    Recommended Labs or Other Treatments After Discharge: Follow up Dr. Marie Contreras in two weeks. Cardiology in 3 weeks    Physician Certification: I certify the above information and transfer of Amandeep   is necessary for the continuing treatment of the diagnosis listed and that she requires [place] for less 30 days.      Update Admission H&P: No change in H&P    PHYSICIAN SIGNATURE:  Electronically signed by Belén Krueger MD on 8/13/20 at 9:55 AM EDT

## 2020-08-06 LAB
ABSOLUTE BANDS #: 1.31 K/UL (ref 0–1)
ABSOLUTE EOS #: 0.15 K/UL (ref 0–0.4)
ABSOLUTE IMMATURE GRANULOCYTE: ABNORMAL K/UL (ref 0–0.3)
ABSOLUTE LYMPH #: 0.08 K/UL (ref 1–4.8)
ABSOLUTE MONO #: 0.08 K/UL (ref 0.1–1.3)
ANION GAP SERPL CALCULATED.3IONS-SCNC: 12 MMOL/L (ref 9–17)
BANDS: 17 % (ref 0–10)
BASOPHILS # BLD: 0 % (ref 0–2)
BASOPHILS ABSOLUTE: 0 K/UL (ref 0–0.2)
BUN BLDV-MCNC: 13 MG/DL (ref 8–23)
BUN/CREAT BLD: ABNORMAL (ref 9–20)
CALCIUM SERPL-MCNC: 7.3 MG/DL (ref 8.6–10.4)
CHLORIDE BLD-SCNC: 104 MMOL/L (ref 98–107)
CO2: 19 MMOL/L (ref 20–31)
CREAT SERPL-MCNC: 0.73 MG/DL (ref 0.5–0.9)
DIFFERENTIAL TYPE: ABNORMAL
EKG ATRIAL RATE: 141 BPM
EKG Q-T INTERVAL: 354 MS
EKG QRS DURATION: 132 MS
EKG QTC CALCULATION (BAZETT): 528 MS
EKG R AXIS: 91 DEGREES
EKG T AXIS: 15 DEGREES
EKG VENTRICULAR RATE: 134 BPM
EOSINOPHILS RELATIVE PERCENT: 2 % (ref 0–4)
GFR AFRICAN AMERICAN: >60 ML/MIN
GFR NON-AFRICAN AMERICAN: >60 ML/MIN
GFR SERPL CREATININE-BSD FRML MDRD: ABNORMAL ML/MIN/{1.73_M2}
GFR SERPL CREATININE-BSD FRML MDRD: ABNORMAL ML/MIN/{1.73_M2}
GLUCOSE BLD-MCNC: 102 MG/DL (ref 65–105)
GLUCOSE BLD-MCNC: 112 MG/DL (ref 65–105)
GLUCOSE BLD-MCNC: 91 MG/DL (ref 65–105)
GLUCOSE BLD-MCNC: 97 MG/DL (ref 65–105)
GLUCOSE BLD-MCNC: 99 MG/DL (ref 70–99)
HCT VFR BLD CALC: 32.6 % (ref 36–46)
HEMOGLOBIN: 10.6 G/DL (ref 12–16)
IMMATURE GRANULOCYTES: ABNORMAL %
LYMPHOCYTES # BLD: 1 % (ref 24–44)
MAGNESIUM: 1.4 MG/DL (ref 1.6–2.6)
MCH RBC QN AUTO: 30.1 PG (ref 26–34)
MCHC RBC AUTO-ENTMCNC: 32.7 G/DL (ref 31–37)
MCV RBC AUTO: 92.1 FL (ref 80–100)
METAMYELOCYTES ABSOLUTE COUNT: 0.08 K/UL
METAMYELOCYTES: 1 %
MONOCYTES # BLD: 1 % (ref 1–7)
MORPHOLOGY: ABNORMAL
NRBC AUTOMATED: ABNORMAL PER 100 WBC
PDW BLD-RTO: 15 % (ref 11.5–14.9)
PLATELET # BLD: 133 K/UL (ref 150–450)
PLATELET ESTIMATE: ABNORMAL
PMV BLD AUTO: 8.3 FL (ref 6–12)
POTASSIUM SERPL-SCNC: 3.5 MMOL/L (ref 3.7–5.3)
RBC # BLD: 3.54 M/UL (ref 4–5.2)
RBC # BLD: ABNORMAL 10*6/UL
SEG NEUTROPHILS: 78 % (ref 36–66)
SEGMENTED NEUTROPHILS ABSOLUTE COUNT: 6 K/UL (ref 1.3–9.1)
SODIUM BLD-SCNC: 135 MMOL/L (ref 135–144)
SURGICAL PATHOLOGY REPORT: NORMAL
WBC # BLD: 7.7 K/UL (ref 3.5–11)
WBC # BLD: ABNORMAL 10*3/UL

## 2020-08-06 PROCEDURE — 2500000003 HC RX 250 WO HCPCS: Performed by: INTERNAL MEDICINE

## 2020-08-06 PROCEDURE — 6360000002 HC RX W HCPCS: Performed by: SURGERY

## 2020-08-06 PROCEDURE — C9113 INJ PANTOPRAZOLE SODIUM, VIA: HCPCS | Performed by: SURGERY

## 2020-08-06 PROCEDURE — 99233 SBSQ HOSP IP/OBS HIGH 50: CPT | Performed by: INTERNAL MEDICINE

## 2020-08-06 PROCEDURE — 93005 ELECTROCARDIOGRAM TRACING: CPT | Performed by: NURSE PRACTITIONER

## 2020-08-06 PROCEDURE — 97530 THERAPEUTIC ACTIVITIES: CPT

## 2020-08-06 PROCEDURE — 2580000003 HC RX 258: Performed by: INTERNAL MEDICINE

## 2020-08-06 PROCEDURE — 2580000003 HC RX 258: Performed by: SURGERY

## 2020-08-06 PROCEDURE — 80048 BASIC METABOLIC PNL TOTAL CA: CPT

## 2020-08-06 PROCEDURE — 6360000002 HC RX W HCPCS: Performed by: INTERNAL MEDICINE

## 2020-08-06 PROCEDURE — 36415 COLL VENOUS BLD VENIPUNCTURE: CPT

## 2020-08-06 PROCEDURE — 82947 ASSAY GLUCOSE BLOOD QUANT: CPT

## 2020-08-06 PROCEDURE — 6370000000 HC RX 637 (ALT 250 FOR IP): Performed by: PHYSICIAN ASSISTANT

## 2020-08-06 PROCEDURE — 97110 THERAPEUTIC EXERCISES: CPT

## 2020-08-06 PROCEDURE — 93010 ELECTROCARDIOGRAM REPORT: CPT | Performed by: INTERNAL MEDICINE

## 2020-08-06 PROCEDURE — 2060000000 HC ICU INTERMEDIATE R&B

## 2020-08-06 PROCEDURE — 83735 ASSAY OF MAGNESIUM: CPT

## 2020-08-06 PROCEDURE — 85025 COMPLETE CBC W/AUTO DIFF WBC: CPT

## 2020-08-06 RX ORDER — METOPROLOL TARTRATE 5 MG/5ML
2.5 INJECTION INTRAVENOUS EVERY 6 HOURS PRN
Status: DISCONTINUED | OUTPATIENT
Start: 2020-08-06 | End: 2020-08-13 | Stop reason: HOSPADM

## 2020-08-06 RX ORDER — AMIODARONE HYDROCHLORIDE 200 MG/1
200 TABLET ORAL DAILY
Status: DISCONTINUED | OUTPATIENT
Start: 2020-08-07 | End: 2020-08-07

## 2020-08-06 RX ORDER — MAGNESIUM SULFATE 1 G/100ML
1 INJECTION INTRAVENOUS PRN
Status: DISCONTINUED | OUTPATIENT
Start: 2020-08-06 | End: 2020-08-13 | Stop reason: HOSPADM

## 2020-08-06 RX ORDER — POTASSIUM CHLORIDE 7.45 MG/ML
10 INJECTION INTRAVENOUS PRN
Status: DISCONTINUED | OUTPATIENT
Start: 2020-08-06 | End: 2020-08-08

## 2020-08-06 RX ADMIN — MAGNESIUM SULFATE HEPTAHYDRATE 1 G: 1 INJECTION, SOLUTION INTRAVENOUS at 16:58

## 2020-08-06 RX ADMIN — HYDROMORPHONE HYDROCHLORIDE 1 MG: 1 INJECTION, SOLUTION INTRAMUSCULAR; INTRAVENOUS; SUBCUTANEOUS at 22:45

## 2020-08-06 RX ADMIN — CEFAZOLIN 2 G: 1 INJECTION, POWDER, FOR SOLUTION INTRAMUSCULAR; INTRAVENOUS at 13:27

## 2020-08-06 RX ADMIN — POTASSIUM CHLORIDE 10 MEQ: 7.46 INJECTION, SOLUTION INTRAVENOUS at 15:24

## 2020-08-06 RX ADMIN — METOCLOPRAMIDE 10 MG: 5 INJECTION, SOLUTION INTRAMUSCULAR; INTRAVENOUS at 06:16

## 2020-08-06 RX ADMIN — METOCLOPRAMIDE 10 MG: 5 INJECTION, SOLUTION INTRAMUSCULAR; INTRAVENOUS at 16:57

## 2020-08-06 RX ADMIN — METOPROLOL TARTRATE 2.5 MG: 5 INJECTION, SOLUTION INTRAVENOUS at 19:42

## 2020-08-06 RX ADMIN — METOPROLOL TARTRATE 5 MG: 1 INJECTION, SOLUTION INTRAVENOUS at 01:06

## 2020-08-06 RX ADMIN — METOCLOPRAMIDE 10 MG: 5 INJECTION, SOLUTION INTRAMUSCULAR; INTRAVENOUS at 01:06

## 2020-08-06 RX ADMIN — ENOXAPARIN SODIUM 30 MG: 30 INJECTION SUBCUTANEOUS at 07:32

## 2020-08-06 RX ADMIN — POTASSIUM CHLORIDE 10 MEQ: 7.46 INJECTION, SOLUTION INTRAVENOUS at 19:42

## 2020-08-06 RX ADMIN — POTASSIUM CHLORIDE 10 MEQ: 7.46 INJECTION, SOLUTION INTRAVENOUS at 22:29

## 2020-08-06 RX ADMIN — ACETAMINOPHEN 650 MG: 325 TABLET, FILM COATED ORAL at 08:28

## 2020-08-06 RX ADMIN — AMIODARONE HYDROCHLORIDE 150 MG: 1.5 INJECTION, SOLUTION INTRAVENOUS at 05:29

## 2020-08-06 RX ADMIN — CEFAZOLIN 2 G: 1 INJECTION, POWDER, FOR SOLUTION INTRAMUSCULAR; INTRAVENOUS at 21:36

## 2020-08-06 RX ADMIN — HYDROMORPHONE HYDROCHLORIDE 0.5 MG: 1 INJECTION, SOLUTION INTRAMUSCULAR; INTRAVENOUS; SUBCUTANEOUS at 12:14

## 2020-08-06 RX ADMIN — MAGNESIUM SULFATE HEPTAHYDRATE 1 G: 1 INJECTION, SOLUTION INTRAVENOUS at 14:18

## 2020-08-06 RX ADMIN — METOPROLOL TARTRATE 5 MG: 1 INJECTION, SOLUTION INTRAVENOUS at 07:32

## 2020-08-06 RX ADMIN — PANTOPRAZOLE SODIUM 40 MG: 40 INJECTION, POWDER, FOR SOLUTION INTRAVENOUS at 07:32

## 2020-08-06 RX ADMIN — METOCLOPRAMIDE 10 MG: 5 INJECTION, SOLUTION INTRAMUSCULAR; INTRAVENOUS at 12:14

## 2020-08-06 RX ADMIN — SODIUM CHLORIDE: 9 INJECTION, SOLUTION INTRAVENOUS at 01:06

## 2020-08-06 RX ADMIN — HYDROMORPHONE HYDROCHLORIDE 0.5 MG: 1 INJECTION, SOLUTION INTRAMUSCULAR; INTRAVENOUS; SUBCUTANEOUS at 07:46

## 2020-08-06 RX ADMIN — CEFAZOLIN 2 G: 1 INJECTION, POWDER, FOR SOLUTION INTRAMUSCULAR; INTRAVENOUS at 04:39

## 2020-08-06 RX ADMIN — ENOXAPARIN SODIUM 30 MG: 30 INJECTION SUBCUTANEOUS at 19:42

## 2020-08-06 RX ADMIN — Medication 10 ML: at 07:32

## 2020-08-06 ASSESSMENT — PAIN SCALES - GENERAL
PAINLEVEL_OUTOF10: 2
PAINLEVEL_OUTOF10: 4
PAINLEVEL_OUTOF10: 8
PAINLEVEL_OUTOF10: 2
PAINLEVEL_OUTOF10: 5
PAINLEVEL_OUTOF10: 2
PAINLEVEL_OUTOF10: 0
PAINLEVEL_OUTOF10: 5

## 2020-08-06 ASSESSMENT — PAIN DESCRIPTION - DESCRIPTORS: DESCRIPTORS: ACHING;SORE

## 2020-08-06 ASSESSMENT — PAIN DESCRIPTION - FREQUENCY: FREQUENCY: CONTINUOUS

## 2020-08-06 ASSESSMENT — PAIN DESCRIPTION - LOCATION: LOCATION: ABDOMEN

## 2020-08-06 ASSESSMENT — PAIN - FUNCTIONAL ASSESSMENT: PAIN_FUNCTIONAL_ASSESSMENT: PREVENTS OR INTERFERES SOME ACTIVE ACTIVITIES AND ADLS

## 2020-08-06 ASSESSMENT — PAIN DESCRIPTION - PAIN TYPE: TYPE: ACUTE PAIN;SURGICAL PAIN

## 2020-08-06 ASSESSMENT — PAIN DESCRIPTION - ORIENTATION: ORIENTATION: ANTERIOR;RIGHT;LOWER

## 2020-08-06 NOTE — PROGRESS NOTES
RN agrees with [facility] RNs charting  Electronically signed by Elonda Dakin, RN on 8/6/2020 at 6:27 AM

## 2020-08-06 NOTE — CARE COORDINATION
ONGOING DISCHARGE PLAN:    Spoke with patient regarding discharge plan and patient confirms that plan is still to return to home alone to her [facility]. Pt. Will continue w/ [facility]NS, [facility], for Skilled & HHA 3 X a week. Pt. Is POD #2, Open Sigmoid Colectomy w/ Anastomosis. Remains on IV Fluids. Cl liquid diet. Remains on IV Ancef/Flagyl. PT/OT on board. Will continue to follow for additional discharge needs.     Electronically signed by Elsie Granger RN on 8/6/2020 at 4:09 PM

## 2020-08-06 NOTE — PROGRESS NOTES
RN spoke with Dr. Erik Buchanan on the phone about patients HR and Afib with RVR. He ordered amiodarone 150 mg IV once per day to be given now.

## 2020-08-06 NOTE — PROGRESS NOTES
[facility] Internal Medicine    CONSULTATION / HISTORY AND PHYSICAL EXAMINATION            Date:   8/6/2020  Patient name:  Bryn Cranker  Date of admission:  8/4/2020  5:43 AM  MRN:   [id number]  Account:  [de-identified]  YOB: 1950  PCP:    Lisette Juarez MD  Room:   211/2116-01  Code Status:    Full Code    Physician Requesting Consult: Lizzy Ricardo MD    Reason for Consult: Medical management    Chief Complaint:     No chief complaint on file. History Obtained From:     patient, electronic medical record    History of Present Illness:   Patient past medical history multiple medical problems includes morbid obesity, hypertension, diabetes, hypothyroidism, atrial fibrillation on anticoagulation, patient admitted to hospital for planned open sigmoid colectomy and cholecystectomy  She underwent EGD and colonoscopy yesterday  Patient has history of uterine cancer in the past status post hysterectomy    Past Medical History:     Past Medical History:   Diagnosis Date    Adenocarcinoma of endometrium, stage 1 ([facility] [address] 10/5/2017    Well differentiated grade 1 adenocarcinoma arising in complex hyperplasia    JOSHUA (acute kidney injury) ([facility] [facility] 1/15/2020    Allergic rhinitis 2/23/2017    At high risk for falls 2/23/2017    Atrial fibrillation [facility])     Jan 2020    CHF (congestive heart failure) ([facility]     \"little\"    Colon polyp     Had colonoscopy done 20 yrs ago    Diabetes mellitus ([facility]     Diverticulitis     Endometrial cancer ([facility]     History of TIA (transient ischemic attack) '80's    on Baby ASA    Hyperglycemia 3/21/2017    Hyperlipidemia     Hypertension since 2015    on Rx.     Hypothyroidism 1980    sub total thyroidectomy goiter    Legally blind since born    both eyes, cord prolapse; \"not legally blind\" per \"associated eye care\" please see telephone encounter from 2/27/18    Lower back pain     Mixed incontinence 1/9/2016    Morbid Vacc oxycoccus, (SM CRANBERRY) 500 MG TABS Take 500 mg by mouth daily  7/31/17   Historical Provider, MD   UNABLE TO FIND Needs right walker brake fixed 10/3/19   Marbin Whiting MD   Cholecalciferol (VITAMIN D) 2000 units TABS tablet Take 1 tablet by mouth daily 3/9/19   Marbin Whiting MD   vitamin B-12 (CYANOCOBALAMIN) 1000 MCG tablet Take 1 tablet by mouth daily 3/9/19   Marbin Whiting MD   acetaminophen (APAP EXTRA STRENGTH) 500 MG tablet Take 1 tablet by mouth every 6 hours as needed for Pain or Fever 2/12/19   Marbin Whiting MD        Allergies:     Sulfa antibiotics and Penicillins    Social History:     Tobacco:    reports that she has never smoked. She has never used smokeless tobacco.  Alcohol:      reports no history of alcohol use. Drug Use:  reports no history of drug use. Family History:     Family History   Problem Relation Age of Onset    Coronary Art Dis Father     Hypertension Father     Diabetes Father     High Blood Pressure Father     Heart Attack Father     Diabetes Mother     High Blood Pressure Mother     Thyroid Disease Mother     High Blood Pressure Sister     Thyroid Disease Brother     High Blood Pressure Brother     High Blood Pressure Maternal Grandmother     Diabetes Maternal Grandfather     No Known Problems Paternal Grandmother     Heart Attack Paternal Grandfather     Colon Cancer Neg Hx        Review of Systems:     Positive and Negative as described in HPI. CONSTITUTIONAL:  negative for fevers, chills, sweats, fatigue, weight loss  HEENT:  negative for vision, hearing changes, runny nose, throat pain  RESPIRATORY:  negative for shortness of breath, cough, congestion, wheezing. CARDIOVASCULAR:  negative for chest pain, palpitations.   GASTROINTESTINAL: Positive for abdominal pain  GENITOURINARY:  negative for difficulty of urination, burning with urination, frequency   INTEGUMENT:  negative for rash, skin lesions, easy bruising pulses palpable, no pedal edema or calf pain with palpation  Psych: normal affect    Investigations:      Laboratory Testing:  Recent Results (from the past 24 hour(s))   POC Glucose Fingerstick    Collection Time: 08/05/20 11:11 AM   Result Value Ref Range    POC Glucose 101 65 - 105 mg/dL   POC Glucose Fingerstick    Collection Time: 08/05/20  4:24 PM   Result Value Ref Range    POC Glucose 109 (H) 65 - 105 mg/dL   POC Glucose Fingerstick    Collection Time: 08/05/20  8:42 PM   Result Value Ref Range    POC Glucose 115 (H) 65 - 105 mg/dL   EKG 12 Lead    Collection Time: 08/06/20  1:02 AM   Result Value Ref Range    Ventricular Rate 134 BPM    Atrial Rate 141 BPM    QRS Duration 132 ms    Q-T Interval 354 ms    QTc Calculation (Bazett) 528 ms    R Axis 91 degrees    T Axis 15 degrees   CBC Auto Differential    Collection Time: 08/06/20  4:37 AM   Result Value Ref Range    WBC 7.7 3.5 - 11.0 k/uL    RBC 3.54 (L) 4.0 - 5.2 m/uL    Hemoglobin 10.6 (L) 12.0 - 16.0 g/dL    Hematocrit 32.6 (L) 36 - 46 %    MCV 92.1 80 - 100 fL    MCH 30.1 26 - 34 pg    MCHC 32.7 31 - 37 g/dL    RDW 15.0 (H) 11.5 - 14.9 %    Platelets 497 (L) 904 - 450 k/uL    MPV 8.3 6.0 - 12.0 fL    NRBC Automated NOT REPORTED per 100 WBC    Differential Type NOT REPORTED     Immature Granulocytes NOT REPORTED 0 %    Absolute Immature Granulocyte NOT REPORTED 0.00 - 0.30 k/uL    WBC Morphology NOT REPORTED     RBC Morphology NOT REPORTED     Platelet Estimate NOT REPORTED     Seg Neutrophils 78 (H) 36 - 66 %    Lymphocytes 1 (L) 24 - 44 %    Monocytes 1 1 - 7 %    Eosinophils % 2 0 - 4 %    Basophils 0 0 - 2 %    Bands 17 (H) 0 - 10 %    Metamyelocytes 1 (H) 0 %    Segs Absolute 6.00 1.3 - 9.1 k/uL    Absolute Lymph # 0.08 (L) 1.0 - 4.8 k/uL    Absolute Mono # 0.08 (L) 0.1 - 1.3 k/uL    Absolute Eos # 0.15 0.0 - 0.4 k/uL    Basophils Absolute 0.00 0.0 - 0.2 k/uL    Absolute Bands # 1.31 (H) 0.0 - 1.0 k/uL    Metamyelocytes Absolute 0.08 (H) 0 k/uL Morphology ANISOCYTOSIS PRESENT     Morphology 1+ ELLIPTOCYTES     Morphology SLT [facility]    Basic Metabolic Panel    Collection Time: 08/06/20  4:37 AM   Result Value Ref Range    Glucose 99 70 - 99 mg/dL    BUN 13 8 - 23 mg/dL    CREATININE 0.73 0.50 - 0.90 mg/dL    Bun/Cre Ratio NOT REPORTED 9 - 20    Calcium 7.3 (L) 8.6 - 10.4 mg/dL    Sodium 135 135 - 144 mmol/L    Potassium 3.5 (L) 3.7 - 5.3 mmol/L    Chloride 104 98 - 107 mmol/L    CO2 19 (L) 20 - 31 mmol/L    Anion Gap 12 9 - 17 mmol/L    GFR Non-African American >60 >60 mL/min    GFR African American >60 >60 mL/min    GFR Comment          GFR Staging NOT REPORTED    POC Glucose Fingerstick    Collection Time: 08/06/20  8:26 AM   Result Value Ref Range    POC Glucose 97 65 - 105 mg/dL       Imaging/Diagonstics:      Assessment :      Primary Problem  <principal problem not specified>    Active Hospital Problems    Diagnosis Date Noted    Morbid obesity with BMI of 40.0-44.9, adult ([facility] [E66.01, Z68.41] 02/23/2017     Priority: High    Sigmoid diverticulitis [K57.32] 08/04/2020    Class 3 severe obesity due to excess calories without serious comorbidity with body mass index (BMI) of 40.0 to 44.9 in adult ([address] [E66.01, Z68.41] 07/22/2020    Atrial fibrillation, new onset ([facility] [I48.91] 01/20/2020    A-fib ([facility] [I48.91] 01/14/2020    Type 2 diabetes mellitus, without long-term current use of insulin ([facility] [E11.9] 01/14/2020    Adenomatous polyp of sigmoid colon, 6/26/17 [D12.5] 07/30/2017    Acquired hypothyroidism [E03.9]        Plan:     1. Perforated sigmoid diverticulitis status post open sigmoid colectomy and cholecystectomy, patient is on Dilaudid pain is controlled  2. Diabetes, sugar controlled, low-dose sliding scale, point-of-care glucose every 6  3. Hypertension, controlled, starting patient on Lopressor 5 mg every 6 as needed for systolic blood pressure more than 150, hold if heart rate is less than 60  4.  Hypothyroidism, TSH is okay  5. Congestive heart failure, last expression 55%, has high BNP tested in July, likely has heart failure with preserved ejection fraction, will reduce fluid to 100 mL per she is n.p.o., will evaluate tomorrow about reducing fluid  6. Had stress test recently which was negative  7. Patient has history of A. fib, rate controlled, was on Xarelto  8. We will discuss with operating surgeon when it is okay to change Lovenox prophylactic dose to therapeutic dose  9.     8/5  Patient is doing better, complaining of some pain at the local site  Has reduced urine output, given  1 L of fluid bolus  We will discuss with operating surgeon, if he can change Lovenox to therapeutic dose with history of A. Fib    8/6  Patient has episode of A. fib with RVR, started on IV amiodarone  Patient is on IV Lopressor as needed  Spiked fever up to 100.3  White counts are okay  Will monitor  Hypokalemia, replacing lites  Has prolonged QTC, avoid Reglan  Checking magnesium  If patient spiked fever again, need to work-up for infectious etiology including blood culture, urine culture, chest x-ray  Consultations:   IP CONSULT TO INTERNAL MEDICINE  IP CONSULT TO CASE MANAGEMENT  IP CONSULT TO SOCIAL WORK  IP CONSULT TO Pavel Barker MD  8/6/2020  10:30 AM    Copy sent to Dr. Becky Sommers MD    Please note that this chart was generated using voice recognition Dragon dictation software. Although every effort was made to ensure the accuracy of this automated transcription, some errors in transcription may have occurred.

## 2020-08-06 NOTE — PROGRESS NOTES
Paged Dylan Sosa about patients heart rate staying in the 130s even after metoprolo. She stated she consulted cardiology and to call them.

## 2020-08-06 NOTE — CONSULTS
use of insulin ([facility] [address] Past Surgical History:   has a past surgical history that includes Thyroid surgery (1980); Colonoscopy (1997); Tonsillectomy; Colonoscopy (06/26/2017); pr colsc flx w/removal lesion by hot bx forceps (N/A, 6/26/2017); Dilation and curettage of uterus (N/A, 9/20/2017); Cataract removal with implant (Bilateral, 2015); vitrectomy; julieta and bso (cervix removed) (10/24/2017); Hysterectomy (N/A, 10/24/2017); Upper gastrointestinal endoscopy (N/A, 8/3/2020); Colonoscopy (N/A, 8/3/2020); and Small intestine surgery (N/A, 8/4/2020). Home Medications:    Prior to Admission medications    Medication Sig Start Date End Date Taking? Authorizing Provider   metoprolol tartrate (LOPRESSOR) 25 MG tablet Take 0.5 tablets by mouth 2 times daily 7/27/20  Yes Lily Selby MD   amiodarone (CORDARONE) 200 MG tablet Take 1 tablet by mouth daily 6/22/20  Yes Ford Power MD   atorvastatin (LIPITOR) 40 MG tablet TAKE 1 TABLET BY MOUTH DAILY STOP SIMVASTATIN 3/16/20  Yes Kelley Tai MD   metFORMIN (GLUCOPHAGE) 500 MG tablet TAKE ONE TABLET BY MOUTH TWICE A DAY WITH A MEAL 11/11/19  Yes Kelley Tai MD   Lactobacillus (PROBIOTIC ACIDOPHILUS) TABS Take 1 tablet by mouth daily 7/31/17  Yes Kelley Tai MD   Blood Pressure Monitor KIT Use as directed. 7/22/20   MAX Barker CNP   glucose monitoring kit (FREESTYLE) monitoring kit Use as directed. BRAND OF CHOICE INSURANCE ALLOWS. 7/22/20   MAX Barker CNP   blood glucose monitor strips Test 2-3 times a day & as needed for symptoms of irregular blood glucose.   BRAND OF CHOICE INSURANCE ALLOWS. 7/22/20   MAX Barker CNP   Alcohol Swabs (ALCOHOL PREP) PADS Use as directed 7/22/20   MAX Barker CNP   Lancets MISC 1 each by Does not apply route 2 times daily 7/22/20   Feroz Torres APRN - CNP   oxybutynin (DITROPAN-XL) 10 MG extended release tablet Take 1 tablet by mouth daily 7/20/20 Roya Salinas MD   rivaroxaban (XARELTO) 20 MG TABS tablet Take 1 tablet by mouth daily (with breakfast) 6/22/20   Johnny Pham MD   levothyroxine (SYNTHROID) 75 MCG tablet TAKE 1 TABLET BY MOUTH EVERY MORNING (BEFORE BREAKFAST) 1/21/20   Naima Jurado MD   HM LORATADINE 10 MG tablet TAKE 1 TABLET BY MOUTH DAILY 1/21/20   Naima Jurado MD   docusate sodium (COLACE) 100 MG capsule TAKE 1 CAPSULE BY MOUTH 2 TIMES DAILY 12/16/19   Naima Jurado MD   MYRBETRIQ 50 MG TB24 TAKE ONE TABLET BY MOUTH ONCE DAILY 12/16/19   Naima Jurado MD   Cranberry, Vacc oxycoccus, (SM CRANBERRY) 500 MG TABS Take 500 mg by mouth daily  7/31/17   Historical Provider, MD   UNABLE TO FIND Needs right walker brake fixed 10/3/19   Naima Jurado MD   Cholecalciferol (VITAMIN D) 2000 units TABS tablet Take 1 tablet by mouth daily 3/9/19   Naima Jurado MD   vitamin B-12 (CYANOCOBALAMIN) 1000 MCG tablet Take 1 tablet by mouth daily 3/9/19   Naima Jurado MD   acetaminophen (APAP EXTRA STRENGTH) 500 MG tablet Take 1 tablet by mouth every 6 hours as needed for Pain or Fever 2/12/19   Naima Jurado MD       Current Medications: Scheduled Meds:   [START ON 8/7/2020] amiodarone  200 mg Oral Daily    sodium chloride flush  10 mL Intravenous 2 times per day    enoxaparin  30 mg Subcutaneous BID    metoclopramide  10 mg Intravenous Q6H    pantoprazole  40 mg Intravenous Daily    And    sodium chloride (PF)  10 mL Intravenous Daily    ceFAZolin (ANCEF) 50 mL IVPB  2 g Intravenous Q8H    insulin lispro  0-6 Units Subcutaneous TID WC    insulin lispro  0-3 Units Subcutaneous Nightly     Continuous Infusions:   dextrose      sodium chloride 100 mL/hr at 08/06/20 0106     PRN Meds:.potassium chloride, magnesium sulfate, metoprolol, glucose, dextrose, glucagon (rDNA), dextrose, acetaminophen, sodium chloride flush, HYDROmorphone **OR** HYDROmorphone, ondansetron, ketorolac     Allergies:  Sulfa antibiotics and Penicillins    Social History:   reports that she has never smoked. She has never used smokeless tobacco. She reports that she does not drink alcohol or use drugs. Family History: family history includes Coronary Art Dis in her father; Diabetes in her father, maternal grandfather, and mother; Heart Attack in her father and paternal grandfather; High Blood Pressure in her brother, father, maternal grandmother, mother, and sister; Hypertension in her father; No Known Problems in her paternal grandmother; Thyroid Disease in her brother and mother. Review of Systems   CONSTITUTIONAL:  negative for fevers, chills, fatigue and malaise    EYES:  negative for discharge    HEENT:  negative for epistaxis and sore throat    RESPIRATORY:  negative for cough, shortness of breath, wheezing    CARDIOVASCULAR:  negative for chest pain, palpitations, syncope, edema    GASTROINTESTINAL:   Status post EGD with history of perforated diverticulitis status post colectomy   GENITOURINARY:  negative for incontinence    MUSCULOSKELETAL:  negative for neck or back pain    NEUROLOGICAL:  negative for headaches, seizures and double vision   PSYCHIATRIC:  negative               PHYSICAL EXAM:    Blood pressure 122/78, pulse 106, temperature 97.4 °F (36.3 °C), temperature source Axillary, resp. rate 16, height 5' 2\" (1.575 m), weight 241 lb 6.5 oz (109.5 kg), SpO2 94 %, not currently breastfeeding.     CONSTITUTIONAL: AOx4, no apparent distress, appears stated age   HEAD: normocephalic, atraumatic   EYES: PERRLA, EOMI   ENT: moist mucous membranes, uvula midline   NECK:  symmetric, no midline tenderness to palpation   LUNGS: clear to auscultation bilaterally   CARDIOVASCULAR: regular rate and rhythm, no murmurs, rubs or gallops   ABDOMEN: Surgical wound with dressing  SKIN: no rash       DATA:    ECG:AFIB  Echo:  Stress:  Cath:    Labs:     CBC:   Recent Labs     08/05/20  0508 08/06/20  0437   WBC 10.9 7.7   HGB 10.9* 10.6*   HCT 33.9* 32.6*    133*     BMP:   Recent Labs     08/05/20  0508 08/06/20  0437    135   K 3.8 3.5*   CO2 20 19*   BUN 15 13   CREATININE 0.97* 0.73   LABGLOM 57* >60   GLUCOSE 132* 99     BNP: No results for input(s): BNP in the last 72 hours. PT/INR: No results for input(s): PROTIME, INR in the last 72 hours. APTT:No results for input(s): APTT in the last 72 hours. CARDIAC ENZYMES:No results for input(s): CKTOTAL, CKMB, CKMBINDEX, TROPONINI in the last 72 hours. FASTING LIPID PANEL:  Lab Results   Component Value Date    HDL 47 01/28/2020    TRIG 158 01/28/2020     LIVER PROFILE:No results for input(s): AST, ALT, LABALBU in the last 72 hours.     Intake/Output Summary (Last 24 hours) at 8/6/2020 1206  Last data filed at 8/6/2020 1042  Gross per 24 hour   Intake 2785 ml   Output 1275 ml   Net 1510 ml       IMPRESSION:    Patient Active Problem List   Diagnosis    Acquired hypothyroidism    History of TIA (transient ischemic attack)    Chronic bilateral low back pain without sciatica    Essential hypertension    Osteopenia determined by x-ray    Osteoarthritis involving multiple joints on both sides of body    Left knee DJD    Prediabetes    Vitamin D deficiency    Mixed incontinence    Chronic pain of both knees    Slow transit constipation    Allergic rhinitis    Hyperlipidemia with target LDL less than 100    Morbid obesity with BMI of 40.0-44.9, adult ([facility] [address]    At high risk for falls    Dense breast tissue on mammogram    Hyperglycemia    Spondylosis of lumbar region without myelopathy or radiculopathy    OAB (overactive bladder)    Primary osteoarthritis of both knees    Chronic fatigue     Adenomatous polyp of sigmoid colon, 6/26/17    Mixed stress and urge urinary incontinence    TLHBSO, bilateral LND 10/24/17    Optic atrophy of both eyes    Cataracta b/l eyes    Difficulty walking    Esotropia    Nystagmus    History of uterine cancer, Well differentiated grade 1 adenocarcinoma arising in complex hyperplasia, 2017    Vitamin B 12 deficiency    Aortic calcification (HCC)    Calculus of gallbladder without cholecystitis without obstruction    CLAROS (nonalcoholic steatohepatitis)    Optic atrophy    Cough present for greater than 3 weeks    Dyspepsia    A-fib (HCC)    Type 2 diabetes mellitus, without long-term current use of insulin (HCC)    JOSHUA (acute kidney injury) ([address]    Atrial fibrillation, new onset ([address]    Mobility impaired    Perforated diverticulum    Diverticulitis    Chronic cholecystitis    Colitis    Multiple atypical skin moles    Class 3 severe obesity due to excess calories without serious comorbidity with body mass index (BMI) of 40.0 to 44.9 in adult (HCC)    Symptomatic bradycardia    CHF NYHA class I, chronic, diastolic (HCC)    Sigmoid diverticulitis           RECOMMENDATIONS:    Paroxysmal A. fib, with fast ventricular rate, earlier patient was given amiodarone 150 mg IV  Will restart p.o. amiodarone tomorrow morning if it is is taking p.o. If the ventricular rate become fast, plan to start restart the IV amiodarone drip as per protocol  Patient is already started on low pressors, I will cut down the dose to 2.5 mg every 6 as needed if systolic blood pressure above 140, and heart rate above 120, hold if the systolic blood pressure less than 100 and heart rate less than 50  Concern is patient had history of junctional rhythm recently so we will watch carefully starting any AV fili medications  Anticoagulations once is okay with surgery at this time patient is on DVT prophylaxis  Discussed with patient and nursing.     Manisha Hyde [facility]        [phone]

## 2020-08-06 NOTE — PROGRESS NOTES
sigmoid colon, 6/26/17    A-fib ([facility] [address]    Type 2 diabetes mellitus, without long-term current use of insulin (HCC)    Atrial fibrillation, new onset ([facility] [address]    Class 3 severe obesity due to excess calories without serious comorbidity with body mass index (BMI) of 40.0 to 44.9 in adult [facility])    Sigmoid diverticulitis  Resolved Problems:    * No resolved hospital problems. *      Plan  1. Tylenol for fevers, continue to monitor  2. NPO with ice chips, sips with meds  3. Await return of bowel function  4. Continue IV antibiotics  5. Continue medical management  6. Patient was seen and examined. Awake alert no acute distress. Afebrile vital signs are stable. Passing flatus. Urine output is adequate. 7. Start clear liquid diet. Discontinue Cloud. External catheter. Increased activity. Pulmonary toilet. Blood work reviewed. Surgically stable.       Electronically signed by Cade Brunson PA-C  [id number] [address]

## 2020-08-06 NOTE — PROGRESS NOTES
[address]   Physical Therapy Progress Note    Date: 20  Patient Name: Ivy Tobar       Room: 6-  MRN: [id number]   Account: [de-identified]   : 1950  (78 y.o.)   Gender: female     Discharge Recommendations   Patient would benefit from continued therapy after discharge, Continue to assess pending progress  Other: TBD - may need RW(Pt reports she has Rollator @ home)    Referring Practitioner: Augusto Jensen MD  Diagnosis: Sigmoid diverticulitis  Restrictions/Precautions: General Precautions, Fall Risk, Surgical Protocols( NPO except sips with meds; ice chips )  Other position/activity restrictions: ambulate patient  Required Braces or Orthoses  Other: Abdominal Binder   Past Medical History:  has a past medical history of Adenocarcinoma of endometrium, stage 1 ([address] JOSHUA (acute kidney injury) ([address] Allergic rhinitis, At high risk for falls, Atrial fibrillation ([address] CHF (congestive heart failure) ([address] Colon polyp, Diabetes mellitus ([address] Diverticulitis, Endometrial cancer ([address] History of TIA (transient ischemic attack), Hyperglycemia, Hyperlipidemia, Hypertension, Hypothyroidism, Legally blind, Lower back pain, Mixed incontinence, Morbid obesity with BMI of 40.0-44.9, adult ([address] CLAROS (nonalcoholic steatohepatitis), Obesity, Oral phase dysphagia, Osteoarthritis (arthritis due to wear and tear of joints), Osteoarthritis involving multiple joints on both sides of body, Osteoporosis, Perforated bowel ([address] Postmenopausal bleeding, S/P h-scope, Myosure 17, Tennis elbow, Thickened endometrium, TIA (transient ischemic attack), TLHBSO, bilateral LND 10/24/17, and Type 2 diabetes mellitus, without long-term current use of insulin ([address] Past Surgical History:   has a past surgical history that includes Thyroid surgery (); Colonoscopy (); Tonsillectomy; Colonoscopy (2017); pr colsc flx w/removal lesion by hot bx forceps (N/A, 2017);  Dilation and curettage of uterus (N/A, 9/20/2017); Cataract removal with implant (Bilateral, 2015); vitrectomy; julieta and bso (cervix removed) (10/24/2017); Hysterectomy (N/A, 10/24/2017); Upper gastrointestinal endoscopy (N/A, 8/3/2020); Colonoscopy (N/A, 8/3/2020); and Small intestine surgery (N/A, 8/4/2020). Additional Pertinent Hx: Afib, CHF, TIA, 8/4/20 colectomy    Overall Orientation Status: Within Normal Limits  Restrictions/Precautions  Restrictions/Precautions: General Precautions; Fall Risk;Surgical Protocols( NPO except sips with meds; ice chips )  Required Braces or Orthoses?: Yes  Required Braces or Orthoses  Other: Abdominal Binder  Position Activity Restriction  Other position/activity restrictions: ambulate patient    Subjective: Pt laying in bed upon writer arrival, pt is agreeable to therapy after JANINA Zaldivar finishing adminstering pain meds. Comments: JANINA Cleaning for 2nd attempt to see pt and informed writer she spoke with cardiology and plan to adminster amiodarone tomorrow to manage A-fib. Monitor SpO2 and HR throughout tx; HR should not exceed 140 BPM per JANINA Zaldivar request    Vital Signs  Pulse: 138(sitting edge of bed and amb; hx A-fib HR ranged 115-138 BPM)  Heart Rate Source: Monitor  Patient Currently in Pain: Yes  Pain Assessment: 0-10  Pain Level: 5  Pain Type: Acute pain;Surgical pain  Pain Location: Abdomen  Pain Orientation: Anterior;Right; Lower  Pain Descriptors: Aching;Sore(3/10 supine @ rest and 5/10 sitting EOB/ambulating)  Pain Frequency: Continuous  Functional Pain Assessment: Prevents or interferes some active activities and ADLs  Non-Pharmaceutical Pain Intervention(s): Ambulation/Increased Activity;Cold applied;Distraction; Emotional support;Rest;Repositioned  Response to Pain Intervention: Patient Satisfied  Multiple Pain Sites: No     Oxygen Therapy  SpO2: 95 %(sitting EOB and amb)  Pulse Oximeter Device Mode: Continuous  Pulse Oximeter Device Location: Finger  O2 Device: Nasal Fair;+  Standing - Static: Fair  Standing - Dynamic: Fair  Comments: Fall risk, standing balance with rolling walker and seated at edge of bed     Other exercises?: Yes  Other exercises 1: Supine Bilat LE therapeutic exercise: ankle pumps x10, heel slides x10-12 reps  Other exercises 2: Educated/demo log roll technique with pillow; pt had fair carryover  Other exercises 3: Seated dangle @ edge of bed ~10 min with focus to engage core for stability and increase seated endurance without trunk support  Other exercises 4: Seated long arc quad x10-12 reps  Other exercises 5: Sit to stands x2 from various seated heights with one verbal cue for hand placement  Other exercises 6: Monitoring SpO2 and HR closely d/t A-fib  Other exercises 7: Ice pack to R IV placement per pt request (JANINA [place] approves) and cold towels to posterior neck and face due to temperature/sweating  Other exercises 8: Repositioned pt with pillow to R UE to maintain skin integrity  Other exercises 9: Educated pt over room safety awareness; to press call button for assistance to reduce chance of fall. Other exercises 10: Educated pt to attempt to sit up in bedside chair ~1 hour; pt verbalized understanding  Other Activities  Comment: Rest Breaks PRN        Activity Tolerance: Patient limited by endurance; Patient limited by fatigue;Patient limited by pain;Treatment limited secondary to medical complications (free text)(Fever and A-fib)  PT Equipment Recommendations  Other: TBD - may need RW(Pt reports she has Rollator @ home)       Assessment  Activity Tolerance: Patient limited by endurance; Patient limited by fatigue;Patient limited by pain;Treatment limited secondary to medical complications (free text)(Fever and A-fib)   Body structures, Functions, Activity limitations: Decreased functional mobility ; Decreased ADL status; Decreased strength;Decreased endurance;Decreased balance; Increased pain;Decreased posture  Specific instructions for Next Treatment:

## 2020-08-07 LAB
ABSOLUTE BANDS #: 0.94 K/UL (ref 0–1)
ABSOLUTE EOS #: 0.4 K/UL (ref 0–0.4)
ABSOLUTE IMMATURE GRANULOCYTE: ABNORMAL K/UL (ref 0–0.3)
ABSOLUTE LYMPH #: 0.74 K/UL (ref 1–4.8)
ABSOLUTE MONO #: 0.54 K/UL (ref 0.1–1.3)
ANION GAP SERPL CALCULATED.3IONS-SCNC: 7 MMOL/L (ref 9–17)
BANDS: 14 % (ref 0–10)
BASOPHILS # BLD: 0 % (ref 0–2)
BASOPHILS ABSOLUTE: 0 K/UL (ref 0–0.2)
BUN BLDV-MCNC: 9 MG/DL (ref 8–23)
BUN/CREAT BLD: ABNORMAL (ref 9–20)
CALCIUM SERPL-MCNC: 7.5 MG/DL (ref 8.6–10.4)
CHLORIDE BLD-SCNC: 102 MMOL/L (ref 98–107)
CO2: 24 MMOL/L (ref 20–31)
CREAT SERPL-MCNC: 0.61 MG/DL (ref 0.5–0.9)
DIFFERENTIAL TYPE: ABNORMAL
EOSINOPHILS RELATIVE PERCENT: 6 % (ref 0–4)
GFR AFRICAN AMERICAN: >60 ML/MIN
GFR NON-AFRICAN AMERICAN: >60 ML/MIN
GFR SERPL CREATININE-BSD FRML MDRD: ABNORMAL ML/MIN/{1.73_M2}
GFR SERPL CREATININE-BSD FRML MDRD: ABNORMAL ML/MIN/{1.73_M2}
GLUCOSE BLD-MCNC: 105 MG/DL (ref 65–105)
GLUCOSE BLD-MCNC: 111 MG/DL (ref 65–105)
GLUCOSE BLD-MCNC: 111 MG/DL (ref 65–105)
GLUCOSE BLD-MCNC: 85 MG/DL (ref 65–105)
GLUCOSE BLD-MCNC: 93 MG/DL (ref 70–99)
HCT VFR BLD CALC: 33.3 % (ref 36–46)
HEMOGLOBIN: 10.8 G/DL (ref 12–16)
IMMATURE GRANULOCYTES: ABNORMAL %
LYMPHOCYTES # BLD: 11 % (ref 24–44)
MAGNESIUM: 1.8 MG/DL (ref 1.6–2.6)
MAGNESIUM: 1.8 MG/DL (ref 1.6–2.6)
MCH RBC QN AUTO: 29.8 PG (ref 26–34)
MCHC RBC AUTO-ENTMCNC: 32.3 G/DL (ref 31–37)
MCV RBC AUTO: 92.3 FL (ref 80–100)
METAMYELOCYTES ABSOLUTE COUNT: 0.13 K/UL
METAMYELOCYTES: 2 %
MONOCYTES # BLD: 8 % (ref 1–7)
MORPHOLOGY: ABNORMAL
MORPHOLOGY: ABNORMAL
NRBC AUTOMATED: ABNORMAL PER 100 WBC
PDW BLD-RTO: 14.7 % (ref 11.5–14.9)
PLATELET # BLD: 120 K/UL (ref 150–450)
PLATELET ESTIMATE: ABNORMAL
PMV BLD AUTO: 8.2 FL (ref 6–12)
POTASSIUM SERPL-SCNC: 3.6 MMOL/L (ref 3.7–5.3)
POTASSIUM SERPL-SCNC: 3.7 MMOL/L (ref 3.7–5.3)
RBC # BLD: 3.62 M/UL (ref 4–5.2)
RBC # BLD: ABNORMAL 10*6/UL
SEG NEUTROPHILS: 59 % (ref 36–66)
SEGMENTED NEUTROPHILS ABSOLUTE COUNT: 3.95 K/UL (ref 1.3–9.1)
SODIUM BLD-SCNC: 133 MMOL/L (ref 135–144)
WBC # BLD: 6.7 K/UL (ref 3.5–11)
WBC # BLD: ABNORMAL 10*3/UL

## 2020-08-07 PROCEDURE — 2060000000 HC ICU INTERMEDIATE R&B

## 2020-08-07 PROCEDURE — 97116 GAIT TRAINING THERAPY: CPT

## 2020-08-07 PROCEDURE — 97110 THERAPEUTIC EXERCISES: CPT

## 2020-08-07 PROCEDURE — 36415 COLL VENOUS BLD VENIPUNCTURE: CPT

## 2020-08-07 PROCEDURE — 80048 BASIC METABOLIC PNL TOTAL CA: CPT

## 2020-08-07 PROCEDURE — 6370000000 HC RX 637 (ALT 250 FOR IP): Performed by: INTERNAL MEDICINE

## 2020-08-07 PROCEDURE — 6370000000 HC RX 637 (ALT 250 FOR IP): Performed by: STUDENT IN AN ORGANIZED HEALTH CARE EDUCATION/TRAINING PROGRAM

## 2020-08-07 PROCEDURE — 6360000002 HC RX W HCPCS: Performed by: SURGERY

## 2020-08-07 PROCEDURE — 84132 ASSAY OF SERUM POTASSIUM: CPT

## 2020-08-07 PROCEDURE — 85025 COMPLETE CBC W/AUTO DIFF WBC: CPT

## 2020-08-07 PROCEDURE — 82947 ASSAY GLUCOSE BLOOD QUANT: CPT

## 2020-08-07 PROCEDURE — C9113 INJ PANTOPRAZOLE SODIUM, VIA: HCPCS | Performed by: SURGERY

## 2020-08-07 PROCEDURE — 83735 ASSAY OF MAGNESIUM: CPT

## 2020-08-07 PROCEDURE — 2580000003 HC RX 258: Performed by: INTERNAL MEDICINE

## 2020-08-07 PROCEDURE — 99232 SBSQ HOSP IP/OBS MODERATE 35: CPT | Performed by: INTERNAL MEDICINE

## 2020-08-07 PROCEDURE — 6360000002 HC RX W HCPCS: Performed by: INTERNAL MEDICINE

## 2020-08-07 PROCEDURE — 2580000003 HC RX 258: Performed by: SURGERY

## 2020-08-07 PROCEDURE — 2500000003 HC RX 250 WO HCPCS: Performed by: INTERNAL MEDICINE

## 2020-08-07 RX ORDER — POTASSIUM CHLORIDE 20 MEQ/1
40 TABLET, EXTENDED RELEASE ORAL ONCE
Status: COMPLETED | OUTPATIENT
Start: 2020-08-07 | End: 2020-08-07

## 2020-08-07 RX ADMIN — METOPROLOL TARTRATE 2.5 MG: 5 INJECTION, SOLUTION INTRAVENOUS at 11:28

## 2020-08-07 RX ADMIN — SODIUM CHLORIDE: 9 INJECTION, SOLUTION INTRAVENOUS at 04:52

## 2020-08-07 RX ADMIN — METOCLOPRAMIDE 10 MG: 5 INJECTION, SOLUTION INTRAMUSCULAR; INTRAVENOUS at 06:13

## 2020-08-07 RX ADMIN — CEFAZOLIN 2 G: 1 INJECTION, POWDER, FOR SOLUTION INTRAMUSCULAR; INTRAVENOUS at 04:52

## 2020-08-07 RX ADMIN — MAGNESIUM SULFATE HEPTAHYDRATE 2 G: 500 INJECTION, SOLUTION INTRAMUSCULAR; INTRAVENOUS at 14:25

## 2020-08-07 RX ADMIN — METOPROLOL TARTRATE 25 MG: 25 TABLET, FILM COATED ORAL at 22:11

## 2020-08-07 RX ADMIN — Medication 10 ML: at 09:13

## 2020-08-07 RX ADMIN — Medication 10 ML: at 09:15

## 2020-08-07 RX ADMIN — METOCLOPRAMIDE 10 MG: 5 INJECTION, SOLUTION INTRAMUSCULAR; INTRAVENOUS at 01:35

## 2020-08-07 RX ADMIN — POTASSIUM CHLORIDE 40 MEQ: 1500 TABLET, EXTENDED RELEASE ORAL at 14:09

## 2020-08-07 RX ADMIN — AMIODARONE HYDROCHLORIDE 200 MG: 200 TABLET ORAL at 09:11

## 2020-08-07 RX ADMIN — HYDROMORPHONE HYDROCHLORIDE 1 MG: 1 INJECTION, SOLUTION INTRAMUSCULAR; INTRAVENOUS; SUBCUTANEOUS at 20:10

## 2020-08-07 RX ADMIN — CEFAZOLIN 2 G: 1 INJECTION, POWDER, FOR SOLUTION INTRAMUSCULAR; INTRAVENOUS at 14:25

## 2020-08-07 RX ADMIN — HYDROMORPHONE HYDROCHLORIDE 1 MG: 1 INJECTION, SOLUTION INTRAMUSCULAR; INTRAVENOUS; SUBCUTANEOUS at 17:15

## 2020-08-07 RX ADMIN — ENOXAPARIN SODIUM 105 MG: 120 INJECTION SUBCUTANEOUS at 22:11

## 2020-08-07 RX ADMIN — HYDROMORPHONE HYDROCHLORIDE 1 MG: 1 INJECTION, SOLUTION INTRAMUSCULAR; INTRAVENOUS; SUBCUTANEOUS at 09:11

## 2020-08-07 RX ADMIN — PANTOPRAZOLE SODIUM 40 MG: 40 INJECTION, POWDER, FOR SOLUTION INTRAVENOUS at 09:10

## 2020-08-07 RX ADMIN — SODIUM CHLORIDE: 9 INJECTION, SOLUTION INTRAVENOUS at 17:02

## 2020-08-07 RX ADMIN — Medication 10 ML: at 22:12

## 2020-08-07 RX ADMIN — CEFAZOLIN 2 G: 1 INJECTION, POWDER, FOR SOLUTION INTRAMUSCULAR; INTRAVENOUS at 22:12

## 2020-08-07 RX ADMIN — METOPROLOL TARTRATE 25 MG: 25 TABLET, FILM COATED ORAL at 14:09

## 2020-08-07 RX ADMIN — ENOXAPARIN SODIUM 30 MG: 30 INJECTION SUBCUTANEOUS at 09:10

## 2020-08-07 ASSESSMENT — PAIN SCALES - GENERAL
PAINLEVEL_OUTOF10: 8
PAINLEVEL_OUTOF10: 7
PAINLEVEL_OUTOF10: 7
PAINLEVEL_OUTOF10: 6

## 2020-08-07 ASSESSMENT — PAIN DESCRIPTION - ORIENTATION: ORIENTATION: ANTERIOR;LEFT

## 2020-08-07 ASSESSMENT — PAIN DESCRIPTION - PAIN TYPE: TYPE: ACUTE PAIN

## 2020-08-07 ASSESSMENT — PAIN DESCRIPTION - LOCATION
LOCATION: ABDOMEN
LOCATION: ABDOMEN

## 2020-08-07 NOTE — PROGRESS NOTES
RN called to the room because patient stated that her IV was burning. Infusion of magnesium and abx had been running for ten minutes. RN found patients hand extremely swollen. Patient unable to bend fingers and hand was cool to touch with mild discoloration at finger tips. Patient experienced no pain at this time and retained full sensation in fingertips. Patient again educated the IV in her hand needs to be discontinued. Patient agreed for RN to pull IV at this time and start one one in a different area. Ice and elevation applied to the swollen right hand.

## 2020-08-07 NOTE — PROGRESS NOTES
[facility]   Progress Note                   Date:   8/7/2020  Patient name: Raleigh Muse  Date of admission:  8/4/2020  5:43 AM  MRN:   [id number]  YOB: 1950  PCP: Lydia Mckay MD    Reason for Admission:     Subjective:   Patient was seen and assessed at bedside this morning. Patient denies any chest pain shortness of breath nausea vomiting. Patient did have episode of atrial fibrillation. Intake/Output Summary (Last 24 hours) at 8/7/2020 1158  Last data filed at 8/7/2020 2549  Gross per 24 hour   Intake --   Output 2045 ml   Net -2045 ml       Medications:   Scheduled Meds:   metoprolol tartrate  25 mg Oral BID    sodium chloride flush  10 mL Intravenous 2 times per day    enoxaparin  30 mg Subcutaneous BID    metoclopramide  10 mg Intravenous Q6H    pantoprazole  40 mg Intravenous Daily    And    sodium chloride (PF)  10 mL Intravenous Daily    ceFAZolin (ANCEF) 50 mL IVPB  2 g Intravenous Q8H    insulin lispro  0-6 Units Subcutaneous TID WC    insulin lispro  0-3 Units Subcutaneous Nightly     Continuous Infusions:   dextrose      sodium chloride 100 mL/hr at 08/07/20 0452     CBC:   Recent Labs     08/05/20  0508 08/06/20  0437 08/07/20  0447   WBC 10.9 7.7 6.7   HGB 10.9* 10.6* 10.8*    133* 120*     BMP:    Recent Labs     08/05/20  0508 08/06/20  0437 08/07/20  0108 08/07/20  0447    135  --  133*   K 3.8 3.5* 3.6* 3.7    104  --  102   CO2 20 19*  --  24   BUN 15 13  --  9   CREATININE 0.97* 0.73  --  0.61   GLUCOSE 132* 99  --  93     Hepatic: No results for input(s): AST, ALT, ALB, BILITOT, ALKPHOS in the last 72 hours. Troponin: No results for input(s): TROPONINI in the last 72 hours. BNP: No results for input(s): BNP in the last 72 hours. Lipids: No results for input(s): CHOL, HDL in the last 72 hours. Invalid input(s): LDLCALCU  INR: No results for input(s): INR in the last 72 hours.     Objective:   Vitals: BP (!) 179/100 Allergic rhinitis     Hyperlipidemia with target LDL less than 100     Morbid obesity with BMI of 40.0-44.9, adult (HCC)     At high risk for falls     Dense breast tissue on mammogram     Hyperglycemia     Spondylosis of lumbar region without myelopathy or radiculopathy     OAB (overactive bladder)     Primary osteoarthritis of both knees     Chronic fatigue      Adenomatous polyp of sigmoid colon, 6/26/17     Mixed stress and urge urinary incontinence     TLHBSO, bilateral LND 10/24/17     Optic atrophy of both eyes     Cataracta b/l eyes     Difficulty walking     Esotropia     Nystagmus     History of uterine cancer, Well differentiated grade 1 adenocarcinoma arising in complex hyperplasia, 2017     Vitamin B 12 deficiency     Aortic calcification (HCC)     Calculus of gallbladder without cholecystitis without obstruction     CLAROS (nonalcoholic steatohepatitis)     Optic atrophy     Cough present for greater than 3 weeks     Dyspepsia     A-fib (HCC)     Type 2 diabetes mellitus, without long-term current use of insulin (HCC)     JOSHUA (acute kidney injury) ([address]     Atrial fibrillation, new onset ([address]     Mobility impaired     Perforated diverticulum     Diverticulitis     Chronic cholecystitis     Colitis     Multiple atypical skin moles     Class 3 severe obesity due to excess calories without serious comorbidity with body mass index (BMI) of 40.0 to 44.9 in adult [facility])     Symptomatic bradycardia     CHF NYHA class I, chronic, diastolic ([address]     Sigmoid diverticulitis      Plan of Treatment:   1. Stop amiodarone due to prolonged QTC  2. Start Lopressor 25 mg twice daily  3. Hold Reglan due to prolonged QTC and avoid any QTC prolonging drugs    Will discuss with rounding attending Dr. Lillian Urban for final recommendations. Jenifer Luna MD  PGY2 Family Medicine Resident  Attending Physician Statement  I have discussed the care of the patient, including pertinent history and exam findings, with the resident. I have seen and examined the patient and the key elements of all parts of the encounter have been performed by me. I agree with the assessment, plan and orders as documented by the resident.   CHELSY amio, watch electrolytes, watch for any medications that can prolong QTC  Nay Hunt MD

## 2020-08-07 NOTE — PROGRESS NOTES
RN educated patient that patient's right had is swelling and may need a new IV. Patient declined new IV at this time. RN flushed existing IV line with 10 mL of normal saline. IV flushed well.

## 2020-08-07 NOTE — PROGRESS NOTES
[facility]                 PATIENT NAME: Shannon Brown     TODAY'S DATE: 8/7/2020, 10:36 AM    SUBJECTIVE:  POD#3  Pt seen and examined. Afebrile, VSS. Patient doing well today. No fevers overnight. Pain is controlled. She is passing gas, no bowel movement yet. Tolerating clear liquid diet, no N/V. Incision clean, dry, intact. NORMA x 2 serosanguinous. Cloud catheter removed, voiding on her own. Urine output adequate. OBJECTIVE:   VITALS:  /88   Pulse 115   Temp 98 °F (36.7 °C) (Oral)   Resp 18   Ht 5' 2\" (1.575 m)   Wt 247 lb 12.8 oz (112.4 kg)   LMP  (LMP Unknown)   SpO2 96%   BMI 45.32 kg/m²      INTAKE/OUTPUT:      Intake/Output Summary (Last 24 hours) at 8/7/2020 1036  Last data filed at 8/7/2020 0808  Gross per 24 hour   Intake --   Output 2495 ml   Net -2495 ml                 CONSTITUTIONAL:  awake and alert. No acute distress  HEART:   Afib; irregularly irregular  LUNGS:   Decreased air entry at bases, no wheezing  ABDOMEN:   Abdomen soft, non-tender, non-distended  EXTREMITIES:   Trace pedal edema    Data:  CBC:   Lab Results   Component Value Date    WBC 6.7 08/07/2020    RBC 3.62 08/07/2020    RBC 4.23 03/20/2012    HGB 10.8 08/07/2020    HCT 33.3 08/07/2020    MCV 92.3 08/07/2020    MCH 29.8 08/07/2020    MCHC 32.3 08/07/2020    RDW 14.7 08/07/2020     08/07/2020     03/20/2012    MPV 8.2 08/07/2020     BMP:    Lab Results   Component Value Date     08/07/2020    K 3.7 08/07/2020     08/07/2020    CO2 24 08/07/2020    BUN 9 08/07/2020    LABALBU 4.3 07/24/2020    LABALBU 4.3 03/20/2012    CREATININE 0.61 08/07/2020    CALCIUM 7.5 08/07/2020    GFRAA >60 08/07/2020    LABGLOM >60 08/07/2020    GLUCOSE 93 08/07/2020    GLUCOSE 114 03/20/2012       Radiology Review:  No new images to review      ASSESSMENT     Active Problems:     Morbid obesity with BMI of 40.0-44.9, adult ([facility]    Acquired hypothyroidism    Adenomatous polyp of sigmoid colon, 6/26/17    A-fib ([facility]    Type 2 diabetes mellitus, without long-term current use of insulin (HCC)    Atrial fibrillation, new onset (HCC)    Class 3 severe obesity due to excess calories without serious comorbidity with body mass index (BMI) of 40.0 to 44.9 in adult [facility])    Sigmoid diverticulitis  Resolved Problems:    * No resolved hospital problems. *      Plan  1. Advance to full liquid diet  2. Tylenol for breakthrough fevers  3. Continue IV antibiotics  4. Increase activity, PT/OT  5. Surgically stable  6. Continue medical management  7. Patient was seen and examined. Doing well. Passing flatus. Tolerating liquids. Abdomen is benign. NORMA drain serosanguineous. Blood work noted. Surgically stable. Ambulate. Increased activity. Pulmonary toilet. DVT prophylaxis.       Electronically signed by Cade Brunson PA-C  [address] [address]

## 2020-08-07 NOTE — PLAN OF CARE
Problem: Infection:  Goal: Will remain free from infection  Description: Will remain free from infection  Outcome: Ongoing     Problem: Safety:  Goal: Free from accidental physical injury  Description: Free from accidental physical injury  Outcome: Ongoing     Problem: Safety:  Goal: Free from intentional harm  Description: Free from intentional harm  Outcome: Ongoing     Problem: Daily Care:  Goal: Daily care needs are met  Description: Daily care needs are met  Outcome: Ongoing  Note: Daily care needs have been met this shift. Bathroom needs have been assessed every 2 hours. Hygiene care needs has been assessed this shift. Will continue to monitor. Problem: Pain:  Goal: Patient's pain/discomfort is manageable  Description: Patient's pain/discomfort is manageable  Outcome: Ongoing  Note: Pain assessed at regular intervals. Medications administered as requested for comfort. Pain remains at a tolerable level. Problem: Pain:  Goal: Pain level will decrease  Description: Pain level will decrease  Outcome: Ongoing     Problem: Pain:  Goal: Control of acute pain  Description: Control of acute pain  Outcome: Ongoing     Problem: Pain:  Goal: Control of chronic pain  Description: Control of chronic pain  Outcome: Ongoing     Problem: Skin Integrity:  Goal: Skin integrity will stabilize  Description: Skin integrity will stabilize  Outcome: Ongoing     Problem: Discharge Planning:  Goal: Patients continuum of care needs are met  Description: Patients continuum of care needs are met  Outcome: Ongoing     Problem: Falls - Risk of:  Goal: Will remain free from falls  Description: Will remain free from falls  Outcome: Ongoing  Note: Bed remains in lowest position, call light within reach. Patient remains free of falls at this time. RN will continue to monitor.       Problem: Falls - Risk of:  Goal: Absence of physical injury  Description: Absence of physical injury  Outcome: Ongoing     Problem: HEMODYNAMIC

## 2020-08-07 NOTE — PROGRESS NOTES
RN reassessed patients right hand. Right hand is normal in color and has resumed similar appearance as when RN first advised patient to change her IV. Patient reports no pain in hand and is able flex and extend fingers.

## 2020-08-07 NOTE — CARE COORDINATION
FAVIOLA spoke with [facility] again in admissions with [facility]. They are accepting the patient and they have started the pre-cert. FAVIOLA also requested a COVID test for this patient for DC to [facility]. FAVIOLA will continue to follow.

## 2020-08-07 NOTE — CARE COORDINATION
SW noted that therapy is recommending that this patient get some rehabilitation prior to going home. FAVIOLA spoke with the patient about this and the patient is willing to go to [facility]. SW began to get the list with the patient reported that she would go to [facility]. SW faxed the referral to that facility and will follow up with them after they review this referral.  SW following.

## 2020-08-07 NOTE — PROGRESS NOTES
Upon ambulation, patient heart rate rosa isela to 179 in AFIB. Blood Pressure systolic 213. IV lopressor given. Patient heart rate 100-120 at this time.

## 2020-08-07 NOTE — PROGRESS NOTES
[facility] Internal Medicine    CONSULTATION / HISTORY AND PHYSICAL EXAMINATION            Date:   8/7/2020  Patient name:  Lillie Cherry  Date of admission:  8/4/2020  5:43 AM  MRN:   [id number]  Account:  [de-identified]  YOB: 1950  PCP:    Naima Jurado MD  Room:   2116/2116-01  Code Status:    Full Code    Physician Requesting Consult: Filemon Soto MD    Reason for Consult: Medical management    Chief Complaint:     No chief complaint on file. History Obtained From:     patient, electronic medical record    History of Present Illness:   Patient past medical history multiple medical problems includes morbid obesity, hypertension, diabetes, hypothyroidism, atrial fibrillation on anticoagulation, patient admitted to hospital for planned open sigmoid colectomy and cholecystectomy  She underwent EGD and colonoscopy yesterday  Patient has history of uterine cancer in the past status post hysterectomy  8/7   Patient did not spike any fever overnight  Started on clear liquid    Past Medical History:     Past Medical History:   Diagnosis Date    Adenocarcinoma of endometrium, stage 1 ([facility] [address] 10/5/2017    Well differentiated grade 1 adenocarcinoma arising in complex hyperplasia    JOSHUA (acute kidney injury) ([facility] [address] 1/15/2020    Allergic rhinitis 2/23/2017    At high risk for falls 2/23/2017    Atrial fibrillation [facility])     Jan 2020    CHF (congestive heart failure) ([facility] [address]     \"little\"    Colon polyp     Had colonoscopy done 20 yrs ago    Diabetes mellitus ([address]     Diverticulitis     Endometrial cancer ([facility] [address]     History of TIA (transient ischemic attack) '80's    on Baby ASA    Hyperglycemia 3/21/2017    Hyperlipidemia     Hypertension since 2015    on Rx.     Hypothyroidism 1980    sub total thyroidectomy goiter    Legally blind since born    both eyes, cord prolapse; \"not legally blind\" per \"associated eye care\" please see telephone encounter from 2/27/18    Lower back pain     Mixed incontinence 1/9/2016    Morbid obesity with BMI of 40.0-44.9, adult ([facility] [address] 2/23/2017    CLAROS (nonalcoholic steatohepatitis) 3/10/2019    Obesity     Oral phase dysphagia 2/23/2018    Osteoarthritis (arthritis due to wear and tear of joints)     ronan knee    Osteoarthritis involving multiple joints on both sides of body 4/24/2012    Osteoporosis     Perforated bowel ([address]     Postmenopausal bleeding 9/20/2017    S/P h-scope, Myosure 9/20/17 9/20/2017    Pathology pending    Tennis elbow     left    Thickened endometrium 9/20/2017    TIA (transient ischemic attack)     TLHBSO, bilateral LND 10/24/17 10/24/2017    Type 2 diabetes mellitus, without long-term current use of insulin ([facility] [address] 1/14/2020        Past Surgical History:     Past Surgical History:   Procedure Laterality Date    CATARACT REMOVAL WITH IMPLANT Bilateral 2015    COLONOSCOPY  1997    had polyp, she doesn't know where and when, \"20 yrs ago\"    COLONOSCOPY  06/26/2017    COLONOSCOPY N/A 8/3/2020    COLONOSCOPY POLYPECTOMY SNARE performed by Radha Mccauley MD at [address] [facility] N/A 9/20/2017    [facility] performed by Dakota Schumacher DO at [address] N/A 10/24/2017    TOTAL LAPAROSCOPIC HYSTERECTOMY, BSO, F.S.  STAGING, GYRUS G400 performed by Jay Ponce MD at [address] N/A 6/26/2017    COLONOSCOPY POLYPECTOMY / HOT SNARE performed by Jose Bustillos DO at [address] N/A 8/4/2020    OPEN SIGMOID COLECTOMY; PRIMARY ANASTOMOSIS & MOBILIZATION OF SPLENIC FLEXURE performed by Radha Mccauley MD at [address]  10/24/2017    with pelvic lymph node dissection    THYROID SURGERY  1980    subtotal 20 years ago    TONSILLECTOMY      UPPER GASTROINTESTINAL ENDOSCOPY N/A 8/3/2020    EGD BIOPSY performed by Radha Mccauley MD at [facility] ENDO    VITRECTOMY      FLOATERS        Medications Prior to Admission:     Prior to Admission medications    Medication Sig Start Date End Date Taking? Authorizing Provider   metoprolol tartrate (LOPRESSOR) 25 MG tablet Take 0.5 tablets by mouth 2 times daily 7/27/20  Yes Arleen Melton MD   amiodarone (CORDARONE) 200 MG tablet Take 1 tablet by mouth daily 6/22/20  Yes Thee Lee MD   atorvastatin (LIPITOR) 40 MG tablet TAKE 1 TABLET BY MOUTH DAILY STOP SIMVASTATIN 3/16/20  Yes Lydia Mckay MD   metFORMIN (GLUCOPHAGE) 500 MG tablet TAKE ONE TABLET BY MOUTH TWICE A DAY WITH A MEAL 11/11/19  Yes Lydia Mckay MD   Lactobacillus (PROBIOTIC ACIDOPHILUS) TABS Take 1 tablet by mouth daily 7/31/17  Yes Lydia Mckay MD   Blood Pressure Monitor KIT Use as directed. 7/22/20   MAX Barker CNP   glucose monitoring kit (FREESTYLE) monitoring kit Use as directed. BRAND OF CHOICE INSURANCE ALLOWS. 7/22/20   MAX Barker CNP   blood glucose monitor strips Test 2-3 times a day & as needed for symptoms of irregular blood glucose.   BRAND OF CHOICE INSURANCE ALLOWS. 7/22/20   MAX Barker CNP   Alcohol Swabs (ALCOHOL PREP) PADS Use as directed 7/22/20   MAX Barker CNP   Lancets MISC 1 each by Does not apply route 2 times daily 7/22/20   MAX Barker CNP   oxybutynin (DITROPAN-XL) 10 MG extended release tablet Take 1 tablet by mouth daily 7/20/20   Topher Abebe MD   rivaroxaban (XARELTO) 20 MG TABS tablet Take 1 tablet by mouth daily (with breakfast) 6/22/20   Thee Lee MD   levothyroxine (SYNTHROID) 75 MCG tablet TAKE 1 TABLET BY MOUTH EVERY MORNING (BEFORE BREAKFAST) 1/21/20   Lydia Mckay MD   HM LORATADINE 10 MG tablet TAKE 1 TABLET BY MOUTH DAILY 1/21/20   Radha Vallecillo MD   docusate sodium (COLACE) 100 MG capsule TAKE 1 CAPSULE BY MOUTH 2 TIMES DAILY 12/16/19   Lydia Mckay MD   MYRBETRIQ 50 MG TB24 TAKE ONE TABLET BY MOUTH ONCE DAILY 12/16/19   Karthik Arreola MD   Cranberry, Vacc oxycoccus, (SM CRANBERRY) 500 MG TABS Take 500 mg by mouth daily  7/31/17   Historical Provider, MD   UNABLE TO FIND Needs right walker brake fixed 10/3/19   Karthik Arreola MD   Cholecalciferol (VITAMIN D) 2000 units TABS tablet Take 1 tablet by mouth daily 3/9/19   Karthik Arreola MD   vitamin B-12 (CYANOCOBALAMIN) 1000 MCG tablet Take 1 tablet by mouth daily 3/9/19   Karthik Arreola MD   acetaminophen (APAP EXTRA STRENGTH) 500 MG tablet Take 1 tablet by mouth every 6 hours as needed for Pain or Fever 2/12/19   Karthik Arreola MD        Allergies:     Sulfa antibiotics and Penicillins    Social History:     Tobacco:    reports that she has never smoked. She has never used smokeless tobacco.  Alcohol:      reports no history of alcohol use. Drug Use:  reports no history of drug use. Family History:     Family History   Problem Relation Age of Onset    Coronary Art Dis Father     Hypertension Father     Diabetes Father     High Blood Pressure Father     Heart Attack Father     Diabetes Mother     High Blood Pressure Mother     Thyroid Disease Mother     High Blood Pressure Sister     Thyroid Disease Brother     High Blood Pressure Brother     High Blood Pressure Maternal Grandmother     Diabetes Maternal Grandfather     No Known Problems Paternal Grandmother     Heart Attack Paternal Grandfather     Colon Cancer Neg Hx        Review of Systems:     Positive and Negative as described in HPI. CONSTITUTIONAL:  negative for fevers, chills, sweats, fatigue, weight loss  HEENT:  negative for vision, hearing changes, runny nose, throat pain  RESPIRATORY:  negative for shortness of breath, cough, congestion, wheezing. CARDIOVASCULAR:  negative for chest pain, palpitations.   GASTROINTESTINAL: Positive for abdominal pain  GENITOURINARY:  negative for difficulty of urination, burning with urination, frequency INTEGUMENT:  negative for rash, skin lesions, easy bruising   HEMATOLOGIC/LYMPHATIC:  negative for swelling/edema   ALLERGIC/IMMUNOLOGIC:  negative for urticaria , itching  ENDOCRINE:  negative increase in drinking, increase in urination, hot or cold intolerance  MUSCULOSKELETAL:  negative joint pains, muscle aches, swelling of joints  NEUROLOGICAL:  negative for headaches, dizziness, lightheadedness, numbness, pain, tingling extremities  BEHAVIOR/PSYCH:  negative for depression, anxiety    Physical Exam:     /76   Pulse 125   Temp 97.7 °F (36.5 °C) (Oral)   Resp 18   Ht 5' 2\" (1.575 m)   Wt 247 lb 12.8 oz (112.4 kg)   LMP  (LMP Unknown)   SpO2 96%   BMI 45.32 kg/m²   Temp (24hrs), Av.3 °F (36.8 °C), Min:97.4 °F (36.3 °C), Max:99.5 °F (37.5 °C)    Recent Labs     20  1648 20  1957 20  0824 20  1204   POCGLU 112* 102 85 111*       Intake/Output Summary (Last 24 hours) at 2020 1323  Last data filed at 2020 0808  Gross per 24 hour   Intake --   Output 2045 ml   Net -2045 ml       General Appearance:  alert, well appearing, and in no acute distress. Obesity present  Mental status: oriented to person, place, and time with normal affect  Head:  normocephalic, atraumatic. Eye: no icterus, redness, pupils equal and reactive, extraocular eye movements intact, conjunctiva clear  Ear: normal external ear, no discharge, hearing intact  Nose:  no drainage noted  Mouth: mucous membranes moist  Neck: supple, no carotid bruits, thyroid not palpable  Lungs: Bilateral equal air entry, clear to ausculation, no wheezing, rales or rhonchi, normal effort  Cardiovascular: normal rate, regular rhythm, no murmur, gallop, rub.   Abdomen: Surgical changes present in abdominal  Neurologic: There are no new focal motor or sensory deficits, normal muscle tone and bulk, no abnormal sensation, normal speech, cranial nerves II through XII grossly intact  Skin: No gross lesions, rashes, bruising or bleeding on exposed skin area  Extremities:  peripheral pulses palpable, no pedal edema or calf pain with palpation  Psych: normal affect    Investigations:      Laboratory Testing:  Recent Results (from the past 24 hour(s))   POC Glucose Fingerstick    Collection Time: 08/06/20  4:48 PM   Result Value Ref Range    POC Glucose 112 (H) 65 - 105 mg/dL   POC Glucose Fingerstick    Collection Time: 08/06/20  7:57 PM   Result Value Ref Range    POC Glucose 102 65 - 105 mg/dL   K (Potassium)    Collection Time: 08/07/20  1:08 AM   Result Value Ref Range    Potassium 3.6 (L) 3.7 - 5.3 mmol/L   Magnesium    Collection Time: 08/07/20  1:08 AM   Result Value Ref Range    Magnesium 1.8 1.6 - 2.6 mg/dL   CBC Auto Differential    Collection Time: 08/07/20  4:47 AM   Result Value Ref Range    WBC 6.7 3.5 - 11.0 k/uL    RBC 3.62 (L) 4.0 - 5.2 m/uL    Hemoglobin 10.8 (L) 12.0 - 16.0 g/dL    Hematocrit 33.3 (L) 36 - 46 %    MCV 92.3 80 - 100 fL    MCH 29.8 26 - 34 pg    MCHC 32.3 31 - 37 g/dL    RDW 14.7 11.5 - 14.9 %    Platelets 534 (L) 620 - 450 k/uL    MPV 8.2 6.0 - 12.0 fL    NRBC Automated NOT REPORTED per 100 WBC    Differential Type NOT REPORTED     Immature Granulocytes NOT REPORTED 0 %    Absolute Immature Granulocyte NOT REPORTED 0.00 - 0.30 k/uL    WBC Morphology NOT REPORTED     RBC Morphology NOT REPORTED     Platelet Estimate NOT REPORTED     Seg Neutrophils 59 36 - 66 %    Lymphocytes 11 (L) 24 - 44 %    Monocytes 8 (H) 1 - 7 %    Eosinophils % 6 (H) 0 - 4 %    Basophils 0 0 - 2 %    Bands 14 (H) 0 - 10 %    Metamyelocytes 2 (H) 0 %    Segs Absolute 3.95 1.3 - 9.1 k/uL    Absolute Lymph # 0.74 (L) 1.0 - 4.8 k/uL    Absolute Mono # 0.54 0.1 - 1.3 k/uL    Absolute Eos # 0.40 0.0 - 0.4 k/uL    Basophils Absolute 0.00 0.0 - 0.2 k/uL    Absolute Bands # 0.94 0.0 - 1.0 k/uL    Metamyelocytes Absolute 0.13 (H) 0 k/uL    Morphology ANISOCYTOSIS PRESENT     Morphology SLT ROULE    Basic Metabolic Panel    Collection Time: 08/07/20  4:47 AM   Result Value Ref Range    Glucose 93 70 - 99 mg/dL    BUN 9 8 - 23 mg/dL    CREATININE 0.61 0.50 - 0.90 mg/dL    Bun/Cre Ratio NOT REPORTED 9 - 20    Calcium 7.5 (L) 8.6 - 10.4 mg/dL    Sodium 133 (L) 135 - 144 mmol/L    Potassium 3.7 3.7 - 5.3 mmol/L    Chloride 102 98 - 107 mmol/L    CO2 24 20 - 31 mmol/L    Anion Gap 7 (L) 9 - 17 mmol/L    GFR Non-African American >60 >60 mL/min    GFR African American >60 >60 mL/min    GFR Comment          GFR Staging NOT REPORTED    Magnesium    Collection Time: 08/07/20  4:47 AM   Result Value Ref Range    Magnesium 1.8 1.6 - 2.6 mg/dL   POC Glucose Fingerstick    Collection Time: 08/07/20  8:24 AM   Result Value Ref Range    POC Glucose 85 65 - 105 mg/dL   POC Glucose Fingerstick    Collection Time: 08/07/20 12:04 PM   Result Value Ref Range    POC Glucose 111 (H) 65 - 105 mg/dL       Imaging/Diagonstics:      Assessment :      Primary Problem  <principal problem not specified>    Active Hospital Problems    Diagnosis Date Noted    Morbid obesity with BMI of 40.0-44.9, adult ([facility] [E66.01, Z68.41] 02/23/2017     Priority: High    Sigmoid diverticulitis [K57.32] 08/04/2020    Class 3 severe obesity due to excess calories without serious comorbidity with body mass index (BMI) of 40.0 to 44.9 in adult ([facility] [E66.01, Z68.41] 07/22/2020    Atrial fibrillation, new onset ([facility] [I48.91] 01/20/2020    A-fib ([facility] [I48.91] 01/14/2020    Type 2 diabetes mellitus, without long-term current use of insulin (HCC) [E11.9] 01/14/2020    Adenomatous polyp of sigmoid colon, 6/26/17 [D12.5] 07/30/2017    Acquired hypothyroidism [E03.9]        Plan:     1. Perforated sigmoid diverticulitis status post open sigmoid colectomy and cholecystectomy, patient is on Dilaudid pain is controlled  2. Diabetes, sugar controlled, low-dose sliding scale, point-of-care glucose every 6  3.  Hypertension, controlled, starting patient on Lopressor 5 mg every 6 as needed for systolic blood pressure more than 150, hold if heart rate is less than 60  4. Hypothyroidism, TSH is okay  5. Congestive heart failure, last expression 55%, has high BNP tested in July, likely has heart failure with preserved ejection fraction, will reduce fluid to 100 mL per she is n.p.o., will evaluate tomorrow about reducing fluid  6. Had stress test recently which was negative  7. Patient has history of A. fib, rate controlled, was on Xarelto  8. We will discuss with operating surgeon when it is okay to change Lovenox prophylactic dose to therapeutic dose  9.     8/5  Patient is doing better, complaining of some pain at the local site  Has reduced urine output, given  1 L of fluid bolus  We will discuss with operating surgeon, if he can change Lovenox to therapeutic dose with history of A. Fib    8/6  Patient has episode of A. fib with RVR, started on IV amiodarone  Patient is on IV Lopressor as needed  Spiked fever up to 100.3  White counts are okay  Will monitor  Hypokalemia, replacing lites  Has prolonged QTC, avoid Reglan  Checking magnesium  If patient spiked fever again, need to work-up for infectious etiology including blood culture, urine culture, chest x-ray    8/7   Replacing lites, goal to keep magnesium more than 2 and potassium more than 4  QTC prolonged, amiodarone is off  Patient is on Lopressor    Consultations:   Brett Dawson MD  8/7/2020  1:23 PM    Copy sent to Dr. Elisabeth Carrillo MD    Please note that this chart was generated using voice recognition Dragon dictation software. Although every effort was made to ensure the accuracy of this automated transcription, some errors in transcription may have occurred.

## 2020-08-07 NOTE — PLAN OF CARE
Patient remains afebrile; WBC count is within normal limits; no signs of erythema, edema, or warmth. Will continue to monitor for signs/symptoms of infection. Skin assessment complete. Waffle mattress in place. Coccyx reddened. Sensicare applied PRN. Turned and repositioned every two hours. Area kept free from moisture. Proper nourishment and fluids encouraged, as appropriate. Will continue to monitor for additional needs and changes in skin breakdown. Pt assessed as a fall risk this shift. Remains free from falls and accidental injury at this time. Fall precautions in place, including falling star sign and fall risk band on pt. Floor free from obstacles, and bed is locked and in lowest position. Adequate lighting provided. Pt encouraged to call before getting OOB for any need. Bed alarm activated. Will continue to monitor needs during hourly rounding, and reinforce education on use of call light.

## 2020-08-07 NOTE — PROGRESS NOTES
Patient right hand reassessed. Patient hand size has reduced to plus one edema. No discoloration noted. Full flexion and extension noted. Patient requests heat on her hand at this time.

## 2020-08-07 NOTE — PROGRESS NOTES
[address]   Physical Therapy Progress Note    Date: 20  Patient Name: Bryn Cranker       Room: 6-  MRN: [id number]   Account: [de-identified]   : 1950  (78 y.o.)   Gender: female     Discharge Recommendations   Patient would benefit from continued therapy after discharge, Continue to assess pending progress  Other: TBD - may need RW(Pt reports she has Rollator @ home)    Referring Practitioner: Lizzy Ricardo MD  Diagnosis: Sigmoid diverticulitis  Restrictions/Precautions: General Precautions, Fall Risk, Surgical Protocols  Other position/activity restrictions: ambulate patient  Required Braces or Orthoses  Other: Abdominal Binder   Past Medical History:  has a past medical history of Adenocarcinoma of endometrium, stage 1 ([address] JOSHUA (acute kidney injury) ([address] Allergic rhinitis, At high risk for falls, Atrial fibrillation ([address] CHF (congestive heart failure) ([address] Colon polyp, Diabetes mellitus ([address] Diverticulitis, Endometrial cancer ([address] History of TIA (transient ischemic attack), Hyperglycemia, Hyperlipidemia, Hypertension, Hypothyroidism, Legally blind, Lower back pain, Mixed incontinence, Morbid obesity with BMI of 40.0-44.9, adult ([address] CLAROS (nonalcoholic steatohepatitis), Obesity, Oral phase dysphagia, Osteoarthritis (arthritis due to wear and tear of joints), Osteoarthritis involving multiple joints on both sides of body, Osteoporosis, Perforated bowel ([address] Postmenopausal bleeding, S/P h-scope, Myosure 17, Tennis elbow, Thickened endometrium, TIA (transient ischemic attack), TLHBSO, bilateral LND 10/24/17, and Type 2 diabetes mellitus, without long-term current use of insulin ([address] Past Surgical History:   has a past surgical history that includes Thyroid surgery (); Colonoscopy (); Tonsillectomy; Colonoscopy (2017); pr colsc flx w/removal lesion by hot bx forceps (N/A, 2017);  Dilation and curettage of uterus (N/A, 2017); length  Distance: 6ftx2  Comments: HR jumped to 179 per [employer] who arrived to inform writer, assisted pt to closest chair, RN remained in room to assess pt. Stairs/Curb  Stairs?: No                  Posture: Fair  Sitting - Static: Good  Sitting - Dynamic: Fair;+  Standing - Static: Fair  Standing - Dynamic: Fair  Comments: Seated Edge of Bed, Standing with Rolling Walker     Other exercises 1: Supine Bilat LE therapeutic exercise: ankle pumps x10, heel slides x15 reps  Other exercises 2: Educated/demo log roll technique with pillow; pt had fair carryover  Other exercises 3: Seated dangle @ edge of bed ~10 min with focus to engage core for stability and increase seated endurance without trunk support  Other Activities  Comment: Rest Breaks PRN        Activity Tolerance: Treatment limited secondary to medical complications (free text)(HR Tachy 179 while amb)          Assessment  Activity Tolerance: Treatment limited secondary to medical complications (free text)(HR Tachy 179 while amb)   Body structures, Functions, Activity limitations: Decreased functional mobility ; Decreased ADL status; Decreased strength;Decreased endurance;Decreased balance; Increased pain;Decreased posture  Prognosis: Good;Fair  Discharge Recommendations: Patient would benefit from continued therapy after discharge;Continue to assess pending progress     Type of devices: All fall risk precautions in place;Call light within reach;Gait belt;Patient at risk for falls; Left in chair;Nurse notified  Restraints  Initially in place: No     Plan  Times per week: 5-6x/week  Current Treatment Recommendations: Strengthening, Balance Training, Functional Mobility Training, Transfer Training, Endurance Training, Gait Training, Equipment Evaluation, Education, & procurement, Patient/Caregiver Education & Training, Safety Education & Training, Positioning    Patient Education  New Education Provided:  Plan of Care  Learner:patient  Method: demonstration and explanation       Outcome: needs reinforcement     Goals  Short term goals  Time Frame for Short term goals: 3-4 days  Short term goal 1: Pt to demonstrate min x1 bed mobility with good log rolling technique. Short term goal 2: Pt to demonstrate CGA transfers. Short term goal 3: Pt to amb 50'-75' with RW min to CGA x1. Short term goal 4: Pt to reduce pain to 2-3/10 with movement to increased independence for mobility. Short term goal 5: Pt to improve BLE strength by 1/2 MMG.     PT Individual Minutes  Time In: 1106  Time Out: 1140  Minutes: 34    Electronically signed by Immanuel Schwarz PTA on 8/7/20 at 2:56 PM EDT

## 2020-08-08 LAB
ABSOLUTE EOS #: 0.1 K/UL (ref 0–0.4)
ABSOLUTE IMMATURE GRANULOCYTE: ABNORMAL K/UL (ref 0–0.3)
ABSOLUTE LYMPH #: 1.1 K/UL (ref 1–4.8)
ABSOLUTE MONO #: 0.5 K/UL (ref 0.1–1.3)
ANION GAP SERPL CALCULATED.3IONS-SCNC: 9 MMOL/L (ref 9–17)
BASOPHILS # BLD: 1 % (ref 0–2)
BASOPHILS ABSOLUTE: 0 K/UL (ref 0–0.2)
BUN BLDV-MCNC: 8 MG/DL (ref 8–23)
BUN/CREAT BLD: ABNORMAL (ref 9–20)
CALCIUM SERPL-MCNC: 8 MG/DL (ref 8.6–10.4)
CHLORIDE BLD-SCNC: 102 MMOL/L (ref 98–107)
CO2: 25 MMOL/L (ref 20–31)
CREAT SERPL-MCNC: 0.57 MG/DL (ref 0.5–0.9)
DIFFERENTIAL TYPE: ABNORMAL
EOSINOPHILS RELATIVE PERCENT: 2 % (ref 0–4)
GFR AFRICAN AMERICAN: >60 ML/MIN
GFR NON-AFRICAN AMERICAN: >60 ML/MIN
GFR SERPL CREATININE-BSD FRML MDRD: ABNORMAL ML/MIN/{1.73_M2}
GFR SERPL CREATININE-BSD FRML MDRD: ABNORMAL ML/MIN/{1.73_M2}
GLUCOSE BLD-MCNC: 102 MG/DL (ref 65–105)
GLUCOSE BLD-MCNC: 115 MG/DL (ref 65–105)
GLUCOSE BLD-MCNC: 121 MG/DL (ref 65–105)
GLUCOSE BLD-MCNC: 150 MG/DL (ref 65–105)
GLUCOSE BLD-MCNC: 93 MG/DL (ref 70–99)
HCT VFR BLD CALC: 34.3 % (ref 36–46)
HEMOGLOBIN: 11.3 G/DL (ref 12–16)
IMMATURE GRANULOCYTES: ABNORMAL %
LYMPHOCYTES # BLD: 16 % (ref 24–44)
MAGNESIUM: 1.9 MG/DL (ref 1.6–2.6)
MCH RBC QN AUTO: 29.9 PG (ref 26–34)
MCHC RBC AUTO-ENTMCNC: 32.9 G/DL (ref 31–37)
MCV RBC AUTO: 91.1 FL (ref 80–100)
MONOCYTES # BLD: 8 % (ref 1–7)
NRBC AUTOMATED: ABNORMAL PER 100 WBC
PDW BLD-RTO: 14.8 % (ref 11.5–14.9)
PLATELET # BLD: 143 K/UL (ref 150–450)
PLATELET ESTIMATE: ABNORMAL
PMV BLD AUTO: 8.5 FL (ref 6–12)
POTASSIUM SERPL-SCNC: 3.5 MMOL/L (ref 3.7–5.3)
RBC # BLD: 3.77 M/UL (ref 4–5.2)
RBC # BLD: ABNORMAL 10*6/UL
SARS-COV-2, PCR: ABNORMAL
SARS-COV-2, RAPID: ABNORMAL
SARS-COV-2: DETECTED
SEG NEUTROPHILS: 73 % (ref 36–66)
SEGMENTED NEUTROPHILS ABSOLUTE COUNT: 5 K/UL (ref 1.3–9.1)
SODIUM BLD-SCNC: 136 MMOL/L (ref 135–144)
SOURCE: ABNORMAL
WBC # BLD: 6.8 K/UL (ref 3.5–11)
WBC # BLD: ABNORMAL 10*3/UL

## 2020-08-08 PROCEDURE — 94761 N-INVAS EAR/PLS OXIMETRY MLT: CPT

## 2020-08-08 PROCEDURE — 6360000002 HC RX W HCPCS: Performed by: SURGERY

## 2020-08-08 PROCEDURE — 80048 BASIC METABOLIC PNL TOTAL CA: CPT

## 2020-08-08 PROCEDURE — 2580000003 HC RX 258: Performed by: SURGERY

## 2020-08-08 PROCEDURE — 6360000002 HC RX W HCPCS: Performed by: INTERNAL MEDICINE

## 2020-08-08 PROCEDURE — 97110 THERAPEUTIC EXERCISES: CPT

## 2020-08-08 PROCEDURE — 6370000000 HC RX 637 (ALT 250 FOR IP): Performed by: PHYSICIAN ASSISTANT

## 2020-08-08 PROCEDURE — 6370000000 HC RX 637 (ALT 250 FOR IP): Performed by: INTERNAL MEDICINE

## 2020-08-08 PROCEDURE — 2580000003 HC RX 258: Performed by: INTERNAL MEDICINE

## 2020-08-08 PROCEDURE — 82947 ASSAY GLUCOSE BLOOD QUANT: CPT

## 2020-08-08 PROCEDURE — 93005 ELECTROCARDIOGRAM TRACING: CPT | Performed by: INTERNAL MEDICINE

## 2020-08-08 PROCEDURE — 99232 SBSQ HOSP IP/OBS MODERATE 35: CPT | Performed by: INTERNAL MEDICINE

## 2020-08-08 PROCEDURE — 36415 COLL VENOUS BLD VENIPUNCTURE: CPT

## 2020-08-08 PROCEDURE — 2060000000 HC ICU INTERMEDIATE R&B

## 2020-08-08 PROCEDURE — 6370000000 HC RX 637 (ALT 250 FOR IP): Performed by: STUDENT IN AN ORGANIZED HEALTH CARE EDUCATION/TRAINING PROGRAM

## 2020-08-08 PROCEDURE — 83735 ASSAY OF MAGNESIUM: CPT

## 2020-08-08 PROCEDURE — 6370000000 HC RX 637 (ALT 250 FOR IP): Performed by: SURGERY

## 2020-08-08 PROCEDURE — U0003 INFECTIOUS AGENT DETECTION BY NUCLEIC ACID (DNA OR RNA); SEVERE ACUTE RESPIRATORY SYNDROME CORONAVIRUS 2 (SARS-COV-2) (CORONAVIRUS DISEASE [COVID-19]), AMPLIFIED PROBE TECHNIQUE, MAKING USE OF HIGH THROUGHPUT TECHNOLOGIES AS DESCRIBED BY CMS-2020-01-R: HCPCS

## 2020-08-08 PROCEDURE — C9113 INJ PANTOPRAZOLE SODIUM, VIA: HCPCS | Performed by: SURGERY

## 2020-08-08 PROCEDURE — 85025 COMPLETE CBC W/AUTO DIFF WBC: CPT

## 2020-08-08 RX ORDER — POTASSIUM CHLORIDE 20 MEQ/1
40 TABLET, EXTENDED RELEASE ORAL PRN
Status: DISCONTINUED | OUTPATIENT
Start: 2020-08-08 | End: 2020-08-13 | Stop reason: HOSPADM

## 2020-08-08 RX ORDER — DILTIAZEM HYDROCHLORIDE 240 MG/1
240 CAPSULE, COATED, EXTENDED RELEASE ORAL DAILY
Status: DISCONTINUED | OUTPATIENT
Start: 2020-08-08 | End: 2020-08-13 | Stop reason: HOSPADM

## 2020-08-08 RX ORDER — POTASSIUM CHLORIDE 7.45 MG/ML
10 INJECTION INTRAVENOUS PRN
Status: DISCONTINUED | OUTPATIENT
Start: 2020-08-08 | End: 2020-08-13 | Stop reason: HOSPADM

## 2020-08-08 RX ORDER — OXYCODONE HYDROCHLORIDE AND ACETAMINOPHEN 5; 325 MG/1; MG/1
1 TABLET ORAL EVERY 4 HOURS PRN
Status: DISCONTINUED | OUTPATIENT
Start: 2020-08-08 | End: 2020-08-13 | Stop reason: HOSPADM

## 2020-08-08 RX ADMIN — ACETAMINOPHEN 650 MG: 325 TABLET, FILM COATED ORAL at 04:11

## 2020-08-08 RX ADMIN — OXYCODONE HYDROCHLORIDE AND ACETAMINOPHEN 1 TABLET: 5; 325 TABLET ORAL at 22:05

## 2020-08-08 RX ADMIN — HYDROMORPHONE HYDROCHLORIDE 1 MG: 1 INJECTION, SOLUTION INTRAMUSCULAR; INTRAVENOUS; SUBCUTANEOUS at 08:07

## 2020-08-08 RX ADMIN — CEFAZOLIN 2 G: 1 INJECTION, POWDER, FOR SOLUTION INTRAMUSCULAR; INTRAVENOUS at 13:59

## 2020-08-08 RX ADMIN — CEFAZOLIN 2 G: 1 INJECTION, POWDER, FOR SOLUTION INTRAMUSCULAR; INTRAVENOUS at 22:05

## 2020-08-08 RX ADMIN — PANTOPRAZOLE SODIUM 40 MG: 40 INJECTION, POWDER, FOR SOLUTION INTRAVENOUS at 08:05

## 2020-08-08 RX ADMIN — SODIUM CHLORIDE: 9 INJECTION, SOLUTION INTRAVENOUS at 04:11

## 2020-08-08 RX ADMIN — ONDANSETRON 4 MG: 2 INJECTION INTRAMUSCULAR; INTRAVENOUS at 18:32

## 2020-08-08 RX ADMIN — CEFAZOLIN 2 G: 1 INJECTION, POWDER, FOR SOLUTION INTRAMUSCULAR; INTRAVENOUS at 04:11

## 2020-08-08 RX ADMIN — INSULIN LISPRO 1 UNITS: 100 INJECTION, SOLUTION INTRAVENOUS; SUBCUTANEOUS at 17:03

## 2020-08-08 RX ADMIN — Medication 10 ML: at 22:06

## 2020-08-08 RX ADMIN — POTASSIUM CHLORIDE 40 MEQ: 1500 TABLET, EXTENDED RELEASE ORAL at 12:25

## 2020-08-08 RX ADMIN — ENOXAPARIN SODIUM 105 MG: 120 INJECTION SUBCUTANEOUS at 22:05

## 2020-08-08 RX ADMIN — METOPROLOL TARTRATE 25 MG: 25 TABLET, FILM COATED ORAL at 22:05

## 2020-08-08 RX ADMIN — ENOXAPARIN SODIUM 105 MG: 120 INJECTION SUBCUTANEOUS at 08:06

## 2020-08-08 RX ADMIN — DILTIAZEM HYDROCHLORIDE 240 MG: 240 CAPSULE, COATED, EXTENDED RELEASE ORAL at 12:24

## 2020-08-08 RX ADMIN — METOPROLOL TARTRATE 25 MG: 25 TABLET, FILM COATED ORAL at 08:06

## 2020-08-08 RX ADMIN — HYDROMORPHONE HYDROCHLORIDE 1 MG: 1 INJECTION, SOLUTION INTRAMUSCULAR; INTRAVENOUS; SUBCUTANEOUS at 17:02

## 2020-08-08 RX ADMIN — Medication 10 ML: at 08:05

## 2020-08-08 ASSESSMENT — PAIN SCALES - GENERAL
PAINLEVEL_OUTOF10: 6
PAINLEVEL_OUTOF10: 8
PAINLEVEL_OUTOF10: 1
PAINLEVEL_OUTOF10: 8
PAINLEVEL_OUTOF10: 8
PAINLEVEL_OUTOF10: 0
PAINLEVEL_OUTOF10: 8
PAINLEVEL_OUTOF10: 5
PAINLEVEL_OUTOF10: 0
PAINLEVEL_OUTOF10: 3
PAINLEVEL_OUTOF10: 0
PAINLEVEL_OUTOF10: 3

## 2020-08-08 ASSESSMENT — PAIN - FUNCTIONAL ASSESSMENT: PAIN_FUNCTIONAL_ASSESSMENT: PREVENTS OR INTERFERES SOME ACTIVE ACTIVITIES AND ADLS

## 2020-08-08 ASSESSMENT — PAIN DESCRIPTION - PAIN TYPE
TYPE: ACUTE PAIN
TYPE: ACUTE PAIN

## 2020-08-08 ASSESSMENT — PAIN DESCRIPTION - ORIENTATION: ORIENTATION: ANTERIOR

## 2020-08-08 ASSESSMENT — PAIN DESCRIPTION - DESCRIPTORS: DESCRIPTORS: ACHING;SORE

## 2020-08-08 ASSESSMENT — PAIN DESCRIPTION - LOCATION
LOCATION: ABDOMEN
LOCATION: ABDOMEN

## 2020-08-08 ASSESSMENT — PAIN DESCRIPTION - FREQUENCY: FREQUENCY: INTERMITTENT

## 2020-08-08 NOTE — CARE COORDINATION
Social work; order is in for [facility] not sure if collected yet. Tao Machuca has started precert on Friday. Await precert. Will need john and Rx at discharge. Tomás mata  Social work; started [employer]. Will need john and Rx at discharge.   Tomás mata

## 2020-08-08 NOTE — PROGRESS NOTES
[facility]   Progress Note                   Date:   8/8/2020  Patient name: Inetta Babinski  Date of admission:  8/4/2020  5:43 AM  MRN:   [id number]  YOB: 1950  PCP: Chayo Castillo MD    Reason for Admission:     Subjective:   Patient was seen and assessed at bedside this morning. Patient denies any chest pain shortness of breath nausea vomiting. Continues to have episodes of paroxysmal atrial fibrillation.   Yesterday when she was walking with physical therapy her heart rate jumped to 170 bpm    Intake/Output Summary (Last 24 hours) at 8/8/2020 0936  Last data filed at 8/8/2020 0507  Gross per 24 hour   Intake 2270 ml   Output 2280 ml   Net -10 ml       Medications:   Scheduled Meds:   metoprolol tartrate  25 mg Oral BID    enoxaparin  1 mg/kg Subcutaneous BID    sodium chloride flush  10 mL Intravenous 2 times per day    pantoprazole  40 mg Intravenous Daily    And    sodium chloride (PF)  10 mL Intravenous Daily    ceFAZolin (ANCEF) 50 mL IVPB  2 g Intravenous Q8H    insulin lispro  0-6 Units Subcutaneous TID WC    insulin lispro  0-3 Units Subcutaneous Nightly     Continuous Infusions:   dextrose      sodium chloride 100 mL/hr at 08/08/20 0411     CBC:   Recent Labs     08/06/20  0437 08/07/20  0447 08/08/20  0428   WBC 7.7 6.7 6.8   HGB 10.6* 10.8* 11.3*   * 120* 143*     BMP:    Recent Labs     08/06/20  0437 08/07/20  0108 08/07/20  0447 08/08/20  0428     --  133* 136   K 3.5* 3.6* 3.7 3.5*     --  102 102   CO2 19*  --  24 25   BUN 13  --  9 8   CREATININE 0.73  --  0.61 0.57   GLUCOSE 99  --  93 93         Objective:   Vitals: BP (!) 133/96   Pulse 114   Temp 98.6 °F (37 °C) (Oral)   Resp 16   Ht 5' 2\" (1.575 m)   Wt 247 lb 12.8 oz (112.4 kg)   LMP  (LMP Unknown)   SpO2 96%   BMI 45.32 kg/m²   Constitutional and General Appearance: alert, cooperative, no distress and appears stated age    Respiratory:  · Clear to auscultation bilaterally, with equal air entry  Cardiovascular:  · S1, s2, rrr, no pain on palpation of anterior chest wall.    Abdomen:   · Abdomen is nontender, bandage postop day 2  Extremities:  · No le edema  Integumentum:   Intact with no rashes noted    Patient Active Problem List   Diagnosis    Acquired hypothyroidism    History of TIA (transient ischemic attack)    Chronic bilateral low back pain without sciatica    Essential hypertension    Osteopenia determined by x-ray    Osteoarthritis involving multiple joints on both sides of body    Left knee DJD    Prediabetes    Vitamin D deficiency    Mixed incontinence    Chronic pain of both knees    Slow transit constipation    Allergic rhinitis    Hyperlipidemia with target LDL less than 100    Morbid obesity with BMI of 40.0-44.9, adult ([address]    At high risk for falls    Dense breast tissue on mammogram    Hyperglycemia    Spondylosis of lumbar region without myelopathy or radiculopathy    OAB (overactive bladder)    Primary osteoarthritis of both knees    Chronic fatigue     Adenomatous polyp of sigmoid colon, 6/26/17    Mixed stress and urge urinary incontinence    TLHBSO, bilateral LND 10/24/17    Optic atrophy of both eyes    Cataracta b/l eyes    Difficulty walking    Esotropia    Nystagmus    History of uterine cancer, Well differentiated grade 1 adenocarcinoma arising in complex hyperplasia, 2017    Vitamin B 12 deficiency    Aortic calcification (HCC)    Calculus of gallbladder without cholecystitis without obstruction    CLAROS (nonalcoholic steatohepatitis)    Optic atrophy    Cough present for greater than 3 weeks    Dyspepsia    A-fib (HCC)    Type 2 diabetes mellitus, without long-term current use of insulin (HCC)    JOSHUA (acute kidney injury) ([address]    Atrial fibrillation, new onset ([address]    Mobility impaired    Perforated diverticulum    Diverticulitis    Chronic cholecystitis    Colitis    Multiple atypical skin moles    Class 3 severe obesity due to excess calories without serious comorbidity with body mass index (BMI) of 40.0 to 44.9 in adult [facility])    Symptomatic bradycardia    CHF NYHA class I, chronic, diastolic (HCC)    Sigmoid diverticulitis       EKG:   Last EKG was 8/4/2020 showed A. fib with RVR    ECHO:   Normal left ventricle size, wall thickness and function with an estimated EF  > 55%. No segmental wall motion abnormalities seen. Normal right ventricular size and function. Left atrium is normal in size. Right atrium is normal in size. Aortic valve is trileaflet. Aortic valve sclerosis without stenosis. No aortic insufficiency. Normal aortic root dimension. Thickened anterior mitral valve leaflet. Mild to moderate mitral  regurgitation. Normal tricuspid valve structure and function. Insignificant tricuspid  regurgitation, unable to estimate RVSP. IVC normal diameter & inspiratory collapse indicating normal RA filling  pressure . No significant pericardial effusion is seen. Stress Test:   Stress test on 7/24/2020 showed no areas of perfusion defect and no wall motion abnormality        Assessment / Acute Cardiac Problems:     1. Paroxysmal atrial fibrillation with RVR  2. Hypertension. 3. Preserved LV systolic function  4. No ischemia or infarction on recent nuclear stress test  5. Prolonged QTC      Plan of Treatment:     1. Recheck EKG today to evaluate QTC and will resume amiodarone if QTC is normal  2. Continue Lopressor 25 mg twice daily  3. Hold Reglan due to prolonged QTC and avoid any QTC prolonging drugs. 4. Add Cardizem  mg daily for better rate control  5. Continue anticoagulation with Lovenox full dose and resume Xarelto when okay with other services.     Electronically signed by Otoniel Rey MD on 8/8/2020 at 9:53 [facility]  [phone]

## 2020-08-08 NOTE — CARE COORDINATION
ONGOING DISCHARGE PLAN:    Spoke with patient regarding discharge plan and patient confirms that plan is still  to have LSW follow for SNF, [facility]. Pt needs Covid testing prior to admission, Nursing to collect today. Will await results.     Pt. Is from home alone in her [facility] [address]      Current w/VNS, [facility], for Skilled & [facility] 3 X a week.      PT/OT rec SNF, Pt. Is agreeable to OV, Pre-Cert was started 2/6/76. Most likely will get on Monday.     Pt. Is POD #4, Open Sigmoid Colectomy w/ Anastomosis.      Remains on IV Fluids/Ancef. Low Fiber diet.      Surgery continues to follow. Will continue to follow for additional discharge needs.     Electronically signed by Kayla Mcgovern RN on 8/8/2020 at 2:34 PM

## 2020-08-08 NOTE — PROGRESS NOTES
[facility]   Physical Therapy Progress Note    Date: 20  Patient Name: Sarah Lott       Room:   MRN: [id number]   Account: [de-identified]   : 1950  (78 y.o.)   Gender: female     Discharge Recommendations   Patient would benefit from continued therapy after discharge, Continue to assess pending progress  Other: TBD - may need RW(Pt reports she has Rollator @ home)    Referring Practitioner: Evens Andrade MD  Diagnosis: Sigmoid diverticulitis  Restrictions/Precautions: General Precautions, Fall Risk, Surgical Protocols  Other position/activity restrictions: ambulate patient  Required Braces or Orthoses  Other: Abdominal Binder   Past Medical History:  has a past medical history of Adenocarcinoma of endometrium, stage 1 ([address] JOSHUA (acute kidney injury) ([address] Allergic rhinitis, At high risk for falls, Atrial fibrillation ([address] CHF (congestive heart failure) ([address] Colon polyp, Diabetes mellitus ([address] Diverticulitis, Endometrial cancer ([address] History of TIA (transient ischemic attack), Hyperglycemia, Hyperlipidemia, Hypertension, Hypothyroidism, Legally blind, Lower back pain, Mixed incontinence, Morbid obesity with BMI of 40.0-44.9, adult ([address] CLAROS (nonalcoholic steatohepatitis), Obesity, Oral phase dysphagia, Osteoarthritis (arthritis due to wear and tear of joints), Osteoarthritis involving multiple joints on both sides of body, Osteoporosis, Perforated bowel ([address] Postmenopausal bleeding, S/P h-scope, Myosure 17, Tennis elbow, Thickened endometrium, TIA (transient ischemic attack), TLHBSO, bilateral LND 10/24/17, and Type 2 diabetes mellitus, without long-term current use of insulin ([address] Past Surgical History:   has a past surgical history that includes Thyroid surgery (); Colonoscopy (); Tonsillectomy; Colonoscopy (2017); pr colsc flx w/removal lesion by hot bx forceps (N/A, 2017);  Dilation and curettage of uterus (N/A, 2017); Cataract removal with implant (Bilateral, 2015); vitrectomy; julieta and bso (cervix removed) (10/24/2017); Hysterectomy (N/A, 10/24/2017); Upper gastrointestinal endoscopy (N/A, 8/3/2020); Colonoscopy (N/A, 8/3/2020); and Small intestine surgery (N/A, 8/4/2020). Additional Pertinent Hx: Afib, CHF, TIA, 8/4/20 colectomy    Overall Orientation Status: Within Normal Limits  Restrictions/Precautions  Restrictions/Precautions: General Precautions; Fall Risk;Surgical Protocols  Required Braces or Orthoses?: Yes  Required Braces or Orthoses  Other: Abdominal Binder  Position Activity Restriction  Other position/activity restrictions: ambulate patient    Subjective: Pt laying in bed talking on cellphone upon writer arrival. Pt is agreeable to therapy. Pt reports \"I'm feeling a whole lot better compared to yesterday! My heart rate shot up to the 170s! \"  Comments: JANINA Corona pt for therapy and informs writer pt's HR was tachy yesterday during therapy tx and reached 179 BPM, she informed writer to observe HR throughout and to try avoid 140+ BPM    Vital Signs  Pulse: 130(standing and ambulating in room)  Heart Rate Source: Monitor  Patient Currently in Pain: Yes  Pain Assessment: 0-10  Pain Level: 8  Pain Type: Acute pain  Pain Location: Abdomen  Pain Orientation: Anterior  Pain Descriptors: Aching; Sore  Pain Frequency: Intermittent(0/10 supine ine bed, 8/10 supine<>sit)  Functional Pain Assessment: Prevents or interferes some active activities and ADLs  Non-Pharmaceutical Pain Intervention(s): Ambulation/Increased Activity; Distraction; Emotional support;Rest;Repositioned  Response to Pain Intervention: Patient Satisfied     Oxygen Therapy  SpO2: 100 %(standing and ambulating in room)  Pulse Oximeter Device Mode: Continuous  Pulse Oximeter Device Location: Finger  O2 Device: None (Room air)  Patient Observation  Observations: Pt has hx of A-fib, @ start of tx while supine in bed in Baarn position, HR varied  BPM; writer informed JANINA Drake with her approval to continue therapy         Bed Mobility  Rolling: Minimal assistance;Rolling Left;Rolling Right(heavy use of bed rails)  Supine to Sit: Moderate assistance(Pt has to take a rest break when performing)  Sit to Supine: Minimal assistance; Moderate assistance(assist with B LE into bed and posterior trunk support)  Scooting: Dependent/Total;2 Person assistance(scoot to head of bed)  Comment: Pt reports she is ordering bed rails for home      Transfers:  Sit to Stand: Contact guard assistance  Stand to sit: Contact guard assistance  Lateral Transfers: Contact guard assistance(Lateral step to R x2 and lateral step to L x1)  Comments: Pt performed with rolling walker with no LOB, pt reported fear/anxiety of L knee \"giving out on me or popping out of place, it does that sometimes. \"     Ambulation 1  Surface: level tile  Device: Rolling Walker  Other Apparatus: (IV pole and hand held pulse ox)  Assistance: Contact guard assistance  Quality of Gait: Bilat LE's exertnally rotated, wide KENNETH, small, cautious steps with downward gaze  Gait Deviations: Slow Kassy; Increased KENNETH; Decreased step length;Decreased step height  Distance: 4ftx2(Standing rest break to look out window)  Comments: HR ranged 120's-130 BPM; pt refused further ambulation due to fear/anxiety of L knee giving out        Stairs/Curb  Stairs?: No                                                     Posture: Fair  Sitting - Static: Good  Sitting - Dynamic: Good;-  Standing - Static: Fair  Standing - Dynamic: Fair  Comments: Seated Edge of Bed, Standing with Rolling Walker     Other exercises?: Yes  Other exercises 1: Supine Bilat LE: ankle pumps x12 and straight leg raises x5 ea, abduction/adduction x8 ea  Other exercises 2: Pt sat edge of bed ~5 min x2 focus to maintain upright posture and improve pursed lip breathing technique  Other exercises 3: Seated long arc quad x5 ea  Other exercises 4: Tandem stance x2 ~12 sec(Pt fearful of L knee buckleling)  Other exercises 5: Sit to stand x2 from edge of bed with good carryover on proper hand technique to avoid fall  Other exercises 6: Static standing tolerance ~2 min  Other exercises 7: Continuously monitoring SpO2 and HR throughout tx due to A-fib/tachy HR  Other exercises 8: bed mobility/reposition pt in bed in Baarn position  Other exercises 9: Educated/pt demo incentive spirometer x7 reps; educated pt to continue performing throughout day to reduce fluid build-up/strengthen pulmonary function  Other Activities  Comment: Rest Breaks PRN        Activity Tolerance: Patient limited by fatigue;Patient limited by endurance; Other(Anxiety/fear of L knee buckle)  PT Equipment Recommendations  Other: TBD - may need RW(Pt reports she has Rollator @ home)  Other Comments  Comments: Pt is motivated throughout tx and is eager to improve function and D/C    Assessment  Activity Tolerance: Patient limited by fatigue;Patient limited by endurance; Other(Anxiety/fear of L knee buckle)   Body structures, Functions, Activity limitations: Decreased functional mobility ; Decreased ADL status; Decreased strength;Decreased endurance;Decreased balance; Increased pain;Decreased posture  Specific instructions for Next Treatment: progress gait distance, strengthening, pain mgmt  Prognosis: Good;Fair  Discharge Recommendations: Patient would benefit from continued therapy after discharge;Continue to assess pending progress     Type of devices: All fall risk precautions in place; Bed alarm in place;Call light within reach;Gait belt;Patient at risk for falls; Left in bed;Nurse notified(JANINA Drake notified)  Restraints  Initially in place: No     Plan  Times per week: 5-6x/week  Current Treatment Recommendations: Strengthening, Balance Training, Functional Mobility Training, Transfer Training, Endurance Training, Gait Training, Equipment Evaluation, Education, & procurement, Patient/Caregiver Education & Training, Safety Education & Training, Positioning    Patient Education  New Education Provided:  Continued compliance with incentive spirometer to improve pulmonary function and reduce fluid build-up  Learner:patient  Method: demonstration and explanation       Outcome: acknowledged understanding of continued compliance with incentive spirometer to improve pulmonary function and reduce fluid build-up, demonstrated understanding and needs reinforcement     Goals  Short term goals  Time Frame for Short term goals: 3-4 days  Short term goal 1: Pt to demonstrate min x1 bed mobility with good log rolling technique. Short term goal 2: Pt to demonstrate CGA transfers. Short term goal 3: Pt to amb 50'-75' with RW min to CGA x1. Short term goal 4: Pt to reduce pain to 2-3/10 with movement to increased independence for mobility. Short term goal 5: Pt to improve BLE strength by 1/2 MMG.     PT Individual Minutes  Time In: 4923  Time Out: 8207  Minutes: 35    Electronically signed by Ervin Venegas PTA on 8/8/20 at 11:48 AM EDT

## 2020-08-08 NOTE — PROGRESS NOTES
Formerly [facility]    CONSULTATION / HISTORY AND PHYSICAL EXAMINATION            Date:   8/8/2020  Patient name:  Chandrika García  Date of admission:  8/4/2020  5:43 AM  MRN:   [id number]  Account:  [de-identified]  YOB: 1950  PCP:    Becky Sommers MD  Room:   2116/2116-01  Code Status:    Full Code    Physician Requesting Consult: Srinath Weber MD    Reason for Consult: Medical management    Chief Complaint:     No chief complaint on file. History Obtained From:     patient, electronic medical record    History of Present Illness:   Patient past medical history multiple medical problems includes morbid obesity, hypertension, diabetes, hypothyroidism, atrial fibrillation on anticoagulation, patient admitted to hospital for planned open sigmoid colectomy and cholecystectomy  She underwent EGD and colonoscopy yesterday  Patient has history of uterine cancer in the past status post hysterectomy  8/7   Patient did not spike any fever overnight  Started on clear liquid    Past Medical History:     Past Medical History:   Diagnosis Date    Adenocarcinoma of endometrium, stage 1 ([facility] 10/5/2017    Well differentiated grade 1 adenocarcinoma arising in complex hyperplasia    JOSHUA (acute kidney injury) ([facility] [facility] 1/15/2020    Allergic rhinitis 2/23/2017    At high risk for falls 2/23/2017    Atrial fibrillation [facility])     Jan 2020    CHF (congestive heart failure) ([facility] [facility]     \"little\"    Colon polyp     Had colonoscopy done 20 yrs ago    Diabetes mellitus ([address]     Diverticulitis     Endometrial cancer ([facility] [facility]     History of TIA (transient ischemic attack) '80's    on Baby ASA    Hyperglycemia 3/21/2017    Hyperlipidemia     Hypertension since 2015    on Rx.     Hypothyroidism 1980    sub total thyroidectomy goiter    Legally blind since born    both eyes, cord prolapse; \"not legally blind\" per \"associated eye care\" please see telephone encounter from 2/27/18    Lower back pain     Mixed incontinence 1/9/2016    Morbid obesity with BMI of 40.0-44.9, adult ([address] 2/23/2017    CLAROS (nonalcoholic steatohepatitis) 3/10/2019    Obesity     Oral phase dysphagia 2/23/2018    Osteoarthritis (arthritis due to wear and tear of joints)     ronan knee    Osteoarthritis involving multiple joints on both sides of body 4/24/2012    Osteoporosis     Perforated bowel ([address]     Postmenopausal bleeding 9/20/2017    S/P h-scope, Myosure 9/20/17 9/20/2017    Pathology pending    Tennis elbow     left    Thickened endometrium 9/20/2017    TIA (transient ischemic attack)     TLHBSO, bilateral LND 10/24/17 10/24/2017    Type 2 diabetes mellitus, without long-term current use of insulin ([facility] 1/14/2020        Past Surgical History:     Past Surgical History:   Procedure Laterality Date    CATARACT REMOVAL WITH IMPLANT Bilateral 2015    COLONOSCOPY  1997    had polyp, she doesn't know where and when, \"20 yrs ago\"    COLONOSCOPY  06/26/2017    COLONOSCOPY N/A 8/3/2020    COLONOSCOPY POLYPECTOMY SNARE performed by David Braxton MD at [address] [facility] N/A 9/20/2017    [facility] performed by Anil Keys DO at [address] [facility] N/A 10/24/2017    TOTAL LAPAROSCOPIC HYSTERECTOMY, BSO, F.S.  STAGING, GYRUS G400 performed by Machelle Durham MD at [address] N/A 6/26/2017    COLONOSCOPY POLYPECTOMY / HOT SNARE performed by Tereza Cid DO at [address] N/A 8/4/2020    OPEN SIGMOID COLECTOMY; PRIMARY ANASTOMOSIS & MOBILIZATION OF SPLENIC FLEXURE performed by David Braxton MD at [address]  10/24/2017    with pelvic lymph node dissection    THYROID SURGERY  1980    subtotal 20 years ago    TONSILLECTOMY      UPPER GASTROINTESTINAL ENDOSCOPY N/A 8/3/2020    EGD BIOPSY performed by David Braxton MD at [address] [facility] ENDO    VITRECTOMY      FLOATERS        Medications Prior to Admission:     Prior to Admission medications    Medication Sig Start Date End Date Taking? Authorizing Provider   metoprolol tartrate (LOPRESSOR) 25 MG tablet Take 0.5 tablets by mouth 2 times daily 7/27/20  Yes Pushpa Rae MD   amiodarone (CORDARONE) 200 MG tablet Take 1 tablet by mouth daily 6/22/20  Yes Edd Bullock MD   atorvastatin (LIPITOR) 40 MG tablet TAKE 1 TABLET BY MOUTH DAILY STOP SIMVASTATIN 3/16/20  Yes Diana Ross MD   metFORMIN (GLUCOPHAGE) 500 MG tablet TAKE ONE TABLET BY MOUTH TWICE A DAY WITH A MEAL 11/11/19  Yes Diana Ross MD   Lactobacillus (PROBIOTIC ACIDOPHILUS) TABS Take 1 tablet by mouth daily 7/31/17  Yes Diana Ross MD   Blood Pressure Monitor KIT Use as directed. 7/22/20   MAX Barker CNP   glucose monitoring kit (FREESTYLE) monitoring kit Use as directed. BRAND OF CHOICE INSURANCE ALLOWS. 7/22/20   MAX Barker CNP   blood glucose monitor strips Test 2-3 times a day & as needed for symptoms of irregular blood glucose.   BRAND OF CHOICE INSURANCE ALLOWS. 7/22/20   MAX Barker CNP   Alcohol Swabs (ALCOHOL PREP) PADS Use as directed 7/22/20   MAX Barker CNP   Lancets MISC 1 each by Does not apply route 2 times daily 7/22/20   MAX Barker CNP   oxybutynin (DITROPAN-XL) 10 MG extended release tablet Take 1 tablet by mouth daily 7/20/20   Duane Ko MD   rivaroxaban (XARELTO) 20 MG TABS tablet Take 1 tablet by mouth daily (with breakfast) 6/22/20   Edd Bullock MD   levothyroxine (SYNTHROID) 75 MCG tablet TAKE 1 TABLET BY MOUTH EVERY MORNING (BEFORE BREAKFAST) 1/21/20   Diana Ross MD   HM LORATADINE 10 MG tablet TAKE 1 TABLET BY MOUTH DAILY 1/21/20   Radha Vallecillo MD   docusate sodium (COLACE) 100 MG capsule TAKE 1 CAPSULE BY MOUTH 2 TIMES DAILY 12/16/19   Diana Ross MD   MYRBETRIQ 50 MG TB24 TAKE ONE TABLET BY MOUTH ONCE DAILY 12/16/19   Keely Dempsey MD   Cranberry, Vacc oxycoccus, (SM CRANBERRY) 500 MG TABS Take 500 mg by mouth daily  7/31/17   Historical Provider, MD   UNABLE TO FIND Needs right walker brake fixed 10/3/19   Keely Dempsey MD   Cholecalciferol (VITAMIN D) 2000 units TABS tablet Take 1 tablet by mouth daily 3/9/19   Keely Dempsey MD   vitamin B-12 (CYANOCOBALAMIN) 1000 MCG tablet Take 1 tablet by mouth daily 3/9/19   Keely Dempsey MD   acetaminophen (APAP EXTRA STRENGTH) 500 MG tablet Take 1 tablet by mouth every 6 hours as needed for Pain or Fever 2/12/19   Keely Dempsey MD        Allergies:     Sulfa antibiotics and Penicillins    Social History:     Tobacco:    reports that she has never smoked. She has never used smokeless tobacco.  Alcohol:      reports no history of alcohol use. Drug Use:  reports no history of drug use. Family History:     Family History   Problem Relation Age of Onset    Coronary Art Dis Father     Hypertension Father     Diabetes Father     High Blood Pressure Father     Heart Attack Father     Diabetes Mother     High Blood Pressure Mother     Thyroid Disease Mother     High Blood Pressure Sister     Thyroid Disease Brother     High Blood Pressure Brother     High Blood Pressure Maternal Grandmother     Diabetes Maternal Grandfather     No Known Problems Paternal Grandmother     Heart Attack Paternal Grandfather     Colon Cancer Neg Hx        Review of Systems:     Positive and Negative as described in HPI. CONSTITUTIONAL:  negative for fevers, chills, sweats, fatigue, weight loss  HEENT:  negative for vision, hearing changes, runny nose, throat pain  RESPIRATORY:  negative for shortness of breath, cough, congestion, wheezing. CARDIOVASCULAR:  negative for chest pain, palpitations.   GASTROINTESTINAL: Positive for abdominal pain  GENITOURINARY:  negative for difficulty of urination, burning with urination, frequency INTEGUMENT:  negative for rash, skin lesions, easy bruising   HEMATOLOGIC/LYMPHATIC:  negative for swelling/edema   ALLERGIC/IMMUNOLOGIC:  negative for urticaria , itching  ENDOCRINE:  negative increase in drinking, increase in urination, hot or cold intolerance  MUSCULOSKELETAL:  negative joint pains, muscle aches, swelling of joints  NEUROLOGICAL:  negative for headaches, dizziness, lightheadedness, numbness, pain, tingling extremities  BEHAVIOR/PSYCH:  negative for depression, anxiety    Physical Exam:     BP (!) 133/96   Pulse 130 Comment: standing and ambulating in room  Temp 98.6 °F (37 °C) (Oral)   Resp 16   Ht 5' 2\" (1.575 m)   Wt 247 lb 12.8 oz (112.4 kg)   LMP  (LMP Unknown)   SpO2 100% Comment: standing and ambulating in room  BMI 45.32 kg/m²   Temp (24hrs), Av.2 °F (36.8 °C), Min:97.7 °F (36.5 °C), Max:98.6 °F (37 °C)    Recent Labs     20  1635 20  2109 20  0703 20  1137   POCGLU 111* 105 102 115*       Intake/Output Summary (Last 24 hours) at 2020 1219  Last data filed at 2020 0507  Gross per 24 hour   Intake 2270 ml   Output 2280 ml   Net -10 ml       General Appearance:  alert, well appearing, and in no acute distress. Obesity present  Mental status: oriented to person, place, and time with normal affect  Head:  normocephalic, atraumatic. Eye: no icterus, redness, pupils equal and reactive, extraocular eye movements intact, conjunctiva clear  Ear: normal external ear, no discharge, hearing intact  Nose:  no drainage noted  Mouth: mucous membranes moist  Neck: supple, no carotid bruits, thyroid not palpable  Lungs: Bilateral equal air entry, clear to ausculation, no wheezing, rales or rhonchi, normal effort  Cardiovascular: normal rate, regular rhythm, no murmur, gallop, rub.   Abdomen: Surgical changes present in abdominal  Neurologic: There are no new focal motor or sensory deficits, normal muscle tone and bulk, no abnormal sensation, normal speech, cranial nerves II through XII grossly intact  Skin: No gross lesions, rashes, bruising or bleeding on exposed skin area  Extremities:  peripheral pulses palpable, no pedal edema or calf pain with palpation  Psych: normal affect    Investigations:      Laboratory Testing:  Recent Results (from the past 24 hour(s))   POC Glucose Fingerstick    Collection Time: 08/07/20  4:35 PM   Result Value Ref Range    POC Glucose 111 (H) 65 - 105 mg/dL   POC Glucose Fingerstick    Collection Time: 08/07/20  9:09 PM   Result Value Ref Range    POC Glucose 105 65 - 105 mg/dL   CBC Auto Differential    Collection Time: 08/08/20  4:28 AM   Result Value Ref Range    WBC 6.8 3.5 - 11.0 k/uL    RBC 3.77 (L) 4.0 - 5.2 m/uL    Hemoglobin 11.3 (L) 12.0 - 16.0 g/dL    Hematocrit 34.3 (L) 36 - 46 %    MCV 91.1 80 - 100 fL    MCH 29.9 26 - 34 pg    MCHC 32.9 31 - 37 g/dL    RDW 14.8 11.5 - 14.9 %    Platelets 342 (L) 356 - 450 k/uL    MPV 8.5 6.0 - 12.0 fL    NRBC Automated NOT REPORTED per 100 WBC    Differential Type NOT REPORTED     Seg Neutrophils 73 (H) 36 - 66 %    Lymphocytes 16 (L) 24 - 44 %    Monocytes 8 (H) 1 - 7 %    Eosinophils % 2 0 - 4 %    Basophils 1 0 - 2 %    Immature Granulocytes NOT REPORTED 0 %    Segs Absolute 5.00 1.3 - 9.1 k/uL    Absolute Lymph # 1.10 1.0 - 4.8 k/uL    Absolute Mono # 0.50 0.1 - 1.3 k/uL    Absolute Eos # 0.10 0.0 - 0.4 k/uL    Basophils Absolute 0.00 0.0 - 0.2 k/uL    Absolute Immature Granulocyte NOT REPORTED 0.00 - 0.30 k/uL    WBC Morphology NOT REPORTED     RBC Morphology NOT REPORTED     Platelet Estimate NOT REPORTED    Basic Metabolic Panel    Collection Time: 08/08/20  4:28 AM   Result Value Ref Range    Glucose 93 70 - 99 mg/dL    BUN 8 8 - 23 mg/dL    CREATININE 0.57 0.50 - 0.90 mg/dL    Bun/Cre Ratio NOT REPORTED 9 - 20    Calcium 8.0 (L) 8.6 - 10.4 mg/dL    Sodium 136 135 - 144 mmol/L    Potassium 3.5 (L) 3.7 - 5.3 mmol/L    Chloride 102 98 - 107 mmol/L    CO2 25 20 - 31 mmol/L    Anion Gap 9 9 - 17 mmol/L    GFR Non-African American >60 >60 mL/min    GFR African American >60 >60 mL/min    GFR Comment          GFR Staging NOT REPORTED    Magnesium    Collection Time: 08/08/20  4:28 AM   Result Value Ref Range    Magnesium 1.9 1.6 - 2.6 mg/dL   POC Glucose Fingerstick    Collection Time: 08/08/20  7:03 AM   Result Value Ref Range    POC Glucose 102 65 - 105 mg/dL   POC Glucose Fingerstick    Collection Time: 08/08/20 11:37 AM   Result Value Ref Range    POC Glucose 115 (H) 65 - 105 mg/dL       Imaging/Diagonstics:      Assessment :      Primary Problem  <principal problem not specified>    Active Hospital Problems    Diagnosis Date Noted    Morbid obesity with BMI of 40.0-44.9, adult ([facility] [E66.01, Z68.41] 02/23/2017     Priority: High    Sigmoid diverticulitis [K57.32] 08/04/2020    Class 3 severe obesity due to excess calories without serious comorbidity with body mass index (BMI) of 40.0 to 44.9 in adult ([facility] [E66.01, Z68.41] 07/22/2020    Atrial fibrillation, new onset ([facility] [facility] [I48.91] 01/20/2020    A-fib ([facility] [facility] [I48.91] 01/14/2020    Type 2 diabetes mellitus, without long-term current use of insulin ([facility] [address] [facility] [E11.9] 01/14/2020    Adenomatous polyp of sigmoid colon, 6/26/17 [D12.5] 07/30/2017    Acquired hypothyroidism [E03.9]        Plan:     1. Perforated sigmoid diverticulitis status post open sigmoid colectomy and cholecystectomy, patient is on Dilaudid pain is controlled  2. Diabetes, sugar controlled, low-dose sliding scale, point-of-care glucose every 6  3. Hypertension, controlled, starting patient on Lopressor 5 mg every 6 as needed for systolic blood pressure more than 150, hold if heart rate is less than 60  4. Hypothyroidism, TSH is okay  5. Congestive heart failure, last expression 55%, has high BNP tested in July, likely has heart failure with preserved ejection fraction, will reduce fluid to 100 mL per she is n.p.o., will evaluate tomorrow about reducing fluid  6.  Had stress test recently which was negative  7. Patient has history of A. fib, rate controlled, was on Xarelto  8. We will discuss with operating surgeon when it is okay to change Lovenox prophylactic dose to therapeutic dose  9.     8/5  Patient is doing better, complaining of some pain at the local site  Has reduced urine output, given  1 L of fluid bolus  We will discuss with operating surgeon, if he can change Lovenox to therapeutic dose with history of A. Fib    8/6  Patient has episode of A. fib with RVR, started on IV amiodarone  Patient is on IV Lopressor as needed  Spiked fever up to 100.3  White counts are okay  Will monitor  Hypokalemia, replacing lites  Has prolonged QTC, avoid Reglan  Checking magnesium  If patient spiked fever again, need to work-up for infectious etiology including blood culture, urine culture, chest x-ray    8/7   Replacing lites, goal to keep magnesium more than 2 and potassium more than 4  QTC prolonged, amiodarone is off  Patient is on Lopressor  8/8  Patient is on full dose Lovenox  Tolerating diet  No new spikes of fever  Had episode of tachycardia started on Cardizem    Consultations:   [address] [facility] CONSULT TO CASE MANAGEMENT  IP Tarik Samuels MD  8/8/2020  12:19 PM    Copy sent to Dr. Naima Jurado MD    Please note that this chart was generated using voice recognition Dragon dictation software. Although every effort was made to ensure the accuracy of this automated transcription, some errors in transcription may have occurred.

## 2020-08-08 NOTE — PLAN OF CARE
Problem: Infection:  Goal: Will remain free from infection  Description: Will remain free from infection  8/8/2020 0456 by Klever Downs RN  Outcome: Ongoing  8/7/2020 1735 by Melissa Sparrow RN  Outcome: Ongoing     Problem: Safety:  Goal: Free from accidental physical injury  Description: Free from accidental physical injury  8/8/2020 0456 by Klever Downs RN  Outcome: Ongoing  8/7/2020 1735 by Melissa Sparrow RN  Outcome: Ongoing  Goal: Free from intentional harm  Description: Free from intentional harm  8/7/2020 1735 by Melissa Sparrow RN  Outcome: Ongoing     Problem: Daily Care:  Goal: Daily care needs are met  Description: Daily care needs are met  8/8/2020 0456 by Klever Downs RN  Outcome: Ongoing  8/7/2020 1735 by Melissa Sparrow RN  Outcome: Ongoing     Problem: Pain:  Goal: Patient's pain/discomfort is manageable  Description: Patient's pain/discomfort is manageable  8/8/2020 0456 by Klever Downs RN  Outcome: Ongoing  8/7/2020 1735 by Melissa Sparrow RN  Outcome: Ongoing  Goal: Pain level will decrease  Description: Pain level will decrease  8/7/2020 1735 by Melissa Sparrow RN  Outcome: Ongoing  Goal: Control of acute pain  Description: Control of acute pain  8/7/2020 1735 by Melissa Sparrow RN  Outcome: Ongoing  Goal: Control of chronic pain  Description: Control of chronic pain  8/7/2020 1735 by Melissa Sparrow RN  Outcome: Ongoing     Problem: Skin Integrity:  Goal: Skin integrity will stabilize  Description: Skin integrity will stabilize  8/8/2020 0456 by Klever Downs RN  Outcome: Ongoing  8/7/2020 1735 by Melissa Sparrow RN  Outcome: Ongoing     Problem: Discharge Planning:  Goal: Patients continuum of care needs are met  Description: Patients continuum of care needs are met  8/8/2020 0456 by Klever Downs RN  Outcome: Ongoing  8/7/2020 1735 by Melissa Sparrow RN  Outcome: Ongoing     Problem: Falls - Risk of:  Goal: Will remain free

## 2020-08-08 NOTE — PLAN OF CARE
Problem: Safety:  Goal: Free from accidental physical injury  Description: Free from accidental physical injury  8/8/2020 1821 by Palomo Blas RN  Outcome: Met This Shift    Pt. Remained free from accidental physical injury this shift. Pt. Educated on call light pt. Used approprietly. Problem: Daily Care:  Goal: Daily care needs are met  Description: Daily care needs are met  8/8/2020 1821 by Palomo Blas RN  Outcome: Met This Shift  Pt. Daily care need met this shift. Gown changed, bed changed, geronimo care performed. Food tray set up. Problem: Pain:  Goal: Patient's pain/discomfort is manageable  Description: Patient's pain/discomfort is manageable  8/8/2020 1821 by Palomo Blas RN  Outcome: Met This Shift  Pt. Pain was managed this shift. Pain assessments done frequently this shift. Pain med's give, pt. Repositioned and sleep and rest promoted. Problem: Falls - Risk of:  Goal: Will remain free from falls  Description: Will remain free from falls  8/8/2020 1821 by Palomo Blas RN  Outcome: Met This Shift  Pt. Remained free from falls this shift. Pt. Used call light approprietly, room free from clutter, frequent nursing rounds done this shift.

## 2020-08-09 ENCOUNTER — APPOINTMENT (OUTPATIENT)
Dept: GENERAL RADIOLOGY | Age: 70
DRG: 329 | End: 2020-08-09
Attending: SURGERY
Payer: MEDICARE

## 2020-08-09 LAB
ABSOLUTE EOS #: 0.1 K/UL (ref 0–0.4)
ABSOLUTE IMMATURE GRANULOCYTE: ABNORMAL K/UL (ref 0–0.3)
ABSOLUTE LYMPH #: 1.5 K/UL (ref 1–4.8)
ABSOLUTE MONO #: 0.5 K/UL (ref 0.1–1.3)
ANION GAP SERPL CALCULATED.3IONS-SCNC: 7 MMOL/L (ref 9–17)
BASOPHILS # BLD: 1 % (ref 0–2)
BASOPHILS ABSOLUTE: 0 K/UL (ref 0–0.2)
BUN BLDV-MCNC: 10 MG/DL (ref 8–23)
BUN/CREAT BLD: ABNORMAL (ref 9–20)
C-REACTIVE PROTEIN: 27.5 MG/L (ref 0–5)
CALCIUM SERPL-MCNC: 8.4 MG/DL (ref 8.6–10.4)
CHLORIDE BLD-SCNC: 103 MMOL/L (ref 98–107)
CO2: 28 MMOL/L (ref 20–31)
CREAT SERPL-MCNC: 0.67 MG/DL (ref 0.5–0.9)
D-DIMER QUANTITATIVE: 0.67 MG/L FEU (ref 0–0.59)
DIFFERENTIAL TYPE: ABNORMAL
EOSINOPHILS RELATIVE PERCENT: 2 % (ref 0–4)
FERRITIN: 219 UG/L (ref 13–150)
GFR AFRICAN AMERICAN: >60 ML/MIN
GFR NON-AFRICAN AMERICAN: >60 ML/MIN
GFR SERPL CREATININE-BSD FRML MDRD: ABNORMAL ML/MIN/{1.73_M2}
GFR SERPL CREATININE-BSD FRML MDRD: ABNORMAL ML/MIN/{1.73_M2}
GLUCOSE BLD-MCNC: 111 MG/DL (ref 70–99)
GLUCOSE BLD-MCNC: 118 MG/DL (ref 65–105)
GLUCOSE BLD-MCNC: 125 MG/DL (ref 65–105)
GLUCOSE BLD-MCNC: 152 MG/DL (ref 65–105)
HCT VFR BLD CALC: 33 % (ref 36–46)
HEMOGLOBIN: 10.9 G/DL (ref 12–16)
IMMATURE GRANULOCYTES: ABNORMAL %
LACTATE DEHYDROGENASE: 147 U/L (ref 135–214)
LYMPHOCYTES # BLD: 25 % (ref 24–44)
MAGNESIUM: 1.5 MG/DL (ref 1.6–2.6)
MCH RBC QN AUTO: 30.6 PG (ref 26–34)
MCHC RBC AUTO-ENTMCNC: 33.2 G/DL (ref 31–37)
MCV RBC AUTO: 92.3 FL (ref 80–100)
MONOCYTES # BLD: 9 % (ref 1–7)
NRBC AUTOMATED: ABNORMAL PER 100 WBC
PDW BLD-RTO: 14.8 % (ref 11.5–14.9)
PLATELET # BLD: 151 K/UL (ref 150–450)
PLATELET ESTIMATE: ABNORMAL
PMV BLD AUTO: 8.2 FL (ref 6–12)
POTASSIUM SERPL-SCNC: 3.8 MMOL/L (ref 3.7–5.3)
PROCALCITONIN: 0.19 NG/ML
RBC # BLD: 3.57 M/UL (ref 4–5.2)
RBC # BLD: ABNORMAL 10*6/UL
SARS-COV-2, PCR: NORMAL
SARS-COV-2, RAPID: NORMAL
SARS-COV-2: NOT DETECTED
SEG NEUTROPHILS: 63 % (ref 36–66)
SEGMENTED NEUTROPHILS ABSOLUTE COUNT: 3.8 K/UL (ref 1.3–9.1)
SODIUM BLD-SCNC: 138 MMOL/L (ref 135–144)
SOURCE: NORMAL
WBC # BLD: 6 K/UL (ref 3.5–11)
WBC # BLD: ABNORMAL 10*3/UL

## 2020-08-09 PROCEDURE — C9113 INJ PANTOPRAZOLE SODIUM, VIA: HCPCS | Performed by: SURGERY

## 2020-08-09 PROCEDURE — 80048 BASIC METABOLIC PNL TOTAL CA: CPT

## 2020-08-09 PROCEDURE — 99221 1ST HOSP IP/OBS SF/LOW 40: CPT | Performed by: INTERNAL MEDICINE

## 2020-08-09 PROCEDURE — 36415 COLL VENOUS BLD VENIPUNCTURE: CPT

## 2020-08-09 PROCEDURE — 99232 SBSQ HOSP IP/OBS MODERATE 35: CPT | Performed by: INTERNAL MEDICINE

## 2020-08-09 PROCEDURE — 85379 FIBRIN DEGRADATION QUANT: CPT

## 2020-08-09 PROCEDURE — 2580000003 HC RX 258: Performed by: INTERNAL MEDICINE

## 2020-08-09 PROCEDURE — U0003 INFECTIOUS AGENT DETECTION BY NUCLEIC ACID (DNA OR RNA); SEVERE ACUTE RESPIRATORY SYNDROME CORONAVIRUS 2 (SARS-COV-2) (CORONAVIRUS DISEASE [COVID-19]), AMPLIFIED PROBE TECHNIQUE, MAKING USE OF HIGH THROUGHPUT TECHNOLOGIES AS DESCRIBED BY CMS-2020-01-R: HCPCS

## 2020-08-09 PROCEDURE — 2060000000 HC ICU INTERMEDIATE R&B

## 2020-08-09 PROCEDURE — 85025 COMPLETE CBC W/AUTO DIFF WBC: CPT

## 2020-08-09 PROCEDURE — 83615 LACTATE (LD) (LDH) ENZYME: CPT

## 2020-08-09 PROCEDURE — 6370000000 HC RX 637 (ALT 250 FOR IP): Performed by: INTERNAL MEDICINE

## 2020-08-09 PROCEDURE — 6360000002 HC RX W HCPCS: Performed by: INTERNAL MEDICINE

## 2020-08-09 PROCEDURE — 86140 C-REACTIVE PROTEIN: CPT

## 2020-08-09 PROCEDURE — 82728 ASSAY OF FERRITIN: CPT

## 2020-08-09 PROCEDURE — 84145 PROCALCITONIN (PCT): CPT

## 2020-08-09 PROCEDURE — 71045 X-RAY EXAM CHEST 1 VIEW: CPT

## 2020-08-09 PROCEDURE — 94761 N-INVAS EAR/PLS OXIMETRY MLT: CPT

## 2020-08-09 PROCEDURE — 83735 ASSAY OF MAGNESIUM: CPT

## 2020-08-09 PROCEDURE — 82947 ASSAY GLUCOSE BLOOD QUANT: CPT

## 2020-08-09 PROCEDURE — 2580000003 HC RX 258: Performed by: SURGERY

## 2020-08-09 PROCEDURE — 6370000000 HC RX 637 (ALT 250 FOR IP): Performed by: STUDENT IN AN ORGANIZED HEALTH CARE EDUCATION/TRAINING PROGRAM

## 2020-08-09 PROCEDURE — 6360000002 HC RX W HCPCS: Performed by: SURGERY

## 2020-08-09 RX ORDER — ACETAMINOPHEN 325 MG/1
650 TABLET ORAL EVERY 6 HOURS PRN
Status: DISCONTINUED | OUTPATIENT
Start: 2020-08-09 | End: 2020-08-13 | Stop reason: HOSPADM

## 2020-08-09 RX ORDER — ACETAMINOPHEN 650 MG/1
650 SUPPOSITORY RECTAL EVERY 6 HOURS PRN
Status: DISCONTINUED | OUTPATIENT
Start: 2020-08-09 | End: 2020-08-13 | Stop reason: HOSPADM

## 2020-08-09 RX ORDER — DEXTROMETHORPHAN POLISTIREX 30 MG/5ML
60 SUSPENSION ORAL EVERY 12 HOURS PRN
Status: DISCONTINUED | OUTPATIENT
Start: 2020-08-09 | End: 2020-08-13 | Stop reason: HOSPADM

## 2020-08-09 RX ORDER — METOPROLOL TARTRATE 50 MG/1
50 TABLET, FILM COATED ORAL 2 TIMES DAILY
Status: DISCONTINUED | OUTPATIENT
Start: 2020-08-09 | End: 2020-08-13 | Stop reason: HOSPADM

## 2020-08-09 RX ORDER — BENZONATATE 100 MG/1
200 CAPSULE ORAL 3 TIMES DAILY PRN
Status: DISCONTINUED | OUTPATIENT
Start: 2020-08-09 | End: 2020-08-13 | Stop reason: HOSPADM

## 2020-08-09 RX ADMIN — Medication 10 ML: at 09:32

## 2020-08-09 RX ADMIN — HYDROMORPHONE HYDROCHLORIDE 1 MG: 1 INJECTION, SOLUTION INTRAMUSCULAR; INTRAVENOUS; SUBCUTANEOUS at 12:22

## 2020-08-09 RX ADMIN — INSULIN LISPRO 1 UNITS: 100 INJECTION, SOLUTION INTRAVENOUS; SUBCUTANEOUS at 12:22

## 2020-08-09 RX ADMIN — ENOXAPARIN SODIUM 105 MG: 120 INJECTION SUBCUTANEOUS at 22:58

## 2020-08-09 RX ADMIN — PANTOPRAZOLE SODIUM 40 MG: 40 INJECTION, POWDER, FOR SOLUTION INTRAVENOUS at 09:17

## 2020-08-09 RX ADMIN — METOPROLOL TARTRATE 50 MG: 50 TABLET, FILM COATED ORAL at 22:59

## 2020-08-09 RX ADMIN — HYDROMORPHONE HYDROCHLORIDE 1 MG: 1 INJECTION, SOLUTION INTRAMUSCULAR; INTRAVENOUS; SUBCUTANEOUS at 18:34

## 2020-08-09 RX ADMIN — DILTIAZEM HYDROCHLORIDE 240 MG: 240 CAPSULE, COATED, EXTENDED RELEASE ORAL at 09:17

## 2020-08-09 RX ADMIN — METOPROLOL TARTRATE 25 MG: 25 TABLET, FILM COATED ORAL at 09:16

## 2020-08-09 RX ADMIN — ENOXAPARIN SODIUM 105 MG: 120 INJECTION SUBCUTANEOUS at 12:16

## 2020-08-09 RX ADMIN — CEFAZOLIN 2 G: 1 INJECTION, POWDER, FOR SOLUTION INTRAMUSCULAR; INTRAVENOUS at 22:49

## 2020-08-09 RX ADMIN — MAGNESIUM SULFATE HEPTAHYDRATE 2 G: 500 INJECTION, SOLUTION INTRAMUSCULAR; INTRAVENOUS at 09:57

## 2020-08-09 RX ADMIN — Medication 10 ML: at 22:57

## 2020-08-09 RX ADMIN — Medication 10 ML: at 09:17

## 2020-08-09 RX ADMIN — CEFAZOLIN 2 G: 1 INJECTION, POWDER, FOR SOLUTION INTRAMUSCULAR; INTRAVENOUS at 12:17

## 2020-08-09 ASSESSMENT — PAIN DESCRIPTION - LOCATION: LOCATION: ABDOMEN

## 2020-08-09 ASSESSMENT — PAIN DESCRIPTION - PAIN TYPE: TYPE: ACUTE PAIN;SURGICAL PAIN

## 2020-08-09 ASSESSMENT — PAIN SCALES - GENERAL
PAINLEVEL_OUTOF10: 0
PAINLEVEL_OUTOF10: 0
PAINLEVEL_OUTOF10: 8
PAINLEVEL_OUTOF10: 0
PAINLEVEL_OUTOF10: 4
PAINLEVEL_OUTOF10: 0
PAINLEVEL_OUTOF10: 8
PAINLEVEL_OUTOF10: 0
PAINLEVEL_OUTOF10: 8

## 2020-08-09 ASSESSMENT — PAIN DESCRIPTION - FREQUENCY: FREQUENCY: INTERMITTENT

## 2020-08-09 NOTE — PLAN OF CARE
Problem: Infection:  Goal: Will remain free from infection  Description: Will remain free from infection  Outcome: Ongoing     Problem: Safety:  Goal: Free from accidental physical injury  Description: Free from accidental physical injury  8/9/2020 0813 by Bea Negro RN  Outcome: Ongoing  8/8/2020 1821 by Christal Brady RN  Outcome: Met This Shift  Goal: Free from intentional harm  Description: Free from intentional harm  Outcome: Ongoing     Problem: Daily Care:  Goal: Daily care needs are met  Description: Daily care needs are met  8/9/2020 0813 by Bea Negro RN  Outcome: Ongoing  8/8/2020 1821 by Christal Brady RN  Outcome: Met This Shift     Problem: Pain:  Goal: Patient's pain/discomfort is manageable  Description: Patient's pain/discomfort is manageable  8/9/2020 0813 by Bea Negro RN  Outcome: Ongoing  8/8/2020 1821 by Christal Brady RN  Outcome: Met This Shift  Goal: Pain level will decrease  Description: Pain level will decrease  Outcome: Ongoing  Goal: Control of acute pain  Description: Control of acute pain  Outcome: Ongoing  Goal: Control of chronic pain  Description: Control of chronic pain  Outcome: Ongoing     Problem: Skin Integrity:  Goal: Skin integrity will stabilize  Description: Skin integrity will stabilize  8/9/2020 0813 by Bea Negro RN  Outcome: Ongoing  8/8/2020 1821 by Christal Brady RN  Outcome: Met This Shift     Problem: Discharge Planning:  Goal: Patients continuum of care needs are met  Description: Patients continuum of care needs are met  Outcome: Ongoing     Problem: Falls - Risk of:  Goal: Will remain free from falls  Description: Will remain free from falls  8/9/2020 0813 by Bea Negor RN  Outcome: Ongoing  8/8/2020 1821 by Christal Brady RN  Outcome: Met This Shift  Goal: Absence of physical injury  Description: Absence of physical injury  Outcome: Ongoing     Problem: HEMODYNAMIC STATUS  Goal: Patient has stable vital signs and fluid balance  Outcome: Ongoing     Problem: OXYGENATION/RESPIRATORY FUNCTION  Goal: Patient will achieve/maintain normal respiratory rate/effort  Outcome: Ongoing     Problem: MOBILITY  Goal: Early mobilization is achieved  Outcome: Ongoing     Problem: ELIMINATION  Goal: Elimination patterns are normal or improving  Description: Elimination patterns return to pre-surgery normal patterns  Outcome: Ongoing     Problem: SKIN INTEGRITY  Goal: Skin integrity is maintained or improved  Outcome: Ongoing     Problem: Musculor/Skeletal Functional Status  Goal: Highest potential functional level  Outcome: Ongoing  Goal: Absence of falls  Outcome: Ongoing     Problem: Skin Integrity:  Goal: Will show no infection signs and symptoms  Description: Will show no infection signs and symptoms  Outcome: Ongoing  Goal: Absence of new skin breakdown  Description: Absence of new skin breakdown  Outcome: Ongoing     Problem: Airway Clearance - Ineffective  Goal: Achieve or maintain patent airway  Outcome: Ongoing     Problem: Gas Exchange - Impaired  Goal: Absence of hypoxia  Outcome: Ongoing  Goal: Promote optimal lung function  Outcome: Ongoing     Problem: Breathing Pattern - Ineffective  Goal: Ability to achieve and maintain a regular respiratory rate  Outcome: Ongoing     Problem:  Body Temperature -  Risk of, Imbalanced  Goal: Ability to maintain a body temperature within defined limits  Outcome: Ongoing  Goal: Will regain or maintain usual level of consciousness  Outcome: Ongoing  Goal: Complications related to the disease process, condition or treatment will be avoided or minimized  Outcome: Ongoing     Problem: Isolation Precautions - Risk of Spread of Infection  Goal: Prevent transmission of infection  Outcome: Ongoing     Problem: Nutrition Deficits  Goal: Optimize nutrtional status  Outcome: Ongoing     Problem: Risk for Fluid Volume Deficit  Goal: Maintain normal heart rhythm  Outcome: Ongoing  Goal:

## 2020-08-09 NOTE — CONSULTS
[address] [facility]                 [address] [facility], [address]                                  CONSULTATION    PATIENT NAME: Herber Duke                     :        1950  MED REC NO:   [id number]                              ROOM:       2014  ACCOUNT NO:   [de-identified]                           ADMIT DATE: 2020  PROVIDER:     Raheel Anand    CONSULT DATE:  2020    CHIEF COMPLAINT:  COVID-19 positive. HISTORY OF PRESENT ILLNESS:  The patient is a 73-year-old female who had  colonoscopy and EGD because of reflux esophagitis and a history of  colitis. The patient prior to surgery had no pulmonary complaints,  denied any nausea, vomiting, abdominal pain, had no change in her sense  of taste or smell. Subsequently, a rapid antigen screening COVID test  was negative. She was admitted on  and underwent surgery by Dr. Hank Rodriguez on  that same day. She had an open sigmoid colectomy with  primary anastomosis and mobilization of the splenic flexure. Her  postoperative course was uncomplicated. She was placed on Lovenox after  surgery because of a history of atrial fibrillation (previously she had  been on Xarelto). Subsequently, the patient was going to go to an [facility]. A RT-PCR COVID test swab was done and that came back positive. The  patient was transferred to the [facility] unit here. This morning, the patient denies any shortness of breath or chest pain. She is currently saturating at 96% on room air. She denies any change  in her sense of taste or smell. She denies any myalgias or headaches. She does complain of some mild nausea and abdominal pain, but that she  says because she had the surgery. ALLERGIES:  She is allergic to SULFA and PENICILLIN. PAST MEDICAL HISTORY:  As documented above.   She has a history of atrial  fibrillation, diabetes, hypertension, obesity, history of endometrial  cancer, history of TIA, We will await  the results of this. She will be in droplet plus isolation. She will  be treated medically with supportive care. Currently as mentioned  above, she is on room air. Further recommendations to follow.         Bruno Garay    D: 08/09/2020 9:56:11       T: 08/09/2020 10:00:19     [email or web address]  Job#: [id number]     Doc#: [id number]    CC:

## 2020-08-09 NOTE — CARE COORDINATION
DISCHARGE PLANNING NOTE:    Plan is for this patient to go to [facility]. We are awaiting precert that was started 8/7. Covid postive, however they are re-testing as the patient is asymptomatic and feel it was a false positive. On low fiber diet and IV Ancef. Will continue to follow along and await covid results and precert.      Electronically signed by Marvin Judd RN on 8/9/2020 at 2:52 PM

## 2020-08-09 NOTE — PROGRESS NOTES
Patient was transferred to COVID unit based on the test results yesterday which was positive. Case discussed with Dr. Nela Carrasco. COVID testing is being repeated. Case discussed with nurse taking care of the patient. Patient was not examined because of her [address] at this time. No new surgical changes. Tolerating diet bowels are moving NORMA drains are serous. Incision is clean dry intact. Blood work was reviewed. Await repeat COVID testing. Diet and activity as tolerated. Discharge planning.

## 2020-08-09 NOTE — PROGRESS NOTES
[facility]   Progress Note                   Date:   8/9/2020  Patient name: Kaleigh Raymond  Date of admission:  8/4/2020  5:43 AM  MRN:   [id number]  YOB: 1950  PCP: Yaakov Sánchez MD    Reason for Admission:     Subjective:   Patient tested positive for COVID-19 and transferred to [facility]. Remains in A.fib with borderline rate control.       Intake/Output Summary (Last 24 hours) at 8/9/2020 0857  Last data filed at 8/9/2020 0400  Gross per 24 hour   Intake --   Output 1180 ml   Net -1180 ml       Medications:   Scheduled Meds:   dilTIAZem  240 mg Oral Daily    metoprolol tartrate  25 mg Oral BID    enoxaparin  1 mg/kg Subcutaneous BID    sodium chloride flush  10 mL Intravenous 2 times per day    pantoprazole  40 mg Intravenous Daily    And    sodium chloride (PF)  10 mL Intravenous Daily    ceFAZolin (ANCEF) 50 mL IVPB  2 g Intravenous Q8H    insulin lispro  0-6 Units Subcutaneous TID WC    insulin lispro  0-3 Units Subcutaneous Nightly     Continuous Infusions:   dextrose       CBC:   Recent Labs     08/07/20 0447 08/08/20 0428 08/09/20  0442   WBC 6.7 6.8 6.0   HGB 10.8* 11.3* 10.9*   * 143* 151     BMP:    Recent Labs     08/07/20 0447 08/08/20 0428 08/09/20  0442   * 136 138   K 3.7 3.5* 3.8    102 103   CO2 24 25 28   BUN 9 8 10   CREATININE 0.61 0.57 0.67   GLUCOSE 93 93 111*         Objective:   Vitals: BP (!) 131/55   Pulse 90   Temp 97.9 °F (36.6 °C) (Oral)   Resp 13   Ht 5' 2\" (1.575 m)   Wt 245 lb 2.4 oz (111.2 kg)   LMP  (LMP Unknown)   SpO2 93%   BMI 44.84 kg/m²     PE was not done     Patient Active Problem List   Diagnosis    Acquired hypothyroidism    History of TIA (transient ischemic attack)    Chronic bilateral low back pain without sciatica    Essential hypertension    Osteopenia determined by x-ray    Osteoarthritis involving multiple joints on both sides of body    Left knee DJD    Prediabetes    Vitamin D deficiency    Mixed incontinence    Chronic pain of both knees    Slow transit constipation    Allergic rhinitis    Hyperlipidemia with target LDL less than 100    Morbid obesity with BMI of 40.0-44.9, adult (HCC)    At high risk for falls    Dense breast tissue on mammogram    Hyperglycemia    Spondylosis of lumbar region without myelopathy or radiculopathy    OAB (overactive bladder)    Primary osteoarthritis of both knees    Chronic fatigue     Adenomatous polyp of sigmoid colon, 6/26/17    Mixed stress and urge urinary incontinence    TLHBSO, bilateral LND 10/24/17    Optic atrophy of both eyes    Cataracta b/l eyes    Difficulty walking    Esotropia    Nystagmus    History of uterine cancer, Well differentiated grade 1 adenocarcinoma arising in complex hyperplasia, 2017    Vitamin B 12 deficiency    Aortic calcification (HCC)    Calculus of gallbladder without cholecystitis without obstruction    CLAROS (nonalcoholic steatohepatitis)    Optic atrophy    Cough present for greater than 3 weeks    Dyspepsia    [facility]-fib ([facility]    Type 2 diabetes mellitus, without long-term current use of insulin ([facility])    JOSHUA (acute kidney injury) ([address]    Atrial fibrillation, new onset ([address]    Mobility impaired    Perforated diverticulum    Diverticulitis    Chronic cholecystitis    Colitis    Multiple atypical skin moles    Class 3 severe obesity due to excess calories without serious comorbidity with body mass index (BMI) of 40.0 to 44.9 in adult ([facility])    Symptomatic bradycardia    CHF NYHA class I, chronic, diastolic ([facility])    Sigmoid diverticulitis       EKG:   Last EKG was 8/4/2020 showed A. fib with RVR    ECHO:   Normal left ventricle size, wall thickness and function with an estimated EF  > 55%. No segmental wall motion abnormalities seen. Normal right ventricular size and function. Left atrium is normal in size. Right atrium is normal in size. Aortic valve is trileaflet.  Aortic valve sclerosis without stenosis. No aortic insufficiency. Normal aortic root dimension. Thickened anterior mitral valve leaflet. Mild to moderate mitral  regurgitation. Normal tricuspid valve structure and function. Insignificant tricuspid  regurgitation, unable to estimate RVSP. IVC normal diameter & inspiratory collapse indicating normal RA filling  pressure . No significant pericardial effusion is seen. Stress Test:   Stress test on 7/24/2020 showed no areas of perfusion defect and no wall motion abnormality        Assessment / Acute Cardiac Problems:     1. Persistent atrial fibrillation with borderline rate control. 2. Hypertension. 3. Preserved LV systolic function  4. No ischemia or infarction on recent nuclear stress test  5. Prolonged QTC      Plan of Treatment:     1. Repeat EKG reviewed and showed normal QTC  2. Increase Lopressor to 50 mg twice daily for better rate control. 3. Continue Cardizem  mg daily for rate control  4. Continue anticoagulation with Lovenox full dose and resume Xarelto when okay with other services.     Electronically signed by Meryle Ely, MD on 8/9/2020 at 8:57 [facility]  [phone]

## 2020-08-09 NOTE — PROGRESS NOTES
[facility]    CONSULTATION / HISTORY AND PHYSICAL EXAMINATION            Date:   8/9/2020  Patient name:  Jaxon Vallecillo  Date of admission:  8/4/2020  5:43 AM  MRN:   [id number]  Account:  [de-identified]  YOB: 1950  PCP:    Alysa Christian MD  Room:   2014/2014-01  Code Status:    Full Code    Physician Requesting Consult: Elder Hurst MD    Reason for Consult: Medical management    Chief Complaint:     No chief complaint on file. History Obtained From:     patient, electronic medical record    History of Present Illness:   Patient past medical history multiple medical problems includes morbid obesity, hypertension, diabetes, hypothyroidism, atrial fibrillation on anticoagulation, patient admitted to hospital for planned open sigmoid colectomy and cholecystectomy  She underwent EGD and colonoscopy yesterday  Patient has history of uterine cancer in the past status post hysterectomy  8/7   Patient did not spike any fever overnight  Started on clear liquid  8/9   Turned  positive for COVID-19  Past Medical History:     Past Medical History:   Diagnosis Date    Adenocarcinoma of endometrium, stage 1 ([facility] 10/5/2017    Well differentiated grade 1 adenocarcinoma arising in complex hyperplasia    JOSHUA (acute kidney injury) ([facility] 1/15/2020    Allergic rhinitis 2/23/2017    At high risk for falls 2/23/2017    Atrial fibrillation [facility])     Jan 2020    CHF (congestive heart failure) ([facility]     \"little\"    Colon polyp     Had colonoscopy done 20 yrs ago    Diabetes mellitus ([facility]     Diverticulitis     Endometrial cancer ([facility]     History of TIA (transient ischemic attack) '80's    on Baby ASA    Hyperglycemia 3/21/2017    Hyperlipidemia     Hypertension since 2015    on Rx.     Hypothyroidism 1980    sub total thyroidectomy goiter    Legally blind since born    both eyes, cord prolapse; \"not legally blind\" per \"associated eye care\" please see telephone encounter from 2/27/18    Lower back pain     Mixed incontinence 1/9/2016    Morbid obesity with BMI of 40.0-44.9, adult ([address] 2/23/2017    CLAROS (nonalcoholic steatohepatitis) 3/10/2019    Obesity     Oral phase dysphagia 2/23/2018    Osteoarthritis (arthritis due to wear and tear of joints)     ronan knee    Osteoarthritis involving multiple joints on both sides of body 4/24/2012    Osteoporosis     Perforated bowel ([address]     Postmenopausal bleeding 9/20/2017    S/P h-scope, Myosure 9/20/17 9/20/2017    Pathology pending    Tennis elbow     left    Thickened endometrium 9/20/2017    TIA (transient ischemic attack)     TLHBSO, bilateral LND 10/24/17 10/24/2017    Type 2 diabetes mellitus, without long-term current use of insulin ([facility] 1/14/2020        Past Surgical History:     Past Surgical History:   Procedure Laterality Date    CATARACT REMOVAL WITH IMPLANT Bilateral 2015    COLONOSCOPY  1997    had polyp, she doesn't know where and when, \"20 yrs ago\"    COLONOSCOPY  06/26/2017    COLONOSCOPY N/A 8/3/2020    COLONOSCOPY POLYPECTOMY SNARE performed by Shazia Arzola MD at [address] [facility] N/A 9/20/2017    HYSTEROSCOPY  WITH MYOSURE performed by Reji Crystal DO at [address] [facility] N/A 10/24/2017    TOTAL LAPAROSCOPIC HYSTERECTOMY, BSO, F.S.  STAGING, GYRUS G400 performed by Love Coon MD at [address] N/A 6/26/2017    COLONOSCOPY POLYPECTOMY / HOT SNARE performed by Tabitha Francisco DO at [address] [facility] N/A 8/4/2020    OPEN SIGMOID COLECTOMY; PRIMARY ANASTOMOSIS & MOBILIZATION OF SPLENIC FLEXURE performed by Shazia Arzola MD at [address]  10/24/2017    with pelvic lymph node dissection    THYROID SURGERY  1980    subtotal 20 years ago    TONSILLECTOMY      UPPER GASTROINTESTINAL ENDOSCOPY N/A 8/3/2020    EGD BIOPSY performed by Shazia Arzola MD at [address] [facility] VITRECTOMY      FLOATERS        Medications Prior to Admission:     Prior to Admission medications    Medication Sig Start Date End Date Taking? Authorizing Provider   metoprolol tartrate (LOPRESSOR) 25 MG tablet Take 0.5 tablets by mouth 2 times daily 7/27/20  Yes Monique Varela MD   amiodarone (CORDARONE) 200 MG tablet Take 1 tablet by mouth daily 6/22/20  Yes Aditya Martinez MD   atorvastatin (LIPITOR) 40 MG tablet TAKE 1 TABLET BY MOUTH DAILY STOP SIMVASTATIN 3/16/20  Yes Fanny Lee MD   metFORMIN (GLUCOPHAGE) 500 MG tablet TAKE ONE TABLET BY MOUTH TWICE A DAY WITH A MEAL 11/11/19  Yes Fanny Lee MD   Lactobacillus (PROBIOTIC ACIDOPHILUS) TABS Take 1 tablet by mouth daily 7/31/17  Yes Fanny Lee MD   Blood Pressure Monitor KIT Use as directed. 7/22/20   MAX Barker CNP   glucose monitoring kit (FREESTYLE) monitoring kit Use as directed. BRAND OF CHOICE INSURANCE ALLOWS. 7/22/20   MAX Barker CNP   blood glucose monitor strips Test 2-3 times a day & as needed for symptoms of irregular blood glucose.   BRAND OF CHOICE INSURANCE ALLOWS. 7/22/20   MAX Barker CNP   Alcohol Swabs (ALCOHOL PREP) PADS Use as directed 7/22/20   MAX Barker - CNP   Lancets MISC 1 each by Does not apply route 2 times daily 7/22/20   MAX Barker CNP   oxybutynin (DITROPAN-XL) 10 MG extended release tablet Take 1 tablet by mouth daily 7/20/20   Yuridia Mcwilliams MD   rivaroxaban (XARELTO) 20 MG TABS tablet Take 1 tablet by mouth daily (with breakfast) 6/22/20   Aditya Martinez MD   levothyroxine (SYNTHROID) 75 MCG tablet TAKE 1 TABLET BY MOUTH EVERY MORNING (BEFORE BREAKFAST) 1/21/20   Fanny Lee MD   HM LORATADINE 10 MG tablet TAKE 1 TABLET BY MOUTH DAILY 1/21/20   Radha Vallecillo MD   docusate sodium (COLACE) 100 MG capsule TAKE 1 CAPSULE BY MOUTH 2 TIMES DAILY 12/16/19   Radha MD Ruth Ann   MYRBETRIQ 50 MG TB24 TAKE ONE TABLET BY MOUTH ONCE DAILY 12/16/19   Yaakov Sánchez MD   Cranberry, Vacc oxycoccus, (SM CRANBERRY) 500 MG TABS Take 500 mg by mouth daily  7/31/17   Historical Provider, MD   UNABLE TO FIND Needs right walker brake fixed 10/3/19   Yaakov Sánchez MD   Cholecalciferol (VITAMIN D) 2000 units TABS tablet Take 1 tablet by mouth daily 3/9/19   Yaakov Sánchez MD   vitamin B-12 (CYANOCOBALAMIN) 1000 MCG tablet Take 1 tablet by mouth daily 3/9/19   Yaakov Sánchez MD   acetaminophen (APAP EXTRA STRENGTH) 500 MG tablet Take 1 tablet by mouth every 6 hours as needed for Pain or Fever 2/12/19   Yaakov Sánchez MD        Allergies:     Sulfa antibiotics and Penicillins    Social History:     Tobacco:    reports that she has never smoked. She has never used smokeless tobacco.  Alcohol:      reports no history of alcohol use. Drug Use:  reports no history of drug use. Family History:     Family History   Problem Relation Age of Onset    Coronary Art Dis Father     Hypertension Father     Diabetes Father     High Blood Pressure Father     Heart Attack Father     Diabetes Mother     High Blood Pressure Mother     Thyroid Disease Mother     High Blood Pressure Sister     Thyroid Disease Brother     High Blood Pressure Brother     High Blood Pressure Maternal Grandmother     Diabetes Maternal Grandfather     No Known Problems Paternal Grandmother     Heart Attack Paternal Grandfather     Colon Cancer Neg Hx        Review of Systems:     Positive and Negative as described in HPI. CONSTITUTIONAL:  negative for fevers, chills, sweats, fatigue, weight loss  HEENT:  negative for vision, hearing changes, runny nose, throat pain  RESPIRATORY:  negative for shortness of breath, cough, congestion, wheezing. CARDIOVASCULAR:  negative for chest pain, palpitations.   GASTROINTESTINAL: Positive for abdominal pain  GENITOURINARY:  negative for difficulty of monitor  Hypokalemia, replacing lites  Has prolonged QTC, avoid Reglan  Checking magnesium  If patient spiked fever again, need to work-up for infectious etiology including blood culture, urine culture, chest x-ray    8/7   Replacing lites, goal to keep magnesium more than 2 and potassium more than 4  QTC prolonged, amiodarone is off  Patient is on Lopressor  8/8  Patient is on full dose Lovenox  Tolerating diet  No new spikes of fever  Had episode of tachycardia started on Cardizem    8/9   Patient got testing for coronavirus before discharge to SNF  Transfer to a positive  Patient transferred to [facility] plus precautions  Still tachycardic, dose of Lopressor increased recently by cardiologist  Case discussed with RN  Consultations:   [address] [place] [address] CONSULT TO CASE MANAGEMENT  IP CONSULT TO SOCIAL WORK  IP CONSULT TO CARDIOLOGY  IP CONSULT TO INFECTIOUS DISEASES  IP CONSULT TO PULMONOLOGY      Paula Bonilla MD  8/9/2020  5:58 PM    Copy sent to Dr. Jose Mcmullen MD    Please note that this chart was generated using voice recognition Dragon dictation software. Although every effort was made to ensure the accuracy of this automated transcription, some errors in transcription may have occurred.

## 2020-08-09 NOTE — PROGRESS NOTES
Patient transferred to ICU 2014 from PCU because of COVID-19+ swab. Telemetry monitoring and continuous pulse ox initiated,with bed locked in lowest height setting, 2 side rails up and call button and bedside table both within patient's reach. Patient's belongings placed in cabinet. Patient oriented to room.

## 2020-08-09 NOTE — CONSULTS
yrs ago    Diabetes mellitus ([address]     Diverticulitis     Endometrial cancer ([address]     History of TIA (transient ischemic attack) '80's    on Baby ASA    Hyperglycemia 3/21/2017    Hyperlipidemia     Hypertension since 2015    on Rx.     Hypothyroidism 1980    sub total thyroidectomy goiter    Legally blind since born    both eyes, cord prolapse; \"not legally blind\" per \"associated eye care\" please see telephone encounter from 2/27/18    Lower back pain     Mixed incontinence 1/9/2016    Morbid obesity with BMI of 40.0-44.9, adult ([address] 2/23/2017    CLAROS (nonalcoholic steatohepatitis) 3/10/2019    Obesity     Oral phase dysphagia 2/23/2018    Osteoarthritis (arthritis due to wear and tear of joints)     ronan knee    Osteoarthritis involving multiple joints on both sides of body 4/24/2012    Osteoporosis     Perforated bowel ([address]     Postmenopausal bleeding 9/20/2017    S/P h-scope, Myosure 9/20/17 9/20/2017    Pathology pending    Tennis elbow     left    Thickened endometrium 9/20/2017    TIA (transient ischemic attack)     TLHBSO, bilateral LND 10/24/17 10/24/2017    Type 2 diabetes mellitus, without long-term current use of insulin ([address] 1/14/2020       Past Surgical  History:     Past Surgical History:   Procedure Laterality Date    CATARACT REMOVAL WITH IMPLANT Bilateral 2015    COLONOSCOPY  1997    had polyp, she doesn't know where and when, \"20 yrs ago\"    COLONOSCOPY  06/26/2017    COLONOSCOPY N/A 8/3/2020    COLONOSCOPY POLYPECTOMY SNARE performed by Saeed Lee MD at [address] [facility] N/A 9/20/2017    HYSTEROSCOPY  WITH MYOSURE performed by John Ospina DO at [address] [facility] N/A 10/24/2017    TOTAL LAPAROSCOPIC HYSTERECTOMY, BSO, F.S.  STAGING, GYRUS G400 performed by Denise Venegas MD at [address] W/REMOVAL LESION BY HOT BX FORCEPS N/A 6/26/2017    COLONOSCOPY POLYPECTOMY / HOT SNARE performed by Pamela Monroe DO at [address] [facility] [address] N/A 8/4/2020    OPEN SIGMOID COLECTOMY; PRIMARY ANASTOMOSIS & MOBILIZATION OF SPLENIC FLEXURE performed by Cecil López MD at [address]  10/24/2017    with pelvic lymph node dissection    THYROID SURGERY  1980    subtotal 20 years ago    TONSILLECTOMY      UPPER GASTROINTESTINAL ENDOSCOPY N/A 8/3/2020    EGD BIOPSY performed by Cecil López MD at [address] [facility] VITRECTOMY      FLOATERS       Medications:      metoprolol tartrate  50 mg Oral BID    dilTIAZem  240 mg Oral Daily    enoxaparin  1 mg/kg Subcutaneous BID    sodium chloride flush  10 mL Intravenous 2 times per day    pantoprazole  40 mg Intravenous Daily    And    sodium chloride (PF)  10 mL Intravenous Daily    ceFAZolin (ANCEF) 50 mL IVPB  2 g Intravenous Q8H    insulin lispro  0-6 Units Subcutaneous TID WC    insulin lispro  0-3 Units Subcutaneous Nightly       Social History:     Social History     Socioeconomic History    Marital status:       Spouse name: Not on file    Number of children: 1    Years of education: Not on file    Highest education level: Not on file   Occupational History    Not on file   Social Needs    Financial resource strain: Not on file    Food insecurity     Worry: Not on file     Inability: Not on file    Transportation needs     Medical: Not on file     Non-medical: Not on file   Tobacco Use    Smoking status: Never Smoker    Smokeless tobacco: Never Used   Substance and Sexual Activity    Alcohol use: No     Alcohol/week: 0.0 standard drinks    Drug use: No    Sexual activity: Never   Lifestyle    Physical activity     Days per week: Not on file     Minutes per session: Not on file    Stress: Not on file   Relationships    Social connections     Talks on phone: Not on file     Gets together: Not on file     Attends Jew service: Not on file     Active member of club or organization: Not on file     Attends meetings of clubs or organizations: Not on file     Relationship status: Not on file    Intimate partner violence     Fear of current or ex partner: Not on file     Emotionally abused: Not on file     Physically abused: Not on file     Forced sexual activity: Not on file   Other Topics Concern    Not on file   Social History Narrative    Not on file       Family History:     Family History   Problem Relation Age of Onset    Coronary Art Dis Father     Hypertension Father     Diabetes Father     High Blood Pressure Father     Heart Attack Father     Diabetes Mother     High Blood Pressure Mother     Thyroid Disease Mother     High Blood Pressure Sister     Thyroid Disease Brother     High Blood Pressure Brother     High Blood Pressure Maternal Grandmother     Diabetes Maternal Grandfather     No Known Problems Paternal Grandmother     Heart Attack Paternal Grandfather     Colon Cancer Neg Hx         Allergies:   Sulfa antibiotics and Penicillins     Review of Systems:     Review of Systems  As per history of present illness, other than above 14 systems reviewed negative  Physical Examination :     Patient Vitals for the past 8 hrs:   BP Temp Temp src Pulse Resp SpO2   08/09/20 1345 96/73 -- -- 100 10 92 %   08/09/20 1330 (!) 89/47 -- -- 107 13 93 %   08/09/20 1315 (!) 118/99 -- -- 116 15 94 %   08/09/20 1300 103/82 -- -- 110 12 95 %   08/09/20 1245 110/71 -- -- 98 13 93 %   08/09/20 1215 (!) 113/91 -- -- 109 19 96 %   08/09/20 1200 103/74 -- -- 106 18 93 %   08/09/20 1145 129/74 98 °F (36.7 °C) Oral 117 22 94 %   08/09/20 1130 108/69 -- -- 108 13 94 %   08/09/20 1115 (!) 110/54 -- -- 96 15 92 %   08/09/20 1100 (!) 99/59 -- -- 96 16 92 %   08/09/20 1045 113/79 -- -- 96 15 --   08/09/20 1030 103/62 -- -- 108 22 --   08/09/20 1015 124/61 -- -- 113 18 --   08/09/20 1000 (!) 140/78 -- -- 113 17 --   08/09/20 0945 (!) 133/94 -- -- 106 22 --   08/09/20 0930 (!) 133/104 -- -- 125 17 --   08/09/20 0915 (!) adocument that actually reflects the content of the visit, the document can still have some errors including those of syntax and sound a like substitutions which may escape proof reading. It such instances, actual meaningcan be extrapolated by contextual diversion.     Nataliia Harris MD  Office: [phone]  Perfect serve / office [phone]

## 2020-08-09 NOTE — PROGRESS NOTES
Writer gave report to Charo ICU RN regarding this patient. Patient sent to ICU room 2014 with all belongings and on telemetry monitor. Upon arrival to room, RN spoke with Leonor Figueroa updating on patient. No signs of distress noted at this time.

## 2020-08-09 NOTE — PROGRESS NOTES
Patients COVID swab came back positive, house supervisor notified, patient to be sent to [facility] unit. Dr. Pattie Pittman notified, waiting for call back.

## 2020-08-09 NOTE — CONSULTS
Full consult dictated    Initially COVID negative by what appears to be rapid antigen test ([facility]), underwent open sigmoid colectomy with primary anastomosis on August 4 for complicated sigmoid diverticulitis    Patient denies any shortness of breath has a very minimal cough, denies any chest pain, chest pressure or pleurisy. Denies any change in her sense of taste or smell.   Was going to go to [facility] but RT-PCR was positive          Given lack of symptoms and clear chest x-ray will order inflammatory markers and repeat PCR test    Patient currently on room air and is otherwise hemodynamically stable    On high-dose Laverta Hero because of history of atrial fibrillation, previously took Xarelto

## 2020-08-09 NOTE — PROGRESS NOTES
Informed Dr. Dileep Ambriz of new consult regarding new onset A-Fib. Dr. Dileep Ambriz is aware and input a consultation note.

## 2020-08-10 PROBLEM — U07.1 COVID-19 VIRUS INFECTION: Status: ACTIVE | Noted: 2020-08-10

## 2020-08-10 LAB
ABSOLUTE EOS #: 0.2 K/UL (ref 0–0.4)
ABSOLUTE IMMATURE GRANULOCYTE: ABNORMAL K/UL (ref 0–0.3)
ABSOLUTE LYMPH #: 1.7 K/UL (ref 1–4.8)
ABSOLUTE MONO #: 0.6 K/UL (ref 0.1–1.3)
ANION GAP SERPL CALCULATED.3IONS-SCNC: 6 MMOL/L (ref 9–17)
BASOPHILS # BLD: 1 % (ref 0–2)
BASOPHILS ABSOLUTE: 0.1 K/UL (ref 0–0.2)
BUN BLDV-MCNC: 10 MG/DL (ref 8–23)
BUN/CREAT BLD: ABNORMAL (ref 9–20)
CALCIUM SERPL-MCNC: 8.2 MG/DL (ref 8.6–10.4)
CHLORIDE BLD-SCNC: 102 MMOL/L (ref 98–107)
CO2: 28 MMOL/L (ref 20–31)
CREAT SERPL-MCNC: 0.62 MG/DL (ref 0.5–0.9)
D-DIMER QUANTITATIVE: 0.52 MG/L FEU (ref 0–0.59)
DIFFERENTIAL TYPE: ABNORMAL
EKG ATRIAL RATE: 73 BPM
EKG Q-T INTERVAL: 344 MS
EKG QRS DURATION: 74 MS
EKG QTC CALCULATION (BAZETT): 498 MS
EKG R AXIS: 64 DEGREES
EKG T AXIS: 77 DEGREES
EKG VENTRICULAR RATE: 126 BPM
EOSINOPHILS RELATIVE PERCENT: 3 % (ref 0–4)
GFR AFRICAN AMERICAN: >60 ML/MIN
GFR NON-AFRICAN AMERICAN: >60 ML/MIN
GFR SERPL CREATININE-BSD FRML MDRD: ABNORMAL ML/MIN/{1.73_M2}
GFR SERPL CREATININE-BSD FRML MDRD: ABNORMAL ML/MIN/{1.73_M2}
GLUCOSE BLD-MCNC: 115 MG/DL (ref 65–105)
GLUCOSE BLD-MCNC: 118 MG/DL (ref 65–105)
GLUCOSE BLD-MCNC: 118 MG/DL (ref 65–105)
GLUCOSE BLD-MCNC: 122 MG/DL (ref 65–105)
GLUCOSE BLD-MCNC: 125 MG/DL (ref 70–99)
HCT VFR BLD CALC: 31.6 % (ref 36–46)
HEMOGLOBIN: 10.5 G/DL (ref 12–16)
IMMATURE GRANULOCYTES: ABNORMAL %
LYMPHOCYTES # BLD: 27 % (ref 24–44)
MAGNESIUM: 1.7 MG/DL (ref 1.6–2.6)
MCH RBC QN AUTO: 30 PG (ref 26–34)
MCHC RBC AUTO-ENTMCNC: 33.2 G/DL (ref 31–37)
MCV RBC AUTO: 90.4 FL (ref 80–100)
MONOCYTES # BLD: 9 % (ref 1–7)
NRBC AUTOMATED: ABNORMAL PER 100 WBC
PDW BLD-RTO: 15 % (ref 11.5–14.9)
PLATELET # BLD: 172 K/UL (ref 150–450)
PLATELET ESTIMATE: ABNORMAL
PMV BLD AUTO: 8.7 FL (ref 6–12)
POTASSIUM SERPL-SCNC: 3.3 MMOL/L (ref 3.7–5.3)
RBC # BLD: 3.49 M/UL (ref 4–5.2)
RBC # BLD: ABNORMAL 10*6/UL
SEG NEUTROPHILS: 60 % (ref 36–66)
SEGMENTED NEUTROPHILS ABSOLUTE COUNT: 3.8 K/UL (ref 1.3–9.1)
SODIUM BLD-SCNC: 136 MMOL/L (ref 135–144)
WBC # BLD: 6.2 K/UL (ref 3.5–11)
WBC # BLD: ABNORMAL 10*3/UL

## 2020-08-10 PROCEDURE — 6370000000 HC RX 637 (ALT 250 FOR IP): Performed by: PHYSICIAN ASSISTANT

## 2020-08-10 PROCEDURE — C9113 INJ PANTOPRAZOLE SODIUM, VIA: HCPCS | Performed by: SURGERY

## 2020-08-10 PROCEDURE — 36415 COLL VENOUS BLD VENIPUNCTURE: CPT

## 2020-08-10 PROCEDURE — 94761 N-INVAS EAR/PLS OXIMETRY MLT: CPT

## 2020-08-10 PROCEDURE — 99232 SBSQ HOSP IP/OBS MODERATE 35: CPT | Performed by: INTERNAL MEDICINE

## 2020-08-10 PROCEDURE — 2580000003 HC RX 258: Performed by: SURGERY

## 2020-08-10 PROCEDURE — 6360000002 HC RX W HCPCS: Performed by: SURGERY

## 2020-08-10 PROCEDURE — 2060000000 HC ICU INTERMEDIATE R&B

## 2020-08-10 PROCEDURE — 99231 SBSQ HOSP IP/OBS SF/LOW 25: CPT | Performed by: INTERNAL MEDICINE

## 2020-08-10 PROCEDURE — 6360000002 HC RX W HCPCS: Performed by: INTERNAL MEDICINE

## 2020-08-10 PROCEDURE — 6370000000 HC RX 637 (ALT 250 FOR IP): Performed by: INTERNAL MEDICINE

## 2020-08-10 PROCEDURE — 82947 ASSAY GLUCOSE BLOOD QUANT: CPT

## 2020-08-10 PROCEDURE — 80048 BASIC METABOLIC PNL TOTAL CA: CPT

## 2020-08-10 PROCEDURE — 97530 THERAPEUTIC ACTIVITIES: CPT

## 2020-08-10 PROCEDURE — 97110 THERAPEUTIC EXERCISES: CPT

## 2020-08-10 PROCEDURE — 83735 ASSAY OF MAGNESIUM: CPT

## 2020-08-10 PROCEDURE — 85025 COMPLETE CBC W/AUTO DIFF WBC: CPT

## 2020-08-10 PROCEDURE — 85379 FIBRIN DEGRADATION QUANT: CPT

## 2020-08-10 RX ORDER — OXYCODONE HYDROCHLORIDE AND ACETAMINOPHEN 5; 325 MG/1; MG/1
1 TABLET ORAL EVERY 6 HOURS PRN
Qty: 20 TABLET | Refills: 0 | Status: SHIPPED | OUTPATIENT
Start: 2020-08-10 | End: 2020-08-15

## 2020-08-10 RX ORDER — MAGNESIUM SULFATE 1 G/100ML
1 INJECTION INTRAVENOUS
Status: DISPENSED | OUTPATIENT
Start: 2020-08-10 | End: 2020-08-10

## 2020-08-10 RX ORDER — ATORVASTATIN CALCIUM 40 MG/1
40 TABLET, FILM COATED ORAL NIGHTLY
Status: DISCONTINUED | OUTPATIENT
Start: 2020-08-10 | End: 2020-08-13 | Stop reason: HOSPADM

## 2020-08-10 RX ORDER — PANTOPRAZOLE SODIUM 40 MG/1
40 TABLET, DELAYED RELEASE ORAL
Status: DISCONTINUED | OUTPATIENT
Start: 2020-08-11 | End: 2020-08-13 | Stop reason: HOSPADM

## 2020-08-10 RX ORDER — CEPHALEXIN 500 MG/1
500 CAPSULE ORAL 3 TIMES DAILY
Qty: 21 CAPSULE | Refills: 0 | Status: SHIPPED | OUTPATIENT
Start: 2020-08-10 | End: 2020-08-17

## 2020-08-10 RX ORDER — LEVOTHYROXINE SODIUM 0.07 MG/1
75 TABLET ORAL
Status: DISCONTINUED | OUTPATIENT
Start: 2020-08-11 | End: 2020-08-13 | Stop reason: HOSPADM

## 2020-08-10 RX ORDER — ONDANSETRON 4 MG/1
4 TABLET, ORALLY DISINTEGRATING ORAL EVERY 8 HOURS PRN
Qty: 21 TABLET | Refills: 0 | Status: SHIPPED | OUTPATIENT
Start: 2020-08-10 | End: 2020-08-17

## 2020-08-10 RX ADMIN — ENOXAPARIN SODIUM 105 MG: 120 INJECTION SUBCUTANEOUS at 10:40

## 2020-08-10 RX ADMIN — CEFAZOLIN 2 G: 1 INJECTION, POWDER, FOR SOLUTION INTRAMUSCULAR; INTRAVENOUS at 18:05

## 2020-08-10 RX ADMIN — Medication 10 ML: at 21:22

## 2020-08-10 RX ADMIN — PANTOPRAZOLE SODIUM 40 MG: 40 INJECTION, POWDER, FOR SOLUTION INTRAVENOUS at 08:40

## 2020-08-10 RX ADMIN — POTASSIUM BICARBONATE 40 MEQ: 782 TABLET, EFFERVESCENT ORAL at 08:40

## 2020-08-10 RX ADMIN — MAGNESIUM SULFATE HEPTAHYDRATE 1 G: 1 INJECTION, SOLUTION INTRAVENOUS at 14:20

## 2020-08-10 RX ADMIN — CEFAZOLIN 2 G: 1 INJECTION, POWDER, FOR SOLUTION INTRAMUSCULAR; INTRAVENOUS at 10:40

## 2020-08-10 RX ADMIN — Medication 10 ML: at 08:39

## 2020-08-10 RX ADMIN — ATORVASTATIN CALCIUM 40 MG: 40 TABLET, FILM COATED ORAL at 21:21

## 2020-08-10 RX ADMIN — HYDROMORPHONE HYDROCHLORIDE 1 MG: 1 INJECTION, SOLUTION INTRAMUSCULAR; INTRAVENOUS; SUBCUTANEOUS at 08:40

## 2020-08-10 RX ADMIN — CEFAZOLIN 2 G: 1 INJECTION, POWDER, FOR SOLUTION INTRAMUSCULAR; INTRAVENOUS at 01:50

## 2020-08-10 RX ADMIN — RIVAROXABAN 20 MG: 20 TABLET, FILM COATED ORAL at 22:09

## 2020-08-10 ASSESSMENT — PAIN SCALES - GENERAL
PAINLEVEL_OUTOF10: 0
PAINLEVEL_OUTOF10: 8
PAINLEVEL_OUTOF10: 0

## 2020-08-10 ASSESSMENT — PAIN DESCRIPTION - FREQUENCY: FREQUENCY: INTERMITTENT

## 2020-08-10 ASSESSMENT — PAIN DESCRIPTION - PROGRESSION: CLINICAL_PROGRESSION: NOT CHANGED

## 2020-08-10 ASSESSMENT — PAIN DESCRIPTION - LOCATION: LOCATION: ABDOMEN

## 2020-08-10 ASSESSMENT — PAIN DESCRIPTION - DESCRIPTORS: DESCRIPTORS: ACHING

## 2020-08-10 ASSESSMENT — PAIN DESCRIPTION - ORIENTATION: ORIENTATION: ANTERIOR

## 2020-08-10 ASSESSMENT — PAIN DESCRIPTION - PAIN TYPE: TYPE: ACUTE PAIN;SURGICAL PAIN

## 2020-08-10 ASSESSMENT — PAIN DESCRIPTION - ONSET: ONSET: ON-GOING

## 2020-08-10 ASSESSMENT — PAIN - FUNCTIONAL ASSESSMENT: PAIN_FUNCTIONAL_ASSESSMENT: PREVENTS OR INTERFERES WITH MANY ACTIVE NOT PASSIVE ACTIVITIES

## 2020-08-10 NOTE — PROGRESS NOTES
Pulmonary Progress Note  [facility]      Patient - Rocky Wiggins,  Age - 78 y.o.    - 1950      Room Number -    N [facility]  [id number]   [facility]  Date of Admission -  2020  5:43 AM        Desi Sherwood MD  Primary Care Physician - Myna Frankel, MD     SUBJECTIVE   Remains on room air without any significant dyspnea    OBJECTIVE   VITALS    height is 5' 2\" (1.575 m) and weight is 245 lb 2.4 oz (111.2 kg). Her oral temperature is 98.3 °F (36.8 °C). Her blood pressure is 143/66 (abnormal) and her pulse is 56. Her respiration is 13 and oxygen saturation is 95%. Body mass index is 44.84 kg/m². Temperature Range: Temp: 98.3 °F (36.8 °C) Temp  Av.3 °F (36.8 °C)  Min: 98.2 °F (36.8 °C)  Max: 98.4 °F (36.9 °C)  BP Range:  Systolic (22FOL), XMS:967 , Min:82 , LZR:006     Diastolic (06BCR), MXJ:04, Min:43, Max:105    Pulse Range: Pulse  Av.5  Min: 46  Max: 108  Respiration Range: Resp  Av.5  Min: 13  Max: 24  Current Pulse Ox[de-identified]  SpO2: 95 %  24HR Pulse Ox Range:  SpO2  Av.9 %  Min: 90 %  Max: 95 %  Oxygen Amount and Delivery: O2 Flow Rate (L/min): 1 L/min    Wt Readings from Last 3 Encounters:   20 245 lb 2.4 oz (111.2 kg)   20 226 lb (102.5 kg)   20 232 lb (105.2 kg)       I/O (24 Hours)    Intake/Output Summary (Last 24 hours) at 8/10/2020 1351  Last data filed at 8/10/2020 0400  Gross per 24 hour   Intake 480 ml   Output 1335 ml   Net -855 ml       EXAM     General Appearance  Awake, alert, oriented, in no acute distress  HEENT - normocephalic, atraumatic.  []  Mallampati  [x] Crowded airway   [] Macroglossia  []  Retrognathia  [x] Micrognathia  []  Normal tongue size []  Normal Bite  [] Yates sign positive    Neck - Supple,  trachea midline   Lungs -clear diminished  Cardiovascular - Heart sounds are normal.  Regular rate and rhythm the last 72 hours. PTT   Lab Results   Component Value Date    APTT 28.7 07/11/2020         RADIOLOGY     (See actual reports for details)    ASSESSMENT/PLAN   Active Problems: Morbid obesity with BMI of 40.0-44.9, adult (HCC)    Acquired hypothyroidism    Adenomatous polyp of sigmoid colon, 6/26/17    A-fib ([facility]    Type 2 diabetes mellitus, without long-term current use of insulin ([facility])    Atrial fibrillation, new onset ([facility]    Class 3 severe obesity due to excess calories without serious comorbidity with body mass index (BMI) of 40.0 to 44.9 in adult [facility])    Sigmoid diverticulitis    COVID-19 virus infection  Resolved Problems:    * No resolved hospital problems. *    Even though her third COVID-19 test came back negative, that could be human error the fact that her first test was an antigen test and her second test which was RT-PCR was positive.   She is an asymptomatic patient, but nonetheless is infection and should be treated in isolation  Continue droplet plus isolation  No objection to discharge planning  Electronically signed by Susan Eastman MD on 8/10/2020 at 1:51 PM

## 2020-08-10 NOTE — PROGRESS NOTES
[facility]    CONSULTATION / HISTORY AND PHYSICAL EXAMINATION            Date:   8/10/2020  Patient name:  Sarah Lott  Date of admission:  8/4/2020  5:43 AM  MRN:   [id number]  Account:  [de-identified]  YOB: 1950  PCP:    Jose Mcmullen MD  Room:   2014/2014-01  Code Status:    Full Code    Physician Requesting Consult: Evens Andrade MD    Reason for Consult: Medical management    Chief Complaint:     No chief complaint on file. Sigmoid diverticulitis    History Obtained From:     patient, electronic medical record     History of Present Illness/ Interval History:   71-year-old female with history of morbid obesity, hypertension, diabetes, atrial fibrillation on anticoagulation admitted for planned open sigmoid colectomy and cholecystectomy. Noted to be COVID-19 positive after admission. Had EGD and colonoscopy earlier this month, history of uterine cancer in the past status post hysterectomy    Past Medical History:     Past Medical History:   Diagnosis Date    Adenocarcinoma of endometrium, stage 1 ([facility] 10/5/2017    Well differentiated grade 1 adenocarcinoma arising in complex hyperplasia    JOSHUA (acute kidney injury) ([facility] 1/15/2020    Allergic rhinitis 2/23/2017    At high risk for falls 2/23/2017    Atrial fibrillation [facility])     Jan 2020    CHF (congestive heart failure) ([facility]     \"little\"    Colon polyp     Had colonoscopy done 20 yrs ago    Diabetes mellitus ([facility]     Diverticulitis     Endometrial cancer ([facility]     History of TIA (transient ischemic attack) '80's    on Baby ASA    Hyperglycemia 3/21/2017    Hyperlipidemia     Hypertension since 2015    on Rx.     Hypothyroidism 1980    sub total thyroidectomy goiter    Legally blind since born    both eyes, cord prolapse; \"not legally blind\" per \"associated eye care\" please see telephone encounter from 2/27/18    Lower back pain     Mixed incontinence 1/9/2016    Morbid obesity with BMI of 40.0-44.9, adult ([facility] 2/23/2017    CLAROS (nonalcoholic steatohepatitis) 3/10/2019    Obesity     Oral phase dysphagia 2/23/2018    Osteoarthritis (arthritis due to wear and tear of joints)     ronan knee    Osteoarthritis involving multiple joints on both sides of body 4/24/2012    Osteoporosis     Perforated bowel ([facility]     Postmenopausal bleeding 9/20/2017    S/P h-scope, Myosure 9/20/17 9/20/2017    Pathology pending    Tennis elbow     left    Thickened endometrium 9/20/2017    TIA (transient ischemic attack)     TLHBSO, bilateral LND 10/24/17 10/24/2017    Type 2 diabetes mellitus, without long-term current use of insulin ([facility] 1/14/2020        Past Surgical History:     Past Surgical History:   Procedure Laterality Date    CATARACT REMOVAL WITH IMPLANT Bilateral 2015    COLONOSCOPY  1997    had polyp, she doesn't know where and when, \"20 yrs ago\"    COLONOSCOPY  06/26/2017    COLONOSCOPY N/A 8/3/2020    COLONOSCOPY POLYPECTOMY SNARE performed by Hattie Egan MD at [address] [facility] N/A 9/20/2017    HYSTEROSCOPY  WITH MYOSURE performed by Meseret Camejo DO at [address] [facility] N/A 10/24/2017    TOTAL LAPAROSCOPIC HYSTERECTOMY, BSO, F.S.  STAGING, GYRUS G400 performed by Fanny Han MD at [address] [facility] N/A 6/26/2017    COLONOSCOPY POLYPECTOMY / HOT SNARE performed by Torin Yousif DO at [place] [address] N/A 8/4/2020    OPEN SIGMOID COLECTOMY; PRIMARY ANASTOMOSIS & MOBILIZATION OF SPLENIC FLEXURE performed by Hatite Egan MD at [address] [facility]  10/24/2017    with pelvic lymph node dissection    THYROID SURGERY  1980    subtotal 20 years ago    TONSILLECTOMY      UPPER GASTROINTESTINAL ENDOSCOPY N/A 8/3/2020    EGD BIOPSY performed by Hattie Egan MD at [facility]      FLOATERS        Medications Prior to Admission:     Prior to Admission medications    Medication Sig Start Date End Date Taking? Authorizing Provider   metoprolol tartrate (LOPRESSOR) 25 MG tablet Take 0.5 tablets by mouth 2 times daily 7/27/20  Yes Mercedes Escobedo MD   amiodarone (CORDARONE) 200 MG tablet Take 1 tablet by mouth daily 6/22/20  Yes Danielle Darling MD   atorvastatin (LIPITOR) 40 MG tablet TAKE 1 TABLET BY MOUTH DAILY STOP SIMVASTATIN 3/16/20  Yes Malini Balderas MD   metFORMIN (GLUCOPHAGE) 500 MG tablet TAKE ONE TABLET BY MOUTH TWICE A DAY WITH A MEAL 11/11/19  Yes Malini Balderas MD   Lactobacillus (PROBIOTIC ACIDOPHILUS) TABS Take 1 tablet by mouth daily 7/31/17  Yes Malini Balderas MD   Blood Pressure Monitor KIT Use as directed. 7/22/20   MAX Barker CNP   glucose monitoring kit (FREESTYLE) monitoring kit Use as directed. BRAND OF CHO[employer] INSURANCE ALLOWS. 7/22/20   MAX Barker CNP   blood glucose monitor strips Test 2-3 times a day & as needed for symptoms of irregular blood glucose.   BRAND [employer] CHO[employer] INSURANCE ALLOWS. 7/22/20   MAX Barker CNP   Alcohol Swabs (ALCOHOL PREP) PADS Use as directed 7/22/20   MAX Barker CNP   Lancets MISC 1 each by Does not apply route 2 times daily 7/22/20   MAX Barker CNP   oxybutynin (DITROPAN-XL) 10 MG extended release tablet Take 1 tablet by mouth daily 7/20/20   Nik Keane MD   rivaroxaban (XARELTO) 20 MG TABS tablet Take 1 tablet by mouth daily (with breakfast) 6/22/20   Danielle Darling MD   levothyroxine (SYNTHROID) 75 MCG tablet TAKE 1 TABLET BY MOUTH EVERY MORNING (BEFORE BREAKFAST) 1/21/20   Malini Balderas MD   HM LORATADINE 10 MG tablet TAKE 1 TABLET BY MOUTH DAILY 1/21/20   Malini Balderas MD   docusate sodium (COLACE) 100 MG capsule TAKE 1 CAPSULE BY MOUTH 2 TIMES DAILY 12/16/19   Malini Balderas MD   MYRBETRIQ 50 MG TB24 TAKE ONE TABLET BY MOUTH ONCE DAILY 12/16/19   Malini Balderas MD Cranberry, Vacc oxycoccus, (SM CRANBERRY) 500 MG TABS Take 500 mg by mouth daily  7/31/17   Historical Provider, MD   UNABLE TO FIND Needs right walker brake fixed 10/3/19   Arnoldo Jim MD   Cholecalciferol (VITAMIN D) 2000 units TABS tablet Take 1 tablet by mouth daily 3/9/19   Arnoldo Jim MD   vitamin B-12 (CYANOCOBALAMIN) 1000 MCG tablet Take 1 tablet by mouth daily 3/9/19   Arnoldo Jim MD   acetaminophen (APAP EXTRA STRENGTH) 500 MG tablet Take 1 tablet by mouth every 6 hours as needed for Pain or Fever 2/12/19   Arnoldo Jim MD        Allergies:     Sulfa antibiotics and Penicillins    Social History:     Tobacco:    reports that she has never smoked. She has never used smokeless tobacco.  Alcohol:      reports no history of alcohol use. Drug Use:  reports no history of drug use. Family History:     Family History   Problem Relation Age of Onset    Coronary Art Dis Father     Hypertension Father     Diabetes Father     High Blood Pressure Father     Heart Attack Father     Diabetes Mother     High Blood Pressure Mother     Thyroid Disease Mother     High Blood Pressure Sister     Thyroid Disease Brother     High Blood Pressure Brother     High Blood Pressure Maternal Grandmother     Diabetes Maternal Grandfather     No Known Problems Paternal Grandmother     Heart Attack Paternal Grandfather     Colon Cancer Neg Hx        Review of Systems:     Positive and Negative as described in HPI. Constitutional: Negative for chills, fatigue, fever and unexpected weight change. HENT: Negative for ear discharge, facial swelling, nosebleeds, sore throat, trouble swallowing and voice change. Eyes: Negative for redness and visual disturbance. Respiratory: Negative for cough, shortness of breath and wheezing. Cardiovascular: Negative for chest pain, palpitations and leg swelling.    Gastrointestinal: approprate post surgical paion, no blood in stool, diarrhea and vomiting. Genitourinary: Negative for difficulty urinating, dysuria and flank pain. Musculoskeletal: Negative for joint swelling. Skin: Negative for color change and rash. Neurological: Negative for dizziness, seizures, syncope and headaches. Hematological: Negative for adenopathy. Does not bruise/bleed easily. Physical Exam:     /68   Pulse 53   Temp 98.4 °F (36.9 °C) (Oral)   Resp 18   Ht 5' 2\" (1.575 m)   Wt 245 lb 2.4 oz (111.2 kg)   LMP  (LMP Unknown)   SpO2 91%   BMI 44.84 kg/m²   Temp (24hrs), Av.3 °F (36.8 °C), Min:98 °F (36.7 °C), Max:98.4 °F (36.9 °C)      Remainder of examination deferred due to excessive risk conferred by physical examination during a global pandemic due to coronavirus 19. In a setting where local and national supplies of personal protective equipment are depleted.        Investigations:      Laboratory Testing:  Lab Results   Component Value Date    WBC 6.2 08/10/2020    HGB 10.5 (L) 08/10/2020    HCT 31.6 (L) 08/10/2020    MCV 90.4 08/10/2020     08/10/2020     Lab Results   Component Value Date     08/10/2020    K 3.3 08/10/2020     08/10/2020    CO2 28 08/10/2020    BUN 10 08/10/2020    CREATININE 0.62 08/10/2020    GLUCOSE 125 08/10/2020    GLUCOSE 114 2012    CALCIUM 8.2 08/10/2020         Assessment :      Primary Problem  <principal problem not specified>    Active Hospital Problems    Diagnosis Date Noted    Morbid obesity with BMI of 40.0-44.9, adult ([facility] [E66.01, Z68.41] 2017     Priority: High    Sigmoid diverticulitis [K57.32] 2020    Class 3 severe obesity due to excess calories without serious comorbidity with body mass index (BMI) of 40.0 to 44.9 in adult ([facility] [E66.01, Z68.41] 2020    Atrial fibrillation, new onset ([facility] [I48.91] 2020    A-fib ([facility] [I48.91] 2020    Type 2 diabetes mellitus, without long-term current use of insulin ([facility] [E11.9] 2020    Adenomatous polyp of sigmoid colon, 6/26/17 [D12.5] 07/30/2017    Acquired hypothyroidism [E03.9]        Plan: Active Problems: Morbid obesity with BMI of 40.0-44.9, adult (HCC)    Acquired hypothyroidism    Adenomatous polyp of sigmoid colon, 6/26/17    A-fib ([facility]    Type 2 diabetes mellitus, without long-term current use of insulin (HCC)    Atrial fibrillation, new onset ([facility]    Class 3 severe obesity due to excess calories without serious comorbidity with body mass index (BMI) of 40.0 to 44.9 in adult [facility])    Sigmoid diverticulitis  Resolved Problems:    * No resolved hospital problems. *      81-year-old status post sigmoid colectomy and cholecystectomy for perforated sigmoid diverticulitis. POD day #6 today. 1. COVID-19 infection. 1st test negative, second test positive in preparation for discharge to SNF. 2. Status post surgery day #6-doing well, no further fever spikes  3. Tachycardia- started on Cardizem, Lopressor dose increased- better controlled. 4. Type 2 diabetes-well controlled. 5. Hypertension-controlled. 6. Hypothyroid- continue home medication. 7. Chronic diastolic heart failure-currently compensated. 8. Atrial fibrillation on Cardizem, Lopressor, Xarelto-rate currently controlled. 9. Hypokalemia-replace per protocol, hypomagnesemia replace to keep at 2      We will confirm with surgical team about resuming Xarelto           Consultations:   Alcira Brown  IP CONSULT TO SOCIAL WORK  IP CONSULT TO CARDIOLOGY  IP CONSULT TO INFECTIOUS DISEASES  IP CONSULT TO PULMONOLOGY      Brigitte Khan MD  8/10/2020  10:29 AM    Copy sent to Dr. Kiersten Guthrie MD    Please note that this chart was generated using voice recognition Dragon dictation software. Although every effort was made to ensure the accuracy of this automated transcription, some errors in transcription may have occurred.

## 2020-08-10 NOTE — PLAN OF CARE
Problem: Infection:  Goal: Will remain free from infection  Description: Will remain free from infection  Outcome: Ongoing     Problem: Safety:  Goal: Free from accidental physical injury  Description: Free from accidental physical injury  Outcome: Ongoing  Goal: Free from intentional harm  Description: Free from intentional harm  Outcome: Ongoing     Problem: Daily Care:  Goal: Daily care needs are met  Description: Daily care needs are met  Outcome: Ongoing     Problem: Pain:  Goal: Patient's pain/discomfort is manageable  Description: Patient's pain/discomfort is manageable  Outcome: Ongoing  Goal: Pain level will decrease  Description: Pain level will decrease  Outcome: Ongoing  Goal: Control of acute pain  Description: Control of acute pain  Outcome: Ongoing  Goal: Control of chronic pain  Description: Control of chronic pain  Outcome: Ongoing     Problem: Skin Integrity:  Goal: Skin integrity will stabilize  Description: Skin integrity will stabilize  Outcome: Ongoing     Problem: Discharge Planning:  Goal: Patients continuum of care needs are met  Description: Patients continuum of care needs are met  Outcome: Ongoing     Problem: Falls - Risk of:  Goal: Will remain free from falls  Description: Will remain free from falls  Outcome: Ongoing  Goal: Absence of physical injury  Description: Absence of physical injury  Outcome: Ongoing     Problem: HEMODYNAMIC STATUS  Goal: Patient has stable vital signs and fluid balance  Outcome: Ongoing     Problem: OXYGENATION/RESPIRATORY FUNCTION  Goal: Patient will achieve/maintain normal respiratory rate/effort  Outcome: Ongoing     Problem: MOBILITY  Goal: Early mobilization is achieved  Outcome: Ongoing     Problem: ELIMINATION  Goal: Elimination patterns are normal or improving  Description: Elimination patterns return to pre-surgery normal patterns  Outcome: Ongoing     Problem: SKIN INTEGRITY  Goal: Skin integrity is maintained or improved  Outcome: Ongoing Problem: Musculor/Skeletal Functional Status  Goal: Highest potential functional level  Outcome: Ongoing  Goal: Absence of falls  Outcome: Ongoing     Problem: Skin Integrity:  Goal: Will show no infection signs and symptoms  Description: Will show no infection signs and symptoms  Outcome: Ongoing  Goal: Absence of new skin breakdown  Description: Absence of new skin breakdown  Outcome: Ongoing     Problem: Airway Clearance - Ineffective  Goal: Achieve or maintain patent airway  Outcome: Ongoing     Problem: Gas Exchange - Impaired  Goal: Absence of hypoxia  Outcome: Ongoing  Goal: Promote optimal lung function  Outcome: Ongoing     Problem: Breathing Pattern - Ineffective  Goal: Ability to achieve and maintain a regular respiratory rate  Outcome: Ongoing     Problem:  Body Temperature -  Risk of, Imbalanced  Goal: Ability to maintain a body temperature within defined limits  Outcome: Ongoing  Goal: Will regain or maintain usual level of consciousness  Outcome: Ongoing  Goal: Complications related to the disease process, condition or treatment will be avoided or minimized  Outcome: Ongoing     Problem: Isolation Precautions - Risk of Spread of Infection  Goal: Prevent transmission of infection  Outcome: Ongoing     Problem: Nutrition Deficits  Goal: Optimize nutrtional status  Outcome: Ongoing     Problem: Risk for Fluid Volume Deficit  Goal: Maintain normal heart rhythm  Outcome: Ongoing  Goal: Maintain absence of muscle cramping  Outcome: Ongoing  Goal: Maintain normal serum potassium, sodium, calcium, phosphorus, and pH  Outcome: Ongoing     Problem: Loneliness or Risk for Loneliness  Goal: Demonstrate positive use of time alone when socialization is not possible  Outcome: Ongoing     Problem: Fatigue  Goal: Verbalize increase energy and improved vitality  Outcome: Ongoing     Problem: Patient Education: Go to Patient Education Activity  Goal: Patient/Family Education  Outcome: Ongoing

## 2020-08-10 NOTE — PROGRESS NOTES
[facility]   Progress Note                   Date:   8/10/2020  Patient name: Nereyda Swartz  Date of admission:  8/4/2020  5:43 AM  MRN:   [id number]  YOB: 1950  PCP: Malini Balderas MD    Reason for Admission: A. Fib RVR    Subjective:     Patient was not seen at bedside this morning due to COVID. Intake/Output Summary (Last 24 hours) at 8/10/2020 1001  Last data filed at 8/10/2020 0400  Gross per 24 hour   Intake 600 ml   Output 1335 ml   Net -735 ml       Medications:   Scheduled Meds:   metoprolol tartrate  50 mg Oral BID    dilTIAZem  240 mg Oral Daily    enoxaparin  1 mg/kg Subcutaneous BID    sodium chloride flush  10 mL Intravenous 2 times per day    pantoprazole  40 mg Intravenous Daily    And    sodium chloride (PF)  10 mL Intravenous Daily    ceFAZolin (ANCEF) 50 mL IVPB  2 g Intravenous Q8H    insulin lispro  0-6 Units Subcutaneous TID WC    insulin lispro  0-3 Units Subcutaneous Nightly     Continuous Infusions:   dextrose       CBC:   Recent Labs     08/08/20 0428 08/09/20 0442 08/10/20  0416   WBC 6.8 6.0 6.2   HGB 11.3* 10.9* 10.5*   * 151 172     BMP:    Recent Labs     08/08/20 0428 08/09/20 0442 08/10/20  0416    138 136   K 3.5* 3.8 3.3*    103 102   CO2 25 28 28   BUN 8 10 10   CREATININE 0.57 0.67 0.62   GLUCOSE 93 111* 125*     Hepatic: No results for input(s): AST, ALT, ALB, BILITOT, ALKPHOS in the last 72 hours. Troponin: No results for input(s): TROPONINI in the last 72 hours. BNP: No results for input(s): BNP in the last 72 hours. Lipids: No results for input(s): CHOL, HDL in the last 72 hours. Invalid input(s): LDLCALCU  INR: No results for input(s): INR in the last 72 hours.     Objective:   Vitals: /68   Pulse 53   Temp 98.4 °F (36.9 °C) (Oral)   Resp 18   Ht 5' 2\" (1.575 m)   Wt 245 lb 2.4 oz (111.2 kg)   LMP  (LMP Unknown)   SpO2 91%   BMI 44.84 kg/m²     Physical exam was deferred this morning fatigue      Adenomatous polyp of sigmoid colon, 6/26/17     Mixed stress and urge urinary incontinence     TLHBSO, bilateral LND 10/24/17     Optic atrophy of both eyes     Cataracta b/l eyes     Difficulty walking     Esotropia     Nystagmus     History of uterine cancer, Well differentiated grade 1 adenocarcinoma arising in complex hyperplasia, 2017     Vitamin B 12 deficiency     Aortic calcification (HCC)     Calculus of gallbladder without cholecystitis without obstruction     CLAROS (nonalcoholic steatohepatitis)     Optic atrophy     Cough present for greater than 3 weeks     Dyspepsia     A-fib ([address]     Type 2 diabetes mellitus, without long-term current use of insulin (HCC)     JOSHUA (acute kidney injury) ([address]     Atrial fibrillation, new onset ([address]     Mobility impaired     Perforated diverticulum     Diverticulitis     Chronic cholecystitis     Colitis     Multiple atypical skin moles     Class 3 severe obesity due to excess calories without serious comorbidity with body mass index (BMI) of 40.0 to 44.9 in [facility])     Symptomatic bradycardia     CHF NYHA class I, chronic, diastolic ([address]     Sigmoid diverticulitis      Plan of Treatment:   1. Patient is sinus rhythm this morning, continue Lopressor 50mg BID, Cardizem CD 240mg daily  2. Replace potassium  3. Continue with full dose Lovenox, may resume Xarelto when cleared by other consulting services    Will discuss with rounding attending Dr. Casandra Moura for final recommendations. Carito Lockett MD  PGY2 Family Medicine Resident    I performed a history and physical examination of the patient and discussed management with the resident. I reviewed the residents note and agree with the documented findings and plan of care. Any areas of disagreement are noted on the chart. I was personally present for the key portions of any procedures. I have documented in the chart those procedures where I was not present during the key portions.  I have personally evaluated this patient and have completed at least one if not all key elements of the E/M (history, physical exam, and MDM). Additional findings are as noted.     Elizabeth Johnson MD

## 2020-08-10 NOTE — PROGRESS NOTES
[facility]                 PATIENT NAME: Lillie Cherry     TODAY'S DATE: 8/10/2020, 10:20 AM    SUBJECTIVE:  POD#6  [facility] 1st COVID PCR positive; repeat PCR negative. However patient remains in COVI precautions. Discussed with nursing staff. Abdominal pain controlled. Bowels are moving. Tolerating diet, no N/V. Incision clean, dry, intact. NORMA x 2 serosanguinous. OBJECTIVE:   VITALS:  /68   Pulse 53   Temp 98.4 °F (36.9 °C) (Oral)   Resp 18   Ht 5' 2\" (1.575 m)   Wt 245 lb 2.4 oz (111.2 kg)   LMP  (LMP Unknown)   SpO2 91%   BMI 44.84 kg/m²      INTAKE/OUTPUT:      Intake/Output Summary (Last 24 hours) at 8/10/2020 1020  Last data filed at 8/10/2020 0400  Gross per 24 hour   Intake 600 ml   Output 1335 ml   Net -735 ml                 CONSTITUTIONAL:  awake and alert.   No acute distress  HEART:   Atrial fibrillation   LUNGS:   Decreased air entry at bases  ABDOMEN:   Abdomen soft, non-tender, non-distended  EXTREMITIES:   Pedal edema    Data:  CBC:   Lab Results   Component Value Date    WBC 6.2 08/10/2020    RBC 3.49 08/10/2020    RBC 4.23 03/20/2012    HGB 10.5 08/10/2020    HCT 31.6 08/10/2020    MCV 90.4 08/10/2020    MCH 30.0 08/10/2020    MCHC 33.2 08/10/2020    RDW 15.0 08/10/2020     08/10/2020     03/20/2012    MPV 8.7 08/10/2020     BMP:    Lab Results   Component Value Date     08/10/2020    K 3.3 08/10/2020     08/10/2020    CO2 28 08/10/2020    BUN 10 08/10/2020    LABALBU 4.3 07/24/2020    LABALBU 4.3 03/20/2012    CREATININE 0.62 08/10/2020    CALCIUM 8.2 08/10/2020    GFRAA >60 08/10/2020    LABGLOM >60 08/10/2020    GLUCOSE 125 08/10/2020    GLUCOSE 114 03/20/2012       Radiology Review:      XR CHEST PORTABLE [[id number]]  Collected: 08/09/20 0932        Order Status: Completed  Updated: 08/09/20 1007       Narrative:         EXAMINATION:   ONE XRAY VIEW OF THE CHEST     8/9/2020 9:32 am     COMPARISON:   July 24, 2020     HISTORY:   ORDERING SYSTEM PROVIDED HISTORY: COVID-19 positive, check for pneumonia   TECHNOLOGIST PROVIDED HISTORY:   COVID-19 positive, check for pneumonia   Reason for Exam: Covid positive pt   Acuity: Unknown   Type of Exam: Unknown     FINDINGS:   Questionable minimal bibasilar lung opacities. Cardiomegaly.  No pulmonary edema.       Impression:         Questionable minimal bibasilar lung opacities. ASSESSMENT     Active Problems: Morbid obesity with BMI of 40.0-44.9, adult ([facility])    Acquired hypothyroidism    Adenomatous polyp of sigmoid colon, 6/26/17    [facility]fib ([facility]    Type 2 diabetes mellitus, without long-term current use of insulin ([facility])    Atrial fibrillation, new onset ([facility]    Class 3 severe obesity due to excess calories without serious comorbidity with body mass index (BMI) of 40.0 to 44.9 in adult [facility])    Sigmoid diverticulitis  Resolved Problems:    * No resolved hospital problems. *      Plan  1. Diet as tolerated  2. Bowels are moving  3. Patient is surgically stable  4. Discharge planning, precert started  5. As above.       Electronically signed by Jed Sorto PA-C  [address] [address]

## 2020-08-10 NOTE — CARE COORDINATION
ONGOING DISCHARGE PLAN:    LSW following for discharge planning to SNF [facility]. Therapy recommendations support discharge planning. Active orders for IV ancef and a low fiber diet. POD #6 Open Sigmoid Colectomy. Will continue to follow for additional discharge needs.     Electronically signed by Beatris Villa RN on 8/10/2020 at 12:15 PM

## 2020-08-10 NOTE — CARE COORDINATION
Writer spoke to the pt dtr,juwan, she does NOT want her mother to go to [facility]. She would like to pt to be referred to [facility]. Writer called [facility] and cancelled referral. Writer then called [facility] and left a message on admission vmail to make a referral. Pt will need a pre cert.

## 2020-08-10 NOTE — PROGRESS NOTES
[facility] -   Infectious diseases evaluation  admission date 8/4/2020    reason for consultation:   Positive COVID-19 test    Impression :   Current:  · Positive COVID-19 test/asymptomatic infection. · Sigmoid diverticulosis status post colectomy 8/4/2020  · Atrial fibrillation  · Diabetes mellitus  · Hypertension  · Obesity  · History of endometrial cancer status post hysterectomy  · Legally blind    Other:  · Penicillin and sulfa allergy    Recommendations     Discussed with Dr. Abiel Parada plus isolation           History of Present Illness:   Initial history:  Otis Pires is a 78y.o.-year-old female was admitted for EGD and colonoscopy that was done 8/3/2020 found to have a sigmoid diverticulosis and polyps . the patient had colectomy 8/4/2020 postop course was uncomplicated and the patient was going to be discharged to [place]. Rapid COVID test was negative on 7/30/2020  Repeat COVID test 8/8/2020 came back positive. The patient had no shortness of breath or cough, no hypoxia, O2 sat 95% on room air, no change in her sense of taste or smell, no reported fever. She does have postoperative abdominal pain and nausea    Interval changes  8/10/2020   Chest x-ray showed questionable minimal bibasilar lung opacity  She had no cough or shortness of breath, no hypoxia on room air, no new events  Repeat rapid COVID test yesterday was negative        I have personally reviewed the past medical history, past surgical history, medications, social history, and family history, and I haveupdated the database accordingly.   Past Medical History:     Past Medical History:   Diagnosis Date    Adenocarcinoma of endometrium, stage 1 ([address] 10/5/2017    Well differentiated grade 1 adenocarcinoma arising in complex hyperplasia    JOSHUA (acute kidney injury) ([address] 1/15/2020    Allergic rhinitis 2/23/2017    At high risk for falls 2/23/2017    Atrial fibrillation [facility])     Isaias 2020    CHF (congestive heart failure) (HCC)     \"little\"    Colon polyp     Had colonoscopy done 20 yrs ago    Diabetes mellitus ([address]     Diverticulitis     Endometrial cancer ([address]     History of TIA (transient ischemic attack) '80's    on Baby ASA    Hyperglycemia 3/21/2017    Hyperlipidemia     Hypertension since 2015    on Rx.     Hypothyroidism 1980    sub total thyroidectomy goiter    Legally blind since born    both eyes, cord prolapse; \"not legally blind\" per \"associated eye care\" please see telephone encounter from 2/27/18    Lower back pain     Mixed incontinence 1/9/2016    Morbid obesity with BMI of 40.0-44.9, adult ([address] 2/23/2017    CLAROS (nonalcoholic steatohepatitis) 3/10/2019    Obesity     Oral phase dysphagia 2/23/2018    Osteoarthritis (arthritis due to wear and tear of joints)     ronan knee    Osteoarthritis involving multiple joints on both sides of body 4/24/2012    Osteoporosis     Perforated bowel ([address]     Postmenopausal bleeding 9/20/2017    S/P h-scope, Myosure 9/20/17 9/20/2017    Pathology pending    Tennis elbow     left    Thickened endometrium 9/20/2017    TIA (transient ischemic attack)     TLHBSO, bilateral LND 10/24/17 10/24/2017    Type 2 diabetes mellitus, without long-term current use of insulin ([address] 1/14/2020       Past Surgical  History:     Past Surgical History:   Procedure Laterality Date    CATARACT REMOVAL WITH IMPLANT Bilateral 2015    COLONOSCOPY  1997    had polyp, she doesn't know where and when, \"20 yrs ago\"    COLONOSCOPY  06/26/2017    COLONOSCOPY N/A 8/3/2020    COLONOSCOPY POLYPECTOMY SNARE performed by Jose Manuel Clark MD at [address] [facility] N/A 9/20/2017    HYSTEROSCOPY  WITH MYOSURE performed by Gloria Peñaloza DO at [address] [facility] N/A 10/24/2017    TOTAL LAPAROSCOPIC HYSTERECTOMY, BSO, F.S.  STAGING, GYRUS G400 performed by Thomas Iniguez MD at [address] [place] [address] W/REMOVAL LESION BY HOT BX FORCEPS N/A 6/26/2017    COLONOSCOPY POLYPECTOMY / HOT SNARE performed by Sebastien Avalos DO at [facility] N/A 8/4/2020    OPEN SIGMOID COLECTOMY; PRIMARY ANASTOMOSIS & MOBILIZATION OF SPLENIC FLEXURE performed by Cayden Jackson MD at [address]  10/24/2017    with pelvic lymph node dissection    THYROID SURGERY  1980    subtotal 20 years ago    TONSILLECTOMY      UPPER GASTROINTESTINAL ENDOSCOPY N/A 8/3/2020    EGD BIOPSY performed by Cayden Jackson MD at [facility] VITRECTOMY      FLOATERS       Medications:      magnesium sulfate  1 g Intravenous Q1H    rivaroxaban  20 mg Oral Dinner    [START ON 8/11/2020] levothyroxine  75 mcg Oral QAM AC    atorvastatin  40 mg Oral Nightly    [START ON 8/11/2020] pantoprazole  40 mg Oral QAM AC    metoprolol tartrate  50 mg Oral BID    dilTIAZem  240 mg Oral Daily    sodium chloride flush  10 mL Intravenous 2 times per day    ceFAZolin (ANCEF) 50 mL IVPB  2 g Intravenous Q8H    insulin lispro  0-6 Units Subcutaneous TID WC    insulin lispro  0-3 Units Subcutaneous Nightly       Social History:     Social History     Socioeconomic History    Marital status:       Spouse name: Not on file    Number of children: 1    Years of education: Not on file    Highest education level: Not on file   Occupational History    Not on file   Social Needs    Financial resource strain: Not on file    Food insecurity     Worry: Not on file     Inability: Not on file    Transportation needs     Medical: Not on file     Non-medical: Not on file   Tobacco Use    Smoking status: Never Smoker    Smokeless tobacco: Never Used   Substance and Sexual Activity    Alcohol use: No     Alcohol/week: 0.0 standard drinks    Drug use: No    Sexual activity: Never   Lifestyle    Physical activity     Days per week: Not on file     Minutes per session: Not on file    Stress: Not on file   Relationships    Social connections     Talks on phone: Not on file     Gets together: Not on file     Attends Orthodox service: Not on file     Active member of club or organization: Not on file     Attends meetings of clubs or organizations: Not on file     Relationship status: Not on file    Intimate partner violence     Fear of current or ex partner: Not on file     Emotionally abused: Not on file     Physically abused: Not on file     Forced sexual activity: Not on file   Other Topics Concern    Not on file   Social History Narrative    Not on file       Family History:     Family History   Problem Relation Age of Onset    Coronary Art Dis Father     Hypertension Father     Diabetes Father     High Blood Pressure Father     Heart Attack Father     Diabetes Mother     High Blood Pressure Mother     Thyroid Disease Mother     High Blood Pressure Sister     Thyroid Disease Brother     High Blood Pressure Brother     High Blood Pressure Maternal Grandmother     Diabetes Maternal Grandfather     No Known Problems Paternal Grandmother     Heart Attack Paternal Grandfather     Colon Cancer Neg Hx         Allergies:   Sulfa antibiotics and Penicillins     Review of Systems:     Review of Systems  As per history of present illness, other than above 12 systems reviewed negative  Physical Examination :     Patient Vitals for the past 8 hrs:   BP Temp Temp src Pulse Resp SpO2   08/10/20 0900 130/68 -- -- 53 18 91 %   08/10/20 0806 -- -- -- 54 -- --   08/10/20 0800 (!) 107/45 -- -- 53 16 94 %   08/10/20 0700 (!) 89/43 98.4 °F (36.9 °C) Oral 52 17 93 %   08/10/20 0600 94/63 -- -- (!) 46 16 94 %   08/10/20 0500 113/62 -- -- 90 16 91 %       Physical Exam    Due to the current efforts to prevent transmission of COVID-19 and also the need to preserve PPE for other caregivers, a face-to-face encounter with the patient was not performed.  That being said, all relevant records and diagnostic tests were reviewed, including laboratory results and imaging. When appropriate, patient was evaluated from the window, called on the phone, and chart reviewed, disc w  involved healthcare workers. Medical Decision Making:   I have independently reviewed/ordered the following labs:    CBC with Differential:   Recent Labs     08/09/20  0442 08/10/20  0416   WBC 6.0 6.2   HGB 10.9* 10.5*   HCT 33.0* 31.6*    172   LYMPHOPCT 25 27   MONOPCT 9* 9*     BMP:  Recent Labs     08/09/20  0442 08/10/20  0416    136   K 3.8 3.3*    102   CO2 28 28   BUN 10 10   CREATININE 0.67 0.62   MG 1.5* 1.7     Hepatic Function Panel: No results for input(s): PROT, LABALBU, BILIDIR, IBILI, BILITOT, ALKPHOS, ALT, AST in the last 72 hours. No results for input(s): RPR in the last 72 hours. No results for input(s): HIV in the last 72 hours. No results for input(s): BC in the last 72 hours. Lab Results   Component Value Date    CREATININE 0.62 08/10/2020    GLUCOSE 125 08/10/2020    GLUCOSE 114 03/20/2012       Detailed results: Thank you for allowing us to participate in the care of this patient. Please call with questions. This note is created with the assistance of a speech recognition program.  While intending to generate adocument that actually reflects the content of the visit, the document can still have some errors including those of syntax and sound a like substitutions which may escape proof reading. It such instances, actual meaningcan be extrapolated by contextual diversion.     Ольга Jackson MD  Office: [phone]  Perfect serve / office [phone]

## 2020-08-10 NOTE — PROGRESS NOTES
[address] [facility]    Physical Therapy Progress Note    Date: 8/10/20  Patient Name: Jaxon Vallecillo       Room:   MRN: [id number]   Account: [de-identified]   : 1950  (79 y.o.)   Gender: female     Discharge Recommendations   Patient would benefit from continued therapy after discharge, Continue to assess pending progress  Other: TBD - may need RW(Pt reports she has Rollator @ home)    Referring Practitioner: Elder Hurst MD  Diagnosis: Sigmoid diverticulitis  Restrictions/Precautions: General Precautions, Fall Risk, Surgical Protocols  Other position/activity restrictions: ambulate patient  Required Braces or Orthoses  Other: Abdominal Binder   Past Medical History:  has a past medical history of Adenocarcinoma of endometrium, stage 1 ([address] JOSHUA (acute kidney injury) ([address] Allergic rhinitis, At high risk for falls, Atrial fibrillation ([address] CHF (congestive heart failure) ([address] Colon polyp, Diabetes mellitus ([address] Diverticulitis, Endometrial cancer ([address] History of TIA (transient ischemic attack), Hyperglycemia, Hyperlipidemia, Hypertension, Hypothyroidism, Legally blind, Lower back pain, Mixed incontinence, Morbid obesity with BMI of 40.0-44.9, adult ([address] CLAROS (nonalcoholic steatohepatitis), Obesity, Oral phase dysphagia, Osteoarthritis (arthritis due to wear and tear of joints), Osteoarthritis involving multiple joints on both sides of body, Osteoporosis, Perforated bowel ([address] Postmenopausal bleeding, S/P h-scope, Myosure 17, Tennis elbow, Thickened endometrium, TIA (transient ischemic attack), TLHBSO, bilateral LND 10/24/17, and Type 2 diabetes mellitus, without long-term current use of insulin ([address] Past Surgical History:   has a past surgical history that includes Thyroid surgery (); Colonoscopy (); Tonsillectomy; Colonoscopy (2017); pr colsc flx w/removal lesion by hot bx forceps (N/A, 2017);  Dilation and curettage of uterus (N/A, 2017); Cataract removal with implant (Bilateral, 2015); vitrectomy; julieta and bso (cervix removed) (10/24/2017); Hysterectomy (N/A, 10/24/2017); Upper gastrointestinal endoscopy (N/A, 8/3/2020); Colonoscopy (N/A, 8/3/2020); and Small intestine surgery (N/A, 8/4/2020). Additional Pertinent Hx: Afib, CHF, TIA, 8/4/20 colectomy    Overall Orientation Status: Within Normal Limits  Restrictions/Precautions  Restrictions/Precautions: General Precautions; Fall Risk;Surgical Protocols  Required Braces or Orthoses?: Yes  Required Braces or Orthoses  Other: Abdominal Binder  Position Activity Restriction  Other position/activity restrictions: ambulate patient    Subjective: Pt reports feeling good; just ready to go to rehab; pt c/o IV burning in left arm near elbow, JANINA Arthur informed of red swollen Left arm  Comments: JANINA bernal for PT. Pt is pleasant and very motivated to work with PT. Vital Signs  Pulse: 55  BP: (!) 149/81  BP Location: Right upper arm  Patient Position: Sitting(Sitting Edge of Bed)  Patient Currently in Pain: Denies           Patient Observation  Observations: HR WNL       Bed Mobility  Rolling: Minimal assistance;Rolling Left;Rolling Right(heavy use of bed rails)  Supine to Sit: Moderate assistance(Pt has to take a rest break when performing)  Sit to Supine: Minimal assistance(assist with B LE into bed and posterior trunk support)  Scooting: Moderate assistance(scoot to head of bed)              Stairs/Curb  Stairs?: No             Posture: Fair  Sitting - Static: Good  Sitting - Dynamic: Good;-  Standing - Static: Fair  Standing - Dynamic: Fair  Comments: Seated Edge of Bed, Standing with Rolling Walker     Other exercises?: Yes  Other exercises 1: Seated bilat LE LAQ, marching x10  Other exercises 2: Dangle EOB 40 min.   Other exercises 3: Seated Edge of Bed (EOB) dynamic ativity reaching Out of Base of Support, pt guarded on movements, no LOB  Other exercises 4: Bed Mobility with log rolling education, fair carryover  Other exercises 5: Pt requesting wash cloth to wash face, arms, hands, and back (assisted with washing back as pt demo unable to reach)  Other exercises 6: Attempt sit to Stand, very wide Base of Support, pt sitting right back down unable to complete the stand. Other exercises 7: Monitor HR and BP throughout tx, BP high while sitting /80 however HR WNL 55-68   Other Activities  Comment: Rest Breaks PRN        Activity Tolerance: Patient limited by fatigue;Patient limited by endurance; Other;Patient limited by pain(Pain limiting standing)  PT Equipment Recommendations  Other: TBD - may need RW(Pt reports she has Rollator @ home)       Assessment  Activity Tolerance: Patient limited by fatigue;Patient limited by endurance; Other;Patient limited by pain(Pain limiting standing)   Body structures, Functions, Activity limitations: Decreased functional mobility ; Decreased ADL status; Decreased strength;Decreased endurance;Decreased balance; Increased pain;Decreased posture  Specific instructions for Next Treatment: progress gait distance, strengthening, pain mgmt  Prognosis: Good;Fair  Discharge Recommendations: Patient would benefit from continued therapy after discharge;Continue to assess pending progress     Type of devices: All fall risk precautions in place; Bed alarm in place;Call light within reach;Gait belt;Patient at risk for falls; Left in bed;Nurse notified(JANINA Segura notified)  Restraints  Initially in place: No     Plan  Times per week: 5-6x/week  Current Treatment Recommendations: Strengthening, Balance Training, Functional Mobility Training, Transfer Training, Endurance Training, Gait Training, Equipment Evaluation, Education, & procurement, Patient/Caregiver Education & Training, Safety Education & Training, Positioning    Patient Education  New Education Provided:  Plan of Care  Learner:patient  Method: demonstration and explanation       Outcome: acknowledged understanding of Plan of Care and needs reinforcement     Goals  Short term goals  Time Frame for Short term goals: 3-4 days  Short term goal 1: Pt to demonstrate min x1 bed mobility with good log rolling technique. Short term goal 2: Pt to demonstrate CGA transfers. Short term goal 3: Pt to amb 50'-75' with RW min to CGA x1. Short term goal 4: Pt to reduce pain to 2-3/10 with movement to increased independence for mobility. Short term goal 5: Pt to improve BLE strength by 1/2 MMG.     PT Individual Minutes  Time In: 7211  Time Out: 9627  Minutes: 53    Electronically signed by Arthur Zarate PTA on 8/10/20 at 4:06 PM EDT

## 2020-08-11 LAB
ABSOLUTE EOS #: 0.1 K/UL (ref 0–0.4)
ABSOLUTE IMMATURE GRANULOCYTE: ABNORMAL K/UL (ref 0–0.3)
ABSOLUTE LYMPH #: 1.2 K/UL (ref 1–4.8)
ABSOLUTE MONO #: 0.5 K/UL (ref 0.1–1.3)
ANION GAP SERPL CALCULATED.3IONS-SCNC: 6 MMOL/L (ref 9–17)
BASOPHILS # BLD: 1 % (ref 0–2)
BASOPHILS ABSOLUTE: 0 K/UL (ref 0–0.2)
BUN BLDV-MCNC: 12 MG/DL (ref 8–23)
BUN/CREAT BLD: ABNORMAL (ref 9–20)
CALCIUM SERPL-MCNC: 8.5 MG/DL (ref 8.6–10.4)
CHLORIDE BLD-SCNC: 101 MMOL/L (ref 98–107)
CO2: 30 MMOL/L (ref 20–31)
CREAT SERPL-MCNC: 0.62 MG/DL (ref 0.5–0.9)
D-DIMER QUANTITATIVE: 0.42 MG/L FEU (ref 0–0.59)
DIFFERENTIAL TYPE: ABNORMAL
EOSINOPHILS RELATIVE PERCENT: 2 % (ref 0–4)
GFR AFRICAN AMERICAN: >60 ML/MIN
GFR NON-AFRICAN AMERICAN: >60 ML/MIN
GFR SERPL CREATININE-BSD FRML MDRD: ABNORMAL ML/MIN/{1.73_M2}
GFR SERPL CREATININE-BSD FRML MDRD: ABNORMAL ML/MIN/{1.73_M2}
GLUCOSE BLD-MCNC: 110 MG/DL (ref 65–105)
GLUCOSE BLD-MCNC: 112 MG/DL (ref 70–99)
GLUCOSE BLD-MCNC: 126 MG/DL (ref 65–105)
GLUCOSE BLD-MCNC: 138 MG/DL (ref 65–105)
GLUCOSE BLD-MCNC: 146 MG/DL (ref 65–105)
HCT VFR BLD CALC: 29.6 % (ref 36–46)
HEMOGLOBIN: 9.8 G/DL (ref 12–16)
IMMATURE GRANULOCYTES: ABNORMAL %
LYMPHOCYTES # BLD: 16 % (ref 24–44)
MAGNESIUM: 1.9 MG/DL (ref 1.6–2.6)
MCH RBC QN AUTO: 30.5 PG (ref 26–34)
MCHC RBC AUTO-ENTMCNC: 33.2 G/DL (ref 31–37)
MCV RBC AUTO: 91.8 FL (ref 80–100)
MONOCYTES # BLD: 8 % (ref 1–7)
NRBC AUTOMATED: ABNORMAL PER 100 WBC
PDW BLD-RTO: 14.8 % (ref 11.5–14.9)
PLATELET # BLD: 181 K/UL (ref 150–450)
PLATELET ESTIMATE: ABNORMAL
PMV BLD AUTO: 8.2 FL (ref 6–12)
POTASSIUM SERPL-SCNC: 3.8 MMOL/L (ref 3.7–5.3)
RBC # BLD: 3.22 M/UL (ref 4–5.2)
RBC # BLD: ABNORMAL 10*6/UL
SEG NEUTROPHILS: 73 % (ref 36–66)
SEGMENTED NEUTROPHILS ABSOLUTE COUNT: 5.3 K/UL (ref 1.3–9.1)
SODIUM BLD-SCNC: 137 MMOL/L (ref 135–144)
WBC # BLD: 7.2 K/UL (ref 3.5–11)
WBC # BLD: ABNORMAL 10*3/UL

## 2020-08-11 PROCEDURE — 36415 COLL VENOUS BLD VENIPUNCTURE: CPT

## 2020-08-11 PROCEDURE — 6360000002 HC RX W HCPCS: Performed by: SURGERY

## 2020-08-11 PROCEDURE — 2580000003 HC RX 258: Performed by: SURGERY

## 2020-08-11 PROCEDURE — 85379 FIBRIN DEGRADATION QUANT: CPT

## 2020-08-11 PROCEDURE — 6370000000 HC RX 637 (ALT 250 FOR IP): Performed by: INTERNAL MEDICINE

## 2020-08-11 PROCEDURE — 94761 N-INVAS EAR/PLS OXIMETRY MLT: CPT

## 2020-08-11 PROCEDURE — 80048 BASIC METABOLIC PNL TOTAL CA: CPT

## 2020-08-11 PROCEDURE — 85025 COMPLETE CBC W/AUTO DIFF WBC: CPT

## 2020-08-11 PROCEDURE — 99231 SBSQ HOSP IP/OBS SF/LOW 25: CPT | Performed by: INTERNAL MEDICINE

## 2020-08-11 PROCEDURE — 99232 SBSQ HOSP IP/OBS MODERATE 35: CPT | Performed by: INTERNAL MEDICINE

## 2020-08-11 PROCEDURE — 2060000000 HC ICU INTERMEDIATE R&B

## 2020-08-11 PROCEDURE — 82947 ASSAY GLUCOSE BLOOD QUANT: CPT

## 2020-08-11 PROCEDURE — 6370000000 HC RX 637 (ALT 250 FOR IP): Performed by: SURGERY

## 2020-08-11 PROCEDURE — 83735 ASSAY OF MAGNESIUM: CPT

## 2020-08-11 RX ADMIN — DILTIAZEM HYDROCHLORIDE 240 MG: 240 CAPSULE, COATED, EXTENDED RELEASE ORAL at 07:23

## 2020-08-11 RX ADMIN — Medication 10 ML: at 20:01

## 2020-08-11 RX ADMIN — CEFAZOLIN 2 G: 1 INJECTION, POWDER, FOR SOLUTION INTRAMUSCULAR; INTRAVENOUS at 02:30

## 2020-08-11 RX ADMIN — INSULIN LISPRO 1 UNITS: 100 INJECTION, SOLUTION INTRAVENOUS; SUBCUTANEOUS at 20:02

## 2020-08-11 RX ADMIN — OXYCODONE HYDROCHLORIDE AND ACETAMINOPHEN 1 TABLET: 5; 325 TABLET ORAL at 13:08

## 2020-08-11 RX ADMIN — Medication 10 ML: at 12:57

## 2020-08-11 RX ADMIN — RIVAROXABAN 20 MG: 20 TABLET, FILM COATED ORAL at 17:32

## 2020-08-11 RX ADMIN — PANTOPRAZOLE SODIUM 40 MG: 40 TABLET, DELAYED RELEASE ORAL at 07:23

## 2020-08-11 RX ADMIN — METOPROLOL TARTRATE 50 MG: 50 TABLET, FILM COATED ORAL at 07:23

## 2020-08-11 RX ADMIN — CEFAZOLIN 2 G: 1 INJECTION, POWDER, FOR SOLUTION INTRAMUSCULAR; INTRAVENOUS at 12:56

## 2020-08-11 RX ADMIN — ATORVASTATIN CALCIUM 40 MG: 40 TABLET, FILM COATED ORAL at 20:01

## 2020-08-11 RX ADMIN — LEVOTHYROXINE SODIUM 75 MCG: 75 TABLET ORAL at 07:23

## 2020-08-11 ASSESSMENT — PAIN SCALES - GENERAL
PAINLEVEL_OUTOF10: 6
PAINLEVEL_OUTOF10: 0

## 2020-08-11 NOTE — PROGRESS NOTES
Discussed with nursing staff given her COVID-19 status. No new surgical issues. Afebrile vital signs are stable. Incision is clean dry intact. Bowels are moving. NORMA drains are serosanguineous. Surgically stable for discharge to ECF. Discontinue IV Ancef.

## 2020-08-11 NOTE — CARE COORDINATION
Writer was informed that [facility]i does not take the pt indirance. Writer informed the pt dtr,juwan.  She would like the pt referred to [facility]

## 2020-08-11 NOTE — CARE COORDINATION
DISCHARGE PLANNING NOTE:    LSW now following for discharge to [address] [facility], pre-cert needed. POD#7 open sigmoid colectomy. PT/OT rec SNF. Active order for IV Ancef and low fiber diet. Will continue to follow for additional discharge needs.     Electronically signed by Noble Franklin RN on 8/11/2020 at 4:18 PM

## 2020-08-11 NOTE — PROGRESS NOTES
Nutrition Assessment     Type and Reason for Visit: RD Nutrition Re-Screen/LOS    Nutrition Recommendations/Plan: Continue diet as ordered. Nutrition Assessment:  Patient eating % of meals with possible discharge to [facility] today. Malnutrition Assessment:  Malnutrition Status: No malnutrition      Current Nutrition Therapies:    DIET LOW FIBER; Safety Tray; Safety Tray (Disposables)    Nutrition Diagnosis:   No nutrition diagnosis at this time     Nutrition Interventions:   Food and/or Nutrient Delivery:  Continue Current Diet  Nutrition Education/Counseling:  Education not indicated   Coordination of Nutrition Care:  Continued Inpatient Monitoring    Some areas of assessment may be incomplete due to COVID-19 precautions. Elio Dawn R.D. L.IMANI.   Clinical Dietitian  Office: [phone]

## 2020-08-11 NOTE — PROGRESS NOTES
[facility] Internal Medicine    CONSULTATION / HISTORY AND PHYSICAL EXAMINATION            Date:   8/11/2020  Patient name:  Sarah Lott  Date of admission:  8/4/2020  5:43 AM  MRN:   [id number]  Account:  [de-identified]  YOB: 1950  PCP:    Jose Mcmullen MD  Room:   2014/2014-01  Code Status:    Full Code    Physician Requesting Consult: Evens Andrade MD    Reason for Consult: Medical management    Chief Complaint:     No chief complaint on file. Sigmoid diverticulitis    History Obtained From:     patient, electronic medical record     History of Present Illness/ Interval History:   28-year-old female with history of morbid obesity, hypertension, diabetes, atrial fibrillation on anticoagulation admitted for planned open sigmoid colectomy and cholecystectomy. Noted to be COVID-19 positive after admission. Had EGD and colonoscopy earlier this month, history of uterine cancer in the past status post hysterectomy    Past Medical History:     Past Medical History:   Diagnosis Date    Adenocarcinoma of endometrium, stage 1 ([facility] 10/5/2017    Well differentiated grade 1 adenocarcinoma arising in complex hyperplasia    JOSHUA (acute kidney injury) ([facility] 1/15/2020    Allergic rhinitis 2/23/2017    At high risk for falls 2/23/2017    Atrial fibrillation [facility])     Jan 2020    CHF (congestive heart failure) ([facility]     \"little\"    Colon polyp     Had colonoscopy done 20 yrs ago    Diabetes mellitus ([facility]     Diverticulitis     Endometrial cancer ([facility]     History of TIA (transient ischemic attack) '80's    on Baby ASA    Hyperglycemia 3/21/2017    Hyperlipidemia     Hypertension since 2015    on Rx.     Hypothyroidism 1980    sub total thyroidectomy goiter    Legally blind since born    both eyes, cord prolapse; \"not legally blind\" per \"associated eye care\" please see telephone encounter from 2/27/18    Lower back pain     Mixed incontinence 1/9/2016    Morbid obesity with BMI of 40.0-44.9, adult ([facility] 2/23/2017    CLAROS (nonalcoholic steatohepatitis) 3/10/2019    Obesity     Oral phase dysphagia 2/23/2018    Osteoarthritis (arthritis due to wear and tear of joints)     ronan knee    Osteoarthritis involving multiple joints on both sides of body 4/24/2012    Osteoporosis     Perforated bowel ([address]     Postmenopausal bleeding 9/20/2017    S/P h-scope, Myosure 9/20/17 9/20/2017    Pathology pending    Tennis elbow     left    Thickened endometrium 9/20/2017    TIA (transient ischemic attack)     TLHBSO, bilateral LND 10/24/17 10/24/2017    Type 2 diabetes mellitus, without long-term current use of insulin ([facility] 1/14/2020        Past Surgical History:     Past Surgical History:   Procedure Laterality Date    CATARACT REMOVAL WITH IMPLANT Bilateral 2015    COLONOSCOPY  1997    had polyp, she doesn't know where and when, \"20 yrs ago\"    COLONOSCOPY  06/26/2017    COLONOSCOPY N/A 8/3/2020    COLONOSCOPY POLYPECTOMY SNARE performed by Shazia Arzola MD at [address] [facility] N/A 9/20/2017    HYSTEROSCOPY  WITH MYOSURE performed by Reji Crystal DO at [address] N/A 10/24/2017    TOTAL LAPAROSCOPIC HYSTERECTOMY, BSO, F.S.  STAGING, GYRUS G400 performed by Love Coon MD at [address] N/A 6/26/2017    COLONOSCOPY POLYPECTOMY / HOT SNARE performed by Tabitha Francisco DO at [address] N/A 8/4/2020    OPEN SIGMOID COLECTOMY; PRIMARY ANASTOMOSIS & MOBILIZATION OF SPLENIC FLEXURE performed by Shazia Arzola MD at [address]  10/24/2017    with pelvic lymph node dissection    THYROID SURGERY  1980    subtotal 20 years ago    TONSILLECTOMY      UPPER GASTROINTESTINAL ENDOSCOPY N/A 8/3/2020    EGD BIOPSY performed by Shazia Arzola MD at [address] [facility] VITRECTOMY      FLOATERS        Medications Prior to Admission:     Prior to Admission medications    Medication Sig Start Date End Date Taking? Authorizing Provider   oxyCODONE-acetaminophen (PERCOCET) 5-325 MG per tablet Take 1 tablet by mouth every 6 hours as needed for Pain for up to 5 days. Intended supply: 5 days. Take lowest dose possible to manage pain 8/10/20 8/15/20 Yes JEANNE Herbert   ondansetron (ZOFRAN ODT) 4 MG disintegrating tablet Take 1 tablet by mouth every 8 hours as needed for Nausea or Vomiting 8/10/20 8/17/20 Yes JEANNE Herbert   cephALEXin (KEFLEX) 500 MG capsule Take 1 capsule by mouth 3 times daily for 7 days 8/10/20 8/17/20 Yes JEANNE Prescott   metoprolol tartrate (LOPRESSOR) 25 MG tablet Take 0.5 tablets by mouth 2 times daily 7/27/20  Yes Billy Pretty MD   amiodarone (CORDARONE) 200 MG tablet Take 1 tablet by mouth daily 6/22/20  Yes Radha Elmore MD   atorvastatin (LIPITOR) 40 MG tablet TAKE 1 TABLET BY MOUTH DAILY STOP SIMVASTATIN 3/16/20  Yes Lisette Juarez MD   metFORMIN (GLUCOPHAGE) 500 MG tablet TAKE ONE TABLET BY MOUTH TWICE A DAY WITH A MEAL 11/11/19  Yes Lisette Juarez MD   Lactobacillus (PROBIOTIC ACIDOPHILUS) TABS Take 1 tablet by mouth daily 7/31/17  Yes Lisette Juarez MD   Blood Pressure Monitor KIT Use as directed. 7/22/20   MAX Barker CNP   glucose monitoring kit (FREESTYLE) monitoring kit Use as directed. BRAND [employer] CHO[employer] INSURANCE ALLOWS. 7/22/20   MAX Barker CNP   blood glucose monitor strips Test 2-3 times a day & as needed for symptoms of irregular blood glucose.   BRAND [employer] CHO[employer] INSURANCE ALLOWS. 7/22/20   MAX Barker CNP   Alcohol Swabs (ALCOHOL PREP) PADS Use as directed 7/22/20   MAX Barker CNP   Lancets MISC 1 each by Does not apply route 2 times daily 7/22/20   MAX Barker CNP   oxybutynin (DITROPAN-XL) 10 MG extended release tablet Take 1 tablet by mouth daily 7/20/20   Maverick Armijo MD   rivaroxaban Systems:     Positive and Negative as described in HPI. Constitutional: Negative for chills, fatigue, fever and unexpected weight change. HENT: Negative for ear discharge, facial swelling, nosebleeds, sore throat, trouble swallowing and voice change. Eyes: Negative for redness and visual disturbance. Respiratory: Denies any cough shortness of breath or wheezing  Cardiovascular: No chest pain or palpitation   gastrointestinal: Generalized abdominal pain after surgery, improving, no diarrhea or vomiting. .   Genitourinary: Negative for difficulty urinating, dysuria and flank pain. Musculoskeletal: Negative for joint swelling. Skin: Negative for color change and rash. Neurological: Negative for dizziness, seizures, syncope and headaches. Hematological: Negative for adenopathy. Does not bruise/bleed easily. Physical Exam:     BP (!) 155/65   Pulse 61   Temp 98.2 °F (36.8 °C) (Oral)   Resp 18   Ht 5' 2\" (1.575 m)   Wt 235 lb 10.8 oz (106.9 kg)   LMP  (LMP Unknown)   SpO2 96%   BMI 43.10 kg/m²   Temp (24hrs), Av.4 °F (36.9 °C), Min:98.2 °F (36.8 °C), Max:99 °F (37.2 °C)      Remainder of examination deferred due to excessive risk conferred by physical examination during a global pandemic due to coronavirus 19. In a setting where local and national supplies of personal protective equipment are depleted.        Investigations:      Laboratory Testing:  Lab Results   Component Value Date    WBC 7.2 2020    HGB 9.8 (L) 2020    HCT 29.6 (L) 2020    MCV 91.8 2020     2020     Lab Results   Component Value Date     2020    K 3.8 2020     2020    CO2 30 2020    BUN 12 2020    CREATININE 0.62 2020    GLUCOSE 112 2020    GLUCOSE 114 2012    CALCIUM 8.5 2020         Assessment :      Primary Problem  <principal problem not specified>    Active Hospital Problems    Diagnosis Date Noted    Morbid obesity with BMI of 40.0-44.9, adult ([address] [E66.01, Z68.41] 02/23/2017     Priority: High    COVID-19 virus infection [U07.1] 08/10/2020    Sigmoid diverticulitis [K57.32] 08/04/2020    Class 3 severe obesity due to excess calories without serious comorbidity with body mass index (BMI) of 40.0 to 44.9 in adult ([address] [E66.01, Z68.41] 07/22/2020    Atrial fibrillation, new onset ([address] [I48.91] 01/20/2020    A-fib ([address] [I48.91] 01/14/2020    Type 2 diabetes mellitus, without long-term current use of insulin ([address] [E11.9] 01/14/2020    Adenomatous polyp of sigmoid colon, 6/26/17 [D12.5] 07/30/2017    Acquired hypothyroidism [E03.9]        Plan: Active Problems: Morbid obesity with BMI of 40.0-44.9, adult (HCC)    Acquired hypothyroidism    Adenomatous polyp of sigmoid colon, 6/26/17    A-fib ([address]    Type 2 diabetes mellitus, without long-term current use of insulin (HCC)    Atrial fibrillation, new onset ([address]    Class 3 severe obesity due to excess calories without serious comorbidity with body mass index (BMI) of 40.0 to 44.9 in adult [facility])    Sigmoid diverticulitis    COVID-19 virus infection  Resolved Problems:    * No resolved hospital problems. *      80-year-old status post sigmoid colectomy and cholecystectomy for perforated sigmoid diverticulitis. 1. COVID-19 infection. 1st test negative, second test positive in preparation for discharge to SNF. 2. Status post surgery -doing well, no further fever spikes  3. Tachycardia- started on Cardizem, Lopressor dose increased- better controlled. 4. Type 2 diabetes-well controlled. 5. Hypertension-controlled. 6. Hypothyroid- continue home medication. 7. Chronic diastolic heart failure-currently compensated. 8. Atrial fibrillation on Cardizem, Lopressor, Xarelto-rate currently controlled.   9. Hypokalemia-replace per protocol, hypomagnesemia replace to keep at 2      8/11  Xarelto resumed  Still awaiting pre-CERT    Will need antibiotic changed to Keflex on discharge per surgical team  Patient reports abdominal pain is much better, hurts only when she is coughing. Denies any cough or shortness of breath. No chest pain or palpitation. Consultations:   IP CONSULT TO INTERNAL MEDICINE  IP CONSULT TO CASE MANAGEMENT  IP CONSULT TO SOCIAL WORK  IP CONSULT TO CARDIOLOGY  IP CONSULT TO INFECTIOUS DISEASES  IP CONSULT TO PULMONOLOGY      Farida Wise MD  8/11/2020  10:20 AM    Copy sent to Dr. Myna Frankel, MD    Please note that this chart was generated using voice recognition Dragon dictation software. Although every effort was made to ensure the accuracy of this automated transcription, some errors in transcription may have occurred.

## 2020-08-11 NOTE — PROGRESS NOTES
rhythm   Abdomen - Soft, nontender, nondistended, no masses or organomegaly  Neurologic - There are no focal motor or sensory deficits  Skin - No bruising or bleeding  Extremities - No clubbing, cyanosis, edema    MEDS      rivaroxaban  20 mg Oral Dinner    levothyroxine  75 mcg Oral QAM AC    atorvastatin  40 mg Oral Nightly    pantoprazole  40 mg Oral QAM AC    metoprolol tartrate  50 mg Oral BID    dilTIAZem  240 mg Oral Daily    sodium chloride flush  10 mL Intravenous 2 times per day    ceFAZolin (ANCEF) 50 mL IVPB  2 g Intravenous Q8H    insulin lispro  0-6 Units Subcutaneous TID WC    insulin lispro  0-3 Units Subcutaneous Nightly      dextrose       acetaminophen **OR** acetaminophen, dextromethorphan, benzonatate, potassium chloride **OR** potassium alternative oral replacement **OR** potassium chloride, oxyCODONE-acetaminophen, magnesium sulfate, metoprolol, glucose, dextrose, glucagon (rDNA), dextrose, sodium chloride flush, ondansetron    LABS   CBC   Recent Labs     08/11/20  0558   WBC 7.2   HGB 9.8*   HCT 29.6*   MCV 91.8        BMP:   Lab Results   Component Value Date     08/11/2020    K 3.8 08/11/2020     08/11/2020    CO2 30 08/11/2020    BUN 12 08/11/2020    LABALBU 4.3 07/24/2020    LABALBU 4.3 03/20/2012    CREATININE 0.62 08/11/2020    CALCIUM 8.5 08/11/2020    GFRAA >60 08/11/2020    LABGLOM >60 08/11/2020     ABGs:No results found for: PHART, PO2ART, IYB2CGP No results found for: IFIO2, MODE, SETTIDVOL, SETPEEP  Ionized Calcium:  No results found for: IONCA  Magnesium:    Lab Results   Component Value Date    MG 1.9 08/11/2020     Phosphorus:    Lab Results   Component Value Date    PHOS 3.5 07/26/2020        LIVER PROFILE No results for input(s): AST, ALT, LIPASE, BILIDIR, BILITOT, ALKPHOS in the last 72 hours. Invalid input(s): AMYLASE,  ALB  INR No results for input(s): INR in the last 72 hours.   PTT   Lab Results   Component Value Date    APTT 28.7 07/11/2020         RADIOLOGY     (See actual reports for details)    ASSESSMENT/PLAN   Active Problems: Morbid obesity with BMI of 40.0-44.9, adult (HCC)    Acquired hypothyroidism    Adenomatous polyp of sigmoid colon, 6/26/17    A-fib ([facility]    Type 2 diabetes mellitus, without long-term current use of insulin (HCC)    Atrial fibrillation, new onset ([facility]    Class 3 severe obesity due to excess calories without serious comorbidity with body mass index (BMI) of 40.0 to 44.9 in adult [facility])    Sigmoid diverticulitis    COVID-19 virus infection  Resolved Problems:    * No resolved hospital problems.  *    Awaiting placement in ECF, daughter wants specific ECF's  [employer])   Continue droplet plus isolation  She has COVID infection, bite negative PCR testing came back) middle test which was PCR was positive, I do not believe that this was a false positive)  Continue supportive care    electronically signed by Michi Gimenez MD on 8/11/2020 at 9:20 AM

## 2020-08-11 NOTE — PLAN OF CARE
Problem: Infection:  Goal: Will remain free from infection  Description: Will remain free from infection  Outcome: Ongoing  Note: Patient showed no signs of infection throughout shift. Problem: Safety:  Goal: Free from accidental physical injury  Description: Free from accidental physical injury  Outcome: Ongoing  Note: Fall assessment performed and appropriate measures implemented. Room freed from clutter. Bed in lowest position with wheels locked. Call light in place. ID band in place. Goal: Free from intentional harm  Description: Free from intentional harm  Outcome: Ongoing     Problem: Daily Care:  Goal: Daily care needs are met  Description: Daily care needs are met  Outcome: Ongoing  Note: Daily care needs have been met this shift. Bathroom needs have been assessed every 2 hours. Hygiene care needs has been assessed this shift. Will continue to monitor. Problem: Pain:  Goal: Patient's pain/discomfort is manageable  Description: Patient's pain/discomfort is manageable  Outcome: Ongoing  Note: Pain assessed at regular intervals. Medications administered as requested for comfort. Pain remains at a tolerable level. Goal: Pain level will decrease  Description: Pain level will decrease  Outcome: Ongoing  Goal: Control of acute pain  Description: Control of acute pain  Outcome: Ongoing  Goal: Control of chronic pain  Description: Control of chronic pain  Outcome: Ongoing     Problem: Skin Integrity:  Goal: Skin integrity will stabilize  Description: Skin integrity will stabilize  Outcome: Ongoing  Note: Patient turned and repositioned every 2 hours and as needed for comfort. Skin remains dry and intact. No new skin breakdown noted.       Problem: Discharge Planning:  Goal: Patients continuum of care needs are met  Description: Patients continuum of care needs are met  Outcome: Ongoing     Problem: Falls - Risk of:  Goal: Will remain free from falls  Description: Will remain free from falls  Outcome: Ongoing  Note: Bed remains in lowest position, call light within reach. Patient remains free of falls at this time. RN will continue to monitor. Goal: Absence of physical injury  Description: Absence of physical injury  Outcome: Ongoing  Note: Fall assessment performed and appropriate measures implemented. Room freed from clutter. Bed in lowest position with wheels locked. Call light in place. ID band in place. Problem: HEMODYNAMIC STATUS  Goal: Patient has stable vital signs and fluid balance  Outcome: Ongoing  Note: Monitoring labs, output and vital signs. Problem: OXYGENATION/RESPIRATORY FUNCTION  Goal: Patient will achieve/maintain normal respiratory rate/effort  Outcome: Ongoing  Note: Respiratory rate remained within normal limits throughout shift. Problem: MOBILITY  Goal: Early mobilization is achieved  Outcome: Ongoing     Problem: ELIMINATION  Goal: Elimination patterns are normal or improving  Description: Elimination patterns return to pre-surgery normal patterns  Outcome: Ongoing     Problem: SKIN INTEGRITY  Goal: Skin integrity is maintained or improved  Outcome: Ongoing     Problem: Musculor/Skeletal Functional Status  Goal: Highest potential functional level  Outcome: Ongoing  Goal: Absence of falls  Outcome: Ongoing     Problem: Skin Integrity:  Goal: Will show no infection signs and symptoms  Description: Will show no infection signs and symptoms  Outcome: Ongoing  Note: No signs of infection noted throughout shift. Goal: Absence of new skin breakdown  Description: Absence of new skin breakdown  Outcome: Ongoing  Note: Patient turned and repositioned every 2 hours and as needed for comfort. Skin remains dry and intact. No new skin breakdown noted.       Problem: Airway Clearance - Ineffective  Goal: Achieve or maintain patent airway  Outcome: Ongoing     Problem: Gas Exchange - Impaired  Goal: Absence of hypoxia  Outcome: Ongoing  Goal: Promote optimal lung function  Outcome: Ongoing     Problem: Breathing Pattern - Ineffective  Goal: Ability to achieve and maintain a regular respiratory rate  Outcome: Ongoing  Note: Respiratory rate remained within normal limits throughout shift. Problem: Body Temperature -  Risk of, Imbalanced  Goal: Ability to maintain a body temperature within defined limits  Outcome: Ongoing  Note: Patient's temperature remained within normal limits throughout shift.    Goal: Will regain or maintain usual level of consciousness  Outcome: Ongoing  Goal: Complications related to the disease process, condition or treatment will be avoided or minimized  Outcome: Ongoing     Problem: Isolation Precautions - Risk of Spread of Infection  Goal: Prevent transmission of infection  Outcome: Ongoing     Problem: Nutrition Deficits  Goal: Optimize nutrtional status  Outcome: Ongoing     Problem: Risk for Fluid Volume Deficit  Goal: Maintain normal heart rhythm  Outcome: Ongoing  Goal: Maintain absence of muscle cramping  Outcome: Ongoing  Goal: Maintain normal serum potassium, sodium, calcium, phosphorus, and pH  Outcome: Ongoing     Problem: Loneliness or Risk for Loneliness  Goal: Demonstrate positive use of time alone when socialization is not possible  Outcome: Ongoing     Problem: Fatigue  Goal: Verbalize increase energy and improved vitality  Outcome: Ongoing     Problem: Patient Education: Go to Patient Education Activity  Goal: Patient/Family Education  Outcome: Ongoing

## 2020-08-11 NOTE — PLAN OF CARE
Problem: Infection:  Goal: Will remain free from infection  Description: Will remain free from infection  Outcome: Ongoing     Problem: Safety:  Goal: Free from accidental physical injury  Description: Free from accidental physical injury  Outcome: Ongoing  Goal: Free from intentional harm  Description: Free from intentional harm  Outcome: Ongoing     Problem: Daily Care:  Goal: Daily care needs are met  Description: Daily care needs are met  Outcome: Ongoing     Problem: Pain:  Goal: Patient's pain/discomfort is manageable  Description: Patient's pain/discomfort is manageable  Outcome: Ongoing  Note: Pain is well controlled with current regimen. See MAR. Goal: Pain level will decrease  Description: Pain level will decrease  Outcome: Ongoing  Goal: Control of acute pain  Description: Control of acute pain  Outcome: Ongoing  Goal: Control of chronic pain  Description: Control of chronic pain  Outcome: Ongoing     Problem: Skin Integrity:  Goal: Skin integrity will stabilize  Description: Skin integrity will stabilize  Outcome: Ongoing     Problem: Discharge Planning:  Goal: Patients continuum of care needs are met  Description: Patients continuum of care needs are met  Outcome: Ongoing     Problem: Falls - Risk of:  Goal: Will remain free from falls  Description: Will remain free from falls  Outcome: Ongoing  Note: Patient remains free of falls and injuries throughout shift. Bed remains in the lowest position, wheels locked, call light and bedside table are within reach.   Goal: Absence of physical injury  Description: Absence of physical injury  Outcome: Ongoing     Problem: HEMODYNAMIC STATUS  Goal: Patient has stable vital signs and fluid balance  Outcome: Ongoing     Problem: OXYGENATION/RESPIRATORY FUNCTION  Goal: Patient will achieve/maintain normal respiratory rate/effort  Outcome: Ongoing     Problem: MOBILITY  Goal: Early mobilization is achieved  Outcome: Ongoing     Problem: ELIMINATION  Goal: Elimination patterns are normal or improving  Description: Elimination patterns return to pre-surgery normal patterns  Outcome: Ongoing     Problem: SKIN INTEGRITY  Goal: Skin integrity is maintained or improved  Outcome: Ongoing     Problem: Musculor/Skeletal Functional Status  Goal: Highest potential functional level  Outcome: Ongoing  Goal: Absence of falls  Outcome: Ongoing     Problem: Skin Integrity:  Goal: Will show no infection signs and symptoms  Description: Will show no infection signs and symptoms  Outcome: Ongoing  Goal: Absence of new skin breakdown  Description: Absence of new skin breakdown  Outcome: Ongoing     Problem: Airway Clearance - Ineffective  Goal: Achieve or maintain patent airway  Outcome: Ongoing     Problem: Gas Exchange - Impaired  Goal: Absence of hypoxia  Outcome: Ongoing  Goal: Promote optimal lung function  Outcome: Ongoing     Problem: Breathing Pattern - Ineffective  Goal: Ability to achieve and maintain a regular respiratory rate  Outcome: Ongoing     Problem:  Body Temperature -  Risk of, Imbalanced  Goal: Ability to maintain a body temperature within defined limits  Outcome: Ongoing  Goal: Will regain or maintain usual level of consciousness  Outcome: Ongoing  Goal: Complications related to the disease process, condition or treatment will be avoided or minimized  Outcome: Ongoing     Problem: Isolation Precautions - Risk of Spread of Infection  Goal: Prevent transmission of infection  Outcome: Ongoing     Problem: Nutrition Deficits  Goal: Optimize nutrtional status  Outcome: Ongoing     Problem: Risk for Fluid Volume Deficit  Goal: Maintain normal heart rhythm  Outcome: Ongoing  Goal: Maintain absence of muscle cramping  Outcome: Ongoing  Goal: Maintain normal serum potassium, sodium, calcium, phosphorus, and pH  Outcome: Ongoing     Problem: Loneliness or Risk for Loneliness  Goal: Demonstrate positive use of time alone when socialization is not possible  Outcome: Ongoing Problem: Fatigue  Goal: Verbalize increase energy and improved vitality  Outcome: Ongoing     Problem: Patient Education: Go to Patient Education Activity  Goal: Patient/Family Education  Outcome: Ongoing

## 2020-08-11 NOTE — CARE COORDINATION
DR. Quintin Pittman (CARDIO) ROUNDED, PT. [address] [facility] Electronically signed by Gloria Rivas RN on 8/11/2020 at 10:49 AM

## 2020-08-11 NOTE — PROGRESS NOTES
[facility]   Progress Note                   Date:   8/11/2020  Patient name: Otis Pires  Date of admission:  8/4/2020  5:43 AM  MRN:   [id number]  YOB: 1950  PCP: Jared Scruggs MD    Reason for Admission: Afib w RVR    Subjective:     PATIENT NOT SEEN DUE TO COVID-19 INFECTION    Case was discussed with nursing    Intake/Output Summary (Last 24 hours) at 8/11/2020 0808  Last data filed at 8/11/2020 0449  Gross per 24 hour   Intake 349 ml   Output 1388 ml   Net -1039 ml       Medications:   Scheduled Meds:   rivaroxaban  20 mg Oral Dinner    levothyroxine  75 mcg Oral QAM AC    atorvastatin  40 mg Oral Nightly    pantoprazole  40 mg Oral QAM AC    metoprolol tartrate  50 mg Oral BID    dilTIAZem  240 mg Oral Daily    sodium chloride flush  10 mL Intravenous 2 times per day    ceFAZolin (ANCEF) 50 mL IVPB  2 g Intravenous Q8H    insulin lispro  0-6 Units Subcutaneous TID WC    insulin lispro  0-3 Units Subcutaneous Nightly     Continuous Infusions:   dextrose       CBC:   Recent Labs     08/09/20  0442 08/10/20  0416 08/11/20  0558   WBC 6.0 6.2 7.2   HGB 10.9* 10.5* 9.8*    172 181     BMP:    Recent Labs     08/09/20 0442 08/10/20  0416 08/11/20  0558    136 137   K 3.8 3.3* 3.8    102 101   CO2 28 28 30   BUN 10 10 12   CREATININE 0.67 0.62 0.62   GLUCOSE 111* 125* 112*     Hepatic: No results for input(s): AST, ALT, ALB, BILITOT, ALKPHOS in the last 72 hours. Troponin: No results for input(s): TROPONINI in the last 72 hours. BNP: No results for input(s): BNP in the last 72 hours. Lipids: No results for input(s): CHOL, HDL in the last 72 hours. Invalid input(s): LDLCALCU  INR: No results for input(s): INR in the last 72 hours.     Objective:   Vitals: BP (!) 155/65   Pulse 61   Temp 98.2 °F (36.8 °C) (Oral)   Resp 18   Ht 5' 2\" (1.575 m)   Wt 235 lb 10.8 oz (106.9 kg)   LMP  (LMP Unknown)   SpO2 96%   BMI 43.10 kg/m²   Physical exam was deferred at this time      EKG:   Last EKG was 8/4/2020 showed A. fib with RVR     ECHO:   Normal left ventricle size, wall thickness and function with an estimated EF  > 55%. No segmental wall motion abnormalities seen. Normal right ventricular size and function. Left atrium is normal in size. Right atrium is normal in size. Aortic valve is trileaflet. Aortic valve sclerosis without stenosis. No aortic insufficiency. Normal aortic root dimension. Thickened anterior mitral valve leaflet. Mild to moderate mitral  regurgitation. Normal tricuspid valve structure and function. Insignificant tricuspid  regurgitation, unable to estimate RVSP. IVC normal diameter & inspiratory collapse indicating normal RA filling  pressure . No significant pericardial effusion is seen.     Stress Test:   Stress test on 7/24/2020 showed no areas of perfusion defect and no wall motion abnormality      Assessment / Acute Cardiac Problems:   1. Persistent atrial fibrillation with borderline rate control. In sinus rhythm this am  2. Hypertension. 3. Preserved LV systolic function  4. No ischemia or infarction on recent nuclear stress test  5.  Prolonged QTC - resolved    Patient Active Problem List:     Acquired hypothyroidism     History of TIA (transient ischemic attack)     Chronic bilateral low back pain without sciatica     Essential hypertension     Osteopenia determined by x-ray     Osteoarthritis involving multiple joints on both sides of body     Left knee DJD     Prediabetes     Vitamin D deficiency     Mixed incontinence     Chronic pain of both knees     Slow transit constipation     Allergic rhinitis     Hyperlipidemia with target LDL less than 100     Morbid obesity with BMI of 40.0-44.9, adult (HCC)     At high risk for falls     Dense breast tissue on mammogram     Hyperglycemia     Spondylosis of lumbar region without myelopathy or radiculopathy     OAB (overactive bladder)     Primary osteoarthritis of both +  Reviewed tele at bedside  Reviewed recent stress and echo  AF is now NSR on BB, CCB, Xarelto  No further cardiac work up. Thank you for allowing me to participate in the care of this patient, please do not hesitate to call if you have any questions. Rosendo Corona DO, [address], [place] [address] [facility]  [facility]. [facility]  [phone]

## 2020-08-11 NOTE — CARE COORDINATION
Pt dtr would like the pt discharged to [facility]. they have 1 covid bed left.  Writer faxed a packet to snf

## 2020-08-11 NOTE — PROGRESS NOTES
Spoke to Dr. Tristan Avila upon rounds, order to discontinue the IV Ancef. Once she is discharged to the [facility], she is to complete course of PO keflex for 7 days. Prescription is in chart.

## 2020-08-12 ENCOUNTER — APPOINTMENT (OUTPATIENT)
Dept: GENERAL RADIOLOGY | Age: 70
DRG: 329 | End: 2020-08-12
Attending: SURGERY
Payer: MEDICARE

## 2020-08-12 LAB
ABSOLUTE EOS #: 0.1 K/UL (ref 0–0.4)
ABSOLUTE IMMATURE GRANULOCYTE: ABNORMAL K/UL (ref 0–0.3)
ABSOLUTE LYMPH #: 1.5 K/UL (ref 1–4.8)
ABSOLUTE MONO #: 0.5 K/UL (ref 0.1–1.3)
ANION GAP SERPL CALCULATED.3IONS-SCNC: 5 MMOL/L (ref 9–17)
BASOPHILS # BLD: 1 % (ref 0–2)
BASOPHILS ABSOLUTE: 0.1 K/UL (ref 0–0.2)
BNP INTERPRETATION: ABNORMAL
BUN BLDV-MCNC: 13 MG/DL (ref 8–23)
BUN/CREAT BLD: ABNORMAL (ref 9–20)
CALCIUM SERPL-MCNC: 8.5 MG/DL (ref 8.6–10.4)
CHLORIDE BLD-SCNC: 103 MMOL/L (ref 98–107)
CO2: 30 MMOL/L (ref 20–31)
CREAT SERPL-MCNC: 0.69 MG/DL (ref 0.5–0.9)
D-DIMER QUANTITATIVE: 0.42 MG/L FEU (ref 0–0.59)
DIFFERENTIAL TYPE: ABNORMAL
EOSINOPHILS RELATIVE PERCENT: 2 % (ref 0–4)
GFR AFRICAN AMERICAN: >60 ML/MIN
GFR NON-AFRICAN AMERICAN: >60 ML/MIN
GFR SERPL CREATININE-BSD FRML MDRD: ABNORMAL ML/MIN/{1.73_M2}
GFR SERPL CREATININE-BSD FRML MDRD: ABNORMAL ML/MIN/{1.73_M2}
GLUCOSE BLD-MCNC: 101 MG/DL (ref 70–99)
GLUCOSE BLD-MCNC: 103 MG/DL (ref 65–105)
GLUCOSE BLD-MCNC: 112 MG/DL (ref 65–105)
GLUCOSE BLD-MCNC: 123 MG/DL (ref 65–105)
HCT VFR BLD CALC: 28.9 % (ref 36–46)
HEMOGLOBIN: 9.7 G/DL (ref 12–16)
IMMATURE GRANULOCYTES: ABNORMAL %
LYMPHOCYTES # BLD: 20 % (ref 24–44)
MAGNESIUM: 1.9 MG/DL (ref 1.6–2.6)
MCH RBC QN AUTO: 30.7 PG (ref 26–34)
MCHC RBC AUTO-ENTMCNC: 33.5 G/DL (ref 31–37)
MCV RBC AUTO: 91.8 FL (ref 80–100)
MONOCYTES # BLD: 7 % (ref 1–7)
NRBC AUTOMATED: ABNORMAL PER 100 WBC
PDW BLD-RTO: 15.2 % (ref 11.5–14.9)
PLATELET # BLD: 204 K/UL (ref 150–450)
PLATELET ESTIMATE: ABNORMAL
PMV BLD AUTO: 8.9 FL (ref 6–12)
POTASSIUM SERPL-SCNC: 3.8 MMOL/L (ref 3.7–5.3)
PRO-BNP: 457 PG/ML
RBC # BLD: 3.15 M/UL (ref 4–5.2)
RBC # BLD: ABNORMAL 10*6/UL
SEG NEUTROPHILS: 70 % (ref 36–66)
SEGMENTED NEUTROPHILS ABSOLUTE COUNT: 5 K/UL (ref 1.3–9.1)
SODIUM BLD-SCNC: 138 MMOL/L (ref 135–144)
WBC # BLD: 7.2 K/UL (ref 3.5–11)
WBC # BLD: ABNORMAL 10*3/UL

## 2020-08-12 PROCEDURE — 97530 THERAPEUTIC ACTIVITIES: CPT

## 2020-08-12 PROCEDURE — 6370000000 HC RX 637 (ALT 250 FOR IP): Performed by: INTERNAL MEDICINE

## 2020-08-12 PROCEDURE — 36415 COLL VENOUS BLD VENIPUNCTURE: CPT

## 2020-08-12 PROCEDURE — 6360000002 HC RX W HCPCS: Performed by: INTERNAL MEDICINE

## 2020-08-12 PROCEDURE — 83735 ASSAY OF MAGNESIUM: CPT

## 2020-08-12 PROCEDURE — 2580000003 HC RX 258: Performed by: SURGERY

## 2020-08-12 PROCEDURE — 99232 SBSQ HOSP IP/OBS MODERATE 35: CPT | Performed by: INTERNAL MEDICINE

## 2020-08-12 PROCEDURE — 82947 ASSAY GLUCOSE BLOOD QUANT: CPT

## 2020-08-12 PROCEDURE — 85379 FIBRIN DEGRADATION QUANT: CPT

## 2020-08-12 PROCEDURE — 85025 COMPLETE CBC W/AUTO DIFF WBC: CPT

## 2020-08-12 PROCEDURE — 6370000000 HC RX 637 (ALT 250 FOR IP): Performed by: SURGERY

## 2020-08-12 PROCEDURE — 80048 BASIC METABOLIC PNL TOTAL CA: CPT

## 2020-08-12 PROCEDURE — 71045 X-RAY EXAM CHEST 1 VIEW: CPT

## 2020-08-12 PROCEDURE — 83880 ASSAY OF NATRIURETIC PEPTIDE: CPT

## 2020-08-12 PROCEDURE — 99231 SBSQ HOSP IP/OBS SF/LOW 25: CPT | Performed by: INTERNAL MEDICINE

## 2020-08-12 PROCEDURE — 2060000000 HC ICU INTERMEDIATE R&B

## 2020-08-12 RX ORDER — FUROSEMIDE 10 MG/ML
20 INJECTION INTRAMUSCULAR; INTRAVENOUS ONCE
Status: COMPLETED | OUTPATIENT
Start: 2020-08-12 | End: 2020-08-12

## 2020-08-12 RX ADMIN — BENZONATATE 200 MG: 100 CAPSULE ORAL at 09:56

## 2020-08-12 RX ADMIN — Medication 10 ML: at 10:04

## 2020-08-12 RX ADMIN — OXYCODONE HYDROCHLORIDE AND ACETAMINOPHEN 1 TABLET: 5; 325 TABLET ORAL at 15:12

## 2020-08-12 RX ADMIN — RIVAROXABAN 20 MG: 20 TABLET, FILM COATED ORAL at 17:55

## 2020-08-12 RX ADMIN — ATORVASTATIN CALCIUM 40 MG: 40 TABLET, FILM COATED ORAL at 21:31

## 2020-08-12 RX ADMIN — DILTIAZEM HYDROCHLORIDE 240 MG: 240 CAPSULE, COATED, EXTENDED RELEASE ORAL at 09:56

## 2020-08-12 RX ADMIN — METOPROLOL TARTRATE 50 MG: 50 TABLET, FILM COATED ORAL at 21:31

## 2020-08-12 RX ADMIN — PANTOPRAZOLE SODIUM 40 MG: 40 TABLET, DELAYED RELEASE ORAL at 09:56

## 2020-08-12 RX ADMIN — FUROSEMIDE 20 MG: 10 INJECTION, SOLUTION INTRAMUSCULAR; INTRAVENOUS at 13:03

## 2020-08-12 RX ADMIN — METOPROLOL TARTRATE 50 MG: 50 TABLET, FILM COATED ORAL at 10:00

## 2020-08-12 RX ADMIN — LEVOTHYROXINE SODIUM 75 MCG: 75 TABLET ORAL at 09:56

## 2020-08-12 ASSESSMENT — PAIN DESCRIPTION - ORIENTATION
ORIENTATION: LEFT;RIGHT;MID
ORIENTATION_2: RIGHT

## 2020-08-12 ASSESSMENT — PAIN DESCRIPTION - DESCRIPTORS
DESCRIPTORS_2: ACHING
DESCRIPTORS: ACHING;SORE

## 2020-08-12 ASSESSMENT — PAIN - FUNCTIONAL ASSESSMENT: PAIN_FUNCTIONAL_ASSESSMENT: PREVENTS OR INTERFERES SOME ACTIVE ACTIVITIES AND ADLS

## 2020-08-12 ASSESSMENT — PAIN DESCRIPTION - INTENSITY: RATING_2: 5

## 2020-08-12 ASSESSMENT — PAIN DESCRIPTION - LOCATION
LOCATION: ABDOMEN
LOCATION_2: KNEE

## 2020-08-12 ASSESSMENT — PAIN SCALES - GENERAL
PAINLEVEL_OUTOF10: 3
PAINLEVEL_OUTOF10: 0
PAINLEVEL_OUTOF10: 8

## 2020-08-12 ASSESSMENT — PAIN DESCRIPTION - DURATION: DURATION_2: CONTINUOUS

## 2020-08-12 ASSESSMENT — PAIN DESCRIPTION - PAIN TYPE
TYPE: SURGICAL PAIN
TYPE_2: CHRONIC PAIN

## 2020-08-12 ASSESSMENT — PAIN DESCRIPTION - FREQUENCY: FREQUENCY: CONTINUOUS

## 2020-08-12 NOTE — PROGRESS NOTES
Pulmonary Progress Note  [facility]      Patient - Mo Adams,  Age - 78 y.o.    - 1950      Room Number -    MR[facility] -  [id number]   [facility]  Date of Admission -  2020  5:43 AM        Bang Miles MD  Primary Care Physician - Elisabeth Carrillo MD     SUBJECTIVE   Several episodes of desaturation last night and early this morning while she was sleeping. I suspect she has moderate to severe sleep apnea but this was not reported previously. She had been on room air. When she is awake, she saturations are 98 to 100%    OBJECTIVE   VITALS    height is 5' 2\" (1.575 m) and weight is 247 lb 5.7 oz (112.2 kg). Her temperature is 97.8 °F (36.6 °C). Her blood pressure is 153/88 (abnormal) and her pulse is 70. Her respiration is 17 and oxygen saturation is 96%. Body mass index is 45.24 kg/m². Temperature Range: Temp: 97.8 °F (36.6 °C) Temp  Av.2 °F (36.8 °C)  Min: 97.8 °F (36.6 °C)  Max: 98.5 °F (36.9 °C)  BP Range:  Systolic (84ONT), CVN:725 , Min:105 , NXT:355     Diastolic (07ZWA), HBV:43, Min:45, Max:125    Pulse Range: Pulse  Av.8  Min: 48  Max: 70  Respiration Range: Resp  Av.5  Min: 12  Max: 20  Current Pulse Ox[de-identified]  SpO2: 96 %  24HR Pulse Ox Range:  SpO2  Av %  Min: 92 %  Max: 98 %  Oxygen Amount and Delivery: O2 Flow Rate (L/min): 1 L/min    Wt Readings from Last 3 Encounters:   20 247 lb 5.7 oz (112.2 kg)   20 226 lb (102.5 kg)   20 232 lb (105.2 kg)       I/O (24 Hours)    Intake/Output Summary (Last 24 hours) at 2020 6380  Last data filed at 2020 0513  Gross per 24 hour   Intake --   Output 1700 ml   Net -1700 ml       EXAM     General Appearance  Awake, alert, oriented, in no acute distress  HEENT - normocephalic, atraumatic.  []  Mallampati  [x] Crowded airway   [x] Macroglossia  []  Retrognathia  [] Micrognathia  [] input(s): AMYLASE,  ALB  INR No results for input(s): INR in the last 72 hours. PTT   Lab Results   Component Value Date    APTT 28.7 07/11/2020         RADIOLOGY     (See actual reports for details)    ASSESSMENT/PLAN   Active Problems: Morbid obesity with BMI of 40.0-44.9, adult (HCC)    Acquired hypothyroidism    Adenomatous polyp of sigmoid colon, 6/26/17    A-fib ([facility]    Type 2 diabetes mellitus, without long-term current use of insulin ([facility])    Atrial fibrillation, new onset ([facility]    Class 3 severe obesity due to excess calories without serious comorbidity with body mass index (BMI) of 40.0 to 44.9 in adult [facility])    Sigmoid diverticulitis    COVID-19 virus infection  Resolved Problems:    * No resolved hospital problems. *    I suspect ZACKERY as a cause of her hypoxemia while sleeping  Will check stat chest x-ray  Will also order BNP  Will give her IV Lasix  Repeated testing will not resolve the situation. Even if she has a negative test down the road, she has a COVID infection and has to be in droplet plus isolation for at least a total of 21 days  She can then be considered noninfectious per [facility] guidelines     Electronically signed by Carlitos Cross MD on 8/12/2020 at 9:25 AM    Addendum: Chest x-ray was reviewed, there are no new infiltrates and is fairly clear. Again I suspect desaturation due to ZACKERY. We will continue oxygen at nighttime.   As an outpatient, she can have a sleep study

## 2020-08-12 NOTE — CARE COORDINATION
ONGOING DISCHARGE PLAN:    Reviewed patients chart regarding discharge plan and chart still confirms the plan is to discharge to [address] [place] [address]  Patient remains in icu   Patient on iv lasix   cxr ordered   bnp   Will need to have bed cleaned at [facility] possibly will have one in the next 24 hours      Will review plan with patient and or family      Will continue to follow for additional discharge needs.      Electronically signed by Bhupendra Obregon RN on 8/12/2020 at 2:32 PM

## 2020-08-12 NOTE — PROGRESS NOTES
Deep          [facility] -   Infectious diseases evaluation  admission date 8/4/2020    reason for consultation:   Positive COVID-19 test    Impression :   Current:  · Positive COVID-19 test/asymptomatic infection. · Sigmoid diverticulosis status post colectomy 8/4/2020  · Atrial fibrillation  · Diabetes mellitus  · Hypertension  · Obesity  · History of endometrial cancer status post hysterectomy  · Legally blind    Other:  · Penicillin and sulfa allergy    Recommendations   Continue current care  Discontinue antibiotics  Droplet plus isolation           History of Present Illness:   Initial history:  Omar Friday is a 78y.o.-year-old female was admitted for EGD and colonoscopy that was done 8/3/2020 found to have a sigmoid diverticulosis and polyps . the patient had colectomy 8/4/2020 postop course was uncomplicated and the patient was going to be discharged to [facility]. Rapid COVID test was negative on 7/30/2020  Repeat COVID test 8/8/2020 came back positive. Repeat rapid COVID test dated 8 /9/20 was negative  The patient had no shortness of breath or cough, no hypoxia, O2 sat 95% on room air, no change in her sense of taste or smell, no reported fever. She does have postoperative abdominal pain and nausea    Interval changes  8/11/2020   She remains on room air with o2   sats 94%, no reported fever, no new events          I have personally reviewed the past medical history, past surgical history, medications, social history, and family history, and I haveupdated the database accordingly.   Past Medical History:     Past Medical History:   Diagnosis Date    Adenocarcinoma of endometrium, stage 1 ([address] 10/5/2017    Well differentiated grade 1 adenocarcinoma arising in complex hyperplasia    JOSHUA (acute kidney injury) ([address] 1/15/2020    Allergic rhinitis 2/23/2017    At high risk for falls 2/23/2017    Atrial fibrillation [facility])     Jan 2020    CHF (congestive heart failure) [facility])     \"little\"    Colon polyp     Had colonoscopy done 20 yrs ago    Diabetes mellitus ([address]     Diverticulitis     Endometrial cancer ([address]     History of TIA (transient ischemic attack) '80's    on Baby ASA    Hyperglycemia 3/21/2017    Hyperlipidemia     Hypertension since 2015    on Rx.     Hypothyroidism 1980    sub total thyroidectomy goiter    Legally blind since born    both eyes, cord prolapse; \"not legally blind\" per \"associated eye care\" please see telephone encounter from 2/27/18    Lower back pain     Mixed incontinence 1/9/2016    Morbid obesity with BMI of 40.0-44.9, adult ([address] 2/23/2017    CLAROS (nonalcoholic steatohepatitis) 3/10/2019    Obesity     Oral phase dysphagia 2/23/2018    Osteoarthritis (arthritis due to wear and tear of joints)     ronan knee    Osteoarthritis involving multiple joints on both sides of body 4/24/2012    Osteoporosis     Perforated bowel ([address]     Postmenopausal bleeding 9/20/2017    S/P h-scope, Myosure 9/20/17 9/20/2017    Pathology pending    Tennis elbow     left    Thickened endometrium 9/20/2017    TIA (transient ischemic attack)     TLHBSO, bilateral LND 10/24/17 10/24/2017    Type 2 diabetes mellitus, without long-term current use of insulin ([address] 1/14/2020       Past Surgical  History:     Past Surgical History:   Procedure Laterality Date    CATARACT REMOVAL WITH IMPLANT Bilateral 2015    COLONOSCOPY  1997    had polyp, she doesn't know where and when, \"20 yrs ago\"    COLONOSCOPY  06/26/2017    COLONOSCOPY N/A 8/3/2020    COLONOSCOPY POLYPECTOMY SNARE performed by Elder Hurst MD at [address] [facility] N/A 9/20/2017    HYSTEROSCOPY  WITH MYOSURE performed by Irvin Barr DO at [address] [facility] N/A 10/24/2017    TOTAL LAPAROSCOPIC HYSTERECTOMY, BSO, F.S.  STAGING, GYRUS G400 performed by Joce Goff MD at [address] W/REMOVAL LESION BY HOT [address] 6/26/2017    COLONOSCOPY POLYPECTOMY / HOT SNARE performed by Carolina Carranza DO at [address] N/A 8/4/2020    OPEN SIGMOID COLECTOMY; PRIMARY ANASTOMOSIS & MOBILIZATION OF SPLENIC FLEXURE performed by Mandy Bennett MD at [address]  10/24/2017    with pelvic lymph node dissection    THYROID SURGERY  1980    subtotal 20 years ago    TONSILLECTOMY      UPPER GASTROINTESTINAL ENDOSCOPY N/A 8/3/2020    [facility] VITRECTOMY      FLOATERS       Medications:      rivaroxaban  20 mg Oral Dinner    levothyroxine  75 mcg Oral QAM AC    atorvastatin  40 mg Oral Nightly    pantoprazole  40 mg Oral QAM AC    metoprolol tartrate  50 mg Oral BID    dilTIAZem  240 mg Oral Daily    sodium chloride flush  10 mL Intravenous 2 times per day    insulin lispro  0-6 Units Subcutaneous TID WC    insulin lispro  0-3 Units Subcutaneous Nightly       Social History:     Social History     Socioeconomic History    Marital status:       Spouse name: Not on file    Number of children: 1    Years of education: Not on file    Highest education level: Not on file   Occupational History    Not on file   Social Needs    Financial resource strain: Not on file    Food insecurity     Worry: Not on file     Inability: Not on file    Transportation needs     Medical: Not on file     Non-medical: Not on file   Tobacco Use    Smoking status: Never Smoker    Smokeless tobacco: Never Used   Substance and Sexual Activity    Alcohol use: No     Alcohol/week: 0.0 standard drinks    Drug use: No    Sexual activity: Never   Lifestyle    Physical activity     Days per week: Not on file     Minutes per session: Not on file    Stress: Not on file   Relationships    Social connections     Talks on phone: Not on file     Gets together: Not on file     Attends Taoism service: Not on file     Active member of club or organization: Not on file     Attends meetings of clubs or organizations: Not on file     Relationship status: Not on file    Intimate partner violence     Fear of current or ex partner: Not on file     Emotionally abused: Not on file     Physically abused: Not on file     Forced sexual activity: Not on file   Other Topics Concern    Not on file   Social History Narrative    Not on file       Family History:     Family History   Problem Relation Age of Onset    Coronary Art Dis Father     Hypertension Father     Diabetes Father     High Blood Pressure Father     Heart Attack Father     Diabetes Mother     High Blood Pressure Mother     Thyroid Disease Mother     High Blood Pressure Sister     Thyroid Disease Brother     High Blood Pressure Brother     High Blood Pressure Maternal Grandmother     Diabetes Maternal Grandfather     No Known Problems Paternal Grandmother     Heart Attack Paternal Grandfather     Colon Cancer Neg Hx         Allergies:   Sulfa antibiotics and Penicillins     Review of Systems:     Review of Systems  As per history of present illness, other than above 12 systems reviewed negative  Physical Examination :     Patient Vitals for the past 8 hrs:   BP Temp Temp src Pulse Resp SpO2   08/11/20 1800 (!) 124/51 98.5 °F (36.9 °C) Oral 55 18 96 %   08/11/20 1600 (!) 118/53 -- -- -- -- --   08/11/20 1500 117/67 -- -- -- -- --   08/11/20 1400 (!) 115/57 -- -- -- -- --   08/11/20 1300 (!) 145/125 98.3 °F (36.8 °C) Oral -- -- --       Physical Exam    Due to the current efforts to prevent transmission of COVID-19 and also the need to preserve PPE for other caregivers, a face-to-face encounter with the patient was not performed. That being said, all relevant records and diagnostic tests were reviewed, including laboratory results and imaging. When appropriate, patient was evaluated from the window, called on the phone, and chart reviewed, disc w  involved healthcare workers.   Medical Decision Making:   I have independently reviewed/ordered the following labs:    CBC with Differential:   Recent Labs     08/10/20  0416 08/11/20  0558   WBC 6.2 7.2   HGB 10.5* 9.8*   HCT 31.6* 29.6*    181   LYMPHOPCT 27 16*   MONOPCT 9* 8*     BMP:  Recent Labs     08/10/20  0416 08/11/20  0558    137   K 3.3* 3.8    101   CO2 28 30   BUN 10 12   CREATININE 0.62 0.62   MG 1.7 1.9     Hepatic Function Panel: No results for input(s): PROT, LABALBU, BILIDIR, IBILI, BILITOT, ALKPHOS, ALT, AST in the last 72 hours. No results for input(s): RPR in the last 72 hours. No results for input(s): HIV in the last 72 hours. No results for input(s): BC in the last 72 hours. Lab Results   Component Value Date    CREATININE 0.62 08/11/2020    GLUCOSE 112 08/11/2020    GLUCOSE 114 03/20/2012       Detailed results: Thank you for allowing us to participate in the care of this patient. Please call with questions. This note is created with the assistance of a speech recognition program.  While intending to generate adocument that actually reflects the content of the visit, the document can still have some errors including those of syntax and sound a like substitutions which may escape proof reading. It such instances, actual meaningcan be extrapolated by contextual diversion.     Karo Barragan MD  Office: [phone]  Perfect serve / office [phone]

## 2020-08-12 NOTE — PROGRESS NOTES
[facility]: IBIS KENNEY   Physical Therapy Progress Note    Date: 20  Patient Name: Naif Leonard       Room:   MRN: [id number]   Account: [de-identified]   : 1950  (79 y.o.)   Gender: female     Discharge Recommendations   Patient would benefit from continued therapy after discharge, Continue to assess pending progress  Other: TBD - may need RW(Pt reports she has Rollator @ home)    Referring Practitioner: Hattie Egan MD  Diagnosis: Sigmoid diverticulitis  Restrictions/Precautions: General Precautions, Fall Risk, Surgical Protocols  Other position/activity restrictions: ambulate patient  Required Braces or Orthoses  Other: Abdominal Binder   Past Medical History:  has a past medical history of Adenocarcinoma of endometrium, stage 1 ([address] JOSHUA (acute kidney injury) ([address] Allergic rhinitis, At high risk for falls, Atrial fibrillation ([address] CHF (congestive heart failure) ([address] Colon polyp, Diabetes mellitus ([address] Diverticulitis, Endometrial cancer ([address] History of TIA (transient ischemic attack), Hyperglycemia, Hyperlipidemia, Hypertension, Hypothyroidism, Legally blind, Lower back pain, Mixed incontinence, Morbid obesity with BMI of 40.0-44.9, adult ([address] CLAROS (nonalcoholic steatohepatitis), Obesity, Oral phase dysphagia, Osteoarthritis (arthritis due to wear and tear of joints), Osteoarthritis involving multiple joints on both sides of body, Osteoporosis, Perforated bowel ([address] Postmenopausal bleeding, S/P h-scope, Myosure 17, Tennis elbow, Thickened endometrium, TIA (transient ischemic attack), TLHBSO, bilateral LND 10/24/17, and Type 2 diabetes mellitus, without long-term current use of insulin ([address] Past Surgical History:   has a past surgical history that includes Thyroid surgery (); Colonoscopy (); Tonsillectomy; Colonoscopy (2017); pr colsc flx w/removal lesion by hot bx forceps (N/A, 2017);  Dilation and curettage of uterus (N/A, 2017); technique      Transfers:  Sit to Stand: Unable to assess  Stand to sit: Unable to assess  Comments: Pt reported increased fatigue and abdominal pain with bed mobs and refused standing/ambulation                          Stairs/Curb  Stairs?: No                                                     Posture: Fair  Sitting - Static: Fair;+  Sitting - Dynamic: Fair;+  Comments: seated edge of bed      Other exercises?: Yes  Other exercises 1: Educated/have pt performed supine portion of HEP: heel slides, ankle pumps, short arc quads x10-12 reps  Other exercises 2: Educated pt/demo supine and seated HEP for pt to perform throughout day to progress strength and functional endurance  Other exercises 3: Educated pt over log roll technique to perform reduce abdominal distress  Other exercises 4: Monitor vitals throughout; pt has hx of A-fib  Other exercises 5: Abdominal skin integrity check, stitches look dry and clean and intact, medium sized green/blue bruising   Other exercises 6: Repositioned pt in bed with Trendelenburg, pt able to assist writer with scoot to head of bed; pt left in semi Beach position  Other Activities  Comment: Rest Breaks PRN        Activity Tolerance: Patient limited by fatigue;Patient limited by pain; Patient limited by endurance  PT Equipment Recommendations  Other: TBD - may need RW(Pt reports she has Rollator @ home)  Other Comments  Comments: Pt is motivated throughout tx and is eager to improve function and D/C    Assessment  Activity Tolerance: Patient limited by fatigue;Patient limited by pain; Patient limited by endurance   Body structures, Functions, Activity limitations: Decreased functional mobility ; Decreased ADL status; Decreased strength;Decreased endurance;Decreased balance; Increased pain;Decreased posture  Specific instructions for Next Treatment: progress gait distance, strengthening, pain mgmt  Prognosis: Good;Fair  Discharge Recommendations: Patient would benefit from continued therapy after discharge;Continue to assess pending progress     Type of devices: All fall risk precautions in place; Bed alarm in place;Call light within reach; Patient at risk for falls; Left in bed;Nurse notified(JANINA Randall notified)  Restraints  Initially in place: No     Plan  Times per week: 5-6x/week  Current Treatment Recommendations: Strengthening, Balance Training, Functional Mobility Training, Transfer Training, Endurance Training, Gait Training, Equipment Evaluation, Education, & procurement, Patient/Caregiver Education & Training, Safety Education & Training, Positioning    Patient Education  New Education Provided:  Performing supine and seated HEP throughout day to progress strength and functional endurance and log roll technique with bed mobility  Learner:patient  Method: demonstration and explanation and handout     Outcome: acknowledged understanding of performing supine and seated HEP throughout day to progress strength and functional endurance and log roll technique with bed mobility, demonstrated understanding and needs reinforcement     Goals  Short term goals  Time Frame for Short term goals: 3-4 days  Short term goal 1: Pt to demonstrate min x1 bed mobility with good log rolling technique. Short term goal 2: Pt to demonstrate CGA transfers. Short term goal 3: Pt to amb 50'-75' with RW min to CGA x1. Short term goal 4: Pt to reduce pain to 2-3/10 with movement to increased independence for mobility. Short term goal 5: Pt to improve BLE strength by 1/2 MMG.     PT Individual Minutes  Time In: 0236  Time Out: 1540  Minutes: 19    Electronically signed by Patricia Tang PTA on 8/12/20 at 3:59 PM EDT

## 2020-08-12 NOTE — PROGRESS NOTES
Insurance company called to notify us that patient was approved of [id number] [facility].

## 2020-08-12 NOTE — PROGRESS NOTES
[facility]                 PATIENT NAME: Otis Pires     TODAY'S DATE: 8/12/2020, 11:12 AM    SUBJECTIVE:  POD#8  Pt seen and examined. Afebrile, VSS. Remains in COVID precautions. Patient states she is doing well. Abdominal pain controlled. Bowels are moving. Tolerating diet, no N/V. Incision clean, dry, intact. NORMA x 2 serosanguinous. OBJECTIVE:   VITALS:  BP (!) 153/88   Pulse 70   Temp 97.8 °F (36.6 °C)   Resp 17   Ht 5' 2\" (1.575 m)   Wt 247 lb 5.7 oz (112.2 kg)   LMP  (LMP Unknown)   SpO2 96%   BMI 45.24 kg/m²      INTAKE/OUTPUT:      Intake/Output Summary (Last 24 hours) at 8/12/2020 1112  Last data filed at 8/12/2020 0513  Gross per 24 hour   Intake --   Output 1700 ml   Net -1700 ml                 CONSTITUTIONAL:  awake and alert. No acute distress  HEART:   RRR  LUNGS:   Decreased air entry at bases, no wheezing  ABDOMEN:   Abdomen soft, non-tender, non-distended  EXTREMITIES:   Pedal edema b/l    Data:  CBC:   Lab Results   Component Value Date    WBC 7.2 08/12/2020    RBC 3.15 08/12/2020    RBC 4.23 03/20/2012    HGB 9.7 08/12/2020    HCT 28.9 08/12/2020    MCV 91.8 08/12/2020    MCH 30.7 08/12/2020    MCHC 33.5 08/12/2020    RDW 15.2 08/12/2020     08/12/2020     03/20/2012    MPV 8.9 08/12/2020     BMP:    Lab Results   Component Value Date     08/12/2020    K 3.8 08/12/2020     08/12/2020    CO2 30 08/12/2020    BUN 13 08/12/2020    LABALBU 4.3 07/24/2020    LABALBU 4.3 03/20/2012    CREATININE 0.69 08/12/2020    CALCIUM 8.5 08/12/2020    GFRAA >60 08/12/2020    LABGLOM >60 08/12/2020    GLUCOSE 101 08/12/2020    GLUCOSE 114 03/20/2012       Radiology Review:      XR CHEST PORTABLE [[id number]]  Collected: 08/12/20 1021        Order Status: Completed  Updated: 08/12/20 1036       Impression:         No acute pulmonary process. ASSESSMENT     Active Problems:     Morbid obesity with BMI of 40.0-44.9, adult ([facility] Acquired hypothyroidism    Adenomatous polyp of sigmoid colon, 6/26/17    A-fib ([facility]    Type 2 diabetes mellitus, without long-term current use of insulin (HCC)    Atrial fibrillation, new onset (HCC)    Class 3 severe obesity due to excess calories without serious comorbidity with body mass index (BMI) of 40.0 to 44.9 in adult [facility])    Sigmoid diverticulitis    COVID-19 virus infection  Resolved Problems:    * No resolved hospital problems. *      Plan  1. Diet as tolerated  2. Bowels are moving  3. Surgically stable for discharge to [facility]  4. Prescriptions in chart  5. Discharge planning; SW currently looking at [id number] [facility]  6. Continue medical management  7. I did not see the patient Mundo Nayak saw the patient. Surgically stable. No issues with the drains. Tolerating diet. Discharge planning.       Electronically signed by Qian Bland PA-C  [address] [address]

## 2020-08-12 NOTE — PLAN OF CARE
Problem: Infection:  Goal: Will remain free from infection  Description: Will remain free from infection  8/12/2020 0311 by Jr Sepulveda RN  Outcome: Ongoing  Outcome: Ongoing     Problem: Safety:  Goal: Free from accidental physical injury  Description: Free from accidental physical injury  8/12/2020 0311 by Jr Sepulveda RN  Outcome: Ongoing  Note: Fall assessment performed and appropriate measures implemented. Room freed from clutter. Bed in lowest position with wheels locked. Call light in place. ID band in place. Outcome: Ongoing  Goal: Free from intentional harm  Description: Free from intentional harm  8/12/2020 0311 by Jr Sepulveda RN  Outcome: Ongoing  Outcome: Ongoing     Problem: Daily Care:  Goal: Daily care needs are met  Description: Daily care needs are met  8/12/2020 0311 by Jr Sepulveda RN  Outcome: Ongoing  Note: Daily care needs have been met this shift. Bathroom needs have been assessed every 2 hours. Hygiene care needs has been assessed this shift. Will continue to monitor. Outcome: Ongoing     Problem: Pain:  Goal: Patient's pain/discomfort is manageable  Description: Patient's pain/discomfort is manageable  8/12/2020 0311 by Jr Sepulveda RN  Outcome: Ongoing  Note: Pain assessed at regular intervals. No complaints of pain at this time. Pain remains at a tolerable level. Outcome: Ongoing  Note: Pain is well controlled with current regimen. See MAR.   Goal: Pain level will decrease  Description: Pain level will decrease  8/12/2020 0311 by Jr Sepulveda RN  Outcome: Ongoing  Outcome: Ongoing  Goal: Control of acute pain  Description: Control of acute pain  8/12/2020 0311 by Jr Sepulveda RN  Outcome: Ongoing  Outcome: Ongoing  Goal: Control of chronic pain  Description: Control of chronic pain  8/12/2020 0311 by Jr Sepulveda RN  Outcome: Ongoing  Outcome: Ongoing     Problem: Skin Integrity:  Goal: Skin integrity will stabilize  Description: Skin integrity will stabilize  8/12/2020 0311 by Mauro Centeno Teri Valencia RN  Outcome: Ongoing  Note: Surgical incision remains dry and intact. No other signs of skin issues noted. Outcome: Ongoing     Problem: Discharge Planning:  Goal: Patients continuum of care needs are met  Description: Patients continuum of care needs are met  8/12/2020 0311 by Zoie Bah RN  Outcome: Ongoing  Outcome: Ongoing     Problem: Falls - Risk of:  Goal: Will remain free from falls  Description: Will remain free from falls  8/12/2020 0311 by Zoie Bah RN  Outcome: Ongoing  Outcome: Ongoing  Note: Patient remains free of falls and injuries throughout shift. Bed remains in the lowest position, wheels locked, call light and bedside table are within reach. Goal: Absence of physical injury  Description: Absence of physical injury  8/12/2020 0311 by Zoie Bah RN  Outcome: Ongoing  Outcome: Ongoing     Problem: HEMODYNAMIC STATUS  Goal: Patient has stable vital signs and fluid balance  8/12/2020 0311 by Zoie Bah RN  Outcome: Ongoing  Note: Patient's vital signs remained within normal limits throughout shift. Monitoring labs and output regarding fluid balance. Outcome: Ongoing     Problem: OXYGENATION/RESPIRATORY FUNCTION  Goal: Patient will achieve/maintain normal respiratory rate/effort  8/12/2020 0311 by Zoie Bah RN  Outcome: Ongoing  Note: Patient's respiratory rate remains within normal limits throughout shift. Outcome: Ongoing     Problem: MOBILITY  Goal: Early mobilization is achieved  8/12/2020 0311 by Zoie Bah RN  Outcome: Ongoing  Outcome: Ongoing     Problem: ELIMINATION  Goal: Elimination patterns are normal or improving  Description: Elimination patterns return to pre-surgery normal patterns  8/12/2020 0311 by Zoie Bah RN  Outcome: Ongoing  Note: Urine output remains adequate throughout shift. No bowel movements.    Outcome: Ongoing     Problem: SKIN INTEGRITY  Goal: Skin integrity is maintained or improved  8/12/2020 0311 by Zoie Bah RN  Outcome: Ongoing  Note: Patient turned and repositioned every 2 hours and as needed for comfort. Skin remains dry and intact. No new skin breakdown noted. Outcome: Ongoing     Problem: Musculor/Skeletal Functional Status  Goal: Highest potential functional level  8/12/2020 0311 by Sharad Jackson RN  Outcome: Ongoing  Outcome: Ongoing  Goal: Absence of falls  8/12/2020 0311 by Sharad Jackson RN  Outcome: Ongoing  Outcome: Ongoing     Problem: Skin Integrity:  Goal: Will show no infection signs and symptoms  Description: Will show no infection signs and symptoms  8/12/2020 0311 by Sharad Jackson RN  Outcome: Ongoing  Note: No signs of infection noted throughout shift. Outcome: Ongoing  Goal: Absence of new skin breakdown  Description: Absence of new skin breakdown  8/12/2020 0311 by Sharad Jackson RN  Outcome: Ongoing  Note: Patient turned and repositioned every 2 hours and as needed for comfort. Skin remains dry and intact. No new skin breakdown noted. Outcome: Ongoing     Problem: Airway Clearance - Ineffective  Goal: Achieve or maintain patent airway  8/12/2020 0311 by Sharad Jackson RN  Outcome: Ongoing  Outcome: Ongoing     Problem: Gas Exchange - Impaired  Goal: Absence of hypoxia  8/12/2020 0311 by Sharad Jackson RN  Outcome: Ongoing  Note: Patient's oxygen saturation remains within normal limits throughout shift. Outcome: Ongoing  Goal: Promote optimal lung function  8/12/2020 0311 by Sharad Jackson RN  Outcome: Ongoing  Outcome: Ongoing     Problem: Breathing Pattern - Ineffective  Goal: Ability to achieve and maintain a regular respiratory rate  8/12/2020 0311 by Sharad Jackson RN  Outcome: Ongoing  Note: Patient's respiratory rate remains within normal limits throughout shift. Outcome: Ongoing     Problem:  Body Temperature -  Risk of, Imbalanced  Goal: Ability to maintain a body temperature within defined limits  8/12/2020 0311 by Sharad Jackson RN  Outcome: Ongoing  Note: Patient's body temperature remains within normal limits- no fevers noted and no signs of other infection noted. Outcome: Ongoing  Goal: Will regain or maintain usual level of consciousness  8/12/2020 0311 by Darryle Brier, RN  Outcome: Ongoing  Note: Patient alert and orientated x4. Outcome: Ongoing  Goal: Complications related to the disease process, condition or treatment will be avoided or minimized  8/12/2020 0311 by Darryle Brier, RN  Outcome: Ongoing  Outcome: Ongoing     Problem: Isolation Precautions - Risk of Spread of Infection  Goal: Prevent transmission of infection  8/12/2020 0311 by Darryle Brier, RN  Outcome: Ongoing  Outcome: Ongoing     Problem: Nutrition Deficits  Goal: Optimize nutrtional status  8/12/2020 0311 by Darryle Brier, RN  Outcome: Ongoing  Outcome: Ongoing     Problem: Risk for Fluid Volume Deficit  Goal: Maintain normal heart rhythm  8/12/2020 0311 by Darryle Brier, RN  Outcome: Ongoing  Note: Patient continues to have sinus bradycardia on monitor. Outcome: Ongoing  Goal: Maintain absence of muscle cramping  8/12/2020 0311 by Darryle Brier, RN  Outcome: Ongoing  Note: No complaints of muscle cramping during shift. Outcome: Ongoing  Goal: Maintain normal serum potassium, sodium, calcium, phosphorus, and pH  8/12/2020 0311 by Darryle Brier, RN  Outcome: Ongoing  Note: Monitoring labs, output and vital signs.    Outcome: Ongoing     Problem: Loneliness or Risk for Loneliness  Goal: Demonstrate positive use of time alone when socialization is not possible  8/12/2020 0311 by Darryle Brier, RN  Outcome: Ongoing  Outcome: Ongoing     Problem: Fatigue  Goal: Verbalize increase energy and improved vitality  8/12/2020 0311 by Darryle Brier, RN  Outcome: Ongoing  Outcome: Ongoing     Problem: Patient Education: Go to Patient Education Activity  Goal: Patient/Family Education  8/12/2020 0311 by Darryle Brier, RN  Outcome: Ongoing  Outcome: Ongoing

## 2020-08-12 NOTE — PROGRESS NOTES
[facility]          [facility] -   Infectious diseases evaluation  admission date 8/4/2020    reason for consultation:   Positive COVID-19 test    Impression :   Current:  · Positive COVID-19 test/asymptomatic infection. · Sigmoid diverticulosis status post colectomy 8/4/2020  · Atrial fibrillation  · Diabetes mellitus  · Hypertension  · Obesity  · History of endometrial cancer status post hysterectomy  · Legally blind    Other:  · Penicillin and sulfa allergy    Recommendations   Continue current care  She received 8 days of IV cefazolin that was discontinued yesterday. Droplet plus isolation   Discussed with nursing staff          History of Present Illness:   Initial history:  Kaleigh Raymond is a 78y.o.-year-old female was admitted for EGD and colonoscopy that was done 8/3/2020 found to have a sigmoid diverticulosis and polyps . the patient had colectomy 8/4/2020 postop course was uncomplicated and the patient was going to be discharged to [facility]. Rapid COVID test was negative on 7/30/2020  Repeat COVID test 8/8/2020 came back positive. Repeat rapid COVID test dated 8 /9/20 was negative  The patient had no shortness of breath or cough, no hypoxia, O2 sat 95% on room air, no change in her sense of taste or smell, no reported fever. She does have postoperative abdominal pain and nausea    Interval changes  8/12/2020   She had episode of desaturation overnight, currently on room air, no hypoxia, no reported fever, NORMA drains with a serosanguineous drainage, no reported redness at the surgical incision, no new complaints          I have personally reviewed the past medical history, past surgical history, medications, social history, and family history, and I haveupdated the database accordingly.   Past Medical History:     Past Medical History:   Diagnosis Date    Adenocarcinoma of endometrium, stage 1 ([facility] [address] 10/5/2017    Well differentiated grade 1 adenocarcinoma arising in complex hyperplasia    JOSHUA (acute kidney injury) ([address] 1/15/2020    Allergic rhinitis 2/23/2017    At high risk for falls 2/23/2017    Atrial fibrillation [facility])     Jan 2020    CHF (congestive heart failure) ([address]     \"little\"    Colon polyp     Had colonoscopy done 20 yrs ago    Diabetes mellitus ([address]     Diverticulitis     Endometrial cancer ([address]     History of TIA (transient ischemic attack) '80's    on Baby ASA    Hyperglycemia 3/21/2017    Hyperlipidemia     Hypertension since 2015    on Rx.     Hypothyroidism 1980    sub total thyroidectomy goiter    Legally blind since born    both eyes, cord prolapse; \"not legally blind\" per \"associated eye care\" please see telephone encounter from 2/27/18    Lower back pain     Mixed incontinence 1/9/2016    Morbid obesity with BMI of 40.0-44.9, adult ([address] 2/23/2017    CLAROS (nonalcoholic steatohepatitis) 3/10/2019    Obesity     Oral phase dysphagia 2/23/2018    Osteoarthritis (arthritis due to wear and tear of joints)     ronan knee    Osteoarthritis involving multiple joints on both sides of body 4/24/2012    Osteoporosis     Perforated bowel ([address]     Postmenopausal bleeding 9/20/2017    S/P h-scope, Myosure 9/20/17 9/20/2017    Pathology pending    Tennis elbow     left    Thickened endometrium 9/20/2017    TIA (transient ischemic attack)     TLHBSO, bilateral LND 10/24/17 10/24/2017    Type 2 diabetes mellitus, without long-term current use of insulin ([address] 1/14/2020       Past Surgical  History:     Past Surgical History:   Procedure Laterality Date    CATARACT REMOVAL WITH IMPLANT Bilateral 2015    COLONOSCOPY  1997    had polyp, she doesn't know where and when, \"20 yrs ago\"    COLONOSCOPY  06/26/2017    COLONOSCOPY N/A 8/3/2020    COLONOSCOPY POLYPECTOMY SNARE performed by Lizette Parisi MD at [address] [facility] N/A 9/20/2017    HYSTEROSCOPY  WITH MYOSURE performed by Kerry Moeller DO at [address] per session: Not on file    Stress: Not on file   Relationships    Social connections     Talks on phone: Not on file     Gets together: Not on file     Attends Jain service: Not on file     Active member of club or organization: Not on file     Attends meetings of clubs or organizations: Not on file     Relationship status: Not on file    Intimate partner violence     Fear of current or ex partner: Not on file     Emotionally abused: Not on file     Physically abused: Not on file     Forced sexual activity: Not on file   Other Topics Concern    Not on file   Social History Narrative    Not on file       Family History:     Family History   Problem Relation Age of Onset    Coronary Art Dis Father     Hypertension Father     Diabetes Father     High Blood Pressure Father     Heart Attack Father     Diabetes Mother     High Blood Pressure Mother     Thyroid Disease Mother     High Blood Pressure Sister     Thyroid Disease Brother     High Blood Pressure Brother     High Blood Pressure Maternal Grandmother     Diabetes Maternal Grandfather     No Known Problems Paternal Grandmother     Heart Attack Paternal Grandfather     Colon Cancer Neg Hx         Allergies:   Sulfa antibiotics and Penicillins     Review of Systems:     Review of Systems  As per history of present illness, other than above 12 systems reviewed negative  Physical Examination :     Patient Vitals for the past 8 hrs:   BP Temp Pulse Resp SpO2 Weight   08/12/20 0900 -- -- 70 17 96 % --   08/12/20 0815 -- 97.8 °F (36.6 °C) -- -- -- --   08/12/20 0800 -- -- 56 19 92 % --   08/12/20 0700 (!) 153/88 -- 58 13 97 % --   08/12/20 0600 (!) 160/65 -- 55 18 95 % --   08/12/20 0500 (!) 141/64 -- 56 19 93 % --   08/12/20 0459 -- -- -- -- -- 247 lb 5.7 oz (112.2 kg)       Physical Exam    Due to the current efforts to prevent transmission of COVID-19 and also the need to preserve PPE for other caregivers, a face-to-face encounter with the

## 2020-08-12 NOTE — PROGRESS NOTES
[facility]    CONSULTATION / HISTORY AND PHYSICAL EXAMINATION            Date:   8/12/2020  Patient name:  Lane Navarro  Date of admission:  8/4/2020  5:43 AM  MRN:   [id number]  Account:  [de-identified]  YOB: 1950  PCP:    Jean Douglas MD  Room:   2014/2014-01  Code Status:    Full Code    Physician Requesting Consult: Alphonso Ahumada MD    Reason for Consult: Medical management    Chief Complaint:     No chief complaint on file. Sigmoid diverticulitis    History Obtained From:     patient, electronic medical record     History of Present Illness/ Interval History:   61-year-old female with history of morbid obesity, hypertension, diabetes, atrial fibrillation on anticoagulation admitted for planned open sigmoid colectomy and cholecystectomy. Noted to be COVID-19 positive after admission. Had EGD and colonoscopy earlier this month, history of uterine cancer in the past status post hysterectomy    Past Medical History:     Past Medical History:   Diagnosis Date    Adenocarcinoma of endometrium, stage 1 ([facility] [address] 10/5/2017    Well differentiated grade 1 adenocarcinoma arising in complex hyperplasia    JOSHUA (acute kidney injury) ([facility] [address] 1/15/2020    Allergic rhinitis 2/23/2017    At high risk for falls 2/23/2017    Atrial fibrillation [facility])     Jan 2020    CHF (congestive heart failure) ([facility] [address]     \"little\"    Colon polyp     Had colonoscopy done 20 yrs ago    Diabetes mellitus ([facility] [address]     Diverticulitis     Endometrial cancer ([facility] [address]     History of TIA (transient ischemic attack) '80's    on Baby ASA    Hyperglycemia 3/21/2017    Hyperlipidemia     Hypertension since 2015    on Rx.     Hypothyroidism 1980    sub total thyroidectomy goiter    Legally blind since born    both eyes, cord prolapse; \"not legally blind\" per \"associated eye care\" please see telephone encounter from 2/27/18    Lower back pain     Mixed incontinence 1/9/2016    Morbid obesity with BMI of 40.0-44.9, adult ([facility] [address] 2/23/2017    CLAROS (nonalcoholic steatohepatitis) 3/10/2019    Obesity     Oral phase dysphagia 2/23/2018    Osteoarthritis (arthritis due to wear and tear of joints)     ronan knee    Osteoarthritis involving multiple joints on both sides of body 4/24/2012    Osteoporosis     Perforated bowel ([address]     Postmenopausal bleeding 9/20/2017    S/P h-scope, Myosure 9/20/17 9/20/2017    Pathology pending    Tennis elbow     left    Thickened endometrium 9/20/2017    TIA (transient ischemic attack)     TLHBSO, bilateral LND 10/24/17 10/24/2017    Type 2 diabetes mellitus, without long-term current use of insulin ([facility] [address] 1/14/2020        Past Surgical History:     Past Surgical History:   Procedure Laterality Date    CATARACT REMOVAL WITH IMPLANT Bilateral 2015    COLONOSCOPY  1997    had polyp, she doesn't know where and when, \"20 yrs ago\"    COLONOSCOPY  06/26/2017    COLONOSCOPY N/A 8/3/2020    COLONOSCOPY POLYPECTOMY SNARE performed by Melba Min MD at [address] [facility] N/A 9/20/2017    HYSTEROSCOPY  WITH MYOSURE performed by Génesis Egan DO at [facility] [address] N/A 10/24/2017    TOTAL LAPAROSCOPIC HYSTERECTOMY, BSO, F.S.  STAGING, GYRUS G400 performed by Max Riddle MD at [address] N/A 6/26/2017    COLONOSCOPY POLYPECTOMY / HOT SNARE performed by Paris Barba DO at [address] N/A 8/4/2020    OPEN SIGMOID COLECTOMY; PRIMARY ANASTOMOSIS & MOBILIZATION OF SPLENIC FLEXURE performed by Melba Min MD at [place] [address]  10/24/2017    with pelvic lymph node dissection    THYROID SURGERY  1980    subtotal 20 years ago    TONSILLECTOMY      UPPER GASTROINTESTINAL ENDOSCOPY N/A 8/3/2020    EGD BIOPSY performed by Melba Min MD at [address] [facility] VITRECTOMY      FLOATERS        Medications Prior to Admission:     Prior to Admission medications    Medication Sig Start Date End Date Taking? Authorizing Provider   oxyCODONE-acetaminophen (PERCOCET) 5-325 MG per tablet Take 1 tablet by mouth every 6 hours as needed for Pain for up to 5 days. Intended supply: 5 days. Take lowest dose possible to manage pain 8/10/20 8/15/20 Yes JEANNE Baldwin   ondansetron (ZOFRAN ODT) 4 MG disintegrating tablet Take 1 tablet by mouth every 8 hours as needed for Nausea or Vomiting 8/10/20 8/17/20 Yes JEANNE Baldwin   cephALEXin (KEFLEX) 500 MG capsule Take 1 capsule by mouth 3 times daily for 7 days 8/10/20 8/17/20 Yes JEANNE Prescott   metoprolol tartrate (LOPRESSOR) 25 MG tablet Take 0.5 tablets by mouth 2 times daily 7/27/20  Yes Sheela Ford MD   amiodarone (CORDARONE) 200 MG tablet Take 1 tablet by mouth daily 6/22/20  Yes Gonzalo Lopez MD   atorvastatin (LIPITOR) 40 MG tablet TAKE 1 TABLET BY MOUTH DAILY STOP SIMVASTATIN 3/16/20  Yes Keely Dempsey MD   metFORMIN (GLUCOPHAGE) 500 MG tablet TAKE ONE TABLET BY MOUTH TWICE A DAY WITH A MEAL 11/11/19  Yes Keely Dempsey MD   Lactobacillus (PROBIOTIC ACIDOPHILUS) TABS Take 1 tablet by mouth daily 7/31/17  Yes Keely Dempsey MD   Blood Pressure Monitor KIT Use as directed. 7/22/20   MAX Barker CNP   glucose monitoring kit (FREESTYLE) monitoring kit Use as directed. BRAND [employer] CHO[employer] INSURANCE ALLOWS. 7/22/20   MAX Barker CNP   blood glucose monitor strips Test 2-3 times a day & as needed for symptoms of irregular blood glucose.   BR[employer] INSURANCE ALLOWS. 7/22/20   MAX Barker CNP   Alcohol Swabs (ALCOHOL PREP) PADS Use as directed 7/22/20   MAX Barker CNP   Lancets MISC 1 each by Does not apply route 2 times daily 7/22/20   MAX Barker CNP   oxybutynin (DITROPAN-XL) 10 MG extended release tablet Take 1 tablet by mouth daily 7/20/20   Lorene Quezada MD   rivaroxaban 02/23/2017     Priority: High    COVID-19 virus infection [U07.1] 08/10/2020    Sigmoid diverticulitis [K57.32] 08/04/2020    Class 3 severe obesity due to excess calories without serious comorbidity with body mass index (BMI) of 40.0 to 44.9 in adult ([address] [E66.01, Z68.41] 07/22/2020    Atrial fibrillation, new onset ([address] [I48.91] 01/20/2020    A-fib ([address] [I48.91] 01/14/2020    Type 2 diabetes mellitus, without long-term current use of insulin ([address] [E11.9] 01/14/2020    Adenomatous polyp of sigmoid colon, 6/26/17 [D12.5] 07/30/2017    Acquired hypothyroidism [E03.9]        Plan: Active Problems: Morbid obesity with BMI of 40.0-44.9, adult (HCC)    Acquired hypothyroidism    Adenomatous polyp of sigmoid colon, 6/26/17    A-fib ([address]    Type 2 diabetes mellitus, without long-term current use of insulin (HCC)    Atrial fibrillation, new onset ([address]    Class 3 severe obesity due to excess calories without serious comorbidity with body mass index (BMI) of 40.0 to 44.9 in adult [facility])    Sigmoid diverticulitis    COVID-19 virus infection  Resolved Problems:    * No resolved hospital problems. *      63-year-old status post sigmoid colectomy and cholecystectomy for perforated sigmoid diverticulitis. 1. COVID-19 infection. 1st test negative, second test positive in preparation for discharge to SNF. 2. Status post surgery -doing well, no further fever spikes  3. Tachycardia- started on Cardizem, Lopressor dose increased- better controlled. 4. Type 2 diabetes-well controlled. 5. Hypertension-controlled. 6. Hypothyroid- continue home medication. 7. Chronic diastolic heart failure-currently compensated. 8. Atrial fibrillation on Cardizem, Lopressor, Xarelto-rate currently controlled.   9. Hypokalemia-replace per protocol, hypomagnesemia replace to keep at 2      8/11  Xarelto resumed  Still awaiting pre-CERT    Will need antibiotic changed to Keflex on discharge per surgical team  Patient reports abdominal pain is much better, hurts only when she is coughing. Denies any cough or shortness of breath. No chest pain or palpitation. 8/12  Xarelto resumed yesterday-hemoglobin stable. Still awaiting pre-CERT. Abdominal pain improving, no respiratory distress. Some desaturation noted overnight-likely sleep apnea- patient will be discharged on 2 L nasal oxygen at night, chest x-ray today unremarkable. Consultations:   IP CONSULT TO INTERNAL MEDICINE  IP CONSULT TO CASE MANAGEMENT  IP CONSULT TO SOCIAL WORK  IP CONSULT TO CARDIOLOGY  IP CONSULT TO INFECTIOUS DISEASES  IP CONSULT TO PULMONOLOGY      Queenie Domínguez MD  8/12/2020  10:58 AM    Copy sent to Dr. Lilibeth Romano MD    Please note that this chart was generated using voice recognition Dragon dictation software. Although every effort was made to ensure the accuracy of this automated transcription, some errors in transcription may have occurred.

## 2020-08-13 VITALS
DIASTOLIC BLOOD PRESSURE: 75 MMHG | TEMPERATURE: 98.1 F | BODY MASS INDEX: 43.13 KG/M2 | RESPIRATION RATE: 18 BRPM | OXYGEN SATURATION: 96 % | HEIGHT: 62 IN | WEIGHT: 234.35 LBS | SYSTOLIC BLOOD PRESSURE: 119 MMHG | HEART RATE: 107 BPM

## 2020-08-13 LAB
ABSOLUTE EOS #: 0.2 K/UL (ref 0–0.4)
ABSOLUTE IMMATURE GRANULOCYTE: ABNORMAL K/UL (ref 0–0.3)
ABSOLUTE LYMPH #: 1.7 K/UL (ref 1–4.8)
ABSOLUTE MONO #: 0.6 K/UL (ref 0.1–1.3)
ANION GAP SERPL CALCULATED.3IONS-SCNC: 9 MMOL/L (ref 9–17)
BASOPHILS # BLD: 1 % (ref 0–2)
BASOPHILS ABSOLUTE: 0.1 K/UL (ref 0–0.2)
BUN BLDV-MCNC: 13 MG/DL (ref 8–23)
BUN/CREAT BLD: ABNORMAL (ref 9–20)
CALCIUM SERPL-MCNC: 9 MG/DL (ref 8.6–10.4)
CHLORIDE BLD-SCNC: 100 MMOL/L (ref 98–107)
CO2: 30 MMOL/L (ref 20–31)
CREAT SERPL-MCNC: 0.74 MG/DL (ref 0.5–0.9)
D-DIMER QUANTITATIVE: 0.5 MG/L FEU (ref 0–0.59)
DIFFERENTIAL TYPE: ABNORMAL
EOSINOPHILS RELATIVE PERCENT: 2 % (ref 0–4)
GFR AFRICAN AMERICAN: >60 ML/MIN
GFR NON-AFRICAN AMERICAN: >60 ML/MIN
GFR SERPL CREATININE-BSD FRML MDRD: ABNORMAL ML/MIN/{1.73_M2}
GFR SERPL CREATININE-BSD FRML MDRD: ABNORMAL ML/MIN/{1.73_M2}
GLUCOSE BLD-MCNC: 110 MG/DL (ref 70–99)
GLUCOSE BLD-MCNC: 115 MG/DL (ref 65–105)
HCT VFR BLD CALC: 31.9 % (ref 36–46)
HEMOGLOBIN: 10.9 G/DL (ref 12–16)
IMMATURE GRANULOCYTES: ABNORMAL %
LYMPHOCYTES # BLD: 18 % (ref 24–44)
MAGNESIUM: 1.6 MG/DL (ref 1.6–2.6)
MCH RBC QN AUTO: 31.4 PG (ref 26–34)
MCHC RBC AUTO-ENTMCNC: 34.3 G/DL (ref 31–37)
MCV RBC AUTO: 91.5 FL (ref 80–100)
MONOCYTES # BLD: 6 % (ref 1–7)
NRBC AUTOMATED: ABNORMAL PER 100 WBC
PDW BLD-RTO: 15.4 % (ref 11.5–14.9)
PLATELET # BLD: 255 K/UL (ref 150–450)
PLATELET ESTIMATE: ABNORMAL
PMV BLD AUTO: 8.8 FL (ref 6–12)
POTASSIUM SERPL-SCNC: 3.8 MMOL/L (ref 3.7–5.3)
RBC # BLD: 3.48 M/UL (ref 4–5.2)
RBC # BLD: ABNORMAL 10*6/UL
SEG NEUTROPHILS: 73 % (ref 36–66)
SEGMENTED NEUTROPHILS ABSOLUTE COUNT: 6.8 K/UL (ref 1.3–9.1)
SODIUM BLD-SCNC: 139 MMOL/L (ref 135–144)
WBC # BLD: 9.3 K/UL (ref 3.5–11)
WBC # BLD: ABNORMAL 10*3/UL

## 2020-08-13 PROCEDURE — 85379 FIBRIN DEGRADATION QUANT: CPT

## 2020-08-13 PROCEDURE — 85025 COMPLETE CBC W/AUTO DIFF WBC: CPT

## 2020-08-13 PROCEDURE — 80048 BASIC METABOLIC PNL TOTAL CA: CPT

## 2020-08-13 PROCEDURE — 6370000000 HC RX 637 (ALT 250 FOR IP): Performed by: INTERNAL MEDICINE

## 2020-08-13 PROCEDURE — 83735 ASSAY OF MAGNESIUM: CPT

## 2020-08-13 PROCEDURE — 99024 POSTOP FOLLOW-UP VISIT: CPT | Performed by: PHYSICIAN ASSISTANT

## 2020-08-13 PROCEDURE — 36415 COLL VENOUS BLD VENIPUNCTURE: CPT

## 2020-08-13 PROCEDURE — 2580000003 HC RX 258: Performed by: SURGERY

## 2020-08-13 PROCEDURE — 82947 ASSAY GLUCOSE BLOOD QUANT: CPT

## 2020-08-13 RX ORDER — DEXTROMETHORPHAN POLISTIREX 30 MG/5ML
60 SUSPENSION ORAL EVERY 12 HOURS PRN
Qty: 1 BOTTLE | Refills: 0 | DISCHARGE
Start: 2020-08-13 | End: 2020-08-23

## 2020-08-13 RX ORDER — BENZONATATE 200 MG/1
200 CAPSULE ORAL 3 TIMES DAILY PRN
Qty: 30 CAPSULE | DISCHARGE
Start: 2020-08-13 | End: 2020-08-20

## 2020-08-13 RX ORDER — DILTIAZEM HYDROCHLORIDE 240 MG/1
240 CAPSULE, COATED, EXTENDED RELEASE ORAL DAILY
Qty: 30 CAPSULE | Refills: 1 | Status: SHIPPED | OUTPATIENT
Start: 2020-08-14 | End: 2020-09-23

## 2020-08-13 RX ORDER — PANTOPRAZOLE SODIUM 40 MG/1
40 TABLET, DELAYED RELEASE ORAL
Qty: 30 TABLET | Refills: 1 | Status: SHIPPED | OUTPATIENT
Start: 2020-08-14 | End: 2020-09-23

## 2020-08-13 RX ORDER — METOPROLOL TARTRATE 50 MG/1
50 TABLET, FILM COATED ORAL 2 TIMES DAILY
Qty: 60 TABLET | Refills: 1 | Status: SHIPPED | OUTPATIENT
Start: 2020-08-13 | End: 2020-09-24 | Stop reason: SDUPTHER

## 2020-08-13 RX ORDER — DOCUSATE SODIUM 100 MG/1
100 CAPSULE, LIQUID FILLED ORAL 2 TIMES DAILY PRN
Qty: 180 CAPSULE | Refills: 3 | DISCHARGE
Start: 2020-08-13 | End: 2020-09-29

## 2020-08-13 RX ADMIN — DILTIAZEM HYDROCHLORIDE 240 MG: 240 CAPSULE, COATED, EXTENDED RELEASE ORAL at 08:11

## 2020-08-13 RX ADMIN — PANTOPRAZOLE SODIUM 40 MG: 40 TABLET, DELAYED RELEASE ORAL at 05:17

## 2020-08-13 RX ADMIN — Medication 10 ML: at 01:46

## 2020-08-13 RX ADMIN — LEVOTHYROXINE SODIUM 75 MCG: 75 TABLET ORAL at 05:17

## 2020-08-13 RX ADMIN — METOPROLOL TARTRATE 50 MG: 50 TABLET, FILM COATED ORAL at 08:09

## 2020-08-13 ASSESSMENT — PAIN SCALES - GENERAL: PAINLEVEL_OUTOF10: 0

## 2020-08-13 NOTE — PROGRESS NOTES
Pulmonary Progress Note  [facility]      Patient - Inna Zuniga,  Age - 78 y.o.    - 1950      Room Number -    N -  [id number]   [facility]  Date of Admission -  2020  5:43 AM        Hanley Barthel, MD  Primary Care Physician - Amado Staton MD     SUBJECTIVE   No issues overnight in terms of oxygen desaturation, wore her oxygen without any problem    OBJECTIVE   VITALS    height is 5' 2\" (1.575 m) and weight is 234 lb 5.6 oz (106.3 kg). Her oral temperature is 98.1 °F (36.7 °C). Her blood pressure is 119/75 and her pulse is 103. Her respiration is 18 and oxygen saturation is 96%. Body mass index is 42.86 kg/m². Temperature Range: Temp: 98.1 °F (36.7 °C) Temp  Av.3 °F (36.8 °C)  Min: 98 °F (36.7 °C)  Max: 98.8 °F (37.1 °C)  BP Range:  Systolic (51OEE), SDX:188 , Min:90 , LMF:648     Diastolic (80NMS), MFX:50, Min:56, Max:90    Pulse Range: Pulse  Av.7  Min: 70  Max: 113  Respiration Range: Resp  Av.8  Min: 14  Max: 18  Current Pulse Ox[de-identified]  SpO2: 96 %  24HR Pulse Ox Range:  SpO2  Av.5 %  Min: 94 %  Max: 96 %  Oxygen Amount and Delivery: O2 Flow Rate (L/min): 2 L/min(on for night)    Wt Readings from Last 3 Encounters:   20 234 lb 5.6 oz (106.3 kg)   20 226 lb (102.5 kg)   20 232 lb (105.2 kg)       I/O (24 Hours)    Intake/Output Summary (Last 24 hours) at 2020 0848  Last data filed at 2020 5200  Gross per 24 hour   Intake --   Output 1955 ml   Net -1955 ml       EXAM     General Appearance  Awake, alert, oriented, in no acute distress  HEENT - normocephalic, atraumatic.  []  Mallampati  [x] Crowded airway   [x] Macroglossia  []  Retrognathia  [] Micrognathia  []  Normal tongue size []  Normal Bite  [] [place] sign positive    Neck - Supple,  trachea midline   Lungs -clear  Cardiovascular - Heart sounds are normal. Regular rate and rhythm   Abdomen - Soft, nontender, nondistended, no masses or organomegaly  Neurologic - There are no focal motor or sensory deficits  Skin - No bruising or bleeding  Extremities - No clubbing, cyanosis, edema    MEDS      rivaroxaban  20 mg Oral Dinner    levothyroxine  75 mcg Oral QAM AC    atorvastatin  40 mg Oral Nightly    pantoprazole  40 mg Oral QAM AC    metoprolol tartrate  50 mg Oral BID    dilTIAZem  240 mg Oral Daily    sodium chloride flush  10 mL Intravenous 2 times per day    insulin lispro  0-6 Units Subcutaneous TID WC    insulin lispro  0-3 Units Subcutaneous Nightly      dextrose       acetaminophen **OR** acetaminophen, dextromethorphan, benzonatate, potassium chloride **OR** potassium alternative oral replacement **OR** potassium chloride, oxyCODONE-acetaminophen, magnesium sulfate, metoprolol, glucose, dextrose, glucagon (rDNA), dextrose, sodium chloride flush, ondansetron    LABS   CBC   Recent Labs     08/13/20  0328   WBC 9.3   HGB 10.9*   HCT 31.9*   MCV 91.5        BMP:   Lab Results   Component Value Date     08/13/2020    K 3.8 08/13/2020     08/13/2020    CO2 30 08/13/2020    BUN 13 08/13/2020    LABALBU 4.3 07/24/2020    LABALBU 4.3 03/20/2012    CREATININE 0.74 08/13/2020    CALCIUM 9.0 08/13/2020    GFRAA >60 08/13/2020    LABGLOM >60 08/13/2020     ABGs:No results found for: PHART, PO2ART, IVT3HGT No results found for: IFIO2, MODE, SETTIDVOL, SETPEEP  Ionized Calcium:  No results found for: IONCA  Magnesium:    Lab Results   Component Value Date    MG 1.6 08/13/2020     Phosphorus:    Lab Results   Component Value Date    PHOS 3.5 07/26/2020        LIVER PROFILE No results for input(s): AST, ALT, LIPASE, BILIDIR, BILITOT, ALKPHOS in the last 72 hours. Invalid input(s): AMYLASE,  ALB  INR No results for input(s): INR in the last 72 hours.   PTT   Lab Results   Component Value Date    APTT 28.7 07/11/2020         RADIOLOGY     (See actual reports for details)    ASSESSMENT/PLAN   Active Problems: Morbid obesity with BMI of 40.0-44.9, adult (HCC)    Acquired hypothyroidism    Adenomatous polyp of sigmoid colon, 6/26/17    A-fib ([facility]    Type 2 diabetes mellitus, without long-term current use of insulin (HCC)    Atrial fibrillation, new onset ([facility]    Class 3 severe obesity due to excess calories without serious comorbidity with body mass index (BMI) of 40.0 to 44.9 in adult [facility])    Sigmoid diverticulitis    COVID-19 virus infection  Resolved Problems:    * No resolved hospital problems. *    Desaturation secondary to ZACKERY  Okay to wear oxygen at night and while sleeping.   Keep head of the bed elevated when sleeping   Eventually, will need outpatient sleep study  Okay to discharge to [facility]  Follow-up in office in 2 months  Electronically signed by Laurence Vail MD on 8/13/2020 at 8:48 AM

## 2020-08-13 NOTE — DISCHARGE SUMMARY
Physician Discharge Summary     Date of admission: 8/4/2020    Discharge date: 8/13/2020    Admission Diagnosis: Sigmoid diverticulitis [K57.32]    Discharge Diagnosis: same, s/p sigmoid colectomy     Brief Hospitalization Details:  Wilber Youngblood is a 78 y.o. female with PMHx complicated sigmoid diverticulitis who was admitted for postoperative management following open sigmoid colectomy with primary anastomosis 08/03/2020. Patient did well postoperatively. Pain was controlled. Bowel function returned, having bowel movements daily. Diet was gradually increased, currently tolerating a low fiber diet. Patient active with PT/OT. Adequate urine output. In anticipation for discharge, COVID-19 swab was performed revealing positive result. Repeat was negative however discharge placement was adjusted. Patient is surgically stable for discharge to [facility]. Incision clean, dry, intact. NORMA x 2 serosanguinous. Labs stable. Discharge instructions discussed at length. Prescriptions in chart. Patient to follow up in office as outpatient in 7-10 days. Current Discharge Medication List      START taking these medications    Details   benzonatate (TESSALON) 200 MG capsule Take 1 capsule by mouth 3 times daily as needed for Cough  Qty: 30 capsule      dextromethorphan (DELSYM) 30 MG/5ML extended release liquid Take 10 mLs by mouth every 12 hours as needed for Cough  Qty: 1 Bottle, Refills: 0      dilTIAZem (CARDIZEM CD) 240 MG extended release capsule Take 1 capsule by mouth daily  Qty: 30 capsule, Refills: 1      pantoprazole (PROTONIX) 40 MG tablet Take 1 tablet by mouth every morning (before breakfast)  Qty: 30 tablet, Refills: 1      oxyCODONE-acetaminophen (PERCOCET) 5-325 MG per tablet Take 1 tablet by mouth every 6 hours as needed for Pain for up to 5 days. Intended supply: 5 days.  Take lowest dose possible to manage pain  Qty: 20 tablet, Refills: 0    Comments: Reduce doses taken as pain becomes manageable  Associated (ALCOHOL PREP) PADS Use as directed  Qty: 100 each, Refills: 11    Associated Diagnoses: Type 2 diabetes mellitus with hyperglycemia, without long-term current use of insulin ([facility])      Lancets MISC 1 each by Does not apply route 2 times daily  Qty: 300 each, Refills: 11    Associated Diagnoses: Type 2 diabetes mellitus with hyperglycemia, without long-term current use of insulin ([facility])      oxybutynin (DITROPAN-XL) 10 MG extended release tablet Take 1 tablet by mouth daily  Qty: 30 tablet, Refills: 5      rivaroxaban (XARELTO) 20 MG TABS tablet Take 1 tablet by mouth daily (with breakfast)  Qty: 30 tablet, Refills: 3      levothyroxine (SYNTHROID) 75 MCG tablet TAKE 1 TABLET BY MOUTH EVERY MORNING (BEFORE BREAKFAST)  Qty: 90 tablet, Refills: 3    Associated Diagnoses: Acquired hypothyroidism      MYRBETRIQ 50 MG TB24 TAKE ONE TABLET BY MOUTH ONCE DAILY  Qty: 90 tablet, Refills: 3    Associated Diagnoses: Mixed incontinence; OAB (overactive bladder)      UNABLE TO FIND Needs right walker brake fixed  Qty: 1 each, Refills: 0    Associated Diagnoses: Primary osteoarthritis of both knees; Chronic bilateral low back pain without sciatica; At high risk for falls      Cholecalciferol (VITAMIN D) 2000 units TABS tablet Take 1 tablet by mouth daily  Qty: 90 tablet, Refills: 3    Associated Diagnoses: Vitamin D deficiency      vitamin B-12 (CYANOCOBALAMIN) 1000 MCG tablet Take 1 tablet by mouth daily  Qty: 90 tablet, Refills: 3    Associated Diagnoses: Vitamin B 12 deficiency      acetaminophen (APAP EXTRA STRENGTH) 500 MG tablet Take 1 tablet by mouth every 6 hours as needed for Pain or Fever  Qty: 360 tablet, Refills: 3    Associated Diagnoses: Chronic pain of both knees;  Chronic bilateral low back pain without sciatica; Primary osteoarthritis of both knees         STOP taking these medications       amiodarone (CORDARONE) 200 MG tablet Comments:   Reason for Stopping:         HM LORATADINE 10 MG tablet Comments:   Reason for Stopping:         Cranberry, Vacc oxycoccus, (SM CRANBERRY) 500 MG TABS Comments:   Reason for Stopping:               Condition at Discharge: good    Electronically signed by Denise Kayser, PA on 8/13/2020 at 10:27 AM

## 2020-08-13 NOTE — PLAN OF CARE
Problem: Infection:  Goal: Will remain free from infection  Description: Will remain free from infection  Outcome: Ongoing  No new signs of infection. Will continue to monitor labs and temp. Problem: Safety:  Goal: Free from accidental physical injury  Description: Free from accidental physical injury  Outcome: Ongoing   Fall precautions in place. Pat safety top priority. Problem: Daily Care:  Goal: Daily care needs are met  Description: Daily care needs are met  Outcome: Ongoing   Needs addressed on rounds. Problem: Pain:  Goal: Patient's pain/discomfort is manageable  Description: Patient's pain/discomfort is manageable  Outcome: Ongoing   Pain assessed on rounds. Non pharm measures utilized. Problem: Skin Integrity:  Goal: Skin integrity will stabilize  Description: Skin integrity will stabilize  Outcome: Ongoing   Skin kept clean and dry. Head to toe assessment charted. Problem: Discharge Planning:  Goal: Patients continuum of care needs are met  Description: Patients continuum of care needs are met  Outcome: Ongoing     Problem: Falls - Risk of:  Goal: Will remain free from falls  Description: Will remain free from falls  Outcome: Ongoing   No falls noted this shift. Problem: HEMODYNAMIC STATUS  Goal: Patient has stable vital signs and fluid balance  Outcome: Ongoing   Vitals remained stable this shift, no signs of fluid overload or dehydration. Problem: OXYGENATION/RESPIRATORY FUNCTION  Goal: Patient will achieve/maintain normal respiratory rate/effort  Outcome: Ongoing   O2 stats maintained. O2 2l nc worn at night. Problem: MOBILITY  Goal: Early mobilization is achieved  Outcome: Ongoing   Encourage frequent repositioning. Problem: ELIMINATION  Goal: Elimination patterns are normal or improving  Description: Elimination patterns return to pre-surgery normal patterns  Outcome: Ongoing   External catheter placed for pt comfort and safety.      Problem: SKIN INTEGRITY  Goal: Skin integrity is maintained or improved  Outcome: Ongoing   No new signs of skin breakdown. Head to toe charted. Problem: Musculor/Skeletal Functional Status  Goal: Highest potential functional level  Outcome: Ongoing     Problem: Airway Clearance - Ineffective  Goal: Achieve or maintain patent airway  Outcome: Ongoing   Met. Problem: Gas Exchange - Impaired  Goal: Absence of hypoxia  Outcome: Ongoing   No signs of hypoxia this shift. O2 stats remained in range. Problem: Breathing Pattern - Ineffective  Goal: Ability to achieve and maintain a regular respiratory rate  Outcome: Ongoing   No signs of distress. Problem: Body Temperature -  Risk of, Imbalanced  Goal: Ability to maintain a body temperature within defined limits  Outcome: Ongoing   No sign of fever. Monitored throughout shift. Problem: Isolation Precautions - Risk of Spread of Infection  Goal: Prevent transmission of infection  Outcome: Ongoing   Teach isolation precautions and conduct it appropriately . Problem: Nutrition Deficits  Goal: Optimize nutrtional status  Outcome: Ongoing     Problem: Risk for Fluid Volume Deficit  Goal: Maintain normal heart rhythm  Outcome: Ongoing   Monitor I/o.     Problem: Loneliness or Risk for Loneliness  Goal: Demonstrate positive use of time alone when socialization is not possible  Outcome: Ongoing     Problem: Fatigue  Goal: Verbalize increase energy and improved vitality  Outcome: Ongoing   Cluster activities to encourage rest.     Problem: Patient Education: Go to Patient Education Activity  Goal: Patient/Family Education  Outcome: Ongoing

## 2020-08-13 NOTE — CARE COORDINATION
Writer arranged for the pt to be transported to [facility] at [facility]. Writer completed [facility] ppw and faxed.  Writer then gave ppw including fax confirmation to [facility]

## 2020-08-13 NOTE — CARE COORDINATION
After Visit Summary   Natalie Moya  MRN: [id number]      [birth year] - 8/13/2020   [address] [facility]   [phone]    Care Plan Once You Return Home     Ask        Ask how to get these medications   1 benzonatate   2 dextromethorphan   3 docusate sodium   Do         these medications from [address] [place] [address], 25 [facility]   1 dilTIAZem   2 metoprolol tartrate   3 pantoprazole       these medications from any pharmacy with your printed prescription   1 cephALEXin   2 ondansetron   3 oxyCODONE-acetaminophen   Go     Aug17 PT EVAL 60 MINUTES 10:30 AM   Ramonito Antonietta Nissen, PT   [facility]   [address], [address]   [address] [facility]   [phone]    You have more future appointments. Please review your full appointment list.   Kennedy Vela your After Visit Summary and more online at [email or web address]. After Visit Summary   Instructions        Need Help? After Visit Summary  (Discharge Instructions)     This summary was created for you.     Thank you for entrusting your care to us. The following information includes details about your hospital/visit stay along with steps you should take to help with your recovery once you leave the hospital. Follow-up with your Primary Care Provider when condition worsens. In the event of an emergency, call 911 or go to the nearest Emergency Department. At your follow-up appointment, ask your physician about any tests or studies that were not available at discharge. In this packet, you will find information about the topics listed below:      Instructions about your medications including a list of your home medications   A summary of your hospital visit   Follow-up appointments once you have left the hospital   Your care plan at home        You may receive a survey regarding the care you received during your stay. Your input is valuable to us.  We monitoring  08/13/2021    Colonoscopy  08/03/2023    Tetanus Combination Vaccine (2 - Td)  09/28/2027            Follow Up Information and Future Appointments     Follow Up Information and Future Appointments           Follow up with Amado Staton MD   Specialty: [facility]  [address]   [address] [address]   [phone]            Follow up with [address]   They will call you at home to set up a time to come to your house. [address] [facility] with Tina Chau, [facility]   Monday Aug 17, 2020 10:30 AM  [facility]   [address], [place] [address]   [address]    [address] Office Visit with Eliane Keith MD   Monday Aug 31, 2020 9:20 AM   Please arrive 15 minutes prior to appointment, bring photo ID and insurance card. [facility] - [address] [address] [phone]    Sep18 Follow Up Appointment with Bess Thompson PA-C   Friday Sep 18, 2020 2:45 PM   Please arrive 15 minutes prior to appointment time, bring insurance card and photo ID. [address]   [address] [place] [address] [address] [place] [id number] [phone]    Sep28 Follow Up Appointment with Christiano Gtz MD   Monday Sep 28, 2020 1:45 PM   Please arrive 15 minutes prior to appointment time, bring insurance card and photo ID. [address] [facility] [address]   [address] [facility] [phone] Office Visit with Amado Staton MD   Friday Oct 30, 2020 10:30 AM   Please arrive 15 minutes prior to appointment, bring photo ID and insurance card.   [facility]   [address]   [phone]    You are allergic to the following     You are allergic to the following   Allergen  Reactions    Sulfa Antibiotics Nausea Only        Penicillins  Rash    Your Latest Vitals        Blood Pressure 119/75      BMI 42.86      Weight 234 lb 5.6 oz      Height 5' 2\"      Temperature (Oral) 98.1 °F      Pulse 107      Respiration 18      Oxygen Saturation 96%      BSA 2.16 m²   Opioid Education     Prescription Opioids: What You Need to Know:     Prescription opioids can be used to help relieve moderate-to-severe pain and are often prescribed following a surgery or injury, or for certain health conditions. These medications can be an important part of treatment but also come with serious risks. Opioids are strong pain medicines. Examples include hydrocodone ([place]), oxycodone (ROXICODONE, OXYCONTIN, PERCOCET), fentanyl ([address]), and morphine (MS CONTIN). Heroin is an example of an illegal opioid. It is important to work with your health care provider to make sure you are getting the safest, most effective care.     WHAT ARE THE RISKS AND SIDE EFFECTS OF OPIOID USE? Prescription opioids carry serious risks of addiction and overdose, especially with prolonged use. An opioid overdose, often marked by slow breathing, can cause sudden death. The use of prescription opioids can have a number of side effects as well, even when taken as directed. · Tolerance-meaning you might need to take more of a medication for the same pain relief  · Physical dependence-meaning you have symptoms of withdrawal when the medication is stopped. Withdrawal symptoms can include nausea, sweating, chills, diarrhea, stomach cramps, and muscle aches. Withdrawal can last up to several weeks, depending on which drug you took and how long you took it.   · Increased sensitivity to pain  · Constipation  · Nausea, vomiting, and dry mouth  · Sleepiness and dizziness  · Confusion  · Depression  · Low levels of testosterone that can result in lower sex drive, energy, and strength  · Itching and sweating     RISKS ARE GREATER WITH: · History of drug misuse, substance use disorder, or overdose  · Mental health conditions (such as depression or anxiety)  · Sleep apnea  · Older age (72 years or older)  · Pregnancy     Avoid alcohol while taking prescription opioids. Also, unless specifically advised by your health care provider, medications to avoid include:  · Benzodiazepines (such as alprazolam (Xanax) or diazepam (Valium))  · Muscle relaxants (such as carisoprodol (Soma) or cyclobenzaprine (Flexeril))  · Hypnotics (such as zolpidem (Ambien) or eszopiclone (Lunesta))  · Other prescription opioids     KNOW YOUR OPTIONS  Talk to your health care provider about ways to manage your pain that don't involve prescription opioids. Some of these options may actually work better and have fewer risks and side effects. Consult your physician before adding or stopping any medications, treatments, or physical activity. Options may include:  · Pain relievers such as acetaminophen (Tylenol), ibuprofen (Motrin, Advil), and naproxen (Naprosyn, Aleve)  · Some medications that are also used for depression or seizures  · Physical therapy and exercise  · Counseling to help patients learn how to cope better with triggers of pain and stress. · Application of heat or cold compress  · Massage therapy  · Relaxation techniques     Be Informed  Make sure you know the name of your medication, how much and how often to take it, and its potential risks & side effects.     IF YOU ARE PRESCRIBED OPIOIDS FOR PAIN:  · Never take opioids in greater amounts or more often than prescribed. Remember the goal is not to be pain-free but to manage your pain at a tolerable level. · Follow up with your primary care provider to:  § Work together to create a plan on how to manage your pain. § Talk about ways to help manage your pain that don't involve prescription opioids. § Talk about any and all concerns and side effects.           · Help prevent misuse and abuse. § Never sell or share prescription opioids  § Help prevent misuse and abuse. · Store prescription opioids in a secure place and out of reach of others (this may include visitors, children, friends, and family). · Safely dispose of unused/unwanted prescription opioids: Find your community drug take-back program or your pharmacy mail-back program, or flush them down the toilet, following guidance from the Food and Drug Administration (www.fda.gov/Drugs/ResourcesForYou). · Visit www.cdc.gov/drugoverdose to learn about the risks of opioid abuse and overdose. · If you believe you may be struggling with addiction, tell your health care provider and ask for guidance or call [place] at 7-503-584-WIGE.                                                               Daily Medication List (This medication list can be shared with any healthcare provider who is helping you manage your medications)     There are NEW medications for you. START taking them after you leave the hospital     There are NEW medications for you.  START taking them after you leave the hospital     Next Dose Due  AM  NOON  PM  NIGHT     benzonatate 200 MG capsule   Commonly known as: TESSALON   Take 1 capsule by mouth 3 times daily as needed for Cough          cephALEXin 500 MG capsule   Commonly known as: KEFLEX   Take 1 capsule by mouth 3 times daily for 7 days          dextromethorphan 30 MG/5ML extended release liquid   Commonly known as: DELSYM   Take 10 mLs by mouth every 12 hours as needed for Cough          dilTIAZem 240 MG extended release capsule   Commonly known as: CARDIZEM CD   Take 1 capsule by mouth daily   Start taking on: August 14, 2020          ondansetron 4 MG disintegrating tablet   Commonly known as: Zofran ODT   Take 1 tablet by mouth every 8 hours as needed for Nausea or Vomiting          oxyCODONE-acetaminophen 5-325 MG per tablet   Commonly known as: Percocet   Take 1 tablet by mouth every 6 hours as needed for Pain for up to 5 days. Intended supply: 5 days. Take lowest dose possible to manage pain          pantoprazole 40 MG tablet   Commonly known as: PROTONIX   Take 1 tablet by mouth every morning (before breakfast)   Start taking on: August 14, 2020         You told us you were taking these medications at home,but the amount or how often you take this medication has CHANGED     You told us you were taking these medications at home,but the amount or how often you take this medication has CHANGED     Next Dose Due  AM  NOON  PM  NIGHT     docusate sodium 100 MG capsule   Commonly known as: COLACE   Take 1 capsule by mouth 2 times daily as needed for Constipation   What changed:    0 when to take this   0 reasons to take this          metoprolol tartrate 50 MG tablet   Commonly known as: LOPRESSOR   Take 1 tablet by mouth 2 times daily   What changed:    0 medication strength   0 how much to take         These are medications you told us you were taking at home, CONTINUE taking them after you leave the hospital     These are medications you told us you were taking at home, CONTINUE taking them after you leave the hospital     Next Dose Due  AM  NOON  PM  NIGHT     acetaminophen 500 MG tablet   Commonly known as: APAP Extra Strength   Take 1 tablet by mouth every 6 hours as needed for Pain or Fever          Alcohol Prep Pads   Use as directed          atorvastatin 40 MG tablet   Commonly known as: LIPITOR   TAKE 1 TABLET BY MOUTH DAILY STOP SIMVASTATIN          blood glucose test strips   Test 2-3 times a day & as needed for symptoms of irregular blood glucose. BRAND OF CHOICE INSURANCE ALLOWS. Blood Pressure Monitor Kit   Use as directed. glucose monitoring kit monitoring kit   Use as directed. BRAND OF CHO[employer] INSURANCE ALLOWS.           Lancets Misc   1 each by Does not apply route 2 times daily          levothyroxine 75 MCG tablet   Commonly known as: SYNTHROID TAKE 1 TABLET BY MOUTH EVERY MORNING (BEFORE BREAKFAST)          metFORMIN 500 MG tablet   Commonly known as: GLUCOPHAGE   TAKE ONE TABLET BY MOUTH TWICE A DAY WITH A MEAL          Myrbetriq 50 MG Tb24   Generic drug: mirabegron   TAKE ONE TABLET BY MOUTH ONCE DAILY          oxybutynin 10 MG extended release tablet   Commonly known as: DITROPAN-XL   Take 1 tablet by mouth daily          Probiotic Acidophilus Tabs   Take 1 tablet by mouth daily          rivaroxaban 20 MG Tabs tablet   Commonly known as: Xarelto   Take 1 tablet by mouth daily (with breakfast)          UNABLE TO FIND   Needs right walker brake fixed          vitamin B-12 1000 MCG tablet   Commonly known as: CYANOCOBALAMIN   Take 1 tablet by mouth daily          vitamin D 50 MCG (2000 UT) Tabs tablet   Commonly known as: CHOLECALCIFEROL   Take 1 tablet by mouth daily         These are the medications you have told us you were taking at home STOP taking them after you leave the hospital     These are the medications you have told us you were taking at home STOP taking them after you leave the hospital    amiodarone 200 MG tablet   Commonly known as: CORDARONE     HM Loratadine 10 MG tablet   Generic drug: loratadine     SM Cranberry 500 MG Tabs   Generic drug: Cranberry (Vacc oxycoccus)            Where to  your medications         these medications at [facility], [address] [place] [address] [phone] - F [phone]      dilTIAZem  metoprolol tartrate  pantoprazole   Address:  [address] [address]    Phone:  [phone]        these medications from any pharmacy with your printed prescription      cephALEXin  ondansetron  oxyCODONE-acetaminophen      Ask your doctor where to  these medications       benzonatate 200 MG capsule     dextromethorphan 30 MG/5ML extended release liquid     docusate sodium 100 MG capsule         SHANA Instructions     Continuity of Care Form     Patient Name: Kip Sensing   :  1950                      MRN:  [id number]     Admit date:  2020                       Discharge date:  20     Code Status Order: Full Code   Advance Directives:              Advance Care Flowsheet Documentation      Date/Time Healthcare Directive Type of Healthcare Directive Copy in [address] Agent's Name Healthcare Agent's Phone Number     [phone]  No, patient does not have an advance directive for healthcare treatment declined info -- -- -- -- --             Admitting Physician:  Zita Guzmán MD  PCP: Aurelio Rolle MD     Discharging Nurse: [facility] Unit/Room#: 2116/2116-01  Discharging Unit Phone Number: [phone]     Emergency Contact:   Extended Emergency Contact Information  Primary Emergency Contact: Donna Dennis  Address: [address] Phone: [phone]  Mobile Phone: [phone]  Relation: Brother/Sister  Hearing or visual needs: None  Other needs: None  Preferred language: English   needed? No  Secondary Emergency Contact: Angelene Kanner [address] Phone: [phone]  Relation: Child  Hearing or visual needs: None  Other needs: None  Preferred language: English   needed?  No     Past Surgical History:        Past Surgical History:   Procedure Laterality Date    CATARACT REMOVAL WITH IMPLANT Bilateral     COLONOSCOPY        had polyp, she doesn't know where and when, \"20 yrs ago\"    COLONOSCOPY   2017    COLONOSCOPY N/A 8/3/2020     COLONOSCOPY POLYPECTOMY SNARE performed by Zita Guzmán MD at [address] [place] [address] N/A 2017     HYSTEROSCOPY  WITH MYOSURE performed by José Luis Le DO at [facility] N/A 10/24/2017     TOTAL LAPAROSCOPIC HYSTERECTOMY, BSO, F.S.  STAGING, GYRUS G400 performed by Kendrick Schilder, MD at [address] [place] [address] [place] COLSC FLX W/REMOVAL LESION BY HOT BX FORCEPS N/A 6/26/2017     COLONOSCOPY POLYPECTOMY / HOT SNARE performed by Nayla Oquendo DO at [facility] N/A 8/4/2020     OPEN SIGMOID COLECTOMY; PRIMARY ANASTOMOSIS & MOBILIZATION OF SPLENIC FLEXURE performed by Zachariah Quinn MD at [address]   10/24/2017     with pelvic lymph node dissection    THYROID SURGERY   1980     subtotal 20 years ago    TONSILLECTOMY        UPPER GASTROINTESTINAL ENDOSCOPY N/A 8/3/2020     EGD BIOPSY performed by Zachariah Quinn MD at [address] [facility]         Immunization History:        Immunization History   Administered Date(s) Administered    Influenza Vaccine, unspecified formulation 10/01/2016    Influenza Virus Vaccine 10/01/2019    Influenza, Triv, inactivated, subunit, adjuvanted, IM (Fluad 65 yrs and older) 09/14/2017, 10/02/2018    Pneumococcal Conjugate 13-valent ([id number]) 02/23/2017    Pneumococcal Polysaccharide ([id number]) 04/20/2018    Tdap (Boostrix, Adacel) 09/28/2017         Active Problems:       Patient Active Problem List   Diagnosis Code    Acquired hypothyroidism E03.9    History of TIA (transient ischemic attack) Z86.73    Chronic bilateral low back pain without sciatica M54.5, G89.29    Essential hypertension I10    Osteopenia determined by x-ray M85.80    Osteoarthritis involving multiple joints on both sides of body M15.9    Left knee DJD M17.12    Prediabetes R73.03    Vitamin D deficiency E55.9    Mixed incontinence N39.46    Chronic pain of both knees M25.561, M25.562, G89.29    Slow transit constipation K59.01    Allergic rhinitis J30.9    Hyperlipidemia with target LDL less than 100 E78.5    Morbid obesity with BMI of 40.0-44.9, adult ([facility] E66.01, Z68.41    At high risk for falls Z91.81    Dense breast tissue on mammogram R92.2    Hyperglycemia R73.9    Spondylosis of lumbar region without myelopathy or radiculopathy M47.816    OAB (overactive bladder) N32.81    Primary osteoarthritis of both knees M17.0    Chronic fatigue  R53.82    Adenomatous polyp of sigmoid colon, 6/26/17 D12.5    Mixed stress and urge urinary incontinence N39.46    TLHBSO, bilateral LND 10/24/17 Z90.710, Z90.79, Z90.722    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    Cataracta b/l eyes H26.9    Difficulty walking R26.2    Esotropia H50.00    Nystagmus H55.00    History of uterine cancer, Well differentiated grade 1 adenocarcinoma arising in complex hyperplasia, 2017 Z85.42    Vitamin B 12 deficiency E53.8    Aortic calcification (HCC) I70.0    Calculus of gallbladder without cholecystitis without obstruction K80.20    CLAROS (nonalcoholic steatohepatitis) K75.81    Optic atrophy H47.20    Cough present for greater than 3 weeks R05    Dyspepsia R10.13    A-fib ([facility]) I48.91    Type 2 diabetes mellitus, without long-term current use of insulin (HCC) E11.9    JOSHUA (acute kidney injury) ([facility] [address] N17.9    Atrial fibrillation, new onset ([facility]) I48.91    Mobility impaired Z74.09    Perforated diverticulum K57.80    Diverticulitis K57.92    Chronic cholecystitis K81.1    Colitis K52.9    Multiple atypical skin moles D22.9    Class 3 severe obesity due to excess calories without serious comorbidity with body mass index (BMI) of 40.0 to 44.9 in adult (HCC) E66.01, Z68.41    Symptomatic bradycardia R00.1    CHF NYHA class I, chronic, diastolic (HCC) Z19.19    Sigmoid diverticulitis K57.32         Isolation/Infection:       Isolation            No Isolation                      Patient Infection Status      Infection Onset Added Last Indicated Last Indicated By Review Planned Expiration Resolved Resolved By     None active     Resolved     COVID-19 Rule Out 07/30/20 07/31/20 07/30/20 Covid-19 Ambulatory (Ordered)     08/01/20 Rule-Out Test Resulted             Nurse Assessment:  Last Vital Signs: BP (!) 115/47   Pulse 62   Temp 97.6 °F (36.4 °C) (Oral)   Resp 16   Ht 5' 2\" (1.575 m)   Wt 234 lb 10.4 oz (106.4 kg)   LMP  (LMP Unknown)   SpO2 100%   BMI 42.92 kg/m²     Last documented pain score (0-10 scale): Pain Level: 5  Last Weight:   Wt Readings from Last 1 Encounters:   08/05/20 234 lb 10.4 oz (106.4 kg)      Mental Status:  oriented and alert     IV Access:  - None     Nursing Mobility/ADLs:  Walking            Assisted  Transfer            Assisted  Bathing             Assisted  Dressing           Assisted  Toileting           Assisted  Feeding            Assisted  Med Admin      Assisted  Med Delivery   whole     Wound Care Documentation and Therapy:     Elimination:  Continence:   · Bowel: Yes  · Bladder: Yes  Urinary Catheter: None   Colostomy/Ileostomy/Ileal Conduit: No     Date of Last BM: 8/12/20     Intake/Output Summary (Last 24 hours) at 8/5/2020 0931  Last data filed at 8/5/2020 0908      Gross per 24 hour   Intake 3299 ml   Output 1530 ml   Net 1769 ml      I/O last 3 completed shifts: In: 4279 [P.O.:65; I.V.:4214]  Out: 3346 [Urine:1305; Drains:350; Blood:100]     Safety Concerns: At Risk for Falls     Impairments/Disabilities:      None     Nutrition Therapy:  Current Nutrition Therapy:   - Oral Diet:  General     Routes of Feeding: Oral  Liquids: No Restrictions  Daily Fluid Restriction: no  Last Modified Barium Swallow with Video (Video Swallowing Test): not done     Treatments at the Time of Hospital Discharge:   Respiratory Treatments: N/A  Oxygen Therapy:  is not on home oxygen therapy. Ventilator:    - No ventilator support     Rehab Therapies: Physical Therapy and Occupational Therapy  Weight Bearing Status/Restrictions: No weight bearing restirctions  Other Medical Equipment (for information only, NOT a DME order):  cane and walker, SC  Other Treatments: Skilled Nursing Assessment Per Protocol. Medication Education & Monitoring. HHA 3 X a week as prior to admission.  Resume previous services as prior to admission.     Patient's personal belongings (please select all that are sent with patient):  None     RN SIGNATURE:  Electronically signed by Danelle Gardner RN on 8/13/20 at 9:57 AM EDT     CASE MANAGEMENT/SOCIAL WORK SECTION     Inpatient Status Date: 8/4/20     Readmission Risk Assessment Score:  Readmission Risk              Risk of Unplanned Readmission:        20            · Discharging to Facility/ Agency:   [address] [place] [address]  Dialysis Facility (if applicable)   · Name:  · Address:  · Dialysis Schedule:  · Phone:  · Fax:     / signature: Electronically signed by Sbarina Venegas RN on 8/5/20 at 9:32 AM EDT     PHYSICIAN SECTION     Prognosis: Fair     Condition at Discharge: Stable     Rehab Potential (if transferring to Rehab): Fair     Recommended Labs or Other Treatments After Discharge: Follow up Dr. Blayne Cortes in two weeks.  Cardiology in 3 weeks     Physician Certification: I certify the above information and transfer of Paty Wilcox  is necessary for the continuing treatment of the diagnosis listed and that she requires [place] for less 30 days.      Update Admission H&P: No change in H&P     PHYSICIAN SIGNATURE:  Electronically signed by Marva Luevano MD on 8/13/20 at 9:55 AM EDT

## 2020-08-17 ENCOUNTER — HOSPITAL ENCOUNTER (OUTPATIENT)
Dept: PHYSICAL THERAPY | Age: 70
Setting detail: THERAPIES SERIES
Discharge: HOME | End: 2020-08-17
Payer: MEDICAID

## 2020-09-13 ENCOUNTER — TELEPHONE (OUTPATIENT)
Dept: FAMILY MEDICINE CLINIC | Age: 70
End: 2020-09-13

## 2020-09-13 NOTE — TELEPHONE ENCOUNTER
Lane Navarro is a 78 y.o. female patient's home health nurse named [place] verified medication list from the nursing home where she was discharged from yesterday. Patient seems to have had the amiodarone stopped from the most recent admission to the hospital.  Patient is still currently on diltiazem and metoprolol. The nurse manager is concerned about sending any home health personnel in the patient's home since the patient was admitted for COVID-19 virus. However, patient has tested negative on the ninth. Nurse manager was instructed to make the soonest appointment with our office as a transitional care management to clarify every medication changes that was made by his nursing home provider. SARS-CoV-2, ANGELA   Date Value Ref Range Status   07/30/2020 Not Detected Not Detected Final     Comment:     (NOTE)  This test was developed and its performance characteristics  determined by [employer]. This test has not been FDA  cleared or approved. This test has been authorized by FDA under an  Emergency Use Authorization (EUA). This test is only authorized for  the duration of time the declaration that circumstances exist  justifying the authorization of the emergency use of in vitro  diagnostic tests for detection of SARS-CoV-2 virus and/or diagnosis  of COVID-19 infection under section 564(b)(1) of the Act, 21 U. S.C.  349RVH-0(H)(9), unless the authorization is terminated or revoked  sooner. When diagnostic testing is negative, the possibility of a  false negative result should be considered in the context of a  patient's recent exposures and the presence of clinical signs and  symptoms consistent with COVID-19. An individual without symptoms of  COVID-19 and who is not shedding SARS-CoV-2 virus would expect to  have a negative (not detected) result in this assay.   Performed At: [address] [id number]  Elizabeth Timmons MD FT:[id number] SARS-CoV-2   Date Value Ref Range Status   08/09/2020 Not Detected Not Detected Final     Comment:           The specimen is NEGATIVE for SARS-CoV-2, the novel coronavirus associated with COVID-19. A negative result does not rule out COVID-19. This test has been authorized by the FDA under an Emergency Use Authorization (EUA) for use   by authorized laboratories. [employer] SARS-CoV-2 Reagents for BD MAX System are designed to detect the virus that causes   COVID-19 in patients with signs and symptoms of infection who are suspected of COVID-19. An individual without symptoms of COVID-19 and who is not shedding SARS-CoV-2 virus would   expect to have a negative (not detected) result in this assay. Fact sheet for Healthcare Providers: kstattoo.com  Fact sheet for Patients: kstattoo.com        METHODOLOGY: RT-PCR     08/08/2020 DETECTED (A) Not Detected Final     Comment:           The specimen is POSITIVE for SARS-Cov-2, the novel coronavirus associated with COVID-19. This test has been authorized by the FDA under an Emergency Use Authorization (EUA) for use   by authorized laboratories. [employer] SARS-CoV-2 Reagents for BD MAX System are designed to detect the virus that causes   COVID-19 in patients with signs and symptoms of infection who are suspected of COVID-19. An individual without symptoms of COVID-19 and who is not shedding SARS-CoV-2 virus would   expect to have a negative (not detected) result in this assay.   Fact sheet for Healthcare Providers: kstattoo.com  Fact sheet for Patients: kstattoo.com        METHODOLOGY: RT-PCR        Results reported to the appropriate Health Department       SARS-CoV-2, Rapid   Date Value Ref Range Status   06/16/2020 Not Detected Not Detected Final     Comment:           Rapid NAAT:  The specimen is NEGATIVE for SARS-CoV-2, the novel coronavirus associated with   COVID-19. Negative results should be treated as presumptive and, if inconsistent with clinical signs   and symptoms or necessary for patient management,  should be tested with an alternative molecular assay. Negative results do not preclude   SARS-CoV-2 infection and   should not be used as the sole basis for patient management decisions.          Fact sheet for Healthcare Providers: BuildHer.es  Fact sheet for Patients: Joel.dayan          Methodology: Isothermal Nucleic Acid Amplification

## 2020-09-15 ENCOUNTER — TELEPHONE (OUTPATIENT)
Dept: FAMILY MEDICINE CLINIC | Age: 70
End: 2020-09-15

## 2020-09-15 PROBLEM — Z86.16 HISTORY OF 2019 NOVEL CORONAVIRUS DISEASE (COVID-19): Status: ACTIVE | Noted: 2020-09-15

## 2020-09-15 NOTE — TELEPHONE ENCOUNTER
Received a fax request for patient, she is in need of nocturnal O2 testing. Please fax back an order so they can set that up for patient. Please advise.

## 2020-09-15 NOTE — TELEPHONE ENCOUNTER
What is the reason for oxygen evaluation? ?  I do not have more information. I did order it but please obtain some info from home care, and fax the order       Diagnosis Orders   1.  History of 2019 novel coronavirus disease (COVID-19)  Home O2 eval (desaturation screen)        SpO2 Readings from Last 4 Encounters:   08/13/20 96%   08/04/20 100%   08/03/20 99%   08/03/20 100%

## 2020-09-21 PROBLEM — K62.5 RECTAL BLEEDING: Status: ACTIVE | Noted: 2020-09-21

## 2020-09-22 ENCOUNTER — VIRTUAL VISIT (OUTPATIENT)
Dept: FAMILY MEDICINE CLINIC | Age: 70
End: 2020-09-22
Payer: MEDICARE

## 2020-09-22 PROBLEM — R53.82 CHRONIC FATIGUE: Status: RESOLVED | Noted: 2017-06-10 | Resolved: 2020-09-22

## 2020-09-22 PROBLEM — R26.2 DIFFICULTY WALKING: Status: RESOLVED | Noted: 2018-04-28 | Resolved: 2020-09-22

## 2020-09-22 PROBLEM — R92.30 DENSE BREAST TISSUE ON MAMMOGRAM: Status: RESOLVED | Noted: 2017-03-20 | Resolved: 2020-09-22

## 2020-09-22 PROBLEM — N39.46 MIXED STRESS AND URGE URINARY INCONTINENCE: Status: RESOLVED | Noted: 2017-09-26 | Resolved: 2020-09-22

## 2020-09-22 PROBLEM — R05.8 COUGH PRESENT FOR GREATER THAN 3 WEEKS: Status: RESOLVED | Noted: 2019-10-03 | Resolved: 2020-09-22

## 2020-09-22 PROBLEM — R10.13 DYSPEPSIA: Status: RESOLVED | Noted: 2020-01-12 | Resolved: 2020-09-22

## 2020-09-22 PROBLEM — U07.1 COVID-19 VIRUS INFECTION: Status: RESOLVED | Noted: 2020-08-10 | Resolved: 2020-09-22

## 2020-09-22 PROBLEM — N17.9 AKI (ACUTE KIDNEY INJURY) (HCC): Status: RESOLVED | Noted: 2020-01-15 | Resolved: 2020-09-22

## 2020-09-22 PROBLEM — R73.9 HYPERGLYCEMIA: Status: RESOLVED | Noted: 2017-03-21 | Resolved: 2020-09-22

## 2020-09-22 PROBLEM — R92.2 DENSE BREAST TISSUE ON MAMMOGRAM: Status: RESOLVED | Noted: 2017-03-20 | Resolved: 2020-09-22

## 2020-09-22 PROCEDURE — 3017F COLORECTAL CA SCREEN DOC REV: CPT | Performed by: FAMILY MEDICINE

## 2020-09-22 PROCEDURE — 4040F PNEUMOC VAC/ADMIN/RCVD: CPT | Performed by: FAMILY MEDICINE

## 2020-09-22 PROCEDURE — G8427 DOCREV CUR MEDS BY ELIG CLIN: HCPCS | Performed by: FAMILY MEDICINE

## 2020-09-22 PROCEDURE — 3044F HG A1C LEVEL LT 7.0%: CPT | Performed by: FAMILY MEDICINE

## 2020-09-22 PROCEDURE — 1090F PRES/ABSN URINE INCON ASSESS: CPT | Performed by: FAMILY MEDICINE

## 2020-09-22 PROCEDURE — 99214 OFFICE O/P EST MOD 30 MIN: CPT | Performed by: FAMILY MEDICINE

## 2020-09-22 PROCEDURE — 1123F ACP DISCUSS/DSCN MKR DOCD: CPT | Performed by: FAMILY MEDICINE

## 2020-09-22 PROCEDURE — G8399 PT W/DXA RESULTS DOCUMENT: HCPCS | Performed by: FAMILY MEDICINE

## 2020-09-22 PROCEDURE — 2022F DILAT RTA XM EVC RTNOPTHY: CPT | Performed by: FAMILY MEDICINE

## 2020-09-22 RX ORDER — ZOSTER VACCINE RECOMBINANT, ADJUVANTED 50 MCG/0.5
0.5 KIT INTRAMUSCULAR ONCE
Qty: 0.5 ML | Refills: 0 | Status: SHIPPED | OUTPATIENT
Start: 2020-09-22 | End: 2020-09-22

## 2020-09-22 ASSESSMENT — ENCOUNTER SYMPTOMS
ABDOMINAL PAIN: 0
CONSTIPATION: 0
CHEST TIGHTNESS: 0
DIARRHEA: 0
WHEEZING: 0
SHORTNESS OF BREATH: 0
VOMITING: 0
BACK PAIN: 1
ABDOMINAL DISTENTION: 0
COUGH: 0

## 2020-09-22 NOTE — PATIENT INSTRUCTIONS
Patient Education        Low Sodium Diet (2,000 Milligram): Care Instructions  Your Care Instructions     Too much sodium causes your body to hold on to extra water. This can raise your blood pressure and force your heart and kidneys to work harder. In very serious cases, this could cause you to be put in the hospital. It might even be life-threatening. By limiting sodium, you will feel better and lower your risk of serious problems. The most common source of sodium is salt. People get most of the salt in their diet from canned, prepared, and packaged foods. Fast food and restaurant meals also are very high in sodium. Your doctor will probably limit your sodium to less than 2,000 milligrams (mg) a day. This limit counts all the sodium in prepared and packaged foods and any salt you add to your food. Follow-up care is a key part of your treatment and safety. Be sure to make and go to all appointments, and call your doctor if you are having problems. It's also a good idea to know your test results and keep a list of the medicines you take. How can you care for yourself at home? Read food labels  · Read labels on cans and food packages. The labels tell you how much sodium is in each serving. Make sure that you look at the serving size. If you eat more than the serving size, you have eaten more sodium. · Food labels also tell you the Percent Daily Value for sodium. Choose products with low Percent Daily Values for sodium. · Be aware that sodium can come in forms other than salt, including monosodium glutamate (MSG), sodium citrate, and sodium bicarbonate (baking soda). MSG is often added to Asian food. When you eat out, you can sometimes ask for food without MSG or added salt. Buy low-sodium foods  · Buy foods that are labeled \"unsalted\" (no salt added), \"sodium-free\" (less than 5 mg of sodium per serving), or \"low-sodium\" (less than 140 mg of sodium per serving).  Foods labeled \"reduced-sodium\" and \"light sodium\" may still have too much sodium. Be sure to read the label to see how much sodium you are getting. · Buy fresh vegetables, or frozen vegetables without added sauces. Buy low-sodium versions of canned vegetables, soups, and other canned goods. Prepare low-sodium meals  · Cut back on the amount of salt you use in cooking. This will help you adjust to the taste. Do not add salt after cooking. One teaspoon of salt has about 2,300 mg of sodium. · Take the salt shaker off the table. · Flavor your food with garlic, lemon juice, onion, vinegar, herbs, and spices. Do not use soy sauce, lite soy sauce, steak sauce, onion salt, garlic salt, celery salt, mustard, or ketchup on your food. · Use low-sodium salad dressings, sauces, and ketchup. Or make your own salad dressings and sauces without adding salt. · Use less salt (or none) when recipes call for it. You can often use half the salt a recipe calls for without losing flavor. Other foods such as rice, pasta, and grains do not need added salt. · Rinse canned vegetables, and cook them in fresh water. This removes some--but not all--of the salt. · Avoid water that is naturally high in sodium or that has been treated with water softeners, which add sodium. Call your local water company to find out the sodium content of your water supply. If you buy bottled water, read the label and choose a sodium-free brand. Avoid high-sodium foods  · Avoid eating:  ? Smoked, cured, salted, and canned meat, fish, and poultry. ? Ham, atwood, hot dogs, and luncheon meats. ? Regular, hard, and processed cheese and regular peanut butter. ? Crackers with salted tops, and other salted snack foods such as pretzels, chips, and salted popcorn. ? Frozen prepared meals, unless labeled low-sodium. ? Canned and dried soups, broths, and bouillon, unless labeled sodium-free or low-sodium. ? Canned vegetables, unless labeled sodium-free or low-sodium. ?  [place] fries, pizza, tacos, and other fast foods.  ? Pickles, olives, ketchup, and other condiments, especially soy sauce, unless labeled sodium-free or low-sodium. Where can you learn more? Go to https://DiBcomtiffanieb.[employer]cience. org and sign in to your [employer] account. Enter M974 in the [facility] box to learn more about \"Low Sodium Diet (2,000 Milligram): Care Instructions. \"     If you do not have an account, please click on the \"Sign Up Now\" link. Current as of: August 22, 2019               Content Version: 12.5  © 6575-8096 [employer]. Care instructions adapted under license by [employer]. If you have questions about a medical condition or this instruction, always ask your healthcare professional. [address] any warranty or liability for your use of this information.

## 2020-09-22 NOTE — PROGRESS NOTES
[facility] PHYSICIANS  [facility] FAMILY PHYSICIANS [facility]  [facility] [address].  [address] [address]  Dept: [phone]     2020 No chief complaint on file. NALINI Muse (:  1950) is a 78 y.o. female is an established patient Dr. Cristiano Fuller. Patient has a history of hypertension, new onset atrial fibrillation, diabetes, hypothyroidism, overactive bladder, and the recent colectomy. Patient had a complicated diverticulitis in the sigmoid colon. Patient had perforated and had a complicated prolonged: Surgery part of the sigmoid colon was removed. Patient also had some primary anastomosis on 8/3/2020    Patient had inadvertent COVID swab testing without any specific symptoms and patient turned positive. Retesting had been done since and those came back negative. Because of the significant surgery that she had with her multiple morbidities including self-care deficit patient was transferred to an E . Patient was discharged over a week ago. She is home getting some home health nurses, PT OT, and some aids several times a week. Patient is being cared for by her daughter and sometimes her granddaughter. Patient is doing well. She denies any diarrhea she denies any constipation, she does have some mild abdominal pain due to the incision. No incisional pain or infection. Patient is being monitored closely by the Ramon Rodriguez. . Continued home health care needed we will renew the order if needed. Patient's blood pressure is fluctuating. Patient states that visiting nurse would take the blood pressure and not say anything. However, when she checks her own blood pressure usually elevated. She denies any chest pains, shortness of breath, or palpitations. Patient is currently on metoprolol, diltiazem, and Xarelto. She is established with Dr. Byron Todd. She is encouraged to set up a follow-up appointment from the hospitalization.     DIABETES MELLITUS  Patient has a  stable Diabetes Mellitus. Patient's recent   Lab Results   Component Value Date    LABA1C 5.3 07/22/2020   . Current therapy includes metformin. Patient is responding well with this therapy. Patient reports home glucose monitoring as Stable readings. Patient denieshypoglycemia episodes such as{symptoms. HYPOTHYROIDISM  Rafa Angel is a 78 y.o. female patient  has a known hypothyroidism. Patient is currently on levothyroxine. Recent symptoms: fatigue. She denies weight gain. Recent TSH level 3.21 mg/dl. Patient is  taking her medication consistently on an empty stomach. Lab Results   Component Value Date    TSH 3.21 07/26/2020      Rafa Angel will require the following home care treatments or therapies: . Home care will be necessary because of homebound. The patient is in agreement to receiving home care. Physician to Follow Referral: MAX Calderon - CNP    I certify that I, or a nurse practitioner or physician assistant working with me, had an in-person encounter with Rafa Angel on 9/22/2020 and the reason for the home care services is documented in the clinical note for that day. Rafa Angel was assessed on the above date and was found to have medical conditions requiring Home Care that included as mentioned.     Homebound Status: further, I certify that my clinical findings support that Rafa Angel does meet the Medicare definition of \"Confined to the Home\" because of   Deconditioned due to medical condition  Unable to leave home without maximum effort and requires assistance of wheelchair or walker  Unsteady gait or dizziness that requires assistance of wheelchair, walker, or assistance of another person  Medically restricted to home because of multiple chronic conditions and mobility difficulties     Certification and medical necessity: I certify that, based on my findings, the following services are medically necessary home health services for Rafa Angel for the following reasons:  - Vital signs monitoring: Nurse to perform BP, HR, RR, Temp, and Weight with each visit and teach patient and caregivers on taking daily. Nurse to call physician if pulse less than 50 or greater than 120, respiratory rate less than 12 or greater than 25, oral temperature greater than or equal to 556 oF, systolic BP less than 90 or greater than 153, diastolic BP less than 50 or greater than 100. - Medication compliance and education for  changes in previous medications safety concerns  - Cardiopulmonary assessment and monitoring due to recent new onset atrial fibrillation monitoring to include oxygen saturation by pulse oximeter on  supplemental oxygen only and  during exertion only Notify provider if any deterioration in cardiopulmonary status or oxygen saturation less than 90%. - Consult Physical Therapy for  Evaluate and Treat  - Consult Medical Social Worker  assessment and counseling and education on community resources  - [address] Aid for  assistance with Activities of Daily Living  - Teaching and management of the following disease process and symptom management:   - Disease Process Education    SARS-CoV-2, ANGELA   Date Value Ref Range Status   07/30/2020 Not Detected Not Detected Final     Comment:     (NOTE)  This test was developed and its performance characteristics  determined by [employer]. This test has not been FDA  cleared or approved. This test has been authorized by FDA under an  Emergency Use Authorization (EUA). This test is only authorized for  the duration of time the declaration that circumstances exist  justifying the authorization of the emergency use of in vitro  diagnostic tests for detection of SARS-CoV-2 virus and/or diagnosis  of COVID-19 infection under section 564(b)(1) of the Act, 21 U. S.C.  607BVE-4(C)(0), unless the authorization is terminated or revoked  sooner.  When diagnostic testing is negative, the possibility of a  false negative result should be considered differentiated grade 1 adenocarcinoma arising in complex hyperplasia, 2017    Vitamin B 12 deficiency    Aortic calcification (HCC)    Calculus of gallbladder without cholecystitis without obstruction    CLAROS (nonalcoholic steatohepatitis)    Optic atrophy    A-fib (HCC)    Type 2 diabetes mellitus, without long-term current use of insulin (HCC)    Atrial fibrillation, new onset ([address]    Mobility impaired    Perforated diverticulum    Chronic cholecystitis    Colitis    Multiple atypical skin moles    Class 3 severe obesity due to excess calories without serious comorbidity with body mass index (BMI) of 40.0 to 44.9 in adult [facility])    Symptomatic bradycardia    CHF NYHA class I, chronic, diastolic (HCC)    Sigmoid diverticulitis    History of 2019 novel coronavirus disease (COVID-19)    Rectal bleeding      Past Medical History:   Diagnosis Date    Adenocarcinoma of endometrium, stage 1 ([facility] 10/5/2017    Well differentiated grade 1 adenocarcinoma arising in complex hyperplasia    JOSHUA (acute kidney injury) ([facility] 1/15/2020    Allergic rhinitis 2/23/2017    At high risk for falls 2/23/2017    Atrial fibrillation [facility])     Jan 2020    CHF (congestive heart failure) ([facility]     \"little\"    Colon polyp     Had colonoscopy done 20 yrs ago    Diabetes mellitus ([address]     Diverticulitis     Endometrial cancer ([facility]     History of TIA (transient ischemic attack) '80's    on Baby ASA    Hyperglycemia 3/21/2017    Hyperlipidemia     Hypertension since 2015    on Rx.     Hypothyroidism 1980    sub total thyroidectomy goiter    Legally blind since born    both eyes, cord prolapse; \"not legally blind\" per \"associated eye care\" please see telephone encounter from 2/27/18    Lower back pain     Mixed incontinence 1/9/2016    Morbid obesity with BMI of 40.0-44.9, adult ([address] 2/23/2017    CLAROS (nonalcoholic steatohepatitis) 3/10/2019    Obesity     Oral phase dysphagia 2/23/2018    Osteoarthritis (arthritis due to wear and tear of joints)     ronan knee    Osteoarthritis involving multiple joints on both sides of body 4/24/2012    Osteoporosis     Perforated bowel ([facility]     Postmenopausal bleeding 9/20/2017    S/P h-scope, Myosure 9/20/17 9/20/2017    Pathology pending    Tennis elbow     left    Thickened endometrium 9/20/2017    TIA (transient ischemic attack)     TLHBSO, bilateral LND 10/24/17 10/24/2017    Type 2 diabetes mellitus, without long-term current use of insulin ([facility] 1/14/2020      Past Surgical History:   Procedure Laterality Date    CATARACT REMOVAL WITH IMPLANT Bilateral 2015    COLONOSCOPY  1997    had polyp, she doesn't know where and when, \"20 yrs ago\"    COLONOSCOPY  06/26/2017    COLONOSCOPY N/A 8/3/2020    COLONOSCOPY POLYPECTOMY SNARE performed by Mega Cottrell MD at [address] [facility] N/A 9/20/2017    HYSTEROSCOPY  WITH Shanna Pritchard performed by Kt Bush DO at [address] [facility] N/A 10/24/2017    TOTAL LAPAROSCOPIC HYSTERECTOMY, BSO, F.S.  STAGING, GYRUS G400 performed by So Mercer MD at [address] N/A 6/26/2017    COLONOSCOPY POLYPECTOMY / [place] performed by Ian Crockett DO at [address] [facility] N/A 8/4/2020    OPEN SIGMOID COLECTOMY; PRIMARY ANASTOMOSIS & MOBILIZATION OF SPLENIC FLEXURE performed by Mega Cottrell MD at [address]  10/24/2017    with pelvic lymph node dissection    THYROID SURGERY  1980    subtotal 20 years ago    TONSILLECTOMY      UPPER GASTROINTESTINAL ENDOSCOPY N/A 8/3/2020    EGD BIOPSY performed by Mega Cottrell MD at [address] [facility] [address]      FLOATERS     Family History   Problem Relation Age of Onset    Coronary Art Dis Father     Hypertension Father     Diabetes Father     High Blood Pressure Father     Heart Attack Father     Diabetes Mother     High Blood Pressure Mother  Thyroid Disease Mother     High Blood Pressure Sister     Thyroid Disease Brother     High Blood Pressure Brother     High Blood Pressure Maternal Grandmother     Diabetes Maternal Grandfather     No Known Problems Paternal Grandmother     Heart Attack Paternal Grandfather     Colon Cancer Neg Hx      Current Outpatient Medications   Medication Sig Dispense Refill    zoster recombinant adjuvanted vaccine (SHINGRIX) 50 MCG/0.5ML SUSR injection Inject 0.5 mLs into the muscle once for 1 dose 1 dose now and repeat in 2-6 months 0.5 mL 0    docusate sodium (COLACE) 100 MG capsule Take 1 capsule by mouth 2 times daily as needed for Constipation 180 capsule 3    dilTIAZem (CARDIZEM CD) 240 MG extended release capsule Take 1 capsule by mouth daily 30 capsule 1    metoprolol tartrate (LOPRESSOR) 50 MG tablet Take 1 tablet by mouth 2 times daily 60 tablet 1    pantoprazole (PROTONIX) 40 MG tablet Take 1 tablet by mouth every morning (before breakfast) 30 tablet 1    Blood Pressure Monitor KIT Use as directed. 1 kit 1    glucose monitoring kit (FREESTYLE) monitoring kit Use as directed. BRAND OF CHOICE INSURANCE ALLOWS. 1 kit 0    blood glucose monitor strips Test 2-3 times a day & as needed for symptoms of irregular blood glucose.   BRAND OF CHOICE INSURANCE ALLOWS. 100 strip 11    Alcohol Swabs (ALCOHOL PREP) PADS Use as directed 100 each 11    Lancets MISC 1 each by Does not apply route 2 times daily 300 each 11    oxybutynin (DITROPAN-XL) 10 MG extended release tablet Take 1 tablet by mouth daily 30 tablet 5    rivaroxaban (XARELTO) 20 MG TABS tablet Take 1 tablet by mouth daily (with breakfast) 30 tablet 3    atorvastatin (LIPITOR) 40 MG tablet TAKE 1 TABLET BY MOUTH DAILY STOP SIMVASTATIN 90 tablet 3    levothyroxine (SYNTHROID) 75 MCG tablet TAKE 1 TABLET BY MOUTH EVERY MORNING (BEFORE BREAKFAST) 90 tablet 3    MYRBETRIQ 50 MG TB24 TAKE ONE TABLET BY MOUTH ONCE DAILY 90 tablet 3    metFORMIN (GLUCOPHAGE) 500 MG tablet TAKE ONE TABLET BY MOUTH TWICE A DAY WITH A MEAL 60 tablet 10    Cholecalciferol (VITAMIN D) 2000 units TABS tablet Take 1 tablet by mouth daily 90 tablet 3    vitamin B-12 (CYANOCOBALAMIN) 1000 MCG tablet Take 1 tablet by mouth daily 90 tablet 3    acetaminophen (APAP EXTRA STRENGTH) 500 MG tablet Take 1 tablet by mouth every 6 hours as needed for Pain or Fever 360 tablet 3    Lactobacillus (PROBIOTIC ACIDOPHILUS) TABS Take 1 tablet by mouth daily 30 tablet 3     No current facility-administered medications for this visit. Review of Systems   Constitutional: Negative for activity change, appetite change, chills, fatigue, fever and unexpected weight change. Eyes: Positive for visual disturbance (wears glasses). Respiratory: Negative for cough, chest tightness, shortness of breath and wheezing. Cardiovascular: Negative for chest pain, palpitations and leg swelling. Gastrointestinal: Negative for abdominal distention, abdominal pain, constipation, diarrhea and vomiting. Endocrine: Negative for cold intolerance, heat intolerance, polydipsia, polyphagia and polyuria. Genitourinary: Negative for difficulty urinating, dysuria, frequency, hematuria and urgency. Urine incontinence - chronic   Musculoskeletal: Positive for arthralgias, back pain, gait problem and myalgias. Ambulates with walker with seat   Skin: Positive for wound. Abdomen-s/p colectomy   Allergic/Immunologic: Negative for environmental allergies. Neurological: Negative for weakness and numbness. Hematological: Does not bruise/bleed easily. Psychiatric/Behavioral: Positive for sleep disturbance. Negative for dysphoric mood. The patient is nervous/anxious. Prior to Visit Medications    Medication Sig Taking?  Authorizing Provider   zoster recombinant adjuvanted vaccine [facility]) 50 MCG/0.5ML SUSR injection Inject 0.5 mLs into the muscle once for 1 dose 1 dose now and repeat in 2-6 months Yes MAX Barker CNP   docusate sodium (COLACE) 100 MG capsule Take 1 capsule by mouth 2 times daily as needed for Constipation  Deshawn Jerez MD   dilTIAZem (CARDIZEM CD) 240 MG extended release capsule Take 1 capsule by mouth daily  Deshawn Jreez MD   metoprolol tartrate (LOPRESSOR) 50 MG tablet Take 1 tablet by mouth 2 times daily  Deshawn Jerez MD   pantoprazole (PROTONIX) 40 MG tablet Take 1 tablet by mouth every morning (before breakfast)  Deshawn Jerez MD   Blood Pressure Monitor KIT Use as directed. MAX Barker CNP   glucose monitoring kit (FREESTYLE) monitoring kit Use as directed. BRAND OF CHOICE INSURANCE ALLOWS. MAX Barker CNP   blood glucose monitor strips Test 2-3 times a day & as needed for symptoms of irregular blood glucose. BRAND OF CHOICE INSURANCE ALLOWS.   MAX Barker CNP   Alcohol Swabs (ALCOHOL PREP) PADS Use as directed  MAX Barker CNP   Lancets MISC 1 each by Does not apply route 2 times daily  MAX Barker CNP   oxybutynin (DITROPAN-XL) 10 MG extended release tablet Take 1 tablet by mouth daily  Yuridia Mcwilliams MD   rivaroxaban (XARELTO) 20 MG TABS tablet Take 1 tablet by mouth daily (with breakfast)  Aditya Martinez MD   atorvastatin (LIPITOR) 40 MG tablet TAKE 1 TABLET BY MOUTH DAILY STOP SIMVASTATIN  Radha Vallecillo MD   levothyroxine (SYNTHROID) 75 MCG tablet TAKE 1 TABLET BY MOUTH EVERY MORNING (BEFORE BREAKFAST)  Fanny Lee MD   MYRBETRIQ 50 MG TB24 TAKE ONE TABLET BY MOUTH ONCE DAILY  Fanny Lee MD   metFORMIN (GLUCOPHAGE) 500 MG tablet TAKE ONE TABLET BY MOUTH TWICE A DAY WITH A MEAL  Fanny Lee MD   Cholecalciferol (VITAMIN D) 2000 units TABS tablet Take 1 tablet by mouth daily  Fanny Lee MD   vitamin B-12 (CYANOCOBALAMIN) 1000 MCG tablet Take 1 tablet by mouth daily  Fanny Lee MD   acetaminophen (APAP EXTRA STRENGTH) 500 MG tablet Take 1 tablet by mouth every 6 hours as needed for Pain or Fever  Marbin Whiting MD   Lactobacillus (PROBIOTIC ACIDOPHILUS) TABS Take 1 tablet by mouth daily  Marbin Whiting MD      Social History     Tobacco Use    Smoking status: Never Smoker    Smokeless tobacco: Never Used   Substance Use Topics    Alcohol use: No     Alcohol/week: 0.0 standard drinks      There were no vitals filed for this visit. Estimated body mass index is 42.86 kg/m² as calculated from the following:    Height as of 8/4/20: 5' 2\" (1.575 m). Weight as of 8/13/20: 234 lb 5.6 oz (106.3 kg). Physical Exam  Pulmonary:      Effort: Pulmonary effort is normal.   Neurological:      Mental Status: She is alert and oriented to person, place, and time. Psychiatric:         Mood and Affect: Mood is anxious. Speech: Speech normal.       Most recent labs reviewed with patient, and all questions answered.   Lab Results   Component Value Date    WBC 9.3 08/13/2020    HGB 10.9 (L) 08/13/2020    HCT 31.9 (L) 08/13/2020    MCV 91.5 08/13/2020     08/13/2020     Lab Results   Component Value Date     08/13/2020    K 3.8 08/13/2020     08/13/2020    CO2 30 08/13/2020    BUN 13 08/13/2020    CREATININE 0.74 08/13/2020    GLUCOSE 110 08/13/2020    GLUCOSE 114 03/20/2012    CALCIUM 9.0 08/13/2020      Lab Results   Component Value Date    ALT 16 07/24/2020    AST 12 07/24/2020    ALKPHOS 50 07/24/2020    BILITOT 0.24 (L) 07/24/2020     Lab Results   Component Value Date    TSH 3.21 07/26/2020     Lab Results   Component Value Date    CHOL 145 01/28/2020    CHOL 188 11/14/2018    CHOL 287 (H) 07/31/2018     Lab Results   Component Value Date    TRIG 158 (H) 01/28/2020    TRIG 179 (H) 11/14/2018    TRIG 318 (H) 07/31/2018     Lab Results   Component Value Date    HDL 47 01/28/2020    HDL 47 11/14/2018    HDL 44 07/31/2018     Lab Results   Component Value Date    LDLCHOLESTEROL 66 01/28/2020    LDLCHOLESTEROL 105 11/14/2018    LDLCHOLESTEROL 179 (H) 07/31/2018     Lab Results   Component Value Date    CHOLHDLRATIO 3.1 01/28/2020    CHOLHDLRATIO 4.0 11/14/2018    CHOLHDLRATIO 6.5 (H) 07/31/2018     Lab Results   Component Value Date    LABA1C 5.3 07/22/2020      Lab Results   Component Value Date    [id number] 273 01/09/2020     Lab Results   Component Value Date    FOLATE 7.0 01/09/2020     Lab Results   Component Value Date    VITD25 31.4 01/09/2020     ASSESSMENT/PLAN:  1. Essential hypertension  Stable  Continue current therapy. DISCUSSED AND ADVISED TO:  Cut down on your salt intake. Cut down on caffeinated drinks, sports drinks. Instructed to check BP at home regularly. Report for any chest pains, shortness of breath, headaches, and lightheadedness. Call the office if your blood pressure continue to be higher than 140/90 or 90/50.      - [address]    2. Atrial fibrillation, new onset ([address]  Stable  Continue current therapy including Xarelto  Make appointment for Dr. Francoise Patel    3. Type 2 diabetes mellitus with hyperglycemia, without long-term current use of insulin (HCC)  Stable  Continue therapy. We will continue to monitor Hemoglobin A1c   DISCUSSED and ADVISED TO:  Continue to check Glucose levels at home. Report increased and low levels as discussed. Decrease carbohydrates, sugary drinks, desserts in your diet. Exercise regularly, as tolerated. Try to lose weight.      - [address]    4. Acquired hypothyroidism  Stable  Continue current therapy. Continue to monitor TSH  Take them as instructed first thing in the morning before breakfast.  DISCUSSED AND ADVISED TO:  Do labs regularly every 6 months to monitor Thyroid hormones levels. Report for unintended weight loss or weight gain. Report for palpitations. Report intolerance to heat or cold. - TSH;  Future  - [address] [place]    5. S/P colectomy  Recent  Wound care follow-up by home health    - [address]    6. OAB (overactive bladder)  Stable  Continue current therapy  Established with Dr. Grzegorz Alamo    7. Breast cancer screening by mammogram  recommended    - [facility] DIGITAL SCREEN W OR WO CAD BILATERAL; Future    8. History of 2019 novel coronavirus disease (COVID-19)  Resolved  Recent COVID-19 tests negative    - [address]    9. Need for varicella vaccine  Recommended by CDC. No current infection. Denies previous adverse reaction to vaccination. - zoster recombinant adjuvanted vaccine [facility]) 50 MCG/0.5ML SUSR injection; Inject 0.5 mLs into the muscle once for 1 dose 1 dose now and repeat in 2-6 months  Dispense: 0.5 mL; Refill: 0   Physician to Follow Referral: MAX Yin - CNP    I certify that I, or a nurse practitioner or physician assistant working with me, had an in-person encounter with Sarah Lott on 9/22/2020 and the reason for the home care services is documented in the clinical note for that day. Sarah Lott was assessed on the above date and was found to have medical conditions requiring Home Care that included as mentioned.     Homebound Status: further, I certify that my clinical findings support that Sarah Lott does meet the Medicare definition of \"Confined to the Home\" because of   Deconditioned due to medical condition  Unable to leave home without maximum effort and requires assistance of wheelchair or walker  Unsteady gait or dizziness that requires assistance of wheelchair, walker, or assistance of another person  Medically restricted to home because of multiple chronic conditions and mobility difficulties     Certification and medical necessity: I certify that, based on my findings, the following services are medically necessary home health services for Sarah Lott for the following reasons:  - Vital signs monitoring: Nurse to perform BP, HR, RR, Temp, and Weight with each visit and teach patient and caregivers on taking daily. Nurse to call physician if pulse less than 50 or greater than 120, respiratory rate less than 12 or greater than 25, oral temperature greater than or equal to 705 oF, systolic BP less than 90 or greater than 625, diastolic BP less than 50 or greater than 100. - Medication compliance and education for  changes in previous medications safety concerns  - Cardiopulmonary assessment and monitoring due to recent new onset atrial fibrillation monitoring to include oxygen saturation by pulse oximeter on  supplemental oxygen only and  during exertion only Notify provider if any deterioration in cardiopulmonary status or oxygen saturation less than 90%. - Consult Physical Therapy for  Evaluate and Treat  - Consult Medical Social Worker  assessment and counseling and education on community resources  - [address] Aid for  assistance with Activities of Daily Living  - Teaching and management of the following disease process and symptom management:   - Disease Process Education    Controlled Substance Monitoring:  Acute and Chronic Pain Monitoring:   RX Monitoring 9/25/2017   Attestation The Prescription Monitoring Report for this patient was reviewed today. Periodic Controlled Substance Monitoring No signs of potential drug abuse or diversion identified.      Orders Placed This Encounter   Procedures    JEN DIGITAL SCREEN W OR WO CAD BILATERAL     Standing Status:   Future     Standing Expiration Date:   3/22/2022     Order Specific Question:   Reason for exam:     Answer:   annual    TSH     Standing Status:   Future     Standing Expiration Date:   9/22/2021   [address] [place] [address]     Referral Priority:   Routine     Referral Type:   Home Health Care     Referral Reason:   Specialty Services Required     Referral Location: [address] [facility]     Requested Specialty:   [address]     Number of Visits Requested:   1     Orders Placed This Encounter   Medications    zoster recombinant adjuvanted vaccine [facility]) 50 MCG/0.5ML SUSR injection     Sig: Inject 0.5 mLs into the muscle once for 1 dose 1 dose now and repeat in 2-6 months     Dispense:  0.5 mL     Refill:  0      Medications Discontinued During This Encounter   Medication Reason    UNABLE TO FIND Therapy completed      Health Maintenance Due   Topic Date Due    Diabetic retinal exam  01/08/1960    Shingles Vaccine (1 of 2) 01/08/2002    Diabetic foot exam  02/27/2018    Breast cancer screen  05/06/2020    Annual Wellness Visit (AWV)  07/29/2020    Flu vaccine (1) 09/01/2020      No follow-ups on file. Izzy Correiao received counseling on the following healthy behaviors: nutrition, exercise and medication adherence  Reviewed prior labs and health maintenance  Continue current medications, diet and exercise. Discussed use, benefit, and side effects of prescribed medications. Barriers to medication compliance addressed. Patient given educational materials - see patient instructions  Was a self-tracking handout given in paper form or via [employer]? Yes    Requested Prescriptions     Signed Prescriptions Disp Refills    zoster recombinant adjuvanted vaccine (SHINGRIX) 50 MCG/0.5ML SUSR injection 0.5 mL 0     Sig: Inject 0.5 mLs into the muscle once for 1 dose 1 dose now and repeat in 2-6 months       All patient questions answered. Patient voiced understanding. Quality Measures    There is no height or weight on file to calculate BMI. Elevated. Weight control planned discussed Healthy diet and regular exercise. . Blood pressure is normal. Treatment plan consists of No treatment change needed.     Fall Risk 7/22/2020 3/8/2019 4/20/2018 2/23/2017   2 or more falls in past year? no no no no   Fall with injury in past year? no no no no     The patient does not have

## 2020-09-23 RX ORDER — PROBIOTIC PRODUCT - TAB 1B-250 MG
TAB ORAL
Qty: 30 TABLET | Refills: 11 | Status: SHIPPED | OUTPATIENT
Start: 2020-09-23 | End: 2021-07-14

## 2020-09-23 RX ORDER — MELATONIN
Qty: 60 TABLET | Refills: 11 | Status: SHIPPED | OUTPATIENT
Start: 2020-09-23 | End: 2021-07-14

## 2020-09-23 RX ORDER — PSEUDOEPHED/ACETAMINOPH/DIPHEN 30MG-500MG
TABLET ORAL
Qty: 100 TABLET | Refills: 11 | Status: SHIPPED | OUTPATIENT
Start: 2020-09-23 | End: 2021-11-16

## 2020-09-23 RX ORDER — DILTIAZEM HYDROCHLORIDE 240 MG/1
CAPSULE, EXTENDED RELEASE ORAL
Qty: 30 CAPSULE | Refills: 11 | Status: SHIPPED | OUTPATIENT
Start: 2020-09-23 | End: 2021-01-13 | Stop reason: DRUGHIGH

## 2020-09-23 RX ORDER — PANTOPRAZOLE SODIUM 40 MG/1
TABLET, DELAYED RELEASE ORAL
Qty: 30 TABLET | Refills: 11 | Status: SHIPPED | OUTPATIENT
Start: 2020-09-23 | End: 2021-09-08

## 2020-09-23 NOTE — TELEPHONE ENCOUNTER
Please Approve or Refuse.   Send to Pharmacy per Pt's Request:    Next Visit Date:  10/30/2020   Last Visit Date: 9/22/2020    Hemoglobin A1C (%)   Date Value   07/22/2020 5.3   01/09/2020 5.6   10/03/2019 5.9             ( goal A1C is < 7)   BP Readings from Last 3 Encounters:   08/13/20 119/75   08/04/20 (!) 174/88   08/03/20 (!) 140/69          (goal 120/80)  BUN   Date Value Ref Range Status   08/13/2020 13 8 - 23 mg/dL Final     CREATININE   Date Value Ref Range Status   08/13/2020 0.74 0.50 - 0.90 mg/dL Final     Potassium   Date Value Ref Range Status   08/13/2020 3.8 3.7 - 5.3 mmol/L Final

## 2020-09-25 ENCOUNTER — TELEPHONE (OUTPATIENT)
Dept: FAMILY MEDICINE CLINIC | Age: 70
End: 2020-09-25

## 2020-09-25 NOTE — TELEPHONE ENCOUNTER
Nurse is calling [facility]  in   HR 54 temp 98.6 resp  17 /74   O2 98 room air      Around 10 am   Medication taken at 9 :30 am

## 2020-09-29 RX ORDER — DOCUSATE SODIUM 100 MG/1
CAPSULE, LIQUID FILLED ORAL
Qty: 180 CAPSULE | Refills: 3 | Status: SHIPPED | OUTPATIENT
Start: 2020-09-29 | End: 2020-12-01 | Stop reason: SDUPTHER

## 2020-09-30 RX ORDER — OXYBUTYNIN CHLORIDE 10 MG/1
TABLET, EXTENDED RELEASE ORAL
Qty: 90 TABLET | Refills: 0 | Status: SHIPPED | OUTPATIENT
Start: 2020-09-30 | End: 2020-12-07

## 2020-09-30 NOTE — TELEPHONE ENCOUNTER
Current Outpatient Medications on File Prior to Visit   Medication Sig Dispense Refill    docusate sodium (COLACE) 100 MG capsule TAKE ONE (1) CAPSULE BY MOUTH TWO (2) TIMES DAILY  180 capsule 3    metoprolol tartrate (LOPRESSOR) 25 MG tablet Take 0.5 tablets by mouth 2 times daily Dose decreased by cardiologist 30 tablet 5    Probiotic Product (LISSY-BID PROBIOTIC) TABS TAKE 1 TABLET BY MOUTH IN THE MORNING  30 tablet 11    CARTIA  MG extended release capsule TAKE 1 CAPSULE BY MOUTH IN THE MORNING  30 capsule 11    vitamin D3 (CHOLECALCIFEROL) 25 MCG (1000 UT) TABS tablet TAKE 2 TABLETS BY MOUTH ONCE DAILY  60 tablet 11    pantoprazole (PROTONIX) 40 MG tablet TAKE 1 TABLET BY MOUTH ONCE DAILY  30 tablet 11    ACETAMINOPHEN EXTRA STRENGTH 500 MG tablet TAKE 1 TABLET BY MOUTH EVERY 6 HOURS AS NEEDED FOR PAIN  100 tablet 11    Blood Pressure Monitor KIT Use as directed. 1 kit 1    glucose monitoring kit (FREESTYLE) monitoring kit Use as directed. BRAND OF CHOICE INSURANCE ALLOWS. 1 kit 0    blood glucose monitor strips Test 2-3 times a day & as needed for symptoms of irregular blood glucose.   BRAND OF CHOICE INSURANCE ALLOWS. 100 strip 11    Alcohol Swabs (ALCOHOL PREP) PADS Use as directed 100 each 11    Lancets MISC 1 each by Does not apply route 2 times daily 300 each 11    oxybutynin (DITROPAN-XL) 10 MG extended release tablet Take 1 tablet by mouth daily 30 tablet 5    rivaroxaban (XARELTO) 20 MG TABS tablet Take 1 tablet by mouth daily (with breakfast) 30 tablet 3    atorvastatin (LIPITOR) 40 MG tablet TAKE 1 TABLET BY MOUTH DAILY STOP SIMVASTATIN 90 tablet 3    levothyroxine (SYNTHROID) 75 MCG tablet TAKE 1 TABLET BY MOUTH EVERY MORNING (BEFORE BREAKFAST) 90 tablet 3    MYRBETRIQ 50 MG TB24 TAKE ONE TABLET BY MOUTH ONCE DAILY 90 tablet 3    metFORMIN (GLUCOPHAGE) 500 MG tablet TAKE ONE TABLET BY MOUTH TWICE A DAY WITH A MEAL 60 tablet 10    vitamin B-12 (CYANOCOBALAMIN) 1000 MCG tablet Take 1 tablet by mouth daily 90 tablet 3    Lactobacillus (PROBIOTIC ACIDOPHILUS) TABS Take 1 tablet by mouth daily 30 tablet 3     No current facility-administered medications on file prior to visit.

## 2020-10-07 ENCOUNTER — TELEPHONE (OUTPATIENT)
Dept: FAMILY MEDICINE CLINIC | Age: 70
End: 2020-10-07

## 2020-10-07 NOTE — TELEPHONE ENCOUNTER
Nurse is supposed to go out today so she will have the home nurse get o2 readings and call us with them

## 2020-10-07 NOTE — TELEPHONE ENCOUNTER
Pt was on oxygen while in the hospital and at the nursing home. She is scheduled to see you 10/30/2020 and does not have oxygen at home. Pt is asking if she should still be on it and what she should do to get oxygen for home if so. Please advise.

## 2020-10-07 NOTE — TELEPHONE ENCOUNTER
BEFORE THEY DISCHARGE PATIENTS FROM NURSING HOMES, THEY DO HOME OXYGEN EVALUATION    PLEASE EITHER CALL HER CURRENT HOME CARE NURSE TO LET US KNOW HOW IS HER OXYGEN GOING AT REST AND WITH ACTIVITIES,  OR THE REHAB WHERE SHE WAS AT (WOULD BE DIFFICULT TO GET THIS INFO!)    SpO2 Readings from Last 4 Encounters:   08/13/20 96%   08/04/20 100%   08/03/20 99%   08/03/20 100%

## 2020-10-08 ENCOUNTER — OFFICE VISIT (OUTPATIENT)
Dept: GYNECOLOGIC ONCOLOGY | Age: 70
End: 2020-10-08
Payer: MEDICARE

## 2020-10-08 VITALS — HEART RATE: 42 BPM | TEMPERATURE: 96.8 F | DIASTOLIC BLOOD PRESSURE: 68 MMHG | SYSTOLIC BLOOD PRESSURE: 130 MMHG

## 2020-10-08 PROCEDURE — 1123F ACP DISCUSS/DSCN MKR DOCD: CPT | Performed by: PHYSICIAN ASSISTANT

## 2020-10-08 PROCEDURE — G8399 PT W/DXA RESULTS DOCUMENT: HCPCS | Performed by: PHYSICIAN ASSISTANT

## 2020-10-08 PROCEDURE — G8427 DOCREV CUR MEDS BY ELIG CLIN: HCPCS | Performed by: PHYSICIAN ASSISTANT

## 2020-10-08 PROCEDURE — 1090F PRES/ABSN URINE INCON ASSESS: CPT | Performed by: PHYSICIAN ASSISTANT

## 2020-10-08 PROCEDURE — G8484 FLU IMMUNIZE NO ADMIN: HCPCS | Performed by: PHYSICIAN ASSISTANT

## 2020-10-08 PROCEDURE — 4040F PNEUMOC VAC/ADMIN/RCVD: CPT | Performed by: PHYSICIAN ASSISTANT

## 2020-10-08 PROCEDURE — 99214 OFFICE O/P EST MOD 30 MIN: CPT | Performed by: PHYSICIAN ASSISTANT

## 2020-10-08 PROCEDURE — 3017F COLORECTAL CA SCREEN DOC REV: CPT | Performed by: PHYSICIAN ASSISTANT

## 2020-10-08 PROCEDURE — G8417 CALC BMI ABV UP PARAM F/U: HCPCS | Performed by: PHYSICIAN ASSISTANT

## 2020-10-08 PROCEDURE — 1036F TOBACCO NON-USER: CPT | Performed by: PHYSICIAN ASSISTANT

## 2020-10-08 ASSESSMENT — ENCOUNTER SYMPTOMS: BACK PAIN: 1

## 2020-10-08 NOTE — PROGRESS NOTES
Review of Systems   Musculoskeletal: Positive for back pain (chronic) and gait problem (uses walker). Neurological: Positive for gait problem (uses walker). Hematological: Bruises/bleeds easily.

## 2020-10-08 NOTE — PATIENT INSTRUCTIONS
1. Return to clinic in 6 months for follow up surveillance  2. Continue care with your providers as instructed  3.  Call or return to clinic sooner with any questions or concerns

## 2020-10-08 NOTE — PROGRESS NOTES
[facility], [facility], [address] [place] [address] present for her endometrial cancer surveillance visit. CC/HPI: She is a  female with a history of stage 1A grade 1 endometrioid adenocarcinoma and has undergone surgery of total laparoscopic hysterectomy, bilateral salpingo-oophorectomy, with pelvic and paraaortic lymphadenectomy with peritoneal biopsy completed in 2017. There is no pre operative  antigen level on file. Final pathology revealed endometrioid adenocarcinoma of the endometrium, 3.8 CM grade 1. Superficial myometrial invasion. Bilateral tubes and ovaries were negative for malignancy. Pelvic and para-aortic lymph node biopsies were negative for neoplasm. No lymphovascular invasion present. Pelvic washings were also negative for malignancy. Mismatch repair testing did confirm \"positive MLH1 promoter methylation is usually associated with sporadic and not syndromal occurrences of endometrial adenocarcinoma\". Therefore she was surgically staged FIGO IA grade 1 endometrioid adenocarcinoma of the uterus. She did not require any adjuvant treatment, but rather observation. She has continued on surveillance and done well. She is currently on Xarelto therapy for Atrial fibrillation. She continues to follow with her cardiologist Dr. Alesia Luo as instructed. She recently had her beta blocker medication dose change yesterday for bradycardia. She was most recently hospitalized in 8459 for complicated sigmoid diverticulitis. She underwent EDG and colonoscopy, and ultimately and open sigmoid colectomy with primary anastomosis on 2020 by Dr. Shaye Chapin. Pathology revealed tubular adenomas, no evidence of malignancy. Today, she has no concerns. She denies abdominal or pelvic pain aside from healing due to recent open colectomy. She denies vaginal bleeding, discharge, irritation or odor.  No changes to her urination or bowel movements. She denies blood in urine or stool. She is ambulating at baseline with walker assistance. She denies chest pain, shortness of breath, or difficulty breathing. She has no lightheadedness or headache. She is up to date on mammogram screening most recently obtained in May 6 2019, resulted as BIRADS-2 Benign. She is also up to date on colonoscopy most recently in 2017. ROS:  I have personally reviewed and agree with the review of systems done by my ancillary staff in the [facility] documentation. Physical Exam:    Vitals:    10/08/20 1018   BP: 130/68   Pulse: (!) 42   Temp: 96.8 °F (36 °C)   TempSrc: Temporal       General: well- appearing, no acute distress    HEENT: Thyroid normal size. No cervical or supraclavicular lymphadenopathy. Lungs: Clear to auscultate bilaterally to the bases    No CVA tenderness present bilaterally. Cardiac: Sinus bradycardia, No murmurs or gallops appreciated. Abdomen: Morbidly obese. Midline laparotomy scar present. Appears to be well healed and no areas of infectious process. She has no rashes or erythema throughout. Soft, nontender and nondistended. No detectable organomegaly. No masses or ascites. No inguinal lymphadenopathy bilaterally. Extremities: No edema. No deformities. No calf tenderness bilaterally. Pelvic: Atrophic. Otherwise, the patient has normal female external genitalia including, vulva, urethra, vagina, perineum, Bartholin's and Piperton's. Uterus, bilateral fallopian tubes and adnexae, and cervix are surgically absent. Speculum examination reveals no blood or discharge in vaginal vault. Vaginal cuff well-intact with no signs of erythema, induration, separation, or granulation tissue. Bimanual examination: Bladder is non-tender, without mass; urethra is non-tender, without mass. There are no palpable vaginal nodules and no other pelvic masses, tenderness or pathology noted.        Impression:  FIGO Stage 1A Grade 1 endometrioid adenocarcinoma s/p surgical intervention with negative high risk factors, therefore requiring no further treatment. No evidence of recurrence or metastasis today, therefore no changes to her care plan as of now. Assessment/Plan:  Her Surveillance plan is as follows:     1. Return to clinic in 6 months for follow up surveillance     2. Encourage to continue follow up with providers as instructed, Pt with sinus bradycardia, she is asymptomatic and being managed by cardiology. 3. Call or return to clinic sooner with any questions or concerns     I spent 25 minutes providing care to the patient and >50% of the time was spent counseling and coordinating care, discussing the patient's current situation, reviewing her options, counseling and education her and answering her questions. The patient's pertinent images, labs, and previous records and procedures were reviewed.     Electronically signed by [facility], PA-C on 10/8/2020 at 10:45 PM

## 2020-10-30 ENCOUNTER — TELEPHONE (OUTPATIENT)
Dept: FAMILY MEDICINE CLINIC | Age: 70
End: 2020-10-30

## 2020-10-30 ENCOUNTER — OFFICE VISIT (OUTPATIENT)
Dept: FAMILY MEDICINE CLINIC | Age: 70
End: 2020-10-30
Payer: MEDICARE

## 2020-10-30 VITALS
BODY MASS INDEX: 41.22 KG/M2 | TEMPERATURE: 97 F | WEIGHT: 224 LBS | DIASTOLIC BLOOD PRESSURE: 64 MMHG | HEART RATE: 45 BPM | HEIGHT: 62 IN | SYSTOLIC BLOOD PRESSURE: 118 MMHG | OXYGEN SATURATION: 98 %

## 2020-10-30 PROBLEM — I48.0 PAROXYSMAL ATRIAL FIBRILLATION (HCC): Status: ACTIVE | Noted: 2020-01-14

## 2020-10-30 PROBLEM — I48.91 ATRIAL FIBRILLATION, NEW ONSET (HCC): Status: RESOLVED | Noted: 2020-01-20 | Resolved: 2020-10-30

## 2020-10-30 LAB — HBA1C MFR BLD: 5.1 %

## 2020-10-30 PROCEDURE — G8399 PT W/DXA RESULTS DOCUMENT: HCPCS | Performed by: FAMILY MEDICINE

## 2020-10-30 PROCEDURE — 1123F ACP DISCUSS/DSCN MKR DOCD: CPT | Performed by: FAMILY MEDICINE

## 2020-10-30 PROCEDURE — 3017F COLORECTAL CA SCREEN DOC REV: CPT | Performed by: FAMILY MEDICINE

## 2020-10-30 PROCEDURE — 83036 HEMOGLOBIN GLYCOSYLATED A1C: CPT | Performed by: FAMILY MEDICINE

## 2020-10-30 PROCEDURE — G0008 ADMIN INFLUENZA VIRUS VAC: HCPCS | Performed by: FAMILY MEDICINE

## 2020-10-30 PROCEDURE — 3044F HG A1C LEVEL LT 7.0%: CPT | Performed by: FAMILY MEDICINE

## 2020-10-30 PROCEDURE — 99214 OFFICE O/P EST MOD 30 MIN: CPT | Performed by: FAMILY MEDICINE

## 2020-10-30 PROCEDURE — 1036F TOBACCO NON-USER: CPT | Performed by: FAMILY MEDICINE

## 2020-10-30 PROCEDURE — G8417 CALC BMI ABV UP PARAM F/U: HCPCS | Performed by: FAMILY MEDICINE

## 2020-10-30 PROCEDURE — 1090F PRES/ABSN URINE INCON ASSESS: CPT | Performed by: FAMILY MEDICINE

## 2020-10-30 PROCEDURE — 2022F DILAT RTA XM EVC RTNOPTHY: CPT | Performed by: FAMILY MEDICINE

## 2020-10-30 PROCEDURE — G8427 DOCREV CUR MEDS BY ELIG CLIN: HCPCS | Performed by: FAMILY MEDICINE

## 2020-10-30 PROCEDURE — 90694 VACC AIIV4 NO PRSRV 0.5ML IM: CPT | Performed by: FAMILY MEDICINE

## 2020-10-30 PROCEDURE — 4040F PNEUMOC VAC/ADMIN/RCVD: CPT | Performed by: FAMILY MEDICINE

## 2020-10-30 PROCEDURE — G8484 FLU IMMUNIZE NO ADMIN: HCPCS | Performed by: FAMILY MEDICINE

## 2020-10-30 ASSESSMENT — ENCOUNTER SYMPTOMS
SHORTNESS OF BREATH: 0
ABDOMINAL DISTENTION: 0
WHEEZING: 0
DIARRHEA: 0
BACK PAIN: 1
CONSTIPATION: 0
NAUSEA: 0
CHEST TIGHTNESS: 0
COUGH: 0
ABDOMINAL PAIN: 0
VOMITING: 0

## 2020-10-30 NOTE — TELEPHONE ENCOUNTER
PT CALLED SHE IS CONCERNED ABOUT HER NEEDING OXYGEN STATES SHE WASON IT IN [facility]  AND NURSING HOME.  SHE IS ALSO CONCERNED ABOUT HER HEART RATE BEING LOW AT HER APPT TODAY PLEASE ADVISE

## 2020-10-30 NOTE — PROGRESS NOTES
Visit Information    Have you changed or started any medications since your last visit including any over-the-counter medicines, vitamins, or herbal medicines? no   Are you having any side effects from any of your medications? -  no  Have you stopped taking any of your medications? Is so, why? -  no    Have you seen any other physician or provider since your last visit? No  Have you had any other diagnostic tests since your last visit? No  Have you been seen in the emergency room and/or had an admission to a hospital since we last saw you? Yes - Records Obtained  Have you had your routine dental cleaning in the past 6 months? no    Have you activated your [employer] account? If not, what are your barriers?  Yes     Patient Care Team:  Celeste Tarango MD as PCP - General (Family Medicine)  Celeste Tarango MD as PCP - [facility]  Maximo Barry MD as Referring Physician (Orthopedic Surgery)  Espinoza Marte MD as Surgeon (Orthopedic Surgery)  Mariusz Keating MD as Consulting Physician (Endocrinology)  Manish Victoria DO as Consulting Physician (Obstetrics & Gynecology)  Nohemy Lema MD as Consulting Physician (Urology)  MAX Vivar CNP as Nurse Practitioner (Certified Nurse Practitioner)  Onofre Blanco MD as Consulting Physician (Otolaryngology)  Apurva Park MD as Consulting Physician (Obstetrics & Gynecology)  Alan Watson DO as Consulting Physician (General Surgery)  Alyssa Emery DO as Consulting Physician (Cardiology)    Medical History Review  Past Medical, Family, and Social History reviewed and does contribute to the patient presenting condition    Health Maintenance   Topic Date Due    Diabetic retinal exam  01/08/1960    Shingles Vaccine (1 of 2) 01/08/2002    Diabetic foot exam  02/27/2018    Breast cancer screen  05/06/2020    Annual Wellness Visit (AWV)  07/29/2020    Flu vaccine (1) 09/01/2020    A1C test (Diabetic or Prediabetic)  10/22/2020  Lipid screen  01/28/2021    Diabetic microalbuminuria test  01/30/2021    TSH testing  07/26/2021    Potassium monitoring  08/13/2021    Creatinine monitoring  08/13/2021    Colon cancer screen colonoscopy  08/03/2023    DTaP/Tdap/Td vaccine (2 - Td) 09/28/2027    DEXA (modify frequency per FRAX score)  Completed    Pneumococcal 65+ years Vaccine  Completed    Hepatitis C screen  Completed    Hepatitis A vaccine  Aged Out    Hib vaccine  Aged Out    Meningococcal (ACWY) vaccine  Aged Out

## 2020-10-30 NOTE — TELEPHONE ENCOUNTER
Her oxygen was good today, doesn't need oxygen anymore, insurance will not approve with normal levels        SpO2 Readings from Last 4 Encounters:   10/30/20 98%   08/13/20 96%   08/04/20 100%   08/03/20 99%     Heart rate was low, needs to see cardiology, she has A fib and needs to take meds which slow down the pulse  Her heart rate at home was in 60's per her paper today and we did discuss this at appointment  Continue all meds as they are and follow up with cardiology    Pulse Readings from Last 3 Encounters:   10/30/20 (!) 45   10/08/20 (!) 42   08/13/20 107       Current Outpatient Medications on File Prior to Visit   Medication Sig Dispense Refill    oxybutynin (DITROPAN-XL) 10 MG extended release tablet TAKE 1 TABLET BY MOUTH ONCE DAILY  90 tablet 0    docusate sodium (COLACE) 100 MG capsule TAKE ONE (1) CAPSULE BY MOUTH TWO (2) TIMES DAILY  180 capsule 3    metoprolol tartrate (LOPRESSOR) 25 MG tablet Take 0.5 tablets by mouth 2 times daily Dose decreased by cardiologist 30 tablet 5    Probiotic Product (LISSY-BID PROBIOTIC) TABS TAKE 1 TABLET BY MOUTH IN THE MORNING  30 tablet 11    CARTIA  MG extended release capsule TAKE 1 CAPSULE BY MOUTH IN THE MORNING  (Patient taking differently: 120 mg daily ) 30 capsule 11    vitamin D3 (CHOLECALCIFEROL) 25 MCG (1000 UT) TABS tablet TAKE 2 TABLETS BY MOUTH ONCE DAILY  60 tablet 11    pantoprazole (PROTONIX) 40 MG tablet TAKE 1 TABLET BY MOUTH ONCE DAILY  30 tablet 11    ACETAMINOPHEN EXTRA STRENGTH 500 MG tablet TAKE 1 TABLET BY MOUTH EVERY 6 HOURS AS NEEDED FOR PAIN  100 tablet 11    Blood Pressure Monitor KIT Use as directed. 1 kit 1    glucose monitoring kit (FREESTYLE) monitoring kit Use as directed. BRAND OF CHOICE INSURANCE ALLOWS. 1 kit 0    blood glucose monitor strips Test 2-3 times a day & as needed for symptoms of irregular blood glucose.   BRAND OF CHOICE INSURANCE ALLOWS. 100 strip 11    Alcohol Swabs (ALCOHOL PREP) PADS Use as directed 100 each 11    Lancets MISC 1 each by Does not apply route 2 times daily 300 each 11    rivaroxaban (XARELTO) 20 MG TABS tablet Take 1 tablet by mouth daily (with breakfast) 30 tablet 3    atorvastatin (LIPITOR) 40 MG tablet TAKE 1 TABLET BY MOUTH DAILY STOP SIMVASTATIN 90 tablet 3    levothyroxine (SYNTHROID) 75 MCG tablet TAKE 1 TABLET BY MOUTH EVERY MORNING (BEFORE BREAKFAST) 90 tablet 3    MYRBETRIQ 50 MG TB24 TAKE ONE TABLET BY MOUTH ONCE DAILY 90 tablet 3    metFORMIN (GLUCOPHAGE) 500 MG tablet TAKE ONE TABLET BY MOUTH TWICE A DAY WITH A MEAL 60 tablet 10    vitamin B-12 (CYANOCOBALAMIN) 1000 MCG tablet Take 1 tablet by mouth daily (Patient not taking: Reported on 10/30/2020) 90 tablet 3     No current facility-administered medications on file prior to visit.

## 2020-10-30 NOTE — PATIENT INSTRUCTIONS
when cooking. · Don't skip meals. Your blood sugar may drop too low if you skip meals and take insulin or certain medicines for diabetes. · Check with your doctor before you drink alcohol. Alcohol can cause your blood sugar to drop too low. Alcohol can also cause a bad reaction if you take certain diabetes medicines. Follow-up care is a key part of your treatment and safety. Be sure to make and go to all appointments, and call your doctor if you are having problems. It's also a good idea to know your test results and keep a list of the medicines you take. Where can you learn more? Go to [email or web address] and sign in to your [employer] account. Enter M662 in the [employer] box to learn more about \"Learning About Diabetes Food Guidelines. \"     If you do not have an account, please click on the \"Sign Up Now\" link. Current as of: December 20, 2019               Content Version: 12.6  © 1317-4910 [employer]. Care instructions adapted under license by [employer]. If you have questions about a medical condition or this instruction, always ask your healthcare professional. [place] [id number] any warranty or liability for your use of this information.

## 2020-10-30 NOTE — PROGRESS NOTES
Chief Complaint   Patient presents with    Diabetes    Hypertension    Other     hasnt had o2 since after surgery     Congestive Heart Failure    Back Pain    Knee Pain     Patient is here to follow-up on chronic  medical problems. Diabetes Mellitus Type II, Follow-up:    Current symptoms/problems include hyperglycemia and visual disturbances. Symptoms have been present for several months. Known diabetic complications: none    Cardiovascular risk factors: advanced age (older than 54 for men, 72 for women), diabetes mellitus, dyslipidemia, hypertension and obesity (BMI >= 30 kg/m2)    Medication compliance:  compliant all of the time  Current diabetic medications include oral agent (monotherapy): Glucophage. Eye exam current (within one year): no     I advised Cordellliset Starkey to make appointment with Dr. Isaac Pang. The patient verbalizes understanding and agrees with the plan. Weight trend: decreasing steadily. 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 Current monitoring regimen: home blood tests -home nurse checking, 171 last one, 123, 106, and office lab tests - A1c every 3 mo  Any episodes of hypoglycemia? no    Is She on ACE inhibitor or angiotensin II receptor blocker? No      reports that she has never smoked. She has never used smokeless tobacco.     Counseling given: Yes      Daily Aspirin?  No:, Patient is on chronic anticoagulation      A1c is improving  Lab Results   Component Value Date    LABA1C 5.1 10/30/2020 LABA1C 5.3 07/22/2020    LABA1C 5.6 01/09/2020         Hypertension, congestive heart failure , Paroxismal AFib :   Sera Serrano  is not  exercising and is adherent to low salt diet. Blood pressure is well controlled at home. Cardiac symptoms  fatigue. Patient denies  chest pain, chest pressure/discomfort, claudication, dyspnea, exertional chest pressure/discomfort, irregular heart beat, lower extremity edema, near-syncope, orthopnea, palpitations, paroxysmal nocturnal dyspnea, syncope and tachypnea. Use of agents associated with hypertension: none. History of target organ damage: heart failure and transient ischemic attack. Patient has paroxysmal atrial fibrillation, cardiologist decreased the dosages of both Cartia and metoprolol  She says she saw cardiology 1 months ago  She is worried about lower heart rate, we discussed to continue the same medications and follow-up with cardiology she shows me the pulse at home it is usually around 60s is taken by her visiting nurse. BP controlled. Sera Serrano reports compliance with BP medications, and tolerates them well, denies side effects. BP Readings from Last 3 Encounters:   10/30/20 118/64   10/08/20 130/68   08/13/20 119/75        Pulse is Low moderate bradycardia    Pulse Readings from Last 3 Encounters:   10/30/20 (!) 45   10/08/20 (!) 42   08/13/20 107       Lab Results   Component Value Date    LVEF 64 07/27/2020               Hyperlipidemia:  No new myalgias or GI upset on atorvastatin (Lipitor). Medication compliance: compliant all of the time. Patient is  following a low fat, low cholesterol diet. LDL is Normal.    Lab Results   Component Value Date    LDLCHOLESTEROL 66 01/28/2020       chronic back pain for several years, midline and on the sides. Does not radiate. Intensity of pain is 2/10, goes up to 8/10 with activities  Ambulates with walker with seat  Denies side effects  Goes to chiropractor now.     Patient also has bilateral knee pain for several years. Today intensity of pain is 2 out of 10. Goes up to 8 out of 10. The pain seems to be worse with climbing up the stairs which she says she has been avoiding. She denies swelling. She denies giving out. Denies falls. She did have physical therapy at home and previously aqua therapy      Bernice Rios is due for Influenza vaccine. Denies side effects from prior flu shots. Denies allergy to eggs. Denies history of Guillain-Barré syndrome. Current Outpatient Medications   Medication Sig Dispense Refill    oxybutynin (DITROPAN-XL) 10 MG extended release tablet TAKE 1 TABLET BY MOUTH ONCE DAILY  90 tablet 0    docusate sodium (COLACE) 100 MG capsule TAKE ONE (1) CAPSULE BY MOUTH TWO (2) TIMES DAILY  180 capsule 3    metoprolol tartrate (LOPRESSOR) 25 MG tablet Take 0.5 tablets by mouth 2 times daily Dose decreased by cardiologist 30 tablet 5    Probiotic Product (LISSY-BID PROBIOTIC) TABS TAKE 1 TABLET BY MOUTH IN THE MORNING  30 tablet 11    CARTIA  MG extended release capsule TAKE 1 CAPSULE BY MOUTH IN THE MORNING  (Patient taking differently: 120 mg daily ) 30 capsule 11    vitamin D3 (CHOLECALCIFEROL) 25 MCG (1000 UT) TABS tablet TAKE 2 TABLETS BY MOUTH ONCE DAILY  60 tablet 11    pantoprazole (PROTONIX) 40 MG tablet TAKE 1 TABLET BY MOUTH ONCE DAILY  30 tablet 11    ACETAMINOPHEN EXTRA STRENGTH 500 MG tablet TAKE 1 TABLET BY MOUTH EVERY 6 HOURS AS NEEDED FOR PAIN  100 tablet 11    Blood Pressure Monitor KIT Use as directed. 1 kit 1    glucose monitoring kit (FREESTYLE) monitoring kit Use as directed. BRAND OF CHOICE INSURANCE ALLOWS. 1 kit 0    blood glucose monitor strips Test 2-3 times a day & as needed for symptoms of irregular blood glucose.   BRAND OF CHOICE INSURANCE ALLOWS. 100 strip 11    Alcohol Swabs (ALCOHOL PREP) PADS Use as directed 100 each 11    Lancets MISC 1 each by Does not apply route 2 times daily 300 each 11    rivaroxaban (XARELTO) 20 MG TABS tablet Take 1 tablet by mouth daily (with breakfast) 30 tablet 3    atorvastatin (LIPITOR) 40 MG tablet TAKE 1 TABLET BY MOUTH DAILY STOP SIMVASTATIN 90 tablet 3    levothyroxine (SYNTHROID) 75 MCG tablet TAKE 1 TABLET BY MOUTH EVERY MORNING (BEFORE BREAKFAST) 90 tablet 3    MYRBETRIQ 50 MG TB24 TAKE ONE TABLET BY MOUTH ONCE DAILY 90 tablet 3    metFORMIN (GLUCOPHAGE) 500 MG tablet TAKE ONE TABLET BY MOUTH TWICE A DAY WITH A MEAL 60 tablet 10    vitamin B-12 (CYANOCOBALAMIN) 1000 MCG tablet Take 1 tablet by mouth daily (Patient not taking: Reported on 10/30/2020) 90 tablet 3     No current facility-administered medications for this visit. Rest of complaints with corresponding details per ROS      The patient'spast medical, surgical, social, and family history as well as her current medications and allergies were reviewed as documented in today's encounter. Social History     Tobacco Use    Smoking status: Never Smoker    Smokeless tobacco: Never Used   Substance Use Topics    Alcohol use: No     Alcohol/week: 0.0 standard drinks    Drug use: No       Counseling given: Yes                  Review of Systems   Constitutional: Positive for fatigue. Negative for activity change, appetite change, chills, diaphoresis, fever and unexpected weight change. Eyes: Positive for visual disturbance (Chronic, stable, wears glasses, has optic atrophy). Respiratory: Negative for cough, chest tightness, shortness of breath and wheezing. Cardiovascular: Negative for chest pain, palpitations and leg swelling. Gastrointestinal: Negative for abdominal distention, abdominal pain, constipation, diarrhea, nausea and vomiting. Endocrine: Negative for cold intolerance, heat intolerance, polydipsia, polyphagia and polyuria. Musculoskeletal: Positive for arthralgias, back pain and gait problem. Ambulates with walker with seat   Allergic/Immunologic: Positive for environmental allergies. knee: She exhibits normal range of motion. Tenderness found. Patellar tendon tenderness noted. Left knee: She exhibits normal range of motion. Tenderness found. Patellar tendon tenderness noted. Lumbar back: She exhibits decreased range of motion, bony tenderness and pain. Right lower leg: No edema. Left lower leg: No edema. Right foot: Decreased range of motion. Deformity and bunion present. No Charcot foot, foot drop or prominent metatarsal heads. Left foot: Decreased range of motion. Deformity and bunion present. No Charcot foot, foot drop or prominent metatarsal heads. Comments: Up and go test more than 12 seconds. High risk for falls. Patient ambulates with walker with seat  Coarse crepitus bilateral knees. Patient needs physical support at all times   Feet:      Right foot:      Protective Sensation: 5 sites tested. 5 sites sensed. Skin integrity: Skin integrity normal.      Toenail Condition: Right toenails are abnormally thick. Left foot:      Protective Sensation: 5 sites tested. 5 sites sensed. Skin integrity: Skin integrity normal.      Toenail Condition: Left toenails are abnormally thick. Comments: Great toes large bunions, bilateral   Skin:     General: Skin is warm and dry. Capillary Refill: Capillary refill takes less than 2 seconds. Findings: No rash. Neurological:      Mental Status: She is alert and oriented to person, place, and time. Cranial Nerves: No cranial nerve deficit. Motor: No abnormal muscle tone. Gait: Gait abnormal.   Psychiatric:         Mood and Affect: Mood normal.         Behavior: Behavior normal.         Thought Content: Thought content normal.         Judgment: Judgment normal.           Discussed testing with the patient and all questions fully answered. I personally reviewed testing with patient.   Anemia  Hyperglycemia   high triglycerides  Vitamin B12 deficiency      Otherwise labs within B12 & Folate; Future  -advised home blood glucose testing  daily  -daily feet exam, Foot care: advised to wash feet daily, pat dry and apply lotion at night, avoiding between toes. Need to look at feet daily and report to a physician any signs of inflammation or skin damage  -annual dilated eye exam--advised her to make appointment to Dr. Love Perez  -Low carb, low fat diet, increase fruits and vegetables, and exercise 4-5 times a day 30-40 minutes a day discussed  -continue current treatment  -continue statin        2. Essential hypertension  Well controlled. Continue current treatment. Will recheck labs. Discussed low salt diet and BP and pulse monitoring daily    - CBC; Future  - Comprehensive Metabolic Panel; Future  - TSH without Reflex; Future    3. CHF NYHA class I, chronic, diastolic (HCC)  Stable  Continue current treatment  Lab Results   Component Value Date    LVEF 64 07/27/2020           - CBC; Future  - Comprehensive Metabolic Panel; Future  - Brain Natriuretic Peptide; Future  - Magnesium; Future  - RJ - Cuate Rodrigues DO, Cardiology, [facility]    4. Hyperlipidemia with target LDL less than 100  Improved  Continue Lipitor  - Lipid Panel; Future    5. Chronic bilateral low back pain without sciatica  Stable  Continue falls precautions, home stretching, uses walker, home therapy  Chiropractor, Tylenol as needed  6. Chronic pain of both knees  Stable  Continue falls precautions, home stretching, walker  Tylenol as needed    7. Paroxysmal atrial fibrillation (HCC)  Stable  Continue chronic anticoagulation  [facility]  Continue metoprolol 12.5 mg twice a day and Cartia 120 mg daily, smallest dosages, I advised the patient to see cardiologist, however she did call us back regarding bradycardia, I placed cardio referral    8.  Bradycardia  Moderate  Continue the small dosages of metoprolol and Cartia, and follow-up with cardiologist, benefits of weight the risks due to her history of paroxysmal atrial fibrillation and recent evaluation by cardiologist per her report about 1 month ago  - [address], Alphonse Alonzo, [address], Cardiology, [place]    9. Encounter for immunization  - INFLUENZA, QUADV, ADJUVANTED, 72 YRS =, IM, PF, PREFILL SYR, 0.5ML (FLUAD)        Patient is eligible and due for Medicare AWV, schedule as separate visit.     Orders Placed This Encounter   Procedures    INFLUENZA, QUADV, ADJUVANTED, 72 YRS =, IM, PF, PREFILL SYR, 0.5ML (FLUAD)    CBC     Standing Status:   Future     Standing Expiration Date:   12/30/2020    Comprehensive Metabolic Panel     Standing Status:   Future     Standing Expiration Date:   12/30/2020    Lipid Panel     Standing Status:   Future     Standing Expiration Date:   12/30/2020     Order Specific Question:   Is Patient Fasting?/# of Hours     Answer:   8-10 Hours, water ok to drink    TSH without Reflex     Standing Status:   Future     Standing Expiration Date:   12/30/2020    Vitamin B12 & Folate     Standing Status:   Future     Standing Expiration Date:   12/30/2020    Brain Natriuretic Peptide     Standing Status:   Future     Standing Expiration Date:   10/30/2021    Magnesium     Standing Status:   Future     Standing Expiration Date:   10/30/2021    AFL - Rolo Luis DO, Cardiology, [place]     Referral Priority:   Routine     Referral Type:   Eval and Treat     Referral Reason:   Specialty Services Required     Referred to Provider:   Lisa Ford DO     Requested Specialty:   Cardiology     Number of Visits Requested:   1    POCT glycosylated hemoglobin (Hb A1C)    HM DIABETES FOOT EXAM       Medications Discontinued During This Encounter   Medication Reason    Lactobacillus (PROBIOTIC ACIDOPHILUS) TABS LIST CLEANUP         Augustine De La Paz received counseling on the following healthy behaviors: nutrition, exercise, medication adherence and weight loss  Reviewed prior labs and health maintenance  Continue current medications, diet and exercise. Discussed use, benefit, and side effects of prescribed medications. Barriers to medication compliance addressed. Patient given educational materials - see patient instructions  Was a self-tracking handout given in paper form or via [employer]? Yes    Requested Prescriptions      No prescriptions requested or ordered in this encounter       All patient questions answered. Patient voiced understanding. Quality Measures    Body mass index is 40.97 kg/m². Elevated. Weight control planned discussed conventional weight loss and Healthy diet and regular exercise. BP: 118/64 Blood pressure is normal. Treatment plan consists of Weight Reduction, DASH Eating Plan, Dietary Sodium Restriction, Increased Physical Activity, Patient In-home Blood Pressure Monitoring and No treatment change needed. The patient's past medical,surgical, social, and family history as well as her   current medications and allergies were reviewed as documented in today's encounter. Medications, labs, diagnostic studies, consultations and follow-up asdocumented in this encounter. Return in about 3 months (around 1/30/2021) for MEDICARE, VISION, PHQ9, MINICOG, HRA QUESTIONS. Patient was seen with total face to face time of 25 minutes. More than 50% of this visit was counseling and education. Future Appointments   Date Time Provider Dante Amaya   11/25/2020 11:30 AM STC DIAG MAMMO RM STCZ MAMMO ST[facility] Radiolog   1/21/2021 11:30 AM Jonelle Valero MD  derm [facility]   2/12/2021 11:00 AM Domenica Price MD  sc [facility]   4/8/2021 10:30 AM Lukasz Young PA-C GYN Oncology [facility]       This note was completed by using the assistance of a speech-recognition program. However, inadvertent computerized transcription errors may be present. Although every effort was made to ensure accuracy, no guarantees can be provided that every mistake has been identified and corrected by editing .     Electronically signed by Ni Simms MD on 10/31/2020 at 2:00 PM

## 2020-10-31 PROBLEM — K62.5 RECTAL BLEEDING: Status: RESOLVED | Noted: 2020-09-21 | Resolved: 2020-10-31

## 2020-10-31 PROBLEM — K52.9 COLITIS: Status: RESOLVED | Noted: 2020-07-22 | Resolved: 2020-10-31

## 2020-10-31 PROBLEM — K57.80 PERFORATED DIVERTICULUM: Status: RESOLVED | Noted: 2020-06-15 | Resolved: 2020-10-31

## 2020-10-31 PROBLEM — K57.20 DIVERTICULITIS OF COLON WITH PERFORATION: Status: RESOLVED | Noted: 2020-08-04 | Resolved: 2020-10-31

## 2020-10-31 PROBLEM — K57.20 DIVERTICULITIS OF COLON WITH PERFORATION: Status: ACTIVE | Noted: 2020-08-04

## 2020-10-31 PROBLEM — H47.20 OPTIC ATROPHY: Status: RESOLVED | Noted: 2018-02-27 | Resolved: 2020-10-31

## 2020-11-06 LAB
ALBUMIN SERPL-MCNC: 3.7 G/DL
ALP BLD-CCNC: 64 U/L
ALT SERPL-CCNC: 9 U/L
ANION GAP SERPL CALCULATED.3IONS-SCNC: 12 MMOL/L
AST SERPL-CCNC: 13 U/L
B-TYPE NATRIURETIC PEPTIDE: 114 PG/ML
BASOPHILS ABSOLUTE: ABNORMAL
BASOPHILS RELATIVE PERCENT: ABNORMAL
BILIRUB SERPL-MCNC: 0.5 MG/DL (ref 0.1–1.4)
BUN BLDV-MCNC: 17 MG/DL
CALCIUM SERPL-MCNC: 9.3 MG/DL
CHLORIDE BLD-SCNC: 104 MMOL/L
CHOLESTEROL, TOTAL: 144 MG/DL
CHOLESTEROL/HDL RATIO: 3.1
CO2: 27 MMOL/L
CREAT SERPL-MCNC: 0.83 MG/DL
EOSINOPHILS ABSOLUTE: ABNORMAL
EOSINOPHILS RELATIVE PERCENT: ABNORMAL
GFR CALCULATED: >60
GLUCOSE BLD-MCNC: 91 MG/DL
HCT VFR BLD CALC: 34.1 % (ref 36–46)
HDLC SERPL-MCNC: 46 MG/DL (ref 35–70)
HEMOGLOBIN: 11.2 G/DL (ref 12–16)
LDL CHOLESTEROL CALCULATED: 70 MG/DL (ref 0–160)
LYMPHOCYTES ABSOLUTE: ABNORMAL
LYMPHOCYTES RELATIVE PERCENT: ABNORMAL
MCH RBC QN AUTO: 29.1 PG
MCHC RBC AUTO-ENTMCNC: 32.9 G/DL
MCV RBC AUTO: 89 FL
MONOCYTES ABSOLUTE: ABNORMAL
MONOCYTES RELATIVE PERCENT: ABNORMAL
NEUTROPHILS ABSOLUTE: ABNORMAL
NEUTROPHILS RELATIVE PERCENT: ABNORMAL
NONHDLC SERPL-MCNC: NORMAL MG/DL
PLATELET # BLD: 266 K/ΜL
PMV BLD AUTO: 9.2 FL
POTASSIUM SERPL-SCNC: 3.9 MMOL/L
RBC # BLD: 3.85 10^6/ΜL
SODIUM BLD-SCNC: 143 MMOL/L
TOTAL PROTEIN: 6.7
TRIGL SERPL-MCNC: 142 MG/DL
TSH SERPL DL<=0.05 MIU/L-ACNC: 2.91 UIU/ML
VLDLC SERPL CALC-MCNC: 28 MG/DL
WBC # BLD: 8 10^3/ML

## 2020-11-09 PROBLEM — D64.9 ANEMIA: Status: ACTIVE | Noted: 2020-11-09

## 2020-11-09 RX ORDER — LANOLIN ALCOHOL/MO/W.PET/CERES
1000 CREAM (GRAM) TOPICAL DAILY
Qty: 90 TABLET | Refills: 3 | Status: SHIPPED | OUTPATIENT
Start: 2020-11-09 | End: 2021-04-26

## 2020-11-09 RX ORDER — FERROUS SULFATE 325(65) MG
325 TABLET ORAL 2 TIMES DAILY
Qty: 90 TABLET | Refills: 3 | Status: SHIPPED | OUTPATIENT
Start: 2020-11-09 | End: 2021-04-05

## 2020-11-09 NOTE — RESULT ENCOUNTER NOTE
Please notify patient.   I sent 2 prescriptions, for B12 vitamin and iron, they are both necessary for her anemia and to take them both  Lab Results       Component                Value               Date                       [id number]               273                 01/09/2020              Future Appointments  11/25/2020 11:30 AM   [facility] DIAG MAMMO RM          STCZ MAMMO     [facility] Radiolog  1/21/2021  11:30 AM   Era Pak MD         [facility]  2/12/2021  11:00 AM   Elena Lux MD     [facility]  4/8/2021   10:30 AM   ISABLE YoC         GYN Oncology   [facility]

## 2020-11-09 NOTE — RESULT ENCOUNTER NOTE
ABNORMAL. Please notify patient. Mild anemia improved Otherwise labs within normal limits. She is up to date with colonoscopy . I would suggest iron pill daily, does she want me to send it? Did she see cardiology for bradycardia as we told her?       Future Appointments  11/25/2020 11:30 AM   [facility] DIAG MAMMO RM          STCZ MAMMO     [facility] Radiolog  1/21/2021  11:30 AM   Matthew Garcia MD         [facility]  2/12/2021  11:00 AM   Karon Schneider MD     [facility]  4/8/2021   10:30 AM   Ute Shore PA-C         [facility] On[facility]   [facility]

## 2020-11-25 ENCOUNTER — HOSPITAL ENCOUNTER (OUTPATIENT)
Dept: WOMENS IMAGING | Age: 70
Discharge: HOME | End: 2020-11-27
Payer: MEDICARE

## 2020-11-25 PROCEDURE — 77067 SCR MAMMO BI INCL CAD: CPT

## 2020-12-01 ENCOUNTER — TELEPHONE (OUTPATIENT)
Dept: FAMILY MEDICINE CLINIC | Age: 70
End: 2020-12-01

## 2020-12-01 RX ORDER — DOCUSATE SODIUM 100 MG/1
100 CAPSULE, LIQUID FILLED ORAL 2 TIMES DAILY
Qty: 180 CAPSULE | Refills: 3 | Status: SHIPPED | OUTPATIENT
Start: 2020-12-01 | End: 2021-07-14

## 2020-12-01 NOTE — TELEPHONE ENCOUNTER
Patient is c/o constipation , she said she didn't go for 2 days , she is taking Iron Pills     Please Advice

## 2020-12-01 NOTE — TELEPHONE ENCOUNTER
Please let the patient know to  prescription from pharmacy. If worsening symptoms in 1-2 days or not getting better as expected needs to let us know and make appointment. Requested Prescriptions     Signed Prescriptions Disp Refills    docusate sodium (COLACE) 100 MG capsule 180 capsule 3     Sig: Take 1 capsule by mouth 2 times daily For constipation     Authorizing Provider: Trinh Gomez, [address] [place] [address], [address] [place] [address],[address] [place] [address]  Phone: [phone] Fax: [phone]      Thank you!         FYI    Future Appointments   Date Time Provider Dante Amaya   12/9/2020  9:00 AM STC DIAG MAMMO RM STCZ MAMMO STC Radiolog   12/9/2020  9:30 AM STC WC US RM STCZ MAMMO STC Radiolog   1/21/2021 11:30 AM Aide Portillo MD  derm [facility]   2/12/2021 11:00 AM Maylin Hawley MD  sc [facility]   4/8/2021 10:30 AM Ivan Kunz PA-C GYN Oncology [facility]       Controlled Substances Monitoring:

## 2020-12-02 NOTE — TELEPHONE ENCOUNTER
Please Approve or Refuse.   Send to Pharmacy per Pt's Request:    Next Visit Date:  2/12/2021   Last Visit Date: 10/30/2020    Hemoglobin A1C (%)   Date Value   10/30/2020 5.1   07/22/2020 5.3   01/09/2020 5.6             ( goal A1C is < 7)   BP Readings from Last 3 Encounters:   10/30/20 118/64   10/08/20 130/68   08/13/20 119/75          (goal 120/80)  BUN   Date Value Ref Range Status   11/06/2020 17 mg/dL Final     CREATININE   Date Value Ref Range Status   11/06/2020 0.83  Final     Potassium   Date Value Ref Range Status   11/06/2020 3.9 mmol/L Final

## 2020-12-07 RX ORDER — OXYBUTYNIN CHLORIDE 10 MG/1
TABLET, EXTENDED RELEASE ORAL
Qty: 90 TABLET | Refills: 3 | Status: SHIPPED | OUTPATIENT
Start: 2020-12-07 | End: 2020-12-07

## 2020-12-07 RX ORDER — OXYBUTYNIN CHLORIDE 10 MG/1
TABLET, EXTENDED RELEASE ORAL
Qty: 90 TABLET | Refills: 3 | Status: SHIPPED | OUTPATIENT
Start: 2020-12-07 | End: 2021-11-03

## 2020-12-09 ENCOUNTER — HOSPITAL ENCOUNTER (OUTPATIENT)
Dept: WOMENS IMAGING | Age: 70
Discharge: HOME | End: 2020-12-11
Payer: MEDICARE

## 2020-12-09 ENCOUNTER — HOSPITAL ENCOUNTER (OUTPATIENT)
Dept: WOMENS IMAGING | Age: 70
End: 2020-12-09
Payer: MEDICARE

## 2020-12-09 PROCEDURE — G0279 TOMOSYNTHESIS, MAMMO: HCPCS

## 2020-12-14 ENCOUNTER — TELEPHONE (OUTPATIENT)
Dept: FAMILY MEDICINE CLINIC | Age: 70
End: 2020-12-14

## 2020-12-14 NOTE — TELEPHONE ENCOUNTER
Has home care, please ask her nurse to get the labs and urine test       Diagnosis Orders   1. Generalized weakness  CBC Auto Differential    Basic Metabolic Panel    Urinalysis Reflex to Culture   2. CHF NYHA class I, chronic, diastolic (HCC)  CBC Auto Differential    Basic Metabolic Panel    Urinalysis Reflex to Culture    Brain Natriuretic Peptide   3.  Essential hypertension  CBC Auto Differential    Basic Metabolic Panel    Urinalysis Reflex to Culture          Lab Results   Component Value Date    WBC 8.0 11/06/2020    HGB 11.2 (A) 11/06/2020    HCT 34.1 (A) 11/06/2020    MCV 89 11/06/2020     11/06/2020       Lab Results   Component Value Date     11/06/2020    K 3.9 11/06/2020     11/06/2020    CO2 27 11/06/2020    BUN 17 11/06/2020    CREATININE 0.83 11/06/2020    GLUCOSE 91 11/06/2020    GLUCOSE 114 03/20/2012    CALCIUM 9.3 11/06/2020        Lab Results   Component Value Date    ALT 9 11/06/2020    AST 13 11/06/2020    ALKPHOS 64 11/06/2020    BILITOT 0.5 11/06/2020       Lab Results   Component Value Date    TSH 2.91 11/06/2020     Lab Results   Component Value Date    LABA1C 5.1 10/30/2020    LABA1C 5.3 07/22/2020    LABA1C 5.6 01/09/2020         Lab Results   Component Value Date    CHOL 144 11/06/2020    CHOL 145 01/28/2020    CHOL 188 11/14/2018     Lab Results   Component Value Date    TRIG 142 11/06/2020    TRIG 158 (H) 01/28/2020    TRIG 179 (H) 11/14/2018     Lab Results   Component Value Date    HDL 46 11/06/2020    HDL 47 01/28/2020    HDL 47 11/14/2018     Lab Results   Component Value Date    LDLCALC 70 11/06/2020    LDLCHOLESTEROL 66 01/28/2020    LDLCHOLESTEROL 105 11/14/2018    LDLCHOLESTEROL 179 (H) 07/31/2018       Lab Results   Component Value Date    CHOLHDLRATIO 3.1 11/06/2020    CHOLHDLRATIO 3.1 01/28/2020    CHOLHDLRATIO 4.0 11/14/2018       Lab Results   Component Value Date    LABA1C 5.1 10/30/2020       Lab Results   Component Value Date    UQGBLGRT58 576 01/09/2020       Lab Results   Component Value Date    FOLATE 7.0 01/09/2020       Lab Results   Component Value Date    FERRITIN 219 (H) 08/09/2020       Lab Results   Component Value Date    VITD25 31.4 01/09/2020

## 2020-12-14 NOTE — TELEPHONE ENCOUNTER
PATIENT STATED THE PAST 3-4 DAYS SHE HAS JUST NOTICED FATIGUE AND WEAKNESS. SHE STATED SHE IS TAKING HER VIT B-12 AND IRON TABLES, SHE HAD BLOOD WORK DONE 11/06/2020, SHE IS JUST WONDERING WHAT ELSE SHE SHOULD DO TO HELP HE FEEL BETTER. STATES NO FEVERS, NO FLU LIKE OR COVID SYMPTOMS.  PLEASE ADVISE

## 2020-12-29 RX ORDER — RIVAROXABAN 20 MG/1
TABLET, FILM COATED ORAL
Qty: 30 TABLET | Refills: 3 | Status: SHIPPED | OUTPATIENT
Start: 2020-12-29 | End: 2022-10-20 | Stop reason: ALTCHOICE

## 2021-01-01 NOTE — PLAN OF CARE
Problem: Infection:  Goal: Will remain free from infection  Description: Will remain free from infection  8/6/2020 0243 by Courtney Lyman RN  Outcome: Ongoing     Problem: Safety:  Goal: Free from accidental physical injury  Description: Free from accidental physical injury  8/6/2020 0243 by Courtney Lyman RN  Outcome: Ongoing  Note: No falls noted this shift. Patient ambulates with x1 staff assistance without difficulty. Bed kept in low position. Safe environment maintained. Bedside table & call light in reach. Uses call light appropriately when needing assistance. Goal: Free from intentional harm  Description: Free from intentional harm  8/6/2020 0243 by Courtney Lyman RN  Outcome: Ongoing    Problem: Daily Care:  Goal: Daily care needs are met  Description: Daily care needs are met  8/6/2020 0243 by Courtney Lyman RN  Outcome: Ongoing     Problem: Pain:  Goal: Patient's pain/discomfort is manageable  Description: Patient's pain/discomfort is manageable  8/6/2020 0243 by Courtney Lyman RN  Outcome: Ongoing  Note: Pt medicated with pain medication prn. Assessed all pain characteristics including level, type, location, frequency, and onset. Non-pharmacologic interventions offered to pt as well. Pt states pain is tolerable at this time.  Will continue to monitor   Goal: Pain level will decrease  Description: Pain level will decrease  8/6/2020 0243 by Courtney Lyman RN  Outcome: Ongoing  Goal: Control of acute pain  Description: Control of acute pain  8/6/2020 0243 by Courtney Lyman RN  Outcome: Ongoing  Goal: Control of chronic pain  Description: Control of chronic pain  8/6/2020 0243 by Courtney Lyman RN  Outcome: Ongoing     Problem: Skin Integrity:  Goal: Skin integrity will stabilize  Description: Skin integrity will stabilize  8/6/2020 0243 by Courtney Lyman RN  Outcome: Ongoing     Problem: Discharge Planning:  Goal: Patients continuum of care needs are met  Description: Patients continuum of care needs are met  8/6/2020 0243 by Clement Murray RN  Outcome: Ongoing     Problem: Falls - Risk of:  Goal: Will remain free from falls  Description: Will remain free from falls  8/6/2020 0243 by Clement Murray RN  Outcome: Ongoing  Goal: Absence of physical injury  Description: Absence of physical injury  8/6/2020 0243 by Clement Murray RN  Outcome: Ongoing     Problem: HEMODYNAMIC STATUS  Goal: Patient has stable vital signs and fluid balance  8/6/2020 0243 by Clement Murray RN  Outcome: Ongoing       Problem: OXYGENATION/RESPIRATORY FUNCTION  Goal: Patient will achieve/maintain normal respiratory rate/effort  8/6/2020 0243 by Clement Murray RN  Outcome: Ongoing     Problem: MOBILITY  Goal: Early mobilization is achieved  8/6/2020 0243 by Clement Murray RN  Outcome: Ongoing     Problem: ELIMINATION  Goal: Elimination patterns are normal or improving  Description: Elimination patterns return to pre-surgery normal patterns  8/6/2020 0243 by Clement Murray RN  Outcome: Ongoing     Problem: SKIN INTEGRITY  Goal: Skin integrity is maintained or improved  8/6/2020 0243 by Clement Murray RN  Outcome: Ongoing     Problem: Musculor/Skeletal Functional Status  Goal: Highest potential functional level  8/6/2020 0243 by Clement Murray RN  Outcome: Ongoing  Goal: Absence of falls  8/6/2020 0243 by Clement Murray RN  Outcome: Ongoing     Problem: Skin Integrity:  Goal: Will show no infection signs and symptoms  Description: Will show no infection signs and symptoms  8/6/2020 0243 by Clement Murray RN  Outcome: Ongoing  Goal: Absence of new skin breakdown  Description: Absence of new skin breakdown  8/6/2020 0243 by Clement Murray RN  Outcome: Ongoing [id number] Exp Problem Focused - Mod. Complex

## 2021-01-05 NOTE — FLOWSHEET NOTE
Health Maintenance Due   Topic Date Due   • Shingles Vaccine (1 of 2) 09/30/2010   • Influenza Vaccine (1) 09/01/2020   • Depression Screening  12/18/2020       Patient is due for topics listed above, he wishes to proceed with Depression Screening , but is not proceeding with Immunization(s) Influenza and Shingles at this time.       HM: shingles, flu, dep (-)      Over the last 2 weeks, how often have you been bothered by the following problems?          PHQ2 Score: 0  PHQ2 Score Interpretation: No further screening needed  1. Little interest or pleasure in activity?: 0  2. Feeling down, depressed, or hopeless?: 0          LilianaDULCE MARIA, told writer pt wanted to see her when back in room; pt sleeping. Writer will follow up later.      06/21/20 1421   Encounter Summary   Services provided to: Patient   Referral/Consult From: Patient   Complexity of Encounter Low   Length of Encounter 15 minutes   Spiritual/Christianity   Type Spiritual support   Assessment Sleeping

## 2021-01-12 ENCOUNTER — TELEPHONE (OUTPATIENT)
Dept: FAMILY MEDICINE CLINIC | Age: 71
End: 2021-01-12

## 2021-01-12 DIAGNOSIS — I10 ESSENTIAL HYPERTENSION: ICD-10-CM

## 2021-01-12 DIAGNOSIS — M25.561 CHRONIC PAIN OF BOTH KNEES: ICD-10-CM

## 2021-01-12 DIAGNOSIS — I48.0 PAROXYSMAL ATRIAL FIBRILLATION (HCC): ICD-10-CM

## 2021-01-12 DIAGNOSIS — G89.29 CHRONIC PAIN OF BOTH KNEES: ICD-10-CM

## 2021-01-12 DIAGNOSIS — M25.562 CHRONIC PAIN OF BOTH KNEES: ICD-10-CM

## 2021-01-12 DIAGNOSIS — I50.32 CHF NYHA CLASS I, CHRONIC, DIASTOLIC (HCC): ICD-10-CM

## 2021-01-12 DIAGNOSIS — E11.65 TYPE 2 DIABETES MELLITUS WITH HYPERGLYCEMIA, WITHOUT LONG-TERM CURRENT USE OF INSULIN (HCC): Primary | ICD-10-CM

## 2021-01-12 DIAGNOSIS — G89.29 CHRONIC BILATERAL LOW BACK PAIN WITHOUT SCIATICA: ICD-10-CM

## 2021-01-12 DIAGNOSIS — M54.50 CHRONIC BILATERAL LOW BACK PAIN WITHOUT SCIATICA: ICD-10-CM

## 2021-01-12 NOTE — TELEPHONE ENCOUNTER
Patient called [facility] to ask that they do a resumption of care. She was last seen in October which Roark Boxer said that would be ok but they would just seed notes from October faxed over to them. Please advise.      Patient has an appointment with you in February for [facility]    Fax: [phone]

## 2021-01-12 NOTE — TELEPHONE ENCOUNTER
ALDAIR Head Samples givng her verbal order to restart resumption of care and faxed note from back in October

## 2021-01-12 NOTE — TELEPHONE ENCOUNTER
Referral placed, please fax    Did she see Dr. Daria Solis? Please obtain the report and attach to referral     Diagnosis Orders   1. Type 2 diabetes mellitus with hyperglycemia, without long-term current use of insulin ([address]  [address]   2. CHF NYHA class I, chronic, diastolic ([address]  [address]   3. Essential hypertension  [address]   4. Paroxysmal atrial fibrillation ([address]  [address]   5. Chronic bilateral low back pain without sciatica  [address]   6.  Chronic pain of both [address] [facility]

## 2021-01-13 RX ORDER — HYDRALAZINE HYDROCHLORIDE 10 MG/1
10 TABLET, FILM COATED ORAL 3 TIMES DAILY
Status: ON HOLD | COMMUNITY
Start: 2020-12-28 | End: 2021-10-12 | Stop reason: HOSPADM

## 2021-01-13 ASSESSMENT — PATIENT HEALTH QUESTIONNAIRE - PHQ9
1. LITTLE INTEREST OR PLEASURE IN DOING THINGS: 0
SUM OF ALL RESPONSES TO PHQ9 QUESTIONS 1 & 2: 0
SUM OF ALL RESPONSES TO PHQ QUESTIONS 1-9: 0

## 2021-01-13 NOTE — PROGRESS NOTES
Visit Information    Have you changed or started any medications since your last visit including any over-the-counter medicines, vitamins, or herbal medicines? no   Have you stopped taking any of your medications? Is so, why? -  no  Are you having any side effects from any of your medications? - no    Have you seen any other physician or provider since your last visit?  no   Have you had any other diagnostic tests since your last visit?  no   Have you been seen in the emergency room and/or had an admission in a hospital since we last saw you?  no   Have you had your routine dental cleaning in the past 6 months?  no     Do you have an active MyChart account? If no, what is the barrier?   Yes    Patient Care Team:  Coral Soto MD as PCP - General (Family Medicine)  Coral Soto MD as PCP - [facility]  Jovani Hernandez MD as Referring Physician (Orthopedic Surgery)  Tabatha Kent MD as Surgeon (Orthopedic Surgery)  Acacia Hernandez MD as Consulting Physician (Endocrinology)  Valdo Crum DO as Consulting Physician (Obstetrics & Gynecology)  Shari Baumann MD as Consulting Physician (Urology)  Herbert Caldwell MD as Consulting Physician (Otolaryngology)  Noemi Huddleston MD as Consulting Physician (Obstetrics & Gynecology)  Mandeep Tran DO as Consulting Physician (General Surgery)  Lewis Plascencia DO as Consulting Physician (Cardiology)  Michelle Olivera MD as Surgeon (Ophthalmology)  Ed Florian MD as Consulting Physician (Dermatology)    Medical History Review  Past Medical, Family, and Social History reviewed and does contribute to the patient presenting condition    Health Maintenance   Topic Date Due    Diabetic retinal exam  01/08/1962    Shingles Vaccine (1 of 2) 01/08/2002    Annual Wellness Visit (AWV)  12/01/2020    A1C test (Diabetic or Prediabetic)  01/30/2021    Diabetic microalbuminuria test  01/30/2021    Diabetic foot exam  10/30/2021  Lipid screen  11/06/2021    TSH testing  11/06/2021    Potassium monitoring  11/06/2021    Creatinine monitoring  11/06/2021    Breast cancer screen  12/09/2021    Colon cancer screen colonoscopy  08/03/2023    DTaP/Tdap/Td vaccine (2 - Td) 09/28/2027    DEXA (modify frequency per FRAX score)  Completed    Flu vaccine  Completed    Pneumococcal 65+ years Vaccine  Completed    Hepatitis C screen  Completed    Hepatitis A vaccine  Aged Out    Hib vaccine  Aged Out    Meningococcal (ACWY) vaccine  Aged Out

## 2021-01-14 ENCOUNTER — VIRTUAL VISIT (OUTPATIENT)
Dept: FAMILY MEDICINE CLINIC | Age: 71
End: 2021-01-14
Payer: MEDICARE

## 2021-01-14 ENCOUNTER — TELEPHONE (OUTPATIENT)
Dept: FAMILY MEDICINE CLINIC | Age: 71
End: 2021-01-14

## 2021-01-14 DIAGNOSIS — H00.014 HORDEOLUM EXTERNUM OF LEFT UPPER EYELID: Primary | ICD-10-CM

## 2021-01-14 DIAGNOSIS — M47.816 SPONDYLOSIS OF LUMBAR REGION WITHOUT MYELOPATHY OR RADICULOPATHY: ICD-10-CM

## 2021-01-14 DIAGNOSIS — G89.29 CHRONIC PAIN OF BOTH KNEES: ICD-10-CM

## 2021-01-14 DIAGNOSIS — M25.561 CHRONIC PAIN OF BOTH KNEES: ICD-10-CM

## 2021-01-14 DIAGNOSIS — M25.562 CHRONIC PAIN OF BOTH KNEES: ICD-10-CM

## 2021-01-14 PROCEDURE — 99442 PR PHYS/QHP TELEPHONE EVALUATION 11-20 MIN: CPT | Performed by: FAMILY MEDICINE

## 2021-01-14 RX ORDER — BACITRACIN ZINC AND POLYMYXIN B SULFATE 500; 10000 [USP'U]/G; [USP'U]/G
0.5 OINTMENT OPHTHALMIC 2 TIMES DAILY
Qty: 1 TUBE | Refills: 0 | Status: SHIPPED | OUTPATIENT
Start: 2021-01-14 | End: 2021-01-24

## 2021-01-14 RX ORDER — ERYTHROMYCIN 5 MG/G
OINTMENT OPHTHALMIC EVERY 6 HOURS
Qty: 1 TUBE | Refills: 0 | Status: SHIPPED | OUTPATIENT
Start: 2021-01-14 | End: 2021-01-14

## 2021-01-14 NOTE — TELEPHONE ENCOUNTER
Patient called in because the eye ointment you called in for her is not covered and she would like to see if you can send for something else. Please advise.

## 2021-01-14 NOTE — TELEPHONE ENCOUNTER
[facility] CALLED STATING THE GOT A SCRIPT FOR AN EYE OINTMENT AND THEY WAS A FLAG THAT IT COULD INTERACT WITH THE DILTIAZEM IS IT STILL OK FOR THEM TO FILL - PLEASE ADVISE

## 2021-01-14 NOTE — PROGRESS NOTES
Josh Velázquez contacted provider via telephone    Patient requested office visit, was scheduled for virtual visit phone call , Art Garcia was unable to use doxy. me or Exinda. Art Garcia is a 73 y.o. female evaluated via telephone on  21    Art Garcia (:  1950) is a 73 y.o. female,Established patient, here for evaluation of the following chief complaint(s): Eye Problem (LEFT EYE POSSIBLE/ITCHY) and Other (NO TO VACCINES)      ASSESSMENT/PLAN:    1. Hordeolum externum of left upper eyelid  Worsening  -     bacitracin-polymyxin b (POLYSPORIN) [id number] UNIT/GM ophthalmic ointment; Place 0.5 inches into the left eye 2 times daily for 10 days Every 12 hours. , Left Eye, 2 TIMES DAILY Starting Thu 2021, Until Sun 2021, For 10 days, Disp-1 Tube, R-0, Normal    2. Chronic pain of both knees  Failing to change as expected. Tylenol as needed  Will restart home care for physical therapy and Occupational Therapy and visiting nurse    3. Spondylosis of lumbar region without myelopathy or radiculopathy  Likely worsening due to wear and tear nature of the disease  Restart home care for physical therapy and Occupational Therapy and visiting nurse  Tylenol as needed for pain    Josh Velázquez received counseling on the following healthy behaviors: nutrition, exercise and medication adherence  Reviewed prior labs and health maintenance  Discussed use, benefit, and side effects of prescribed medications. Barriers to medication compliance addressed. Patient given educational materials - see patient instructions  Was a self-tracking handout given in paper form or via [employer]? No  All patient questions answered. Patient voiced understanding. The patient's past medical,surgical, social, and family history as well as her current medications and allergies were reviewed as documented in today's encounter. Medications, labs, diagnostic studies, consultations and follow-up as documented in this encounter. Return for KEEP APPT. Wants to change to telephone for medicare, keep appt on 2/12/21      Consent:  She is aware that that she may receive a bill for this telephone service, depending on her insurance coverage, and has provided verbal consent to proceed: Yes      Documentation and HPI:  I communicated with the patient about: Eye Problem (LEFT EYE POSSIBLE/ITCHY) and Other (NO TO VACCINES)       Toñito Goff reports: Having Left Eye problem, located in the inner corner, \"it  hurts and itching, and feels like all lump\"  Patient has wound care, and the nurse said it is a bit swollen on the upper eyelid. I asked the patient during the telephone encounter to press over the lower eyelid and upper eyelid, and she says it does not hurt on pressing. Patient denies any drainage from the eye or crusting in the morning  Patient says it the symptoms have been present for a few days, getting worse      Patient says she has Home care nurse once a week, checking on her and she would like to continue physical therapy and Occupational Therapy. She has chronic back pain, midline and on the sides, does not radiate. Has known degenerative disc disease with spondylosis. Patient also has chronic knee pain due to arthritis , pain is worse with climbing up the stairs but has not been doing  this and avoiding as much as possible. She denies any swelling in the knees. Ambulates with walker  Intensity of pain is 3/10, patient says the pain is better today because yesterday she had chiropractor session for pelvis . Patient denies any recent fall but has been careful. Usually physical therapy helps. Previously she also had aqua therapy        Negative depression screening.       PHQ Scores 1/13/2021 1/9/2020 3/8/2019 4/20/2018 2/23/2017   PHQ2 Score 0 0 0 0 0   PHQ9 Score 0 0 0 0 0 blood glucose monitor strips Test 2-3 times a day & as needed for symptoms of irregular blood glucose. BRAND OF CHOICE INSURANCE ALLOWS. Yes MAX Barkre CNP   Alcohol Swabs (ALCOHOL PREP) PADS Use as directed Yes MAX Barker CNP   Lancets MISC 1 each by Does not apply route 2 times daily Yes MAX Barker CNP   atorvastatin (LIPITOR) 40 MG tablet TAKE 1 TABLET BY MOUTH DAILY STOP SIMVASTATIN Yes Kishan Medina MD   levothyroxine (SYNTHROID) 75 MCG tablet TAKE 1 TABLET BY MOUTH EVERY MORNING (BEFORE BREAKFAST) Yes Kishan Medina MD   MYRBETRIQ 50 MG TB24 TAKE ONE TABLET BY MOUTH ONCE DAILY Yes Kishan Medina MD       -vital signs stable and within normal limits except morbid obesity per BMI, last BMI 40.97 kg/M2, and borderline high blood pressure, mild bradycardia      Patient-Reported Vitals 1/14/2021   Patient-Reported Weight 224 lbs   Patient-Reported Height 5ft 2 in   Patient-Reported Systolic 819   Patient-Reported Diastolic 80   Patient-Reported Pulse 52          Orders Placed This Encounter   Medications    DISCONTD: erythromycin (ROMYCIN) 5 MG/GM ophthalmic ointment     Sig: Place into the left eye every 6 hours For 10 days     Dispense:  1 Tube     Refill:  0    bacitracin-polymyxin b (POLYSPORIN) [id number] UNIT/GM ophthalmic ointment     Sig: Place 0.5 inches into the left eye 2 times daily for 10 days Every 12 hours. Dispense:  1 Tube     Refill:  0       No orders of the defined types were placed in this encounter. Medications Discontinued During This Encounter   Medication Reason    CARTIA  MG extended release capsule DOSE ADJUSTMENT    erythromycin (ROMYCIN) 5 MG/GM ophthalmic ointment Cost of medication       I affirm this is a Patient Initiated Episode with an Established Patient who has not had a related appointment within my department in the past 7 days or scheduled within the next 24 hours. Patient location's was at home, and provider's location was at the primary practice location. Future Appointments   Date Time Provider Dante Amaya   1/21/2021 11:30 AM Reji Krause MD  derm [facility]   2/12/2021 11:00 AM Jennifer Cho MD  sc [facility]   4/8/2021 10:30 AM Rigoberto Griggs PA-C GYN Oncology [facility]   4/12/2021 11:00 AM Peter De La Cruz MD [address] [place] [address]        Patient identification was verified at the start of the visit: Yes    On this date 01/14/21 I have spent 20 minutes reviewing previous notes, test results and face to face with the patient discussing the diagnosis and importance of compliance with the treatment plan as well as documenting on the day of the visit. An electronic signature was used to authenticate this note.   Electronically signed by Jennifer Cho MD on 1/14/2021  at 5:37 PM

## 2021-01-15 NOTE — TELEPHONE ENCOUNTER
[facility] pharmacy called in regarding bacitracin-polymyxin b (POLYSPORIN) [id number] UNIT/GM ophthalmic ointment. The pharmacy filled and delivered the erythromycin ointment so they didn't know if that was a future fill. I explained the patient called in stating it wasn't covered so they rechecked the ointment had $0 copay and was delivered to her.  They will disregard the bacitracin-polymyxin b (POLYSPORIN) [phone] UNIT/GM ophthalmic ointment at this time

## 2021-01-19 ENCOUNTER — TELEPHONE (OUTPATIENT)
Dept: FAMILY MEDICINE CLINIC | Age: 71
End: 2021-01-19

## 2021-01-21 ENCOUNTER — OFFICE VISIT (OUTPATIENT)
Dept: DERMATOLOGY | Age: 71
End: 2021-01-21
Payer: MEDICARE

## 2021-01-21 VITALS
WEIGHT: 229 LBS | HEART RATE: 45 BPM | HEIGHT: 62 IN | OXYGEN SATURATION: 95 % | BODY MASS INDEX: 42.14 KG/M2 | SYSTOLIC BLOOD PRESSURE: 132 MMHG | DIASTOLIC BLOOD PRESSURE: 60 MMHG | TEMPERATURE: 97.2 F

## 2021-01-21 DIAGNOSIS — L57.8 ACTINIC SKIN DAMAGE: ICD-10-CM

## 2021-01-21 DIAGNOSIS — L72.0 MILIAL CYST: ICD-10-CM

## 2021-01-21 DIAGNOSIS — L57.0 KERATOSIS, ACTINIC: Primary | ICD-10-CM

## 2021-01-21 DIAGNOSIS — L82.1 SEBORRHEIC KERATOSES: ICD-10-CM

## 2021-01-21 PROCEDURE — 1123F ACP DISCUSS/DSCN MKR DOCD: CPT | Performed by: DERMATOLOGY

## 2021-01-21 PROCEDURE — 4040F PNEUMOC VAC/ADMIN/RCVD: CPT | Performed by: DERMATOLOGY

## 2021-01-21 PROCEDURE — 17000 DESTRUCT PREMALG LESION: CPT | Performed by: DERMATOLOGY

## 2021-01-21 PROCEDURE — 3017F COLORECTAL CA SCREEN DOC REV: CPT | Performed by: DERMATOLOGY

## 2021-01-21 PROCEDURE — G8399 PT W/DXA RESULTS DOCUMENT: HCPCS | Performed by: DERMATOLOGY

## 2021-01-21 PROCEDURE — 1036F TOBACCO NON-USER: CPT | Performed by: DERMATOLOGY

## 2021-01-21 PROCEDURE — 1090F PRES/ABSN URINE INCON ASSESS: CPT | Performed by: DERMATOLOGY

## 2021-01-21 PROCEDURE — G8427 DOCREV CUR MEDS BY ELIG CLIN: HCPCS | Performed by: DERMATOLOGY

## 2021-01-21 PROCEDURE — G8417 CALC BMI ABV UP PARAM F/U: HCPCS | Performed by: DERMATOLOGY

## 2021-01-21 PROCEDURE — 99213 OFFICE O/P EST LOW 20 MIN: CPT | Performed by: DERMATOLOGY

## 2021-01-21 PROCEDURE — G8484 FLU IMMUNIZE NO ADMIN: HCPCS | Performed by: DERMATOLOGY

## 2021-01-21 PROCEDURE — 17003 DESTRUCT PREMALG LES 2-14: CPT | Performed by: DERMATOLOGY

## 2021-01-21 NOTE — PROGRESS NOTES
Dermatology Patient Note  [id number]  [address]  Dept: [phone]  Dept Fax: [phone]      VISITDATE: 1/21/2021   REFERRING PROVIDER: Mera Ferguson APR*      Junior Sanchez is a 73 y.o. female  who presents today in the office for:    New Patient (SK on her face that she can't wash off)      PERTINENT HISTORY NOT LISTED ABOVE:  Patient presents for lesions on face and eyelids that don't go away    CURRENT MEDICATIONS:   Current Outpatient Medications   Medication Sig Dispense Refill    bacitracin-polymyxin b (POLYSPORIN) [id number] UNIT/GM ophthalmic ointment Place 0.5 inches into the left eye 2 times daily for 10 days Every 12 hours.  1 Tube 0    dilTIAZem (CARDIZEM) 30 MG tablet       hydrALAZINE (APRESOLINE) 10 MG tablet       XARELTO 20 MG TABS tablet TAKE ONE (1) TABLET BY MOUTH DAILY (WITH BREAKFAST)  30 tablet 3    oxybutynin (DITROPAN-XL) 10 MG extended release tablet TAKE 1 TABLET BY MOUTH ONCE DAILY  90 tablet 3    metFORMIN (GLUCOPHAGE) 500 MG tablet TAKE ONE TABLET BY MOUTH TWICE A DAY WITH A MEAL  60 tablet 10    docusate sodium (COLACE) 100 MG capsule Take 1 capsule by mouth 2 times daily For constipation 180 capsule 3    ferrous sulfate (IRON 325) 325 (65 Fe) MG tablet Take 1 tablet by mouth 2 times daily 90 tablet 3    vitamin B-12 (CYANOCOBALAMIN) 1000 MCG tablet Take 1 tablet by mouth daily 90 tablet 3    metoprolol tartrate (LOPRESSOR) 25 MG tablet Take 0.5 tablets by mouth 2 times daily Dose decreased by cardiologist 30 tablet 5    Probiotic Product (LISSY-BID PROBIOTIC) TABS TAKE 1 TABLET BY MOUTH IN THE MORNING  30 tablet 11    vitamin D3 (CHOLECALCIFEROL) 25 MCG (1000 UT) TABS tablet TAKE 2 TABLETS BY MOUTH ONCE DAILY  60 tablet 11    pantoprazole (PROTONIX) 40 MG tablet TAKE 1 TABLET BY MOUTH ONCE DAILY  30 tablet 11    ACETAMINOPHEN EXTRA STRENGTH 500 MG tablet TAKE 1 TABLET BY MOUTH EVERY 6 HOURS AS NEEDED FOR PAIN  100 tablet 11    Blood Pressure Monitor KIT Use as directed. 1 kit 1    glucose monitoring kit (FREESTYLE) monitoring kit Use as directed. BRAND OF CHOICE INSURANCE ALLOWS. 1 kit 0    blood glucose monitor strips Test 2-3 times a day & as needed for symptoms of irregular blood glucose. BRAND OF CHOICE INSURANCE ALLOWS. 100 strip 11    Alcohol Swabs (ALCOHOL PREP) PADS Use as directed 100 each 11    Lancets MISC 1 each by Does not apply route 2 times daily 300 each 11    atorvastatin (LIPITOR) 40 MG tablet TAKE 1 TABLET BY MOUTH DAILY STOP SIMVASTATIN 90 tablet 3    levothyroxine (SYNTHROID) 75 MCG tablet TAKE 1 TABLET BY MOUTH EVERY MORNING (BEFORE BREAKFAST) 90 tablet 3    MYRBETRIQ 50 MG TB24 TAKE ONE TABLET BY MOUTH ONCE DAILY 90 tablet 3     No current facility-administered medications for this visit. ALLERGIES:   Allergies   Allergen Reactions    Sulfa Antibiotics Nausea Only    Penicillins Rash       SOCIAL HISTORY:  Social History     Tobacco Use    Smoking status: Never Smoker    Smokeless tobacco: Never Used   Substance Use Topics    Alcohol use: No     Alcohol/week: 0.0 standard drinks       Pertinent ROS:  Review of Systems  Skin: Denies any new changing, growing or bleeding lesions or rashes except as described in the HPI   Constitutional: Denies fevers, chills, and malaise. PHYSICAL EXAM:   /60 (Site: Right Upper Arm, Position: Sitting, Cuff Size: Large Adult)   Pulse (!) 45   Temp 97.2 °F (36.2 °C)   Ht 5' 2\" (1.575 m)   Wt 229 lb (103.9 kg)   LMP  (LMP Unknown)   SpO2 95%   BMI 41.88 kg/m²     The patient is generally well appearing, well nourished, alert and conversational. Affect is normal.    Cutaneous Exam:  Physical Exam  Sun-exposed skin, which includes the head/face, neck, both arms, digits and/or nails was examined.     Diagnoses/exam findings/medical history pertinent to this visit are listed below:    Assessment and Plan:  Assessment become dark brown to almost black with a \"stuck on\" appearance. The surface may feel smooth or rough. Self-Care Guidelines  No treatment is needed unless there is irritation from clothing with itching or bleeding. There is no way to prevent new spots from forming. Some lotions with alpha hydroxyl acids may make the areas feel smoother with regular use but will not eliminate them. OTC freezing techniques are available but usually not effective. When to [facility]  If a spot on the skin is growing, bleeding, painful, or itchy, or any other concerning changes, then see your doctor. Actinic Keratosis (AKs)   Actinic Keratosis are skin lesions caused by long-term exposure to the sun. They are scaly, rough, to the touch, irregularly shaped, and skin-colored, reddish brown, or yellowish in color. Recent Studies suggest that some AK lesions actually may be a very early form of a type of skin cancer, squamous cell carcinoma (SCC), that has not spread beyond a small, confined area. It is not yet possible to tell which AK lesions will go on to become skin cancer. Experts from the [address] [place] [address], the [address] [place] [address], and the [facility] have recommended that all patients with AK lesions be evaluated and undergo some form of treatment. Your dermatologist can determine which type of treatment-either alone or in combination-is right for you. SUN PROTECTION AND OBSERVATION  Your dermatologist may recommend that you use a sun block, wear a hat and clothing to prevent sun exposure, and have regular skin examinations. Some AKs go away without further treatment, provided that the skin is not subjected to more sun damage. However, regular examinations will help catch the lesions that need to be treated. LESION-TARGETED THERAPIES   Liquid nitrogen (cryotherapy) destroys AKs by freezing them. This results in blistering and shedding of the AKs.  Cryotherapy is the most common treatment when a patient has a few, small AK lesions. Topical chemotherapy is a cream that targets sun-damaged and pre-cancerous cells and destroys them. Sun Protection     There are two types of sun rays that are harmful to the skin. UVA rays cause skin aging and skin cancer, such as melanoma. UVB rays cause sunburns, cataracts, and also contribute to skin cancer. The American-[employer]y [employer] Dermat[employer] recommends that children and adults wear a broad spectrum, waterproof sunscreen with a Sun Protection Factor (SPF) of 30 or higher. It is important to check the ingredient label to be sure the sunscreen will protect the skin from both UVA and UVB sunrays. Your sunscreen should contain at least one of the following ingredients: titanium dioxide, zinc oxide, or avobenzone. Sunscreen will not be effective unless it is applied to all exposed skin. Sunscreens work best if they are applied 30 minutes before sun exposure. They should be reapplied every 2 hours and after any water exposure. Sunscreen is not perfect. It is important to use other methods to protect the skin from sun exposure also. Wear hats, sunglasses and other sun protective clothing when outdoors. Stay in the shade during the peak hours of sun exposure between 10 AM and 4 PM.        This note was created with the assistance of a speech-recognition program.  Although the intention is to generate a document that actually reflects the content of the visit, no guarantees can be provided that every mistake has been identified and corrected byediting.     Electronically signed by Tarsha Harper MD on 1/21/21 at 11:09 AM EST

## 2021-01-21 NOTE — PATIENT INSTRUCTIONS
Seborrheic Keratosis  Seborrheic keratoses are common benign growths of unknown cause seen in adults due to a thickening of an area of the top skin layer. Who's At Risk  Although they can occur anytime after puberty, almost everyone over 48 has one or more of these and they increase in number with age. Some families have an inherited tendency to grow multiple lesions. Men and women are equally as likely to develop seborrheic keratoses. Dark-skinned people are less affected than those with light skin; a variant seen in blacks is called dermatosis papulosa nigra. Signs & Symptoms  One or more spots can occur anywhere on the body, except for palms, soles, and mucous membranes (eg, in the mouth or rectum). They do not go away. They do not turn into cancers, but some cancers resemble seborrheic keratosis. They start as light brown to skin-colored, flat areas, which are round to oval and of varying size (usually less than a half inch, but sometimes much larger). As they grow thicker and rise above the skin surface, seborrheic keratoses may become dark brown to almost black with a \"stuck on\" appearance. The surface may feel smooth or rough. Self-Care Guidelines  No treatment is needed unless there is irritation from clothing with itching or bleeding. There is no way to prevent new spots from forming. Some lotions with alpha hydroxyl acids may make the areas feel smoother with regular use but will not eliminate them. OTC freezing techniques are available but usually not effective. When to [facility]  If a spot on the skin is growing, bleeding, painful, or itchy, or any other concerning changes, then see your doctor. Actinic Keratosis (AKs)   Actinic Keratosis are skin lesions caused by long-term exposure to the sun. They are scaly, rough, to the touch, irregularly shaped, and skin-colored, reddish brown, or yellowish in color. Recent Studies suggest that some AK lesions actually may be a very early form of a type of skin cancer, squamous cell carcinoma (SCC), that has not spread beyond a small, confined area. It is not yet possible to tell which AK lesions will go on to become skin cancer. Experts from the [employer], the [address] [place] [address], and the [facility] have recommended that all patients with AK lesions be evaluated and undergo some form of treatment. Your dermatologist can determine which type of treatment-either alone or in combination-is right for you. SUN PROTECTION AND OBSERVATION  Your dermatologist may recommend that you use a sun block, wear a hat and clothing to prevent sun exposure, and have regular skin examinations. Some AKs go away without further treatment, provided that the skin is not subjected to more sun damage. However, regular examinations will help catch the lesions that need to be treated. LESION-TARGETED THERAPIES   Liquid nitrogen (cryotherapy) destroys AKs by freezing them. This results in blistering and shedding of the AKs. Cryotherapy is the most common treatment when a patient has a few, small AK lesions. Topical chemotherapy is a cream that targets sun-damaged and pre-cancerous cells and destroys them. Sun Protection     There are two types of sun rays that are harmful to the skin. UVA rays cause skin aging and skin cancer, such as melanoma. UVB rays cause sunburns, cataracts, and also contribute to skin cancer. The [employer] recommends that children and adults wear a broad spectrum, waterproof sunscreen with a Sun Protection Factor (SPF) of 30 or higher. It is important to check the ingredient label to be sure the sunscreen will protect the skin from both UVA and UVB sunrays. Your sunscreen should contain at least one of the following ingredients: titanium dioxide, zinc oxide, or avobenzone. Sunscreen will not be effective unless it is applied to all exposed skin. Sunscreens work best if they are applied 30 minutes before sun exposure. They should be reapplied every 2 hours and after any water exposure. Sunscreen is not perfect. It is important to use other methods to protect the skin from sun exposure also. Wear hats, sunglasses and other sun protective clothing when outdoors.   Stay in the shade during the peak hours of sun exposure between 10 AM and 4 PM.

## 2021-01-23 DIAGNOSIS — E03.9 ACQUIRED HYPOTHYROIDISM: ICD-10-CM

## 2021-01-25 RX ORDER — LEVOTHYROXINE SODIUM 0.07 MG/1
TABLET ORAL
Qty: 90 TABLET | Refills: 3 | Status: SHIPPED | OUTPATIENT
Start: 2021-01-25 | End: 2021-09-13

## 2021-01-25 NOTE — TELEPHONE ENCOUNTER
Please Approve or Refuse.   Send to Pharmacy per Pt's Request:    Next Visit Date:  2/12/2021   Last Visit Date: 1/14/2021    Hemoglobin A1C (%)   Date Value   10/30/2020 5.1   07/22/2020 5.3   01/09/2020 5.6             ( goal A1C is < 7)   BP Readings from Last 3 Encounters:   01/21/21 132/60   10/30/20 118/64   10/08/20 130/68          (goal 120/80)  BUN   Date Value Ref Range Status   11/06/2020 17 mg/dL Final     CREATININE   Date Value Ref Range Status   11/06/2020 0.83  Final     Potassium   Date Value Ref Range Status   11/06/2020 3.9 mmol/L Final

## 2021-02-12 ENCOUNTER — OFFICE VISIT (OUTPATIENT)
Dept: FAMILY MEDICINE CLINIC | Age: 71
End: 2021-02-12
Payer: MEDICARE

## 2021-02-12 VITALS
WEIGHT: 231 LBS | BODY MASS INDEX: 42.51 KG/M2 | DIASTOLIC BLOOD PRESSURE: 70 MMHG | HEIGHT: 62 IN | HEART RATE: 52 BPM | SYSTOLIC BLOOD PRESSURE: 120 MMHG | TEMPERATURE: 97.3 F | OXYGEN SATURATION: 99 %

## 2021-02-12 DIAGNOSIS — I48.0 PAROXYSMAL ATRIAL FIBRILLATION (HCC): ICD-10-CM

## 2021-02-12 DIAGNOSIS — D64.9 ANEMIA, UNSPECIFIED TYPE: ICD-10-CM

## 2021-02-12 DIAGNOSIS — E11.65 TYPE 2 DIABETES MELLITUS WITH HYPERGLYCEMIA, WITHOUT LONG-TERM CURRENT USE OF INSULIN (HCC): ICD-10-CM

## 2021-02-12 DIAGNOSIS — M47.816 SPONDYLOSIS OF LUMBAR REGION WITHOUT MYELOPATHY OR RADICULOPATHY: ICD-10-CM

## 2021-02-12 DIAGNOSIS — G89.29 CHRONIC PAIN OF BOTH KNEES: ICD-10-CM

## 2021-02-12 DIAGNOSIS — Z00.00 ROUTINE GENERAL MEDICAL EXAMINATION AT A HEALTH CARE FACILITY: Primary | ICD-10-CM

## 2021-02-12 DIAGNOSIS — M25.562 CHRONIC PAIN OF BOTH KNEES: ICD-10-CM

## 2021-02-12 DIAGNOSIS — M54.50 CHRONIC BILATERAL LOW BACK PAIN WITHOUT SCIATICA: ICD-10-CM

## 2021-02-12 DIAGNOSIS — I10 ESSENTIAL HYPERTENSION: ICD-10-CM

## 2021-02-12 DIAGNOSIS — G89.29 CHRONIC BILATERAL LOW BACK PAIN WITHOUT SCIATICA: ICD-10-CM

## 2021-02-12 DIAGNOSIS — M25.561 CHRONIC PAIN OF BOTH KNEES: ICD-10-CM

## 2021-02-12 LAB — HBA1C MFR BLD: 5.5 %

## 2021-02-12 PROCEDURE — G8484 FLU IMMUNIZE NO ADMIN: HCPCS | Performed by: FAMILY MEDICINE

## 2021-02-12 PROCEDURE — G0438 PPPS, INITIAL VISIT: HCPCS | Performed by: FAMILY MEDICINE

## 2021-02-12 PROCEDURE — 4040F PNEUMOC VAC/ADMIN/RCVD: CPT | Performed by: FAMILY MEDICINE

## 2021-02-12 PROCEDURE — 3017F COLORECTAL CA SCREEN DOC REV: CPT | Performed by: FAMILY MEDICINE

## 2021-02-12 PROCEDURE — 1123F ACP DISCUSS/DSCN MKR DOCD: CPT | Performed by: FAMILY MEDICINE

## 2021-02-12 PROCEDURE — 83036 HEMOGLOBIN GLYCOSYLATED A1C: CPT | Performed by: FAMILY MEDICINE

## 2021-02-12 PROCEDURE — 3044F HG A1C LEVEL LT 7.0%: CPT | Performed by: FAMILY MEDICINE

## 2021-02-12 ASSESSMENT — PATIENT HEALTH QUESTIONNAIRE - PHQ9
SUM OF ALL RESPONSES TO PHQ QUESTIONS 1-9: 0
SUM OF ALL RESPONSES TO PHQ QUESTIONS 1-9: 0

## 2021-02-12 ASSESSMENT — LIFESTYLE VARIABLES: HOW OFTEN DO YOU HAVE A DRINK CONTAINING ALCOHOL: 0

## 2021-02-12 ASSESSMENT — VISUAL ACUITY
OS_CC: 20/40
OD_CC: 20/50

## 2021-02-12 NOTE — PATIENT INSTRUCTIONS
Schedule a Vaccine  When you qualify to receive the vaccine, call the [facility]) COVID-19 Vaccination Hotline to schedule your appointment or to get additional information about the [facility]) locations which are offering the COVID-19 vaccine. To be 94% effective, it's important that you receive two doses of one of the COVID-19 vaccines. -If you are receiving the [place] vaccine, your second shot will be scheduled as close to 21 days after the first shot as possible. -If you are receiving the Moderna vaccine, your second shot will be scheduled as close to 28 days after the first shot as possible. [facility]) COVID-19 Vaccination Hotline: [phone]    Links to [facility]) website and [facility] website:    [employer]/[facility]-monitoring-coronavirus-covid-19/covid-19-vaccine/[place]/red-vaccine    [email or web address]        Personalized Preventive Plan for Jaqui Delvalle - 2/12/2021  Medicare offers a range of preventive health benefits. Some of the tests and screenings are paid in full while other may be subject to a deductible, co-insurance, and/or copay. Some of these benefits include a comprehensive review of your medical history including lifestyle, illnesses that may run in your family, and various assessments and screenings as appropriate. After reviewing your medical record and screening and assessments performed today your provider may have ordered immunizations, labs, imaging, and/or referrals for you. A list of these orders (if applicable) as well as your Preventive Care list are included within your After Visit Summary for your review. Other Preventive Recommendations:    · A preventive eye exam performed by an eye specialist is recommended every 1-2 years to screen for glaucoma; cataracts, macular degeneration, and other eye disorders. · A preventive dental visit is recommended every 6 months. · Try to get at least 150 minutes of exercise per week or 10,000 steps per day on a pedometer . · Order or download the FREE \"Exercise & Physical Activity: Your Everyday Guide\" from [employer]. Call 0-109.892.3007 or search [employer] online. · You need 9641-8519 mg of calcium and 3594-9685 IU of vitamin D per day. It is possible to meet your calcium requirement with diet alone, but a vitamin D supplement is usually necessary to meet this goal.  · When exposed to the sun, use a sunscreen that protects against both UVA and UVB radiation with an SPF of 30 or greater. Reapply every 2 to 3 hours or after sweating, drying off with a towel, or swimming. · Always wear a seat belt when traveling in a car. Always wear a helmet when riding a bicycle or motorcycle. Heart-Healthy Diet   Sodium, Fat, and Cholesterol Controlled Diet       What Is a Heart Healthy Diet? A heart-healthy diet is one that limits sodium , certain types of fat , and cholesterol . This type of diet is recommended for:   People with any form of cardiovascular disease (eg, coronary heart disease , peripheral vascular disease , previous heart attack , previous stroke )   People with risk factors for cardiovascular disease, such as high blood pressure , high cholesterol , or diabetes   Anyone who wants to lower their risk of developing cardiovascular disease   Sodium    Sodium is a mineral found in many foods. In general, most people consume much more sodium than they need. Diets high in sodium can increase blood pressure and lead to edema (water retention). On a heart-healthy diet, you should consume no more than 2,300 mg (milligrams) of sodium per dayabout the amount in one teaspoon of table salt. The foods highest in sodium include table salt (about 50% sodium), processed foods, convenience foods, and preserved foods.    Cholesterol Cholesterol is a fat-like, waxy substance in your blood. Our bodies make some cholesterol. It is also found in animal products, with the highest amounts in fatty meat, egg yolks, whole milk, cheese, shellfish, and organ meats. On a heart-healthy diet, you should limit your cholesterol intake to less than 200 mg per day. It is normal and important to have some cholesterol in your bloodstream. But too much cholesterol can cause plaque to build up within your arteries, which can eventually lead to a heart attack or stroke. The two types of cholesterol that are most commonly referred to are:   Low-density lipoprotein (LDL) cholesterol  Also known as bad cholesterol, this is the cholesterol that tends to build up along your arteries. Bad cholesterol levels are increased by eating fats that are saturated or hydrogenated. Optimal level of this cholesterol is less than 100. Over 130 starts to get risky for heart disease. High-density lipoprotein (HDL) cholesterol  Also known as good cholesterol, this type of cholesterol actually carries cholesterol away from your arteries and may, therefore, help lower your risk of having a heart attack. You want this level to be high (ideally greater than 60). It is a risk to have a level less than 40. You can raise this good cholesterol by eating olive oil, canola oil, avocados, or nuts. Exercise raises this level, too. Fat    Fat is calorie dense and packs a lot of calories into a small amount of food. Even though fats should be limited due to their high calorie content, not all fats are bad. In fact, some fats are quite healthful. Fat can be broken down into four main types.    The good-for-you fats are:   Monounsaturated fat  found in oils such as olive and canola, avocados, and nuts and natural nut butters; can decrease cholesterol levels, while keeping levels of HDL cholesterol high Polyunsaturated fat  found in oils such as safflower, sunflower, soybean, corn, and sesame; can decrease total cholesterol and LDL cholesterol   Omega-3 fatty acids  particularly those found in fatty fish (such as salmon, trout, tuna, mackerel, herring, and sardines); can decrease risk of arrhythmias, decrease triglyceride levels, and slightly lower blood pressure   The fats that you want to limit are:   Saturated fat  found in animal products, many fast foods, and a few vegetables; increases total blood cholesterol, including LDL levels   Animal fats that are saturated include: butter, lard, whole-milk dairy products, meat fat, and poultry skin   Vegetable fats that are saturated include: hydrogenated shortening, palm oil, coconut oil, cocoa butter   Hydrogenated or trans fat  found in margarine and vegetable shortening, most shelf stable snack foods, and fried foods; increases LDL and decreases HDL     It is generally recommended that you limit your total fat for the day to less than 30% of your total calories. If you follow an 1800-calorie heart healthy diet, for example, this would mean 60 grams of fat or less per day. Saturated fat and trans fat in your diet raises your blood cholesterol the most, much more than dietary cholesterol does. For this reason, on a heart-healthy diet, less than 7% of your calories should come from saturated fat and ideally 0% from trans fat. On an 1800-calorie diet, this translates into less than 14 grams of saturated fat per day, leaving 46 grams of fat to come from mono- and polyunsaturated fats.    Food Choices on a Heart Healthy Diet   Food Category   Foods Recommended   Foods to Avoid   Grains   Breads and rolls without salted tops Most dry and cooked cereals Unsalted crackers and breadsticks Low-sodium or homemade breadcrumbs or stuffing All rice and pastas Breads, rolls, and crackers with salted tops High-fat baked goods (eg, muffins, donuts, pastries) Quick breads, self-rising flour, and biscuit mixes Regular bread crumbs Instant hot cereals Commercially prepared rice, pasta, or stuffing mixes   Vegetables   Most fresh, frozen, and low-sodium canned vegetables Low-sodium and salt-free vegetable juices Canned vegetables if unsalted or rinsed   Regular canned vegetables and juices, including sauerkraut and pickled vegetables Frozen vegetables with sauces Commercially prepared potato and vegetable mixes   Fruits   Most fresh, frozen, and canned fruits All fruit juices   Fruits processed with salt or sodium   Milk   Nonfat or low-fat (1%) milk Nonfat or low-fat yogurt Cottage cheese, low-fat ricotta, cheeses labeled as low-fat and low-sodium   Whole milk Reduced-fat (2%) milk Malted and chocolate milk Full fat yogurt Most cheeses (unless low-fat and low salt) Buttermilk (no more than 1 cup per week)   Meats and Beans   Lean cuts of fresh or frozen beef, veal, lamb, or pork (look for the word loin) Fresh or frozen poultry without the skin Fresh or frozen fish and some shellfish Egg whites and egg substitutes (Limit whole eggs to three per week) Tofu Nuts or seeds (unsalted, dry-roasted), low-sodium peanut butter Dried peas, beans, and lentils   Any smoked, cured, salted, or canned meat, fish, or poultry (including atwood, chipped beef, cold cuts, hot dogs, sausages, sardines, and anchovies) Poultry skins Breaded and/or fried fish or meats Canned peas, beans, and lentils Salted nuts   Fats and Oils   Olive oil and canola oil Low-sodium, low-fat salad dressings and mayonnaise   Butter, margarine, coconut and palm oils, atwood fat   Snacks, Sweets, and Condiments Use cooking methods that require little or no added fat, such as grilling, boiling, baking, poaching, broiling, roasting, steaming, stir-frying, and sauting. Avoid fast food and convenience food. They tend to be high in saturated and trans fat and have a lot of added salt. Talk to a registered dietitian for individualized diet advice. Last Reviewed: March 2011 Shantanu Conrad MS, MPH, RD   Updated: 3/29/2011   ·     Heart-Healthy Diet   Sodium, Fat, and Cholesterol Controlled Diet       What Is a Heart Healthy Diet? A heart-healthy diet is one that limits sodium , certain types of fat , and cholesterol . This type of diet is recommended for:   People with any form of cardiovascular disease (eg, coronary heart disease , peripheral vascular disease , previous heart attack , previous stroke )   People with risk factors for cardiovascular disease, such as high blood pressure , high cholesterol , or diabetes   Anyone who wants to lower their risk of developing cardiovascular disease   Sodium    Sodium is a mineral found in many foods. In general, most people consume much more sodium than they need. Diets high in sodium can increase blood pressure and lead to edema (water retention). On a heart-healthy diet, you should consume no more than 2,300 mg (milligrams) of sodium per dayabout the amount in one teaspoon of table salt. The foods highest in sodium include table salt (about 50% sodium), processed foods, convenience foods, and preserved foods. Cholesterol    Cholesterol is a fat-like, waxy substance in your blood. Our bodies make some cholesterol. It is also found in animal products, with the highest amounts in fatty meat, egg yolks, whole milk, cheese, shellfish, and organ meats. On a heart-healthy diet, you should limit your cholesterol intake to less than 200 mg per day. It is normal and important to have some cholesterol in your bloodstream. But too much cholesterol can cause plaque to build up within your arteries, which can eventually lead to a heart attack or stroke. The two types of cholesterol that are most commonly referred to are:   Low-density lipoprotein (LDL) cholesterol  Also known as bad cholesterol, this is the cholesterol that tends to build up along your arteries. Bad cholesterol levels are increased by eating fats that are saturated or hydrogenated. Optimal level of this cholesterol is less than 100. Over 130 starts to get risky for heart disease. High-density lipoprotein (HDL) cholesterol  Also known as good cholesterol, this type of cholesterol actually carries cholesterol away from your arteries and may, therefore, help lower your risk of having a heart attack. You want this level to be high (ideally greater than 60). It is a risk to have a level less than 40. You can raise this good cholesterol by eating olive oil, canola oil, avocados, or nuts. Exercise raises this level, too. Fat    Fat is calorie dense and packs a lot of calories into a small amount of food. Even though fats should be limited due to their high calorie content, not all fats are bad. In fact, some fats are quite healthful. Fat can be broken down into four main types.    The good-for-you fats are:   Monounsaturated fat  found in oils such as olive and canola, avocados, and nuts and natural nut butters; can decrease cholesterol levels, while keeping levels of HDL cholesterol high   Polyunsaturated fat  found in oils such as safflower, sunflower, soybean, corn, and sesame; can decrease total cholesterol and LDL cholesterol   Omega-3 fatty acids  particularly those found in fatty fish (such as salmon, trout, tuna, mackerel, herring, and sardines); can decrease risk of arrhythmias, decrease triglyceride levels, and slightly lower blood pressure   The fats that you want to limit are: Saturated fat  found in animal products, many fast foods, and a few vegetables; increases total blood cholesterol, including LDL levels   Animal fats that are saturated include: butter, lard, whole-milk dairy products, meat fat, and poultry skin   Vegetable fats that are saturated include: hydrogenated shortening, palm oil, coconut oil, cocoa butter   Hydrogenated or trans fat  found in margarine and vegetable shortening, most shelf stable snack foods, and fried foods; increases LDL and decreases HDL     It is generally recommended that you limit your total fat for the day to less than 30% of your total calories. If you follow an 1800-calorie heart healthy diet, for example, this would mean 60 grams of fat or less per day. Saturated fat and trans fat in your diet raises your blood cholesterol the most, much more than dietary cholesterol does. For this reason, on a heart-healthy diet, less than 7% of your calories should come from saturated fat and ideally 0% from trans fat. On an 1800-calorie diet, this translates into less than 14 grams of saturated fat per day, leaving 46 grams of fat to come from mono- and polyunsaturated fats.    Food Choices on a Heart Healthy Diet   Food Category   Foods Recommended   Foods to Avoid   Grains   Breads and rolls without salted tops Most dry and cooked cereals Unsalted crackers and breadsticks Low-sodium or homemade breadcrumbs or stuffing All rice and pastas   Breads, rolls, and crackers with salted tops High-fat baked goods (eg, muffins, donuts, pastries) Quick breads, self-rising flour, and biscuit mixes Regular bread crumbs Instant hot cereals Commercially prepared rice, pasta, or stuffing mixes   Vegetables   Most fresh, frozen, and low-sodium canned vegetables Low-sodium and salt-free vegetable juices Canned vegetables if unsalted or rinsed Regular canned vegetables and juices, including sauerkraut and pickled vegetables Frozen vegetables with sauces Commercially prepared potato and vegetable mixes   Fruits   Most fresh, frozen, and canned fruits All fruit juices   Fruits processed with salt or sodium   Milk   Nonfat or low-fat (1%) milk Nonfat or low-fat yogurt Cottage cheese, low-fat ricotta, cheeses labeled as low-fat and low-sodium   Whole milk Reduced-fat (2%) milk Malted and chocolate milk Full fat yogurt Most cheeses (unless low-fat and low salt) Buttermilk (no more than 1 cup per week)   Meats and Beans   Lean cuts of fresh or frozen beef, veal, lamb, or pork (look for the word loin) Fresh or frozen poultry without the skin Fresh or frozen fish and some shellfish Egg whites and egg substitutes (Limit whole eggs to three per week) Tofu Nuts or seeds (unsalted, dry-roasted), low-sodium peanut butter Dried peas, beans, and lentils   Any smoked, cured, salted, or canned meat, fish, or poultry (including atwood, chipped beef, cold cuts, hot dogs, sausages, sardines, and anchovies) Poultry skins Breaded and/or fried fish or meats Canned peas, beans, and lentils Salted nuts   Fats and Oils   Olive oil and canola oil Low-sodium, low-fat salad dressings and mayonnaise   Butter, margarine, coconut and palm oils, atwood fat   Snacks, Sweets, and Condiments   Low-sodium or unsalted versions of broths, soups, soy sauce, and condiments Pepper, herbs, and spices; vinegar, lemon, or lime juice Low-fat frozen desserts (yogurt, sherbet, fruit bars) Sugar, cocoa powder, honey, syrup, jam, and preserves Low-fat, trans-fat free cookies, cakes, and pies Martin and animal crackers, fig bars, varsha snaps   High-fat desserts Broth, soups, gravies, and sauces, made from instant mixes or other high-sodium ingredients Salted snack foods Canned olives Meat tenderizers, seasoning salt, and most flavored vinegars   Beverages Low-sodium carbonated beverages Tea and coffee in moderation Soy milk   Commercially softened water   Suggestions   Make whole grains, fruits, and vegetables the base of your diet. Choose heart-healthy fats such as canola, olive, and flaxseed oil, and foods high in heart-healthy fats, such as nuts, seeds, soybeans, tofu, and fish. Eat fish at least twice per week; the fish highest in omega-3 fatty acids and lowest in mercury include salmon, herring, mackerel, sardines, and canned chunk light tuna. If you eat fish less than twice per week or have high triglycerides, talk to your doctor about taking fish oil supplements. Read food labels. For products low in fat and cholesterol, look for fat free, low-fat, cholesterol free, saturated fat free, and trans fat freeAlso scan the Nutrition Facts Label, which lists saturated fat, trans fat, and cholesterol amounts. For products low in sodium, look for sodium free, very low sodium, low sodium, no added salt, and unsalted   Skip the salt when cooking or at the table; if food needs more flavor, get creative and try out different herbs and spices. Garlic and onion also add substantial flavor to foods. Trim any visible fat off meat and poultry before cooking, and drain the fat off after raygoza. Use cooking methods that require little or no added fat, such as grilling, boiling, baking, poaching, broiling, roasting, steaming, stir-frying, and sauting. Avoid fast food and convenience food. They tend to be high in saturated and trans fat and have a lot of added salt. Talk to a registered dietitian for individualized diet advice. Last Reviewed: March 2011 Moriah Nolan MS, MPH, RD   Updated: 3/29/2011   ·     Heart-Healthy Diet   Sodium, Fat, and Cholesterol Controlled Diet       What Is a Heart Healthy Diet? A heart-healthy diet is one that limits sodium , certain types of fat , and cholesterol .  This type of diet is recommended for: People with any form of cardiovascular disease (eg, coronary heart disease , peripheral vascular disease , previous heart attack , previous stroke )   People with risk factors for cardiovascular disease, such as high blood pressure , high cholesterol , or diabetes   Anyone who wants to lower their risk of developing cardiovascular disease   Sodium    Sodium is a mineral found in many foods. In general, most people consume much more sodium than they need. Diets high in sodium can increase blood pressure and lead to edema (water retention). On a heart-healthy diet, you should consume no more than 2,300 mg (milligrams) of sodium per dayabout the amount in one teaspoon of table salt. The foods highest in sodium include table salt (about 50% sodium), processed foods, convenience foods, and preserved foods. Cholesterol    Cholesterol is a fat-like, waxy substance in your blood. Our bodies make some cholesterol. It is also found in animal products, with the highest amounts in fatty meat, egg yolks, whole milk, cheese, shellfish, and organ meats. On a heart-healthy diet, you should limit your cholesterol intake to less than 200 mg per day. It is normal and important to have some cholesterol in your bloodstream. But too much cholesterol can cause plaque to build up within your arteries, which can eventually lead to a heart attack or stroke. The two types of cholesterol that are most commonly referred to are:   Low-density lipoprotein (LDL) cholesterol  Also known as bad cholesterol, this is the cholesterol that tends to build up along your arteries. Bad cholesterol levels are increased by eating fats that are saturated or hydrogenated. Optimal level of this cholesterol is less than 100. Over 130 starts to get risky for heart disease. It is generally recommended that you limit your total fat for the day to less than 30% of your total calories. If you follow an 1800-calorie heart healthy diet, for example, this would mean 60 grams of fat or less per day. Saturated fat and trans fat in your diet raises your blood cholesterol the most, much more than dietary cholesterol does. For this reason, on a heart-healthy diet, less than 7% of your calories should come from saturated fat and ideally 0% from trans fat. On an 1800-calorie diet, this translates into less than 14 grams of saturated fat per day, leaving 46 grams of fat to come from mono- and polyunsaturated fats.    Food Choices on a Heart Healthy Diet   Food Category   Foods Recommended   Foods to Avoid   Grains   Breads and rolls without salted tops Most dry and cooked cereals Unsalted crackers and breadsticks Low-sodium or homemade breadcrumbs or stuffing All rice and pastas   Breads, rolls, and crackers with salted tops High-fat baked goods (eg, muffins, donuts, pastries) Quick breads, self-rising flour, and biscuit mixes Regular bread crumbs Instant hot cereals Commercially prepared rice, pasta, or stuffing mixes   Vegetables   Most fresh, frozen, and low-sodium canned vegetables Low-sodium and salt-free vegetable juices Canned vegetables if unsalted or rinsed   Regular canned vegetables and juices, including sauerkraut and pickled vegetables Frozen vegetables with sauces Commercially prepared potato and vegetable mixes   Fruits   Most fresh, frozen, and canned fruits All fruit juices   Fruits processed with salt or sodium   Milk   Nonfat or low-fat (1%) milk Nonfat or low-fat yogurt Cottage cheese, low-fat ricotta, cheeses labeled as low-fat and low-sodium   Whole milk Reduced-fat (2%) milk Malted and chocolate milk Full fat yogurt Most cheeses (unless low-fat and low salt) Buttermilk (no more than 1 cup per week)   Meats and Beans Eat fish at least twice per week; the fish highest in omega-3 fatty acids and lowest in mercury include salmon, herring, mackerel, sardines, and canned chunk light tuna. If you eat fish less than twice per week or have high triglycerides, talk to your doctor about taking fish oil supplements. Read food labels. For products low in fat and cholesterol, look for fat free, low-fat, cholesterol free, saturated fat free, and trans fat freeAlso scan the Nutrition Facts Label, which lists saturated fat, trans fat, and cholesterol amounts. For products low in sodium, look for sodium free, very low sodium, low sodium, no added salt, and unsalted   Skip the salt when cooking or at the table; if food needs more flavor, get creative and try out different herbs and spices. Garlic and onion also add substantial flavor to foods. Trim any visible fat off meat and poultry before cooking, and drain the fat off after raygoza. Use cooking methods that require little or no added fat, such as grilling, boiling, baking, poaching, broiling, roasting, steaming, stir-frying, and sauting. Avoid fast food and convenience food. They tend to be high in saturated and trans fat and have a lot of added salt. Talk to a registered dietitian for individualized diet advice. Last Reviewed: March 2011 Bao Jean Baptiste MS, MPH, RD   Updated: 3/29/2011   ·     Preventing Osteoporosis: After Your Visit  Your Care Instructions  Osteoporosis means the bones are weak and thin enough that they can break easily. The older you are, the more likely you are to get osteoporosis. But with plenty of calcium, vitamin D, and exercise, you can help prevent osteoporosis. The preteen and teen years are a key time for bone building. With the help of calcium, vitamin D, and exercise in those early years and beyond, the bones reach their peak density and strength by age 27. After age 27, your bones naturally start to thin and weaken. The stronger your bones are at around age 27, the lower your risk for osteoporosis. But no matter what your age and risk are, your bones still need calcium, vitamin D, and exercise to stay strong. Also avoid smoking, and limit alcohol. Smoking and heavy alcohol use can make your bones thinner. Talk to your doctor about any special risks you might have, such as having a close relative with osteoporosis or taking a medicine that can weaken bones. Your doctor can tell you the best ways to protect your bones from thinning. Follow-up care is a key part of your treatment and safety. Be sure to make and go to all appointments, and call your doctor if you are having problems. It's also a good idea to know your test results and keep a list of the medicines you take. How can you care for yourself at home? Get enough calcium and vitamin D. The [place] [employer] recommends adults younger than age 46 need 1,000 mg of calcium and 600 IU of vitamin D each day. Women ages 46 to 79 need 1,200 mg of calcium and 600 IU of vitamin D each day. Men ages 46 to 79 need 1,000 mg of calcium and 600 IU of vitamin D each day. Adults 71 and older need 1,200 mg of calcium and 800 IU of vitamin D each day. Eat foods rich in calcium, like yogurt, cheese, milk, and dark green vegetables. Eat foods rich in vitamin D, like eggs, fatty fish, cereal, and fortified milk. Get some sunshine. Your body uses sunshine to make its own vitamin D. The safest time to be out in the sun is before 10 a.m. or after 3 p.m. Avoid getting sunburned. Sunburn can increase your risk of skin cancer. Eat 7 to 8 servings of grains each day. A serving is 1 slice of bread, 1 ounce of dry cereal, or ½ cup of cooked rice, pasta, or cooked cereal. Try to choose whole-grain products as much as possible. Limit lean meat, poultry, and fish to 6 ounces each day. Six ounces is about the size of two decks of cards. Eat 4 to 5 servings of nuts, seeds, and legumes (cooked dried beans, lentils, and split peas) each week. A serving is 1/3 cup of nuts, 2 tablespoons of seeds, or ½ cup cooked dried beans or peas. Limit sweets and added sugars to 5 servings or less a week. A serving is 1 tablespoon jelly or jam, ½ cup sorbet, or 1 cup of lemonade. Tips for success  Start small. Do not try to make dramatic changes to your diet all at once. You might feel that you are missing out on your favorite foods and then be more likely to not follow the plan. Make small changes, and stick with them. Once those changes become habit, add a few more changes. Try some of the following:  Make it a goal to eat a fruit or vegetable at every meal and at snacks. This will make it easy to get the recommended amount of fruits and vegetables each day. Try yogurt topped with fruit and nuts for a snack or healthy dessert. Add lettuce, tomato, cucumber, and onion to sandwiches. Combine a ready-made pizza crust with low-fat mozzarella cheese and lots of vegetable toppings. Try using tomatoes, squash, spinach, broccoli, carrots, cauliflower, and onions. Have a variety of cut-up vegetables with a low-fat dip as an appetizer instead of chips and dip. Sprinkle sunflower seeds or chopped almonds over salads. Or try adding chopped walnuts or almonds to cooked vegetables. Try some vegetarian meals using beans and peas. Add garbanzo or kidney beans to salads.  Make burritos and tacos with mashed hurtado beans or black beans © 9284-0180 [employer]. Care instructions adapted under license by [facility]. This care instruction is for use with your licensed healthcare professional. If you have questions about a medical condition or this instruction, always ask your healthcare professional. [address] any warranty or liability for your use of this information. Content Version: 9.4.64940; Last Revised: March 15, 2012              ·   Patient information: Weight loss treatments    INTRODUCTION  Obesity is a major international problem, and Americans are among the heaviest people in the world. The percentage of obese people in the [place] has risen steadily. Many people find that although they initially lose weight by dieting, they quickly regain the weight after the diet ends. Because it so hard to keep weight off over time, it is important to have as much information and support as possible before starting a diet. You are most likely to be successful in losing weight and keeping it off when you believe that your body weight can be controlled. STARTING A WEIGHT LOSS PROGRAM  Some people like to talk to their doctor or nurse to get help choosing the best plan, monitoring progress, and getting advice and support along the way. To know what treatment (or combination of treatments) will work best, determine your body mass index (BMI) and waist circumference (measurement). The BMI is calculated from your height and weight. A person with a BMI between 25 and 29.9 is considered overweight   A person with a BMI of 30 or greater is considered to be obese  A waist circumference greater than 35 inches (88 cm) in women and 40 inches (102 cm) in men increases the risk of obesity-related complications, such as heart disease and diabetes. People who are obese and who have a larger waist size may need more aggressive weight loss treatment than others. Talk to your doctor or nurse for advice. Types of treatment  Based on your measurements and your medical history, your doctor or nurse can determine what combination of weight loss treatments would work best for you. Treatments may include changes in lifestyle, exercise, dieting, and, in some cases, weight loss medicines or weight loss surgery. Weight loss surgery, also called bariatric surgery, is reserved for people with severe obesity who have not responded to other weight loss treatments. SETTING A WEIGHT LOSS GOAL  It is important to set a realistic weight loss goal. Your first goal should be to avoid gaining more weight and staying at your current weight (or within 5 percent). Many people have a \"dream\" weight that is difficult or impossible to achieve. People at high risk of developing diabetes who are able to lose 5 percent of their body weight and maintain this weight will reduce their risk of developing diabetes by about 50 percent and reduce their blood pressure. This is a success. Losing more than 15 percent of your body weight and staying at this weight is an extremely good result, even if you never reach your \"dream\" or \"ideal\" weight. LIFESTYLE CHANGES  Programs that help you to change your lifestyle are usually run by psychologists or other professionals. The goals of lifestyle changes are to help you change your eating habits, become more active, and be more aware of how much you eat and exercise, helping you to make healthier choices. This type of treatment can be broken down into three steps: The triggers that make you want to eat   Eating   What happens after you eat  Triggers to eat  Determining what triggers you to eat involves figuring out what foods you eat and where and when you eat. To figure out what triggers you to eat, keep a record for a few days of everything you eat, the places where you eat, how often you eat, and the emotions you were feeling when you ate. CHOOSING A DIET  A calorie is a unit of energy found in food. Your body needs calories to function. The goal of any diet is to burn up more calories than you eat. How quickly you lose weight depends upon several factors, such as your age, gender, and starting weight. Older people have a slower metabolism than young people, so they lose weight more slowly. Men lose more weight than women of similar height and weight when dieting because they use more energy. People who are extremely overweight lose weight more quickly than those who are only mildly overweight. Try not to drink alcohol or drinks with added sugar, and most sweets (candy, cakes, cookies), since they rarely contain important nutrients. Portion-controlled diets  One simple way to diet is to buy packaged foods, like frozen low-calorie meals or meal-replacement canned drinks. A typical meal plan for one day may include:  A meal-replacement drink or breakfast bar for breakfast   A meal-replacement drink or a frozen low-calorie (250 to 350 calories) meal for lunch   A frozen low-calorie meal or other prepackaged, calorie-controlled meal, along with extra vegetables for dinner  This would give you 1000 to 1500 calories per day. Low-fat diet  To reduce the amount of fat in your diet, you can:  Eat low-fat foods. Low-fat foods are those that contain less than 30 percent of calories from fat. Fat is listed on the food facts label (figure 1). Count fat grams. For a 1500 calorie diet, this would mean about 45 g or fewer of fat per day. Low-carbohydrate diet  Low- and very-low-carbohydrate diets (eg, Atkins diet, [employer]) have become popular ways to lose weight quickly.   With a very-low-carbohydrate diet, you eat between 0 and 60 grams of carbohydrates per day (a standard diet contains 200 to 300 grams of carbohydrates)   With a low-carbohydrate diet, you eat between 60 and 130 grams of carbohydrates per day Carbohydrates are found in fruits, vegetables, and grains (including breads, rice, pasta, and cereal), alcoholic beverages, and in dairy products. Meat and fish do not contain carbohydrates. Side effects of very-low-carbohydrate diets can include constipation, headache, bad breath, muscle cramps, diarrhea, and weakness. Mediterranean diet  The term \"Mediterranean diet\" refers to a way of eating that is common in [place] regions around the [facility]. Although there is some variation in Mediterranean diets, there are some similarities. Most Mediterranean diets include:  A high level of monounsaturated fats (from olive or canola oil, walnuts, pecans, almonds) and a low level of saturated fats (from butter)   A high amount of vegetables, fruits, legumes, and grains (7 to 10 servings of fruits and vegetables per day)   A moderate amount of milk and dairy products, mostly in the form of cheese. Use low-fat dairy products (skim milk, fat-free yogurt, low-fat cheese). A relatively low amount of red meat and meat products. Substitute fish or poultry for red meat. For those who drink alcohol, a modest amount (mainly as red wine) may help to protect against cardiovascular disease. A modest amount is up to one (4 ounce) glass per day for women and up to two glasses per day for men. Which diet is best?  Studies have compared different diets, including:  Very-low-carbohydrate (Atkins)   Macronutrient balance controlling glycemic load (Zone®)   Reduced-calorie (Weight Watchers®)   Very-low-fat (Ornish)  No one diet is \"best\" for weight loss. Any diet will help you to lose weight if you stick with the diet. Therefore, it is important to choose a diet that includes foods you like. Fad diets  Fad diets often promise quick weight loss (more than 1 to 2 pounds per week) and may claim that you do not need to exercise or give up favorite foods. Some fad diets cost a lot of money, because you have to pay for seminars or pills. Fad diets generally lack any scientific evidence that they are safe and effective, but instead rely on \"before\" and \"after\" photos or testimonials. Diets that sound too good to be true usually are. These plans are a waste of time and money and are not recommended. A doctor, nurse, or nutritionist can help you find a safe and effective way to lose weight and keep it off. WEIGHT LOSS [place] a weight loss medicine may be helpful when used in combination with diet, exercise, and lifestyle changes. However, it is important to understand the risks and benefits of these medicines. It is also important to be realistic about your goal weight using a weight loss medicine; you may not reach your \"dream\" weight, but you may be able to reduce your risk of diabetes or heart disease. Weight loss medicines may be recommended for people who have not been able to lose weight with diet and exercise who have a:  BMI of 30 or more    BMI between 27 and 29.9 and have other medical problems, such as diabetes, high cholesterol, or high blood pressure  Two weight loss medicines are approved in the [place] for long-term use. These are sibutramine and orlistat. Other weight loss medicines (phentermine, diethylpropion) are available but are only approved for short-term use (up to 12 weeks). Sibutramine  Sibutramine (Meridia®, Reductil®) is a medicine that reduces your appetite. In people who take the medicine for one year, the average weight loss is 10 percent of the initial body weight (5 percent more than those who took a placebo treatment). Side effects of sibutramine include insomnia, dry mouth, and constipation. Increases in blood pressure can occur. Therefore, blood pressure is usually monitored during treatment. There is no evidence that sibutramine causes heart or lung problems (like dexfenfluramine and fenfluramine (Phen/Fen)). However, experts agree that sibutramine should not used by people with coronary heart disease, heart failure, uncontrolled hypertension, stroke, irregular heart rhythms, or peripheral vascular disease (poor circulation in the legs). Orlistat  Orlistat (Xenical® 120 mg capsules) is a medicine that reduces the amount of fat your body absorbs from the foods you eat. A lower-dose version is now available without a prescription (Urbano® 60 mg capsules) in many countries, including the [place]. The medicine is recommended three times per day, taken with a meal; you can skip a dose if you skip a meal or if the meal contains no fat. After one year of treatment with orlistat, the average weight loss is approximately 8 to 10 percent of initial body weight (4 percent more than in those who took a placebo). Cholesterol levels often improve, and blood pressure sometimes falls. In people with diabetes, orlistat may help control blood sugar levels. Side effects occur in 15 to 10 percent of people and may include stomach cramps, gas, diarrhea, leakage of stool, or oily stools. These problems are more likely when you take orlistat with a high-fat meal (if more than 30 percent of calories in the meal are from fat). Side effects usually improve as you learn to avoid high-fat foods. Severe liver injury has been reported rarely in patients taking orlistat, but it is not known if orlistat caused the liver problems. Diet supplements  Diet supplements are widely used by people who are trying to lose weight, although the safety and efficacy of these supplements are often unproven. A few of the more common diet supplements are discussed below; none of these are recommended because they have not been studied carefully, and there is no proof they are safe or effective. Chitosan and wheat dextrin are ineffective for weight loss, and their use is not recommended. Ephedra, a compound related to ephedrine, is no longer available in the [place] due to safety concerns. Many nonprescription diet pills previously contained ephedra. Although some studies have shown that ephedra helps with weight loss, there can be serious side effects (psychiatric symptoms, palpitations, and stomach upset), including death. There are not enough data about the safety and efficacy of chromium, ginseng, glucomannan, green tea, hydroxycitric acid, L carnitine, psyllium, pyruvate supplements, [place] wort, and conjugated linoleic acid. Two supplements from [facility], [address] [place] (also known as the Louie Willett 15 pill) and Herbathin dietary supplement, have been shown to contain prescription drugs. Hoodia gordonii is a dietary supplement derived from a plant in [place]. It is not recommended because there is no proof that it is safe or effective. Bitter orange (Citrus aurantium) can increase your heart rate and blood pressure and is not recommended. SHOULD I HAVE SURGERY TO LOSE WEIGHT?   Weight loss surgery is recommended ONLY for people with one of the following:  Severe obesity (body mass index above 40) (calculator 1 and calculator 2) who have not responded to diet, exercise, or weight loss medicines   Body mass index between 35 and 40, along with a serious medical problem (including diabetes, severe joint pain, or sleep apnea) that would improve with weight loss You should be sure that you understand the potential risks and benefits of weight loss surgery. You must be motivated and willing to make lifelong changes in how you eat to reach and maintain a healthier weight after surgery. You must also be realistic about weight loss after surgery (see 'Effectiveness of weight loss surgery' below). PREPARING FOR WEIGHT LOSS SURGERY  Most people who have weight loss surgery will meet with several specialists before surgery is scheduled. This often includes a dietitian, mental health counselor, a doctor who specializes in care of obese people, and a surgeon who performs weight loss surgery (bariatric surgeon). You may need to work with these providers for several weeks or months before surgery. The nutritionist will explain what and how much you will be able to eat after surgery. You may also need to lose a small amount of weight before surgery. The mental health specialist will help you to cope with stress and other factors that can make it harder to lose weight or trigger you to eat   The medical doctor will determine whether you need other tests, counseling, or treatment before surgery. He or she might also help you begin a medical weight loss program so that you can lose some weight before surgery. The bariatric surgeon will meet with you to discuss the surgeries available to treat obesity. He or she will also make sure you are a good candidate for surgery. TYPES OF WEIGHT LOSS SURGERY  There are several types of weight loss surgeries, the most common being lap banding, gastric bypass, and gastric sleeve. Lap banding  Laparoscopic adjustable gastric banding (LAGB), or lap banding, is a surgery that uses an adjustable band around the opening to the stomach (figure 1). This reduces the amount of food that you can eat at one time. Lap banding is done through small incisions, with a laparoscope. The band can be adjusted after surgery, allowing you to eat more or less food. Adjustments to the size and tightness of the band are made by using a needle to add or remove fluid from a port (a small container under the skin that is connected to the band). Adding fluid to the band makes it tighter which restricts the amount of food you can eat and may help you to lose more weight. Lap banding is a popular choice because it is relatively simple to perform, can be adjusted or removed, and has a low risk of serious complications immediately after surgery. However, weight loss with the lap band depends on your ability to follow the program closely. You will need to prepare nutritious meals that [facility] with\" the band, not against it. For example, the lap band will not work well if you eat or drink a large amount of liquid calories (like ice cream). The band will not help you to feel full when you eat/drink liquid calories. Weight loss ranges from 45 to 75 percent after two years. As an example, a person who is 120 pounds overweight could expect to lose approximately 54 to 90 pounds in the two years after lap banding. Gastric bypass  Deb-en-Y gastric bypass, also called gastric bypass, helps you to lose weight by reducing the amount of food you can eat and reducing the number of calories and nutrients you absorb from the food you eat. To perform gastric bypass, a surgeon creates a small stomach pouch by dividing the stomach and attaching it to the small intestine. This helps you to lose weight in two ways: The smaller stomach can hold less food than before surgery. This causes you to feel full after eating a very small amount of food or liquid. Over time, the pouch might stretch, allowing you to eat more food. The body absorbs fewer calories, since food bypasses most of the stomach as well as the upper small intestine. This new arrangement seems to decrease your appetite and change how you break down foods by changing the release of various hormones. Gastric bypass can be performed as open surgery (through an incision on the abdomen) or laparoscopically, which uses smaller incisions and smaller instruments. Both the laparoscopic and open techniques have risks and benefits. You and your surgeon should work together to decide which surgery, if any, is right for you. Gastric bypass has a high success rate, and people lose an average of 62 to 68 percent of their excess body weight in the first year. Weight loss typically levels off after one to two years, with an overall excess weight loss between 50 and 75 percent. For a person who is 120 pounds overweight, an average of 60 to 90 pounds of weight loss would be expected. Gastric sleeve  Gastric sleeve, also known as sleeve gastrectomy, is a surgery that reduces the size of the stomach and makes it into a narrow tube (figure 3). The new stomach is much smaller and produces less of the hormone (ghrelin) that causes hunger, helping you feel satisfied with less food. Sleeve gastrectomy is safer than gastric bypass because the intestines are not rearranged, and there is less chance of malnutrition. It also appears to control hunger better than lap banding. It might be safer than the lap banding because no foreign materials are used. The gastric sleeve has a good success rate, and people lose an average of 33 percent of their excess body weight in the first year. For a person who is 120 pounds overweight, this would mean losing about 40 pounds in the first year. WEIGHT LOSS SURGERY COMPLICATIONS  A variety of complications can occur with weight loss surgery. The risks of surgery depend upon which surgery you have and any medical problems you had before surgery. Some of the more common early surgical complications (one to six weeks after surgery) include:  Bleeding   Infection   Blockage or tear in the bowels   Need for further surgery  Important medical complications after surgery can include blood clots in the legs or lungs, heart attack, pneumonia, and urinary tract infection. Complications are less likely when surgery is performed in centers that are experienced in weight loss surgery. In general, centers with experience in weight loss surgery have:  Board-certified doctors and surgeons   A team of support staff (dietitians, counselors, nurses)   Long-term follow-up after surgery   Hospital staff experienced with the care of weight loss patients. This includes nurses who are trained in the care of patients immediately after surgery and anesthesiologists who are experienced in caring for the morbidly obese. EFFECTIVENESS OF WEIGHT LOSS SURGERY  The goal of weight loss surgery is to reduce the risk of illness or death associated with obesity. Weight loss surgery can also help you to feel and look better, reduce the amount of money you spend on medicines, and cut down on sick days. As an example, weight loss surgery can improve health problems related to obesity (diabetes, high blood pressure, high cholesterol, sleep apnea) to the point that you need less or no medicine. Finally, weight loss surgery might reduce your risk of developing heart disease, cancer, and certain infections. AFTER WEIGHT LOSS SURGERY  You will need to stay in the hospital until your team feels that it is safe for you to leave (on average, one to three days). Do not drive if you are taking prescription pain medicine. Begin exercising as soon as possible once you have healed; most weight loss centers will design an exercise program for you. Once you are home, it is important to eat and drink exactly what your doctor and dietitian recommend. You will see your doctor, nurse, and dietitian on a regular basis after surgery to monitor your health, diet, and weight loss. You will be able to slowly increase how much you eat over time, although it will always be important to:  Eat small, frequent meals and not skip meals   Chew your food slowly and completely   Avoid eating while \"distracted\" (such as eating while watching TV)   Stop eating when you feel full   Drink liquids at least 30 minutes before or after eating   Avoid foods high in fat or sugar   Take vitamin supplements, as recommended  It can take several months to learn to listen to your body so that you know when you are hungry and when you are full. You may dislike foods you previously loved, and you may begin to prefer new foods. This can be a frustrating process for some people, so talk to your dietitian if you are having trouble. It usually takes between one and two years to lose weight after surgery. After reaching their goal weight, some people have plastic surgery (called \"body contouring\") to remove excess skin from the body, particularly in the abdominal area. Before you decide to have weight loss surgery, you must commit to staying healthy for life. This includes following up with your healthcare team, exercising most days of the week, and eating a sensible diet every day. It can be difficult to develop new eating and exercise habits after weight loss surgery, and you will have to work hard to stick to your goals. Recovering from surgery and losing weight can be stressful and emotional, and it is important to have the support of family and friends. Working with a , therapist, or support group can help you through the ups and downs. WHERE TO GET MORE INFORMATION  Your healthcare provider is the best source of information for questions and concerns related to your medical problem. This article will be updated as needed every four months on our Web site ([email or web address])  ·     High-Fiber Diet     What Is Fiber? Dietary fiber is a form of carbohydrate found in plants that cannot be digested by humans. All plants contain fiber, including fruits, vegetables, grains, and legumes. Fiber is often classified into two categories: soluble and insoluble. Soluble fiber draws water into the bowel and can help slow digestion. Examples of foods that are high in soluble fiber include oatmeal, oat bran, barley, legumes (eg, beans and peas), apples, and strawberries. Insoluble fiber speeds digestion and can add bulk to the stool. Examples of foods that are high in insoluble fiber include whole-wheat products, wheat bran, cauliflower, green beans, and potatoes. Why Follow a High-Fiber Diet? A high-fiber diet is often recommended to prevent and treat constipation , hemorrhoids , diverticulitis , and irritable bowel syndrome . Eating a high-fiber diet can also help improve your cholesterol levels, lower your risk of coronary heart disease , reduce your risk of type 2 diabetes , and lower your weight. For people with type 1 or 2 diabetes, a high-fiber diet can also help stabilize blood sugar levels. How Much Fiber Should I Eat? A high-fiber diet should contain  20-35 grams  of fiber a day. This is actually the amount recommended for the general adult population; however, most Americans eat only 15 grams of fiber per day.    Digestion of Fiber Eating a higher fiber diet than usual can take some getting used to by your body's digestive system. To avoid the side effects of sudden increases in dietary fiber (eg, gas, cramping, bloating, and diarrhea), increase fiber gradually and be sure to drink plenty of fluids every day. Tips for Increasing Fiber Intake   Whenever possible, choose whole grains over refined grains (eg, brown rice instead of white rice, whole-wheat bread instead of white bread). Include a variety of grains in your diet, such as wheat, rye, barley, oats, quinoa, and bulgur. Eat more vegetarian-based meals. Here are some ideas: black bean burgers, eggplant lasagna, and veggie tofu stir-taveras. Choose high-fiber snacks, such as fruits, popcorn, whole-grain crackers, and nuts. Make whole-grain cereal or whole-grain toast part of your daily breakfast regime. When eating out, whether ordering a sandwich or dinner, ask for extra vegetables. When baking, replace part of the white flour with whole-wheat flour. Whole-wheat flour is particularly easy to incorporate into a recipe. High-Fiber Diet Eating Guide   Food Category   Foods Recommended   Notes   Grains   Whole-grain breads, muffins, bagels, or meredith bread Rye bread Whole-wheat crackers or crisp breads Whole-grain or bran cereals Oatmeal, oat bran, or grits Wheat germ Whole-wheat pasta and brown rice   Read the ingredients list on food labels. Look for products that list \"whole\" as the first ingredient (eg, whole-wheat, whole oats). Choose cereals with at least 2 grams of fiber per serving.    Vegetables   All vegetables, especially asparagus, bean sprouts, broccoli, [place] sprouts, cabbage, carrots, cauliflower, celery, corn, greens, green beans, green pepper, onions, peas, potatoes (with skin), snow peas, spinach, squash, sweet potatoes, tomatoes, zucchini   For maximum fiber intake, eat the peels of fruits and vegetablesjust be sure to wash them well first.   Fruits All fruits, especially apples, berries, grapefruits, mangoes, nectarines, oranges, peaches, pears, dried fruits (figs, dates, prunes, raisins)   Choose raw fruits and vegetables over juice, cooked, or cannedraw fruit has more fiber. Dried fruit is also a good source of fiber. Milk   With the exception of yogurt containing inulin (a type of fiber), dairy foods provide little fiber. Add more fiber by topping your yogurt or cottage cheese with fresh fruit, whole grain or bran cereals, nuts, or seeds. Meats and Beans   All beans and peas, especially Garbanzo beans, kidney beans, lentils, lima beans, split peas, and hurtado beans All nuts and seeds, especially almonds, peanuts, [place] nuts, cashews, peanut butter, walnuts, sesame and sunflower seeds All meat, poultry, fish, and eggs   Increase fiber in meat dishes by adding hurtado beans, kidney beans, black-eyed peas, bran, or oatmeal. If you are following a low-fat diet, use nuts and seeds only in moderation. Fats and Oils   All in moderation   Fats and oils do not provide fiber   Snacks, Sweets, and Condiments   Fruit Nuts Popcorn, whole-wheat pretzels, or trail mix made with dried fruits, nuts, and seeds Cakes, breads, and cookies made with oatmeal or whole-wheat flour   Most snack foods do not provide much fiber. Choose snacks with at least 2 grams of fiber per serving. Last Reviewed: March 2011 Oskar Kwon MS, MPH, RD   Updated: 3/29/2011   ·     Keep Your Memory Landry Szymanski       Many factors can affect your ability to remembera hectic lifestyle, aging, stress, chronic disease, and certain medicines. But, there are steps you can take to sharpen your mind and help preserve your memory. Challenge Your Brain   Regularly challenging your mind may help keeps it in top shape. Good mental exercises include:   Crossword puzzlesUse a dictionary if you need it; you will learn more that way. Brainteasers Try some!    Crafts, such as wood working and sewing If you are interested in taking herbs and supplements, talk to your doctor first because they may interact with other medicines that you are taking. Exercise Regularly    Among the many benefits of regular exercise are increased blood flow to the brain and decreased risk of certain diseases that can interfere with memory function. One study found that even moderate exercise has a beneficial effect. Examples of \"moderate\" exercise include:   Playing 18 holes of golf once a week, without a cart   Playing tennis twice a week   Walking one mile per day   Manage Stress    It can be tough to remember what is important when your mind is cluttered. Make time for relaxation. Choose activities that calm you down, and make it routine. Manage Chronic Conditions    Side effects of high blood pressure , diabetes, and heart disease can interfere with mental function. Many of the lifestyle steps discussed here can help manage these conditions. Strive to eat a healthy diet, exercise regularly, get stress under control, and follow your doctor's advice for your condition. Minimize Medications    Talk to your doctor about the medicines that you take. Some may be unnecessary. Also, healthy lifestyle habits may lower the need for certain drugs. Last Reviewed: April 2010 Domingo Mercedes MD   Updated: 4/13/2010   ·     [address]       As we get older, changes in balance, gait, strength, vision, hearing, and cognition make even the most youthful senior more prone to accidents. Falls are one of the leading health risks for older people.  This increased risk of falling is related to:   Aging process (eg, decreased muscle strength, slowed reflexes)   Higher incidence of chronic health problems (eg, arthritis, diabetes) that may limit mobility, agility or sensory awareness Side effects of medicine (eg, dizziness, blurred vision)especially medicines like prescription pain medicines and drugs used to treat mental health conditions   Depending on the brittleness of your bones, the consequences of a fall can be serious and long lasting. Home Life   Research by the Association of Aging [facility]) shows that some home accidents among older adults can be prevented by making simple lifestyle changes and basic modifications and repairs to the home environment. Here are some lifestyle changes that experts recommend:   Have your hearing and vision checked regularly. Be sure to wear prescription glasses that are right for you. Speak to your doctor or pharmacist about the possible side effects of your medicines. A number of medicines can cause dizziness. If you have problems with sleep, talk to your doctor. Limit your intake of alcohol. If necessary, use a cane or walker to help maintain your balance. Wear supportive, rubber-soled shoes, even at home. If you live in a region that gets wintry weather, you may want to put special cleats on your shoes to prevent you from slipping on the snow and ice. Exercise regularly to help maintain muscle tone, agility, and balance. Always hold the banister when going up or down stairs. Also, use  bars when getting in or out of the bath or shower, or using the toilet. To avoid dizziness, get up slowly from a lying down position. Sit up first, dangling your legs for a minute or two before rising to a standing position. Overall Home Safety Check   According to the Consumer Product Safety Commision's \"Older Consumer Home Safety Checklist,\" it is important to check for potential hazards in each room. And remember, proper lighting is an essential factor in home safety. If you cannot see clearly, you are more likely to fall.    Important questions to ask yourself include: Are lamp, electric, extension, and telephone cords placed out of the flow of traffic and maintained in good condition? Have frayed cords been replaced? Are all small rugs and runners slip resistant? If not, you can secure them to the floor with a special double-sided carpet tape. Are smoke detectors properly locatedone on every floor of your home and one outside of every sleeping area? Are they in good working order? Are batteries replaced at least once a year? Do you have a well-maintained carbon monoxide detector outside every sleeping are in your home? Does your furniture layout leave plenty of space to maneuver between and around chairs, tables, beds, and sofas? Are hallways, stairs and passages between rooms well lit? Can you reach a lamp without getting out of bed? Are floor surfaces well maintained? Shag rugs, high-pile carpeting, tile floors, and polished wood floors can be particularly slippery. Stairs should always have handrails and be carpeted or fitted with a non-skid tread. Is your telephone easily reachable. Is the cord safely tucked away? Room by Room   According to the Association of Aging, bathrooms and jimmy are the two most potentially hazardous rooms in your home. In the Kitchen    Be sure your stove is in proper working order and always make sure burners and the oven are off before you go out or go to sleep. Keep pots on the back burners, turn handles away from the front of the stove, and keep stove clean and free of grease build-up. Kitchen ventilation systems and range exhausts should be working properly. Keep flammable objects such as towels and pot holders away from the cooking area except when in use. Make sure kitchen curtains are tied back. Move cords and appliances away from the sink and hot surfaces. If extension cords are needed, install wiring guides so they do not hang over the sink, range, or working areas. Look for coffee pots, kettles and toaster ovens with automatic shut-offs. Keep a mop handy in the kitchen so you can wipe up spills instantly. You should also have a small fire extinguisher. Arrange your kitchen with frequently used items on lower shelves to avoid the need to stand on a stepstool to reach them. Make sure countertops are well-lit to avoid injuries while cutting and preparing food. In the Bathroom    Use a non-slip mat or decals in the tub and shower, since wet, soapy tile or porcelain surfaces are extremely slippery. Make sure bathroom rugs are non-skid or tape them firmly to the floor. Bathtubs should have at least one, preferably two, grab bars, firmly attached to structural supports in the wall. (Do not use built-in soap holders or glass shower doors as grab bars.)    Tub seats fitted with non-slip material on the legs allow you to wash sitting down. For people with limited mobility, bathtub transfer benches allow you to slide safely into the tub. Raised toilet seats and toilet safety rails are helpful for those with knee or hip problems. In the [facility]    Make sure you use a nightlight and that the area around your bed is clear of potential obstacles. Be careful with electric blankets and never go to sleep with a heating pad, which can cause serious burns even if on a low setting. Use fire-resistant mattress covers and pillows, and NEVER smoke in bed. Keep a phone next to the bed that is programmed to dial 911 at the push of a button. If you have a chronic condition, you may want to sign on with an automatic call-in service. Typically the system includes a small pendant that connects directly to an emergency medical voice-response system. You should also make arrangements to stay in contact with someonefriend, neighbor, family memberon a regular schedule.    Fire Prevention According to the National S.A.F.E. (Smoke Alarms for Every) [facility], senior citizens are one of the two highest risk groups for death and serious injuries due to residential fires. When cooking, wear short-sleeved items, never a bulky long-sleeved robe. The [facility]'s Safety Checklist for Older Consumers emphasizes the importance of checking basements, garages, workshops and storage areas for fire hazards, such as volatile liquids, piles of old rags or clothing and overloaded circuits. Never smoke in bed or when lying down on a couch or recliner chair. Small portable electric or kerosene heaters are responsible for many home fires and should be used cautiously if at all. If you do use one, be sure to keep them away from flammable materials. In case of fire, make sure you have a pre-established emergency exit plan. Have a professional check your fireplace and other fuel-burning appliances yearly. Helping Hands   Baby boomers entering the topete years will continue to see the development of new products to help older adults live safely and independently in spite of age-related changes. Making Life More Livable  , by Monet Bryant, lists over 1,000 products for \"living well in the mature years,\" such as bathing and mobility aids, household security devices, ergonomically designed knives and peelers, and faucet valves and knobs for temperature control. Medical supply stores and organizations are good sources of information about products that improve your quality of life and insure your safety.      Last Reviewed: November 2009 Rahul Le MD   Updated: 3/7/2011     ·        Advance Care Planning: Care Instructions  Your Care Instructions It can be hard to live with an illness that cannot be cured. But if your health is getting worse, you may want to make decisions about end-of-life care. Planning for the end of your life does not mean that you are giving up. It is a way to make sure that your wishes are met. Clearly stating your wishes can make it easier for your loved ones. Making plans while you are still able may also ease your mind and make your final days less stressful and more meaningful. Follow-up care is a key part of your treatment and safety. Be sure to make and go to all appointments, and call your doctor if you are having problems. It's also a good idea to know your test results and keep a list of the medicines you take. What can you do to plan for the end of life? You can bring these issues up with your doctor. You do not need to wait until your doctor starts the conversation. You might start with \"I would not be willing to live with . Ramírez Moe \" When you complete this sentence it helps your doctor understand your wishes. Talk openly and honestly with your doctor. This is the best way to understand the decisions you will need to make as your health changes. Know that you can always change your mind. Ask your doctor about commonly used life-support measures. These include tube feedings, breathing machines, and fluids given through a vein (IV). Understanding these treatments will help you decide whether you want them. You may choose to have these life-supporting treatments for a limited time. This allows a trial period to see whether they will help you. You may also decide that you want your doctor to take only certain measures to keep you alive. It is important to spell out these conditions so that your doctor and family understand them. Talk to your doctor about how long you are likely to live. He or she may be able to give you an idea of what usually happens with your specific illness. Think about preparing papers that state your wishes. This way there will not be any confusion about what you want. You can change your instructions at any time. Which papers should you prepare? Advance directives are legal papers that tell doctors how you want to be cared for at the end of your life. You do not need a  to write these papers. Ask your doctor or your state health department for information on how to write your advance directives. They may have the forms for each of these types of papers. Make sure your doctor has a copy of these on file, and give a copy to a family member or close friend. Consider a do-not-resuscitate order (DNR). This order asks that no extra treatments be done if your heart stops or you stop breathing. Extra treatments may include cardiopulmonary resuscitation (CPR), electrical shock to restart your heart, or a machine to breathe for you. If you decide to have a DNR order, ask your doctor to explain and write it. Place the order in your home where everyone can easily see it. Consider a living will. A living will explains your wishes about life support and other treatments at the end of your life if you become unable to speak for yourself. Living kay tell doctors to use or not use treatments that would keep you alive. You must have one or two witnesses or a notary present when you sign this form. A living will may be called something else in your state. Consider a medical power of . This form allows you to name a person to make decisions about your care if you are not able to. Most people ask a close friend or family member. Talk to this person about the kinds of treatments you want and those that you do not want. Make sure this person understands your wishes. A medical power of  may be called something else in your state. These legal papers are simple to change. Tell your doctor what you want to change, and ask him or her to make a note in your medical file. Give your family updated copies of the papers. Where can you learn more? Go to [email or web address] and sign in to your [employer] account. Enter P184 in the Crowdcube box to learn more about \"Advance Care Planning: Care Instructions. \"     If you do not have an account, please click on the \"Sign Up Now\" link. Current as of: December 9, 2019               Content Version: 12.6  © 0323-5748 [employer]. Care instructions adapted under license by [address] If you have questions about a medical condition or this instruction, always ask your healthcare professional. [address] any warranty or liability for your use of this information. ·        Learning About Living Cassie Grantolf  What is a living will? A living will, also called a declaration, is a legal form. It tells your family and your doctor your wishes when you can't speak for yourself. It's used by the health professionals who will treat you as you near the end of your life or if you get seriously hurt or ill. If you put your wishes in writing, your loved ones and others will know what kind of care you want. They won't need to guess. This can ease your mind and be helpful to others. And you can change or cancel your living will at any time. A living will is not the same as an estate or property will. An estate will explains what you want to happen with your money and property after you die. How do you use it? A living will is used to describe the kinds of treatment or life support you want as you near the end of your life or if you get seriously hurt or ill. Keep these facts in mind about living kay. Your living will is used only if you can't speak or make decisions for yourself. Most often, one or more doctors must certify that you can't speak or decide for yourself before your living will takes effect. If you get better and can speak for yourself again, you can accept or refuse any treatment. It doesn't matter what you said in your living will. Some states may limit your right to refuse treatment in certain cases. For example, you may need to clearly state in your living will that you don't want artificial hydration and nutrition, such as being fed through a tube. Is a living will a legal document? A living will is a legal document. Each state has its own laws about living kay. And a living will may be called something else in your state. Here are some things to know about living kay. You don't need an  to complete a living will. But legal advice can be helpful if your state's laws are unclear. It can also help if your health history is complicated or your family can't agree on what should be in your living will. You can change your living will at any time. Some people find that their wishes about end-of-life care change as their health changes. If you make big changes to your living will, complete a new form. If you move to another state, make sure that your living will is legal in the state where you now live. In most cases, doctors will respect your wishes even if you have a form from a different state. You might use a universal form that has been approved by many states. This kind of form can sometimes be filled out and stored online. Your digital copy will then be available wherever you have a connection to the internet. The doctors and nurses who need to treat you can find it right away. Your state may offer an online registry. This is another place where you can store your living will online. If you do not have an account, please click on the \"Sign Up Now\" link. Current as of: December 9, 2019               Content Version: 12.6  © 2006-2020 [employer]. Care instructions adapted under license by [facility] ([facility]). If you have questions about a medical condition or this instruction, always ask your healthcare professional. [address] any warranty or liability for your use of this information. ·        Learning About Medical Power of   What is a medical power of ? A medical power of , also called a durable power of  for health care, is one type of the legal forms called advance directives. It lets you name the person you want to make treatment decisions for you if you can't speak or decide for yourself. The person you choose is called your health care agent. This person is also called a health care proxy or health care surrogate. A medical power of  may be called something else in your state. How do you choose a health care agent? Choose your health care agent carefully. This person may or may not be a family member. Talk to the person before you make your final decision. Make sure he or she is comfortable with this responsibility. It's a good idea to choose someone who:  Is at least 25years old. Knows you well and understands what makes life meaningful for you. Understands your [employer]ar[employer] and moral values. Will do what you want, not what he or she wants. Will be able to make difficult choices at a stressful time. Will be able to refuse or stop treatment, if that is what you would want, even if you could die. Will be firm and confident with health professionals if needed. Will ask questions to get needed information. Lives near you or agrees to travel to you if needed. Your family may help you make medical decisions while you can still be part of that process. But it's important to choose one person to be your health care agent in case you aren't able to make decisions for yourself. If you don't fill out the legal form and name a health care agent, the decisions your family can make may be limited. A health care agent may be called something else in your state. Who will make decisions for you if you don't have a health care agent? If you don't have a health care agent or a living will, you may not get the care you want. Decisions may be made by family members who disagree about your medical care. Or decisions may be made by a medical professional who doesn't know you well. In some cases, a  makes the decisions. When you name a health care agent, it is very clear who has the power to make health decisions for you. How do you name a health care agent? You name your health care agent on a legal form. This form is usually called a medical power of . Ask your hospital, state bar association, or office on aging where to find these forms. You must sign the form to make it legal. Some states require you to get the form notarized. This means that a person called a  watches you sign the form and then he or she signs the form. Some states also require that two or more witnesses sign the form. Be sure to tell your family members and doctors who your health care agent is. Where can you learn more? Go to [email or web address] org and sign in to your [employer] account. Enter [id number] in the [facility] box to learn more about \"Learning About [address] \"     If you do not have an account, please click on the \"Sign Up Now\" link. Current as of: December 9, 2019               Content Version: 12.6  © 0418-7342 [employer]. Care instructions adapted under license by [facility] ([facility]). If you have questions about a medical condition or this instruction, always ask your healthcare professional. [place] [address] any warranty or liability for your use of this information.     ·

## 2021-02-12 NOTE — PROGRESS NOTES
Medicare Annual Wellness Visit  Name: Marina Foster Date: 2021   MRN: [id number] Sex: Female   Age: 73 y.o. Ethnicity: Non-/Non    : 1950 Race: Mary Casas is here for Medicare AWV, Knee Pain, and Back Pain    Screenings for behavioral, psychosocial and functional/safety risks, and cognitive dysfunction are all negative except as indicated below. These results, as well as other patient data from the [address] form, are documented in Flowsheets linked to this Encounter. Allergies   Allergen Reactions    Sulfa Antibiotics Nausea Only    Penicillins Rash       Prior to Visit Medications    Medication Sig Taking?  Authorizing Provider   levothyroxine (SYNTHROID) 75 MCG tablet TAKE ONE (1) TABLET BY MOUTH EVERY MORNING (BEFORE BREAKFAST)  yes Kishan Medina MD   dilTIAZem (CARDIZEM) 30 MG tablet  yes Historical Provider, MD   hydrALAZINE (APRESOLINE) 10 MG tablet  yes Historical Provider, MD   XARELTO 20 MG TABS tablet TAKE ONE (1) TABLET BY MOUTH DAILY (WITH BREAKFAST)  yes Cathy Corrales MD   oxybutynin (DITROPAN-XL) 10 MG extended release tablet TAKE 1 TABLET BY MOUTH ONCE DAILY  yes Kishan Medina MD   metFORMIN (GLUCOPHAGE) 500 MG tablet TAKE ONE TABLET BY MOUTH TWICE A DAY WITH A MEAL  yes Kishan Medina MD   docusate sodium (COLACE) 100 MG capsule Take 1 capsule by mouth 2 times daily For constipation yes Kishan Medina MD   ferrous sulfate (IRON 325) 325 (65 Fe) MG tablet Take 1 tablet by mouth 2 times daily yes Kishan Medina MD   vitamin B-12 (CYANOCOBALAMIN) 1000 MCG tablet Take 1 tablet by mouth daily yes Kishan Medina MD   metoprolol tartrate (LOPRESSOR) 25 MG tablet Take 0.5 tablets by mouth 2 times daily Dose decreased by cardiologist yes Kishan Medina MD   Probiotic Product (LISSY-BID PROBIOTIC) TABS TAKE 1 TABLET BY MOUTH IN THE MORNING  yes Kishan Medina MD   vitamin D3 (CHOLECALCIFEROL) 25 MCG (1000 UT) TABS tablet TAKE 2 TABLETS BY MOUTH ONCE DAILY  yes Mounika Reddy MD   pantoprazole (PROTONIX) 40 MG tablet TAKE 1 TABLET BY MOUTH ONCE DAILY  yes Mounika Reddy MD   ACETAMINOPHEN EXTRA STRENGTH 500 MG tablet TAKE 1 TABLET BY MOUTH EVERY 6 HOURS AS NEEDED FOR PAIN  yes Mounika Reddy MD   Blood Pressure Monitor KIT Use as directed. yes MAX Barker CNP   glucose monitoring kit (FREESTYLE) monitoring kit Use as directed. BRAND OF CHOICE INSURANCE ALLOWS. yes MAX Barker CNP   blood glucose monitor strips Test 2-3 times a day & as needed for symptoms of irregular blood glucose. BRAND OF CHOICE INSURANCE ALLOWS. yes MAX Barker CNP   Alcohol Swabs (ALCOHOL PREP) PADS Use as directed yes MAX Barker CNP   Lancets MISC 1 each by Does not apply route 2 times daily yes MAX Barker CNP   atorvastatin (LIPITOR) 40 MG tablet TAKE 1 TABLET BY MOUTH DAILY STOP SIMVASTATIN yes Mounika Reddy MD   MYRBETRIQ 50 MG TB24 TAKE ONE TABLET BY MOUTH ONCE DAILY yes Mounika Reddy MD         Past Medical History:   Diagnosis Date    Adenocarcinoma of endometrium, stage 1 ([facility] 10/5/2017    Well differentiated grade 1 adenocarcinoma arising in complex hyperplasia    JOSHUA (acute kidney injury) ([facility] 1/15/2020    Allergic rhinitis 2/23/2017    At high risk for falls 2/23/2017    Atrial fibrillation [facility])     Jan 2020    Atrial fibrillation, new onset ([facility] 1/20/2020    CHF (congestive heart failure) ([facility]     \"little\"    Colitis 7/22/2020    Colon polyp     Had colonoscopy done 20 yrs ago    Diabetes mellitus ([facility]     Diverticulitis     Diverticulitis of colon with perforation 8/4/2020    Endometrial cancer ([facility]     History of TIA (transient ischemic attack) '80's    on Baby ASA    Hyperglycemia 3/21/2017    Hyperlipidemia     Hypertension since 2015    on Rx.    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    Oral phase dysphagia 2/23/2018    Osteoarthritis (arthritis due to wear and tear of joints)     ronan knee    Osteoarthritis involving multiple joints on both sides of body 4/24/2012    Osteoporosis     Perforated bowel ([facility]     Perforated diverticulum 6/15/2020    Postmenopausal bleeding 9/20/2017    Rectal bleeding 9/21/2020    S/P h-scope, Myosure 9/20/17 9/20/2017    Pathology pending    Tennis elbow     left    Thickened endometrium 9/20/2017    TIA (transient ischemic attack)     TLHBSO, bilateral LND 10/24/17 10/24/2017    Type 2 diabetes mellitus, without long-term current use of insulin ([facility] 1/14/2020       Past Surgical History:   Procedure Laterality Date    CATARACT REMOVAL WITH IMPLANT Bilateral 2015    COLONOSCOPY  1997    had polyp, she doesn't know where and when, \"20 yrs ago\"    COLONOSCOPY  06/26/2017    COLONOSCOPY N/A 8/3/2020    COLONOSCOPY POLYPECTOMY SNARE performed by Vinay Quezada MD at [address] [place] [address] N/A 9/20/2017    HYSTEROSCOPY  WITH MYOSURE performed by Lyn Gillespie DO at [place] [address] N/A 10/24/2017    TOTAL LAPAROSCOPIC HYSTERECTOMY, BSO, F.S.  STAGING, GYRUS G400 performed by Silvino Schmitt MD at [address] [place] [address] N/A 6/26/2017    COLONOSCOPY POLYPECTOMY / HOT SNARE performed by Cirilo Sinha DO at [address] [place] [address] N/A 8/4/2020    OPEN SIGMOID COLECTOMY; PRIMARY ANASTOMOSIS & MOBILIZATION OF SPLENIC FLEXURE performed by Vinay Quezada MD at [facility]  10/24/2017 with pelvic lymph node dissection    THYROID SURGERY  1980    subtotal 20 years ago    TONSILLECTOMY      UPPER GASTROINTESTINAL ENDOSCOPY N/A 8/3/2020    EGD BIOPSY performed by Derrell Lefort, MD at [address]         Family History   Problem Relation Age of Onset    Coronary Art Dis Father     Hypertension Father     Diabetes Father     High Blood Pressure Father     Heart Attack Father     Diabetes Mother     High Blood Pressure Mother     Thyroid Disease Mother     High Blood Pressure Sister     Thyroid Disease Brother     High Blood Pressure Brother     High Blood Pressure Maternal Grandmother     Diabetes Maternal Grandfather     No Known Problems Paternal Grandmother     Heart Attack Paternal Grandfather     Colon Cancer Neg Hx        CareTeam (Including outside providers/suppliers regularly involved in providing care):   Patient Care Team:  Zeyad Ma MD as PCP - General (Family Medicine)  Zeyad Ma MD as PCP - [facility]  Oly Bush MD as Referring Physician (Orthopedic Surgery)  Reyna Michaels MD as Surgeon (Orthopedic Surgery)  Christine Eldridge MD as Consulting Physician (Endocrinology)  Sasha Horton DO as Consulting Physician (Obstetrics & Gynecology)  Rhea Snow MD as Consulting Physician (Urology)  Balbina García MD as Consulting Physician (Otolaryngology)  Johnny Mills MD as Consulting Physician (Obstetrics & Gynecology)  Alo Sanchez DO as Consulting Physician (General Surgery)  Megan Blas DO as Consulting Physician (Cardiology)  Aubree Jain MD as Surgeon (Ophthalmology)  Urszula Valentine MD as Consulting Physician (Dermatology)      Vital signs within normal limits except mild bradycardia and morbid obesity per BMI  Wt Readings from Last 3 Encounters:   02/12/21 231 lb (104.8 kg)   01/21/21 229 lb (103.9 kg)   10/30/20 224 lb (101.6 kg)     Vitals:    02/12/21 1046   BP: 120/70 Pulse: 52   Temp: 97.3 °F (36.3 °C)   SpO2: 99%   Weight: 231 lb (104.8 kg)   Height: 5' 2\" (1.575 m)     Body mass index is 42.25 kg/m². Based upon direct observation of the patient, evaluation of cognition reveals recent and remote memory intact. Patient has chronic knee pain and back pain, Ambulates with walker with seat  Had home PT till last week and it helped. Patient states that it will help if she would do more to prevent falls    Physical exam  General Appearance: alert and oriented to person, place and time, well-developed and well-nourished, in no acute distress  Head: normocephalic and atraumatic  Pulmonary/Chest: clear to auscultation bilaterally- no wheezes, rales or rhonchi, normal air movement, no respiratory distress  Cardiovascular: normal rate, regular rhythm and normal S1 and S2  Extremities: no edema  Musculoskeletal:     Up and go test more than 12 seconds. High risk for falls. Needs walker at all times  Coarse crepitus bilateral knees, decreased range of motion, tenderness patellar bilaterally, no effusion  lumbar spine tenderness, decreased range of motion, tenderness midline and on the sides      Patient's complete Health Risk Assessment and screening values have been reviewed and are found in Flowsheets. The following problems were reviewed today and where indicated follow up appointments were made and/or referrals ordered. Positive Risk Factor Screenings with Interventions:            General Health and ACP:  General  In general, how would you say your health is?: Very Good  In the past 7 days, have you experienced any of the following?  New or Increased Pain, New or Increased Fatigue, Loneliness, Social Isolation, Stress or Anger?: None of These  Do you get the social and emotional support that you need?: Yes  Do you have a Living Will?: (!) No(in process)  Advance Directives     Power of [address] [place] [address] Will ACP-Advance Directive ACP-Power of     Not on File Not on File Not on File Not on File      General Health Risk Interventions:  · No Living Will: Patient declines ACP discussion/assistance, her sister is helping her  ·     Health Habits/Nutrition:  Health Habits/Nutrition  Do you exercise for at least 20 minutes 2-3 times per week?: (!) No  Have you lost any weight without trying in the past 3 months?: No  Do you eat only one meal per day?: No  Have you seen the dentist within the past year?: (!) No  Body mass index: (!) 42.25  Health Habits/Nutrition Interventions:  · Inadequate physical activity:  patient agrees to increase physical activity as follows: advised to increase exercising   · Dental exam overdue:  patient encouraged to make appointment with his/her dentist    Hearing/Vision:   Hearing Screening    125Hz 250Hz 500Hz 1000Hz 2000Hz 3000Hz 4000Hz 6000Hz 8000Hz   Right ear:            Left ear:               Visual Acuity Screening    Right eye Left eye Both eyes   Without correction:      With correction: 20/50 20/40 20/50     Hearing/Vision  Do you or your family notice any trouble with your hearing that hasn't been managed with hearing aids?: No  Do you have difficulty driving, watching TV, or doing any of your daily activities because of your eyesight?: No  Have you had an eye exam within the past year?: Yes  Hearing/Vision Interventions:  · Vision concerns:  patient encouraged to make appointment with his/her eye specialist     ADL:  ADLs  In the past 7 days, did you need help from others to perform any of the following everyday activities? Eating, dressing, grooming, bathing, toileting, or walking/balance?: None  In the past 7 days, did you need help from others to take care of any of the following?  Laundry, housekeeping, banking/finances, shopping, telephone use, food preparation, transportation, or taking medications?: [employer], Laundry, Housekeeping, Shopping(has a home health aid that helps her)  ADL Interventions:  · Patient declines any further evaluation/treatment for this issue  · Referred for [address]    · [facility] has been helping her with transportation  Aid does the rest  Her sister doing shopping  Daughter advised her not to get the COVID 19 vaccine, we discussed options, she says she does not want it, she has been wearing her mask. She did have Covid infection in August      Personalized Preventive Plan   Current Health Maintenance Status  Immunization History   Administered Date(s) Administered    Influenza Vaccine, unspecified formulation 10/01/2016    Influenza Virus Vaccine 10/01/2019    Influenza, Quadv, adjuvanted, 65 yrs +, IM, PF (Fluad) 10/30/2020    Influenza, Triv, inactivated, subunit, adjuvanted, IM (Fluad 65 yrs and older) 09/14/2017, 10/02/2018    Pneumococcal Conjugate 13-valent ([id number]) 02/23/2017    Pneumococcal Polysaccharide ([id number]) 04/20/2018    Tdap (Boostrix, Adacel) 09/28/2017        Health Maintenance   Topic Date Due    Diabetic retinal exam  01/08/1962    COVID-19 Vaccine (1 of 2) 01/08/1968    Shingles Vaccine (1 of 2) 01/08/2002    Annual Wellness Visit (AWV)  12/01/2020    Diabetic microalbuminuria test  01/30/2021    A1C test (Diabetic or Prediabetic)  05/12/2021    Diabetic foot exam  10/30/2021    Lipid screen  11/06/2021    TSH testing  11/06/2021    Potassium monitoring  11/06/2021    Creatinine monitoring  11/06/2021    Breast cancer screen  12/09/2021    Colon cancer screen colonoscopy  08/03/2023    DTaP/Tdap/Td vaccine (2 - Td) 09/28/2027    DEXA (modify frequency per FRAX score)  Completed    Flu vaccine  Completed    Pneumococcal 65+ years Vaccine  Completed    Hepatitis C screen  Completed    Hepatitis A vaccine  Aged Out    Hib vaccine  Aged Out    Meningococcal (ACWY) vaccine  Aged Out     Recommendations for [employer] Due: see orders and patient instructions/AVS.  .   Recommended screening schedule for the next 5-10 years is provided to the patient in written form: see Patient Instructions/AVS.    Markos Mays was seen today for medicare awv, knee pain and back pain. Diagnoses and all orders for this visit:    Routine general medical examination at a health care facility  -     [address]    Type 2 diabetes mellitus with hyperglycemia, without long-term current use of insulin ([facility] [address] [facility]  Worsening  Low-carb diet discussed  Continue Metformin and self-monitoring home blood glucose fasting at least once a week  Lab Results   Component Value Date    LABA1C 5.5 02/12/2021    LABA1C 5.1 10/30/2020    LABA1C 5.3 07/22/2020       -     POCT glycosylated hemoglobin (Hb A1C)  -     Microalbumin / Creatinine Urine Ratio; Future  -     [address]  -     CBC Auto Differential; Future  -     Comprehensive Metabolic Panel; Future  Advised to schedule eye exam  Anemia, unspecified type  Taking iron, recheck blood work to see if it improved  Lab Results   Component Value Date    WBC 8.0 11/06/2020    HGB 11.2 (A) 11/06/2020    HCT 34.1 (A) 11/06/2020    MCV 89 11/06/2020     11/06/2020       -     [address]  -     CBC Auto Differential; Future    Essential hypertension  Well controlled. Continue current treatment. Will recheck labs. -     [address]  -     CBC Auto Differential; Future    Spondylosis of lumbar region without myelopathy or radiculopathy  Likely worsening due to wear and tear nature of the disease she would benefit from more physical therapy at home  -     [address]    Chronic pain of both knees  Failing to change as expected.    She would benefit from more home physical therapy  -     [address]    Paroxysmal atrial fibrillation ([address]  Stable  Continue current treatment: Xarelto for chronic anticoagulation, diltiazem and metoprolol for rate control and follow-up with cardiologist as scheduled  -     [address]    Chronic bilateral low back pain without sciatica  Failing to change as expected.    -     [address]             Future Appointments   Date Time Provider Dante Monique   4/8/2021 10:30 AM Emilia Pascal [facility] On[facility]   4/12/2021 11:00 AM Eufemia Mckinley MD [facility]   7/13/2021  9:30 AM Corrine Miner MD [facility]   7/21/2021 10:45 AM Jason Duran MD [facility]

## 2021-02-12 NOTE — PROGRESS NOTES
Visit Information    Have you changed or started any medications since your last visit including any over-the-counter medicines, vitamins, or herbal medicines? no   Are you having any side effects from any of your medications? -  no  Have you stopped taking any of your medications? Is so, why? -  no    Have you seen any other physician or provider since your last visit? No  Have you had any other diagnostic tests since your last visit? No  Have you been seen in the emergency room and/or had an admission to a hospital since we last saw you? No  Have you had your routine dental cleaning in the past 6 months? no    Have you activated your [employer] account? If not, what are your barriers?  Yes     Patient Care Team:  Mounika Reddy MD as PCP - General (Family Medicine)  Mounika Reddy MD as PCP - [facility]  Marlen Lima MD as Referring Physician (Orthopedic Surgery)  Juan Jose Dodson MD as Surgeon (Orthopedic Surgery)  Samm Richards MD as Consulting Physician (Endocrinology)  Jacques Cifuentes DO as Consulting Physician (Obstetrics & Gynecology)  Judie Huang MD as Consulting Physician (Urology)  Anca Martinez MD as Consulting Physician (Otolaryngology)  Sudhir Sanz MD as Consulting Physician (Obstetrics & Gynecology)  Lia Mcdonald DO as Consulting Physician (General Surgery)  Ofelia Gillis DO as Consulting Physician (Cardiology)  Nikolay Kinney MD as Surgeon (Ophthalmology)  Amalia Titus MD as Consulting Physician (Dermatology)    Medical History Review  Past Medical, Family, and Social History reviewed and does contribute to the patient presenting condition    Health Maintenance   Topic Date Due    Diabetic retinal exam  01/08/1962    COVID-19 Vaccine (1 of 2) 01/08/1968    Shingles Vaccine (1 of 2) 01/08/2002    Annual Wellness Visit (AWV)  12/01/2020    A1C test (Diabetic or Prediabetic)  01/30/2021    Diabetic microalbuminuria test  01/30/2021    Diabetic foot exam  10/30/2021    Lipid screen  11/06/2021    TSH testing  11/06/2021    Potassium monitoring  11/06/2021    Creatinine monitoring  11/06/2021    Breast cancer screen  12/09/2021    Colon cancer screen colonoscopy  08/03/2023    DTaP/Tdap/Td vaccine (2 - Td) 09/28/2027    DEXA (modify frequency per FRAX score)  Completed    Flu vaccine  Completed    Pneumococcal 65+ years Vaccine  Completed    Hepatitis C screen  Completed    Hepatitis A vaccine  Aged Out    Hib vaccine  Aged Out    Meningococcal (ACWY) vaccine  Aged Out

## 2021-02-12 NOTE — RESULT ENCOUNTER NOTE
Addressed during office visit today, A1c 5.5, within normal limits   Continue current treatment discussed during visit

## 2021-02-26 ENCOUNTER — TELEPHONE (OUTPATIENT)
Dept: FAMILY MEDICINE CLINIC | Age: 71
End: 2021-02-26

## 2021-02-26 DIAGNOSIS — G89.29 CHRONIC RIGHT SHOULDER PAIN: Primary | ICD-10-CM

## 2021-02-26 DIAGNOSIS — M25.511 CHRONIC RIGHT SHOULDER PAIN: Primary | ICD-10-CM

## 2021-02-26 NOTE — TELEPHONE ENCOUNTER
Order placed, might need to be faxed to the home care     Diagnosis Orders   1.  Chronic right shoulder pain  XR SHOULDER RIGHT (MIN 2 VIEWS)

## 2021-03-01 DIAGNOSIS — M25.512 CHRONIC LEFT SHOULDER PAIN: Primary | ICD-10-CM

## 2021-03-01 DIAGNOSIS — G89.29 CHRONIC LEFT SHOULDER PAIN: Primary | ICD-10-CM

## 2021-03-02 DIAGNOSIS — M25.511 CHRONIC RIGHT SHOULDER PAIN: ICD-10-CM

## 2021-03-02 DIAGNOSIS — G89.29 CHRONIC RIGHT SHOULDER PAIN: ICD-10-CM

## 2021-03-23 DIAGNOSIS — E78.5 HYPERLIPIDEMIA WITH TARGET LDL LESS THAN 100: ICD-10-CM

## 2021-03-23 RX ORDER — ATORVASTATIN CALCIUM 40 MG/1
TABLET, FILM COATED ORAL
Qty: 90 TABLET | Refills: 3 | Status: SHIPPED | OUTPATIENT
Start: 2021-03-23 | End: 2021-12-29

## 2021-04-03 DIAGNOSIS — D64.9 ANEMIA, UNSPECIFIED TYPE: ICD-10-CM

## 2021-04-05 ENCOUNTER — TELEPHONE (OUTPATIENT)
Dept: DERMATOLOGY | Age: 71
End: 2021-04-05

## 2021-04-05 RX ORDER — FERROUS SULFATE 325(65) MG
TABLET ORAL
Qty: 90 TABLET | Refills: 3 | Status: SHIPPED | OUTPATIENT
Start: 2021-04-05 | End: 2021-10-01 | Stop reason: SDUPTHER

## 2021-04-05 NOTE — TELEPHONE ENCOUNTER
Pt called and states that she has some lesions on her back that she is concerned are cancerous. This is her next follow up. Do you want to see her sooner?         Future Appointments   Date Time Provider Dante Monique   4/8/2021 10:30 AM Samantha Reeves [place] [facility]   4/12/2021 11:00 AM Alondra Melendrez MD [facility]   7/13/2021  9:30 AM Becky Hair MD  sc [facility]   7/21/2021 10:45 AM Esperanza Cosme MD  derm [facility]

## 2021-04-05 NOTE — TELEPHONE ENCOUNTER
Pt is already scheduled    Future Appointments   Date Time Provider Dante Monique   4/8/2021 10:30 AM [facility]u [place] [facility]   4/12/2021 11:00 AM Janice Moreira MD [facility]   4/14/2021  1:15 PM MD nigel Bloom derm [facility]   7/13/2021  9:30 AM Brittany Meyer MD  sc [facility]   7/21/2021 10:45 AM Shawn Ruiz MD  derm [facility]

## 2021-04-08 ENCOUNTER — OFFICE VISIT (OUTPATIENT)
Dept: GYNECOLOGIC ONCOLOGY | Age: 71
End: 2021-04-08
Payer: MEDICARE

## 2021-04-08 VITALS
BODY MASS INDEX: 43.69 KG/M2 | TEMPERATURE: 97.1 F | WEIGHT: 237.4 LBS | DIASTOLIC BLOOD PRESSURE: 84 MMHG | HEART RATE: 48 BPM | HEIGHT: 62 IN | SYSTOLIC BLOOD PRESSURE: 150 MMHG | OXYGEN SATURATION: 94 %

## 2021-04-08 DIAGNOSIS — Z85.42 HISTORY OF UTERINE CANCER: Primary | ICD-10-CM

## 2021-04-08 DIAGNOSIS — Z90.710 S/P TOTAL HYSTERECTOMY AND BSO (BILATERAL SALPINGO-OOPHORECTOMY): ICD-10-CM

## 2021-04-08 DIAGNOSIS — Z90.79 S/P TOTAL HYSTERECTOMY AND BSO (BILATERAL SALPINGO-OOPHORECTOMY): ICD-10-CM

## 2021-04-08 DIAGNOSIS — Z90.722 S/P TOTAL HYSTERECTOMY AND BSO (BILATERAL SALPINGO-OOPHORECTOMY): ICD-10-CM

## 2021-04-08 PROCEDURE — G8417 CALC BMI ABV UP PARAM F/U: HCPCS | Performed by: PHYSICIAN ASSISTANT

## 2021-04-08 PROCEDURE — 3017F COLORECTAL CA SCREEN DOC REV: CPT | Performed by: PHYSICIAN ASSISTANT

## 2021-04-08 PROCEDURE — 99214 OFFICE O/P EST MOD 30 MIN: CPT | Performed by: PHYSICIAN ASSISTANT

## 2021-04-08 PROCEDURE — 1036F TOBACCO NON-USER: CPT | Performed by: PHYSICIAN ASSISTANT

## 2021-04-08 PROCEDURE — G8427 DOCREV CUR MEDS BY ELIG CLIN: HCPCS | Performed by: PHYSICIAN ASSISTANT

## 2021-04-08 PROCEDURE — 1123F ACP DISCUSS/DSCN MKR DOCD: CPT | Performed by: PHYSICIAN ASSISTANT

## 2021-04-08 PROCEDURE — 1090F PRES/ABSN URINE INCON ASSESS: CPT | Performed by: PHYSICIAN ASSISTANT

## 2021-04-08 PROCEDURE — G8399 PT W/DXA RESULTS DOCUMENT: HCPCS | Performed by: PHYSICIAN ASSISTANT

## 2021-04-08 PROCEDURE — 4040F PNEUMOC VAC/ADMIN/RCVD: CPT | Performed by: PHYSICIAN ASSISTANT

## 2021-04-08 RX ORDER — DILTIAZEM HYDROCHLORIDE 240 MG/1
240 CAPSULE, COATED, EXTENDED RELEASE ORAL DAILY
Status: ON HOLD | COMMUNITY
End: 2021-10-11

## 2021-04-08 ASSESSMENT — ENCOUNTER SYMPTOMS
EYES NEGATIVE: 1
GASTROINTESTINAL NEGATIVE: 1
RESPIRATORY NEGATIVE: 1

## 2021-04-08 NOTE — PATIENT INSTRUCTIONS
Return to clinic in 6 months for surveillance    Follow up with your primary doctor, cardiologist and dermatologist as requested    Call or return to clinic sooner with any questions or concerns

## 2021-04-08 NOTE — PROGRESS NOTES
Review of Systems   Constitutional: Negative. HENT:  Negative. Eyes: Negative. Respiratory: Negative. Cardiovascular: Negative. Gastrointestinal: Negative. Endocrine: Negative. Genitourinary: Negative. Musculoskeletal: Negative. Skin: Negative. Neurological: Negative. Hematological: Bruises/bleeds easily. Psychiatric/Behavioral: Negative.
masses, tenderness or pathology noted. Impression:  FIGO Stage 1A Grade 1 endometrioid adenocarcinoma s/p surgical intervention with negative high risk factors, therefore requiring no further treatment. She is now 3 1/2 years since surgery. There is no evidence of recurrence or metastasis today. She will remain on surveillance. Assessment/Plan:  Her Surveillance plan is as follows:   1. Return to clinic in 6 months for follow up surveillance     2. Encourage to continue follow up with providers as instructed, including PCP, cardiologist and dermatology     3. Call or return to clinic sooner with any questions or concerns     I spent 30 minutes providing care to the patient and >50% of the time was spent counseling and coordinating care, discussing the patient's current situation, reviewing her options, counseling and education her and answering her questions. The patient's pertinent images, labs, and previous records and procedures were reviewed.     Electronically signed by Dung Vaughn PA-C on 4/8/2021 at 10:55 AM

## 2021-04-14 ENCOUNTER — OFFICE VISIT (OUTPATIENT)
Dept: DERMATOLOGY | Age: 71
End: 2021-04-14
Payer: MEDICARE

## 2021-04-14 VITALS
BODY MASS INDEX: 43.43 KG/M2 | HEART RATE: 48 BPM | OXYGEN SATURATION: 97 % | DIASTOLIC BLOOD PRESSURE: 72 MMHG | HEIGHT: 62 IN | WEIGHT: 236 LBS | SYSTOLIC BLOOD PRESSURE: 116 MMHG

## 2021-04-14 DIAGNOSIS — L82.1 SEBORRHEIC KERATOSES: Primary | ICD-10-CM

## 2021-04-14 PROCEDURE — 3017F COLORECTAL CA SCREEN DOC REV: CPT | Performed by: DERMATOLOGY

## 2021-04-14 PROCEDURE — G8427 DOCREV CUR MEDS BY ELIG CLIN: HCPCS | Performed by: DERMATOLOGY

## 2021-04-14 PROCEDURE — G8399 PT W/DXA RESULTS DOCUMENT: HCPCS | Performed by: DERMATOLOGY

## 2021-04-14 PROCEDURE — 99212 OFFICE O/P EST SF 10 MIN: CPT | Performed by: DERMATOLOGY

## 2021-04-14 PROCEDURE — 1036F TOBACCO NON-USER: CPT | Performed by: DERMATOLOGY

## 2021-04-14 PROCEDURE — 4040F PNEUMOC VAC/ADMIN/RCVD: CPT | Performed by: DERMATOLOGY

## 2021-04-14 PROCEDURE — G8417 CALC BMI ABV UP PARAM F/U: HCPCS | Performed by: DERMATOLOGY

## 2021-04-14 PROCEDURE — 1090F PRES/ABSN URINE INCON ASSESS: CPT | Performed by: DERMATOLOGY

## 2021-04-14 PROCEDURE — 1123F ACP DISCUSS/DSCN MKR DOCD: CPT | Performed by: DERMATOLOGY

## 2021-04-14 NOTE — PROGRESS NOTES
Dermatology Patient Note  [facility]  [address]  [address]  Dept: [phone]  Dept Fax: [phone]      VISITDATE: 4/14/2021   REFERRING PROVIDER: No ref. provider found      Lani Mclaughlin is a 73 y.o. female  who presents today in the office for:    Follow-up (new lesions on left should she would like evaluated)      PERTINENT HISTORY NOT LISTED ABOVE:  Patient presents for evaluation of lesions on left shoulder  - can't see them so is concerned    CURRENT MEDICATIONS:   Current Outpatient Medications   Medication Sig Dispense Refill    dilTIAZem (CARDIZEM CD) 240 MG extended release capsule Take 240 mg by mouth daily      FEROSUL 325 (65 Fe) MG tablet TAKE ONE (1) TABLET BY MOUTH TWO (2) TIMES DAILY  90 tablet 3    atorvastatin (LIPITOR) 40 MG tablet TAKE ONE (1) TABLET BY MOUTH DAILY STOP SIMVASTATIN  90 tablet 3    dilTIAZem (CARDIZEM) 30 MG tablet       XARELTO 20 MG TABS tablet TAKE ONE (1) TABLET BY MOUTH DAILY (WITH BREAKFAST)  30 tablet 3    oxybutynin (DITROPAN-XL) 10 MG extended release tablet TAKE 1 TABLET BY MOUTH ONCE DAILY  90 tablet 3    metFORMIN (GLUCOPHAGE) 500 MG tablet TAKE ONE TABLET BY MOUTH TWICE A DAY WITH A MEAL  60 tablet 10    docusate sodium (COLACE) 100 MG capsule Take 1 capsule by mouth 2 times daily For constipation 180 capsule 3    metoprolol tartrate (LOPRESSOR) 25 MG tablet Take 0.5 tablets by mouth 2 times daily Dose decreased by cardiologist 30 tablet 5    Probiotic Product (LISSY-BID PROBIOTIC) TABS TAKE 1 TABLET BY MOUTH IN THE MORNING  30 tablet 11    vitamin D3 (CHOLECALCIFEROL) 25 MCG (1000 UT) TABS tablet TAKE 2 TABLETS BY MOUTH ONCE DAILY  60 tablet 11    pantoprazole (PROTONIX) 40 MG tablet TAKE 1 TABLET BY MOUTH ONCE DAILY  30 tablet 11    glucose monitoring kit (FREESTYLE) monitoring kit Use as directed.   BRAND OF CHOICE INSURANCE ALLOWS. 1 kit 0    Lancets MISC 1 each by Does not apply route 2 times daily 300 each 11    MYRBETRIQ 50 MG TB24 TAKE ONE TABLET BY MOUTH ONCE DAILY 90 tablet 3    levothyroxine (SYNTHROID) 75 MCG tablet TAKE ONE (1) TABLET BY MOUTH EVERY MORNING (BEFORE BREAKFAST)  90 tablet 3    hydrALAZINE (APRESOLINE) 10 MG tablet       vitamin B-12 (CYANOCOBALAMIN) 1000 MCG tablet Take 1 tablet by mouth daily 90 tablet 3    ACETAMINOPHEN EXTRA STRENGTH 500 MG tablet TAKE 1 TABLET BY MOUTH EVERY 6 HOURS AS NEEDED FOR PAIN  100 tablet 11    Blood Pressure Monitor KIT Use as directed. 1 kit 1    blood glucose monitor strips Test 2-3 times a day & as needed for symptoms of irregular blood glucose. BRAND OF CHOICE INSURANCE ALLOWS. 100 strip 11    Alcohol Swabs (ALCOHOL PREP) PADS Use as directed 100 each 11     No current facility-administered medications for this visit. ALLERGIES:   Allergies   Allergen Reactions    Sulfa Antibiotics Nausea Only    Penicillins Rash       SOCIAL HISTORY:  Social History     Tobacco Use    Smoking status: Never Smoker    Smokeless tobacco: Never Used   Substance Use Topics    Alcohol use: No     Alcohol/week: 0.0 standard drinks       Pertinent ROS:  Review of Systems  Skin: Denies any new changing, growing or bleeding lesions or rashes except as described in the HPI   Constitutional: Denies fevers, chills, and malaise. PHYSICAL EXAM:   /72 (Site: Right Upper Arm, Position: Sitting, Cuff Size: Medium Adult)   Pulse (!) 48   Ht 5' 2\" (1.575 m)   Wt 236 lb (107 kg)   LMP  (LMP Unknown)   SpO2 97%   BMI 43.16 kg/m²     The patient is generally well appearing, well nourished, alert and conversational. Affect is normal.    Cutaneous Exam:  Physical Exam  Sun-exposed skin, which includes the head/face, neck, both arms, digits and/or nails was examined. + back    Diagnoses/exam findings/medical history pertinent to this visit are listed below:    Assessment and Plan:  Assessment   1.  Seborrheic keratoses  reassurance and education RTC for skin check as scheduled    Patient Instructions   Seborrheic Keratosis  Seborrheic keratoses are common benign growths of unknown cause seen in adults due to a thickening of an area of the top skin layer. Who's At Risk  Although they can occur anytime after puberty, almost everyone over 48 has one or more of these and they increase in number with age. Some families have an inherited tendency to grow multiple lesions. Men and women are equally as likely to develop seborrheic keratoses. Dark-skinned people are less affected than those with light skin; a variant seen in blacks is called dermatosis papulosa nigra. Signs & Symptoms  One or more spots can occur anywhere on the body, except for palms, soles, and mucous membranes (eg, in the mouth or rectum). They do not go away. They do not turn into cancers, but some cancers resemble seborrheic keratosis. They start as light brown to skin-colored, flat areas, which are round to oval and of varying size (usually less than a half inch, but sometimes much larger). As they grow thicker and rise above the skin surface, seborrheic keratoses may become dark brown to almost black with a \"stuck on\" appearance. The surface may feel smooth or rough. Self-Care Guidelines  No treatment is needed unless there is irritation from clothing with itching or bleeding. There is no way to prevent new spots from forming. Some lotions with alpha hydroxyl acids may make the areas feel smoother with regular use but will not eliminate them. OTC freezing techniques are available but usually not effective. When to [facility]  If a spot on the skin is growing, bleeding, painful, or itchy, or any other concerning changes, then see your doctor.         This note was created with the assistance of a speech-recognition program.  Although the intention is to generate a document that actually reflects the content of the visit, no guarantees can be provided that every mistake has been identified and corrected byediting.     Electronically signed by Dereje Mar MD on 4/14/21 at 1:18 PM EDT

## 2021-04-14 NOTE — PATIENT INSTRUCTIONS
Seborrheic Keratosis  Seborrheic keratoses are common benign growths of unknown cause seen in adults due to a thickening of an area of the top skin layer. Who's At Risk  Although they can occur anytime after puberty, almost everyone over 48 has one or more of these and they increase in number with age. Some families have an inherited tendency to grow multiple lesions. Men and women are equally as likely to develop seborrheic keratoses. Dark-skinned people are less affected than those with light skin; a variant seen in blacks is called dermatosis papulosa nigra. Signs & Symptoms  One or more spots can occur anywhere on the body, except for palms, soles, and mucous membranes (eg, in the mouth or rectum). They do not go away. They do not turn into cancers, but some cancers resemble seborrheic keratosis. They start as light brown to skin-colored, flat areas, which are round to oval and of varying size (usually less than a half inch, but sometimes much larger). As they grow thicker and rise above the skin surface, seborrheic keratoses may become dark brown to almost black with a \"stuck on\" appearance. The surface may feel smooth or rough. Self-Care Guidelines  No treatment is needed unless there is irritation from clothing with itching or bleeding. There is no way to prevent new spots from forming. Some lotions with alpha hydroxyl acids may make the areas feel smoother with regular use but will not eliminate them. OTC freezing techniques are available but usually not effective. When to [facility]  If a spot on the skin is growing, bleeding, painful, or itchy, or any other concerning changes, then see your doctor.

## 2021-04-24 DIAGNOSIS — E53.8 VITAMIN B 12 DEFICIENCY: ICD-10-CM

## 2021-04-26 RX ORDER — LANOLIN ALCOHOL/MO/W.PET/CERES
CREAM (GRAM) TOPICAL
Qty: 90 TABLET | Refills: 3 | Status: SHIPPED | OUTPATIENT
Start: 2021-04-26 | End: 2022-02-23

## 2021-07-13 ENCOUNTER — HOSPITAL ENCOUNTER (OUTPATIENT)
Age: 71
Setting detail: SPECIMEN
Discharge: HOME | End: 2021-07-13
Payer: MEDICARE

## 2021-07-13 ENCOUNTER — OFFICE VISIT (OUTPATIENT)
Dept: FAMILY MEDICINE CLINIC | Age: 71
End: 2021-07-13
Payer: MEDICARE

## 2021-07-13 VITALS
HEIGHT: 62 IN | TEMPERATURE: 97 F | WEIGHT: 242 LBS | BODY MASS INDEX: 44.53 KG/M2 | HEART RATE: 57 BPM | SYSTOLIC BLOOD PRESSURE: 126 MMHG | DIASTOLIC BLOOD PRESSURE: 80 MMHG | OXYGEN SATURATION: 97 %

## 2021-07-13 DIAGNOSIS — I10 ESSENTIAL HYPERTENSION: ICD-10-CM

## 2021-07-13 DIAGNOSIS — I50.32 CHF NYHA CLASS I, CHRONIC, DIASTOLIC (HCC): ICD-10-CM

## 2021-07-13 DIAGNOSIS — I48.0 PAROXYSMAL ATRIAL FIBRILLATION (HCC): ICD-10-CM

## 2021-07-13 DIAGNOSIS — E11.65 TYPE 2 DIABETES MELLITUS WITH HYPERGLYCEMIA, WITHOUT LONG-TERM CURRENT USE OF INSULIN (HCC): Primary | ICD-10-CM

## 2021-07-13 DIAGNOSIS — E11.65 TYPE 2 DIABETES MELLITUS WITH HYPERGLYCEMIA, WITHOUT LONG-TERM CURRENT USE OF INSULIN (HCC): ICD-10-CM

## 2021-07-13 DIAGNOSIS — M17.0 PRIMARY OSTEOARTHRITIS OF BOTH KNEES: ICD-10-CM

## 2021-07-13 LAB
ALBUMIN SERPL-MCNC: 4 G/DL (ref 3.5–5.2)
ALBUMIN/GLOBULIN RATIO: ABNORMAL (ref 1–2.5)
ALP BLD-CCNC: 80 U/L (ref 35–104)
ALT SERPL-CCNC: 11 U/L (ref 5–33)
ANION GAP SERPL CALCULATED.3IONS-SCNC: 11 MMOL/L (ref 9–17)
AST SERPL-CCNC: 15 U/L
BILIRUB SERPL-MCNC: 0.25 MG/DL (ref 0.3–1.2)
BNP INTERPRETATION: NORMAL
BUN BLDV-MCNC: 15 MG/DL (ref 8–23)
BUN/CREAT BLD: ABNORMAL (ref 9–20)
CALCIUM SERPL-MCNC: 9.2 MG/DL (ref 8.6–10.4)
CHLORIDE BLD-SCNC: 102 MMOL/L (ref 98–107)
CO2: 29 MMOL/L (ref 20–31)
CREAT SERPL-MCNC: 0.79 MG/DL (ref 0.5–0.9)
FOLATE: 8.6 NG/ML
GFR AFRICAN AMERICAN: >60 ML/MIN
GFR NON-AFRICAN AMERICAN: >60 ML/MIN
GFR SERPL CREATININE-BSD FRML MDRD: ABNORMAL ML/MIN/{1.73_M2}
GFR SERPL CREATININE-BSD FRML MDRD: ABNORMAL ML/MIN/{1.73_M2}
GLUCOSE BLD-MCNC: 117 MG/DL (ref 70–99)
HBA1C MFR BLD: 5.4 %
HCT VFR BLD CALC: 35.6 % (ref 36–46)
HEMOGLOBIN: 11.7 G/DL (ref 12–16)
MCH RBC QN AUTO: 30.1 PG (ref 26–34)
MCHC RBC AUTO-ENTMCNC: 33 G/DL (ref 31–37)
MCV RBC AUTO: 91 FL (ref 80–100)
NRBC AUTOMATED: ABNORMAL PER 100 WBC
PDW BLD-RTO: 14.8 % (ref 11.5–14.9)
PLATELET # BLD: 282 K/UL (ref 150–450)
PMV BLD AUTO: 8.8 FL (ref 6–12)
POTASSIUM SERPL-SCNC: 3.9 MMOL/L (ref 3.7–5.3)
PRO-BNP: 274 PG/ML
RBC # BLD: 3.91 M/UL (ref 4–5.2)
SODIUM BLD-SCNC: 142 MMOL/L (ref 135–144)
TOTAL PROTEIN: 7.2 G/DL (ref 6.4–8.3)
TSH SERPL DL<=0.05 MIU/L-ACNC: 3.67 MIU/L (ref 0.3–5)
VITAMIN B-12: 1311 PG/ML (ref 232–1245)
WBC # BLD: 7.7 K/UL (ref 3.5–11)

## 2021-07-13 PROCEDURE — 1123F ACP DISCUSS/DSCN MKR DOCD: CPT | Performed by: FAMILY MEDICINE

## 2021-07-13 PROCEDURE — 82607 VITAMIN B-12: CPT

## 2021-07-13 PROCEDURE — 4040F PNEUMOC VAC/ADMIN/RCVD: CPT | Performed by: FAMILY MEDICINE

## 2021-07-13 PROCEDURE — 36415 COLL VENOUS BLD VENIPUNCTURE: CPT

## 2021-07-13 PROCEDURE — 99214 OFFICE O/P EST MOD 30 MIN: CPT | Performed by: FAMILY MEDICINE

## 2021-07-13 PROCEDURE — 1036F TOBACCO NON-USER: CPT | Performed by: FAMILY MEDICINE

## 2021-07-13 PROCEDURE — 1090F PRES/ABSN URINE INCON ASSESS: CPT | Performed by: FAMILY MEDICINE

## 2021-07-13 PROCEDURE — 3044F HG A1C LEVEL LT 7.0%: CPT | Performed by: FAMILY MEDICINE

## 2021-07-13 PROCEDURE — 2022F DILAT RTA XM EVC RTNOPTHY: CPT | Performed by: FAMILY MEDICINE

## 2021-07-13 PROCEDURE — G8427 DOCREV CUR MEDS BY ELIG CLIN: HCPCS | Performed by: FAMILY MEDICINE

## 2021-07-13 PROCEDURE — G8417 CALC BMI ABV UP PARAM F/U: HCPCS | Performed by: FAMILY MEDICINE

## 2021-07-13 PROCEDURE — 85027 COMPLETE CBC AUTOMATED: CPT

## 2021-07-13 PROCEDURE — 84443 ASSAY THYROID STIM HORMONE: CPT

## 2021-07-13 PROCEDURE — G8399 PT W/DXA RESULTS DOCUMENT: HCPCS | Performed by: FAMILY MEDICINE

## 2021-07-13 PROCEDURE — 83036 HEMOGLOBIN GLYCOSYLATED A1C: CPT | Performed by: FAMILY MEDICINE

## 2021-07-13 PROCEDURE — 82746 ASSAY OF FOLIC ACID SERUM: CPT

## 2021-07-13 PROCEDURE — 80053 COMPREHEN METABOLIC PANEL: CPT

## 2021-07-13 PROCEDURE — 83880 ASSAY OF NATRIURETIC PEPTIDE: CPT

## 2021-07-13 PROCEDURE — 3017F COLORECTAL CA SCREEN DOC REV: CPT | Performed by: FAMILY MEDICINE

## 2021-07-13 SDOH — ECONOMIC STABILITY: FOOD INSECURITY: WITHIN THE PAST 12 MONTHS, THE FOOD YOU BOUGHT JUST DIDN'T LAST AND YOU DIDN'T HAVE MONEY TO GET MORE.: NEVER TRUE

## 2021-07-13 SDOH — ECONOMIC STABILITY: FOOD INSECURITY: WITHIN THE PAST 12 MONTHS, YOU WORRIED THAT YOUR FOOD WOULD RUN OUT BEFORE YOU GOT MONEY TO BUY MORE.: NEVER TRUE

## 2021-07-13 ASSESSMENT — ENCOUNTER SYMPTOMS
DIARRHEA: 0
NAUSEA: 0
CONSTIPATION: 0
ABDOMINAL DISTENTION: 0
ABDOMINAL PAIN: 0
SHORTNESS OF BREATH: 0
WHEEZING: 0
VOMITING: 0
BACK PAIN: 1
CHEST TIGHTNESS: 0
COUGH: 0

## 2021-07-13 ASSESSMENT — PATIENT HEALTH QUESTIONNAIRE - PHQ9
1. LITTLE INTEREST OR PLEASURE IN DOING THINGS: 0
SUM OF ALL RESPONSES TO PHQ9 QUESTIONS 1 & 2: 0
2. FEELING DOWN, DEPRESSED OR HOPELESS: 0
SUM OF ALL RESPONSES TO PHQ QUESTIONS 1-9: 0

## 2021-07-13 ASSESSMENT — SOCIAL DETERMINANTS OF HEALTH (SDOH): HOW HARD IS IT FOR YOU TO PAY FOR THE VERY BASICS LIKE FOOD, HOUSING, MEDICAL CARE, AND HEATING?: NOT HARD AT ALL

## 2021-07-13 NOTE — PROGRESS NOTES
Azam Elder (:  1950) is a 73 y.o. female,Established patient, here for evaluation of the following chief complaint(s): Diabetes (informed to schedule eye exam), Hypertension (hasnt taken meds yet just woke up so only had thyroid med), Orders (home care), and Knee Pain      ASSESSMENT/PLAN:    1. Type 2 diabetes mellitus with hyperglycemia, without long-term current use of insulin ([facility])  Improved  Very well controlled    -     POCT glycosylated hemoglobin (Hb A1C)    Lab Results   Component Value Date    LABA1C 5.4 2021    LABA1C 5.5 2021    LABA1C 5.1 10/30/2020       -     CBC; Future  -     Comprehensive Metabolic Panel; Future  -     TSH without Reflex; Future  -     Vitamin B12 & Folate; Future  We will check A1c every 3 to 6 months in the office or at the lab, patient declines checking home blood glucose  -daily feet exam, Foot care: advised to wash feet daily, pat dry and apply lotion at night, avoiding between toes. Need to look at feet daily and report to a physician any signs of inflammation or skin damage  -annual dilated eye exam  -Low carb, low fat diet, increase fruits and vegetables, and exercise 4-5 times a day 30-40 minutes a day discussed  -continue current treatment Metformin  -She is not on aspirin due to chronic anticoagulation and high risk for bleeding    -continue statin Lipitor        2. Essential hypertension  Well controlled. Continue current treatment. Will recheck labs. Discussed low salt diet and BP and pulse monitoring.    -     CBC; Future  -     Comprehensive Metabolic Panel; Future  -     TSH without Reflex; Future  3. Paroxysmal atrial fibrillation (HCC)  Stable  Continue rate control with diltiazem and metoprolol, and chronic anticoagulation with Xarelto  -     TSH without Reflex; Future  4.  CHF NYHA class I, chronic, diastolic (HCC)  Stable  Lab Results   Component Value Date    LVEF 64 2020     Continue metoprolol, statin, Cardizem, strongly advised to follow-up with cardiologist      -     Brain Natriuretic Peptide; Future  5. Primary osteoarthritis of both knees  Likely worsening due to wear and tear nature of the disease. Uses walker with seat at all times to prevent falls  Tylenol extra strength for pain        I advised Asael Green to make appointment with cardiology and Dr. Bridget Carlisle. The patient verbalizes understanding and agrees with the plan. Patient would benefit to continue Home care for aids, uses walker with seat at all times    Will get COVID 19 at [facility] received counseling on the following healthy behaviors: nutrition, exercise, medication adherence and weight loss, falls precautions    Reviewed prior labs and health maintenance  Discussed use, benefit, and side effects of prescribed medications. Barriers to medication compliance addressed. Patient given educational materials - see patient instructions  Was a self-tracking handout given in paper form or via [employer]? Yes  All patient questions answered. Patient voiced understanding. The patient's past medical,surgical, social, and family history as well as her current medications and allergies were reviewed as documented in today's encounter. Medications, labs, diagnostic studies, consultations and follow-up as documented in this encounter. Return in about 4 months (around 11/13/2021) for ALWAYS NEEDS 30 MIN. APPT, **POC A1C, diabetes, HTN,HLP, LABS F/U. Data Unavailable    Future Appointments   Date Time Provider Dante Amaya   7/21/2021 10:45 AM [facility]   10/7/2021 10:30 AM YOHANNES Rodriguez GYN Oncology [facility]   11/26/2021 10:00 AM Dora Christina MD [facility]         SUBJECTIVE/OBJECTIVE:    Diabetes Mellitus Type II, Follow-up:    Current symptoms/problems include hyperglycemia and visual disturbances. Symptoms have been present for 1 year.     Known diabetic complications: none  Cardiovascular risk factors: advanced age (older than 54 for men, 72 for women), diabetes mellitus, dyslipidemia, hypertension and obesity (BMI >= 30 kg/m2)    Medication compliance:  compliant all of the time  Current diabetic medications include oral agents (dual therapy): Glucophage. Eye exam current (within one year): no  Current diet: in general, a \"healthy\" diet  , diabetic, low fat/ cholesterol, low salt    Barriers: impairment:  visual impaired, and physical: Osteoarthritis in her knees, difficulty ambulating, uses walker at all times, patient says she still needs the aids to help her out    Current monitoring regimen: office lab tests - A1c every 3-6 months  Home blood sugar records: patient does not test  Any episodes of hypoglycemia? no    Is She on ACE inhibitor or angiotensin II receptor blocker? No      reports that she has never smoked. She has never used smokeless tobacco.     Counseling given: Yes      Daily Aspirin? No: Patient is on chronic anticoagulation      A1c is improved very well controlled. Lab Results   Component Value Date    LABA1C 5.4 07/13/2021    LABA1C 5.5 02/12/2021    LABA1C 5.1 10/30/2020     There is unintentional weight gain of 18 pounds in 9 months  Wt Readings from Last 3 Encounters:   07/13/21 242 lb (109.8 kg)   04/14/21 236 lb (107 kg)   04/08/21 237 lb 6.4 oz (107.7 kg)      10/30/20 224 lb (101.6 kg)           Hypertension, congestive heart failure ,Paroxismal AFib  Deo Clark  is not  exercising, but staying active, and is adherent to low salt diet. Blood pressure is well controlled at home when nurse checking, has BP machine . Cardiac symptoms  fatigue. Patient denies  chest pain, chest pressure/discomfort, claudication, dyspnea, exertional chest pressure/discomfort, irregular heart beat, lower extremity edema, near-syncope, orthopnea, palpitations, paroxysmal nocturnal dyspnea, syncope and tachypnea. Use of agents associated with hypertension: none.   History of target MOUTH EVERY MORNING (BEFORE BREAKFAST)  Yes Deniz Hernandez MD   dilTIAZem (CARDIZEM) 30 MG tablet  Yes Historical Provider, MD   hydrALAZINE (APRESOLINE) 10 MG tablet  Yes Historical Provider, MD   XARELTO 20 MG TABS tablet TAKE ONE (1) TABLET BY MOUTH DAILY (WITH BREAKFAST)  Yes Rupert Hernandez MD   oxybutynin (DITROPAN-XL) 10 MG extended release tablet TAKE 1 TABLET BY MOUTH ONCE DAILY  Yes Deniz Hernandez MD   metFORMIN (GLUCOPHAGE) 500 MG tablet TAKE ONE TABLET BY MOUTH TWICE A DAY WITH A MEAL  Yes Deniz Hernandez MD   docusate sodium (COLACE) 100 MG capsule Take 1 capsule by mouth 2 times daily For constipation Yes Deniz Hernandez MD   metoprolol tartrate (LOPRESSOR) 25 MG tablet Take 0.5 tablets by mouth 2 times daily Dose decreased by cardiologist Yes Deniz Hernandez MD   Probiotic Product (LISSY-BID PROBIOTIC) TABS TAKE 1 TABLET BY MOUTH IN THE MORNING  Yes Deniz Hernandez MD   vitamin D3 (CHOLECALCIFEROL) 25 MCG (1000 UT) TABS tablet TAKE 2 TABLETS BY MOUTH ONCE DAILY  Yes Deniz Hernandez MD   pantoprazole (PROTONIX) 40 MG tablet TAKE 1 TABLET BY MOUTH ONCE DAILY  Yes Deniz Hernandez MD   ACETAMINOPHEN EXTRA STRENGTH 500 MG tablet TAKE 1 TABLET BY MOUTH EVERY 6 HOURS AS NEEDED FOR PAIN  Yes Deniz Hernandez MD   Blood Pressure Monitor KIT Use as directed. Yes MAX Barker CNP   glucose monitoring kit (FREESTYLE) monitoring kit Use as directed. BRAND OF CHOICE INSURANCE ALLOWS. Yes MAX Barker CNP   blood glucose monitor strips Test 2-3 times a day & as needed for symptoms of irregular blood glucose. BRAND OF CHOICE INSURANCE ALLOWS.  Yes MAX Barker CNP   Alcohol Swabs (ALCOHOL PREP) PADS Use as directed Yes MAX Barker CNP   Lancets MISC 1 each by Does not apply route 2 times daily Yes MAX Barker CNP   MYRBETRIQ 50 MG TB24 TAKE ONE TABLET BY MOUTH ONCE DAILY Yes Deniz Hernandez MD Social History     Tobacco Use    Smoking status: Never Smoker    Smokeless tobacco: Never Used   Vaping Use    Vaping Use: Never used   Substance Use Topics    Alcohol use: No     Alcohol/week: 0.0 standard drinks    Drug use: No         Review of Systems   Constitutional: Positive for fatigue and unexpected weight change. Negative for activity change, appetite change, chills, diaphoresis and fever. Eyes: Positive for visual disturbance (stable, chronic). Respiratory: Negative for cough, chest tightness, shortness of breath and wheezing. Cardiovascular: Negative for chest pain, palpitations and leg swelling. Gastrointestinal: Negative for abdominal distention, abdominal pain, constipation, diarrhea, nausea and vomiting. Endocrine: Negative for cold intolerance, heat intolerance, polydipsia, polyphagia and polyuria. Musculoskeletal: Positive for arthralgias (knees), back pain and gait problem. Ambulates with walker    Allergic/Immunologic: Positive for environmental allergies. Neurological: Negative for numbness. Hematological: Does not bruise/bleed easily. Psychiatric/Behavioral: Negative for dysphoric mood. -vital signs stable and within normal limits except Morbid obesity per BMI. /80   Pulse 57   Temp 97 °F (36.1 °C) (Temporal)   Ht 5' 2\" (1.575 m)   Wt 242 lb (109.8 kg)   LMP  (LMP Unknown)   SpO2 97%   BMI 44.26 kg/m²         Physical Exam  Vitals and nursing note reviewed. Constitutional:       General: She is not in acute distress. Appearance: Normal appearance. She is well-developed. She is obese. She is not diaphoretic. HENT:      Head: Normocephalic and atraumatic. Right Ear: External ear normal.      Left Ear: External ear normal.      Mouth/Throat:      Comments: I did not examine the mouth due to coronavirus pandemic and wearing masks    Eyes:      General: Lids are normal. No scleral icterus. Right eye: No discharge. Left eye: No discharge. Extraocular Movements: Extraocular movements intact. Conjunctiva/sclera: Conjunctivae normal.   Neck:      Thyroid: No thyromegaly. Cardiovascular:      Rate and Rhythm: Normal rate and regular rhythm. Heart sounds: Normal heart sounds. No murmur heard. Pulmonary:      Effort: Pulmonary effort is normal. No respiratory distress. Breath sounds: Normal breath sounds. No wheezing or rales. Chest:      Chest wall: No tenderness. Abdominal:      General: Bowel sounds are normal. There is no distension. Palpations: Abdomen is soft. There is no hepatomegaly or splenomegaly. Tenderness: There is no abdominal tenderness. Comments: Obese abdomen. Musculoskeletal:      Cervical back: Normal range of motion and neck supple. Right knee: Decreased range of motion. Tenderness present. Left knee: Decreased range of motion. Tenderness present. Right lower leg: No edema. Left lower leg: No edema. Comments: Ambulates with walker with seat   Skin:     General: Skin is warm and dry. Capillary Refill: Capillary refill takes less than 2 seconds. Findings: No rash. Neurological:      Mental Status: She is alert and oriented to person, place, and time. Cranial Nerves: No cranial nerve deficit. Motor: No abnormal muscle tone. Psychiatric:         Mood and Affect: Mood normal.         Behavior: Behavior normal.         Thought Content: Thought content normal.         Judgment: Judgment normal.           I personally reviewed testing with patient.   Hyperglycemia  Anemia, on iron supplement  Hyperlipidemia improved  Vitamin B12 deficiency mild, taking vitamin B12 supplementation  Otherwise labs within normal limits    Office Visit on 02/12/2021   Component Date Value Ref Range Status    Hemoglobin A1C 02/12/2021 5.5  % Final       Lab Results   Component Value Date    WBC 8.0 11/06/2020    HGB 11.2 (A) 11/06/2020 HCT 34.1 (A) 11/06/2020    MCV 89 11/06/2020     11/06/2020       Lab Results   Component Value Date     11/06/2020    K 3.9 11/06/2020     11/06/2020    CO2 27 11/06/2020    BUN 17 11/06/2020    CREATININE 0.83 11/06/2020    GLUCOSE 91 11/06/2020    GLUCOSE 114 03/20/2012    CALCIUM 9.3 11/06/2020        Lab Results   Component Value Date    ALT 9 11/06/2020    AST 13 11/06/2020    ALKPHOS 64 11/06/2020    BILITOT 0.5 11/06/2020       Lab Results   Component Value Date    TSH 2.91 11/06/2020       Lab Results   Component Value Date    CHOL 144 11/06/2020    CHOL 145 01/28/2020    CHOL 188 11/14/2018     Lab Results   Component Value Date    TRIG 142 11/06/2020    TRIG 158 (H) 01/28/2020    TRIG 179 (H) 11/14/2018     Lab Results   Component Value Date    HDL 46 11/06/2020    HDL 47 01/28/2020    HDL 47 11/14/2018     Lab Results   Component Value Date    LDLCALC 70 11/06/2020    LDLCHOLESTEROL 66 01/28/2020    LDLCHOLESTEROL 105 11/14/2018    LDLCHOLESTEROL 179 (H) 07/31/2018     Lab Results   Component Value Date    CHOLHDLRATIO 3.1 11/06/2020    CHOLHDLRATIO 3.1 01/28/2020    CHOLHDLRATIO 4.0 11/14/2018         Lab Results   Component Value Date    IOIFIYHW59 273 01/09/2020     Lab Results   Component Value Date    FOLATE 7.0 01/09/2020     Lab Results   Component Value Date    VITD25 31.4 01/09/2020         No orders of the defined types were placed in this encounter.       Orders Placed This Encounter   Procedures    CBC     Standing Status:   Future     Number of Occurrences:   1     Standing Expiration Date:   9/10/2021    Comprehensive Metabolic Panel     Standing Status:   Future     Number of Occurrences:   1     Standing Expiration Date:   9/10/2021    TSH without Reflex     Standing Status:   Future     Number of Occurrences:   1     Standing Expiration Date:   9/10/2021    Vitamin B12 & Folate     Standing Status:   Future     Number of Occurrences:   1     Standing Expiration Date:   9/10/2021    Brain Natriuretic Peptide     Standing Status:   Future     Number of Occurrences:   1     Standing Expiration Date:   7/13/2022    POCT glycosylated hemoglobin (Hb A1C)       There are no discontinued medications. On this date 7/13/2021 I have spent  35 minutes reviewing previous notes, test results and face to face with the patient discussing the diagnosis and importance of compliance with the treatment plan as well as documenting on the day of the visit. This note was completed by using the assistance of a speech-recognition program. However, inadvertent computerized transcription errors may be present. Although every effort was made to ensure accuracy, no guarantees can be provided that every mistake has been identified and corrected by editing . An electronic signature was used to authenticate this note.   Electronically signed by Yesi Barney MD on 7/19/2021 at 10:08 AM

## 2021-07-13 NOTE — PATIENT INSTRUCTIONS
Patient Education        Learning About Carbohydrate (Carb) Counting and Eating Out When You Have Diabetes  Why plan your meals? Meal planning can be a key part of managing diabetes. Planning meals and snacks with the right balance of carbohydrate, protein, and fat can help you keep your blood sugar at the target level you set with your doctor. You don't have to eat special foods. You can eat what your family eats, including sweets once in a while. But you do have to pay attention to how often you eat and how much you eat of certain foods. You may want to work with a dietitian or a certified diabetes educator. He or she can give you tips and meal ideas and can answer your questions about meal planning. This health professional can also help you reach a healthy weight if that is one of your goals. What should you know about eating carbs? Managing the amount of carbohydrate (carbs) you eat is an important part of healthy meals when you have diabetes. Carbohydrate is found in many foods. · Learn which foods have carbs. And learn the amounts of carbs in different foods. ? Bread, cereal, pasta, and rice have about 15 grams of carbs in a serving. A serving is 1 slice of bread (1 ounce), ½ cup of cooked cereal, or 1/3 cup of cooked pasta or rice. ? Fruits have 15 grams of carbs in a serving. A serving is 1 small fresh fruit, such as an apple or orange; ½ of a banana; ½ cup of cooked or canned fruit; ½ cup of fruit juice; 1 cup of melon or raspberries; or 2 tablespoons of dried fruit. ? Milk and no-sugar-added yogurt have 15 grams of carbs in a serving. A serving is 1 cup of milk or 2/3 cup of no-sugar-added yogurt. ? Starchy vegetables have 15 grams of carbs in a serving. A serving is ½ cup of mashed potatoes or sweet potato; 1 cup winter squash; ½ of a small baked potato; ½ cup of cooked beans; or ½ cup cooked corn or green peas.   · Learn how much carbs to eat each day and at each meal. A dietitian or CDE can is a healthy choice because people who have diabetes are at higher risk of heart disease. So choose lean cuts of meat and nonfat or low-fat dairy products. Use olive or canola oil instead of butter or shortening when cooking. · Don't skip meals. Your blood sugar may drop too low if you skip meals and take insulin or certain medicines for diabetes. · Check with your doctor before you drink alcohol. Alcohol can cause your blood sugar to drop too low. Alcohol can also cause a bad reaction if you take certain diabetes medicines. Follow-up care is a key part of your treatment and safety. Be sure to make and go to all appointments, and call your doctor if you are having problems. It's also a good idea to know your test results and keep a list of the medicines you take. Where can you learn more? Go to https://[employer]. org and sign in to your "[employer] account. Enter F709 in the [employer] box to learn more about \"Learning About Carbohydrate (Carb) Counting and Eating Out When You Have Diabetes. \"     If you do not have an account, please click on the \"Sign Up Now\" link. Current as of: August 31, 2020               Content Version: 12.9  © 2006-2021 Healthwise, Incorporated. Care instructions adapted under license by [facility] ([facility]). If you have questions about a medical condition or this instruction, always ask your healthcare professional. [place] [address] any warranty or liability for your use of this information.

## 2021-07-13 NOTE — PROGRESS NOTES
Visit Information    Have you changed or started any medications since your last visit including any over-the-counter medicines, vitamins, or herbal medicines? no   Are you having any side effects from any of your medications? -  no  Have you stopped taking any of your medications? Is so, why? -  no    Have you seen any other physician or provider since your last visit? No  Have you had any other diagnostic tests since your last visit? No  Have you been seen in the emergency room and/or had an admission to a hospital since we last saw you? No  Have you had your routine dental cleaning in the past 6 months? no    Have you activated your [employer] account? If not, what are your barriers?  Yes     Patient Care Team:  Ct Paez MD as PCP - General (Family Medicine)  Ct Paez MD as PCP - [facility]  Siegfried Severs, MD as Referring Physician (Orthopedic Surgery)  Giselle Malone MD as Surgeon (Orthopedic Surgery)  Nikki Hill MD as Consulting Physician (Endocrinology)  Kathia De La Vega DO as Consulting Physician (Obstetrics & Gynecology)  Estefani Akers MD as Consulting Physician (Urology)  Amor Joe MD as Consulting Physician (Otolaryngology)  Laurence Krishna MD as Consulting Physician (Obstetrics & Gynecology)  Rachna Rivera DO as Consulting Physician (General Surgery)  Jessi Altman DO as Consulting Physician (Cardiology)  Soledad Wilks MD as Surgeon (Ophthalmology)  Noris Manjarrez MD as Consulting Physician (Dermatology)    Medical History Review  Past Medical, Family, and Social History reviewed and does contribute to the patient presenting condition    Health Maintenance   Topic Date Due    Diabetic retinal exam  Never done    COVID-19 Vaccine (1) Never done    Shingles Vaccine (1 of 2) Never done    Diabetic microalbuminuria test  01/30/2021    A1C test (Diabetic or Prediabetic)  05/12/2021    Flu vaccine (1) 09/01/2021    Diabetic foot exam 10/30/2021    Lipid screen  11/06/2021    TSH testing  11/06/2021    Potassium monitoring  11/06/2021    Creatinine monitoring  11/06/2021    Breast cancer screen  12/09/2021    Annual Wellness Visit (AWV)  02/13/2022    Colon cancer screen colonoscopy  08/03/2023    DTaP/Tdap/Td vaccine (2 - Td or Tdap) 09/28/2027    DEXA (modify frequency per FRAX score)  Completed    Pneumococcal 65+ years Vaccine  Completed    Hepatitis C screen  Completed    Hepatitis A vaccine  Aged Out    Hib vaccine  Aged Out    Meningococcal (ACWY) vaccine  Aged Out

## 2021-07-13 NOTE — RESULT ENCOUNTER NOTE
Please notify patient, normal A1c 5.4, greatly improved diabetes, continue current treatment    Future Appointments  7/21/2021  10:45 AM   Job Salazar MD          derm             [facility]  10/7/2021  10:30 AM   Bobby Sanders PA-C         GYN Oncology        [facility]  11/26/2021 10:00 AM   Celeste Tarango MD     [facility]

## 2021-07-13 NOTE — RESULT ENCOUNTER NOTE
ABNORMAL. Please notify patient.   Blood glucose 117 well-controlled diabetes  Mild anemia is improving, continue iron supplement and vitamin B12  Patient is up-to-date with colonoscopy, and EGD done 8/3/2020, and she had hysterectomy  Otherwise labs within normal limits    We will recheck her blood work at the next appointment and if she still has anemia then will refer her back to GI      Future Appointments  7/21/2021  10:45 AM   Vahid Jones MD          derm             [facility]  10/7/2021  10:30 AM   Iglesia Bonds PA-C         GYN Oncology        [facility]  11/26/2021 10:00 AM   Dung Mijares MD     [facility]

## 2021-07-14 DIAGNOSIS — E55.9 VITAMIN D DEFICIENCY: ICD-10-CM

## 2021-07-14 DIAGNOSIS — K21.9 GASTROESOPHAGEAL REFLUX DISEASE WITHOUT ESOPHAGITIS: ICD-10-CM

## 2021-07-14 DIAGNOSIS — K59.01 SLOW TRANSIT CONSTIPATION: ICD-10-CM

## 2021-07-14 RX ORDER — MELATONIN
Qty: 60 TABLET | Refills: 11 | Status: SHIPPED | OUTPATIENT
Start: 2021-07-14 | End: 2022-07-13

## 2021-07-14 RX ORDER — DOCUSATE SODIUM 100 MG/1
CAPSULE, LIQUID FILLED ORAL
Qty: 180 CAPSULE | Refills: 3 | Status: SHIPPED | OUTPATIENT
Start: 2021-07-14 | End: 2021-12-01 | Stop reason: SDUPTHER

## 2021-07-14 RX ORDER — PROBIOTIC PRODUCT - TAB 1B-250 MG
TAB ORAL
Qty: 30 TABLET | Refills: 11 | Status: SHIPPED | OUTPATIENT
Start: 2021-07-14 | End: 2022-03-22

## 2021-07-21 ENCOUNTER — OFFICE VISIT (OUTPATIENT)
Dept: DERMATOLOGY | Age: 71
End: 2021-07-21
Payer: MEDICARE

## 2021-07-21 VITALS
DIASTOLIC BLOOD PRESSURE: 78 MMHG | WEIGHT: 242 LBS | BODY MASS INDEX: 44.26 KG/M2 | HEART RATE: 54 BPM | OXYGEN SATURATION: 97 % | TEMPERATURE: 97.1 F | SYSTOLIC BLOOD PRESSURE: 137 MMHG

## 2021-07-21 DIAGNOSIS — L82.1 SEBORRHEIC KERATOSES: Primary | ICD-10-CM

## 2021-07-21 DIAGNOSIS — D22.9 MULTIPLE NEVI: ICD-10-CM

## 2021-07-21 DIAGNOSIS — L57.8 ACTINIC SKIN DAMAGE: ICD-10-CM

## 2021-07-21 PROCEDURE — 3017F COLORECTAL CA SCREEN DOC REV: CPT | Performed by: DERMATOLOGY

## 2021-07-21 PROCEDURE — 1123F ACP DISCUSS/DSCN MKR DOCD: CPT | Performed by: DERMATOLOGY

## 2021-07-21 PROCEDURE — 1036F TOBACCO NON-USER: CPT | Performed by: DERMATOLOGY

## 2021-07-21 PROCEDURE — G8399 PT W/DXA RESULTS DOCUMENT: HCPCS | Performed by: DERMATOLOGY

## 2021-07-21 PROCEDURE — G8417 CALC BMI ABV UP PARAM F/U: HCPCS | Performed by: DERMATOLOGY

## 2021-07-21 PROCEDURE — 99213 OFFICE O/P EST LOW 20 MIN: CPT | Performed by: DERMATOLOGY

## 2021-07-21 PROCEDURE — G8427 DOCREV CUR MEDS BY ELIG CLIN: HCPCS | Performed by: DERMATOLOGY

## 2021-07-21 PROCEDURE — 4040F PNEUMOC VAC/ADMIN/RCVD: CPT | Performed by: DERMATOLOGY

## 2021-07-21 PROCEDURE — 1090F PRES/ABSN URINE INCON ASSESS: CPT | Performed by: DERMATOLOGY

## 2021-07-21 NOTE — PROGRESS NOTES
Dermatology Patient Note  [address]  [address]  [address]  Dept: [phone]  Dept Fax: [phone]      VISITDATE: 7/21/2021   REFERRING PROVIDER: No ref. provider found      Juan Carlos Ng is a 73 y.o. female  who presents today in the office for:    Follow-up (6mo FBSE/AK- no areas of concern)      HISTORY OF PRESENT ILLNESS:  As above.      MEDICAL PROBLEMS:  Patient Active Problem List    Diagnosis Date Noted    Morbid obesity with BMI of 40.0-44.9, adult ([address] 02/23/2017     Priority: High    Anemia 11/09/2020    History of 2019 novel coronavirus disease (COVID-19) 09/15/2020    Symptomatic bradycardia 07/24/2020    CHF NYHA class I, chronic, diastolic ([facility] 74/06/6887    Chronic cholecystitis 07/22/2020    Multiple atypical skin moles 07/22/2020    Class 3 severe obesity due to excess calories without serious comorbidity with body mass index (BMI) of 40.0 to 44.9 in adult ([address] 07/22/2020    Mobility impaired 01/21/2020    Paroxysmal atrial fibrillation ([address] 01/14/2020    Type 2 diabetes mellitus, without long-term current use of insulin ([address] 01/14/2020    Aortic calcification ([facility] 03/10/2019    Calculus of gallbladder without cholecystitis without obstruction 03/10/2019    CLAROS (nonalcoholic steatohepatitis) 03/10/2019    Vitamin B 12 deficiency 03/09/2019    History of uterine cancer, Well differentiated grade 1 adenocarcinoma arising in complex hyperplasia, 2017 07/21/2018    Optic atrophy of both eyes 02/27/2018    Cataracta b/l eyes 02/27/2018    TLHBSO, bilateral LND 10/24/17 10/24/2017    Adenomatous polyp of sigmoid colon, 6/26/17 07/30/2017     Final Diagnosis SPECIMEN \"A\":  SIGMOID COLON, 35 CM, BIOPSY:     TUBULAR ADENOMA   SPECIMEN \"B\":  SIGMOID COLON, 20 CM, BIOPSY:  HYPERPLASTIC POLYP      OAB (overactive bladder) 06/10/2017    Primary osteoarthritis of both knees 06/10/2017    Spondylosis of lumbar region metoprolol tartrate (LOPRESSOR) 25 MG tablet Take 0.5 tablets by mouth 2 times daily Dose decreased by cardiologist 30 tablet 5    pantoprazole (PROTONIX) 40 MG tablet TAKE 1 TABLET BY MOUTH ONCE DAILY  30 tablet 11    ACETAMINOPHEN EXTRA STRENGTH 500 MG tablet TAKE 1 TABLET BY MOUTH EVERY 6 HOURS AS NEEDED FOR PAIN  100 tablet 11    Blood Pressure Monitor KIT Use as directed. 1 kit 1    glucose monitoring kit (FREESTYLE) monitoring kit Use as directed. BRAND OF CHOICE INSURANCE ALLOWS. 1 kit 0    blood glucose monitor strips Test 2-3 times a day & as needed for symptoms of irregular blood glucose. BRAND OF CHO[employer] INSURANCE ALLOWS. 100 strip 11    Alcohol Swabs (ALCOHOL PREP) PADS Use as directed 100 each 11    Lancets MISC 1 each by Does not apply route 2 times daily 300 each 11    MYRBETRIQ 50 MG TB24 TAKE ONE TABLET BY MOUTH ONCE DAILY 90 tablet 3     No current facility-administered medications for this visit. ALLERGIES:   Allergies   Allergen Reactions    Sulfa Antibiotics Nausea Only    Penicillins Rash       SOCIAL HISTORY:  Social History     Tobacco Use    Smoking status: Never Smoker    Smokeless tobacco: Never Used   Substance Use Topics    Alcohol use: No     Alcohol/week: 0.0 standard drinks       Pertinent ROS:  Review of Systems  Skin: Denies any new changing, growing or bleeding lesions or rashes except as described in the HPI   Constitutional: Denies fevers, chills, and malaise. PHYSICAL EXAM:   /78   Pulse 54   Temp 97.1 °F (36.2 °C)   Wt 242 lb (109.8 kg)   LMP  (LMP Unknown)   SpO2 97%   BMI 44.26 kg/m²     The patient is generally well appearing, well nourished, alert and conversational. Affect is normal.    Cutaneous Exam:  Physical Exam  Total body skin exam excluding external genitalia: head/face, neck, both arms, chest, back, abdomen, both legs, buttocks, digits and/or nails, was examined.  Genital exam was deferred as patient denied having any lesions in in the first months of life, the mole is called a congenital, or birthmark mole. About 1 in 100 people are born with one or more moles. Most people develop their moles later in childhood or adulthood. These are called acquired moles. They are most common on sun exposed areas of skin such as the face, neck, upper body, arms and legs. CHECKING MOLES  Most moles are harmless, but in rare cases moles may become cancerous. Checking moles and looking for changes is an important step in helping to catch worrisome changes early. Some changes to look for are asymmetry (moles that do not look the same on each half), irregular shapes or borders, uneven color or large size. Also look for any moles that bleed, itch, or become painful. Looking at your skin regularly can help you recognize moles that are more at risk for becoming cancerous. WHEN TO CALL THE DOCTOR  Call your doctor if you see any of the following changes in a mole:       Irregular borders (uneven shape or edges)       Changes in color to black, blue or red.      Changes in the surface texture       Scabs, scaling, irritation or bleeding in the mole    TREATMENT FOR MOLES  Often we can simply look at your moles and tell you if they look worrisome. If we are not concerned about the look of your moles at your appointment, we may measure some moles and take some photos that will allow us to watch for future changes in the moles. TREATMENT FOR MOLES  If a mole is getting irritated frequently, bleeding, difficult to watch due to location or dark color, atypical in appearance, or worrisome, we may perform a skin biopsy. A skin biopsy is a procedure that involves removing the mole so that it can be looked at under a microscope. There are many methods used to remove moles. The method we choose depends on the location of the mole, the size of the mole, and the amount of concern for skin cancer.  Generally, removing moles in the dermatologists office is a simple and safe procedure that can be done with local anesthesia. PREVENTION  You can do some things to prevent moles from becoming cancerous:       Try to avoid long periods of time in the sun and severe sunburns. The sun is        especially dangerous between 10:00 am and 4:00 pm.       Use a broad spectrum, water-resistant sun block lotion with an SPF of 30 or        greater. A broad spectrum lotion blocks both UVA and UVB rays from the sun. Re-apply sunscreen at least every 2 hours and after swimming or sweating.      Take advantage of shade whenever possible. Wear a broad-brimmed hat,        sunglasses, and protective clothing when outdoors.      Do not use tanning beds. Sun Protection     There are two types of sun rays that are harmful to the skin. UVA rays cause skin aging and skin cancer, such as melanoma. UVB rays cause sunburns, cataracts, and also contribute to skin cancer. The American-Academy of Dermatology recommends that children and adults wear a broad spectrum, waterproof sunscreen with a Sun Protection Factor (SPF) of 30 or higher. It is important to check the ingredient label to be sure the sunscreen will protect the skin from both UVA and UVB sunrays. Your sunscreen should contain at least one of the following ingredients: titanium dioxide, zinc oxide, or avobenzone. Sunscreen will not be effective unless it is applied to all exposed skin. Sunscreens work best if they are applied 30 minutes before sun exposure. They should be reapplied every 2 hours and after any water exposure. Sunscreen is not perfect. It is important to use other methods to protect the skin from sun exposure also. Wear hats, sunglasses and other sun protective clothing when outdoors.   Stay in the shade during the peak hours of sun exposure between 10 AM and 4 PM.        This note was created with the assistance of a speech-recognition program.  Although the intention is to generate a document that actually reflects the content of the visit, no guarantees can be provided that every mistake has been identified and corrected by editing. I, Dr. Deretha Primrose, personally performed the services described in this documentation, as scribed by [facility] in my presence, and it is both accurate and complete.      Electronically signed by Matthew Garcia MD on 7/21/21 at 11:38 AM EDT

## 2021-07-21 NOTE — PATIENT INSTRUCTIONS
Moles    Moles, or nevi, are very common. Moles are areas of the skin where there are more cells called melanocytes. Melanocytes are the cells in the body that produce pigment, or color. Moles can be many colors including skin-tone, pink, tan, brown, and very dark brown to black. Moles can be raised or flat. Moles can have hair. Moles can grow on any skin surface, including the scalp, hands and feet. When someone is born with a mole, or develops one in the first months of life, the mole is called a congenital, or birthmark mole. About 1 in 100 people are born with one or more moles. Most people develop their moles later in childhood or adulthood. These are called acquired moles. They are most common on sun exposed areas of skin such as the face, neck, upper body, arms and legs. CHECKING MOLES  Most moles are harmless, but in rare cases moles may become cancerous. Checking moles and looking for changes is an important step in helping to catch worrisome changes early. Some changes to look for are asymmetry (moles that do not look the same on each half), irregular shapes or borders, uneven color or large size. Also look for any moles that bleed, itch, or become painful. Looking at your skin regularly can help you recognize moles that are more at risk for becoming cancerous. WHEN TO CALL THE DOCTOR  Call your doctor if you see any of the following changes in a mole:       Irregular borders (uneven shape or edges)       Changes in color to black, blue or red.      Changes in the surface texture       Scabs, scaling, irritation or bleeding in the mole    TREATMENT FOR MOLES  Often we can simply look at your moles and tell you if they look worrisome. If we are not concerned about the look of your moles at your appointment, we may measure some moles and take some photos that will allow us to watch for future changes in the moles.      TREATMENT FOR MOLES  If a mole is getting irritated frequently, bleeding, difficult to watch due to location or dark color, atypical in appearance, or worrisome, we may perform a skin biopsy. A skin biopsy is a procedure that involves removing the mole so that it can be looked at under a microscope. There are many methods used to remove moles. The method we choose depends on the location of the mole, the size of the mole, and the amount of concern for skin cancer. Generally, removing moles in the dermatologists office is a simple and safe procedure that can be done with local anesthesia. PREVENTION  You can do some things to prevent moles from becoming cancerous:       Try to avoid long periods of time in the sun and severe sunburns. The sun is        especially dangerous between 10:00 am and 4:00 pm.       Use a broad spectrum, water-resistant sun block lotion with an SPF of 30 or        greater. A broad spectrum lotion blocks both UVA and UVB rays from the sun. Re-apply sunscreen at least every 2 hours and after swimming or sweating.      Take advantage of shade whenever possible. Wear a broad-brimmed hat,        sunglasses, and protective clothing when outdoors.      Do not use tanning beds. Sun Protection     There are two types of sun rays that are harmful to the skin. UVA rays cause skin aging and skin cancer, such as melanoma. UVB rays cause sunburns, cataracts, and also contribute to skin cancer. The American-Academy of Dermatology recommends that children and adults wear a broad spectrum, waterproof sunscreen with a Sun Protection Factor (SPF) of 30 or higher. It is important to check the ingredient label to be sure the sunscreen will protect the skin from both UVA and UVB sunrays. Your sunscreen should contain at least one of the following ingredients: titanium dioxide, zinc oxide, or avobenzone. Sunscreen will not be effective unless it is applied to all exposed skin. Sunscreens work best if they are applied 30 minutes before sun exposure.   They should be reapplied every 2 hours and after any water exposure. Sunscreen is not perfect. It is important to use other methods to protect the skin from sun exposure also. Wear hats, sunglasses and other sun protective clothing when outdoors.   Stay in the shade during the peak hours of sun exposure between 10 AM and 4 PM.

## 2021-07-22 ENCOUNTER — TELEPHONE (OUTPATIENT)
Dept: FAMILY MEDICINE CLINIC | Age: 71
End: 2021-07-22

## 2021-07-22 NOTE — TELEPHONE ENCOUNTER
----- Message from Vickey Peters sent at 7/22/2021 10:42 AM EDT -----  Subject: Results Request    QUESTIONS  Which lab or imaging result is the patient calling about? Comprehensive   Metabolic Panel  Which provider ordered the test? Lily Gaitan   At what location was the test performed? Date the test was performed? 2021-07-20  Additional Information for Provider? Patient was looking for her results   for her blood work that was done at [address] [place] [address]. ---------------------------------------------------------------------------  --------------  Susan COONEY  What is the best way for the office to contact you? OK to leave message on   voicemail  Preferred Call Back Phone Number?  [phone]

## 2021-07-22 NOTE — TELEPHONE ENCOUNTER
Please let the patient know of results    Rennis Cushing, [place]   7/14/2021  8:43 AM EDT       LM for pt to call the office for results    Gilma Gillis MD   7/13/2021  5:26 PM EDT       ABNORMAL. Please notify patient.   Blood glucose 117 well-controlled diabetes  Mild anemia is improving, continue iron supplement and vitamin B12  Patient is up-to-date with colonoscopy, and EGD done 8/3/2020, and she had hysterectomy  Otherwise labs within normal limits     We will recheck her blood work at the next appointment and if she still has anemia then will refer her back to GI        Future Appointments  7/21/2021  10:45 AM   Alexa Richardson MD          derm             [facility]  10/7/2021  10:30 AM   Kandy Ganser, PA-C         GYN Oncology        [facility]  11/26/2021 10:00 AM   Janie Lyman MD     [facility]

## 2021-09-08 DIAGNOSIS — R73.9 HYPERGLYCEMIA: ICD-10-CM

## 2021-09-08 DIAGNOSIS — K21.9 GASTROESOPHAGEAL REFLUX DISEASE WITHOUT ESOPHAGITIS: ICD-10-CM

## 2021-09-08 DIAGNOSIS — E11.65 TYPE 2 DIABETES MELLITUS WITH HYPERGLYCEMIA, WITHOUT LONG-TERM CURRENT USE OF INSULIN (HCC): Primary | ICD-10-CM

## 2021-09-08 RX ORDER — PANTOPRAZOLE SODIUM 40 MG/1
TABLET, DELAYED RELEASE ORAL
Qty: 30 TABLET | Refills: 11 | Status: SHIPPED | OUTPATIENT
Start: 2021-09-08 | End: 2021-10-06

## 2021-09-08 NOTE — TELEPHONE ENCOUNTER
Please Approve or Refuse.   Send to Pharmacy per Pt's Request:      Next Visit Date:  11/26/2021   Last Visit Date: 7/13/2021    Hemoglobin A1C (%)   Date Value   07/13/2021 5.4   02/12/2021 5.5   10/30/2020 5.1             ( goal A1C is < 7)   BP Readings from Last 3 Encounters:   07/21/21 137/78   07/13/21 126/80   04/14/21 116/72          (goal 120/80)  BUN   Date Value Ref Range Status   07/13/2021 15 8 - 23 mg/dL Final     CREATININE   Date Value Ref Range Status   07/13/2021 0.79 0.50 - 0.90 mg/dL Final     Potassium   Date Value Ref Range Status   07/13/2021 3.9 3.7 - 5.3 mmol/L Final

## 2021-09-13 DIAGNOSIS — E03.9 ACQUIRED HYPOTHYROIDISM: ICD-10-CM

## 2021-09-13 RX ORDER — LEVOTHYROXINE SODIUM 0.07 MG/1
TABLET ORAL
Qty: 90 TABLET | Refills: 3 | Status: SHIPPED | OUTPATIENT
Start: 2021-09-13 | End: 2021-10-01 | Stop reason: SDUPTHER

## 2021-10-01 DIAGNOSIS — D64.9 ANEMIA, UNSPECIFIED TYPE: ICD-10-CM

## 2021-10-01 DIAGNOSIS — E03.9 ACQUIRED HYPOTHYROIDISM: ICD-10-CM

## 2021-10-01 RX ORDER — FERROUS SULFATE 325(65) MG
325 TABLET ORAL
Qty: 90 TABLET | Refills: 3 | Status: ON HOLD | OUTPATIENT
Start: 2021-10-01 | End: 2021-10-11

## 2021-10-01 RX ORDER — LEVOTHYROXINE SODIUM 0.07 MG/1
75 TABLET ORAL
Qty: 90 TABLET | Refills: 3 | Status: SHIPPED | OUTPATIENT
Start: 2021-10-01 | End: 2021-12-20

## 2021-10-06 DIAGNOSIS — I48.91 ATRIAL FIBRILLATION, UNSPECIFIED TYPE (HCC): ICD-10-CM

## 2021-10-06 DIAGNOSIS — I10 ESSENTIAL HYPERTENSION: ICD-10-CM

## 2021-10-06 DIAGNOSIS — K21.9 GASTROESOPHAGEAL REFLUX DISEASE WITHOUT ESOPHAGITIS: ICD-10-CM

## 2021-10-06 RX ORDER — PANTOPRAZOLE SODIUM 40 MG/1
TABLET, DELAYED RELEASE ORAL
Qty: 30 TABLET | Refills: 9 | Status: SHIPPED | OUTPATIENT
Start: 2021-10-06 | End: 2022-06-14

## 2021-10-07 ENCOUNTER — OFFICE VISIT (OUTPATIENT)
Dept: GYNECOLOGIC ONCOLOGY | Age: 71
End: 2021-10-07
Payer: MEDICARE

## 2021-10-07 VITALS
BODY MASS INDEX: 44.35 KG/M2 | DIASTOLIC BLOOD PRESSURE: 84 MMHG | OXYGEN SATURATION: 97 % | WEIGHT: 241 LBS | TEMPERATURE: 96.8 F | SYSTOLIC BLOOD PRESSURE: 135 MMHG | HEIGHT: 62 IN | HEART RATE: 50 BPM

## 2021-10-07 DIAGNOSIS — Z85.42 HISTORY OF UTERINE CANCER: Primary | ICD-10-CM

## 2021-10-07 PROCEDURE — G8484 FLU IMMUNIZE NO ADMIN: HCPCS | Performed by: PHYSICIAN ASSISTANT

## 2021-10-07 PROCEDURE — G8427 DOCREV CUR MEDS BY ELIG CLIN: HCPCS | Performed by: PHYSICIAN ASSISTANT

## 2021-10-07 PROCEDURE — 1123F ACP DISCUSS/DSCN MKR DOCD: CPT | Performed by: PHYSICIAN ASSISTANT

## 2021-10-07 PROCEDURE — 3017F COLORECTAL CA SCREEN DOC REV: CPT | Performed by: PHYSICIAN ASSISTANT

## 2021-10-07 PROCEDURE — G8399 PT W/DXA RESULTS DOCUMENT: HCPCS | Performed by: PHYSICIAN ASSISTANT

## 2021-10-07 PROCEDURE — 1090F PRES/ABSN URINE INCON ASSESS: CPT | Performed by: PHYSICIAN ASSISTANT

## 2021-10-07 PROCEDURE — 1036F TOBACCO NON-USER: CPT | Performed by: PHYSICIAN ASSISTANT

## 2021-10-07 PROCEDURE — G8417 CALC BMI ABV UP PARAM F/U: HCPCS | Performed by: PHYSICIAN ASSISTANT

## 2021-10-07 PROCEDURE — 4040F PNEUMOC VAC/ADMIN/RCVD: CPT | Performed by: PHYSICIAN ASSISTANT

## 2021-10-07 PROCEDURE — 99214 OFFICE O/P EST MOD 30 MIN: CPT | Performed by: PHYSICIAN ASSISTANT

## 2021-10-07 ASSESSMENT — ENCOUNTER SYMPTOMS
RESPIRATORY NEGATIVE: 1
GASTROINTESTINAL NEGATIVE: 1
EYES NEGATIVE: 1

## 2021-10-07 NOTE — PROGRESS NOTES
Review of Systems   Constitutional: Negative. HENT:  Negative. Eyes: Negative. Respiratory: Negative. Cardiovascular: Negative. Gastrointestinal: Negative. Endocrine: Negative. Genitourinary: Negative. Musculoskeletal: Negative. Skin: Negative. Neurological: Negative. Hematological: Bruises/bleeds easily.

## 2021-10-07 NOTE — PATIENT INSTRUCTIONS
Return to clinic in 6 months for surveillance    Call or return to clinic sooner with any questions or concerns

## 2021-10-07 NOTE — PROGRESS NOTES
[address] [facility], [facility], [address] [place] [address] present for her endometrial cancer surveillance visit. CC/HPI: She is a  female with a history of stage 1A grade 1 endometrioid adenocarcinoma and has undergone surgery of total laparoscopic hysterectomy, bilateral salpingo-oophorectomy, with pelvic and paraaortic lymphadenectomy with peritoneal biopsy completed in 2017. There is no pre operative  antigen level on file. Final pathology revealed endometrioid adenocarcinoma of the endometrium, 3.8 CM grade 1. Superficial myometrial invasion. Bilateral tubes and ovaries were negative for malignancy. Pelvic and para-aortic lymph node biopsies were negative for neoplasm. No lymphovascular invasion present. Pelvic washings were also negative for malignancy. Mismatch repair testing did confirm \"positive MLH1 promoter methylation is usually associated with sporadic and not syndromal occurrences of endometrial adenocarcinoma\". Therefore she was surgically staged FIGO IA grade 1 endometrioid adenocarcinoma of the uterus. She did not require any adjuvant treatment and was placed on surveillance. She was hospitalized in 7755 for complicated sigmoid diverticulitis. She underwent EDG and colonoscopy, and ultimately and open sigmoid colectomy with primary anastomosis on 2020 by Dr. Rosa Arita. Pathology revealed tubular adenomas, no evidence of malignancy. Today, she is doing well. She has no concerns. Denies abdominal or pelvic pain. No vaginal bleeding or discharge. No new lumps or bumps. Denies nausea or vomiting. No changes to urination or bowel habits. She has a history atrial fibrillation and sinus bradycardia. She remains on Xarelto. She continues to follow with her cardiologist Dr. Dianna Montanez.     She is up to date on mammogram screening most recently obtained in 2020, resulted as BIRADS-1.     ROS:  I have personally reviewed and agree with the review of systems done by my ancillary staff in the [facility] documentation. Physical Exam:    Vitals:    10/07/21 1028   BP: 135/84   Site: Right Upper Arm   Position: Sitting   Cuff Size: Medium Adult   Pulse: 50   Temp: 96.8 °F (36 °C)   SpO2: 97%   Weight: 241 lb (109.3 kg)   Height: 5' 2\" (1.575 m)       General: well- appearing, no acute distress, alert and oriented x 3    HEENT: Thyroid normal size. No cervical or supraclavicular lymphadenopathy. Lungs: Clear to auscultate bilaterally to the bases    No CVA tenderness present bilaterally. Cardiac: Sinus bradycardia, No murmurs or gallops appreciated. Abdomen: Morbidly obese. Abdomen is soft and non tender to deep palpation throughout. Midline laparotomy scar present and laparoscopic scars present. No herniations. She has no rashes or erytema. No detectable organomegaly. No masses or ascites. No inguinal lymphadenopathy bilaterally. Extremities: No edema. No deformities. No calf tenderness bilaterally. Pelvic: Moderate vaginal atrophy. Otherwise, the patient has normal female external genitalia including, vulva, urethra, vagina, perineum, Bartholin's. Uterus, bilateral fallopian tubes and adnexae, and cervix are surgically absent. Speculum examination using medium speculum reveals no blood or discharge in vaginal vault. Vaginal cuff well-intact with no signs of erythema, induration, separation, or granulation tissue. Bimanual examination: Bladder is non-tender, without mass; urethra is non-tender, without mass. There are no palpable vaginal nodules and no other pelvic masses, tenderness or pathology noted. Impression:  FIGO Stage 1A Grade 1 endometrioid adenocarcinoma s/p surgical intervention with negative high risk factors, therefore requiring no further treatment. She is now 4 years since surgery. There is no evidence of recurrence today. She will remain on surveillance. Assessment/Plan:  Her Surveillance plan is as follows:   1. Return to clinic in 6 months for follow up surveillance     2. Call or return to clinic sooner with any questions or concerns     I spent 30 minutes providing care to the patient and >50% of the time was spent counseling and coordinating care, discussing the patient's current situation, reviewing her options, counseling and education her and answering her questions. The patient's pertinent images, labs, and previous records and procedures were reviewed.     Electronically signed by Kevon Ledbetter PA-C on 10/7/2021 at 10:55 AM

## 2021-10-10 ENCOUNTER — APPOINTMENT (OUTPATIENT)
Dept: GENERAL RADIOLOGY | Age: 71
DRG: 309 | End: 2021-10-10
Payer: MEDICARE

## 2021-10-10 ENCOUNTER — HOSPITAL ENCOUNTER (INPATIENT)
Age: 71
LOS: 2 days | Discharge: HOME | DRG: 309 | End: 2021-10-12
Attending: EMERGENCY MEDICINE | Admitting: FAMILY MEDICINE
Payer: MEDICARE

## 2021-10-10 DIAGNOSIS — I48.91 ATRIAL FIBRILLATION WITH RAPID VENTRICULAR RESPONSE (HCC): Primary | ICD-10-CM

## 2021-10-10 DIAGNOSIS — R07.9 CHEST PAIN, UNSPECIFIED TYPE: ICD-10-CM

## 2021-10-10 LAB
ALBUMIN SERPL-MCNC: 3.9 G/DL (ref 3.5–5.1)
ALP BLD-CCNC: 79 U/L (ref 38–126)
ALT SERPL-CCNC: 14 U/L (ref 11–66)
ANION GAP SERPL CALCULATED.3IONS-SCNC: 17 MEQ/L (ref 8–16)
APTT: 41.1 SECONDS (ref 22–38)
AST SERPL-CCNC: 16 U/L (ref 5–40)
BASOPHILS # BLD: 0.6 %
BASOPHILS ABSOLUTE: 0.1 THOU/MM3 (ref 0–0.1)
BILIRUB SERPL-MCNC: 0.3 MG/DL (ref 0.3–1.2)
BUN BLDV-MCNC: 26 MG/DL (ref 7–22)
CALCIUM SERPL-MCNC: 9.7 MG/DL (ref 8.5–10.5)
CHLORIDE BLD-SCNC: 103 MEQ/L (ref 98–111)
CO2: 22 MEQ/L (ref 23–33)
CREAT SERPL-MCNC: 1.2 MG/DL (ref 0.4–1.2)
EKG Q-T INTERVAL: 290 MS
EKG QRS DURATION: 68 MS
EKG QTC CALCULATION (BAZETT): 451 MS
EKG R AXIS: 55 DEGREES
EKG T AXIS: 115 DEGREES
EKG VENTRICULAR RATE: 146 BPM
EOSINOPHIL # BLD: 0.7 %
EOSINOPHILS ABSOLUTE: 0.1 THOU/MM3 (ref 0–0.4)
ERYTHROCYTE [DISTWIDTH] IN BLOOD BY AUTOMATED COUNT: 14.1 % (ref 11.5–14.5)
ERYTHROCYTE [DISTWIDTH] IN BLOOD BY AUTOMATED COUNT: 48.5 FL (ref 35–45)
GFR SERPL CREATININE-BSD FRML MDRD: 44 ML/MIN/1.73M2
GLUCOSE BLD-MCNC: 105 MG/DL (ref 70–108)
GLUCOSE BLD-MCNC: 148 MG/DL (ref 70–108)
HCT VFR BLD CALC: 41.6 % (ref 37–47)
HEMOGLOBIN: 12.9 GM/DL (ref 12–16)
IMMATURE GRANS (ABS): 0.04 THOU/MM3 (ref 0–0.07)
IMMATURE GRANULOCYTES: 0.3 %
INR BLD: 1.8 (ref 0.85–1.13)
LACTIC ACID: 2.4 MMOL/L (ref 0.5–2)
LYMPHOCYTES # BLD: 13.3 %
LYMPHOCYTES ABSOLUTE: 1.6 THOU/MM3 (ref 1–4.8)
MCH RBC QN AUTO: 29.3 PG (ref 26–33)
MCHC RBC AUTO-ENTMCNC: 31 GM/DL (ref 32.2–35.5)
MCV RBC AUTO: 94.5 FL (ref 81–99)
MONOCYTES # BLD: 5.1 %
MONOCYTES ABSOLUTE: 0.6 THOU/MM3 (ref 0.4–1.3)
NUCLEATED RED BLOOD CELLS: 0 /100 WBC
OSMOLALITY CALCULATION: 290.6 MOSMOL/KG (ref 275–300)
PLATELET # BLD: 323 THOU/MM3 (ref 130–400)
PMV BLD AUTO: 11.1 FL (ref 9.4–12.4)
POTASSIUM REFLEX MAGNESIUM: 4.1 MEQ/L (ref 3.5–5.2)
PRO-BNP: 2333 PG/ML (ref 0–900)
PROCALCITONIN: 0.06 NG/ML (ref 0.01–0.09)
RBC # BLD: 4.4 MILL/MM3 (ref 4.2–5.4)
SARS-COV-2, NAAT: NOT  DETECTED
SEG NEUTROPHILS: 80 %
SEGMENTED NEUTROPHILS ABSOLUTE COUNT: 9.8 THOU/MM3 (ref 1.8–7.7)
SODIUM BLD-SCNC: 142 MEQ/L (ref 135–145)
T4 FREE: 1.17 NG/DL (ref 0.93–1.76)
TOTAL PROTEIN: 7.2 G/DL (ref 6.1–8)
TROPONIN T: < 0.01 NG/ML
TSH SERPL DL<=0.05 MIU/L-ACNC: 3.96 UIU/ML (ref 0.4–4.2)
WBC # BLD: 12.3 THOU/MM3 (ref 4.8–10.8)

## 2021-10-10 PROCEDURE — 2500000003 HC RX 250 WO HCPCS: Performed by: STUDENT IN AN ORGANIZED HEALTH CARE EDUCATION/TRAINING PROGRAM

## 2021-10-10 PROCEDURE — 84439 ASSAY OF FREE THYROXINE: CPT

## 2021-10-10 PROCEDURE — 84443 ASSAY THYROID STIM HORMONE: CPT

## 2021-10-10 PROCEDURE — 84100 ASSAY OF PHOSPHORUS: CPT

## 2021-10-10 PROCEDURE — 85730 THROMBOPLASTIN TIME PARTIAL: CPT

## 2021-10-10 PROCEDURE — 2140000000 HC CCU INTERMEDIATE R&B

## 2021-10-10 PROCEDURE — 2580000003 HC RX 258: Performed by: STUDENT IN AN ORGANIZED HEALTH CARE EDUCATION/TRAINING PROGRAM

## 2021-10-10 PROCEDURE — 36415 COLL VENOUS BLD VENIPUNCTURE: CPT

## 2021-10-10 PROCEDURE — 82948 REAGENT STRIP/BLOOD GLUCOSE: CPT

## 2021-10-10 PROCEDURE — 99285 EMERGENCY DEPT VISIT HI MDM: CPT

## 2021-10-10 PROCEDURE — 83880 ASSAY OF NATRIURETIC PEPTIDE: CPT

## 2021-10-10 PROCEDURE — 71045 X-RAY EXAM CHEST 1 VIEW: CPT

## 2021-10-10 PROCEDURE — 99223 1ST HOSP IP/OBS HIGH 75: CPT | Performed by: FAMILY MEDICINE

## 2021-10-10 PROCEDURE — 84145 PROCALCITONIN (PCT): CPT

## 2021-10-10 PROCEDURE — 93005 ELECTROCARDIOGRAM TRACING: CPT | Performed by: EMERGENCY MEDICINE

## 2021-10-10 PROCEDURE — 87635 SARS-COV-2 COVID-19 AMP PRB: CPT

## 2021-10-10 PROCEDURE — 84484 ASSAY OF TROPONIN QUANT: CPT

## 2021-10-10 PROCEDURE — 2500000003 HC RX 250 WO HCPCS

## 2021-10-10 PROCEDURE — 83605 ASSAY OF LACTIC ACID: CPT

## 2021-10-10 PROCEDURE — 2580000003 HC RX 258

## 2021-10-10 PROCEDURE — 85610 PROTHROMBIN TIME: CPT

## 2021-10-10 PROCEDURE — 96374 THER/PROPH/DIAG INJ IV PUSH: CPT

## 2021-10-10 PROCEDURE — 80053 COMPREHEN METABOLIC PANEL: CPT

## 2021-10-10 PROCEDURE — 85025 COMPLETE CBC W/AUTO DIFF WBC: CPT

## 2021-10-10 RX ORDER — ATORVASTATIN CALCIUM 40 MG/1
40 TABLET, FILM COATED ORAL NIGHTLY
Status: DISCONTINUED | OUTPATIENT
Start: 2021-10-11 | End: 2021-10-12 | Stop reason: HOSPADM

## 2021-10-10 RX ORDER — SODIUM CHLORIDE 0.9 % (FLUSH) 0.9 %
5-40 SYRINGE (ML) INJECTION EVERY 12 HOURS SCHEDULED
Status: DISCONTINUED | OUTPATIENT
Start: 2021-10-10 | End: 2021-10-12 | Stop reason: HOSPADM

## 2021-10-10 RX ORDER — LANOLIN ALCOHOL/MO/W.PET/CERES
1000 CREAM (GRAM) TOPICAL DAILY
Status: DISCONTINUED | OUTPATIENT
Start: 2021-10-11 | End: 2021-10-12 | Stop reason: HOSPADM

## 2021-10-10 RX ORDER — ONDANSETRON 4 MG/1
4 TABLET, ORALLY DISINTEGRATING ORAL EVERY 8 HOURS PRN
Status: DISCONTINUED | OUTPATIENT
Start: 2021-10-10 | End: 2021-10-12 | Stop reason: HOSPADM

## 2021-10-10 RX ORDER — ACETAMINOPHEN 500 MG
500 TABLET ORAL EVERY 6 HOURS PRN
Status: DISCONTINUED | OUTPATIENT
Start: 2021-10-10 | End: 2021-10-10

## 2021-10-10 RX ORDER — VITAMIN B COMPLEX
1000 TABLET ORAL DAILY
Status: DISCONTINUED | OUTPATIENT
Start: 2021-10-11 | End: 2021-10-12 | Stop reason: HOSPADM

## 2021-10-10 RX ORDER — ACETAMINOPHEN 325 MG/1
650 TABLET ORAL EVERY 6 HOURS PRN
Status: DISCONTINUED | OUTPATIENT
Start: 2021-10-10 | End: 2021-10-12 | Stop reason: HOSPADM

## 2021-10-10 RX ORDER — DOCUSATE SODIUM 100 MG/1
100 CAPSULE, LIQUID FILLED ORAL 2 TIMES DAILY
Status: DISCONTINUED | OUTPATIENT
Start: 2021-10-10 | End: 2021-10-12 | Stop reason: HOSPADM

## 2021-10-10 RX ORDER — DILTIAZEM HYDROCHLORIDE 5 MG/ML
10 INJECTION INTRAVENOUS ONCE
Status: COMPLETED | OUTPATIENT
Start: 2021-10-10 | End: 2021-10-10

## 2021-10-10 RX ORDER — SODIUM CHLORIDE 9 MG/ML
25 INJECTION, SOLUTION INTRAVENOUS PRN
Status: DISCONTINUED | OUTPATIENT
Start: 2021-10-10 | End: 2021-10-12 | Stop reason: HOSPADM

## 2021-10-10 RX ORDER — ONDANSETRON 2 MG/ML
4 INJECTION INTRAMUSCULAR; INTRAVENOUS EVERY 6 HOURS PRN
Status: DISCONTINUED | OUTPATIENT
Start: 2021-10-10 | End: 2021-10-12 | Stop reason: HOSPADM

## 2021-10-10 RX ORDER — HYDRALAZINE HYDROCHLORIDE 10 MG/1
10 TABLET, FILM COATED ORAL EVERY 6 HOURS SCHEDULED
Status: DISCONTINUED | OUTPATIENT
Start: 2021-10-11 | End: 2021-10-12 | Stop reason: HOSPADM

## 2021-10-10 RX ORDER — SODIUM CHLORIDE 9 MG/ML
INJECTION, SOLUTION INTRAVENOUS CONTINUOUS
Status: DISCONTINUED | OUTPATIENT
Start: 2021-10-10 | End: 2021-10-12 | Stop reason: HOSPADM

## 2021-10-10 RX ORDER — ACETAMINOPHEN 650 MG/1
650 SUPPOSITORY RECTAL EVERY 6 HOURS PRN
Status: DISCONTINUED | OUTPATIENT
Start: 2021-10-10 | End: 2021-10-12 | Stop reason: HOSPADM

## 2021-10-10 RX ORDER — DEXTROSE MONOHYDRATE 50 MG/ML
100 INJECTION, SOLUTION INTRAVENOUS PRN
Status: DISCONTINUED | OUTPATIENT
Start: 2021-10-10 | End: 2021-10-12 | Stop reason: HOSPADM

## 2021-10-10 RX ORDER — METOPROLOL TARTRATE 5 MG/5ML
5 INJECTION INTRAVENOUS EVERY 5 MIN PRN
Status: DISCONTINUED | OUTPATIENT
Start: 2021-10-10 | End: 2021-10-10

## 2021-10-10 RX ORDER — SODIUM CHLORIDE 0.9 % (FLUSH) 0.9 %
5-40 SYRINGE (ML) INJECTION PRN
Status: DISCONTINUED | OUTPATIENT
Start: 2021-10-10 | End: 2021-10-11 | Stop reason: SDUPTHER

## 2021-10-10 RX ORDER — OXYBUTYNIN CHLORIDE 10 MG/1
10 TABLET, EXTENDED RELEASE ORAL NIGHTLY
Status: DISCONTINUED | OUTPATIENT
Start: 2021-10-11 | End: 2021-10-12 | Stop reason: HOSPADM

## 2021-10-10 RX ORDER — DILTIAZEM HYDROCHLORIDE 240 MG/1
240 CAPSULE, COATED, EXTENDED RELEASE ORAL DAILY
Status: DISCONTINUED | OUTPATIENT
Start: 2021-10-11 | End: 2021-10-12 | Stop reason: HOSPADM

## 2021-10-10 RX ORDER — FERROUS SULFATE 325(65) MG
325 TABLET ORAL
Status: DISCONTINUED | OUTPATIENT
Start: 2021-10-11 | End: 2021-10-12 | Stop reason: HOSPADM

## 2021-10-10 RX ORDER — TROSPIUM CHLORIDE 20 MG/1
20 TABLET, FILM COATED ORAL
Status: DISCONTINUED | OUTPATIENT
Start: 2021-10-11 | End: 2021-10-12 | Stop reason: HOSPADM

## 2021-10-10 RX ORDER — DEXTROSE MONOHYDRATE 25 G/50ML
12.5 INJECTION, SOLUTION INTRAVENOUS PRN
Status: DISCONTINUED | OUTPATIENT
Start: 2021-10-10 | End: 2021-10-12 | Stop reason: HOSPADM

## 2021-10-10 RX ORDER — POLYETHYLENE GLYCOL 3350 17 G/17G
17 POWDER, FOR SOLUTION ORAL DAILY PRN
Status: DISCONTINUED | OUTPATIENT
Start: 2021-10-10 | End: 2021-10-12 | Stop reason: HOSPADM

## 2021-10-10 RX ORDER — NICOTINE POLACRILEX 4 MG
15 LOZENGE BUCCAL PRN
Status: DISCONTINUED | OUTPATIENT
Start: 2021-10-10 | End: 2021-10-12 | Stop reason: HOSPADM

## 2021-10-10 RX ORDER — PANTOPRAZOLE SODIUM 40 MG/1
40 TABLET, DELAYED RELEASE ORAL
Status: DISCONTINUED | OUTPATIENT
Start: 2021-10-11 | End: 2021-10-12 | Stop reason: HOSPADM

## 2021-10-10 RX ORDER — LEVOTHYROXINE SODIUM 0.07 MG/1
75 TABLET ORAL
Status: DISCONTINUED | OUTPATIENT
Start: 2021-10-11 | End: 2021-10-12 | Stop reason: HOSPADM

## 2021-10-10 RX ADMIN — METOPROLOL TARTRATE 5 MG: 5 INJECTION INTRAVENOUS at 18:52

## 2021-10-10 RX ADMIN — DILTIAZEM HYDROCHLORIDE 5 MG/HR: 5 INJECTION INTRAVENOUS at 22:44

## 2021-10-10 RX ADMIN — SODIUM CHLORIDE, PRESERVATIVE FREE 10 ML: 5 INJECTION INTRAVENOUS at 21:58

## 2021-10-10 RX ADMIN — DILTIAZEM HYDROCHLORIDE 5 MG/HR: 5 INJECTION, SOLUTION INTRAVENOUS at 21:52

## 2021-10-10 RX ADMIN — DILTIAZEM HYDROCHLORIDE 10 MG: 5 INJECTION INTRAVENOUS at 21:51

## 2021-10-10 ASSESSMENT — PAIN DESCRIPTION - LOCATION: LOCATION: ABDOMEN

## 2021-10-10 ASSESSMENT — ENCOUNTER SYMPTOMS
CHEST TIGHTNESS: 1
VOMITING: 0
NAUSEA: 1
SHORTNESS OF BREATH: 1
ABDOMINAL DISTENTION: 0

## 2021-10-10 ASSESSMENT — PAIN SCALES - GENERAL
PAINLEVEL_OUTOF10: 0
PAINLEVEL_OUTOF10: 5

## 2021-10-10 ASSESSMENT — PAIN DESCRIPTION - PAIN TYPE: TYPE: ACUTE PAIN

## 2021-10-10 NOTE — ED NOTES
Assumed care at this time. Received bedside shift report from [facility]. Pt resting in bed alert. VS assessed. RR even and unlabored. Pt updated on POC. No distress noted.  Pt stable at this time and denies any needs      Karen Reyes RN  10/10/21 5826

## 2021-10-10 NOTE — ED PROVIDER NOTES
[place] ENCOUNTER          Pt Name: Rocio Keller  MRN: [id number]  [place] 1950  Date of evaluation: 10/10/2021  Treating Resident Physician: Trevin George MD  Supervising Physician: Reema Mcarthur COMPLAINT       Chief Complaint   Patient presents with    Abdominal Pain    Atrial Fibrillation    Nausea     History obtained from the patient. HISTORY OF PRESENT ILLNESS    HPI  Rocio Keller is a 73 y.o. female who presents to the emergency department for evaluation of palpitations. Patient states that he has a history of paroxysmal A. fib, but usually is in sinus. Does chronically take metoprolol and Cardizem. Is anticoagulated with Xarelto. Today was waiting for the bus, began to have chest pain, shortness of breath, abdominal pain, nausea, lightheadedness. Pain is sharp midsternal.  Not pleuritic, not worse with exertion. On presentation found to be in A. fib with RVR  The patient has no other acute complaints at this time. REVIEW OF SYSTEMS   Review of Systems   Constitutional: Positive for diaphoresis and fatigue. Respiratory: Positive for chest tightness and shortness of breath. Cardiovascular: Positive for chest pain and palpitations. Gastrointestinal: Positive for nausea. Negative for abdominal distention and vomiting. Genitourinary: Negative. Musculoskeletal: Positive for arthralgias and myalgias. Skin: Negative. Neurological: Positive for light-headedness.           PAST MEDICAL AND SURGICAL HISTORY     Past Medical History:   Diagnosis Date    Adenocarcinoma of endometrium, stage 1 ([address] 10/5/2017    Well differentiated grade 1 adenocarcinoma arising in complex hyperplasia    JOSHUA (acute kidney injury) ([address] 1/15/2020    Allergic rhinitis 2/23/2017    At high risk for falls 2/23/2017    Atrial fibrillation [facility])     Jan 2020    Atrial fibrillation, new onset ([address] 1/20/2020    CHF TOTAL LAPAROSCOPIC HYSTERECTOMY, BSO, F.S.  STAGING, GYRUS G400 performed by Theresa Roe MD at [address] N/A 6/26/2017    COLONOSCOPY POLYPECTOMY / HOT SNARE performed by Christophe Salomon DO at [address] N/A 8/4/2020    OPEN SIGMOID COLECTOMY; PRIMARY ANASTOMOSIS & MOBILIZATION OF SPLENIC FLEXURE performed by Sulma Bourgeois MD at [address]  10/24/2017    with pelvic lymph node dissection    THYROID SURGERY  1980    subtotal 20 years ago    TONSILLECTOMY      UPPER GASTROINTESTINAL ENDOSCOPY N/A 8/3/2020    EGD BIOPSY performed by Sulma Bourgeois MD at [address] [facility] VITRECTOMY      FLOATERS         MEDICATIONS     Current Facility-Administered Medications:     dilTIAZem injection 10 mg, 10 mg, IntraVENous, Once **FOLLOWED BY** dilTIAZem 125 mg in dextrose 5 % 125 mL infusion, 5-15 mg/hr, IntraVENous, Continuous, Antonette Beauchamp MD    Current Outpatient Medications:     pantoprazole (PROTONIX) 40 MG tablet, TAKE 1 TABLET BY MOUTH ONCE DAILY , Disp: 30 tablet, Rfl: 9    metoprolol tartrate (LOPRESSOR) 25 MG tablet, TAKE ONE-HALF TABLET = 12.5 MG BY MOUTH TWICE A DAY , Disp: 30 tablet, Rfl: 9    levothyroxine (SYNTHROID) 75 MCG tablet, Take 1 tablet by mouth every morning (before breakfast), Disp: 90 tablet, Rfl: 3    ferrous sulfate (FEROSUL) 325 (65 Fe) MG tablet, Take 1 tablet by mouth daily (with breakfast), Disp: 90 tablet, Rfl: 3    metFORMIN (GLUCOPHAGE) 500 MG tablet, TAKE ONE TABLET BY MOUTH TWICE A DAY WITH A MEAL , Disp: 60 tablet, Rfl: 10     MG capsule, TAKE ONE (1) CAPSULE BY MOUTH TWO (2) TIMES DAILY FOR CONSTIPATION , Disp: 180 capsule, Rfl: 3    vitamin D3 (CHOLECALCIFEROL) 25 MCG (1000 UT) TABS tablet, TAKE 2 TABLETS BY MOUTH ONCE DAILY , Disp: 60 tablet, Rfl: 11    Probiotic Product (LISSY-BID PROBIOTIC) TABS, TAKE 1 TABLET BY MOUTH IN THE MORNING , Disp: 30 tablet, Rfl: 11    vitamin B-12 (CYANOCOBALAMIN) 1000 MCG tablet, TAKE ONE (1) TABLET BY MOUTH DAILY , Disp: 90 tablet, Rfl: 3    dilTIAZem (CARDIZEM CD) 240 MG extended release capsule, Take 240 mg by mouth daily, Disp: , Rfl:     atorvastatin (LIPITOR) 40 MG tablet, TAKE ONE (1) TABLET BY MOUTH DAILY STOP SIMVASTATIN , Disp: 90 tablet, Rfl: 3    dilTIAZem (CARDIZEM) 30 MG tablet, , Disp: , Rfl:     hydrALAZINE (APRESOLINE) 10 MG tablet, , Disp: , Rfl:     XARELTO 20 MG TABS tablet, TAKE ONE (1) TABLET BY MOUTH DAILY (WITH BREAKFAST) , Disp: 30 tablet, Rfl: 3    oxybutynin (DITROPAN-XL) 10 MG extended release tablet, TAKE 1 TABLET BY MOUTH ONCE DAILY , Disp: 90 tablet, Rfl: 3    ACETAMINOPHEN EXTRA STRENGTH 500 MG tablet, TAKE 1 TABLET BY MOUTH EVERY 6 HOURS AS NEEDED FOR PAIN , Disp: 100 tablet, Rfl: 11    Blood Pressure Monitor KIT, Use as directed., Disp: 1 kit, Rfl: 1    glucose monitoring kit (FREESTYLE) monitoring kit, Use as directed. BRAND OF CHOICE INSURANCE ALLOWS., Disp: 1 kit, Rfl: 0    blood glucose monitor strips, Test 2-3 times a day & as needed for symptoms of irregular blood glucose.  BRAND OF CHOICE INSURANCE ALLOWS., Disp: 100 strip, Rfl: 11    Alcohol Swabs (ALCOHOL PREP) PADS, Use as directed, Disp: 100 each, Rfl: 11    Lancets MISC, 1 each by Does not apply route 2 times daily, Disp: 300 each, Rfl: 11    MYRBETRIQ 50 MG TB24, TAKE ONE TABLET BY MOUTH ONCE DAILY, Disp: 90 tablet, Rfl: 3      SOCIAL HISTORY     Social History     Social History Narrative    Not on file     Social History     Tobacco Use    Smoking status: Never Smoker    Smokeless tobacco: Never Used   Vaping Use    Vaping Use: Never used   Substance Use Topics    Alcohol use: No     Alcohol/week: 0.0 standard drinks    Drug use: No         ALLERGIES     Allergies   Allergen Reactions    Sulfa Antibiotics Nausea Only    Penicillins Rash         FAMILY HISTORY     Family History   Problem Relation Age of Onset    Coronary Art Dis Father     Hypertension Father     Diabetes Father     High Blood Pressure Father     Heart Attack Father     Diabetes Mother     High Blood Pressure Mother     Thyroid Disease Mother     High Blood Pressure Sister     Thyroid Disease Brother     High Blood Pressure Brother     High Blood Pressure Maternal Grandmother     Diabetes Maternal Grandfather     No Known Problems Paternal Grandmother     Heart Attack Paternal Grandfather     Colon Cancer Neg Hx          PREVIOUS RECORDS   Previous records reviewed: Medical, past surgical, medications, allergies        PHYSICAL EXAM     ED Triage Vitals   BP Temp Temp Source Pulse Resp SpO2 Height Weight   10/10/21 1752 10/10/21 1757 10/10/21 1757 10/10/21 1747 10/10/21 1747 10/10/21 1747 -- 10/10/21 1747   110/71 98.7 °F (37.1 °C) Oral (S) 134 22 96 %  224 lb (101.6 kg)     Initial vital signs and nursing assessment reviewed and Tachycardic. Body mass index is 40.97 kg/m². Pulsoximetry is normal per my interpretation. Additional Vital Signs:  Vitals:    10/10/21 1912   BP: 115/75   Pulse: 135   Resp: 19   Temp:    SpO2: 92%       Physical Exam  Constitutional:       Appearance: She is well-developed. She is obese. She is ill-appearing and diaphoretic. HENT:      Head: Normocephalic and atraumatic. Eyes:      Extraocular Movements: Extraocular movements intact. Pupils: Pupils are equal, round, and reactive to light. Cardiovascular:      Rate and Rhythm: Tachycardia present. Rhythm irregular. Heart sounds: Normal heart sounds. Pulmonary:      Effort: Pulmonary effort is normal. No respiratory distress. Breath sounds: Normal breath sounds. No wheezing or rales. Abdominal:      General: Bowel sounds are normal. There is no distension. Palpations: Abdomen is soft. Tenderness: There is no abdominal tenderness. There is no guarding or rebound.    Skin:     Capillary Refill: Capillary refill takes less than 2 seconds. Neurological:      General: No focal deficit present. Mental Status: She is alert and oriented to person, place, and time. MEDICAL DECISION MAKING   Initial Assessment and Plan:    This is a 65-year-old female, history of paroxysmal A. fib, TIA, hypothyroidism, presenting with chest pain, shortness of breath, palpitations, dizziness. Patient found to be in A. fib with RVR on arrival with a rate of 150. Patient given 5 of metoprolol with no change. Patient still having chest tightness. Blood pressure stable. Cardizem bolus and drip ordered. First troponin is negative, mild leukocytosis of 12.3.  TSH pending. Would like to admit this patient for A. fib with RVR. ED RESULTS   Laboratory results:  Labs Reviewed   CBC WITH AUTO DIFFERENTIAL - Abnormal; Notable for the following components:       Result Value    WBC 12.3 (*)     MCHC 31.0 (*)     RDW-SD 48.5 (*)     Segs Absolute 9.8 (*)     All other components within normal limits   COMPREHENSIVE METABOLIC PANEL W/ REFLEX TO MG FOR LOW K - Abnormal; Notable for the following components:    Glucose 148 (*)     BUN 26 (*)     CO2 22 (*)     All other components within normal limits   PROTIME-INR - Abnormal; Notable for the following components:    INR 1.80 (*)     All other components within normal limits   APTT - Abnormal; Notable for the following components:    aPTT 41.1 (*)     All other components within normal limits   ANION GAP - Abnormal; Notable for the following components:    Anion Gap 17.0 (*)     All other components within normal limits   GLOMERULAR FILTRATION RATE, ESTIMATED - Abnormal; Notable for the following components:    Est, Glom Filt Rate 44 (*)     All other components within normal limits   COVID-19, RAPID   TROPONIN   OSMOLALITY   TSH WITHOUT REFLEX   T4, FREE       Radiologic studies results:  XR CHEST PORTABLE   Final Result   Cardiomegaly with mild pulmonary vascular congestion.                **This report has been created using voice recognition software. It may contain minor errors which are inherent in voice recognition technology. **      Final report electronically signed by Dr Mj Uribe on 10/10/2021 6:52 PM          ED Medications administered this visit:   Medications   dilTIAZem injection 10 mg (has no administration in time range)     Followed by   dilTIAZem 125 mg in dextrose 5 % 125 mL infusion (has no administration in time range)         ED COURSE             MEDICATION CHANGES     New Prescriptions    No medications on file         FINAL DISPOSITION     Final diagnoses:   Atrial fibrillation with rapid ventricular response (HCC)   Chest pain, unspecified type     Condition: condition: good  Dispo: Admit to telemetry      This transcription was electronically signed. Parts of this transcriptions may have been dictated by use of voice recognition software and electronically transcribed, and parts may have been transcribed with the assistance of an ED scribe. The transcription may contain errors not detected in proofreading. Please refer to my supervising physician's documentation if my documentation differs.     Electronically Signed: Richard Cotto MD, 10/10/21, 8:03 PM          Richard Cotto MD  Resident  10/10/21 2005

## 2021-10-10 NOTE — ED PROVIDER NOTES
ATTENDING NOTE:    I supervised and discussed the history, physical exam and the management of this patient with the resident. I reviewed the resident's note and agree with the documented findings and plan of care. Please see my additional note. Critical Care  Performed by: Kanwal Brown MD  Authorized by: Kanwal Brown MD     Critical care provider statement:     Critical care time (minutes):  35    Critical care time was exclusive of:  Separately billable procedures and treating other patients    Critical care was necessary to treat or prevent imminent or life-threatening deterioration of the following conditions: atrial fibrillation with RVR requiring rate control. Critical care was time spent personally by me on the following activities:  Development of treatment plan with patient or surrogate, discussions with consultants, evaluation of patient's response to treatment, examination of patient, obtaining history from patient or surrogate, ordering and performing treatments and interventions, ordering and review of laboratory studies, ordering and review of radiographic studies, pulse oximetry, re-evaluation of patient's condition and review of old charts    I assumed direction of critical care for this patient from another provider in my specialty: no    Comments:      atrial fibrillation with RVR requiring rate control with cardizem gtt        I personally saw and examined the patient. I have reviewed and agree with the resident's findings, including all diagnostic interpretations and treatment plans as written. I was present for the key portion of any procedures performed and the inclusive time noted in any critical care statement.     Electronically verified by Paula Hays MD  10/10/21 2026

## 2021-10-10 NOTE — ED NOTES
Pt medicted per MAR for HR. Pt denies further needs. Call light in reach.      Brandon Gonzalez RN  10/10/21 6599

## 2021-10-11 ENCOUNTER — APPOINTMENT (OUTPATIENT)
Dept: NON INVASIVE DIAGNOSTICS | Age: 71
DRG: 309 | End: 2021-10-11
Payer: MEDICARE

## 2021-10-11 LAB
ANION GAP SERPL CALCULATED.3IONS-SCNC: 13 MEQ/L (ref 8–16)
BACTERIA: ABNORMAL
BILIRUBIN URINE: NEGATIVE
BLOOD, URINE: ABNORMAL
BUN BLDV-MCNC: 25 MG/DL (ref 7–22)
CALCIUM SERPL-MCNC: 9.1 MG/DL (ref 8.5–10.5)
CASTS: ABNORMAL /LPF
CASTS: ABNORMAL /LPF
CHARACTER, URINE: ABNORMAL
CHLORIDE BLD-SCNC: 107 MEQ/L (ref 98–111)
CO2: 24 MEQ/L (ref 23–33)
COLOR: YELLOW
CREAT SERPL-MCNC: 1.1 MG/DL (ref 0.4–1.2)
CRYSTALS: ABNORMAL
EPITHELIAL CELLS, UA: ABNORMAL /HPF
ERYTHROCYTE [DISTWIDTH] IN BLOOD BY AUTOMATED COUNT: 14.2 % (ref 11.5–14.5)
ERYTHROCYTE [DISTWIDTH] IN BLOOD BY AUTOMATED COUNT: 48.5 FL (ref 35–45)
GFR SERPL CREATININE-BSD FRML MDRD: 49 ML/MIN/1.73M2
GLUCOSE BLD-MCNC: 113 MG/DL (ref 70–108)
GLUCOSE BLD-MCNC: 113 MG/DL (ref 70–108)
GLUCOSE BLD-MCNC: 141 MG/DL (ref 70–108)
GLUCOSE BLD-MCNC: 153 MG/DL (ref 70–108)
GLUCOSE BLD-MCNC: 156 MG/DL (ref 70–108)
GLUCOSE, URINE: NEGATIVE MG/DL
HCT VFR BLD CALC: 36.4 % (ref 37–47)
HEMOGLOBIN: 11.5 GM/DL (ref 12–16)
KETONES, URINE: NEGATIVE
LEUKOCYTE ESTERASE, URINE: ABNORMAL
LV EF: 55 %
LVEF MODALITY: NORMAL
MAGNESIUM: 1.4 MG/DL (ref 1.6–2.4)
MCH RBC QN AUTO: 29.8 PG (ref 26–33)
MCHC RBC AUTO-ENTMCNC: 31.6 GM/DL (ref 32.2–35.5)
MCV RBC AUTO: 94.3 FL (ref 81–99)
MISCELLANEOUS LAB TEST RESULT: ABNORMAL
NITRITE, URINE: NEGATIVE
PH UA: 5.5 (ref 5–9)
PHOSPHORUS: 3.8 MG/DL (ref 2.4–4.7)
PLATELET # BLD: 230 THOU/MM3 (ref 130–400)
PMV BLD AUTO: 10.3 FL (ref 9.4–12.4)
POTASSIUM SERPL-SCNC: 3.8 MEQ/L (ref 3.5–5.2)
PROTEIN UA: NEGATIVE MG/DL
RBC # BLD: 3.86 MILL/MM3 (ref 4.2–5.4)
RBC URINE: ABNORMAL /HPF
RENAL EPITHELIAL, UA: ABNORMAL
SODIUM BLD-SCNC: 144 MEQ/L (ref 135–145)
SPECIFIC GRAVITY UA: 1.02 (ref 1–1.03)
UROBILINOGEN, URINE: 0.2 EU/DL (ref 0–1)
WBC # BLD: 8.3 THOU/MM3 (ref 4.8–10.8)
WBC UA: > 100 /HPF
YEAST: ABNORMAL

## 2021-10-11 PROCEDURE — 2140000000 HC CCU INTERMEDIATE R&B

## 2021-10-11 PROCEDURE — 93306 TTE W/DOPPLER COMPLETE: CPT

## 2021-10-11 PROCEDURE — 99233 SBSQ HOSP IP/OBS HIGH 50: CPT | Performed by: INTERNAL MEDICINE

## 2021-10-11 PROCEDURE — 6360000002 HC RX W HCPCS

## 2021-10-11 PROCEDURE — 93017 CV STRESS TEST TRACING ONLY: CPT | Performed by: NUCLEAR MEDICINE

## 2021-10-11 PROCEDURE — 78452 HT MUSCLE IMAGE SPECT MULT: CPT

## 2021-10-11 PROCEDURE — 82948 REAGENT STRIP/BLOOD GLUCOSE: CPT

## 2021-10-11 PROCEDURE — 3430000000 HC RX DIAGNOSTIC RADIOPHARMACEUTICAL: Performed by: INTERNAL MEDICINE

## 2021-10-11 PROCEDURE — 36415 COLL VENOUS BLD VENIPUNCTURE: CPT

## 2021-10-11 PROCEDURE — 83735 ASSAY OF MAGNESIUM: CPT

## 2021-10-11 PROCEDURE — 93018 CV STRESS TEST I&R ONLY: CPT | Performed by: NUCLEAR MEDICINE

## 2021-10-11 PROCEDURE — 85027 COMPLETE CBC AUTOMATED: CPT

## 2021-10-11 PROCEDURE — A9500 TC99M SESTAMIBI: HCPCS | Performed by: INTERNAL MEDICINE

## 2021-10-11 PROCEDURE — 6370000000 HC RX 637 (ALT 250 FOR IP)

## 2021-10-11 PROCEDURE — 99223 1ST HOSP IP/OBS HIGH 75: CPT | Performed by: NUCLEAR MEDICINE

## 2021-10-11 PROCEDURE — 6370000000 HC RX 637 (ALT 250 FOR IP): Performed by: FAMILY MEDICINE

## 2021-10-11 PROCEDURE — 93016 CV STRESS TEST SUPVJ ONLY: CPT | Performed by: NUCLEAR MEDICINE

## 2021-10-11 PROCEDURE — 2580000003 HC RX 258

## 2021-10-11 PROCEDURE — 6370000000 HC RX 637 (ALT 250 FOR IP): Performed by: INTERNAL MEDICINE

## 2021-10-11 PROCEDURE — 80048 BASIC METABOLIC PNL TOTAL CA: CPT

## 2021-10-11 PROCEDURE — 78452 HT MUSCLE IMAGE SPECT MULT: CPT | Performed by: NUCLEAR MEDICINE

## 2021-10-11 PROCEDURE — 81001 URINALYSIS AUTO W/SCOPE: CPT

## 2021-10-11 RX ORDER — AMINOPHYLLINE DIHYDRATE 25 MG/ML
50 INJECTION, SOLUTION INTRAVENOUS PRN
Status: ACTIVE | OUTPATIENT
Start: 2021-10-11 | End: 2021-10-11

## 2021-10-11 RX ORDER — SODIUM CHLORIDE 9 MG/ML
500 INJECTION, SOLUTION INTRAVENOUS CONTINUOUS PRN
Status: ACTIVE | OUTPATIENT
Start: 2021-10-11 | End: 2021-10-11

## 2021-10-11 RX ORDER — METOPROLOL TARTRATE 50 MG/1
50 TABLET, FILM COATED ORAL 2 TIMES DAILY
Status: DISCONTINUED | OUTPATIENT
Start: 2021-10-11 | End: 2021-10-12 | Stop reason: HOSPADM

## 2021-10-11 RX ORDER — ATROPINE SULFATE 0.1 MG/ML
0.5 INJECTION INTRAVENOUS EVERY 5 MIN PRN
Status: ACTIVE | OUTPATIENT
Start: 2021-10-11 | End: 2021-10-11

## 2021-10-11 RX ORDER — FERROUS SULFATE 325(65) MG
325 TABLET ORAL 2 TIMES DAILY
COMMUNITY
End: 2021-12-02 | Stop reason: HOSPADM

## 2021-10-11 RX ORDER — MAGNESIUM SULFATE IN WATER 40 MG/ML
2000 INJECTION, SOLUTION INTRAVENOUS ONCE
Status: COMPLETED | OUTPATIENT
Start: 2021-10-11 | End: 2021-10-11

## 2021-10-11 RX ORDER — SODIUM CHLORIDE 0.9 % (FLUSH) 0.9 %
5-40 SYRINGE (ML) INJECTION PRN
Status: ACTIVE | OUTPATIENT
Start: 2021-10-11 | End: 2021-10-11

## 2021-10-11 RX ORDER — AMIODARONE HYDROCHLORIDE 200 MG/1
200 TABLET ORAL 2 TIMES DAILY
Status: DISCONTINUED | OUTPATIENT
Start: 2021-10-11 | End: 2021-10-12 | Stop reason: HOSPADM

## 2021-10-11 RX ORDER — NITROGLYCERIN 0.4 MG/1
0.4 TABLET SUBLINGUAL EVERY 5 MIN PRN
Status: ACTIVE | OUTPATIENT
Start: 2021-10-11 | End: 2021-10-11

## 2021-10-11 RX ORDER — METOPROLOL TARTRATE 5 MG/5ML
5 INJECTION INTRAVENOUS EVERY 5 MIN PRN
Status: ACTIVE | OUTPATIENT
Start: 2021-10-11 | End: 2021-10-11

## 2021-10-11 RX ORDER — ALBUTEROL SULFATE 90 UG/1
2 AEROSOL, METERED RESPIRATORY (INHALATION) PRN
Status: ACTIVE | OUTPATIENT
Start: 2021-10-11 | End: 2021-10-11

## 2021-10-11 RX ADMIN — FERROUS SULFATE TAB 325 MG (65 MG ELEMENTAL FE) 325 MG: 325 (65 FE) TAB at 08:34

## 2021-10-11 RX ADMIN — SODIUM CHLORIDE: 9 INJECTION, SOLUTION INTRAVENOUS at 00:18

## 2021-10-11 RX ADMIN — TROSPIUM CHLORIDE 20 MG: 20 TABLET, FILM COATED ORAL at 15:53

## 2021-10-11 RX ADMIN — MAGNESIUM SULFATE HEPTAHYDRATE 2000 MG: 40 INJECTION, SOLUTION INTRAVENOUS at 06:09

## 2021-10-11 RX ADMIN — PANTOPRAZOLE SODIUM 40 MG: 40 TABLET, DELAYED RELEASE ORAL at 06:13

## 2021-10-11 RX ADMIN — ACETAMINOPHEN 650 MG: 325 TABLET ORAL at 12:58

## 2021-10-11 RX ADMIN — METOPROLOL TARTRATE 50 MG: 25 TABLET, FILM COATED ORAL at 15:52

## 2021-10-11 RX ADMIN — SODIUM CHLORIDE, PRESERVATIVE FREE 10 ML: 5 INJECTION INTRAVENOUS at 21:28

## 2021-10-11 RX ADMIN — METOPROLOL TARTRATE 25 MG: 25 TABLET, FILM COATED ORAL at 08:34

## 2021-10-11 RX ADMIN — RIVAROXABAN 20 MG: 20 TABLET, FILM COATED ORAL at 17:58

## 2021-10-11 RX ADMIN — Medication 10.6 MILLICURIE: at 10:45

## 2021-10-11 RX ADMIN — AMIODARONE HYDROCHLORIDE 200 MG: 200 TABLET ORAL at 15:53

## 2021-10-11 RX ADMIN — AMIODARONE HYDROCHLORIDE 200 MG: 200 TABLET ORAL at 21:28

## 2021-10-11 RX ADMIN — OXYBUTYNIN CHLORIDE 10 MG: 10 TABLET, EXTENDED RELEASE ORAL at 21:28

## 2021-10-11 RX ADMIN — LEVOTHYROXINE SODIUM 75 MCG: 0.07 TABLET ORAL at 06:13

## 2021-10-11 RX ADMIN — Medication 1000 UNITS: at 08:34

## 2021-10-11 RX ADMIN — TROSPIUM CHLORIDE 20 MG: 20 TABLET, FILM COATED ORAL at 06:13

## 2021-10-11 RX ADMIN — DOCUSATE SODIUM 100 MG: 100 CAPSULE ORAL at 08:34

## 2021-10-11 RX ADMIN — METOPROLOL TARTRATE 50 MG: 25 TABLET, FILM COATED ORAL at 21:28

## 2021-10-11 RX ADMIN — ATORVASTATIN CALCIUM 40 MG: 40 TABLET, FILM COATED ORAL at 21:28

## 2021-10-11 RX ADMIN — Medication 34.1 MILLICURIE: at 11:45

## 2021-10-11 RX ADMIN — Medication 1000 MCG: at 08:34

## 2021-10-11 RX ADMIN — METOPROLOL TARTRATE 25 MG: 25 TABLET, FILM COATED ORAL at 00:18

## 2021-10-11 ASSESSMENT — PAIN DESCRIPTION - LOCATION: LOCATION: KNEE

## 2021-10-11 ASSESSMENT — PAIN SCALES - GENERAL
PAINLEVEL_OUTOF10: 0
PAINLEVEL_OUTOF10: 0
PAINLEVEL_OUTOF10: 7

## 2021-10-11 ASSESSMENT — ENCOUNTER SYMPTOMS
CHEST TIGHTNESS: 1
NAUSEA: 1
EYES NEGATIVE: 1

## 2021-10-11 ASSESSMENT — PAIN DESCRIPTION - PAIN TYPE: TYPE: CHRONIC PAIN

## 2021-10-11 NOTE — RESULT ENCOUNTER NOTE
Addressed in ED   currently admitted.       Future Appointments  11/26/2021 10:00 AM   [facility] Baptiste [facility]     [facility]  4/7/2022   10:30 AM   Brayan Russell PA-C         GYN Oncology        [facility]  7/21/2022  10:45 AM   Marie Hong MD         [facility]

## 2021-10-11 NOTE — PROGRESS NOTES
Pharmacy Medication History Note      List of current medications patient is taking is complete. Source of information: Last fill history, [facility] [phone], patient and last AVS printout. Changes made to medication list:  Medications removed (include reason, ex. therapy complete or physician discontinued):  Diltiazem 240mg daily (no recent fill, last fill 9/2020) patient no longer takes     Medications added/doses adjusted:  Diltiazem 30mg BID (last filled 9/8/21)  Hydralazine 10mg TID (last filled 9/8/21)  Metoprolol tartrate 25mg tablet take 12.5mg BID (last filled 9/8/21)  Mybetriq 50mg daily (no recent fills last fill was 11/2020) but patient states she still takes  Ferrous sulfate 325mg BID (changed from daily last fill 9/11/21 verifies the BID frequency)    Other notes (ex. Recent course of antibiotics, Coumadin dosing):    Denies use of other OTC or herbal medications.       Allergies reviewed    Fabiano Terry PharmD 10/11/2021 2:28 PM

## 2021-10-11 NOTE — CARE COORDINATION
10/11/21, 8:05 AM EDT  DISCHARGE PLANNING EVALUATION:    Rosalia Vásquez       Admitted: 10/10/2021/ [address] day: 1   [facility]: [facility]- Reason for admit: Atrial fibrillation with rapid ventricular response ([address] [I48.91]  Atrial fibrillation with RVR ([address] [I48.91]  Chest pain, unspecified type [R07.9]   PMH:  has a past medical history of Adenocarcinoma of endometrium, stage 1 ([address] JOSHUA (acute kidney injury) ([address] Allergic rhinitis, At high risk for falls, Atrial fibrillation ([address] Atrial fibrillation, new onset ([address] CHF (congestive heart failure) ([address] Colitis, Colon polyp, Diabetes mellitus ([address] Diverticulitis, Diverticulitis of colon with perforation, Endometrial cancer ([address] History of TIA (transient ischemic attack), Hyperglycemia, Hyperlipidemia, Hypertension, Hypothyroidism, Legally blind, Lower back pain, Mixed incontinence, Morbid obesity with BMI of 40.0-44.9, adult ([address] CLAROS (nonalcoholic steatohepatitis), Obesity, Optic atrophy, Oral phase dysphagia, Osteoarthritis (arthritis due to wear and tear of joints), Osteoarthritis involving multiple joints on both sides of body, Osteoporosis, Perforated bowel ([address] Perforated diverticulum, Postmenopausal bleeding, Rectal bleeding, S/P h-scope, Myosure 9/20/17, Tennis elbow, Thickened endometrium, TIA (transient ischemic attack), TLHBSO, bilateral LND 10/24/17, and Type 2 diabetes mellitus, without long-term current use of insulin ([address] Procedure: Planning Echo and Stress Test today. Barriers to Discharge: To ER with abd pain, palpitations, SOB. Hx PAD. On Metoprolol and Cardizem at home and takes Xarelto. In ER found to be A-fib RVR. Cardiology consulted. Echo and Stress Test planned. Cardizem gtt. PCP: Prosper Dougherty MD  Readmission Risk Score: 23%    Patient Goals/Plan/Treatment Preferences: Met with pt today. Natalia Nelson is from home alone. She has no home services.  She does have a walker, a shower chair and an elevated commode. She has a PCP, she has transportation through [address] and she has no issues getting meds. No discharge needs voiced at this time. CM will continue to follow for possible needs. Transportation/Food Security/Housekeeping Addressed:  No issues identified.

## 2021-10-11 NOTE — CONSULTS
[address] [place] [address]                                  CONSULTATION    PATIENT NAME: Agustin Banks                     :        1950  MED REC NO:   [id number]                           ROOM:       [id number]  ACCOUNT NO:   [de-identified]                           ADMIT DATE: 10/10/2021  PROVIDER:     ELLA Elena Friend:  10/11/2021    CARDIAC CONSULTATION    REASON FOR CONSULTATION:  Atrial fibrillation. REFERRING PROVIDER:  Hospitalist Service. HISTORY OF PRESENT ILLNESS:  A pleasant 72-year-old patient not  necessarily the best historian, who has multiple risk factors for  coronary artery disease including diabetes for several years,  hypertension, hyperlipidemia as well as family history of coronary  artery disease, who comes in because of symptoms of fatigue, tiredness,  shortness of breath, not feeling well, was found to be in atrial  fibrillation, rapid ventricular response. Looks like the patient has  been aware of AFib as had AFib for several years and not sure if she has  been in rhythm or not through that time. Looks like there was some  issue with compliance with medication as I could understand from the  staff. She has not been taking her medication on time. In fact, she  might have had some compliance issue with the meds altogether. The  patient is not aware of any previous coronary artery disease or  stenting. She does have hypertension, hyperlipidemia and diabetes. REVIEW OF SYSTEMS:  No fever, chills or weight loss. No hematuria or  dysuria. No abdominal pain, nausea, vomiting, or diarrhea. No obvious  active psych problems or suicidal ideations. No skin rashes. No  obvious dizziness, lightheadedness or loss of consciousness up until  lately. No recent trauma. No bleeding problem. No HEENT-related  problems. PAST MEDICAL HISTORY:  1. Hypertension. 2.  Hyperlipidemia.   3. Diabetes. 4.  No known history of coronary artery disease. 5.  AFib. ALLERGIES:  PENICILLIN. CURRENT MEDICATIONS:  Albuterol, Lipitor 40 a day, diltiazem 240 a day,  hydralazine 10 four times a day, insulin, levothyroxine 75 a day,  magnesium, Xarelto 20 a day. SOCIAL HISTORY:  No tobacco, no drugs, no alcohol. FAMILY HISTORY:  Significant for coronary artery disease. PHYSICAL EXAMINATION:  VITAL SIGNS:  Showed a blood pressure 127/69, heart rate of 67. NECK:  No JVD. LUNGS:  Decreased air entry. No crackles or wheezes. HEART:  Normal S1, S2. Systolic murmur grade 2/6. ABDOMEN:  Soft, nontender. Positive bowel sounds. No organomegaly. EXTREMITIES:  No significant edema. NEUROLOGIC:  Grossly intact. Awake, alert. No focal deficits. PSYCHIATRIC:  No evidence of active psychosis. SKIN:  No rashes. LABORATORY DATA:  Showed sodium 144, potassium 3.8, BUN 25, creatinine  1.1. White count 8.3, hemoglobin 11.5, hematocrit 36.4, platelets 173. EKG showed AFib. IMPRESSION:  This is a patient who comes in with above presentation,  atrial fibrillation, which is not necessarily new. Has been dealing  with AFib for a while; however, it is not clear whether she has been in  AFib with rate control or if she has been in sinus rhythm which she  cannot tell. She does have multiple risk factors for coronary artery  disease and symptoms as described above. PLAN:  Is as follows:  1. We will proceed with noninvasive evaluation to evaluate for ischemia  and structural heart disease. 2.  We will decide regarding rate versus rhythm control until we get  some further records. I might just go with rate control and reassess as  an outpatient and decide regarding possible cardioversion especially  after making sure the patient is compliant with medical therapy. 4.  Further management will follow accordingly. Thank you for allowing me to participate in her care.         Travis Vick M.D.    D: 10/11/2021 8:12:32       T: 10/11/2021 8:14:55     [id number]  Job#: [id number]     Doc#: [id number]    CC:

## 2021-10-11 NOTE — ED NOTES
ED to inpatient nurses report    Chief Complaint   Patient presents with    Abdominal Pain    Atrial Fibrillation    Nausea      Present to ED from home  LOC: alert and orientated to name, place, date  Vital signs   Vitals:    10/10/21 1757 10/10/21 1851 10/10/21 1912 10/10/21 2011   BP:  136/83 115/75 101/72   Pulse:  142 135 125   Resp:  23 19 23   Temp: 98.7 °F (37.1 °C)      TempSrc: Oral      SpO2:  97% 92% 96%   Weight:          Oxygen Baseline 97%    Current needs required RA   LDAs:   Peripheral IV 10/10/21 Right Antecubital (Active)   Site Assessment Clean;Dry; Intact 10/10/21 2012   Line Status Normal saline locked 10/10/21 2012   Dressing Status Clean;Dry; Intact 10/10/21 2012   Dressing Intervention New 10/10/21 1753     Mobility: Requires assistance * 1  Pending ED orders: cardizem drip- waiting for pharmacy to send   Present condition: stable- tachycardic    Electronically signed by Mukesh Juarez RN on 10/10/2021 at 8:25 PM       Mukesh Juarez RN  10/10/21 2025

## 2021-10-11 NOTE — H&P
[place] [address] MEDICINE    HISTORY AND PHYSICAL EXAM    PATIENT NAME:  Dennise Brown    MRN:  [id number]  SERVICE DATE:  10/10/2021   SERVICE TIME:  9:25 PM    Primary Care Physician: Zully Ashley MD     SUBJECTIVE  CHIEF COMPLAINT: Atrial fibrillation with RVR    HPI:  Dennise Brown is a 73 y.o., , female PMH atrial fibrillation on Xarelto, chronic, mild diastolic heart failure, type 2 diabetes, endometrial cancer, TIA, hyperlipidemia, hypertension, hypothyroidism, who presents with complaints of nausea, back pain, headache, and chest heaviness while standing at the bus stop at 1 PM. The patient was waiting at the bus stop when all of a sudden she did not start to feel well. She lay down in bed throughout the day but noticed that her symptoms were becoming worse even after taking Tylenol. She has a past medical history of atrial fibrillation controlled with xarelto, toprol, and diltiazem. The patient reports that she is compliant with her medications and follows with Dr. Ana Lilia Beltran for cardiology. She has had several hospitalizations related to atrial fibrillation with the last one being in January 2021. The patient denies any vomiting episodes, diarrhea, constipation, abdominal pain, hematochezia, or hematemesis. She typically ambulates with a walker at home and has not had any falls in the last few months. In the ED, the patient's vitals were significant for temperature 98.7, respiratory rate 22, pulse 134, SPO2 96%. The patient's labs are significant for CO2 22, BUN 26, creatinine 1.2, anion gap 17.0, glucose 148, WBC 12.3, INR 1.80. An EKG was done that showed atrial fibrillation with RVR. A chest x-ray was done that showed cardiomegaly with mild pulmonary vascular congestion. She was given 5 mg of metoprolol with no change in the ED. A cardizem bolus and drip was ordered. The troponin on admission was negative, and decision was made to admit due to atrial fibrillation with RVR. PAST MEDICAL HISTORY:    Past Medical History:   Diagnosis Date    Adenocarcinoma of endometrium, stage 1 ([address] 10/5/2017    Well differentiated grade 1 adenocarcinoma arising in complex hyperplasia    JOSHUA (acute kidney injury) ([address] 1/15/2020    Allergic rhinitis 2/23/2017    At high risk for falls 2/23/2017    Atrial fibrillation [facility])     Jan 2020    Atrial fibrillation, new onset ([address] 1/20/2020    CHF (congestive heart failure) ([address]     \"little\"    Colitis 7/22/2020    Colon polyp     Had colonoscopy done 20 yrs ago    Diabetes mellitus ([address]     Diverticulitis     Diverticulitis of colon with perforation 8/4/2020    Endometrial cancer ([address]     History of TIA (transient ischemic attack) '80's    on Baby ASA    Hyperglycemia 3/21/2017    Hyperlipidemia     Hypertension since 2015    on Rx.    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    Oral phase dysphagia 2/23/2018    Osteoarthritis (arthritis due to wear and tear of joints)     ronan knee    Osteoarthritis involving multiple joints on both sides of body 4/24/2012    Osteoporosis     Perforated bowel ([address]     Perforated diverticulum 6/15/2020    Postmenopausal bleeding 9/20/2017    Rectal bleeding 9/21/2020    S/P h-scope, Myosure 9/20/17 9/20/2017    Pathology pending    Tennis elbow     left    Thickened endometrium 9/20/2017    TIA (transient ischemic attack)     TLHBSO, bilateral LND 10/24/17 10/24/2017    Type 2 diabetes mellitus, without long-term current use of insulin ([address] 1/14/2020     PAST SURGICAL HISTORY:    Past Surgical History:   Procedure Laterality Date    CATARACT REMOVAL WITH IMPLANT Bilateral 2015    COLONOSCOPY  1997    had polyp, she doesn't know where and when, \"20 yrs ago\"    COLONOSCOPY  06/26/2017    COLONOSCOPY N/A 8/3/2020    COLONOSCOPY POLYPECTOMY SNARE performed by Steve Wise MD at [address] [facility] N/A 9/20/2017    HYSTEROSCOPY  [address] performed by Isacc Borges DO at [address] N/A 10/24/2017    TOTAL LAPAROSCOPIC HYSTERECTOMY, BSO, F.S.  STAGING, GYRUS G400 performed by Henna Hernandez MD at [address] N/A 6/26/2017    COLONOSCOPY POLYPECTOMY / [address] performed by Manisha Walters DO at [address] [facility] N/A 8/4/2020    OPEN SIGMOID COLECTOMY; PRIMARY ANASTOMOSIS & MOBILIZATION OF SPLENIC FLEXURE performed by Steve Wise MD at [address]  10/24/2017    with pelvic lymph node dissection    THYROID SURGERY  1980    subtotal 20 years ago    TONSILLECTOMY      UPPER GASTROINTESTINAL ENDOSCOPY N/A 8/3/2020    EGD BIOPSY performed by Steve Wise MD at [address] [facility] VITRECTOMY      FLOATERS     FAMILY HISTORY:    Family History   Problem Relation Age of Onset    Coronary Art Dis Father     Hypertension Father     Diabetes Father     High Blood Pressure Father     Heart Attack Father     Diabetes Mother     High Blood Pressure Mother     Thyroid Disease Mother     High Blood Pressure Sister     Thyroid Disease Brother     High Blood Pressure Brother     High Blood Pressure Maternal Grandmother     Diabetes Maternal Grandfather     No Known Problems Paternal Grandmother     Heart Attack Paternal Grandfather     Colon Cancer Neg Hx      SOCIAL HISTORY:    Social History     Socioeconomic History    Marital status:       Spouse name: Not on file    Number of children: 1    Years of education: Not on file    Highest education level: Not on file Occupational History    Not on file   Tobacco Use    Smoking status: Never Smoker    Smokeless tobacco: Never Used   Vaping Use    Vaping Use: Never used   Substance and Sexual Activity    Alcohol use: No     Alcohol/week: 0.0 standard drinks    Drug use: No    Sexual activity: Not Currently     Partners: Male   Other Topics Concern    Not on file   Social History Narrative    Not on file     Social Determinants of Health     Financial Resource Strain: Low Risk     Difficulty of Paying Living Expenses: Not hard at all   Food Insecurity: No Food Insecurity    Worried About Running Out of Food in the Last Year: Never true    [place] of Food in the Last Year: Never true   Transportation Needs:     Lack of Transportation (Medical):  Lack of Transportation (Non-Medical):    Physical Activity:     Days of Exercise per Week:     Minutes of Exercise per Session:    Stress:     Feeling of Stress :    Social Connections:     Frequency of Communication with Friends and Family:     Frequency of Social Gatherings with Friends and Family:     Attends Restoration[employer] Services:     Active Member of Clubs or Organizations:     Attends Club or Organization Meetings:     Marital Status:    Intimate Partner Violence:     Fear of Current or Ex-Partner:     Emotionally Abused:     Physically Abused:     Sexually Abused:      MEDICATIONS:   Prior to Admission medications    Medication Sig Start Date End Date Taking?  Authorizing Provider   metoprolol tartrate (LOPRESSOR) 25 MG tablet TAKE ONE-HALF TABLET = 12.5 MG BY MOUTH TWICE A DAY  10/6/21  Yes Rohini Campa MD   ferrous sulfate (FEROSUL) 325 (65 Fe) MG tablet Take 1 tablet by mouth daily (with breakfast) 10/1/21  Yes Rohini Campa MD   metFORMIN (GLUCOPHAGE) 500 MG tablet TAKE ONE TABLET BY MOUTH TWICE A DAY WITH A MEAL  9/8/21  Yes Rohini Campa MD    MG capsule TAKE ONE (1) CAPSULE BY MOUTH TWO (2) TIMES DAILY FOR CONSTIPATION 7/14/21  Yes Elvis Stout MD   vitamin D3 (CHOLECALCIFEROL) 25 MCG (1000 UT) TABS tablet TAKE 2 TABLETS BY MOUTH ONCE DAILY  7/14/21  Yes Elvis Stout MD   Probiotic Product (LISSY-BID PROBIOTIC) TABS TAKE 1 TABLET BY MOUTH IN THE MORNING  7/14/21  Yes Elvis Stout MD   vitamin B-12 (CYANOCOBALAMIN) 1000 MCG tablet TAKE ONE (1) TABLET BY MOUTH DAILY  4/26/21  Yes Elvis Stout MD   dilTIAZem (CARDIZEM CD) 240 MG extended release capsule Take 240 mg by mouth daily   Yes Historical Provider, MD   atorvastatin (LIPITOR) 40 MG tablet TAKE ONE (1) TABLET BY MOUTH DAILY STOP SIMVASTATIN  3/23/21  Yes Elvis Stout MD   dilTIAZem (CARDIZEM) 30 MG tablet  12/28/20  Yes Historical Provider, MD   hydrALAZINE (APRESOLINE) 10 MG tablet  12/28/20  Yes Historical Provider, MD   XARELTO 20 MG TABS tablet TAKE ONE (1) TABLET BY MOUTH DAILY (WITH BREAKFAST)  12/29/20  Yes Ze Walsh MD   oxybutynin (DITROPAN-XL) 10 MG extended release tablet TAKE 1 TABLET BY MOUTH ONCE DAILY  12/7/20  Yes Elvis Stout MD   ACETAMINOPHEN EXTRA STRENGTH 500 MG tablet TAKE 1 TABLET BY MOUTH EVERY 6 HOURS AS NEEDED FOR PAIN  9/23/20  Yes Elvis Stout MD   MYRBETRIQ 50 MG TB24 TAKE ONE TABLET BY MOUTH ONCE DAILY 12/16/19  Yes Elvis Stout MD   pantoprazole (PROTONIX) 40 MG tablet TAKE 1 TABLET BY MOUTH ONCE DAILY  10/6/21   Elvis Stout MD   levothyroxine (SYNTHROID) 75 MCG tablet Take 1 tablet by mouth every morning (before breakfast) 10/1/21   Elvis Stout MD   Blood Pressure Monitor KIT Use as directed. 7/22/20   MAX Barker CNP   glucose monitoring kit (FREESTYLE) monitoring kit Use as directed. BRAND OF CHOICE INSURANCE ALLOWS. 7/22/20   MAX Barker CNP   blood glucose monitor strips Test 2-3 times a day & as needed for symptoms of irregular blood glucose.   BRAND OF CHOICE INSURANCE ALLOWS. 7/22/20   MAX Barker CNP   Alcohol Swabs Interval 290 ms    QTc Calculation (Bazett) 451 ms    R Axis 55 degrees    T Axis 115 degrees   CBC auto differential    Collection Time: 10/10/21  6:00 PM   Result Value Ref Range    WBC 12.3 (H) 4.8 - 10.8 thou/mm3    RBC 4.40 4.20 - 5.40 mill/mm3    Hemoglobin 12.9 12.0 - 16.0 gm/dl    Hematocrit 41.6 37.0 - 47.0 %    MCV 94.5 81.0 - 99.0 fL    MCH 29.3 26.0 - 33.0 pg    MCHC 31.0 (L) 32.2 - 35.5 gm/dl    RDW-CV 14.1 11.5 - 14.5 %    RDW-SD 48.5 (H) 35.0 - 45.0 fL    Platelets 521 136 - 465 thou/mm3    MPV 11.1 9.4 - 12.4 fL    Seg Neutrophils 80.0 %    Lymphocytes 13.3 %    Monocytes 5.1 %    Eosinophils 0.7 %    Basophils 0.6 %    Immature Granulocytes 0.3 %    Segs Absolute 9.8 (H) 1 - 7 thou/mm3    Lymphocytes Absolute 1.6 1.0 - 4.8 thou/mm3    Monocytes Absolute 0.6 0.4 - 1.3 thou/mm3    Eosinophils Absolute 0.1 0.0 - 0.4 thou/mm3    Basophils Absolute 0.1 0.0 - 0.1 thou/mm3    Immature Grans (Abs) 0.04 0.00 - 0.07 thou/mm3    nRBC 0 /100 wbc   Comprehensive Metabolic Panel w/ Reflex to MG    Collection Time: 10/10/21  6:00 PM   Result Value Ref Range    Glucose 148 (H) 70 - 108 mg/dL    CREATININE 1.2 0.4 - 1.2 mg/dL    BUN 26 (H) 7 - 22 mg/dL    Sodium 142 135 - 145 meq/L    Potassium reflex Magnesium 4.1 3.5 - 5.2 meq/L    Chloride 103 98 - 111 meq/L    CO2 22 (L) 23 - 33 meq/L    Calcium 9.7 8.5 - 10.5 mg/dL    AST 16 5 - 40 U/L    Alkaline Phosphatase 79 38 - 126 U/L    Total Protein 7.2 6.1 - 8.0 g/dL    Albumin 3.9 3.5 - 5.1 g/dL    Total Bilirubin 0.3 0.3 - 1.2 mg/dL    ALT 14 11 - 66 U/L   Troponin    Collection Time: 10/10/21  6:00 PM   Result Value Ref Range    Troponin T < 0.010 ng/ml   Protime-INR    Collection Time: 10/10/21  6:00 PM   Result Value Ref Range    INR 1.80 (H) 0.85 - 1.13   APTT    Collection Time: 10/10/21  6:00 PM   Result Value Ref Range    aPTT 41.1 (H) 22.0 - 38.0 seconds   Anion Gap    Collection Time: 10/10/21  6:00 PM   Result Value Ref Range    Anion Gap 17.0 (H) 8.0 - 16.0 meq/L   Osmolality    Collection Time: 10/10/21  6:00 PM   Result Value Ref Range    Osmolality Calc 290.6 275.0 - 300.0 mOsmol/kg   Glomerular Filtration Rate, Estimated    Collection Time: 10/10/21  6:00 PM   Result Value Ref Range    Est, Glom Filt Rate 44 (A) ml/min/1.73m2   TSH without Reflex    Collection Time: 10/10/21  6:00 PM   Result Value Ref Range    TSH 3.960 0.400 - 4.200 uIU/mL   T4, Free    Collection Time: 10/10/21  6:00 PM   Result Value Ref Range    T4 Free 1.17 0.93 - 1.76 ng/dL   COVID-19, Rapid    Collection Time: 10/10/21  7:00 PM    Specimen: Nasopharyngeal Swab   Result Value Ref Range    SARS-CoV-2, NAAT NOT  DETECTED NOT DETECTED       IMAGING:  XR CHEST PORTABLE    Result Date: 10/10/2021  PROCEDURE: XR CHEST PORTABLE CLINICAL INFORMATION: 21-year-old female with shortness of breath and chest pain. COMPARISON: No prior study. TECHNIQUE: AP upright view of the chest was obtained. FINDINGS: There is cardiomegaly with mild pulmonary vascular congestion. There is no significant pleural effusion or pneumothorax. Visualized portions of the upper abdomen are within normal limits. The osseous structures are intact. No acute fractures or suspicious osseous lesions. Cardiomegaly with mild pulmonary vascular congestion. **This report has been created using voice recognition software. It may contain minor errors which are inherent in voice recognition technology. ** Final report electronically signed by Dr Mario Bryant on 10/10/2021 6:52 PM      VTE Prophylaxis: Already on anticoagulation    ASSESSMENT AND PLAN  Active Problems:    1 Parxosymal atrial fibrillation with RVR  -Diagnosed with atrial fibrillation 1 year ago and put on Xarelto, Toprol, and Diltiazem. No recent illness. Compliant with daily medications. BP stable. Troponin negative. K 4.1. No Mg available.  TSH normal.  -Etiology unclear, but could be related to dehydration (poor PO intake recently) or decompensated CHF (pulmonary edema on CXR) - volume status difficult to gauge on clinical exam  -[id number] score 7; HAS-BLED Score 2  -Started on IV Cardizem bolus and drip in the ED  -Hold oral dose of diltiazem at this time and resume with drip weaning  -Xarelto resumed  -Toprol continued with hold parameters  -Hydralazine held (can contribute to reflex tachycardia)  -Trial maintenance IVF overnight  -On continuous telemetry at this time  -Check Mg  -Follows with Dr. Gretchen Scott from cardiology  -Cardiology consulted given repeat hospitalizations for this issue. May benefit from ablation    2. Stage 1 JOSHUA likely secondary to poor oral intake/dehydration  -Creatinine on admission was 1.2 (baseilne 0.6-0.7)  -BUN/creatinine ratio 21.6 - suggests pre-renal etiology  -Fluid trial as above  -Avoid nephrotoxins  -Will continue to monitor daily BMPs    3. Chronic diastolic heart failure - unclear whether compensated  -Last echocardiogram done last year showed ejection fraction>55%, G1DD  -Bilateral interstitial edema noted on portable CXR. No lower extremity edema present on exam.  -BNP elevated  -I/Os, Daily weights  -May benefit from trial of diuresis and repeat ECHO pending overnight response to fluid trial    4. Mild anion gap metabolic acidosis  -With mild LA elevation  -Likely secondary to dehydration and hypoperfusion from tachycardia  -Management as above  -Will continue to monitor BMP's. Repeat LA in the morning    5. SIRS 3/4 with low suspicion for active infection/sepsis  -Patient had tachycardia (), tachypnea (Resp 22) and leukocytosis (WBC 12.3). Afebrile  -No infiltrate on CXR  -Check UA  -Procalcitonin normal    6. NIDDM2  -Metformin held  -Started low-dose sliding scale insulin with hypoglycemia protocol  -Accuchecks prn    7. History of hypothyroidism  -TSH normal on arrival  -Levothyroxine continued     8. History of hyperlipidemia  -Atorvastatin continued    9.   Primary hypertension  -Controlled  -Hydralazine held for tachycardia. Resume as indicated  -Lopressor continued with hold parameters  -Monitor BP    10. History of GERD  -Protonix continued    11. History of urinary incontinence  -Mirabegron, oxybutynin continued    12. History of vitamins D and B12 deficiency  -Supplementation resumed  -Consider substitution of PPI for H2 blocker    13. History of iron deficiency anemia  -Ferrous sulfate continued    14.  Hypomagnesemia  -Magnesium replacement protocol      Plan of care discussed with: Patient and patient's     SIGNATURE: Major Emmanuel DO  DATE: October 10, 2021  TIME: 9:25 PM   Santos Martinez MD  - supervising physician

## 2021-10-11 NOTE — ED NOTES
Pt and vs reassessed. RR even and unlabored. Pt resting in bed alert. Pt updated on POC. Medication list reviewed.  No distress noted, pt stable at this time and denies any needs     Geraldo Collet, RN  10/10/21 2014

## 2021-10-11 NOTE — RESULT ENCOUNTER NOTE
Addressed in ED   currently admitted.   A fib w/ RVR    Future Appointments  11/26/2021 10:00 AM   Steven Colin [facility]     [facility]  4/7/2022   10:30 AM   Roma Restrepo PA-C         GYN Oncology        Enma To  7/21/2022  10:45 AM   Skip Epstein MD          derm             [facility]

## 2021-10-11 NOTE — PROGRESS NOTES
Hospitalist Progress Note      Patient:  Michael Argueta    Unit/Bed:3B-23/023-A  YOB: 1950  MRN: [id number]   Acct: [de-identified]   PCP: Winston Lynch MD  Date of Admission: 10/10/2021    Assessment/Plan:    1 Parxosymal atrial fibrillation with RVR  -Diagnosed with atrial fibrillation 1 year ago and put on Xarelto, Toprol, and Diltiazem. No recent illness. Compliant with daily medications. BP stable. Troponin negative. K 4.1. No Mg available. TSH normal.  -Continue IV Cardizem gtt for rate control. Plan for echo and stress test today. Will wean off drip and transition to PO after stress test  -Cardiology following     2. Mild JOSHUA likely secondary to poor oral intake/dehydration  -Creatinine on admission was 1.2 (baseilne 0.6-0.7)  -Gentle IVF, monitor fluid status and resp status     3. HFpEF  - continue medical management     4. NIDDM2  -Metformin held  -Started low-dose sliding scale insulin with hypoglycemia protocol  -Accuchecks prn     5. Hypothyroidism  -TSH normal on arrival  -Levothyroxine continued      6. HLD  -Continue statin     7.  Essential hypertension  -Controlled  -Hydralazine held for tachycardia. Resume as indicated  -Lopressor continued with hold parameters  -Monitor BP     8. History of GERD  -Protonix continued     9. History of urinary incontinence  -Mirabegron, oxybutynin continued       Chief Complaint: Headache    Initial H and P:-      73 y.o., , female PMH atrial fibrillation on Xarelto, chronic, mild diastolic heart failure, type 2 diabetes, endometrial cancer, TIA, hyperlipidemia, hypertension, hypothyroidism, who presents with complaints of nausea, back pain, headache, and chest heaviness while standing at the bus stop at 1 PM. The patient was waiting at the bus stop when all of a sudden she did not start to feel well.   She lay down in bed throughout the day but noticed that her symptoms were becoming worse even after taking Tylenol. She has a past medical history of atrial fibrillation controlled with xarelto, toprol, and diltiazem. The patient reports that she is compliant with her medications and follows with Dr. Rupa Szymanski for cardiology. She has had several hospitalizations related to atrial fibrillation with the last one being in January 2021. The patient denies any vomiting episodes, diarrhea, constipation, abdominal pain, hematochezia, or hematemesis. She typically ambulates with a walker at home and has not had any falls in the last few months.      In the ED, the patient's vitals were significant for temperature 98.7, respiratory rate 22, pulse 134, SPO2 96%. The patient's labs are significant for CO2 22, BUN 26, creatinine 1.2, anion gap 17.0, glucose 148, WBC 12.3, INR 1.80. An EKG was done that showed atrial fibrillation with RVR. A chest x-ray was done that showed cardiomegaly with mild pulmonary vascular congestion. She was given 5 mg of metoprolol with no change in the ED. A cardizem bolus and drip was ordered. The troponin on admission was negative, and decision was made to admit due to atrial fibrillation with RVR    Subjective (past 24 hours):     Feels better. Denies chest pain, SOB, LE swelling, NV. No headache this morning. No fevers. Past medical history, family history, social history and allergies reviewed again and is unchanged since admission. ROS (All review of systems completed. Pertinent positives noted.  Otherwise All other systems reviewed and negative.)     Medications:  Reviewed    Infusion Medications    sodium chloride      dilTIAZem (CARDIZEM) 125 mg in dextrose 5% 125 mL infusion 15 mg/hr (10/11/21 0100)    dextrose      sodium chloride      sodium chloride 75 mL/hr at 10/11/21 0018     Scheduled Medications    magnesium replacement protocol   Other RX Placeholder    atorvastatin  40 mg Oral Nightly    [Held by provider] dilTIAZem  240 mg Oral Daily    [Held by provider] dilTIAZem  30 mg Oral 4 times per day    docusate sodium  100 mg Oral BID    ferrous sulfate  325 mg Oral Daily with breakfast    [Held by provider] hydrALAZINE  10 mg Oral 4 times per day    levothyroxine  75 mcg Oral QAM AC    metoprolol tartrate  25 mg Oral BID    oxybutynin  10 mg Oral Nightly    pantoprazole  40 mg Oral QAM AC    vitamin B-12  1,000 mcg Oral Daily    Vitamin D  1,000 Units Oral Daily    rivaroxaban  20 mg Oral Daily    insulin lispro  0-6 Units SubCUTAneous TID WC    insulin lispro  0-3 Units SubCUTAneous Nightly    sodium chloride flush  5-40 mL IntraVENous 2 times per day    trospium  20 mg Oral BID AC     PRN Meds: regadenoson, sodium chloride flush, sodium chloride, albuterol sulfate HFA, atropine, nitroGLYCERIN, metoprolol, aminophylline, glucose, dextrose, glucagon (rDNA), dextrose, sodium chloride, ondansetron **OR** ondansetron, polyethylene glycol, acetaminophen **OR** acetaminophen      Intake/Output Summary (Last 24 hours) at 10/11/2021 0832  Last data filed at 10/11/2021 9841  Gross per 24 hour   Intake 590 ml   Output 100 ml   Net 490 ml       Diet:  ADULT DIET; Regular; 3 carb choices (45 gm/meal); Low Fat/Low Chol/High Fiber/JOSI; Low Sodium (2 gm)  Diet NPO    Exam:  /69   Pulse 67   Temp 98.6 °F (37 °C) (Oral)   Resp 21   Ht 5' 2\" (1.575 m)   Wt 239 lb 4.8 oz (108.5 kg)   LMP  (LMP Unknown)   SpO2 96%   BMI 43.77 kg/m²   General appearance: No apparent distress, appears stated age and cooperative. HEENT: Pupils equal, round, and reactive to light. Conjunctivae/corneas clear. Neck: Supple, with full range of motion. No jugular venous distention. Trachea midline. Respiratory:  Normal respiratory effort. Clear to auscultation, bilaterally without Rales/Wheezes/Rhonchi. Cardiovascular: Irregularly irregular, tachycardic  Abdomen: Soft, non-tender, non-distended with normal bowel sounds.   Musculoskeletal: passive and active ROM x 4 extremities. Skin: Skin color, texture, turgor normal.  No rashes or lesions. Neurologic:  Neurovascularly intact without any focal sensory/motor deficits. Cranial nerves: II-XII intact, grossly non-focal.  Psychiatric: Alert and oriented, thought content appropriate, normal insight  Capillary Refill: Brisk,< 3 seconds   Peripheral Pulses: +2 palpable, equal bilaterally     Labs:   Recent Labs     10/10/21  1800 10/11/21  0414   WBC 12.3* 8.3   HGB 12.9 11.5*   HCT 41.6 36.4*    230     Recent Labs     10/10/21  1800 10/10/21  2212 10/11/21  0414     --  144   K 4.1  --  3.8     --  107   CO2 22*  --  24   BUN 26*  --  25*   CREATININE 1.2  --  1.1   CALCIUM 9.7  --  9.1   PHOS  --  3.8  --      Recent Labs     10/10/21  1800   AST 16   ALT 14   BILITOT 0.3   ALKPHOS 79     Recent Labs     10/10/21  1800   INR 1.80*     No results for input(s): Mekhi Geliset in the last 72 hours. Microbiology:    Blood culture #1: No results found for: BC    Blood culture #2:No results found for: Yesenia Waller    Organism:No results found for: ORG    No results found for: LABGRAM    MRSA culture only:No results found for: [facility]    Urine culture: No results found for: LABURIN    Respiratory culture: No results found for: CULTRESP    Aerobic and Anaerobic :  No results found for: LABAERO  No results found for: LABANAE    Urinalysis:      Lab Results   Component Value Date    NITRU NEGATIVE 10/11/2021    WBCUA > 100 10/11/2021    BACTERIA MANY 10/11/2021    RBCUA 0-2 10/11/2021    BLOODU SMALL 10/11/2021    SPECGRAV 1.016 10/11/2021    GLUCOSEU NEGATIVE 08/04/2020       Radiology:  XR CHEST PORTABLE   Final Result   Cardiomegaly with mild pulmonary vascular congestion. **This report has been created using voice recognition software. It may contain minor errors which are inherent in voice recognition technology. **      Final report electronically signed by Dr Brayden Egan on 10/10/2021 6:52 PM XR CHEST PORTABLE    Result Date: 10/10/2021  PROCEDURE: XR CHEST PORTABLE CLINICAL INFORMATION: 68-year-old female with shortness of breath and chest pain. COMPARISON: No prior study. TECHNIQUE: AP upright view of the chest was obtained. FINDINGS: There is cardiomegaly with mild pulmonary vascular congestion. There is no significant pleural effusion or pneumothorax. Visualized portions of the upper abdomen are within normal limits. The osseous structures are intact. No acute fractures or suspicious osseous lesions. Cardiomegaly with mild pulmonary vascular congestion. **This report has been created using voice recognition software. It may contain minor errors which are inherent in voice recognition technology. ** Final report electronically signed by Dr Emiliano Myers on 10/10/2021 6:52 PM      Electronically signed by Kirk Faye MD on 10/11/2021 at 8:32 AM

## 2021-10-12 ENCOUNTER — TELEPHONE (OUTPATIENT)
Dept: FAMILY MEDICINE CLINIC | Age: 71
End: 2021-10-12

## 2021-10-12 VITALS
RESPIRATION RATE: 17 BRPM | HEIGHT: 62 IN | DIASTOLIC BLOOD PRESSURE: 64 MMHG | SYSTOLIC BLOOD PRESSURE: 146 MMHG | BODY MASS INDEX: 44.33 KG/M2 | TEMPERATURE: 97.8 F | OXYGEN SATURATION: 96 % | HEART RATE: 58 BPM | WEIGHT: 240.9 LBS

## 2021-10-12 LAB
ANION GAP SERPL CALCULATED.3IONS-SCNC: 12 MEQ/L (ref 8–16)
BUN BLDV-MCNC: 23 MG/DL (ref 7–22)
CALCIUM SERPL-MCNC: 9.2 MG/DL (ref 8.5–10.5)
CHLORIDE BLD-SCNC: 104 MEQ/L (ref 98–111)
CO2: 23 MEQ/L (ref 23–33)
CREAT SERPL-MCNC: 1.1 MG/DL (ref 0.4–1.2)
ERYTHROCYTE [DISTWIDTH] IN BLOOD BY AUTOMATED COUNT: 14.1 % (ref 11.5–14.5)
ERYTHROCYTE [DISTWIDTH] IN BLOOD BY AUTOMATED COUNT: 49.8 FL (ref 35–45)
GFR SERPL CREATININE-BSD FRML MDRD: 49 ML/MIN/1.73M2
GLUCOSE BLD-MCNC: 110 MG/DL (ref 70–108)
GLUCOSE BLD-MCNC: 115 MG/DL (ref 70–108)
GLUCOSE BLD-MCNC: 99 MG/DL (ref 70–108)
HCT VFR BLD CALC: 35.4 % (ref 37–47)
HEMOGLOBIN: 10.8 GM/DL (ref 12–16)
MCH RBC QN AUTO: 29.3 PG (ref 26–33)
MCHC RBC AUTO-ENTMCNC: 30.5 GM/DL (ref 32.2–35.5)
MCV RBC AUTO: 96.2 FL (ref 81–99)
PLATELET # BLD: 225 THOU/MM3 (ref 130–400)
PMV BLD AUTO: 10.6 FL (ref 9.4–12.4)
POTASSIUM SERPL-SCNC: 3.8 MEQ/L (ref 3.5–5.2)
RBC # BLD: 3.68 MILL/MM3 (ref 4.2–5.4)
SODIUM BLD-SCNC: 139 MEQ/L (ref 135–145)
WBC # BLD: 8.2 THOU/MM3 (ref 4.8–10.8)

## 2021-10-12 PROCEDURE — 6370000000 HC RX 637 (ALT 250 FOR IP): Performed by: FAMILY MEDICINE

## 2021-10-12 PROCEDURE — 6370000000 HC RX 637 (ALT 250 FOR IP): Performed by: INTERNAL MEDICINE

## 2021-10-12 PROCEDURE — 2580000003 HC RX 258

## 2021-10-12 PROCEDURE — 80048 BASIC METABOLIC PNL TOTAL CA: CPT

## 2021-10-12 PROCEDURE — 6370000000 HC RX 637 (ALT 250 FOR IP)

## 2021-10-12 PROCEDURE — 36415 COLL VENOUS BLD VENIPUNCTURE: CPT

## 2021-10-12 PROCEDURE — 99239 HOSP IP/OBS DSCHRG MGMT >30: CPT | Performed by: INTERNAL MEDICINE

## 2021-10-12 PROCEDURE — 85027 COMPLETE CBC AUTOMATED: CPT

## 2021-10-12 PROCEDURE — 82948 REAGENT STRIP/BLOOD GLUCOSE: CPT

## 2021-10-12 PROCEDURE — 99232 SBSQ HOSP IP/OBS MODERATE 35: CPT | Performed by: STUDENT IN AN ORGANIZED HEALTH CARE EDUCATION/TRAINING PROGRAM

## 2021-10-12 RX ORDER — AMIODARONE HYDROCHLORIDE 200 MG/1
200 TABLET ORAL 2 TIMES DAILY
Qty: 28 TABLET | Refills: 0 | Status: SHIPPED | OUTPATIENT
Start: 2021-10-12 | End: 2021-12-02 | Stop reason: DRUGHIGH

## 2021-10-12 RX ORDER — METOPROLOL TARTRATE 50 MG/1
50 TABLET, FILM COATED ORAL 2 TIMES DAILY
Qty: 60 TABLET | Refills: 3 | Status: SHIPPED | OUTPATIENT
Start: 2021-10-12 | End: 2021-10-20 | Stop reason: SDUPTHER

## 2021-10-12 RX ORDER — AMIODARONE HYDROCHLORIDE 200 MG/1
200 TABLET ORAL DAILY
Qty: 30 TABLET | Refills: 3 | Status: SHIPPED | OUTPATIENT
Start: 2021-10-27 | End: 2022-09-01

## 2021-10-12 RX ORDER — LISINOPRIL 10 MG/1
10 TABLET ORAL DAILY
Qty: 30 TABLET | Refills: 3 | Status: SHIPPED | OUTPATIENT
Start: 2021-10-12 | End: 2021-12-29

## 2021-10-12 RX ORDER — LISINOPRIL 10 MG/1
10 TABLET ORAL DAILY
Status: DISCONTINUED | OUTPATIENT
Start: 2021-10-12 | End: 2021-10-12 | Stop reason: HOSPADM

## 2021-10-12 RX ADMIN — LEVOTHYROXINE SODIUM 75 MCG: 0.07 TABLET ORAL at 06:30

## 2021-10-12 RX ADMIN — Medication 1000 UNITS: at 08:56

## 2021-10-12 RX ADMIN — METOPROLOL TARTRATE 50 MG: 25 TABLET, FILM COATED ORAL at 08:54

## 2021-10-12 RX ADMIN — SODIUM CHLORIDE, PRESERVATIVE FREE 10 ML: 5 INJECTION INTRAVENOUS at 08:58

## 2021-10-12 RX ADMIN — AMIODARONE HYDROCHLORIDE 200 MG: 200 TABLET ORAL at 08:56

## 2021-10-12 RX ADMIN — LISINOPRIL 10 MG: 10 TABLET ORAL at 11:38

## 2021-10-12 RX ADMIN — ACETAMINOPHEN 650 MG: 325 TABLET ORAL at 09:53

## 2021-10-12 RX ADMIN — FERROUS SULFATE TAB 325 MG (65 MG ELEMENTAL FE) 325 MG: 325 (65 FE) TAB at 08:56

## 2021-10-12 RX ADMIN — PANTOPRAZOLE SODIUM 40 MG: 40 TABLET, DELAYED RELEASE ORAL at 06:30

## 2021-10-12 RX ADMIN — TROSPIUM CHLORIDE 20 MG: 20 TABLET, FILM COATED ORAL at 06:30

## 2021-10-12 RX ADMIN — DOCUSATE SODIUM 100 MG: 100 CAPSULE ORAL at 08:58

## 2021-10-12 RX ADMIN — Medication 1000 MCG: at 08:58

## 2021-10-12 ASSESSMENT — PAIN DESCRIPTION - ONSET: ONSET: ON-GOING

## 2021-10-12 ASSESSMENT — PAIN SCALES - GENERAL
PAINLEVEL_OUTOF10: 0
PAINLEVEL_OUTOF10: 3
PAINLEVEL_OUTOF10: 0

## 2021-10-12 ASSESSMENT — PAIN DESCRIPTION - ORIENTATION: ORIENTATION: MID

## 2021-10-12 ASSESSMENT — PAIN DESCRIPTION - LOCATION: LOCATION: KNEE

## 2021-10-12 ASSESSMENT — PAIN DESCRIPTION - PAIN TYPE: TYPE: CHRONIC PAIN

## 2021-10-12 ASSESSMENT — PAIN - FUNCTIONAL ASSESSMENT: PAIN_FUNCTIONAL_ASSESSMENT: PREVENTS OR INTERFERES WITH MANY ACTIVE NOT PASSIVE ACTIVITIES

## 2021-10-12 ASSESSMENT — PAIN DESCRIPTION - DESCRIPTORS: DESCRIPTORS: NAGGING;NUMBNESS

## 2021-10-12 ASSESSMENT — PAIN DESCRIPTION - FREQUENCY: FREQUENCY: CONTINUOUS

## 2021-10-12 ASSESSMENT — PAIN DESCRIPTION - PROGRESSION: CLINICAL_PROGRESSION: NOT CHANGED

## 2021-10-12 NOTE — PROGRESS NOTES
Discharge teaching and instructions for diagnosis/procedure of atrial fibrillation completed with patient using teachback method. AVS reviewed. Prescriptions given to patient. Patient voiced understanding regarding prescriptions, follow up appointments, and care of self at home.

## 2021-10-12 NOTE — PROGRESS NOTES
CLINICAL PHARMACY: DISCHARGE MED RECONCILIATION/REVIEW    [facility]) Select Patient?: Yes  Total # of Interventions Recommended: 0   -   Total # Interventions Accepted: 0  Intervention Severity:   - Level 1 Intervention Present?: No   - Level 2 #: 0   - Level 3 #: 0   Time Spent (min): 5    Additional Documentation:

## 2021-10-12 NOTE — CARE COORDINATION
Discharge Planning Update:     Treating a-fib rvr. Cardizem gtt off. PO Amiodarone. Xarelto. Echo and Stress Test complete. Currently SR. Anticipate discharge possibly today. Plans return home alone as prior to admission.

## 2021-10-12 NOTE — PROGRESS NOTES
Cardiology Progress Note      Patient:  Qi Lam  YOB: 1950  MRN: [id number]   Acct: [de-identified]  [facility] Date:  10/10/2021  Primary Cardiologist: none     Per dr Harsha Rosario note:  [place] FOR CONSULTATION:  Atrial fibrillation.     REFERRING PROVIDER:  Hospitalist Service.     HISTORY OF PRESENT ILLNESS:  A pleasant 44-year-old patient not  necessarily the best historian, who has multiple risk factors for  coronary artery disease including diabetes for several years,  hypertension, hyperlipidemia as well as family history of coronary  artery disease, who comes in because of symptoms of fatigue, tiredness,  shortness of breath, not feeling well, was found to be in atrial  fibrillation, rapid ventricular response. Looks like the patient has  been aware of AFib as had AFib for several years and not sure if she has  been in rhythm or not through that time. Looks like there was some  issue with compliance with medication as I could understand from the  staff. She has not been taking her medication on time. In fact, she  might have had some compliance issue with the meds altogether. The  patient is not aware of any previous coronary artery disease or  stenting. She does have hypertension, hyperlipidemia and diabetes. \"      Subjective (Events in last 24 hours):   Pt awake, alert. NAD. Denies CP, SOB, leg swelling. States she has been feeling much better. dw patient about med adjustments and she will be told prior to dc about which meds to take. She states she sees heart doc in [facility], will fu with them.     Objective:   BP (!) 167/78   Pulse 60   Temp 97.7 °F (36.5 °C) (Oral)   Resp 18   Ht 5' 2\" (1.575 m)   Wt 240 lb 14.4 oz (109.3 kg)   LMP  (LMP Unknown)   SpO2 98%   BMI 44.06 kg/m²      bp elevated  TELEMETRY: nsr, 60s     Physical Exam:  General Appearance: alert and oriented to person, place and time, in no acute distress  Cardiovascular: normal rate, regular rhythm, normal S1 and S2, no murmurs, rubs, clicks, or gallops, distal pulses intact, no carotid bruits, no JVD  Pulmonary/Chest: slight crackles in right lung base, CTA elsewhere  Abdomen: soft, non-tender, non-distended, normal bowel sounds, no masses   Extremities: no cyanosis, clubbing or edema, pulse   Skin: warm and dry, no rash or erythema  Head: normocephalic and atraumatic  Eyes: pupils equal, round, and reactive to light  Neck: supple and non-tender without mass, no thyromegaly   Musculoskeletal: normal range of motion, no joint swelling, deformity or tenderness  Neurological: alert, oriented, normal speech, no focal findings or movement disorder noted    Medications:    magnesium replacement protocol   Other RX Placeholder    metoprolol tartrate  50 mg Oral BID    amiodarone  200 mg Oral BID    atorvastatin  40 mg Oral Nightly    [Held by provider] dilTIAZem  240 mg Oral Daily    [Held by provider] dilTIAZem  30 mg Oral 4 times per day    docusate sodium  100 mg Oral BID    ferrous sulfate  325 mg Oral Daily with breakfast    [Held by provider] hydrALAZINE  10 mg Oral 4 times per day    levothyroxine  75 mcg Oral QAM AC    oxybutynin  10 mg Oral Nightly    pantoprazole  40 mg Oral QAM AC    vitamin B-12  1,000 mcg Oral Daily    Vitamin D  1,000 Units Oral Daily    rivaroxaban  20 mg Oral Daily    insulin lispro  0-6 Units SubCUTAneous TID WC    insulin lispro  0-3 Units SubCUTAneous Nightly    sodium chloride flush  5-40 mL IntraVENous 2 times per day    trospium  20 mg Oral BID AC      dextrose      sodium chloride      sodium chloride 75 mL/hr at 10/11/21 0018     regadenoson, 0.4 mg, ONCE PRN  glucose, 15 g, PRN  dextrose, 12.5 g, PRN  glucagon (rDNA), 1 mg, PRN  dextrose, 100 mL/hr, PRN  sodium chloride, 25 mL, PRN  ondansetron, 4 mg, Q8H PRN   Or  ondansetron, 4 mg, Q6H PRN  polyethylene glycol, 17 g, Daily PRN  acetaminophen, 650 mg, Q6H PRN   Or  acetaminophen, 650 mg, Q6H PRN        Diagnostics:  EKG: Echo:   Conclusions      Summary   Ejection fraction is visually estimated at 55%. Overall left ventricular function is normal.   Aortic valve appears tricuspid. Aortic valve leaflets are somewhat thickened. Aortic valve leaflets are Mildly calcified. Signature      ----------------------------------------------------------------   Electronically signed by Jaelyn Colbert MD (Interpreting   physician) on 10/11/2021 at 07:04 PM  Stress:   Conclusions      Summary   This Nuclear Medicine study was negative for ischemia . normal EF   baseline A fib      Recommendation   Clinical correlation is recommended. Signatures      ----------------------------------------------------------------   Electronically signed by Jaelyn Colbert MD (Interpreting   Cardiologist) on 10/11/2021 at 19:58  Left Heart Cath:     Lab Data:    Cardiac Enzymes:  No results for input(s): CKTOTAL, CKMB, CKMBINDEX, TROPONINI in the last 72 hours.     CBC:   Lab Results   Component Value Date    WBC 8.2 10/12/2021    RBC 3.68 10/12/2021    RBC 4.23 03/20/2012    HGB 10.8 10/12/2021    HCT 35.4 10/12/2021     10/12/2021     03/20/2012       CMP:    Lab Results   Component Value Date     10/12/2021    K 3.8 10/12/2021    K 4.1 10/10/2021     10/12/2021    CO2 23 10/12/2021    BUN 23 10/12/2021    CREATININE 1.1 10/12/2021    GFRAA >60 07/13/2021    LABGLOM 49 10/12/2021    GLUCOSE 115 10/12/2021    GLUCOSE 114 03/20/2012    CALCIUM 9.2 10/12/2021       Hepatic Function Panel:    Lab Results   Component Value Date    ALKPHOS 79 10/10/2021    ALT 14 10/10/2021    AST 16 10/10/2021    PROT 7.2 10/10/2021    BILITOT 0.3 10/10/2021    LABALBU 3.9 10/10/2021    LABALBU 4.3 03/20/2012       Magnesium:    Lab Results   Component Value Date    MG 1.4 10/11/2021       PT/INR:    Lab Results   Component Value Date    PROTIME 13.1 07/11/2020    INR 1.80 10/10/2021       HgBA1c:    Lab Results   Component Value

## 2021-10-12 NOTE — TELEPHONE ENCOUNTER
----- Message from Alice Sheikh MA sent at 10/12/2021 10:08 AM EDT -----  Subject: Appointment Request    Reason for Call: Routine Hospital Follow Up    QUESTIONS  Type of Appointment? New Patient/New to Provider  Reason for appointment request? No appointments available during search  Additional Information for Provider? Arline from [address] [place] [address] called to   schedule pt for a hospital follow up (admit 10/10-10/12) dx? afib with   rapid vr. She stated pt needs to be seen within the next 7-10 days, and   there are no appts during those needed days as next avail was 11/4. Arline   can be reached back at the number below to call and schedule follow up   accordingly. Thank you so much!  ---------------------------------------------------------------------------  --------------  CALL BACK INFO  What is the best way for the office to contact you? OK to leave message on   voicemail  Preferred Call Back Phone Number? [phone]  ---------------------------------------------------------------------------  --------------  SCRIPT ANSWERS  Relationship to Patient? Third Party  Representative Name? Arline [facility] ([facility] AT [place])   (Patient requests to see provider urgently. )? No  (Has the patient been discharged from the hospital within 2 business days   AND does not have a Telephone Encounter  Follow Up From [address] [place] [address]   documented in [facility] [place]?)? No  Do you have any questions for your primary care provider that need to be   answered prior to your appointment? (Use RN Triage if question pertains to   anything on the red flag list)? No  (Patient needs follow up visit after hospital discharge) Book first   available appointment within 7 days OF DISCHARGE, if no appt, proceed to   book the next available time slot within 14 days OF DISCHARGE AND Send   Message to Provider. [address] Follow Up appointment cannot be booked   beyond 14 Days and should result in a Message to Provider. ?  Yes   Have you been diagnosed with, awaiting test results for, or told that you   are suspected of having COVID-19 (Coronavirus)? (If patient has tested   negative or was tested as a requirement for work, school, or travel and   not based on symptoms, answer no)? No  Within the past two weeks have you developed any of the following symptoms   (answer no if symptoms have been present longer than 2 weeks or began   more than 2 weeks ago)? Fever or Chills, Cough, Shortness of breath or   difficulty breathing, Loss of taste or smell, Sore throat, Nasal   congestion, Sneezing or runny nose, Fatigue or generalized body aches   (answer no if pain is specific to a body part e.g. back pain), Diarrhea,   Headache? No  Have you had close contact with someone with COVID-19 in the last 14 days? No  (Service Expert  click yes below to proceed with [employer] As Usual   Scheduling)?  Yes

## 2021-10-12 NOTE — PROGRESS NOTES
Report received from Primary RN Jacob. Patient is lying comfortably in bed. respirations are unlabored. Call light and bedside table in reach.  Electronically signed by Josh Merino on 10/12/2021 at 7:22 AM

## 2021-10-12 NOTE — DISCHARGE SUMMARY
Hospitalist Discharge Summary        Patient: Nahun Bob  YOB: 1950  MRN: [id number]   Acct: [de-identified]    Primary Care Physician: Katelyn Wilder MD    Admit date  10/10/2021    Discharge date:  10/12/2021 10:55 AM    Chief Complaint on presentation :-  Headache    Discharge Assessment and Plan:-     1 Parxosymal atrial fibrillation with RVR  -Diagnosed with atrial fibrillation 1 year ago and put on Xarelto, Toprol, and Diltiazem. No recent illness. Compliant with daily medications. BP stable. Troponin negative. K 4.1. No Mg available. TSH normal.  -Continue IV Cardizem gtt for rate control. Plan for echo and stress test today. Will wean off drip and transition to PO after stress test  -Cardiology following    10/12: Cardiology stopped cardizem, rec amio 200mg BID for 14 days and then daily thereafter. Otherwise, ok with discharge. Patient was given all instructions. Rec to f/u with PCP and cardiologist.     2. Mild JOSHUA likely secondary to poor oral intake/dehydration  -Creatinine on admission was 1.2 (baseilne 0.6-0.7)  -Gentle IVF, monitor fluid status and resp status     3. HFpEF  - continue medical management     4.  NIDDM2  -Metformin held  -Started low-dose sliding scale insulin with hypoglycemia protocol  -Accuchecks prn     5.  Hypothyroidism  -TSH normal on arrival  -Levothyroxine continued      6.  HLD  -Continue statin     7.  Essential hypertension  -Controlled  -Hydralazine held for tachycardia.  Resume as indicated  -Lopressor continued with hold parameters  -Monitor BP     8. History of GERD  -Protonix continued     9. History of urinary incontinence  -Mirabegron, oxybutynin continued       Initial H and P and Hospital course:-    71 y.o., , female PMH atrial fibrillation on Xarelto, chronic, mild diastolic heart failure, type 2 diabetes, endometrial cancer, TIA, hyperlipidemia, hypertension, hypothyroidism, who presents with complaints of nausea, back pain, headache, and chest heaviness while standing at the bus stop at 1 PM. The patient was waiting at the bus stop when all of a sudden she did not start to feel well.  She lay down in bed throughout the day but noticed that her symptoms were becoming worse even after taking Tylenol.  She has a past medical history of atrial fibrillation controlled with xarelto, toprol, and diltiazem.  The patient reports that she is compliant with her medications and follows with Dr. Philip Xiao cardiology. She has had several hospitalizations related to atrial fibrillation with the last one being in January 2021. The patient denies any vomiting episodes, diarrhea, constipation, abdominal pain, hematochezia, or hematemesis. She typically ambulates with a walker at home and has not had any falls in the last few months.      In the ED, the patient's vitals were significant for temperature 98.7, respiratory rate 22, pulse 134, SPO2 96%. The patient's labs are significant for CO2 22, BUN 26, creatinine 1.2, anion gap 17.0, glucose 148, WBC 12.3, INR 1.80. An EKG was done that showed atrial fibrillation with RVR. A chest x-ray was done that showed cardiomegaly with mild pulmonary vascular congestion. She was given 5 mg of metoprolol with no change in the ED. A cardizem bolus and drip was ordered.  The troponin on admission was negative, and decision was made to admit due to atrial fibrillation with RVR    Physical Exam:-  Vitals:   Patient Vitals for the past 24 hrs:   BP Temp Temp src Pulse Resp SpO2 Weight   10/12/21 0845 (!) 180/88 -- -- 70 -- -- --   10/12/21 0745 (!) 167/78 97.7 °F (36.5 °C) Oral 60 18 98 % --   10/12/21 0322 (!) 132/58 98.3 °F (36.8 °C) Oral 59 20 95 % --   10/12/21 0304 -- -- -- -- -- -- 240 lb 14.4 oz (109.3 kg)   10/12/21 0019 131/60 97.3 °F (36.3 °C) Oral 58 18 96 % --   10/11/21 2039 (!) 124/57 97.4 °F (36.3 °C) Oral 66 16 96 % --   10/11/21 1633 106/64 98.2 °F (36.8 °C) Oral 59 18 96 % --   10/11/21 1244 127/64 98.4 °F (36.9 °C) Oral 60 18 97 % --     Weight:   Weight: 240 lb 14.4 oz (109.3 kg)   24 hour intake/output:     Intake/Output Summary (Last 24 hours) at 10/12/2021 1055  Last data filed at 10/12/2021 0854  Gross per 24 hour   Intake 1876.64 ml   Output 600 ml   Net 1276.64 ml       1. General appearance: No apparent distress, appears stated age and cooperative. 2. HEENT: Normal cephalic, atraumatic without obvious deformity. Pupils equal, round, and reactive to light. Extra ocular muscles intact. Conjunctivae/corneas clear. 3. Neck: Supple, with full range of motion. No jugular venous distention. Trachea midline. 4. Respiratory:  Normal respiratory effort. Clear to auscultation, bilaterally without Rales/Wheezes/Rhonchi. 5. Cardiovascular: Regular rate and rhythm with normal S1/S2 without murmurs, rubs or gallops. 6. Abdomen: Soft, non-tender, non-distended with normal bowel sounds. 7. Musculoskeletal:  No clubbing, cyanosis or edema bilaterally. 8. Skin: Skin color, texture, turgor normal.  No rashes or lesions. 9. Neurologic:  Neurovascularly intact without any focal sensory/motor deficits. Cranial nerves: II-XII intact, grossly non-focal.  10. Psychiatric: Alert and oriented, thought content appropriate, normal insight  11. Capillary Refill: Brisk,< 3 seconds   12. Peripheral Pulses: +2 palpable, equal bilaterally       Discharge Medications:-      Medication List      START taking these medications    * amiodarone 200 MG tablet  Commonly known as: CORDARONE  Take 1 tablet by mouth 2 times daily for 14 days     * amiodarone 200 MG tablet  Commonly known as: CORDARONE  Take 1 tablet by mouth daily  Start taking on: October 27, 2021         * This list has 2 medication(s) that are the same as other medications prescribed for you. Read the directions carefully, and ask your doctor or other care provider to review them with you.             CHANGE how you take these medications    metoprolol tartrate 50 MG tablet  Commonly known as: LOPRESSOR  Take 1 tablet by mouth 2 times daily  What changed:   · medication strength  · See the new instructions. CONTINUE taking these medications    Acetaminophen Extra Strength 500 MG tablet  Generic drug: acetaminophen  TAKE 1 TABLET BY MOUTH EVERY 6 HOURS AS NEEDED FOR PAIN     Alcohol Prep Pads  Use as directed     atorvastatin 40 MG tablet  Commonly known as: LIPITOR  TAKE ONE (1) TABLET BY MOUTH DAILY STOP SIMVASTATIN     blood glucose test strips  Test 2-3 times a day & as needed for symptoms of irregular blood glucose. BRAND OF CHOICE INSURANCE ALLOWS. Blood Pressure Monitor Kit  Use as directed.  MG capsule  Generic drug: docusate sodium  TAKE ONE (1) CAPSULE BY MOUTH TWO (2) TIMES DAILY FOR CONSTIPATION     ferrous sulfate 325 (65 Fe) MG tablet  Commonly known as: IRON 325     glucose monitoring kit  Use as directed. BRAND OF CHOICE INSURANCE ALLOWS.      Lancets Misc  1 each by Does not apply route 2 times daily     levothyroxine 75 MCG tablet  Commonly known as: SYNTHROID  Take 1 tablet by mouth every morning (before breakfast)     metFORMIN 500 MG tablet  Commonly known as: GLUCOPHAGE  TAKE ONE TABLET BY MOUTH TWICE A DAY WITH A MEAL     Myrbetriq 50 MG Tb24  Generic drug: mirabegron  TAKE ONE TABLET BY MOUTH ONCE DAILY     oxybutynin 10 MG extended release tablet  Commonly known as: DITROPAN-XL  TAKE 1 TABLET BY MOUTH ONCE DAILY     pantoprazole 40 MG tablet  Commonly known as: PROTONIX  TAKE 1 TABLET BY MOUTH ONCE DAILY     Keiko-Bid Probiotic Tabs  TAKE 1 TABLET BY MOUTH IN THE MORNING     vitamin B-12 1000 MCG tablet  Commonly known as: CYANOCOBALAMIN  TAKE ONE (1) TABLET BY MOUTH DAILY     vitamin D3 25 MCG (1000 UT) Tabs tablet  Commonly known as: CHOLECALCIFEROL  TAKE 2 TABLETS BY MOUTH ONCE DAILY     Xarelto 20 MG Tabs tablet  Generic drug: rivaroxaban  TAKE ONE (1) TABLET BY MOUTH DAILY (WITH BREAKFAST)        STOP taking these medications dilTIAZem 30 MG tablet  Commonly known as: CARDIZEM     hydrALAZINE 10 MG tablet  Commonly known as: APRESOLINE           Where to Get Your Medications      These medications were sent to [facility] [address], [address]  [facility] [address]    Phone: [phone]   · amiodarone 200 MG tablet  · amiodarone 200 MG tablet  · metoprolol tartrate 50 MG tablet          Labs :-  Recent Results (from the past 72 hour(s))   EKG Chest Pain    Collection Time: 10/10/21  5:44 PM   Result Value Ref Range    Ventricular Rate 146 BPM    QRS Duration 68 ms    Q-T Interval 290 ms    QTc Calculation (Bazett) 451 ms    R Axis 55 degrees    T Axis 115 degrees   CBC auto differential    Collection Time: 10/10/21  6:00 PM   Result Value Ref Range    WBC 12.3 (H) 4.8 - 10.8 thou/mm3    RBC 4.40 4.20 - 5.40 mill/mm3    Hemoglobin 12.9 12.0 - 16.0 gm/dl    Hematocrit 41.6 37.0 - 47.0 %    MCV 94.5 81.0 - 99.0 fL    MCH 29.3 26.0 - 33.0 pg    MCHC 31.0 (L) 32.2 - 35.5 gm/dl    RDW-CV 14.1 11.5 - 14.5 %    RDW-SD 48.5 (H) 35.0 - 45.0 fL    Platelets 329 684 - 674 thou/mm3    MPV 11.1 9.4 - 12.4 fL    Seg Neutrophils 80.0 %    Lymphocytes 13.3 %    Monocytes 5.1 %    Eosinophils 0.7 %    Basophils 0.6 %    Immature Granulocytes 0.3 %    Segs Absolute 9.8 (H) 1 - 7 thou/mm3    Lymphocytes Absolute 1.6 1.0 - 4.8 thou/mm3    Monocytes Absolute 0.6 0.4 - 1.3 thou/mm3    Eosinophils Absolute 0.1 0.0 - 0.4 thou/mm3    Basophils Absolute 0.1 0.0 - 0.1 thou/mm3    Immature Grans (Abs) 0.04 0.00 - 0.07 thou/mm3    nRBC 0 /100 wbc   Comprehensive Metabolic Panel w/ Reflex to MG    Collection Time: 10/10/21  6:00 PM   Result Value Ref Range    Glucose 148 (H) 70 - 108 mg/dL    CREATININE 1.2 0.4 - 1.2 mg/dL    BUN 26 (H) 7 - 22 mg/dL    Sodium 142 135 - 145 meq/L    Potassium reflex Magnesium 4.1 3.5 - 5.2 meq/L    Chloride 103 98 - 111 meq/L    CO2 22 (L) 23 - 33 meq/L    Calcium 9.7 8.5 - 10.5 mg/dL    AST 16 5 - 40 U/L    Alkaline Phosphatase 79 38 - 126 U/L    Total Protein 7.2 6.1 - 8.0 g/dL    Albumin 3.9 3.5 - 5.1 g/dL    Total Bilirubin 0.3 0.3 - 1.2 mg/dL    ALT 14 11 - 66 U/L   Troponin    Collection Time: 10/10/21  6:00 PM   Result Value Ref Range    Troponin T < 0.010 ng/ml   Protime-INR    Collection Time: 10/10/21  6:00 PM   Result Value Ref Range    INR 1.80 (H) 0.85 - 1.13   APTT    Collection Time: 10/10/21  6:00 PM   Result Value Ref Range    aPTT 41.1 (H) 22.0 - 38.0 seconds   Anion Gap    Collection Time: 10/10/21  6:00 PM   Result Value Ref Range    Anion Gap 17.0 (H) 8.0 - 16.0 meq/L   Osmolality    Collection Time: 10/10/21  6:00 PM   Result Value Ref Range    Osmolality Calc 290.6 275.0 - 300.0 mOsmol/kg   Glomerular Filtration Rate, Estimated    Collection Time: 10/10/21  6:00 PM   Result Value Ref Range    Est, Glom Filt Rate 44 (A) ml/min/1.73m2   TSH without Reflex    Collection Time: 10/10/21  6:00 PM   Result Value Ref Range    TSH 3.960 0.400 - 4.200 uIU/mL   T4, Free    Collection Time: 10/10/21  6:00 PM   Result Value Ref Range    T4 Free 1.17 0.93 - 1.76 ng/dL   COVID-19, Rapid    Collection Time: 10/10/21  7:00 PM    Specimen: Nasopharyngeal Swab   Result Value Ref Range    SARS-CoV-2, NAAT NOT  DETECTED NOT DETECTED   Lactic acid, plasma    Collection Time: 10/10/21 10:12 PM   Result Value Ref Range    Lactic Acid 2.4 (H) 0.5 - 2.0 mmol/L   Procalcitonin    Collection Time: 10/10/21 10:12 PM   Result Value Ref Range    Procalcitonin 0.06 0.01 - 0.09 ng/mL   Brain Natriuretic Peptide    Collection Time: 10/10/21 10:12 PM   Result Value Ref Range    Pro-BNP 2333.0 (H) 0.0 - 900.0 pg/mL   Phosphorus    Collection Time: 10/10/21 10:12 PM   Result Value Ref Range    Phosphorus 3.8 2.4 - 4.7 mg/dL   POCT glucose    Collection Time: 10/10/21 10:28 PM   Result Value Ref Range    POC Glucose 105 70 - 108 mg/dl   CBC    Collection Time: 10/11/21  4:14 AM   Result Value Ref Range    WBC 8.3 4.8 - 10.8 thou/mm3    RBC 3.86 (L) 4.20 - 5.40 mill/mm3    Hemoglobin 11.5 (L) 12.0 - 16.0 gm/dl    Hematocrit 36.4 (L) 37.0 - 47.0 %    MCV 94.3 81.0 - 99.0 fL    MCH 29.8 26.0 - 33.0 pg    MCHC 31.6 (L) 32.2 - 35.5 gm/dl    RDW-CV 14.2 11.5 - 14.5 %    RDW-SD 48.5 (H) 35.0 - 45.0 fL    Platelets 187 618 - 366 thou/mm3    MPV 10.3 9.4 - 12.4 fL   Basic Metabolic Panel    Collection Time: 10/11/21  4:14 AM   Result Value Ref Range    Sodium 144 135 - 145 meq/L    Potassium 3.8 3.5 - 5.2 meq/L    Chloride 107 98 - 111 meq/L    CO2 24 23 - 33 meq/L    Glucose 141 (H) 70 - 108 mg/dL    BUN 25 (H) 7 - 22 mg/dL    CREATININE 1.1 0.4 - 1.2 mg/dL    Calcium 9.1 8.5 - 10.5 mg/dL   Magnesium    Collection Time: 10/11/21  4:14 AM   Result Value Ref Range    Magnesium 1.4 (L) 1.6 - 2.4 mg/dL   Anion Gap    Collection Time: 10/11/21  4:14 AM   Result Value Ref Range    Anion Gap 13.0 8.0 - 16.0 meq/L   Glomerular Filtration Rate, Estimated    Collection Time: 10/11/21  4:14 AM   Result Value Ref Range    Est, Glom Filt Rate 49 (A) ml/min/1.73m2   Urinalysis with microscopic    Collection Time: 10/11/21  6:30 AM   Result Value Ref Range    Glucose, Urine NEGATIVE NEGATIVE mg/dl    Bilirubin Urine NEGATIVE NEGATIVE    Ketones, Urine NEGATIVE NEGATIVE    Specific Gravity, UA 1.016 1.002 - 1.030    Blood, Urine SMALL (A) NEGATIVE    pH, UA 5.5 5.0 - 9.0    Protein, UA NEGATIVE NEGATIVE mg/dl    Urobilinogen, Urine 0.2 0.0 - 1.0 eu/dl    Nitrite, Urine NEGATIVE NEGATIVE    Leukocyte Esterase, Urine LARGE (A) NEGATIVE    Color, UA YELLOW YELLOW-STRAW    Character, Urine TURBID (A) CLR-SL.[id number]    RBC, UA 0-2 0-2/hpf /hpf    WBC, UA > 100 0-4/hpf /hpf    Epithelial Cells, UA 0-2 3-5/hpf /hpf    Bacteria, UA MANY FEW/NONE SEEN    Casts NONE SEEN NONE SEEN /lpf    Crystals NONE SEEN NONE SEEN    Renal Epithelial, UA NONE SEEN NONE SEEN    Yeast, UA NONE SEEN NONE SEEN    Casts NONE SEEN /lpf    Miscellaneous Lab Test Result NONE SEEN    POCT Glucose    Collection Time: 10/11/21  8:13 AM   Result Value Ref Range    POC Glucose 113 (H) 70 - 108 mg/dl   POCT Glucose    Collection Time: 10/11/21 12:46 PM   Result Value Ref Range    POC Glucose 156 (H) 70 - 108 mg/dl   POCT Glucose    Collection Time: 10/11/21  5:04 PM   Result Value Ref Range    POC Glucose 113 (H) 70 - 108 mg/dl   POCT Glucose    Collection Time: 10/11/21  8:56 PM   Result Value Ref Range    POC Glucose 153 (H) 70 - 108 mg/dl   Basic Metabolic Panel    Collection Time: 10/12/21  5:18 AM   Result Value Ref Range    Sodium 139 135 - 145 meq/L    Potassium 3.8 3.5 - 5.2 meq/L    Chloride 104 98 - 111 meq/L    CO2 23 23 - 33 meq/L    Glucose 115 (H) 70 - 108 mg/dL    BUN 23 (H) 7 - 22 mg/dL    CREATININE 1.1 0.4 - 1.2 mg/dL    Calcium 9.2 8.5 - 10.5 mg/dL   CBC    Collection Time: 10/12/21  5:18 AM   Result Value Ref Range    WBC 8.2 4.8 - 10.8 thou/mm3    RBC 3.68 (L) 4.20 - 5.40 mill/mm3    Hemoglobin 10.8 (L) 12.0 - 16.0 gm/dl    Hematocrit 35.4 (L) 37.0 - 47.0 %    MCV 96.2 81.0 - 99.0 fL    MCH 29.3 26.0 - 33.0 pg    MCHC 30.5 (L) 32.2 - 35.5 gm/dl    RDW-CV 14.1 11.5 - 14.5 %    RDW-SD 49.8 (H) 35.0 - 45.0 fL    Platelets 433 431 - 732 thou/mm3    MPV 10.6 9.4 - 12.4 fL   Anion Gap    Collection Time: 10/12/21  5:18 AM   Result Value Ref Range    Anion Gap 12.0 8.0 - 16.0 meq/L   Glomerular Filtration Rate, Estimated    Collection Time: 10/12/21  5:18 AM   Result Value Ref Range    Est, Glom Filt Rate 49 (A) ml/min/1.73m2   POCT Glucose    Collection Time: 10/12/21  7:34 AM   Result Value Ref Range    POC Glucose 110 (H) 70 - 108 mg/dl        Microbiology:    Blood culture #1: No results found for: BC    Blood culture #2:No results found for: BLOODCULT2    Organism:  No results found for: LABGRAM    MRSA culture only:No results found for: [facility]    Urine culture: No results found for: LABURIN  No results found for: [facility] Respiratory culture: No results found for: CULTRESP    Aerobic and Anaerobic :  No results found for: LABAERO  No results found for: LABANAE    Urinalysis:      Lab Results   Component Value Date    NITRU NEGATIVE 10/11/2021    WBCUA > 100 10/11/2021    BACTERIA MANY 10/11/2021    RBCUA 0-2 10/11/2021    BLOODU SMALL 10/11/2021    SPECGRAV 1.016 10/11/2021    GLUCOSEU NEGATIVE 08/04/2020       Radiology:-  Echo Complete    Result Date: 10/11/2021  Transthoracic Echocardiography Report (TTE)  Demographics   Patient Name    Daphne Corral Gender               Female   MR #            [id number]      Race                                                   Ethnicity   Account #       [de-identified]      Room Number          0023   Accession       [id number]     Date of Study        10/11/2021  Number   Date of Birth   1950     Referring Physician  Jillian Gonzalez MD   Age             73 year(s)     Sonographer          Mireille Aragon, OFECS,                                                      RVT                                  Interpreting         Jillian Gonzalez MD                                 Physician  Procedure Type of Study   TTE procedure:ECHOCARDIOGRAM COMPLETE 2D W DOPPLER W COLOR. Procedure Date Date: 10/11/2021 Start: 08:51 AM Study Location: Bedside Technical Quality: Adequate visualization Indications:Dyspnea / Shortness of breath. Additional Medical History:Hypothyroidism, Hyperlipidemia, Hypertension, Osteoarthritis, Diabetes, Stroke, A-fib, Congestive heart failure. Patient Status: Routine Height: 62 inches Weight: 239.01 pounds BSA: 2.06 m^2 BMI: 43.71 kg/m^2 BP: 127/69 mmHg  Conclusions   Summary  Ejection fraction is visually estimated at 55%. Overall left ventricular function is normal.  Aortic valve appears tricuspid. Aortic valve leaflets are somewhat thickened. Aortic valve leaflets are Mildly calcified.    Signature ----------------------------------------------------------------  Electronically signed by Pebbles Renteria MD (Interpreting  physician) on 10/11/2021 at 07:04 PM  ----------------------------------------------------------------   Findings   Mitral Valve  Trace mitral regurgitation is present. Aortic Valve  Aortic valve appears tricuspid. Aortic valve leaflets are somewhat thickened. Aortic valve leaflets are Mildly calcified. Tricuspid Valve  Trivial tricuspid regurgitation visualized. Pulmonic Valve  The pulmonic valve was not well visualized . Trivial pulmonic regurgitation visualized. Left Atrium  Left atrial size was normal.   Left Ventricle  Ejection fraction is visually estimated at 55%. Overall left ventricular function is normal.   Right Atrium  Right atrial size was normal.   Right Ventricle  The right ventricular size was normal with normal systolic function and  wall thickness. Pericardial Effusion  The pericardium was normal in appearance with no evidence of a pericardial  effusion. Pleural Effusion  No evidence of pleural effusion. Aorta / Great Vessels  -Aortic root dimension within normal limits.  -The Pulmonary artery is within normal limits. -IVC size is within normal limits with normal respiratory phasic changes.   M-Mode/2D Measurements & Calculations   LV Diastolic   LV Systolic Dimension:    AV Cusp Separation: 1.5 cmLA  Dimension: 4.6 3.2 cm                    Dimension: 3.3 cmAO Root  cm             LV Volume Diastolic: 40.8 Dimension: 2.8 cmLA Area: 22.2  LV FS:30.4 %   ml                        cm^2  LV PW          LV Volume Systolic: 41 ml  Diastolic: 1   LV EDV/LV EDV Index: 97.3  cm             ml/47 m^2LV ESV/LV ESV  Septum         Index: 41 ml/20 m^2       RV Diastolic Dimension: 2.2 cm  Diastolic: 0.9 EF Calculated: 57.9 %  cm                                       LA/Aorta: 1.18                                            LA volume/Index: 63.4 ml /31m^2  Doppler Measurements & Calculations   MV Peak E-Wave: 148  AV Peak Velocity: 159  LVOT Peak Velocity: 137 cm/s  cm/s                 cm/s                   LVOT Mean Velocity: 84.6 cm/s                       AV Peak Gradient:      LVOT Peak Gradient: 8 mmHgLVOT  MV Peak Gradient:    10.11 mmHg             Mean Gradient: 4 mmHg  8.76 mmHg            AV Mean Velocity: 108                       cm/s                   TV Peak E-Wave: 108 cm/s  MV Deceleration      AV Mean Gradient: 5  Time: 165 msec       mmHg                   TV Peak Gradient: 4.67 mmHg                       AV VTI: 32.6 cm                                              PV Peak Velocity: 86.1 cm/s                                              PV Peak Gradient: 2.97 mmHg                       LVOT VTI: 25.2 cm                       IVRT: 53 msec  MR Velocity: 455  cm/s                       AV DVI (VTI): 0.77AV                       DVI (Vmax):0.86  [id number]%3d    XR CHEST PORTABLE    Result Date: 10/10/2021  PROCEDURE: XR CHEST PORTABLE CLINICAL INFORMATION: 30-year-old female with shortness of breath and chest pain. COMPARISON: No prior study. TECHNIQUE: AP upright view of the chest was obtained. FINDINGS: There is cardiomegaly with mild pulmonary vascular congestion. There is no significant pleural effusion or pneumothorax. Visualized portions of the upper abdomen are within normal limits. The osseous structures are intact. No acute fractures or suspicious osseous lesions. Cardiomegaly with mild pulmonary vascular congestion. **This report has been created using voice recognition software. It may contain minor errors which are inherent in voice recognition technology. ** Final report electronically signed by Dr Saul Salguero on 10/10/2021 6:52 PM       Follow-up scheduled after discharge :-    in the next few days with Aida Jean Baptiste MD    Consultations during this hospital stay:-  [] NONE [] Cardiology  [] Nephrology  [] Hemo onco   [] GI   [] ID  [] Endocrine  [] Pulm    [] Neuro    [] Psych   [] Urology  [] ENT   [] G SURGERY   []Ortho    []CV surg    [] Palliative  [] Hospice [] Pain management   []    []TCU   [] PT/OT  OTHERS:-    Disposition: home  Condition at Discharge: Stable    Time Spent:- 35 minutes    Electronically signed by Jewels London MD on 10/12/21 at 10:47 AM EDT   Discharging Hospitalist

## 2021-10-12 NOTE — PROGRESS NOTES
Report given to Primary RN Guerita White. Patient is ready for discharge. Pt going home with boyfriend.  Electronically signed by Berkley Acosta on 10/12/2021 at 1:19 PM

## 2021-10-13 ENCOUNTER — TELEPHONE (OUTPATIENT)
Dept: FAMILY MEDICINE CLINIC | Age: 71
End: 2021-10-13

## 2021-10-13 ENCOUNTER — CARE COORDINATION (OUTPATIENT)
Dept: CASE MANAGEMENT | Age: 71
End: 2021-10-13

## 2021-10-13 DIAGNOSIS — N39.46 MIXED INCONTINENCE: ICD-10-CM

## 2021-10-13 DIAGNOSIS — N32.81 OAB (OVERACTIVE BLADDER): ICD-10-CM

## 2021-10-13 PROBLEM — E66.813 OBESITY, CLASS III, BMI 40-49.9 (MORBID OBESITY): Status: ACTIVE | Noted: 2017-02-23

## 2021-10-13 RX ORDER — MIRABEGRON 50 MG/1
TABLET, FILM COATED, EXTENDED RELEASE ORAL
Qty: 90 TABLET | Refills: 3 | OUTPATIENT
Start: 2021-10-13

## 2021-10-13 NOTE — CARE COORDINATION
Care Transitions Outreach Attempt    Call within 2 business days of discharge: Yes   Attempted initial 24 hour transitional call to patient. Left VM to return call directly to [phone]    Patient: Rosalia Vásquez Patient : 1950 MRN: [id number]    Last Discharge [address] [address]       Complaint Diagnosis Description Type Department Provider    10/10/21 Abdominal Pain; Atrial Fibrillation; Nausea Atrial fibrillation with rapid ventricular response ([address] . .. ED to Hosp-Admission (Discharged) (ADMITTED) [facility] Katja Pop MD; Marzena Kyle. ..             Noted following upcoming appointments from discharge chart review:   [facility] follow up appointment(s):   Future Appointments   Date Time Provider Dante Amaya   10/13/2021  4:00 PM Prosper Dougherty MD [facility]   2021 10:00 AM Prosper Dougherty MD [facility]   2022 10:30 AM Karon Jama PA-C GYN Oncology Ximena Mcmahon   2022 10:45 AM [facility]     Non-[facility] follow up appointment(s): na

## 2021-10-13 NOTE — TELEPHONE ENCOUNTER
New referral to home [place] [facility] placed  You will need my note and the H&P from the hospitalization from 10/10/2021.   She will also need to have the blood work drawn at home, orders placed&printed, I will place in the box      Please call home health care [facility] and order to restart aid, contact information in media    Please schedule her high dosage flu shot in 2.5 weeks (she will get the second COVID-19 vaccine this week at [place])

## 2021-10-13 NOTE — TELEPHONE ENCOUNTER
Please Approve or Refuse. Send to Pharmacy per Pt's Request:  Next Visit Date:  10/13/2021   Last Visit Date: 7/13/2021    Hemoglobin A1C (%)   Date Value   07/13/2021 5.4   02/12/2021 5.5   10/30/2020 5.1             ( goal A1C is < 7)   BP Readings from Last 3 Encounters:   10/12/21 (!) 146/64   10/07/21 135/84   07/21/21 137/78          (goal 120/80)  BUN   Date Value Ref Range Status   10/12/2021 23 (H) 7 - 22 mg/dL Final     CREATININE   Date Value Ref Range Status   10/12/2021 1.1 0.4 - 1.2 mg/dL Final     Potassium   Date Value Ref Range Status   10/12/2021 3.8 3.5 - 5.2 meq/L Final     Potassium reflex Magnesium   Date Value Ref Range Status   10/10/2021 4.1 3.5 - 5.2 meq/L Final     Comment:     Low level specimen hemolysis is present as indicated by the interference  level index on the Roche analyzer. The reported K+ level may be falsely  increased. If clinically warranted, recollection of the specimen is  suggested.

## 2021-10-14 ENCOUNTER — CARE COORDINATION (OUTPATIENT)
Dept: CASE MANAGEMENT | Age: 71
End: 2021-10-14

## 2021-10-14 DIAGNOSIS — I48.91 ATRIAL FIBRILLATION WITH RAPID VENTRICULAR RESPONSE (HCC): Primary | ICD-10-CM

## 2021-10-14 PROCEDURE — 1111F DSCHRG MED/CURRENT MED MERGE: CPT | Performed by: FAMILY MEDICINE

## 2021-10-14 NOTE — TELEPHONE ENCOUNTER
Scheduled flu shot. Pt states home care has not reached out yet but they are supposed to be coming out or getting in touch with her.  Someone was trying to call in as I was on the phone with her so it may have been them

## 2021-10-14 NOTE — CARE COORDINATION
prescriptions and are they filled?: Yes  Have you been contacted by a [employer] [address]?: No  Have you scheduled your follow up appointment?: Yes  How are you going to get to your appointment?: Public transportation  Were you discharged with any Home Care or Post Acute Services: Yes  Post Acute Services: Home Health (Comment: [id number])  Do you feel like you have everything you need to keep you well at home?: Yes  Care Transitions Interventions     Transitions of Care Initial Call      Challenges to be reviewed by the provider   Additional needs identified to be addressed with provider: No  none             Method of communication with provider : none      Advance Care Planning:   Does patient have an Advance Directive: has documents but hasn't filled out yet. Was this a readmission? No  Patient stated reason for admission: palpitations  Patients top risk factors for readmission: lack of knowledge about disease, medical condition-CHF, Afib, DM and polypharmacy    Care Transition Nurse (CTN) contacted the patient by telephone to perform post hospital discharge assessment. Verified name and  with patient as identifiers. Provided introduction to self, and explanation of the CTN role. CTN reviewed discharge instructions, medical action plan and red flags with patient who verbalized understanding. Patient given an opportunity to ask questions and does not have any further questions or concerns at this time. Were discharge instructions available to patient? Yes. Reviewed appropriate site of care based on symptoms and resources available to patient including: PCP, Specialist and Home health. The patient agrees to contact the PCP office for questions related to their healthcare. Medication reconciliation was performed with patient, who verbalizes understanding of administration of home medications. Advised obtaining a 90-day supply of all daily and as-needed medications.      Covid Risk Education     Educated patient about risk for severe COVID-19 due to risk factors according to CDC guidelines. CTN reviewed discharge instructions, medical action plan and red flag symptoms with the patient who verbalized understanding. Discussed COVID vaccination status: Yes. Education provided on COVID-19 vaccination as appropriate. Discussed exposure protocols and quarantine with CDC Guidelines. Patient was given an opportunity to verbalize any questions and concerns and agrees to contact CTN or health care provider for questions related to their healthcare. Reviewed and educated patient on any new and changed medications related to discharge diagnosis. Was patient discharged with a pulse oximeter? No Discussed and confirmed pulse oximeter discharge instructions and when to notify provider or seek emergency care. CTN provided contact information. Plan for follow-up call in 5-7 days based on severity of symptoms and risk factors.   Plan for next call: symptom management-chest pain?, palpitations?, edema? and self management-wt, FBS, BP      Follow Up  Future Appointments   Date Time Provider Dante Amaya   10/20/2021 10:30 AM [facility] VASCULAR  [address]   11/3/2021 10:30 AM SCHEDULE, [facility] fp sc [facility]   11/26/2021 10:00 AM Dalia Granados MD fp sc [facility]   4/7/2022 10:30 AM Buddy Andrews PA-C GYN Oncology [facility]   7/21/2022 10:45 AM [facility]       Seth Flores RN

## 2021-10-19 ENCOUNTER — TELEPHONE (OUTPATIENT)
Dept: FAMILY MEDICINE CLINIC | Age: 71
End: 2021-10-19

## 2021-10-19 DIAGNOSIS — B37.49 CANDIDIASIS OF PERINEUM: Primary | ICD-10-CM

## 2021-10-19 RX ORDER — FLUCONAZOLE 150 MG/1
150 TABLET ORAL WEEKLY
Qty: 4 TABLET | Refills: 0 | Status: SHIPPED | OUTPATIENT
Start: 2021-10-19 | End: 2021-12-02 | Stop reason: ALTCHOICE

## 2021-10-19 RX ORDER — CLOTRIMAZOLE AND BETAMETHASONE DIPROPIONATE 10; .64 MG/G; MG/G
CREAM TOPICAL
Qty: 45 G | Refills: 0 | Status: SHIPPED | OUTPATIENT
Start: 2021-10-19

## 2021-10-19 RX ORDER — NYSTATIN 100000 [USP'U]/G
POWDER TOPICAL
Qty: 60 G | Refills: 3 | Status: SHIPPED | OUTPATIENT
Start: 2021-10-19 | End: 2022-03-22

## 2021-10-20 ENCOUNTER — HOSPITAL ENCOUNTER (OUTPATIENT)
Dept: VASCULAR LAB | Age: 71
Discharge: HOME | End: 2021-10-20
Payer: MEDICARE

## 2021-10-20 ENCOUNTER — TELEPHONE (OUTPATIENT)
Dept: FAMILY MEDICINE CLINIC | Age: 71
End: 2021-10-20

## 2021-10-20 ENCOUNTER — HOSPITAL ENCOUNTER (OUTPATIENT)
Age: 71
Discharge: HOME | End: 2021-10-20
Payer: MEDICARE

## 2021-10-20 DIAGNOSIS — E11.65 TYPE 2 DIABETES MELLITUS WITH HYPERGLYCEMIA, WITHOUT LONG-TERM CURRENT USE OF INSULIN (HCC): ICD-10-CM

## 2021-10-20 DIAGNOSIS — I70.0 ATHEROSCLEROSIS OF AORTA (HCC): ICD-10-CM

## 2021-10-20 DIAGNOSIS — I48.0 PAROXYSMAL ATRIAL FIBRILLATION (HCC): ICD-10-CM

## 2021-10-20 DIAGNOSIS — E78.5 HYPERLIPIDEMIA WITH TARGET LDL LESS THAN 100: ICD-10-CM

## 2021-10-20 DIAGNOSIS — N32.81 OAB (OVERACTIVE BLADDER): ICD-10-CM

## 2021-10-20 DIAGNOSIS — I50.32 CHF NYHA CLASS I, CHRONIC, DIASTOLIC (HCC): ICD-10-CM

## 2021-10-20 DIAGNOSIS — I12.9 BENIGN HYPERTENSION WITH CKD (CHRONIC KIDNEY DISEASE), STAGE II: ICD-10-CM

## 2021-10-20 DIAGNOSIS — N18.2 BENIGN HYPERTENSION WITH CKD (CHRONIC KIDNEY DISEASE), STAGE II: Primary | ICD-10-CM

## 2021-10-20 DIAGNOSIS — N39.46 MIXED INCONTINENCE: ICD-10-CM

## 2021-10-20 DIAGNOSIS — N18.2 BENIGN HYPERTENSION WITH CKD (CHRONIC KIDNEY DISEASE), STAGE II: ICD-10-CM

## 2021-10-20 DIAGNOSIS — I12.9 BENIGN HYPERTENSION WITH CKD (CHRONIC KIDNEY DISEASE), STAGE II: Primary | ICD-10-CM

## 2021-10-20 LAB
-: ABNORMAL
-: NORMAL
ALBUMIN SERPL-MCNC: NORMAL G/DL (ref 3.5–5.2)
ALBUMIN/GLOBULIN RATIO: NORMAL (ref 1–2.5)
ALP BLD-CCNC: NORMAL U/L (ref 35–104)
ALT SERPL-CCNC: NORMAL U/L (ref 5–33)
AMORPHOUS: ABNORMAL
ANION GAP SERPL CALCULATED.3IONS-SCNC: NORMAL MMOL/L (ref 9–17)
AST SERPL-CCNC: NORMAL U/L
BACTERIA: ABNORMAL
BILIRUB SERPL-MCNC: NORMAL MG/DL (ref 0.3–1.2)
BILIRUBIN URINE: NEGATIVE
BNP INTERPRETATION: ABNORMAL
BUN BLDV-MCNC: NORMAL MG/DL (ref 8–23)
BUN/CREAT BLD: NORMAL (ref 9–20)
CALCIUM SERPL-MCNC: NORMAL MG/DL (ref 8.6–10.4)
CASTS UA: ABNORMAL /LPF
CHLORIDE BLD-SCNC: NORMAL MMOL/L (ref 98–107)
CHOLESTEROL/HDL RATIO: 3.6
CHOLESTEROL: 174 MG/DL
CO2: NORMAL MMOL/L (ref 20–31)
COLOR: YELLOW
COMMENT UA: ABNORMAL
CREAT SERPL-MCNC: NORMAL MG/DL (ref 0.5–0.9)
CRYSTALS, UA: ABNORMAL /HPF
EPITHELIAL CELLS UA: ABNORMAL /HPF
ESTIMATED AVERAGE GLUCOSE: 117 MG/DL
GFR AFRICAN AMERICAN: NORMAL ML/MIN
GFR NON-AFRICAN AMERICAN: NORMAL ML/MIN
GFR SERPL CREATININE-BSD FRML MDRD: NORMAL ML/MIN/{1.73_M2}
GFR SERPL CREATININE-BSD FRML MDRD: NORMAL ML/MIN/{1.73_M2}
GLUCOSE BLD-MCNC: NORMAL MG/DL (ref 70–99)
GLUCOSE URINE: NEGATIVE
HBA1C MFR BLD: 5.7 % (ref 4–6)
HCT VFR BLD CALC: 38.8 % (ref 36–46)
HDLC SERPL-MCNC: 49 MG/DL
HEMOGLOBIN: 12.6 G/DL (ref 12–16)
KETONES, URINE: NEGATIVE
LDL CHOLESTEROL: 89 MG/DL (ref 0–130)
LEUKOCYTE ESTERASE, URINE: ABNORMAL
MCH RBC QN AUTO: 29.3 PG (ref 26–34)
MCHC RBC AUTO-ENTMCNC: 32.4 G/DL (ref 31–37)
MCV RBC AUTO: 90.6 FL (ref 80–100)
MUCUS: ABNORMAL
NITRITE, URINE: NEGATIVE
NRBC AUTOMATED: NORMAL PER 100 WBC
OTHER OBSERVATIONS UA: ABNORMAL
PDW BLD-RTO: 14.8 % (ref 11.5–14.9)
PH UA: 5.5 (ref 5–8)
PLATELET # BLD: 301 K/UL (ref 150–450)
PMV BLD AUTO: 9.1 FL (ref 6–12)
POTASSIUM SERPL-SCNC: NORMAL MMOL/L (ref 3.7–5.3)
PRO-BNP: 359 PG/ML
PROTEIN UA: NEGATIVE
RBC # BLD: 4.28 M/UL (ref 4–5.2)
RBC UA: ABNORMAL /HPF
REASON FOR REJECTION: NORMAL
RENAL EPITHELIAL, UA: ABNORMAL /HPF
SODIUM BLD-SCNC: NORMAL MMOL/L (ref 135–144)
SPECIFIC GRAVITY UA: 1.02 (ref 1–1.03)
TOTAL PROTEIN: NORMAL G/DL (ref 6.4–8.3)
TRICHOMONAS: ABNORMAL
TRIGL SERPL-MCNC: 180 MG/DL
TURBIDITY: ABNORMAL
URINE HGB: ABNORMAL
UROBILINOGEN, URINE: NORMAL
VLDLC SERPL CALC-MCNC: ABNORMAL MG/DL (ref 1–30)
WBC # BLD: 8.4 K/UL (ref 3.5–11)
WBC UA: ABNORMAL /HPF
YEAST: ABNORMAL
ZZ NTE CLEAN UP: ORDERED TEST: NORMAL
ZZ NTE WITH NAME CLEAN UP: SPECIMEN SOURCE: NORMAL

## 2021-10-20 PROCEDURE — 87186 SC STD MICRODIL/AGAR DIL: CPT

## 2021-10-20 PROCEDURE — 87086 URINE CULTURE/COLONY COUNT: CPT

## 2021-10-20 PROCEDURE — 81001 URINALYSIS AUTO W/SCOPE: CPT

## 2021-10-20 PROCEDURE — 85027 COMPLETE CBC AUTOMATED: CPT

## 2021-10-20 PROCEDURE — 83880 ASSAY OF NATRIURETIC PEPTIDE: CPT

## 2021-10-20 PROCEDURE — 80061 LIPID PANEL: CPT

## 2021-10-20 PROCEDURE — 80053 COMPREHEN METABOLIC PANEL: CPT

## 2021-10-20 PROCEDURE — 93978 VASCULAR STUDY: CPT

## 2021-10-20 PROCEDURE — 87077 CULTURE AEROBIC IDENTIFY: CPT

## 2021-10-20 PROCEDURE — 83036 HEMOGLOBIN GLYCOSYLATED A1C: CPT

## 2021-10-20 PROCEDURE — 36415 COLL VENOUS BLD VENIPUNCTURE: CPT

## 2021-10-20 NOTE — TELEPHONE ENCOUNTER
Noted. Thank you!     Future Appointments   Date Time Provider Dante Amaya   11/3/2021 10:30 AM [facility]   11/26/2021 10:00 AM [facility]   4/7/2022 10:30 AM Jose Brown PA-C GYN Oncology [facility]   7/21/2022 10:45 AM [facility]

## 2021-10-20 NOTE — TELEPHONE ENCOUNTER
To decrease metoprolol from 50 mg twice a day to 25 mg twice a day I will send a new prescription to the pharmacy. To make appointment with cardiologist.  Please let the patient know and the home visiting nurse. Thank you! Please let the patient know to  prescription from the pharmacy. Orders Placed This Encounter   Medications    metoprolol tartrate (LOPRESSOR) 25 MG tablet     Sig: Take 1 tablet by mouth 2 times daily Dose decreased 10/20/2021 due to bradycardia     Dispense:  60 tablet     Refill:  One [address] [phone] [address] [place], [place] [address]  Phone: [phone] Fax: [phone]      No orders of the defined types were placed in this encounter. Future Appointments   Date Time Provider Dante Amaya   11/3/2021 10:30 AM [facility]   11/26/2021 10:00 AM [facility]   4/7/2022 10:30 AM Katy Gonzalez PA-C GYN Oncology [facility]   7/21/2022 10:45 AM [facility]       Thank you!

## 2021-10-20 NOTE — TELEPHONE ENCOUNTER
Enma Nguyen from [address] [place] [address] they are going to begin therapy in home today for 2 times a week for 4 weeks.

## 2021-10-21 ENCOUNTER — CARE COORDINATION (OUTPATIENT)
Dept: CASE MANAGEMENT | Age: 71
End: 2021-10-21

## 2021-10-21 PROBLEM — I71.40 ABDOMINAL AORTIC ANEURYSM (AAA) WITHOUT RUPTURE (HCC): Status: ACTIVE | Noted: 2021-10-21

## 2021-10-21 RX ORDER — ONDANSETRON 4 MG/1
TABLET, FILM COATED ORAL
Qty: 30 TABLET | Refills: 0 | Status: SHIPPED | OUTPATIENT
Start: 2021-10-21 | End: 2021-11-11

## 2021-10-21 NOTE — CARE COORDINATION
Care Transitions Outreach Attempt    Call within 2 business days of discharge: Yes   Attempted to reach patient for transitions of care follow up. Unable to reach patient. No voicemail. Will attempt again at another time. Patient: Ryan Wolfe Patient : 1950 MRN: [id number]    Last Discharge [address] [address]       Complaint Diagnosis Description Type Department Provider    10/10/21 Abdominal Pain; Atrial Fibrillation; Nausea Atrial fibrillation with rapid ventricular response ([address] . .. ED to Hosp-Admission (Discharged) (ADMITTED) [facility] Tyson Andrews MD; Daniel Gonzalez. ..             Noted following upcoming appointments from discharge chart review:   [facility] follow up appointment(s):   Future Appointments   Date Time Provider Dante Amaya   11/3/2021 10:30 AM SCHEDULE, [facility]   2021 10:00 AM Cherri Spurling, MD [facility]   2022 10:30 AM Nithya Vidal PA-C GYN Oncology [facility]   2022 10:45 AM Malena Corona MD [facility]     [facility] follow up appointment(s): na

## 2021-10-21 NOTE — PROGRESS NOTES
CLINICAL PHARMACY NOTE: MEDS TO BEDS    Total # of Prescriptions Filled: 3   The following medications were delivered to the patient:  Metoprolol Tartrate 50mg  Amiodarone 200mg  Lisinopril 10mg    Additional Documentation:

## 2021-10-22 NOTE — RESULT ENCOUNTER NOTE
Please notify patient: Patient does have UTI, I did not send the antibiotic to the pharmacy yet, the sensitivity will come today, to check the pharmacy after 5 or tomorrow morning  Hemoglobin A1c 5.7 slightly worsening blood sugarSome of the blood work was not done, which includes the comprehensive metabolic profile  Lipids were within normal limits except mildly high triglycerides  Otherwise labs within normal limits  Congestive heart failure has improved based on the markers  Cells in the blood within normal limits, resolved anemia      Future Appointments  11/3/2021  10:30 AM   [facility]  11/26/2021 10:00 AM   Elvis Stout MD     [facility]               [facility]  4/7/2022   10:30 AM   Nitza Lee PA-C         GYN Oncology        [facility]  7/21/2022  10:45 AM   Quinten Lyn MD         [facility]             [facility]

## 2021-10-25 DIAGNOSIS — N39.0 UTI DUE TO KLEBSIELLA SPECIES: Primary | ICD-10-CM

## 2021-10-25 DIAGNOSIS — B96.89 UTI DUE TO KLEBSIELLA SPECIES: Primary | ICD-10-CM

## 2021-10-25 RX ORDER — CEPHALEXIN 500 MG/1
500 CAPSULE ORAL 2 TIMES DAILY
Qty: 14 CAPSULE | Refills: 0 | Status: SHIPPED | OUTPATIENT
Start: 2021-10-25 | End: 2021-11-01

## 2021-10-25 NOTE — RESULT ENCOUNTER NOTE
Please notify patient: Urine culture just came now, UTI Klebsiella, sensitive to Keflex, she did take Keflex before, per medication history, I will send to the pharmacy  Please also advised her to make an appointment to Dr. Martin Langley, please give her contact info      Future Appointments  11/3/2021  10:30 AM   SCHEDULE, [facility] fp [facility]  11/26/2021 10:00 AM   Beverley Jennings MD     [facility]  4/7/2022   10:30 AM   Jose Castellon PA-C         GYN Oncology        [facility]  7/21/2022  10:45 AM   Brynn Fall MD         [facility]

## 2021-10-26 ENCOUNTER — CARE COORDINATION (OUTPATIENT)
Dept: CASE MANAGEMENT | Age: 71
End: 2021-10-26

## 2021-10-26 LAB
CULTURE: ABNORMAL
CULTURE: ABNORMAL
Lab: ABNORMAL
SPECIMEN DESCRIPTION: ABNORMAL

## 2021-10-26 NOTE — CARE COORDINATION
Care Transitions Outreach Attempt    Call within 2 business days of discharge: Yes   Attempted to reach patient for transitions of care follow up. Person answered the phone and then hung up when I asked if [facility] was there. Called back with no answer. LVM to return call. If no return call, CTN will sign off-2nd attempt. Patient: Dennise Brown Patient : 1950 MRN: [id number]    Last Discharge [facility]       Complaint Diagnosis Description Type Department Provider    10/10/21 Abdominal Pain; Atrial Fibrillation; Nausea Atrial fibrillation with rapid ventricular response ([address] . .. ED to Hosp-Admission (Discharged) (ADMITTED) [facility] Meli Beck MD; Armaan Colmenares. ..             Noted following upcoming appointments from discharge chart review:   [facility] follow up appointment(s):   Future Appointments   Date Time Provider Dante Amaya   2021 10:30 AM Her[facility] James [facility]   11/3/2021 10:30 AM SCHEDULE, AMENA [facility] fp sc [facility]   2021 10:00 AM Zully Ashley MD fp sc [facility]   2022 10:30 AM Tadeo Urbina PA-C GYN Oncology [facility]   2022 10:45 AM Caitlin Genao MD  derm [facility]     Non-[facility] follow up appointment(s): na

## 2021-11-01 ENCOUNTER — OFFICE VISIT (OUTPATIENT)
Dept: UROLOGY | Age: 71
End: 2021-11-01
Payer: MEDICARE

## 2021-11-01 VITALS
TEMPERATURE: 96 F | WEIGHT: 240 LBS | SYSTOLIC BLOOD PRESSURE: 122 MMHG | BODY MASS INDEX: 44.16 KG/M2 | HEART RATE: 67 BPM | HEIGHT: 62 IN | DIASTOLIC BLOOD PRESSURE: 85 MMHG

## 2021-11-01 DIAGNOSIS — R35.0 FREQUENCY OF URINATION: ICD-10-CM

## 2021-11-01 DIAGNOSIS — N32.81 OAB (OVERACTIVE BLADDER): Primary | ICD-10-CM

## 2021-11-01 DIAGNOSIS — N39.41 URGE INCONTINENCE: ICD-10-CM

## 2021-11-01 PROCEDURE — 1090F PRES/ABSN URINE INCON ASSESS: CPT | Performed by: UROLOGY

## 2021-11-01 PROCEDURE — 1036F TOBACCO NON-USER: CPT | Performed by: UROLOGY

## 2021-11-01 PROCEDURE — G8399 PT W/DXA RESULTS DOCUMENT: HCPCS | Performed by: UROLOGY

## 2021-11-01 PROCEDURE — G8417 CALC BMI ABV UP PARAM F/U: HCPCS | Performed by: UROLOGY

## 2021-11-01 PROCEDURE — 4040F PNEUMOC VAC/ADMIN/RCVD: CPT | Performed by: UROLOGY

## 2021-11-01 PROCEDURE — 99214 OFFICE O/P EST MOD 30 MIN: CPT | Performed by: UROLOGY

## 2021-11-01 PROCEDURE — G8484 FLU IMMUNIZE NO ADMIN: HCPCS | Performed by: UROLOGY

## 2021-11-01 PROCEDURE — 1123F ACP DISCUSS/DSCN MKR DOCD: CPT | Performed by: UROLOGY

## 2021-11-01 PROCEDURE — 0509F URINE INCON PLAN DOCD: CPT | Performed by: UROLOGY

## 2021-11-01 PROCEDURE — 3017F COLORECTAL CA SCREEN DOC REV: CPT | Performed by: UROLOGY

## 2021-11-01 PROCEDURE — G8428 CUR MEDS NOT DOCUMENT: HCPCS | Performed by: UROLOGY

## 2021-11-01 PROCEDURE — 1111F DSCHRG MED/CURRENT MED MERGE: CPT | Performed by: UROLOGY

## 2021-11-01 ASSESSMENT — ENCOUNTER SYMPTOMS
DIARRHEA: 0
NAUSEA: 0
EYE PAIN: 0
RESPIRATORY NEGATIVE: 1
WHEEZING: 0
EYES NEGATIVE: 1
EYE REDNESS: 0
CONSTIPATION: 0
SHORTNESS OF BREATH: 0
COUGH: 0
GASTROINTESTINAL NEGATIVE: 1
ABDOMINAL PAIN: 0
VOMITING: 0
BACK PAIN: 0

## 2021-11-01 NOTE — PROGRESS NOTES
colonoscopy done 20 yrs ago    Diabetes mellitus ([address]     Diverticulitis     Diverticulitis of colon with perforation 8/4/2020    Endometrial cancer ([address]     History of TIA (transient ischemic attack) '80's    on Baby ASA    Hyperglycemia 3/21/2017    Hyperlipidemia     Hypertension since 2015    on Rx.    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    Oral phase dysphagia 2/23/2018    Osteoarthritis (arthritis due to wear and tear of joints)     ronan knee    Osteoarthritis involving multiple joints on both sides of body 4/24/2012    Osteoporosis     Perforated bowel ([address]     Perforated diverticulum 6/15/2020    Postmenopausal bleeding 9/20/2017    Rectal bleeding 9/21/2020    S/P h-scope, Myosure 9/20/17 9/20/2017    Pathology pending    Tennis elbow     left    Thickened endometrium 9/20/2017    TIA (transient ischemic attack)     TLHBSO, bilateral LND 10/24/17 10/24/2017    Type 2 diabetes mellitus, without long-term current use of insulin ([address] 1/14/2020     Past Surgical History:   Procedure Laterality Date    CATARACT REMOVAL WITH IMPLANT Bilateral 2015    COLONOSCOPY  1997    had polyp, she doesn't know where and when, \"20 yrs ago\"    COLONOSCOPY  06/26/2017    COLONOSCOPY N/A 8/3/2020    COLONOSCOPY POLYPECTOMY SNARE performed by Emma Reeves MD at [address] [facility] N/A 9/20/2017    HYSTEROSCOPY  WITH MYOSURE performed by Vernard Skiff, DO at [address] N/A 10/24/2017    TOTAL LAPAROSCOPIC HYSTERECTOMY, BSO, F.S.  STAGING, GYRUS G400 performed by Denice Mckeon Kathy Velez MD at [address] N/A 6/26/2017    COLONOSCOPY POLYPECTOMY / HOT SNARE performed by Meera Pfeiffer DO at [address] [facility] N/A 8/4/2020    OPEN SIGMOID COLECTOMY; PRIMARY ANASTOMOSIS & MOBILIZATION OF SPLENIC FLEXURE performed by Yajaira Curry MD at [address] [facility] [address]  10/24/2017    with pelvic lymph node dissection    THYROID SURGERY  1980    subtotal 20 years ago    TONSILLECTOMY      UPPER GASTROINTESTINAL ENDOSCOPY N/A 8/3/2020    EGD BIOPSY performed by Yajaira Curry MD at [facility] VITRECTOMY      FLOATERS     Family History   Problem Relation Age of Onset    Coronary Art Dis Father     Hypertension Father     Diabetes Father     High Blood Pressure Father     Heart Attack Father     Diabetes Mother     High Blood Pressure Mother     Thyroid Disease Mother     High Blood Pressure Sister     Thyroid Disease Brother     High Blood Pressure Brother     High Blood Pressure Maternal Grandmother     Diabetes Maternal Grandfather     No Known Problems Paternal Grandmother     Heart Attack Paternal Grandfather     Colon Cancer Neg Hx      Outpatient Medications Marked as Taking for the 11/1/21 encounter (Office Visit) with Saumya Mills MD   Medication Sig Dispense Refill    cephALEXin (KEFLEX) 500 MG capsule Take 1 capsule by mouth 2 times daily for 7 days Taken before, no reaction 14 capsule 0    ondansetron (ZOFRAN) 4 MG tablet TAKE 1 TABLET BY MOUTH EVERY 8 HOURS AS NEEDED  30 tablet 0    metoprolol tartrate (LOPRESSOR) 25 MG tablet Take 1 tablet by mouth 2 times daily Dose decreased 10/20/2021 due to bradycardia 60 tablet 3    clotrimazole-betamethasone (LOTRISONE) 1-0.05 % cream Apply topically 1 times daily on the perineum, alternate powder, for 10 days 45 g 0    fluconazole (DIFLUCAN) 150 MG tablet Take 1 tablet by mouth once a week 4 tablet 0    nystatin (MYCOSTATIN) 664540 UNIT/GM powder Apply daily for 4-6 weeks 60 g 3    mirabegron (MYRBETRIQ) 50 MG TB24 Take 50 mg by mouth daily 90 tablet 3    amiodarone (CORDARONE) 200 MG tablet Take 1 tablet by mouth daily 30 tablet 3    lisinopril (PRINIVIL;ZESTRIL) 10 MG tablet Take 1 tablet by mouth daily 30 tablet 3    ferrous sulfate (IRON 325) 325 (65 Fe) MG tablet Take 325 mg by mouth 2 times daily      pantoprazole (PROTONIX) 40 MG tablet TAKE 1 TABLET BY MOUTH ONCE DAILY  30 tablet 9    levothyroxine (SYNTHROID) 75 MCG tablet Take 1 tablet by mouth every morning (before breakfast) 90 tablet 3    metFORMIN (GLUCOPHAGE) 500 MG tablet TAKE ONE TABLET BY MOUTH TWICE A DAY WITH A MEAL  60 tablet 10     MG capsule TAKE ONE (1) CAPSULE BY MOUTH TWO (2) TIMES DAILY FOR CONSTIPATION  180 capsule 3    vitamin D3 (CHOLECALCIFEROL) 25 MCG (1000 UT) TABS tablet TAKE 2 TABLETS BY MOUTH ONCE DAILY  60 tablet 11    Probiotic Product (LISSY-BID PROBIOTIC) TABS TAKE 1 TABLET BY MOUTH IN THE MORNING  30 tablet 11    vitamin B-12 (CYANOCOBALAMIN) 1000 MCG tablet TAKE ONE (1) TABLET BY MOUTH DAILY  90 tablet 3    atorvastatin (LIPITOR) 40 MG tablet TAKE ONE (1) TABLET BY MOUTH DAILY STOP SIMVASTATIN  90 tablet 3    XARELTO 20 MG TABS tablet TAKE ONE (1) TABLET BY MOUTH DAILY (WITH BREAKFAST)  30 tablet 3    oxybutynin (DITROPAN-XL) 10 MG extended release tablet TAKE 1 TABLET BY MOUTH ONCE DAILY  90 tablet 3    ACETAMINOPHEN EXTRA STRENGTH 500 MG tablet TAKE 1 TABLET BY MOUTH EVERY 6 HOURS AS NEEDED FOR PAIN  100 tablet 11    Blood Pressure Monitor KIT Use as directed. 1 kit 1    glucose monitoring kit (FREESTYLE) monitoring kit Use as directed. BRAND OF CHOICE INSURANCE ALLOWS. 1 kit 0    blood glucose monitor strips Test 2-3 times a day & as needed for symptoms of irregular blood glucose.   BRAND OF CHOICE INSURANCE ALLOWS. 100 strip 11    Alcohol Swabs (ALCOHOL PREP) PADS Use as directed 100 each 11    Lancets MISC 1 each by Does not apply route 2 times daily 300 each 11      (All medications reviewed and updated by provider sincelast office visit or hospitalization)   Sulfa antibiotics and Penicillins  Social History     Tobacco Use   Smoking Status Never Smoker   Smokeless Tobacco Never Used      (If patient a smoker, smoking cessation counseling offered)     Social History     Substance and Sexual Activity   Alcohol Use No    Alcohol/week: 0.0 standard drinks       REVIEW OF SYSTEMS:  Review of Systems      Physical Exam:      Vitals:    11/01/21 1014   BP: 122/85   Pulse: 67   Temp: 96 °F (35.6 °C)     Body mass index is 43.9 kg/m². Patient is a 73 y.o. female in noacute distress and alert and oriented to person, place and time. Physical Exam  Constitutional: Patient in no acute distress. Neuro: Alert andoriented to person, place and time. Psych: Mood normal, affect normal  Skin: No rash noted  HEENT: Head: Normocephalic and atraumatic        and Plan      1. OAB (overactive bladder)    2. Urge incontinence    3. Frequency of urination           Plan: We will continue oxybutynin and Myrbetriq. Patient can follow-up in 1 year. Return in about 1 year (around 11/1/2022). Prescriptions Ordered:  No orders of the defined types were placed in this encounter. Orders Placed:  No orders of the defined types were placed in this encounter. Adriana Green MD    Agree with the ROS entered by the MA.

## 2021-11-02 ENCOUNTER — TELEPHONE (OUTPATIENT)
Dept: FAMILY MEDICINE CLINIC | Age: 71
End: 2021-11-02

## 2021-11-02 NOTE — TELEPHONE ENCOUNTER
received media with her vitals, her pulse is below 50 but she has A fib, and this heart rate caught by her visiting nurse, might not be reliable   I just cut down the metoprolol  NEEDS TO SEE CARDIO ASAP and continue meds as they are for now      Pulse Readings from Last 3 Encounters:   11/01/21 67   10/12/21 58   10/07/21 50       Current Outpatient Medications on File Prior to Visit   Medication Sig Dispense Refill    ondansetron (ZOFRAN) 4 MG tablet TAKE 1 TABLET BY MOUTH EVERY 8 HOURS AS NEEDED  30 tablet 0    metoprolol tartrate (LOPRESSOR) 25 MG tablet Take 1 tablet by mouth 2 times daily Dose decreased 10/20/2021 due to bradycardia 60 tablet 3    clotrimazole-betamethasone (LOTRISONE) 1-0.05 % cream Apply topically 1 times daily on the perineum, alternate powder, for 10 days 45 g 0    fluconazole (DIFLUCAN) 150 MG tablet Take 1 tablet by mouth once a week 4 tablet 0    nystatin (MYCOSTATIN) 995970 UNIT/GM powder Apply daily for 4-6 weeks 60 g 3    mirabegron (MYRBETRIQ) 50 MG TB24 Take 50 mg by mouth daily 90 tablet 3    amiodarone (CORDARONE) 200 MG tablet Take 1 tablet by mouth 2 times daily for 14 days 28 tablet 0    amiodarone (CORDARONE) 200 MG tablet Take 1 tablet by mouth daily 30 tablet 3    lisinopril (PRINIVIL;ZESTRIL) 10 MG tablet Take 1 tablet by mouth daily 30 tablet 3    ferrous sulfate (IRON 325) 325 (65 Fe) MG tablet Take 325 mg by mouth 2 times daily      pantoprazole (PROTONIX) 40 MG tablet TAKE 1 TABLET BY MOUTH ONCE DAILY  30 tablet 9    levothyroxine (SYNTHROID) 75 MCG tablet Take 1 tablet by mouth every morning (before breakfast) 90 tablet 3    metFORMIN (GLUCOPHAGE) 500 MG tablet TAKE ONE TABLET BY MOUTH TWICE A DAY WITH A MEAL  60 tablet 10     MG capsule TAKE ONE (1) CAPSULE BY MOUTH TWO (2) TIMES DAILY FOR CONSTIPATION  180 capsule 3    vitamin D3 (CHOLECALCIFEROL) 25 MCG (1000 UT) TABS tablet TAKE 2 TABLETS BY MOUTH ONCE DAILY  60 tablet 11    Probiotic Product (LISSY-BID PROBIOTIC) TABS TAKE 1 TABLET BY MOUTH IN THE MORNING  30 tablet 11    vitamin B-12 (CYANOCOBALAMIN) 1000 MCG tablet TAKE ONE (1) TABLET BY MOUTH DAILY  90 tablet 3    atorvastatin (LIPITOR) 40 MG tablet TAKE ONE (1) TABLET BY MOUTH DAILY STOP SIMVASTATIN  90 tablet 3    XARELTO 20 MG TABS tablet TAKE ONE (1) TABLET BY MOUTH DAILY (WITH BREAKFAST)  30 tablet 3    oxybutynin (DITROPAN-XL) 10 MG extended release tablet TAKE 1 TABLET BY MOUTH ONCE DAILY  90 tablet 3    ACETAMINOPHEN EXTRA STRENGTH 500 MG tablet TAKE 1 TABLET BY MOUTH EVERY 6 HOURS AS NEEDED FOR PAIN  100 tablet 11    Blood Pressure Monitor KIT Use as directed. 1 kit 1    glucose monitoring kit (FREESTYLE) monitoring kit Use as directed. BRAND OF CHOICE INSURANCE ALLOWS. 1 kit 0    blood glucose monitor strips Test 2-3 times a day & as needed for symptoms of irregular blood glucose. BRAND OF CHOICE INSURANCE ALLOWS. 100 strip 11    Alcohol Swabs (ALCOHOL PREP) PADS Use as directed 100 each 11    Lancets MISC 1 each by Does not apply route 2 times daily 300 each 11     No current facility-administered medications on file prior to visit.

## 2021-11-03 ENCOUNTER — NURSE ONLY (OUTPATIENT)
Dept: FAMILY MEDICINE CLINIC | Age: 71
End: 2021-11-03
Payer: MEDICARE

## 2021-11-03 ENCOUNTER — APPOINTMENT (OUTPATIENT)
Dept: GENERAL RADIOLOGY | Age: 71
End: 2021-11-03
Payer: MEDICARE

## 2021-11-03 ENCOUNTER — TELEPHONE (OUTPATIENT)
Dept: OTHER | Facility: CLINIC | Age: 71
End: 2021-11-03

## 2021-11-03 ENCOUNTER — HOSPITAL ENCOUNTER (EMERGENCY)
Age: 71
Discharge: HOME | End: 2021-11-04
Attending: EMERGENCY MEDICINE
Payer: MEDICARE

## 2021-11-03 DIAGNOSIS — J40 BRONCHITIS: Primary | ICD-10-CM

## 2021-11-03 DIAGNOSIS — Z23 NEED FOR INFLUENZA VACCINATION: Primary | ICD-10-CM

## 2021-11-03 DIAGNOSIS — N32.81 OAB (OVERACTIVE BLADDER): ICD-10-CM

## 2021-11-03 LAB
ABSOLUTE EOS #: 0.1 K/UL (ref 0–0.4)
ABSOLUTE IMMATURE GRANULOCYTE: ABNORMAL K/UL (ref 0–0.3)
ABSOLUTE LYMPH #: 1.8 K/UL (ref 1–4.8)
ABSOLUTE MONO #: 0.5 K/UL (ref 0.1–1.3)
ALBUMIN SERPL-MCNC: 4 G/DL (ref 3.5–5.2)
ALBUMIN/GLOBULIN RATIO: ABNORMAL (ref 1–2.5)
ALP BLD-CCNC: 68 U/L (ref 35–104)
ALT SERPL-CCNC: 16 U/L (ref 5–33)
ANION GAP SERPL CALCULATED.3IONS-SCNC: 7 MMOL/L (ref 9–17)
AST SERPL-CCNC: 15 U/L
BASOPHILS # BLD: 1 % (ref 0–2)
BASOPHILS ABSOLUTE: 0.1 K/UL (ref 0–0.2)
BILIRUB SERPL-MCNC: 0.21 MG/DL (ref 0.3–1.2)
BNP INTERPRETATION: ABNORMAL
BUN BLDV-MCNC: 18 MG/DL (ref 8–23)
BUN/CREAT BLD: ABNORMAL (ref 9–20)
CALCIUM SERPL-MCNC: 9 MG/DL (ref 8.6–10.4)
CHLORIDE BLD-SCNC: 103 MMOL/L (ref 98–107)
CO2: 28 MMOL/L (ref 20–31)
CREAT SERPL-MCNC: 1.02 MG/DL (ref 0.5–0.9)
DIFFERENTIAL TYPE: ABNORMAL
EOSINOPHILS RELATIVE PERCENT: 1 % (ref 0–4)
GFR AFRICAN AMERICAN: >60 ML/MIN
GFR NON-AFRICAN AMERICAN: 54 ML/MIN
GFR SERPL CREATININE-BSD FRML MDRD: ABNORMAL ML/MIN/{1.73_M2}
GFR SERPL CREATININE-BSD FRML MDRD: ABNORMAL ML/MIN/{1.73_M2}
GLUCOSE BLD-MCNC: 125 MG/DL (ref 70–99)
HCT VFR BLD CALC: 34.5 % (ref 36–46)
HEMOGLOBIN: 11.5 G/DL (ref 12–16)
IMMATURE GRANULOCYTES: ABNORMAL %
INFLUENZA A: NOT DETECTED
INFLUENZA B: NOT DETECTED
LYMPHOCYTES # BLD: 22 % (ref 24–44)
MCH RBC QN AUTO: 30.3 PG (ref 26–34)
MCHC RBC AUTO-ENTMCNC: 33.3 G/DL (ref 31–37)
MCV RBC AUTO: 91 FL (ref 80–100)
MONOCYTES # BLD: 6 % (ref 1–7)
NRBC AUTOMATED: ABNORMAL PER 100 WBC
PDW BLD-RTO: 15.4 % (ref 11.5–14.9)
PLATELET # BLD: 221 K/UL (ref 150–450)
PLATELET ESTIMATE: ABNORMAL
PMV BLD AUTO: 8.8 FL (ref 6–12)
POTASSIUM SERPL-SCNC: 4 MMOL/L (ref 3.7–5.3)
PRO-BNP: 491 PG/ML
RBC # BLD: 3.79 M/UL (ref 4–5.2)
RBC # BLD: ABNORMAL 10*6/UL
SARS-COV-2 RNA, RT PCR: NOT DETECTED
SEG NEUTROPHILS: 70 % (ref 36–66)
SEGMENTED NEUTROPHILS ABSOLUTE COUNT: 6 K/UL (ref 1.3–9.1)
SODIUM BLD-SCNC: 138 MMOL/L (ref 135–144)
SOURCE: NORMAL
SPECIMEN DESCRIPTION: NORMAL
TOTAL PROTEIN: 6.8 G/DL (ref 6.4–8.3)
TROPONIN INTERP: NORMAL
TROPONIN T: NORMAL NG/ML
TROPONIN, HIGH SENSITIVITY: 13 NG/L (ref 0–14)
WBC # BLD: 8.6 K/UL (ref 3.5–11)
WBC # BLD: ABNORMAL 10*3/UL

## 2021-11-03 PROCEDURE — 87636 SARSCOV2 & INF A&B AMP PRB: CPT

## 2021-11-03 PROCEDURE — G0008 ADMIN INFLUENZA VIRUS VAC: HCPCS | Performed by: FAMILY MEDICINE

## 2021-11-03 PROCEDURE — 85025 COMPLETE CBC W/AUTO DIFF WBC: CPT

## 2021-11-03 PROCEDURE — 99283 EMERGENCY DEPT VISIT LOW MDM: CPT

## 2021-11-03 PROCEDURE — 36415 COLL VENOUS BLD VENIPUNCTURE: CPT

## 2021-11-03 PROCEDURE — 80053 COMPREHEN METABOLIC PANEL: CPT

## 2021-11-03 PROCEDURE — 71045 X-RAY EXAM CHEST 1 VIEW: CPT

## 2021-11-03 PROCEDURE — 84484 ASSAY OF TROPONIN QUANT: CPT

## 2021-11-03 PROCEDURE — 83880 ASSAY OF NATRIURETIC PEPTIDE: CPT

## 2021-11-03 PROCEDURE — 90694 VACC AIIV4 NO PRSRV 0.5ML IM: CPT | Performed by: FAMILY MEDICINE

## 2021-11-03 RX ORDER — OXYBUTYNIN CHLORIDE 10 MG/1
TABLET, EXTENDED RELEASE ORAL
Qty: 90 TABLET | Refills: 9 | Status: SHIPPED | OUTPATIENT
Start: 2021-11-03 | End: 2022-09-01

## 2021-11-03 ASSESSMENT — ENCOUNTER SYMPTOMS
NAUSEA: 0
VOMITING: 0
DIARRHEA: 0
COUGH: 1
RHINORRHEA: 1
SHORTNESS OF BREATH: 1

## 2021-11-03 NOTE — TELEPHONE ENCOUNTER
Please inform visiting nurse, please see media document scanned from yesterday for contact information

## 2021-11-03 NOTE — TELEPHONE ENCOUNTER
INFORMED VISITING NURSE, THEY ARE CONTACTING PATIENT GOING TO MAKE SURE MEDICATION IS TAKING PROPERLY AND TO GET APPOINTMENT ASAP WITH CARDIOLOGY.  ALSO GAVE CARDIOLOGY NAME AND PHONE NUMBER

## 2021-11-04 ENCOUNTER — TELEPHONE (OUTPATIENT)
Dept: FAMILY MEDICINE CLINIC | Age: 71
End: 2021-11-04

## 2021-11-04 ENCOUNTER — TELEPHONE (OUTPATIENT)
Dept: OTHER | Facility: CLINIC | Age: 71
End: 2021-11-04

## 2021-11-04 VITALS
HEART RATE: 62 BPM | TEMPERATURE: 98.1 F | DIASTOLIC BLOOD PRESSURE: 56 MMHG | OXYGEN SATURATION: 97 % | WEIGHT: 235 LBS | RESPIRATION RATE: 18 BRPM | BODY MASS INDEX: 43.24 KG/M2 | HEIGHT: 62 IN | SYSTOLIC BLOOD PRESSURE: 136 MMHG

## 2021-11-04 RX ORDER — ALBUTEROL SULFATE 90 UG/1
1-2 AEROSOL, METERED RESPIRATORY (INHALATION) EVERY 4 HOURS PRN
Qty: 18 G | Refills: 0 | Status: SHIPPED | OUTPATIENT
Start: 2021-11-04 | End: 2022-09-08 | Stop reason: SDUPTHER

## 2021-11-04 RX ORDER — GUAIFENESIN/DEXTROMETHORPHAN 100-10MG/5
5 SYRUP ORAL 3 TIMES DAILY PRN
Qty: 120 ML | Refills: 0 | Status: SHIPPED | OUTPATIENT
Start: 2021-11-04 | End: 2021-11-14

## 2021-11-04 NOTE — TELEPHONE ENCOUNTER
[facility]) ED Follow up Call    Reason for ED visit:           Paco Davis , this is Yolande Mcdaniels from Dr. Lidia Carvalho office, just calling to see how you are doing after your recent ED visit. Did you receive discharge instructions? Yes  Do you understand the discharge instructions? Yes  Did the ED give you any new prescriptions? Yes  Were you able to fill your prescriptions? Yes      Do you have one of our red, yellow and green  Zone sheets that help you to determine when you should go to the ED? Yes      Do you need or want to make a follow up appt with your PCP? Yes    Do you have any further needs in the home i.e. Equipment?   Not Applicable        FU appts/Provider:    Future Appointments   Date Time Provider Dante Amaya   11/26/2021 10:00 AM Beverley Jennings MD  sc [facility]   4/7/2022 10:30 AM Jose Castellon PA-C GYN Oncology [facility]   7/21/2022 10:45 AM Brynn Fall MD  derm [facility]

## 2021-11-04 NOTE — ED TRIAGE NOTES
Pt presents to ED for difficulty breathing. Pt states that her symptoms started around 1800 tonight. Pt states that she's been having productive cough. Pt denies any fevers/ chills.

## 2021-11-04 NOTE — ED PROVIDER NOTES
[address]  EMERGENCY DEPARTMENT ENCOUNTER      Pt Name: Krystal Buck  MRN: [id number]  [place] 1950  Date of evaluation: 11/3/21      CHIEF COMPLAINT       Chief Complaint   Patient presents with    Cough    Shortness of Breath     HISTORY OF PRESENT ILLNESS   HPI 73 y.o. female presents with c/o sob, cough and congestion  Symptoms have been present over the last the last day. Reports that she has associated congestion, rhinorrhea, and cough. She's been using OTC cold medications. She reports she is vaccinated against covid. No known sick contacts. She denies cp, leg edema, orthopnea. REVIEW OF SYSTEMS       Review of Systems   Constitutional: Negative for fever. HENT: Positive for congestion and rhinorrhea. Eyes: Negative for visual disturbance. Respiratory: Positive for cough and shortness of breath. Cardiovascular: Negative for chest pain and leg swelling. Gastrointestinal: Negative for diarrhea, nausea and vomiting. Endocrine: Positive for polyuria. Genitourinary: Negative for difficulty urinating and dysuria. Musculoskeletal: Negative for myalgias. Skin: Negative for rash. Neurological: Negative for headaches.        PAST MEDICAL HISTORY     Past Medical History:   Diagnosis Date    Abdominal aortic aneurysm (AAA) without rupture ([address] 10/21/2021    Adenocarcinoma of endometrium, stage 1 ([address] 10/5/2017    Well differentiated grade 1 adenocarcinoma arising in complex hyperplasia    JOSHUA (acute kidney injury) ([address] 1/15/2020    Allergic rhinitis 2/23/2017    At high risk for falls 2/23/2017    Atrial fibrillation [facility])     Jan 2020    Atrial fibrillation, new onset ([address] 1/20/2020    CHF (congestive heart failure) ([address]     \"little\"    Colitis 7/22/2020    Colon polyp     Had colonoscopy done 20 yrs ago    Diabetes mellitus ([address]     Diverticulitis     Diverticulitis of colon with perforation 8/4/2020    Endometrial cancer ([address]     History of TIA (transient ischemic attack) '80's    on Baby ASA    Hyperglycemia 3/21/2017    Hyperlipidemia     Hypertension since 2015    on Rx.    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    Oral phase dysphagia 2/23/2018    Osteoarthritis (arthritis due to wear and tear of joints)     ronan knee    Osteoarthritis involving multiple joints on both sides of body 4/24/2012    Osteoporosis     Perforated bowel ([address]     Perforated diverticulum 6/15/2020    Postmenopausal bleeding 9/20/2017    Rectal bleeding 9/21/2020    S/P h-scope, Myosure 9/20/17 9/20/2017    Pathology pending    Tennis elbow     left    Thickened endometrium 9/20/2017    TIA (transient ischemic attack)     TLHBSO, bilateral LND 10/24/17 10/24/2017    Type 2 diabetes mellitus, without long-term current use of insulin ([address] 1/14/2020       SURGICAL HISTORY       Past Surgical History:   Procedure Laterality Date    CATARACT REMOVAL WITH IMPLANT Bilateral 2015    COLONOSCOPY  1997    had polyp, she doesn't know where and when, \"20 yrs ago\"    COLONOSCOPY  06/26/2017    COLONOSCOPY N/A 8/3/2020    COLONOSCOPY POLYPECTOMY SNARE performed by Suzanna Purdy MD at [address] [place] [address] N/A 9/20/2017    HYSTEROSCOPY  WITH MYOSURE performed by Herbert Bhagat DO [place] [address] N/A 10/24/2017    TOTAL LAPAROSCOPIC HYSTERECTOMY, BSO, F.S.  STAGING, GYRUS G400 performed by Amanda Allan MD at [address] [place] [address] W/REMOVAL LESION BY HOT BX FORCEPS N/A 6/26/2017    COLONOSCOPY POLYPECTOMY / HOT SNARE performed by Maribell Hedrick Manjinder Urena DO at [address] N/A 8/4/2020    OPEN SIGMOID COLECTOMY; PRIMARY ANASTOMOSIS & MOBILIZATION OF SPLENIC FLEXURE performed by Babak Person MD at [address]  10/24/2017    with pelvic lymph node dissection    THYROID SURGERY  1980    subtotal 20 years ago    TONSILLECTOMY      UPPER GASTROINTESTINAL ENDOSCOPY N/A 8/3/2020    EGD BIOPSY performed by Babak Person MD at [place] [address]       Discharge Medication List as of 11/4/2021 12:59 AM      CONTINUE these medications which have NOT CHANGED    Details   oxybutynin (DITROPAN-XL) 10 MG extended release tablet TAKE 1 TABLET BY MOUTH ONCE DAILY , Disp-90 tablet, R-9Normal      ondansetron (ZOFRAN) 4 MG tablet TAKE 1 TABLET BY MOUTH EVERY 8 HOURS AS NEEDED , Disp-30 tablet, R-0Normal      metoprolol tartrate (LOPRESSOR) 25 MG tablet Take 1 tablet by mouth 2 times daily Dose decreased 10/20/2021 due to bradycardia, Disp-60 tablet, R-3Normal      clotrimazole-betamethasone (LOTRISONE) 1-0.05 % cream Apply topically 1 times daily on the perineum, alternate powder, for 10 days, Disp-45 g, R-0, Normal      fluconazole (DIFLUCAN) 150 MG tablet Take 1 tablet by mouth once a week, Disp-4 tablet, R-0Normal      nystatin (MYCOSTATIN) 681913 UNIT/GM powder Apply daily for 4-6 weeks, Disp-60 g, R-3, Normal      mirabegron (MYRBETRIQ) 50 MG TB24 Take 50 mg by mouth daily, Disp-90 tablet, R-3Normal      amiodarone (CORDARONE) 200 MG tablet Take 1 tablet by mouth daily, Disp-30 tablet, R-3Normal      lisinopril (PRINIVIL;ZESTRIL) 10 MG tablet Take 1 tablet by mouth daily, Disp-30 tablet, R-3Normal      ferrous sulfate (IRON 325) 325 (65 Fe) MG tablet Take 325 mg by mouth 2 times dailyHistorical Med      pantoprazole (PROTONIX) 40 MG tablet TAKE 1 TABLET BY MOUTH ONCE DAILY , Disp-30 tablet, R-9Normal      levothyroxine (SYNTHROID) 75 MCG tablet Take 1 tablet by mouth every morning (before breakfast), Disp-90 tablet, R-3Normal      metFORMIN (GLUCOPHAGE) 500 MG tablet TAKE ONE TABLET BY MOUTH TWICE A DAY WITH A MEAL , Disp-60 tablet, R-10Normal       MG capsule TAKE ONE (1) CAPSULE BY MOUTH TWO (2) TIMES DAILY FOR CONSTIPATION , Disp-180 capsule, R-3Normal      vitamin D3 (CHOLECALCIFEROL) 25 MCG (1000 UT) TABS tablet TAKE 2 TABLETS BY MOUTH ONCE DAILY , Disp-60 tablet, R-11Normal      Probiotic Product (LISSY-BID PROBIOTIC) TABS TAKE 1 TABLET BY MOUTH IN THE MORNING , Disp-30 tablet, R-11Normal      vitamin B-12 (CYANOCOBALAMIN) 1000 MCG tablet TAKE ONE (1) TABLET BY MOUTH DAILY , Disp-90 tablet, R-3Normal      atorvastatin (LIPITOR) 40 MG tablet TAKE ONE (1) TABLET BY MOUTH DAILY STOP SIMVASTATIN , Disp-90 tablet, R-3Normal      XARELTO 20 MG TABS tablet TAKE ONE (1) TABLET BY MOUTH DAILY (WITH BREAKFAST) , Disp-30 tablet, R-3Normal      ACETAMINOPHEN EXTRA STRENGTH 500 MG tablet TAKE 1 TABLET BY MOUTH EVERY 6 HOURS AS NEEDED FOR PAIN , Disp-100 tablet,R-11Normal      Blood Pressure Monitor KIT Disp-1 kit,R-1, PrintUse as directed. glucose monitoring kit (FREESTYLE) monitoring kit Disp-1 kit,R-0, NormalUse as directed. [employer] INSURANCE ALLOWS.      blood glucose monitor strips Test 2-3 times a day & as needed for symptoms of irregular blood glucose. BR[employer] INSURANCE ALLOWS., Disp-100 strip,R-11, Normal      Alcohol Swabs (ALCOHOL PREP) PADS Disp-100 each,R-11, NormalUse as directed      Lancets MISC 2 TIMES DAILY Starting 2020, Disp-300 each,R-11, Normal             ALLERGIES     is allergic to sulfa antibiotics and penicillins. FAMILY HISTORY     She indicated that her mother is . She indicated that her father is . She indicated that her sister is alive. She indicated that her brother is alive. She indicated that her maternal grandmother is . She indicated that her maternal grandfather is .  She indicated that her paternal grandmother is . She indicated that her paternal grandfather is . She indicated that her daughter is alive. She indicated that the status of her neg hx is unknown. SOCIAL HISTORY      reports that she has never smoked. She has never used smokeless tobacco. She reports that she does not drink alcohol and does not use drugs. PHYSICAL EXAM     INITIAL VITALS: BP (!) 136/56   Pulse 62   Temp 98.1 °F (36.7 °C) (Oral)   Resp 18   Ht 5' 2\" (1.575 m)   Wt 235 lb (106.6 kg)   LMP  (LMP Unknown)   SpO2 97%   BMI 42.98 kg/m²   Gen: nad  Head: Normocephalic, atraumatic  Eye: Pupils equal round reactive to light, no conjunctivitis  ENT: MMM  Neck: no JVD  Heart: Regular rate and rhythm no murmurs  Lungs: Clear to auscultation bilaterally, no respiratory distress  Chest wall: No crepitus, no tenderness palpation  Abdomen: Soft, nontender, nondistended, with no peritoneal signs  Neurologic: Patient is alert and oriented x3, motor and sensation is intact in all 4 extremities, fluent speech  Extremities: minimal bilateral equal edema, no  calf tenderness to palpation    MEDICAL DECISION MAKING:     MDM  73 y.o. female presenting with sob. Differential diagnosis of CHF, PNA,  anemia, renal failure, metabolic acidosis. We'll obtain a cbc, cmp, ekg, troponin, bnp, and chest xray. The patient is put on a cardiac, O2 monitor. We will monitor closely and reassess. Emergency Department course:  WBC count normal  No troponin /BNP elevation, doubt CHF. CXR shows no pneumonia or pneumothorax or sign of CHF. COVID and Influenza negative. Suspect a viral bronchitis. Providing symptomatic treatment. D/w pt the results, treatment plan, warning precautions for prompt ED return and importance of close OP FU, she verbalizes understanding and agrees with the treatment plan.        DIAGNOSTIC RESULTS     RADIOLOGY:All plain film, CT, MRI, and formal ultrasound images (except ED bedside ultrasound) are read by the radiologist and the images and interpretations are directly viewed by the emergency physician. XR CHEST PORTABLE   Final Result   No acute cardiopulmonary process. Stable cardiomegaly             LABS: All lab results were reviewed by myself, and all abnormals are listed below.   Labs Reviewed   CBC WITH AUTO DIFFERENTIAL - Abnormal; Notable for the following components:       Result Value    RBC 3.79 (*)     Hemoglobin 11.5 (*)     Hematocrit 34.5 (*)     RDW 15.4 (*)     Seg Neutrophils 70 (*)     Lymphocytes 22 (*)     All other components within normal limits   COMPREHENSIVE METABOLIC PANEL - Abnormal; Notable for the following components:    Glucose 125 (*)     CREATININE 1.02 (*)     Anion Gap 7 (*)     Total Bilirubin 0.21 (*)     GFR Non- 54 (*)     All other components within normal limits   BRAIN NATRIURETIC PEPTIDE - Abnormal; Notable for the following components:    Pro- (*)     All other components within normal limits   COVID-19 & INFLUENZA COMBO   TROPONIN   URINE RT REFLEX TO CULTURE       EMERGENCY DEPARTMENT COURSE:   Vitals:    Vitals:    11/03/21 2118 11/03/21 2130 11/03/21 2230 11/04/21 0100   BP: (!) 179/67 (!) 161/65 (!) 160/75 (!) 136/56   Pulse: 59 60  62   Resp: 19 18  18   Temp: 98.1 °F (36.7 °C)      TempSrc: Oral      SpO2: 98% 98% 96% 97%   Weight: 235 lb (106.6 kg)      Height: 5' 2\" (1.575 m)          The patient was given the following medications while in the emergency department:  Orders Placed This Encounter   Medications    albuterol sulfate HFA (PROVENTIL HFA) 108 (90 Base) MCG/ACT inhaler     Sig: Inhale 1-2 puffs into the lungs every 4 hours as needed for Wheezing     Dispense:  18 g     Refill:  0    guaiFENesin-dextromethorphan (ROBITUSSIN DM) 100-10 MG/5ML syrup     Sig: Take 5 mLs by mouth 3 times daily as needed for Cough     Dispense:  120 mL     Refill:  0     -------------------------  CRITICAL CARE:   CONSULTS: None  PROCEDURES: Procedures     FINAL IMPRESSION      1.  Bronchitis          DISPOSITION/PLAN   DISPOSITION Decision To Discharge 11/04/2021 12:57:40 AM      PATIENT REFERRED TO:  Cherri Spurling, MD  [address]  [address] [facility]  [address] [facility]    In 3 days      [address]  [address]  [address] [facility] [address]  [phone]    If symptoms worsen      DISCHARGE MEDICATIONS:  Discharge Medication List as of 11/4/2021 12:59 AM      START taking these medications    Details   albuterol sulfate HFA (PROVENTIL HFA) 108 (90 Base) MCG/ACT inhaler Inhale 1-2 puffs into the lungs every 4 hours as needed for Wheezing, Disp-18 g, R-0Print      guaiFENesin-dextromethorphan (ROBITUSSIN DM) 100-10 MG/5ML syrup Take 5 mLs by mouth 3 times daily as needed for Cough, Disp-120 mL, R-0Print               Iatlo Ro MD  Attending Emergency Physician                      Italo Ro MD  11/04/21 [id number]

## 2021-11-04 NOTE — ED NOTES
Discharge papers reviewed with pt. Pt educated on medication and dosages. Pt instructed to follow-up with PCP/specialist and to return to ED if symptoms worsen or have any concerns. Pt verbalizes understanding. Pt wheeled out of ED with all belongings.        Danelle Reynolds RN  11/04/21 [id number]

## 2021-11-11 ENCOUNTER — VIRTUAL VISIT (OUTPATIENT)
Dept: FAMILY MEDICINE CLINIC | Age: 71
End: 2021-11-11
Payer: MEDICARE

## 2021-11-11 DIAGNOSIS — M54.50 CHRONIC BILATERAL LOW BACK PAIN WITHOUT SCIATICA: ICD-10-CM

## 2021-11-11 DIAGNOSIS — I50.32 CHF NYHA CLASS I, CHRONIC, DIASTOLIC (HCC): ICD-10-CM

## 2021-11-11 DIAGNOSIS — N18.30 BENIGN HYPERTENSION WITH CKD (CHRONIC KIDNEY DISEASE) STAGE III (HCC): Primary | ICD-10-CM

## 2021-11-11 DIAGNOSIS — E11.65 TYPE 2 DIABETES MELLITUS WITH HYPERGLYCEMIA, WITHOUT LONG-TERM CURRENT USE OF INSULIN (HCC): ICD-10-CM

## 2021-11-11 DIAGNOSIS — I12.9 BENIGN HYPERTENSION WITH CKD (CHRONIC KIDNEY DISEASE) STAGE III (HCC): Primary | ICD-10-CM

## 2021-11-11 DIAGNOSIS — M25.561 CHRONIC PAIN OF BOTH KNEES: ICD-10-CM

## 2021-11-11 DIAGNOSIS — G89.29 CHRONIC PAIN OF BOTH KNEES: ICD-10-CM

## 2021-11-11 DIAGNOSIS — N39.46 MIXED INCONTINENCE: ICD-10-CM

## 2021-11-11 DIAGNOSIS — G89.29 CHRONIC BILATERAL LOW BACK PAIN WITHOUT SCIATICA: ICD-10-CM

## 2021-11-11 DIAGNOSIS — E66.01 CLASS 3 SEVERE OBESITY DUE TO EXCESS CALORIES WITHOUT SERIOUS COMORBIDITY WITH BODY MASS INDEX (BMI) OF 40.0 TO 44.9 IN ADULT (HCC): ICD-10-CM

## 2021-11-11 DIAGNOSIS — I48.0 PAROXYSMAL ATRIAL FIBRILLATION (HCC): ICD-10-CM

## 2021-11-11 DIAGNOSIS — M25.562 CHRONIC PAIN OF BOTH KNEES: ICD-10-CM

## 2021-11-11 PROCEDURE — 99443 PR PHYS/QHP TELEPHONE EVALUATION 21-30 MIN: CPT | Performed by: FAMILY MEDICINE

## 2021-11-11 RX ORDER — ONDANSETRON 4 MG/1
TABLET, FILM COATED ORAL
Qty: 30 TABLET | Refills: 0 | Status: SHIPPED | OUTPATIENT
Start: 2021-11-11 | End: 2022-01-03

## 2021-11-11 NOTE — PROGRESS NOTES
[id number] Phone      Send Link Via Text    No text has been sent  Last text sent2021 11:12 AM  To:[phone]  Email      Send Link Via Email    No email has been sent  Last email sent2021 11:12 AM  To:[email or web address]        Called patient 11:17 AM           Onel Larry contacted provider via telephone, could not use the camera. Patient requested office visit, was scheduled for virtual visit phone call , Ryan Wolfe was unable to use doxy. me or [employer]hart. Ryan Wolfe is a 73 y.o. female evaluated via telephone on  21    Ryan Wolfe (:  1950) is a 73 y.o. female,Established patient, here for evaluation of the following chief complaint(s): Hypertension, Congestive Heart Failure, Diabetes, Difficulty Walking, and ED Follow-up (11/3/2021 bronchitis)      ASSESSMENT/PLAN:    1. Benign hypertension with CKD (chronic kidney disease) stage III (HCC)  Slightly worsening chronic kidney disease stage III, previously GFR over 60, now GFR 54 on 11/3/2021  Well controlled HTN. Continue current treatment metoprolol 25 mg twice a day lisinopril 10 mg  Advised not to take NSAIDs  -      Glassy Pro  Discussed low salt diet and BP and pulse monitoring. 2. Paroxysmal atrial fibrillation (HCC)  Stable  Continue amiodarone, metoprolol 25 mg twice a day and chronic anticoagulation with Xarelto, follow-up with cardiologist as scheduled  -      Glassy Pro  3. CHF NYHA class I, chronic, diastolic (HCC)  Stable  Lab Results   Component Value Date    LVEF 55 10/11/2021   Continue metoprolol, 25 mg twice a day, lisinopril 10 mg, Lipitor 40 mg  Follow-up with cardiology as scheduled    -      Glassy Pro  4.  Type 2 diabetes mellitus with hyperglycemia, without long-term current use of insulin (HCC)   slightly worsening but very well controlled  Continue Metformin prescribed medications. Barriers to medication compliance addressed. Patient given educational materials - see patient instructions  Was a self-tracking handout given in paper form or via [employer]? Yes  All patient questions answered. Patient voiced understanding. The patient's past medical,surgical, social, and family history as well as her current medications and allergies were reviewed as documented in today's encounter. Medications, labs, diagnostic studies, consultations and follow-up as documented in this encounter. Return in about 3 months (around 2/11/2022) for ALWAYS NEEDS 30 MIN. APPT, PHQ-9 in EPIC, DM2, HTN, HLP, CHF. Data Unavailable      Future Appointments   Date Time Provider Dante Amaya   3/22/2022  8:30 AM Cade Deluca MD [facility]   4/7/2022 10:30 AM Preeti Bartlett PA-C GYN Oncology [facility]   7/21/2022 10:45 AM Kerry Connor MD  derm [facility]       Consent:  She is aware that that she may receive a bill for this telephone service, depending on her insurance coverage, and has provided verbal consent to proceed: Yes      Documentation and HPI:  I communicated with the patient about: Hypertension, Congestive Heart Failure, Diabetes, Difficulty Walking, and ED Follow-up (11/3/2021 bronchitis)       Jazmin Rivers reports: She was seen in emergency room on 11/3/2021 at [facility] due to shortness of breath and cough, she was found to have acute bronchitis per records. She was treated with albuterol inhaler and cough syrup. In the emergency room, COVID-19 and influenza both negative  BNP mildly increased 491, tropes negative, CMP showed chronic kidney disease stage III GFR 54, hyperglycemia blood glucose 125 otherwise normal.  CBC showed mild anemia with hemoglobin 11.5, hematocrit 34.5.   Chest x-ray showed minor perihilar edema likely related to interstitial vascular congestion and mildly enlarged cardiomediastinal silhouette otherwise normal, not pneumonia    Patient says that today she feels better, using inhaler as needed once or twice a day, patient reports her shortness of breath and cough are better, reports minimal mucus production, white. Patient reports she has been having [facility] living home care, she tells me that PT discharged her today  Patient says \"I would like to do more\"   \"I have difficulty raising my legs, I cannot go out\", patient says she could not go to the outpatient physical therapy, she lives alone    Has bilateral knee pain due to osteoarthritis, and chronic low back pain, does not radiate into the legs for several years, takes Tylenol as needed, which helps. She has been using Walker with seat to ambulate and to prevent falls. Hypertension, CHF, paroxysmal atrial fibrillation   she  is not exercising, but just completed physical therapy, and is adherent to low salt diet. Blood pressure is well controlled at home. Cardiac symptoms dyspnea and fatigue. Patient denies chest pain, chest pressure/discomfort, claudication, exertional chest pressure/discomfort, irregular heart beat, lower extremity edema, near-syncope, orthopnea, palpitations, paroxysmal nocturnal dyspnea, syncope and tachypnea. Cardiovascular risk factors: advanced age (older than 54 for men, 72 for women), diabetes mellitus, dyslipidemia, hypertension and obesity (BMI >= 30 kg/m2). Use of agents associated with hypertension: none. History of target organ damage: chronic kidney disease and heart failure, TIA.     Will see Dr. Jaya Lance on 11/17/21    Blood pressure is Normal, 135/65 mmHg    BP Readings from Last 3 Encounters:   11/04/21 (!) 136/56   11/01/21 122/85   10/12/21 (!) 146/64        Pulse is Normal.    Pulse Readings from Last 3 Encounters:   11/04/21 62   11/01/21 67   10/12/21 58     Patient has type 2 diabetes mellitus  Weight is stable, morbid obesity per BMI BMI 42.93 kg/M2  Wt Readings from Last 3 Encounters:   11/03/21 235 lb (106.6 kg)   11/01/21 240 lb (108.9 kg)   10/12/21 240 lb 14.4 oz (109.3 kg)     Blood Glucose 105 this morning  Reports compliance with Metformin, slightly worsening diabetes but very well controlled  Lab Results   Component Value Date    LABA1C 5.7 10/20/2021    LABA1C 5.4 07/13/2021    LABA1C 5.5 02/12/2021       Urine incontinence, has urgency of urination, sometimes accidents if she does not make it on time. Also sneezing and coughing makes her have small accidents. Has been using oxybutynin which helps. Wears pads      The patient's past medical, surgical, social, and family history as well as her current medications and allergies were reviewed as documented in today's encounter. Prior to Visit Medications    Medication Sig Taking?  Authorizing Provider   albuterol sulfate HFA (PROVENTIL HFA) 108 (90 Base) MCG/ACT inhaler Inhale 1-2 puffs into the lungs every 4 hours as needed for Wheezing Yes Evgeny Zimmerman MD   guaiFENesin-dextromethorphan (ROBITUSSIN DM) 100-10 MG/5ML syrup Take 5 mLs by mouth 3 times daily as needed for Cough Yes Evgeny Zimmerman MD   oxybutynin (DITROPAN-XL) 10 MG extended release tablet TAKE 1 TABLET BY MOUTH ONCE DAILY  Yes Devon Cage MD   ondansetron (ZOFRAN) 4 MG tablet TAKE 1 TABLET BY MOUTH EVERY 8 HOURS AS NEEDED   Patient taking differently: Take 4 mg by mouth as needed  Yes Shirin Mcmahon MD   metoprolol tartrate (LOPRESSOR) 25 MG tablet Take 1 tablet by mouth 2 times daily Dose decreased 10/20/2021 due to bradycardia Yes Shirin Mcmahon MD   clotrimazole-betamethasone (LOTRISONE) 1-0.05 % cream Apply topically 1 times daily on the perineum, alternate powder, for 10 days Yes Shirin Mcmahon MD   nystatin (MYCOSTATIN) 774334 UNIT/GM powder Apply daily for 4-6 weeks Yes Shirin Mcmahon MD   mirabegron (MYRBETRIQ) 50 MG TB24 Take 50 mg by mouth daily Yes Shirin Mcmahon MD   amiodarone (CORDARONE) 200 MG tablet Take 1 tablet by mouth daily Yes Vance Verduzco PA-C lisinopril (PRINIVIL;ZESTRIL) 10 MG tablet Take 1 tablet by mouth daily Yes Domingo Pyle MD   ferrous sulfate (IRON 325) 325 (65 Fe) MG tablet Take 325 mg by mouth 2 times daily Yes Marni Lujan MD   pantoprazole (PROTONIX) 40 MG tablet TAKE 1 TABLET BY MOUTH ONCE DAILY  Yes Cheikh Watts MD   levothyroxine (SYNTHROID) 75 MCG tablet Take 1 tablet by mouth every morning (before breakfast) Yes Cheikh Watts MD   metFORMIN (GLUCOPHAGE) 500 MG tablet TAKE ONE TABLET BY MOUTH TWICE A DAY WITH A MEAL  Yes Cheikh Watts MD    MG capsule TAKE ONE (1) CAPSULE BY MOUTH TWO (2) TIMES DAILY FOR CONSTIPATION  Yes Cheikh Watts MD   vitamin D3 (CHOLECALCIFEROL) 25 MCG (1000 UT) TABS tablet TAKE 2 TABLETS BY MOUTH ONCE DAILY  Yes Cheikh Watts MD   Probiotic Product (LISSY-BID PROBIOTIC) TABS TAKE 1 TABLET BY MOUTH IN THE MORNING  Yes Cheikh Watts MD   vitamin B-12 (CYANOCOBALAMIN) 1000 MCG tablet TAKE ONE (1) TABLET BY MOUTH DAILY  Yes Cheikh Watts MD   atorvastatin (LIPITOR) 40 MG tablet TAKE ONE (1) TABLET BY MOUTH DAILY STOP SIMVASTATIN  Yes Cheikh Watts MD   XARELTO 20 MG TABS tablet TAKE ONE (1) TABLET BY MOUTH DAILY (WITH BREAKFAST)  Yes Cesar Arevalo MD   ACETAMINOPHEN EXTRA STRENGTH 500 MG tablet TAKE 1 TABLET BY MOUTH EVERY 6 HOURS AS NEEDED FOR PAIN  Yes Cheikh Watts MD   Blood Pressure Monitor KIT Use as directed. Yes MAX Barker CNP   glucose monitoring kit (FREESTYLE) monitoring kit Use as directed. BRAND OF CHOICE INSURANCE ALLOWS. Yes MAX Barker CNP   blood glucose monitor strips Test 2-3 times a day & as needed for symptoms of irregular blood glucose. BRAND OF CHOICE INSURANCE ALLOWS.  Yes MAX Barker CNP   Alcohol Swabs (ALCOHOL PREP) PADS Use as directed Yes MAX Barker CNP   Lancets MISC 1 each by Does not apply route 2 times daily Yes MAX Barker CNP fluconazole (DIFLUCAN) 150 MG tablet Take 1 tablet by mouth once a week  Oscar Stanley MD   amiodarone (CORDARONE) 200 MG tablet Take 1 tablet by mouth 2 times daily for 14 days  Leann Grey PA-C       -vital signs stable and within normal limits except morbid obesity per BMI, BMI 42.98 kg/M2, mild bradycardia  Patient-Reported Vitals 11/10/2021   Patient-Reported Weight 235 lbs   Patient-Reported Height 5'2\"   Patient-Reported Systolic 710   Patient-Reported Diastolic 75   Patient-Reported Pulse 53   Patient-Reported SpO2 97        Discussed testing with the patient and all questions fully answered. No orders of the defined types were placed in this encounter.   Mild anemia  Chronic kidney disease stage III slightly worsening  Mild increased triglycerides  Hyperglycemia  Otherwise labs within normal limits    Lab Results   Component Value Date    WBC 8.6 11/03/2021    HGB 11.5 (L) 11/03/2021    HCT 34.5 (L) 11/03/2021    MCV 91.0 11/03/2021     11/03/2021       Lab Results   Component Value Date     11/03/2021    K 4.0 11/03/2021    K 4.1 10/10/2021     11/03/2021    CO2 28 11/03/2021    BUN 18 11/03/2021    CREATININE 1.02 11/03/2021    GLUCOSE 125 11/03/2021    GLUCOSE 114 03/20/2012    CALCIUM 9.0 11/03/2021        Lab Results   Component Value Date    ALT 16 11/03/2021    AST 15 11/03/2021    ALKPHOS 68 11/03/2021    BILITOT 0.21 (L) 11/03/2021       Lab Results   Component Value Date    TSH 3.960 10/10/2021       Lab Results   Component Value Date    CHOL 174 10/20/2021    CHOL 144 11/06/2020    CHOL 145 01/28/2020     Lab Results   Component Value Date    TRIG 180 (H) 10/20/2021    TRIG 142 11/06/2020    TRIG 158 (H) 01/28/2020     Lab Results   Component Value Date    HDL 49 10/20/2021    HDL 46 11/06/2020    HDL 47 01/28/2020     Lab Results   Component Value Date    LDLCALC 70 11/06/2020    LDLCHOLESTEROL 89 10/20/2021    LDLCHOLESTEROL 66 01/28/2020    LDLCHOLESTEROL 105 provider was credentialed to provide care. An electronic signature was used to authenticate this note.   Electronically signed by Amairani Hung MD on 11/14/2021  at 10:36 PM

## 2021-11-16 DIAGNOSIS — G89.29 CHRONIC BILATERAL LOW BACK PAIN WITHOUT SCIATICA: ICD-10-CM

## 2021-11-16 DIAGNOSIS — M25.562 CHRONIC PAIN OF BOTH KNEES: ICD-10-CM

## 2021-11-16 DIAGNOSIS — G89.29 CHRONIC PAIN OF BOTH KNEES: ICD-10-CM

## 2021-11-16 DIAGNOSIS — M25.561 CHRONIC PAIN OF BOTH KNEES: ICD-10-CM

## 2021-11-16 DIAGNOSIS — M17.0 PRIMARY OSTEOARTHRITIS OF BOTH KNEES: ICD-10-CM

## 2021-11-16 DIAGNOSIS — M54.50 CHRONIC BILATERAL LOW BACK PAIN WITHOUT SCIATICA: ICD-10-CM

## 2021-11-16 RX ORDER — PSEUDOEPHED/ACETAMINOPH/DIPHEN 30MG-500MG
TABLET ORAL
Qty: 120 TABLET | Refills: 11 | Status: SHIPPED | OUTPATIENT
Start: 2021-11-16

## 2021-11-26 ENCOUNTER — TELEPHONE (OUTPATIENT)
Dept: FAMILY MEDICINE CLINIC | Age: 71
End: 2021-11-26

## 2021-11-26 DIAGNOSIS — I12.9 BENIGN HYPERTENSION WITH CKD (CHRONIC KIDNEY DISEASE) STAGE III (HCC): ICD-10-CM

## 2021-11-26 DIAGNOSIS — M79.10 MUSCLE PAIN: Primary | ICD-10-CM

## 2021-11-26 DIAGNOSIS — N18.30 BENIGN HYPERTENSION WITH CKD (CHRONIC KIDNEY DISEASE) STAGE III (HCC): ICD-10-CM

## 2021-11-26 RX ORDER — LIDOCAINE 40 MG/G
CREAM TOPICAL
Qty: 120 G | Refills: 3 | Status: SHIPPED | OUTPATIENT
Start: 2021-11-26 | End: 2022-03-22

## 2021-11-26 NOTE — TELEPHONE ENCOUNTER
Please let the patient know & [facility] living to do blood work to rule out electrolyte imbalance due to her muscle pain    Could also try lidocaine cream, I send it to the pharmacy  Orders Placed This Encounter   Procedures    Basic Metabolic Panel     Standing Status:   Future     Standing Expiration Date:   11/26/2022    Magnesium     Standing Status:   Future     Standing Expiration Date:   11/26/2022          Diagnosis Orders   1. Muscle pain  Basic Metabolic Panel    Magnesium    lidocaine (LMX) 4 % cream   2. Benign hypertension with CKD (chronic kidney disease) stage III [facility])  Basic Metabolic Panel    Magnesium      Future Appointments   Date Time Provider Dante Amaya   12/2/2021  9:30 AM [facility]   3/22/2022  8:30 AM [facility]   4/7/2022 10:30 AM Nitza Lee PA-C GYN Oncology [facility]   7/21/2022 10:45 [facility]       Thank you!

## 2021-11-26 NOTE — TELEPHONE ENCOUNTER
Lab Results   Component Value Date     11/03/2021    K 4.0 11/03/2021    K 4.1 10/10/2021     11/03/2021    CO2 28 11/03/2021    BUN 18 11/03/2021    CREATININE 1.02 11/03/2021    GLUCOSE 125 11/03/2021    GLUCOSE 114 03/20/2012    CALCIUM 9.0 11/03/2021

## 2021-11-26 NOTE — TELEPHONE ENCOUNTER
[facility] called in regarding patient. Patient c/o bilateral arm pain over the last few days. OTC Tylenol does help some. Pt had no c/o warmth to the touch, hardening, no fall/injury. Advised patient to keep talking tylenol can alternate with ibuprofen if allowed to take that. Watch for hardening and warmth. If pain increases to call and schedule appt or can utilize ED if any of symptoms above occur.

## 2021-11-29 ENCOUNTER — CLINICAL DOCUMENTATION (OUTPATIENT)
Dept: FAMILY MEDICINE CLINIC | Age: 71
End: 2021-11-29

## 2021-11-30 ENCOUNTER — TELEPHONE (OUTPATIENT)
Dept: FAMILY MEDICINE CLINIC | Age: 71
End: 2021-11-30

## 2021-11-30 NOTE — TELEPHONE ENCOUNTER
She has A fib heart rate might not be reliable    Needs to see cardiology, when does she have an appointment?     Future Appointments   Date Time Provider Dante Amaya   12/2/2021  9:30 AM Steven Colin MD [facility]   3/22/2022  8:30 AM Steven Colin MD [facility]   4/7/2022 10:30 AM Roma Restrepo PA-C GYN Oncology [facility]   7/21/2022 10:45 AM Skip Epstein MD [facility]

## 2021-12-01 DIAGNOSIS — R06.09 DOE (DYSPNEA ON EXERTION): Primary | ICD-10-CM

## 2021-12-01 DIAGNOSIS — K59.01 SLOW TRANSIT CONSTIPATION: ICD-10-CM

## 2021-12-01 RX ORDER — IBUPROFEN 800 MG/1
800 TABLET ORAL EVERY 8 HOURS PRN
Qty: 30 TABLET | Refills: 0 | OUTPATIENT
Start: 2021-12-01

## 2021-12-01 RX ORDER — DOCUSATE SODIUM 100 MG/1
CAPSULE, LIQUID FILLED ORAL
Qty: 180 CAPSULE | Refills: 3 | Status: SHIPPED | OUTPATIENT
Start: 2021-12-01 | End: 2022-10-06

## 2021-12-01 NOTE — TELEPHONE ENCOUNTER
Referral placed     Diagnosis Orders   1.  MARTINES (dyspnea on exertion)  Trevor Castleman, MD, Pulmonology, [place]        Future Appointments   Date Time Provider Dante Amaya   12/2/2021  9:30 AM Naseem Parrish MD [facility]   3/22/2022  8:30 AM Naseem Parrish MD [facility]   4/7/2022 10:30 AM Ana Casey PA-C GYN Oncology [facility]   7/21/2022 10:45 AM Agueda Villalba MD [facility]

## 2021-12-01 NOTE — TELEPHONE ENCOUNTER
----- Message from Mark Walls sent at 12/1/2021  9:39 AM EST -----  Subject: Message to Provider    QUESTIONS  Information for Provider? patient would like Genolax stool softner and   ibuprofen sent to [facility] pharmacy.   ---------------------------------------------------------------------------  --------------  Melissa COONEY  What is the best way for the office to contact you? OK to leave message on   voicemail  Preferred Call Back Phone Number? [phone]  ---------------------------------------------------------------------------  --------------  SCRIPT ANSWERS  Relationship to Patient?  Self

## 2021-12-01 NOTE — TELEPHONE ENCOUNTER
Spoke with pt and she states that she isn't seeing cardiology for a few months but reports that she is feeling ok today.

## 2021-12-01 NOTE — TELEPHONE ENCOUNTER
----- Message from Trevin Arce sent at 11/30/2021  4:47 PM EST -----  Subject: Message to Provider    QUESTIONS  Information for Provider? pt feels she is having some breathing   difficulties at night and is requesting to have oxygen ordered for her to   have in her home.  ---------------------------------------------------------------------------  --------------  CALL BACK INFO  What is the best way for the office to contact you? OK to leave message on   voicemail  Preferred Call Back Phone Number? [phone]  ---------------------------------------------------------------------------  --------------  SCRIPT ANSWERS  Relationship to Patient?  Self

## 2021-12-01 NOTE — TELEPHONE ENCOUNTER
I will refer her to pulmonologist, last oxygen was normal, she might need a nocturnal evaluation  Please give her contact information for pulmonologist I will place referral now    SpO2 Readings from Last 4 Encounters:   11/04/21 97%   10/12/21 96%   10/07/21 97%   07/21/21 97%     Future Appointments   Date Time Provider Dante Amaya   12/2/2021  9:30 AM Mickey Essex, MD [facility]   3/22/2022  8:30 AM Mickey Essex, MD [facility]   4/7/2022 10:30 AM Estanislado Wendy, PA-C GYN Oncology [facility]   7/21/2022 10:45 AM Elizabeth Quintero MD [facility]       Patient Care Team:  Mickey Essex, MD as PCP - General (Family Medicine)  Mickey Essex, MD as PCP - [facility] Provider  Troy Velázquez MD as Referring Physician (Orthopedic Surgery)  Justina Miles MD as Surgeon (Orthopedic Surgery)  Israel Morales MD as Consulting Physician (Endocrinology)  Cha Tracy DO as Consulting Physician (Obstetrics & Gynecology)  Gretchen Denson MD as Consulting Physician (Urology)  Anjali Cabrera MD as Consulting Physician (Otolaryngology)  Viky Loyd MD as Consulting Physician (Obstetrics & Gynecology)  Rory Dunn DO as Consulting Physician (General Surgery)  Sanchez Chamberlain DO as Consulting Physician (Cardiology)  Dominic Wood MD as Surgeon (Ophthalmology)  Elizabeth Quintero MD as Consulting Physician (Dermatology)

## 2021-12-01 NOTE — TELEPHONE ENCOUNTER
Still to see pulm as referred and seems she scheduled    Future Appointments   Date Time Provider Dante Monique   12/2/2021  9:30 AM Amira Bermudez MD [facility]   1/26/2022  1:15 PM Vikas Figueroa MD [facility] [place]   3/22/2022  8:30 AM Amira Bermudez MD [facility]   4/7/2022 10:30 AM JEANNE Alcala-C GYN Oncology [place]   7/21/2022 10:45 AM Ziyad Guzman MD [facility]

## 2021-12-02 ENCOUNTER — TELEMEDICINE (OUTPATIENT)
Dept: FAMILY MEDICINE CLINIC | Age: 71
End: 2021-12-02
Payer: MEDICARE

## 2021-12-02 ENCOUNTER — TELEPHONE (OUTPATIENT)
Dept: FAMILY MEDICINE CLINIC | Age: 71
End: 2021-12-02

## 2021-12-02 DIAGNOSIS — R26.2 DIFFICULTY WALKING: ICD-10-CM

## 2021-12-02 DIAGNOSIS — N18.30 BENIGN HYPERTENSION WITH CKD (CHRONIC KIDNEY DISEASE) STAGE III (HCC): Primary | ICD-10-CM

## 2021-12-02 DIAGNOSIS — M25.561 CHRONIC PAIN OF BOTH KNEES: ICD-10-CM

## 2021-12-02 DIAGNOSIS — E66.01 OBESITY, CLASS III, BMI 40-49.9 (MORBID OBESITY) (HCC): ICD-10-CM

## 2021-12-02 DIAGNOSIS — E03.9 ACQUIRED HYPOTHYROIDISM: ICD-10-CM

## 2021-12-02 DIAGNOSIS — I12.9 BENIGN HYPERTENSION WITH CKD (CHRONIC KIDNEY DISEASE) STAGE III (HCC): Primary | ICD-10-CM

## 2021-12-02 DIAGNOSIS — I50.32 CHF NYHA CLASS I, CHRONIC, DIASTOLIC (HCC): ICD-10-CM

## 2021-12-02 DIAGNOSIS — M25.562 CHRONIC PAIN OF BOTH KNEES: ICD-10-CM

## 2021-12-02 DIAGNOSIS — I48.0 PAROXYSMAL ATRIAL FIBRILLATION (HCC): ICD-10-CM

## 2021-12-02 DIAGNOSIS — R00.1 BRADYCARDIA: ICD-10-CM

## 2021-12-02 DIAGNOSIS — G89.29 CHRONIC PAIN OF BOTH KNEES: ICD-10-CM

## 2021-12-02 PROCEDURE — 99443 PR PHYS/QHP TELEPHONE EVALUATION 21-30 MIN: CPT | Performed by: FAMILY MEDICINE

## 2021-12-02 NOTE — PROGRESS NOTES
[address] Phone      Send Link Via Text    No text has been sent  Last text sent2021 9:49 AM  To:[phone]  Email      Send Link Via Email    No email has been sent  Last email sent2021 9:49 AM  To:[email or web address]      LV 9:50 AM           Kiko Obando contacted provider via telephone. Patient requested office visit, was scheduled for virtual visit phone call , Tamiko Levine was unable to use doxy. me or MyChart. Tamiko Levine is a 73 y.o. female evaluated via telephone on  21    Tamiko Levine (:  1950) is a 73 y.o. female,Established patient, here for evaluation of the following chief complaint(s): Bradycardia (48), Other (teeth dark), and Atrial Fibrillation      ASSESSMENT/PLAN:    1. Benign hypertension with CKD (chronic kidney disease) stage III (HCC)  Stable chronic kidney disease stage III, GFR 54 on 11/3/2021, was 52 on 10/12/2021  Well-controlled hypertension per report from home visiting nurse, I do not have a reading  Recheck labs  Discussed low salt diet and BP and pulse monitoring. Her home visiting nurse has been checking her vitals, her home visiting nurse is mostly concerned about the heart rate being 48 recently  To continue current medications metoprolol 25 mg twice a day, lisinopril 10 mg daily  -     Basic Metabolic Panel; Future  -     Magnesium; Future  -     CBC; Future  -     TSH without Reflex; Future  2.  Paroxysmal atrial fibrillation (HCC)  Stable  To continue current medications, metoprolol 25 mg twice a day, amiodarone 200 mg daily  Strongly advised to make an appointment with cardiologist    Patient was advised to call the cardiologist and to get an appointment within 1 week she will need to have an EKG done, to continue the metoprolol 25 mg twice a day and amiodarone 200 mg daily, medications reviewed with patient today, she has to continue same medications, would not be able to cut down metoprolol 25 mg in one half, she has her self-tracking handout given in paper form or via [employer]? Yes  All patient questions answered. Patient voiced understanding. The patient's past medical,surgical, social, and family history as well as her current medications and allergies were reviewed as documented in today's encounter. Medications, labs, diagnostic studies, consultations and follow-up as documented in this encounter. Return for KEEP APPT. Please call pharmacy to stop ferrous sulfate  Please call her home care nurse if she can do blood work at home in 1 week, nonfasting      Future Appointments   Date Time Provider Dante Amaya   1/26/2022  1:15 PM Hemant Palmer MD [facility] [place] [address] [facility]   3/22/2022  8:30 AM [facility]   4/7/2022 10:30 AM Ivonne Concepcion PA-C G[facility] [address] [facility]   7/21/2022 10:45 AM Monique Calvert MD  derm [facility]       Consent:  She is aware that that she may receive a bill for this telephone service, depending on her insurance coverage, and has provided verbal consent to proceed: Yes      Documentation and HPI:  I communicated with the patient about: Bradycardia (48), Other (teeth dark), and Atrial Fibrillation       Sung Slava reports:  She is concerned that her heart rate has been low, 48 per her home visiting nurse . Patient has known hypertension, chronic kidney disease stage III, paroxysmal atrial fibrillation, congestive heart failure. Doesn't know her meds but has them pill packed  Doesn't know how much amiodarone, per records she was to take amiodarone 200 mg twice a day for 14 days and then continue with 200 mg only once a day  She is also on metoprolol 25 mg BID  Advised to call cardiology  Denies chest pain , shortness of breath, lightheadedness, palpitations. Patient reports occasional bilateral leg swelling only when keeping her legs down.   Lab Results   Component Value Date    LVEF 55 10/11/2021     last heart rate showed very mild bradycardia l,  Pulse Readings from Last 3 Encounters:   11/04/21 62   11/01/21 67   10/12/21 58     blood pressure fluctuating but well controlled in general per most recent readings    BP Readings from Last 3 Encounters:   11/04/21 (!) 136/56   11/01/21 122/85   10/12/21 (!) 146/64         Patient continues to have chronic knee pain, and difficulty ambulating. She says they started home PT, but then discharged her again  Patient reports she still has knee pain, difficulty ambulating, walking with walker, it is very hard for her to go out of the house, she would like to do more physical therapy, but insurance would not cover. She denies any falls but has been very careful. She hasn't been doing her home exercises  Still has home visiting nurse. I strongly advised her to start doing the home exercises which the physical therapist left for her to do. Patient  reports she did use some ibuprofen for knee pain and Lidocaine prn which has helped, I advised against refill, advised to take Tylenol extra strength, I explained to her ibuprofen can trigger congestive heart failure and worsening leg swelling. Hypothyroidism: Recent symptoms: fatigue and edema. She denies weight gain, weight loss, cold intolerance, heat intolerance, hair loss, dry skin, constipation, diarrhea, anxiety, tremor, palpitations and dysphagia. Patient is  taking her medication consistently on an empty stomach. TSH is Normal, but borderline high   No results found for: [facility]  Lab Results   Component Value Date    TSH 3.960 10/10/2021    TSH 3.67 07/13/2021    TSH 2.91 11/06/2020       The patient's past medical, surgical, social, and family history as well as her current medications and allergies were reviewed as documented in today's encounter. Prior to Visit Medications    Medication Sig Taking?  Authorizing Provider   docusate sodium (DOK) 100 MG capsule TAKE ONE (1) CAPSULE BY MOUTH TWO (2) TIMES DAILY FOR CONSTIPATION Yes Geronimo Rey MD   lidocaine (LMX) 4 % cream 5 g topical 2-3 times a day as needed for pain, maximum 20 g/day Yes Radha Vallecillo MD   ACETAMINOPHEN EXTRA STRENGTH 500 MG tablet TAKE 1 TABLET BY MOUTH EVERY 6 HOURS AS NEEDED FOR PAIN Yes Celina Chavez MD   ondansetron (ZOFRAN) 4 MG tablet TAKE ONE (1) TABLET BY MOUTH EVERY 8 HOURS AS NEEDED Yes Celina Chavez MD   albuterol sulfate HFA (PROVENTIL HFA) 108 (90 Base) MCG/ACT inhaler Inhale 1-2 puffs into the lungs every 4 hours as needed for Wheezing Yes Abril Brock MD   oxybutynin (DITROPAN-XL) 10 MG extended release tablet TAKE 1 TABLET BY MOUTH ONCE DAILY  Yes Zabrina Raymundo MD   metoprolol tartrate (LOPRESSOR) 25 MG tablet Take 1 tablet by mouth 2 times daily Dose decreased 10/20/2021 due to bradycardia Yes Celina Chavez MD   clotrimazole-betamethasone (LOTRISONE) 1-0.05 % cream Apply topically 1 times daily on the perineum, alternate powder, for 10 days Yes Celina Chavez MD   fluconazole (DIFLUCAN) 150 MG tablet Take 1 tablet by mouth once a week Yes Celina Chavez MD   nystatin (MYCOSTATIN) 134822 UNIT/GM powder Apply daily for 4-6 weeks Yes Celina Chavez MD   mirabegron (MYRBETRIQ) 50 MG TB24 Take 50 mg by mouth daily Yes Celina Chavez MD   amiodarone (CORDARONE) 200 MG tablet Take 1 tablet by mouth daily Yes Clarissa Elizalde PA-C   lisinopril (PRINIVIL;ZESTRIL) 10 MG tablet Take 1 tablet by mouth daily Yes Constance Gaston MD   ferrous sulfate (IRON 325) 325 (65 Fe) MG tablet Take 325 mg by mouth 2 times daily Yes Marni Lujan MD   pantoprazole (PROTONIX) 40 MG tablet TAKE 1 TABLET BY MOUTH ONCE DAILY  Yes Celina Chavez MD   levothyroxine (SYNTHROID) 75 MCG tablet Take 1 tablet by mouth every morning (before breakfast) Yes Celina Chavez MD   metFORMIN (GLUCOPHAGE) 500 MG tablet TAKE ONE TABLET BY MOUTH TWICE A DAY WITH A MEAL  Yes Celina Chavez MD   vitamin D3 (CHOLECALCIFEROL) 25 MCG (1000 UT) TABS tablet TAKE 2 TABLETS BY MOUTH ONCE DAILY  Yes Elvis Stout MD   Probiotic Product (LISSY-BID PROBIOTIC) TABS TAKE 1 TABLET BY MOUTH IN THE MORNING  Yes Elvis Stout MD   vitamin B-12 (CYANOCOBALAMIN) 1000 MCG tablet TAKE ONE (1) TABLET BY MOUTH DAILY  Yes Elvis Stout MD   atorvastatin (LIPITOR) 40 MG tablet TAKE ONE (1) TABLET BY MOUTH DAILY STOP SIMVASTATIN  Yes Elvis Stout MD   XARELTO 20 MG TABS tablet TAKE ONE (1) TABLET BY MOUTH DAILY (WITH BREAKFAST)  Yes Ze Walsh MD   Blood Pressure Monitor KIT Use as directed. Yes MAX Barker CNP   glucose monitoring kit (FREESTYLE) monitoring kit Use as directed. BR[employer] CHO[employer] INSURANCE ALLOWS. Yes MAX Barker CNP   blood glucose monitor strips Test 2-3 times a day & as needed for symptoms of irregular blood glucose. BR[employer] INSURANCE ALLOWS. Yes MAX Barker CNP   Alcohol Swabs (ALCOHOL PREP) PADS Use as directed Yes MAX Barker CNP   Lancets MISC 1 each by Does not apply route 2 times daily Yes MAX Barker CNP       -vital signs stable and within normal limits except morbid obesity per BMI, BMI 42.98 kg/m2  Patient-Reported Vitals 12/5/2021   Patient-Reported Weight 235 lbs   Patient-Reported Height 5 ft 2 in   Patient-Reported Systolic -   Patient-Reported Diastolic -   Patient-Reported Pulse 48 per home care nurse   Patient-Reported SpO2 -        Most recent labs reviewed with the patient and all questions fully answered. Mild anemia, currently taking iron, but her teeth are turning dark, will stop iron and recheck CBC.   Prior ferritin level increased    chronic kidney disease stage III stable  mild hyperglycemia well controlled  high triglycerides    Otherwise labs within normal limits          Lab Results   Component Value Date    WBC 8.6 11/03/2021    HGB 11.5 (L) 11/03/2021    HCT 34.5 (L) 11/03/2021    MCV 91.0 11/03/2021     11/03/2021       Lab Results   Component Value Date     11/03/2021    K 4.0 11/03/2021    K 4.1 10/10/2021     11/03/2021    CO2 28 11/03/2021    BUN 18 11/03/2021    CREATININE 1.02 11/03/2021    GLUCOSE 125 11/03/2021    GLUCOSE 114 03/20/2012    CALCIUM 9.0 11/03/2021        Lab Results   Component Value Date    ALT 16 11/03/2021    AST 15 11/03/2021    ALKPHOS 68 11/03/2021    BILITOT 0.21 (L) 11/03/2021       Lab Results   Component Value Date    TSH 3.960 10/10/2021       Lab Results   Component Value Date    CHOL 174 10/20/2021    CHOL 144 11/06/2020    CHOL 145 01/28/2020     Lab Results   Component Value Date    TRIG 180 (H) 10/20/2021    TRIG 142 11/06/2020    TRIG 158 (H) 01/28/2020     Lab Results   Component Value Date    HDL 49 10/20/2021    HDL 46 11/06/2020    HDL 47 01/28/2020     Lab Results   Component Value Date    LDLCALC 70 11/06/2020    LDLCHOLESTEROL 89 10/20/2021    LDLCHOLESTEROL 66 01/28/2020    LDLCHOLESTEROL 105 11/14/2018       Lab Results   Component Value Date    CHOLHDLRATIO 3.6 10/20/2021    CHOLHDLRATIO 3.1 11/06/2020    CHOLHDLRATIO 3.1 01/28/2020       Lab Results   Component Value Date    LABA1C 5.7 10/20/2021       Lab Results   Component Value Date    CDWVIILQ21 1311 (H) 07/13/2021       Lab Results   Component Value Date    FOLATE 8.6 07/13/2021       Lab Results   Component Value Date    FERRITIN 219 (H) 08/09/2020       Lab Results   Component Value Date    VITD25 31.4 01/09/2020         No orders of the defined types were placed in this encounter.       Orders Placed This Encounter   Procedures    Basic Metabolic Panel     Standing Status:   Future     Standing Expiration Date:   12/2/2022    Brain Natriuretic Peptide     Standing Status:   Future     Standing Expiration Date:   12/2/2022    Magnesium     Standing Status:   Future     Standing Expiration Date:   12/2/2022    CBC     Standing Status:   Future     Standing Expiration Date:   12/2/2022    T4, Free     Standing Status:   Future     Standing Expiration Date:   12/2/2022    TSH without Reflex     Standing Status:   Future     Standing Expiration Date:   12/2/2022       Medications Discontinued During This Encounter   Medication Reason    amiodarone (CORDARONE) 200 MG tablet DOSE ADJUSTMENT    fluconazole (DIFLUCAN) 150 MG tablet Therapy completed    ferrous sulfate (IRON 325) 325 (65 Fe) MG tablet Stop Taking at Discharge       I affirm this is a Patient Initiated Episode with an Established Patient who has not had a related appointment within my department in the past 7 days or scheduled within the next 24 hours. Patient location's was at home, and provider's location was at the primary practice location. Patient identification was verified at the start of the visit: Yes    On this date 12/2/2021 I have spent 30 minutes reviewing previous notes, test results and face to face with the patient discussing the diagnosis and importance of compliance with the treatment plan as well as documenting on the day of the visit. The patient (or guardian if applicable) is aware that this is a billable service. Verbal consent to proceed has been obtained within the past 12 months. The visit was conducted pursuant to the emergency declaration under the [facility], [address] [place] [address] authority and the [employer] and [employer] Act. The patient was located in a state where the provider was credentialed to provide care. An electronic signature was used to authenticate this note.   Electronically signed by Dari Tirado MD on 12/5/2021  at 6:24 PM

## 2021-12-02 NOTE — TELEPHONE ENCOUNTER
Spoke with Nathalia Freedman and canceled the Ferrous Sulfate. Also, spoke with Replaced by [facility] and informed them that I would be faxing over labs to have done in a week.

## 2021-12-05 PROBLEM — I48.91 ATRIAL FIBRILLATION WITH RAPID VENTRICULAR RESPONSE (HCC): Status: RESOLVED | Noted: 2021-10-10 | Resolved: 2021-12-05

## 2021-12-05 PROBLEM — D64.9 ANEMIA: Status: RESOLVED | Noted: 2020-11-09 | Resolved: 2021-12-05

## 2021-12-18 ENCOUNTER — APPOINTMENT (OUTPATIENT)
Dept: GENERAL RADIOLOGY | Age: 71
End: 2021-12-18
Payer: MEDICARE

## 2021-12-18 ENCOUNTER — HOSPITAL ENCOUNTER (EMERGENCY)
Age: 71
Discharge: HOME | End: 2021-12-18
Attending: EMERGENCY MEDICINE
Payer: MEDICARE

## 2021-12-18 ENCOUNTER — VIRTUAL VISIT (OUTPATIENT)
Dept: FAMILY MEDICINE CLINIC | Age: 71
End: 2021-12-18
Payer: MEDICARE

## 2021-12-18 VITALS
SYSTOLIC BLOOD PRESSURE: 134 MMHG | DIASTOLIC BLOOD PRESSURE: 82 MMHG | TEMPERATURE: 97.5 F | HEIGHT: 62 IN | BODY MASS INDEX: 43.24 KG/M2 | HEART RATE: 55 BPM | OXYGEN SATURATION: 97 % | WEIGHT: 235 LBS | RESPIRATION RATE: 17 BRPM

## 2021-12-18 DIAGNOSIS — N39.0 URINARY TRACT INFECTION IN FEMALE: ICD-10-CM

## 2021-12-18 DIAGNOSIS — M25.552 ACUTE HIP PAIN, LEFT: Primary | ICD-10-CM

## 2021-12-18 DIAGNOSIS — Z74.09 MOBILITY IMPAIRED: ICD-10-CM

## 2021-12-18 DIAGNOSIS — M25.552 LEFT HIP PAIN: Primary | ICD-10-CM

## 2021-12-18 DIAGNOSIS — M25.562 ACUTE PAIN OF LEFT KNEE: ICD-10-CM

## 2021-12-18 LAB
-: ABNORMAL
AMORPHOUS: ABNORMAL
BACTERIA: ABNORMAL
BILIRUBIN URINE: NEGATIVE
CASTS UA: ABNORMAL /LPF
COLOR: YELLOW
COMMENT UA: ABNORMAL
CRYSTALS, UA: ABNORMAL /HPF
EPITHELIAL CELLS UA: ABNORMAL /HPF
GLUCOSE URINE: NEGATIVE
KETONES, URINE: NEGATIVE
LEUKOCYTE ESTERASE, URINE: ABNORMAL
MUCUS: ABNORMAL
NITRITE, URINE: NEGATIVE
OTHER OBSERVATIONS UA: ABNORMAL
PH UA: 6.5 (ref 5–8)
PROTEIN UA: NEGATIVE
RBC UA: ABNORMAL /HPF
RENAL EPITHELIAL, UA: ABNORMAL /HPF
SPECIFIC GRAVITY UA: 1.01 (ref 1–1.03)
TRICHOMONAS: ABNORMAL
TURBIDITY: ABNORMAL
URINE HGB: ABNORMAL
UROBILINOGEN, URINE: NORMAL
WBC UA: ABNORMAL /HPF
YEAST: ABNORMAL

## 2021-12-18 PROCEDURE — 6370000000 HC RX 637 (ALT 250 FOR IP): Performed by: EMERGENCY MEDICINE

## 2021-12-18 PROCEDURE — 99285 EMERGENCY DEPT VISIT HI MDM: CPT

## 2021-12-18 PROCEDURE — 81001 URINALYSIS AUTO W/SCOPE: CPT

## 2021-12-18 PROCEDURE — 73502 X-RAY EXAM HIP UNI 2-3 VIEWS: CPT

## 2021-12-18 PROCEDURE — 87086 URINE CULTURE/COLONY COUNT: CPT

## 2021-12-18 PROCEDURE — 99442 PR PHYS/QHP TELEPHONE EVALUATION 11-20 MIN: CPT | Performed by: FAMILY MEDICINE

## 2021-12-18 RX ORDER — LIDOCAINE 4 G/G
1 PATCH TOPICAL ONCE
Status: DISCONTINUED | OUTPATIENT
Start: 2021-12-18 | End: 2021-12-18 | Stop reason: HOSPADM

## 2021-12-18 RX ORDER — ACETAMINOPHEN 500 MG
1000 TABLET ORAL ONCE
Status: COMPLETED | OUTPATIENT
Start: 2021-12-18 | End: 2021-12-18

## 2021-12-18 RX ORDER — LIDOCAINE 50 MG/G
1 PATCH TOPICAL DAILY
Qty: 7 PATCH | Refills: 0 | Status: SHIPPED | OUTPATIENT
Start: 2021-12-18 | End: 2021-12-18 | Stop reason: SDUPTHER

## 2021-12-18 RX ORDER — NITROFURANTOIN 25; 75 MG/1; MG/1
100 CAPSULE ORAL ONCE
Status: COMPLETED | OUTPATIENT
Start: 2021-12-18 | End: 2021-12-18

## 2021-12-18 RX ORDER — LIDOCAINE 50 MG/G
1 PATCH TOPICAL DAILY
Qty: 7 PATCH | Refills: 0 | Status: SHIPPED | OUTPATIENT
Start: 2021-12-18 | End: 2022-01-10

## 2021-12-18 RX ORDER — NITROFURANTOIN 25; 75 MG/1; MG/1
100 CAPSULE ORAL 2 TIMES DAILY
Qty: 14 CAPSULE | Refills: 0 | Status: SHIPPED | OUTPATIENT
Start: 2021-12-18 | End: 2021-12-18 | Stop reason: SDUPTHER

## 2021-12-18 RX ORDER — NITROFURANTOIN 25; 75 MG/1; MG/1
100 CAPSULE ORAL 2 TIMES DAILY
Qty: 14 CAPSULE | Refills: 0 | Status: SHIPPED | OUTPATIENT
Start: 2021-12-18 | End: 2021-12-25

## 2021-12-18 RX ADMIN — NITROFURANTOIN MONOHYDRATE/MACROCRYSTALLINE 100 MG: 25; 75 CAPSULE ORAL at 09:25

## 2021-12-18 RX ADMIN — ACETAMINOPHEN 1000 MG: 500 TABLET, FILM COATED ORAL at 08:26

## 2021-12-18 ASSESSMENT — ENCOUNTER SYMPTOMS
COUGH: 0
CONSTIPATION: 0
BACK PAIN: 0
SHORTNESS OF BREATH: 0
VOMITING: 0
NAUSEA: 0
TROUBLE SWALLOWING: 0
BLOOD IN STOOL: 0
ABDOMINAL PAIN: 0
DIARRHEA: 0
COLOR CHANGE: 0
SORE THROAT: 0

## 2021-12-18 ASSESSMENT — PAIN SCALES - GENERAL
PAINLEVEL_OUTOF10: 4
PAINLEVEL_OUTOF10: 3

## 2021-12-18 NOTE — PROGRESS NOTES
[facility] at [address] [address]   O: [phone]  Q:[phone]    Rosa Serrano is a 73 y.o. female evaluated via telephone on 12/18/2021. CONSENT:  She and/or health care decision maker is aware that that she may receive a bill for this telephone service, depending on her insurance coverage, and has provided verbal consent to proceed: Yes    DOCUMENTATION:  Patient scheduled this appointment today due to No chief complaint on file. Arleth Serrano is a 73 y.o. female patient Have been complaining of pain on left hip and left knee for a few days     She denies any trauma, incident prior to the incident. She notes the the pain and difficulty with mobility had been worsening in the oast few days. In fact she woke up at 4 this morning not able to flex her hips and knee well. She is concerned about her trip to the hospital since we discussed an XR order. DEPRESSION SCREENING: Negative  PHQ Scores 7/13/2021 2/12/2021 1/13/2021 1/9/2020 3/8/2019 4/20/2018 2/23/2017   PHQ2 Score 0 0 0 0 0 0 0   PHQ9 Score 0 0 0 0 0 0 0     I communicated with the patient and/or health care decision maker about: PLAN     Review of Systems   Constitutional: Positive for activity change. Negative for chills and fever. Cardiovascular: Negative for leg swelling. Musculoskeletal: Positive for arthralgias, gait problem, joint swelling and myalgias. Psychiatric/Behavioral: Positive for sleep disturbance. The patient is nervous/anxious. Details of this discussion including any medical advice provided: Under assessment.     PHYSICAL EXAM[INSTRUCTIONS:  \"[x]\" Indicates a positive item  \"[]\" Indicates a negative item  -- DELETE ALL ITEMS NOT EXAMINED]  Patient-Reported Vitals 12/5/2021   Patient-Reported Weight 235 lbs   Patient-Reported Height 5 ft 2 in   Patient-Reported Systolic -   Patient-Reported Diastolic -   Patient-Reported Pulse 48 per home care nurse Patient-Reported SpO2 -     Constitutional: [x] No apparent distress      [] Abnormal -   Mental status: [x] Alert and awake  [x] Oriented to person/place/time[] Abnormal -   Pulmonary/Chest: [x] Respiratory effort normal         [] Abnormal -          Psychiatric:       [x] Normal Affect [] Abnormal -        [x] No Hallucinations  Other pertinent observable physical exam findings:-moderately anxious  Controlled Substance Monitoring:  Acute and Chronic Pain Monitoring:   RX Monitoring 10/30/2020   Attestation -   Periodic Controlled Substance Monitoring No signs of potential drug abuse or diversion identified. ASSESSMENT  1. Acute hip pain, left  Worsening  Continue to evaluate  Unable to have nsaids  Will consider medrol pack depending on xr    - XR HIP 2-3 VW W PELVIS LEFT; Future    2. Acute pain of left knee  Worsening  Continue to evaluate  Unable to have nsaids  Will consider medrol pack depending on xr    - XR KNEE LEFT (3 VIEWS); Future    3. Mobility impaired  Worsening  Continue to evaluate  Unable to have nsaids  Will consider medrol pack depending on xr      Total Time: minutes: 11-20 minutes  I affirm this is a Patient Initiated Episode with a Patient who has not had a related appointment within my department in the past 7 days or scheduled within the next 24 hours.   Patient identification was verified at the start of the visit: Yes  Note: not billable if this call serves to triage the patient into an appointment for the relevant concern  Patient-Reported Vitals 12/5/2021   Patient-Reported Weight 235 lbs   Patient-Reported Height 5 ft 2 in   Patient-Reported Systolic -   Patient-Reported Diastolic -   Patient-Reported Pulse 48 per home care nurse   Patient-Reported SpO2 -     Patient Active Problem List   Diagnosis    Acquired hypothyroidism    History of TIA (transient ischemic attack)    Chronic bilateral low back pain without sciatica    Benign hypertension with CKD (chronic kidney disease) stage III ([facility]    Osteopenia determined by x-ray    Osteoarthritis involving multiple joints on both sides of body    Left knee DJD    Vitamin D deficiency    Mixed incontinence    Chronic pain of both knees    Slow transit constipation    Allergic rhinitis    Hyperlipidemia with target LDL less than 100    Obesity, Class III, BMI 40-49.9 (morbid obesity) (HCC)    At high risk for falls    Spondylosis of lumbar region without myelopathy or radiculopathy    OAB (overactive bladder)    Primary osteoarthritis of both knees    Adenomatous polyp of sigmoid colon, 6/26/17    TLHBSO, bilateral LND 10/24/17    Optic atrophy of both eyes    Cataracta b/l eyes    Difficulty walking    Esotropia    History of uterine cancer, Well differentiated grade 1 adenocarcinoma arising in complex hyperplasia, 2017    Vitamin B 12 deficiency    Atherosclerosis of aorta (HCC)    Calculus of gallbladder without cholecystitis without obstruction    CLAROS (nonalcoholic steatohepatitis)    Paroxysmal atrial fibrillation (HCC)    Type 2 diabetes mellitus, without long-term current use of insulin (HCC)    Mobility impaired    Chronic cholecystitis    Multiple atypical skin moles    Class 3 severe obesity due to excess calories without serious comorbidity with body mass index (BMI) of 40.0 to 44.9 in [facility])    Bradycardia    CHF NYHA class I, chronic, diastolic (HCC)    History of 2019 novel coronavirus disease (COVID-19)    Abdominal aortic aneurysm (AAA) without rupture ([facility]     Inez Stewart is a 73 y.o. female patient  being evaluated by a Virtual Visit (video visit) encounter to address concerns as mentioned above. A caregiver was present when appropriate. Due to this being a TeleHealth encounter (During [facility] public health emergency), evaluation of the following organ systems was limited:Vitals/Constitutional/EENT/Resp/CV/GI//MS/Neuro/Skin/Heme-Lymph-Imm.   Services were provided through a video synchronous discussion virtually to substitute for in-person clinic visit. This is a telehealth visit that was performed with the originating site at Patient Location: home and provider Location of [place], [place]. Pursuant to the emergency declaration under the [facility], [address] [place] [address] authority and the [employer] Act, this Virtual Visit was conducted with patient's (and/or legal guardian's) consent, to reduce the patient's risk of exposure to COVID-19 and provide necessary medical care. The patient (and/or legal guardian) has also been advised to contact this office for worsening conditions or problems, and seek emergency medical treatment and/or call 911 if deemed necessary. This note was completed by using the assistance of a speech-recognition program. However, inadvertent computerized transcription errors may be present. Although every effort was made to ensure accuracy, no guarantees can be provided that every mistake has been identified and corrected by editing.   Electronically signed by MAX Rivera CNP on 12/18/21 at 7:03 AM EST

## 2021-12-18 NOTE — ED NOTES
Bed: 10  Expected date:   Expected time:   Means of arrival:   Comments:  [place], RN  12/18/21 [id number]

## 2021-12-18 NOTE — ED PROVIDER NOTES
[address]  EMERGENCY DEPARTMENT ENCOUNTER    Pt Name: Rosa Serrano  MRN: [id number]  YOB: 1950  Date of evaluation:12/18/21  PCP: Mayela Franks MD    [address] [place] [address]       Chief Complaint   Patient presents with    Leg Pain       HISTORY OF PRESENT ILLNESS    Rosa Serrano is a 73 y.o. female who presents with a history of left hip pain. Patient states that her hip has been hurting her on the left side for past 3 to 4 days. No fall or injury. Describes pain as sharp. No pain in her lower back. No numbness, tingling, weakness. Pain does radiate down the leg. No difficulty with bowel or bladder habits. No fevers or chills. Symptoms are acute. Symptoms are moderate. Patient has no other complaints at this time. REVIEW OF SYSTEMS       Review of Systems   Constitutional: Negative for chills, fatigue and fever. HENT: Negative for congestion, ear pain, sore throat and trouble swallowing. Eyes: Negative for visual disturbance. Respiratory: Negative for cough and shortness of breath. Cardiovascular: Negative for chest pain, palpitations and leg swelling. Gastrointestinal: Negative for abdominal pain, blood in stool, constipation, diarrhea, nausea and vomiting. Genitourinary: Negative for dysuria and flank pain. Musculoskeletal: Positive for arthralgias. Negative for back pain, myalgias and neck pain. Skin: Negative for color change, rash and wound. Neurological: Negative for dizziness, weakness, light-headedness, numbness and headaches. Psychiatric/Behavioral: Negative for confusion. All other systems reviewed and are negative. Negative in 10 essential Systems except as mentioned above and in the HPI.         PAST MEDICAL HISTORY     Past Medical History:   Diagnosis Date    Abdominal aortic aneurysm (AAA) without rupture ([address] 10/21/2021    Adenocarcinoma of endometrium, stage 1 ([address] 10/5/2017    Well differentiated grade 1 adenocarcinoma arising in complex hyperplasia    JOSHUA (acute kidney injury) ([address] 1/15/2020    Allergic rhinitis 2/23/2017    Anemia 11/9/2020    At high risk for falls 2/23/2017    Atrial fibrillation [facility])     Jan 2020    Atrial fibrillation with rapid ventricular response ([address] 10/10/2021    Atrial fibrillation, new onset ([address] 1/20/2020    CHF (congestive heart failure) ([address]     \"little\"    Colitis 7/22/2020    Colon polyp     Had colonoscopy done 20 yrs ago    Diabetes mellitus ([address]     Diverticulitis     Diverticulitis of colon with perforation 8/4/2020    Endometrial cancer ([address]     History of TIA (transient ischemic attack) '80's    on Baby ASA    Hyperglycemia 3/21/2017    Hyperlipidemia     Hypertension since 2015    on Rx. 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    Oral phase dysphagia 2/23/2018    Osteoarthritis (arthritis due to wear and tear of joints)     ronan knee    Osteoarthritis involving multiple joints on both sides of body 4/24/2012    Osteoporosis     Perforated bowel ([address]     Perforated diverticulum 6/15/2020    Postmenopausal bleeding 9/20/2017    Rectal bleeding 9/21/2020    S/P h-scope, Myosure 9/20/17 9/20/2017    Pathology pending    Tennis elbow     left    Thickened endometrium 9/20/2017    TIA (transient ischemic attack)     TLHBSO, bilateral LND 10/24/17 10/24/2017    Type 2 diabetes mellitus, without long-term current use of insulin ([address] 1/14/2020         SURGICAL HISTORY      has a past surgical history that includes Thyroid surgery (1980); Colonoscopy (1997);  Tonsillectomy; Colonoscopy (06/26/2017); pr colsc flx w/removal lesion by hot bx forceps (N/A, 6/26/2017); Dilation and curettage of uterus (N/A, 9/20/2017); Cataract removal with implant (Bilateral, 2015); vitrectomy; julieta and bso (cervix removed) (10/24/2017); Hysterectomy (N/A, 10/24/2017); Upper gastrointestinal endoscopy (N/A, 8/3/2020); Colonoscopy (N/A, 8/3/2020); and Small intestine surgery (N/A, 8/4/2020). CURRENT MEDICATIONS       Previous Medications    ACETAMINOPHEN EXTRA STRENGTH 500 MG TABLET    TAKE 1 TABLET BY MOUTH EVERY 6 HOURS AS NEEDED FOR PAIN    ALBUTEROL SULFATE HFA (PROVENTIL HFA) 108 (90 BASE) MCG/ACT INHALER    Inhale 1-2 puffs into the lungs every 4 hours as needed for Wheezing    ALCOHOL SWABS (ALCOHOL PREP) PADS    Use as directed    AMIODARONE (CORDARONE) 200 MG TABLET    Take 1 tablet by mouth daily    ATORVASTATIN (LIPITOR) 40 MG TABLET    TAKE ONE (1) TABLET BY MOUTH DAILY STOP SIMVASTATIN     BLOOD GLUCOSE MONITOR STRIPS    Test 2-3 times a day & as needed for symptoms of irregular blood glucose. BRAND OF CHOICE INSURANCE ALLOWS.    BLOOD PRESSURE MONITOR KIT    Use as directed. CLOTRIMAZOLE-BETAMETHASONE (LOTRISONE) 1-0.05 % CREAM    Apply topically 1 times daily on the perineum, alternate powder, for 10 days    DOCUSATE SODIUM (DOK) 100 MG CAPSULE    TAKE ONE (1) CAPSULE BY MOUTH TWO (2) TIMES DAILY FOR CONSTIPATION    GLUCOSE MONITORING KIT (FREESTYLE) MONITORING KIT    Use as directed. BRAND OF CHOICE INSURANCE ALLOWS.     LANCETS MISC    1 each by Does not apply route 2 times daily    LEVOTHYROXINE (SYNTHROID) 75 MCG TABLET    Take 1 tablet by mouth every morning (before breakfast)    LIDOCAINE (LMX) 4 % CREAM    5 g topical 2-3 times a day as needed for pain, maximum 20 g/day    LISINOPRIL (PRINIVIL;ZESTRIL) 10 MG TABLET    Take 1 tablet by mouth daily    METFORMIN (GLUCOPHAGE) 500 MG TABLET    TAKE ONE TABLET BY MOUTH TWICE A DAY WITH A MEAL     METOPROLOL TARTRATE (LOPRESSOR) 25 MG TABLET    Take 1 tablet by mouth 2 times daily Dose decreased 10/20/2021 due to bradycardia    MIRABEGRON (MYRBETRIQ) 50 MG TB24    Take 50 mg by mouth daily    NYSTATIN (MYCOSTATIN) 836693 UNIT/GM POWDER    Apply daily for 4-6 weeks    ONDANSETRON (ZOFRAN) 4 MG TABLET    TAKE ONE (1) TABLET BY MOUTH EVERY 8 HOURS AS NEEDED    OXYBUTYNIN (DITROPAN-XL) 10 MG EXTENDED RELEASE TABLET    TAKE 1 TABLET BY MOUTH ONCE DAILY     PANTOPRAZOLE (PROTONIX) 40 MG TABLET    TAKE 1 TABLET BY MOUTH ONCE DAILY     PROBIOTIC PRODUCT (LISSY-BID PROBIOTIC) TABS    TAKE 1 TABLET BY MOUTH IN THE MORNING     VITAMIN B-12 (CYANOCOBALAMIN) 1000 MCG TABLET    TAKE ONE (1) TABLET BY MOUTH DAILY     VITAMIN D3 (CHOLECALCIFEROL) 25 MCG (1000 UT) TABS TABLET    TAKE 2 TABLETS BY MOUTH ONCE DAILY     XARELTO 20 MG TABS TABLET    TAKE ONE (1) TABLET BY MOUTH DAILY (WITH BREAKFAST)        ALLERGIES     is allergic to sulfa antibiotics and penicillins. FAMILY HISTORY     She indicated that her mother is . She indicated that her father is . She indicated that her sister is alive. She indicated that her brother is alive. She indicated that her maternal grandmother is . She indicated that her maternal grandfather is . She indicated that her paternal grandmother is . She indicated that her paternal grandfather is . She indicated that her daughter is alive. She indicated that the status of her neg hx is unknown.     family history includes Coronary Art Dis in her father; Diabetes in her father, maternal grandfather, and mother; Heart Attack in her father and paternal grandfather; High Blood Pressure in her brother, father, maternal grandmother, mother, and sister; Hypertension in her father; No Known Problems in her paternal grandmother; Thyroid Disease in her brother and mother. SOCIAL HISTORY      reports that she has never smoked.  She has never used smokeless tobacco. She reports that she does not drink alcohol and does not use drugs. PHYSICAL EXAM     INITIAL VITALS:  height is 5' 2\" (1.575 m) and weight is 235 lb (106.6 kg). Her oral temperature is 97.5 °F (36.4 °C). Her blood pressure is 134/82 and her pulse is 55. Her respiration is 17 and oxygen saturation is 97%. Physical Exam  Vitals and nursing note reviewed. Constitutional:       General: She is not in acute distress. HENT:      Head: Normocephalic and atraumatic. Eyes:      Conjunctiva/sclera: Conjunctivae normal.      Pupils: Pupils are equal, round, and reactive to light. Cardiovascular:      Rate and Rhythm: Normal rate and regular rhythm. Heart sounds: Normal heart sounds. No murmur heard. Pulmonary:      Effort: Pulmonary effort is normal. No respiratory distress. Breath sounds: Normal breath sounds. Abdominal:      General: Bowel sounds are normal. There is no distension. Palpations: Abdomen is soft. Tenderness: There is no abdominal tenderness. Musculoskeletal:         General: No tenderness. Cervical back: Neck supple. Lumbar back: Normal. No bony tenderness. Left hip: No deformity or bony tenderness. Decreased range of motion. Lymphadenopathy:      Cervical: No cervical adenopathy. Skin:     General: Skin is warm and dry. Findings: No rash. Neurological:      Mental Status: She is alert and oriented to person, place, and time. Psychiatric:         Judgment: Judgment normal.           DIFFERENTIAL DIAGNOSIS/MDM:   53-year-old female presents with atraumatic left hip pain for the past 3 to 4 days. She is afebrile, nontoxic, hypertensive otherwise vital signs normal.  No acute distress. On exam she has decreased range of motion of the left hip however really does not have any tenderness. We will get her change, evaluate for any signs of infection however lower suspicion for this. She was able to ambulate to the bathroom. We will get a urinalysis, x-rays, treat pain.     DIAGNOSTIC RESULTS EKG: All EKG's are interpreted by the Emergency Department Physician who either signs or Co-signs this chart in the absence of a cardiologist.        RADIOLOGY:   I directly visualized the following  images and reviewed the radiologist interpretations:  XR HIP LEFT (2-3 VIEWS)   Final Result   No acute abnormality left hip. Mild degenerative changes. Osteopenia. ED BEDSIDE ULTRASOUND:      LABS:  Labs Reviewed   URINE RT REFLEX TO CULTURE - Abnormal; Notable for the following components:       Result Value    Turbidity UA Cloudy (*)     Urine Hgb SMALL (*)     Leukocyte Esterase, Urine MOD (*)     All other components within normal limits   MICROSCOPIC URINALYSIS - Abnormal; Notable for the following components:    Bacteria, UA FEW (*)     All other components within normal limits   CULTURE, URINE         EMERGENCY DEPARTMENT COURSE:   Vitals:    Vitals:    12/18/21 0750 12/18/21 0928   BP: (!) 181/51 134/82   Pulse: 55 55   Resp: 18 17   Temp: 97.5 °F (36.4 °C)    TempSrc: Oral    SpO2: 96% 97%   Weight: 235 lb (106.6 kg)    Height: 5' 2\" (1.575 m)      Skin examined with female chaperone. No evidence of skin breakdown, erythema, wounds, any other signs of infection. Very low suspicion for septic joint. X-ray shows degenerative changes. Likely the cause of her symptoms. Urinalysis shows evidence of UTI. Will treat with Tylenol, lidocaine patch. Will start on Macrobid for UTI. She is able to ambulate with a steady gait. Advised to follow-up with PCP, return if any symptoms worsen. Patient agreeable plan will be discharged home today. CRITICALCARE:      CONSULTS:  None      PROCEDURES:      FINAL IMPRESSION      1. Left hip pain    2.  Urinary tract infection in female            DISPOSITION/PLAN   DISPOSITION Decision To Discharge 12/18/2021 10:04:51 AM          PATIENT REFERRED TO:  Yadira Carlin MD  [address] [place] [id number]  [phone]    Schedule an appointment as soon as possible for a visit       [facility] ED  [address]  [phone]    As needed, If symptoms worsen      DISCHARGE MEDICATIONS:  New Prescriptions    LIDOCAINE (LIDODERM) 5 %    Place 1 patch onto the skin daily 12 hours on, 12 hours off. NITROFURANTOIN, MACROCRYSTAL-MONOHYDRATE, (MACROBID) 100 MG CAPSULE    Take 1 capsule by mouth 2 times daily for 14 doses       The care is provided during an unprecedented national emergency due to the novel coronavirus, COVID-19.     (Please note that portions ofthis note were completed with a voice recognition program.  Efforts were made to edit the dictations but occasionally words are mis-transcribed.)    Maddy Munson DO  Attending Emergency Physician          Maddy Munson DO  12/18/21 1007

## 2021-12-18 NOTE — PATIENT INSTRUCTIONS
Patient Education        Arthritis: Care Instructions  Overview     Arthritis, also called osteoarthritis, is a breakdown of the cartilage that cushions your joints. When the cartilage wears down, your bones rub against each other. This causes pain and stiffness. Many people have some arthritis as they age. Arthritis most often affects the joints of the spine, hands, hips, knees, or feet. Arthritis never goes away completely. But medicine and home treatment can help reduce pain and help you stay active. Follow-up care is a key part of your treatment and safety. Be sure to make and go to all appointments, and call your doctor if you are having problems. It's also a good idea to know your test results and keep a list of the medicines you take. How can you care for yourself at home? · Stay at a healthy weight. Being overweight puts extra strain on your joints. · Talk to your doctor or physical therapist about exercises that will help ease joint pain. ? Stretch. You may enjoy gentle forms of yoga to help keep your joints and muscles flexible. ? Walk instead of jog. Other types of exercise that are less stressful on the joints include riding a bike, swimming, osmel chi, or water exercise. ? Lift weights. Strong muscles help reduce stress on your joints. Stronger thigh muscles, for example, take some of the stress off of the knees and hips. Learn the right way to lift weights so you don't make joint pain worse. · Take your medicines exactly as prescribed. Call your doctor if you think you are having a problem with your medicine. · Take pain medicines exactly as directed. ? If the doctor gave you a prescription medicine for pain, take it as prescribed. ? If you are not taking a prescription pain medicine, ask your doctor if you can take an over-the-counter medicine. · Use a cane, crutch, walker, or another device if you need help to get around. These can help rest your joints.  You also can use other things to make life easier, such as a higher toilet seat and padded handles on kitchen utensils. · Do not sit in low chairs. They can make it hard to get up. · Put heat or cold on your sore joints as needed. Use whichever helps you most. You can also switch between hot and cold packs. ? Apply heat 2 or 3 times a day for 20 to 30 minutesusing a heating pad, hot shower, or hot packto relieve pain and stiffness. But don't use heat on a swollen joint. ? Put ice or a cold pack on your sore joint for 10 to 20 minutes at a time. Put a thin cloth between the ice and your skin. When should you call for help? Call your doctor now or seek immediate medical care if:    · You have sudden swelling, warmth, or pain in any joint.     · You have joint pain and a fever or rash.     · You have such bad pain that you cannot use a joint. Watch closely for changes in your health, and be sure to contact your doctor if:    · You have mild joint symptoms that continue even with more than 6 weeks of care at home.     · You have stomach pain or other problems with your medicine. Where can you learn more? Go to [email or web address] and sign in to your [employer] account. Enter M487 in the Ritani box to learn more about \"Arthritis: Care Instructions. \"     If you do not have an account, please click on the \"Sign Up Now\" link. Current as of: April 30, 2021               Content Version: 13.0  © 2006-2021 Healthwise, Incorporated. Care instructions adapted under license by [facility] ([facility]). If you have questions about a medical condition or this instruction, always ask your healthcare professional. [place] [address] any warranty or liability for your use of this information.

## 2021-12-19 LAB
CULTURE: NORMAL
Lab: NORMAL
SPECIMEN DESCRIPTION: NORMAL

## 2021-12-20 ENCOUNTER — TELEPHONE (OUTPATIENT)
Dept: FAMILY MEDICINE CLINIC | Age: 71
End: 2021-12-20

## 2021-12-20 DIAGNOSIS — E03.9 ACQUIRED HYPOTHYROIDISM: ICD-10-CM

## 2021-12-20 RX ORDER — LEVOTHYROXINE SODIUM 0.07 MG/1
TABLET ORAL
Qty: 90 TABLET | Refills: 3 | Status: SHIPPED | OUTPATIENT
Start: 2021-12-20 | End: 2022-08-09

## 2021-12-20 NOTE — TELEPHONE ENCOUNTER
[facility] ED Follow up Call     Reason for ED visit:  Left hip pain        12/20/2021         FU appts/Provider:    Future Appointments   Date Time Provider Dante Amaya   1/26/2022  1:15 PM Lowell Cotto MD [facility]   3/22/2022  8:30 AM Celina Chavez MD [facility]   4/14/2022 10:30 AM Spike Celeste PA-C [facility]   8/15/2022 11:30 AM Hai Pickett MD  derm [facility]         VOICEMAIL DOCUMENTATION - ERASE IF NOT USED  Hi, this message is for [facility]. This is [employer], [place] from [address] [place] [address] office. Just calling to see how you are doing after your recent visit to the Emergency Room. [address] [place] [address] wants to make sure you were able to fill any prescriptions and that you understand your discharge instructions. Please return our call if you need to make a follow up appointment with your provider or have any further needs. Our phone number is [phone]. Have a great day.

## 2021-12-29 DIAGNOSIS — N18.30 BENIGN HYPERTENSION WITH CKD (CHRONIC KIDNEY DISEASE) STAGE III (HCC): Primary | ICD-10-CM

## 2021-12-29 DIAGNOSIS — E78.5 HYPERLIPIDEMIA WITH TARGET LDL LESS THAN 100: ICD-10-CM

## 2021-12-29 DIAGNOSIS — I12.9 BENIGN HYPERTENSION WITH CKD (CHRONIC KIDNEY DISEASE) STAGE III (HCC): Primary | ICD-10-CM

## 2021-12-29 RX ORDER — LISINOPRIL 10 MG/1
TABLET ORAL
Qty: 90 TABLET | Refills: 3 | Status: ON HOLD | OUTPATIENT
Start: 2021-12-29 | End: 2022-09-03 | Stop reason: HOSPADM

## 2021-12-29 RX ORDER — ATORVASTATIN CALCIUM 40 MG/1
TABLET, FILM COATED ORAL
Qty: 90 TABLET | Refills: 3 | Status: SHIPPED | OUTPATIENT
Start: 2021-12-29

## 2021-12-29 NOTE — TELEPHONE ENCOUNTER
No orders of the defined types were placed in this encounter. No orders of the defined types were placed in this encounter.     Current Outpatient Medications on File Prior to Visit   Medication Sig Dispense Refill    levothyroxine (SYNTHROID) 75 MCG tablet TAKE ONE (1) TABLET BY MOUTH EVERY MORNING (BEFORE BREAKFAST) 90 tablet 3    lidocaine (LIDODERM) 5 % Place 1 patch onto the skin daily 12 hours on, 12 hours off. 7 patch 0    docusate sodium (DOK) 100 MG capsule TAKE ONE (1) CAPSULE BY MOUTH TWO (2) TIMES DAILY FOR CONSTIPATION 180 capsule 3    lidocaine (LMX) 4 % cream 5 g topical 2-3 times a day as needed for pain, maximum 20 g/day 120 g 3    ACETAMINOPHEN EXTRA STRENGTH 500 MG tablet TAKE 1 TABLET BY MOUTH EVERY 6 HOURS AS NEEDED FOR PAIN 120 tablet 11    ondansetron (ZOFRAN) 4 MG tablet TAKE ONE (1) TABLET BY MOUTH EVERY 8 HOURS AS NEEDED 30 tablet 0    albuterol sulfate HFA (PROVENTIL HFA) 108 (90 Base) MCG/ACT inhaler Inhale 1-2 puffs into the lungs every 4 hours as needed for Wheezing 18 g 0    oxybutynin (DITROPAN-XL) 10 MG extended release tablet TAKE 1 TABLET BY MOUTH ONCE DAILY  90 tablet 9    metoprolol tartrate (LOPRESSOR) 25 MG tablet Take 1 tablet by mouth 2 times daily Dose decreased 10/20/2021 due to bradycardia 60 tablet 3    clotrimazole-betamethasone (LOTRISONE) 1-0.05 % cream Apply topically 1 times daily on the perineum, alternate powder, for 10 days 45 g 0    nystatin (MYCOSTATIN) 351033 UNIT/GM powder Apply daily for 4-6 weeks 60 g 3    mirabegron (MYRBETRIQ) 50 MG TB24 Take 50 mg by mouth daily 90 tablet 3    amiodarone (CORDARONE) 200 MG tablet Take 1 tablet by mouth daily 30 tablet 3    lisinopril (PRINIVIL;ZESTRIL) 10 MG tablet Take 1 tablet by mouth daily 30 tablet 3    pantoprazole (PROTONIX) 40 MG tablet TAKE 1 TABLET BY MOUTH ONCE DAILY  30 tablet 9    metFORMIN (GLUCOPHAGE) 500 MG tablet TAKE ONE TABLET BY MOUTH TWICE A DAY WITH A MEAL  60 tablet 10    vitamin D3 (CHOLECALCIFEROL) 25 MCG (1000 UT) TABS tablet TAKE 2 TABLETS BY MOUTH ONCE DAILY  60 tablet 11    Probiotic Product (LISSY-BID PROBIOTIC) TABS TAKE 1 TABLET BY MOUTH IN THE MORNING  30 tablet 11    vitamin B-12 (CYANOCOBALAMIN) 1000 MCG tablet TAKE ONE (1) TABLET BY MOUTH DAILY  90 tablet 3    atorvastatin (LIPITOR) 40 MG tablet TAKE ONE (1) TABLET BY MOUTH DAILY STOP SIMVASTATIN  90 tablet 3    XARELTO 20 MG TABS tablet TAKE ONE (1) TABLET BY MOUTH DAILY (WITH BREAKFAST)  30 tablet 3    Blood Pressure Monitor KIT Use as directed. 1 kit 1    glucose monitoring kit (FREESTYLE) monitoring kit Use as directed. BRAND OF CHOICE INSURANCE ALLOWS. 1 kit 0    blood glucose monitor strips Test 2-3 times a day & as needed for symptoms of irregular blood glucose. BRAND OF CHOICE INSURANCE ALLOWS. 100 strip 11    Alcohol Swabs (ALCOHOL PREP) PADS Use as directed 100 each 11    Lancets MISC 1 each by Does not apply route 2 times daily 300 each 11     No current facility-administered medications on file prior to visit.

## 2021-12-29 NOTE — TELEPHONE ENCOUNTER
Please Approve or Refuse. Send to Pharmacy per Pt's Request:      Next Visit Date:  3/22/2022   Last Visit Date: 12/18/2021    Hemoglobin A1C (%)   Date Value   10/20/2021 5.7   07/13/2021 5.4   02/12/2021 5.5             ( goal A1C is < 7)   BP Readings from Last 3 Encounters:   12/18/21 134/82   11/04/21 (!) 136/56   11/01/21 122/85          (goal 120/80)  BUN   Date Value Ref Range Status   11/03/2021 18 8 - 23 mg/dL Final     CREATININE   Date Value Ref Range Status   11/03/2021 1.02 (H) 0.50 - 0.90 mg/dL Final     Potassium   Date Value Ref Range Status   11/03/2021 4.0 3.7 - 5.3 mmol/L Final     Potassium reflex Magnesium   Date Value Ref Range Status   10/10/2021 4.1 3.5 - 5.2 meq/L Final     Comment:     Low level specimen hemolysis is present as indicated by the interference  level index on the Roche analyzer. The reported K+ level may be falsely  increased. If clinically warranted, recollection of the specimen is  suggested.

## 2022-01-03 RX ORDER — ONDANSETRON 4 MG/1
TABLET, FILM COATED ORAL
Qty: 30 TABLET | Refills: 0 | Status: SHIPPED | OUTPATIENT
Start: 2022-01-03 | End: 2022-09-08 | Stop reason: ALTCHOICE

## 2022-01-08 DIAGNOSIS — M47.816 SPONDYLOSIS OF LUMBAR REGION WITHOUT MYELOPATHY OR RADICULOPATHY: ICD-10-CM

## 2022-01-08 DIAGNOSIS — M15.9 OSTEOARTHRITIS INVOLVING MULTIPLE JOINTS ON BOTH SIDES OF BODY: Primary | ICD-10-CM

## 2022-01-08 DIAGNOSIS — M54.50 CHRONIC BILATERAL LOW BACK PAIN WITHOUT SCIATICA: ICD-10-CM

## 2022-01-08 DIAGNOSIS — M17.12 PRIMARY OSTEOARTHRITIS OF LEFT KNEE: ICD-10-CM

## 2022-01-08 DIAGNOSIS — G89.29 CHRONIC BILATERAL LOW BACK PAIN WITHOUT SCIATICA: ICD-10-CM

## 2022-01-10 ENCOUNTER — OFFICE VISIT (OUTPATIENT)
Dept: ORTHOPEDIC SURGERY | Age: 72
End: 2022-01-10
Payer: MEDICARE

## 2022-01-10 VITALS — BODY MASS INDEX: 43.24 KG/M2 | HEIGHT: 62 IN | WEIGHT: 235 LBS

## 2022-01-10 DIAGNOSIS — M17.12 PRIMARY OSTEOARTHRITIS OF LEFT KNEE: Primary | ICD-10-CM

## 2022-01-10 PROCEDURE — 20610 DRAIN/INJ JOINT/BURSA W/O US: CPT | Performed by: ORTHOPAEDIC SURGERY

## 2022-01-10 RX ORDER — LIDOCAINE 50 MG/G
PATCH TOPICAL
Qty: 30 PATCH | Refills: 5 | Status: SHIPPED | OUTPATIENT
Start: 2022-01-10 | End: 2022-03-22

## 2022-01-10 RX ORDER — LIDOCAINE HYDROCHLORIDE 10 MG/ML
4 INJECTION, SOLUTION INFILTRATION; PERINEURAL ONCE
Status: COMPLETED | OUTPATIENT
Start: 2022-01-10 | End: 2022-01-10

## 2022-01-10 RX ORDER — TRIAMCINOLONE ACETONIDE 40 MG/ML
40 INJECTION, SUSPENSION INTRA-ARTICULAR; INTRAMUSCULAR ONCE
Status: COMPLETED | OUTPATIENT
Start: 2022-01-10 | End: 2022-01-10

## 2022-01-10 RX ADMIN — LIDOCAINE HYDROCHLORIDE 4 ML: 10 INJECTION, SOLUTION INFILTRATION; PERINEURAL at 11:38

## 2022-01-10 RX ADMIN — TRIAMCINOLONE ACETONIDE 40 MG: 40 INJECTION, SUSPENSION INTRA-ARTICULAR; INTRAMUSCULAR at 11:36

## 2022-01-10 NOTE — PROGRESS NOTES
HPI: Ms. Madai Pedraza is a 74-year-old of seen in the past and previously diagnosed with bilateral knee osteoarthritis. She received a cortisone injection to the left knee back on 7/28/2020 and indicates that the injection worked very well for her lasting quite a while. She presents today with recurrent pain that is gradually getting worse and requests injection to both of her knees. She does have underlying diabetes. I had a discussion with the patient today about this and recommended proceeding with staged cortisone injections to her knees. She was amenable and had an injection to the left knee administered as outlined below. I will have her follow-up my clinic in a couple weeks for an injection to the right but she may return or call earlier with questions or concerns. Procedure: left intraarticular knee injection  Following an appropriate discussion with the patient regarding the risks and benefits of the procedure she consented to proceed. her left knee was prepped using chlorhexadine solution. Using aseptic technique and through a superolateral approach, her left knee joint was injected with a 5 cc mixture of 1cc 40mg/ml kenalog and 4 cc of 1% lidocaine without epinephrine. A band aid was applied to the injection site. she tolerated the injection with no immediate adverse reactions.

## 2022-01-22 ENCOUNTER — NURSE TRIAGE (OUTPATIENT)
Dept: OTHER | Facility: CLINIC | Age: 72
End: 2022-01-22

## 2022-01-22 NOTE — TELEPHONE ENCOUNTER
[facility] [address] [facility] with [employer]. Subjective: Caller states \"This started around 4 am, it's a mild abdominal pain. One year ago, I had bowel surgery. I just want to make sure everything is okay. \"     Current Symptoms:   Left sided abdominal pain  Urine has \"slight odor and is slightly darker color\"  Nausea in the morning (takes Zofran) - denies vomiting  Denies shortness of breath or chest pain     Last BM:  1/20/22 - pt had bowel surgery one year ago - takes colace scheduled BID - denies feelings of constipation     Onset: a few hours ago; improving    Associated Symptoms: NA    Pain Severity: 3/10; aching; constant    Temperature: Denies fever     What has been tried: Colace    LMP: NA Pregnant: NA    Recommended disposition: SEE HCP WITHIN 4 HOURS (OR PCP TRIAGE): Pt refused dispo - despite reviewing risks of delaying care - continues to refuse - Warm transfer to [facility] to either page on-call provider and/or schedule appointment. Pt advised she should be seen within next four hours - reviewed she can call 911 for EMS transport if she is unable to drive herself. Pt declined. Care advice provided, patient verbalizes understanding; denies any other questions or concerns; instructed to call back for any new or worsening symptoms. Writer provided warm transfer to [facility]  at [facility] for appointment scheduling     Attention Provider: Thank you for allowing me to participate in the care of your patient. The patient was connected to triage in response to information provided to the ECC/PSC. Please do not respond through this encounter as the response is not directed to a shared pool.     Reason for Disposition   [1] MILD-MODERATE pain AND [2] constant AND [3] present > 2 hours    Protocols used: ABDOMINAL PAIN - [facility] - ARMANDO LAUGHLIN

## 2022-01-26 ENCOUNTER — VIRTUAL VISIT (OUTPATIENT)
Dept: PULMONOLOGY | Age: 72
End: 2022-01-26
Payer: MEDICARE

## 2022-01-26 DIAGNOSIS — E66.01 OBESITY, CLASS III, BMI 40-49.9 (MORBID OBESITY) (HCC): ICD-10-CM

## 2022-01-26 DIAGNOSIS — G47.33 OBSTRUCTIVE SLEEP APNEA SYNDROME: Primary | ICD-10-CM

## 2022-01-26 PROCEDURE — G8484 FLU IMMUNIZE NO ADMIN: HCPCS | Performed by: INTERNAL MEDICINE

## 2022-01-26 PROCEDURE — G8399 PT W/DXA RESULTS DOCUMENT: HCPCS | Performed by: INTERNAL MEDICINE

## 2022-01-26 PROCEDURE — 4040F PNEUMOC VAC/ADMIN/RCVD: CPT | Performed by: INTERNAL MEDICINE

## 2022-01-26 PROCEDURE — 99204 OFFICE O/P NEW MOD 45 MIN: CPT | Performed by: INTERNAL MEDICINE

## 2022-01-26 PROCEDURE — G8417 CALC BMI ABV UP PARAM F/U: HCPCS | Performed by: INTERNAL MEDICINE

## 2022-01-26 PROCEDURE — 1123F ACP DISCUSS/DSCN MKR DOCD: CPT | Performed by: INTERNAL MEDICINE

## 2022-01-26 PROCEDURE — 1036F TOBACCO NON-USER: CPT | Performed by: INTERNAL MEDICINE

## 2022-01-26 PROCEDURE — 3017F COLORECTAL CA SCREEN DOC REV: CPT | Performed by: INTERNAL MEDICINE

## 2022-01-26 PROCEDURE — G8427 DOCREV CUR MEDS BY ELIG CLIN: HCPCS | Performed by: INTERNAL MEDICINE

## 2022-01-26 PROCEDURE — 1090F PRES/ABSN URINE INCON ASSESS: CPT | Performed by: INTERNAL MEDICINE

## 2022-01-26 ASSESSMENT — SLEEP AND FATIGUE QUESTIONNAIRES
HOW LIKELY ARE YOU TO NOD OFF OR FALL ASLEEP WHILE SITTING INACTIVE IN A PUBLIC PLACE: 2
HOW LIKELY ARE YOU TO NOD OFF OR FALL ASLEEP WHILE SITTING AND READING: 2
HOW LIKELY ARE YOU TO NOD OFF OR FALL ASLEEP WHILE SITTING QUIETLY AFTER LUNCH WITHOUT ALCOHOL: 2
ESS TOTAL SCORE: 14
HOW LIKELY ARE YOU TO NOD OFF OR FALL ASLEEP IN A CAR, WHILE STOPPED FOR A FEW MINUTES IN TRAFFIC: 0
HOW LIKELY ARE YOU TO NOD OFF OR FALL ASLEEP WHILE WATCHING TV: 2
HOW LIKELY ARE YOU TO NOD OFF OR FALL ASLEEP WHILE LYING DOWN TO REST IN THE AFTERNOON WHEN CIRCUMSTANCES PERMIT: 2
HOW LIKELY ARE YOU TO NOD OFF OR FALL ASLEEP WHILE SITTING AND TALKING TO SOMEONE: 2
HOW LIKELY ARE YOU TO NOD OFF OR FALL ASLEEP WHEN YOU ARE A PASSENGER IN A CAR FOR AN HOUR WITHOUT A BREAK: 2

## 2022-01-26 NOTE — PROGRESS NOTES
2022    TELEHEALTH EVALUATION -- Audio/Visual (During BKWSE-91 public health emergency)    Patient and physician are located in their individual locations. This is visit is completed via Quick Key application []/ Doxy. me[] / Telephone []     HPI:    Vimal Jaramillo (:  1950) has requested an audio/video evaluation for the following concern(s): This patient has been a non-smoker all her life. She is 81year-old overweight individual she weighs 235 pounds her height is 5 feet 2 inches. She has been asked to be seen because of the fact that she is tired all day and she thinks her oxygen level is low and she might need oxygen. As noted above she has never smoked. She has no clinical features suggestive of COPD never had asthma. She had minimal cough. He been told that she is she is sometimes out of breath which she has in fact has been causing her fatigue and tiredness. She never have had any hemoptysis never had tuberculosis no occupational exposure of any significance. Nobody else in the family has COPD or asthma. There are no clinical features to suggest any alpha 1 antitrypsin deficiency. She has not noted any pedal edema or thromboembolic process. She has not been hospitalized frequently because of shortness of breath or have had any significant recurrent pneumonias. Patient is known to have systemic hypertension that is under good control she also history of diabetes no history of coronary artery disease or stroke. She has hypothyroid state and is on replacement therapy. She been told in the past that she have had heart failure although there is no clinical evidence at this time no PND no angina. She is known to have atrial fibrillation since . She is on anticoagulation no bleeding. The mentioned above she is tired and very sleepy during the daytime she will take frequent naps during the day daytime she will fall asleep while watching TV or sitting or resting.   He does not drive.    She has no symptoms of cataplexy sleep paralysis or any hallucination no evidence suggestive of periodic leg movements. 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 She has she is functional with the present vision although is significantly decreased. At one time she was considered to be legally blind. No trauma no headaches in the morning does not have to go to the bathroom frequently. She takes her antihypertensive therapy and anticoagulation on a regular basis she is also takes other medication for cardiovascular status and thyroid replacement therapy. Review of Systems review of the system was conducted for all other 10 system other than what is noted above or no other additional information was obtained. Medications were reviewed. Prior to Visit Medications    Medication Sig Taking? Authorizing Provider   lidocaine (LIDODERM) 5 % PLACE ONE (1) PATCH ONTO THE SKIN DAILY 12 HOURS ON, 12 HOURS OFF.  Yes Elinor Benites MD   ondansetron (ZOFRAN) 4 MG tablet TAKE ONE (1) TABLET BY MOUTH EVERY 8 HOURS AS NEEDED Yes Elinor Benites MD   atorvastatin (LIPITOR) 40 MG tablet TAKE ONE (1) TABLET BY MOUTH DAILY STOP SIMVASTATIN Yes Elinor Benites MD   lisinopril (PRINIVIL;ZESTRIL) 10 MG tablet TAKE 1 TABLET BY MOUTH ONCE DAILY Yes Elinor Benites MD   levothyroxine (SYNTHROID) 75 MCG tablet TAKE ONE (1) TABLET BY MOUTH EVERY MORNING (BEFORE BREAKFAST) Yes Elinor Benites MD   docusate sodium (DOK) 100 MG capsule TAKE ONE (1) CAPSULE BY MOUTH TWO (2) TIMES DAILY FOR CONSTIPATION Yes Elinor Benites MD   lidocaine (LMX) 4 % cream 5 g topical 2-3 times a day as needed for pain, maximum 20 g/day Yes Elinor Benites MD   ACETAMINOPHEN EXTRA STRENGTH 500 MG tablet TAKE 1 TABLET BY MOUTH EVERY 6 HOURS AS NEEDED FOR PAIN Yes Elinor Benites MD   albuterol sulfate HFA (PROVENTIL HFA) 108 (90 Base) MCG/ACT inhaler Inhale 1-2 puffs into the lungs every 4 hours as needed for Wheezing Yes Eddie Lehman MD   oxybutynin (DITROPAN-XL) 10 MG extended release tablet TAKE 1 TABLET BY MOUTH ONCE DAILY  Yes Emily Swartz MD   metoprolol tartrate (LOPRESSOR) 25 MG tablet Take 1 tablet by mouth 2 times daily Dose decreased 10/20/2021 due to bradycardia Yes Javad Lyn MD   clotrimazole-betamethasone (LOTRISONE) 1-0.05 % cream Apply topically 1 times daily on the perineum, alternate powder, for 10 days Yes Javad Lyn MD   nystatin (MYCOSTATIN) 119425 UNIT/GM powder Apply daily for 4-6 weeks Yes Javad Lyn MD   mirabegron (MYRBETRIQ) 50 MG TB24 Take 50 mg by mouth daily Yes Javad Lyn MD   amiodarone (CORDARONE) 200 MG tablet Take 1 tablet by mouth daily Yes Ashtyn Gary PA-C   pantoprazole (PROTONIX) 40 MG tablet TAKE 1 TABLET BY MOUTH ONCE DAILY  Yes Javad Lyn MD   metFORMIN (GLUCOPHAGE) 500 MG tablet TAKE ONE TABLET BY MOUTH TWICE A DAY WITH A MEAL  Yes Javad Lyn MD   vitamin D3 (CHOLECALCIFEROL) 25 MCG (1000 UT) TABS tablet TAKE 2 TABLETS BY MOUTH ONCE DAILY  Yes Javad Lyn MD   Probiotic Product (LISSY-BID PROBIOTIC) TABS TAKE 1 TABLET BY MOUTH IN THE MORNING  Yes Javad Lyn MD   vitamin B-12 (CYANOCOBALAMIN) 1000 MCG tablet TAKE ONE (1) TABLET BY MOUTH DAILY  Yes Javad Lyn MD   XARELTO 20 MG TABS tablet TAKE ONE (1) TABLET BY MOUTH DAILY (WITH BREAKFAST)  Yes Diego Escalona MD   Blood Pressure Monitor KIT Use as directed. Yes MAX Barker CNP   glucose monitoring kit (FREESTYLE) monitoring kit Use as directed. BRAND OF CHOICE INSURANCE ALLOWS. Yes MAX Barker CNP   blood glucose monitor strips Test 2-3 times a day & as needed for symptoms of irregular blood glucose. BRAND OF CHOICE INSURANCE ALLOWS.  Yes MAX Barker CNP   Alcohol Swabs (ALCOHOL PREP) PADS Use as directed Yes MAX Barker CNP   Lancets MISC 1 each by Does not apply route 2 times daily Yes MAX Barker - CNP       Social History     Tobacco Use    Smoking status: Never Smoker    Smokeless tobacco: Never Used   Vaping Use    Vaping Use: Never used   Substance Use Topics    Alcohol use: No     Alcohol/week: 0.0 standard drinks    Drug use: No            RECORD REVIEW: Previous medical records were reviewed at today's visit. Wt Readings from Last 3 Encounters:   01/10/22 235 lb (106.6 kg)   12/18/21 235 lb (106.6 kg)   11/03/21 235 lb (106.6 kg)       Results for orders placed or performed during the hospital encounter of 12/18/21   Culture, Urine    Specimen: Urine, clean catch   Result Value Ref Range    Specimen Description . CLEAN CATCH URINE     Special Requests NOT REPORTED     Culture NO SIGNIFICANT GROWTH    Urinalysis Reflex to Culture    Specimen: Urine, clean catch   Result Value Ref Range    Color, UA Yellow Yellow    Turbidity UA Cloudy (A) Clear    Glucose, Ur NEGATIVE NEGATIVE    Bilirubin Urine NEGATIVE NEGATIVE    Ketones, Urine NEGATIVE NEGATIVE    Specific Gravity, UA 1.009 1.000 - 1.030    Urine Hgb SMALL (A) NEGATIVE    pH, UA 6.5 5.0 - 8.0    Protein, UA NEGATIVE NEGATIVE    Urobilinogen, Urine Normal Normal    Nitrite, Urine NEGATIVE NEGATIVE    Leukocyte Esterase, Urine MOD (A) NEGATIVE    Urinalysis Comments NOT REPORTED    Microscopic Urinalysis   Result Value Ref Range    -          WBC, UA 50  /HPF    RBC, UA 0 TO 2 /HPF    Casts UA NOT REPORTED /LPF    Crystals, UA NOT REPORTED None /HPF    Epithelial Cells UA 2 TO 5 /HPF    Renal Epithelial, UA NOT REPORTED 0 /HPF    Bacteria, UA FEW (A) None    Mucus, UA NOT REPORTED None    Trichomonas, UA NOT REPORTED None    Amorphous, UA NOT REPORTED None    Other Observations UA NOT REPORTED NOT REQ. Yeast, UA NOT REPORTED None       No results found.   Urinary analysis analysis report from December was suggestive of UTI which apparently has been treated. PHYSICAL EXAMINATION:  Due to this being a TeleHealth encounter, evaluation of the following organ systems is limited: Vitals/Constitutional/EENT/Resp/CV/GI//MS/Neuro/Skin/Heme-Lymph-Imm. Constitutional: [x] Appears well-developed and well-nourished. [x] Abnormal obese mental status  [x] Alert and awake  [x] Oriented to person/place/time [x]Able to follow commands    [x] No apparent distress      Eyes:  EOM    [x]  Normal  [] Abnormal-  Sclera  [x]  Normal  [] Abnormal -         Discharge [x]  None visible  [] Abnormal -    HENT:   [x] Normocephalic, atraumatic. [] Abnormal shaped head   [x] Mouth/Throat: Mucous membranes are moist.     Ears [x] Normal  [] Abnormal-    Neck: [x] Normal range of motion [x] Supple [x] No visualized mass. Pulmonary/Chest: [x] Respiratory effort normal.  [x] No visualized signs of difficulty breathing or respiratory distress        [] Abnormal      Musculoskeletal:   [x] Normal range of motion. [x] Normal gait with no signs of ataxia. [x]  No signs of cyanosis of the peripheral portions of extremities. [] Abnormal       Neurological:        [x] Normal cranial nerve (limited exam to video visit) [x] No focal weakness observed       [] Abnormal          Speech       [x] Normal   [] Abnormal     Skin:        [x] No rash on visible skin  [x] Normal  [] Abnormal     Psychiatric:       [x] Normal  [] Abnormal        [x] Normal Mood  [] Anxious appearing        Other Pertinent Exam Findings: She does not have any recent chest x-rays. Chest x-ray from November revealed that there is cardiomegaly. No pleural effusions no masses were noted      ASSESSMENT:  Possible obstructive sleep apnea syndrome  Hypertension  Diabetes  Atrial fibrillation  History of heart failure  On anticoagulation  Visual defect since childhood              Plan:   1. I discussed the situation with the patient. I advised her to undergo a sleep study.   If the sleep study does confirm the diagnosis of sleep apnea we will treat her with CPAP or BiPAP. It is my impression that her daytime sleepiness and fatigue is very likely secondary to sleep apnea syndrome. She is obese. 2.   3. She does not drink any alcohol or take any hypnotic medications. 4.   5. Continue treatment of hypertension and diabetes as before  6.   7. He will continue anticoagulation. New pressure continue treatment for atrial fibrillation as well.  8.   9. I I am going to arrange for the sleep study at [facility] and will plan to see her follow-up after the sleep study has been performed. 10.   11. I encouraged the patient to lose weight if possible. 12.   13. Patient already has taken the Covid vaccine  14.   15. She already taken the flu vaccine  16.   17. Dictated by Dr. Ebony Diaz MD dictation over thank  18. An  electronic signature was used to authenticate this note. --Will Jimenez MD on 1/26/2022 at 1:18 PM    9}    Pursuant to the emergency declaration under the [address] [place] [address], [facility] waiver authority and the [employer] and [employer] Act, this Virtual  Visit was conducted, with patient's consent, to reduce the patient's risk of exposure to COVID-19 and provide continuity of care for an established patient.     Services were provided through a video synchronous discussion virtually to substitute for in-person clinic visit.     _______________________________________________________________________________________________________________________________________________  FOR TELEPHONE VISITS PLEASE COMPLETE THE FOLLOWING      Consent:  She and/or health care decision maker is aware that that she may receive a bill for this telephone service, depending on her insurance coverage, and has provided verbal consent to proceed: Yes      I affirm this is a Patient Initiated Episode with an Established Patient who has not had a related appointment within my department in the past 7 days or scheduled within the next 24 hours.     Total Time: 40 minutes    Note: not billable if this call serves to triage the patient into an appointment for the relevant concern

## 2022-01-28 ENCOUNTER — OFFICE VISIT (OUTPATIENT)
Dept: ORTHOPEDIC SURGERY | Age: 72
End: 2022-01-28
Payer: MEDICARE

## 2022-01-28 VITALS — WEIGHT: 235 LBS | HEIGHT: 62 IN | BODY MASS INDEX: 43.24 KG/M2

## 2022-01-28 DIAGNOSIS — M17.0 PRIMARY OSTEOARTHRITIS OF BOTH KNEES: Primary | ICD-10-CM

## 2022-01-28 PROCEDURE — 20610 DRAIN/INJ JOINT/BURSA W/O US: CPT | Performed by: ORTHOPAEDIC SURGERY

## 2022-01-28 PROCEDURE — 99024 POSTOP FOLLOW-UP VISIT: CPT | Performed by: ORTHOPAEDIC SURGERY

## 2022-01-28 RX ORDER — LIDOCAINE HYDROCHLORIDE 10 MG/ML
4 INJECTION, SOLUTION INFILTRATION; PERINEURAL ONCE
Status: COMPLETED | OUTPATIENT
Start: 2022-01-28 | End: 2022-01-28

## 2022-01-28 RX ORDER — TRIAMCINOLONE ACETONIDE 40 MG/ML
40 INJECTION, SUSPENSION INTRA-ARTICULAR; INTRAMUSCULAR ONCE
Status: COMPLETED | OUTPATIENT
Start: 2022-01-28 | End: 2022-01-28

## 2022-01-28 RX ADMIN — TRIAMCINOLONE ACETONIDE 40 MG: 40 INJECTION, SUSPENSION INTRA-ARTICULAR; INTRAMUSCULAR at 10:39

## 2022-01-28 RX ADMIN — LIDOCAINE HYDROCHLORIDE 4 ML: 10 INJECTION, SOLUTION INFILTRATION; PERINEURAL at 10:40

## 2022-01-31 ENCOUNTER — TELEPHONE (OUTPATIENT)
Dept: FAMILY MEDICINE CLINIC | Age: 72
End: 2022-01-31

## 2022-01-31 DIAGNOSIS — R82.90 BAD ODOR OF URINE: Primary | ICD-10-CM

## 2022-01-31 NOTE — TELEPHONE ENCOUNTER
----- Message from Iman Gonsalez sent at 1/29/2022 11:25 AM EST -----  Subject: Message to Provider    QUESTIONS  Information for Provider? Pt states that she noticed an odor when   urinating today. No pain or burning. She is requesting a Rx to be sent to   [facility] on [facility]. Please advise.  ---------------------------------------------------------------------------  --------------  CALL BACK INFO  What is the best way for the office to contact you? OK to leave message on   voicemail  Preferred Call Back Phone Number? [phone]  ---------------------------------------------------------------------------  --------------  SCRIPT ANSWERS  Relationship to Patient?  Self

## 2022-01-31 NOTE — TELEPHONE ENCOUNTER
Please advise patient I cannot send antibiotics, please have the urine test done first, we need to make sure she does need the antibiotic first     Diagnosis Orders   1.  Bad odor of urine  Urinalysis Reflex to Culture        Future Appointments   Date Time Provider Dante Monique   3/22/2022  8:30 AM Shameka Benites MD [facility]   4/14/2022 10:30 AM Hugh Paulino PA-C GYN Oncology [facility]   4/27/2022  2:15 PM Nathalia Victoria MD [facility]   8/15/2022 11:30 AM Mauricio Low MD [facility]

## 2022-02-02 ENCOUNTER — TELEPHONE (OUTPATIENT)
Dept: UROLOGY | Age: 72
End: 2022-02-02

## 2022-02-02 NOTE — TELEPHONE ENCOUNTER
Called patient to make sure that she was able to get sleep study completed . Patient stated she has the number and will call once the weather breaks to get celestine.

## 2022-02-02 NOTE — PROGRESS NOTES
HPI: Ms. Aurelio Suarez is a 81year old lady with bilateral knee osteoarthritis here for a right knee cortisone injection as previously scheduled. She received an injection to the left knee a few weeks ago and feels great. She received the injection to the right knee today as outlined below. I will see her back in my clinic as needed but she may return or call at anytime with questions or concerns. Procedure: right intraarticular knee injection  Following an appropriate discussion with the patient regarding the risks and benefits of the procedure she consented to proceed. her right knee was prepped using chlorhexadine solution. Using aseptic technique and through a superolateral approach, her right knee joint was injected with a 5 cc mixture of 1cc 40mg/ml kenalog and 4 cc of 1% lidocaine without epinephrine. A band aid was applied to the injection site. she tolerated the injection with no immediate adverse reactions.

## 2022-02-03 ENCOUNTER — TELEPHONE (OUTPATIENT)
Dept: FAMILY MEDICINE CLINIC | Age: 72
End: 2022-02-03

## 2022-02-03 NOTE — TELEPHONE ENCOUNTER
Her pharmacist associated with insurance is suggesting to stop Metformin due to her having congestive heart failure, and they are afraid this could exacerbate her congestive heart failure at times, that is why to avoid any exacerbation and admission related to congestive heart failure.   Document is in media

## 2022-02-03 NOTE — TELEPHONE ENCOUNTER
Received notification from pharmacist regarding Metformin, I do suggest her to stop Metformin at this time, her diabetes is very well controlled.   Please let the patient know and call the pharmacy to discontinue Metformin  Lab Results   Component Value Date    LABA1C 5.7 10/20/2021    LABA1C 5.4 07/13/2021    LABA1C 5.5 02/12/2021       Lab Results   Component Value Date    LVEF 55 10/11/2021       I received a second concern regarding Prozac and warfarin, patient is not on warfarin anymore she is on Xarelto, continue Xarelto    Lab Results   Component Value Date    INR 1.80 (H) 10/10/2021    INR 1.0 07/11/2020    INR 1.3 06/17/2020    PROTIME 13.1 07/11/2020    PROTIME 16.2 (H) 06/17/2020    PROTIME 15.9 (H) 06/16/2020

## 2022-02-07 ENCOUNTER — TELEPHONE (OUTPATIENT)
Dept: FAMILY MEDICINE CLINIC | Age: 72
End: 2022-02-07

## 2022-02-07 NOTE — TELEPHONE ENCOUNTER
MEDICATION CONCERN: PHARMACY CALLED WANTED TO MAKE SURE YOU WERE AWARE THAT PATIENT IS [facility] [place] FROM YOU

## 2022-02-07 NOTE — TELEPHONE ENCOUNTER
Please advise the patient, pharmacy called us and they want her to take only either oxybutynin or Myrbetriq, then will call the pharmacy and cancel one of them

## 2022-02-08 NOTE — TELEPHONE ENCOUNTER
Noted  Patient was already told to stop Metformin   Please call the pharmacy to discontinue Metformin due to congestive heart failure  Future Appointments   Date Time Provider Dante Faulkneri   3/1/2022  7:20 PM Joey Ch [facility] [address]   3/22/2022  8:30 AM Brittany Meyer MD [facility]   4/14/2022 10:30 AM Reagan Luu PA-C GYN Oncology [facility]   4/27/2022  2:15 PM Ferny Varela MD [facility]   8/15/2022 11:30 AM Shawn Ruiz MD [facility]

## 2022-02-23 DIAGNOSIS — E53.8 VITAMIN B 12 DEFICIENCY: ICD-10-CM

## 2022-02-23 RX ORDER — LANOLIN ALCOHOL/MO/W.PET/CERES
CREAM (GRAM) TOPICAL
Qty: 90 TABLET | Refills: 3 | Status: SHIPPED | OUTPATIENT
Start: 2022-02-23 | End: 2022-04-19

## 2022-03-01 ENCOUNTER — HOSPITAL ENCOUNTER (OUTPATIENT)
Dept: SLEEP CENTER | Age: 72
Discharge: HOME | End: 2022-03-03
Payer: MEDICARE

## 2022-03-01 DIAGNOSIS — G47.33 OBSTRUCTIVE SLEEP APNEA SYNDROME: ICD-10-CM

## 2022-03-01 PROCEDURE — 95810 POLYSOM 6/> YRS 4/> PARAM: CPT

## 2022-03-02 ENCOUNTER — TELEMEDICINE (OUTPATIENT)
Dept: FAMILY MEDICINE CLINIC | Age: 72
End: 2022-03-02
Payer: MEDICARE

## 2022-03-02 VITALS
OXYGEN SATURATION: 98 % | HEIGHT: 62 IN | BODY MASS INDEX: 43.24 KG/M2 | HEART RATE: 59 BPM | WEIGHT: 235 LBS | RESPIRATION RATE: 16 BRPM

## 2022-03-02 DIAGNOSIS — R82.90 BAD ODOR OF URINE: Primary | ICD-10-CM

## 2022-03-02 PROCEDURE — 99442 PR PHYS/QHP TELEPHONE EVALUATION 11-20 MIN: CPT | Performed by: FAMILY MEDICINE

## 2022-03-02 ASSESSMENT — SLEEP AND FATIGUE QUESTIONNAIRES
ESS TOTAL SCORE: 5
HOW LIKELY ARE YOU TO NOD OFF OR FALL ASLEEP WHILE WATCHING TV: 2
HOW LIKELY ARE YOU TO NOD OFF OR FALL ASLEEP WHILE SITTING INACTIVE IN A PUBLIC PLACE: 0
HOW LIKELY ARE YOU TO NOD OFF OR FALL ASLEEP IN A CAR, WHILE STOPPED FOR A FEW MINUTES IN TRAFFIC: 0
HOW LIKELY ARE YOU TO NOD OFF OR FALL ASLEEP WHILE LYING DOWN TO REST IN THE AFTERNOON WHEN CIRCUMSTANCES PERMIT: 1
HOW LIKELY ARE YOU TO NOD OFF OR FALL ASLEEP WHILE SITTING QUIETLY AFTER LUNCH WITHOUT ALCOHOL: 0
HOW LIKELY ARE YOU TO NOD OFF OR FALL ASLEEP WHILE SITTING AND READING: 2
HOW LIKELY ARE YOU TO NOD OFF OR FALL ASLEEP WHILE SITTING AND TALKING TO SOMEONE: 0
HOW LIKELY ARE YOU TO NOD OFF OR FALL ASLEEP WHEN YOU ARE A PASSENGER IN A CAR FOR AN HOUR WITHOUT A BREAK: 0

## 2022-03-02 ASSESSMENT — PATIENT HEALTH QUESTIONNAIRE - PHQ9
SUM OF ALL RESPONSES TO PHQ QUESTIONS 1-9: 0
SUM OF ALL RESPONSES TO PHQ QUESTIONS 1-9: 0
SUM OF ALL RESPONSES TO PHQ9 QUESTIONS 1 & 2: 0
SUM OF ALL RESPONSES TO PHQ QUESTIONS 1-9: 0
2. FEELING DOWN, DEPRESSED OR HOPELESS: 0
1. LITTLE INTEREST OR PLEASURE IN DOING THINGS: 0
SUM OF ALL RESPONSES TO PHQ QUESTIONS 1-9: 0

## 2022-03-02 NOTE — PROGRESS NOTES
Lisseth Ivan is a 81 y.o. female evaluated via telephone on 3/2/2022. Consent:  She and/or health care decision maker is aware that that she may receive a bill for this telephone service, which includes applicable co-pays, depending on her insurance coverage, and has provided verbal consent to proceed. Documentation:  I communicated with the patient and/or health care decision maker about ,  Patient was scheduled for sick call complaining of bad odor of urine she denies any dysuria increased frequency or urgency. Patient has gained recently urine test done and urine culture was negative. Patient denies any suprapubic pain. She has urine test ordered which she has not done. Details of this discussion including any medical advice provided:   1. Bad odor of urine  Discussed with patient to drink plenty of water, it is normal to have ordered. . Do the urine test which is already ordered after the results we will call you back. Previous urine culture is normal        I affirm this is a Patient Initiated Episode with a Patient who has not had a related appointment within my department in the past 7 days or scheduled within the next 24 hours. Patient identification was verified at the start of the visit: Yes    Total Time: minutes: 11-20 minutes    Lisseth Ivan was evaluated through a synchronous (real-time) audio encounter. The patient was located at home in a state where the provider was licensed to provide care.     Note: not billable if this call serves to triage the patient into an appointment for the relevant concern      Arely Ayers MD

## 2022-03-02 NOTE — PAYOR INFORMATION
Verified Demographics with Patient? No   If no why? Already verified  [facility] Completed? No    If not why? Already completed  Verified Insurance through: Already verified  COB Completed? Not required  Auth Verification #:NA  Liability Due: $0  Discount Offered: na%       Previous Balance: $ 0   Discount Offered: na%  Site Collect Status: no liability  Patient Response: no liability  Financial Aid Offered?  Yes Pt declined  Cards Scanned: yes

## 2022-03-07 ENCOUNTER — HOSPITAL ENCOUNTER (OUTPATIENT)
Age: 72
Discharge: HOME | DRG: 690 | End: 2022-03-07
Payer: MEDICARE

## 2022-03-07 DIAGNOSIS — N30.01 ACUTE CYSTITIS WITH HEMATURIA: Primary | ICD-10-CM

## 2022-03-07 DIAGNOSIS — R82.90 BAD ODOR OF URINE: ICD-10-CM

## 2022-03-07 LAB
-: ABNORMAL
AMORPHOUS: ABNORMAL
BACTERIA: ABNORMAL
BILIRUBIN URINE: ABNORMAL
COLOR: ABNORMAL
EPITHELIAL CELLS UA: ABNORMAL /HPF
GLUCOSE URINE: NEGATIVE
KETONES, URINE: NEGATIVE
LEUKOCYTE ESTERASE, URINE: ABNORMAL
NITRITE, URINE: NEGATIVE
PH UA: 8 (ref 5–8)
PROTEIN UA: ABNORMAL
RBC UA: ABNORMAL /HPF
SPECIFIC GRAVITY UA: 1.02 (ref 1–1.03)
TURBIDITY: ABNORMAL
URINE HGB: ABNORMAL
UROBILINOGEN, URINE: NORMAL
WBC UA: ABNORMAL /HPF

## 2022-03-07 PROCEDURE — 87077 CULTURE AEROBIC IDENTIFY: CPT

## 2022-03-07 PROCEDURE — 87186 SC STD MICRODIL/AGAR DIL: CPT

## 2022-03-07 PROCEDURE — 81001 URINALYSIS AUTO W/SCOPE: CPT

## 2022-03-07 PROCEDURE — 87086 URINE CULTURE/COLONY COUNT: CPT

## 2022-03-07 PROCEDURE — 87088 URINE BACTERIA CULTURE: CPT

## 2022-03-07 RX ORDER — CEPHALEXIN 500 MG/1
500 CAPSULE ORAL 2 TIMES DAILY
Qty: 14 CAPSULE | Refills: 0 | Status: ON HOLD | OUTPATIENT
Start: 2022-03-07 | End: 2022-03-11 | Stop reason: HOSPADM

## 2022-03-07 NOTE — RESULT ENCOUNTER NOTE
Please notify patient: UTI and a lot of blood in the urine, needs to make, appointment with Dr. Heather Haas, her urologist, I will send Keflex to the pharmacy for the UTI    Future Appointments  3/22/2022  8:30 AM    Marianna Marshall MD     [facility]  4/14/2022  10:30 AM   Sarai Vaughan PA-C         GYN Oncology        [facility]  4/27/2022  2:15 PM    Miguel Chaparro MD        [facility]  8/15/2022  11:30 AM   Butch Dougherty MD          derm             [facility]

## 2022-03-08 NOTE — RESULT ENCOUNTER NOTE
Please notify patient: UTI E. coli continue Keflex, pending culture and sensitivity   Future Appointments  3/14/2022  10:40 AM   Quinn Velazco MD         [facility]           [facility]  3/22/2022  8:30 AM    [facility]icedo [facility]  4/14/2022  10:30 AM   Carlene Lewis PA-C         GYN Oncology        [facility]  4/27/2022  2:15 PM    Justo Tong MD        [facility]  8/15/2022  11:30 AM   Horace Anderson MD         [facility]

## 2022-03-09 ENCOUNTER — TELEPHONE (OUTPATIENT)
Dept: FAMILY MEDICINE CLINIC | Age: 72
End: 2022-03-09

## 2022-03-09 DIAGNOSIS — B96.29 UTI DUE TO EXTENDED-SPECTRUM BETA LACTAMASE (ESBL) PRODUCING ESCHERICHIA COLI: Primary | ICD-10-CM

## 2022-03-09 DIAGNOSIS — Z16.12 UTI DUE TO EXTENDED-SPECTRUM BETA LACTAMASE (ESBL) PRODUCING ESCHERICHIA COLI: Primary | ICD-10-CM

## 2022-03-09 DIAGNOSIS — N39.0 UTI DUE TO EXTENDED-SPECTRUM BETA LACTAMASE (ESBL) PRODUCING ESCHERICHIA COLI: Primary | ICD-10-CM

## 2022-03-09 LAB
CULTURE: ABNORMAL
SPECIMEN DESCRIPTION: ABNORMAL

## 2022-03-09 NOTE — RESULT ENCOUNTER NOTE
Noted  Future Appointments  3/14/2022  10:40 AM   Ghada Hwang MD         [facility]           [facility]  3/22/2022  8:30 AM    Thomas Waldrop [facility]     [facility]  4/14/2022  10:30 AM   Earnest Mackay PA-C         GYN Oncology        [facility]  4/27/2022  2:15 PM    Shannon Mcelroy MD        [facility]  8/15/2022  11:30 AM   Victor Manuel Reed MD          derm             [facility]

## 2022-03-09 NOTE — RESULT ENCOUNTER NOTE
Please notify patient:  no need to start the antibiotics given by me, will not work  To await or have phone close by for call from [facility]        Future Appointments  3/14/2022  10:40 AM   Nicole Virk MD         [facility]           [facility]  3/22/2022  8:30 AM    Gabriele Deal MD     [facility]               [facility]  3/23/2022  9:30 AM    Jessie Chun MD             [facility]        [facility]  4/14/2022  10:30 AM   Benji Benavidez PA-C         [facility]        [facility]  4/27/2022  2:15 PM    Loli Light MD        [facility]  8/15/2022  11:30 AM   Valerie Baires MD          derm             [facility]

## 2022-03-09 NOTE — RESULT ENCOUNTER NOTE
Perfect served [facility] Appointments  3/14/2022  10:40 AM   Pierce Brasher MD         [facility]           [facility]  3/22/2022  8:30 AM    Stephan Juarez MD     [facility]  4/14/2022  10:30 AM   Seferino High PA-C         GYN Oncology        [facility]  4/27/2022  2:15 PM    Maurice Wilkinson MD        [facility]  8/15/2022  11:30 AM   Belle Stoner MD          derm             [facility]

## 2022-03-09 NOTE — RESULT ENCOUNTER NOTE
Please notify patient: She has a very resistant urinary tract infection. Please check with patient if she has any fever, chills or night sweats, then she needs to go to emergency room. If no fever, chills or night sweats, then will do a direct admission, please let me know, she will need direct admission and we can make the arrangements for her, it will be at [facility].  Dr. Junaid Harris perfect serve me back and we discussed together, she agrees for direct admission if no sepsis symptoms are present.   Then I will need to call admission center for [facility] bed    Future Appointments  3/14/2022  10:40 AM   Mary Jane Hancock MD         [facility]           [facility]  3/22/2022  8:30 AM    Rad[facility] Stevens [facility]     [facility]  4/14/2022  10:30 AM   Tana Vázquez PA-C         GYN Oncology        TOLPP  4/27/2022  2:15 PM    Mars[facility] Salazar [facility]        [facility]  8/15/2022  11:30 AM   Len Manjarrez MD          derm             TOP

## 2022-03-09 NOTE — RESULT ENCOUNTER NOTE
Access Center call me back they do not have any beds at this time pending discharges,  Please let the patient know someone will call her from the hospital at [facility] either this evening like around 6:00 or tomorrow morning for admission, they do not have any beds this very moment, but they will make 1    If any fever chills or night sweats develop to go to the emergency room    Future Appointments  3/14/2022  10:40 AM   Megan Moyer MD         [facility]           [facility]  3/22/2022  8:30 AM    Joycelyn Marie MD      sc               TOLP  4/14/2022  10:30 AM   Carlos Adler PA-C         GYN Oncology        [facility]  4/27/2022  2:15 PM    Chava Lisa [facility]        [facility]  8/15/2022  11:30 AM   Mal Goltz, MD         [facility]             [address]

## 2022-03-09 NOTE — RESULT ENCOUNTER NOTE
Noted, direct admission called for [address] at access center, for direct admission , please let patient know to go to registration      Future Appointments  3/14/2022  10:40 AM   Eric Vincent MD         . [facility]           M[facility]OL[facility]  3/22/2022  8:30 AM    Alfredo Guillen MD     [facility]               MHTOLPP  4/14/2022  10:30 AM   Raul Menard PA-C         GYN Oncology        TOLPP  4/27/2022  2:15 PM    Bharath Monroe MD        [facility]         MHTOLPP  8/15/2022  11:30 AM   Alexi Chavez MD          derm             TOLPP

## 2022-03-09 NOTE — RESULT ENCOUNTER NOTE
Please check with the urologist, patient does have UTI with ESBL, resistant at all antibiotics, I did send Keflex which I plan to stop, and refer to infectious disease, if this is okay with them, referral placed to Dr. Ollie De Leon, but might need direct admission    Then please call Dr. Ollie De Leon , ID, referral placed, what antibiotic should I prescribe to her, patient is at home, does not have a PICC line, meropenem is 3 times a day so she cannot get it at the infusion center, or should I send the patient for direct admission?     Future Appointments  3/14/2022  10:40 AM   Suzy Luna MD         [facility]           [facility]  3/22/2022  8:30 AM    Radha Valentine MD     [facility]  4/14/2022  10:30 AM   Mi Medina PA-C         GYN Oncology        [facility]  4/27/2022  2:15 PM    Prabhakar Reyes [facility]        [facility]  8/15/2022  11:30 AM   Anatoliy Zepeda MD         [facility]

## 2022-03-09 NOTE — TELEPHONE ENCOUNTER
Noted. Thank you!   Pending direct admission    Future Appointments   Date Time Provider Dante Amaya   3/14/2022 10:40 AM Zac Curran MD [facility]   3/22/2022  8:30 AM Amor Molina MD  sc TOLP   3/23/2022  9:30 AM Dandre Mosqueda MD INF DIS OREG TOLP   4/14/2022 10:30 AM Lory Dakins, PA-RAPHAEL GYN Oncology TOLP   4/27/2022  2:15 PM [facility]   8/15/2022 11:30 AM Rima Mcgrath MD  derm [facility]

## 2022-03-10 ENCOUNTER — TELEPHONE (OUTPATIENT)
Dept: FAMILY MEDICINE CLINIC | Age: 72
End: 2022-03-10

## 2022-03-10 ENCOUNTER — HOSPITAL ENCOUNTER (INPATIENT)
Age: 72
LOS: 1 days | Discharge: HOME HEALTH | DRG: 690 | End: 2022-03-11
Attending: INTERNAL MEDICINE | Admitting: INTERNAL MEDICINE
Payer: MEDICARE

## 2022-03-10 LAB
ANION GAP SERPL CALCULATED.3IONS-SCNC: 9 MMOL/L (ref 9–17)
BUN BLDV-MCNC: 24 MG/DL (ref 8–23)
CALCIUM SERPL-MCNC: 9.3 MG/DL (ref 8.6–10.4)
CHLORIDE BLD-SCNC: 105 MMOL/L (ref 98–107)
CO2: 27 MMOL/L (ref 20–31)
CREAT SERPL-MCNC: 0.89 MG/DL (ref 0.5–0.9)
GFR AFRICAN AMERICAN: >60 ML/MIN
GFR NON-AFRICAN AMERICAN: >60 ML/MIN
GFR SERPL CREATININE-BSD FRML MDRD: ABNORMAL ML/MIN/{1.73_M2}
GLUCOSE BLD-MCNC: 93 MG/DL (ref 70–99)
HCT VFR BLD CALC: 36.2 % (ref 36–46)
HEMOGLOBIN: 11.9 G/DL (ref 12–16)
MCH RBC QN AUTO: 30.1 PG (ref 26–34)
MCHC RBC AUTO-ENTMCNC: 32.9 G/DL (ref 31–37)
MCV RBC AUTO: 91.3 FL (ref 80–100)
PDW BLD-RTO: 14.5 % (ref 11.5–14.9)
PLATELET # BLD: 222 K/UL (ref 150–450)
PMV BLD AUTO: 7.8 FL (ref 6–12)
POTASSIUM SERPL-SCNC: 4.3 MMOL/L (ref 3.7–5.3)
RBC # BLD: 3.96 M/UL (ref 4–5.2)
SODIUM BLD-SCNC: 141 MMOL/L (ref 135–144)
WBC # BLD: 7.1 K/UL (ref 3.5–11)

## 2022-03-10 PROCEDURE — 99222 1ST HOSP IP/OBS MODERATE 55: CPT | Performed by: INTERNAL MEDICINE

## 2022-03-10 PROCEDURE — 96366 THER/PROPH/DIAG IV INF ADDON: CPT

## 2022-03-10 PROCEDURE — 02HV33Z INSERTION OF INFUSION DEVICE INTO SUPERIOR VENA CAVA, PERCUTANEOUS APPROACH: ICD-10-PCS | Performed by: INTERNAL MEDICINE

## 2022-03-10 PROCEDURE — 6370000000 HC RX 637 (ALT 250 FOR IP): Performed by: INTERNAL MEDICINE

## 2022-03-10 PROCEDURE — 85027 COMPLETE CBC AUTOMATED: CPT

## 2022-03-10 PROCEDURE — G0378 HOSPITAL OBSERVATION PER HR: HCPCS

## 2022-03-10 PROCEDURE — 1200000000 HC SEMI PRIVATE

## 2022-03-10 PROCEDURE — 2580000003 HC RX 258: Performed by: INTERNAL MEDICINE

## 2022-03-10 PROCEDURE — G0379 DIRECT REFER HOSPITAL OBSERV: HCPCS

## 2022-03-10 PROCEDURE — 96365 THER/PROPH/DIAG IV INF INIT: CPT

## 2022-03-10 PROCEDURE — 6360000002 HC RX W HCPCS: Performed by: INTERNAL MEDICINE

## 2022-03-10 PROCEDURE — 80048 BASIC METABOLIC PNL TOTAL CA: CPT

## 2022-03-10 PROCEDURE — 36415 COLL VENOUS BLD VENIPUNCTURE: CPT

## 2022-03-10 RX ORDER — PANTOPRAZOLE SODIUM 40 MG/1
40 TABLET, DELAYED RELEASE ORAL
Status: DISCONTINUED | OUTPATIENT
Start: 2022-03-11 | End: 2022-03-11 | Stop reason: HOSPADM

## 2022-03-10 RX ORDER — POLYETHYLENE GLYCOL 3350 17 G/17G
17 POWDER, FOR SOLUTION ORAL DAILY PRN
Status: DISCONTINUED | OUTPATIENT
Start: 2022-03-10 | End: 2022-03-11 | Stop reason: HOSPADM

## 2022-03-10 RX ORDER — ONDANSETRON 2 MG/ML
4 INJECTION INTRAMUSCULAR; INTRAVENOUS EVERY 6 HOURS PRN
Status: DISCONTINUED | OUTPATIENT
Start: 2022-03-10 | End: 2022-03-11 | Stop reason: HOSPADM

## 2022-03-10 RX ORDER — DOCUSATE SODIUM 100 MG/1
100 CAPSULE, LIQUID FILLED ORAL 2 TIMES DAILY
Status: DISCONTINUED | OUTPATIENT
Start: 2022-03-10 | End: 2022-03-11 | Stop reason: HOSPADM

## 2022-03-10 RX ORDER — ONDANSETRON 4 MG/1
4 TABLET, ORALLY DISINTEGRATING ORAL EVERY 8 HOURS PRN
Status: DISCONTINUED | OUTPATIENT
Start: 2022-03-10 | End: 2022-03-11 | Stop reason: HOSPADM

## 2022-03-10 RX ORDER — LEVOTHYROXINE SODIUM 0.07 MG/1
75 TABLET ORAL DAILY
Status: DISCONTINUED | OUTPATIENT
Start: 2022-03-11 | End: 2022-03-11 | Stop reason: HOSPADM

## 2022-03-10 RX ORDER — ALBUTEROL SULFATE 90 UG/1
2 AEROSOL, METERED RESPIRATORY (INHALATION) EVERY 4 HOURS PRN
Status: DISCONTINUED | OUTPATIENT
Start: 2022-03-10 | End: 2022-03-11 | Stop reason: HOSPADM

## 2022-03-10 RX ORDER — LISINOPRIL 10 MG/1
10 TABLET ORAL DAILY
Status: DISCONTINUED | OUTPATIENT
Start: 2022-03-11 | End: 2022-03-11 | Stop reason: HOSPADM

## 2022-03-10 RX ORDER — ACETAMINOPHEN 325 MG/1
650 TABLET ORAL EVERY 6 HOURS PRN
Status: DISCONTINUED | OUTPATIENT
Start: 2022-03-10 | End: 2022-03-11 | Stop reason: HOSPADM

## 2022-03-10 RX ORDER — ACETAMINOPHEN 650 MG/1
650 SUPPOSITORY RECTAL EVERY 6 HOURS PRN
Status: DISCONTINUED | OUTPATIENT
Start: 2022-03-10 | End: 2022-03-11 | Stop reason: HOSPADM

## 2022-03-10 RX ORDER — ATORVASTATIN CALCIUM 40 MG/1
40 TABLET, FILM COATED ORAL DAILY
Status: DISCONTINUED | OUTPATIENT
Start: 2022-03-11 | End: 2022-03-11 | Stop reason: HOSPADM

## 2022-03-10 RX ORDER — AMIODARONE HYDROCHLORIDE 200 MG/1
200 TABLET ORAL DAILY
Status: DISCONTINUED | OUTPATIENT
Start: 2022-03-11 | End: 2022-03-11 | Stop reason: HOSPADM

## 2022-03-10 RX ORDER — OXYBUTYNIN CHLORIDE 10 MG/1
10 TABLET, EXTENDED RELEASE ORAL NIGHTLY
Status: DISCONTINUED | OUTPATIENT
Start: 2022-03-10 | End: 2022-03-11 | Stop reason: HOSPADM

## 2022-03-10 RX ORDER — SODIUM CHLORIDE 0.9 % (FLUSH) 0.9 %
5-40 SYRINGE (ML) INJECTION PRN
Status: DISCONTINUED | OUTPATIENT
Start: 2022-03-10 | End: 2022-03-11 | Stop reason: HOSPADM

## 2022-03-10 RX ORDER — SODIUM CHLORIDE 0.9 % (FLUSH) 0.9 %
5-40 SYRINGE (ML) INJECTION EVERY 12 HOURS SCHEDULED
Status: DISCONTINUED | OUTPATIENT
Start: 2022-03-10 | End: 2022-03-11 | Stop reason: HOSPADM

## 2022-03-10 RX ORDER — SODIUM CHLORIDE 9 MG/ML
25 INJECTION, SOLUTION INTRAVENOUS PRN
Status: DISCONTINUED | OUTPATIENT
Start: 2022-03-10 | End: 2022-03-11 | Stop reason: HOSPADM

## 2022-03-10 RX ADMIN — SODIUM CHLORIDE, PRESERVATIVE FREE 10 ML: 5 INJECTION INTRAVENOUS at 21:02

## 2022-03-10 RX ADMIN — DOCUSATE SODIUM 100 MG: 100 CAPSULE ORAL at 21:05

## 2022-03-10 RX ADMIN — SODIUM CHLORIDE 25 ML: 9 INJECTION, SOLUTION INTRAVENOUS at 15:56

## 2022-03-10 RX ADMIN — MEROPENEM 1000 MG: 1 INJECTION, POWDER, FOR SOLUTION INTRAVENOUS at 15:58

## 2022-03-10 RX ADMIN — MEROPENEM 1000 MG: 1 INJECTION, POWDER, FOR SOLUTION INTRAVENOUS at 21:10

## 2022-03-10 RX ADMIN — OXYBUTYNIN CHLORIDE 10 MG: 10 TABLET, EXTENDED RELEASE ORAL at 21:02

## 2022-03-10 RX ADMIN — SODIUM CHLORIDE 25 ML: 9 INJECTION, SOLUTION INTRAVENOUS at 21:09

## 2022-03-10 ASSESSMENT — PAIN SCALES - GENERAL
PAINLEVEL_OUTOF10: 0

## 2022-03-10 NOTE — PROGRESS NOTES
Dr. Antonio Zimmerman notified via [facility] of patients arrival to unit.

## 2022-03-10 NOTE — PLAN OF CARE
Problem: Safety:  Goal: Free from accidental physical injury  Description: Free from accidental physical injury  Outcome: Ongoing  Note: Standard safety measures in place throughout shift. Pt used called light for assistance when ambulating and walker. Problem: Pain:  Goal: Patient's pain/discomfort is manageable  Description: Patient's pain/discomfort is manageable  Outcome: Ongoing  Note: Pt remained free of pain. Problem: Skin Integrity:  Goal: Skin integrity will stabilize  Description: Skin integrity will stabilize  Outcome: Ongoing  Note: Pt remained free of skin breakdown.

## 2022-03-10 NOTE — PROGRESS NOTES
Patient arrived to the unit at this time. Patient oriented to room and instructed on call light and unit routine. Bed locked and in lowest position, call light within reach, side rails up 2/4, gripper socks on.

## 2022-03-10 NOTE — TELEPHONE ENCOUNTER
Call from admitting that patient has a bed 2056    The admitting did not want to call the patient they requested me to call the patient myself    I left a voice message, please call her again and make sure she goes

## 2022-03-10 NOTE — H&P
History and Physical Service  [facility]    HISTORY AND PHYSICAL EXAMINATION            Date:   3/10/2022  Patient name:  Staci Shahid  MRN:   [id number]  Account:  [de-identified]  YOB: 1950  PCP:    Zeferino Parada MD  Code Status:    Full Code    Chief Complaint:     No chief complaint on file. History Obtained From:     Patient, EMR, nursing staff  His  HPI   tory Obtained From:  Past [address] History:prema History: This patient is a 81 y.o. Non- / non  femalewho presents with  ESBL UTI  History of type 2 diabetes, hypertension, CKD stage III, uterine cancer status post surgery, CLAROS, paroxysmal A. fib on metoprolol, Xarelto  She was seen at urgent care on 2/7 for malodorous urine  Urine cultures were done at the time showing ESBL E. Coli, PCP reviewed the results and recommended inpatient admission for IV antibiotics  Today patient denies any symptoms of dysuria frequency abdominal pain denies fever chills at home  Started on Merrem  No aggravating relieving factors identified    Review of Systems:     Denies any shortness of breath or cough  Denies chest pain or palpitations  Denies abdominal pain, diarrhea vomiting  Denies any new numbness tremors or weakness. A 10 point review of systems was performed and and negative except as mentioned in HPI  Positive and Negative as described in HPI.       Past Medical History:     Past Medical History:   Diagnosis Date    Abdominal aortic aneurysm (AAA) without rupture ([address] 10/21/2021    Adenocarcinoma of endometrium, stage 1 ([address] 10/5/2017    Well differentiated grade 1 adenocarcinoma arising in complex hyperplasia    JOSHUA (acute kidney injury) ([address] 1/15/2020    Allergic rhinitis 2/23/2017    Anemia 11/9/2020    At high risk for falls 2/23/2017    Atrial fibrillation [facility])     Jan 2020    Atrial fibrillation with rapid ventricular response ([address] 10/10/2021    Atrial fibrillation, new onset ([address] 1/20/2020    CHF (congestive heart failure) ([address]     \"little\"    Colitis 7/22/2020    Colon polyp     Had colonoscopy done 20 yrs ago    Diabetes mellitus ([address]     Diverticulitis     Diverticulitis of colon with perforation 8/4/2020    Endometrial cancer ([address]     History of TIA (transient ischemic attack) '80's    on Baby ASA    Hyperglycemia 3/21/2017    Hyperlipidemia     Hypertension since 2015    on Rx.    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    Oral phase dysphagia 2/23/2018    Osteoarthritis (arthritis due to wear and tear of joints)     ronan knee    Osteoarthritis involving multiple joints on both sides of body 4/24/2012    Osteoporosis     Perforated bowel ([address]     Perforated diverticulum 6/15/2020    Postmenopausal bleeding 9/20/2017    Rectal bleeding 9/21/2020    S/P h-scope, Myosure 9/20/17 9/20/2017    Pathology pending    Tennis elbow     left    Thickened endometrium 9/20/2017    TIA (transient ischemic attack)     TLHBSO, bilateral LND 10/24/17 10/24/2017    Type 2 diabetes mellitus, without long-term current use of insulin ([address] 1/14/2020        Past Surgical History:     Past Surgical History:   Procedure Laterality Date    CATARACT REMOVAL WITH IMPLANT Bilateral 2015    COLONOSCOPY  1997    had polyp, she doesn't know where and when, \"20 yrs ago\"    COLONOSCOPY  06/26/2017    COLONOSCOPY N/A 8/3/2020    COLONOSCOPY POLYPECTOMY SNARE performed by Brennon Kim MD at [address] [place] [address] N/A 9/20/2017    HYSTEROSCOPY  WITH MYOSURE performed by Aubree Nuñez DO Aicha at [address] [facility] N/A 10/24/2017    TOTAL LAPAROSCOPIC HYSTERECTOMY, BSO, F.S.  STAGING, GYRUS G400 performed by Layton Cates MD at [address] N/A 6/26/2017    COLONOSCOPY POLYPECTOMY / HOT SNARE performed by Vahid Simons DO at [facility] N/A 8/4/2020    OPEN SIGMOID COLECTOMY; PRIMARY ANASTOMOSIS & MOBILIZATION OF SPLENIC FLEXURE performed by Nancy Zhong MD at [address]  10/24/2017    with pelvic lymph node dissection    THYROID SURGERY  1980    subtotal 20 years ago    TONSILLECTOMY      UPPER GASTROINTESTINAL ENDOSCOPY N/A 8/3/2020    EGD BIOPSY performed by Nancy Zhong MD at [facility] VITRECTOMY      FLOATERS        Medications Prior to Admission:     Prior to Admission medications    Medication Sig Start Date End Date Taking?  Authorizing Provider   vitamin B-12 (CYANOCOBALAMIN) 1000 MCG tablet TAKE ONE (1) TABLET BY MOUTH DAILY 2/23/22  Yes Luzmaria Salguero MD   atorvastatin (LIPITOR) 40 MG tablet TAKE ONE (1) TABLET BY MOUTH DAILY STOP SIMVASTATIN 12/29/21  Yes Luzmaria Salguero MD   lisinopril (PRINIVIL;ZESTRIL) 10 MG tablet TAKE 1 TABLET BY MOUTH ONCE DAILY 12/29/21  Yes Luzmaria Salguero MD   levothyroxine (SYNTHROID) 75 MCG tablet TAKE ONE (1) TABLET BY MOUTH EVERY MORNING (BEFORE BREAKFAST) 12/20/21  Yes Luzmaria Salguero MD   oxybutynin (DITROPAN-XL) 10 MG extended release tablet TAKE 1 TABLET BY MOUTH ONCE DAILY  11/3/21  Yes Fariba Arenas MD   metoprolol tartrate (LOPRESSOR) 25 MG tablet Take 1 tablet by mouth 2 times daily Dose decreased 10/20/2021 due to bradycardia 10/20/21  Yes Luzmaria Salguero MD   amiodarone (CORDARONE) 200 MG tablet Take 1 tablet by mouth daily 10/27/21  Yes Damion Raymond PA-C   pantoprazole (PROTONIX) 40 MG tablet TAKE 1 TABLET BY MOUTH ONCE DAILY  10/6/21  Yes Luzmaria Salguero MD   vitamin D3 (CHOLECALCIFEROL) 25 MCG (1000 UT) TABS tablet TAKE 2 TABLETS BY MOUTH ONCE DAILY  7/14/21  Yes Kian Alicia MD   Probiotic Product (LISSY-BID PROBIOTIC) TABS TAKE 1 TABLET BY MOUTH IN THE MORNING  7/14/21  Yes Kian Alicia MD   XARELTO 20 MG TABS tablet TAKE ONE (1) TABLET BY MOUTH DAILY (WITH BREAKFAST)  12/29/20  Yes Edwina Villela MD   cephALEXin (KEFLEX) 500 MG capsule Take 1 capsule by mouth 2 times daily for 7 days Taken before, no reaction 3/7/22 3/14/22  Kian Alicia MD   lidocaine (LIDODERM) 5 % PLACE ONE (1) PATCH ONTO THE SKIN DAILY 12 HOURS ON, 12 HOURS OFF. 1/10/22   Kian Alicia MD   ondansetron (ZOFRAN) 4 MG tablet TAKE ONE (1) TABLET BY MOUTH EVERY 8 HOURS AS NEEDED 1/3/22   Kian Alicia MD   docusate sodium (DOK) 100 MG capsule TAKE ONE (1) CAPSULE BY MOUTH TWO (2) TIMES DAILY FOR CONSTIPATION 12/1/21   Kian Alicia MD   lidocaine (LMX) 4 % cream 5 g topical 2-3 times a day as needed for pain, maximum 20 g/day 11/26/21   Radha Vallecillo MD   ACETAMINOPHEN EXTRA STRENGTH 500 MG tablet TAKE 1 TABLET BY MOUTH EVERY 6 HOURS AS NEEDED FOR PAIN 11/16/21   Kian Alicia MD   albuterol sulfate HFA (PROVENTIL HFA) 108 (90 Base) MCG/ACT inhaler Inhale 1-2 puffs into the lungs every 4 hours as needed for Wheezing 11/4/21   Anna Ellis MD   clotrimazole-betamethasone (LOTRISONE) 1-0.05 % cream Apply topically 1 times daily on the perineum, alternate powder, for 10 days 10/19/21   Kian Alicia MD   nystatin (MYCOSTATIN) 614984 UNIT/GM powder Apply daily for 4-6 weeks 10/19/21   Kian Alicia MD   Blood Pressure Monitor KIT Use as directed. 7/22/20   MAX Barker CNP   glucose monitoring kit (FREESTYLE) monitoring kit Use as directed. BRAND OF CHO[employer] INSURANCE ALLOWS. 7/22/20   MAX Barker CNP   blood glucose monitor strips Test 2-3 times a day & as needed for symptoms of irregular blood glucose. BRAND OF CHO[employer] INSURANCE ALLOWS. 7/22/20   MAX Barker CNP   Alcohol Swabs (ALCOHOL PREP) PADS Use as directed 7/22/20   MAX Barker CNP   Lancets MISC 1 each by Does not apply route 2 times daily 7/22/20   MAX Barker CNP        Allergies:     Sulfa antibiotics and Penicillins    Social History:     Tobacco:    reports that she has never smoked. She has never used smokeless tobacco.  Alcohol:      reports no history of alcohol use. Drug Use:  reports no history of drug use. Family History:     Family History   Problem Relation Age of Onset    Coronary Art Dis Father     Hypertension Father     Diabetes Father     High Blood Pressure Father     Heart Attack Father     Diabetes Mother     High Blood Pressure Mother     Thyroid Disease Mother     High Blood Pressure Sister     Thyroid Disease Brother     High Blood Pressure Brother     High Blood Pressure Maternal Grandmother     Diabetes Maternal Grandfather     No Known Problems Paternal Grandmother     Heart Attack Paternal Grandfather     Colon Cancer Neg Hx            Physical Exam:   BP (!) 181/73   Pulse (!) 43   Temp 97.3 °F (36.3 °C)   Resp 18   Ht 5' 2\" (1.575 m)   Wt 238 lb 1.6 oz (108 kg)   LMP  (LMP Unknown)   SpO2 98%   BMI 43.55 kg/m²   No results for input(s): POCGLU in the last 72 hours. General Appearance:  alert, well appearing, and in no acute distress  Mental status: oriented to person, place, and time with normal affect  Head:  normocephalic, atraumatic. Eye: no icterus, redness, pupils equal and reactive, extraocular eye movements intact, conjunctiva clear  Ear: normal external ear, no discharge, hearing intact  Nose:  no drainage noted  Mouth: mucous membranes moist  Neck: supple, no carotid bruits, thyroid not palpable  Lungs: Bilateral equal air entry, clear to ausculation, no wheezing, rales or rhonchi, normal effort  Cardiovascular: normal rate, regular rhythm, no murmur, gallop, rub.   Abdomen: Soft, nontender, nondistended, normal bowel sounds, no hepatomegaly or splenomegaly  Neurologic: There are no new focal motor or sensory deficits, normal muscle tone and bulk, no abnormal sensation, normal speech, cranial nerves II through XII grossly intact  Skin: No gross lesions, rashes, bruising or bleeding on exposed skin area  Extremities:  peripheral pulses palpable, no pedal edema or calf pain with palpation  Psych: normal affect     Investigations:      Laboratory Testing:  Recent Results (from the past 24 hour(s))   Basic Metabolic Panel    Collection Time: 03/10/22  1:11 PM   Result Value Ref Range    Glucose 93 70 - 99 mg/dL    BUN 24 (H) 8 - 23 mg/dL    CREATININE 0.89 0.50 - 0.90 mg/dL    Calcium 9.3 8.6 - 10.4 mg/dL    Sodium 141 135 - 144 mmol/L    Potassium 4.3 3.7 - 5.3 mmol/L    Chloride 105 98 - 107 mmol/L    CO2 27 20 - 31 mmol/L    Anion Gap 9 9 - 17 mmol/L    GFR Non-African American >60 >60 mL/min    GFR African American >60 >60 mL/min    GFR Comment         CBC    Collection Time: 03/10/22  1:11 PM   Result Value Ref Range    WBC 7.1 3.5 - 11.0 k/uL    RBC 3.96 (L) 4.0 - 5.2 m/uL    Hemoglobin 11.9 (L) 12.0 - 16.0 g/dL    Hematocrit 36.2 36 - 46 %    MCV 91.3 80 - 100 fL    MCH 30.1 26 - 34 pg    MCHC 32.9 31 - 37 g/dL    RDW 14.5 11.5 - 14.9 %    Platelets 285 341 - 435 k/uL    MPV 7.8 6.0 - 12.0 fL       Recent Labs     03/10/22  1311   HGB 11.9*   HCT 36.2   WBC 7.1   MCV 91.3      K 4.3      CO2 27   BUN 24*   CREATININE 0.89   GLUCOSE 93       Hematology:  Recent Labs     03/10/22  1311   WBC 7.1   RBC 3.96*   HGB 11.9*   HCT 36.2   MCV 91.3   MCH 30.1   MCHC 32.9   RDW 14.5      MPV 7.8     Chemistry:  Recent Labs     03/10/22  1311      K 4.3      CO2 27   GLUCOSE 93   BUN 24*   CREATININE 0.89   ANIONGAP 9   LABGLOM >60   GFRAA >60   CALCIUM 9.3     No results for input(s): PROT, LABALBU, LABA1C, Y6IJDCZ, E1LLSJM, FT4, TSH, AST, ALT, LDH, GGT, ALKPHOS, LABGGT, BILITOT, BILIDIR, AMMONIA, AMYLASE, LIPASE, LACTATE, CHOL, HDL, LDLCHOLESTEROL, CHOLHDLRATIO, TRIG, VLDL, KHQ26FM, PHENYTOIN, PHENYF, URICACID, POCGLU in the last 72 hours. Imaging/Diagnostics:       No results found.      Current Facility-Administered Medications   Medication Dose Route Frequency Provider Last Rate Last Admin    sodium chloride flush 0.9 % injection 5-40 mL  5-40 mL IntraVENous 2 times per day Alfie Caicedo MD        sodium chloride flush 0.9 % injection 5-40 mL  5-40 mL IntraVENous PRN Alfie Caicedo MD        0.9 % sodium chloride infusion  25 mL IntraVENous PRN Alfie Caicedo  mL/hr at 03/10/22 1556 25 mL at 03/10/22 1556    ondansetron (ZOFRAN-ODT) disintegrating tablet 4 mg  4 mg Oral Q8H PRN Alfie Caicedo MD        Or    ondansetron [facility] injection 4 mg  4 mg IntraVENous Q6H PRN Alfie Caicedo MD        polyethylene glycol (GLYCOLAX) packet 17 g  17 g Oral Daily PRN Alfie Caicedo MD        acetaminophen (TYLENOL) tablet 650 mg  650 mg Oral Q6H PRN Alfie Caicedo MD        Or   Shameka Linton acetaminophen (TYLENOL) suppository 650 mg  650 mg Rectal Q6H PRN Alfie Caicedo MD        meropenem (MERREM) 1,000 mg in sodium chloride 0.9 % 100 mL IVPB (mini-bag)  1,000 mg IntraVENous Once Alfei Caicedo  mL/hr at 03/10/22 1558 1,000 mg at 03/10/22 1558    Followed by   Shameka Current meropenem (MERREM) 1,000 mg in sodium chloride 0.9 % 100 mL IVPB (mini-bag)  1,000 mg IntraVENous Q8H Alfie Caicedo MD        albuterol sulfate  (90 Base) MCG/ACT inhaler 2 puff  2 puff Inhalation Q4H PRN Alfie Caicedo MD        [START ON 3/11/2022] amiodarone (CORDARONE) tablet 200 mg  200 mg Oral Daily MD Shameka Delatorre Darlene Grams ON 3/11/2022] atorvastatin (LIPITOR) tablet 40 mg  40 mg Oral Daily Alfie Caicedo MD        docusate sodium (COLACE) capsule 100 mg  100 mg Oral BID Alfie Caicedo MD        [START ON 3/11/2022] levothyroxine (SYNTHROID) tablet 75 mcg  75 mcg Oral Daily Carrillo Rivers MD        metoprolol tartrate (LOPRESSOR) tablet 25 mg  25 mg Oral BID Carrillo Rivers MD        [START ON 3/11/2022] lisinopril (PRINIVIL;ZESTRIL) tablet 10 mg  10 mg Oral Daily Carrillo Rivers MD        [facility]) extended release tablet 10 mg  10 mg Oral Nightly Carrillo Rivers MD       Taniya Manual Mikayla Blandon ON 3/11/2022] pantoprazole (PROTONIX) tablet 40 mg  40 mg Oral QAM AC Carrillo Rivers MD       Taniya Manual Mikayla Blandon ON 3/11/2022] rivaroxaban (XARELTO) tablet 20 mg  20 mg Oral Daily with breakfast Carrillo Rivers MD           Impressions :     1. Principal Problem:    UTI due to extended-spectrum beta lactamase (ESBL) producing Escherichia coli  Active Problems:    Benign hypertension with CKD (chronic kidney disease) stage III ([facility]    History of uterine cancer, Well differentiated grade 1 adenocarcinoma arising in complex hyperplasia, 2017    CLAROS (nonalcoholic steatohepatitis)    Paroxysmal atrial fibrillation (HCC)    Type 2 diabetes mellitus, without long-term current use of insulin (HCC)    Class 3 severe obesity due to excess calories without serious comorbidity with body mass index (BMI) of 40.0 to 44.9 in [facility])    UTI (urinary tract infection)  Resolved Problems:    * No resolved hospital problems.  *        2.  has a past medical history of Abdominal aortic aneurysm (AAA) without rupture ([facility] (10/21/2021), Adenocarcinoma of endometrium, stage 1 ([facility] (10/5/2017), JOSHUA (acute kidney injury) ([facility] (1/15/2020), Allergic rhinitis (2/23/2017), Anemia (11/9/2020), At high risk for falls (2/23/2017), Atrial fibrillation ([address] Atrial fibrillation with rapid ventricular response ([address] (10/10/2021), Atrial fibrillation, new onset ([address] (1/20/2020), CHF (congestive heart failure) ([facility] Colitis (7/22/2020), Colon polyp, Diabetes mellitus ([facility] Diverticulitis, Diverticulitis of colon with perforation (8/4/2020), Endometrial cancer ([facility] History of TIA (transient ischemic attack) ('80's), Hyperglycemia (3/21/2017), Hyperlipidemia, Hypertension (since 2015), Hypothyroidism (1980), Legally blind (since born), Lower back pain, Mixed incontinence (1/9/2016), Morbid obesity with BMI of 40.0-44.9, adult ([facility] (2/23/2017), CLAROS (nonalcoholic steatohepatitis) (3/10/2019), Obesity, Optic atrophy (2/27/2018), Oral phase dysphagia (2/23/2018), Osteoarthritis (arthritis due to wear and tear of joints), Osteoarthritis involving multiple joints on both sides of body (4/24/2012), Osteoporosis, Perforated bowel ([facility] Perforated diverticulum (6/15/2020), Postmenopausal bleeding (9/20/2017), Rectal bleeding (9/21/2020), S/P h-scope, Myosure 9/20/17 (9/20/2017), Tennis elbow, Thickened endometrium (9/20/2017), TIA (transient ischemic attack), TLHBSO, bilateral LND 10/24/17 (10/24/2017), and Type 2 diabetes mellitus, without long-term current use of insulin ([facility] (1/14/2020). Plans:     1. ESBL UTI-started on Merrem  2. Paroxysmal A. fib-rate controlled continue metoprolol, amiodarone, Xarelto  3. CKD-creatinine at baseline  4.  Hypertension-currently controlled    PICC line inserted, anticipate discharge tomorrow on antibiotics    DVT pplx-Xarelto  Antibiotics started/continued-Merrem      Baron Yvette MD  3/10/2022  4:07 PM

## 2022-03-10 NOTE — DISCHARGE INSTR - COC
Continuity of Care Form    Patient Name: Selma Montgomery   :  1950  MRN:  [id number]    Admit date:  3/10/2022  Discharge date: 3/11/2022    Code Status Order: Full Code   Advance Directives:      Admitting Physician:  Derik Cantu MD  PCP: Thomas Waldrop MD    Discharging Nurse: Nu Breaux Unit/Room#: 4984/1458-53  Discharging Unit Phone Number: [phone]    Emergency Contact:   Extended Emergency Contact Information  Primary Emergency Contact: Donna Dennis  Address: [address] Phone: [phone]  Mobile Phone: [phone]  Relation: Brother/Sister  Hearing or visual needs: None  Other needs: None  Preferred language: English   needed? No  Secondary Emergency Contact: RolandajakeSandhya Jordan [address] Phone: [phone]  Relation: Child  Hearing or visual needs: None  Other needs: None  Preferred language: English   needed?  No    Past Surgical History:  Past Surgical History:   Procedure Laterality Date    CATARACT REMOVAL WITH IMPLANT Bilateral     COLONOSCOPY      had polyp, she doesn't know where and when, \"20 yrs ago\"    COLONOSCOPY  2017    COLONOSCOPY N/A 8/3/2020    COLONOSCOPY POLYPECTOMY SNARE performed by Kenny Staley MD at [address] [place] [address] N/A 2017    HYSTEROSCOPY  WITH MYOSURE performed by Shalom Kraus DO at [facility] N/A 10/24/2017    TOTAL LAPAROSCOPIC HYSTERECTOMY, BSO, F.S.  STAGING, GYRUS G400 performed by Hong Steinberg MD at [address] [place] [address] N/A 2017    COLONOSCOPY POLYPECTOMY / HOT SNARE performed by Sherri Mcneil DO at [facility]  N/A 2020    OPEN SIGMOID COLECTOMY; PRIMARY ANASTOMOSIS & MOBILIZATION OF SPLENIC FLEXURE performed by Kenny Staley MD at [facility]  10/24/2017    with pelvic lymph node dissection    THYROID SURGERY  1980    subtotal 20 years ago    TONSILLECTOMY      UPPER GASTROINTESTINAL ENDOSCOPY N/A 8/3/2020    EGD BIOPSY performed by Meghann Yanez MD at [facility] END[facility]    VITRECTOMY      FLOATERS       Immunization History:   Immunization History   Administered Date(s) Administered    COVID-19, Pfizer Purple top, DILUTE for use, 12+ yrs, 30mcg/0.3mL dose 07/22/2021, 10/15/2021    Influenza Vaccine, unspecified formulation 10/01/2016    Influenza Virus Vaccine 10/01/2019    Influenza, Quadv, adjuvanted, 65 yrs +, IM, PF (Fluad) 10/30/2020, 11/03/2021    Influenza, Triv, inactivated, subunit, adjuvanted, IM (Fluad 65 yrs and older) 09/14/2017, 10/02/2018    Pneumococcal Conjugate 13-valent ([id number]) 02/23/2017    Pneumococcal Polysaccharide ([id number]) 04/20/2018    Tdap (Boostrix, Adacel) 09/28/2017       Active Problems:  Patient Active Problem List   Diagnosis Code    Acquired hypothyroidism E03.9    History of TIA (transient ischemic attack) Z86.73    Chronic bilateral low back pain without sciatica M54.50, G89.29    Benign hypertension with CKD (chronic kidney disease) stage III (HCC) I12.9, N18.30    Osteopenia determined by x-ray M85.80    Osteoarthritis involving multiple joints on both sides of body M15.9    Left knee DJD M17.12    Vitamin D deficiency E55.9    Mixed incontinence N39.46    Chronic pain of both knees M25.561, M25.562, G89.29    Slow transit constipation K59.01    Allergic rhinitis J30.9    Hyperlipidemia with target LDL less than 100 E78.5    Obesity, Class III, BMI 40-49.9 (morbid obesity) ([facility] E66.01    At high risk for falls Z91.81    Spondylosis of lumbar region without myelopathy or radiculopathy M47.816    OAB (overactive bladder) N32.81    Primary osteoarthritis of both knees M17.0    Adenomatous polyp of sigmoid colon, 6/26/17 D12.5    TLHBSO, bilateral LND 10/24/17 Z90.710, Z90.79, Z90.722    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    Cataracta b/l eyes H26.9    Difficulty walking R26.2    Esotropia H50.00    History of uterine cancer, Well differentiated grade 1 adenocarcinoma arising in complex hyperplasia, 2017 Z85.42    Vitamin B 12 deficiency E53.8    Atherosclerosis of aorta (HCC) I70.0    Calculus of gallbladder without cholecystitis without obstruction K80.20    CLAROS (nonalcoholic steatohepatitis) K75.81    Paroxysmal atrial fibrillation (HCC) I48.0    Type 2 diabetes mellitus, without long-term current use of insulin (HCC) E11.9    Mobility impaired Z74.09    Chronic cholecystitis K81.1    Multiple atypical skin moles D22.9    Class 3 severe obesity due to excess calories without serious comorbidity with body mass index (BMI) of 40.0 to 44.9 in adult ([facility] E66.01, Z68.41    Bradycardia R00.1    CHF NYHA class I, chronic, diastolic (HCC) K24.36    History of 2019 novel coronavirus disease (COVID-19) Z86.16    Abdominal aortic aneurysm (AAA) without rupture ([facility] I71.4    UTI due to extended-spectrum beta lactamase (ESBL) producing Escherichia coli N39.0, B96.29, Z16.12    UTI (urinary tract infection) N39.0       Isolation/Infection:   Isolation            Contact          Patient Infection Status       Infection Onset Added Last Indicated Last Indicated By Review Planned Expiration Resolved Resolved By    ESBL (Extended Spectrum Beta Lactamase) 10/20/21 10/26/21 03/07/22 Culture, Urine        Resolved    COVID-19 (Rule Out) 21 COVID-19 & Influenza Combo (Ordered)   21 Rule-Out Test Resulted    COVID-19 (Rule Out) 10/10/21 10/10/21 10/10/21 COVID-19, Rapid (Ordered)   10/10/21 Rule-Out Test Resulted    COVID-19 20 COVID-19   10/03/20     COVID-19 (Rule Out) 20 COVID-19 (Ordered)   20 Rule-Out Test Resulted    COVID-19 (Rule Out) 20 Covid-19 Ambulatory (Ordered)   20 Rule-Out Test Resulted            Nurse Assessment:  Last Vital Signs: BP (!) 181/73   Pulse (!) 43   Temp 97.3 °F (36.3 °C)   Resp 18   Ht 5' 2\" (1.575 m)   Wt 238 lb 1.6 oz (108 kg)   LMP  (LMP Unknown)   SpO2 98%   BMI 43.55 kg/m²     Last documented pain score (0-10 scale): Pain Level: 0  Last Weight:   Wt Readings from Last 1 Encounters:   03/10/22 238 lb 1.6 oz (108 kg)     Mental Status:  oriented and alert    IV Access:  - PICC - site  R Basilic, insertion date: 3/10/2022    Nursing Mobility/ADLs:  Walking   Independent  Transfer  Independent  Bathing  Independent  Dressing  Independent  [address]  Independent  Med Delivery   whole    Wound Care Documentation and Therapy:        Elimination:  Continence: Bowel: Yes  Bladder: Yes  Urinary Catheter: None   Colostomy/Ileostomy/Ileal Conduit: No       Date of Last BM:   No intake or output data in the 24 hours ending 03/10/22 1458  No intake/output data recorded. Safety Concerns:     None    Impairments/Disabilities:      None    Nutrition Therapy:  Current Nutrition Therapy:   - Oral Diet:  General    Routes of Feeding: Oral  Liquids: No Restrictions  Daily Fluid Restriction: no  Last Modified Barium Swallow with Video (Video Swallowing Test): not done    Treatments at the Time of Hospital Discharge:   Respiratory Treatments: none  Oxygen Therapy:  is not on home oxygen therapy. Ventilator:    - No ventilator support    Rehab Therapies: Physical Therapy and Occupational Therapy  Weight Bearing Status/Restrictions: none  Other Medical Equipment (for information only, NOT a DME order): Other Treatments: skilled nursing assessment per protocol medication education     IV INVANZ 1GM daily x 5 days . PICC line flushes and care per protocol. PICC line placed right basilic on 8/67/51.      Patient's personal belongings (please select all that are sent with patient):  None    RN SIGNATURE:  Electronically signed by Nila Faye RN on 3/11/22 at 12:34 PM EST    CASE MANAGEMENT/SOCIAL WORK SECTION    Inpatient Status Date: 3/10/22    Readmission Risk Assessment Score:  Readmission Risk              Risk of Unplanned Readmission:  13           Discharging to Facility/ [address]  Phone: [phone]  Fax [facility]: [phone]  F: [phone]       Dialysis Facility (if applicable)   Name:  Address:  Dialysis Schedule:  Phone:  Fax:    / signature: Electronically signed by Conor Bridges RN on 3/10/22 at 2:58 PM EST    PHYSICIAN SECTION    Prognosis: Fair    Condition at Discharge: Stable    Rehab Potential (if transferring to Rehab): Fair    Recommended Labs or Other Treatments After Discharge:     Physician Certification: I certify the above information and transfer of Fabiano Bates  is necessary for the continuing treatment of the diagnosis listed and that she requires Home Care for less 30 days.      Update Admission H&P: No change in H&P    PHYSICIAN SIGNATURE:  Electronically signed by Alli Ortiz MD on 3/11/22 at 12:10 PM EST

## 2022-03-10 NOTE — CARE COORDINATION
CASE MANAGEMENT NOTE:    Admission Date:  3/10/2022 Charli Costa is a 81 y.o.  female    Admitted for : UTI (urinary tract infection) [N39.0]  UTI due to extended-spectrum beta lactamase (ESBL) producing Escherichia coli [N39.0, B96.29, Z16.12]    Met with:  Patient    PCP:  Dr. Charan Caicedo:  [facility] Medicare      Is patient alert and oriented at time of discussion:  Yes    Current Residence/ Living Arrangements:  independently at home             Current Services PTA:  No    Does patient go to outpatient dialysis: No  If yes, location and chair time:     Is patient agreeable to VNS: Yes    Freedom of choice provided:  Yes    List of [address] provided: Yes    VNS chosen:  Yes    DME:  walker and shower chair    Home Oxygen: No    Nebulizer: No    CPAP/BIPAP: NA    Supplier: N/A    Potential Assistance Needed: No    SNF needed: No    Freedom of choice and list provided: NA    Pharmacy:  [facility]       Does Patient want to use MEDS to BEDS? No    Is patient currently receiving oral anticoagulation therapy? No    Is the Patient an [facility] with Readmission Risk Score greater than 14%? Yes  If yes, pt needs a follow up appointment made within 7 days. Family Members/Caregivers that pt would like involved in their care:    Yes    If yes, list name here:  [address] [place] [address]    Transportation Provider:  Family             Discharge Plan:  3/10/22 [facility] Medicare Pt is from home in a two story apt that has a elevator DME rollator shower chair  [address] has had in the past will send referral Plan is to discharge to home [address] [place] is started and needs signed will follow for needs . //tv                Electronically signed by:  Milton Richardson RN on 3/10/2022 at 2:45 PM

## 2022-03-11 ENCOUNTER — TELEPHONE (OUTPATIENT)
Dept: FAMILY MEDICINE CLINIC | Age: 72
End: 2022-03-11

## 2022-03-11 VITALS
HEART RATE: 51 BPM | WEIGHT: 238.1 LBS | DIASTOLIC BLOOD PRESSURE: 73 MMHG | SYSTOLIC BLOOD PRESSURE: 154 MMHG | HEIGHT: 62 IN | OXYGEN SATURATION: 97 % | RESPIRATION RATE: 18 BRPM | BODY MASS INDEX: 43.82 KG/M2 | TEMPERATURE: 98.4 F

## 2022-03-11 LAB
ANION GAP SERPL CALCULATED.3IONS-SCNC: 9 MMOL/L (ref 9–17)
BUN BLDV-MCNC: 23 MG/DL (ref 8–23)
CALCIUM SERPL-MCNC: 9.1 MG/DL (ref 8.6–10.4)
CHLORIDE BLD-SCNC: 104 MMOL/L (ref 98–107)
CO2: 27 MMOL/L (ref 20–31)
CREAT SERPL-MCNC: 1.04 MG/DL (ref 0.5–0.9)
GFR AFRICAN AMERICAN: >60 ML/MIN
GFR NON-AFRICAN AMERICAN: 52 ML/MIN
GFR SERPL CREATININE-BSD FRML MDRD: ABNORMAL ML/MIN/{1.73_M2}
GLUCOSE BLD-MCNC: 96 MG/DL (ref 70–99)
HCT VFR BLD CALC: 34.2 % (ref 36–46)
HEMOGLOBIN: 11.5 G/DL (ref 12–16)
MCH RBC QN AUTO: 30.7 PG (ref 26–34)
MCHC RBC AUTO-ENTMCNC: 33.7 G/DL (ref 31–37)
MCV RBC AUTO: 91.2 FL (ref 80–100)
PDW BLD-RTO: 14.7 % (ref 11.5–14.9)
PLATELET # BLD: 216 K/UL (ref 150–450)
PMV BLD AUTO: 7.8 FL (ref 6–12)
POTASSIUM SERPL-SCNC: 4.1 MMOL/L (ref 3.7–5.3)
RBC # BLD: 3.75 M/UL (ref 4–5.2)
SODIUM BLD-SCNC: 140 MMOL/L (ref 135–144)
WBC # BLD: 7.9 K/UL (ref 3.5–11)

## 2022-03-11 PROCEDURE — 80048 BASIC METABOLIC PNL TOTAL CA: CPT

## 2022-03-11 PROCEDURE — G0378 HOSPITAL OBSERVATION PER HR: HCPCS

## 2022-03-11 PROCEDURE — 6360000002 HC RX W HCPCS: Performed by: INTERNAL MEDICINE

## 2022-03-11 PROCEDURE — 2580000003 HC RX 258: Performed by: INTERNAL MEDICINE

## 2022-03-11 PROCEDURE — 6370000000 HC RX 637 (ALT 250 FOR IP): Performed by: INTERNAL MEDICINE

## 2022-03-11 PROCEDURE — 96366 THER/PROPH/DIAG IV INF ADDON: CPT

## 2022-03-11 PROCEDURE — 85027 COMPLETE CBC AUTOMATED: CPT

## 2022-03-11 PROCEDURE — 99239 HOSP IP/OBS DSCHRG MGMT >30: CPT | Performed by: INTERNAL MEDICINE

## 2022-03-11 PROCEDURE — 36415 COLL VENOUS BLD VENIPUNCTURE: CPT

## 2022-03-11 PROCEDURE — 96367 TX/PROPH/DG ADDL SEQ IV INF: CPT

## 2022-03-11 RX ADMIN — RIVAROXABAN 20 MG: 20 TABLET, FILM COATED ORAL at 08:38

## 2022-03-11 RX ADMIN — METOPROLOL TARTRATE 25 MG: 25 TABLET, FILM COATED ORAL at 08:37

## 2022-03-11 RX ADMIN — MEROPENEM 1000 MG: 1 INJECTION, POWDER, FOR SOLUTION INTRAVENOUS at 05:55

## 2022-03-11 RX ADMIN — SODIUM CHLORIDE, PRESERVATIVE FREE 10 ML: 5 INJECTION INTRAVENOUS at 08:39

## 2022-03-11 RX ADMIN — ATORVASTATIN CALCIUM 40 MG: 40 TABLET, FILM COATED ORAL at 08:38

## 2022-03-11 RX ADMIN — LEVOTHYROXINE SODIUM 75 MCG: 0.07 TABLET ORAL at 05:49

## 2022-03-11 RX ADMIN — ERTAPENEM 1000 MG: 1 INJECTION INTRAMUSCULAR; INTRAVENOUS at 13:13

## 2022-03-11 RX ADMIN — DOCUSATE SODIUM 100 MG: 100 CAPSULE ORAL at 08:38

## 2022-03-11 RX ADMIN — PANTOPRAZOLE SODIUM 40 MG: 40 TABLET, DELAYED RELEASE ORAL at 05:48

## 2022-03-11 RX ADMIN — AMIODARONE HYDROCHLORIDE 200 MG: 200 TABLET ORAL at 08:38

## 2022-03-11 RX ADMIN — LISINOPRIL 10 MG: 10 TABLET ORAL at 08:37

## 2022-03-11 ASSESSMENT — PAIN SCALES - GENERAL: PAINLEVEL_OUTOF10: 0

## 2022-03-11 NOTE — TELEPHONE ENCOUNTER
[place] [facility] called to ask if  will be okay following up on orders for home visits. Verbal answer with a phone call will be fine [phone].

## 2022-03-11 NOTE — PLAN OF CARE
Problem: Falls - Risk of:  Goal: Will remain free from falls  Description: Will remain free from falls  Outcome: Ongoing     Problem: Falls - Risk of:  Goal: Absence of physical injury  Description: Absence of physical injury  3/11/2022 0630 by Pb Marks RN  Outcome: Ongoing     Problem: Infection:  Goal: Will remain free from infection  Description: Will remain free from infection  3/11/2022 0630 by Pb Marks RN  Outcome: Ongoing     Problem: Safety:  Goal: Free from accidental physical injury  Description: Free from accidental physical injury  3/11/2022 0630 by Pb Marks RN  Outcome: Ongoing     Problem: Safety:  Goal: Free from intentional harm  Description: Free from intentional harm  3/11/2022 0630 by Pb Marks RN  Outcome: Ongoing

## 2022-03-11 NOTE — CARE COORDINATION
DISCHARGE PLANNING NOTE:    Referral faxed to [facility]. Spoke to Brenda Martinez and patient is covered at 100%. Pt has Right Basilic single PICC line placed 3/10/22. Awaiting order, will fax to [facility]. [address] notified of probable d/c today and start of care for Saturday.   Electronically signed by Jovi Shetty RN on 3/11/2022 at 10:40 AM

## 2022-03-11 NOTE — CARE COORDINATION
with pelvic lymph node dissection    THYROID SURGERY  1980    subtotal 20 years ago    TONSILLECTOMY      UPPER GASTROINTESTINAL ENDOSCOPY N/A 8/3/2020    EGD BIOPSY performed by Elder Nuñez MD at [address]       Immunization History:   Immunization History   Administered Date(s) Administered    COVID-19, Pfizer Purple top, DILUTE for use, 12+ yrs, 30mcg/0.3mL dose 07/22/2021, 10/15/2021    Influenza Vaccine, unspecified formulation 10/01/2016    Influenza Virus Vaccine 10/01/2019    Influenza, Quadv, adjuvanted, 72 yrs +, IM, PF (Fluad) 10/30/2020, 11/03/2021    Influenza, Triv, inactivated, subunit, adjuvanted, IM (Fluad 65 yrs and older) 09/14/2017, 10/02/2018    Pneumococcal Conjugate 13-valent ([id number]) 02/23/2017    Pneumococcal Polysaccharide (Lvgyzwiut57) 04/20/2018    Tdap (Boostrix, Adacel) 09/28/2017       Active Problems:  Patient Active Problem List   Diagnosis Code    Acquired hypothyroidism E03.9    History of TIA (transient ischemic attack) Z86.73    Chronic bilateral low back pain without sciatica M54.50, G89.29    Benign hypertension with CKD (chronic kidney disease) stage III (HCC) I12.9, N18.30    Osteopenia determined by x-ray M85.80    Osteoarthritis involving multiple joints on both sides of body M15.9    Left knee DJD M17.12    Vitamin D deficiency E55.9    Mixed incontinence N39.46    Chronic pain of both knees M25.561, M25.562, G89.29    Slow transit constipation K59.01    Allergic rhinitis J30.9    Hyperlipidemia with target LDL less than 100 E78.5    Obesity, Class III, BMI 40-49.9 (morbid obesity) ([facility] E66.01    At high risk for falls Z91.81    Spondylosis of lumbar region without myelopathy or radiculopathy M47.816    OAB (overactive bladder) N32.81    Primary osteoarthritis of both knees M17.0    Adenomatous polyp of sigmoid colon, 6/26/17 D12.5    TLHBSO, bilateral LND 10/24/17 Z90.710, Z90.79, Z90.722    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    Cataracta b/l eyes H26.9    Difficulty walking R26.2    Esotropia H50.00    History of uterine cancer, Well differentiated grade 1 adenocarcinoma arising in complex hyperplasia, 2017 Z85.42    Vitamin B 12 deficiency E53.8    Atherosclerosis of aorta (HCC) I70.0    Calculus of gallbladder without cholecystitis without obstruction K80.20    CLAROS (nonalcoholic steatohepatitis) K75.81    Paroxysmal atrial fibrillation (HCC) I48.0    Type 2 diabetes mellitus, without long-term current use of insulin (HCC) E11.9    Mobility impaired Z74.09    Chronic cholecystitis K81.1    Multiple atypical skin moles D22.9    Class 3 severe obesity due to excess calories without serious comorbidity with body mass index (BMI) of 40.0 to 44.9 in adult ([facility] E66.01, Z68.41    Bradycardia R00.1    CHF NYHA class I, chronic, diastolic (HCC) U44.97    History of 2019 novel coronavirus disease (COVID-19) Z86.16    Abdominal aortic aneurysm (AAA) without rupture (HCC) I71.4    UTI due to extended-spectrum beta lactamase (ESBL) producing Escherichia coli N39.0, B96.29, Z16.12    UTI (urinary tract infection) N39.0       Isolation/Infection:   Isolation            Contact          Patient Infection Status       Infection Onset Added Last Indicated Last Indicated By Review Planned Expiration Resolved Resolved By    ESBL (Extended Spectrum Beta Lactamase) 10/20/21 10/26/21 03/07/22 Culture, Urine        Resolved    COVID-19 (Rule Out) 21 COVID-19 & Influenza Combo (Ordered)   21 Rule-Out Test Resulted    COVID-19 (Rule Out) 10/10/21 10/10/21 10/10/21 COVID-19, Rapid (Ordered)   10/10/21 Rule-Out Test Resulted    COVID-19 20 COVID-19   10/03/20     COVID-19 (Rule Out) 20 COVID-19 (Ordered)   20 Rule-Out Test Resulted    COVID-19 (Rule Out) 20 Covid-19 Ambulatory (Ordered)   20 Rule-Out Test Resulted            Nurse Assessment:  Last Vital Signs: BP (!) 181/73   Pulse (!) 43   Temp 97.3 °F (36.3 °C)   Resp 18   Ht 5' 2\" (1.575 m)   Wt 238 lb 1.6 oz (108 kg)   LMP  (LMP Unknown)   SpO2 98%   BMI 43.55 kg/m²     Last documented pain score (0-10 scale): Pain Level: 0  Last Weight:   Wt Readings from Last 1 Encounters:   03/10/22 238 lb 1.6 oz (108 kg)     Mental Status:  oriented and alert    IV Access:  - PICC - site  R Basilic, insertion date: 3/10/2022    Nursing Mobility/ADLs:  Walking   Independent  Transfer  Independent  Bathing  Independent  Dressing  Independent  Toileting  Independent  Feeding  [address]  Independent  Med Delivery   whole    Wound Care Documentation and Therapy:        Elimination:  Continence:   · Bowel: Yes  · Bladder: Yes  Urinary Catheter: None   Colostomy/Ileostomy/Ileal Conduit: No       Date of Last BM:   No intake or output data in the 24 hours ending 03/10/22 1458  No intake/output data recorded. Safety Concerns:     None    Impairments/Disabilities:      None    Nutrition Therapy:  Current Nutrition Therapy:   - Oral Diet:  General    Routes of Feeding: Oral  Liquids: No Restrictions  Daily Fluid Restriction: no  Last Modified Barium Swallow with Video (Video Swallowing Test): not done    Treatments at the Time of Hospital Discharge:   Respiratory Treatments: none  Oxygen Therapy:  is not on home oxygen therapy. Ventilator:    - No ventilator support    Rehab Therapies: Physical Therapy and Occupational Therapy  Weight Bearing Status/Restrictions: none  Other Medical Equipment (for information only, NOT a DME order): Other Treatments: skilled nursing assessment per protocol medication education     IV INVANZ 1GM daily x 5 days . PICC line flushes and care per protocol. PICC line placed right basilic on 6/99/36.      Patient's personal belongings (please select all that are sent with patient):  None    RN SIGNATURE: Electronically signed by Naomi Mortimer, RN on 3/11/22 at 12:34 PM EST    CASE MANAGEMENT/SOCIAL WORK SECTION    Inpatient Status Date: 3/10/22    Readmission Risk Assessment Score:  Readmission Risk              Risk of Unplanned Readmission:  13           Discharging to Facility/ Agency   · [address]  · Phone: [phone]  · Fax [phone]    [facility]: [phone]  F: [phone]   ·     Dialysis Facility (if applicable)   · Name:  · Address:  · Dialysis Schedule:  · Phone:  · Fax:    / signature: Electronically signed by Nakul Rabago RN on 3/10/22 at 2:58 PM EST    PHYSICIAN SECTION    Prognosis: Fair    Condition at Discharge: Stable    Rehab Potential (if transferring to Rehab): Fair    Recommended Labs or Other Treatments After Discharge:     Physician Certification: I certify the above information and transfer of Praneeth Gupta  is necessary for the continuing treatment of the diagnosis listed and that she requires Home Care for less 30 days. Update Admission H&P: No change in H&P    PHYSICIAN SIGNATURE:  Electronically signed by Amandeep Castañeda MD on 3/11/22 at 12:10 PM EST    START taking these medications    START taking these medications   ertapenem  infusion  Commonly known as: INVanz  Infuse 1,000 mg intravenously every 24 hours for 5 days Compound per protocol.      CONTINUE taking these medications    CONTINUE taking these medications   Acetaminophen Extra Strength 500 MG tablet  Generic drug: acetaminophen  TAKE 1 TABLET BY MOUTH EVERY 6 HOURS AS NEEDED FOR PAIN   albuterol sulfate  (90 Base) MCG/ACT inhaler  Commonly known as: Proventil HFA  Inhale 1-2 puffs into the lungs every 4 hours as needed for Wheezing   Alcohol Prep Pads  Use as directed   amiodarone 200 MG tablet  Commonly known as: CORDARONE  Take 1 tablet by mouth daily   atorvastatin 40 MG tablet  Commonly known as: LIPITOR  TAKE ONE (1) TABLET BY MOUTH DAILY STOP SIMVASTATIN blood glucose test strips  Test 2-3 times a day & as needed for symptoms of irregular blood glucose. BRAND OF CHOICE INSURANCE ALLOWS. Blood Pressure Monitor Kit  Use as directed. clotrimazole-betamethasone 1-0.05 % cream  Commonly known as: Lotrisone  Apply topically 1 times daily on the perineum, alternate powder, for 10 days   docusate sodium 100 MG capsule  Commonly known as: DOK  TAKE ONE (1) CAPSULE BY MOUTH TWO (2) TIMES DAILY FOR CONSTIPATION   glucose monitoring kit  Use as directed. BRAND OF CHOICE INSURANCE ALLOWS.    Lancets Misc  1 each by Does not apply route 2 times daily   levothyroxine 75 MCG tablet  Commonly known as: SYNTHROID  TAKE ONE (1) TABLET BY MOUTH EVERY MORNING (BEFORE BREAKFAST)   * lidocaine 4 % cream  Commonly known as: LMX  5 g topical 2-3 times a day as needed for pain, maximum 20 g/day   * lidocaine 5 %  Commonly known as: LIDODERM  PLACE ONE (1) PATCH ONTO THE SKIN DAILY 12 HOURS ON, 12 HOURS OFF.   lisinopril 10 MG tablet  Commonly known as: PRINIVIL;ZESTRIL  TAKE 1 TABLET BY MOUTH ONCE DAILY   metoprolol tartrate 25 MG tablet  Commonly known as: LOPRESSOR  Take 1 tablet by mouth 2 times daily Dose decreased 10/20/2021 due to bradycardia   nystatin 344753 UNIT/GM powder  Commonly known as: MYCOSTATIN  Apply daily for 4-6 weeks   ondansetron 4 MG tablet  Commonly known as: ZOFRAN  TAKE ONE (1) TABLET BY MOUTH EVERY 8 HOURS AS NEEDED   oxybutynin 10 MG extended release tablet  Commonly known as: DITROPAN-XL  TAKE 1 TABLET BY MOUTH ONCE DAILY   pantoprazole 40 MG tablet  Commonly known as: PROTONIX  TAKE 1 TABLET BY MOUTH ONCE DAILY   Keiko-Bid Probiotic Tabs  TAKE 1 TABLET BY MOUTH IN THE MORNING   vitamin B-12 1000 MCG tablet  Commonly known as: CYANOCOBALAMIN  TAKE ONE (1) TABLET BY MOUTH DAILY   vitamin D3 25 MCG (1000 UT) Tabs tablet  Commonly known as: CHOLECALCIFEROL  TAKE 2 TABLETS BY MOUTH ONCE DAILY   Xarelto 20 MG Tabs tablet  Generic drug: rivaroxaban  TAKE ONE (1) TABLET BY MOUTH DAILY (WITH BREAKFAST)    (very important)  * This list has 2 medication(s) that are the same as other medications prescribed for you. Read the directions carefully, and ask your doctor or other care provider to review them with you.        STOP taking these medications    STOP taking these medications   cephALEXin 500 MG capsule  Commonly known as: Lucero Formosa

## 2022-03-11 NOTE — TELEPHONE ENCOUNTER
Noted. Thank you!   Yes I agree    Future Appointments   Date Time Provider Dante Amaya   3/22/2022  8:30 AM Sachin Rosado MD  sc [facility]   3/23/2022  9:30 AM Jakob Lopes MD INF DIS [facility]   4/14/2022 10:30 AM Danny Simons PA-C GYN Oncology [facility]   4/27/2022  2:15 PM [facility]   8/15/2022 11:30 AM Noah Murguia MD  derm [facility]

## 2022-03-11 NOTE — CARE COORDINATION
DISCHARGE PLANNING NOTE:    Akash Jimenez is out of network for home IV therapy. New referral faxed to [facility]. Electronically signed by Yamile Painting RN on 3/11/2022 at 9:02 AM

## 2022-03-11 NOTE — DISCHARGE SUMMARY
[facility]    Discharge Summary     Patient ID: Jayleen Velázquez  :  1950   MRN: [id number]     ACCOUNT:  [de-identified]   Patient's PCP: Sachin Rosado MD  Admit Date: 3/10/2022   Discharge Date: 3/11  Length of Stay: 1  Code Status:  Full Code  Admitting Physician: Yury Moraes MD  Discharge Physician: Yury Moraes MD     Active Discharge Diagnoses:     Primary Problem  UTI due to extended-spectrum beta lactamase (ESBL) producing Escherichia coli      [facility] Problems    Diagnosis Date Noted    UTI (urinary tract infection) [N39.0] 2022    UTI due to extended-spectrum beta lactamase (ESBL) producing Escherichia coli [N39.0, B96.29, Z16.12] 2022    Class 3 severe obesity due to excess calories without serious comorbidity with body mass index (BMI) of 40.0 to 44.9 in adult ([facility] [E66.01, Z68.41] 2020    Type 2 diabetes mellitus, without long-term current use of insulin ([facility] [E11.9] 2020    Paroxysmal atrial fibrillation ([facility] [I48.0] 2020    CLAROS (nonalcoholic steatohepatitis) [K75.81] 03/10/2019    History of uterine cancer, Well differentiated grade 1 adenocarcinoma arising in complex hyperplasia, 2017 [Z85.42] 2018    Benign hypertension with CKD (chronic kidney disease) stage III ([facility] [I12.9, N18.30]        Admission Condition:  fair     Discharged Condition: fair    Hospital Stay:     Hospital Course: Jayleen Velázquez is a 81 y.o. female who was admitted for the management of UTI due to extended-spectrum beta lactamase (ESBL) producing Escherichia coli , presented to ER with No chief complaint on file. This patient is a 81 y.o.  Non- / non  femalewho presents with  ESBL UTI  History of type 2 diabetes, hypertension, CKD stage III, uterine cancer status post surgery, CLAROS, paroxysmal A. fib on metoprolol, Xarelto  She was seen at urgent care on  for malodorous urine  Urine cultures were done at the time showing ESBL E. Coli, PCP reviewed the results and recommended inpatient admission for IV antibiotics  Today patient denies any symptoms of dysuria frequency abdominal pain denies fever chills at home  Started on Merrem    ESBL UTI-started on Merrem  Paroxysmal A. fib-rate controlled continue metoprolol, amiodarone, Xarelto  CKD-creatinine at baseline  Hypertension-currently controlled     PICC line inserted, discharged on Invanz to complete 7 days  Significant therapeutic interventions: As above    Significant Diagnostic Studies:   Labs / Micro:    Lab Results   Component Value Date    WBC 7.9 03/11/2022    HGB 11.5 (L) 03/11/2022    HCT 34.2 (L) 03/11/2022    MCV 91.2 03/11/2022     03/11/2022          Lab Results   Component Value Date     03/11/2022    K 4.1 03/11/2022    K 4.1 10/10/2021     03/11/2022    CO2 27 03/11/2022    BUN 23 03/11/2022    CREATININE 1.04 03/11/2022    GLUCOSE 96 03/11/2022    GLUCOSE 114 03/20/2012    CALCIUM 9.1 03/11/2022          Radiology:    No results found. Consultations:    Consults:     Final Specialist Recommendations/Findings:   None      The patient was seen and examined on day of discharge and this discharge summary is in conjunction with any daily progress note from day of discharge. Discharge plan:     Disposition: home      Instructions to Patient: Keep follow-up appointments      Requiring Further Evaluation/Follow Up POST HOSPITALIZATION/Incidental Findings:     Physician Follow Up:     No follow-up provider specified. Activity: Resume as directed    Discharge Medications:      Medication List      START taking these medications    ertapenem  infusion  Commonly known as: INVanz  Infuse 1,000 mg intravenously every 24 hours for 5 days Compound per protocol.         CONTINUE taking these medications    Acetaminophen Extra Strength 500 MG tablet  Generic drug: acetaminophen  TAKE 1 TABLET BY MOUTH EVERY 6 HOURS AS NEEDED FOR PAIN     albuterol sulfate  (90 Base) MCG/ACT inhaler  Commonly known as: Proventil HFA  Inhale 1-2 puffs into the lungs every 4 hours as needed for Wheezing     Alcohol Prep Pads  Use as directed     amiodarone 200 MG tablet  Commonly known as: CORDARONE  Take 1 tablet by mouth daily     atorvastatin 40 MG tablet  Commonly known as: LIPITOR  TAKE ONE (1) TABLET BY MOUTH DAILY STOP SIMVASTATIN     blood glucose test strips  Test 2-3 times a day & as needed for symptoms of irregular blood glucose. BRAND OF CHOICE INSURANCE ALLOWS. Blood Pressure Monitor Kit  Use as directed. clotrimazole-betamethasone 1-0.05 % cream  Commonly known as: Lotrisone  Apply topically 1 times daily on the perineum, alternate powder, for 10 days     docusate sodium 100 MG capsule  Commonly known as: DOK  TAKE ONE (1) CAPSULE BY MOUTH TWO (2) TIMES DAILY FOR CONSTIPATION     glucose monitoring kit  Use as directed. BRAND OF CHOICE INSURANCE ALLOWS.      Lancets Misc  1 each by Does not apply route 2 times daily     levothyroxine 75 MCG tablet  Commonly known as: SYNTHROID  TAKE ONE (1) TABLET BY MOUTH EVERY MORNING (BEFORE BREAKFAST)     * lidocaine 4 % cream  Commonly known as: LMX  5 g topical 2-3 times a day as needed for pain, maximum 20 g/day     * lidocaine 5 %  Commonly known as: LIDODERM  PLACE ONE (1) PATCH ONTO THE SKIN DAILY 12 HOURS ON, 12 HOURS OFF.     lisinopril 10 MG tablet  Commonly known as: PRINIVIL;ZESTRIL  TAKE 1 TABLET BY MOUTH ONCE DAILY     metoprolol tartrate 25 MG tablet  Commonly known as: LOPRESSOR  Take 1 tablet by mouth 2 times daily Dose decreased 10/20/2021 due to bradycardia     nystatin 144610 UNIT/GM powder  Commonly known as: MYCOSTATIN  Apply daily for 4-6 weeks     ondansetron 4 MG tablet  Commonly known as: ZOFRAN  TAKE ONE (1) TABLET BY MOUTH EVERY 8 HOURS AS NEEDED     oxybutynin 10 MG extended release tablet  Commonly known as: DITROPAN-XL  TAKE 1 TABLET BY MOUTH ONCE DAILY     pantoprazole 40 MG tablet  Commonly known as: PROTONIX  TAKE 1 TABLET BY MOUTH ONCE DAILY     Keiko-Bid Probiotic Tabs  TAKE 1 TABLET BY MOUTH IN THE MORNING     vitamin B-12 1000 MCG tablet  Commonly known as: CYANOCOBALAMIN  TAKE ONE (1) TABLET BY MOUTH DAILY     vitamin D3 25 MCG (1000 UT) Tabs tablet  Commonly known as: CHOLECALCIFEROL  TAKE 2 TABLETS BY MOUTH ONCE DAILY     Xarelto 20 MG Tabs tablet  Generic drug: rivaroxaban  TAKE ONE (1) TABLET BY MOUTH DAILY (WITH BREAKFAST)         * This list has 2 medication(s) that are the same as other medications prescribed for you. Read the directions carefully, and ask your doctor or other care provider to review them with you. STOP taking these medications    cephALEXin 500 MG capsule  Commonly known as: Diana Allegra           Where to Get Your Medications      You can get these medications from any pharmacy    Bring a paper prescription for each of these medications  ertapenem  infusion         Time Spent on discharge is  35 mins in patient examination, evaluation, counseling as well as medication reconciliation, prescriptions for required medications, discharge plan and follow up. Electronically signed by   Yury Ridley MD  3/11/2022  12:10 PM      Thank you Dr. Carlos Anglin MD for the opportunity to be involved in this patient's care.

## 2022-03-11 NOTE — DISCHARGE INSTR - DIET
REGULAR DIET    Good nutrition is important when healing from an illness, injury, or surgery. Follow any nutrition recommendations given to you during your hospital stay. If you were given an oral nutrition supplement while in the hospital, continue to take this supplement at home. You can take it with meals, in-between meals, and/or before bedtime. These supplements can be purchased at most local grocery stores, pharmacies, and chain [employer]-stores. If you have any questions about your diet or nutrition, call the hospital and ask for the dietitian.

## 2022-03-14 ENCOUNTER — TELEPHONE (OUTPATIENT)
Dept: FAMILY MEDICINE CLINIC | Age: 72
End: 2022-03-14

## 2022-03-14 ENCOUNTER — CARE COORDINATION (OUTPATIENT)
Dept: CASE MANAGEMENT | Age: 72
End: 2022-03-14

## 2022-03-14 DIAGNOSIS — B96.29 UTI DUE TO EXTENDED-SPECTRUM BETA LACTAMASE (ESBL) PRODUCING ESCHERICHIA COLI: Primary | ICD-10-CM

## 2022-03-14 DIAGNOSIS — N39.0 UTI DUE TO EXTENDED-SPECTRUM BETA LACTAMASE (ESBL) PRODUCING ESCHERICHIA COLI: Primary | ICD-10-CM

## 2022-03-14 DIAGNOSIS — Z16.12 UTI DUE TO EXTENDED-SPECTRUM BETA LACTAMASE (ESBL) PRODUCING ESCHERICHIA COLI: Primary | ICD-10-CM

## 2022-03-14 PROCEDURE — 1111F DSCHRG MED/CURRENT MED MERGE: CPT | Performed by: FAMILY MEDICINE

## 2022-03-14 NOTE — TELEPHONE ENCOUNTER
[facility] Transitions Initial Follow Up Call    Outreach made within 2 business days of discharge: Yes    Patient: Woodrow Mcardle Patient : 1950   MRN: [id number]  Reason for Admission:UTI due to extended-spectrum beta lactamase (ESBL) producing Escherichia coli     Discharge Date: 3/11/22       Spoke with: patient     Discharge department/facility: [facility] Interactive Patient Contact:  Was patient able to fill all prescriptions: Yes  Was patient instructed to bring all medications to the follow-up visit: Yes  Is patient taking all medications as directed in the discharge summary?  Yes  Does patient understand their discharge instructions: Yes  Does patient have questions or concerns that need addressed prior to 7-14 day follow up office visit: yes     Scheduled appointment with PCP within 7-14 days    Follow Up  Future Appointments   Date Time Provider Dante Amaya   3/22/2022  8:30 AM [facility]   3/23/2022  9:30 AM Cassandra Ferguson MD INF DIS [place] [facility]   2022 10:30 AM Selina Diggs PA-C GYN Oncology [facility]   2022  2:15 PM [facility]   8/15/2022 11:30 [facility]       Tessie Alicia MA

## 2022-03-14 NOTE — CARE COORDINATION
[facility] Transitions Initial Follow Up Call    Call within 2 business days of discharge: Yes    Patient: Jana Buckley Patient : 1950   MRN: <[id number]>  Reason for Admission: UTI  Discharge Date: 3/11/22 RARS: Readmission Risk Score: 12.2 ( )      Last Discharge [facility]       Complaint Diagnosis Description Type Department Provider    3/10/22   Admission (Discharged) Rufina Barney MD           Spoke with: Transitions of Care Initial Call: Spoke to Dean Goodman. Explained the role of Care Transition Nurse and the Transition program, patient is agreeable to follow up calls Post discharge from the [facility] Jaylyn states she had blood in her urine and her Dr told her to go to the hospital. She states she feels fine since discharge. Denies pain, dysuria or hematuria. Appetite and fluid intake is good. Does not check FBS. She states she has no glucometer and denies referral to obtain one. Bowels WNL. Pt states she uses a rollator to ambulate. 1111F medication reconciliation completed. Dean Goodman has a PICC line in her right arm for IV Invanz. [facility] to complete infusions. Discussed f/u appts with Dr Rocky Pagan and Dr Madhav Leija on 3/22/22 and 3/23/22. Pt verbalized understanding of dates and times. She states she has transportation through the [employer]. Call to [facility]. SOC scheduled today. No new issues or concerns. Will continue to follow. Patient is aware of when to contact MD with any new or worsening symptoms. Advised to contact PCP  with any health concerns for early outpatient intervention in an effort to avoid hospitalization. Report any worsening symptoms to PCP and/or Call 911 and/or GO TO EMERGENCY ROOM if symptoms are severe. Expresses understanding. Facility: [address] [place] [address]    Non-face-to-face services provided:  Obtained and reviewed discharge summary and/or continuity of care documents   Transitions of Care Initial Call    Was this an external facility discharge?  No Discharge Facility: N/A    Challenges to be reviewed by the provider   Additional needs identified to be addressed with provider: No  none             Method of communication with provider : none      Advance Care Planning:   Does patient have an Advance Directive: reviewed and current. Was this a readmission? No  Patient stated reason for admission: UTI    Patients top risk factors for readmission: medical condition-UTI    Care Transition Nurse (CTN) contacted the patient by telephone to perform post hospital discharge assessment. Verified name and  with patient as identifiers. Provided introduction to self, and explanation of the CTN role. CTN reviewed discharge instructions, medical action plan and red flags with patient who verbalized understanding. Patient given an opportunity to ask questions and does not have any further questions or concerns at this time. Were discharge instructions available to patient? Yes. Reviewed appropriate site of care based on symptoms and resources available to patient including: PCP, Specialist, Home health, When to call 911 and [address]. The patient agrees to contact the PCP office for questions related to their healthcare. Medication reconciliation was performed with patient, who verbalizes understanding of administration of home medications. Advised obtaining a 90-day supply of all daily and as-needed medications. Covid Risk Education     Educated patient about risk for severe COVID-19 due to risk factors according to CDC guidelines. LPN CC reviewed discharge instructions, medical action plan and red flag symptoms with the patient who verbalized understanding. Discussed COVID vaccination status: Yes. Education provided on COVID-19 vaccination as appropriate. Discussed exposure protocols and quarantine with CDC Guidelines.  Patient was given an opportunity to verbalize any questions and concerns and agrees to contact LPN CC or health care provider for questions related to their healthcare. Reviewed and educated patient on any new and changed medications related to discharge diagnosis. Was patient discharged with a pulse oximeter? No Discussed and confirmed pulse oximeter discharge instructions and when to notify provider or seek emergency care. LPN CC provided contact information. Plan for follow-up call in 5-7 days based on severity of symptoms and risk factors. Plan for next call: symptom management-s/s of UTI  dysuria  hematuria  self management-monitor PICC line  follow up appointment-with PCP and infectious disease  medication management-take all medications as prescribed        Care Transitions 24 Hour Call    Do you have all of your prescriptions and are they filled?: Yes  Post Acute Services: Home Health (Comment:  [id number] [facility] [address])  Care Transitions Interventions         Follow Up  Future Appointments   Date Time Provider Dante Amaya   3/22/2022  8:30 AM Brittany Meyer MD [facility]   3/23/2022  9:30 AM [facility]   4/14/2022 10:30 AM [facility]   4/27/2022  2:15 PM Ferny [facility]arela [facility]   8/15/2022 11:30 AM Shawn Ruiz MD  derm [address] Care Transitions Nurse   [phone]

## 2022-03-15 LAB — STATUS: NORMAL

## 2022-03-18 ENCOUNTER — TELEPHONE (OUTPATIENT)
Dept: FAMILY MEDICINE CLINIC | Age: 72
End: 2022-03-18

## 2022-03-18 NOTE — TELEPHONE ENCOUNTER
----- Message from Jean Pierre Green sent at 3/18/2022  3:33 PM EDT -----  Subject: Message to Provider    QUESTIONS  Information for Provider? Patient request call back for lab results  ---------------------------------------------------------------------------  --------------  CALL BACK INFO  What is the best way for the office to contact you? OK to leave message on   voicemail  Preferred Call Back Phone Number? [phone]  ---------------------------------------------------------------------------  --------------  SCRIPT ANSWERS  Relationship to Patient?  Self

## 2022-03-18 NOTE — TELEPHONE ENCOUNTER
Pt is asking if her blood work was ok from when she was in the hospital. Please advise she was concerned about her kidney function.

## 2022-03-21 ENCOUNTER — CARE COORDINATION (OUTPATIENT)
Dept: CASE MANAGEMENT | Age: 72
End: 2022-03-21

## 2022-03-21 NOTE — CARE COORDINATION
[facility] Transitions Follow Up Call    3/21/2022    Patient: Suzan Jarquin  Patient : 1950   MRN: [id number]  Reason for Admission: UTI  Discharge Date: 3/11/22 RARS: Readmission Risk Score: 12.2 ( )         Spoke with: [facility] Transitions Follow Up Call    Rodrigo Bowser stated she is doing OK, aware of PCP appt tomorrow and ID on 3/23/22, has transportation. Stated she still has PICC line, flushing easily, Allyson Diop visiting. Stated she will likely get [facility] out at f/u appt or later this week. Has lab work with appts. Denies pain, blood in urine. Stated she never saw blood initially in urine when she was advised to go to ED by PCP. Needs to be reviewed by the provider   Additional needs identified to be addressed with provider: No  none             Method of communication with provider : none      Care Transition Nurse (CTN) contacted the patient by telephone to follow up after admission on 3/10/22. Verified name and  with patient as identifiers. Addressed changes since last contact: has PCP appt tomorrow, ID on 3/23/22. has PICC line, no s/s of infection, urinating without pain, no blood noted  Discussed follow-up appointments. CTN reviewed discharge instructions, medical action plan and red flags with patient and discussed any barriers to care and/or understanding of plan of care after discharge. Discussed appropriate site of care based on symptoms and resources available to patient including: PCP  Specialist  Home health  When to call 911. The patient agrees to contact the PCP office for questions related to their healthcare. CTN provided contact information for future needs. Plan for follow-up call in 5-7 days based on severity of symptoms and risk factors. Plan for next call: symptom management-pain, picc out? HHC still?  [facility]OP good?  follow up appointment-PCP, ID f/u        Care Transitions Subsequent and Final Call    Subsequent and Final Calls  Do you have any ongoing symptoms?: No  Have your medications changed?: No  Do you have any questions related to your medications?: No  Do you currently have any active services?: Yes  Are you currently active with any services?: Home Health  Identified Barriers: None  Care Transitions Interventions  Other Interventions:            Follow Up  Future Appointments   Date Time Provider Dante Monique   3/22/2022  8:30 AM Kian Alicia MD [facility]   3/23/2022  9:30 AM Conrad Bonilla MD INF DIS [facility]   4/14/2022 10:30 AM Shravan Toro PA-C GYN Oncology [facility]   4/27/2022  2:15 PM Elena Guzman MD [facility]   8/15/2022 11:30 AM Gaetano Martinez MD [facility] Rakan Shah RN

## 2022-03-22 ENCOUNTER — HOSPITAL ENCOUNTER (OUTPATIENT)
Age: 72
Setting detail: SPECIMEN
Discharge: HOME | End: 2022-03-22
Payer: MEDICARE

## 2022-03-22 ENCOUNTER — OFFICE VISIT (OUTPATIENT)
Dept: FAMILY MEDICINE CLINIC | Age: 72
End: 2022-03-22
Payer: MEDICARE

## 2022-03-22 ENCOUNTER — TELEPHONE (OUTPATIENT)
Dept: FAMILY MEDICINE CLINIC | Age: 72
End: 2022-03-22

## 2022-03-22 VITALS
OXYGEN SATURATION: 96 % | DIASTOLIC BLOOD PRESSURE: 72 MMHG | SYSTOLIC BLOOD PRESSURE: 136 MMHG | TEMPERATURE: 97.3 F | BODY MASS INDEX: 45.56 KG/M2 | HEIGHT: 62 IN | WEIGHT: 247.6 LBS | HEART RATE: 104 BPM

## 2022-03-22 DIAGNOSIS — Z12.31 ENCOUNTER FOR SCREENING MAMMOGRAM FOR BREAST CANCER: ICD-10-CM

## 2022-03-22 DIAGNOSIS — I50.32 CHF NYHA CLASS I, CHRONIC, DIASTOLIC (HCC): ICD-10-CM

## 2022-03-22 DIAGNOSIS — N32.81 OAB (OVERACTIVE BLADDER): ICD-10-CM

## 2022-03-22 DIAGNOSIS — Z23 ENCOUNTER FOR IMMUNIZATION: ICD-10-CM

## 2022-03-22 DIAGNOSIS — I50.32 CHRONIC DIASTOLIC CONGESTIVE HEART FAILURE (HCC): ICD-10-CM

## 2022-03-22 DIAGNOSIS — Z78.0 POSTMENOPAUSAL: ICD-10-CM

## 2022-03-22 DIAGNOSIS — N39.46 MIXED INCONTINENCE: ICD-10-CM

## 2022-03-22 DIAGNOSIS — Z16.12 UTI DUE TO EXTENDED-SPECTRUM BETA LACTAMASE (ESBL) PRODUCING ESCHERICHIA COLI: ICD-10-CM

## 2022-03-22 DIAGNOSIS — I12.9 BENIGN HYPERTENSION WITH CKD (CHRONIC KIDNEY DISEASE) STAGE III (HCC): Primary | ICD-10-CM

## 2022-03-22 DIAGNOSIS — B96.29 UTI DUE TO EXTENDED-SPECTRUM BETA LACTAMASE (ESBL) PRODUCING ESCHERICHIA COLI: ICD-10-CM

## 2022-03-22 DIAGNOSIS — I70.0 ATHEROSCLEROSIS OF AORTA (HCC): ICD-10-CM

## 2022-03-22 DIAGNOSIS — I12.9 BENIGN HYPERTENSION WITH CKD (CHRONIC KIDNEY DISEASE) STAGE III (HCC): ICD-10-CM

## 2022-03-22 DIAGNOSIS — N39.0 UTI DUE TO EXTENDED-SPECTRUM BETA LACTAMASE (ESBL) PRODUCING ESCHERICHIA COLI: ICD-10-CM

## 2022-03-22 DIAGNOSIS — N18.30 BENIGN HYPERTENSION WITH CKD (CHRONIC KIDNEY DISEASE) STAGE III (HCC): ICD-10-CM

## 2022-03-22 DIAGNOSIS — I48.0 PAROXYSMAL ATRIAL FIBRILLATION (HCC): ICD-10-CM

## 2022-03-22 DIAGNOSIS — N18.30 BENIGN HYPERTENSION WITH CKD (CHRONIC KIDNEY DISEASE) STAGE III (HCC): Primary | ICD-10-CM

## 2022-03-22 DIAGNOSIS — M1A.30X0 CHRONIC GOUT DUE TO RENAL IMPAIRMENT WITHOUT TOPHUS, UNSPECIFIED SITE: ICD-10-CM

## 2022-03-22 DIAGNOSIS — E11.65 TYPE 2 DIABETES MELLITUS WITH HYPERGLYCEMIA, WITHOUT LONG-TERM CURRENT USE OF INSULIN (HCC): ICD-10-CM

## 2022-03-22 LAB
ABSOLUTE EOS #: 0.1 K/UL (ref 0–0.4)
ABSOLUTE LYMPH #: 1.4 K/UL (ref 1–4.8)
ABSOLUTE MONO #: 0.4 K/UL (ref 0.1–1.3)
ALBUMIN SERPL-MCNC: 4.2 G/DL (ref 3.5–5.2)
ALP BLD-CCNC: 96 U/L (ref 35–104)
ALT SERPL-CCNC: 24 U/L (ref 5–33)
ANION GAP SERPL CALCULATED.3IONS-SCNC: 15 MMOL/L (ref 9–17)
AST SERPL-CCNC: 18 U/L
BACTERIA: ABNORMAL
BASOPHILS # BLD: 1 % (ref 0–2)
BASOPHILS ABSOLUTE: 0.1 K/UL (ref 0–0.2)
BILIRUB SERPL-MCNC: 0.6 MG/DL (ref 0.3–1.2)
BILIRUBIN URINE: NEGATIVE
BUN BLDV-MCNC: 17 MG/DL (ref 8–23)
CALCIUM SERPL-MCNC: 9.5 MG/DL (ref 8.6–10.4)
CASTS UA: ABNORMAL /LPF
CHLORIDE BLD-SCNC: 103 MMOL/L (ref 98–107)
CO2: 26 MMOL/L (ref 20–31)
COLOR: YELLOW
CREAT SERPL-MCNC: 1.04 MG/DL (ref 0.5–0.9)
EOSINOPHILS RELATIVE PERCENT: 1 % (ref 0–4)
EPITHELIAL CELLS UA: ABNORMAL /HPF
FOLATE: 14.2 NG/ML
GFR AFRICAN AMERICAN: >60 ML/MIN
GFR NON-AFRICAN AMERICAN: 52 ML/MIN
GFR SERPL CREATININE-BSD FRML MDRD: ABNORMAL ML/MIN/{1.73_M2}
GLUCOSE BLD-MCNC: 103 MG/DL (ref 70–99)
GLUCOSE URINE: NEGATIVE
HBA1C MFR BLD: 5 %
HCT VFR BLD CALC: 39.4 % (ref 36–46)
HEMOGLOBIN: 12.9 G/DL (ref 12–16)
KETONES, URINE: NEGATIVE
LEUKOCYTE ESTERASE, URINE: ABNORMAL
LYMPHOCYTES # BLD: 17 % (ref 24–44)
MAGNESIUM: 1.5 MG/DL (ref 1.6–2.6)
MCH RBC QN AUTO: 29.8 PG (ref 26–34)
MCHC RBC AUTO-ENTMCNC: 32.6 G/DL (ref 31–37)
MCV RBC AUTO: 91.4 FL (ref 80–100)
MONOCYTES # BLD: 4 % (ref 1–7)
NITRITE, URINE: NEGATIVE
PDW BLD-RTO: 14.4 % (ref 11.5–14.9)
PH UA: 6.5 (ref 5–8)
PHOSPHORUS: 3.4 MG/DL (ref 2.6–4.5)
PLATELET # BLD: 291 K/UL (ref 150–450)
PMV BLD AUTO: 8.8 FL (ref 6–12)
POTASSIUM SERPL-SCNC: 4 MMOL/L (ref 3.7–5.3)
PRO-BNP: 3666 PG/ML
PROTEIN UA: NEGATIVE
RBC # BLD: 4.31 M/UL (ref 4–5.2)
RBC UA: ABNORMAL /HPF
SEG NEUTROPHILS: 77 % (ref 36–66)
SEGMENTED NEUTROPHILS ABSOLUTE COUNT: 6.4 K/UL (ref 1.3–9.1)
SODIUM BLD-SCNC: 144 MMOL/L (ref 135–144)
SPECIFIC GRAVITY UA: 1.02 (ref 1–1.03)
TOTAL PROTEIN: 6.9 G/DL (ref 6.4–8.3)
TSH SERPL DL<=0.05 MIU/L-ACNC: 3.41 MIU/L (ref 0.3–5)
TURBIDITY: CLEAR
URIC ACID: 6.3 MG/DL (ref 2.4–5.7)
URINE HGB: ABNORMAL
UROBILINOGEN, URINE: NORMAL
VITAMIN B-12: 1428 PG/ML (ref 232–1245)
WBC # BLD: 8.4 K/UL (ref 3.5–11)
WBC UA: ABNORMAL /HPF

## 2022-03-22 PROCEDURE — 99495 TRANSJ CARE MGMT MOD F2F 14D: CPT | Performed by: FAMILY MEDICINE

## 2022-03-22 PROCEDURE — 83036 HEMOGLOBIN GLYCOSYLATED A1C: CPT | Performed by: FAMILY MEDICINE

## 2022-03-22 PROCEDURE — 84100 ASSAY OF PHOSPHORUS: CPT

## 2022-03-22 PROCEDURE — 81001 URINALYSIS AUTO W/SCOPE: CPT

## 2022-03-22 PROCEDURE — 84550 ASSAY OF BLOOD/URIC ACID: CPT

## 2022-03-22 PROCEDURE — 1111F DSCHRG MED/CURRENT MED MERGE: CPT | Performed by: FAMILY MEDICINE

## 2022-03-22 PROCEDURE — 83735 ASSAY OF MAGNESIUM: CPT

## 2022-03-22 PROCEDURE — 83880 ASSAY OF NATRIURETIC PEPTIDE: CPT

## 2022-03-22 PROCEDURE — 85025 COMPLETE CBC W/AUTO DIFF WBC: CPT

## 2022-03-22 PROCEDURE — 80053 COMPREHEN METABOLIC PANEL: CPT

## 2022-03-22 PROCEDURE — 36415 COLL VENOUS BLD VENIPUNCTURE: CPT

## 2022-03-22 PROCEDURE — 82746 ASSAY OF FOLIC ACID SERUM: CPT

## 2022-03-22 PROCEDURE — 82607 VITAMIN B-12: CPT

## 2022-03-22 PROCEDURE — 84443 ASSAY THYROID STIM HORMONE: CPT

## 2022-03-22 RX ORDER — ALLOPURINOL 100 MG/1
100 TABLET ORAL DAILY
Qty: 90 TABLET | Refills: 1 | Status: SHIPPED | OUTPATIENT
Start: 2022-03-22 | End: 2022-09-01

## 2022-03-22 RX ORDER — NYSTATIN 100000 [USP'U]/G
POWDER TOPICAL
Qty: 60 G | Refills: 3 | Status: SHIPPED | OUTPATIENT
Start: 2022-03-22

## 2022-03-22 RX ORDER — BNT162B2 0.23 MG/2.25ML
0.3 INJECTION, SUSPENSION INTRAMUSCULAR ONCE
Qty: 0.3 ML | Refills: 0 | Status: SHIPPED | OUTPATIENT
Start: 2022-03-22 | End: 2022-03-22

## 2022-03-22 RX ORDER — MULTIVITAMIN/IRON/FOLIC ACID 18MG-0.4MG
250 TABLET ORAL DAILY
Qty: 90 TABLET | Refills: 3 | Status: SHIPPED | OUTPATIENT
Start: 2022-03-22

## 2022-03-22 RX ORDER — FUROSEMIDE 20 MG/1
20 TABLET ORAL DAILY
Qty: 90 TABLET | Refills: 3 | Status: SHIPPED | OUTPATIENT
Start: 2022-03-22 | End: 2022-09-08 | Stop reason: SDUPTHER

## 2022-03-22 ASSESSMENT — ENCOUNTER SYMPTOMS
DIARRHEA: 0
CONSTIPATION: 0
ABDOMINAL DISTENTION: 0
SHORTNESS OF BREATH: 1
VOMITING: 0
CHEST TIGHTNESS: 0
NAUSEA: 0
ABDOMINAL PAIN: 0
BACK PAIN: 1
WHEEZING: 0
COUGH: 0

## 2022-03-22 NOTE — TELEPHONE ENCOUNTER
I did not specifically increase the metoprolol, today, metoprolol 25 mg is what we had in the computer for her    Does she have any other bottles at home , showing any other dosage? ? She was also to call the cardiologist to find out amiodarone DOSAGE    Also just for reassurance her heart rate today was quite fast, so I would like her to take the metoprolol 25 mg twice a day as prescribed today until seen by cardiologist, to help with the fast heart rate.   Yes to make an appointment with cardiologist        Pulse Readings from Last 3 Encounters:   03/22/22 104   03/11/22 51   03/02/22 59         Current Outpatient Medications on File Prior to Visit   Medication Sig Dispense Refill    COVID-19 mRNA Vac-Aldo,Pfizer, (PFIZER-BIONT COVID-19 VAC-ALDO) 30 MCG/0.3ML SUSP injection Inject 0.3 mLs into the muscle once for 1 dose DUE FOR BOOSTER, PLEASE LET PT KNOW WHEN READY 0.3 mL 0    metoprolol tartrate (LOPRESSOR) 25 MG tablet Take 1 tablet by mouth 2 times daily Dose decreased 10/20/2021 due to bradycardia 60 tablet 11    nystatin (MYCOSTATIN) 541326 UNIT/GM powder Apply daily for 4-6 weeks 60 g 3    vitamin B-12 (CYANOCOBALAMIN) 1000 MCG tablet TAKE ONE (1) TABLET BY MOUTH DAILY 90 tablet 3    ondansetron (ZOFRAN) 4 MG tablet TAKE ONE (1) TABLET BY MOUTH EVERY 8 HOURS AS NEEDED 30 tablet 0    atorvastatin (LIPITOR) 40 MG tablet TAKE ONE (1) TABLET BY MOUTH DAILY STOP SIMVASTATIN 90 tablet 3    lisinopril (PRINIVIL;ZESTRIL) 10 MG tablet TAKE 1 TABLET BY MOUTH ONCE DAILY 90 tablet 3    levothyroxine (SYNTHROID) 75 MCG tablet TAKE ONE (1) TABLET BY MOUTH EVERY MORNING (BEFORE BREAKFAST) 90 tablet 3    docusate sodium (DOK) 100 MG capsule TAKE ONE (1) CAPSULE BY MOUTH TWO (2) TIMES DAILY FOR CONSTIPATION 180 capsule 3    ACETAMINOPHEN EXTRA STRENGTH 500 MG tablet TAKE 1 TABLET BY MOUTH EVERY 6 HOURS AS NEEDED FOR PAIN 120 tablet 11    albuterol sulfate HFA (PROVENTIL HFA) 108 (90 Base) MCG/ACT inhaler Inhale 1-2 puffs into the lungs every 4 hours as needed for Wheezing 18 g 0    oxybutynin (DITROPAN-XL) 10 MG extended release tablet TAKE 1 TABLET BY MOUTH ONCE DAILY  90 tablet 9    clotrimazole-betamethasone (LOTRISONE) 1-0.05 % cream Apply topically 1 times daily on the perineum, alternate powder, for 10 days 45 g 0    amiodarone (CORDARONE) 200 MG tablet Take 1 tablet by mouth daily 30 tablet 3    pantoprazole (PROTONIX) 40 MG tablet TAKE 1 TABLET BY MOUTH ONCE DAILY  30 tablet 9    vitamin D3 (CHOLECALCIFEROL) 25 MCG (1000 UT) TABS tablet TAKE 2 TABLETS BY MOUTH ONCE DAILY  60 tablet 11    XARELTO 20 MG TABS tablet TAKE ONE (1) TABLET BY MOUTH DAILY (WITH BREAKFAST)  30 tablet 3     No current facility-administered medications on file prior to visit.

## 2022-03-22 NOTE — PROGRESS NOTES
Visit Information    Have you changed or started any medications since your last visit including any over-the-counter medicines, vitamins, or herbal medicines? no   Have you stopped taking any of your medications? Is so, why? -  no  Are you having any side effects from any of your medications? - no    Have you seen any other physician or provider since your last visit?  no   Have you had any other diagnostic tests since your last visit?  no   Have you been seen in the emergency room and/or had an admission in a hospital since we last saw you?  yes -    Have you had your routine dental cleaning in the past 6 months?  no     Do you have an active MyChart account? If no, what is the barrier?   Yes    Patient Care Team:  Elin Platt MD as PCP - General (Family Medicine)  Elin Platt MD as PCP - [facility]  Wagner Montanez MD as Referring Physician (Orthopedic Surgery)  Debbie Nicholas MD as Surgeon (Orthopedic Surgery)  Altagracia Sheriff MD as Consulting Physician (Endocrinology)  Holly Contreras DO as Consulting Physician (Obstetrics & Gynecology)  Nano Bingham MD as Consulting Physician (Urology)  Adore Gardner MD as Consulting Physician (Otolaryngology)  Graeme Montes MD as Consulting Physician (Obstetrics & Gynecology)  Kathy Gonzalez DO as Consulting Physician (General Surgery)  Rekha Stephens DO as Consulting Physician (Cardiology)  Sharon Walters MD as Surgeon (Ophthalmology)  Sydell Apgar, MD as Consulting Physician (Dermatology)  Kevin Sam LPN as Care Transitions Nurse    Medical History Review  Past Medical, Family, and Social History reviewed and does contribute to the patient presenting condition    Health Maintenance   Topic Date Due    Diabetic retinal exam  Never done    Shingles Vaccine (1 of 2) Never done    Diabetic foot exam  10/30/2021    Breast cancer screen  2021    A1C test (Diabetic or Prediabetic)  2022    COVID-19 Vaccine (3 - Booster for Hess Peter series) 03/15/2022    Annual Wellness Visit (AWV)  08/05/2022    TSH testing  10/10/2022    Lipid screen  10/20/2022    Depression Screen  03/02/2023    Potassium monitoring  03/11/2023    Creatinine monitoring  03/11/2023    Colorectal Cancer Screen  08/03/2023    DTaP/Tdap/Td vaccine (2 - Td or Tdap) 09/28/2027    DEXA (modify frequency per FRAX score)  Completed    Flu vaccine  Completed    Pneumococcal 65+ years Vaccine  Completed    Hepatitis C screen  Completed    Hepatitis A vaccine  Aged Out    Hib vaccine  Aged Out    Meningococcal (ACWY) vaccine  Aged Out

## 2022-03-22 NOTE — TELEPHONE ENCOUNTER
----- Message from Chencho Pitt sent at 3/22/2022  1:02 PM EDT -----  Subject: Message to Provider    QUESTIONS  Information for Provider? Patient is calling to find out if her PCP would   like her to follow-up with her cardiologist since her metoprolol was   increased from 1/2 tablet BID to 1 tablet BID. She is concerned this will   increase her heart rate. Please call patient and advise.   ---------------------------------------------------------------------------  --------------  CALL BACK INFO  What is the best way for the office to contact you? OK to leave message on   voicemail  Preferred Call Back Phone Number? [phone]  ---------------------------------------------------------------------------  --------------  SCRIPT ANSWERS  Relationship to Patient?  Self

## 2022-03-22 NOTE — RESULT ENCOUNTER NOTE
Addressed during office visit today, A1c 5, improved diabetes, continue treatment recommended during the office visit.

## 2022-03-22 NOTE — RESULT ENCOUNTER NOTE
Noted, please try to inform the patient regarding the results    patient: Based on the cardiac markers, BNP is very high, she is again in active flareup of congestive heart failure, did she make an appointment with the cardiologist? if not please have her make an appointment for tomorrow.   I need her to start taking furosemide 20 mg daily new prescription sent to local pharmacy, [facility]     Regarding the metoprolol there is a telephone encounter from today, ,to continue to take what we gave her today 25 mg twice a day her heart rate was fast, so most likely her atrial fibrillation is a bit fast today until seen by cardiologist to continue with 25 mg twice a day  Chronic kidney disease stage III stable  Blood glucose 103 mildly high  Low magnesium, I will send magnesium supplement to the pharmacy, [facility] it would be long-term  Uric acid is high consistent with possible gout, I will send allopurinol to the pharmacy, [facility], it would be long-term  UTI seems to be resolved     Repeat blood work in 1 week new orders placed     Future Appointments  3/23/2022  9:30 AM    Tin Orozco MD             INF DIS [facility]        [facility]  4/5/2022   8:30 AM    [facility] DEXA RM                [facility]          [address] [facility]  4/5/2022   9:00 AM    STC DIGITAL RM             [facility]          [address] [facility]  4/14/2022  10:30 AM   Sarai Vaughan PA-C         GYN Oncology        [facility]  4/18/2022  10:10 AM   Nathalia Eckert MD         [facility]           [facility]  4/27/2022  2:15 PM    Miguel Chaparro MD        [facility]         [facility]  7/1/2022   9:45 AM    Marianna Marshall MD     [facility]               [facility]  8/15/2022  11:30 AM   [facility] Dougherty [facility]         [facility]

## 2022-03-22 NOTE — RESULT ENCOUNTER NOTE
Please notify patient: Based on the cardiac markers, BNP is very high, she is again in active flareup of congestive heart failure, did she make an appointment with the cardiologist? if not please have her make an appointment for tomorrow.   I need her to start taking furosemide 20 mg daily new prescription sent to local pharmacy, [facility]    Regarding the metoprolol there is a telephone encounter from today, ,to continue to take what we gave her today 25 mg twice a day her heart rate was fast, so most likely her atrial fibrillation is a bit fast today until seen by cardiologist to continue with 25 mg twice a day  Chronic kidney disease stage III stable  Blood glucose 103 mildly high  Low magnesium, I will send magnesium supplement to the pharmacy, [facility] it would be long-term  Uric acid is high consistent with possible gout, I will send allopurinol to the pharmacy, [facility], it would be long-term  UTI seems to be resolved    Repeat blood work in 1 week new orders placed    Future Appointments  3/23/2022  9:30 AM    Hector Gerard MD             INF DIS [facility]        [place] [facility]  4/5/2022   8:30 AM    [facility] DEXA RM                [facility]          [address] [facility]  4/5/2022   9:00 AM    STC DIGITAL RM             [facility]          [address] [facility]  4/14/2022  10:30 AM   Lynette Boss PA-C         GYN Oncology        [facility]  4/18/2022  10:10 AM   Jan Zarco MD         [facility]           [facility]  4/27/2022  2:15 PM    [facility] Salazar [facility]        [facility]  7/1/2022   9:45 AM    Richard Krueger [facility]     [facility]  8/15/2022  11:30 AM   Ebony Sauer MD          derm             [facility]

## 2022-03-22 NOTE — PROGRESS NOTES
incontinence  Improved with medication, follow-up with urology, to continue with oxybutynin  -     ID DISCHARGE MEDS RECONCILED W/ CURRENT OUTPATIENT MED LIST    Type 2 diabetes mellitus with hyperglycemia, without long-term current use of insulin ([facility] [address]  Improved diabetes based on A1c  Okay to continue with low-carb diet only to avoid polypharmacy due to increased risk due to over 74years old  I have stopped Metformin due to worsening kidney function  Lab Results   Component Value Date    LABA1C 5.0 03/22/2022    LABA1C 5.7 10/20/2021    LABA1C 5.4 07/13/2021       -     POCT glycosylated hemoglobin (Hb A1C)  -     ID DISCHARGE MEDS RECONCILED W/ CURRENT OUTPATIENT MED LIST  -     CBC with Auto Differential; Future  -     Comprehensive Metabolic Panel; Future  -     Magnesium; Future  -     TSH; Future  -     Vitamin B12 & Folate; Future  Check A1c every 3 to 6 months  -daily feet exam, Foot care: advised to wash feet daily, pat dry and apply lotion at night, avoiding between toes. Need to look at feet daily and report to a physician any signs of inflammation or skin damage  -annual dilated eye exam--to make appointment  -Low carb, low fat diet, increase fruits and vegetables, and exercise 4-5 times a day 30-40 minutes a day discussed  -continue low-carb diet  -continue lisinopril ACEI and statin Lipitor 40 mg      Chronic diastolic congestive heart failure (HCC)  Stable  Lab Results   Component Value Date    LVEF 55 10/11/2021     Continue metoprolol 25 mg twice a day, furosemide 20 mg daily, lisinopril 10 mg daily  Recheck labs  Discussed low salt diet and BP, Weight, and pulse monitoring.    -     ID DISCHARGE MEDS RECONCILED W/ CURRENT OUTPATIENT MED LIST  -     metoprolol tartrate (LOPRESSOR) 25 MG tablet; Take 1 tablet by mouth 2 times daily Dose decreased 10/20/2021 due to bradycardia, Disp-60 tablet, R-11Normal  -     CBC with Auto Differential; Future  -     Comprehensive Metabolic Panel;  Future  - Magnesium; Future  -     Uric Acid; Future  -     furosemide (LASIX) 20 MG tablet; Take 1 tablet by mouth daily For congestive heart failure, Disp-90 tablet, R-3Normal  -     Basic Metabolic Panel; Future  -     Brain Natriuretic Peptide; Future    Atherosclerosis of aorta (HCC)  Asymptomatic  Continue Lipitor 20 mg daily  She does have increasing cardiovascular risk, following with cardiologist  VL abdominal aorta on 10/20/2021 showed small aortic aneurysm 3.18 cm, recommendation was to recheck in 1 year, due in October 2022    The 10-year ASCVD risk score (Ashtyn South, et al., 2013) is: 24.1%    Values used to calculate the score:      Age: 81 years      Sex: Female      Is Non- : No      Diabetic: Yes      Tobacco smoker: No      Systolic Blood Pressure: 270 mmHg      Is BP treated: Yes      HDL Cholesterol: 49 mg/dL      Total Cholesterol: 174 mg/dL    -     AR DISCHARGE MEDS RECONCILED W/ CURRENT OUTPATIENT MED LIST    Paroxysmal atrial fibrillation (HCC)  Stable  To continue with metoprolol 25 mg twice a day, to inquire with cardiologist if she is still on amiodarone 200 mg daily, continue chronic anticoagulation with Xarelto 20 mg daily  Strongly advised to make appointment with cardiologist, The patient verbalizes understanding and agrees with the plan. -     AR DISCHARGE MEDS RECONCILED W/ CURRENT OUTPATIENT MED LIST    OAB (overactive bladder)  Improved with medication  Continue oxybutynin XL 10 mg daily, to make appointment with urologist  To avoid constipation and to urinate every 2-3 hrs  -     AR DISCHARGE MEDS RECONCILED W/ CURRENT OUTPATIENT MED LIST  -     Urinalysis with Reflex to Culture; Future  Encounter for immunization  -     AR DISCHARGE MEDS RECONCILED W/ CURRENT OUTPATIENT MED LIST  -     COVID-19 mRNA Vac-Aldo,Pfizer, (PFIZER-BIONT COVID-19 VAC-ALDO) 30 MCG/0.3ML SUSP injection;  Inject 0.3 mLs into the muscle once for 1 dose DUE FOR BOOSTER, PLEASE LET PT KNOW WHEN READY, Disp-0.3 mL, R-0Normal  Encounter for screening mammogram for breast cancer  -     JEN ABHIJIT DIGITAL SCREEN BILATERAL; Future  -     VT DISCHARGE MEDS RECONCILED W/ CURRENT OUTPATIENT MED LIST  Postmenopausal  -     VT DISCHARGE MEDS RECONCILED W/ CURRENT OUTPATIENT MED LIST  -     DEXA BONE DENSITY AXIAL SKELETON; Future  Chronic gout due to renal impairment without tophus, unspecified site  Newly found, due to increased uric acid    Lab Results   Component Value Date    URICACID 6.3 (H) 03/22/2022       -     VT DISCHARGE MEDS RECONCILED W/ CURRENT OUTPATIENT MED LIST  -     allopurinol (ZYLOPRIM) 100 MG tablet; Take 1 tablet by mouth daily For gout. Stop if any rash develops, Disp-90 tablet, R-1Normal      Medical Decision Making: moderate complexity  Return in 3 months (on 6/22/2022) for ALWAYS NEEDS 30 MIN. APPT, **POC A1C, DM2, HTN, HLP, CHF. On this date 3/22/2022 I have spent 45 minutes reviewing previous notes, test results and face to face with the patient discussing the diagnosis and importance of compliance with the treatment plan as well as documenting on the day of the visit. Will make appointment with cardiology and Dr. Vera Zarco   Will make appointment with Ophthalmology   To inquire if still on amiodarone 200 mg  Patient verbalizes understanding and agrees with the plan          Future Appointments   Date Time Provider Dante Amaya   3/23/2022  9:30 AM Jennifer Vu MD INF DIS [facility]   4/14/2022 10:30 AM Priyank Marsh PA-C GYN Oncology [facility]   4/27/2022  2:15 PM Stephie Patel MD RESP [place] [facility]   7/1/2022  9:45 AM Nick Bernard MD  sc [facility]   8/15/2022 11:30 AM Carlene Stark MD  derm [facility]       Subjective:   HPI  Chief Complaint   Patient presents with   Joi Diego from Hospital    Diabetes    Hypertension    Hyperlipidemia    Congestive Heart Failure       Inpatient course: Discharge summary reviewed- see chart.   Patient was sent by myself for admission due to urinary tract infection with ESBL E. coli resistant to multiple medications and unable to benefit from treatment in outpatient      Interval history/Current status: Improved    Patient was admitted 3/10/2022 through 3/11/2022 at [facility] due to UTI ESBL E. coli, started on ertapenem Invanz for 7 days, via PICC line placed in the hospital.      Currently, patient reports she finished meds on Wednesday  HAS PICC LINE  UTI E coli ESBL on 3/7/2022 based on the culture   Patient says due to the admission, she had to cancel Dr. Jaems Gonzalez  Patient also had UTI Klebsiela ESBL on 10/20/2021, but was sensitive to Keflex at that time. She has questions why this was not the case at this time and she required readmission instead. Patient reports frequency and urgency of urination at baseline, due to her overactive bladder. She does wear pull-ups as incontinence support. She denies any foul odor, fever or chills, or flank pain. She reports compliance with oxybutynin. She denies constipation. Has known diabetes mellitus type 2  Doesn't check Blood Glucose   I advised Asmita Lovelace to make appointment with Dr. Domenica Kelley. The patient verbalizes understanding and agrees with the plan. Denies numbness  We stopped the Metformin, and she is afraid her diabetes got worse. She does keep low-carb diet most of the times    A1c improved and very well controlled    Lab Results   Component Value Date    LABA1C 5.0 03/22/2022    LABA1C 5.7 10/20/2021    LABA1C 5.4 07/13/2021     She is on ACE inhibitor and statin  Prior urine microalbumin was normal  Lab Results   Component Value Date    LABMICR CANNOT BE CALCULATED 01/30/2020         Hypertension, diastolic CHF, paroxysmal atrial fibrillation, chronic kidney disease stage III   she  is not exercising and is adherent to low salt diet. Blood pressure is well controlled at home. Cardiac symptoms dyspnea and fatigue.  Patient denies chest pain, chest pressure/discomfort, claudication, exertional chest pressure/discomfort, irregular heart beat, lower extremity edema, near-syncope, orthopnea, palpitations, paroxysmal nocturnal dyspnea, syncope and tachypnea. Cardiovascular risk factors: advanced age (older than 54 for men, 72 for women), diabetes mellitus, dyslipidemia, hypertension, obesity (BMI >= 30 kg/m2) and sedentary lifestyle. Use of agents associated with hypertension: none. History of target organ damage: chronic kidney disease and heart failure. Stress test 10/10/2021 - for ischemia, ejection fraction 71%  Echo 2D 10/10/2021 ejection fraction 55%, aortic valve thickened, mild calcification of the valve    On Xarelto for atrial fibrillation  She is not very sure if she is still taking amiodarone but she will check with the cardiologist and she will check her bottles at home    Patient also has AAA on the ultrasound for our the on 10/21/2022. She denies any pain in her legs when walking or cold feet      Lab Results   Component Value Date    LVEF 55 10/11/2021       BP controlled. Gianfranco Benites reports compliance with BP medications, and tolerates them well, denies side effects.     BP Readings from Last 3 Encounters:   03/22/22 136/72   03/11/22 (!) 154/73   12/18/21 134/82        Pulse is Normal.    Pulse Readings from Last 3 Encounters:   03/22/22 104   03/11/22 51   03/02/22 59       Weight is increased  Wt Readings from Last 3 Encounters:   03/22/22 247 lb 9.6 oz (112.3 kg)   03/10/22 238 lb 1.6 oz (108 kg)   03/02/22 235 lb (106.6 kg)   Her weight on 10/13/2021 was 240 pounds    Postmenopausal since 51 yo  DEXA 5/15/17 -Osteopenia  She she reports joint pains mostly in her knees and lower back    She denies any joint swelling, however uric acid done today shows high uric acid level, and she is agreeable to start allopurinol      Lab Results   Component Value Date    URICACID 6.3 (H) 03/22/2022       She is due for Mammogram.   Denies breast pain, lumps or nipple discharge.        Patient Active Problem List   Diagnosis    Acquired hypothyroidism    History of TIA (transient ischemic attack)    Chronic bilateral low back pain without sciatica    Benign hypertension with CKD (chronic kidney disease) stage III (HCC)    Osteopenia determined by x-ray    Osteoarthritis involving multiple joints on both sides of body    Left knee DJD    Vitamin D deficiency    Mixed incontinence    Chronic pain of both knees    Slow transit constipation    Allergic rhinitis    Hyperlipidemia with target LDL less than 100    Obesity, Class III, BMI 40-49.9 (morbid obesity) ([facility] [address]    At high risk for falls    Spondylosis of lumbar region without myelopathy or radiculopathy    OAB (overactive bladder)    Primary osteoarthritis of both knees    Adenomatous polyp of sigmoid colon, 6/26/17    TLHBSO, bilateral LND 10/24/17    Optic atrophy of both eyes    Cataracta b/l eyes    Difficulty walking    Esotropia    History of uterine cancer, Well differentiated grade 1 adenocarcinoma arising in complex hyperplasia, 2017    Vitamin B 12 deficiency    Atherosclerosis of aorta ([facility] [address]    Calculus of gallbladder without cholecystitis without obstruction    CLAROS (nonalcoholic steatohepatitis)    Paroxysmal atrial fibrillation (HCC)    Type 2 diabetes mellitus, without long-term current use of insulin (HCC)    Mobility impaired    Chronic cholecystitis    Multiple atypical skin moles    Class 3 severe obesity due to excess calories without serious comorbidity with body mass index (BMI) of 40.0 to 44.9 in adult (HCC)    Bradycardia    CHF NYHA class I, chronic, diastolic (HCC)    History of 2019 novel coronavirus disease (COVID-19)    Abdominal aortic aneurysm (AAA) without rupture ([facility] [address]    UTI due to extended-spectrum beta lactamase (ESBL) producing Escherichia coli    UTI (urinary tract infection)       Medications listed as ordered at the time of discharge from hospital  [unfilled]     Discharge Medications:       Medication List       START taking these medications    ertapenem  infusion  Commonly known as: INVanz  Infuse 1,000 mg intravenously every 24 hours for 5 days Compound per protocol.          CONTINUE taking these medications    Acetaminophen Extra Strength 500 MG tablet  Generic drug: acetaminophen  TAKE 1 TABLET BY MOUTH EVERY 6 HOURS AS NEEDED FOR PAIN      albuterol sulfate  (90 Base) MCG/ACT inhaler  Commonly known as: Proventil HFA  Inhale 1-2 puffs into the lungs every 4 hours as needed for Wheezing      Alcohol Prep Pads  Use as directed      amiodarone 200 MG tablet  Commonly known as: CORDARONE  Take 1 tablet by mouth daily      atorvastatin 40 MG tablet  Commonly known as: LIPITOR  TAKE ONE (1) TABLET BY MOUTH DAILY STOP SIMVASTATIN      blood glucose test strips  Test 2-3 times a day & as needed for symptoms of irregular blood glucose. BRAND OF CHOICE INSURANCE ALLOWS.      Blood Pressure Monitor Kit  Use as directed.      clotrimazole-betamethasone 1-0.05 % cream  Commonly known as: Lotrisone  Apply topically 1 times daily on the perineum, alternate powder, for 10 days      docusate sodium 100 MG capsule  Commonly known as: DOK  TAKE ONE (1) CAPSULE BY MOUTH TWO (2) TIMES DAILY FOR CONSTIPATION      glucose monitoring kit  Use as directed.   BRAND OF CHOICE INSURANCE ALLOWS.      Lancets Misc  1 each by Does not apply route 2 times daily      levothyroxine 75 MCG tablet  Commonly known as: SYNTHROID  TAKE ONE (1) TABLET BY MOUTH EVERY MORNING (BEFORE BREAKFAST)      * lidocaine 4 % cream  Commonly known as: LMX  5 g topical 2-3 times a day as needed for pain, maximum 20 g/day      * lidocaine 5 %  Commonly known as: LIDODERM  PLACE ONE (1) PATCH ONTO THE SKIN DAILY 12 HOURS ON, 12 HOURS OFF.      lisinopril 10 MG tablet  Commonly known as: PRINIVIL;ZESTRIL  TAKE 1 TABLET BY MOUTH ONCE DAILY      metoprolol tartrate 25 MG tablet  Commonly known as: LOPRESSOR  Take 1 tablet by mouth 2 times daily Dose decreased 10/20/2021 due to bradycardia      nystatin 904510 UNIT/GM powder  Commonly known as: MYCOSTATIN  Apply daily for 4-6 weeks      ondansetron 4 MG tablet  Commonly known as: ZOFRAN  TAKE ONE (1) TABLET BY MOUTH EVERY 8 HOURS AS NEEDED      oxybutynin 10 MG extended release tablet  Commonly known as: DITROPAN-XL  TAKE 1 TABLET BY MOUTH ONCE DAILY      pantoprazole 40 MG tablet  Commonly known as: PROTONIX  TAKE 1 TABLET BY MOUTH ONCE DAILY      Keiko-Bid Probiotic Tabs  TAKE 1 TABLET BY MOUTH IN THE MORNING      vitamin B-12 1000 MCG tablet  Commonly known as: CYANOCOBALAMIN  TAKE ONE (1) TABLET BY MOUTH DAILY      vitamin D3 25 MCG (1000 UT) Tabs tablet  Commonly known as: CHOLECALCIFEROL  TAKE 2 TABLETS BY MOUTH ONCE DAILY      Xarelto 20 MG Tabs tablet  Generic drug: rivaroxaban  TAKE ONE (1) TABLET BY MOUTH DAILY (WITH BREAKFAST)            Medications marked \"taking\" at this time  Outpatient Medications Marked as Taking for the 3/22/22 encounter (Office Visit) with Nick Bernard MD   Medication Sig Dispense Refill    vitamin B-12 (CYANOCOBALAMIN) 1000 MCG tablet TAKE ONE (1) TABLET BY MOUTH DAILY 90 tablet 3    ondansetron (ZOFRAN) 4 MG tablet TAKE ONE (1) TABLET BY MOUTH EVERY 8 HOURS AS NEEDED 30 tablet 0    atorvastatin (LIPITOR) 40 MG tablet TAKE ONE (1) TABLET BY MOUTH DAILY STOP SIMVASTATIN 90 tablet 3    lisinopril (PRINIVIL;ZESTRIL) 10 MG tablet TAKE 1 TABLET BY MOUTH ONCE DAILY 90 tablet 3    levothyroxine (SYNTHROID) 75 MCG tablet TAKE ONE (1) TABLET BY MOUTH EVERY MORNING (BEFORE BREAKFAST) 90 tablet 3    docusate sodium (DOK) 100 MG capsule TAKE ONE (1) CAPSULE BY MOUTH TWO (2) TIMES DAILY FOR CONSTIPATION 180 capsule 3    ACETAMINOPHEN EXTRA STRENGTH 500 MG tablet TAKE 1 TABLET BY MOUTH EVERY 6 HOURS AS NEEDED FOR PAIN 120 tablet 11    albuterol sulfate HFA (PROVENTIL HFA) 108 (90 Base) MCG/ACT inhaler Inhale 1-2 puffs into the lungs every 4 hours as needed for Wheezing 18 g 0    oxybutynin (DITROPAN-XL) 10 MG extended release tablet TAKE 1 TABLET BY MOUTH ONCE DAILY  90 tablet 9    metoprolol tartrate (LOPRESSOR) 25 MG tablet Take 1 tablet by mouth 2 times daily Dose decreased 10/20/2021 due to bradycardia 60 tablet 3    clotrimazole-betamethasone (LOTRISONE) 1-0.05 % cream Apply topically 1 times daily on the perineum, alternate powder, for 10 days 45 g 0    amiodarone (CORDARONE) 200 MG tablet Take 1 tablet by mouth daily 30 tablet 3    pantoprazole (PROTONIX) 40 MG tablet TAKE 1 TABLET BY MOUTH ONCE DAILY  30 tablet 9    vitamin D3 (CHOLECALCIFEROL) 25 MCG (1000 UT) TABS tablet TAKE 2 TABLETS BY MOUTH ONCE DAILY  60 tablet 11    XARELTO 20 MG TABS tablet TAKE ONE (1) TABLET BY MOUTH DAILY (WITH BREAKFAST)  30 tablet 3        Medications patient taking as of now reconciled against medications ordered at time of hospital discharge: Yes    Review of Systems   Constitutional: Positive for fatigue and unexpected weight change. Negative for activity change, appetite change, chills, diaphoresis and fever. Eyes: Positive for visual disturbance (stable, chronic, advised to make appointment for an eye exam). Respiratory: Positive for shortness of breath (MARTINES mild, when climbing up the stairs ). Negative for cough, chest tightness and wheezing. Cardiovascular: Negative for chest pain, palpitations and leg swelling. Gastrointestinal: Negative for abdominal distention, abdominal pain, constipation, diarrhea, nausea and vomiting. Endocrine: Negative for cold intolerance, heat intolerance, polydipsia, polyphagia and polyuria. Genitourinary: Positive for frequency and urgency. Negative for difficulty urinating and dysuria. Urine incontinence supplies, pull ups    Musculoskeletal: Positive for arthralgias, back pain and gait problem.         Ambulates with walker with seat   Neurological: Negative for light-headedness and numbness. Objective:    /72   Pulse 104   Temp 97.3 °F (36.3 °C)   Ht 5' 2\" (1.575 m)   Wt 247 lb 9.6 oz (112.3 kg)   LMP  (LMP Unknown)   SpO2 96%   BMI 45.29 kg/m²      Vital signs reviewed consistent with mild tachycardia and morbid obesity per BMI    Physical Exam  Vitals and nursing note reviewed. Constitutional:       General: She is not in acute distress. Appearance: Normal appearance. She is well-developed. She is obese. She is not diaphoretic. HENT:      Head: Normocephalic and atraumatic. Right Ear: External ear normal.      Left Ear: External ear normal.      Mouth/Throat:      Comments: I did not examine the mouth due to coronavirus pandemic and wearing masks    Eyes:      General: Lids are normal. No scleral icterus. Right eye: No discharge. Left eye: No discharge. Extraocular Movements: Extraocular movements intact. Conjunctiva/sclera: Conjunctivae normal.   Neck:      Thyroid: No thyromegaly. Cardiovascular:      Rate and Rhythm: Normal rate. Rhythm irregularly irregular. Heart sounds: Normal heart sounds. No murmur heard. Pulmonary:      Effort: Pulmonary effort is normal. No respiratory distress. Breath sounds: Normal breath sounds. No wheezing or rales. Chest:      Chest wall: No tenderness. Abdominal:      General: Bowel sounds are normal. There is no distension. Palpations: Abdomen is soft. There is no hepatomegaly or splenomegaly. Tenderness: There is no abdominal tenderness. Comments: Obese abdomen. Musculoskeletal:      Cervical back: Normal range of motion and neck supple. Right knee: Decreased range of motion. Tenderness present. Left knee: Decreased range of motion. Tenderness present. Right lower leg: No edema. Left lower leg: No edema. Comments: Ambulates with walker with seat   Skin:     General: Skin is warm and dry.       Capillary Refill: Capillary refill takes less than 2 seconds. Findings: No rash. Neurological:      Mental Status: She is alert and oriented to person, place, and time. Cranial Nerves: No cranial nerve deficit. Motor: No abnormal muscle tone. Psychiatric:         Mood and Affect: Mood normal.         Behavior: Behavior normal.         Thought Content:  Thought content normal.         Judgment: Judgment normal.     Most recent labs reviewed consistent with anemia, chronic kidney disease stage III slightly worsening, hyperglycemia well controlled, hyperlipidemia improved  High triglycerides    Otherwise labs within normal limits    Lab Results   Component Value Date    WBC 7.9 03/11/2022    HGB 11.5 (L) 03/11/2022    HCT 34.2 (L) 03/11/2022    MCV 91.2 03/11/2022     03/11/2022       Lab Results   Component Value Date     03/11/2022    K 4.1 03/11/2022    K 4.1 10/10/2021     03/11/2022    CO2 27 03/11/2022    BUN 23 03/11/2022    CREATININE 1.04 03/11/2022    GLUCOSE 96 03/11/2022    GLUCOSE 114 03/20/2012    CALCIUM 9.1 03/11/2022        Lab Results   Component Value Date    ALT 16 11/03/2021    AST 15 11/03/2021    ALKPHOS 68 11/03/2021    BILITOT 0.21 (L) 11/03/2021       Lab Results   Component Value Date    TSH 3.960 10/10/2021       Lab Results   Component Value Date    CHOL 174 10/20/2021    CHOL 144 11/06/2020    CHOL 145 01/28/2020     Lab Results   Component Value Date    TRIG 180 (H) 10/20/2021    TRIG 142 11/06/2020    TRIG 158 (H) 01/28/2020     Lab Results   Component Value Date    HDL 49 10/20/2021    HDL 46 11/06/2020    HDL 47 01/28/2020     Lab Results   Component Value Date    LDLCALC 70 11/06/2020    LDLCHOLESTEROL 89 10/20/2021    LDLCHOLESTEROL 66 01/28/2020    LDLCHOLESTEROL 105 11/14/2018       Lab Results   Component Value Date    CHOLHDLRATIO 3.6 10/20/2021    CHOLHDLRATIO 3.1 11/06/2020    CHOLHDLRATIO 3.1 01/28/2020       Lab Results   Component Value Date LABA1C 5.0 03/22/2022       Lab Results   Component Value Date    SMHZVTZL18 1311 (H) 07/13/2021       Lab Results   Component Value Date    FOLATE 8.6 07/13/2021       Electronically signed by Kristan Bennett MD on 3/30/22 at 5:48 PM EDT

## 2022-03-23 ENCOUNTER — OFFICE VISIT (OUTPATIENT)
Dept: INFECTIOUS DISEASES | Age: 72
End: 2022-03-23
Payer: MEDICARE

## 2022-03-23 ENCOUNTER — TELEPHONE (OUTPATIENT)
Dept: FAMILY MEDICINE CLINIC | Age: 72
End: 2022-03-23

## 2022-03-23 VITALS
TEMPERATURE: 96.8 F | WEIGHT: 247 LBS | OXYGEN SATURATION: 96 % | HEIGHT: 62 IN | RESPIRATION RATE: 17 BRPM | DIASTOLIC BLOOD PRESSURE: 89 MMHG | BODY MASS INDEX: 45.45 KG/M2 | HEART RATE: 76 BPM | SYSTOLIC BLOOD PRESSURE: 133 MMHG

## 2022-03-23 DIAGNOSIS — N39.0 UTI DUE TO EXTENDED-SPECTRUM BETA LACTAMASE (ESBL) PRODUCING ESCHERICHIA COLI: Primary | ICD-10-CM

## 2022-03-23 DIAGNOSIS — B96.29 UTI DUE TO EXTENDED-SPECTRUM BETA LACTAMASE (ESBL) PRODUCING ESCHERICHIA COLI: Primary | ICD-10-CM

## 2022-03-23 DIAGNOSIS — Z16.12 UTI DUE TO EXTENDED-SPECTRUM BETA LACTAMASE (ESBL) PRODUCING ESCHERICHIA COLI: Primary | ICD-10-CM

## 2022-03-23 PROCEDURE — G8417 CALC BMI ABV UP PARAM F/U: HCPCS | Performed by: INTERNAL MEDICINE

## 2022-03-23 PROCEDURE — 1123F ACP DISCUSS/DSCN MKR DOCD: CPT | Performed by: INTERNAL MEDICINE

## 2022-03-23 PROCEDURE — G8399 PT W/DXA RESULTS DOCUMENT: HCPCS | Performed by: INTERNAL MEDICINE

## 2022-03-23 PROCEDURE — 4040F PNEUMOC VAC/ADMIN/RCVD: CPT | Performed by: INTERNAL MEDICINE

## 2022-03-23 PROCEDURE — 1111F DSCHRG MED/CURRENT MED MERGE: CPT | Performed by: INTERNAL MEDICINE

## 2022-03-23 PROCEDURE — G8484 FLU IMMUNIZE NO ADMIN: HCPCS | Performed by: INTERNAL MEDICINE

## 2022-03-23 PROCEDURE — 1090F PRES/ABSN URINE INCON ASSESS: CPT | Performed by: INTERNAL MEDICINE

## 2022-03-23 PROCEDURE — G8427 DOCREV CUR MEDS BY ELIG CLIN: HCPCS | Performed by: INTERNAL MEDICINE

## 2022-03-23 PROCEDURE — 99213 OFFICE O/P EST LOW 20 MIN: CPT | Performed by: INTERNAL MEDICINE

## 2022-03-23 PROCEDURE — 3017F COLORECTAL CA SCREEN DOC REV: CPT | Performed by: INTERNAL MEDICINE

## 2022-03-23 PROCEDURE — 1036F TOBACCO NON-USER: CPT | Performed by: INTERNAL MEDICINE

## 2022-03-23 NOTE — TELEPHONE ENCOUNTER
PATIENT WAS SEEN ON 03/22/2022.  SHE DOES HAVE FOLLOW UP APPOINTMENT ON 07/22 DO YOU WANT HER SEEN SOONER?

## 2022-03-23 NOTE — PROGRESS NOTES
Infectious disease Consult Note      Patient: Isauro Willett  : 1950  Acct#:  [id number]     Date:  3/23/2022    Subjective:       History of Present Illness  Patient is a 81 y.o.  female    Chief Complaint   Patient presents with    New Patient     recurrent uti   The patient was hospitalized at [facility] recently with UTI was discharged 3/11/2022 on IV ertapenem that was completed. The patient still have right arm midline in place, denied urinary symptoms, denied suprapubic pain, denied fever or chills. Urinalysis on 3/22/2022 showed 3-5 WBC, trace leukocyte esterase, proBNP was 3, 666. She denied increased shortness of breath or cough. Past Medical History:   Diagnosis Date    Abdominal aortic aneurysm (AAA) without rupture ([address] 10/21/2021    Adenocarcinoma of endometrium, stage 1 ([address] 10/5/2017    Well differentiated grade 1 adenocarcinoma arising in complex hyperplasia    JOSHUA (acute kidney injury) ([address] 1/15/2020    Allergic rhinitis 2017    Anemia 2020    At high risk for falls 2017    Atrial fibrillation [facility])     2020    Atrial fibrillation with rapid ventricular response ([address] 10/10/2021    Atrial fibrillation, new onset ([address] 2020    CHF (congestive heart failure) ([address]     \"little\"    Colitis 2020    Colon polyp     Had colonoscopy done 20 yrs ago    Diabetes mellitus ([address]     Diverticulitis     Diverticulitis of colon with perforation 2020    Endometrial cancer ([address]     History of TIA (transient ischemic attack) '80's    on Baby ASA    Hyperglycemia 3/21/2017    Hyperlipidemia     Hypertension since 2015    on Rx.    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    Oral phase dysphagia 2/23/2018    Osteoarthritis (arthritis due to wear and tear of joints)     ronan knee    Osteoarthritis involving multiple joints on both sides of body 4/24/2012    Osteoporosis     Perforated bowel ([address]     Perforated diverticulum 6/15/2020    Postmenopausal bleeding 9/20/2017    Rectal bleeding 9/21/2020    S/P h-scope, Myosure 9/20/17 9/20/2017    Pathology pending    Tennis elbow     left    Thickened endometrium 9/20/2017    TIA (transient ischemic attack)     TLHBSO, bilateral LND 10/24/17 10/24/2017    Type 2 diabetes mellitus, without long-term current use of insulin ([address] 1/14/2020      Past Surgical History:   Procedure Laterality Date    CATARACT REMOVAL WITH IMPLANT Bilateral 2015    COLONOSCOPY  1997    had polyp, she doesn't know where and when, \"20 yrs ago\"    COLONOSCOPY  06/26/2017    COLONOSCOPY N/A 8/3/2020    COLONOSCOPY POLYPECTOMY SNARE performed by Jocelyn Amato MD at [address] [facility] N/A 9/20/2017    [facility] performed by Pilar Arzola DO at [facility] N/A 10/24/2017    TOTAL LAPAROSCOPIC HYSTERECTOMY, BSO, F.S.  STAGING, GYRUS G400 performed by Oma Rosales MD at [address] N/A 6/26/2017    COLONOSCOPY POLYPECTOMY / HOT SNARE performed by Hans Segundo DO at [address] [facility] N/A 8/4/2020    OPEN SIGMOID COLECTOMY; PRIMARY ANASTOMOSIS & MOBILIZATION OF SPLENIC FLEXURE performed by Jocelyn Amato MD at [address]  10/24/2017    with pelvic lymph node dissection    THYROID SURGERY  1980    subtotal 20 years ago    TONSILLECTOMY      UPPER GASTROINTESTINAL ENDOSCOPY N/A 8/3/2020    EGD BIOPSY performed by Jocelyn Amato MD at [address] [facility] Admission Meds  Current Outpatient Medications on File Prior to Visit   Medication Sig Dispense Refill    metoprolol tartrate (LOPRESSOR) 25 MG tablet Take 1 tablet by mouth 2 times daily Dose decreased 10/20/2021 due to bradycardia 60 tablet 11    nystatin (MYCOSTATIN) 812334 UNIT/GM powder Apply daily for 4-6 weeks 60 g 3    Magnesium Oxide (MAGNESIUM-OXIDE) 250 MG TABS tablet Take 1 tablet by mouth daily Take with food, in the evening 90 tablet 3    furosemide (LASIX) 20 MG tablet Take 1 tablet by mouth daily For congestive heart failure 90 tablet 3    allopurinol (ZYLOPRIM) 100 MG tablet Take 1 tablet by mouth daily For gout.   Stop if any rash develops 90 tablet 1    vitamin B-12 (CYANOCOBALAMIN) 1000 MCG tablet TAKE ONE (1) TABLET BY MOUTH DAILY 90 tablet 3    ondansetron (ZOFRAN) 4 MG tablet TAKE ONE (1) TABLET BY MOUTH EVERY 8 HOURS AS NEEDED 30 tablet 0    atorvastatin (LIPITOR) 40 MG tablet TAKE ONE (1) TABLET BY MOUTH DAILY STOP SIMVASTATIN 90 tablet 3    lisinopril (PRINIVIL;ZESTRIL) 10 MG tablet TAKE 1 TABLET BY MOUTH ONCE DAILY 90 tablet 3    levothyroxine (SYNTHROID) 75 MCG tablet TAKE ONE (1) TABLET BY MOUTH EVERY MORNING (BEFORE BREAKFAST) 90 tablet 3    docusate sodium (DOK) 100 MG capsule TAKE ONE (1) CAPSULE BY MOUTH TWO (2) TIMES DAILY FOR CONSTIPATION 180 capsule 3    ACETAMINOPHEN EXTRA STRENGTH 500 MG tablet TAKE 1 TABLET BY MOUTH EVERY 6 HOURS AS NEEDED FOR PAIN 120 tablet 11    albuterol sulfate HFA (PROVENTIL HFA) 108 (90 Base) MCG/ACT inhaler Inhale 1-2 puffs into the lungs every 4 hours as needed for Wheezing 18 g 0    oxybutynin (DITROPAN-XL) 10 MG extended release tablet TAKE 1 TABLET BY MOUTH ONCE DAILY  90 tablet 9    clotrimazole-betamethasone (LOTRISONE) 1-0.05 % cream Apply topically 1 times daily on the perineum, alternate powder, for 10 days 45 g 0    amiodarone (CORDARONE) 200 MG tablet Take 1 tablet by mouth daily 30 tablet 3    pantoprazole (PROTONIX) 40 MG tablet TAKE 1 TABLET BY MOUTH ONCE DAILY  30 tablet 9    vitamin D3 (CHOLECALCIFEROL) 25 MCG (1000 UT) TABS tablet TAKE 2 TABLETS BY MOUTH ONCE DAILY  60 tablet 11    XARELTO 20 MG TABS tablet TAKE ONE (1) TABLET BY MOUTH DAILY (WITH BREAKFAST)  30 tablet 3     No current facility-administered medications on file prior to visit. Allergies  Allergies   Allergen Reactions    Sulfa Antibiotics Nausea Only    Penicillins Rash     Took Keflex, cefepime, cefazolin per medication history, no reaction        Social   Social History     Tobacco Use    Smoking status: Never Smoker    Smokeless tobacco: Never Used   Substance Use Topics    Alcohol use: No     Alcohol/week: 0.0 standard drinks               Family History   Problem Relation Age of Onset    Coronary Art Dis Father     Hypertension Father     Diabetes Father     High Blood Pressure Father     Heart Attack Father     Diabetes Mother     High Blood Pressure Mother     Thyroid Disease Mother     High Blood Pressure Sister     Thyroid Disease Brother     High Blood Pressure Brother     High Blood Pressure Maternal Grandmother     Diabetes Maternal Grandfather     No Known Problems Paternal Grandmother     Heart Attack Paternal Grandfather     Colon Cancer Neg Hx           Review of Systems   Other than above 12systems reviewed were negative . Physical Exam  /89   Pulse 76   Temp 96.8 °F (36 °C)   Resp 17   Ht 5' 2\" (1.575 m)   Wt 247 lb (112 kg)   LMP  (LMP Unknown)   SpO2 96%   BMI 45.18 kg/m²           General Appearance: alert and oriented to person, place and time, well-developed and well-nourished, in no acute distress  Skin: warm and dry, no rash or erythema  Head: normocephalic and atraumatic  Eyes: pupils equal, round,  conjunctivae normal  ENT: hearing grossly normal bilaterally. Neck: neck supple and non tender .   Pulmonary/Chest: clear to auscultation bilaterally- no wheezes, rales or rhonchi  Cardiovascular: normal rate, regular rhythm, normal S1 and S2, no murmurs. Abdomen: soft, non-tender, non-distended  Extremities: no cyanosis, clubbing or edema  Neurologic: no cranial nerve deficit and muscle strength normal    Data Review:    WBC   Date Value Ref Range Status   03/22/2022 8.4 3.5 - 11.0 k/uL Final   03/11/2022 7.9 3.5 - 11.0 k/uL Final   03/10/2022 7.1 3.5 - 11.0 k/uL Final     Hemoglobin   Date Value Ref Range Status   03/22/2022 12.9 12.0 - 16.0 g/dL Final   03/11/2022 11.5 (L) 12.0 - 16.0 g/dL Final   03/10/2022 11.9 (L) 12.0 - 16.0 g/dL Final     Hematocrit   Date Value Ref Range Status   03/22/2022 39.4 36 - 46 % Final   03/11/2022 34.2 (L) 36 - 46 % Final   03/10/2022 36.2 36 - 46 % Final     MCV   Date Value Ref Range Status   03/22/2022 91.4 80 - 100 fL Final   03/11/2022 91.2 80 - 100 fL Final   03/10/2022 91.3 80 - 100 fL Final     Platelet Count   Date Value Ref Range Status   03/20/2012 291 140 - 450 k/uL Final     Platelets   Date Value Ref Range Status   03/22/2022 291 150 - 450 k/uL Final   03/11/2022 216 150 - 450 k/uL Final   03/10/2022 222 150 - 450 k/uL Final     Sodium   Date Value Ref Range Status   03/22/2022 144 135 - 144 mmol/L Final   03/11/2022 140 135 - 144 mmol/L Final   03/10/2022 141 135 - 144 mmol/L Final     Potassium   Date Value Ref Range Status   03/22/2022 4.0 3.7 - 5.3 mmol/L Final   03/11/2022 4.1 3.7 - 5.3 mmol/L Final   03/10/2022 4.3 3.7 - 5.3 mmol/L Final     Potassium reflex Magnesium   Date Value Ref Range Status   10/10/2021 4.1 3.5 - 5.2 meq/L Final     Comment:     Low level specimen hemolysis is present as indicated by the interference  level index on the Roche analyzer. The reported K+ level may be falsely  increased. If clinically warranted, recollection of the specimen is  suggested.        Chloride   Date Value Ref Range Status   03/22/2022 103 98 - 107 mmol/L Final   03/11/2022 104 98 - 107 mmol/L Final   03/10/2022 105 98 - 107 mmol/L Final     CO2   Date Value Ref Range Status   03/22/2022 26 20 - 31 mmol/L Final   03/11/2022 27 20 - 31 mmol/L Final   03/10/2022 27 20 - 31 mmol/L Final     Phosphorus   Date Value Ref Range Status   03/22/2022 3.4 2.6 - 4.5 mg/dL Final   10/10/2021 3.8 2.4 - 4.7 mg/dL Final     Comment:     Performed at [address] [place] [address], [address]   07/26/2020 3.5 2.6 - 4.5 mg/dL Final     BUN   Date Value Ref Range Status   03/22/2022 17 8 - 23 mg/dL Final   03/11/2022 23 8 - 23 mg/dL Final   03/10/2022 24 (H) 8 - 23 mg/dL Final     CREATININE   Date Value Ref Range Status   03/22/2022 1.04 (H) 0.50 - 0.90 mg/dL Final   03/11/2022 1.04 (H) 0.50 - 0.90 mg/dL Final   03/10/2022 0.89 0.50 - 0.90 mg/dL Final     AST   Date Value Ref Range Status   03/22/2022 18 <32 U/L Final   11/03/2021 15 <32 U/L Final   10/20/2021 NOT REPORTED <32 U/L Final     ALT   Date Value Ref Range Status   03/22/2022 24 5 - 33 U/L Final   11/03/2021 16 5 - 33 U/L Final   10/20/2021 NOT REPORTED 5 - 33 U/L Final     Total Bilirubin   Date Value Ref Range Status   03/22/2022 0.60 0.3 - 1.2 mg/dL Final   11/03/2021 0.21 (L) 0.3 - 1.2 mg/dL Final   10/20/2021 NOT REPORTED 0.3 - 1.2 mg/dL Final     Alkaline Phosphatase   Date Value Ref Range Status   03/22/2022 96 35 - 104 U/L Final   11/03/2021 68 35 - 104 U/L Final   10/20/2021 NOT REPORTED 35 - 104 U/L Final     Lipase   Date Value Ref Range Status   07/21/2020 42 13 - 60 U/L Final   06/15/2020 25 13 - 60 U/L Final     Amylase   Date Value Ref Range Status   07/21/2020 61 28 - 100 U/L Final     Protime   Date Value Ref Range Status   07/11/2020 13.1 11.8 - 14.6 sec Final   06/17/2020 16.2 (H) 11.8 - 14.6 sec Final   06/16/2020 15.9 (H) 11.8 - 14.6 sec Final     INR   Date Value Ref Range Status   10/10/2021 1.80 (H) 0.85 - 1.13 Final     Comment:     ---------INDICATION-----------------------INR Reference Range  DVT, PE, AF, AMI, tissue heart valve          2.0 to 3.0  Mechanical prosthetic valves                  2.5 to 3.5  Performed at [address] [place] [address], [address]     07/11/2020 1.0  Final     Comment:           Non-therapeutic Range:     INR = 0.9-1.2  Therapeutic Range: Moderate Anticoagulant Intensity:     INR = 2.0-3.0   High Anticoagulant Intensity:     INR = 2.5-3.5           06/17/2020 1.3  Final     Comment:           Non-therapeutic Range:     INR = 0.9-1.2  Therapeutic Range: Moderate Anticoagulant Intensity:     INR = 2.0-3.0   High Anticoagulant Intensity:     INR = 2.5-3.5             No results found for: PTT  No results found for: OCCULTBLD  No results found for: GLUMET     Imaging Studies:                           All appropriate imaging studies and reports reviewed: Yes  No results found. Assessment:   Recent ESBL producing E. coli UTI  Paroxysmal atrial fibrillation  Chronic kidney disease  Hypertension    Recommendations:   IV antibiotics course completed  Remove midline  No further antibiotics at this point. Follow-up when symptomatic for UTI. Thank you for allowing me to participate in the care of your patient. Please feel free to contact me with any questions or concerns.      Colonel Zeny MD

## 2022-03-23 NOTE — TELEPHONE ENCOUNTER
Noted. Thank you!     Future Appointments   Date Time Provider Dante Amaya   4/5/2022  8:30 AM STC DEXA RM [facility] MAMMO [facility] Radiolog   4/5/2022  9:00 AM STC DIGITAL RM [facility] MAMMO [facility] Radiolog   4/14/2022 10:30 AM Hank Murrieta PA-C GYN Oncology [facility]   4/18/2022 10:10 AM Marcela Villanueva MD [facility]RO [facility]   4/27/2022  2:15 PM [facility]   7/1/2022  9:45 AM [facility]   8/15/2022 11:30 AM Azeb Sharma MD [facility]

## 2022-03-23 NOTE — TELEPHONE ENCOUNTER
Please schedule the patient for an appointment. Thank you!       Future Appointments   Date Time Provider Dante Monique   4/5/2022  8:30 AM ST DEXA RM [facility]CZ MAMMO [facility] Radiolog   4/5/2022  9:00 AM STC DIGITAL RM [facility] MAMMO [facility] Radiolog   4/14/2022 10:30 AM Earnest Mackay PA-C GYN Oncology [facility]   4/18/2022 10:10 AM Ghada Hwang MD . C URO [facility]   4/27/2022  2:15 PM [facility]   7/1/2022  9:45 AM [facility]   8/15/2022 11:30 AM Victor Manuel Reed [facility]

## 2022-03-23 NOTE — TELEPHONE ENCOUNTER
Patient called back and was given message. Patient stated that she is taking metoprolol as directed by .     Also she will still contact her cardiologist for amiodarone dosage

## 2022-03-23 NOTE — TELEPHONE ENCOUNTER
----- Message from Jaimee Peace sent at 3/23/2022  8:16 AM EDT -----  Subject: Message to Provider    QUESTIONS  Information for Provider? Pt calling to let Dr Zarina Munoz know that she is   taking 200 mg of amiodarone.  ---------------------------------------------------------------------------  --------------  CALL BACK INFO  What is the best way for the office to contact you? OK to leave message on   voicemail  Preferred Call Back Phone Number? [phone]  ---------------------------------------------------------------------------  --------------  SCRIPT ANSWERS  Relationship to Patient?  Self

## 2022-03-23 NOTE — TELEPHONE ENCOUNTER
Information for Provider? PT wanted to update the  that she has an   appointment with cardiology on 3.29.22.

## 2022-03-29 ENCOUNTER — CARE COORDINATION (OUTPATIENT)
Dept: CASE MANAGEMENT | Age: 72
End: 2022-03-29

## 2022-03-29 NOTE — CARE COORDINATION
[address] [facility] Transitions Follow Up Call    3/29/2022    Patient: Lizeth Call  Patient : 1950   MRN: <[id number]>  Reason for Admission: UTI  Discharge Date: 3/11/22 RARS: Readmission Risk Score: 12.2 ( )         Spoke with: Subsequent transitional call. Spoke to : Basia Mcgee. Pt states she is feeling fine. She has no fever, chills or dysuria. No hematuria. Pt has completed IV Invanz. PICC line discontinued. HHC discontinued. Pt seen by PCP on 3/22/22 and by infectious disease Dr Rosa Martinez 3/23/22. She has an appt with Urology on 22 and with GYN Oncology on 22. No new issues or concerns. Final call. Patient is aware of when to contact MD with any new or worsening symptoms. Advised to contact PCP  with any health concerns for early outpatient intervention in an effort to avoid hospitalization. Report any worsening symptoms to PCP and/or Call 911 and/or GO TO EMERGENCY ROOM if symptoms are severe. Expresses understanding. Care Transitions Follow Up Call    Needs to be reviewed by the provider   Additional needs identified to be addressed with provider: No  none             Method of communication with provider : none      Care Transition Nurse (CTN) contacted the patient by telephone to follow up after admission on 3/10/22. Verified name and  with patient as identifiers. Addressed changes since last contact: none  Discussed follow-up appointments. If no appointment was previously scheduled, appointment scheduling offered: No.   Is follow up appointment scheduled within 7 days of discharge? Yes. Advance Care Planning:   Does patient have an Advance Directive: reviewed and current. CTN reviewed discharge instructions, medical action plan and red flags with patient and discussed any barriers to care and/or understanding of plan of care after discharge.  Discussed appropriate site of care based on symptoms and resources available to patient including: PCP  Specialist  When to call [address] The patient agrees to contact the PCP office for questions related to their healthcare. Patients top risk factors for readmission: medical condition-UTI  Interventions to address risk factors: Obtained and reviewed discharge summary and/or continuity of care documents      Non-[facility] follow up appointment(s): N/A    CTN provided contact information for future needs. No further follow-up call indicated based on severity of symptoms and risk factors. Plan for next call: N/A          Care Transitions Subsequent and Final Call    Subsequent and Final Calls  Are you currently active with any services?: Home Health  Care Transitions Interventions  Other Interventions:            Follow Up  Future Appointments   Date Time Provider Dante Amaya   4/5/2022  8:30 AM STC DEXA RM STCZ MAMMO [facility] Radiolog   4/5/2022  9:00 AM STC DIGITAL RM STCZ MAMMO [facility] Radiolog   4/14/2022 10:30 AM Sabiha Johnson PA-C GYN Oncology [facility]   4/18/2022 10:10 AM Eder Interiano MD . C URO [facility]   4/27/2022  2:15 PM Stephan Blankenship MD [address]   7/1/2022  9:45 AM Kristan Bennett MD [facility]   8/15/2022 11:30 AM Thompson Pleitez MD [facility] Les [address]ang[address] Care Transitions Nurse   [phone]

## 2022-03-30 PROBLEM — N39.0 UTI (URINARY TRACT INFECTION): Status: RESOLVED | Noted: 2022-03-09 | Resolved: 2022-03-30

## 2022-03-30 PROBLEM — E66.01 CLASS 3 SEVERE OBESITY DUE TO EXCESS CALORIES WITHOUT SERIOUS COMORBIDITY WITH BODY MASS INDEX (BMI) OF 40.0 TO 44.9 IN ADULT (HCC): Status: RESOLVED | Noted: 2020-07-22 | Resolved: 2022-03-30

## 2022-03-30 PROBLEM — E66.813 CLASS 3 SEVERE OBESITY DUE TO EXCESS CALORIES WITHOUT SERIOUS COMORBIDITY WITH BODY MASS INDEX (BMI) OF 40.0 TO 44.9 IN ADULT: Status: RESOLVED | Noted: 2020-07-22 | Resolved: 2022-03-30

## 2022-03-30 PROBLEM — M1A.30X0 CHRONIC GOUT DUE TO RENAL IMPAIRMENT WITHOUT TOPHUS: Status: ACTIVE | Noted: 2022-03-30

## 2022-03-31 ENCOUNTER — HOSPITAL ENCOUNTER (OUTPATIENT)
Age: 72
Setting detail: SPECIMEN
Discharge: HOME | End: 2022-03-31

## 2022-03-31 DIAGNOSIS — I12.9 BENIGN HYPERTENSION WITH CKD (CHRONIC KIDNEY DISEASE) STAGE III (HCC): ICD-10-CM

## 2022-03-31 DIAGNOSIS — I50.32 CHRONIC DIASTOLIC CONGESTIVE HEART FAILURE (HCC): ICD-10-CM

## 2022-03-31 DIAGNOSIS — N18.30 BENIGN HYPERTENSION WITH CKD (CHRONIC KIDNEY DISEASE) STAGE III (HCC): ICD-10-CM

## 2022-03-31 LAB
ANION GAP SERPL CALCULATED.3IONS-SCNC: 12 MMOL/L (ref 9–17)
BUN BLDV-MCNC: 20 MG/DL (ref 8–23)
CALCIUM SERPL-MCNC: 9.3 MG/DL (ref 8.6–10.4)
CHLORIDE BLD-SCNC: 101 MMOL/L (ref 98–107)
CO2: 28 MMOL/L (ref 20–31)
CREAT SERPL-MCNC: 1.06 MG/DL (ref 0.5–0.9)
GFR AFRICAN AMERICAN: >60 ML/MIN
GFR NON-AFRICAN AMERICAN: 51 ML/MIN
GFR SERPL CREATININE-BSD FRML MDRD: ABNORMAL ML/MIN/{1.73_M2}
GLUCOSE BLD-MCNC: 105 MG/DL (ref 70–99)
MAGNESIUM: 1.7 MG/DL (ref 1.6–2.6)
POTASSIUM SERPL-SCNC: 3.8 MMOL/L (ref 3.7–5.3)
PRO-BNP: 373 PG/ML
SODIUM BLD-SCNC: 141 MMOL/L (ref 135–144)

## 2022-03-31 NOTE — RESULT ENCOUNTER NOTE
Please notify patient: Greatly improved congestive heart failure, chronic kidney disease stage III stable  Magnesium is improving but still borderline low, to continue the magnesium 250 that I sent to the pharmacy recently, to take it daily long-term    Otherwise labs within normal limits  continue current treatment    Future Appointments  4/5/2022   8:30 AM    [facility] DEXA RM                STCZ MAMMO          ST Radiolog  4/5/2022   9:00 AM    [facility] DIGITAL RM             STCZ MAMMO          [facility] Radiolog  4/14/2022  10:30 AM   Tucker Carlton PA-C         GYN Oncology        [facility]  4/18/2022  10:10 AM   Estefani Akers MD         . C URO           [facility]  4/27/2022  2:15 PM    Cherri Bee MD        [facility]         [facility]  7/1/2022   9:45 AM    Ct Paez MD     [facility]               [facility]  8/15/2022  11:30 AM   Noris Manjarrez MD          derm             [facility]

## 2022-04-05 ENCOUNTER — HOSPITAL ENCOUNTER (OUTPATIENT)
Dept: WOMENS IMAGING | Age: 72
Discharge: HOME | End: 2022-04-07
Payer: MEDICARE

## 2022-04-05 DIAGNOSIS — Z78.0 POSTMENOPAUSAL: ICD-10-CM

## 2022-04-05 DIAGNOSIS — Z12.31 ENCOUNTER FOR SCREENING MAMMOGRAM FOR BREAST CANCER: ICD-10-CM

## 2022-04-05 PROCEDURE — 77080 DXA BONE DENSITY AXIAL: CPT

## 2022-04-05 PROCEDURE — 77063 BREAST TOMOSYNTHESIS BI: CPT

## 2022-04-05 NOTE — RESULT ENCOUNTER NOTE
Please notify patient: Osteopenia thinning her bones which is worsening on DEXA scan, if no severe heartburn, she would benefit from Fosamax, which is taken once a week or Boniva once a week, which 1 which she like and I will send it to the pharmacy    Future Appointments  4/14/2022  10:30 AM   YOHANNES Rodriguez         GYN Oncology        [facility]  4/18/2022  10:10 AM   Laure Stout MD         [facility]           [facility]  4/26/2022  7:10 PM    STCZ SLEEP  1            STCZ Sleep          [place]  4/27/2022  2:15 PM    Jenn Brooke MD        [facility]         [facility]  7/1/2022   9:45 AM    Emerald Colon [facility]     [facility]  8/15/2022  11:30 AM   Alphonse Krueger MD          derm             [facility]

## 2022-04-11 ENCOUNTER — TELEPHONE (OUTPATIENT)
Dept: FAMILY MEDICINE CLINIC | Age: 72
End: 2022-04-11

## 2022-04-11 NOTE — TELEPHONE ENCOUNTER
3rd Covid vaccine ,meaning first booster yes okay it has been more than 5 months since the prior 1  To get Pfizer the same as before      Immunization History   Administered Date(s) Administered    COVID-19, Pfizer Purple top, DILUTE for use, 12+ yrs, 30mcg/0.3mL dose 07/22/2021, 10/15/2021    Influenza Vaccine, unspecified formulation 10/01/2016    Influenza Virus Vaccine 10/01/2019    Influenza, Quadv, adjuvanted, 65 yrs +, IM, PF (Fluad) 10/30/2020, 11/03/2021    Influenza, Triv, inactivated, subunit, adjuvanted, IM (Fluad 65 yrs and older) 09/14/2017, 10/02/2018    Pneumococcal Conjugate 13-valent ([id number]) 02/23/2017    Pneumococcal Polysaccharide ([id number]) 04/20/2018    Tdap (Boostrix, Adacel) 09/28/2017

## 2022-04-11 NOTE — TELEPHONE ENCOUNTER
----- Message from Becki Pittman sent at 4/11/2022  1:27 PM EDT -----  Subject: Message to Provider    QUESTIONS  Information for Provider? Patient request call back. wants to know is she   can go to [facility] and receive 3rd covid 19 booster on Wednesday   ---------------------------------------------------------------------------  --------------  CALL BACK INFO  What is the best way for the office to contact you? OK to leave message on   voicemail  Preferred Call Back Phone Number? [phone]  ---------------------------------------------------------------------------  --------------  SCRIPT ANSWERS  Relationship to Patient?  Self

## 2022-04-12 ENCOUNTER — TELEPHONE (OUTPATIENT)
Dept: FAMILY MEDICINE CLINIC | Age: 72
End: 2022-04-12

## 2022-04-12 ENCOUNTER — OFFICE VISIT (OUTPATIENT)
Dept: GYNECOLOGIC ONCOLOGY | Age: 72
End: 2022-04-12
Payer: MEDICARE

## 2022-04-12 VITALS
TEMPERATURE: 97 F | DIASTOLIC BLOOD PRESSURE: 72 MMHG | BODY MASS INDEX: 45.08 KG/M2 | SYSTOLIC BLOOD PRESSURE: 152 MMHG | WEIGHT: 245 LBS | HEIGHT: 62 IN | HEART RATE: 40 BPM | OXYGEN SATURATION: 98 %

## 2022-04-12 DIAGNOSIS — Z23 ENCOUNTER FOR IMMUNIZATION: ICD-10-CM

## 2022-04-12 DIAGNOSIS — Z85.42 HISTORY OF UTERINE CANCER: Primary | ICD-10-CM

## 2022-04-12 DIAGNOSIS — I50.32 CHRONIC DIASTOLIC CONGESTIVE HEART FAILURE (HCC): ICD-10-CM

## 2022-04-12 DIAGNOSIS — I48.0 PAROXYSMAL ATRIAL FIBRILLATION (HCC): Primary | ICD-10-CM

## 2022-04-12 PROCEDURE — G8399 PT W/DXA RESULTS DOCUMENT: HCPCS | Performed by: PHYSICIAN ASSISTANT

## 2022-04-12 PROCEDURE — 99214 OFFICE O/P EST MOD 30 MIN: CPT | Performed by: PHYSICIAN ASSISTANT

## 2022-04-12 PROCEDURE — 1036F TOBACCO NON-USER: CPT | Performed by: PHYSICIAN ASSISTANT

## 2022-04-12 PROCEDURE — G8417 CALC BMI ABV UP PARAM F/U: HCPCS | Performed by: PHYSICIAN ASSISTANT

## 2022-04-12 PROCEDURE — 1090F PRES/ABSN URINE INCON ASSESS: CPT | Performed by: PHYSICIAN ASSISTANT

## 2022-04-12 PROCEDURE — G8427 DOCREV CUR MEDS BY ELIG CLIN: HCPCS | Performed by: PHYSICIAN ASSISTANT

## 2022-04-12 PROCEDURE — 4040F PNEUMOC VAC/ADMIN/RCVD: CPT | Performed by: PHYSICIAN ASSISTANT

## 2022-04-12 PROCEDURE — 1123F ACP DISCUSS/DSCN MKR DOCD: CPT | Performed by: PHYSICIAN ASSISTANT

## 2022-04-12 PROCEDURE — 3017F COLORECTAL CA SCREEN DOC REV: CPT | Performed by: PHYSICIAN ASSISTANT

## 2022-04-12 ASSESSMENT — ENCOUNTER SYMPTOMS
GASTROINTESTINAL NEGATIVE: 1
EYES NEGATIVE: 1
RESPIRATORY NEGATIVE: 1

## 2022-04-12 NOTE — TELEPHONE ENCOUNTER
Patient seen in GYN today and they said she had worsening bradycardia , we did increase her metoprolol recently when the heart rate was around 100    Please advise the patient and to write down the instructions cut down the dosage of metoprolol back to half tablet twice a day and to make appointment with cardiologist    Please also called her home care and report metoprolol 12.5 mg twice a day dosage decreased today    She does have atrial fibrillation and her pulse readings will not be accurate    Please asked the patient if she wants me to send COVID-19 vaccine booster to the pharmacy and I can do that, what pharmacy? YOHANNES Pak MD  Hello,   Patient has asymptomatic bradycardia in clinic today. Patient states she has increased her metoprolol dosage within the past few weeks. Advised she follow up with PCP and cardiologist. Thank you.        Pulse Readings from Last 3 Encounters:   04/12/22 (!) 40   03/23/22 76   03/22/22 104

## 2022-04-12 NOTE — PROGRESS NOTES
[address] [facility], [facility], [address] [place] [address] present for her endometrial cancer surveillance visit. CC/HPI: She is a  female with a history of stage 1A grade 1 endometrioid adenocarcinoma and has undergone surgery of total laparoscopic hysterectomy, bilateral salpingo-oophorectomy, with pelvic and paraaortic lymphadenectomy with peritoneal biopsy completed in 2017. There is no pre operative  antigen level on file. Final pathology revealed endometrioid adenocarcinoma of the endometrium, 3.8 CM grade 1. Superficial myometrial invasion. Bilateral tubes and ovaries were negative for malignancy. Pelvic and para-aortic lymph node biopsies were negative for neoplasm. No lymphovascular invasion present. Pelvic washings were also negative for malignancy. Mismatch repair testing did confirm \"positive MLH1 promoter methylation is usually associated with sporadic and not syndromal occurrences of endometrial adenocarcinoma\". Therefore she was surgically staged FIGO IA grade 1 endometrioid adenocarcinoma of the uterus. She did not require any adjuvant treatment and was placed on surveillance. She was hospitalized in 245 for complicated sigmoid diverticulitis. She underwent EDG and colonoscopy, and ultimately and open sigmoid colectomy with primary anastomosis on 2020 by Dr. Annmarie Tang. Pathology revealed tubular adenomas, no evidence of malignancy. Today, she is feeling well. She completed IV abx for EBSL E coli UTI last month. She has since had PICC Line removed. She has no abdominal or pelvic pain. She has no vaginal bleeding or discharge. No new lumps or bumps. She has a history atrial fibrillation and sinus bradycardia. She remains on Xarelto. She continues to follow with her cardiologist Dr. Dani Swain and states she recently had increase in her metoprolol to keep her HR low.  She is asymptomatic today, denies chest pain shortness of breath, lightheadedness. She is up to date on mammogram screening most recently obtained in April 5 2022, resulted as BIRADS-1.     ROS:  I have personally reviewed and agree with the review of systems done by my ancillary staff in the [facility] documentation. Physical Exam:    Vitals:    04/12/22 1358 04/12/22 1427   BP: (!) 150/76 (!) 152/72   Site: Left Lower Arm    Position: Sitting    Cuff Size: Medium Adult    Pulse: (!) 40 (!) 40   Temp: 97 °F (36.1 °C)    SpO2: 98%    Weight: 245 lb (111.1 kg)    Height: 5' 2\" (1.575 m)        General: well- appearing, no acute distress, alert and oriented x 3    HEENT: Thyroid normal size. No cervical or supraclavicular lymphadenopathy. Lungs: Clear to auscultate bilaterally to the bases    No CVA tenderness present bilaterally. Cardiac: Known sinus bradycardia, no murmurs or gallops appreciated. Abdomen: Morbidly obese. Abdomen is soft and non tender to deep palpation throughout. Midline laparotomy scar present and laparoscopic scars present. No herniations. She has no rashes or erytema. No detectable organomegaly. No masses or ascites. No inguinal lymphadenopathy bilaterally. Extremities: No edema. No deformities. No calf tenderness bilaterally. Pelvic: Moderate vaginal atrophy. Otherwise, the patient has normal female external genitalia including, vulva, urethra, vagina, perineum, Bartholin's. Uterus, bilateral fallopian tubes and adnexae, and cervix are surgically absent. Speculum examination using medium speculum reveals no blood or discharge in vaginal vault. Vaginal cuff well-intact with no signs of erythema, induration, separation, or granulation tissue. Bimanual examination: Bladder is non-tender, without mass; urethra is non-tender, without mass. There are no palpable vaginal nodules and no other pelvic masses, tenderness or pathology noted.      Impression:  FIGO Stage 1A Grade 1 endometrioid adenocarcinoma s/p surgical intervention with negative high risk factors, therefore requiring no further treatment. She is now 4 1/2 years since surgery. There is no evidence of recurrence today. She will remain on surveillance. Assessment/Plan:  Her Surveillance plan is as follows:   1. Return to clinic in 6 months for follow up surveillance and then will be seen yearly thereafter     2. Call or return to clinic sooner with any questions or concerns     3. Follow up with specialists as planned and will fax note to cardiologist as well to be aware of vitals. I spent 30 minutes providing care to the patient and >50% of the time was spent counseling and coordinating care, discussing the patient's current situation, reviewing her options, counseling and education her and answering her questions. The patient's pertinent images, labs, and previous records and procedures were reviewed.     Electronically signed by Jose Gonzalez PA-C on 4/12/2022 at 2:35 PM

## 2022-04-14 RX ORDER — BNT162B2 0.23 MG/2.25ML
0.3 INJECTION, SUSPENSION INTRAMUSCULAR ONCE
Qty: 0.3 ML | Refills: 0 | Status: SHIPPED | OUTPATIENT
Start: 2022-04-14 | End: 2022-04-14

## 2022-04-14 NOTE — TELEPHONE ENCOUNTER
Called pt to make aware of message instructed by . Also she did verbalize understanding. She also did agree to getting a covid vaccination sometime next wed, at her local pharmacy. Also did contact [facility] to make nurse aware. They did verbalize that she was discharged from [facility] on 03/23/22, reasoning goals were met and no longer needed assistance.

## 2022-04-14 NOTE — TELEPHONE ENCOUNTER
Diagnosis Orders   1. Paroxysmal atrial fibrillation (HCC)  metoprolol tartrate (LOPRESSOR) 25 MG tablet   2. Chronic diastolic congestive heart failure (HCC)  metoprolol tartrate (LOPRESSOR) 25 MG tablet   3.  Encounter for immunization  COVID-19 mRNA Vac-Shauna,Pfizer, (PFIZER-BIONT COVID-19 VAC-SHAUNA) 30 MCG/0.3ML SUSP injection        Future Appointments   Date Time Provider aDnte Monique   4/18/2022 10:10 AM David Gomez MD [facility]   4/26/2022  7:10 PM [facility] SLEEP RM 1 [facility]   4/27/2022  2:15 PM Tish Enriquez MD [facility]P [place] [facility]   7/1/2022  9:45 AM Lily Gaitan MD  sc [facility]   8/15/2022 11:30 AM Jacinto Mckoy MD  derm [facility]   10/10/2022  1:00 PM ISABEL OdellC GYN Oncology [facility]

## 2022-04-18 ENCOUNTER — HOSPITAL ENCOUNTER (OUTPATIENT)
Dept: SLEEP CENTER | Age: 72
Discharge: HOME | End: 2022-04-20
Payer: MEDICARE

## 2022-04-18 DIAGNOSIS — G47.33 OBSTRUCTIVE SLEEP APNEA SYNDROME: ICD-10-CM

## 2022-04-18 PROCEDURE — 95811 POLYSOM 6/>YRS CPAP 4/> PARM: CPT

## 2022-04-19 VITALS
RESPIRATION RATE: 16 BRPM | HEIGHT: 62 IN | OXYGEN SATURATION: 98 % | BODY MASS INDEX: 43.24 KG/M2 | HEART RATE: 59 BPM | WEIGHT: 235 LBS

## 2022-04-19 DIAGNOSIS — E53.8 VITAMIN B 12 DEFICIENCY: ICD-10-CM

## 2022-04-19 RX ORDER — LANOLIN ALCOHOL/MO/W.PET/CERES
CREAM (GRAM) TOPICAL
Qty: 90 TABLET | Refills: 3 | Status: SHIPPED | OUTPATIENT
Start: 2022-04-19

## 2022-04-19 NOTE — TELEPHONE ENCOUNTER
Please Approve or Refuse. Send to Pharmacy per Pt's Request: [facility]     Next Visit Date:  7/1/2022   Last Visit Date: 3/22/2022    Hemoglobin A1C (%)   Date Value   03/22/2022 5.0   10/20/2021 5.7   07/13/2021 5.4             ( goal A1C is < 7)   BP Readings from Last 3 Encounters:   04/12/22 (!) 152/72   03/23/22 133/89   03/22/22 136/72          (goal 120/80)  BUN   Date Value Ref Range Status   03/31/2022 20 8 - 23 mg/dL Final     CREATININE   Date Value Ref Range Status   03/31/2022 1.06 (H) 0.50 - 0.90 mg/dL Final     Potassium   Date Value Ref Range Status   03/31/2022 3.8 3.7 - 5.3 mmol/L Final     Potassium reflex Magnesium   Date Value Ref Range Status   10/10/2021 4.1 3.5 - 5.2 meq/L Final     Comment:     Low level specimen hemolysis is present as indicated by the interference  level index on the Roche analyzer. The reported K+ level may be falsely  increased. If clinically warranted, recollection of the specimen is  suggested.

## 2022-05-03 LAB — STATUS: NORMAL

## 2022-05-16 ENCOUNTER — TELEPHONE (OUTPATIENT)
Dept: FAMILY MEDICINE CLINIC | Age: 72
End: 2022-05-16

## 2022-05-16 NOTE — TELEPHONE ENCOUNTER
Please check with [facility] if they have an open referral for her or not? If yes, please give them verbal order to go and check on patient    In media apparently [facility].       Future Appointments   Date Time Provider Dante Monique   7/1/2022  9:45 AM [facility]   7/27/2022  1:30 PM Yanira Bateman MD [facility] [place] [facility]   8/15/2022 11:30 [facility]   10/10/2022  1:00 PM Rogers Sanchez PA-C GYN Oncology [facility]

## 2022-05-16 NOTE — TELEPHONE ENCOUNTER
----- Message from [employer] sent at 5/16/2022 12:26 PM EDT -----  Subject: Message to Provider    QUESTIONS  Information for Provider? Patient would like a nurse from [address] [place] [address] to   come out. Patient said they change the medication and she isn't sure what   to take. Patient said if one can come out 05/16, that would be fine. Please call patient to discuss. ---------------------------------------------------------------------------  --------------  Joi COONEY  What is the best way for the office to contact you? OK to leave message on   voicemail  Preferred Call Back Phone Number? [phone]  ---------------------------------------------------------------------------  --------------  SCRIPT ANSWERS  Relationship to Patient?  Self

## 2022-05-18 NOTE — TELEPHONE ENCOUNTER
Please let the patient know they discharged her from the home care, that is why no nurse comes in    Please make her an appointment for new order for home care and to bring her pills with her at the appointment.     She can also ask her pharmacist for counseling to go over her medications, I believe she uses [facility] [address] [place] [address] [address] [facility] 3 [address]  Phone: [phone] Fax: [phone] [address] [facility] [address] [place] [address], [address] [phone] [facility] [phone]  [facility] [address]  Phone: [phone] Fax: [phone]      Future Appointments   Date Time Provider Dante Amaya   7/1/2022  9:45 [facility]   7/27/2022  1:30 PM Nola Claudio MD [facility]   8/15/2022 11:30 AM Matthew Garcia MD [facility]   10/10/2022  1:00 PM Ute Shore, [address] [place] [address]

## 2022-05-18 NOTE — TELEPHONE ENCOUNTER
Spoke with the nurse at [facility] and they stated they discharged her on 3/23/22

## 2022-06-10 ENCOUNTER — TELEPHONE (OUTPATIENT)
Dept: FAMILY MEDICINE CLINIC | Age: 72
End: 2022-06-10

## 2022-06-14 DIAGNOSIS — K21.9 GASTROESOPHAGEAL REFLUX DISEASE WITHOUT ESOPHAGITIS: ICD-10-CM

## 2022-06-14 RX ORDER — PANTOPRAZOLE SODIUM 40 MG/1
TABLET, DELAYED RELEASE ORAL
Qty: 30 TABLET | Refills: 8 | Status: SHIPPED | OUTPATIENT
Start: 2022-06-14

## 2022-06-14 RX ORDER — PROBIOTIC PRODUCT - TAB 1B-250 MG
TAB ORAL
Qty: 30 TABLET | Refills: 8 | Status: SHIPPED | OUTPATIENT
Start: 2022-06-14

## 2022-07-07 ENCOUNTER — TELEPHONE (OUTPATIENT)
Dept: FAMILY MEDICINE CLINIC | Age: 72
End: 2022-07-07

## 2022-07-07 ENCOUNTER — TELEPHONE (OUTPATIENT)
Dept: UROLOGY | Age: 72
End: 2022-07-07

## 2022-07-07 DIAGNOSIS — N32.81 OAB (OVERACTIVE BLADDER): Primary | ICD-10-CM

## 2022-07-07 NOTE — TELEPHONE ENCOUNTER
Noted  Future Appointments   Date Time Provider Dante Monique   7/27/2022  1:30 PM [facility]   8/15/2022 11:30 AM [facility]   10/10/2022  1:00 PM Ivone Copeland PA-C GYN Oncology [facility]

## 2022-07-07 NOTE — TELEPHONE ENCOUNTER
Please reschedule the patient for diabetes and home care orders, should be in the office, patient is 11[de-identified]years old and has difficulty seeing      Future Appointments   Date Time Provider Dante Amaya   7/27/2022  1:30 PM Lord Dyan MD [facility] [place] [facility]   8/15/2022 11:30 AM Soledad Castillo MD  derm [facility]   10/10/2022  1:00 PM Cade Echavarria PA-C GYN Oncology [facility]     Mobile Phone        Send Link Via Text     No text has been sent  Last text sent07/07/2022 11:34 AM  [phone]  Email        Send Link Via Email     No email has been sent  Last email sent07/07/2022 11:34 AM  To:[email or web address]     Left voice message , went directly into voice mail, I said to click on the link and hopefully we can see her in a few minutes otherwise we will need to reschedule  [address] [place] [address]     Mobile Phone        Send Link Via Text     No text has been sent  Last text sent07/07/2022 11:43 AM  To:[phone]  Email        Send Link Via Email     No email has been sent  Last email sent07/07/2022 11:34 AM  To:[email or web address]        I called the second number above and I left a message to give us a phone call at [phone] to reschedule

## 2022-07-11 ENCOUNTER — TELEPHONE (OUTPATIENT)
Dept: FAMILY MEDICINE CLINIC | Age: 72
End: 2022-07-11

## 2022-07-11 NOTE — TELEPHONE ENCOUNTER
----- Message from Ivonne Uriostegui sent at 7/11/2022  2:43 PM EDT -----  Subject: Referral Request    Reason for referral request? PT was told by insurance that she can get up   to 6 washable chair pads. Provider patient wants to be referred to(if known):     Provider Phone Number(if known):     Additional Information for Provider?   ---------------------------------------------------------------------------  --------------  [address]    [phone]; OK to leave message on voicemail  ---------------------------------------------------------------------------  --------------

## 2022-07-11 NOTE — TELEPHONE ENCOUNTER
Incontinence supplies, right?  Will do at appointment      Future Appointments   Date Time Provider Dante Faulkneri   7/21/2022  9:30 AM Mayela Franks MD [facility]   7/27/2022  1:30 PM Omi Bangura MD [facility]   8/15/2022 11:30 AM Nicolle Hwang MD  derm [facility]   10/10/2022  1:00 PM Abe Crowe PA-C GYN Oncology [facility]

## 2022-07-12 RX ORDER — MIRABEGRON 50 MG/1
50 TABLET, FILM COATED, EXTENDED RELEASE ORAL DAILY
Qty: 30 TABLET | Refills: 11 | Status: SHIPPED | OUTPATIENT
Start: 2022-07-12

## 2022-07-12 NOTE — TELEPHONE ENCOUNTER
Patient called into the office, wanted to know if she was able to use Myrbetriq instead of Oxybutnin. Advised a message would be sent to the MA for f/u. Patient verbalized understanding, call was ended.
Per Dr.Zafar GONG to send [place]
Abdomen soft, nontender, nondistended, bowel sounds present in all 4 quadrants.

## 2022-07-13 DIAGNOSIS — E55.9 VITAMIN D DEFICIENCY: ICD-10-CM

## 2022-07-13 RX ORDER — MELATONIN
Qty: 60 TABLET | Refills: 11 | Status: SHIPPED | OUTPATIENT
Start: 2022-07-13

## 2022-07-21 ENCOUNTER — TELEPHONE (OUTPATIENT)
Dept: FAMILY MEDICINE CLINIC | Age: 72
End: 2022-07-21

## 2022-07-21 NOTE — TELEPHONE ENCOUNTER
[address] Phone      Send Link Via Text    No text has been sent  Last text sent07/21/2022 9:52 AM  To:[phone]  Email      Send Link Via Email    No email has been sent  Last email sent07/21/2022 9:52 AM  To:[email or web address]      [address] Phone      Send Link Via Text    No text has been sent  Last text sent07/21/2022 10:28 AM  To:[phone]  Email      Send Link Via Email    No email has been sent  Last email sent07/21/2022 10:28 AM  To:[email or web address]    I contacted the patient through the raksul at 10:25 AM call due to difficulties with my office phone, and I left her a lengthy message that she did not click on the text that I sent her before and she should try and if able to do so we will address her medical necessity visit    If unable to do so, please schedule her in the office the earliest opening with any of the providers, I explained to her I might not have openings but if any cancellation with myself absolutely place her in with myself, whichever is earliest for her

## 2022-07-27 ENCOUNTER — OFFICE VISIT (OUTPATIENT)
Dept: PULMONOLOGY | Age: 72
End: 2022-07-27
Payer: MEDICARE

## 2022-07-27 VITALS
RESPIRATION RATE: 16 BRPM | WEIGHT: 250 LBS | HEIGHT: 62 IN | HEART RATE: 68 BPM | OXYGEN SATURATION: 98 % | SYSTOLIC BLOOD PRESSURE: 101 MMHG | BODY MASS INDEX: 46.01 KG/M2 | DIASTOLIC BLOOD PRESSURE: 89 MMHG | TEMPERATURE: 97.3 F

## 2022-07-27 DIAGNOSIS — E66.01 OBESITY, CLASS III, BMI 40-49.9 (MORBID OBESITY) (HCC): Primary | ICD-10-CM

## 2022-07-27 DIAGNOSIS — I50.32 CHRONIC DIASTOLIC CONGESTIVE HEART FAILURE (HCC): ICD-10-CM

## 2022-07-27 DIAGNOSIS — I48.0 PAROXYSMAL ATRIAL FIBRILLATION (HCC): ICD-10-CM

## 2022-07-27 DIAGNOSIS — G47.33 OBSTRUCTIVE SLEEP APNEA SYNDROME: ICD-10-CM

## 2022-07-27 PROCEDURE — 1123F ACP DISCUSS/DSCN MKR DOCD: CPT | Performed by: INTERNAL MEDICINE

## 2022-07-27 PROCEDURE — 3017F COLORECTAL CA SCREEN DOC REV: CPT | Performed by: INTERNAL MEDICINE

## 2022-07-27 PROCEDURE — 1090F PRES/ABSN URINE INCON ASSESS: CPT | Performed by: INTERNAL MEDICINE

## 2022-07-27 PROCEDURE — G8399 PT W/DXA RESULTS DOCUMENT: HCPCS | Performed by: INTERNAL MEDICINE

## 2022-07-27 PROCEDURE — 99214 OFFICE O/P EST MOD 30 MIN: CPT | Performed by: INTERNAL MEDICINE

## 2022-07-27 PROCEDURE — 1036F TOBACCO NON-USER: CPT | Performed by: INTERNAL MEDICINE

## 2022-07-27 PROCEDURE — G8427 DOCREV CUR MEDS BY ELIG CLIN: HCPCS | Performed by: INTERNAL MEDICINE

## 2022-07-27 PROCEDURE — G8417 CALC BMI ABV UP PARAM F/U: HCPCS | Performed by: INTERNAL MEDICINE

## 2022-07-27 NOTE — PROGRESS NOTES
[place] Sm[place]  7/27/2022  Sleep apnea syndrome   Hypertension  morbid obesity  Diabetes-  Thyroid state  Atrial fibrillation  [place] [facility]  presented for follow up for obstructive sleep apnea syndrome. She underwent a sleep study which revealed moderate degree of obstructive sleep apnea syndrome with an apnea index of 17/h. He responded very well to the use of CPAP at 15 cmH2O the apnea was resolved. Patient has gone to her CPAP machine. She was advised to start using the CPAP machine which should help to relieve the apnea improve the quality of her life daytime symptoms control of hypertension and diabetes. She will continue the use of her thyroid replacement therapy. It would greatly help her to lose weight. No clinical evidence of heart failure at this time. Non-smoker no alcohol abuse no drug    Have had COVID-vaccine  He is known to have abdominal aortic aneurysm which has been stable no pain no tenderness      Review of Systems -unchanged  General ROS: negative for - chills, fatigue, fever or weight loss  ENT ROS: negative for - headaches, oral lesions or sore throat  Cardiovascular ROS: no chest pain , orthopnea or pnd   Gastrointestinal ROS: no abdominal pain, change in bowel habits, or black or bloody stools  Skin - no rash   Neuro - no blurry vision , no loc .  No focal weakness   msk - no jt tenderness or swelling    Vascular - no claudication , rest completed and negative   Lymphatic - complete and negative   Hematology - oncology - complete and negative   Allergy immunology - complete and negative    no burning or hematuria       LUNG CANCER SCREENING     CRITERIA MET    []     CT ORDERED  []      CRITERIA NOT MET   [x]      REFUSED                    []        REASON CRITERIA NOT MET     SMOKING LESS THAN 30 PY  []      AGE LESS THAN 55 or GREATER 77 YEARS  []      QUIT SMOKING 15 YEARS OR GREATER   []      RECENT CT WITH IN 11 MONTHS    []      LIFE EXPECTANCY < 5 YEARS   []      SIGNS AND SYMPTOMS OF LUNG CANCER   []           Immunization   Immunization History   Administered Date(s) Administered    COVID-19, PFIZER GRAY top, DO NOT Dilute, (age 15 y+), IM, 30 mcg/0.3 mL 04/22/2022    COVID-19, PFIZER PURPLE top, DILUTE for use, (age 15 y+), 30mcg/0.3mL 07/22/2021, 10/15/2021    Influenza Vaccine, unspecified formulation 10/01/2016    Influenza Virus Vaccine 10/01/2019    Influenza, Quadv, adjuvanted, 65 yrs +, IM, PF (Fluad) 10/30/2020, 11/03/2021    Influenza, Triv, inactivated, subunit, adjuvanted, IM (Fluad 65 yrs and older) 09/14/2017, 10/02/2018    Pneumococcal Conjugate 13-valent ([id number]) 02/23/2017    Pneumococcal Polysaccharide ([id number]) 04/20/2018    Tdap (Boostrix, Adacel) 09/28/2017        Pneumococcal Vaccine     [x] Up to date    [] Indicated   [] Refused  [] Contraindicated       Influenza Vaccine   [x] Up to date    [] Indicated   [] Refused  [] Contraindicated       PAST MEDICAL HISTORY:         Diagnosis Date    Abdominal aortic aneurysm (AAA) without rupture ([address] 10/21/2021    Adenocarcinoma of endometrium, stage 1 ([address] 10/5/2017    Well differentiated grade 1 adenocarcinoma arising in complex hyperplasia    JOSHUA (acute kidney injury) ([address] 1/15/2020    Allergic rhinitis 2/23/2017    Anemia 11/9/2020    At high risk for falls 2/23/2017    Atrial fibrillation ([address]     Jan 2020    Atrial fibrillation with rapid ventricular response ([address] 10/10/2021    Atrial fibrillation, new onset ([address] 1/20/2020    CHF (congestive heart failure) ([address]     \"little\"    Class 3 severe obesity due to excess calories without serious comorbidity with body mass index (BMI) of 40.0 to 44.9 in adult [facility]) 7/22/2020    Colitis 7/22/2020    Colon polyp     Had colonoscopy done 20 yrs ago    Diabetes mellitus ([address]     Diverticulitis     Diverticulitis of colon with perforation 8/4/2020    Endometrial cancer (HCC)     History of TIA (transient ischemic attack) '80's    on Baby ASA Hyperglycemia 3/21/2017    Hyperlipidemia     Hypertension since 2015    on Rx.     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    Oral phase dysphagia 2/23/2018    Osteoarthritis (arthritis due to wear and tear of joints)     ronan knee    Osteoarthritis involving multiple joints on both sides of body 4/24/2012    Osteoporosis     Perforated bowel ([address]     Perforated diverticulum 6/15/2020    Postmenopausal bleeding 9/20/2017    Rectal bleeding 9/21/2020    S/P h-scope, Myosure 9/20/17 9/20/2017    Pathology pending    Tennis elbow     left    Thickened endometrium 9/20/2017    TIA (transient ischemic attack)     TLHBSO, bilateral LND 10/24/17 10/24/2017    Type 2 diabetes mellitus, without long-term current use of insulin ([facility] 1/14/2020       Family History:       Problem Relation Age of Onset    Coronary Art Dis Father     Hypertension Father     Diabetes Father     High Blood Pressure Father     Heart Attack Father     Diabetes Mother     High Blood Pressure Mother     Thyroid Disease Mother     High Blood Pressure Sister     Thyroid Disease Brother     High Blood Pressure Brother     High Blood Pressure Maternal Grandmother     Diabetes Maternal Grandfather     No Known Problems Paternal Grandmother     Heart Attack Paternal Grandfather     Colon Cancer Neg Hx        SURGICAL HISTORY:   Past Surgical History:   Procedure Laterality Date    CATARACT REMOVAL WITH IMPLANT Bilateral 2015    COLONOSCOPY  1997    had polyp, she doesn't know where and when, \"20 yrs ago\"    COLONOSCOPY  06/26/2017    COLONOSCOPY N/A 8/3/2020    COLONOSCOPY POLYPECTOMY SNARE performed by Darian Garcia MD at [address] [facility] N/A 9/20/2017    HYSTEROSCOPY  WITH Ejsus Running performed by Cha Tracy DO at [facility] ([facility]) N/A 10/24/2017    TOTAL LAPAROSCOPIC HYSTERECTOMY, BSO, F.S.  STAGING, GYRUS G400 performed by Taniya Shaw MD at [address] [facility] N/A 6/26/2017    COLONOSCOPY POLYPECTOMY / HOT SNARE performed by Rob Ortega DO at [place] [address] N/A 8/4/2020    OPEN SIGMOID COLECTOMY; PRIMARY ANASTOMOSIS & MOBILIZATION OF SPLENIC FLEXURE performed by Darian Garcia MD at [address] ([address] [facility])  10/24/2017    with pelvic lymph node dissection    THYROID SURGERY  1980    subtotal 20 years ago    TONSILLECTOMY      UPPER GASTROINTESTINAL ENDOSCOPY N/A 8/3/2020    EGD BIOPSY performed by Darian Garcia MD at [address] [facility]      FLOATERS              Not in a hospital admission. Allergies   Allergen Reactions    Sulfa Antibiotics Nausea Only    Penicillins Rash     Took Keflex, cefepime, cefazolin per medication history, no reaction     Social History     Tobacco Use   Smoking Status Never   Smokeless Tobacco Never     Prior to Admission medications    Medication Sig Start Date End Date Taking?  Authorizing Provider   vitamin D3 (CHOLECALCIFEROL) 25 MCG (1000 UT) TABS tablet TAKE TWO (2) TABLETS BY MOUTH ONCE DAILY 7/13/22  Yes Mickey Essex, MD   MYRBETRIQ 50 MG TB24 Take 50 mg by mouth daily 7/12/22  Yes Gretchen Denson MD   Probiotic Product (LISSY-BID PROBIOTIC) TABS TAKE ONE (1) TABLET BY MOUTH IN THE MORNING 6/14/22  Yes Mickey Essex, MD   pantoprazole (PROTONIX) 40 MG tablet TAKE ONE (1) TABLET BY MOUTH ONCE DAILY 6/14/22  Yes Mickey Essex, MD   vitamin B-12 (CYANOCOBALAMIN) 1000 MCG tablet TAKE ONE (1) TABLET BY MOUTH DAILY 4/19/22  Yes Mickey Essex, MD   metoprolol tartrate (LOPRESSOR) 25 MG tablet Take 0.5 tablets by mouth 2 times daily Dose decreased 4/12/2022  due to bradycardia 4/12/22  Yes Yadira Carlin MD   nystatin (MYCOSTATIN) 851414 UNIT/GM powder Apply daily for 4-6 weeks 3/22/22  Yes Yadira Carlin MD   Magnesium Oxide (MAGNESIUM-OXIDE) 250 MG TABS tablet Take 1 tablet by mouth daily Take with food, in the evening 3/22/22  Yes Yadira Carlin MD   furosemide (LASIX) 20 MG tablet Take 1 tablet by mouth daily For congestive heart failure 3/22/22  Yes Yadira Carlin MD   allopurinol (ZYLOPRIM) 100 MG tablet Take 1 tablet by mouth daily For gout.   Stop if any rash develops 3/22/22  Yes Yadira Carlin MD   ondansetron (ZOFRAN) 4 MG tablet TAKE ONE (1) TABLET BY MOUTH EVERY 8 HOURS AS NEEDED 1/3/22  Yes Yadira Carlin MD   atorvastatin (LIPITOR) 40 MG tablet TAKE ONE (1) TABLET BY MOUTH DAILY STOP SIMVASTATIN 12/29/21  Yes Yadira Carlin MD   lisinopril (PRINIVIL;ZESTRIL) 10 MG tablet TAKE 1 TABLET BY MOUTH ONCE DAILY 12/29/21  Yes Yadira Carlin MD   levothyroxine (SYNTHROID) 75 MCG tablet TAKE ONE (1) TABLET BY MOUTH EVERY MORNING (BEFORE BREAKFAST) 12/20/21  Yes Yadira Carlin MD   docusate sodium (DOK) 100 MG capsule TAKE ONE (1) CAPSULE BY MOUTH TWO (2) TIMES DAILY FOR CONSTIPATION 12/1/21  Yes Yadira Carlin MD   ACETAMINOPHEN EXTRA STRENGTH 500 MG tablet TAKE 1 TABLET BY MOUTH EVERY 6 HOURS AS NEEDED FOR PAIN 11/16/21  Yes Yadira Carlin MD   albuterol sulfate HFA (PROVENTIL HFA) 108 (90 Base) MCG/ACT inhaler Inhale 1-2 puffs into the lungs every 4 hours as needed for Wheezing 11/4/21  Yes Lily Taylor MD   oxybutynin (DITROPAN-XL) 10 MG extended release tablet TAKE 1 TABLET BY MOUTH ONCE DAILY  11/3/21  Yes Gretchen Juarez MD   clotrimazole-betamethasone (LOTRISONE) 1-0.05 % cream Apply topically 1 times daily on the perineum, alternate powder, for 10 days 10/19/21  Yes Yadira Carlin MD   amiodarone (CORDARONE) 200 MG tablet Take 1 tablet by mouth daily 10/27/21  Yes Jose Bright PA-C   XARELTO 20 MG TABS tablet TAKE ONE (1) TABLET BY MOUTH DAILY (WITH BREAKFAST)  12/29/20  Yes Vivi Zeng MD         Physical Exam  General Appearance:    Alert, cooperative, no distress, appears stated age morbidly obese no respiratory distress not using accessory muscles very pleasant   Head:    Normocephalic, without obvious abnormality, atraumatic   Eye examination reveals no jaundice or Kolby syndrome    Throat examination is unremarkable    Ear examination normal    Nose revealed no polyps no sinus tenderness   :    Neck:   Supple, symm full examination reveals no nasal polyps etrical, trachea midline, no adenopathy;     thyroid:  no enlargement/tenderness/nodules; no carotid    bruit or JVD short and fair   Back:     Symmetric, no curvature, ROM normal, no CVA tenderness   Lungs:       AP diameter is not increased percussion note is normally resonant breathing vesicular expiration not prolonged no rails or rhonchi are audible   Chest Wall:    No tenderness or deformity      Heart:    Regular rate and rhythm, S1 and S2 normal, no murmur, rub        or gallop no rvh patient atrial fibrillation which is rate controlled                          Abdomen:                                                 Pulses:                                            Lymph nodes:                    Neurologic:                  Soft, non-tender, bowel sounds active all four quadrants,     no masses, no organomegaly         2+ and symmetric all extremities            Cervical, supraclavicular not enlarged or matted or tender      CNII-XII intact, normal strength 5/5 .   Sensation grossly normal  and reflexes normal 2+  throughout     Clubbing No  Lower ext edema No1+   [] , 2 +  [] , 3+   []  Upper ext edema No       Musculoskeletal - no joint swelling or tenderness or synovitis               /89 (Site: Left Wrist)   Pulse 68   Temp 97.3 °F (36.3 °C)   Resp 16   Ht 5' 2\" (1.575 m)   Wt 250 lb (113.4 kg)   LMP  (LMP Unknown)   SpO2 98% Comment: Room air at rest  BMI 45.73 kg/m²     CXR  No recent chest x-ray      CT Scans  No recent CAT scan    Echo  No recent echo        Assessment   Diagnosis Orders   1. Obesity, Class III, BMI 40-49.9 (morbid obesity) ([address]        2. Obstructive sleep apnea syndrome        3. Paroxysmal atrial fibrillation (HCC)        4. Chronic diastolic congestive heart failure (HCC)        Systemic hypertension  Diabetes  Hypothyroid state  Plan  Patient has moderate degree of sleep apnea syndrome which responded well to the use of CPAP patient advised to continue the use of CPAP as before. We also encouraged her to use the machine for 6 to 8 hours every night    Encourage her to lose weight. Treatment apnea will improve the outcome of hypertension and diabetes    She will continue thyroid replacement therapy which can contribute to sleep apnea    Greatly help her to lose weight to    She already had COVID-vaccine Pneumovax and flu vaccine. There is no clinical evidence of heart failure at this time although she has a history of heart failure secondary to diastolic dysfunction caused weight hypertension    Patient advised to contact her office in case she has any problem with the CPAP machine. We will see her follow-up in 3 months dictated with Dr. Gloria Saini MD dictation over thank you          Electronically signed by Lorenzo Alexandra MD on   7/27/22 at 1:47 PM EDT  Please note that this chart was generated using voice recognition Dragon dictation software. Although every effort was made to ensure the accuracy of this automated transcription, some errors in transcription may have occurred.

## 2022-08-09 DIAGNOSIS — E03.9 ACQUIRED HYPOTHYROIDISM: ICD-10-CM

## 2022-08-09 RX ORDER — LEVOTHYROXINE SODIUM 0.07 MG/1
TABLET ORAL
Qty: 90 TABLET | Refills: 3 | Status: SHIPPED | OUTPATIENT
Start: 2022-08-09 | End: 2022-08-25

## 2022-08-25 DIAGNOSIS — E03.9 ACQUIRED HYPOTHYROIDISM: ICD-10-CM

## 2022-08-25 RX ORDER — LEVOTHYROXINE SODIUM 0.07 MG/1
TABLET ORAL
Qty: 90 TABLET | Refills: 3 | Status: SHIPPED | OUTPATIENT
Start: 2022-08-25 | End: 2022-10-04 | Stop reason: SDUPTHER

## 2022-08-29 ENCOUNTER — TELEPHONE (OUTPATIENT)
Dept: FAMILY MEDICINE CLINIC | Age: 72
End: 2022-08-29

## 2022-08-29 ENCOUNTER — NURSE TRIAGE (OUTPATIENT)
Dept: OTHER | Facility: CLINIC | Age: 72
End: 2022-08-29

## 2022-08-29 NOTE — TELEPHONE ENCOUNTER
Received call from Darlyn at [facility] with [employer]. Subjective: Caller states \"high bp \"     Current Symptoms: 142/90 hr 84 wt 251, after [facility] visit  hx afib and was told in afib by visiting nurse hx afib , chf, diabetes , no symptoms no palpitations  Also is asking for a script for pads to place on chairs for urine leakage    Onset: today    Associated Symptoms: NA    Pain Severity: 0/10; N/A; none    Temperature: none     What has been tried: nothng    LMP: NA Pregnant: NA    Recommended disposition: See PCP within 3 Days    Care advice provided, patient verbalizes understanding; denies any other questions or concerns; instructed to call back for any new or worsening symptoms. Patient/Caller agrees with recommended disposition; writer provided warm transfer to [facility] for appointment scheduling     Attention Provider: Thank you for allowing me to participate in the care of your patient. The patient was connected to triage in response to information provided to the ECC/PSC. Please do not respond through this encounter as the response is not directed to a shared pool.          Reason for Disposition   Systolic BP >= 484 OR Diastolic >= 785    Protocols used: Blood Pressure - High-ADULT-OH

## 2022-08-29 NOTE — TELEPHONE ENCOUNTER
Please see  telephone encounter from 8/29/2022. Urgent care for same-day evaluation, appointment with cardiologist in a week, if severe symptoms develop go to emergency room. Patient is on chronic anticoagulation  Unfortunately I will be off this week.       Future Appointments   Date Time Provider Dante Amaya   10/10/2022  1:00 PM Patric Chung [place] [facility] Cora Ghotra   10/12/2022  1:30 PM Dora Suero [address]

## 2022-08-30 NOTE — TELEPHONE ENCOUNTER
----- Message from Karoline Wilson sent at 8/29/2022  1:24 PM EDT -----  Subject: Appointment Request    Reason for Call: Established Patient Appointment needed: Semi-Urgent   Return from RN Triage    QUESTIONS    Reason for appointment request? No appointments available during search     Additional Information for Provider?  Patient called and spoke with Nurse   Triage today - high bp 142/90 and possible in afib again to be seen in 3   days no cp no sob.  ---------------------------------------------------------------------------  --------------  Daryle Haver [facility]  [phone]; OK to leave message on voicemail  ---------------------------------------------------------------------------  --------------  SCRIPT ANSWERS  COVID Screen: Yary Millan
Pt informed she stated she has no way to get there today but if anything gets worse she will call 911
Recvd call from [facility] (DA[facility]) stating the pt called in advising that she wasn't able to go to the ED as instructed due to lack of transportation. Call was placed on hold due to pts coming in the office and when returned to the call pt had disconnected. Called pt back and asked if she had made an appt with cardiologist and pt states she should be able to get in around 9/18 but she probably wont be able to get in within a week. Writer asked if she scheduled to appt already and pt states she has not. Writer adv pt to call office to schedule appt and adv again that should she develop any shortness of breath, chest pain, lightheadedness feeling like passing out to go to ED, pt  verbalized understanding.
atorvastatin (LIPITOR) 40 MG tablet TAKE ONE (1) TABLET BY MOUTH DAILY STOP SIMVASTATIN 90 tablet 3    lisinopril (PRINIVIL;ZESTRIL) 10 MG tablet TAKE 1 TABLET BY MOUTH ONCE DAILY 90 tablet 3    docusate sodium (DOK) 100 MG capsule TAKE ONE (1) CAPSULE BY MOUTH TWO (2) TIMES DAILY FOR CONSTIPATION 180 capsule 3    ACETAMINOPHEN EXTRA STRENGTH 500 MG tablet TAKE 1 TABLET BY MOUTH EVERY 6 HOURS AS NEEDED FOR PAIN 120 tablet 11    albuterol sulfate HFA (PROVENTIL HFA) 108 (90 Base) MCG/ACT inhaler Inhale 1-2 puffs into the lungs every 4 hours as needed for Wheezing 18 g 0    oxybutynin (DITROPAN-XL) 10 MG extended release tablet TAKE 1 TABLET BY MOUTH ONCE DAILY  90 tablet 9    clotrimazole-betamethasone (LOTRISONE) 1-0.05 % cream Apply topically 1 times daily on the perineum, alternate powder, for 10 days 45 g 0    amiodarone (CORDARONE) 200 MG tablet Take 1 tablet by mouth daily 30 tablet 3    XARELTO 20 MG TABS tablet TAKE ONE (1) TABLET BY MOUTH DAILY (WITH BREAKFAST)  30 tablet 3     No current facility-administered medications on file prior to visit.        Future Appointments   Date Time Provider Dante Amaya   10/10/2022  1:00 PM Maddy Gates [place] [facility] Katie Jarrett   10/12/2022  1:30 PM Dora Bacon [address]

## 2022-09-01 ENCOUNTER — APPOINTMENT (OUTPATIENT)
Dept: GENERAL RADIOLOGY | Age: 72
DRG: 309 | End: 2022-09-01
Payer: MEDICARE

## 2022-09-01 ENCOUNTER — HOSPITAL ENCOUNTER (INPATIENT)
Age: 72
LOS: 2 days | Discharge: HOME | DRG: 309 | End: 2022-09-03
Attending: EMERGENCY MEDICINE | Admitting: INTERNAL MEDICINE
Payer: MEDICARE

## 2022-09-01 DIAGNOSIS — I48.91 ATRIAL FIBRILLATION, UNSPECIFIED TYPE (HCC): Primary | ICD-10-CM

## 2022-09-01 LAB
ABSOLUTE EOS #: 0.1 K/UL (ref 0–0.4)
ABSOLUTE LYMPH #: 1.5 K/UL (ref 1–4.8)
ABSOLUTE MONO #: 0.7 K/UL (ref 0.1–1.3)
ANION GAP SERPL CALCULATED.3IONS-SCNC: 11 MMOL/L (ref 9–17)
BASOPHILS # BLD: 1 % (ref 0–2)
BASOPHILS ABSOLUTE: 0.1 K/UL (ref 0–0.2)
BUN BLDV-MCNC: 26 MG/DL (ref 8–23)
CALCIUM SERPL-MCNC: 9.7 MG/DL (ref 8.6–10.4)
CHLORIDE BLD-SCNC: 99 MMOL/L (ref 98–107)
CO2: 26 MMOL/L (ref 20–31)
CREAT SERPL-MCNC: 1.03 MG/DL (ref 0.5–0.9)
EOSINOPHILS RELATIVE PERCENT: 2 % (ref 0–4)
GFR AFRICAN AMERICAN: >60 ML/MIN
GFR NON-AFRICAN AMERICAN: 53 ML/MIN
GFR SERPL CREATININE-BSD FRML MDRD: ABNORMAL ML/MIN/{1.73_M2}
GLUCOSE BLD-MCNC: 126 MG/DL (ref 65–105)
GLUCOSE BLD-MCNC: 92 MG/DL (ref 70–99)
HCT VFR BLD CALC: 36.7 % (ref 36–46)
HEMOGLOBIN: 11.7 G/DL (ref 12–16)
LYMPHOCYTES # BLD: 19 % (ref 24–44)
MAGNESIUM: 1.7 MG/DL (ref 1.6–2.6)
MCH RBC QN AUTO: 28 PG (ref 26–34)
MCHC RBC AUTO-ENTMCNC: 31.8 G/DL (ref 31–37)
MCV RBC AUTO: 88.2 FL (ref 80–100)
MONOCYTES # BLD: 9 % (ref 1–7)
PDW BLD-RTO: 15 % (ref 11.5–14.9)
PLATELET # BLD: 260 K/UL (ref 150–450)
PMV BLD AUTO: 8 FL (ref 6–12)
POTASSIUM SERPL-SCNC: 4.4 MMOL/L (ref 3.7–5.3)
RBC # BLD: 4.16 M/UL (ref 4–5.2)
SEG NEUTROPHILS: 69 % (ref 36–66)
SEGMENTED NEUTROPHILS ABSOLUTE COUNT: 5.5 K/UL (ref 1.3–9.1)
SODIUM BLD-SCNC: 136 MMOL/L (ref 135–144)
TROPONIN, HIGH SENSITIVITY: 10 NG/L (ref 0–14)
TROPONIN, HIGH SENSITIVITY: 11 NG/L (ref 0–14)
WBC # BLD: 7.9 K/UL (ref 3.5–11)

## 2022-09-01 PROCEDURE — 80048 BASIC METABOLIC PNL TOTAL CA: CPT

## 2022-09-01 PROCEDURE — 2580000003 HC RX 258: Performed by: STUDENT IN AN ORGANIZED HEALTH CARE EDUCATION/TRAINING PROGRAM

## 2022-09-01 PROCEDURE — 99285 EMERGENCY DEPT VISIT HI MDM: CPT

## 2022-09-01 PROCEDURE — 83735 ASSAY OF MAGNESIUM: CPT

## 2022-09-01 PROCEDURE — 2500000003 HC RX 250 WO HCPCS: Performed by: STUDENT IN AN ORGANIZED HEALTH CARE EDUCATION/TRAINING PROGRAM

## 2022-09-01 PROCEDURE — 6370000000 HC RX 637 (ALT 250 FOR IP): Performed by: NURSE PRACTITIONER

## 2022-09-01 PROCEDURE — 71045 X-RAY EXAM CHEST 1 VIEW: CPT

## 2022-09-01 PROCEDURE — 36415 COLL VENOUS BLD VENIPUNCTURE: CPT

## 2022-09-01 PROCEDURE — 96374 THER/PROPH/DIAG INJ IV PUSH: CPT

## 2022-09-01 PROCEDURE — 84484 ASSAY OF TROPONIN QUANT: CPT

## 2022-09-01 PROCEDURE — 2060000000 HC ICU INTERMEDIATE R&B

## 2022-09-01 PROCEDURE — 93005 ELECTROCARDIOGRAM TRACING: CPT | Performed by: STUDENT IN AN ORGANIZED HEALTH CARE EDUCATION/TRAINING PROGRAM

## 2022-09-01 PROCEDURE — 99223 1ST HOSP IP/OBS HIGH 75: CPT | Performed by: INTERNAL MEDICINE

## 2022-09-01 PROCEDURE — 85025 COMPLETE CBC W/AUTO DIFF WBC: CPT

## 2022-09-01 PROCEDURE — 82947 ASSAY GLUCOSE BLOOD QUANT: CPT

## 2022-09-01 RX ORDER — SODIUM CHLORIDE 9 MG/ML
INJECTION, SOLUTION INTRAVENOUS PRN
Status: DISCONTINUED | OUTPATIENT
Start: 2022-09-01 | End: 2022-09-03 | Stop reason: HOSPADM

## 2022-09-01 RX ORDER — SODIUM CHLORIDE 0.9 % (FLUSH) 0.9 %
5-40 SYRINGE (ML) INJECTION PRN
Status: DISCONTINUED | OUTPATIENT
Start: 2022-09-01 | End: 2022-09-03 | Stop reason: HOSPADM

## 2022-09-01 RX ORDER — DOCUSATE SODIUM 100 MG/1
100 CAPSULE, LIQUID FILLED ORAL 2 TIMES DAILY
Status: DISCONTINUED | OUTPATIENT
Start: 2022-09-01 | End: 2022-09-03 | Stop reason: HOSPADM

## 2022-09-01 RX ORDER — ACETAMINOPHEN 650 MG/1
650 SUPPOSITORY RECTAL EVERY 6 HOURS PRN
Status: DISCONTINUED | OUTPATIENT
Start: 2022-09-01 | End: 2022-09-03 | Stop reason: HOSPADM

## 2022-09-01 RX ORDER — DILTIAZEM HYDROCHLORIDE 5 MG/ML
20 INJECTION INTRAVENOUS ONCE
Status: COMPLETED | OUTPATIENT
Start: 2022-09-01 | End: 2022-09-01

## 2022-09-01 RX ORDER — ONDANSETRON 4 MG/1
4 TABLET, ORALLY DISINTEGRATING ORAL EVERY 8 HOURS PRN
Status: DISCONTINUED | OUTPATIENT
Start: 2022-09-01 | End: 2022-09-03 | Stop reason: HOSPADM

## 2022-09-01 RX ORDER — POLYETHYLENE GLYCOL 3350 17 G/17G
17 POWDER, FOR SOLUTION ORAL DAILY PRN
Status: DISCONTINUED | OUTPATIENT
Start: 2022-09-01 | End: 2022-09-03 | Stop reason: HOSPADM

## 2022-09-01 RX ORDER — LANOLIN ALCOHOL/MO/W.PET/CERES
200 CREAM (GRAM) TOPICAL DAILY
Status: DISCONTINUED | OUTPATIENT
Start: 2022-09-02 | End: 2022-09-03 | Stop reason: HOSPADM

## 2022-09-01 RX ORDER — ALBUTEROL SULFATE 90 UG/1
2 AEROSOL, METERED RESPIRATORY (INHALATION) EVERY 4 HOURS PRN
Status: DISCONTINUED | OUTPATIENT
Start: 2022-09-01 | End: 2022-09-03 | Stop reason: HOSPADM

## 2022-09-01 RX ORDER — LEVOTHYROXINE SODIUM 0.07 MG/1
75 TABLET ORAL DAILY
Status: DISCONTINUED | OUTPATIENT
Start: 2022-09-02 | End: 2022-09-03 | Stop reason: HOSPADM

## 2022-09-01 RX ORDER — DILTIAZEM HYDROCHLORIDE 5 MG/ML
20 INJECTION INTRAVENOUS ONCE
Status: DISCONTINUED | OUTPATIENT
Start: 2022-09-01 | End: 2022-09-01

## 2022-09-01 RX ORDER — FUROSEMIDE 20 MG/1
20 TABLET ORAL DAILY
Status: DISCONTINUED | OUTPATIENT
Start: 2022-09-02 | End: 2022-09-03 | Stop reason: HOSPADM

## 2022-09-01 RX ORDER — PANTOPRAZOLE SODIUM 40 MG/1
40 TABLET, DELAYED RELEASE ORAL
Status: DISCONTINUED | OUTPATIENT
Start: 2022-09-02 | End: 2022-09-03 | Stop reason: HOSPADM

## 2022-09-01 RX ORDER — ACETAMINOPHEN 325 MG/1
650 TABLET ORAL EVERY 6 HOURS PRN
Status: DISCONTINUED | OUTPATIENT
Start: 2022-09-01 | End: 2022-09-03 | Stop reason: HOSPADM

## 2022-09-01 RX ORDER — LISINOPRIL 10 MG/1
10 TABLET ORAL DAILY
Status: DISCONTINUED | OUTPATIENT
Start: 2022-09-02 | End: 2022-09-03

## 2022-09-01 RX ORDER — ONDANSETRON 2 MG/ML
4 INJECTION INTRAMUSCULAR; INTRAVENOUS EVERY 6 HOURS PRN
Status: DISCONTINUED | OUTPATIENT
Start: 2022-09-01 | End: 2022-09-03 | Stop reason: HOSPADM

## 2022-09-01 RX ORDER — ATORVASTATIN CALCIUM 40 MG/1
40 TABLET, FILM COATED ORAL NIGHTLY
Status: DISCONTINUED | OUTPATIENT
Start: 2022-09-01 | End: 2022-09-03 | Stop reason: HOSPADM

## 2022-09-01 RX ORDER — SODIUM CHLORIDE 0.9 % (FLUSH) 0.9 %
5-40 SYRINGE (ML) INJECTION EVERY 12 HOURS SCHEDULED
Status: DISCONTINUED | OUTPATIENT
Start: 2022-09-01 | End: 2022-09-03 | Stop reason: HOSPADM

## 2022-09-01 RX ORDER — MULTIVITAMIN/IRON/FOLIC ACID 18MG-0.4MG
250 TABLET ORAL DAILY
Status: DISCONTINUED | OUTPATIENT
Start: 2022-09-02 | End: 2022-09-01 | Stop reason: RX

## 2022-09-01 RX ADMIN — DILTIAZEM HYDROCHLORIDE 20 MG: 5 INJECTION INTRAVENOUS at 17:06

## 2022-09-01 RX ADMIN — DILTIAZEM HYDROCHLORIDE 5 MG/HR: 5 INJECTION, SOLUTION INTRAVENOUS at 18:46

## 2022-09-01 RX ADMIN — ATORVASTATIN CALCIUM 40 MG: 40 TABLET, FILM COATED ORAL at 23:41

## 2022-09-01 RX ADMIN — METOPROLOL TARTRATE 12.5 MG: 25 TABLET, FILM COATED ORAL at 23:41

## 2022-09-01 ASSESSMENT — PAIN SCALES - GENERAL: PAINLEVEL_OUTOF10: 0

## 2022-09-01 ASSESSMENT — PAIN - FUNCTIONAL ASSESSMENT
PAIN_FUNCTIONAL_ASSESSMENT: NONE - DENIES PAIN
PAIN_FUNCTIONAL_ASSESSMENT: 0-10

## 2022-09-01 ASSESSMENT — ENCOUNTER SYMPTOMS
CHEST TIGHTNESS: 0
DIARRHEA: 0
VOMITING: 0
COLOR CHANGE: 0
TROUBLE SWALLOWING: 0
NAUSEA: 0
BACK PAIN: 0
COUGH: 0
ABDOMINAL PAIN: 0
SHORTNESS OF BREATH: 0

## 2022-09-01 NOTE — H&P
[facility] Internal Medicine  Herbert Romano MD; Ben Brennan MD; Jose Daniel Hollis MD; MD Jonathan Vicente MD; Casimiro Viveros MD  [facility]   [address] [facility]     HISTORY AND PHYSICAL EXAMINATION            Date:   9/2/2022  Patientname:  Madhuri White  Date of admission:  9/1/2022  4:27 PM  MRN:   [id number]  Account:  [de-identified]  YOB: 1950  PCP:    Beverley eJnnings MD  Room:   2101/2101-01  Code Status:    Full Code      Chief Complaint:     Chief Complaint   Patient presents with    Palpitations       History Obtained From:     patient, family member - daughter     History of Present Illness:     Madhuri White is a 81 y.o. Non- / non  female who presents with Palpitations and is admitted to the hospital for the management of Atrial fibrillation with rapid ventricular response ([facility] [address] Medical history includes AAA, A. fib on anticoagulation, CHF, hypothyroidism. According to patient, she went to see her cardiologist due to her home health nurse telling her that her heart rate was more rapid than normal.  Cardiologist sent her to ED for further evaluation due to A. fib with RVR. She does have a history of A. fib and is on anticoagulation and metoprolol at home. Patient given IVP diltiazem and then started on diltiazem drip. Heart Rate Stable 100-120. Patient denies any symptoms associated with rapid heart rate. She does admit generalized fatigue for the past week. Denies chest pain or shortness of breath. Troponins negative. Cardiology consulted in the ED. Denies fever, chills, chest pain, cough, abdominal pain, nausea, vomiting, diarrhea, and urinary symptoms. There are no aggravating or alleviating factors. Symptoms are reported as acute and mild in severity.     Past Medical History:     Past Medical History:   Diagnosis Date    Abdominal aortic aneurysm (AAA) without rupture ([facility] [address] 10/21/2021 Adenocarcinoma of endometrium, stage 1 ([facility] [address] 10/5/2017    Well differentiated grade 1 adenocarcinoma arising in complex hyperplasia    JOSHUA (acute kidney injury) ([address] 1/15/2020    Allergic rhinitis 2/23/2017    Anemia 11/9/2020    At high risk for falls 2/23/2017    Atrial fibrillation [facility])     Jan 2020    Atrial fibrillation with rapid ventricular response ([address] 10/10/2021    Atrial fibrillation, new onset ([address] 1/20/2020    CHF (congestive heart failure) ([address]     \"little\"    Class 3 severe obesity due to excess calories without serious comorbidity with body mass index (BMI) of 40.0 to 44.9 in adult [facility]) 7/22/2020    Colitis 7/22/2020    Colon polyp     Had colonoscopy done 20 yrs ago    Diabetes mellitus ([address]     Diverticulitis     Diverticulitis of colon with perforation 8/4/2020    Endometrial cancer ([address]     History of TIA (transient ischemic attack) '80's    on Baby ASA    Hyperglycemia 3/21/2017    Hyperlipidemia     Hypertension since 2015    on Rx.     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    Oral phase dysphagia 2/23/2018    Osteoarthritis (arthritis due to wear and tear of joints)     ronan knee    Osteoarthritis involving multiple joints on both sides of body 4/24/2012    Osteoporosis     Perforated bowel ([address]     Perforated diverticulum 6/15/2020    Postmenopausal bleeding 9/20/2017    Rectal bleeding 9/21/2020    S/P h-scope, Myosure 9/20/17 9/20/2017    Pathology pending    Tennis elbow     left    Thickened endometrium 9/20/2017    TIA (transient ischemic attack)     TLHBSO, bilateral LND 10/24/17 10/24/2017    Type 2 diabetes mellitus, pantoprazole (PROTONIX) 40 MG tablet TAKE ONE (1) TABLET BY MOUTH ONCE DAILY 6/14/22  Yes Billy Painting MD   vitamin B-12 (CYANOCOBALAMIN) 1000 MCG tablet TAKE ONE (1) TABLET BY MOUTH DAILY 4/19/22  Yes Billy Painting MD   metoprolol tartrate (LOPRESSOR) 25 MG tablet Take 0.5 tablets by mouth 2 times daily Dose decreased 4/12/2022  due to bradycardia 4/12/22  Yes Billy Painting MD   Magnesium Oxide (MAGNESIUM-OXIDE) 250 MG TABS tablet Take 1 tablet by mouth daily Take with food, in the evening 3/22/22  Yes Billy Painting MD   ondansetron (ZOFRAN) 4 MG tablet TAKE ONE (1) TABLET BY MOUTH EVERY 8 HOURS AS NEEDED 1/3/22  Yes Billy Painting MD   atorvastatin (LIPITOR) 40 MG tablet TAKE ONE (1) TABLET BY MOUTH DAILY STOP SIMVASTATIN  Patient taking differently: 40 mg nightly 12/29/21  Yes Billy Painting MD   lisinopril (PRINIVIL;ZESTRIL) 10 MG tablet TAKE 1 TABLET BY MOUTH ONCE DAILY 12/29/21  Yes Billy Painting MD   XARELTO 20 MG TABS tablet TAKE ONE (1) TABLET BY MOUTH DAILY (WITH BREAKFAST)  12/29/20  Yes Joseph James MD   nystatin (MYCOSTATIN) 025123 UNIT/GM powder Apply daily for 4-6 weeks 3/22/22   Billy Painting MD   furosemide (LASIX) 20 MG tablet Take 1 tablet by mouth daily For congestive heart failure 3/22/22   Billy Painting MD   docusate sodium (DOK) 100 MG capsule TAKE ONE (1) CAPSULE BY MOUTH TWO (2) TIMES DAILY FOR CONSTIPATION 12/1/21   Radha Vallecillo MD   ACETAMINOPHEN EXTRA STRENGTH 500 MG tablet TAKE 1 TABLET BY MOUTH EVERY 6 HOURS AS NEEDED FOR PAIN 11/16/21   Billy Painting MD   albuterol sulfate HFA (PROVENTIL HFA) 108 (90 Base) MCG/ACT inhaler Inhale 1-2 puffs into the lungs every 4 hours as needed for Wheezing 11/4/21   Delfina Valentine MD   clotrimazole-betamethasone (LOTRISONE) 1-0.05 % cream Apply topically 1 times daily on the perineum, alternate powder, for 10 days 10/19/21   Billy Painting MD        Allergies:     Sulfa antibiotics and Penicillins    Social History:     Tobacco:    reports that she has never smoked. She has never used smokeless tobacco.  Alcohol:      reports no history of alcohol use. Drug Use:  reports no history of drug use. Family History:     Family History   Problem Relation Age of Onset    Coronary Art Dis Father     Hypertension Father     Diabetes Father     High Blood Pressure Father     Heart Attack Father     Diabetes Mother     High Blood Pressure Mother     Thyroid Disease Mother     High Blood Pressure Sister     Thyroid Disease Brother     High Blood Pressure Brother     High Blood Pressure Maternal Grandmother     Diabetes Maternal Grandfather     No Known Problems Paternal Grandmother     Heart Attack Paternal Grandfather     Colon Cancer Neg Hx        REVIEW OF SYSTEMS     Review of Systems   Constitutional:  Positive for fatigue. Negative for chills, diaphoresis and fever. HENT:  Negative for congestion and sore throat. Respiratory:  Negative for cough, shortness of breath and wheezing. Cardiovascular:  Negative for chest pain, palpitations and leg swelling. Gastrointestinal:  Negative for abdominal pain, constipation, diarrhea, nausea and vomiting. Genitourinary:  Negative for dysuria, frequency and urgency. Musculoskeletal:  Negative for back pain and myalgias. Skin:  Negative for rash. Neurological:  Negative for dizziness, weakness and headaches. Psychiatric/Behavioral:  The patient is not nervous/anxious. PHYSICAL EXAM      /78   Pulse 76   Temp 97.6 °F (36.4 °C) (Oral)   Resp 18   Ht 5' 2\" (1.575 m)   Wt 255 lb 11.7 oz (116 kg)   LMP  (LMP Unknown)   SpO2 99%   BMI 46.77 kg/m²  Body mass index is 46.77 kg/m². Physical Exam  Vitals and nursing note reviewed. Constitutional:       General: She is not in acute distress. Appearance: She is well-developed. She is not diaphoretic. HENT:      Head: Normocephalic and atraumatic.    Eyes: Conjunctiva/sclera: Conjunctivae normal.      Pupils: Pupils are equal, round, and reactive to light. Neck:      Trachea: No tracheal deviation. Cardiovascular:      Rate and Rhythm: Tachycardia present. Rhythm irregular. Heart sounds: Normal heart sounds. No murmur heard. No friction rub. No gallop. Pulmonary:      Effort: Pulmonary effort is normal. No respiratory distress. Breath sounds: Normal breath sounds. No wheezing or rales. Chest:      Chest wall: No tenderness. Abdominal:      General: Bowel sounds are normal. There is no distension. Palpations: Abdomen is soft. Tenderness: There is no abdominal tenderness. There is no guarding. Musculoskeletal:         General: No tenderness. Normal range of motion. Cervical back: Normal range of motion and neck supple. Lymphadenopathy:      Cervical: No cervical adenopathy. Skin:     General: Skin is warm and dry. Coloration: Skin is not pale. Findings: No erythema or rash. Neurological:      Mental Status: She is alert and oriented to person, place, and time. Motor: Weakness present. No seizure activity. Coordination: Coordination normal.      Comments: Generalized weakness and fatigue   Psychiatric:         Behavior: Behavior normal.         Thought Content:  Thought content normal.         Investigations:      Laboratory Testing:  Recent Results (from the past 24 hour(s))   EKG 12 Lead    Collection Time: 09/01/22  5:00 PM   Result Value Ref Range    Ventricular Rate 125 BPM    Atrial Rate 234 BPM    QRS Duration 72 ms    Q-T Interval 312 ms    QTc Calculation (Bazett) 450 ms    R Axis 54 degrees    T Axis 37 degrees   CBC with Auto Differential    Collection Time: 09/01/22  5:02 PM   Result Value Ref Range    WBC 7.9 3.5 - 11.0 k/uL    RBC 4.16 4.0 - 5.2 m/uL    Hemoglobin 11.7 (L) 12.0 - 16.0 g/dL    Hematocrit 36.7 36 - 46 %    MCV 88.2 80 - 100 fL    MCH 28.0 26 - 34 pg    MCHC 31.8 31 - 37 g/dL RDW 15.0 (H) 11.5 - 14.9 %    Platelets 317 143 - 423 k/uL    MPV 8.0 6.0 - 12.0 fL    Seg Neutrophils 69 (H) 36 - 66 %    Lymphocytes 19 (L) 24 - 44 %    Monocytes 9 (H) 1 - 7 %    Eosinophils % 2 0 - 4 %    Basophils 1 0 - 2 %    Segs Absolute 5.50 1.3 - 9.1 k/uL    Absolute Lymph # 1.50 1.0 - 4.8 k/uL    Absolute Mono # 0.70 0.1 - 1.3 k/uL    Absolute Eos # 0.10 0.0 - 0.4 k/uL    Basophils Absolute 0.10 0.0 - 0.2 k/uL   Basic Metabolic Panel    Collection Time: 09/01/22  5:02 PM   Result Value Ref Range    Glucose 92 70 - 99 mg/dL    BUN 26 (H) 8 - 23 mg/dL    Creatinine 1.03 (H) 0.50 - 0.90 mg/dL    Calcium 9.7 8.6 - 10.4 mg/dL    Sodium 136 135 - 144 mmol/L    Potassium 4.4 3.7 - 5.3 mmol/L    Chloride 99 98 - 107 mmol/L    CO2 26 20 - 31 mmol/L    Anion Gap 11 9 - 17 mmol/L    GFR Non-African American 53 (L) >60 mL/min    GFR African American >60 >60 mL/min    GFR Comment         Magnesium    Collection Time: 09/01/22  5:02 PM   Result Value Ref Range    Magnesium 1.7 1.6 - 2.6 mg/dL   Troponin    Collection Time: 09/01/22  5:02 PM   Result Value Ref Range    Troponin, High Sensitivity 11 0 - 14 ng/L   Troponin    Collection Time: 09/01/22  6:55 PM   Result Value Ref Range    Troponin, High Sensitivity 10 0 - 14 ng/L   POC Glucose Fingerstick    Collection Time: 09/01/22 11:45 PM   Result Value Ref Range    POC Glucose 126 (H) 65 - 105 mg/dL       Imaging/Diagnostics:  XR CHEST PORTABLE    Result Date: 9/1/2022  No acute cardiopulmonary disease. Assessment :      Hospital Problems             Last Modified POA    * (Principal) Atrial fibrillation with rapid ventricular response ([facility] [address] 9/1/2022 Yes    Hyperlipidemia with target LDL less than 100 9/2/2022 Yes    Paroxysmal atrial fibrillation ([address] 9/2/2022 Yes       Plan:     Patient status inpatient in the Progressive Unit/Step down    Atrial fibrillation with RVR  -EKG in ED - a. Fib with ventricular rate . ..  -Cardizem IVP administered in ED, followed by Cardizem drip  -Continued on admission   -troponin 11 and 10  -monitor telemetry and VS  -Consult cardiology  -resume home xarelto  -NPO after midnight  -magnesium 1.7  -Check BNP, TSH, Lipid panel, & HgbA1c in am  -Check 2D echocardiogram  -Pain/nausea control    HLD  -resume home dose atorvastatin  -check lipid panel    Full code    Disposition 1-2 days       Consultations:   IP CONSULT TO CARDIOLOGY  IP CONSULT TO PRIMARY CARE PROVIDER    Patient is admitted as inpatient status because of co-morbidities listed above, severity of signs and symptoms as outlined, requirement for current medical therapies and most importantly because of direct risk to patient if care not provided in a hospital setting. Expected length of stay > 48 hours. MAX Martinez - NP  9/2/2022  3:54 AM    Copy sent to Dr. Celina Chavez MD    Please note that this chart was generated using voice recognition Dragon dictation software. Although every effort was made to ensure the accuracy of this automated transcription, some errors in transcription may have occurred. Lois Peña [address] [place] [address]. Phone [phone]     Attending Physician Statement  I have discussed the care of Krista Rothman with the CNP. I have examined the patient myself and taken ros and hpi , including pertinent history and exam findings. I have reviewed the key elements of all parts of the encounter with the CNPt. I agree with the assessment, plan and orders as documented by the CNP. 51-year-old female history of paroxysmal A. fib on Xarelto, type 2 diabetes, hypothyroid hypertension with CKD stage III, history of uterine cancer status post hysterectomy morbid obesity presenting with A. fib RVR  A. fib RVR, known paroxysmal A. fib started on Cardizem infusion troponins flat Home Xarelto resumed,  Echo pending, heart rate controlled. TSH normal    Body mass index is 46.77 kg/m².       DVT prophylaxis-xarelto    Electronically signed by Herrera Jack MD

## 2022-09-01 NOTE — ED PROVIDER NOTES
[address] ED  eMERGENCY dEPARTMENT eNCOUnter   Attending Attestation     Pt Name: Dennise Brown  MRN: [id number]  [place] 1950  Date of evaluation: 9/1/22       Dennise Brown is a 81 y.o. female who presents with Palpitations      History:   Patient presents with atrial fibrillation with rapid ventricular response. Patient has an underlying history of atrial fibrillation but was sent in by the primary care physician because of the rapid ventricular response. Patient is currently anticoagulated for A. fib. Exam: Vitals:   Vitals:    09/01/22 1713 09/01/22 1716 09/01/22 1734 09/01/22 1855   BP:   112/83 (!) 136/97   Pulse: 79  91 (!) 112   Resp: 22  20 16   Temp:       SpO2: 99%  97% 98%   Weight:  250 lb (113.4 kg)     Height:  5' 2\" (1.575 m)       Patient provided with a Cardizem bolus in the ER. Patient also started on a Cardizem drip with a protocol of keeping the heart rate under 120. Cardiac work-up in the ER did not display positive signs of acute cardiac ischemia. EKG was reviewed and interpreted by myself, ED physician. Patient admitted after speaking with the admitting team.  Patient understands and agrees with the plan. I performed a history and physical examination of the patient and discussed management with the resident. I reviewed the residents note and agree with the documented findings and plan of care. Any areas of disagreement are noted on the chart. I was personally present for the key portions of any procedures. I have documented in the chart those procedures where I was not present during the key portions. I have personally reviewed all images and agree with the resident's interpretation. I have reviewed the emergency nurses triage note. I agree with the chief complaint, past medical history, past surgical history, allergies, medications, social and family history as documented unless otherwise noted below.  Documentation of the HPI, Physical Exam and Medical Decision Making performed by medical students or scribes is based on my personal performance of the HPI, PE and MDM. I personally evaluated and examined the patient in conjunction with the APC and agree with the assessment, treatment plan, and disposition of the patient as recorded by the APC. Additional findings are as noted.     Americo Dobbs DO  Attending Emergency  Physician             Americo Dobbs DO  09/01/22 2018

## 2022-09-01 NOTE — ED TRIAGE NOTES
Mode of arrival (squad #, walk in, police, etc) : walk in      Chief complaint(s): Afib      Arrival Note (brief scenario, treatment PTA, etc). : Pt sent for eval from her cardiologists office for eval of afib. C= \"Have you ever felt that you should Cut down on your drinking? \"  No  A= \"Have people Annoyed you by criticizing your drinking? \"  No  G= \"Have you ever felt bad or Guilty about your drinking? \"  No  E= \"Have you ever had a drink as an Eye-opener first thing in the morning to steady your nerves or to help a hangover? \"  No      Deferred []      Reason for deferring: N/A    *If yes to two or more: probable alcohol abuse. *

## 2022-09-01 NOTE — ED PROVIDER NOTES
[address]  Emergency Department Encounter  EmergencyMedicine Resident     Pt Hayde Manzo  MRN: [id number]  Gar[place] 1950  Date of evaluation: 9/1/22  PCP:  Yeni Hernandez MD    This patient was evaluated in the Emergency Department for symptoms described in the history of present illness. The patient was evaluated in the context of the global COVID-19 pandemic, which necessitated consideration that the patient might be at risk for infection with the SARS-CoV-2 virus that causes COVID-19. Institutional protocols and algorithms that pertain to the evaluation of patients at risk for COVID-19 are in a state of rapid change based on information released by regulatory bodies including the CDC and federal and state organizations. These policies and algorithms were followed during the patient's care in the ED. CHIEF COMPLAINT       Chief Complaint   Patient presents with    Palpitations       HISTORY OF PRESENT ILLNESS  (Location/Symptom, Timing/Onset, Context/Setting, Quality, Duration, Modifying Factors, Severity.)      Inez Stewart is a 81 y.o. female who presents with concern for A. fib RVR. Patient was sent by her cardiologist due to being in A. fib RVR. Patient is currently asymptomatic, not any chest pain shortness of breath, not feeling any palpitations. Patient not have any abdominal pain. Patient is currently on Xarelto for her atrial fibrillation.     PAST MEDICAL / SURGICAL / SOCIAL / FAMILY HISTORY      has a past medical history of Abdominal aortic aneurysm (AAA) without rupture ([address] Adenocarcinoma of endometrium, stage 1 ([address] JOSHUA (acute kidney injury) ([address] Allergic rhinitis, Anemia, At high risk for falls, Atrial fibrillation ([address] Atrial fibrillation with rapid ventricular response ([address] Atrial fibrillation, new onset ([address] CHF (congestive heart failure) ([address] Class 3 severe obesity due to excess calories without serious comorbidity with body mass index (BMI) of 40.0 to 44.9 in adult [facility]), Colitis, Colon polyp, Diabetes mellitus ([facility] Diverticulitis, Diverticulitis of colon with perforation, Endometrial cancer ([address] History of TIA (transient ischemic attack), Hyperglycemia, Hyperlipidemia, Hypertension, Hypothyroidism, Legally blind, Lower back pain, Mixed incontinence, Morbid obesity with BMI of 40.0-44.9, adult ([address] CLAROS (nonalcoholic steatohepatitis), Obesity, Optic atrophy, Oral phase dysphagia, Osteoarthritis (arthritis due to wear and tear of joints), Osteoarthritis involving multiple joints on both sides of body, Osteoporosis, Perforated bowel ([address] Perforated diverticulum, Postmenopausal bleeding, Rectal bleeding, S/P h-scope, Myosure 9/20/17, Tennis elbow, Thickened endometrium, TIA (transient ischemic attack), TLHBSO, bilateral LND 10/24/17, and Type 2 diabetes mellitus, without long-term current use of insulin ([address] has a past surgical history that includes Thyroid surgery (1980); Colonoscopy (1997); Tonsillectomy; Colonoscopy (06/26/2017); pr colsc flx w/removal lesion by hot bx forceps (N/A, 6/26/2017); Dilation and curettage of uterus (N/A, 9/20/2017); Cataract removal with implant (Bilateral, 2015); vitrectomy; Total abdominal hysterectomy w/ bilateral salpingoophorectomy (10/24/2017); Hysterectomy (N/A, 10/24/2017); Upper gastrointestinal endoscopy (N/A, 8/3/2020); Colonoscopy (N/A, 8/3/2020); and Small intestine surgery (N/A, 8/4/2020). Social History     Socioeconomic History    Marital status:       Spouse name: Not on file    Number of children: 1    Years of education: Not on file    Highest education level: Not on file   Occupational History    Not on file   Tobacco Use    Smoking status: Never    Smokeless tobacco: Never   Vaping Use    Vaping Use: Never used   Substance and Sexual Activity    Alcohol use: No     Alcohol/week: 0.0 standard drinks    Drug use: No    Sexual activity: Not Currently     Partners: Male   Other Topics Concern    Not on file   Social History Narrative    Not on file     Social Determinants of Health     Financial Resource Strain: Not on file   Food Insecurity: Not on file   Transportation Needs: Not on file   Physical Activity: Not on file   Stress: Not on file   Social Connections: Not on file   Intimate Partner Violence: Not on file   Housing Stability: Not on file       Family History   Problem Relation Age of Onset    Coronary Art Dis Father     Hypertension Father     Diabetes Father     High Blood Pressure Father     Heart Attack Father     Diabetes Mother     High Blood Pressure Mother     Thyroid Disease Mother     High Blood Pressure Sister     Thyroid Disease Brother     High Blood Pressure Brother     High Blood Pressure Maternal Grandmother     Diabetes Maternal Grandfather     No Known Problems Paternal Grandmother     Heart Attack Paternal Grandfather     Colon Cancer Neg Hx        Allergies:  Sulfa antibiotics and Penicillins    Home Medications:  Prior to Admission medications    Medication Sig Start Date End Date Taking?  Authorizing Provider   levothyroxine (SYNTHROID) 75 MCG tablet TAKE ONE (1) TABLET BY MOUTH EVERY MORNING (BEFORE BREAKFAST) 8/25/22  Yes Zully Ashley MD   vitamin D3 (CHOLECALCIFEROL) 25 MCG (1000 UT) TABS tablet TAKE TWO (2) TABLETS BY MOUTH ONCE DAILY 7/13/22  Yes Zully Ashley MD   MYRBETRIQ 50 MG TB24 Take 50 mg by mouth daily 7/12/22  Yes Herrera James MD   Probiotic Product (LISSY-BID PROBIOTIC) TABS TAKE ONE (1) TABLET BY MOUTH IN THE MORNING 6/14/22  Yes Zully Ashley MD   pantoprazole (PROTONIX) 40 MG tablet TAKE ONE (1) TABLET BY MOUTH ONCE DAILY 6/14/22  Yes Zully Ashley MD   vitamin B-12 (CYANOCOBALAMIN) 1000 MCG tablet TAKE ONE (1) TABLET BY MOUTH DAILY 4/19/22  Yes Zully Ashley MD   metoprolol tartrate (LOPRESSOR) 25 MG tablet Take 0.5 tablets by mouth 2 times daily Dose decreased 4/12/2022  due to bradycardia 4/12/22  Yes Amairani Hung MD   Magnesium Oxide (MAGNESIUM-OXIDE) 250 MG TABS tablet Take 1 tablet by mouth daily Take with food, in the evening 3/22/22  Yes Amairani Hung MD   ondansetron (ZOFRAN) 4 MG tablet TAKE ONE (1) TABLET BY MOUTH EVERY 8 HOURS AS NEEDED 1/3/22  Yes Amairani Hung MD   atorvastatin (LIPITOR) 40 MG tablet TAKE ONE (1) TABLET BY MOUTH DAILY STOP SIMVASTATIN  Patient taking differently: 40 mg nightly 12/29/21  Yes Amairani Hung MD   lisinopril (PRINIVIL;ZESTRIL) 10 MG tablet TAKE 1 TABLET BY MOUTH ONCE DAILY 12/29/21  Yes Amairani Hung MD   XARELTO 20 MG TABS tablet TAKE ONE (1) TABLET BY MOUTH DAILY (WITH BREAKFAST)  12/29/20  Yes Radha Orozco MD   nystatin (MYCOSTATIN) 528425 UNIT/GM powder Apply daily for 4-6 weeks 3/22/22   Amairani Hung MD   furosemide (LASIX) 20 MG tablet Take 1 tablet by mouth daily For congestive heart failure 3/22/22   Amairani Hung MD   docusate sodium (DOK) 100 MG capsule TAKE ONE (1) CAPSULE BY MOUTH TWO (2) TIMES DAILY FOR CONSTIPATION 12/1/21   Radha Vallecillo MD   ACETAMINOPHEN EXTRA STRENGTH 500 MG tablet TAKE 1 TABLET BY MOUTH EVERY 6 HOURS AS NEEDED FOR PAIN 11/16/21   Amairani Hung MD   albuterol sulfate HFA (PROVENTIL HFA) 108 (90 Base) MCG/ACT inhaler Inhale 1-2 puffs into the lungs every 4 hours as needed for Wheezing 11/4/21   Flaquito Pino MD   clotrimazole-betamethasone (LOTRISONE) 1-0.05 % cream Apply topically 1 times daily on the perineum, alternate powder, for 10 days 10/19/21   Amairani Hung MD       REVIEW OF SYSTEMS    (2-9 systems for level 4, 10 or more for level 5)      Review of Systems   Constitutional:  Negative for chills, fatigue and fever. HENT:  Negative for congestion and trouble swallowing. Eyes:  Negative for visual disturbance. Respiratory:  Negative for cough, chest tightness and shortness of breath.     Gastrointestinal:  Negative for abdominal pain, diarrhea, nausea Differential    Basic Metabolic Panel    Troponin    Magnesium    Troponin    Inpatient consult to Cardiology    Inpatient consult to Primary Care Provider    EKG 12 Lead    ADMIT TO INPATIENT       MEDICATIONS ORDERED:  Orders Placed This Encounter   Medications    dilTIAZem injection 20 mg    DISCONTD: dilTIAZem injection 20 mg    dilTIAZem 125 mg in dextrose 5 % 125 mL infusion     Order Specific Question:   Titrate Infusion? Answer:   Yes     Order Specific Question:   Initial Infusion Dose: Answer:   5 mg/hr     Order Specific Question:   Goal of Therapy is:      Answer:   HR less than 120 bpm     Order Specific Question:   Contact Provider if:     Answer:   SBP less than 90 mmHg     Order Specific Question:   Contact Provider if:     Answer:   HR less than 60 bpm     Order Specific Question:   HOLD for     Answer:   SBP less than 90 mmHg     Order Specific Question:   HOLD for     Answer:   HR less than 60 bpm           DIAGNOSTIC RESULTS / EMERGENCY DEPARTMENT COURSE / MDM   LAB RESULTS:  Results for orders placed or performed during the hospital encounter of 09/01/22   CBC with Auto Differential   Result Value Ref Range    WBC 7.9 3.5 - 11.0 k/uL    RBC 4.16 4.0 - 5.2 m/uL    Hemoglobin 11.7 (L) 12.0 - 16.0 g/dL    Hematocrit 36.7 36 - 46 %    MCV 88.2 80 - 100 fL    MCH 28.0 26 - 34 pg    MCHC 31.8 31 - 37 g/dL    RDW 15.0 (H) 11.5 - 14.9 %    Platelets 151 015 - 350 k/uL    MPV 8.0 6.0 - 12.0 fL    Seg Neutrophils 69 (H) 36 - 66 %    Lymphocytes 19 (L) 24 - 44 %    Monocytes 9 (H) 1 - 7 %    Eosinophils % 2 0 - 4 %    Basophils 1 0 - 2 %    Segs Absolute 5.50 1.3 - 9.1 k/uL    Absolute Lymph # 1.50 1.0 - 4.8 k/uL    Absolute Mono # 0.70 0.1 - 1.3 k/uL    Absolute Eos # 0.10 0.0 - 0.4 k/uL    Basophils Absolute 0.10 0.0 - 0.2 k/uL   Basic Metabolic Panel   Result Value Ref Range    Glucose 92 70 - 99 mg/dL    BUN 26 (H) 8 - 23 mg/dL    Creatinine 1.03 (H) 0.50 - 0.90 mg/dL    Calcium 9.7 8.6 - 10.4 mg/dL    Sodium 136 135 - 144 mmol/L    Potassium 4.4 3.7 - 5.3 mmol/L    Chloride 99 98 - 107 mmol/L    CO2 26 20 - 31 mmol/L    Anion Gap 11 9 - 17 mmol/L    GFR Non-African American 53 (L) >60 mL/min    GFR African American >60 >60 mL/min    GFR Comment         Troponin   Result Value Ref Range    Troponin, High Sensitivity 10 0 - 14 ng/L   Magnesium   Result Value Ref Range    Magnesium 1.7 1.6 - 2.6 mg/dL   Troponin   Result Value Ref Range    Troponin, High Sensitivity 11 0 - 14 ng/L   EKG 12 Lead   Result Value Ref Range    Ventricular Rate 125 BPM    Atrial Rate 234 BPM    QRS Duration 72 ms    Q-T Interval 312 ms    QTc Calculation (Bazett) 450 ms    R Axis 54 degrees    T Axis 37 degrees         RADIOLOGY:  XR CHEST PORTABLE    Result Date: 9/1/2022  No acute cardiopulmonary disease. CONSULTS:  [address] TO PRIMARY CARE PROVIDER        Assessment/Plan: Patient in asymptomatic A. fib RVR presented to the ED upon direction by her cardiologist.  Patient started on diltiazem drip after 20 mg push of diltiazem. Patient is hemodynamically stable, heart rate in the 100s 110s. Discussed case with cardiology, will admit patient. Patient is currently on Xarelto for atrial fibrillation, admitted to medicine. FINAL IMPRESSION      1. Atrial fibrillation, unspecified type ([address]          DISPOSITION / PLAN     DISPOSITION Admitted 09/01/2022 07:41:18 PM      PATIENT REFERRED TO:  No follow-up provider specified.     DISCHARGE MEDICATIONS:  Current Discharge Medication List          Tushar Keller DO  Emergency Medicine Resident    (Please note that portions of thisnote were completed with a voice recognition program.  Efforts were made to edit the dictations but occasionally words are mis-transcribed.)         Mauricio Contreras DO  Resident  09/01/22 0805

## 2022-09-02 ENCOUNTER — APPOINTMENT (OUTPATIENT)
Dept: NON INVASIVE DIAGNOSTICS | Age: 72
DRG: 309 | End: 2022-09-02
Payer: MEDICARE

## 2022-09-02 LAB
CHOLESTEROL/HDL RATIO: 4.2
CHOLESTEROL: 196 MG/DL
EKG ATRIAL RATE: 234 BPM
EKG Q-T INTERVAL: 312 MS
EKG QRS DURATION: 72 MS
EKG QTC CALCULATION (BAZETT): 450 MS
EKG R AXIS: 54 DEGREES
EKG T AXIS: 37 DEGREES
EKG VENTRICULAR RATE: 125 BPM
ESTIMATED AVERAGE GLUCOSE: 123 MG/DL
HBA1C MFR BLD: 5.9 % (ref 4–6)
HDLC SERPL-MCNC: 47 MG/DL
LDL CHOLESTEROL: 104 MG/DL (ref 0–130)
LV EF: 55 %
LVEF MODALITY: NORMAL
PRO-BNP: 886 PG/ML
TRIGL SERPL-MCNC: 225 MG/DL
TSH SERPL DL<=0.05 MIU/L-ACNC: 2.38 UIU/ML (ref 0.3–5)

## 2022-09-02 PROCEDURE — 2060000000 HC ICU INTERMEDIATE R&B

## 2022-09-02 PROCEDURE — 93306 TTE W/DOPPLER COMPLETE: CPT

## 2022-09-02 PROCEDURE — 6370000000 HC RX 637 (ALT 250 FOR IP): Performed by: INTERNAL MEDICINE

## 2022-09-02 PROCEDURE — 83036 HEMOGLOBIN GLYCOSYLATED A1C: CPT

## 2022-09-02 PROCEDURE — 36415 COLL VENOUS BLD VENIPUNCTURE: CPT

## 2022-09-02 PROCEDURE — 93010 ELECTROCARDIOGRAM REPORT: CPT | Performed by: INTERNAL MEDICINE

## 2022-09-02 PROCEDURE — 83880 ASSAY OF NATRIURETIC PEPTIDE: CPT

## 2022-09-02 PROCEDURE — 84443 ASSAY THYROID STIM HORMONE: CPT

## 2022-09-02 PROCEDURE — 6370000000 HC RX 637 (ALT 250 FOR IP): Performed by: NURSE PRACTITIONER

## 2022-09-02 PROCEDURE — 80061 LIPID PANEL: CPT

## 2022-09-02 PROCEDURE — 2580000003 HC RX 258: Performed by: NURSE PRACTITIONER

## 2022-09-02 PROCEDURE — 98960 EDU&TRN PT SELF-MGMT NQHP 1: CPT

## 2022-09-02 RX ORDER — ATROPINE SULFATE 0.1 MG/ML
0.5 INJECTION INTRAVENOUS EVERY 5 MIN PRN
OUTPATIENT
Start: 2022-09-02 | End: 2022-09-02

## 2022-09-02 RX ORDER — SODIUM CHLORIDE 0.9 % (FLUSH) 0.9 %
5-40 SYRINGE (ML) INJECTION PRN
OUTPATIENT
Start: 2022-09-02 | End: 2022-09-02

## 2022-09-02 RX ORDER — DIGOXIN 250 MCG
250 TABLET ORAL ONCE
Status: COMPLETED | OUTPATIENT
Start: 2022-09-02 | End: 2022-09-02

## 2022-09-02 RX ORDER — METOPROLOL TARTRATE 5 MG/5ML
5 INJECTION INTRAVENOUS EVERY 5 MIN PRN
OUTPATIENT
Start: 2022-09-02 | End: 2022-09-02

## 2022-09-02 RX ORDER — DIGOXIN 0.25 MG/ML
250 INJECTION INTRAMUSCULAR; INTRAVENOUS ONCE
Status: COMPLETED | OUTPATIENT
Start: 2022-09-02 | End: 2022-09-03

## 2022-09-02 RX ORDER — ALBUTEROL SULFATE 90 UG/1
2 AEROSOL, METERED RESPIRATORY (INHALATION) PRN
OUTPATIENT
Start: 2022-09-02 | End: 2022-09-02

## 2022-09-02 RX ORDER — SODIUM CHLORIDE 9 MG/ML
500 INJECTION, SOLUTION INTRAVENOUS CONTINUOUS PRN
OUTPATIENT
Start: 2022-09-02 | End: 2022-09-02

## 2022-09-02 RX ORDER — AMINOPHYLLINE DIHYDRATE 25 MG/ML
50 INJECTION, SOLUTION INTRAVENOUS PRN
OUTPATIENT
Start: 2022-09-02 | End: 2022-09-02

## 2022-09-02 RX ORDER — NITROGLYCERIN 0.4 MG/1
0.4 TABLET SUBLINGUAL EVERY 5 MIN PRN
OUTPATIENT
Start: 2022-09-02 | End: 2022-09-02

## 2022-09-02 RX ADMIN — Medication 200 MG: at 09:09

## 2022-09-02 RX ADMIN — LISINOPRIL 10 MG: 10 TABLET ORAL at 09:09

## 2022-09-02 RX ADMIN — SODIUM CHLORIDE, PRESERVATIVE FREE 10 ML: 5 INJECTION INTRAVENOUS at 20:22

## 2022-09-02 RX ADMIN — METOPROLOL TARTRATE 12.5 MG: 25 TABLET, FILM COATED ORAL at 20:21

## 2022-09-02 RX ADMIN — ATORVASTATIN CALCIUM 40 MG: 40 TABLET, FILM COATED ORAL at 20:21

## 2022-09-02 RX ADMIN — RIVAROXABAN 20 MG: 20 TABLET, FILM COATED ORAL at 18:29

## 2022-09-02 RX ADMIN — METOPROLOL TARTRATE 12.5 MG: 25 TABLET, FILM COATED ORAL at 09:09

## 2022-09-02 RX ADMIN — DOCUSATE SODIUM 100 MG: 100 CAPSULE, LIQUID FILLED ORAL at 09:09

## 2022-09-02 RX ADMIN — DIGOXIN 250 MCG: 250 TABLET ORAL at 16:24

## 2022-09-02 ASSESSMENT — ENCOUNTER SYMPTOMS
SORE THROAT: 0
WHEEZING: 0
ABDOMINAL PAIN: 0
BACK PAIN: 0
CONSTIPATION: 0
SHORTNESS OF BREATH: 0
COUGH: 0
VOMITING: 0
NAUSEA: 0
DIARRHEA: 0

## 2022-09-02 NOTE — CARE COORDINATION
CASE MANAGEMENT NOTE:    Admission Date:  9/1/2022 Licha Murray is a 81 y.o.  female    Admitted for : Atrial fibrillation with rapid ventricular response ([facility] [I48.91]  Atrial fibrillation, unspecified type ([facility] [address] [I48.91]    Met with:  Patient    PCP:  Jay Monroy                                Insurance:  [facility] medicare       Is patient alert and oriented at time of discussion:  Yes    Current Residence/ Living Arrangements:  independently at home  in 2 story apartment with elevator. Current Services PTA:  [facility] thru [facility] 3 x week Mon/Thurs/Sat     Does patient go to outpatient dialysis: No  If yes, location and chair time: NA  Who is their nephrologist? NA    Is patient agreeable to VNS: Yes- has had [address] in the past and is agreeable to them if needed. Freedom of choice provided:  Yes    List of [address] provided: Yes    VNS chosen:  Yes    DME:  shower chair, rollator, raised toilet seat and GB     Home Oxygen: No    Nebulizer: No    CPAP/BIPAP: Yes- brand new and needs help setting it up     Supplier: Pt stated [facility]      Potential Assistance Needed: Yes    SNF needed: No    Freedom of choice and list provided: No    Pharmacy:  [facility]       Is patient currently receiving oral anticoagulation therapy? Yes- Xarelto PTA    Is the Patient an [facility] with Readmission Risk Score greater than 14%? No  If yes, pt needs a follow up appointment made within 7 days. Family Members/Caregivers that pt would like involved in their care:    Yes    If yes, list name here:  Eugenie [place] and Sia Lung sister    Transportation Provider:  Family or senior center/cab              Discharge Plan:  9/2/22 [facility] MEDICARE from home alone DME: SC, rollator, GB, raised toilet seat. Pt has CPAP but doesn't know how to use it. VNS: none but has had [address] in the past and would like again if she needs them. Xarelto PTA for afib. On cardizem gtt for afib RVR, cardio consult , 2 decho. Following for needs. //JF                Electronically signed by: Samreen Askew RN on 9/2/2022 at 9:27 AM        Left VM for Dr Mana Cuadra respiratory therapy person in his office re: CPAP .   Electronically signed by Samreen Askew RN on 9/2/2022 at 10:35 AM

## 2022-09-02 NOTE — ACP (ADVANCE CARE PLANNING)
Advance Care Planning     Advance Care Planning Activator (Inpatient)  Conversation Note      Date of ACP Conversation: 9/2/2022     Conversation Conducted with: Patient with Decision Making Capacity    ACP Activator: Maricel Whitman RN        Health Care Decision Maker:     Current Designated Health Care Decision Maker:     Click here to complete Healthcare Decision Makers including section of the Healthcare Decision Maker Relationship (ie \"Primary\")  Today we documented Decision Maker(s) consistent with Legal Next of Kin hierarchy. Care Preferences    Ventilation: \"If you were in your present state of health and suddenly became very ill and were unable to breathe on your own, what would your preference be about the use of a ventilator (breathing machine) if it were available to you? \"      Would the patient desire the use of ventilator (breathing machine)?: no    \"If your health worsens and it becomes clear that your chance of recovery is unlikely, what would your preference be about the use of a ventilator (breathing machine) if it were available to you? \"     Would the patient desire the use of ventilator (breathing machine)?: No      Resuscitation  \"CPR works best to restart the heart when there is a sudden event, like a heart attack, in someone who is otherwise healthy. Unfortunately, CPR does not typically restart the heart for people who have serious health conditions or who are very sick. \"    \"In the event your heart stopped as a result of an underlying serious health condition, would you want attempts to be made to restart your heart (answer \"yes\" for attempt to resuscitate) or would you prefer a natural death (answer \"no\" for do not attempt to resuscitate)? \" yes       [x] Yes   [] No   Educated Patient / Mortimer Hoar regarding differences between Advance Directives and portable DNR orders.     Length of ACP Conversation in minutes:      Conversation Outcomes:  [x][] ACP discussion completed   Existing advance directive reviewed with patient; no changes to patient's previously recorded wishes  [] New Advance Directive completed  [] Portable Do Not Rescitate prepared for Provider review and signature  [] POLST/POST/MOLST/MOST prepared for Provider review and signature      Follow-up plan:    [] Schedule follow-up conversation to continue planning  [] Referred individual to Provider for additional questions/concerns   [] Advised patient/agent/surrogate to review completed ACP document and update if needed with changes in condition, patient preferences or care setting    [x] This note routed to one or more involved healthcare providers    {

## 2022-09-02 NOTE — CONSULTS
[facility]   Consult Note                 Date:   9/2/2022  Date of admission:  9/1/2022  4:27 PM  MRN:   [id number]  YOB: 1950    Reason for Consult: A. fib    HISTORY OF PRESENT ILLNESS:    The patient is a 81 y.o.  female who is admitted to the hospital for more rapid ventricular rate and palpitations.   Patient do have a history of A. fib fairly controlled but according to her lately patient is getting very tired short of breath fatigued home health came and found the heart rate was very high and patient was sent here  Patient is a diabetic does not feel much tightness or pressure  Patient is being treated with IV Cardizem  Patient has been on Xarelto before is continued      Past Medical History:   has a past medical history of Abdominal aortic aneurysm (AAA) without rupture ([address] Adenocarcinoma of endometrium, stage 1 ([address] JOSHUA (acute kidney injury) ([address] Allergic rhinitis, Anemia, At high risk for falls, Atrial fibrillation ([address] Atrial fibrillation with rapid ventricular response ([address] Atrial fibrillation, new onset ([address] CHF (congestive heart failure) ([address] Class 3 severe obesity due to excess calories without serious comorbidity with body mass index (BMI) of 40.0 to 44.9 in adult [facility]), Colitis, Colon polyp, Diabetes mellitus ([address] Diverticulitis, Diverticulitis of colon with perforation, Endometrial cancer ([address] History of TIA (transient ischemic attack), Hyperglycemia, Hyperlipidemia, Hypertension, Hypothyroidism, Legally blind, Lower back pain, Mixed incontinence, Morbid obesity with BMI of 40.0-44.9, adult ([address] CLAROS (nonalcoholic steatohepatitis), Obesity, Optic atrophy, Oral phase dysphagia, Osteoarthritis (arthritis due to wear and tear of joints), Osteoarthritis involving multiple joints on both sides of body, Osteoporosis, Perforated bowel ([address] Perforated diverticulum, Postmenopausal bleeding, Rectal bleeding, S/P h-scope, Myosure 9/20/17, Tennis elbow, Thickened endometrium, TIA (transient ischemic attack), TLHBSO, bilateral LND 10/24/17, and Type 2 diabetes mellitus, without long-term current use of insulin ([facility] Past Surgical History:   has a past surgical history that includes Thyroid surgery (1980); Colonoscopy (1997); Tonsillectomy; Colonoscopy (06/26/2017); pr colsc flx w/removal lesion by hot bx forceps (N/A, 6/26/2017); Dilation and curettage of uterus (N/A, 9/20/2017); Cataract removal with implant (Bilateral, 2015); vitrectomy; Total abdominal hysterectomy w/ bilateral salpingoophorectomy (10/24/2017); Hysterectomy (N/A, 10/24/2017); Upper gastrointestinal endoscopy (N/A, 8/3/2020); Colonoscopy (N/A, 8/3/2020); and Small intestine surgery (N/A, 8/4/2020). Home Medications:    Prior to Admission medications    Medication Sig Start Date End Date Taking?  Authorizing Provider   levothyroxine (SYNTHROID) 75 MCG tablet TAKE ONE (1) TABLET BY MOUTH EVERY MORNING (BEFORE BREAKFAST) 8/25/22  Yes Amira Bermudez MD   vitamin D3 (CHOLECALCIFEROL) 25 MCG (1000 UT) TABS tablet TAKE TWO (2) TABLETS BY MOUTH ONCE DAILY 7/13/22  Yes Amira Bermudez MD   MYRBETRIQ 50 MG TB24 Take 50 mg by mouth daily 7/12/22  Yes Veronica Lackey MD   Probiotic Product (LISSY-BID PROBIOTIC) TABS TAKE ONE (1) TABLET BY MOUTH IN THE MORNING 6/14/22  Yes Amira Bermudez MD   pantoprazole (PROTONIX) 40 MG tablet TAKE ONE (1) TABLET BY MOUTH ONCE DAILY 6/14/22  Yes Amira Bermudez MD   vitamin B-12 (CYANOCOBALAMIN) 1000 MCG tablet TAKE ONE (1) TABLET BY MOUTH DAILY 4/19/22  Yes Amira Bermudez MD   metoprolol tartrate (LOPRESSOR) 25 MG tablet Take 0.5 tablets by mouth 2 times daily Dose decreased 4/12/2022  due to bradycardia 4/12/22  Yes Amira Bermudez MD   Magnesium Oxide (MAGNESIUM-OXIDE) 250 MG TABS tablet Take 1 tablet by mouth daily Take with food, in the evening 3/22/22  Yes Amira Bermudez MD   ondansetron (ZOFRAN) 4 MG tablet TAKE ONE (1) TABLET BY MOUTH EVERY 8 HOURS AS NEEDED 1/3/22  Yes Billy Painting MD   atorvastatin (LIPITOR) 40 MG tablet TAKE ONE (1) TABLET BY MOUTH DAILY STOP SIMVASTATIN  Patient taking differently: 40 mg nightly 12/29/21  Yes Billy Painting MD   lisinopril (PRINIVIL;ZESTRIL) 10 MG tablet TAKE 1 TABLET BY MOUTH ONCE DAILY 12/29/21  Yes Billy Painting MD   XARELTO 20 MG TABS tablet TAKE ONE (1) TABLET BY MOUTH DAILY (WITH BREAKFAST)  12/29/20  Yes Joseph James MD   nystatin (MYCOSTATIN) 098013 UNIT/GM powder Apply daily for 4-6 weeks 3/22/22   Billy Painting MD   furosemide (LASIX) 20 MG tablet Take 1 tablet by mouth daily For congestive heart failure  Patient not taking: Reported on 9/2/2022 3/22/22   Billy Painting MD   docusate sodium (DOK) 100 MG capsule TAKE ONE (1) CAPSULE BY MOUTH TWO (2) TIMES DAILY FOR CONSTIPATION 12/1/21   Radha Vallecillo MD   ACETAMINOPHEN EXTRA STRENGTH 500 MG tablet TAKE 1 TABLET BY MOUTH EVERY 6 HOURS AS NEEDED FOR PAIN 11/16/21   Billy Painting MD   albuterol sulfate HFA (PROVENTIL HFA) 108 (90 Base) MCG/ACT inhaler Inhale 1-2 puffs into the lungs every 4 hours as needed for Wheezing 11/4/21   Delfina Valentine MD   clotrimazole-betamethasone (LOTRISONE) 1-0.05 % cream Apply topically 1 times daily on the perineum, alternate powder, for 10 days 10/19/21   Billy Painting MD       Current Medications: Scheduled Meds:   atorvastatin  40 mg Oral Nightly    docusate sodium  100 mg Oral BID    furosemide  20 mg Oral Daily    levothyroxine  75 mcg Oral Daily    lisinopril  10 mg Oral Daily    metoprolol tartrate  12.5 mg Oral BID    pantoprazole  40 mg Oral QAM AC    rivaroxaban  20 mg Oral Daily    sodium chloride flush  5-40 mL IntraVENous 2 times per day    magnesium oxide  200 mg Oral Daily     Continuous Infusions:   dilTIAZem (CARDIZEM) 125 mg in dextrose 5% 125 mL infusion 5 mg/hr (09/01/22 3810)    sodium chloride       PRN Meds:.perflutren lipid microspheres, albuterol sulfate HFA, sodium chloride flush, sodium chloride, ondansetron **OR** ondansetron, polyethylene glycol, acetaminophen **OR** acetaminophen     Allergies:  Sulfa antibiotics and Penicillins    Social History:   reports that she has never smoked. She has never used smokeless tobacco. She reports that she does not drink alcohol and does not use drugs. Family History: family history includes Coronary Art Dis in her father; Diabetes in her father, maternal grandfather, and mother; Heart Attack in her father and paternal grandfather; High Blood Pressure in her brother, father, maternal grandmother, mother, and sister; Hypertension in her father; No Known Problems in her paternal grandmother; Thyroid Disease in her brother and mother. Review of Systems   CONSTITUTIONAL:  positive for fatigue and malaise    EYES:  negative for discharge    HEENT:  negative for epistaxis and sore throat    RESPIRATORY:  positive for shortness of breath,    CARDIOVASCULAR:  negative for chest pain, palpitations, syncope, edema    GASTROINTESTINAL:  negative for nausea, vomiting, diarrhea, constipation, abdominal pain    GENITOURINARY:  negative for incontinence    MUSCULOSKELETAL:  negative for neck or back pain    NEUROLOGICAL:  negative for headaches, seizures and double vision   PSYCHIATRIC:  negative               PHYSICAL EXAM:    Blood pressure (!) 95/59, pulse 74, temperature 97.9 °F (36.6 °C), temperature source Oral, resp. rate 18, height 5' 2\" (1.575 m), weight 255 lb 11.7 oz (116 kg), SpO2 97 %, not currently breastfeeding.     CONSTITUTIONAL: AOx4, no apparent distress, appears stated age   HEAD: normocephalic, atraumatic   EYES: PERRLA, EOMI   ENT: moist mucous membranes, uvula midline   NECK:  symmetric, no midline tenderness to palpation   LUNGS: clear to auscultation bilaterally   CARDIOVASCULAR: irregular rate and rhythm, no murmurs, rubs or gallops   ABDOMEN: Soft, non-tender, non-distended with normal active bowel sounds   SKIN: no rash       DATA:    ECG:AFIB     ECHO: 10/11/21    Summary   Ejection fraction is visually estimated at 55%. Overall left ventricular function is normal.   Aortic valve appears tricuspid. Aortic valve leaflets are somewhat thickened. Aortic valve leaflets are Mildly calcified. ECHO 7/27/20    Normal left ventricle size, wall thickness and function with an estimated EF  > 55%. No segmental wall motion abnormalities seen. Normal right ventricular size and function. Left atrium is normal in size. Right atrium is normal in size. Aortic valve is trileaflet. Aortic valve sclerosis without stenosis. No aortic insufficiency. Normal aortic root dimension. Thickened anterior mitral valve leaflet. Mild to moderate mitral  regurgitation. Normal tricuspid valve structure and function. Insignificant tricuspid  regurgitation, unable to estimate RVSP. IVC normal diameter & inspiratory collapse indicating normal RA filling  pressure . No significant pericardial effusion is seen. Stress Test:   Stress test on 7/24/2020 showed no areas of perfusion defect and no wall motion abnormality       Labs:     CBC:   Recent Labs     09/01/22  1702   WBC 7.9   HGB 11.7*   HCT 36.7        BMP:   Recent Labs     09/01/22  1702      K 4.4   CO2 26   BUN 26*   CREATININE 1.03*   LABGLOM 53*   GLUCOSE 92     BNP: No results for input(s): BNP in the last 72 hours. PT/INR: No results for input(s): PROTIME, INR in the last 72 hours. APTT:No results for input(s): APTT in the last 72 hours. CARDIAC ENZYMES:No results for input(s): CKTOTAL, CKMB, CKMBINDEX, TROPONINI in the last 72 hours. FASTING LIPID PANEL:  Lab Results   Component Value Date/Time    HDL 47 09/02/2022 06:12 AM    LDLCALC 70 11/06/2020 12:00 AM    TRIG 225 09/02/2022 06:12 AM     LIVER PROFILE:No results for input(s): AST, ALT, LABALBU in the last 72 hours.     Intake/Output Summary (Last 24 hours) at 9/2/2022 1349  Last data filed at 9/2/2022 1035  Gross per 24 hour   Intake --   Output 1350 ml   Net -1350 ml       IMPRESSION:    Patient Active Problem List   Diagnosis    Acquired hypothyroidism    History of TIA (transient ischemic attack)    Chronic bilateral low back pain without sciatica    Benign hypertension with CKD (chronic kidney disease) stage III (HCC)    Osteopenia determined by x-ray    Osteoarthritis involving multiple joints on both sides of body    Left knee DJD    Vitamin D deficiency    Mixed incontinence    Chronic pain of both knees    Slow transit constipation    Allergic rhinitis    Hyperlipidemia with target LDL less than 100    Obesity, Class III, BMI 40-49.9 (morbid obesity) ([facility]    At high risk for falls    Spondylosis of lumbar region without myelopathy or radiculopathy    OAB (overactive bladder)    Primary osteoarthritis of both knees    Adenomatous polyp of sigmoid colon, 6/26/17    TLHBSO, bilateral LND 10/24/17    Optic atrophy of both eyes    Cataracta b/l eyes    Difficulty walking    Esotropia    History of uterine cancer, Well differentiated grade 1 adenocarcinoma arising in complex hyperplasia, 2017    Vitamin B 12 deficiency    Atherosclerosis of aorta (HCC)    Calculus of gallbladder without cholecystitis without obstruction    CLAROS (nonalcoholic steatohepatitis)    Paroxysmal atrial fibrillation (HCC)    Type 2 diabetes mellitus, without long-term current use of insulin (HCC)    Mobility impaired    Chronic cholecystitis    Multiple atypical skin moles    Bradycardia    Chronic diastolic congestive heart failure ([address]    History of 2019 novel coronavirus disease (COVID-19)    Abdominal aortic aneurysm (AAA) without rupture ([facility]    UTI due to extended-spectrum beta lactamase (ESBL) producing Escherichia coli    Chronic gout due to renal impairment without tophus    Atrial fibrillation with rapid ventricular response (HCC)           RECOMMENDATIONS:  DAISY. fib with RVR at this time around 100  We will give 1 dose of digoxin 0.25 IV now and can repeat after 2 to 4 hours if the heart rate is still above 100 and titrate off Cardizem drip  We will continue beta-blocker and titrate as needed  Plan is to do the Lexiscan stress test in a.m. Hypertension continue current medications        Discussed with patient and nursing.     Radha Sylvester MD, MD  [facility]        [phone]

## 2022-09-02 NOTE — CARE COORDINATION
DISCHARGE PLANNING NOTE:     Spoke to Karis Mixon at [facility] [employer], pt has missed her appointments for the education /set up of the CPAP. Pt will need to call  [phone] to schedule appointment to have CPAP set up . Most are done virtually or in house. Info will be given to patient to follow up once she gets home.    Electronically signed by Brandon Farley RN on 9/2/2022 at 1:46 PM

## 2022-09-02 NOTE — PROGRESS NOTES
Pt transferred to room 2101 from ED. Pt oriented to room, vitals taken and Pt assessed. Pt placed on telemon. Blood pressures to be checked every 30 minutes.

## 2022-09-02 NOTE — PLAN OF CARE
Problem: Skin/Tissue Integrity  Goal: Absence of new skin breakdown  Description: 1. Monitor for areas of redness and/or skin breakdown  2. Assess vascular access sites hourly  3. Every 4-6 hours minimum:  Change oxygen saturation probe site  4. Every 4-6 hours:  If on nasal continuous positive airway pressure, respiratory therapy assess nares and determine need for appliance change or resting period.   9/2/2022 1814 by Yola Salinas RN  Outcome: Progressing     Problem: Safety - Adult  Goal: Free from fall injury  9/2/2022 1814 by Yola Salinas RN  Outcome: Progressing     Problem: ABCDS Injury Assessment  Goal: Absence of physical injury  9/2/2022 1814 by Yola Salinas RN  Outcome: Progressing     Problem: Discharge Planning  Goal: Discharge to home or other facility with appropriate resources  9/2/2022 1814 by Yola Salinas RN  Outcome: Progressing

## 2022-09-03 VITALS
TEMPERATURE: 97.5 F | WEIGHT: 256.17 LBS | OXYGEN SATURATION: 100 % | HEIGHT: 62 IN | RESPIRATION RATE: 18 BRPM | SYSTOLIC BLOOD PRESSURE: 93 MMHG | DIASTOLIC BLOOD PRESSURE: 76 MMHG | BODY MASS INDEX: 47.14 KG/M2 | HEART RATE: 84 BPM

## 2022-09-03 PROCEDURE — 6370000000 HC RX 637 (ALT 250 FOR IP): Performed by: NURSE PRACTITIONER

## 2022-09-03 PROCEDURE — 6360000002 HC RX W HCPCS: Performed by: INTERNAL MEDICINE

## 2022-09-03 PROCEDURE — 6370000000 HC RX 637 (ALT 250 FOR IP): Performed by: INTERNAL MEDICINE

## 2022-09-03 PROCEDURE — 99239 HOSP IP/OBS DSCHRG MGMT >30: CPT | Performed by: INTERNAL MEDICINE

## 2022-09-03 PROCEDURE — 2580000003 HC RX 258: Performed by: NURSE PRACTITIONER

## 2022-09-03 RX ORDER — AMIODARONE HYDROCHLORIDE 200 MG/1
400 TABLET ORAL 2 TIMES DAILY
Status: DISCONTINUED | OUTPATIENT
Start: 2022-09-03 | End: 2022-09-03 | Stop reason: HOSPADM

## 2022-09-03 RX ORDER — AMIODARONE HYDROCHLORIDE 200 MG/1
200 TABLET ORAL DAILY
Qty: 30 TABLET | Refills: 0 | Status: SHIPPED | OUTPATIENT
Start: 2022-09-03 | End: 2022-09-03 | Stop reason: HOSPADM

## 2022-09-03 RX ORDER — AMIODARONE HYDROCHLORIDE 400 MG/1
400 TABLET ORAL 2 TIMES DAILY
Qty: 60 TABLET | Refills: 0 | Status: SHIPPED | OUTPATIENT
Start: 2022-09-03 | End: 2022-10-20 | Stop reason: ALTCHOICE

## 2022-09-03 RX ADMIN — AMIODARONE HYDROCHLORIDE 400 MG: 200 TABLET ORAL at 12:28

## 2022-09-03 RX ADMIN — PANTOPRAZOLE SODIUM 40 MG: 40 TABLET, DELAYED RELEASE ORAL at 09:33

## 2022-09-03 RX ADMIN — FUROSEMIDE 20 MG: 20 TABLET ORAL at 09:29

## 2022-09-03 RX ADMIN — LISINOPRIL 10 MG: 10 TABLET ORAL at 09:29

## 2022-09-03 RX ADMIN — ACETAMINOPHEN 650 MG: 325 TABLET, FILM COATED ORAL at 15:32

## 2022-09-03 RX ADMIN — LEVOTHYROXINE SODIUM 75 MCG: 0.07 TABLET ORAL at 09:33

## 2022-09-03 RX ADMIN — METOPROLOL TARTRATE 12.5 MG: 25 TABLET, FILM COATED ORAL at 09:29

## 2022-09-03 RX ADMIN — Medication 200 MG: at 09:29

## 2022-09-03 RX ADMIN — SODIUM CHLORIDE, PRESERVATIVE FREE 10 ML: 5 INJECTION INTRAVENOUS at 09:30

## 2022-09-03 RX ADMIN — DIGOXIN 250 MCG: 0.25 INJECTION INTRAMUSCULAR; INTRAVENOUS at 02:15

## 2022-09-03 ASSESSMENT — PAIN DESCRIPTION - DESCRIPTORS: DESCRIPTORS: ACHING

## 2022-09-03 ASSESSMENT — PAIN - FUNCTIONAL ASSESSMENT: PAIN_FUNCTIONAL_ASSESSMENT: ACTIVITIES ARE NOT PREVENTED

## 2022-09-03 ASSESSMENT — PAIN DESCRIPTION - LOCATION: LOCATION: LEG

## 2022-09-03 ASSESSMENT — PAIN DESCRIPTION - ORIENTATION: ORIENTATION: RIGHT;LEFT

## 2022-09-03 ASSESSMENT — PAIN SCALES - GENERAL
PAINLEVEL_OUTOF10: 2
PAINLEVEL_OUTOF10: 2

## 2022-09-03 NOTE — PLAN OF CARE
Problem: Discharge Planning  Goal: Discharge to home or other facility with appropriate resources  9/3/2022 1322 by Seth Lesch, RN  Outcome: Adequate for Discharge  9/3/2022 0359 by Suellen Bishop RN  Outcome: Progressing     Problem: Skin/Tissue Integrity  Goal: Absence of new skin breakdown  Description: 1. Monitor for areas of redness and/or skin breakdown  2. Assess vascular access sites hourly  3. Every 4-6 hours minimum:  Change oxygen saturation probe site  4. Every 4-6 hours:  If on nasal continuous positive airway pressure, respiratory therapy assess nares and determine need for appliance change or resting period.   9/3/2022 1322 by Seth Lesch, RN  Outcome: Adequate for Discharge  9/3/2022 0359 by Suellen Bishop RN  Outcome: Progressing     Problem: Safety - Adult  Goal: Free from fall injury  9/3/2022 1322 by Seth Lesch, RN  Outcome: Adequate for Discharge  9/3/2022 0359 by Suellen Bishop RN  Outcome: Progressing     Problem: ABCDS Injury Assessment  Goal: Absence of physical injury  9/3/2022 1322 by Seth Lesch, RN  Outcome: Adequate for Discharge  9/3/2022 0359 by Suellen Bishop RN  Outcome: Progressing     Problem: Chronic Conditions and Co-morbidities  Goal: Patient's chronic conditions and co-morbidity symptoms are monitored and maintained or improved  9/3/2022 1322 by Seth Lesch, RN  Outcome: Adequate for Discharge  9/3/2022 0359 by Suellen Bishop RN  Outcome: Progressing

## 2022-09-03 NOTE — DISCHARGE INSTR - COC
Continuity of Care Form    Patient Name: Nahun Bob   :  1950  MRN:  [id number]    Admit date:  2022  Discharge date:  ***    Code Status Order: Full Code   Advance Directives:     Admitting Physician:  Candy Shankar MD  PCP: Katelyn Wilder MD    Discharging Nurse: Jennifer Levine RN  [address] Unit/Room#: 2101/2101-01  Discharging Unit Phone Number: [phone]    Emergency Contact:   Extended Emergency Contact Information  Primary Emergency Contact: Miya Mliler [address] Phone: [phone]  Relation: Child  Hearing or visual needs: None  Other needs: None  Preferred language: English   needed? No  Secondary Emergency Contact: Donna Dennis  Address: [address] Phone: [phone]  Mobile Phone: [phone]  Relation: Brother/Sister  Hearing or visual needs: None  Other needs: None  Preferred language: English   needed?  No    Past Surgical History:  Past Surgical History:   Procedure Laterality Date    CATARACT REMOVAL WITH IMPLANT Bilateral     COLONOSCOPY  1997    had polyp, she doesn't know where and when, \"20 yrs ago\"    COLONOSCOPY  2017    COLONOSCOPY N/A 8/3/2020    COLONOSCOPY POLYPECTOMY SNARE performed by Win Douglas MD at [address] [place] [address] N/A 2017    HYSTEROSCOPY  WITH MYOSURE performed by Dai Xavier DO at [facility] ([facility]) N/A 10/24/2017    TOTAL LAPAROSCOPIC HYSTERECTOMY, BSO, F.S.  STAGING, GYRUS G400 performed by Tevin Pearson MD at [facility] N/A 2017    COLONOSCOPY POLYPECTOMY / HOT SNARE performed by Emily Gonzalez DO at [address] N/A 2020    OPEN SIGMOID COLECTOMY; PRIMARY ANASTOMOSIS & MOBILIZATION OF SPLENIC FLEXURE performed by Win Douglas MD at [facility] (CERV[place] TLHBSO, bilateral LND 10/24/17 Z90.710, Z90.79, Z90.722    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    Cataracta b/l eyes H26.9    Difficulty walking R26.2    Esotropia H50.00    History of uterine cancer, Well differentiated grade 1 adenocarcinoma arising in complex hyperplasia, 2017 Z85.42    Vitamin B 12 deficiency E53.8    Atherosclerosis of aorta (HCC) I70.0    Calculus of gallbladder without cholecystitis without obstruction K80.20    CLAROS (nonalcoholic steatohepatitis) K75.81    Paroxysmal atrial fibrillation (HCC) I48.0    Type 2 diabetes mellitus, without long-term current use of insulin (HCC) E11.9    Mobility impaired Z74.09    Chronic cholecystitis K81.1    Multiple atypical skin moles D22.9    Bradycardia R00.1    Chronic diastolic congestive heart failure (HCC) I50.32    History of 2019 novel coronavirus disease (COVID-19) Z86.16    Abdominal aortic aneurysm (AAA) without rupture (HCC) I71.4    UTI due to extended-spectrum beta lactamase (ESBL) producing Escherichia coli N39.0, B96.29, Z16.12    Chronic gout due to 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 30X0    Atrial fibrillation with rapid ventricular response (HCC) I48.91       Isolation/Infection:   Isolation            Contact          Patient Infection Status       Infection Onset Added Last Indicated Last Indicated By Review Planned Expiration Resolved Resolved By    ESBL (Extended Spectrum Beta Lactamase) 10/20/21 10/26/21 03/07/22 Culture, Urine        Resolved    COVID-19 (Rule Out) 21 COVID-19 & Influenza Combo (Ordered)   21 Rule-Out Test Resulted    COVID-19 (Rule Out) 10/10/21 10/10/21 10/10/21 COVID-19, Rapid (Ordered)   10/10/21 Rule-Out Test Resulted    COVID-19 20 COVID-19   10/03/20     COVID-19 (Rule Out) 20 COVID-19 (Ordered)   20 Rule-Out Test Resulted    COVID-19 (Rule Out) 20 Covid-19 Ambulatory (Ordered)   20 Rule-Out Test Resulted            Nurse Assessment:  Last Vital Signs: /73   Pulse 75   Temp 97.6 °F (36.4 °C) (Axillary)   Resp 18   Ht 5' 2\" (1.575 m)   Wt 256 lb 2.8 oz (116.2 kg)   LMP  (LMP Unknown)   SpO2 98%   BMI 46.85 kg/m²     Last documented pain score (0-10 scale): Pain Level: 0  Last Weight:   Wt Readings from Last 1 Encounters:   09/03/22 256 lb 2.8 oz (116.2 kg)     Mental Status:  oriented    IV Access:  - None    Nursing Mobility/ADLs:  Walking   Independent  Transfer  Independent  Bathing  Independent  Dressing  Independent  Toileting  Independent  Feeding  Independent  Med Admin  Assisted  Med Delivery   whole    Wound Care Documentation and Therapy:        Elimination:  Continence: Bowel: Yes  Bladder: Yes  Urinary Catheter: None   Colostomy/Ileostomy/Ileal Conduit: No       Date of Last BM: 09/02/22    Intake/Output Summary (Last 24 hours) at 9/3/2022 1205  Last data filed at 9/3/2022 0524  Gross per 24 hour   Intake 360 ml   Output 400 ml   Net -40 ml     I/O last 3 completed shifts: In: 360 [P.O.:360]  Out: [address] 66 [Urine:1750]    Safety Concerns: At Risk for Falls    Impairments/Disabilities:      None    Nutrition Therapy:  Current Nutrition Therapy:   - Oral Diet:  Low Fat, Low Sodium (2gm), and high fiber    Routes of Feeding: Oral  Liquids: Thin Liquids  Daily Fluid Restriction: no  Last Modified Barium Swallow with Video (Video Swallowing Test): not done    Treatments at the Time of Hospital Discharge:   Respiratory Treatments: CPAP at night  Oxygen Therapy:  is not on home oxygen therapy. Ventilator:    - CPAP   only when sleeping    Rehab Therapies: Physical Therapy and Occupational Therapy  Weight Bearing Status/Restrictions: No weight bearing restrictions  Other Medical Equipment (for information only, NOT a DME order):  CPAP  Other Treatments: nebulizer as needed    Patient's personal belongings (please select all that are sent with patient):   All sent with patient    RN SIGNATURE:  Electronically signed by Casper Keyes RN on 9/3/22 at 1:46 PM EDT    CASE MANAGEMENT/SOCIAL WORK SECTION    Inpatient Status Date: ***    Readmission Risk Assessment Score:  Readmission Risk              Risk of Unplanned Readmission:  16           Discharging to Facility/ Agency   Name:   Address:  Phone:  Fax:    Dialysis Facility (if applicable)   Name:  Address:  Dialysis Schedule:  Phone:  Fax:    / signature: {Esignature:[id number]}    PHYSICIAN SECTION    Prognosis: {Prognosis:5146548710}    Condition at Discharge: [address] [place] [address] Patient Condition:[id number]}    Rehab Potential (if transferring to Rehab): {Prognosis:3171042145}    Recommended Labs or Other Treatments After Discharge: ***    Physician Certification: I certify the above information and transfer of Rocio Keller  is necessary for the continuing treatment of the diagnosis listed and that she requires {Admit to Appropriate Level of Care:65659} for {GREATER/LESS:152879261} 30 days.      Update Admission H&P: {CHP DME Changes in FWU:068655108}    PHYSICIAN SIGNATURE:  Electronically signed by Yayo Nolen MD on 9/3/22 at 12:05 PM EDT

## 2022-09-03 NOTE — PROGRESS NOTES
Stress lab is closed on weekends. Stress test will be done on Tuesday (9/6/22). Please keep her NPO after mid-night Monday (9/5/22). Any questions please call radiology and they can get ahold of the tech on call. Thanks.  Electronically signed by Dania Chan on 9/3/2022 at 7:12 AM

## 2022-09-03 NOTE — DISCHARGE SUMMARY
[place] [address] Internal Medicine    Discharge Summary     Patient ID: Rosa Serrano  :  1950   MRN: [id number]     ACCOUNT:  [de-identified]   Patient's PCP: Mayela Franks MD  Admit Date: 2022   Discharge Date: 9/3/22  Length of Stay: 2  Code Status:  Full Code  Admitting Physician: Cyndee Davidson MD  Discharge Physician: Cyndee Davidson MD     Active Discharge Diagnoses:     Primary Problem  Atrial fibrillation with rapid ventricular response [facility])      [facility] Problems    Diagnosis Date Noted    Obesity, Class III, BMI 40-49.9 (morbid obesity) ([address] [E66.01] 2017     Priority: High    Atrial fibrillation with rapid ventricular response ([address] [I48.91] 2022     Priority: Medium    Paroxysmal atrial fibrillation ([address] [I48.0] 2020    Type 2 diabetes mellitus, without long-term current use of insulin ([address] [E11.9] 2020    History of uterine cancer, Well differentiated grade 1 adenocarcinoma arising in complex hyperplasia, 2017 [Z85.42] 2018    Hyperlipidemia with target LDL less than 100 [E78.5] 2017       Admission Condition:  fair     Discharged Condition: fair    Hospital Stay:     Hospital Course: Rosa Serrano is a 81 y.o. female who was admitted for the management of Atrial fibrillation with rapid ventricular response ([address] , presented to ER with Palpitations  82-year-old female history of paroxysmal A. fib on Xarelto, type 2 diabetes, hypothyroid hypertension with CKD stage III, history of uterine cancer status post hysterectomy morbid obesity presenting with A. fib RVR  A. fib RVR, known paroxysmal A. fib started on Cardizem infusion troponins flat Home Xarelto resumed,  Echo unremarkable, heart rate controlled.   TSH normal  Morbid obesity BMI 46    Seen by cardiology, amiodarone was increased plan to lower at 200 mg daily after 1 month, lisinopril held at discharge due to low blood pressure    After 1 month, can lower amio to 200 daily  Holding lisinopril until repeat blood pressure check with PCP    Significant therapeutic interventions: As above    Significant Diagnostic Studies:   Labs / Micro:    Lab Results   Component Value Date    WBC 7.9 09/01/2022    HGB 11.7 (L) 09/01/2022    HCT 36.7 09/01/2022    MCV 88.2 09/01/2022     09/01/2022          Lab Results   Component Value Date/Time     09/01/2022 05:02 PM    K 4.4 09/01/2022 05:02 PM    K 4.1 10/10/2021 06:00 PM    CL 99 09/01/2022 05:02 PM    CO2 26 09/01/2022 05:02 PM    BUN 26 09/01/2022 05:02 PM    CREATININE 1.03 09/01/2022 05:02 PM    GLUCOSE 92 09/01/2022 05:02 PM    GLUCOSE 114 03/20/2012 10:40 AM    CALCIUM 9.7 09/01/2022 05:02 PM          Radiology:    ECHO Complete 2D W Doppler W Color    Result Date: 9/2/2022  [address] [facility] Transthoracic Echocardiography Report (TTE)  Patient Name Linda Xie        Date of Study             09/02/2022               Tiff Mcmahon   Date of      1950  Gender                    Female  Birth   Age          81 year(s)  Race                         Room Number  2101        Height:                   62 inch, 157.48 cm   Corporate ID [id number]    Weight:                   255 pounds, 115.7 kg  #   Patient Acct [de-identified]   BSA:         2.12 m^2     BMI:        46.64  #                                                              kg/m^2   MR #         [id number]      Sonographer               Basilio Meneses Renee   Accession #  [id number]  Interpreting Physician    Brian Bojorquez 61   Fellow                   Referring Nurse           Gail Cho,                           Practitioner              APRN-NP   Interpreting             Referring Physician  Fellow  Type of Study   TTE procedure:2D Echocardiogram, M-Mode, Doppler, Color Doppler.   Procedure Date Date: 09/02/2022 Start: 02:50 PM Study Location: [facility]. [facility] Technical Quality: Fair visualization Indications:Atrial fibrillation. Patient Status: Inpatient Height: 62 inches Weight: 255.01 pounds BSA: 2.12 m^2 BMI: 46.64 kg/m^2 Rhythm: Within normal limits HR: 78 bpm BP: 95/59 mmHg CONCLUSIONS Summary Technically difficult study. Left ventricle is normal in size and wall thickness. Global left ventricular systolic function is normal. Estimated LV EF >55 %. RV appears normal in size and function Left atrium appears mildly dilated. No significant valvular regurgitation or stenosis seen. No significant pericardial effusion is seen. Normal aortic root dimension. IVC not well visualized Signature ----------------------------------------------------------------------------  Electronically signed by Riya Navas(Interpreting physician) on  09/02/2022 05:01 PM ---------------------------------------------------------------------------- ----------------------------------------------------------------------------  Electronically signed by Sheree Castro(Sonographer) on 09/02/2022 03:49  PM ---------------------------------------------------------------------------- FINDINGS Left Atrium Left atrium is mildly dilated Left Ventricle Left ventricle is normal in size and wall thickness. Global left ventricular systolic function is normal. Estimated LV EF >55 %. Right Atrium Right atrium is normal in size. Right Ventricle Normal right ventricular size and function. Mitral Valve No obvious valvular abnormality seen. No evidence of mitral regurgitation. Aortic Valve Aortic valve is sclerotic but opens well. No evidence of aortic insufficiency or stenosis. Tricuspid Valve Tricuspid valve was not well visualized. Insignificant tricuspid regurgitation, unable to estimate RVSP. Pulmonic Valve Pulmonic valve was not well visualized. No evidence of pulmonic insufficiency or stenosis. Pericardial Effusion No significant pericardial effusion is seen.  Pleural Effusion No pleural effusion seen. Miscellaneous Normal aortic root dimension. IVC not well visualized. M-mode / 2D Measurements & Calculations:   LVIDd:3.78 cm(3.7 - 5.6 cm)      Diastolic LJPGAI:69.3 ml  XVES:9.70 cm(0.6 - 1.1 cm)       Aortic Root:3.2 cm(2.0 - 3.7 cm)  LVPWd:0.99 cm(0.6 - 1.1 cm)      LA Dimension: 3.4 cm(1.9 - 4.0 cm)                                   LA volume/Index: 56.1 ml /26m^2                                   LVOT:2.5 cm   Mitral:                              Aortic   Peak E-Wave: 0.88 m/s                Peak Velocity: 1.35 m/s                                       Mean Velocity: 0.82 m/s  Peak Gradient: 3.12 mmHg             Peak Gradient: 7.29 mmHg  Deceleration Time: 254 msec          Mean Gradient: 4 mmHg                                        Area (continuity): 2.95 cm^2                                       AV VTI: 23.1 cm      XR CHEST PORTABLE    Result Date: 9/1/2022  EXAMINATION: ONE XRAY VIEW OF THE CHEST 9/1/2022 4:57 pm COMPARISON: AP chest from 11/03/2021 HISTORY: ORDERING SYSTEM PROVIDED HISTORY: afib rvr TECHNOLOGIST PROVIDED HISTORY: afib rvr Reason for Exam: high blood pressure, afib History of type 2 diabetes, endometrial cancer, obesity and anemia FINDINGS: Overlying ECG monitor leads. Prominent but unchanged cardiac silhouette. Mediastinal structures midline unchanged, again with calcification aortic knob. No localized pulmonary opacity or blunting of the costophrenic angles. No pneumothorax. Bones and soft tissues stable. No acute cardiopulmonary disease. Consultations:    Consults:     Final Specialist Recommendations/Findings:   IP CONSULT TO CARDIOLOGY  IP CONSULT TO PRIMARY CARE PROVIDER      The patient was seen and examined on day of discharge and this discharge summary is in conjunction with any daily progress note from day of discharge.     Discharge plan:     Disposition: Home      Instructions to Patient: Keep follow-up appointments      Requiring Further Evaluation/Follow Up POST HOSPITALIZATION/Incidental Findings:     Physician Follow Up:     Scott Gates MD  [address]  [address] [place] [address]  [phone]    Follow up on 9/8/2022  for hospital follow up @ 1:15 pm . If you can not make this appt please call and reschedule. [facility]  [address] [id number] [phone]    Follow up  Call [phone] to set up appointment to have CPAP set up. Activity: Resume as directed    Discharge Medications:      Medication List        START taking these medications      amiodarone 400 MG tablet  Commonly known as: PACERONE  Take 1 tablet by mouth 2 times daily            CHANGE how you take these medications      atorvastatin 40 MG tablet  Commonly known as: LIPITOR  TAKE ONE (1) TABLET BY MOUTH DAILY STOP SIMVASTATIN  What changed: See the new instructions.             CONTINUE taking these medications      Acetaminophen Extra Strength 500 MG tablet  Generic drug: acetaminophen  TAKE 1 TABLET BY MOUTH EVERY 6 HOURS AS NEEDED FOR PAIN     albuterol sulfate  (90 Base) MCG/ACT inhaler  Commonly known as: Proventil HFA  Inhale 1-2 puffs into the lungs every 4 hours as needed for Wheezing     clotrimazole-betamethasone 1-0.05 % cream  Commonly known as: Lotrisone  Apply topically 1 times daily on the perineum, alternate powder, for 10 days     docusate sodium 100 MG capsule  Commonly known as: DOK  TAKE ONE (1) CAPSULE BY MOUTH TWO (2) TIMES DAILY FOR CONSTIPATION     furosemide 20 MG tablet  Commonly known as: Lasix  Take 1 tablet by mouth daily For congestive heart failure     levothyroxine 75 MCG tablet  Commonly known as: SYNTHROID  TAKE ONE (1) TABLET BY MOUTH EVERY MORNING (BEFORE BREAKFAST)     Magnesium Oxide 250 MG Tabs tablet  Commonly known as: MAGNESIUM-OXIDE  Take 1 tablet by mouth daily Take with food, in the evening     metoprolol tartrate 25 MG tablet  Commonly known as: LOPRESSOR  Take 0.5 tablets by mouth 2 times daily Dose decreased 4/12/2022  due to bradycardia     Myrbetriq 50 MG Tb24  Generic drug: mirabegron  Take 50 mg by mouth daily     nystatin 051908 UNIT/GM powder  Commonly known as: MYCOSTATIN  Apply daily for 4-6 weeks     ondansetron 4 MG tablet  Commonly known as: ZOFRAN  TAKE ONE (1) TABLET BY MOUTH EVERY 8 HOURS AS NEEDED     pantoprazole 40 MG tablet  Commonly known as: PROTONIX  TAKE ONE (1) TABLET BY MOUTH ONCE DAILY     Keiko-Bid Probiotic Tabs  TAKE ONE (1) TABLET BY MOUTH IN THE MORNING     vitamin B-12 1000 MCG tablet  Commonly known as: CYANOCOBALAMIN  TAKE ONE (1) TABLET BY MOUTH DAILY     vitamin D3 25 MCG (1000 UT) Tabs tablet  Commonly known as: CHOLECALCIFEROL  TAKE TWO (2) TABLETS BY MOUTH ONCE DAILY     Xarelto 20 MG Tabs tablet  Generic drug: rivaroxaban  TAKE ONE (1) TABLET BY MOUTH DAILY (WITH BREAKFAST)            STOP taking these medications      lisinopril 10 MG tablet  Commonly known as: PRINIVIL;ZESTRIL               Where to Get Your Medications        These medications were sent to [facility], [address] [place] [address], [address] [place] [address]      Phone: [phone]   amiodarone 400 MG tablet         Time Spent on discharge is  35 mins in patient examination, evaluation, counseling as well as medication reconciliation, prescriptions for required medications, discharge plan and follow up. Electronically signed by   Florentino Barth MD  9/3/2022  12:06 PM      Thank you Dr. Zully Ashley MD for the opportunity to be involved in this patient's care.

## 2022-09-03 NOTE — PROGRESS NOTES
[facility]        Date:   9/3/2022  Patient name: Ger Ruiz  Date of admission:  9/1/2022  4:27 PM  MRN:   [id number]  YOB: 1950  PCP: Chelsea Ortiz MD    Reason for Consult:       Subjective: No new cardiac issues. No overnight events. Chart reviewed. Physical Exam:   Vitals: /73   Pulse 75   Temp 97.6 °F (36.4 °C) (Axillary)   Resp 18   Ht 5' 2\" (1.575 m)   Wt 256 lb 2.8 oz (116.2 kg)   LMP  (LMP Unknown)   SpO2 98%   BMI 46.85 kg/m²   General appearance: alert and cooperative with exam  HEENT: Head: Normocephalic, no lesions, without obvious abnormality. Neck: no adenopathy, no carotid bruit, no JVD, supple, symmetrical, trachea midline and thyroid not enlarged, symmetric, no tenderness/mass/nodules  Lungs: clear to auscultation bilaterally  Heart: regular rate and rhythm, S1, S2 normal, no murmur, click, rub or gallop  Abdomen: soft, non-tender; bowel sounds normal; no masses,  no organomegaly  Extremities: extremities normal, atraumatic, no cyanosis or edema  Neurologic: Mental status: Alert, oriented, thought content appropriate      Lab work, imaging, nursing, and chart reviewed extensively. Medications:   Scheduled Meds:   atorvastatin  40 mg Oral Nightly    docusate sodium  100 mg Oral BID    furosemide  20 mg Oral Daily    levothyroxine  75 mcg Oral Daily    lisinopril  10 mg Oral Daily    metoprolol tartrate  12.5 mg Oral BID    pantoprazole  40 mg Oral QAM AC    rivaroxaban  20 mg Oral Daily    sodium chloride flush  5-40 mL IntraVENous 2 times per day    magnesium oxide  200 mg Oral Daily     Continuous Infusions:   sodium chloride           Labs:     CBC:   Recent Labs     09/01/22  1702   WBC 7.9   HGB 11.7*        BMP:    Recent Labs     09/01/22  1702      K 4.4   CL 99   CO2 26   BUN 26*   CREATININE 1.03*   GLUCOSE 92     Hepatic: No results for input(s): AST, ALT, ALB, BILITOT, ALKPHOS in the last 72 hours.   Troponin: No results for input(s): TROPONINI in the last 72 hours. BNP: No results for input(s): BNP in the last 72 hours. Lipids:   Recent Labs     09/02/22  0612   CHOL 196   HDL 47     INR: No results for input(s): INR in the last 72 hours.     Other active acute problems:  Patient Active Problem List   Diagnosis    Acquired hypothyroidism    History of TIA (transient ischemic attack)    Chronic bilateral low back pain without sciatica    Benign hypertension with CKD (chronic kidney disease) stage III (HCC)    Osteopenia determined by x-ray    Osteoarthritis involving multiple joints on both sides of body    Left knee DJD    Vitamin D deficiency    Mixed incontinence    Chronic pain of both knees    Slow transit constipation    Allergic rhinitis    Hyperlipidemia with target LDL less than 100    Obesity, Class III, BMI 40-49.9 (morbid obesity) ([address]    At high risk for falls    Spondylosis of lumbar region without myelopathy or radiculopathy    OAB (overactive bladder)    Primary osteoarthritis of both knees    Adenomatous polyp of sigmoid colon, 6/26/17    TLHBSO, bilateral LND 10/24/17    Optic atrophy of both eyes    Cataracta b/l eyes    Difficulty walking    Esotropia    History of uterine cancer, Well differentiated grade 1 adenocarcinoma arising in complex hyperplasia, 2017    Vitamin B 12 deficiency    Atherosclerosis of aorta (HCC)    Calculus of gallbladder without cholecystitis without obstruction    CLAROS (nonalcoholic steatohepatitis)    Paroxysmal atrial fibrillation (HCC)    Type 2 diabetes mellitus, without long-term current use of insulin (HCC)    Mobility impaired    Chronic cholecystitis    Multiple atypical skin moles    Bradycardia    Chronic diastolic congestive heart failure ([address]    History of 2019 novel coronavirus disease (COVID-19)    Abdominal aortic aneurysm (AAA) without rupture ([address]    UTI due to extended-spectrum beta lactamase (ESBL) producing Escherichia coli    Chronic gout due to renal impairment without tophus    Atrial fibrillation with rapid ventricular response (HCC)         IMPRESSION & Recommendations:      Afib c RVR- HR still . Add amiodarone PO. Cont NOAC. Avoid digoxin, not indicated for Afib. After 1 month, can lower amio to 200 daily. At such a low dose, there is no concern for side effects or toxicity. DC stress test. Not indicated and patient won't be able to have stress until Tuesday (Labor day). Can have stress as outpatient if indicated. Hypotension- Hold lisinopril for now. Once HR improves, BP will likely improve. Can DC home on this combination of meds and follow up in 1-2 weeks. Discussed with patient, family, and Nurse. Electronically signed by Leora Campbell DO on 9/3/2022 at 10:46 AM    Leroa Campbell, [address] [facility]  [facility] [employer]  [phone]

## 2022-09-03 NOTE — PROGRESS NOTES
Pt educated on proper setup and use of home cpap machine. Writer told patient to also follow up with DME company for cleaning instructions and questions on device settings.

## 2022-09-03 NOTE — FLOWSHEET NOTE
Patient discharged to home per orders. IV x 2 discontinued intact. Discharge instructions reviewed written and verbal.  Patient states all belongings are present. Taken per wheelchair to awaiting vehicle.

## 2022-09-03 NOTE — PROGRESS NOTES
Pt would like [facility]) resumed on discharge. RN [facility]bone [facility] and informed her of this. She said she will keep watch for the pts discharge and SHANA completion. Ratna Carbone reports she does not need called when pt is discharged. SHANA will need completed and signed, Monico Dickinson RN notified.  Electronically signed by Cele Mcmanus RN on 9/3/2022 at 11:50 AM

## 2022-09-06 ENCOUNTER — CARE COORDINATION (OUTPATIENT)
Dept: CASE MANAGEMENT | Age: 72
End: 2022-09-06

## 2022-09-06 ENCOUNTER — TELEPHONE (OUTPATIENT)
Dept: PULMONOLOGY | Age: 72
End: 2022-09-06

## 2022-09-06 DIAGNOSIS — G47.33 OBSTRUCTIVE SLEEP APNEA SYNDROME: Primary | ICD-10-CM

## 2022-09-06 DIAGNOSIS — I48.91 ATRIAL FIBRILLATION WITH RAPID VENTRICULAR RESPONSE (HCC): Primary | ICD-10-CM

## 2022-09-06 PROCEDURE — 1111F DSCHRG MED/CURRENT MED MERGE: CPT | Performed by: FAMILY MEDICINE

## 2022-09-06 NOTE — TELEPHONE ENCOUNTER
Patient called regarding CPAP supplies. She states that the small mask does not work for her. She needs a Med or Large mask. Please sign pended order.   Thank you

## 2022-09-06 NOTE — TELEPHONE ENCOUNTER
I agree with the [address] [facility] placed notes in the appointment desk for Rollator and blood pressure machine    Future Appointments   Date Time Provider Dante Monique   9/8/2022  1:15 PM [facility]   10/10/2022  1:00 PM Buddy Andrews PA-C GYN Oncology [facility]   10/12/2022  1:30 PM Terry Vogel MD [address] [facility]

## 2022-09-06 NOTE — CARE COORDINATION
[facility] Transitions Initial Follow Up Call    Call within 2 business days of discharge: Yes    Patient: Radha Richmond Patient : 1950   MRN: [id number]  Reason for Admission: Atrial Fibrillation  Discharge Date: 9/3/22 RARS: Readmission Risk Score: 12.3      Last Discharge [facility]       Date Complaint Diagnosis Description Type Department Provider    22 Palpitations Atrial fibrillation, unspecified type [facility]) ED to Hosp-Admission (Discharged) (Claudine Galarza) Monster Charles MD; Americo Dobbs,. .. Spoke with: Patient    Facility: [employer]    Non-face-to-face services provided:  Scheduled appointment with PCP- with PCP  Obtained and reviewed discharge summary and/or continuity of care documents    Spoke with patient who said she is feeling ok, fatigued but denies any chest pains, palpitations or shortness of breath today. She does have some slight bilateral leg edema today. Discharge instructions reviewed, patient was started on Amiodarone which she is taking, Lisinopril was stopped and no longer in her medication packages. She states nurse was out to see her yesterday and her blood pressure systolic was 091. She does not have blood pressure cuff to check her own blood pressures, states nurse is coming out again tomorrow. She has [address] She lives in [facility] apartments in [place], she gets cab rides through the [facility] to her appointments. She has an appointment this Thursday with her PCP. She follows with Dr. Audrey Lockhart, she has called that office about getting new CPAP supplies for bigger mask. She has contacted the supplier about her CPAP and she said her mask she is using currently is too small. Patient is also asking for new Rollator, states the one she is using now is broken. She is eating and drinking ok, states she would like to speak with a dietician about low sodium diet and getting some educational materials sent to her.  Will refer to dietician. She denies any other needs or concerns at this time. She has call out to cardiology to schedule follow up, expressed if she needs further assistance or has any questions or concerns to call CTN. Transitions of Care Initial Call    Was this an external facility discharge? No Discharge Facility: [facility]    Challenges to be reviewed by the provider   Additional needs identified to be addressed with provider: Yes  DME-Patient wants blood pressure  cuff and rollator             Method of communication with provider : chart routing    Advance Care Planning:   Does patient have an Advance Directive: reviewed and current. Care Transition Nurse contacted the patient by telephone to perform post hospital discharge assessment. Verified name and  with patient as identifiers. Provided introduction to self, and explanation of the CTN role. CTN reviewed discharge instructions, medical action plan and red flags with patient who verbalized understanding. Patient given an opportunity to ask questions and does not have any further questions or concerns at this time. Were discharge instructions available to patient? Yes. Reviewed appropriate site of care based on symptoms and resources available to patient including: PCP  Specialist. The patient agrees to contact the PCP office for questions related to their healthcare. Medication reconciliation was performed with patient, who verbalizes understanding of administration of home medications. Advised obtaining a 90-day supply of all daily and as-needed medications. Was patient discharged with a pulse oximeter? no    CTN provided contact information. Plan for follow-up call in 3-5 days based on severity of symptoms and risk factors. Plan for next call:  check if cardiology scheduled her appt, see what PCP notes say.         Care Transitions 24 Hour Call    Schedule Follow Up Appointment with PCP: Completed  Do you have a copy of your discharge

## 2022-09-07 ENCOUNTER — CARE COORDINATION (OUTPATIENT)
Dept: CARE COORDINATION | Age: 72
End: 2022-09-07

## 2022-09-07 NOTE — CARE COORDINATION
Registered Dietitian Initial Assessment for [address]  September 7, 2022    Initial Referral Reason: Hypertension    Patient Care Team:  Rohini Campa MD as PCP - General (Family Medicine)  Rohini Campa MD as PCP - [facility]  Nathan Siddiqi MD as Referring Physician (Orthopedic Surgery)  Betsey Glez MD as Surgeon (Orthopedic Surgery)  Venancio Kinsey MD as Consulting Physician (Endocrinology)  Marisela Lopez DO as Consulting Physician (Obstetrics & Gynecology)  Inez Daley MD as Consulting Physician (Urology)  Rolo Blood MD as Consulting Physician (Otolaryngology)  Keary Gosselin, DO as Consulting Physician (General Surgery)  Carmina Kent DO as Consulting Physician (Cardiology)  Parish Callahan MD as Surgeon (Ophthalmology)  Jose A Mcconnell MD as Consulting Physician (Dermatology)  Amalia De Los Santos MD as Consulting Physician (Infectious Diseases)  Audrey Ashford PA-C as Physician Assistant (Physician Assistant)  Gloria Mcdowell RN as Care Transitions Nurse  Gloria Mcdowell RN as Care Transitions Nurse  Sheela Salcido RD, LD as Dietitian    Patient Active Problem List   Diagnosis    Acquired hypothyroidism    History of TIA (transient ischemic attack)    Chronic bilateral low back pain without sciatica    Benign hypertension with CKD (chronic kidney disease) stage III ([address]    Osteopenia determined by x-ray    Osteoarthritis involving multiple joints on both sides of body    Left knee DJD    Vitamin D deficiency    Mixed incontinence    Chronic pain of both knees    Slow transit constipation    Allergic rhinitis    Hyperlipidemia with target LDL less than 100    Obesity, Class III, BMI 40-49.9 (morbid obesity) ([address]    At high risk for falls    Spondylosis of lumbar region without myelopathy or radiculopathy    OAB (overactive bladder)    Primary osteoarthritis of both knees    Adenomatous polyp of sigmoid colon, 6/26/17 TLHBSO, bilateral LND 10/24/17    Optic atrophy of both eyes    Cataracta b/l eyes    Difficulty walking    Esotropia    History of uterine cancer, Well differentiated grade 1 adenocarcinoma arising in complex hyperplasia, 2017    Vitamin B 12 deficiency    Atherosclerosis of aorta (HCC)    Calculus of gallbladder without cholecystitis without obstruction    CLAROS (nonalcoholic steatohepatitis)    Paroxysmal atrial fibrillation (HCC)    Type 2 diabetes mellitus, without long-term current use of insulin (HCC)    Mobility impaired    Chronic cholecystitis    Multiple atypical skin moles    Bradycardia    Chronic diastolic congestive heart failure ([facility]    History of 2019 novel coronavirus disease (COVID-19)    Abdominal aortic aneurysm (AAA) without rupture (HCC)    UTI due to extended-spectrum beta lactamase (ESBL) producing Escherichia coli    Chronic gout due to renal impairment without tophus    Atrial fibrillation with rapid ventricular response (HCC)       Current Outpatient Medications   Medication Sig Dispense Refill    amiodarone (PACERONE) 400 MG tablet Take 1 tablet by mouth 2 times daily 60 tablet 0    levothyroxine (SYNTHROID) 75 MCG tablet TAKE ONE (1) TABLET BY MOUTH EVERY MORNING (BEFORE BREAKFAST) 90 tablet 3    vitamin D3 (CHOLECALCIFEROL) 25 MCG (1000 UT) TABS tablet TAKE TWO (2) TABLETS BY MOUTH ONCE DAILY 60 tablet 11    MYRBETRIQ 50 MG TB24 Take 50 mg by mouth daily 30 tablet 11    Probiotic Product (LISSY-BID PROBIOTIC) TABS TAKE ONE (1) TABLET BY MOUTH IN THE MORNING 30 tablet 8    pantoprazole (PROTONIX) 40 MG tablet TAKE ONE (1) TABLET BY MOUTH ONCE DAILY 30 tablet 8    vitamin B-12 (CYANOCOBALAMIN) 1000 MCG tablet TAKE ONE (1) TABLET BY MOUTH DAILY 90 tablet 3    metoprolol tartrate (LOPRESSOR) 25 MG tablet Take 0.5 tablets by mouth 2 times daily Dose decreased 4/12/2022  due to bradycardia 60 tablet 11    nystatin (MYCOSTATIN) 837020 UNIT/GM powder Apply daily for 4-6 weeks 60 g 3 Magnesium Oxide (MAGNESIUM-OXIDE) 250 MG TABS tablet Take 1 tablet by mouth daily Take with food, in the evening 90 tablet 3    furosemide (LASIX) 20 MG tablet Take 1 tablet by mouth daily For congestive heart failure 90 tablet 3    ondansetron (ZOFRAN) 4 MG tablet TAKE ONE (1) TABLET BY MOUTH EVERY 8 HOURS AS NEEDED 30 tablet 0    atorvastatin (LIPITOR) 40 MG tablet TAKE ONE (1) TABLET BY MOUTH DAILY STOP SIMVASTATIN (Patient taking differently: 40 mg nightly) 90 tablet 3    docusate sodium (DOK) 100 MG capsule TAKE ONE (1) CAPSULE BY MOUTH TWO (2) TIMES DAILY FOR CONSTIPATION 180 capsule 3    ACETAMINOPHEN EXTRA STRENGTH 500 MG tablet TAKE 1 TABLET BY MOUTH EVERY 6 HOURS AS NEEDED FOR PAIN 120 tablet 11    albuterol sulfate HFA (PROVENTIL HFA) 108 (90 Base) MCG/ACT inhaler Inhale 1-2 puffs into the lungs every 4 hours as needed for Wheezing 18 g 0    clotrimazole-betamethasone (LOTRISONE) 1-0.05 % cream Apply topically 1 times daily on the perineum, alternate powder, for 10 days 45 g 0    XARELTO 20 MG TABS tablet TAKE ONE (1) TABLET BY MOUTH DAILY (WITH BREAKFAST)  30 tablet 3     No current facility-administered medications for this visit.          Visit for:  Obesity/Weight loss  Diabetes:  Hypertension:X  Hyperlipidemia:  Other:     Anthropometric Measurements:  HT:5'2  Weight:256  IBW:110 + or - 10%  BMI:46    Biochemical Data, Medical Tests and Procedures:    Lab Results   Component Value Date    LABA1C 5.9 09/02/2022     Lab Results   Component Value Date     09/02/2022       Lab Results   Component Value Date    CHOL 196 09/02/2022    CHOL 174 10/20/2021    CHOL 144 11/06/2020     Lab Results   Component Value Date    TRIG 225 (H) 09/02/2022    TRIG 180 (H) 10/20/2021    TRIG 142 11/06/2020     Lab Results   Component Value Date    HDL 47 09/02/2022    HDL 49 10/20/2021    HDL 46 11/06/2020     Lab Results   Component Value Date    LDLCALC 70 11/06/2020    LDLCHOLESTEROL 104 09/02/2022 LDLCHOLESTEROL 89 10/20/2021    LDLCHOLESTEROL 66 01/28/2020     Lab Results   Component Value Date    VLDL NOT REPORTED (H) 10/20/2021    VLDL 28 11/06/2020    VLDL NOT REPORTED (H) 01/28/2020     Lab Results   Component Value Date    CHOLHDLRATIO 4.2 09/02/2022    CHOLHDLRATIO 3.6 10/20/2021    CHOLHDLRATIO 3.1 11/06/2020       Lab Results   Component Value Date    WBC 7.9 09/01/2022    HGB 11.7 (L) 09/01/2022    HCT 36.7 09/01/2022    MCV 88.2 09/01/2022     09/01/2022       Lab Results   Component Value Date    CREATININE 1.03 (H) 09/01/2022    BUN 26 (H) 09/01/2022     09/01/2022    K 4.4 09/01/2022    CL 99 09/01/2022    CO2 26 09/01/2022         NUTRITION DIAGNOSIS    #1 Problem  Food and nutrition-related knowledge deficit       Etiology  related to lack of prior nutrition related education       Signs/Symptoms  as evidenced by referral for nutrition education for low sodium guidelines    NUTRITION INTERVENTION  Nutrition Prescription:based on adjusted weight of 150#    diabetic diet and low sodium  providing 1700 kcals/day    Estimated daily CHO Needs: 60 gms/meal, 15-30gms/snack  Protein needs:68gms/d  Fluid needs:2000cc/day    Patient Goals: 1. Discussed avoiding canned, prepackaged foods, processed meats and cheese. Encouraged to avoid canned soups and vegetables- discussed fresh/frozen vegetables and discussed sauces/marinades to avoid high in sodium. Discussed processed meats in detail to avoid- sausage, atwood, bologna, ham, ect- patient was eating some of these states will cut down. 2. Discussed shopping the outer rim of the grocery store where fresher foods are found. Discussed seasonings that can be used that do not contain salt- will send patient a list of these. 3. Discussed reading food labels and what to look for on labels to limit sodium intake. 4. Encouraged plate method at meals- discussed portioning plate 1/2 non-starchy vegetables, 1/4 lean protein, 1/4 carbs.  Patient states she stopped checking blood sugars- A1c is 5.9. Nutrition Intervention Need:  Initial/Brief:  Comprehensive Nutrition Education:X  Coordination of other care during nutrition care:    Monitoring and Evaluation:  Ability to plan meals/snacks:X  Ability to select healthy foods/meals:X  Food and Nutrition Knowledge:X  Self Monitoring:  Physical Activity:  Energy intake:  Fluid/beverage intake:  Fat/chol intake:  Carb intake: Other:sodium intake: X    Plan:  1. Will continue to educate patient on foods to avoid/limit high in sodium,seasonings and sauces high in sodium avoid/limit, reading food labels, and encouraged to shop outer rim of grocery store where fresher foods are found. Patient will be mailed nutrition information on all the above discussed. 2. Will follow up with patient in 2-3 weeks to review nutrition information and answer questions.      AIDAN Hunt

## 2022-09-07 NOTE — CARE COORDINATION
Referral from [facility], Kalani Mayen RN-  Patient is requesting call and educational materials from dietician on low sodium diet. Will reach out to patient for consult.   AIDAN Johnston

## 2022-09-08 ENCOUNTER — CARE COORDINATION (OUTPATIENT)
Dept: CARE COORDINATION | Age: 72
End: 2022-09-08

## 2022-09-08 ENCOUNTER — OFFICE VISIT (OUTPATIENT)
Dept: FAMILY MEDICINE CLINIC | Age: 72
End: 2022-09-08
Payer: MEDICARE

## 2022-09-08 ENCOUNTER — TELEPHONE (OUTPATIENT)
Dept: FAMILY MEDICINE CLINIC | Age: 72
End: 2022-09-08

## 2022-09-08 VITALS
HEART RATE: 62 BPM | DIASTOLIC BLOOD PRESSURE: 80 MMHG | SYSTOLIC BLOOD PRESSURE: 140 MMHG | BODY MASS INDEX: 45.32 KG/M2 | WEIGHT: 247.8 LBS | OXYGEN SATURATION: 97 %

## 2022-09-08 DIAGNOSIS — E11.65 TYPE 2 DIABETES MELLITUS WITH HYPERGLYCEMIA, WITHOUT LONG-TERM CURRENT USE OF INSULIN (HCC): ICD-10-CM

## 2022-09-08 DIAGNOSIS — I48.91 ATRIAL FIBRILLATION WITH RAPID VENTRICULAR RESPONSE (HCC): Primary | ICD-10-CM

## 2022-09-08 DIAGNOSIS — I50.32 CHRONIC DIASTOLIC CONGESTIVE HEART FAILURE (HCC): ICD-10-CM

## 2022-09-08 DIAGNOSIS — I10 PRIMARY HYPERTENSION: ICD-10-CM

## 2022-09-08 DIAGNOSIS — N39.46 MIXED INCONTINENCE: Primary | ICD-10-CM

## 2022-09-08 DIAGNOSIS — I12.9 BENIGN HYPERTENSION WITH CKD (CHRONIC KIDNEY DISEASE) STAGE III (HCC): ICD-10-CM

## 2022-09-08 DIAGNOSIS — N18.30 BENIGN HYPERTENSION WITH CKD (CHRONIC KIDNEY DISEASE) STAGE III (HCC): ICD-10-CM

## 2022-09-08 DIAGNOSIS — G47.33 OBSTRUCTIVE SLEEP APNEA: ICD-10-CM

## 2022-09-08 DIAGNOSIS — Z09 HOSPITAL DISCHARGE FOLLOW-UP: ICD-10-CM

## 2022-09-08 DIAGNOSIS — Z74.09 MOBILITY IMPAIRED: ICD-10-CM

## 2022-09-08 PROCEDURE — 99496 TRANSJ CARE MGMT HIGH F2F 7D: CPT | Performed by: FAMILY MEDICINE

## 2022-09-08 PROCEDURE — 1111F DSCHRG MED/CURRENT MED MERGE: CPT | Performed by: FAMILY MEDICINE

## 2022-09-08 RX ORDER — ALBUTEROL SULFATE 90 UG/1
1-2 AEROSOL, METERED RESPIRATORY (INHALATION) EVERY 4 HOURS PRN
Qty: 18 G | Refills: 0 | Status: SHIPPED | OUTPATIENT
Start: 2022-09-08

## 2022-09-08 RX ORDER — FUROSEMIDE 20 MG/1
20 TABLET ORAL DAILY
Qty: 90 TABLET | Refills: 0 | Status: SHIPPED | OUTPATIENT
Start: 2022-09-08 | End: 2022-11-02

## 2022-09-08 RX ORDER — LISINOPRIL 5 MG/1
5 TABLET ORAL DAILY
Qty: 30 TABLET | Refills: 3 | Status: SHIPPED | OUTPATIENT
Start: 2022-09-08

## 2022-09-08 RX ORDER — WALKER
EACH MISCELLANEOUS
Qty: 1 EACH | Refills: 0 | Status: SHIPPED | OUTPATIENT
Start: 2022-09-08

## 2022-09-08 SDOH — ECONOMIC STABILITY: FOOD INSECURITY: WITHIN THE PAST 12 MONTHS, YOU WORRIED THAT YOUR FOOD WOULD RUN OUT BEFORE YOU GOT MONEY TO BUY MORE.: NEVER TRUE

## 2022-09-08 SDOH — ECONOMIC STABILITY: FOOD INSECURITY: WITHIN THE PAST 12 MONTHS, THE FOOD YOU BOUGHT JUST DIDN'T LAST AND YOU DIDN'T HAVE MONEY TO GET MORE.: NEVER TRUE

## 2022-09-08 ASSESSMENT — ENCOUNTER SYMPTOMS
ABDOMINAL PAIN: 0
BACK PAIN: 1
TROUBLE SWALLOWING: 0
EYE REDNESS: 0
VOMITING: 0
RECTAL PAIN: 0
DIARRHEA: 0
SHORTNESS OF BREATH: 1
COUGH: 0
NAUSEA: 0
STRIDOR: 0
CHEST TIGHTNESS: 0
RHINORRHEA: 0
ABDOMINAL DISTENTION: 0
BLOOD IN STOOL: 0
SINUS PRESSURE: 0
SORE THROAT: 0
CONSTIPATION: 0
COLOR CHANGE: 0
WHEEZING: 0

## 2022-09-08 ASSESSMENT — SOCIAL DETERMINANTS OF HEALTH (SDOH): HOW HARD IS IT FOR YOU TO PAY FOR THE VERY BASICS LIKE FOOD, HOUSING, MEDICAL CARE, AND HEATING?: NOT HARD AT ALL

## 2022-09-08 NOTE — PROGRESS NOTES
Post-Discharge Transitional Care  Follow Up      Jered Benitez   YOB: 1950    Date of Office Visit:  9/8/2022  Date of Hospital Admission: 9/1/22  Date of Hospital Discharge: 9/3/22  Risk of hospital readmission (high >=14%. Medium >=10%) :Readmission Risk Score: 12.3      Care management risk score Rising risk (score 2-5) and Complex Care (Scores >=6): No Risk Score On File     Non face to face  following discharge, date last encounter closed (first attempt may have been earlier): 09/06/2022    Call initiated 2 business days of discharge: Yes    ASSESSMENT/PLAN:   Atrial fibrillation with rapid ventricular response (HCC)  Chronic problem recent worsening, continue beta-blockers amiodarone Xarelto follow-up with cardiologist.  Patient is deconditioned, will benefit from home health. -     Misc. Devices (STEP N REST II WALKER) MISC; Disp-1 each, R-0, PrintRollator walker  -     Misc. Devices (RAISED TOILET SEAT) MISC; Disp-1 each, R-0, PrintPlease provide with arms  -     [address]  Primary hypertension  Fairly controlled, restart lisinopril 5 mg at low-dose, recheck BMP in 1 week order home health  -     lisinopril (PRINIVIL;ZESTRIL) 5 MG tablet; Take 1 tablet by mouth daily, Disp-30 tablet, R-3Normal  -     [address]  Benign hypertension with CKD (chronic kidney disease) stage III ([facility] [address]  At her baseline continue to monitor recheck BMP  -     Basic Metabolic Panel; Future  Type 2 diabetes mellitus with hyperglycemia, without long-term current use of insulin (HCC)  Controlled continue diet control continue to monitor blood sugars  -     Microalbumin, Ur  Mobility impaired  Home health step and walker ordered. -     Misc. Devices (STEP N REST II WALKER) MISC; Disp-1 each, R-0, PrintRollator walker  -     Misc.  Devices (RAISED TOILET SEAT) MISC; Disp-1 each, R-0, PrintPlease provide with arms  -     [address] - [place]  Chronic diastolic congestive heart failure ([facility] [address]  Fairly stable continue diuretics ACE inhibitor's and beta-blockers  -     albuterol sulfate HFA (PROVENTIL HFA) 108 (90 Base) MCG/ACT inhaler; Inhale 1-2 puffs into the lungs every 4 hours as needed for Wheezing, Disp-18 g, R-0Normal  -     furosemide (LASIX) 20 MG tablet; Take 1 tablet by mouth daily For congestive heart failure, Disp-90 tablet, R-0Normal  -     Misc. Devices (STEP N REST II WALKER) MISC; Disp-1 each, R-0, PrintRollator walker  -     Misc. Devices (RAISED TOILET SEAT) MISC; Disp-1 each, R-0, PrintPlease provide with arms  -     [address]  Obstructive sleep apnea  Continue using CPAP. Hospital discharge follow-up  -     [place] DISCHARGE MEDS RECONCILED W/ CURRENT OUTPATIENT MED LIST    Medical Decision Making: high complexity  Return in about 4 weeks (around 10/6/2022) for 30min,always chronic conditons, pcp . On this date 9/8/2022 I have spent 50 minutes reviewing previous notes, test results and face to face with the patient discussing the diagnosis and importance of compliance with the treatment plan as well as documenting on the day of the visit. Subjective:   HPI:  Follow up of Hospital problems/diagnosis(es):   Patient is scheduled for follow-up on hospital visit was admitted with symptoms of irregular heart rate was in A. fib with RVR. Patient was admitted and started on Cardizem drip discharge summary below. Later on she was started on amiodarone twice a day needs appointment with cardiologist in 1 month to change it to once a day. Chronic history of atrial fibrillation is on Xarelto heart rate is under control. Patient blood pressure was running low in the hospital and lisinopril was on hold, currently her blood pressure is mildly high. She is also on Lasix 20 mg patient is not sure about the medications.   Creatinine functions normal.  Patient was taking 10 mg of lisinopril we will decrease it to 5. Patient has blood pressure monitor which she is using at home. Diabetes controlled, is on diet control, she was on metformin in the past which was discontinued in February. Her sugars are running within range A1c is stable. Patient denies any hypoglycemic episodes. Chronic kidney disease stable creatinine. CHF at her baseline denies any dyspnea on exertion, has mild shortness of breath which is normal.    Patient has generalized weakness due to recent hospital admission and also has mobility problems, difficulty in walking balance problems. She had walker which she was using, that broke, needs new walker that will help with her mobility. Obstructive sleep apnea reports she had nasal mask that does not fit with the CPAP, she has received the larger size for that is too big. She has to call from medical pharmacy counter resend new 1. Patient needs home health due to deconditioning due to multiple medical problems and recent hospitalizations. Patient reports she feels physically very weak has no energy and her lower extremities feels weak. Rosalia Vásquez requires the assistance of a Rollator walker to successfully complete daily living tasks such as: bathing, toileting, dressing and grooming. A Rollator walker is necessary due to the patient's impaired ambulation and mobility restrictions, and can ambulate only by using a walker instead of a lesser assistive device, such as a cane or crutch.       Electronically signed by Aric Erazo MD on 9/8/2022 at 1:43 PM    _______________________    Discharge summary   Rosalia Vásquez is a 81 y.o. female who was admitted for the management of Atrial fibrillation with rapid ventricular response ([facility] [address] , presented to ER with Palpitations  81-year-old female history of paroxysmal A. fib on Xarelto, type 2 diabetes, hypothyroid hypertension with CKD stage III, history of uterine cancer status post hysterectomy morbid obesity presenting with A. fib RVR  A. fib RVR, known paroxysmal A. fib started on Cardizem infusion troponins flat Home Xarelto resumed,  Echo unremarkable, heart rate controlled. TSH normal  Morbid obesity BMI 46     Seen by cardiology, amiodarone was increased plan to lower at 200 mg daily after 1 month, lisinopril held at discharge due to low blood pressure     After 1 month, can lower amio to 200 daily  Holding lisinopril until repeat blood pressure check with PCP  [id number] Score for Atrial Fibrillation Stroke Risk   Risk   Factors  Component Value   C CHF Yes 1   H HTN Yes 1   A2 Age >= 76 No,  (71 y.o.) 0   D DM Yes 1   S2 Prior Stroke/TIA No 0   V Vascular Disease No 0   A Age 74-69 Yes,  (71 y.o.) 1   Sc Sex female 1    [id number]  Score  5   Score last updated 9/8/22 0:27 PM EDT    Click here for a link to the UpToDate guideline \"Atrial Fibrillation: Anticoagulation therapy to prevent embolization    Disclaimer: Risk Score calculation is dependent on accuracy of patient problem list and past encounter diagnosis. Inpatient course: Discharge summary reviewed- see chart.     Interval history/Current status:  at her baseline, physically weak    Patient Active Problem List   Diagnosis    Acquired hypothyroidism    History of TIA (transient ischemic attack)    Chronic bilateral low back pain without sciatica    Benign hypertension with CKD (chronic kidney disease) stage III (HCC)    Osteopenia determined by x-ray    Osteoarthritis involving multiple joints on both sides of body    Left knee DJD    Vitamin D deficiency    Mixed incontinence    Chronic pain of both knees    Slow transit constipation    Allergic rhinitis    Hyperlipidemia with target LDL less than 100    Obesity, Class III, BMI 40-49.9 (morbid obesity) ([facility] [address]    At high risk for falls    Spondylosis of lumbar region without myelopathy or radiculopathy    OAB (overactive bladder)    Primary osteoarthritis of both knees    Adenomatous polyp of sigmoid colon, 6/26/17    TLHBSO, bilateral LND 10/24/17    Optic atrophy of both eyes    Cataracta b/l eyes    Difficulty walking    Esotropia    History of uterine cancer, Well differentiated grade 1 adenocarcinoma arising in complex hyperplasia, 2017    Vitamin B 12 deficiency    Atherosclerosis of aorta ([address]    Calculus of gallbladder without cholecystitis without obstruction    CLAROS (nonalcoholic steatohepatitis)    Paroxysmal atrial fibrillation (HCC)    Type 2 diabetes mellitus, without long-term current use of insulin ([address]    Mobility impaired    Chronic cholecystitis    Multiple atypical skin moles    Bradycardia    Chronic diastolic congestive heart failure ([address]    History of 2019 novel coronavirus disease (COVID-19)    Abdominal aortic aneurysm (AAA) without rupture ([facility] [address]    UTI due to extended-spectrum beta lactamase (ESBL) producing Escherichia coli    Chronic gout due to renal impairment without tophus    Atrial fibrillation with rapid ventricular response ([address]    Primary hypertension    Obstructive sleep apnea       Medications listed as ordered at the time of discharge from hospital     Medication List            Accurate as of September 8, 2022  1:43 PM. If you have any questions, ask your nurse or doctor. START taking these medications      lisinopril 5 MG tablet  Commonly known as: PRINIVIL;ZESTRIL  Take 1 tablet by mouth daily  Started by: Aimee Kaminski MD     * Step N Rest II Walker Misc  Rollator walker  Started by: Aimee Kaminski MD     * Raised Toilet Seat Misc  Please provide with arms  Started by: Aimee Kaminski MD           * This list has 2 medication(s) that are the same as other medications prescribed for you. Read the directions carefully, and ask your doctor or other care provider to review them with you.                 CHANGE how you take these medications      atorvastatin 40 MG tablet  Commonly known as: LIPITOR  TAKE ONE (1) TABLET BY MOUTH DAILY STOP MOUTH DAILY (WITH BREAKFAST)            STOP taking these medications      ondansetron 4 MG tablet  Commonly known as: Cong [place] by: Gretchen Juarez MD               Where to Get Your Medications        These medications were sent to [facility], [address] [place] [address] Avenue, [place] [address]      Phone: [phone]   albuterol sulfate  (90 Base) MCG/ACT inhaler  furosemide 20 MG tablet  lisinopril 5 MG tablet       You can get these medications from any pharmacy    Bring a paper prescription for each of these medications  Raised Toilet Seat Misc  Step N Rest II Walker Misc           Medications marked \"taking\" at this time  Outpatient Medications Marked as Taking for the 9/8/22 encounter (Office Visit) with Gretchen Juarez MD   Medication Sig Dispense Refill    albuterol sulfate HFA (PROVENTIL HFA) 108 (90 Base) MCG/ACT inhaler Inhale 1-2 puffs into the lungs every 4 hours as needed for Wheezing 18 g 0    lisinopril (PRINIVIL;ZESTRIL) 5 MG tablet Take 1 tablet by mouth daily 30 tablet 3    furosemide (LASIX) 20 MG tablet Take 1 tablet by mouth daily For congestive heart failure 90 tablet 0    Misc. Devices (STEP N REST II WALKER) MISC Rollator walker 1 each 0    Misc.  Devices (RAISED TOILET SEAT) MISC Please provide with arms 1 each 0    amiodarone (PACERONE) 400 MG tablet Take 1 tablet by mouth 2 times daily 60 tablet 0    levothyroxine (SYNTHROID) 75 MCG tablet TAKE ONE (1) TABLET BY MOUTH EVERY MORNING (BEFORE BREAKFAST) 90 tablet 3    vitamin D3 (CHOLECALCIFEROL) 25 MCG (1000 UT) TABS tablet TAKE TWO (2) TABLETS BY MOUTH ONCE DAILY 60 tablet 11    MYRBETRIQ 50 MG TB24 Take 50 mg by mouth daily 30 tablet 11    Probiotic Product (LISSY-BID PROBIOTIC) TABS TAKE ONE (1) TABLET BY MOUTH IN THE MORNING 30 tablet 8    vitamin B-12 (CYANOCOBALAMIN) 1000 MCG tablet TAKE ONE (1) TABLET BY MOUTH DAILY 90 tablet 3    metoprolol tartrate (LOPRESSOR) 25 MG tablet Take 0.5 tablets by mouth 2 times daily Dose decreased 4/12/2022  due to bradycardia 60 tablet 11    nystatin (MYCOSTATIN) 613138 UNIT/GM powder Apply daily for 4-6 weeks 60 g 3    Magnesium Oxide (MAGNESIUM-OXIDE) 250 MG TABS tablet Take 1 tablet by mouth daily Take with food, in the evening 90 tablet 3    atorvastatin (LIPITOR) 40 MG tablet TAKE ONE (1) TABLET BY MOUTH DAILY STOP SIMVASTATIN (Patient taking differently: 40 mg nightly) 90 tablet 3    docusate sodium (DOK) 100 MG capsule TAKE ONE (1) CAPSULE BY MOUTH TWO (2) TIMES DAILY FOR CONSTIPATION 180 capsule 3    ACETAMINOPHEN EXTRA STRENGTH 500 MG tablet TAKE 1 TABLET BY MOUTH EVERY 6 HOURS AS NEEDED FOR PAIN 120 tablet 11    clotrimazole-betamethasone (LOTRISONE) 1-0.05 % cream Apply topically 1 times daily on the perineum, alternate powder, for 10 days 45 g 0    XARELTO 20 MG TABS tablet TAKE ONE (1) TABLET BY MOUTH DAILY (WITH BREAKFAST)  30 tablet 3        Medications patient taking as of now reconciled against medications ordered at time of hospital discharge: Yes    Review of Systems   Constitutional:  Positive for activity change and unexpected weight change. Negative for appetite change, fatigue and fever. HENT:  Negative for congestion, ear pain, postnasal drip, rhinorrhea, sinus pressure, sore throat and trouble swallowing. Eyes:  Positive for visual disturbance. Negative for redness. Respiratory:  Positive for shortness of breath. Negative for cough, chest tightness, wheezing and stridor. Cardiovascular:  Positive for leg swelling. Negative for chest pain and palpitations. Gastrointestinal:  Negative for abdominal distention, abdominal pain, blood in stool, constipation, diarrhea, nausea, rectal pain and vomiting. Endocrine: Negative for polydipsia, polyphagia and polyuria. Genitourinary:  Negative for difficulty urinating, flank pain, frequency and urgency.    Musculoskeletal:  Positive for arthralgias, back pain and gait problem. Negative for myalgias and neck pain. Skin:  Negative for color change, rash and wound. Allergic/Immunologic: Positive for immunocompromised state. Negative for food allergies. Neurological:  Positive for weakness and numbness. Negative for dizziness, speech difficulty, light-headedness and headaches. Psychiatric/Behavioral:  Positive for decreased concentration. Negative for agitation, behavioral problems, dysphoric mood, hallucinations, sleep disturbance and suicidal ideas. The patient is nervous/anxious. Objective:    BP (!) 140/80   Pulse 62   Wt 247 lb 12.8 oz (112.4 kg)   LMP  (LMP Unknown)   SpO2 97%   BMI 45.32 kg/m²       Physical Exam  Vitals and nursing note reviewed. Constitutional:       General: She is not in acute distress. Appearance: Normal appearance. She is well-developed. She is obese. She is not diaphoretic. HENT:      Head: Normocephalic and atraumatic. Nose: Nose normal.   Eyes:      General:         Right eye: No discharge. Left eye: No discharge. Extraocular Movements: Extraocular movements intact. Conjunctiva/sclera: Conjunctivae normal.      Pupils: Pupils are equal, round, and reactive to light. Neck:      Thyroid: No thyromegaly. Cardiovascular:      Rate and Rhythm: Normal rate. Rhythm irregular. Heart sounds: Normal heart sounds. No murmur heard. Comments: A. fib, heart rate is under control  Pulmonary:      Effort: Pulmonary effort is normal. No respiratory distress. Breath sounds: Examination of the right-lower field reveals rales. Examination of the left-lower field reveals rales. Rales present. No wheezing or rhonchi. Abdominal:      General: Bowel sounds are normal. There is no distension. Palpations: Abdomen is soft. There is no mass. Tenderness: There is no abdominal tenderness. There is no guarding or rebound. Musculoskeletal:         General: No tenderness.       Cervical back: Normal range of motion and neck supple. Spasms present. No rigidity. Thoracic back: Deformity and spasms present. Normal range of motion. Lumbar back: Spasms present. No tenderness. Decreased range of motion. Right lower le+ Edema present. Left lower le+ Edema present. Lymphadenopathy:      Cervical: No cervical adenopathy. Skin:     Coloration: Skin is not jaundiced or pale. Findings: No bruising, erythema or rash. Neurological:      General: No focal deficit present. Mental Status: She is oriented to person, place, and time. She is lethargic. Cranial Nerves: No cranial nerve deficit. Sensory: Sensory deficit present. Motor: Weakness present. No tremor. Coordination: Coordination normal.      Gait: Gait abnormal. Tandem walk normal.      Comments: Diabetic neuropathy   Psychiatric:         Attention and Perception: Attention and perception normal. She is attentive. Mood and Affect: Mood is anxious. Mood is not depressed. Affect is not tearful. Speech: She is communicative. Speech is slurred. Speech is not rapid and pressured or delayed. Behavior: Behavior is slowed. Behavior is not agitated. Thought Content: Thought content normal.         Judgment: Judgment normal.       An electronic signature was used to authenticate this note.   --Aric Erazo MD

## 2022-09-08 NOTE — CARE COORDINATION
Dietician education mailed - \"sodium in your diet\" education material was mailed out to the patient.

## 2022-09-08 NOTE — PATIENT INSTRUCTIONS
New Updates for [facility] MyChart/ [facility] ([facility]) CELESTINE    Thank you for choosing US to give you the best care! [facility] ([facility]) is always trying to think of new ways to help their patients. We are asking all patients to try out the new digital registration that is now available through your [facility] account or the new CELESTINE, [facility] ([facility]). Via the celestine you're now able to update your personal and registration information prior to your upcoming appointment. This will save you time once you arrive at the office to check-in, not to mention your information remains safe!! Many other perks come from signing up for an account, such as:  Requesting refills  Scheduling an appointment  Completing an E-Visit  Sending a message to the office/provider  Having access to your medication list  Paying your bill/copay prior to your appointment  Scheduling your yearly mammogram  Review your test results    If you are not familiar with [facility] or the [facility] ([facility]) CELESTINE, please ask one of us and we will be happy to answer any questions or help you set-up your account.       Your [facility] office,  Charles

## 2022-09-08 NOTE — TELEPHONE ENCOUNTER
Please notify patient we received notification from her insurance that metoprolol and Myrbetriq interact, needs to discuss with her urologist to change to Myrbetriq    Please check with patient if she has had home Medicare visit by her insurance?     If NOT, we can change her next appointment on 10/11/2020 due to Medicare type        Future Appointments   Date Time Provider Dante Monique   10/10/2022  1:00 PM Emilia Bradley GYN Oncology [facility]   10/11/2022  1:30 PM Steven Colin MD [facility]   10/12/2022  1:30 PM [facility]           FYI  Medication interaction received regarding metoprolol or Myrbetriq described as severe, and amiodarone atorvastatin    I verified the interaction between amiodarone atorvastatin and our pharmacy updates, does not show significant interaction, actually does not show any interaction    The other interactions metoprolol amiodarone, levothyroxine metoprolol, levothyroxine amiodarone, and Zofran acetaminophen benefits are much higher than risks, patient is monitored by cardiologist, TSH monitoring    Lab Results   Component Value Date    TSH 2.38 09/02/2022

## 2022-09-09 ENCOUNTER — CARE COORDINATION (OUTPATIENT)
Dept: CASE MANAGEMENT | Age: 72
End: 2022-09-09

## 2022-09-09 NOTE — CARE COORDINATION
[facility] Transitions Follow Up Call    2022    Patient: Qi Lam  Patient : 1950   MRN: [id number]  Reason for Admission: A. Fib  Discharge Date: 9/3/22 RARS: Readmission Risk Score: 12.3         Spoke with: Patient    Spoke to patient only briefly, states she had company, said her nurse was present and she could not talk. I inquired if she was doing ok and if she felt she was set for the weekend and replied she was. Notes in chart reviewed, patient was seen by PCP yesterday in office, orders were placed for a rollator and blood pressure cough and she was restarted on Lisinopril at 5 mg daily. Will follow up with patient next week.           Follow Up  Future Appointments   Date Time Provider Dante Amaya   10/10/2022  1:00 PM Arvind Nguyen, [place] [facility] On[facility]   10/11/2022  1:30 PM Mickey Essex, MD [facility]   10/12/2022  1:30 PM Mary Ellen Ames MD [facility] CM Amezquita RN

## 2022-09-09 NOTE — TELEPHONE ENCOUNTER
Diagnosis Orders   1.  Mixed incontinence  Incontinence Supplies MISC           Future Appointments   Date Time Provider Dante Monique   10/10/2022  1:00 PM Afsaneh Moya GYN Oncology [facility]   10/11/2022  1:30 PM Elke Renee MD [facility]   10/12/2022  1:30 PM Dora Vides [address]

## 2022-09-09 NOTE — TELEPHONE ENCOUNTER
SPOKE WITH PATIENT REGARDING MEDICATION/CALL UROLOGIST VERBALIZED UNDERSTANDING     STATES SHE WILL CONTACT UROLOGIST.     PATIENT IS ASKING IF YOU WOULD WRITE AN ORDER FOR WASHABLE BED PADS PLEASE ADVISE

## 2022-09-12 ENCOUNTER — TELEPHONE (OUTPATIENT)
Dept: FAMILY MEDICINE CLINIC | Age: 72
End: 2022-09-12

## 2022-09-12 NOTE — TELEPHONE ENCOUNTER
----- Message from Festus David sent at 9/12/2022  2:15 PM EDT -----  Subject: Referral Request    Reason for referral request? The pt is requesting washable pads for her   bed and chair. please contact the pt if questions  Provider patient wants to be referred to(if known):     Provider Phone Number(if known):     Additional Information for Provider?   ---------------------------------------------------------------------------  --------------  [address] [facility]    [phone]; OK to leave message on voicemail  ---------------------------------------------------------------------------  --------------

## 2022-09-12 NOTE — TELEPHONE ENCOUNTER
Order placed and printed on 9/9/22, I left it in the box to be faxed  Thank you!       Future Appointments   Date Time Provider Dante Amaya   10/10/2022  1:00 PM Héctor Hidalgo [place] [facility]   10/11/2022  1:30 PM Oscar Stanley MD  [facility]   10/12/2022  1:30 PM Dora [address]

## 2022-09-13 ENCOUNTER — CARE COORDINATION (OUTPATIENT)
Dept: CASE MANAGEMENT | Age: 72
End: 2022-09-13

## 2022-09-13 NOTE — CARE COORDINATION
[facility] Transitions Follow Up Call    2022    Patient: Tyler Catalan  Patient : 1950   MRN: [id number]  Reason for Admission: A. Fib  Discharge Date: 9/3/22 RARS: Readmission Risk Score: 12.3         Spoke with: Patient    Spoke with patient who said she is doing ok today. She denies any chest pains, shortness of breath, new swelling or other issues. Home care nurse was out to see her yesterday. She did see her PCP last week, she was restarted on Lisinopril at 5 mg daily. When I inquired about if she had restarted this medication, she was insistent that she was taking 200 mg BID. I explained this was probably her Amiodarone as Lisinopril does not come in that high of a dose. I called over to [address] where she said she got the medication, it was for Amiodarone, was given 200 mg tabs and instructed to take 2 tabs BID. I did call the pharmacy where she has medications bubble packed as well to confirm that they know to put in Lisinopril at 5 mg. Patient has Amiodarone, Lasix and Lisinopril in bottles and the rest of her medications are in bubble packaging. She will get new bubble packs at the end of the month with all her pills included. Patient is waiting on rollator, noted that PCP office did order this, will see where they sent this to. She said she no longer needs blood pressure cuff because she found hers. Today's blood pressure readings were 117/70 HR 70 and when PT came it was 108/65 with HR 51. She denies any dizziness or lightheadedness. She denies any new needs at this time. Care Transitions Follow Up Call    Needs to be reviewed by the provider   Additional needs identified to be addressed with provider: No  none             Method of communication with provider : none      Care Transition Nurse contacted the patient by telephone to follow up after admission on . Verified name and  with patient as identifiers.     Addressed changes since last contact: Lisinopril restarted at 5mg daily  Discussed follow-up appointments. CTN reviewed discharge instructions, medical action plan and red flags with patient and discussed any barriers to care and/or understanding of plan of care after discharge. Discussed appropriate site of care based on symptoms and resources available to patient including: PCP  Specialist. The patient agrees to contact the PCP office for questions related to their healthcare. Patients top risk factors for readmission: medical condition-A. fib  Interventions to address risk factors: Obtained and reviewed discharge summary and/or continuity of care documents      Non-[facility] follow up appointment(s): n/a    CTN provided contact information for future needs. Plan for follow-up call in 5-7 days based on severity of symptoms and risk factors. Plan for next call:  check for symptoms of A. Fib, check on rollator          Care Transitions Subsequent and Final Call    Schedule Follow Up Appointment with PCP: Completed  Subsequent and Final Calls  Do you have any ongoing symptoms?: No  Have your medications changed?: Yes  Patient Reports: Lisinopril added back to medication regimen, 5 mg daily  Do you have any questions related to your medications?: No  Do you currently have any active services?: Yes  Are you currently active with any services?: Home Health  Do you have any needs or concerns that I can assist you with?: No  Identified Barriers: Lack of Education  Care Transitions Interventions    Registered Dietician: Completed    Other Interventions:              Follow Up  Future Appointments   Date Time Provider Dante Amaya   10/10/2022  1:00 PM Callie Schmitt [place] [facility] Ranulfo Alondra   10/11/2022  1:30 PM Yadira Carlin [facility]   10/12/2022  1:30 PM Kali Feliz MD [facility] Ysabel Bruno RN

## 2022-09-13 NOTE — TELEPHONE ENCOUNTER
Noted, a second message again today regarding this  Future Appointments   Date Time Provider Dante Amaya   10/10/2022  1:00 PM Cole Buenrostro [place] [facility]   10/11/2022  1:30 PM [facility]   10/12/2022  1:30 PM Dora Moscoso 308

## 2022-09-14 DIAGNOSIS — N32.81 OAB (OVERACTIVE BLADDER): Primary | ICD-10-CM

## 2022-09-14 RX ORDER — SOLIFENACIN SUCCINATE 5 MG/1
5 TABLET, FILM COATED ORAL DAILY
Qty: 90 TABLET | Refills: 3 | Status: SHIPPED | OUTPATIENT
Start: 2022-09-14

## 2022-09-14 NOTE — PROGRESS NOTES
Patient to stop myrbetriq due to risk of interaction with metoprolol. She will be switched to vesicare.

## 2022-09-21 ENCOUNTER — CARE COORDINATION (OUTPATIENT)
Dept: CASE MANAGEMENT | Age: 72
End: 2022-09-21

## 2022-09-21 NOTE — CARE COORDINATION
severity of symptoms and risk factors. Plan for next call:  Check for new symptoms, assess any new needs          Care Transitions Subsequent and Final Call    Schedule Follow Up Appointment with PCP: Completed  Subsequent and Final Calls  Do you have any ongoing symptoms?: No  Have your medications changed?: No  Do you have any questions related to your medications?: No  Do you currently have any active services?: Yes  Are you currently active with any services?: Home Health  Do you have any needs or concerns that I can assist you with?: No  Identified Barriers: Lack of Education  Care Transitions Interventions    Registered Dietician: Completed    Other Interventions:              Follow Up  Future Appointments   Date Time Provider Dante Amaya   10/10/2022  1:00 PM Cole Buenrostro [place] [facility] Adam Snow   10/11/2022  1:30 PM [facility]   10/12/2022  1:30 PM Cora Hicks MD [facility] Michelle Rivera RN

## 2022-09-22 ENCOUNTER — CARE COORDINATION (OUTPATIENT)
Dept: CARE COORDINATION | Age: 72
End: 2022-09-22

## 2022-09-22 NOTE — CARE COORDINATION
Nutrition Care Coordinator Follow-Up visit:    Food Recall:eating 2-3 meals/d    Activity Level:  Sedentary:  Lightly Active:X  Moderately Active:  Very Active:    Adult BMI:  Underweight (below 18.5)  Normal Weight (18.5-24.9)  Overweight (25-29. 9)  Obese (30-39. 9)  Morbidly Obese (>40)X    Weight Change:no change    Plan:  Plan was established with patient:  Increase dietary fiber by consuming whole grains, fruits and vegetables:x  Limit dietary cholesterol to >200mg/day: Increase water intake:  Avoid added sugar:X  Avoid sweetened beverages:X  Choose lean meats:  Limit sodium intake: x    Monitoring: Will monitor weight:  Will monitor adherence to meal plan:X  Will monitor adherence to exercise plan: Will monitor HGA1c:    Handouts Provided :  Low Carb snacking:X  Carb counting /individual meal plan:X  Portion Control:X  Food Labels:X  Physical Activity:  Low Fat/Cholesterol:  Hypo/Hyperglycemia:  Calorie Controlled Meal Plan:  Low sodium guidelines: X    Goals: Increase water consumption to 8oz. 6-8 times daily:  Manage blood sugars by consuming 3 meals spaced every 4-5 hours with 2-3 snacks daily:discussed  Increase fiber and decrease fat intake by consuming 1-2 fruit servings and 2-3 vegetable servings per day. Increase physical activity by:  Consume less than 2,000mg of sodium/day: discussed  Avoid consumption of sweetened beverages and added sugar by reading food labels:discussed  Monitor blood sugars by using meter to check blood glucose before morning meal and 2 hours after a meal daily:BS stable A1c- 5.9  Decrease risk of coronary heart disease by consuming fish that contains omega-3 fatty acids at least twice a week, avoiding partially hydrogenated oil/trans fats and limiting saturated fat intake by reading food labels:    Patient goals set: 1. Reviewed avoiding canned, prepackaged foods, processed meats and cheese.  Encouraged to avoid canned soups and vegetables- discussed fresh/frozen vegetables and discussed sauces/marinades to avoid high in sodium. Reviewed processed meats in detail to avoid- sausage, atwood, bologna, ham, ect- patient is working on limiting. 2. Reviewed shopping the outer rim of the grocery store where fresher foods are found. Discussed seasonings that can be used that do not contain salt- will send patient a list of these. 3. Reviewed reading food labels and what to look for on labels to limit sodium intake. 4. Encouraged plate method at meals- discussed portioning plate 1/2 non-starchy vegetables, 1/4 lean protein, 1/4 carbs. Patient states she has not got nutrition information yet- provider her with contact information to call if she does not get it within the next few days. Patient states she is working on limiting processed meats and has stopped buying canned vegetables- now buys frozen or fresh vegetables. Patient has no questions at this time. Will follow up in 3-4 weeks to review and answer questions.   Pan[employer] De [place]ente

## 2022-09-28 ENCOUNTER — CARE COORDINATION (OUTPATIENT)
Dept: CASE MANAGEMENT | Age: 72
End: 2022-09-28

## 2022-09-28 NOTE — CARE COORDINATION
[facility] Care Transitions Follow Up Call    Care Transition Nurse contacted the patient by telephone to follow up after admission on 22. Verified name and  with patient as identifiers. Patient: Jet Sweeney  Patient : 1950   MRN: [id number]  Reason for Admission: A-fib  Discharge Date: 9/3/22 RARS: Readmission Risk Score: 12.3      Needs to be reviewed by the provider   Additional needs identified to be addressed with provider: No  none             Method of communication with provider: none. Spoke to Forest Blackburn for transitions follow up call. Pt stated she in in [facility] on 22 for symptoms of left arm pain and lip numbness and tingling. Noted pt was discharged on 22, diagnosed with bradycardia, prolonged Q-T interval.    Denies ongoing SOB, CP/pressure, palpitations, numbness, tingling, weakness. Patient continues SN/PT with [facility] ALBARO VEGA RN . No changes to medications at discharge from [facility]. Stated she has appts in place, only needs to move appt up with Dr Major Bernabe, declined assistance. Addressed changes since last contact:  home health care-Continues with PT/SN FERNIE [facility] GARY  In [address] for bradycardia on 22  Discussed follow-up appointments. If no appointment was previously scheduled, appointment scheduling offered: Yes. Follow Up  Future Appointments   Date Time Provider Dante Amaya   10/10/2022  1:00 PM [place], [place] [facility]   10/11/2022  1:30 PM [facility]   10/12/2022  1:30 PM Chanda Rubio MD [address] [place] [address] Transition Nurse reviewed discharge instructions with patient and discussed any barriers to care and/or understanding of plan of care after discharge. Discussed appropriate site of care based on symptoms and resources available to patient including: PCP  Specialist  Home health  When to call [address]  The patient agrees to contact the PCP office for questions related to their healthcare. Offered patient enrollment in the Remote Patient Monitoring (RPM) program for in-home monitoring: Patient is not eligible for RPM program.     Care Transitions Subsequent and Final Call    Subsequent and Final Calls  Do you have any ongoing symptoms?: No  Have your medications changed?: No  Do you have any questions related to your medications?: No  Do you currently have any active services?: Yes  Are you currently active with any services?: Home Health  Do you have any needs or concerns that I can assist you with?: No  Identified Barriers: None  Care Transitions Interventions  Other Interventions:             Care Transition Nurse provided contact information for future needs. Plan for follow-up call in 5-7 days based on severity of symptoms and risk factors. Plan for next call: symptom management-Bradycardia, HHC, dietician f/u  follow-up appointment-PCP, Gyn, Dr Derek Dejesus appt moved up?     Candelaria Metz RN

## 2022-09-29 RX ORDER — AMIODARONE HYDROCHLORIDE 200 MG/1
TABLET ORAL
Qty: 120 TABLET | OUTPATIENT
Start: 2022-09-29

## 2022-10-04 ENCOUNTER — CARE COORDINATION (OUTPATIENT)
Dept: CASE MANAGEMENT | Age: 72
End: 2022-10-04

## 2022-10-04 DIAGNOSIS — E03.9 ACQUIRED HYPOTHYROIDISM: ICD-10-CM

## 2022-10-04 RX ORDER — LEVOTHYROXINE SODIUM 0.07 MG/1
TABLET ORAL
Qty: 90 TABLET | Refills: 3 | Status: SHIPPED | OUTPATIENT
Start: 2022-10-04

## 2022-10-04 NOTE — CARE COORDINATION
[facility] Care Transitions Follow Up Call    Care Transition Nurse contacted the patient by telephone to follow up after admission on 22. Verified name and  with patient as identifiers. Patient: Zhao Arango  Patient : 1950   MRN: [id number]  Reason for Admission: Prolonged Q-T interval, Bradycardia  Discharge Date: 9/3/22 RARS: Readmission Risk Score: 12.3      Needs to be reviewed by the provider   Additional needs identified to be addressed with provider: No  none             Method of communication with provider: none. Spoke briefly to Bela Wilson, stated she is currently at [facility] waiting for her ride. Stated she is doing well, no CP/pressure, palpitations, numbness, tingling, dizziness, light headedness. Pt was able to move appt with Dr Bria Tyson, Cardiology up to 10/18/22, will keep existing appt in November as well. Reviewed upcoming appts next week with Niki Oneill, PCP and Resp Specialist. Had to end call as pt's ride showed up. Addressed changes since last contact:   Moved Card appt to 10/18  Discussed follow-up appointments. Follow Up  Future Appointments   Date Time Provider Dante Amaya   10/10/2022  1:00 PM Shan Lucia [place] [facility]   10/11/2022  1:30 PM [facility]   10/12/2022  1:30 PM [facility]     [facility] follow up appointment(s): Dr Du Syed 10/18/22    Care Transition Nurse reviewed discharge instructions with patient and discussed any barriers to care and/or understanding of plan of care after discharge. Discussed appropriate site of care based on symptoms and resources available to patient including: PCP  Specialist  When to call [address] The patient agrees to contact the PCP office for questions related to their healthcare.         Care Transitions Subsequent and Final Call    Subsequent and Final Calls  Do you have any ongoing symptoms?: No  Have your medications changed?: No  Do you have any questions related to your medications?: No  Do you currently have any active services?: No  Are you currently active with any services?: Home Health  Do you have any needs or concerns that I can assist you with?: No  Identified Barriers: None  Care Transitions Interventions  Other Interventions:             Care Transition Nurse provided contact information for future needs. Plan for follow-up call in 5-7 days based on severity of symptoms and risk factors.   Plan for next call: symptom management-bradycardia  follow-up appointment-review upcoming appts    Carlos Paiz RN

## 2022-10-04 NOTE — TELEPHONE ENCOUNTER
----- Message from Alba Joe sent at 10/4/2022  9:55 AM EDT -----  Subject: Refill Request    QUESTIONS  Name of Medication? levothyroxine (SYNTHROID) 75 MCG tablet  Patient-reported dosage and instructions? 1 tablet daily  How many days do you have left? 0  Preferred Pharmacy? [facility] phone number (if available)? [phone]  Additional Information for Provider? Patient lost the bottle of this   medication. Can the doctor please call in a refill of this? Please advise.  ---------------------------------------------------------------------------  --------------  CALL BACK INFO  What is the best way for the office to contact you? OK to leave message on   voicemail  Preferred Call Back Phone Number? [phone]  ---------------------------------------------------------------------------  --------------  SCRIPT ANSWERS  Relationship to Patient?  Self

## 2022-10-05 RX ORDER — AMIODARONE HYDROCHLORIDE 400 MG/1
TABLET ORAL
Qty: 60 TABLET | Refills: 0 | OUTPATIENT
Start: 2022-10-05

## 2022-10-05 NOTE — TELEPHONE ENCOUNTER
Spoke with patient and informed her that the refill request keeps coming to our office and that this will need sent to either her cardiologist or her PCP and have them advise this, she stated understanding and was going to call the pharmacy and inform them to send it to the correct prescriber

## 2022-10-06 ENCOUNTER — TELEPHONE (OUTPATIENT)
Dept: FAMILY MEDICINE CLINIC | Age: 72
End: 2022-10-06

## 2022-10-06 DIAGNOSIS — K59.01 SLOW TRANSIT CONSTIPATION: ICD-10-CM

## 2022-10-06 RX ORDER — DOCUSATE SODIUM 100 MG/1
CAPSULE, LIQUID FILLED ORAL
Qty: 180 CAPSULE | Refills: 3 | Status: SHIPPED | OUTPATIENT
Start: 2022-10-06

## 2022-10-06 NOTE — TELEPHONE ENCOUNTER
----- Message from Prisca Danielson sent at 10/6/2022 12:42 PM EDT -----  Subject: Message to Provider    QUESTIONS  Information for Provider? Pt is wanting the washable pads that sit in a   chair. She wants the biggest size available. Please call pt to advise.  ---------------------------------------------------------------------------  --------------  Jacy Martínez INFO  [phone]; OK to leave message on voicemail  ---------------------------------------------------------------------------  --------------  SCRIPT ANSWERS  Relationship to Patient?  Self

## 2022-10-06 NOTE — TELEPHONE ENCOUNTER
We did fax an order and it is in media    Will have to order again at the appointment on 10/11/2022 I did place a note in my appointment  To consider to get the high dosage flu shot and COVID-vaccine booster at the same time at the pharmacy 1 in each arm    Future Appointments   Date Time Provider Dante Amaya   10/10/2022  1:00 PM Haresh Gonzales[address]   10/11/2022  1:30 PM Loren Valdez [facility]   10/12/2022  1:30 PM Dora Cid [facility]

## 2022-10-07 NOTE — TELEPHONE ENCOUNTER
Spoke with patient and she understands that she will have to wait until the 11th and she will save her supplies until then.

## 2022-10-10 ENCOUNTER — OFFICE VISIT (OUTPATIENT)
Dept: GYNECOLOGIC ONCOLOGY | Age: 72
End: 2022-10-10
Payer: MEDICARE

## 2022-10-10 VITALS
HEART RATE: 61 BPM | BODY MASS INDEX: 47.29 KG/M2 | DIASTOLIC BLOOD PRESSURE: 79 MMHG | HEIGHT: 62 IN | SYSTOLIC BLOOD PRESSURE: 128 MMHG | OXYGEN SATURATION: 97 % | WEIGHT: 257 LBS

## 2022-10-10 DIAGNOSIS — Z85.42 HISTORY OF UTERINE CANCER: Primary | ICD-10-CM

## 2022-10-10 PROCEDURE — G8427 DOCREV CUR MEDS BY ELIG CLIN: HCPCS | Performed by: PHYSICIAN ASSISTANT

## 2022-10-10 PROCEDURE — G8399 PT W/DXA RESULTS DOCUMENT: HCPCS | Performed by: PHYSICIAN ASSISTANT

## 2022-10-10 PROCEDURE — 1123F ACP DISCUSS/DSCN MKR DOCD: CPT | Performed by: PHYSICIAN ASSISTANT

## 2022-10-10 PROCEDURE — 1036F TOBACCO NON-USER: CPT | Performed by: PHYSICIAN ASSISTANT

## 2022-10-10 PROCEDURE — G8484 FLU IMMUNIZE NO ADMIN: HCPCS | Performed by: PHYSICIAN ASSISTANT

## 2022-10-10 PROCEDURE — 99214 OFFICE O/P EST MOD 30 MIN: CPT | Performed by: PHYSICIAN ASSISTANT

## 2022-10-10 PROCEDURE — 1090F PRES/ABSN URINE INCON ASSESS: CPT | Performed by: PHYSICIAN ASSISTANT

## 2022-10-10 PROCEDURE — G8417 CALC BMI ABV UP PARAM F/U: HCPCS | Performed by: PHYSICIAN ASSISTANT

## 2022-10-10 PROCEDURE — 3017F COLORECTAL CA SCREEN DOC REV: CPT | Performed by: PHYSICIAN ASSISTANT

## 2022-10-10 ASSESSMENT — ENCOUNTER SYMPTOMS
EYES NEGATIVE: 1
RESPIRATORY NEGATIVE: 1
BACK PAIN: 1
CONSTIPATION: 1

## 2022-10-10 NOTE — PROGRESS NOTES
[facility], [facility], [address] [place] [address] present for her endometrial cancer surveillance visit. CC/HPI: She is a  female with a history of stage 1A grade 1 endometrioid adenocarcinoma and has undergone surgery of total laparoscopic hysterectomy, bilateral salpingo-oophorectomy, with pelvic and paraaortic lymphadenectomy with peritoneal biopsy completed in 2017. There is no pre operative  antigen level on file. Final pathology revealed endometrioid adenocarcinoma of the endometrium, 3.8 CM grade 1. Superficial myometrial invasion. Bilateral tubes and ovaries were negative for malignancy. Pelvic and para-aortic lymph node biopsies were negative for neoplasm. No lymphovascular invasion present. Pelvic washings were also negative for malignancy. Mismatch repair testing did confirm \"positive MLH1 promoter methylation is usually associated with sporadic and not syndromal occurrences of endometrial adenocarcinoma\". Therefore she was surgically staged FIGO IA grade 1 endometrioid adenocarcinoma of the uterus. She did not require any adjuvant treatment and was placed on surveillance. She was hospitalized in 6821 for complicated sigmoid diverticulitis. She underwent EDG and colonoscopy, and ultimately and open sigmoid colectomy with primary anastomosis on 2020 by Dr. Alfred Ricardo. Pathology revealed tubular adenomas, no evidence of malignancy. Today, she is feeling well. She completed IV abx for EBSL E coli UTI last month. She has since had PICC Line removed. She has no abdominal or pelvic pain. She has no vaginal bleeding or discharge. No new lumps or bumps. She has a history atrial fibrillation and sinus bradycardia. She remains on Xarelto. She continues to follow with her cardiologist Dr. Magnus Woodruff.     She is up to date on mammogram screening most recently obtained in 2022, resulted as BIRADS-1. Today, she has no concerns. She denies abdominal or pelvic pain. No vaginal bleeding or discharge. Occasional constipation, normal urination habits. ROS:  I have personally reviewed and agree with the review of systems done by my ancillary staff in the [facility] documentation. Physical Exam:    Vitals:    10/10/22 1304 10/10/22 1316   BP: (!) 154/78 128/79   Site: Left Lower Arm    Position: Sitting    Cuff Size: Medium Adult    Pulse: 61    SpO2: 97%    Weight: 257 lb (116.6 kg)    Height: 5' 2\" (1.575 m)        General: well- appearing, no acute distress, alert and oriented x 3    HEENT: Thyroid normal size. No cervical or supraclavicular lymphadenopathy. Lungs: Clear to auscultate bilaterally to the bases    No CVA tenderness present bilaterally. Cardiac: Sinus rhythm, no murmurs or gallops appreciated. Abdomen: Obese. Abdomen is soft and non tender to deep palpation throughout. Midline laparotomy scar present and laparoscopic scars present. No herniations. No rashes or erytema. No detectable organomegaly. No masses or ascites. No inguinal lymphadenopathy bilaterally. Extremities: No edema. No deformities. No calf tenderness bilaterally. Pelvic: Normal female external genitalia including, vulva, urethra, vagina, perineum, Bartholin glands. Uterus, bilateral fallopian tubes and adnexae, and cervix are surgically absent. Speculum examination using medium speculum reveals no blood or discharge in vaginal vault. Vaginal cuff well-intact with no signs of erythema, induration, separation, or granulation tissue. Bimanual examination: Bladder is non-tender, without mass; urethra is non-tender, without mass. There are no palpable vaginal nodules and no other pelvic masses, tenderness or pathology noted. Impression:  FIGO Stage 1A Grade 1 endometrioid adenocarcinoma s/p surgical intervention with negative high risk factors, therefore requiring no further treatment.      She is now 5 years since surgery. There is no evidence of recurrence today. She will remain on surveillance. Assessment/Plan:  Her Surveillance plan is as follows:   1. Return to clinic in 1 year for follow up surveillance     2. Call or return to clinic sooner with any questions or concerns     I spent 30 minutes providing care to the patient counseling and coordinating care, discussing the patient's current situation, reviewing her options, counseling and education her and answering her questions. The patient's pertinent images, labs, and previous records and procedures were reviewed.     Electronically signed by Aliza Henning PA-C on 10/10/2022 at 1:35 PM

## 2022-10-10 NOTE — PROGRESS NOTES
Review of Systems   Constitutional: Negative. HENT:  Negative. Eyes: Negative. Respiratory: Negative. Cardiovascular: Negative. Gastrointestinal:  Positive for constipation. Endocrine: Negative. Genitourinary: Negative. Musculoskeletal:  Positive for back pain (lower). Skin: Negative. Neurological: Negative. Hematological:  Bruises/bleeds easily.

## 2022-10-11 ENCOUNTER — TELEPHONE (OUTPATIENT)
Dept: FAMILY MEDICINE CLINIC | Age: 72
End: 2022-10-11

## 2022-10-12 NOTE — TELEPHONE ENCOUNTER
Noted    Multiple attempts made yesterday, patient was rescheduled already       Future Appointments   Date Time Provider Dante Amaya   10/12/2022  1:30 PM Khadar De Leon MD [facility]   10/20/2022  8:45 AM Jermaine Cormier MD [facility]   10/9/2023  1:00 PM Anitra Chinchilla, [address] [place] [address]

## 2022-10-13 ENCOUNTER — TELEPHONE (OUTPATIENT)
Dept: FAMILY MEDICINE CLINIC | Age: 72
End: 2022-10-13

## 2022-10-13 DIAGNOSIS — R11.0 NAUSEA: Primary | ICD-10-CM

## 2022-10-13 DIAGNOSIS — M79.641 PAIN OF RIGHT HAND: Primary | ICD-10-CM

## 2022-10-13 RX ORDER — ONDANSETRON 4 MG/1
4 TABLET, FILM COATED ORAL EVERY 6 HOURS PRN
Qty: 24 TABLET | Refills: 0 | Status: SHIPPED | OUTPATIENT
Start: 2022-10-13 | End: 2022-10-19

## 2022-10-13 NOTE — TELEPHONE ENCOUNTER
Patient is having some pain in her r right and would like to see if you would order her an XR, she sees Dr Bria Tyson on the 17th and was gonna have the test done that same day. Please advise. Patient is also going to have her COVID vaccine done [address] and then will call us afterwards to schedule her flu shot.

## 2022-10-13 NOTE — TELEPHONE ENCOUNTER
----- Message from Malena Restrepo sent at 10/11/2022  2:59 PM EDT -----  Subject: Refill Request    QUESTIONS  Name of Medication? Other - Zofran  Patient-reported dosage and instructions? unsure   How many days do you have left? 0  Preferred Pharmacy? [facility] phone number (if available)? [phone]  ---------------------------------------------------------------------------  --------------  CALL BACK INFO  What is the best way for the office to contact you? OK to leave message on   voicemail  Preferred Call Back Phone Number? [phone]  ---------------------------------------------------------------------------  --------------  SCRIPT ANSWERS  Relationship to Patient?  Self

## 2022-10-13 NOTE — TELEPHONE ENCOUNTER
For x-rays needs to be medical necessity for insurance purposes    Did she fall or any injury?   If not, await evaluation on 10/20/2022  Can take Tylenol, apply Voltaren gel or lidocaine      Future Appointments   Date Time Provider Dante Monique   10/20/2022  8:45 AM Marlow Babinski, MD [facility]   11/23/2022  1:45 PM Genaro Abbott [facility]   10/9/2023  1:00 PM Arvid Simmonds, [address] [place] [address]

## 2022-10-13 NOTE — TELEPHONE ENCOUNTER
Please let the patient know to  prescription from the pharmacy. Orders Placed This Encounter   Medications    ondansetron (ZOFRAN) 4 MG tablet     Sig: Take 1 tablet by mouth every 6 hours as needed for Nausea or Vomiting     Dispense:  24 tablet     Refill:  [address] [place] [address], [address] [place] [address] [phone] [address] [id number]  Phone: [phone] Fax: [phone]      No orders of the defined types were placed in this encounter. Future Appointments   Date Time Provider Dante Amaya   10/20/2022  8:45 AM Andre Gallardo MD [facility]   11/23/2022  1:45 PM [facility]   10/9/2023  1:00 PM Haresh Gonzales PA-C GYN Oncology [facility]       Thank you!

## 2022-10-13 NOTE — TELEPHONE ENCOUNTER
Please Approve or Refuse. Send to Pharmacy per Pt's Request: med[facility]     Next Visit Date:  10/20/2022   Last Visit Date: 9/8/2022    Hemoglobin A1C (%)   Date Value   09/02/2022 5.9   03/22/2022 5.0   10/20/2021 5.7             ( goal A1C is < 7)   BP Readings from Last 3 Encounters:   10/10/22 128/79   09/08/22 (!) 140/80   09/03/22 93/76          (goal 120/80)  BUN   Date Value Ref Range Status   09/01/2022 26 (H) 8 - 23 mg/dL Final     Creatinine   Date Value Ref Range Status   09/01/2022 1.03 (H) 0.50 - 0.90 mg/dL Final     Potassium   Date Value Ref Range Status   09/01/2022 4.4 3.7 - 5.3 mmol/L Final     Potassium reflex Magnesium   Date Value Ref Range Status   10/10/2021 4.1 3.5 - 5.2 meq/L Final     Comment:     Low level specimen hemolysis is present as indicated by the interference  level index on the Roche analyzer. The reported K+ level may be falsely  increased. If clinically warranted, recollection of the specimen is  suggested.

## 2022-10-14 NOTE — TELEPHONE ENCOUNTER
Please let her know I placed her order  To also get her high dosage flu shot and COVID-19 booster Pfizer same day at the pharmacy, or 4 weeks apart      Did she have Medicare visit at home? If not please schedule her in December for a Medicare visit it has to be a video visit or in person     Diagnosis Orders   1.  Pain of right hand  XR WRIST RIGHT (2 VIEWS)           Future Appointments   Date Time Provider Dante Amaya   10/20/2022  8:45 AM Violet Mesa MD [facility]   11/23/2022  1:45 PM Luis Fernando Rowell MD [facility]   10/9/2023  1:00 PM Adriana Rivera PA-C GYN Oncology [facility]

## 2022-10-14 NOTE — TELEPHONE ENCOUNTER
PATIENT CALLED AND STATED THAT SHE WAS HOPING TO HAVE ORDERS PLACED FOR X-RAY SO WHEN SHE GOES ON 10/18/232 TO COMPLETE BLOOD WORK SHE CAN GET IT DONE. SHE STATED SHE DIDN'T MIND IF INS DOES NOT COVER, SHE JUST WANTS A ORDER PLACED.

## 2022-10-17 NOTE — TELEPHONE ENCOUNTER
Noted. Thank you!   We didn't receive the form    Future Appointments   Date Time Provider Dante Monique   10/20/2022  8:45 AM [facility]   11/23/2022  1:45 PM [facility]   10/9/2023  1:00 PM ISABEL BradshawC [facility]

## 2022-10-18 ENCOUNTER — HOSPITAL ENCOUNTER (OUTPATIENT)
Dept: GENERAL RADIOLOGY | Facility: CLINIC | Age: 72
Discharge: HOME | End: 2022-10-20
Payer: MEDICARE

## 2022-10-18 ENCOUNTER — HOSPITAL ENCOUNTER (OUTPATIENT)
Facility: CLINIC | Age: 72
Discharge: HOME | End: 2022-10-20
Payer: MEDICARE

## 2022-10-18 ENCOUNTER — HOSPITAL ENCOUNTER (OUTPATIENT)
Age: 72
Setting detail: SPECIMEN
Discharge: HOME | End: 2022-10-18

## 2022-10-18 DIAGNOSIS — M79.641 PAIN OF RIGHT HAND: ICD-10-CM

## 2022-10-18 PROCEDURE — 73110 X-RAY EXAM OF WRIST: CPT

## 2022-10-19 DIAGNOSIS — M85.80 OSTEOPENIA DETERMINED BY X-RAY: Primary | ICD-10-CM

## 2022-10-19 LAB
ANION GAP SERPL CALCULATED.3IONS-SCNC: 20 MMOL/L (ref 9–17)
BUN BLDV-MCNC: 20 MG/DL (ref 8–23)
CALCIUM SERPL-MCNC: 9.9 MG/DL (ref 8.6–10.4)
CHLORIDE BLD-SCNC: 101 MMOL/L (ref 98–107)
CO2: 23 MMOL/L (ref 20–31)
CREAT SERPL-MCNC: 1.22 MG/DL (ref 0.5–0.9)
GFR SERPL CREATININE-BSD FRML MDRD: 48 ML/MIN/1.73M2
GLUCOSE BLD-MCNC: 92 MG/DL (ref 70–99)
POTASSIUM SERPL-SCNC: 4.5 MMOL/L (ref 3.7–5.3)
SODIUM BLD-SCNC: 144 MMOL/L (ref 135–144)

## 2022-10-19 RX ORDER — ALENDRONATE SODIUM 35 MG/1
35 TABLET ORAL
Qty: 4 TABLET | Refills: 3 | Status: SHIPPED | OUTPATIENT
Start: 2022-10-19

## 2022-10-19 ASSESSMENT — PATIENT HEALTH QUESTIONNAIRE - PHQ9
1. LITTLE INTEREST OR PLEASURE IN DOING THINGS: 0
SUM OF ALL RESPONSES TO PHQ QUESTIONS 1-9: 0
SUM OF ALL RESPONSES TO PHQ9 QUESTIONS 1 & 2: 0
SUM OF ALL RESPONSES TO PHQ QUESTIONS 1-9: 0
2. FEELING DOWN, DEPRESSED OR HOPELESS: 0

## 2022-10-19 NOTE — PROGRESS NOTES
Visit Information    Have you changed or started any medications since your last visit including any over-the-counter medicines, vitamins, or herbal medicines? no   Have you stopped taking any of your medications? Is so, why? -  no  Are you having any side effects from any of your medications? - no    Have you seen any other physician or provider since your last visit? yes -    Have you had any other diagnostic tests since your last visit? yes -    Have you been seen in the emergency room and/or had an admission in a hospital since we last saw you?  no   Have you had your routine dental cleaning in the past 6 months?  no     Do you have an active MyChart account? If no, what is the barrier?   Yes    Patient Care Team:  Moris Babb MD as PCP - General (Family Medicine)  Moris Babb MD as PCP - [facility][1]  Dino Olson MD as Referring Physician (Orthopedic Surgery)  Melania Davis MD as Surgeon (Orthopedic Surgery)  Dania Shannon MD as Consulting Physician (Endocrinology)  Jennifer Dill DO as Consulting Physician (Obstetrics & Gynecology)  Adriano Bustos MD as Consulting Physician (Urology)  Duane Zapata MD as Consulting Physician (Otolaryngology)  Minoo Stephens DO as Consulting Physician (General Surgery)  Krystyna Zabala DO as Consulting Physician (Cardiology)  Shalom Acosta MD as Surgeon (Ophthalmology)  Chad Najera MD as Consulting Physician (Dermatology)  Ashley Alan MD as Consulting Physician (Infectious Diseases)  Anitra Chinchilla PA-C as Physician Assistant (Gynecological Oncology)  Wilian Reynolds RD, LD as Dietitian    Medical History Review  Past Medical, Family, and Social History reviewed and does contribute to the patient presenting condition    Health Maintenance   Topic Date Due    Diabetic retinal exam  Never done    Shingles vaccine (1 of 2) Never done    Diabetic foot exam  10/30/2021    Flu vaccine (1) 08/01/2022    Annual Wellness Visit (AWV) 08/05/2022    COVID-19 Vaccine (4 - Booster for Pfizer series) 08/22/2022    A1C test (Diabetic or Prediabetic)  12/02/2022    Depression Screen  03/02/2023    Breast cancer screen  04/05/2023    Colorectal Cancer Screen  08/03/2023    Lipids  09/02/2023    DEXA (modify frequency per FRAX score)  04/05/2024    DTaP/Tdap/Td vaccine (2 - Td or Tdap) 09/28/2027    Pneumococcal 65+ years Vaccine  Completed    Hepatitis C screen  Completed    Hepatitis A vaccine  Aged Out    Hib vaccine  Aged Out    Meningococcal (ACWY) vaccine  Aged Out

## 2022-10-19 NOTE — RESULT ENCOUNTER NOTE
Please notify patient please call her home care and give order to start occupational therapy  I will send Fosamax to the pharmacy, if she gets heartburn to let us know or stop it    Future Appointments  10/20/2022 8:45 AM    Jonelle Gamez MD          [facility]  11/23/2022 1:45 PM    Merced Salgado MD        [facility]  10/9/2023  1:00 PM    Dora [facility]

## 2022-10-19 NOTE — RESULT ENCOUNTER NOTE
Please notify patient:  mild degenerative joint disease   Mild Osteopenia, continue Fosamax weekly, but I see she has stopped it already  Would benefit for Occupational Therapy though home care   Please advise her to get the flu shot and covid booster at the pharmacy  If she could bring the form from her Medicare visit at home, we would appreciate        Future Appointments  10/20/2022 8:45 AM    Manuela Chi MD          [facility]  11/23/2022 1:45 PM    Cris Harry MD        [facility]  10/9/2023  1:00 PM    Dora Shaw

## 2022-10-20 ENCOUNTER — TELEMEDICINE (OUTPATIENT)
Dept: FAMILY MEDICINE CLINIC | Age: 72
End: 2022-10-20
Payer: MEDICARE

## 2022-10-20 ENCOUNTER — CARE COORDINATION (OUTPATIENT)
Dept: CARE COORDINATION | Age: 72
End: 2022-10-20

## 2022-10-20 DIAGNOSIS — N18.30 BENIGN HYPERTENSION WITH CKD (CHRONIC KIDNEY DISEASE) STAGE III (HCC): ICD-10-CM

## 2022-10-20 DIAGNOSIS — N39.46 MIXED INCONTINENCE: ICD-10-CM

## 2022-10-20 DIAGNOSIS — I12.9 BENIGN HYPERTENSION WITH CKD (CHRONIC KIDNEY DISEASE) STAGE III (HCC): ICD-10-CM

## 2022-10-20 DIAGNOSIS — I48.91 ATRIAL FIBRILLATION WITH RAPID VENTRICULAR RESPONSE (HCC): ICD-10-CM

## 2022-10-20 DIAGNOSIS — R00.1 BRADYCARDIA: Primary | ICD-10-CM

## 2022-10-20 DIAGNOSIS — E87.6 HYPOKALEMIA: ICD-10-CM

## 2022-10-20 DIAGNOSIS — R94.31 PROLONGED Q-T INTERVAL ON ECG: ICD-10-CM

## 2022-10-20 DIAGNOSIS — G47.33 OBSTRUCTIVE SLEEP APNEA: ICD-10-CM

## 2022-10-20 PROCEDURE — 99443 PR PHYS/QHP TELEPHONE EVALUATION 21-30 MIN: CPT | Performed by: FAMILY MEDICINE

## 2022-10-20 NOTE — PATIENT INSTRUCTIONS
New Updates for [facility] MyChart/ Loop ([facility]) CELESTINE    Thank you for choosing US to give you the best care! [employer] ([facility]) is always trying to think of new ways to help their patients. We are asking all patients to try out the new digital registration that is now available through your [facility] account or the new CELESTINE, Loop ([facility]). Via the celestine you're now able to update your personal and registration information prior to your upcoming appointment. This will save you time once you arrive at the office to check-in, not to mention your information remains safe!! Many other perks come from signing up for an account, such as:  Requesting refills  Scheduling an appointment  Completing an E-Visit  Sending a message to the office/provider  Having access to your medication list  Paying your bill/copay prior to your appointment  Scheduling your yearly mammogram  Review your test results    If you are not familiar with [facility] or the Loop ([facility]) CELESTINE, please ask one of us and we will be happy to answer any questions or help you set-up your account.       Your [facility] office,  Charles

## 2022-10-20 NOTE — CARE COORDINATION
Nutrition Care Coordinator Follow-Up visit:    Food Recall:eating 2-3 meals/d    Activity Level:  Sedentary:X  Lightly Active: Moderately Active:  Very Active:    Adult BMI:  Underweight (below 18.5)  Normal Weight (18.5-24.9)  Overweight (25-29. 9)  Obese (30-39. 9)  Morbidly Obese (>40)X    Weight Change:weight is up 10# over past 1-2 months    Plan:  Plan was established with patient:  Increase dietary fiber by consuming whole grains, fruits and vegetables:X  Limit dietary cholesterol to >200mg/day: Increase water intake:  Avoid added sugar:X  Avoid sweetened beverages:X  Choose lean meats:X  Limit sodium intake: X    Monitoring: Will monitor weight:  Will monitor adherence to meal plan:X  Will monitor adherence to exercise plan: Will monitor HGA1c:    Handouts Provided :  Low Carb snacking:X  Carb counting /individual meal plan:  Portion Control:  Food Labels:X  Physical Activity:  Low Fat/Cholesterol:  Hypo/Hyperglycemia:  Calorie Controlled Meal Plan:    Goals: Increase water consumption to 8oz. 6-8 times daily:  Manage blood sugars by consuming 3 meals spaced every 4-5 hours with 2-3 snacks daily:reviewed  Increase fiber and decrease fat intake by consuming 1-2 fruit servings and 2-3 vegetable servings per day. Increase physical activity by:  Consume less than 2,000mg of sodium/day: reviewed  Avoid consumption of sweetened beverages and added sugar by reading food labels:reviewed  Monitor blood sugars by using meter to check blood glucose before morning meal and 2 hours after a meal daily:  Decrease risk of coronary heart disease by consuming fish that contains omega-3 fatty acids at least twice a week, avoiding partially hydrogenated oil/trans fats and limiting saturated fat intake by reading food labels:    Patient goals set: 1. Reviewed avoiding canned, prepackaged foods, processed meats and cheese.  Encouraged to avoid canned soups and vegetables- discussed fresh/frozen vegetables and discussed sauces/marinades to avoid high in sodium. Reviewed processed meats in detail to avoid- sausage, atwood, bologna, ham, ect- patient is working on limiting. 2. Reviewed shopping the outer rim of the grocery store where fresher foods are found. Reviewed seasonings that can be used that do not contain salt- will send patient a list of these. 3. Reviewed reading food labels and what to look for on labels to limit sodium intake. 4. Encouraged plate method at meals- discussed portioning plate 1/2 non-starchy vegetables, 1/4 lean protein, 1/4 carbs. Patient states she got nutrition information but can't find it requested it be re-sent. She continues to work on limiting processed meats and has stopped buying canned vegetables- now buys frozen or fresh vegetables. Reviewed processed meats to avoid- she was eating spam and salami- encouraged to limit to once a week or less. Patient has no questions at this time. Will follow up in 3-4 weeks to review and answer questions.     Jeff Doe

## 2022-10-20 NOTE — PROGRESS NOTES
Alec Ray is a 81 y.o. female evaluated via telephone on 10/20/2022 for Diabetes and Incontinence (NEEDS ORDER FOR WASHABLE PADS)  . Documentation:    Patient was not able to do video visit, visit was completed via telephone. Patient was admitted recently in the hospital admitted for shortness of breath chest tightness. Patient was found to be bradycardic. She was on beta-blockers and had A. fib with RVR in the beginning of September. She was started on amiodarone and beta-blockers. SHE had prolonged QT on EKG, had stress test ordered for outpatient she has not scheduled that. Patient reports she was taken off from beta-blockers and also amiodarone, she is on Eliquis not on Xarelto. She takes Eliquis 5 mg daily. Patient was seen by cardiologist yesterday. Patient denies any chest pain shortness of breath reports her heart rate is still low but has improved. Patient is incontinent supplies has history of mixed incontinence. Patient reports she gets incontinence 2-3 times per day. Patient also has history of sleep apnea and has face mask, reports she is not able to breathe through that. That falls off and has difficulty in sleeping. Patient wants to try nose pillow mask. I communicated with the patient and/or health care decision maker about . Details of this discussion including any medical advice provided:   Discharge summary from [facility] is a 81 y.o. female who presented to the ED with chief complaint of numbness. PMHx pertinent for hypothryoidism, hyperlipidemia, hypertension, and Afib. Pt was brought to ED via EMS after sudden onset of sx around 5 PM. Patient started to feel numbness around her lips and left arm pain. She states she has felt sick to her stomach periodically the last few days but denies any vomiting. Patient denies dizziness or vision changes. Patient notes her heart rate is typically slow secondary to metoprolol and amiodarone. Patient took PM dose of metoprolol prior to coming to ED. Upon initial assessment today, patient was resting in bed upon examination. Patient states that her numbness has sporadically subsided. Patient denied CP, SOB, palpitations, dizziness, vision disturbances, nausea, vomiting or diarrhea. Patient endorsed mild headache but states she feels much better. Patient stated she has been up ambulating to the bathroom on her own. VSS. Patient afebrile. 09/27/22, Hospital Day: 2     Interval History:  Status: stable. Patient resting comfortably in bed. Patient states her headache has resolved. CT brain was negative for acute processes. Repeat EKG shows improved QT interval. Denies any concerns or complaints at this time. Patient states she is feeling better and is ready to go home. Discussed plans to discharge patient home today and resume [facility] care with [facility]. VSS. Patient afebrile. Addressed patient's questions and concerns. Patient verbalizes understanding. 1. Bradycardia  Off on beta-blocker centimeter on seen by cardiologist yesterday we will get note from them. Continue Eliquis    2. Atrial fibrillation with rapid ventricular response (HCC)  Patient bradycardic off of beta-blockers continue Eliquis. Follow-up with cardiologist will schedule stress test    3. Prolonged Q-T interval on ECG  Schedule stress test discussed with patient    4. Benign hypertension with CKD (chronic kidney disease) stage III (HCC)  Stable on recent blood work    5. Hypokalemia  Replaced in the hospital    6. Mixed incontinence  Continuous needs incontinence supplies  - Incontinence Supply Disposable MISC; Use daily for incontinence  Dispense: 100 each; Refill: 3    7. Obstructive sleep apnea  Stable continue using CPAP we will try to order notes below mask  - Respiratory Therapy Supplies MISC; Please provide nose pillow mask  for CPAP  Dispense: 1 each;  Refill: 0      Total Time: minutes: 21-30 minutes    Dora Drake Raman Florian was evaluated through a synchronous (real-time) audio encounter. Patient identification was verified at the start of the visit. She (or guardian if applicable) is aware that this is a billable service, which includes applicable co-pays. This visit was conducted with the patient's (and/or legal guardian's) verbal consent. She has not had a related appointment within my department in the past 7 days or scheduled within the next 24 hours. The patient was located at Home: [address] [place] [address]. The provider was located at [facility] (Appt Dept): [address] [address]  [address] [address].     Note: not billable if this call serves to triage the patient into an appointment for the relevant concern    Caleb Juan MD

## 2022-11-02 DIAGNOSIS — I50.32 CHRONIC DIASTOLIC CONGESTIVE HEART FAILURE (HCC): ICD-10-CM

## 2022-11-02 RX ORDER — FUROSEMIDE 20 MG/1
TABLET ORAL
Qty: 90 TABLET | Refills: 3 | Status: SHIPPED | OUTPATIENT
Start: 2022-11-02

## 2022-11-03 ENCOUNTER — TELEPHONE (OUTPATIENT)
Dept: FAMILY MEDICINE CLINIC | Age: 72
End: 2022-11-03

## 2022-11-03 NOTE — TELEPHONE ENCOUNTER
Please let the patient know order for incontinence supplies was already faxed on 10/27/2022, please give her contact information for the company, everything in media, please give her the information    Please call down home care and give verbal order for extension of services please double check with patient home care name, also documented in media    Future Appointments   Date Time Provider Dante Monique   11/23/2022  1:45 PM Doris Stout MD [facility] 1945 1/24/2023 11:30 AM Claria Mcardle, MD [facility]   10/9/2023  1:00 PM Aliza Henning, [address] [facility]

## 2022-11-03 NOTE — TELEPHONE ENCOUNTER
----- Message from Maricruz Moore sent at 11/3/2022  9:16 AM EDT -----  Subject: Referral Request    Reason for referral request? Patient is scheduled to have a nurse come to   her home on Saturday, for the last visit, but would like to know if this   order can be extended, because she is going to have some dental procedures   coming up, and will need some help. Provider patient wants to be referred to(if known):     Provider Phone Number(if known):     Additional Information for Provider?   ---------------------------------------------------------------------------  --------------  [address]    [phone]; OK to leave message on voicemail  ---------------------------------------------------------------------------  --------------

## 2022-11-03 NOTE — TELEPHONE ENCOUNTER
[address] [place] [address] Clinical Support Pool  Subject: Message to Provider     QUESTIONS   Information for Provider? Patient is calling to let Dr. Kristen hSankar know that   the incontinence supplies need to be sent through [employer]. She is   requesting a call back, if she needs more information.

## 2022-11-08 ENCOUNTER — CARE COORDINATION (OUTPATIENT)
Dept: CARE COORDINATION | Age: 72
End: 2022-11-08

## 2022-11-08 NOTE — CARE COORDINATION
Nutrition Care Coordinator Follow-Up visit:    Food Recall:eating 2-3 meals/d    Activity Level:  Sedentary:X  Lightly Active: Moderately Active:  Very Active:    Adult BMI:  Underweight (below 18.5)  Normal Weight (18.5-24.9)  Overweight (25-29. 9)  Obese (30-39. 9)  Morbidly Obese (>40)X    Weight Change:no change    Plan:  Plan was established with patient:  Increase dietary fiber by consuming whole grains, fruits and vegetables:X  Limit dietary cholesterol to >200mg/day: Increase water intake:  Avoid added sugar:X  Avoid sweetened beverages:X  Choose lean meats:X  Limit sodium intake:X    Monitoring: Will monitor weight:  Will monitor adherence to meal plan:X  Will monitor adherence to exercise plan: Will monitor HGA1c:    Handouts Provided :  Low Carb snacking:  Carb counting /individual meal plan:  Portion Control:  Food Labels:X  Physical Activity:  Low Fat/Cholesterol:  Hypo/Hyperglycemia:  Calorie Controlled Meal Plan:  DASH guidelines: X    Goals: Increase water consumption to 8oz. 6-8 times daily:  Manage blood sugars by consuming 3 meals spaced every 4-5 hours with 2-3 snacks daily:reviewed  Increase fiber and decrease fat intake by consuming 1-2 fruit servings and 2-3 vegetable servings per day. Increase physical activity by:  Consume less than 2,000mg of sodium/day: reviewed  Avoid consumption of sweetened beverages and added sugar by reading food labels:reviewed  Monitor blood sugars by using meter to check blood glucose before morning meal and 2 hours after a meal daily:BS controlled A1c- 5.9  Decrease risk of coronary heart disease by consuming fish that contains omega-3 fatty acids at least twice a week, avoiding partially hydrogenated oil/trans fats and limiting saturated fat intake by reading food labels:reviewed    Patient goals set: 1. Reviewed avoiding canned, prepackaged foods, processed meats and cheese.  Encouraged to avoid canned soups and vegetables- reviewed fresh/frozen vegetables and sauces/marinades to avoid high in sodium. Reviewed processed meats in detail to avoid- sausage, atwood, bologna, ham, ect- patient is working on limiting. 2. Reviewed shopping the outer rim of the grocery store where fresher foods are found. Reviewed seasonings that can be used that do not contain salt- will send patient a list of these. 3. Reviewed reading food labels and what to look for on labels to limit sodium intake. 4. Encouraged plate method at meals- discussed portioning plate 1/2 non-starchy vegetables, 1/4 lean protein, 1/4 carbs. Patient states she got nutrition information but can't find it requested it be re-sent. Reviewed processed meats to avoid-was eating spam and salami- encouraged to limit to once a week or less. She admits to still eating canned vegetables at times- encouraged to buy frozen or fresh. Patient has no questions at this time. Will follow up in 3-4 weeks to review and answer questions.     Colt Andujar

## 2022-11-23 ENCOUNTER — OFFICE VISIT (OUTPATIENT)
Dept: PULMONOLOGY | Age: 72
End: 2022-11-23
Payer: MEDICARE

## 2022-11-23 VITALS
RESPIRATION RATE: 16 BRPM | DIASTOLIC BLOOD PRESSURE: 64 MMHG | HEART RATE: 55 BPM | HEIGHT: 62 IN | BODY MASS INDEX: 47.29 KG/M2 | SYSTOLIC BLOOD PRESSURE: 114 MMHG | OXYGEN SATURATION: 98 % | WEIGHT: 257 LBS

## 2022-11-23 DIAGNOSIS — G47.33 OBSTRUCTIVE SLEEP APNEA SYNDROME: ICD-10-CM

## 2022-11-23 DIAGNOSIS — I48.0 PAROXYSMAL ATRIAL FIBRILLATION (HCC): ICD-10-CM

## 2022-11-23 DIAGNOSIS — G47.33 OBSTRUCTIVE SLEEP APNEA: ICD-10-CM

## 2022-11-23 DIAGNOSIS — E66.01 OBESITY, CLASS III, BMI 40-49.9 (MORBID OBESITY) (HCC): Primary | ICD-10-CM

## 2022-11-23 DIAGNOSIS — I10 PRIMARY HYPERTENSION: ICD-10-CM

## 2022-11-23 PROCEDURE — 3017F COLORECTAL CA SCREEN DOC REV: CPT | Performed by: INTERNAL MEDICINE

## 2022-11-23 PROCEDURE — G8427 DOCREV CUR MEDS BY ELIG CLIN: HCPCS | Performed by: INTERNAL MEDICINE

## 2022-11-23 PROCEDURE — 99214 OFFICE O/P EST MOD 30 MIN: CPT | Performed by: INTERNAL MEDICINE

## 2022-11-23 PROCEDURE — G8417 CALC BMI ABV UP PARAM F/U: HCPCS | Performed by: INTERNAL MEDICINE

## 2022-11-23 PROCEDURE — G8484 FLU IMMUNIZE NO ADMIN: HCPCS | Performed by: INTERNAL MEDICINE

## 2022-11-23 PROCEDURE — 3078F DIAST BP <80 MM HG: CPT | Performed by: INTERNAL MEDICINE

## 2022-11-23 PROCEDURE — 1036F TOBACCO NON-USER: CPT | Performed by: INTERNAL MEDICINE

## 2022-11-23 PROCEDURE — G8399 PT W/DXA RESULTS DOCUMENT: HCPCS | Performed by: INTERNAL MEDICINE

## 2022-11-23 PROCEDURE — 3074F SYST BP LT 130 MM HG: CPT | Performed by: INTERNAL MEDICINE

## 2022-11-23 PROCEDURE — 1090F PRES/ABSN URINE INCON ASSESS: CPT | Performed by: INTERNAL MEDICINE

## 2022-11-23 PROCEDURE — 1123F ACP DISCUSS/DSCN MKR DOCD: CPT | Performed by: INTERNAL MEDICINE

## 2022-11-23 NOTE — PROGRESS NOTES
Ro Ugalde  11/23/2022  Sleep apnea syndrome   Hypertension  morbid obesity  Diabetes-  Thyroid state  Atrial fibrillation  Ro Ugalde has obstructive sleep apnea syndrome which has been responding well to the use of CPAP. She is using the CPAP regularly. However she is having significant problems with the dryness of the mouth. She does put water every night with humidifier. Because of the dryness she is feeling uncomfortable with a CPAP machine. She continues to be morbidly obese and has not been able to lose any weight. She walks with a walker. She has history of atrial fibrillation which is rate controlled. She is on anticoagulation. She has hypothyroid state and is on replacement therapy. Her blood pressure has been under good control as is her blood sugar. Her thyroid function has been checked and there were within normal limits according to the patient. He has taken her flu vaccine Pneumovax and COVID-vaccine. Has any pedal edema or thromboembolic process  Her medications are reviewed. No known x-rays to be seen  Review of Systems -unchanged  General ROS: negative for - chills, fatigue, fever or weight loss  ENT ROS: negative for - headaches, oral lesions or sore throat  Cardiovascular ROS: no chest pain , orthopnea or pnd   Gastrointestinal ROS: no abdominal pain, change in bowel habits, or black or bloody stools  Skin - no rash   Neuro - no blurry vision , no loc .  No focal weakness   msk - no jt tenderness or swelling    Vascular - no claudication , rest completed and negative   Lymphatic - complete and negative   Hematology - oncology - complete and negative   Allergy immunology - complete and negative    no burning or hematuria       LUNG CANCER SCREENING     CRITERIA MET    []     CT ORDERED  []      CRITERIA NOT MET   [x]      REFUSED                    []        REASON CRITERIA NOT MET     SMOKING LESS THAN 30 PY  []      AGE LESS THAN 55 or GREATER 77 YEARS  []      QUIT SMOKING 15 YEARS OR GREATER   []      RECENT CT WITH IN 11 MONTHS    []      LIFE EXPECTANCY < 5 YEARS   []      SIGNS  AND SYMPTOMS OF LUNG CANCER   []           Immunization   Immunization History   Administered Date(s) Administered    COVID-19, PFIZER Bivalent BOOSTER, (age 12y+), IM, 27 mcg/0.3 mL dose 10/26/2022    COVID-19, PFIZER GRAY top, DO NOT Dilute, (age 15 y+), IM, 30 mcg/0.3 mL 04/22/2022    COVID-19, PFIZER PURPLE top, DILUTE for use, (age 15 y+), 30mcg/0.3mL 07/22/2021, 10/15/2021    Influenza Vaccine, unspecified formulation 10/01/2016    Influenza Virus Vaccine 10/01/2019    Influenza, FLUAD, (age 72 y+), Adjuvanted, 0.5mL 10/30/2020, 11/03/2021    Influenza, Triv, inactivated, subunit, adjuvanted, IM (Fluad 65 yrs and older) 09/14/2017, 10/02/2018    Pneumococcal Conjugate 13-valent ([id number]) 02/23/2017    Pneumococcal Polysaccharide ([id number]) 04/20/2018    Tdap (Boostrix, Adacel) 09/28/2017        Pneumococcal Vaccine     [x] Up to date    [] Indicated   [] Refused  [] Contraindicated       Influenza Vaccine   [x] Up to date    [] Indicated   [] Refused  [] Contraindicated       PAST MEDICAL HISTORY:         Diagnosis Date    Abdominal aortic aneurysm (AAA) without rupture 10/21/2021    Adenocarcinoma of endometrium, stage 1 ([address] 10/5/2017    Well differentiated grade 1 adenocarcinoma arising in complex hyperplasia    JOSHUA (acute kidney injury) ([address] 1/15/2020    Allergic rhinitis 2/23/2017    Anemia 11/9/2020    At high risk for falls 2/23/2017    Atrial fibrillation ([address]     Jan 2020    Atrial fibrillation with rapid ventricular response ([address] 10/10/2021    Atrial fibrillation, new onset ([address] 1/20/2020    CHF (congestive heart failure) ([address]     \"little\"    Class 3 severe obesity due to excess calories without serious comorbidity with body mass index (BMI) of 40.0 to 44.9 in adult [facility]) 7/22/2020    Colitis 7/22/2020    Colon polyp     Had colonoscopy done 20 yrs ago    Diabetes mellitus ([address]     Diverticulitis     Diverticulitis of colon with perforation 8/4/2020    Endometrial cancer [facility])     History of TIA (transient ischemic attack) '80's    on Baby ASA    Hyperglycemia 3/21/2017    Hyperlipidemia     Hypertension since 2015    on Rx.     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    Oral phase dysphagia 2/23/2018    Osteoarthritis (arthritis due to wear and tear of joints)     ronan knee    Osteoarthritis involving multiple joints on both sides of body 4/24/2012    Osteoporosis     Perforated bowel ([address]     Perforated diverticulum 6/15/2020    Postmenopausal bleeding 9/20/2017    Rectal bleeding 9/21/2020    S/P h-scope, Myosure 9/20/17 9/20/2017    Pathology pending    Tennis elbow     left    Thickened endometrium 9/20/2017    TIA (transient ischemic attack)     TLHBSO, bilateral LND 10/24/17 10/24/2017    Type 2 diabetes mellitus, without long-term current use of insulin ([address] 1/14/2020       Family History:       Problem Relation Age of Onset    Coronary Art Dis Father     Hypertension Father     Diabetes Father     High Blood Pressure Father     Heart Attack Father     Diabetes Mother     High Blood Pressure Mother     Thyroid Disease Mother     High Blood Pressure Sister     Thyroid Disease Brother     High Blood Pressure Brother     High Blood Pressure Maternal Grandmother     Diabetes Maternal Grandfather     No Known Problems Paternal Grandmother     Heart Attack Paternal Grandfather     Colon Cancer Neg Hx        SURGICAL HISTORY:   Past Surgical History:   Procedure Laterality Date    CATARACT REMOVAL WITH IMPLANT Bilateral 2015    COLONOSCOPY  1997    had polyp, she doesn't know where and when, \"20 yrs ago\"    COLONOSCOPY  06/26/2017    COLONOSCOPY N/A 8/3/2020    COLONOSCOPY POLYPECTOMY SNARE performed by Cassandra Morales MD at [address] N/A 9/20/2017    HYSTEROSCOPY  [address] performed by Fabby Eckert DO at [facility] ([facility]) N/A 10/24/2017    TOTAL LAPAROSCOPIC HYSTERECTOMY, BSO, F.S.  STAGING, GYRUS G400 performed by Nu Lyons MD at [address] N/A 6/26/2017    COLONOSCOPY POLYPECTOMY / HOT SNARE performed by Neida Dhaliwal DO at [address] [facility] [address] N/A 8/4/2020    OPEN SIGMOID COLECTOMY; PRIMARY ANASTOMOSIS & MOBILIZATION OF SPLENIC FLEXURE performed by Cassandra Morales MD at [address] ([facility])  10/24/2017    with pelvic lymph node dissection    THYROID SURGERY  1980    subtotal 20 years ago    TONSILLECTOMY      UPPER GASTROINTESTINAL ENDOSCOPY N/A 8/3/2020    EGD BIOPSY performed by Cassandra Morales MD at [facility]      FLOATERS              Not in a hospital admission. Allergies   Allergen Reactions    Sulfa Antibiotics Nausea Only    Penicillins Rash     Took Keflex, cefepime, cefazolin per medication history, no reaction     Social History     Tobacco Use   Smoking Status Never   Smokeless Tobacco Never     Prior to Admission medications    Medication Sig Start Date End Date Taking?  Authorizing Provider   furosemide (LASIX) 20 MG tablet TAKE ONE (1) TABLET BY MOUTH DAILY FOR CONGESTIVE HEART FAILURE 11/2/22  Yes Marli Humphrey MD   Respiratory Therapy Supplies MISC Please provide nose pillow mask  for CPAP 10/20/22  Yes Devin Henning MD   Incontinence Supply Disposable MISC Use daily for incontinence 10/20/22  Yes Devin Henning MD   alendronate (FOSAMAX) 35 MG tablet Take 1 tablet by mouth every 7 days 10/19/22 Yes Danie Matthew MD   docusate sodium (COLACE) 100 MG capsule TAKE ONE (1) CAPSULE BY MOUTH TWO (2) TIMES DAILY FOR CONSTIPATION 10/6/22  Yes Danie Matthew MD   levothyroxine (SYNTHROID) 75 MCG tablet TAKE ONE (1) TABLET BY MOUTH EVERY MORNING (BEFORE BREAKFAST) 10/4/22  Yes Danie Matthew MD   solifenacin (VESICARE) 5 MG tablet Take 1 tablet by mouth daily 9/14/22  Yes MAX Juarez CNP   Incontinence Supplies MISC Needs washable bed pads 9/9/22  Yes Danie Matthew MD   albuterol sulfate HFA (PROVENTIL HFA) 108 (90 Base) MCG/ACT inhaler Inhale 1-2 puffs into the lungs every 4 hours as needed for Wheezing 9/8/22  Yes Merlinda Common, MD   lisinopril (PRINIVIL;ZESTRIL) 5 MG tablet Take 1 tablet by mouth daily 9/8/22  Yes Merlinda Common, MD   [facility]. Devices (STEP N REST II WALKER) MISC Rollator walker 9/8/22  Yes Merlinda Common, MD   [facility].  Devices (RAISED TOILET SEAT) [facility] Please provide with arms 9/8/22  Yes Merlinda Common, MD   vitamin D3 (CHOLECALCIFEROL) 25 MCG (1000 UT) TABS tablet TAKE TWO (2) TABLETS BY MOUTH ONCE DAILY 7/13/22  Yes Danie Matthew MD   MYRBETRIQ 50 MG TB24 Take 50 mg by mouth daily 7/12/22  Yes Yayo Osorio MD   Probiotic Product (LISSY-BID PROBIOTIC) TABS TAKE ONE (1) TABLET BY MOUTH IN THE MORNING 6/14/22  Yes Danie Matthew MD   pantoprazole (PROTONIX) 40 MG tablet TAKE ONE (1) TABLET BY MOUTH ONCE DAILY 6/14/22  Yes Danie Matthew MD   vitamin B-12 (CYANOCOBALAMIN) 1000 MCG tablet TAKE ONE (1) TABLET BY MOUTH DAILY 4/19/22  Yes Danie Matthew MD   metoprolol tartrate (LOPRESSOR) 25 MG tablet Take 0.5 tablets by mouth 2 times daily Dose decreased 4/12/2022  due to bradycardia  Patient taking differently: Take 12.5 mg by mouth 2 times daily Dose decreased 4/12/2022  due to bradycardia 4/12/22  Yes Danie Matthew MD   nystatin (MYCOSTATIN) 607727 UNIT/GM powder Apply daily for 4-6 weeks 3/22/22  Yes Danie Matthew MD   Magnesium Oxide (MAGNESIUM-OXIDE) 250 MG TABS tablet Take 1 tablet by mouth daily Take with food, in the evening 3/22/22  Yes Marli Humphrey MD   atorvastatin (LIPITOR) 40 MG tablet TAKE ONE (1) TABLET BY MOUTH DAILY STOP SIMVASTATIN  Patient taking differently: 40 mg nightly 12/29/21  Yes Marli Humphrey MD   ACETAMINOPHEN EXTRA STRENGTH 500 MG tablet TAKE 1 TABLET BY MOUTH EVERY 6 HOURS AS NEEDED FOR PAIN 11/16/21  Yes Marli Humphrey MD   clotrimazole-betamethasone ([place]) 1-0.05 % cream Apply topically 1 times daily on the perineum, alternate powder, for 10 days 10/19/21  Yes Marli Humphrey MD         Physical Exam  General Appearance:    Alert, cooperative, no distress, appears stated age morbidly obese no respiratory distress not using accessory muscles very pleasant   Head:    Normocephalic, without obvious abnormality, atraumatic   Eye examination reveals no jaundice or Kolby syndrome    Throat examination is unremarkable    Ear examination normal    Nose revealed no polyps no sinus tenderness   :    Neck:   Supple, symm full examination reveals no nasal polyps etrical, trachea midline, no adenopathy;     thyroid:  no enlargement/tenderness/nodules; no carotid    bruit or JVD short and fair   Back:     Symmetric, no curvature, ROM normal, no CVA tenderness   Lungs:       AP diameter is not increased percussion note is normally resonant breathing vesicular expiration not prolonged no rails or rhonchi are audible   Chest Wall:    No tenderness or deformity      Heart:    Regular rate and rhythm, S1 and S2 normal, no murmur, rub        or gallop no rvh patient atrial fibrillation which is rate controlled                          Abdomen:                                                 Pulses:                                            Lymph nodes:                    Neurologic:                  Soft, non-tender, bowel sounds active all four quadrants,     no masses, no organomegaly         2+ and symmetric all extremities            Cervical, supraclavicular not enlarged or matted or tender      CNII-XII intact, normal strength 5/5 . Sensation grossly normal  and reflexes normal 2+  throughout     Clubbing No  Lower ext edema No1+   [x] , 2 +  [] , 3+   []  Upper ext edema No       Musculoskeletal - no joint swelling or tenderness or synovitis               /64   Pulse 55   Resp 16   Ht 5' 2\" (1.575 m)   Wt 257 lb (116.6 kg)   LMP  (LMP Unknown)   SpO2 98% Comment: room air  BMI 47.01 kg/m²     CXR  No recent chest x-ray      CT Scans  No recent CAT scan    Echo  No recent echo        Assessment  Obstructive sleep apnea syndrome  Obesity  Systemic hypertension  Diabetes  Hypothyroid stater. Patient is motivated to use her CPAP machine I encouraged her to continue use of CPAP machine. Is complaining of dryness of the mouth and nose. I advised her to use normal saline spray in the nose and drink sips of water at night. I also advised her to put her machine on the floor so that he can demonstrate can get back into the humidifier and hopefully it will not get over. I also advised to keep humidification at a higher level. He understands. Hand is willing to make the changes    Her blood pressure is under good control she will continue present therapy    Blood sugar is stable    She is on thyroid replacement therapy which she uses regularly. She is morbidly obese and unfortunately cannot lose any weight she is walking arouwith a walker.   He already has taken her flu vaccine Pneumovax and influenza vaccines and the COVID-vaccine  Dr. Chava Schaeffer will see her in 6 months  Dictated

## 2022-11-28 ENCOUNTER — TELEPHONE (OUTPATIENT)
Dept: FAMILY MEDICINE CLINIC | Age: 72
End: 2022-11-28

## 2022-11-28 NOTE — TELEPHONE ENCOUNTER
----- Message from Nicola Portillo sent at 11/28/2022 11:50 AM EST -----  Subject: Message to Provider    QUESTIONS  Information for Provider? Pt states that she needs oral surgery and has an   list of possible providers. Pt has [facility] and Virtual Call Center   [phone] Faby Guzman [phone] Artie Cates [phone] Félix Gar   [phone] [facility] [phone] Please call pt back for more specifics. Either number listed will work  ---------------------------------------------------------------------------  --------------  [address]  [phone]; OK to leave message on voicemail  ---------------------------------------------------------------------------  --------------  SCRIPT ANSWERS  Relationship to Patient?  Self

## 2022-11-28 NOTE — TELEPHONE ENCOUNTER
----- Message from Pierrepatrick Robbie sent at 11/28/2022 11:50 AM EST -----  Subject: Message to Provider    QUESTIONS  Information for Provider? Pt states that she needs oral surgery and has an   list of possible providers. Pt has [facility] and "[employer]   [phone] Medardo Lindsay [phone] [facility] [phone] Vivica Kawasaki   [phone] Mary Wright [phone] Please call pt back for more specifics. Either number listed will work  ---------------------------------------------------------------------------  --------------  [address] [employer]  [phone]; OK to leave message on voicemail  ---------------------------------------------------------------------------  --------------  SCRIPT ANSWERS  Relationship to Patient?  Self

## 2022-11-28 NOTE — TELEPHONE ENCOUNTER
Called pt back and adv pt she will need to obtain referral from her dentist. Pt did confirm she was told to get a referral from Toro Swain.

## 2022-11-28 NOTE — TELEPHONE ENCOUNTER
SPOKE WITH PATIENT/PT WAS CONFUSED ON HOW TO HANDLE HER REFERRAL FROM [facility].  I DID REVIEW WITH HER AND SHE WILL BE CALLING HER ORAL SURGEON FOR APPOINTMENT PER [facility]

## 2022-11-30 DIAGNOSIS — I10 PRIMARY HYPERTENSION: ICD-10-CM

## 2022-11-30 RX ORDER — LISINOPRIL 5 MG/1
TABLET ORAL
Qty: 90 TABLET | Refills: 3 | Status: SHIPPED | OUTPATIENT
Start: 2022-11-30

## 2022-11-30 RX ORDER — RIVAROXABAN 20 MG/1
TABLET, FILM COATED ORAL
Qty: 30 TABLET | Refills: 10 | Status: SHIPPED | OUTPATIENT
Start: 2022-11-30

## 2022-12-02 ENCOUNTER — CARE COORDINATION (OUTPATIENT)
Dept: CARE COORDINATION | Age: 72
End: 2022-12-02

## 2022-12-02 NOTE — CARE COORDINATION
Nutrition Care Coordinator Follow-Up visit:    Food Recall:eating 2-3 meals/d    Activity Level:  Sedentary:X  Lightly Active: Moderately Active:  Very Active:    Adult BMI:  Underweight (below 18.5)  Normal Weight (18.5-24.9)  Overweight (25-29. 9)  Obese (30-39. 9)  Morbidly Obese (>40)X    Weight Change:no change    Plan:  Plan was established with patient:  Increase dietary fiber by consuming whole grains, fruits and vegetables:X  Limit dietary cholesterol to >200mg/day: Increase water intake:  Avoid added sugar:X  Avoid sweetened beverages:X  Choose lean meats:X  Limit sodium intake:X    Monitoring: Will monitor weight:  Will monitor adherence to meal plan:X  Will monitor adherence to exercise plan: Will monitor HGA1c:    Handouts Provided :  Low Carb snacking:  Carb counting /individual meal plan:  Portion Control:  Food Labels:X  Physical Activity:  Low Fat/Cholesterol:  Hypo/Hyperglycemia:  Calorie Controlled Meal Plan:    Goals: Increase water consumption to 8oz. 6-8 times daily:  Manage blood sugars by consuming 3 meals spaced every 4-5 hours with 2-3 snacks daily:reviewed  Increase fiber and decrease fat intake by consuming 1-2 fruit servings and 2-3 vegetable servings per day. Increase physical activity by:  Consume less than 2,000mg of sodium/day:reviewed  Avoid consumption of sweetened beverages and added sugar by reading food labels:  Monitor blood sugars by using meter to check blood glucose before morning meal and 2 hours after a meal daily:  Decrease risk of coronary heart disease by consuming fish that contains omega-3 fatty acids at least twice a week, avoiding partially hydrogenated oil/trans fats and limiting saturated fat intake by reading food labels:reviewed    Patient goals set: 1. Reviewed avoiding canned, prepackaged foods, processed meats and cheese. Encouraged to avoid canned soups and vegetables- reviewed fresh/frozen vegetables and sauces/marinades to avoid high in sodium. Reviewed processed meats in detail to avoid- sausage, atwood, bologna, ham, ect- patient is working on limiting. 2. Reviewed shopping the outer rim of the grocery store where fresher foods are found. Reviewed seasonings that can be used that do not contain salt- will send patient a list of these. 3. Reviewed reading food labels and what to look for on labels to limit sodium intake. 4. Encouraged plate method at meals- discussed portioning plate 1/2 non-starchy vegetables, 1/4 lean protein, 1/4 carbs. Patient states got nutrition information and looked though it again. Reviewed processed meats to avoid-encouraged to limit to once a week or less. Reviewed avoiding canned soups/vegetables- buying frozen vegetables now and low sodium soups. Patient has no questions at this time. Will follow up in 3-4 weeks to review and answer questions.     Mateo Sanz

## 2022-12-09 ENCOUNTER — TELEPHONE (OUTPATIENT)
Dept: ORTHOPEDIC SURGERY | Age: 72
End: 2022-12-09

## 2022-12-09 NOTE — TELEPHONE ENCOUNTER
Patient called to request an earlier appt date that is in 2022 , She is scheduled the next available for Dr. Antony Baron 1/5. She needs to have early appts as she relies on [facility] for transportation and they close at 2pm.She ask that if something opens sooner to be called.

## 2022-12-16 ENCOUNTER — TELEPHONE (OUTPATIENT)
Dept: FAMILY MEDICINE CLINIC | Age: 72
End: 2022-12-16

## 2022-12-16 NOTE — TELEPHONE ENCOUNTER
Pts daughter called in stating that pt is in ED and she it trying to figure out who took her off of her beta blocker because she is supposed to be on one. Writer adv no current HIPPA on file with her list will need at least verbal consent to speak with her.

## 2022-12-16 NOTE — TELEPHONE ENCOUNTER
we didn't  Likely cardio    We still have metoprolol on her list    Medication  metoprolol tartrate (LOPRESSOR) 25 MG tablet [[id number]]  metoprolol tartrate (LOPRESSOR) 25 MG tablet [[id number]]     Order Details  Dose: 12.5 mg Route: Oral Frequency: 2 TIMES DAILY   Dispense Quantity: 60 tablet Refills: 11          Sig: Take 0.5 tablets by mouth 2 times daily Dose decreased 4/12/2022  due to bradycardia   Patient taking differently: Take 12.5 mg by mouth 2 times daily Dose decreased 4/12/2022  due to bradycardia         Start Date: 04/12/22 End Date: --   Written Date: 04/12/22 Expiration Date: 04/12/23       Diagnosis Association: Chronic diastolic congestive heart failure (HCC) (I50.32);  Paroxysmal atrial fibrillation (HCC) (I48.0)   Original Order:  metoprolol tartrate (LOPRESSOR) 25 MG tablet [[id number]]   Providers    Authorizing Provider: Soledad Nieto MD NPI: [id number]   Ordering User:  Soledad Nieto MD          Pharmacy    [address] [place] [address]   Phone:  [phone]  Fax:  [phone]     Future Appointments   Date Time Provider Dante Amaya   12/19/2022 11:15 AM Ashutosh Torres MD [facility]   1/24/2023 11:30 AM Soledad Nieto MD [facility]   5/24/2023  1:45 PM Bijan Gibbons MD [facility]   10/9/2023  1:00 PM Maylin Childs, [address] [place] [address]

## 2022-12-19 ENCOUNTER — TELEPHONE (OUTPATIENT)
Dept: FAMILY MEDICINE CLINIC | Age: 72
End: 2022-12-19

## 2022-12-19 ENCOUNTER — OFFICE VISIT (OUTPATIENT)
Dept: ORTHOPEDIC SURGERY | Age: 72
End: 2022-12-19
Payer: MEDICARE

## 2022-12-19 VITALS — BODY MASS INDEX: 47.29 KG/M2 | HEIGHT: 62 IN | RESPIRATION RATE: 16 BRPM | WEIGHT: 257 LBS

## 2022-12-19 DIAGNOSIS — M17.12 PRIMARY OSTEOARTHRITIS OF LEFT KNEE: Primary | ICD-10-CM

## 2022-12-19 PROCEDURE — 99999 PR OFFICE/OUTPT VISIT,PROCEDURE ONLY: CPT | Performed by: ORTHOPAEDIC SURGERY

## 2022-12-19 PROCEDURE — 20610 DRAIN/INJ JOINT/BURSA W/O US: CPT | Performed by: ORTHOPAEDIC SURGERY

## 2022-12-19 RX ORDER — TRIAMCINOLONE ACETONIDE 40 MG/ML
40 INJECTION, SUSPENSION INTRA-ARTICULAR; INTRAMUSCULAR ONCE
Status: COMPLETED | OUTPATIENT
Start: 2022-12-19 | End: 2022-12-19

## 2022-12-19 RX ORDER — LIDOCAINE HYDROCHLORIDE 10 MG/ML
4 INJECTION, SOLUTION INFILTRATION; PERINEURAL ONCE
Status: COMPLETED | OUTPATIENT
Start: 2022-12-19 | End: 2022-12-19

## 2022-12-19 RX ADMIN — TRIAMCINOLONE ACETONIDE 40 MG: 40 INJECTION, SUSPENSION INTRA-ARTICULAR; INTRAMUSCULAR at 11:28

## 2022-12-19 RX ADMIN — LIDOCAINE HYDROCHLORIDE 4 ML: 10 INJECTION, SOLUTION INFILTRATION; PERINEURAL at 11:27

## 2022-12-19 NOTE — PROGRESS NOTES
HPI: Ms. Harmony Palmer is a 68-year-old with bilateral knee osteoarthritis returning today requesting a repeat cortisone injection to her left knee. The last injection she received to this knee was back on 1/10/2022 and she states that she has been doing very well up until a few days ago. I did administer her injection as outlined below. I will see her back in my clinic as needed but she may return or call at anytime with questions or concerns. Procedure: left intraarticular knee injection  Following an appropriate discussion with the patient regarding the risks and benefits of the procedure she consented to proceed. her left knee was prepped using chlorhexadine solution. Using aseptic technique and through a superolateral approach, her left knee joint was injected with a 5 cc mixture of 1cc 40mg/ml kenalog and 4 cc of 1% lidocaine without epinephrine. A band aid was applied to the injection site. she tolerated the injection with no immediate adverse reactions.

## 2022-12-19 NOTE — TELEPHONE ENCOUNTER
[facility] ([facility] ED Follow up Call    Reason for ED visit:  [facility] appts/Provider:    Future Appointments   Date Time Provider Dante Amaya   1/24/2023 11:30 AM [facility]   5/24/2023  1:45 PM Betsey Ang MD [facility]   10/9/2023  1:00 PM Padmini Luis PA-C [facility]       VOICEMAIL DOCUMENTATION - ERASE IF NOT USED  Hi, this message is for  DELTA  This is CHERELLE from [address] [place] [address] office. Just calling to see how you are doing after your recent visit to the Emergency Room. [address] [place] [address] wants to make sure you were able to fill any prescriptions and that you understand your discharge instructions. Please return our call if you need to make a follow up appointment with your provider or have any further needs. Our phone number is [phone]*. Have a great day.

## 2022-12-20 DIAGNOSIS — R11.0 NAUSEA: ICD-10-CM

## 2022-12-20 RX ORDER — ONDANSETRON 4 MG/1
TABLET, FILM COATED ORAL
Qty: 24 TABLET | Refills: 0 | Status: SHIPPED | OUTPATIENT
Start: 2022-12-20

## 2022-12-20 NOTE — TELEPHONE ENCOUNTER
Please Approve or Refuse. Send to Pharmacy per Pt's Request:      Next Visit Date:  1/24/2023   Last Visit Date: 10/20/2022    Hemoglobin A1C (%)   Date Value   09/02/2022 5.9   03/22/2022 5.0   10/20/2021 5.7             ( goal A1C is < 7)   BP Readings from Last 3 Encounters:   11/23/22 114/64   10/10/22 128/79   09/08/22 (!) 140/80          (goal 120/80)  BUN   Date Value Ref Range Status   10/18/2022 20 8 - 23 mg/dL Final     Creatinine   Date Value Ref Range Status   10/18/2022 1.22 (H) 0.50 - 0.90 mg/dL Final     Potassium   Date Value Ref Range Status   10/18/2022 4.5 3.7 - 5.3 mmol/L Final     Potassium reflex Magnesium   Date Value Ref Range Status   10/10/2021 4.1 3.5 - 5.2 meq/L Final     Comment:     Low level specimen hemolysis is present as indicated by the interference  level index on the Roche analyzer. The reported K+ level may be falsely  increased. If clinically warranted, recollection of the specimen is  suggested.

## 2022-12-23 ENCOUNTER — CARE COORDINATION (OUTPATIENT)
Dept: CARE COORDINATION | Age: 72
End: 2022-12-23

## 2022-12-23 NOTE — CARE COORDINATION
Nutrition Care Coordinator Follow-Up visit:    Food Recall:eating 2-3 meals/d    Activity Level:  Sedentary:X  Lightly Active: Moderately Active:  Very Active:    Adult BMI:  Underweight (below 18.5)  Normal Weight (18.5-24.9)  Overweight (25-29. 9)  Obese (30-39. 9)  Morbidly Obese (>40)X    Weight Change:no change    Plan:  Plan was established with patient:  Increase dietary fiber by consuming whole grains, fruits and vegetables:X  Limit dietary cholesterol to >200mg/day: Increase water intake:  Avoid added sugar:  Avoid sweetened beverages:  Choose lean meats:X  Limit sodium intake: X    Monitoring: Will monitor weight:  Will monitor adherence to meal plan:X  Will monitor adherence to exercise plan: Will monitor HGA1c:    Handouts Provided :  Low Carb snacking:  Carb counting /individual meal plan:  Portion Control:  Food Labels:  Physical Activity:  Low Fat/Cholesterol:  Hypo/Hyperglycemia:  Calorie Controlled Meal Plan:    Goals: Increase water consumption to 8oz. 6-8 times daily:  Manage blood sugars by consuming 3 meals spaced every 4-5 hours with 2-3 snacks daily:  Increase fiber and decrease fat intake by consuming 1-2 fruit servings and 2-3 vegetable servings per day. Increase physical activity by:  Consume less than 2,000mg of sodium/day: reviewed  Avoid consumption of sweetened beverages and added sugar by reading food labels:reviewed  Monitor blood sugars by using meter to check blood glucose before morning meal and 2 hours after a meal daily:  Decrease risk of coronary heart disease by consuming fish that contains omega-3 fatty acids at least twice a week, avoiding partially hydrogenated oil/trans fats and limiting saturated fat intake by reading food labels:reviewed    Patient goals set: 1. Reviewed avoiding canned, prepackaged foods, processed meats and cheese. Reviewed avoiding canned soups and vegetables- reviewed fresh/frozen vegetables and sauces/marinades to avoid high in sodium. Reviewed processed meats in detail to avoid- sausage, atwood, bologna, ham, ect- patient is working on limiting. 2. Reviewed shopping the outer rim of the grocery store where fresher foods are found. Reviewed seasonings that can be used that do not contain salt- will send patient a list of these. 3. Reviewed reading food labels and what to look for on labels to limit sodium intake. 4. Reviewed plate method at meals- discussed portioning plate 1/2 non-starchy vegetables, 1/4 lean protein, 1/4 carbs. Patient states doing well, reviewed reading labels and what to look for. Reviewed processed meats to avoid and limiting to once a week or less. Patient has no questions at this time. Will follow up in 3-4 weeks to review and answer questions.   Jeff Doe

## 2022-12-28 DIAGNOSIS — I10 PRIMARY HYPERTENSION: ICD-10-CM

## 2022-12-28 RX ORDER — LISINOPRIL 5 MG/1
TABLET ORAL
Qty: 30 TABLET | Refills: 3 | Status: SHIPPED | OUTPATIENT
Start: 2022-12-28

## 2022-12-29 ENCOUNTER — TELEPHONE (OUTPATIENT)
Dept: FAMILY MEDICINE CLINIC | Age: 72
End: 2022-12-29

## 2022-12-29 NOTE — TELEPHONE ENCOUNTER
Will order the walker at the appointment as it requires a face-to-face    Her last appointment with us was a telephone in October with Dr. Hunter Sky and does not qualify for face-to-face    Future Appointments   Date Time Provider Dante Amaya   1[facility]24/2023 11:30 [facility]   [facility]/2023  1:45 PM [facility]   10/9/2023  1:00 PM Isiah Banegas, [address] [place] [address]

## 2022-12-29 NOTE — TELEPHONE ENCOUNTER
PATIENT CALLED IN ASKING FOR DME ORDER FOR NEW WALKER. STATES HERS IS FALLING APART THE BRAKES WONT WORK AND IT WONT FOLD UP PROPERLY PLEASE ADVISE     Please Approve or Refuse. Send to Pharmacy per Pt's Request: [facility]     Next Visit Date:  1/24/2023   Last Visit Date: 10/20/2022    Hemoglobin A1C (%)   Date Value   09/02/2022 5.9   03/22/2022 5.0   10/20/2021 5.7             ( goal A1C is < 7)   BP Readings from Last 3 Encounters:   11/23/22 114/64   10/10/22 128/79   09/08/22 (!) 140/80          (goal 120/80)  BUN   Date Value Ref Range Status   10/18/2022 20 8 - 23 mg/dL Final     Creatinine   Date Value Ref Range Status   10/18/2022 1.22 (H) 0.50 - 0.90 mg/dL Final     Potassium   Date Value Ref Range Status   10/18/2022 4.5 3.7 - 5.3 mmol/L Final     Potassium reflex Magnesium   Date Value Ref Range Status   10/10/2021 4.1 3.5 - 5.2 meq/L Final     Comment:     Low level specimen hemolysis is present as indicated by the interference  level index on the Roche analyzer. The reported K+ level may be falsely  increased. If clinically warranted, recollection of the specimen is  suggested.

## 2023-01-03 DIAGNOSIS — M85.80 OSTEOPENIA DETERMINED BY X-RAY: ICD-10-CM

## 2023-01-03 RX ORDER — ALENDRONATE SODIUM 35 MG/1
TABLET ORAL
Qty: 12 TABLET | Refills: 3 | Status: SHIPPED | OUTPATIENT
Start: 2023-01-03

## 2023-01-05 ENCOUNTER — TELEPHONE (OUTPATIENT)
Dept: UROLOGY | Age: 73
End: 2023-01-05

## 2023-01-05 NOTE — TELEPHONE ENCOUNTER
Patient called main line. Writer attempted to answer. Call was disconnected. Writer left a voicemail for patient to return call.

## 2023-01-05 NOTE — PROGRESS NOTES
History and Physical  [facility]    Patient's Name/Date of Birth: Lutricia Gaucher / 1950 (89 y.o.)    Date: May 29, 2019     HPI: Pt is a 81 y.o. female who presents to [facility] for evaluation of her gallbladder. The patient denies any history of biliary colic. She has no right upper quadrant pain. She is able to tolerate fatty meals without any pain. She denies any nausea or vomiting. She has no abdominal tenderness. She recently fell and had a CT chest which visualized sludge within her gallbladder fossa. She then had a right upper quadrant ultrasound which showed a possible polyp or mass within the gallbladder with questionable blood flow. The patient has a history of uterine cancer and recently underwent a hysterectomy. Past Medical History:   Diagnosis Date    Adenocarcinoma of endometrium, stage 1 ([address] 10/5/2017    Well differentiated grade 1 adenocarcinoma arising in complex hyperplasia    Allergic rhinitis 2/23/2017    At high risk for falls 2/23/2017    Colon polyp     Had colonoscopy done 20 yrs ago    Diabetes mellitus ([address]     Endometrial cancer ([address]     History of TIA (transient ischemic attack) '80's    on Baby ASA    Hyperglycemia 3/21/2017    Hyperlipidemia     Hypertension since 2015    on Rx.     Hypothyroidism 1980    sub total thyroidectomy goiter    Legally blind since born    both eyes, cord prolapse; \"not legally blind\" per \"associated eye care\" please see telephone encounter from 2/27/18    Lower back pain     Mixed incontinence 1/9/2016    Morbid obesity with BMI of 40.0-44.9, adult ([address] 2/23/2017    CLAROS (nonalcoholic steatohepatitis) 3/10/2019    Obesity     Oral phase dysphagia 2/23/2018    Osteoarthritis (arthritis due to wear and tear of joints)     ronan knee    Osteoarthritis involving multiple joints on both sides of body 4/24/2012    Osteoporosis     Postmenopausal bleeding 9/20/2017    S/P h-scope, Myosure 9/20/17 9/20/2017    Pathology pending    Tennis elbow     left    Thickened endometrium 9/20/2017    TLHBSO, bilateral LND 10/24/17 10/24/2017       Past Surgical History:   Procedure Laterality Date    CATARACT REMOVAL WITH IMPLANT Bilateral 2015    COLONOSCOPY  1997    had polyp, she doesn't know where and when, \"20 yrs ago\"    COLONOSCOPY  06/26/2017    DILATION AND CURETTAGE OF UTERUS N/A 9/20/2017    HYSTEROSCOPY  WITH Ollie Cowing performed by Silviano Lopez DO at [address] 10/24/2017    TOTAL LAPAROSCOPIC HYSTERECTOMY, BSO, F.S.  STAGING, GYRUS G400 performed by Lisa Kc MD at [address] N/A 6/26/2017    COLONOSCOPY POLYPECTOMY / HOT SNARE performed by Pete Chopra DO at [address]  10/24/2017    with pelvic lymph node dissection    THYROID SURGERY  1980    subtotal 20 years ago    TONSILLECTOMY      VITRECTOMY      FLOATERS       Current Outpatient Medications   Medication Sig Dispense Refill    lisinopril (PRINIVIL;ZESTRIL) 40 MG tablet TAKE ONE TABLET BY MOUTH ONCE DAILY ---STOP LOSARTAN DUE TO RECALL 90 tablet 3    atorvastatin (LIPITOR) 40 MG tablet TAKE ONE TABLET BY MOUTH ONCE DAILY --STOP SIMVASTATIN-- 90 tablet 3    Cholecalciferol (VITAMIN D) 2000 units TABS tablet Take 1 tablet by mouth daily 90 tablet 3    vitamin B-12 (CYANOCOBALAMIN) 1000 MCG tablet Take 1 tablet by mouth daily 90 tablet 3    acetaminophen (APAP EXTRA STRENGTH) 500 MG tablet Take 1 tablet by mouth every 6 hours as needed for Pain or Fever 360 tablet 3    aspirin (ASPIR-LOW) 81 MG EC tablet Take 1 tablet by mouth daily 90 tablet 3    levothyroxine (SYNTHROID) 75 MCG tablet Take 1 tablet by mouth every morning (before breakfast) 90 tablet 3    loratadine (CLARITIN) 10 MG tablet Take 1 tablet by mouth daily 90 tablet 3    docusate sodium (STOOL SOFTENER) 100 MG capsule Take 1 capsule by mouth 2 times daily 180 History    Not on file   Social Needs    Financial resource strain: Not on file    Food insecurity:     Worry: Not on file     Inability: Not on file    Transportation needs:     Medical: Not on file     Non-medical: Not on file   Tobacco Use    Smoking status: Never Smoker    Smokeless tobacco: Never Used   Substance and Sexual Activity    Alcohol use: No     Alcohol/week: 0.0 oz    Drug use: No    Sexual activity: Never   Lifestyle    Physical activity:     Days per week: Not on file     Minutes per session: Not on file    Stress: Not on file   Relationships    Social connections:     Talks on phone: Not on file     Gets together: Not on file     Attends [employer] service: Not on file     Active member of club or organization: Not on file     Attends meetings of clubs or organizations: Not on file     Relationship status: Not on file    Intimate partner violence:     Fear of current or ex partner: Not on file     Emotionally abused: Not on file     Physically abused: Not on file     Forced sexual activity: Not on file   Other Topics Concern    Not on file   Social History Narrative    Not on file       ROS:   14 systems reviewed. Negative other than those noted above. Physical Exam:  Vitals:    05/29/19 0816   BP: 103/66   Pulse: 71   Temp: 97.5 °F (36.4 °C)   SpO2: 97%     General:A & O x3  HEENT:  NCAT, PERRL, EMOI, oral mucus membrane pink and moist, no mass palpated on neck exam  BREAST:negative  Heart: S1S2, no mumurs, RRR  Lungs: clear to auscultation without wheezes or rales  Abdomen: soft, nontender, no HSM, no guarding, no rebound, no masses  RECTAL: deferred  Extremity: Normal, without deformities, edema, or skin discoloration  SKIN: Skin color, texture, turgor normal. No rashes or lesions. Neuro: CN II-XII grossly intact. No motor or sensory deficits appreciated. MK:normal throughout upper and lower extremities    Assessment      Diagnosis Orders   1.  Gallbladder polyp  US GALLBLADDER RUQ       Plan     1. The patient has a questionable mass within her gallbladder which has questionable blood flow. The patient was referred to us to discuss removing her gallbladder due to the findings on ultrasound. The patient does not want her gallbladder removed. We discussed the risks and benefits of potential surgery and the patient would prefer to avoid surgery and do serial imaging of her gallbladder. We discussed the risks that if the polyp in her gallbladder was cancerous that this could spread. The patient continued to refuse surgery at this time. We will order a repeat ultrasound in 6 months and do serial imaging of the gallbladder to monitor the gallbladder mass. Follow-up in 6 months after obtaining the repeat ultrasound of the gallbladder. Rory Lopez  5/29/2019   Attending Physician Statement  I have discussed the case with Dr. Aster Bales, including pertinent history and exam findings with the resident. Dr. Sonal Corona saw and examined the patient and the key elements of the encounter have been performed by him and discussed with me. I agree with the assessment, plan and orders as documented by the resident and Dr. Sonal Corona.       Electronically signed by Rosa Bush DO  on 6/3/2019 at 11:02 AM Banner Transposition Flap Text: The defect edges were debeveled with a #15 scalpel blade.  Given the location of the defect and the proximity to free margins a Banner transposition flap was deemed most appropriate.  Using a sterile surgical marker, an appropriate flap drawn around the defect. The area thus outlined was incised deep to adipose tissue with a #15 scalpel blade.  The skin margins were undermined to an appropriate distance in all directions utilizing iris scissors.

## 2023-01-11 DIAGNOSIS — R11.0 NAUSEA: ICD-10-CM

## 2023-01-11 RX ORDER — ONDANSETRON 4 MG/1
TABLET, FILM COATED ORAL
Qty: 24 TABLET | Refills: 0 | Status: SHIPPED | OUTPATIENT
Start: 2023-01-11

## 2023-01-13 ENCOUNTER — TELEPHONE (OUTPATIENT)
Dept: PULMONOLOGY | Age: 73
End: 2023-01-13

## 2023-01-13 DIAGNOSIS — G47.33 OBSTRUCTIVE SLEEP APNEA: Primary | ICD-10-CM

## 2023-01-13 NOTE — TELEPHONE ENCOUNTER
Patient called and requested a script be written for nasal pillows for the Cpap machine.   Will you please do a script and ill fax to [address]

## 2023-01-16 ENCOUNTER — CARE COORDINATION (OUTPATIENT)
Dept: CARE COORDINATION | Age: 73
End: 2023-01-16

## 2023-01-16 NOTE — CARE COORDINATION
Nutrition Care Coordinator Follow-Up visit:    Food Recall:eating 2-3 meals/d    Activity Level:  Sedentary:X  Lightly Active: Moderately Active:  Very Active:    Adult BMI:  Underweight (below 18.5)  Normal Weight (18.5-24.9)  Overweight (25-29. 9)  Obese (30-39. 9)  Morbidly Obese (>40)X    Weight Change:no change    Plan:  Plan was established with patient:  Increase dietary fiber by consuming whole grains, fruits and vegetables:X  Limit dietary cholesterol to >200mg/day: Increase water intake:  Avoid added sugar:  Avoid sweetened beverages:  Choose lean meats:X  Limit sodium intake: X    Monitoring: Will monitor weight:  Will monitor adherence to meal plan:X  Will monitor adherence to exercise plan: Will monitor HGA1c:    Handouts Provided :  Low Carb snacking:  Carb counting /individual meal plan:  Portion Control:X  Food Labels:  Physical Activity:  Low Fat/Cholesterol:X  Hypo/Hyperglycemia:  Calorie Controlled Meal Plan:    Goals: Increase water consumption to 8oz. 6-8 times daily:  Manage blood sugars by consuming 3 meals spaced every 4-5 hours with 2-3 snacks daily:  Increase fiber and decrease fat intake by consuming 1-2 fruit servings and 2-3 vegetable servings per day. reviewed  Increase physical activity by:  Consume less than 2,000mg of sodium/day: reviewed  Avoid consumption of sweetened beverages and added sugar by reading food labels:  Monitor blood sugars by using meter to check blood glucose before morning meal and 2 hours after a meal daily:  Decrease risk of coronary heart disease by consuming fish that contains omega-3 fatty acids at least twice a week, avoiding partially hydrogenated oil/trans fats and limiting saturated fat intake by reading food labels:reviewed    Patient goals set: 1. Reviewed avoiding canned, prepackaged foods, processed meats and cheese. Reviewed avoiding canned soups and vegetables- reviewed fresh/frozen vegetables and sauces/marinades to avoid high in sodium. Reviewed processed meats in detail to avoid- sausage, atwood, bologna, ham, ect- patient is working on limiting. 2. Reviewed shopping the outer rim of the grocery store where fresher foods are found. Reviewed seasonings that can be used that do not contain salt- will send patient a list of these. 3. Reviewed reading food labels and what to look for on labels to limit sodium intake. 4. Reviewed plate method at meals- discussed portioning plate 1/2 non-starchy vegetables, 1/4 lean protein, 1/4 carbs. Patient states doing well, had questions regarding CKD guidelines. Reviewed limiting sodium intake, patient has been working on this and avoiding foods high in [address] and K. Reviewed processed meats to avoid and limiting to once a week or less. Patient has no questions at this time. Will follow up in 3-4 weeks to review and answer questions.     Daly Ames

## 2023-01-18 NOTE — PROGRESS NOTES
Chief Complaint   Patient presents with   • Derm Problem     Waist up skin exam   • Office Visit       HISTORY OF PRESENT ILLNESS:     The patient is a 44 year old male who goes by the name Rasheed.      The patient presents for a skin examination to make sure his/her moles do not look dangerous.    ASSOCIATED SIGNS OR SYMPTOMS:   Does the patient report any specific lesions?:  no     The patient uses sunscreen of at least SPF 30   Yes  The patient avoids the peak sun hours of 10 am to 4 pm Yes  The patient wears sun protective clothing/hat  Yes  The patient uses/used tanning beds    No       Patient requests [x] waist up   [] waist up and legs    [] total  skin exam.        REVIEW OF SYSTEMS:  Cardiovascular:   The patient has a pacemaker:    no      The patient has a defibrillator:    no  Hematologic:  The patient bleeds easily because of being on aspirin or an anticoagulant: no     ALLERGIES:  No Known Allergies    Current Outpatient Medications   Medication Sig   • omeprazole (PriLOSEC) 40 MG capsule Take 1 capsule by mouth daily.   • tiZANidine (Zanaflex) 4 MG tablet Take 1 tablet by mouth every 8 hours as needed (muscle spasm).     No current facility-administered medications for this visit.       PAST MEDICAL HISTORY:  The patient has a personal history of skin cancer  No        GERD (gastroesophageal reflux disease)                          DERMATOLOGY PAST MEDICAL HISTORY:      none    FAMILY HISTORY:  The patient has a family history of melanoma:  No    PHYSICAL EXAMINATION:    On the back, chest, abdomen and arms there are light brown even colored and symmetric macules ([place])    Sun damaged skin    On the back and chest cherry red papules (Cherry angiomas)      No other significant findings on WAIST UP EXAMINATION which is a DETAILED  EXAMINATION  including palpation and inspection of the scalp and hair and inspection of the head and face, eyelids, lips, neck, chest (including breasts and  Pt's daughter [address] [facility] calls at [address] for update on pt. All questions answered. underarms-including eccrine and apocrine glands), abdomen, back and right and left upper extremities.  The patient is well nourished and well developed.    The patient was seen and examined by Vianca Silva MD.  in the presence of my medical assistant  Cori Kelley MA .  This office visit note was in part created by Cori Kelley MA acting also as a scribe for Vianca Silva MD.   Vianca Silva MD    reviewed the note for accuracy and completeness before signing.        Assessment and Plan:    1.  Seborrheic keratosis (or keratoses)    The diagnosis was discussed.  Since the lesion(s)  is(are)  asymptomatic, no therapy is required at this time.      2. Cherry angioma(s)    Patient reassured these are a common, benign vascular tumor that require no treatment unless symptomatic.        3. Sun damaged skin (dermatoheliosis)    For management of this problem I recommend:  Avoiding the peak sun hours of 10 am to 4 pm  Wearing appropriate sun protection clothing.  We recommend the patient use the over the counter medication, Sunscreen with SPF 30.    Return to clinic as needed            On 1/18/2023, I, Cori Kelley MA scribed the services personally performed by Vianca Silva MD  The documentation recorded by the scribe accurately and completely reflects the service(s) I personally performed and the decisions made by me.

## 2023-01-24 ENCOUNTER — OFFICE VISIT (OUTPATIENT)
Dept: FAMILY MEDICINE CLINIC | Age: 73
End: 2023-01-24

## 2023-01-24 VITALS
SYSTOLIC BLOOD PRESSURE: 126 MMHG | BODY MASS INDEX: 45.97 KG/M2 | TEMPERATURE: 97.6 F | HEIGHT: 62 IN | HEART RATE: 68 BPM | DIASTOLIC BLOOD PRESSURE: 78 MMHG | OXYGEN SATURATION: 97 % | WEIGHT: 249.8 LBS

## 2023-01-24 DIAGNOSIS — I71.40 ABDOMINAL AORTIC ANEURYSM (AAA) WITHOUT RUPTURE, UNSPECIFIED PART: ICD-10-CM

## 2023-01-24 DIAGNOSIS — I50.32 CHRONIC DIASTOLIC CONGESTIVE HEART FAILURE (HCC): ICD-10-CM

## 2023-01-24 DIAGNOSIS — E11.65 TYPE 2 DIABETES MELLITUS WITH HYPERGLYCEMIA, WITHOUT LONG-TERM CURRENT USE OF INSULIN (HCC): Primary | ICD-10-CM

## 2023-01-24 DIAGNOSIS — N39.46 MIXED INCONTINENCE: ICD-10-CM

## 2023-01-24 DIAGNOSIS — E78.5 HYPERLIPIDEMIA WITH TARGET LDL LESS THAN 100: ICD-10-CM

## 2023-01-24 DIAGNOSIS — M17.0 PRIMARY OSTEOARTHRITIS OF BOTH KNEES: ICD-10-CM

## 2023-01-24 DIAGNOSIS — I70.0 ATHEROSCLEROSIS OF AORTA (HCC): ICD-10-CM

## 2023-01-24 DIAGNOSIS — R26.2 DIFFICULTY WALKING: ICD-10-CM

## 2023-01-24 DIAGNOSIS — Z91.81 AT HIGH RISK FOR FALLS: ICD-10-CM

## 2023-01-24 DIAGNOSIS — I12.9 BENIGN HYPERTENSION WITH CKD (CHRONIC KIDNEY DISEASE) STAGE III (HCC): ICD-10-CM

## 2023-01-24 DIAGNOSIS — E55.9 VITAMIN D DEFICIENCY: ICD-10-CM

## 2023-01-24 DIAGNOSIS — I48.0 PAROXYSMAL ATRIAL FIBRILLATION (HCC): ICD-10-CM

## 2023-01-24 DIAGNOSIS — G47.33 OSA (OBSTRUCTIVE SLEEP APNEA): ICD-10-CM

## 2023-01-24 DIAGNOSIS — N18.30 BENIGN HYPERTENSION WITH CKD (CHRONIC KIDNEY DISEASE) STAGE III (HCC): ICD-10-CM

## 2023-01-24 DIAGNOSIS — M15.9 OSTEOARTHRITIS INVOLVING MULTIPLE JOINTS ON BOTH SIDES OF BODY: ICD-10-CM

## 2023-01-24 PROBLEM — R20.0 NUMBNESS OF LIP: Status: ACTIVE | Noted: 2022-09-27

## 2023-01-24 PROBLEM — R94.31 PROLONGED Q-T INTERVAL ON ECG: Status: ACTIVE | Noted: 2022-09-26

## 2023-01-24 LAB — HBA1C MFR BLD: 5.8 %

## 2023-01-24 ASSESSMENT — ENCOUNTER SYMPTOMS
ABDOMINAL DISTENTION: 0
SHORTNESS OF BREATH: 0
BACK PAIN: 1
WHEEZING: 0
CHEST TIGHTNESS: 0
STRIDOR: 0
RECTAL PAIN: 0
COLOR CHANGE: 0
COUGH: 0
NAUSEA: 0
ABDOMINAL PAIN: 0
CONSTIPATION: 0
BLOOD IN STOOL: 0
VOMITING: 0
DIARRHEA: 0

## 2023-01-24 ASSESSMENT — PATIENT HEALTH QUESTIONNAIRE - PHQ9
SUM OF ALL RESPONSES TO PHQ QUESTIONS 1-9: 0
SUM OF ALL RESPONSES TO PHQ QUESTIONS 1-9: 0
1. LITTLE INTEREST OR PLEASURE IN DOING THINGS: 0
SUM OF ALL RESPONSES TO PHQ9 QUESTIONS 1 & 2: 0
SUM OF ALL RESPONSES TO PHQ QUESTIONS 1-9: 0
2. FEELING DOWN, DEPRESSED OR HOPELESS: 0
SUM OF ALL RESPONSES TO PHQ QUESTIONS 1-9: 0

## 2023-01-24 NOTE — RESULT ENCOUNTER NOTE
Addressed during office visit today, A1c 5.8, improved diabetes and very well controlled, continue treatment recommended during the office visit.

## 2023-01-24 NOTE — PROGRESS NOTES
Visit Information    Have you changed or started any medications since your last visit including any over-the-counter medicines, vitamins, or herbal medicines? no   Have you stopped taking any of your medications? Is so, why? -  no  Are you having any side effects from any of your medications? - no    Have you seen any other physician or provider since your last visit?  no   Have you had any other diagnostic tests since your last visit? yes -    Have you been seen in the emergency room and/or had an admission in a hospital since we last saw you?  yes -    Have you had your routine dental cleaning in the past 6 months?  no     Do you have an active MyChart account? If no, what is the barrier?   Yes    Patient Care Team:  Elke Renee MD as PCP - General (Family Medicine)  Elke Renee MD as PCP - [facility]  Thompson Nye MD as Referring Physician (Orthopedic Surgery)  Talya Mckenna MD as Surgeon (Orthopedic Surgery)  Sintia Tinoco MD as Consulting Physician (Endocrinology)  Selam Pacheco DO as Consulting Physician (Obstetrics & Gynecology)  Saumya Mills MD as Consulting Physician (Urology)  Sheela Davis MD as Consulting Physician (Otolaryngology)  Adan Botello DO as Consulting Physician (General Surgery)  Cheikh Casas DO as Consulting Physician (Cardiology)  Georges Mixon MD as Surgeon (Ophthalmology)  Soledad Castillo MD as Consulting Physician (Dermatology)  Hanna Esposito MD as Consulting Physician (Infectious Diseases)  Casandra Kat PA-C as Physician Assistant (Gynecological Oncology)  Romie Koyanagi, RD, LD as Dietitian    Medical History Review  Past Medical, Family, and Social History reviewed and does contribute to the patient presenting condition    Health Maintenance   Topic Date Due    Diabetic retinal exam  Never done    Shingles vaccine (1 of 2) Never done    Diabetic Alb to Cr ratio (uACR) test  01/30/2021    Diabetic foot exam  10/30/2021 Annual Wellness Visit (AWV)  08/05/2022    A1C test (Diabetic or Prediabetic)  12/02/2022    Breast cancer screen  04/05/2023    Colorectal Cancer Screen  08/03/2023    Lipids  09/02/2023    GFR test (Diabetes, CKD 3-4, OR last GFR 15-59)  10/18/2023    Depression Screen  10/19/2023    DEXA (modify frequency per FRAX score)  04/05/2024    DTaP/Tdap/Td vaccine (2 - Td or Tdap) 09/28/2027    Flu vaccine  Completed    Pneumococcal 65+ years Vaccine  Completed    COVID-19 Vaccine  Completed    Hepatitis C screen  Completed    Hepatitis A vaccine  Aged Out    Hib vaccine  Aged Out    Meningococcal (ACWY) vaccine  Aged Out

## 2023-01-24 NOTE — PROGRESS NOTES
Jered Benitez (:  1950) is a 72 y.o. female,Established patient, here for evaluation of the following chief complaint(s): Diabetes, Sleep Apnea (STILL WAITING ON SUPPLIES), Other (ALL OVER ITCHING THE PAST FEW DAYS/NO RASH OR BUG BITES/NO NEW MEDICATION/DID CHANGE NEW BODY SOAP), Hypertension, Congestive Heart Failure, Difficulty Walking, and Incontinence (Urine)      ASSESSMENT/PLAN:    1. Type 2 diabetes mellitus with hyperglycemia, without long-term current use of insulin (Formerly [facility])  Improved  Well-controlled  -     POCT glycosylated hemoglobin (Hb A1C) 5.8, improved  Lab Results   Component Value Date    LABA1C 5.8 2023    LABA1C 5.9 2022    LABA1C 5.0 2022       -      DIABETES FOOT EXAM  -     Microalbumin / Creatinine Urine Ratio; Future  -      [facility]  -     DME Order for Swati Kong as OP  -     CBC with Auto Differential; Future  -     Comprehensive Metabolic Panel; Future  -     Vitamin B12 & Folate; Future  Will monitor hemoglobin A1c every 3 to 6 months in the office.  -daily feet exam, Foot care: advised to wash feet daily, pat dry and apply lotion at night, avoiding between toes. Need to look at feet daily and report to a physician any signs of inflammation or skin damage  -annual dilated eye exam  -Low carb, low fat diet, increase fruits and vegetables, and exercise 4-5 times a day 30-40 minutes a day discussed  -continue low-carb diet  -continue lisinopril ACEI and statin Lipitor  Patient is on chronic anticoagulation with Xarelto, high risk of falls, that is why she is not on baby aspirin  2.  Chronic diastolic congestive heart failure (HCC)  Stable  To continue with metoprolol 12.5 Mg twice a day, lisinopril 5 Mg, furosemide 20 Mg daily     Lab Results   Component Value Date    LVEF 55 2022   Recheck labs  low salt diet and BP, weight, and pulse monitoring.      -     [address] [facility]  - DME Order for Ramirez Abreu as OP  Her Rollator broke, she needs a new walker with seat    Moris Quintero was evaluated today and a DME order was entered for a wheeled walker with seat because she requires this to successfully complete daily living tasks of personal cares and ambulating. A wheeled walker with seat is necessary due to the patient's unsteady gait, inability to  and ambulation device, ambulating only short distances by pushing a walker, and the need to sit for a short time before resuming ambulation. These tasks cannot be completed with a lesser ambulation device such as a cane, crutch, or standard walker. The need for this equipment was discussed with the patient and she understands and is in agreement.      -     Brain Natriuretic Peptide; Future  -     CBC with Auto Differential; Future  -     Comprehensive Metabolic Panel; Future  3. Benign hypertension with CKD (chronic kidney disease) stage III (HCC)  Stable chronic kidney disease stage III  Well-controlled hypertension  Discussed low salt diet and BP and pulse monitoring. To continue with metoprolol 12.5 Mg twice a day, lisinopril 5 Mg, furosemide 20 Mg daily   -     [address]  -     DME Order for Ramirez Abreu as OP  -     CBC with Auto Differential; Future  -     Comprehensive Metabolic Panel; Future  -     Urinalysis with Reflex to Culture; Future  -     Uric Acid; Future  -     TSH; Future  -     Magnesium; Future  -     Phosphorus; Future  4. Paroxysmal atrial fibrillation (HCC)  Stable  To continue with metoprolol 12.5 Mg twice a day  On chronic anticoagulation on xarelto    -     [address]  -     DME Order for Ramirez Abreu as OP  -     CBC with Auto Differential; Future  -     Comprehensive Metabolic Panel; Future  5.  Hyperlipidemia with target LDL less than 100  Improved  Continue lipitor 40 mg and recheck labs  Lab Results   Component Value Date    LDLCALC 70 11/06/2020    LDLCHOLESTEROL 104 09/02/2022       -     Lipid Panel; Future  6. Difficulty walking  She would benefit to have her rollator changed as it is broke and can fall  Also to restart PT  -     [address]  -     DME Order for Jeimy Guzman as OP  [place] Smoker was evaluated today and a DME order was entered for a wheeled walker with seat because she requires this to successfully complete daily living tasks of personal cares and ambulating. A wheeled walker with seat is necessary due to the patient's unsteady gait, inability to  and ambulation device, ambulating only short distances by pushing a walker, and the need to sit for a short time before resuming ambulation. These tasks cannot be completed with a lesser ambulation device such as a cane, crutch, or standard walker. The need for this equipment was discussed with the patient and she understands and is in agreement. 7. Body mass index (BMI) 45.0-49.9, adult (HCC)  worsening    Wt Readings from Last 3 Encounters:   01/24/23 249 lb 12.8 oz (113.3 kg)   12/19/22 257 lb (116.6 kg)   11/23/22 257 lb (116.6 kg)     03/22/22 247 lb 9.6 oz (112.3 kg)   Low carb, low fat diet, increase fruits and vegetables, and exercise 4-5 times a week 30-40 minutes a day  -     [address]  -     DME Order for Jeimy Guzman as OP  8. Atherosclerosis of aorta (HCC)  Likely worsening  Continue lipitor 40 mg and recheck vascular testing    -     [address]  -     DME Order for Jeimy Guzman as OP  -     VL ABDOMINAL AORTA DUPLEX SCAN; Future  -     VL ARTERIAL PVR LOWER W EXERCISE; Future  9. Mixed incontinence  Stable   Using incontinence supplies  Taking Vesicare, which  helps a little    -     [address]  -     DME Order for Jeimy Guzman as OP  -     Urinalysis with Reflex to Culture; Future  10.  Primary osteoarthritis of both knees  Likely worsening due to wear and tear nature of the disease. Restart home PT, needs replacement of broken rollator  -     [address]  -     DME Order for [facility] as OP  11. Osteoarthritis involving multiple joints on both sides of body  Likely worsening due to wear and tear nature of the disease.;  Restart home physical therapy, needs replacement of Rollator  -     [address]  -     DME Order for [facility] as OP  12. ZACKERY (obstructive sleep apnea)  Cannot use the CPAP, wants nasal pillow, order placed by pulmonology but not faxed yet, they are waiting to or her to say where to fax it, she will contact the pulmonology  13. At high risk for falls  -     [address]  -     DME Order for [facility] as OP  14. Vitamin D deficiency  Unsure if improving or not. Will recheck level  Continue supplementation.    -     Vitamin D 25 Hydroxy; Future  15. Abdominal aortic aneurysm (AAA) without rupture, unspecified part  -     VL ABDOMINAL AORTA DUPLEX SCAN; Future  -     VL ARTERIAL PVR LOWER W EXERCISE; Future    [facility] living was doing PT    Using incontinence supplies    Rollator signed on 12/5/2022, but not received,  Will send again to [employer]  for [employer] received counseling on the following healthy behaviors: nutrition, exercise, medication adherence, and weight loss and falls precautions. Reviewed prior labs and health maintenance  Discussed use, benefit, and side effects of prescribed medications. Barriers to medication compliance addressed. Patient given educational materials - see patient instructions  Was a self-tracking handout given in paper form or via [employer]? Yes  All patient questions answered. Patient voiced understanding.   The patient's past medical,surgical, social, and family history as well as her current medications and allergies were reviewed as documented in today's encounter. Medications, labs, diagnostic studies, consultations and follow-up as documented in this encounter. Return in about 3 months (around 4/24/2023) for Visit type MEDICARE, VISION, [facility],  Dora Coronel. Patient is eligible and due for Medicare AWV, schedule as separate visit. Future Appointments   Date Time Provider Dante Amaya   1/30/2023  9:50 AM Verónica Reddy MD [facility]   1/31/2023 11:00 AM [facility] VASCULAR RM [address]   1/31/2023 12:00  [facility] VASCULAR RM [address]   5/11/2023  9:00 AM [facility]   5/24/2023  1:45 PM [facility]   8/9/2023  9:30 AM [facility]   10/9/2023  1:00 PM Tamara Middleton PA-C [facility]         SUBJECTIVE/OBJECTIVE:    Diabetes Mellitus Type II, Follow-up:    Current symptoms/problems include hyperglycemia, paresthesia of the feet, and visual disturbances. Symptoms have been present for several years. Known diabetic complications: nephropathy and atherosclerosis  Cardiovascular risk factors: advanced age (older than 54 for men, 72 for women), diabetes mellitus, dyslipidemia, hypertension, and obesity (BMI >= 30 kg/m2)    Current diabetic medications include none. Eye exam current (within one year): no    I advised Licha Murray to make appointment with Ophthalmologist. The patient verbalizes understanding and agrees with the plan. Current diet: in general, a \"healthy\" diet  , low salt    Barriers: impairment:  visual and physical: Chronic pain, difficulty ambulating    Current monitoring regimen:  Hemoglobin A1c every 3 to 6 months in the office  Home blood sugar records: patient does not test  Is She on ACE inhibitor or angiotensin II receptor blocker? Yes      reports that she has never smoked.  She has never used smokeless tobacco.     Counseling given: Yes      Daily Aspirin? No: Patient is on chronic anticoagulation with Xarelto      A1c is improved. Lab Results   Component Value Date    LABA1C 5.8 01/24/2023    LABA1C 5.9 09/02/2022    LABA1C 5.0 03/22/2022              Hypertension, CHF, atrial fibrillation, chronic kidney disease stage III    Zev  is not  exercising, but stays active, she is adherent to low salt diet. Blood pressure is well controlled at home. Cardiac symptoms  fatigue. Gets tired easily  Patient denies  chest pain, chest pressure/discomfort, claudication, dyspnea, exertional chest pressure/discomfort, irregular heart beat, lower extremity edema, near-syncope, orthopnea, palpitations, paroxysmal nocturnal dyspnea, syncope, and tachypnea. Use of agents associated with hypertension: thyroid hormones. History of target organ damage: chronic kidney disease and heart failure. Was in ED for A fib with RVR on 12/16/2022 [address] Saw cardiology a few months ago, no medication changes were done    Patient continues to have urine incontinence, she uses incontinence supplies. Patient reports sneezing, or coughing makes it worse. Patient reports if she does not make it on time to the bathroom she has leakage. Has frequency and urgency of urination. She denies burning with urination or flank pain. BP controlled. Zev reports compliance with BP medications, and tolerates them well, denies side effects. BP Readings from Last 3 Encounters:   01/24/23 126/78   11/23/22 114/64   10/10/22 128/79        Pulse is Normal.    Pulse Readings from Last 3 Encounters:   01/24/23 68   11/23/22 55   10/10/22 61         Hyperlipidemia:  No new myalgias or GI upset on atorvastatin (Lipitor). Medication compliance: compliant all of the time. Patient is  following a low fat, low cholesterol diet. LDL is Elevated.     Lab Results   Component Value Date    LDLCALC 70 11/06/2020    LDLCHOLESTEROL 104 09/02/2022     Sleep Apnea:  Current treatment: cPAP.  Compliance: noncompliant: CPAP, cannot use more than 1 hr . Residual symptoms include: morning fatigue and daytime fatigue. Patient says she has called pulmonologist, I reviewed the records, they signed  order, but not faxed yet  Would like nasal pillows. I explained to her the sleep apnea machine is very important in controlling her paroxysmal atrial fibrillation, she understands. Advised to call the pulmonologist again with the location where the order needs to be faxed    Patient has prior diagnosis of aortic atherosclerosis and AAA 3.18 cm in 2021  Patient reports cramps in the legs when walking, resting helps  10/20/2021      Summary        Abdominal aortic aneurysm measuring 3.18 cm was noted. No evidence of aneurysmal dilatation of the common iliac arteries    bilaterally     Patient has known osteoarthritis in multiple joints, and osteoarthritis in her knees. Patient reports bilateral knee pain  Haven't fall recently, but she is afraid she is going to fall. Patient says she is always holding onto the rollator, patient says she can walk just a few steps without holding onto things inside of her small apartment. She says she always goes very close to the door so she can hold onto the walls. Patient says her Rollator is broken, we have ordered for her but she has never received it. Aman Pedersen has Vitamin D deficiency. Aman Pedersen  is  taking Vitamin D supplementation   she feels tired. Lab Results   Component Value Date    VITD25 31.4 01/09/2020          [x]Negative depression screening. PHQ Scores 1/24/2023 10/19/2022 3/2/2022 7/13/2021 2/12/2021 1/13/2021 1/9/2020   PHQ2 Score 0 0 0 0 0 0 0   PHQ9 Score 0 0 0 0 0 0 0       Prior to Visit Medications    Medication Sig Taking?  Authorizing Provider   ondansetron (ZOFRAN) 4 MG tablet TAKE ONE (1) TABLET BY MOUTH EVERY SIX (6) HOURS AS NEEDED FOR NAUSEA OR VOMITING Yes Michael Ugalde MD   alendronate (FOSAMAX) 35 MG tablet TAKE ONE (1) TABLET BY MOUTH EVERY 7 DAYS Yes Elke Renee MD   lisinopril (PRINIVIL;ZESTRIL) 5 MG tablet TAKE ONE (1) TABLET BY MOUTH DAILY Yes Elke Renee MD   XARELTO 20 MG TABS tablet TAKE ONE (1) TABLET BY MOUTH DAILY (WITH BREAKFAST) Yes Elke Renee MD   furosemide (LASIX) 20 MG tablet TAKE ONE (1) TABLET BY MOUTH DAILY FOR CONGESTIVE HEART FAILURE Yes Elke Renee MD   Respiratory Therapy Supplies MISC Please provide nose pillow mask  for CPAP Yes Imelda Bermeo MD   Incontinence Supply Disposable MISC Use daily for incontinence Yes Imelda Bermeo MD   docusate sodium (COLACE) 100 MG capsule TAKE ONE (1) CAPSULE BY MOUTH TWO (2) TIMES DAILY FOR CONSTIPATION Yes Elke Renee MD   levothyroxine (SYNTHROID) 75 MCG tablet TAKE ONE (1) TABLET BY MOUTH EVERY MORNING (BEFORE BREAKFAST) Yes Elke Renee MD   solifenacin (VESICARE) 5 MG tablet Take 1 tablet by mouth daily Yes MAX Mcgraw CNP   Incontinence Supplies MISC Needs washable bed pads Yes Elke Renee MD   albuterol sulfate HFA (PROVENTIL HFA) 108 (90 Base) MCG/ACT inhaler Inhale 1-2 puffs into the lungs every 4 hours as needed for Wheezing Yes Imelda Bermeo MD   [facility]. Devices (STEP N REST II WALKER) MISC Rollator walker Yes Imelda Bermeo MD   Misc.  Devices (RAISED TOILET SEAT) [facility] Please provide with arms Yes Imelda Bermeo MD   vitamin D3 (CHOLECALCIFEROL) 25 MCG (1000 UT) TABS tablet TAKE TWO (2) TABLETS BY MOUTH ONCE DAILY Yes Elke Renee MD   MYRBETRIQ 50 MG TB24 Take 50 mg by mouth daily Yes Saumya Mills MD   Probiotic Product (LISSY-BID PROBIOTIC) TABS TAKE ONE (1) TABLET BY MOUTH IN THE MORNING Yes Elke Renee MD   pantoprazole (PROTONIX) 40 MG tablet TAKE ONE (1) TABLET BY MOUTH ONCE DAILY Yes Elke Renee MD   vitamin B-12 (CYANOCOBALAMIN) 1000 MCG tablet TAKE ONE (1) TABLET BY MOUTH DAILY Yes Elke Renee MD   metoprolol tartrate (LOPRESSOR) 25 MG tablet Take 0.5 tablets by mouth 2 times daily Dose decreased 4/12/2022  due to bradycardia  Patient taking differently: Take 12.5 mg by mouth 2 times daily Dose decreased 4/12/2022  due to bradycardia Yes Shirin Mcmahon MD   Magnesium Oxide (MAGNESIUM-OXIDE) 250 MG TABS tablet Take 1 tablet by mouth daily Take with food, in the evening Yes Shirin Mcmahon MD   atorvastatin (LIPITOR) 40 MG tablet TAKE ONE (1) TABLET BY MOUTH DAILY STOP SIMVASTATIN  Patient taking differently: 40 mg nightly Yes Shirin Mcmahon MD   ACETAMINOPHEN EXTRA STRENGTH 500 MG tablet TAKE 1 TABLET BY MOUTH EVERY 6 HOURS AS NEEDED FOR PAIN Yes Shirin Mcmahon MD   nystatin (MYCOSTATIN) 109074 UNIT/GM powder Apply daily for 4-6 weeks  Patient not taking: Reported on 1/24/2023  Shirin Mcmahon MD   clotrimazole-betamethasone (Juliene Million) 1-0.05 % cream Apply topically 1 times daily on the perineum, alternate powder, for 10 days  Patient not taking: Reported on 1/24/2023  Shirin Mcmahon MD            Social History     Tobacco Use    Smoking status: Never    Smokeless tobacco: Never   Vaping Use    Vaping Use: Never used   Substance Use Topics    Alcohol use: No     Alcohol/week: 0.0 standard drinks    Drug use: No         Review of Systems   Constitutional:  Positive for fatigue and unexpected weight change. Negative for activity change, appetite change, chills, diaphoresis and fever. Eyes:  Positive for visual disturbance. Respiratory:  Negative for cough, chest tightness, shortness of breath, wheezing and stridor. Cardiovascular:  Negative for chest pain, palpitations and leg swelling. Gastrointestinal:  Negative for abdominal distention, abdominal pain, blood in stool, constipation, diarrhea, nausea, rectal pain and vomiting. Endocrine: Negative for cold intolerance, heat intolerance, polydipsia, polyphagia and polyuria. Genitourinary:  Positive for frequency and urgency.  Negative for difficulty urinating, dysuria and flank pain. Urine incontinence   Musculoskeletal:  Positive for arthralgias, back pain and gait problem. Negative for myalgias and neck pain. Ambulates with rollator, walker with seat   Skin:  Negative for color change, rash and wound. Allergic/Immunologic: Positive for immunocompromised state (due to diabetes). Neurological:  Positive for weakness (legs) and numbness (feet). Negative for speech difficulty and headaches. Hematological:  Does not bruise/bleed easily. Psychiatric/Behavioral:  Negative for dysphoric mood. -vital signs stable and within normal limits except Morbid obesity per BMI. /78   Pulse 68   Temp 97.6 °F (36.4 °C)   Ht 5' 2\" (1.575 m)   Wt 249 lb 12.8 oz (113.3 kg)   LMP  (LMP Unknown)   SpO2 97%   BMI 45.69 kg/m²         Physical Exam  Vitals and nursing note reviewed. Constitutional:       General: She is not in acute distress. Appearance: Normal appearance. She is well-developed. She is obese. She is not diaphoretic. HENT:      Head: Normocephalic and atraumatic. Right Ear: External ear normal.      Left Ear: External ear normal.      Mouth/Throat:      Comments: I did not examine the mouth due to coronavirus pandemic and wearing masks    Eyes:      General: Lids are normal. No scleral icterus. Right eye: No discharge. Left eye: No discharge. Extraocular Movements: Extraocular movements intact. Conjunctiva/sclera: Conjunctivae normal.   Neck:      Thyroid: No thyromegaly. Cardiovascular:      Rate and Rhythm: Normal rate and regular rhythm. Pulses:           Dorsalis pedis pulses are 1+ on the right side and 1+ on the left side. Heart sounds: Normal heart sounds. No murmur heard. Pulmonary:      Effort: Pulmonary effort is normal. No respiratory distress. Breath sounds: Normal breath sounds. No wheezing or rales. Chest:      Chest wall: No tenderness.    Abdominal:      General: Bowel sounds are normal. There is no distension. Palpations: Abdomen is soft. There is no hepatomegaly or splenomegaly. Tenderness: There is no abdominal tenderness. Comments: Obese abdomen. Musculoskeletal:      Cervical back: Normal range of motion and neck supple. Right knee: Decreased range of motion. Tenderness present. Left knee: Decreased range of motion. Tenderness present. Right lower leg: No edema. Left lower leg: No edema. Right foot: Decreased range of motion. Deformity and bunion present. No Charcot foot, foot drop or prominent metatarsal heads. Left foot: Decreased range of motion. Deformity and bunion present. No Charcot foot, foot drop or prominent metatarsal heads. Comments: Ambulates with walker with seat   Feet:      Right foot:      Protective Sensation: 5 sites tested. 3 sites sensed. Skin integrity: Callus present. Toenail Condition: Right toenails are abnormally thick and long. Left foot:      Protective Sensation: 5 sites tested. 3 sites sensed. Skin integrity: Callus present. Toenail Condition: Left toenails are abnormally thick and long. Comments: Severe bunion great toes, left side worse. She says she has appointment with podiatry  Skin:     General: Skin is warm and dry. Capillary Refill: Capillary refill takes less than 2 seconds. Findings: No rash. Neurological:      Mental Status: She is alert and oriented to person, place, and time. Cranial Nerves: No cranial nerve deficit. Motor: No abnormal muscle tone. Psychiatric:         Mood and Affect: Mood normal.         Behavior: Behavior normal.         Thought Content: Thought content normal.         Judgment: Judgment normal.         I personally reviewed testing with patient.   Chronic kidney disease stage III stable  Mild hyperglycemia, well controlled  Anemia of chronic disease  Vitamin D deficiency  High triglycerides  Otherwise labs within normal limits        Lab Results   Component Value Date    WBC 7.9 09/01/2022    HGB 11.7 (L) 09/01/2022    HCT 36.7 09/01/2022    MCV 88.2 09/01/2022     09/01/2022       Lab Results   Component Value Date/Time     10/18/2022 10:35 AM    K 4.5 10/18/2022 10:35 AM    K 4.1 10/10/2021 06:00 PM     10/18/2022 10:35 AM    CO2 23 10/18/2022 10:35 AM    BUN 20 10/18/2022 10:35 AM    CREATININE 1.22 10/18/2022 10:35 AM    GLUCOSE 92 10/18/2022 10:35 AM    GLUCOSE 114 03/20/2012 10:40 AM    CALCIUM 9.9 10/18/2022 10:35 AM        Lab Results   Component Value Date    ALT 24 03/22/2022    AST 18 03/22/2022    ALKPHOS 96 03/22/2022    BILITOT 0.60 03/22/2022       Lab Results   Component Value Date    TSH 2.38 09/02/2022       Lab Results   Component Value Date    CHOL 196 09/02/2022    CHOL 174 10/20/2021    CHOL 144 11/06/2020     Lab Results   Component Value Date    TRIG 225 (H) 09/02/2022    TRIG 180 (H) 10/20/2021    TRIG 142 11/06/2020     Lab Results   Component Value Date    HDL 47 09/02/2022    HDL 49 10/20/2021    HDL 46 11/06/2020     Lab Results   Component Value Date    LDLCALC 70 11/06/2020    LDLCHOLESTEROL 104 09/02/2022    LDLCHOLESTEROL 89 10/20/2021    LDLCHOLESTEROL 66 01/28/2020     Lab Results   Component Value Date    CHOLHDLRATIO 4.2 09/02/2022    CHOLHDLRATIO 3.6 10/20/2021    CHOLHDLRATIO 3.1 11/06/2020         Lab Results   Component Value Date    FIXVBBYQ15 1428 (H) 03/22/2022     Lab Results   Component Value Date    FOLATE 14.2 03/22/2022     Lab Results   Component Value Date    VITD25 31.4 01/09/2020         No orders of the defined types were placed in this encounter.       Orders Placed This Encounter   Procedures    VL ABDOMINAL AORTA DUPLEX SCAN     Standing Status:   Future     Standing Expiration Date:   1/24/2024    VL ARTERIAL PVR LOWER W EXERCISE     Standing Status:   Future     Standing Expiration Date:   1/24/2024     Order Specific Question:   Reason for exam: Answer:   if she cannot walk much, ok not to do with exercise    Microalbumin / Creatinine Urine Ratio     Standing Status:   Future     Standing Expiration Date:   1/24/2024    Brain Natriuretic Peptide     Standing Status:   Future     Standing Expiration Date:   1/24/2024    CBC with Auto Differential     Standing Status:   Future     Standing Expiration Date:   1/24/2024    Comprehensive Metabolic Panel     Standing Status:   Future     Standing Expiration Date:   1/24/2024    Vitamin D 25 Hydroxy     Standing Status:   Future     Standing Expiration Date:   1/24/2024    Vitamin B12 & Folate     Standing Status:   Future     Standing Expiration Date:   1/24/2024    Urinalysis with Reflex to Culture     Standing Status:   Future     Standing Expiration Date:   1/24/2024     Order Specific Question:   SPECIFY(EX-CATH,MIDSTREAM,CYSTO,ETC)?      Answer:   MIDSTREAM    Uric Acid     Standing Status:   Future     Standing Expiration Date:   1/24/2024    TSH     Standing Status:   Future     Standing Expiration Date:   1/24/2024    Magnesium     Standing Status:   Future     Standing Expiration Date:   1/24/2024    Phosphorus     Standing Status:   Future     Standing Expiration Date:   1/24/2024    Lipid Panel     Standing Status:   Future     Standing Expiration Date:   1/24/2024     Order Specific Question:   Is Patient Fasting?/# of Hours     Answer:   8-10 Hours, water ok to drink    [employer]     Referral Priority:   Routine     Referral Type:   [address] [place] [address]     Referral Reason:   Specialty Services Required     Referral Location:   [place] [address]     Requested Specialty:   [address]     Number of Visits Requested:   1    POCT glycosylated hemoglobin (Hb A1C)    HM DIABETES FOOT EXAM    DME Order for Walker as OP     You must complete the order parameters below and add the medical necessity documentation for this DME in a separate note.    Dc Nielson with Wheels and Seat    Current patient weight: Weight: 249 lb 12.8 oz (113.3 kg)  Current patient height: Height: 5' 2\" (157.5 cm)  Diagnosis: Type 2 diabetes mellitus with hyperglycemia, without long-term current use of insulin (HCC)  (primary encounter diagnosis)  Paroxysmal atrial fibrillation (HCC)  Body mass index (BMI) 45.0-49.9, adult (HCC)  Chronic diastolic congestive heart failure (HCC)  Atherosclerosis of aorta (HCC)  Benign hypertension with CKD (chronic kidney disease) stage III (HCC)  Mixed incontinence  Osteoarthritis involving multiple joints on both sides of body  Primary osteoarthritis of both knees  At high risk for falls  Difficulty walking    Duration: Purchase    NPI: [id number]    [address] [facility] [address]  [address] [facility] [place] [address]  Dept: [phone]  Dept Fax: [phone]       Medications Discontinued During This Encounter   Medication Reason    nystatin (MYCOSTATIN) 464713 UNIT/GM powder Therapy completed    clotrimazole-betamethasone (LOTRISONE) 1-0.05 % cream Therapy completed         On this date 1/24/2023 I have spent 35 minutes reviewing previous notes, test results and face to face with the patient discussing the diagnosis and importance of compliance with the treatment plan as well as documenting on the day of the visit. This note was completed by using the assistance of a speech-recognition program. However, inadvertent computerized transcription errors may be present. Although every effort was made to ensure accuracy, no guarantees can be provided that every mistake has been identified and corrected by editing . An electronic signature was used to authenticate this note.   Electronically signed by Laxmi Blankenship MD on 1/25/2023 at 7:55 PM

## 2023-01-25 ENCOUNTER — TELEPHONE (OUTPATIENT)
Dept: FAMILY MEDICINE CLINIC | Age: 73
End: 2023-01-25

## 2023-01-25 DIAGNOSIS — E78.5 HYPERLIPIDEMIA WITH TARGET LDL LESS THAN 100: ICD-10-CM

## 2023-01-25 PROBLEM — N39.0 UTI DUE TO EXTENDED-SPECTRUM BETA LACTAMASE (ESBL) PRODUCING ESCHERICHIA COLI: Status: RESOLVED | Noted: 2022-03-09 | Resolved: 2023-01-25

## 2023-01-25 PROBLEM — R94.31 PROLONGED Q-T INTERVAL ON ECG: Status: RESOLVED | Noted: 2022-09-26 | Resolved: 2023-01-25

## 2023-01-25 PROBLEM — D12.5 ADENOMATOUS POLYP OF SIGMOID COLON: Status: RESOLVED | Noted: 2017-07-30 | Resolved: 2023-01-25

## 2023-01-25 PROBLEM — B96.29 UTI DUE TO EXTENDED-SPECTRUM BETA LACTAMASE (ESBL) PRODUCING ESCHERICHIA COLI: Status: RESOLVED | Noted: 2022-03-09 | Resolved: 2023-01-25

## 2023-01-25 PROBLEM — R20.0 NUMBNESS OF LIP: Status: RESOLVED | Noted: 2022-09-27 | Resolved: 2023-01-25

## 2023-01-25 PROBLEM — Z16.12 UTI DUE TO EXTENDED-SPECTRUM BETA LACTAMASE (ESBL) PRODUCING ESCHERICHIA COLI: Status: RESOLVED | Noted: 2022-03-09 | Resolved: 2023-01-25

## 2023-01-25 PROBLEM — I10 PRIMARY HYPERTENSION: Status: RESOLVED | Noted: 2022-09-08 | Resolved: 2023-01-25

## 2023-01-25 PROBLEM — I48.91 ATRIAL FIBRILLATION WITH RAPID VENTRICULAR RESPONSE (HCC): Status: RESOLVED | Noted: 2022-09-01 | Resolved: 2023-01-25

## 2023-01-25 PROBLEM — R00.1 BRADYCARDIA: Status: RESOLVED | Noted: 2020-07-24 | Resolved: 2023-01-25

## 2023-01-25 RX ORDER — ATORVASTATIN CALCIUM 40 MG/1
TABLET, FILM COATED ORAL
Qty: 90 TABLET | Refills: 3 | Status: SHIPPED | OUTPATIENT
Start: 2023-01-25

## 2023-01-25 RX ORDER — ATORVASTATIN CALCIUM 40 MG/1
40 TABLET, FILM COATED ORAL NIGHTLY
Qty: 90 TABLET | Refills: 3 | Status: SHIPPED | OUTPATIENT
Start: 2023-01-25 | End: 2023-01-25

## 2023-01-25 NOTE — TELEPHONE ENCOUNTER
Please Approve or Refuse. Send to Pharmacy per Pt's Request:      Next Visit Date:  5/11/2023   Last Visit Date: 1/24/2023    Hemoglobin A1C (%)   Date Value   01/24/2023 5.8   09/02/2022 5.9   03/22/2022 5.0             ( goal A1C is < 7)   BP Readings from Last 3 Encounters:   01/24/23 126/78   11/23/22 114/64   10/10/22 128/79          (goal 120/80)  BUN   Date Value Ref Range Status   10/18/2022 20 8 - 23 mg/dL Final     Creatinine   Date Value Ref Range Status   10/18/2022 1.22 (H) 0.50 - 0.90 mg/dL Final     Potassium   Date Value Ref Range Status   10/18/2022 4.5 3.7 - 5.3 mmol/L Final     Potassium reflex Magnesium   Date Value Ref Range Status   10/10/2021 4.1 3.5 - 5.2 meq/L Final     Comment:     Low level specimen hemolysis is present as indicated by the interference  level index on the Roche analyzer. The reported K+ level may be falsely  increased. If clinically warranted, recollection of the specimen is  suggested.

## 2023-01-27 ENCOUNTER — HOSPITAL ENCOUNTER (OUTPATIENT)
Age: 73
Setting detail: SPECIMEN
Discharge: HOME | End: 2023-01-27
Payer: MEDICARE

## 2023-01-27 DIAGNOSIS — M1A.30X0 CHRONIC GOUT DUE TO RENAL IMPAIRMENT WITHOUT TOPHUS, UNSPECIFIED SITE: Primary | ICD-10-CM

## 2023-01-27 LAB
ABSOLUTE EOS #: 0.1 K/UL (ref 0–0.4)
ABSOLUTE LYMPH #: 1.7 K/UL (ref 1–4.8)
ABSOLUTE MONO #: 0.4 K/UL (ref 0.1–1.3)
ALBUMIN SERPL-MCNC: 3.7 G/DL (ref 3.5–5.2)
ALP BLD-CCNC: 75 U/L (ref 35–104)
ALT SERPL-CCNC: 13 U/L (ref 5–33)
ANION GAP SERPL CALCULATED.3IONS-SCNC: 11 MMOL/L (ref 9–17)
AST SERPL-CCNC: 19 U/L
BACTERIA: NORMAL
BASOPHILS # BLD: 1 % (ref 0–2)
BASOPHILS ABSOLUTE: 0.1 K/UL (ref 0–0.2)
BILIRUB SERPL-MCNC: 0.4 MG/DL (ref 0.3–1.2)
BILIRUBIN URINE: NEGATIVE
BUN BLDV-MCNC: 27 MG/DL (ref 8–23)
CALCIUM SERPL-MCNC: 9.4 MG/DL (ref 8.6–10.4)
CASTS UA: NORMAL /LPF
CHLORIDE BLD-SCNC: 100 MMOL/L (ref 98–107)
CHOLESTEROL, FASTING: 179 MG/DL
CHOLESTEROL/HDL RATIO: 3.3
CO2: 26 MMOL/L (ref 20–31)
COLOR: YELLOW
CREAT SERPL-MCNC: 1.01 MG/DL (ref 0.5–0.9)
EOSINOPHILS RELATIVE PERCENT: 2 % (ref 0–4)
EPITHELIAL CELLS UA: NORMAL /HPF
GFR SERPL CREATININE-BSD FRML MDRD: 60 ML/MIN/1.73M2
GLUCOSE BLD-MCNC: 101 MG/DL (ref 70–99)
GLUCOSE URINE: NEGATIVE
HCT VFR BLD CALC: 36.4 % (ref 36–46)
HDLC SERPL-MCNC: 54 MG/DL
HEMOGLOBIN: 11.8 G/DL (ref 12–16)
KETONES, URINE: NEGATIVE
LDL CHOLESTEROL: 84 MG/DL (ref 0–130)
LEUKOCYTE ESTERASE, URINE: ABNORMAL
LYMPHOCYTES # BLD: 26 % (ref 24–44)
MAGNESIUM: 1.8 MG/DL (ref 1.6–2.6)
MCH RBC QN AUTO: 28.2 PG (ref 26–34)
MCHC RBC AUTO-ENTMCNC: 32.3 G/DL (ref 31–37)
MCV RBC AUTO: 87.4 FL (ref 80–100)
MONOCYTES # BLD: 6 % (ref 1–7)
NITRITE, URINE: NEGATIVE
PDW BLD-RTO: 14.9 % (ref 11.5–14.9)
PH UA: 5.5 (ref 5–8)
PHOSPHORUS: 3.9 MG/DL (ref 2.6–4.5)
PLATELET # BLD: 272 K/UL (ref 150–450)
PMV BLD AUTO: 8.4 FL (ref 6–12)
POTASSIUM SERPL-SCNC: 4.3 MMOL/L (ref 3.7–5.3)
PRO-BNP: 49 PG/ML
PROTEIN UA: NEGATIVE
RBC # BLD: 4.17 M/UL (ref 4–5.2)
RBC UA: NORMAL /HPF
REASON FOR REJECTION: NORMAL
SEG NEUTROPHILS: 65 % (ref 36–66)
SEGMENTED NEUTROPHILS ABSOLUTE COUNT: 4.5 K/UL (ref 1.3–9.1)
SODIUM BLD-SCNC: 137 MMOL/L (ref 135–144)
SPECIFIC GRAVITY UA: 1.02 (ref 1–1.03)
TOTAL PROTEIN: 7.3 G/DL (ref 6.4–8.3)
TRIGLYCERIDE, FASTING: 206 MG/DL
TSH SERPL DL<=0.05 MIU/L-ACNC: 2.12 UIU/ML (ref 0.3–5)
TURBIDITY: CLEAR
URIC ACID: 6.8 MG/DL (ref 2.4–5.7)
URINE HGB: NEGATIVE
UROBILINOGEN, URINE: NORMAL
VITAMIN B-12: 1472 PG/ML (ref 232–1245)
VITAMIN D 25-HYDROXY: 45 NG/ML
WBC # BLD: 6.8 K/UL (ref 3.5–11)
WBC UA: NORMAL /HPF
ZZ NTE CLEAN UP: ORDERED TEST: NORMAL
ZZ NTE WITH NAME CLEAN UP: SPECIMEN SOURCE: NORMAL

## 2023-01-27 PROCEDURE — 80061 LIPID PANEL: CPT

## 2023-01-27 PROCEDURE — 87086 URINE CULTURE/COLONY COUNT: CPT

## 2023-01-27 PROCEDURE — 84100 ASSAY OF PHOSPHORUS: CPT

## 2023-01-27 PROCEDURE — 82043 UR ALBUMIN QUANTITATIVE: CPT

## 2023-01-27 PROCEDURE — 82570 ASSAY OF URINE CREATININE: CPT

## 2023-01-27 PROCEDURE — 80053 COMPREHEN METABOLIC PANEL: CPT

## 2023-01-27 PROCEDURE — 85025 COMPLETE CBC W/AUTO DIFF WBC: CPT

## 2023-01-27 PROCEDURE — 81001 URINALYSIS AUTO W/SCOPE: CPT

## 2023-01-27 PROCEDURE — 83735 ASSAY OF MAGNESIUM: CPT

## 2023-01-27 PROCEDURE — 84550 ASSAY OF BLOOD/URIC ACID: CPT

## 2023-01-27 PROCEDURE — 82607 VITAMIN B-12: CPT

## 2023-01-27 PROCEDURE — 83880 ASSAY OF NATRIURETIC PEPTIDE: CPT

## 2023-01-27 PROCEDURE — 82306 VITAMIN D 25 HYDROXY: CPT

## 2023-01-27 PROCEDURE — 84443 ASSAY THYROID STIM HORMONE: CPT

## 2023-01-27 RX ORDER — ALLOPURINOL 100 MG/1
100 TABLET ORAL DAILY
Qty: 90 TABLET | Refills: 1 | Status: SHIPPED | OUTPATIENT
Start: 2023-01-27

## 2023-01-28 LAB
CREATININE URINE: 135.2 MG/DL (ref 28–217)
CULTURE: NORMAL
MICROALBUMIN/CREAT 24H UR: <12 MG/L
MICROALBUMIN/CREAT UR-RTO: NORMAL MCG/MG CREAT
SPECIMEN DESCRIPTION: NORMAL

## 2023-01-28 NOTE — RESULT ENCOUNTER NOTE
Please notify patient: Uric acid is high we will start her on allopurinol to prevent gout    Chronic kidney disease stage III improved  Blood glucose mildly high 101  Triglycerides high stable-sweets  Anemia stable  I am not sure if urine culture will be done, only if abnormal will call her back  Otherwise labs within normal limits  continue current treatment    Future Appointments  1/30/2023  9:50 AM    Justyn Hawk MD         . C URO           [facility]  1/31/2023  11:00 AM   [facility] VASCULAR  [address]  1/31/2023  12:00 PM   [address] [place] [address] VASCULAR RM [address]  5/11/2023  9:00 AM    Prosper Dougherty [facility]     [facility]  1[facility]45 PM    Marycarmen Camilo MD        [facility]  8/9/2023   9:30 AM    Prosper Dougherty MD     [facility]  10/9/2023  1:00 PM    Nany Jernigan

## 2023-01-28 NOTE — RESULT ENCOUNTER NOTE
Please notify patient: No UTI on urine culture.   Uric acid is high we will start her on allopurinol to prevent gout     Chronic kidney disease stage III improved  Blood glucose mildly high 101  Triglycerides high stable-sweets  Anemia stable  Otherwise labs within normal limits  continue current treatment    Future Appointments  1/30/2023  9:50 AM    Sumanth Sanchez MD         . C URO           [facility]  1/31/2023  11:00 AM   [facility] VASCULAR RM [address]  1/31/2023  12:00 PM   [address] [place] [address] VASCULAR RM [address]  5/11/2023  9:00 AM    Cade Deluca MD     [facility]  5/24/2023  1:45 PM    Kirk Hubbard MD        [facility]  8/9/2023   9:30 AM    Cade Deluca MD     [facility]  10/9/2023  1:00 PM    Ian Garay PA-C        GYN Oncology        Mauricio Bravo

## 2023-01-31 ENCOUNTER — APPOINTMENT (OUTPATIENT)
Dept: GENERAL RADIOLOGY | Age: 73
DRG: 302 | End: 2023-01-31
Payer: MEDICARE

## 2023-01-31 ENCOUNTER — HOSPITAL ENCOUNTER (INPATIENT)
Age: 73
LOS: 1 days | Discharge: HOME HEALTH | DRG: 302 | End: 2023-02-01
Attending: STUDENT IN AN ORGANIZED HEALTH CARE EDUCATION/TRAINING PROGRAM | Admitting: INTERNAL MEDICINE
Payer: MEDICARE

## 2023-01-31 DIAGNOSIS — R53.83 OTHER FATIGUE: Primary | ICD-10-CM

## 2023-01-31 DIAGNOSIS — J18.9 PNEUMONIA OF BOTH LUNGS DUE TO INFECTIOUS ORGANISM, UNSPECIFIED PART OF LUNG: ICD-10-CM

## 2023-01-31 PROBLEM — I20.0 UNSTABLE ANGINA (HCC): Status: ACTIVE | Noted: 2023-01-31

## 2023-01-31 LAB
ABSOLUTE EOS #: 0.1 K/UL (ref 0–0.4)
ABSOLUTE LYMPH #: 1.3 K/UL (ref 1–4.8)
ABSOLUTE MONO #: 0.5 K/UL (ref 0.1–1.3)
ANION GAP SERPL CALCULATED.3IONS-SCNC: 12 MMOL/L (ref 9–17)
BASOPHILS # BLD: 1 % (ref 0–2)
BASOPHILS ABSOLUTE: 0.1 K/UL (ref 0–0.2)
BUN BLDV-MCNC: 31 MG/DL (ref 8–23)
CALCIUM SERPL-MCNC: 8.8 MG/DL (ref 8.6–10.4)
CHLORIDE BLD-SCNC: 99 MMOL/L (ref 98–107)
CO2: 24 MMOL/L (ref 20–31)
CREAT SERPL-MCNC: 1.14 MG/DL (ref 0.5–0.9)
EOSINOPHILS RELATIVE PERCENT: 1 % (ref 0–4)
GFR SERPL CREATININE-BSD FRML MDRD: 51 ML/MIN/1.73M2
GLUCOSE BLD-MCNC: 136 MG/DL (ref 70–99)
HCT VFR BLD CALC: 38.7 % (ref 36–46)
HEMOGLOBIN: 12.6 G/DL (ref 12–16)
INFLUENZA A: NOT DETECTED
INFLUENZA B: NOT DETECTED
LYMPHOCYTES # BLD: 16 % (ref 24–44)
MCH RBC QN AUTO: 28.2 PG (ref 26–34)
MCHC RBC AUTO-ENTMCNC: 32.5 G/DL (ref 31–37)
MCV RBC AUTO: 86.9 FL (ref 80–100)
MONOCYTES # BLD: 6 % (ref 1–7)
PDW BLD-RTO: 15.2 % (ref 11.5–14.9)
PLATELET # BLD: 297 K/UL (ref 150–450)
PMV BLD AUTO: 7.8 FL (ref 6–12)
POTASSIUM SERPL-SCNC: 4 MMOL/L (ref 3.7–5.3)
PRO-BNP: 148 PG/ML
PROCALCITONIN: 0.04 NG/ML
RBC # BLD: 4.46 M/UL (ref 4–5.2)
SARS-COV-2 RNA, RT PCR: NOT DETECTED
SEG NEUTROPHILS: 76 % (ref 36–66)
SEGMENTED NEUTROPHILS ABSOLUTE COUNT: 6.3 K/UL (ref 1.3–9.1)
SODIUM BLD-SCNC: 135 MMOL/L (ref 135–144)
SOURCE: NORMAL
SPECIMEN DESCRIPTION: NORMAL
TROPONIN, HIGH SENSITIVITY: 14 NG/L (ref 0–14)
TROPONIN, HIGH SENSITIVITY: 17 NG/L (ref 0–14)
WBC # BLD: 8.2 K/UL (ref 3.5–11)

## 2023-01-31 PROCEDURE — 84145 PROCALCITONIN (PCT): CPT

## 2023-01-31 PROCEDURE — 96361 HYDRATE IV INFUSION ADD-ON: CPT

## 2023-01-31 PROCEDURE — 6370000000 HC RX 637 (ALT 250 FOR IP): Performed by: INTERNAL MEDICINE

## 2023-01-31 PROCEDURE — G0378 HOSPITAL OBSERVATION PER HR: HCPCS

## 2023-01-31 PROCEDURE — 0202U NFCT DS 22 TRGT SARS-COV-2: CPT

## 2023-01-31 PROCEDURE — 99285 EMERGENCY DEPT VISIT HI MDM: CPT

## 2023-01-31 PROCEDURE — 96367 TX/PROPH/DG ADDL SEQ IV INF: CPT

## 2023-01-31 PROCEDURE — 96365 THER/PROPH/DIAG IV INF INIT: CPT

## 2023-01-31 PROCEDURE — 93005 ELECTROCARDIOGRAM TRACING: CPT | Performed by: STUDENT IN AN ORGANIZED HEALTH CARE EDUCATION/TRAINING PROGRAM

## 2023-01-31 PROCEDURE — 36415 COLL VENOUS BLD VENIPUNCTURE: CPT

## 2023-01-31 PROCEDURE — 6360000002 HC RX W HCPCS

## 2023-01-31 PROCEDURE — 1200000000 HC SEMI PRIVATE

## 2023-01-31 PROCEDURE — 96374 THER/PROPH/DIAG INJ IV PUSH: CPT

## 2023-01-31 PROCEDURE — 85025 COMPLETE CBC W/AUTO DIFF WBC: CPT

## 2023-01-31 PROCEDURE — 96375 TX/PRO/DX INJ NEW DRUG ADDON: CPT

## 2023-01-31 PROCEDURE — 6360000002 HC RX W HCPCS: Performed by: STUDENT IN AN ORGANIZED HEALTH CARE EDUCATION/TRAINING PROGRAM

## 2023-01-31 PROCEDURE — 80048 BASIC METABOLIC PNL TOTAL CA: CPT

## 2023-01-31 PROCEDURE — 87636 SARSCOV2 & INF A&B AMP PRB: CPT

## 2023-01-31 PROCEDURE — 2580000003 HC RX 258: Performed by: STUDENT IN AN ORGANIZED HEALTH CARE EDUCATION/TRAINING PROGRAM

## 2023-01-31 PROCEDURE — 71045 X-RAY EXAM CHEST 1 VIEW: CPT

## 2023-01-31 PROCEDURE — 6370000000 HC RX 637 (ALT 250 FOR IP): Performed by: STUDENT IN AN ORGANIZED HEALTH CARE EDUCATION/TRAINING PROGRAM

## 2023-01-31 PROCEDURE — 99223 1ST HOSP IP/OBS HIGH 75: CPT | Performed by: INTERNAL MEDICINE

## 2023-01-31 PROCEDURE — 2580000003 HC RX 258

## 2023-01-31 PROCEDURE — 84484 ASSAY OF TROPONIN QUANT: CPT

## 2023-01-31 PROCEDURE — 83880 ASSAY OF NATRIURETIC PEPTIDE: CPT

## 2023-01-31 RX ORDER — POTASSIUM CHLORIDE 20 MEQ/1
40 TABLET, EXTENDED RELEASE ORAL PRN
Status: DISCONTINUED | OUTPATIENT
Start: 2023-01-31 | End: 2023-02-01 | Stop reason: HOSPADM

## 2023-01-31 RX ORDER — ONDANSETRON 2 MG/ML
4 INJECTION INTRAMUSCULAR; INTRAVENOUS ONCE
Status: COMPLETED | OUTPATIENT
Start: 2023-01-31 | End: 2023-01-31

## 2023-01-31 RX ORDER — ALBUTEROL SULFATE 90 UG/1
1 AEROSOL, METERED RESPIRATORY (INHALATION) EVERY 4 HOURS PRN
Status: DISCONTINUED | OUTPATIENT
Start: 2023-01-31 | End: 2023-02-01 | Stop reason: HOSPADM

## 2023-01-31 RX ORDER — SODIUM CHLORIDE 0.9 % (FLUSH) 0.9 %
5-40 SYRINGE (ML) INJECTION EVERY 12 HOURS SCHEDULED
Status: DISCONTINUED | OUTPATIENT
Start: 2023-01-31 | End: 2023-02-01 | Stop reason: HOSPADM

## 2023-01-31 RX ORDER — ONDANSETRON 4 MG/1
4 TABLET, ORALLY DISINTEGRATING ORAL EVERY 8 HOURS PRN
Status: DISCONTINUED | OUTPATIENT
Start: 2023-01-31 | End: 2023-02-01 | Stop reason: HOSPADM

## 2023-01-31 RX ORDER — ASPIRIN 81 MG/1
81 TABLET, CHEWABLE ORAL DAILY
Status: DISCONTINUED | OUTPATIENT
Start: 2023-02-01 | End: 2023-02-01 | Stop reason: HOSPADM

## 2023-01-31 RX ORDER — LISINOPRIL 5 MG/1
5 TABLET ORAL DAILY
Status: DISCONTINUED | OUTPATIENT
Start: 2023-01-31 | End: 2023-02-01 | Stop reason: HOSPADM

## 2023-01-31 RX ORDER — POTASSIUM CHLORIDE 7.45 MG/ML
10 INJECTION INTRAVENOUS PRN
Status: DISCONTINUED | OUTPATIENT
Start: 2023-01-31 | End: 2023-02-01 | Stop reason: HOSPADM

## 2023-01-31 RX ORDER — ALENDRONATE SODIUM 35 MG/1
35 TABLET ORAL WEEKLY
Status: DISCONTINUED | OUTPATIENT
Start: 2023-01-31 | End: 2023-01-31

## 2023-01-31 RX ORDER — ONDANSETRON 2 MG/ML
4 INJECTION INTRAMUSCULAR; INTRAVENOUS EVERY 6 HOURS PRN
Status: DISCONTINUED | OUTPATIENT
Start: 2023-01-31 | End: 2023-02-01 | Stop reason: HOSPADM

## 2023-01-31 RX ORDER — SODIUM CHLORIDE 9 MG/ML
INJECTION, SOLUTION INTRAVENOUS PRN
Status: DISCONTINUED | OUTPATIENT
Start: 2023-01-31 | End: 2023-02-01 | Stop reason: HOSPADM

## 2023-01-31 RX ORDER — LEVOTHYROXINE SODIUM 0.07 MG/1
75 TABLET ORAL DAILY
Status: DISCONTINUED | OUTPATIENT
Start: 2023-01-31 | End: 2023-02-01 | Stop reason: HOSPADM

## 2023-01-31 RX ORDER — SODIUM CHLORIDE 0.9 % (FLUSH) 0.9 %
5-40 SYRINGE (ML) INJECTION PRN
Status: DISCONTINUED | OUTPATIENT
Start: 2023-01-31 | End: 2023-02-01 | Stop reason: HOSPADM

## 2023-01-31 RX ORDER — ATORVASTATIN CALCIUM 40 MG/1
40 TABLET, FILM COATED ORAL NIGHTLY
Status: DISCONTINUED | OUTPATIENT
Start: 2023-01-31 | End: 2023-02-01 | Stop reason: HOSPADM

## 2023-01-31 RX ORDER — ACETAMINOPHEN 325 MG/1
650 TABLET ORAL EVERY 6 HOURS PRN
Status: DISCONTINUED | OUTPATIENT
Start: 2023-01-31 | End: 2023-01-31

## 2023-01-31 RX ORDER — POLYETHYLENE GLYCOL 3350 17 G/17G
17 POWDER, FOR SOLUTION ORAL DAILY PRN
Status: DISCONTINUED | OUTPATIENT
Start: 2023-01-31 | End: 2023-02-01 | Stop reason: HOSPADM

## 2023-01-31 RX ORDER — TROSPIUM CHLORIDE 20 MG/1
20 TABLET, FILM COATED ORAL
Status: DISCONTINUED | OUTPATIENT
Start: 2023-01-31 | End: 2023-02-01 | Stop reason: HOSPADM

## 2023-01-31 RX ORDER — MAGNESIUM SULFATE 1 G/100ML
1000 INJECTION INTRAVENOUS PRN
Status: DISCONTINUED | OUTPATIENT
Start: 2023-01-31 | End: 2023-02-01 | Stop reason: HOSPADM

## 2023-01-31 RX ORDER — ATORVASTATIN CALCIUM 40 MG/1
40 TABLET, FILM COATED ORAL NIGHTLY
Status: CANCELLED | OUTPATIENT
Start: 2023-01-31

## 2023-01-31 RX ORDER — FUROSEMIDE 20 MG/1
20 TABLET ORAL DAILY
Status: DISCONTINUED | OUTPATIENT
Start: 2023-01-31 | End: 2023-02-01 | Stop reason: HOSPADM

## 2023-01-31 RX ORDER — ENOXAPARIN SODIUM 100 MG/ML
40 INJECTION SUBCUTANEOUS DAILY
Status: CANCELLED | OUTPATIENT
Start: 2023-01-31

## 2023-01-31 RX ORDER — ACETAMINOPHEN 325 MG/1
650 TABLET ORAL EVERY 6 HOURS PRN
Status: DISCONTINUED | OUTPATIENT
Start: 2023-01-31 | End: 2023-02-01 | Stop reason: HOSPADM

## 2023-01-31 RX ORDER — ACETAMINOPHEN 650 MG/1
650 SUPPOSITORY RECTAL EVERY 6 HOURS PRN
Status: DISCONTINUED | OUTPATIENT
Start: 2023-01-31 | End: 2023-01-31

## 2023-01-31 RX ORDER — NITROGLYCERIN 0.4 MG/1
0.4 TABLET SUBLINGUAL EVERY 5 MIN PRN
Status: DISCONTINUED | OUTPATIENT
Start: 2023-01-31 | End: 2023-02-01 | Stop reason: HOSPADM

## 2023-01-31 RX ORDER — SODIUM CHLORIDE 0.9 % (FLUSH) 0.9 %
10 SYRINGE (ML) INJECTION PRN
Status: DISCONTINUED | OUTPATIENT
Start: 2023-01-31 | End: 2023-02-01 | Stop reason: HOSPADM

## 2023-01-31 RX ORDER — 0.9 % SODIUM CHLORIDE 0.9 %
1000 INTRAVENOUS SOLUTION INTRAVENOUS ONCE
Status: COMPLETED | OUTPATIENT
Start: 2023-01-31 | End: 2023-01-31

## 2023-01-31 RX ORDER — ACETAMINOPHEN 650 MG/1
650 SUPPOSITORY RECTAL EVERY 6 HOURS PRN
Status: DISCONTINUED | OUTPATIENT
Start: 2023-01-31 | End: 2023-02-01 | Stop reason: HOSPADM

## 2023-01-31 RX ORDER — ALLOPURINOL 100 MG/1
100 TABLET ORAL DAILY
Status: DISCONTINUED | OUTPATIENT
Start: 2023-01-31 | End: 2023-02-01 | Stop reason: HOSPADM

## 2023-01-31 RX ORDER — PANTOPRAZOLE SODIUM 40 MG/1
40 TABLET, DELAYED RELEASE ORAL
Status: DISCONTINUED | OUTPATIENT
Start: 2023-02-01 | End: 2023-02-01 | Stop reason: HOSPADM

## 2023-01-31 RX ADMIN — SODIUM CHLORIDE 1000 ML: 9 INJECTION, SOLUTION INTRAVENOUS at 10:30

## 2023-01-31 RX ADMIN — AZITHROMYCIN MONOHYDRATE 500 MG: 500 INJECTION, POWDER, LYOPHILIZED, FOR SOLUTION INTRAVENOUS at 17:54

## 2023-01-31 RX ADMIN — ONDANSETRON HYDROCHLORIDE 4 MG: 2 SOLUTION INTRAMUSCULAR; INTRAVENOUS at 10:31

## 2023-01-31 RX ADMIN — CEFTRIAXONE SODIUM 1000 MG: 1 INJECTION, POWDER, FOR SOLUTION INTRAMUSCULAR; INTRAVENOUS at 16:56

## 2023-01-31 RX ADMIN — METOPROLOL TARTRATE 12.5 MG: 25 TABLET, FILM COATED ORAL at 21:22

## 2023-01-31 RX ADMIN — SODIUM CHLORIDE, PRESERVATIVE FREE 10 ML: 5 INJECTION INTRAVENOUS at 21:22

## 2023-01-31 RX ADMIN — ATORVASTATIN CALCIUM 40 MG: 40 TABLET, FILM COATED ORAL at 21:22

## 2023-01-31 RX ADMIN — ACETAMINOPHEN 650 MG: 325 TABLET ORAL at 17:03

## 2023-01-31 ASSESSMENT — ENCOUNTER SYMPTOMS
SHORTNESS OF BREATH: 1
NAUSEA: 0
COUGH: 0
ABDOMINAL PAIN: 0
VOMITING: 0
RHINORRHEA: 0

## 2023-01-31 ASSESSMENT — PAIN SCALES - GENERAL
PAINLEVEL_OUTOF10: 4
PAINLEVEL_OUTOF10: 4

## 2023-01-31 ASSESSMENT — LIFESTYLE VARIABLES
HOW OFTEN DO YOU HAVE A DRINK CONTAINING ALCOHOL: NEVER
HOW MANY STANDARD DRINKS CONTAINING ALCOHOL DO YOU HAVE ON A TYPICAL DAY: PATIENT DOES NOT DRINK

## 2023-01-31 ASSESSMENT — PAIN - FUNCTIONAL ASSESSMENT: PAIN_FUNCTIONAL_ASSESSMENT: NONE - DENIES PAIN

## 2023-01-31 NOTE — PROGRESS NOTES
Patient seen with Dr. Ronita Boxer. Patient is to be started on antibiotics for possible pneumonia and hold off on the stress test tomorrow until further recommendations.  Ordered procalcitonin and CXR for tomorrow AM.    Patient will be re-evaluated tomorrow AM.    Electronically signed by Radha Kiran MD on 1/31/2023 at 4:21 PM

## 2023-01-31 NOTE — DISCHARGE INSTR - COC
Continuity of Care Form    Patient Name: Libertad Azul   :  1950  MRN:  [id number]    Admit date:  2023  Discharge date:  2023    Code Status Order: Full Code   Advance Directives:     Admitting Physician:  Zari Azul MD  PCP: Alo Martinez MD    Discharging Nurse: [facility] Unit/Room#: 2105/2105-01  Discharging Unit Phone Number: [phone]    Emergency Contact:   Extended Emergency Contact Information  Primary Emergency Contact: Jenn Willis [address] Phone: [phone]  Relation: Child  Hearing or visual needs: None  Other needs: None  Preferred language: English   needed? No  Secondary Emergency Contact: Donna Dennis  Address: [address] Phone: [phone]  Mobile Phone: [phone]  Relation: Brother/Sister  Hearing or visual needs: None  Other needs: None  Preferred language: English   needed?  No    Past Surgical History:  Past Surgical History:   Procedure Laterality Date    CATARACT REMOVAL WITH IMPLANT Bilateral     COLONOSCOPY      had polyp, she doesn't know where and when, \"20 yrs ago\"    COLONOSCOPY  2017    COLONOSCOPY N/A 8/3/2020    COLONOSCOPY POLYPECTOMY SNARE performed by Debbi Hammonds MD at [address] [place] [address] N/A 2017    HYSTEROSCOPY  WITH MYOSURE performed by Kayy Martinez DO at [facility] ([address] [place] [address]) N/A 10/24/2017    TOTAL LAPAROSCOPIC HYSTERECTOMY, BSO, F.S.  STAGING, GYRUS G400 performed by Lainey Carrasco MD at [address] [place] [address] N/A 2017    COLONOSCOPY POLYPECTOMY / HOT SNARE [facility] N/A 2020    OPEN SIGMOID COLECTOMY; PRIMARY ANASTOMOSIS & MOBILIZATION OF SPLENIC FLEXURE performed by Debbi Hammonds MD at [address] [facility] (CERVIX REMOVED)  10/24/2017    with pelvic lymph node dissection    THYROID SURGERY  1980    subtotal 20 years ago    TONSILLECTOMY      UPPER GASTROINTESTINAL ENDOSCOPY N/A 8/3/2020    EGD BIOPSY performed by Stephanie Julio MD at [facility]    VITRECTOMY      FLOATERS       Immunization History:   Immunization History   Administered Date(s) Administered    COVID-19, PFIZER Bivalent BOOSTER, DO NOT Dilute, (age 12y+), IM, 27 mcg/0.3 mL 10/26/2022    COVID-19, PFIZER GRAY top, DO NOT Dilute, (age 15 y+), IM, 30 mcg/0.3 mL 04/22/2022    COVID-19, PFIZER PURPLE top, DILUTE for use, (age 15 y+), 30mcg/0.3mL 07/22/2021, 10/15/2021    Influenza Vaccine, unspecified formulation 10/01/2016    Influenza Virus Vaccine 10/01/2019    Influenza, FLUAD, (age 72 y+), Adjuvanted, 0.5mL 10/30/2020, 11/03/2021    Influenza, FLUARIX, FLULAVAL, FLUZONE (age 10 mo+) AND AFLURIA, (age 1 y+), PF, 0.5mL 09/25/2019    Influenza, FLUZONE (age 72 y+), High Dose, 0.7mL 11/09/2022    Influenza, Triv, inactivated, subunit, adjuvanted, IM (Fluad 65 yrs and older) 09/14/2017, 10/02/2018    Pneumococcal Conjugate 13-valent (Dflgyrv41) 02/23/2017    Pneumococcal Polysaccharide (Fjxugnemc44) 04/20/2018    Tdap (Boostrix, Adacel) 09/28/2017       Active Problems:  Patient Active Problem List   Diagnosis Code    Acquired hypothyroidism E03.9    History of TIA (transient ischemic attack) Z86.73    Chronic bilateral low back pain without sciatica M54.50, G89.29    Benign hypertension with CKD (chronic kidney disease) stage III (HCC) I12.9, N18.30    Osteopenia determined by x-ray M85.80    Osteoarthritis involving multiple joints on both sides of body M15.9    Left knee DJD M17.12    Vitamin D deficiency E55.9    Mixed incontinence N39.46    Chronic pain of both knees M25.561, M25.562, G89.29    Slow transit constipation K59.01    Allergic rhinitis J30.9    Hyperlipidemia with target LDL less than 100 E78.5    Body mass index (BMI) 45.0-49.9, adult ([facility] H33.22 At high risk for falls Z91.81    Spondylosis of lumbar region without myelopathy or radiculopathy M47.816    OAB (overactive bladder) N32.81    Primary osteoarthritis of both knees M17.0    TLHBSO, bilateral LND 10/24/17 Z90.710, Z90.79, Z90.722    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    Cataracta b/l eyes H26.9    Difficulty walking R26.2    Esotropia H50.00    History of uterine cancer, Well differentiated grade 1 adenocarcinoma arising in complex hyperplasia, 2017 Z85.42    Vitamin B 12 deficiency E53.8    Atherosclerosis of aorta (HCC) I70.0    Calculus of gallbladder without cholecystitis without obstruction K80.20    CLAROS (nonalcoholic steatohepatitis) K75.81    Paroxysmal atrial fibrillation (HCC) I48.0    Type 2 diabetes mellitus, without long-term current use of insulin (HCC) E11.9    Mobility impaired Z74.09    Chronic cholecystitis K81.1    Multiple atypical skin moles D22.9    Chronic diastolic congestive heart failure (HCC) I50.32    History of 2019 novel coronavirus disease (COVID-19) Z86.16    Abdominal aortic aneurysm (AAA) without rupture I71.40    Chronic gout due to 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 30X0    ZACKERY (obstructive sleep apnea) G47.33    Unstable angina (HCC) I20.0       Isolation/Infection:   Isolation            No Isolation          Patient Infection Status       Infection Onset Added Last Indicated Last Indicated By Review Planned Expiration Resolved Resolved By    ESBL (Extended Spectrum Beta Lactamase) 10/20/21 10/26/21 03/07/22 Culture, Urine        Resolved    COVID-19 (Rule Out) 23 COVID-19 & Influenza Combo (Ordered)   23 Rule-Out Test Resulted    COVID-19 (Rule Out) 21 COVID-19 & Influenza Combo (Ordered)   21 Rule-Out Test Resulted    COVID-19 (Rule Out) 10/10/21 10/10/21 10/10/21 COVID-19, Rapid (Ordered)   10/10/21 Rule-Out Test Resulted    COVID-19 20 COVID-19   10/03/20  COVID-19 (Rule Out) 08/08/20 08/08/20 08/08/20 COVID-19 (Ordered)   08/08/20 Rule-Out Test Resulted    COVID-19 (Rule Out) 07/30/20 07/31/20 07/30/20 Covid-19 Ambulatory (Ordered)   08/01/20 Rule-Out Test Resulted            Nurse Assessment:  Last Vital Signs: /77   Pulse 92   Temp 97.7 °F (36.5 °C) (Axillary)   Resp 18   Ht 5' 2\" (1.575 m)   Wt 248 lb (112.5 kg)   LMP  (LMP Unknown)   SpO2 98%   BMI 45.36 kg/m²     Last documented pain score (0-10 scale):    Last Weight:   Wt Readings from Last 1 Encounters:   01/31/23 248 lb (112.5 kg)     Mental Status:  oriented and alert    IV Access:  - None    Nursing Mobility/ADLs:  Walking   Assisted  Transfer  Assisted  Bathing  Assisted  Dressing  Assisted  Toileting  Assisted  Feeding  Assisted  Med Admin  Assisted  Med Delivery   whole    Wound Care Documentation and Therapy:        Elimination:  Continence: Bowel: Yes  Bladder: Yes  Urinary Catheter: None   Colostomy/Ileostomy/Ileal Conduit: No       Date of Last BM: 1/31/2023    Intake/Output Summary (Last 24 hours) at 1/31/2023 1629  Last data filed at 1/31/2023 1413  Gross per 24 hour   Intake 1000 ml   Output --   Net 1000 ml     No intake/output data recorded. Safety Concerns: At Risk for Falls    Impairments/Disabilities:      Vision and Hearing    Nutrition Therapy:  Current Nutrition Therapy:   - Oral Diet:  General, Low Fat, and Low Sodium (2gm)    Routes of Feeding: Oral  Liquids: Thin Liquids  Daily Fluid Restriction: no  Last Modified Barium Swallow with Video (Video Swallowing Test): not done    Treatments at the Time of Hospital Discharge:   Respiratory Treatments: N/A  Oxygen Therapy:  is not on home oxygen therapy. Ventilator:   CPAP  only when sleeping  Rehab Therapies: Physical Therapy and Occupational Therapy  Weight Bearing Status/Restrictions: No weight bearing restrictions  Other Medical Equipment (for information only, NOT a DME order):     Other Treatments: Skilled Nursing assessment and monitoring. Medication education and monitoring per protocol. Resume previous orders    Patient's personal belongings (please select all that are sent with patient):  Heriberto Christian RN SIGNATURE:  Electronically signed by Philip Elizondo RN on 2/1/23 at 4:11 PM EST    CASE MANAGEMENT/SOCIAL WORK SECTION    Inpatient Status Date: 1/31/2023    Readmission Risk Assessment Score:  Readmission Risk              Risk of Unplanned Readmission:  0           Discharging to [facility] [address], [address]  P: [phone]   F: [phone]      / signature: Electronically signed by Shelia Menjivar RN on 1/31/23 at 4:29 PM EST    PHYSICIAN SECTION    Prognosis: Fair    Condition at Discharge: Stable    Rehab Potential (if transferring to Rehab): Fair    Recommended Labs or Other Treatments After Discharge:     Physician Certification: I certify the above information and transfer of Afshan Holcomb  is necessary for the continuing treatment of the diagnosis listed and that she requires Home Care for less 30 days.      Update Admission H&P: No change in H&P    PHYSICIAN SIGNATURE:  Electronically signed by Abrahan Cobos MD on 2/1/23 at 3:27 PM EST

## 2023-01-31 NOTE — ED TRIAGE NOTES
Mode of arrival (squad #, walk in, police, etc) : squad        Chief complaint(s): Pt c/o generalized weakness        Arrival Note (brief scenario, treatment PTA, etc). : Pt was at home and suddenly became weak. C= \"Have you ever felt that you should Cut down on your drinking? \"  No  A= \"Have people Annoyed you by criticizing your drinking? \"  No  G= \"Have you ever felt bad or Guilty about your drinking? \"  No  E= \"Have you ever had a drink as an Eye-opener first thing in the morning to steady your nerves or to help a hangover? \"  No      Deferred []      Reason for deferring: N/A    *If yes to two or more: probable alcohol abuse. *

## 2023-01-31 NOTE — CARE COORDINATION
CASE MANAGEMENT NOTE:    Admission Date:  1/31/2023 Miley Harris is a 72 y.o.  female    Admitted for : Unstable angina (HCC) [I20.0]  Other fatigue [R53.83]    Met with:  Patient    PCP:  Dr. Scooby Conway:  [facility] and Albert Zaidi      Is patient alert and oriented at time of discussion:  Yes    Current Residence/ Living Arrangements:  independently at home alone in apartment. Pt lives on 2nd floor, however there is an elevator. Current Services PTA:  [facility] - [address] and has HHA 3x/week ([facility] from [employer] on [facility])    Does patient go to outpatient dialysis: No  If yes, location and chair time: n/a  Who is their nephrologist? N/a    Is patient agreeable to [facility]: Yes    Freedom of choice provided:  Yes    List of [address] provided: No, already current with [facility] - [address] and satisfied with their care. [facility] chosen: [address] Referral faxed to notify of admission and Donna Donald from [address] is aware. DME:  raised toilet seat, grab bars, bipap, shower chair, rollator    Home Oxygen: No    Nebulizer: No    CPAP/BIPAP: Yes    Supplier: [address]    Potential Assistance Needed: resume previous services    SNF needed: No    Freedom of choice and list provided: NA    Pharmacy:  [facility]       Is patient currently receiving oral anticoagulation therapy? Yes, Xarelto    Is the Patient an [facility] with Readmission Risk Score greater than 14%? No  If yes, pt needs a follow up appointment made within 7 days. Family Members/Caregivers that are willing and able to care for patient at home:    Yes    If yes, list name here:  Nickie Rodriguez and Morgan Phoenix    Family/caregivers educated on resources available including DME and respite care: NA    Transportation Provider:  [employer] bus or cab             Discharge Plan:  home with [facility] - [address]                  Electronically signed by: Lody Perdomo RN on 1/31/2023 at 4:25 PM

## 2023-01-31 NOTE — ED NOTES
Report given to Anya Whittington RN from U. Report method by phone   The following was reviewed with receiving RN:   Current vital signs:  /72   Pulse 98   Temp 98.1 °F (36.7 °C)   Resp 18   Ht 5' 2\" (1.575 m)   Wt 248 lb (112.5 kg)   LMP  (LMP Unknown)   SpO2 96%   BMI 45.36 kg/m²                MEWS Score: 1     Any medication or safety alerts were reviewed. Any pending diagnostics and notifications were also reviewed, as well as any safety concerns or issues, abnormal labs, abnormal imaging, and abnormal assessment findings. Questions were answered.             Toshia Cornejo RN  01/31/23 [phone]

## 2023-01-31 NOTE — ACP (ADVANCE CARE PLANNING)
Advance Care Planning     Advance Care Planning Activator (Inpatient)  Conversation Note      Date of ACP Conversation: 1/31/2023     Conversation Conducted with: Patient with Decision Making Capacity    ACP Activator: Armaan Avalos RN    Health Care Decision Maker:     Current Designated Health Care Decision Maker:     Primary Decision Maker: Jay Narvaez Child - [phone]    Secondary Decision Maker: Isabela Salazar - Brother/Sister - [phone]  Click here to complete Healthcare Decision Makers including section of the Healthcare Decision Maker Relationship (ie \"Primary\")  Today we documented Decision Maker(s) consistent with Legal Next of Kin hierarchy. Care Preferences    Ventilation: \"If you were in your present state of health and suddenly became very ill and were unable to breathe on your own, what would your preference be about the use of a ventilator (breathing machine) if it were available to you? \"      Would the patient desire the use of ventilator (breathing machine)?: yes    \"If your health worsens and it becomes clear that your chance of recovery is unlikely, what would your preference be about the use of a ventilator (breathing machine) if it were available to you? \"     Would the patient desire the use of ventilator (breathing machine)?: No      Resuscitation  \"CPR works best to restart the heart when there is a sudden event, like a heart attack, in someone who is otherwise healthy. Unfortunately, CPR does not typically restart the heart for people who have serious health conditions or who are very sick. \"    \"In the event your heart stopped as a result of an underlying serious health condition, would you want attempts to be made to restart your heart (answer \"yes\" for attempt to resuscitate) or would you prefer a natural death (answer \"no\" for do not attempt to resuscitate)? \" yes       [] Yes   [] No   Educated Patient / Decision Maker regarding differences between Advance Directives and portable DNR orders.     Length of ACP Conversation in minutes:      Conversation Outcomes:  [] ACP discussion completed  [] Existing advance directive reviewed with patient; no changes to patient's previously recorded wishes  [] New Advance Directive completed  [] Portable Do Not Rescitate prepared for Provider review and signature  [] POLST/POST/MOLST/MOST prepared for Provider review and signature      Follow-up plan:    [] Schedule follow-up conversation to continue planning  [] Referred individual to Provider for additional questions/concerns   [] Advised patient/agent/surrogate to review completed ACP document and update if needed with changes in condition, patient preferences or care setting    [] This note routed to one or more involved healthcare providers

## 2023-01-31 NOTE — H&P
[address]      [address] [facility]     HISTORY AND PHYSICAL EXAMINATION            Date:   1/31/2023  Patient name:  Krista Stock  Date of admission:  1/31/2023  9:22 AM  MRN:   [id number]  Account:  [de-identified]  YOB: 1950  PCP:    Crystal Martinez MD  Room:   [address] [place] [address]  Code Status:    Full Code    Chief Complaint:     Chief Complaint   Patient presents with    Fatigue       History Obtained From:     patient    History of Present Illness: The patient is a 72 y.o.  female who presents with to the ED complaining of shortness of breath and fatigue that started this morning when she was trying to get dressed, reporting that \"it took all my energy to get stressed\" and is admitted to the hospital for the management of unstable angina    Patient has a significant medical history of obstructive sleep apnea recently started on BiPAP which she is noncompliant with, hypothyroidism, hyperlipidemia, chronic kidney disease stage III, atrial fibrillation on rivaroxaban and metoprolol. Patient stated she had a similar episode around [place] which she presented to the ED at [facility] to be evaluated. Patient at that time was discharged    In the ED patient was tachycardic and in atrial fibrillation saturating 98%, heart rate 10 4-1 17, blood pressure 138/83. Patient had also reading of 95/80 and 110/72. Patient has a 2 sets of troponin which were 17 and 14, respectively. Patient also had an echo in September 2022 at that time patient presented with a picture of a TIA. Ejection fraction at that time was 55% with a mild dilatation of the left atrium.       Past Medical History:     Past Medical History:   Diagnosis Date    Abdominal aortic aneurysm (AAA) without rupture 10/21/2021    Adenocarcinoma of endometrium, stage 1 ([facility] 10/5/2017 Well differentiated grade 1 adenocarcinoma arising in complex hyperplasia    Adenomatous polyp of sigmoid colon, 6/26/17 7/30/2017    Final Diagnosis SPECIMEN \"A\":  SIGMOID COLON, 35 CM, BIOPSY:     TUBULAR ADENOMA  SPECIMEN \"B\":  SIGMOID COLON, 20 CM, BIOPSY:  HYPERPLASTIC POLYP    JOSHUA (acute kidney injury) ([address] 1/15/2020    Allergic rhinitis 2/23/2017    Anemia 11/9/2020    At high risk for falls 2/23/2017    Atrial fibrillation [facility])     Jan 2020    Atrial fibrillation with rapid ventricular response ([address] 10/10/2021    Atrial fibrillation, new onset ([address] 1/20/2020    CHF (congestive heart failure) ([address]     \"little\"    Class 3 severe obesity due to excess calories without serious comorbidity with body mass index (BMI) of 40.0 to 44.9 in adult [facility]) 7/22/2020    Colitis 7/22/2020    Colon polyp     Had colonoscopy done 20 yrs ago    Diabetes mellitus ([address]     Diverticulitis     Diverticulitis of colon with perforation 8/4/2020    Endometrial cancer ([address]     History of TIA (transient ischemic attack) '80's    on Baby ASA    Hyperglycemia 3/21/2017    Hyperlipidemia     Hypertension since 2015    on Rx.     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    Oral phase dysphagia 2/23/2018    Osteoarthritis (arthritis due to wear and tear of joints)     ronan knee    Osteoarthritis involving multiple joints on both sides of body 4/24/2012    Osteoporosis     Perforated bowel ([address]     Perforated diverticulum 6/15/2020    Postmenopausal bleeding 9/20/2017    Primary hypertension 9/8/2022    Prolonged Q-T interval on ECG 9/26/2022    Rectal bleeding 9/21/2020    S/P h-scope, Myosure 9/20/17 9/20/2017    Pathology pending    Tennis elbow     left    Thickened endometrium 9/20/2017    TIA (transient ischemic attack)     TLHBSO, bilateral LND 10/24/17 10/24/2017    Type 2 diabetes mellitus, without long-term current use of insulin ([facility] 1/14/2020    UTI due to extended-spectrum beta lactamase (ESBL) producing Escherichia coli 3/9/2022        Past SurgicalHistory:     Past Surgical History:   Procedure Laterality Date    CATARACT REMOVAL WITH IMPLANT Bilateral 2015    COLONOSCOPY  1997    had polyp, she doesn't know where and when, \"20 yrs ago\"    COLONOSCOPY  06/26/2017    COLONOSCOPY N/A 8/3/2020    COLONOSCOPY POLYPECTOMY SNARE performed by Thee Elder MD at [facility] N/A 9/20/2017    HYSTEROSCOPY  [address] performed by Alessandra Lyons DO at [facility] ([address]) N/A 10/24/2017    TOTAL LAPAROSCOPIC HYSTERECTOMY, BSO, F.S.  STAGING, GYRUS G400 performed by Marta Hong MD at [facility] N/A 6/26/2017    COLONOSCOPY POLYPECTOMY / [address] performed by Ervin Cam DO at [place] [address] N/A 8/4/2020    OPEN SIGMOID COLECTOMY; PRIMARY ANASTOMOSIS & MOBILIZATION OF SPLENIC FLEXURE performed by Thee Elder MD at [facility] ([address] [place] [address])  10/24/2017    with pelvic lymph node dissection    THYROID SURGERY  1980    subtotal 20 years ago    TONSILLECTOMY      UPPER GASTROINTESTINAL ENDOSCOPY N/A 8/3/2020    EGD BIOPSY performed by Thee Elder MD at [facility]        Medications Prior to Admission:        Prior to Admission medications    Medication Sig Start Date End Date Taking?  Authorizing Provider   allopurinol (ZYLOPRIM) 100 MG tablet Take 1 tablet by mouth daily For high uric acid, to prevent gout 1/27/23   Taty Rascon MD   atorvastatin (LIPITOR) 40 MG tablet TAKE ONE (1) TABLET BY MOUTH DAILY STOP SIMVASTATIN 1/25/23   Radha Vallecillo MD   ondansetron (ZOFRAN) 4 MG tablet TAKE ONE (1) TABLET BY MOUTH EVERY SIX (6) HOURS AS NEEDED FOR NAUSEA OR VOMITING 1/11/23   Radha Vallecillo MD   alendronate (FOSAMAX) 35 MG tablet TAKE ONE (1) TABLET BY MOUTH EVERY 7 DAYS  Patient taking differently: Indications: takes on fridays 1/3/23   Bebeto Dietrich MD   lisinopril (PRINIVIL;ZESTRIL) 5 MG tablet TAKE ONE (1) TABLET BY MOUTH DAILY 12/28/22   Bebeto Dietrich MD   XARELTO 20 MG TABS tablet TAKE ONE (1) TABLET BY MOUTH DAILY (WITH BREAKFAST) 11/30/22   Bebeto Dietrich MD   furosemide (LASIX) 20 MG tablet TAKE ONE (1) TABLET BY MOUTH DAILY FOR CONGESTIVE HEART FAILURE 11/2/22   Bebeto Dietrich MD   Respiratory Therapy Supplies MISC Please provide nose pillow mask  for CPAP 10/20/22   Rosa Diggs MD   Incontinence Supply Disposable MISC Use daily for incontinence 10/20/22   Rosa Diggs MD   docusate sodium (COLACE) 100 MG capsule TAKE ONE (1) CAPSULE BY MOUTH TWO (2) TIMES DAILY FOR CONSTIPATION  Patient not taking: Reported on 1/31/2023 10/6/22   Bebeto Dietrich MD   levothyroxine (SYNTHROID) 75 MCG tablet TAKE ONE (1) TABLET BY MOUTH EVERY MORNING (BEFORE BREAKFAST) 10/4/22   Bebeto Dietrich MD   solifenacin (VESICARE) 5 MG tablet Take 1 tablet by mouth daily 9/14/22   MAX Jacobsen CNP   Incontinence Supplies MISC Needs washable bed pads 9/9/22   Bebeto Dietrich MD   albuterol sulfate HFA (PROVENTIL HFA) 108 (90 Base) MCG/ACT inhaler Inhale 1-2 puffs into the lungs every 4 hours as needed for Wheezing 9/8/22   Rosa Diggs MD   Misc. Devices (STEP N REST II WALKER) MISC Rollator walker 9/8/22   Rosa Diggs MD   Misc.  Devices (RAISED TOILET SEAT) [facility] Please provide with arms 9/8/22   Rosa Diggs MD   vitamin D3 (CHOLECALCIFEROL) 25 MCG (1000 UT) TABS tablet TAKE TWO (2) TABLETS BY MOUTH ONCE DAILY 7/13/22   Gutierrez Burgos MD   MYRBETRIQ 50 MG TB24 Take 50 mg by mouth daily  Patient not taking: Reported on 1/31/2023 7/12/22   Prisca Fulton MD   Probiotic Product (LISSY-BID PROBIOTIC) TABS TAKE ONE (1) TABLET BY MOUTH IN THE [facility] 6/14/22   Gutierrez Burgos MD   pantoprazole (PROTONIX) 40 MG tablet TAKE ONE (1) TABLET BY MOUTH ONCE DAILY 6/14/22   Gutierrez Burgos MD   vitamin B-12 (CYANOCOBALAMIN) 1000 MCG tablet TAKE ONE (1) TABLET BY MOUTH DAILY 4/19/22   Gutierrez Burgos MD   metoprolol tartrate (LOPRESSOR) 25 MG tablet Take 0.5 tablets by mouth 2 times daily Dose decreased 4/12/2022  due to bradycardia  Patient taking differently: Take 12.5 mg by mouth 2 times daily Dose decreased 4/12/2022  due to bradycardia 4/12/22   Gutierrez Bugros MD   Magnesium Oxide (MAGNESIUM-OXIDE) 250 MG TABS tablet Take 1 tablet by mouth daily Take with food, in the evening 3/22/22   Gutierrez Burgos MD   ACETAMINOPHEN EXTRA STRENGTH 500 MG tablet TAKE 1 TABLET BY MOUTH EVERY 6 HOURS AS NEEDED FOR PAIN 11/16/21   Gutierrez Burgos MD        Allergies:     Sulfa antibiotics and Penicillins    Social History:     Tobacco:    reports that she has never smoked. She has never used smokeless tobacco.  Alcohol:      reports no history of alcohol use. Drug Use:  reports no history of drug use.     Family History:     Family History   Problem Relation Age of Onset    Coronary Art Dis Father     Hypertension Father     Diabetes Father     High Blood Pressure Father     Heart Attack Father     Diabetes Mother     High Blood Pressure Mother     Thyroid Disease Mother     High Blood Pressure Sister     Thyroid Disease Brother     High Blood Pressure Brother     High Blood Pressure Maternal Grandmother     Diabetes Maternal Grandfather     No Known Problems Paternal Grandmother     Heart Attack Paternal Grandfather     Colon Cancer Neg Hx        Review of Systems:     Positive and Negative as described in HPI.    CONSTITUTIONAL: Positive for fatigue  Psych: Normal mood and affect, no depression, no suicidal ideation. EYES: No acute changes in vision  HEENT: No headaches, no nasal congestion, no difficulty swallowing  RESPIRATORY: Exertional dyspnea, no wheezing, no Cough  CARDIOVASCULAR: negative for chest pain, no palpitations  GASTROINTESTINAL: no nausea, no vomiting, no change in bowel habits, no abdominal pain   GENITOURINARY: negative for dysuria, no hematuria   NEUROLOGICAL: No weakness or numbness    Physical Exam:   /77   Pulse 92   Temp 97.7 °F (36.5 °C) (Axillary)   Resp 18   Ht 5' 2\" (1.575 m)   Wt 248 lb (112.5 kg)   LMP  (LMP Unknown)   SpO2 98%   BMI 45.36 kg/m²   Temp (24hrs), Av.9 °F (36.6 °C), Min:97.7 °F (36.5 °C), Max:98.1 °F (36.7 °C)        No results for input(s): POCGLU in the last 72 hours. Intake/Output Summary (Last 24 hours) at 2023 1609  Last data filed at 2023 1413  Gross per 24 hour   Intake 1000 ml   Output --   Net 1000 ml       PHYSICAL EXAM:  General Appearance  Alert , awake, not in acute distress  HEENT - Head is normocephalic, atraumatic. Psych: Normal mood and affect, normal behavior, not agitated, maintaining good eye contact   Neck: supple, no rigidity,  Lungs - Bilateral equal air entry, no wheezes, rales or rhonchi, aeration good  Cardiovascular - Heart sounds are normal.  Tachycardic and atrial fibrillation  Abdomen - Soft, nontender, nondistended, no masses or organomegaly  Neurologic - There are no new focal motor or sensory deficits  Skin - No bruising or bleeding on exposed skin area  Extremities - No cyanosis or clubbing, trace edema    Investigations:     Laboratory Testing:  Recent Results (from the past 24 hour(s))   COVID-19 & Influenza Combo    Collection Time: 23 10:28 AM    Specimen: Nasopharyngeal Swab   Result Value Ref Range    Specimen Description . NASOPHARYNGEAL SWAB     Source . NASOPHARYNGEAL SWAB     SARS-CoV-2 RNA, RT PCR Not Detected Not Detected    INFLUENZA A Not Detected Not Detected    INFLUENZA B Not Detected Not Detected   CBC with Auto Differential    Collection Time: 01/31/23 10:29 AM   Result Value Ref Range    WBC 8.2 3.5 - 11.0 k/uL    RBC 4.46 4.0 - 5.2 m/uL    Hemoglobin 12.6 12.0 - 16.0 g/dL    Hematocrit 38.7 36 - 46 %    MCV 86.9 80 - 100 fL    MCH 28.2 26 - 34 pg    MCHC 32.5 31 - 37 g/dL    RDW 15.2 (H) 11.5 - 14.9 %    Platelets 524 598 - 594 k/uL    MPV 7.8 6.0 - 12.0 fL    Seg Neutrophils 76 (H) 36 - 66 %    Lymphocytes 16 (L) 24 - 44 %    Monocytes 6 1 - 7 %    Eosinophils % 1 0 - 4 %    Basophils 1 0 - 2 %    Segs Absolute 6.30 1.3 - 9.1 k/uL    Absolute Lymph # 1.30 1.0 - 4.8 k/uL    Absolute Mono # 0.50 0.1 - 1.3 k/uL    Absolute Eos # 0.10 0.0 - 0.4 k/uL    Basophils Absolute 0.10 0.0 - 0.2 k/uL   Basic Metabolic Panel    Collection Time: 01/31/23 10:29 AM   Result Value Ref Range    Glucose 136 (H) 70 - 99 mg/dL    BUN 31 (H) 8 - 23 mg/dL    Creatinine 1.14 (H) 0.50 - 0.90 mg/dL    Est, Glom Filt Rate 51 (L) >60 mL/min/1.73m2    Calcium 8.8 8.6 - 10.4 mg/dL    Sodium 135 135 - 144 mmol/L    Potassium 4.0 3.7 - 5.3 mmol/L    Chloride 99 98 - 107 mmol/L    CO2 24 20 - 31 mmol/L    Anion Gap 12 9 - 17 mmol/L   Troponin    Collection Time: 01/31/23 10:29 AM   Result Value Ref Range    Troponin, High Sensitivity 17 (H) 0 - 14 ng/L   Brain Natriuretic Peptide    Collection Time: 01/31/23 10:29 AM   Result Value Ref Range    Pro- <300 pg/mL   EKG 12 Lead    Collection Time: 01/31/23 10:43 AM   Result Value Ref Range    Ventricular Rate 102 BPM    Atrial Rate 300 BPM    QRS Duration 82 ms    Q-T Interval 380 ms    QTc Calculation (Bazett) 495 ms    R Axis 29 degrees    T Axis 54 degrees   Troponin    Collection Time: 01/31/23 12:10 PM   Result Value Ref Range    Troponin, High Sensitivity 14 0 - 14 ng/L         Current Facility-Administered Medications   Medication Dose Route Frequency Provider Last Rate Last Admin    sodium chloride flush 0.9 % injection 5-40 mL  5-40 mL IntraVENous 2 times per day Liya Avalos MD        sodium chloride flush 0.9 % injection 10 mL  10 mL IntraVENous PRN Liya Avalos MD        0.9 % sodium chloride infusion   IntraVENous PRN Liya Avalos MD        ondansetron (ZOFRAN-ODT) disintegrating tablet 4 mg  4 mg Oral Q8H PRN Liya Avalos MD        Or    ondansetron (ZOFRAN) injection 4 mg  4 mg IntraVENous Q6H PRN Liya Avalos MD        acetaminophen (TYLENOL) tablet 650 mg  650 mg Oral Q6H PRN Liya Avalos MD        Or    acetaminophen (TYLENOL) suppository 650 mg  650 mg Rectal Q6H PRN Todd Tari Ramirez MD        magnesium hydroxide (MILK OF MAGNESIA) 400 MG/5ML suspension 30 mL  30 mL Oral Daily PRN Liya Avalos MD        potassium chloride (KLOR-CON M) extended release tablet 40 mEq  40 mEq Oral PRN Liya Avalos MD        Or    potassium bicarb-citric acid (EFFER-K) effervescent tablet 40 mEq  40 mEq Oral PRN Liya Avalos MD        Or    potassium chloride 10 mEq/100 mL IVPB (Peripheral Line)  10 mEq IntraVENous PRN Liya Avalos MD        potassium chloride 10 mEq/100 mL IVPB (Peripheral Line)  10 mEq IntraVENous PRN Liya Avalos MD        magnesium sulfate 1000 mg in dextrose 5% 100 mL IVPB  1,000 mg IntraVENous PRN Liya Avalos MD        nitroGLYCERIN (NITROSTAT) SL tablet 0.4 mg  0.4 mg SubLINGual Q5 Min PRN Liya Avalos MD        [START ON 2/1/2023] aspirin chewable tablet 81 mg  81 mg Oral Daily Liya Avalos MD        allopurinol (ZYLOPRIM) tablet 100 mg  100 mg Oral Daily Rachel Harrison MD        atorvastatin (LIPITOR) tablet 40 mg  40 mg Oral Nightly Rachel Harrison MD        furosemide (LASIX) tablet 20 mg  20 mg Oral Daily Rachel Harrison MD        levothyroxine (SYNTHROID) tablet 75 mcg  75 mcg Oral Daily Rachel Harrison MD        lisinopril (PRINIVIL;ZESTRIL) tablet 5 mg  5 mg Oral Daily Esha Perez MD        metoprolol tartrate (LOPRESSOR) tablet 12.5 mg  12.5 mg Oral BID Esha Perez MD        [facility]) tablet 20 mg  20 mg Oral BID AC Esha Perez MD        rivaroxaban (XARELTO) tablet 20 mg  20 mg Oral Daily Esha Perez MD        sodium chloride flush 0.9 % injection 5-40 mL  5-40 mL IntraVENous 2 times per day Esha Perez MD        sodium chloride flush 0.9 % injection 5-40 mL  5-40 mL IntraVENous PRN Esha Perez MD        0.9 % sodium chloride infusion   IntraVENous PRN Esha Perez MD        polyethylene glycol (GLYCOLAX) packet 17 g  17 g Oral Daily PRN Esha Perez MD        acetaminophen (TYLENOL) tablet 650 mg  650 mg Oral Q6H PRN Esha Perez MD        Or    acetaminophen (TYLENOL) suppository 650 mg  650 mg Rectal Q6H PRN Esha Perez MD        [START ON 2/1/2023] pantoprazole (PROTONIX) tablet 40 mg  40 mg Oral QAM AC Esha Perez MD        albuterol sulfate HFA (PROVENTIL;VENTOLIN;PROAIR) 108 (90 Base) MCG/ACT inhaler 1 puff  1 puff Inhalation Q4H PRN Esha Perez MD           Imaging/Diagnostics:    XR CHEST PORTABLE    Result Date: 1/31/2023  EXAMINATION: ONE XRAY VIEW OF THE CHEST 1/31/2023 9:48 am COMPARISON: 09/01/2022 HISTORY: ORDERING SYSTEM PROVIDED HISTORY: cough, congestion TECHNOLOGIST PROVIDED HISTORY: cough, congestion FINDINGS: Heart size is stable without evidence of vascular congestion. Mild calcific plaque of the thoracic aorta. Subtle haziness over the lung bases is probably in large part due to overlying soft tissue. 14 mm density projecting over the right lung base is probably due to overlapping structures. Lungs are otherwise clear. No new focal infiltrates are seen. No significant pleural effusions.   Monitor leads overlie the chest.     No acute cardiopulmonary process suspected. Subtle 14 mm density right lung base likely related to overlapping structures. Recommend follow-up PA and lateral views. If it persists, CT is recommended. Assessment :      Primary Problem  Unstable angina [facility])    Active Hospital Problems    Diagnosis Date Noted    Body mass index (BMI) 45.0-49.9, adult [facility]) [Z68.42] 02/23/2017     Priority: High    Unstable angina ([facility] [I20.0] 01/31/2023     Priority: Medium    Chronic diastolic congestive heart failure ([facility] [I50.32] 07/24/2020    Type 2 diabetes mellitus, without long-term current use of insulin ([facility] [E11.9] 01/14/2020    Acquired hypothyroidism [E03.9]     Benign hypertension with CKD (chronic kidney disease) stage III ([facility] [I12.9, N18.30]        Plan:     Patient status Admit as inpatient in the  Progressive Unit/Step down    Fatigue and exertional dyspnea, suggestive of unstable angina with underlying atrial fibrillation  - Patient troponins were negative  - Continue patient on home dose Xarelto  - Patient on aspirin, atorvastatin  - Patient on metoprolol for atrial fibrillation  - Correct etiology are consulted and appreciate recommendations  - Stress test pending, patient n.p.o. at midnight  - Repeat echo  Hypothyroidism  - Continue home dose of levothyroxine  Hyperlipidemia  - Patient on atorvastatin  Benign hypertension  - Patient on lisinopril  Obstructive sleep apnea  - Patient on home BiPAP, she does not know the settings. - We will order BiPAP overnight        Attestation and add on       I have discussed the care of Mitchell Tran , including pertinent history and exam findings,      1/31/23    with the resident. I have seen and examined the patient and the key elements of all parts of the encounter have been performed by me . I agree with the assessment, plan and orders as documented by the resident.      Hospital Problems             Last Modified POA    * (Principal) Unstable angina ([facility] 1/31/2023 Yes    Body mass index (BMI) 45.0-49.9, adult ([facility] 1/31/2023 Yes    Acquired hypothyroidism 1/31/2023 Yes    Benign hypertension with CKD (chronic kidney disease) stage III ([place] [address] 1/31/2023 Yes    Type 2 diabetes mellitus, without long-term current use of insulin ([facility] 1/31/2023 Yes    Chronic diastolic congestive heart failure ([place] [address] 1/31/2023 Yes           ---- ;        Medications: Allergies: Allergies   Allergen Reactions    Sulfa Antibiotics Nausea Only    Penicillins Rash     Took Keflex, cefepime, cefazolin per medication history, no reaction       Current Meds:   Scheduled Meds:    sodium chloride flush  5-40 mL IntraVENous 2 times per day    [START ON 2/1/2023] aspirin  81 mg Oral Daily    allopurinol  100 mg Oral Daily    atorvastatin  40 mg Oral Nightly    furosemide  20 mg Oral Daily    levothyroxine  75 mcg Oral Daily    lisinopril  5 mg Oral Daily    metoprolol tartrate  12.5 mg Oral BID    trospium  20 mg Oral BID AC    rivaroxaban  20 mg Oral Daily    sodium chloride flush  5-40 mL IntraVENous 2 times per day    [START ON 2/1/2023] pantoprazole  40 mg Oral QAM AC     Continuous Infusions:    sodium chloride      sodium chloride       PRN Meds: sodium chloride flush, sodium chloride, ondansetron **OR** ondansetron, acetaminophen **OR** acetaminophen, magnesium hydroxide, potassium chloride **OR** potassium alternative oral replacement **OR** potassium chloride, potassium chloride, magnesium sulfate, nitroGLYCERIN, sodium chloride flush, sodium chloride, polyethylene glycol, acetaminophen **OR** acetaminophen, albuterol sulfate HFA      MD MARILOU Mccrary[address], [address] [place] [address].    Phone [phone]   Fax: [phone]  Answering Service: [phone]       Consultations:   IP CONSULT TO INTERNAL MEDICINE  IP CONSULT TO CARDIOLOGY    DVT prophylaxis:  On Xarelto  GI prophylaxis: Protonix 40 mg daily Patient is admitted as inpatient status because of co-morbiditieslisted above, severity of signs and symptoms as outlined, requirement for current medical therapies and most importantly because of direct risk to patient if care not provided in a hospital setting.       Willima Bright MD  1/31/2023  4:09 PM    Copy sent to Dr. Vaishali Mcclellan MD

## 2023-01-31 NOTE — ED PROVIDER NOTES
[facility]  Emergency Department Encounter  Emergency Medicine Physician     Pt Name: Adriane Osorio  MRN: [id number]  [place] 1950  Date of evaluation: 1/31/23  PCP:  Margo Jarvis MD    [address] [place] [address]       Chief Complaint   Patient presents with    Fatigue       HISTORY OF PRESENT ILLNESS  (Location/Symptom, Timing/Onset, Context/Setting, Quality, Duration, Modifying Factors, Severity.)    Adriane Osorio is a 72 y.o. female who presents with fatigue, intermittent chest pain, shortness of breath. Patient states that she has been having more short of breath and more fatigued every time when she has to walk. States this is relatively new for her. She does endorse a history of atrial fibrillation with rapid ventricular response. States that she has been prescribed Xarelto, she is still taking Xarelto. Has been compliant with her other medications as well. Denies any recent exposures. Denies any nausea or vomiting.   Denies any body aches, fever, chills        PAST MEDICAL / SURGICAL / SOCIAL / FAMILY HISTORY    has a past medical history of Abdominal aortic aneurysm (AAA) without rupture, Adenocarcinoma of endometrium, stage 1 (HCC), Adenomatous polyp of sigmoid colon, 6/26/17, JOSHUA (acute kidney injury) ([address] Allergic rhinitis, Anemia, At high risk for falls, Atrial fibrillation ([address] Atrial fibrillation with rapid ventricular response ([address] Atrial fibrillation, new onset ([address] CHF (congestive heart failure) ([address] Class 3 severe obesity due to excess calories without serious comorbidity with body mass index (BMI) of 40.0 to 44.9 in adult [facility]), Colitis, Colon polyp, Diabetes mellitus ([address] Diverticulitis, Diverticulitis of colon with perforation, Endometrial cancer ([address] History of TIA (transient ischemic attack), Hyperglycemia, Hyperlipidemia, Hypertension, Hypothyroidism, Legally blind, Lower back pain, Mixed incontinence, Morbid obesity with BMI of 40.0-44.9, adult ([address] CLAROS (nonalcoholic steatohepatitis), Numbness of lip, Obesity, Optic atrophy, Oral phase dysphagia, Osteoarthritis (arthritis due to wear and tear of joints), Osteoarthritis involving multiple joints on both sides of body, Osteoporosis, Perforated bowel ([facility] Perforated diverticulum, Postmenopausal bleeding, Primary hypertension, Prolonged Q-T interval on ECG, Rectal bleeding, S/P h-scope, Myosure 9/20/17, Tennis elbow, Thickened endometrium, TIA (transient ischemic attack), TLHBSO, bilateral LND 10/24/17, Type 2 diabetes mellitus, without long-term current use of insulin ([facility] and UTI due to extended-spectrum beta lactamase (ESBL) producing Escherichia coli.     has a past surgical history that includes Thyroid surgery (1980); Colonoscopy (1997); Tonsillectomy; Colonoscopy (06/26/2017); pr colsc flx w/removal lesion by hot bx forceps (N/A, 6/26/2017); Dilation and curettage of uterus (N/A, 9/20/2017); Cataract removal with implant (Bilateral, 2015); vitrectomy; Total abdominal hysterectomy w/ bilateral salpingoophorectomy (10/24/2017); Hysterectomy (N/A, 10/24/2017); Upper gastrointestinal endoscopy (N/A, 8/3/2020); Colonoscopy (N/A, 8/3/2020); and Small intestine surgery (N/A, 8/4/2020). Social History     Socioeconomic History    Marital status:       Spouse name: Not on file    Number of children: 1    Years of education: Not on file    Highest education level: Not on file   Occupational History    Not on file   Tobacco Use    Smoking status: Never    Smokeless tobacco: Never   Vaping Use    Vaping Use: Never used   Substance and Sexual Activity    Alcohol use: No     Alcohol/week: 0.0 standard drinks    Drug use: No    Sexual activity: Not Currently     Partners: Male   Other Topics Concern    Not on file   Social History Narrative    Not on file     Social Determinants of Health     Financial Resource Strain: Low Risk     Difficulty of Paying Living Expenses: Not hard at all   Food Insecurity: No Food Insecurity    Worried About Running Out of Food in the Last Year: Never true    Ran Out of Food in the Last Year: Never true   Transportation Needs: Not on file   Physical Activity: Not on file   Stress: Not on file   Social Connections: Not on file   Intimate Partner Violence: Not on file   Housing Stability: Not on file       Family History   Problem Relation Age of Onset    Coronary Art Dis Father     Hypertension Father     Diabetes Father     High Blood Pressure Father     Heart Attack Father     Diabetes Mother     High Blood Pressure Mother     Thyroid Disease Mother     High Blood Pressure Sister     Thyroid Disease Brother     High Blood Pressure Brother     High Blood Pressure Maternal Grandmother     Diabetes Maternal Grandfather     No Known Problems Paternal Grandmother     Heart Attack Paternal Grandfather     Colon Cancer Neg Hx        Allergies:    Sulfa antibiotics and Penicillins    Home Medications:  Prior to Admission medications    Medication Sig Start Date End Date Taking?  Authorizing Provider   allopurinol (ZYLOPRIM) 100 MG tablet Take 1 tablet by mouth daily For high uric acid, to prevent gout 1/27/23   Bebeto Dietrich MD   atorvastatin (LIPITOR) 40 MG tablet TAKE ONE (1) TABLET BY MOUTH DAILY STOP SIMVASTATIN 1/25/23   Radha Vallecillo MD   ondansetron (ZOFRAN) 4 MG tablet TAKE ONE (1) TABLET BY MOUTH EVERY SIX (6) HOURS AS NEEDED FOR NAUSEA OR VOMITING 1/11/23   Radha Vallecillo MD   alendronate (FOSAMAX) 35 MG tablet TAKE ONE (1) TABLET BY MOUTH EVERY 7 DAYS  Patient taking differently: Indications: takes on fridays 1/3/23   Bebeto Dietrich MD   lisinopril (PRINIVIL;ZESTRIL) 5 MG tablet TAKE ONE (1) TABLET BY MOUTH DAILY 12/28/22   Radha Vallecillo MD   XARELTO 20 MG TABS tablet TAKE ONE (1) TABLET BY MOUTH DAILY (WITH BREAKFAST) 11/30/22   Bebeto Dietrich MD   furosemide (LASIX) 20 MG tablet TAKE ONE (1) TABLET BY MOUTH DAILY FOR CONGESTIVE HEART FAILURE 11/2/22 Claria Mcardle, MD   Respiratory Therapy Supplies MISC Please provide nose pillow mask  for CPAP 10/20/22   Maya Cunningham MD   Incontinence Supply Disposable MISC Use daily for incontinence 10/20/22   Maya Cunningham MD   docusate sodium (COLACE) 100 MG capsule TAKE ONE (1) CAPSULE BY MOUTH TWO (2) TIMES DAILY FOR CONSTIPATION  Patient not taking: Reported on 1/31/2023 10/6/22   Claria Mcardle, MD   levothyroxine (SYNTHROID) 75 MCG tablet TAKE ONE (1) TABLET BY MOUTH EVERY MORNING (BEFORE BREAKFAST) 10/4/22   Claria Mcardle, MD   solifenacin (VESICARE) 5 MG tablet Take 1 tablet by mouth daily 9/14/22   MAX Huerta CNP   Incontinence Supplies MISC Needs washable bed pads 9/9/22   Claria Mcardle, MD   albuterol sulfate HFA (PROVENTIL HFA) 108 (90 Base) MCG/ACT inhaler Inhale 1-2 puffs into the lungs every 4 hours as needed for Wheezing 9/8/22   Maya Cunningham MD   [facility]. Devices (STEP N REST II WALKER) MISC Rollator walker 9/8/22   Maya Cunningham MD   [facility].  Devices (RAISED TOILET SEAT) [facility] Please provide with arms 9/8/22   Maya Cunningham MD   vitamin D3 (CHOLECALCIFEROL) 25 MCG (1000 UT) TABS tablet TAKE TWO (2) TABLETS BY MOUTH ONCE DAILY 7/13/22   Claria Mcardle, MD   MYRBETRIQ 50 MG TB24 Take 50 mg by mouth daily  Patient not taking: Reported on 1/31/2023 7/12/22   Eloy Hernandez MD   Probiotic Product (LISSY-BID PROBIOTIC) TABS TAKE ONE (1) TABLET BY MOUTH IN THE [facility] 6/14/22   Claria Mcardle, MD   pantoprazole (PROTONIX) 40 MG tablet TAKE ONE (1) TABLET BY MOUTH ONCE DAILY 6/14/22   Claria Mcardle, MD   vitamin B-12 (CYANOCOBALAMIN) 1000 MCG tablet TAKE ONE (1) TABLET BY MOUTH DAILY 4/19/22   Claria Mcardle, MD   metoprolol tartrate (LOPRESSOR) 25 MG tablet Take 0.5 tablets by mouth 2 times daily Dose decreased 4/12/2022  due to bradycardia  Patient taking differently: Take 12.5 mg by mouth 2 times daily Dose decreased 4/12/2022  due to bradycardia 4/12/22 Ryan Ramos MD   Magnesium Oxide (MAGNESIUM-OXIDE) 250 MG TABS tablet Take 1 tablet by mouth daily Take with food, in the evening 3/22/22   Radha Vallecillo MD   ACETAMINOPHEN EXTRA STRENGTH 500 MG tablet TAKE 1 TABLET BY MOUTH EVERY 6 HOURS AS NEEDED FOR PAIN 11/16/21   Ryan Ramos MD       REVIEW OF SYSTEMS    (2-9 systems for level 4, 10 or more for level 5)    Review of Systems   Constitutional:  Positive for fatigue. Negative for chills and fever. HENT:  Negative for congestion and rhinorrhea. Respiratory:  Positive for shortness of breath. Negative for cough. Cardiovascular:  Negative for chest pain. Gastrointestinal:  Negative for abdominal pain, nausea and vomiting. Genitourinary:  Negative for dysuria and flank pain. Musculoskeletal:  Negative for myalgias. Neurological:  Negative for headaches. All other systems reviewed and are negative. PHYSICAL EXAM   (up to 7 for level 4, 8 or more for level 5)    INITIAL VITALS:   ED Triage Vitals [01/31/23 0922]   BP Temp Temp Source Heart Rate Resp SpO2 Height Weight   138/83 97.8 °F (36.6 °C) Oral (!) 104 17 98 % 5' 2\" (1.575 m) 248 lb (112.5 kg)       Physical Exam  Vitals and nursing note reviewed. Constitutional:       General: She is not in acute distress. Appearance: She is well-developed. She is obese. She is not ill-appearing. Cardiovascular:      Rate and Rhythm: Normal rate and regular rhythm. Pulses: Normal pulses. Radial pulses are 2+ on the right side and 2+ on the left side. Pulmonary:      Effort: Pulmonary effort is normal. No respiratory distress. Breath sounds: Normal breath sounds. No decreased breath sounds. Abdominal:      General: There is no distension. Palpations: Abdomen is soft. Tenderness: There is no abdominal tenderness. Skin:     General: Skin is warm and dry. Capillary Refill: Capillary refill takes less than 2 seconds.       Coloration: Skin is pale.   Neurological:      General: No focal deficit present. Mental Status: She is alert and oriented to person, place, and time. DIFFERENTIAL  DIAGNOSIS   PLAN (LABS / IMAGING / EKG):  Orders Placed This Encounter   Procedures    COVID-19 & Influenza Combo    XR CHEST PORTABLE    CBC with Auto Differential    Basic Metabolic Panel    Troponin    Brain Natriuretic Peptide    Troponin    Magnesium    Lipid Panel    Basic Metabolic Panel w/ Reflex to MG    CBC with Auto Differential    Protime-INR    ADULT DIET;  Regular; Low Fat/Low Chol/High Fiber/JOSI    Diet NPO Exceptions are: Sips of Water with Meds    Notify physician    Daily weights    Intake and output    Place intermittent pneumatic compression device    Vital signs per unit routine    Admission/Observation order previously placed    Up with assistance    Up as tolerated    Elevate Head of Bed     Telemetry monitoring - 48 hour duration    Full Code    Inpatient consult to Internal Medicine    Inpatient consult to Cardiology    OT eval and treat    PT evaluation and treat    Initiate Oxygen Therapy Protocol    Pulse Oximetry Spot Check    EKG 12 Lead    EKG 12 lead    ECHO Complete 2D W Doppler W Color    Place in Observation Service       MEDICATIONS ORDERED:  Orders Placed This Encounter   Medications    0.9 % sodium chloride bolus    ondansetron (ZOFRAN) injection 4 mg    sodium chloride flush 0.9 % injection 5-40 mL    sodium chloride flush 0.9 % injection 10 mL    0.9 % sodium chloride infusion    OR Linked Order Group     ondansetron (ZOFRAN-ODT) disintegrating tablet 4 mg     ondansetron (ZOFRAN) injection 4 mg    OR Linked Order Group     acetaminophen (TYLENOL) tablet 650 mg     acetaminophen (TYLENOL) suppository 650 mg    magnesium hydroxide (MILK OF MAGNESIA) 400 MG/5ML suspension 30 mL    OR Linked Order Group     potassium chloride (KLOR-CON M) extended release tablet 40 mEq     potassium bicarb-citric acid (EFFER-K) effervescent tablet 40 mEq     potassium chloride 10 mEq/100 mL IVPB (Peripheral Line)    potassium chloride 10 mEq/100 mL IVPB (Peripheral Line)    magnesium sulfate 1000 mg in dextrose 5% 100 mL IVPB    nitroGLYCERIN (NITROSTAT) SL tablet 0.4 mg    aspirin chewable tablet 81 mg    DISCONTD: alendronate (FOSAMAX) tablet 35 mg    allopurinol (ZYLOPRIM) tablet 100 mg    atorvastatin (LIPITOR) tablet 40 mg    furosemide (LASIX) tablet 20 mg    levothyroxine (SYNTHROID) tablet 75 mcg    lisinopril (PRINIVIL;ZESTRIL) tablet 5 mg    metoprolol tartrate (LOPRESSOR) tablet 12.5 mg    trospium (SANCTURA) tablet 20 mg    rivaroxaban (XARELTO) tablet 20 mg     Order Specific Question:   Indication of Use     Answer:   A Fib/A Flutter    sodium chloride flush 0.9 % injection 5-40 mL    sodium chloride flush 0.9 % injection 5-40 mL    0.9 % sodium chloride infusion    polyethylene glycol (GLYCOLAX) packet 17 g    OR Linked Order Group     acetaminophen (TYLENOL) tablet 650 mg     acetaminophen (TYLENOL) suppository 650 mg    pantoprazole (PROTONIX) tablet 40 mg    albuterol sulfate HFA (PROVENTIL;VENTOLIN;PROAIR) 108 (90 Base) MCG/ACT inhaler 1 puff     Order Specific Question:   Initiate RT Bronchodilator Protocol     Answer:   Yes - Inpatient Protocol         DIAGNOSTIC RESULTS / EMERGENCYDEPARTMENT COURSE / MDM   LABS:  Labs Reviewed   CBC WITH AUTO DIFFERENTIAL - Abnormal; Notable for the following components:       Result Value    RDW 15.2 (*)     Seg Neutrophils 76 (*)     Lymphocytes 16 (*)     All other components within normal limits   BASIC METABOLIC PANEL - Abnormal; Notable for the following components:    Glucose 136 (*)     BUN 31 (*)     Creatinine 1.14 (*)     Est, Glom Filt Rate 51 (*)     All other components within normal limits   TROPONIN - Abnormal; Notable for the following components:    Troponin, High Sensitivity 17 (*)     All other components within normal limits   COVID-19 & INFLUENZA COMBO   BRAIN NATRIURETIC PEPTIDE   TROPONIN       RADIOLOGY:  XR CHEST PORTABLE    Result Date: 1/31/2023  EXAMINATION: ONE XRAY VIEW OF THE CHEST 1/31/2023 9:48 am COMPARISON: 09/01/2022 HISTORY: ORDERING SYSTEM PROVIDED HISTORY: cough, congestion TECHNOLOGIST PROVIDED HISTORY: cough, congestion FINDINGS: Heart size is stable without evidence of vascular congestion. Mild calcific plaque of the thoracic aorta. Subtle haziness over the lung bases is probably in large part due to overlying soft tissue. 14 mm density projecting over the right lung base is probably due to overlapping structures. Lungs are otherwise clear. No new focal infiltrates are seen. No significant pleural effusions. Monitor leads overlie the chest.     No acute cardiopulmonary process suspected. Subtle 14 mm density right lung base likely related to overlapping structures. Recommend follow-up PA and lateral views. If it persists, CT is recommended. EKG    EKG Interpretation    Interpreted by me    Rhythm: Atrial fibrillation  Rate: Tachycardic, 102  Axis: normal  Ectopy: none  Conduction: normal  ST Segments: no acute change  T Waves: no acute change  Q Waves: none    Clinical Impression: Atrial fibrillation, rate 102. No ST segment changes,, no ectopy. Normal axis, good R wave progression. Q waves seen in lead III. Compared to EKG dated 9/1/2022, the Q waves in lead III do appear somewhat worse than before        All EKG's are interpreted by the Emergency Department Physician whoeither signs or Co-signs this chart in the absence of a cardiologist.    Impression:  Patient presents with intermittent chest pain or shortness of breath. Dyspnea on exertion. Does have a history of atrial fibrillation. Has been taking Eliquis. No abdominal pain, minimal chest pain. Some fatigue present currently. We will plan for chest pain work-up.       EMERGENCY DEPARTMENT COURSE:  MDM:  1)  Number and Complexity of Problems    Problem List, DDX, Considered diagnosis:  ACS, CAD, CHF, pneumonia, A. fib          Pertinent Comorbid Conditions:  See history above. 2)  Data Reviewed    Decision Rules, Testing considered, external document review, independent imaging review:    None      3)  Treatment and Disposition     ED Course as of 01/31/23 1617   Tue Jan 31, 2023   1346 Spoke with internal medicine who accepted patient to their service. Spoke with Dr. Noreen Ordonez who recommended that we order a cardiac stress test sooner rather than later [AP]      ED Course User Index  [AP] Araceli Green, DO       Patient repeat assessment, shared decision making, and disposition discussion: Troponin stable. Initial 17, downtrending to 14. Blood work otherwise still within normal limits. Patient did have an echo completed back in September which demonstrated ejection fraction approximately 50%. She was allegedly supposed to have a stress test at that time which was canceled. Did discuss this case with cardiology and with internal medicine and with patient. Patient states that she does not feel particularly comfortable going home just yet. Internal medicine accepted her to the observation service, cardiology recommending stress test as above. MIPS:  [None]    Social determinants of health impacting treatment or disposition: None, admitted    Code Status Discussion:  Full Code      PROCEDURES:  none    CONSULTS:  IP CONSULT TO INTERNAL MEDICINE  IP CONSULT TO CARDIOLOGY    CRITICAL CARE:  There was a high probability of clinically significant/life threatening deterioration in this patient's condition which required my urgent intervention. Total critical care time was 10 minutes. This excludes any time for separately reportable procedures. FINAL IMPRESSION     1. Other fatigue          DISPOSITION / PLAN   DISPOSITION Admitted 01/31/2023 01:44:41 PM        PATIENT REFERRED TO:  No follow-up provider specified.     DISCHARGE MEDICATIONS:  Current Discharge Medication List          Karena Euceda DO  Emergency Medicine Attending    (Please note that portions of this note were completed with a voice recognition program.  Efforts were made to edit the dictations but occasionally words are mis-transcribed.)          Karena Euceda DO  01/31/23 [id number]

## 2023-01-31 NOTE — CARE COORDINATION
Writer was notified, by Kiana Villa, from [place], that she is a current client, of theirs. She is following.

## 2023-01-31 NOTE — PROGRESS NOTES
Medication History completed:    New medications: none    Medications discontinued: none    Medications flagged for review:  Docusate sodium 100 mg - patient no longer taking  Myrbetriq 50 mg - patient no longer taking    Changes to dosing: none    Stated allergies: As listed    Other pertinent information: Medications confirmed with [facility].       Thank you,   Chanda Alanis, PharmD Candidate [id number]

## 2023-02-01 ENCOUNTER — APPOINTMENT (OUTPATIENT)
Dept: NON INVASIVE DIAGNOSTICS | Age: 73
DRG: 302 | End: 2023-02-01
Payer: MEDICARE

## 2023-02-01 ENCOUNTER — APPOINTMENT (OUTPATIENT)
Dept: GENERAL RADIOLOGY | Age: 73
DRG: 302 | End: 2023-02-01
Payer: MEDICARE

## 2023-02-01 ENCOUNTER — APPOINTMENT (OUTPATIENT)
Dept: NUCLEAR MEDICINE | Age: 73
DRG: 302 | End: 2023-02-01
Payer: MEDICARE

## 2023-02-01 VITALS
HEART RATE: 95 BPM | DIASTOLIC BLOOD PRESSURE: 59 MMHG | OXYGEN SATURATION: 99 % | SYSTOLIC BLOOD PRESSURE: 102 MMHG | WEIGHT: 248 LBS | HEIGHT: 62 IN | TEMPERATURE: 97 F | RESPIRATION RATE: 18 BRPM | BODY MASS INDEX: 45.64 KG/M2

## 2023-02-01 LAB
ABSOLUTE EOS #: 0.1 K/UL (ref 0–0.4)
ABSOLUTE LYMPH #: 1.7 K/UL (ref 1–4.8)
ABSOLUTE MONO #: 0.5 K/UL (ref 0.1–1.3)
ADENOVIRUS PCR: NOT DETECTED
ANION GAP SERPL CALCULATED.3IONS-SCNC: 10 MMOL/L (ref 9–17)
B PARAP IS1001 DNA NPH QL NAA+NON-PROBE: NOT DETECTED
B PERT DNA SPEC QL NAA+PROBE: NOT DETECTED
BASOPHILS # BLD: 1 % (ref 0–2)
BASOPHILS ABSOLUTE: 0.1 K/UL (ref 0–0.2)
BUN SERPL-MCNC: 30 MG/DL (ref 8–23)
CALCIUM SERPL-MCNC: 8.5 MG/DL (ref 8.6–10.4)
CHLAMYDIA PNEUMONIAE BY PCR: NOT DETECTED
CHLORIDE SERPL-SCNC: 105 MMOL/L (ref 98–107)
CHOLEST SERPL-MCNC: 158 MG/DL
CHOLESTEROL/HDL RATIO: 3.8
CO2 SERPL-SCNC: 25 MMOL/L (ref 20–31)
CORONAVIRUS 229E PCR: NOT DETECTED
CORONAVIRUS HKU1 PCR: NOT DETECTED
CORONAVIRUS NL63 PCR: NOT DETECTED
CORONAVIRUS OC43 PCR: NOT DETECTED
CREAT SERPL-MCNC: 1.24 MG/DL (ref 0.5–0.9)
EKG ATRIAL RATE: 300 BPM
EKG Q-T INTERVAL: 380 MS
EKG QRS DURATION: 82 MS
EKG QTC CALCULATION (BAZETT): 495 MS
EKG R AXIS: 29 DEGREES
EKG T AXIS: 54 DEGREES
EKG VENTRICULAR RATE: 102 BPM
EOSINOPHILS RELATIVE PERCENT: 1 % (ref 0–4)
GFR SERPL CREATININE-BSD FRML MDRD: 47 ML/MIN/1.73M2
GLUCOSE SERPL-MCNC: 118 MG/DL (ref 70–99)
HCT VFR BLD AUTO: 35.4 % (ref 36–46)
HDLC SERPL-MCNC: 42 MG/DL
HGB BLD-MCNC: 11.5 G/DL (ref 12–16)
HUMAN METAPNEUMOVIRUS PCR: NOT DETECTED
INFLUENZA A BY PCR: NOT DETECTED
INFLUENZA B BY PCR: NOT DETECTED
INR PPP: 1.1
LDLC SERPL CALC-MCNC: 71 MG/DL (ref 0–130)
LV EF: 70 %
LVEF MODALITY: NORMAL
LYMPHOCYTES # BLD: 18 % (ref 24–44)
MAGNESIUM SERPL-MCNC: 1.9 MG/DL (ref 1.6–2.6)
MCH RBC QN AUTO: 28.8 PG (ref 26–34)
MCHC RBC AUTO-ENTMCNC: 32.5 G/DL (ref 31–37)
MCV RBC AUTO: 88.5 FL (ref 80–100)
MONOCYTES # BLD: 5 % (ref 1–7)
MYCOPLASMA PNEUMONIAE PCR: NOT DETECTED
PARAINFLUENZA 1 PCR: NOT DETECTED
PARAINFLUENZA 2 PCR: NOT DETECTED
PARAINFLUENZA 3 PCR: NOT DETECTED
PARAINFLUENZA 4 PCR: NOT DETECTED
PDW BLD-RTO: 15.2 % (ref 11.5–14.9)
PLATELET # BLD AUTO: 215 K/UL (ref 150–450)
PMV BLD AUTO: 8 FL (ref 6–12)
POTASSIUM SERPL-SCNC: 4.3 MMOL/L (ref 3.7–5.3)
PROTHROMBIN TIME: 14.7 SEC (ref 11.8–14.6)
RBC # BLD: 4 M/UL (ref 4–5.2)
RESP SYNCYTIAL VIRUS PCR: NOT DETECTED
RHINO/ENTEROVIRUS PCR: NOT DETECTED
SARS-COV-2 RNA NPH QL NAA+NON-PROBE: NOT DETECTED
SEG NEUTROPHILS: 75 % (ref 36–66)
SEGMENTED NEUTROPHILS ABSOLUTE COUNT: 7 K/UL (ref 1.3–9.1)
SODIUM SERPL-SCNC: 140 MMOL/L (ref 135–144)
SPECIMEN DESCRIPTION: NORMAL
TRIGL SERPL-MCNC: 223 MG/DL
WBC # BLD AUTO: 9.3 K/UL (ref 3.5–11)

## 2023-02-01 PROCEDURE — A9500 TC99M SESTAMIBI: HCPCS | Performed by: STUDENT IN AN ORGANIZED HEALTH CARE EDUCATION/TRAINING PROGRAM

## 2023-02-01 PROCEDURE — 85025 COMPLETE CBC W/AUTO DIFF WBC: CPT

## 2023-02-01 PROCEDURE — 6370000000 HC RX 637 (ALT 250 FOR IP): Performed by: INTERNAL MEDICINE

## 2023-02-01 PROCEDURE — 97162 PT EVAL MOD COMPLEX 30 MIN: CPT

## 2023-02-01 PROCEDURE — 97116 GAIT TRAINING THERAPY: CPT

## 2023-02-01 PROCEDURE — 94660 CPAP INITIATION&MGMT: CPT

## 2023-02-01 PROCEDURE — 6360000002 HC RX W HCPCS: Performed by: INTERNAL MEDICINE

## 2023-02-01 PROCEDURE — 83735 ASSAY OF MAGNESIUM: CPT

## 2023-02-01 PROCEDURE — 2700000000 HC OXYGEN THERAPY PER DAY

## 2023-02-01 PROCEDURE — 2580000003 HC RX 258: Performed by: STUDENT IN AN ORGANIZED HEALTH CARE EDUCATION/TRAINING PROGRAM

## 2023-02-01 PROCEDURE — 6360000002 HC RX W HCPCS: Performed by: STUDENT IN AN ORGANIZED HEALTH CARE EDUCATION/TRAINING PROGRAM

## 2023-02-01 PROCEDURE — 99233 SBSQ HOSP IP/OBS HIGH 50: CPT | Performed by: INTERNAL MEDICINE

## 2023-02-01 PROCEDURE — G0378 HOSPITAL OBSERVATION PER HR: HCPCS

## 2023-02-01 PROCEDURE — 85610 PROTHROMBIN TIME: CPT

## 2023-02-01 PROCEDURE — 80048 BASIC METABOLIC PNL TOTAL CA: CPT

## 2023-02-01 PROCEDURE — 71045 X-RAY EXAM CHEST 1 VIEW: CPT

## 2023-02-01 PROCEDURE — 93010 ELECTROCARDIOGRAM REPORT: CPT | Performed by: INTERNAL MEDICINE

## 2023-02-01 PROCEDURE — 97166 OT EVAL MOD COMPLEX 45 MIN: CPT

## 2023-02-01 PROCEDURE — 96375 TX/PRO/DX INJ NEW DRUG ADDON: CPT

## 2023-02-01 PROCEDURE — 6360000002 HC RX W HCPCS

## 2023-02-01 PROCEDURE — 36415 COLL VENOUS BLD VENIPUNCTURE: CPT

## 2023-02-01 PROCEDURE — 96376 TX/PRO/DX INJ SAME DRUG ADON: CPT

## 2023-02-01 PROCEDURE — 94761 N-INVAS EAR/PLS OXIMETRY MLT: CPT

## 2023-02-01 PROCEDURE — 93017 CV STRESS TEST TRACING ONLY: CPT

## 2023-02-01 PROCEDURE — 97530 THERAPEUTIC ACTIVITIES: CPT

## 2023-02-01 PROCEDURE — 96366 THER/PROPH/DIAG IV INF ADDON: CPT

## 2023-02-01 PROCEDURE — 6370000000 HC RX 637 (ALT 250 FOR IP): Performed by: STUDENT IN AN ORGANIZED HEALTH CARE EDUCATION/TRAINING PROGRAM

## 2023-02-01 PROCEDURE — 3430000000 HC RX DIAGNOSTIC RADIOPHARMACEUTICAL: Performed by: STUDENT IN AN ORGANIZED HEALTH CARE EDUCATION/TRAINING PROGRAM

## 2023-02-01 PROCEDURE — 2580000003 HC RX 258

## 2023-02-01 PROCEDURE — 78452 HT MUSCLE IMAGE SPECT MULT: CPT

## 2023-02-01 PROCEDURE — 80061 LIPID PANEL: CPT

## 2023-02-01 RX ORDER — SODIUM CHLORIDE 0.9 % (FLUSH) 0.9 %
10 SYRINGE (ML) INJECTION PRN
Status: DISCONTINUED | OUTPATIENT
Start: 2023-02-01 | End: 2023-02-01 | Stop reason: HOSPADM

## 2023-02-01 RX ORDER — MIDODRINE HYDROCHLORIDE 5 MG/1
5 TABLET ORAL
Status: DISCONTINUED | OUTPATIENT
Start: 2023-02-01 | End: 2023-02-01

## 2023-02-01 RX ORDER — ASPIRIN 81 MG/1
81 TABLET, CHEWABLE ORAL DAILY
Qty: 30 TABLET | Refills: 3 | Status: SHIPPED | OUTPATIENT
Start: 2023-02-02

## 2023-02-01 RX ORDER — AMINOPHYLLINE DIHYDRATE 25 MG/ML
50 INJECTION, SOLUTION INTRAVENOUS PRN
Status: ACTIVE | OUTPATIENT
Start: 2023-02-01 | End: 2023-02-01

## 2023-02-01 RX ORDER — ATROPINE SULFATE 0.1 MG/ML
0.5 INJECTION INTRAVENOUS EVERY 5 MIN PRN
Status: ACTIVE | OUTPATIENT
Start: 2023-02-01 | End: 2023-02-01

## 2023-02-01 RX ORDER — SODIUM CHLORIDE 9 MG/ML
500 INJECTION, SOLUTION INTRAVENOUS CONTINUOUS PRN
Status: DISCONTINUED | OUTPATIENT
Start: 2023-02-01 | End: 2023-02-01

## 2023-02-01 RX ORDER — METOPROLOL TARTRATE 5 MG/5ML
5 INJECTION INTRAVENOUS EVERY 5 MIN PRN
Status: DISCONTINUED | OUTPATIENT
Start: 2023-02-01 | End: 2023-02-01

## 2023-02-01 RX ORDER — NITROGLYCERIN 0.4 MG/1
0.4 TABLET SUBLINGUAL EVERY 5 MIN PRN
Qty: 25 TABLET | Refills: 3 | Status: SHIPPED | OUTPATIENT
Start: 2023-02-01

## 2023-02-01 RX ORDER — MIDODRINE HYDROCHLORIDE 10 MG/1
10 TABLET ORAL
Status: DISCONTINUED | OUTPATIENT
Start: 2023-02-01 | End: 2023-02-01 | Stop reason: HOSPADM

## 2023-02-01 RX ORDER — DIGOXIN 0.25 MG/ML
250 INJECTION INTRAMUSCULAR; INTRAVENOUS ONCE
Status: COMPLETED | OUTPATIENT
Start: 2023-02-01 | End: 2023-02-01

## 2023-02-01 RX ORDER — AZITHROMYCIN 500 MG/1
500 TABLET, FILM COATED ORAL DAILY
Qty: 3 TABLET | Refills: 0 | Status: SHIPPED | OUTPATIENT
Start: 2023-02-01 | End: 2023-02-04

## 2023-02-01 RX ORDER — SODIUM CHLORIDE 0.9 % (FLUSH) 0.9 %
5-40 SYRINGE (ML) INJECTION PRN
Status: ACTIVE | OUTPATIENT
Start: 2023-02-01 | End: 2023-02-01

## 2023-02-01 RX ORDER — AMINOPHYLLINE DIHYDRATE 25 MG/ML
50 INJECTION, SOLUTION INTRAVENOUS PRN
Status: DISCONTINUED | OUTPATIENT
Start: 2023-02-01 | End: 2023-02-01

## 2023-02-01 RX ORDER — ALBUTEROL SULFATE 90 UG/1
2 AEROSOL, METERED RESPIRATORY (INHALATION) PRN
Status: DISCONTINUED | OUTPATIENT
Start: 2023-02-01 | End: 2023-02-01

## 2023-02-01 RX ORDER — TECHNETIUM TC-99M SESTAMIBI 1 MG/10ML
38.6 INJECTION INTRAVENOUS
Status: COMPLETED | OUTPATIENT
Start: 2023-02-01 | End: 2023-02-01

## 2023-02-01 RX ORDER — ATROPINE SULFATE 0.1 MG/ML
0.5 INJECTION INTRAVENOUS EVERY 5 MIN PRN
Status: DISCONTINUED | OUTPATIENT
Start: 2023-02-01 | End: 2023-02-01

## 2023-02-01 RX ORDER — NITROGLYCERIN 0.4 MG/1
0.4 TABLET SUBLINGUAL EVERY 5 MIN PRN
Status: DISCONTINUED | OUTPATIENT
Start: 2023-02-01 | End: 2023-02-01

## 2023-02-01 RX ORDER — METOPROLOL TARTRATE 5 MG/5ML
5 INJECTION INTRAVENOUS EVERY 5 MIN PRN
Status: ACTIVE | OUTPATIENT
Start: 2023-02-01 | End: 2023-02-01

## 2023-02-01 RX ORDER — MIDODRINE HYDROCHLORIDE 10 MG/1
10 TABLET ORAL
Qty: 3 TABLET | Refills: 1 | Status: SHIPPED | OUTPATIENT
Start: 2023-02-02 | End: 2023-03-04

## 2023-02-01 RX ORDER — TECHNETIUM TC-99M SESTAMIBI 1 MG/10ML
15.6 INJECTION INTRAVENOUS
Status: COMPLETED | OUTPATIENT
Start: 2023-02-01 | End: 2023-02-01

## 2023-02-01 RX ORDER — SODIUM CHLORIDE 0.9 % (FLUSH) 0.9 %
5-40 SYRINGE (ML) INJECTION PRN
Status: DISCONTINUED | OUTPATIENT
Start: 2023-02-01 | End: 2023-02-01

## 2023-02-01 RX ORDER — ALBUTEROL SULFATE 90 UG/1
2 AEROSOL, METERED RESPIRATORY (INHALATION) PRN
Status: ACTIVE | OUTPATIENT
Start: 2023-02-01 | End: 2023-02-01

## 2023-02-01 RX ORDER — NITROGLYCERIN 0.4 MG/1
0.4 TABLET SUBLINGUAL EVERY 5 MIN PRN
Status: ACTIVE | OUTPATIENT
Start: 2023-02-01 | End: 2023-02-01

## 2023-02-01 RX ORDER — MIDODRINE HYDROCHLORIDE 5 MG/1
5 TABLET ORAL ONCE
Status: COMPLETED | OUTPATIENT
Start: 2023-02-01 | End: 2023-02-01

## 2023-02-01 RX ORDER — SODIUM CHLORIDE 9 MG/ML
500 INJECTION, SOLUTION INTRAVENOUS CONTINUOUS PRN
Status: ACTIVE | OUTPATIENT
Start: 2023-02-01 | End: 2023-02-01

## 2023-02-01 RX ADMIN — DIGOXIN 250 MCG: 0.25 INJECTION INTRAMUSCULAR; INTRAVENOUS at 12:45

## 2023-02-01 RX ADMIN — TROSPIUM CHLORIDE 20 MG: 20 TABLET, FILM COATED ORAL at 14:43

## 2023-02-01 RX ADMIN — AZITHROMYCIN MONOHYDRATE 500 MG: 500 INJECTION, POWDER, LYOPHILIZED, FOR SOLUTION INTRAVENOUS at 15:55

## 2023-02-01 RX ADMIN — RIVAROXABAN 20 MG: 20 TABLET, FILM COATED ORAL at 17:31

## 2023-02-01 RX ADMIN — MIDODRINE HYDROCHLORIDE 5 MG: 5 TABLET ORAL at 14:43

## 2023-02-01 RX ADMIN — METOPROLOL TARTRATE 12.5 MG: 25 TABLET, FILM COATED ORAL at 08:23

## 2023-02-01 RX ADMIN — PANTOPRAZOLE SODIUM 40 MG: 40 TABLET, DELAYED RELEASE ORAL at 06:20

## 2023-02-01 RX ADMIN — LISINOPRIL 5 MG: 5 TABLET ORAL at 08:23

## 2023-02-01 RX ADMIN — SODIUM CHLORIDE, PRESERVATIVE FREE 10 ML: 5 INJECTION INTRAVENOUS at 08:24

## 2023-02-01 RX ADMIN — DIGOXIN 250 MCG: 0.25 INJECTION INTRAMUSCULAR; INTRAVENOUS at 14:43

## 2023-02-01 RX ADMIN — MIDODRINE HYDROCHLORIDE 10 MG: 10 TABLET ORAL at 17:31

## 2023-02-01 RX ADMIN — Medication 38.6 MILLICURIE: at 13:12

## 2023-02-01 RX ADMIN — MIDODRINE HYDROCHLORIDE 5 MG: 5 TABLET ORAL at 12:45

## 2023-02-01 RX ADMIN — REGADENOSON 0.4 MG: 0.08 INJECTION, SOLUTION INTRAVENOUS at 10:17

## 2023-02-01 RX ADMIN — SODIUM CHLORIDE, PRESERVATIVE FREE 10 ML: 5 INJECTION INTRAVENOUS at 13:12

## 2023-02-01 RX ADMIN — Medication 15.6 MILLICURIE: at 10:19

## 2023-02-01 RX ADMIN — LEVOTHYROXINE SODIUM 75 MCG: 0.07 TABLET ORAL at 06:20

## 2023-02-01 RX ADMIN — TROSPIUM CHLORIDE 20 MG: 20 TABLET, FILM COATED ORAL at 06:20

## 2023-02-01 RX ADMIN — ALLOPURINOL 100 MG: 100 TABLET ORAL at 08:23

## 2023-02-01 RX ADMIN — ASPIRIN 81 MG: 81 TABLET, CHEWABLE ORAL at 08:23

## 2023-02-01 RX ADMIN — FUROSEMIDE 20 MG: 20 TABLET ORAL at 08:23

## 2023-02-01 NOTE — CARE COORDINATION
ONGOING DISCHARGE PLAN:    Patient is alert and oriented x4. Spoke with patient regarding discharge plan and patient confirms that plan is still home with [facility]. Stress test  Echo pending    IV zith/rocephin    Will continue to follow for additional discharge needs.     Electronically signed by Radha Cleary RN on 2/1/2023 at 1:35 PM

## 2023-02-01 NOTE — PROGRESS NOTES
[address]   Occupational Therapy Evaluation  Date: 23  Patient Name: Robin Bond       Room: 3551/5636-87  MRN: [id number]  Account: [de-identified]   : 1950  (72 y.o.) Gender: female     Discharge Recommendations:  Further Occupational Therapy is recommended upon facility discharge.     OT Equipment Recommendations  Other: TBD    Referring Practitioner: Dior Keyes MD  Diagnosis: Unstable angina      Treatment Diagnosis: Impaired self care status    Past Medical History:  has a past medical history of Abdominal aortic aneurysm (AAA) without rupture, Adenocarcinoma of endometrium, stage 1 ([facility] Adenomatous polyp of sigmoid colon, 17, JOSHUA (acute kidney injury) ([facility] Allergic rhinitis, Anemia, At high risk for falls, Atrial fibrillation [facility]), Atrial fibrillation with rapid ventricular response ([facility] Atrial fibrillation, new onset ([facility] CHF (congestive heart failure) ([facility] Class 3 severe obesity due to excess calories without serious comorbidity with body mass index (BMI) of 40.0 to 44.9 in adult [facility]), Colitis, Colon polyp, Diabetes mellitus ([facility] Diverticulitis, Diverticulitis of colon with perforation, Endometrial cancer ([facility] History of TIA (transient ischemic attack), Hyperglycemia, Hyperlipidemia, Hypertension, Hypothyroidism, Legally blind, Lower back pain, Mixed incontinence, Morbid obesity with BMI of 40.0-44.9, adult ([facility] CLAROS (nonalcoholic steatohepatitis), Numbness of lip, Obesity, Optic atrophy, Oral phase dysphagia, Osteoarthritis (arthritis due to wear and tear of joints), Osteoarthritis involving multiple joints on both sides of body, Osteoporosis, Perforated bowel ([facility] Perforated diverticulum, Postmenopausal bleeding, Primary hypertension, Prolonged Q-T interval on ECG, Rectal bleeding, S/P h-scope, Myosure 17, Tennis elbow, Thickened endometrium, TIA (transient ischemic attack), TLHBSO, bilateral LND 10/24/17, Type 2 diabetes mellitus, without long-term current use of insulin ([facility] and UTI due to extended-spectrum beta lactamase (ESBL) producing Escherichia coli. Past Surgical History:   has a past surgical history that includes Thyroid surgery (1980); Colonoscopy (1997); Tonsillectomy; Colonoscopy (06/26/2017); pr colsc flx w/removal lesion by hot bx forceps (N/A, 6/26/2017); Dilation and curettage of uterus (N/A, 9/20/2017); Cataract removal with implant (Bilateral, 2015); vitrectomy; Total abdominal hysterectomy w/ bilateral salpingoophorectomy (10/24/2017); Hysterectomy (N/A, 10/24/2017); Upper gastrointestinal endoscopy (N/A, 8/3/2020); Colonoscopy (N/A, 8/3/2020); and Small intestine surgery (N/A, 8/4/2020). Restrictions  Restrictions/Precautions  Restrictions/Precautions: Fall Risk;Up as Tolerated (peripheral IV right antecubital)  Required Braces or Orthoses?: No  Position Activity Restriction  Other position/activity restrictions: up as tolerated/ up w/  assist      Vitals  Vitals  Heart Rate: (!) 118  Heart Rate Source: Monitor  BP: 90/62  BP Location: Left lower arm  BP Method: Automatic  Patient Position: Semi fowlers  MAP (Calculated): 71  Resp: 18  SpO2: 99 %  O2 Device: None (Room air)     Subjective  Subjective: \"It feels good to be up. \" Pt was pleasant and agreeable to OT/PT eval  Comments: Ok per [facility] for OT/PT eval  Subjective  Pain: 3/10 chronic low back pain      Social/Functional History  Social/Functional History  Lives With: Alone  Type of Home: Apartment (2nd floor apartment w/ elevator access)  Home Layout: One level  Home Access: Elevator, Ramped entrance  Bathroom Shower/Tub: Tub/Shower unit, Shower chair with back, Curtain  Bathroom Toilet: Standard (w/ toilet raiser w/ rails, also has grab bars)  Bathroom Equipment: Hand-held shower, Grab bars in shower, Shower chair, Grab bars around toilet, Toilet raiser  Bathroom Accessibility: Walker accessible  Home Equipment: Rollator, Reacher (rollator x 2 (new one just delivered))  Receives Help From: Family, Personal care attendant  ADL Assistance: Independent  Homemaking Assistance: Needs assistance (aid assists w/ laundry and light cleaning, pt does her own cooking and  some cleaning , states that her sister or aid do the grocery shopping)  Homemaking Responsibilities: Yes (aid assists w/ laundry and light cleaning, pt does her own cooking and  some cleaning , states that her sister or aid do the grocery shopping)  Ambulation Assistance: Independent (uses rollator)  Transfer Assistance: Independent  Active : No  Patient's  Info: transportation through [employer] bus  Mode of Transportation: Bus  Occupation: Retired  IADL Comments: sleeps in a flat bed  Additional Comments: aid comes 2 hours on 2 days a week and 3 hours on Saturday, sister lives nearby and helps w/ grocery shopping and finances & banking; dtr lives in [place], [place] but has a new baby      Objective  Orientation  Overall Orientation Status: Within Functional Limits  Orientation Level: Oriented X4, Oriented to person, Oriented to place, Oriented to time, Oriented to situation      Sensation  Overall Sensation Status: WFL    ADL  Feeding: Setup  Grooming: Setup  UE Bathing: Stand by assistance  LE Bathing: Contact guard assistance  UE Dressing: Stand by assistance  LE Dressing: Contact guard assistance  Toileting: Contact guard assistance  Additional Comments: ADL scores based on clinical reasoning and skilled observation unless otherwise noted.  Pt currently limited due to decreased strength, balance, and activity tolerance imacting safety and independence with self care tasks         UE Function  LUE AROM (degrees)  LUE AROM : WFL  Left Hand AROM (degrees)  Left Hand AROM: WFL  Tone LUE  LUE Tone: Normotonic  LUE Strength  Gross LUE Strength: WFL  L Hand General: 4/5    RUE AROM (degrees)  RUE AROM : WFL  Right Hand AROM (degrees)  Right Hand AROM: WFL  Tone RUE  RUE Tone: Normotonic  RUE Strength  Gross RUE Strength: WFL  R Hand General: 4/5         Fine Motor Skills/Coordination  Coordination  Movements Are Fluid And Coordinated: Yes              Bed Mobility  Bed mobility  Rolling to Right: Minimal assistance  Supine to Sit: Minimal assistance  Sit to Supine: Unable to assess (Pt sitting in chair at end of session)  Scooting: Stand by assistance  Bed Mobility Comments: Bed mobility completed with HOB elevated. Pt pulling on therapist for A. Pt denies dizziness sitting EOB. Balance  Balance  Sitting Balance: Stand by assistance  Standing Balance: Contact guard assistance (CGA-SBA)       Transfers  Transfers  Sit to stand: Stand by assistance  Stand to sit: Stand by assistance  Transfer Comments: Verbal cues for hand placement and safety with Fair carryover with pt not pushing from surface or reaching when completing transfers. Functional Mobility  Functional - Mobility Device: 4-Wheeled Walker  Activity: Other (to/from door)  Assist Level: Stand by assistance  Functional Mobility Comments: Verbal cues for hand placement and safety with Fair carryover. Assessment  Assessment  Performance deficits / Impairments: Decreased ADL status, Decreased functional mobility , Decreased endurance, Decreased balance, Decreased high-level IADLs  Treatment Diagnosis: Impaired self care status  Prognosis: Good  Decision Making: Medium Complexity    Activity Tolerance  Activity Tolerance: Patient Tolerated treatment well    Safety Devices  Type of Devices:  All fall risk precautions in place, Call light within reach, Patient at risk for falls, Left in chair, Nurse notified    Patient Education  Patient Education  Education Given To: Patient  Education Provided: Role of Therapy, Plan of Care, Transfer Training  Barriers to Learning: None  Education Outcome: Verbalized understanding, Continued education needed      Functional Outcome Measures  AM-PAC Daily Activity Inpatient   How much help for putting on and taking off regular lower body clothing?: A Little  How much help for Bathing?: A Little  How much help for Toileting?: A Little  How much help for putting on and taking off regular upper body clothing?: A Little  How much help for taking care of personal grooming?: A Little  How much help for eating meals?: A Little  AM-[facility] Inpatient Daily Activity Raw Score: 18  AM-PAC Inpatient ADL T-Scale Score : 38.66  ADL Inpatient CMS 0-100% Score: 46.65  ADL Inpatient CMS G-Code Modifier : CK       Goals     Short Term Goals  Time Frame for Short Term Goals: By discharge  Short Term Goal 1: Pt will complete BADLs with Mod I and Good safety with use of AE as needed  Short Term Goal 2: Pt will complete functional transfers/mobility during self care tasks with Mod I and Good safety with use of least restrictive device  Short Term Goal 3: Pt will tolerate standing 5+ minutes during functional activity of choice with Good safety  Short Term Goal 4: Pt will verbalize/demonstrate Good understanding of home safety/fall prevention strategies to increase safety and independence with self care and mobility  Short Term Goal 5: Pt will participate in 15+ minutes of therapeutic exercises/functional activities to increase safety and independence with self care and mobility    Plan  Occupational Therapy Plan  Times Per Week: 3-5  Current Treatment Recommendations: Self-Care / ADL, Strengthening, Balance training, Functional mobility training, Endurance training, Safety education & training, Patient/Caregiver education & training, Equipment evaluation, education, & procurement, Home management training      OT Individual Minutes  OT Individual Minutes  Time In: 3464  Time Out: 8047  Minutes: 24  Time Code Minutes   Timed Code Treatment Minutes: 8 Minutes        Electronically signed by CHERELLE Greenberg on 2/1/23 at 1:03 PM EST

## 2023-02-01 NOTE — PROGRESS NOTES
[id number] [employer]    PROGRESS NOTE             2/1/2023    7:31 AM    Name:   Ro Little  MRN:     [id number]     Kimdominiklyside:      [de-identified]   Room:   [facility] [place] [address] Day:  1  Admit Date:  1/31/2023  9:22 AM    PCP:  Claria Mcardle, MD  Code Status:  Full Code    Subjective:     C/C:   Chief Complaint   Patient presents with    Fatigue     Interval History Status: improved. Patient seen and examined at the bed side, no new acute events overnight. Plan was to hold stress test due to suspicion of Pneumonia. Patient procal is WNL and CXR is negative for acute cardiopulmonary processes. Notes from nursing staff and Consults had been reviewed, and the overnight progress had been checked with the nursing staff as well. Brief History:     The patient is a 72 y.o.  female who presents with to the ED complaining of shortness of breath and fatigue that started this morning when she was trying to get dressed, reporting that \"it took all my energy to get stressed\" and is admitted to the hospital for the management of unstable angina     Patient has a significant medical history of obstructive sleep apnea recently started on BiPAP which she is noncompliant with, hypothyroidism, hyperlipidemia, chronic kidney disease stage III, atrial fibrillation on rivaroxaban and metoprolol. Patient stated she had a similar episode around [place] which she presented to the ED at [facility] to be evaluated. Patient at that time was discharged     In the ED patient was tachycardic and in atrial fibrillation saturating 98%, heart rate 10 4-1 17, blood pressure 138/83. Patient had also reading of 95/80 and 110/72. Patient has a 2 sets of troponin which were 17 and 14, respectively. Patient also had an echo in September 2022 at that time patient presented with a picture of a TIA.   Ejection fraction at that time was 55% with a mild dilatation of the left atrium. Review of Systems:     CONSTITUTIONAL:  Positive for fatigue. Psych: Normal mood and affect, no depression, no suicidal ideation. EYES: negative for blury vision  HEENT: No headaches, no nasal congestion, no difficulty swallowing  RESPIRATORY:Positive for dyspnea, no wheezing, no Cough  CARDIOVASCULAR: negative for chest pain, no palpitations  GASTROINTESTINAL: no nausea, no vomiting, no change in bowel habits, no abdominal pain   GENITOURINARY: negative for dysuria, no hematuria   MUSCULOSKELETAL: no joint pains, no muscle aches, no swelling of joints or extremities  NEUROLOGICAL: No weakness or numbness      Medications: Allergies:     Allergies   Allergen Reactions    Sulfa Antibiotics Nausea Only    Penicillins Rash     Took Keflex, cefepime, cefazolin per medication history, no reaction       Current Meds:   Scheduled Meds:    sodium chloride flush  5-40 mL IntraVENous 2 times per day    aspirin  81 mg Oral Daily    allopurinol  100 mg Oral Daily    atorvastatin  40 mg Oral Nightly    furosemide  20 mg Oral Daily    levothyroxine  75 mcg Oral Daily    lisinopril  5 mg Oral Daily    metoprolol tartrate  12.5 mg Oral BID    trospium  20 mg Oral BID AC    rivaroxaban  20 mg Oral Daily    sodium chloride flush  5-40 mL IntraVENous 2 times per day    pantoprazole  40 mg Oral QAM AC    cefTRIAXone (ROCEPHIN) IV  1,000 mg IntraVENous Q24H    azithromycin  500 mg IntraVENous Q24H     Continuous Infusions:    sodium chloride      sodium chloride       PRN Meds: sodium chloride flush, sodium chloride, ondansetron **OR** ondansetron, acetaminophen **OR** acetaminophen, magnesium hydroxide, potassium chloride **OR** potassium alternative oral replacement **OR** potassium chloride, potassium chloride, magnesium sulfate, nitroGLYCERIN, sodium chloride flush, sodium chloride, polyethylene glycol, albuterol sulfate HFA    Data:     Past Medical History:   has a past medical history of Abdominal aortic aneurysm (AAA) without rupture, Adenocarcinoma of endometrium, stage 1 ([facility] Adenomatous polyp of sigmoid colon, 6/26/17, JOSHUA (acute kidney injury) ([facility] Allergic rhinitis, Anemia, At high risk for falls, Atrial fibrillation [facility]), Atrial fibrillation with rapid ventricular response ([facility] Atrial fibrillation, new onset ([facility] CHF (congestive heart failure) ([facility] Class 3 severe obesity due to excess calories without serious comorbidity with body mass index (BMI) of 40.0 to 44.9 in adult [facility]), Colitis, Colon polyp, Diabetes mellitus ([facility] Diverticulitis, Diverticulitis of colon with perforation, Endometrial cancer ([facility] History of TIA (transient ischemic attack), Hyperglycemia, Hyperlipidemia, Hypertension, Hypothyroidism, Legally blind, Lower back pain, Mixed incontinence, Morbid obesity with BMI of 40.0-44.9, adult ([facility] CLAROS (nonalcoholic steatohepatitis), Numbness of lip, Obesity, Optic atrophy, Oral phase dysphagia, Osteoarthritis (arthritis due to wear and tear of joints), Osteoarthritis involving multiple joints on both sides of body, Osteoporosis, Perforated bowel ([facility] Perforated diverticulum, Postmenopausal bleeding, Primary hypertension, Prolonged Q-T interval on ECG, Rectal bleeding, S/P h-scope, Myosure 9/20/17, Tennis elbow, Thickened endometrium, TIA (transient ischemic attack), TLHBSO, bilateral LND 10/24/17, Type 2 diabetes mellitus, without long-term current use of insulin ([facility] and UTI due to extended-spectrum beta lactamase (ESBL) producing Escherichia coli. Social History:   reports that she has never smoked. She has never used smokeless tobacco. She reports that she does not drink alcohol and does not use drugs.      Family History:   Family History   Problem Relation Age of Onset    Coronary Art Dis Father     Hypertension Father     Diabetes Father     High Blood Pressure Father     Heart Attack Father     Diabetes Mother     High Blood Pressure Mother     Thyroid Disease Mother     High Blood Pressure Sister     Thyroid Disease Brother     High Blood Pressure Brother     High Blood Pressure Maternal Grandmother     Diabetes Maternal Grandfather     No Known Problems Paternal Grandmother     Heart Attack Paternal Grandfather     Colon Cancer Neg Hx        Vitals:  /63   Pulse (!) 103   Temp 97.5 °F (36.4 °C) (Oral)   Resp 18   Ht 5' 2\" (1.575 m)   Wt 248 lb (112.5 kg)   LMP  (LMP Unknown)   SpO2 100%   BMI 45.36 kg/m²   Temp (24hrs), Av.7 °F (36.5 °C), Min:97.5 °F (36.4 °C), Max:98.1 °F (36.7 °C)      No results for input(s): POCGLU in the last 72 hours. I/O(24Hr): Intake/Output Summary (Last 24 hours) at 2023 0731  Last data filed at 2023 1746  Gross per 24 hour   Intake 1240 ml   Output --   Net 1240 ml       Labs:    CBC:   Lab Results   Component Value Date/Time    WBC 9.3 2023 05:33 AM    RBC 4.00 2023 05:33 AM    RBC 4.23 2012 10:40 AM    HGB 11.5 2023 05:33 AM    HCT 35.4 2023 05:33 AM    MCV 88.5 2023 05:33 AM    MCH 28.8 2023 05:33 AM    MCHC 32.5 2023 05:33 AM    RDW 15.2 2023 05:33 AM     2023 05:33 AM     2012 10:40 AM    MPV 8.0 2023 05:33 AM       Lab Results   Component Value Date/Time    SPECIAL NOT REPORTED 2021 08:47 AM     Lab Results   Component Value Date/Time    CULTURE NO SIGNIFICANT GROWTH 2023 10:15 AM         Radiology:    XR CHEST PORTABLE    Result Date: 2023  EXAMINATION: ONE XRAY VIEW OF THE CHEST 2023 9:48 am COMPARISON: 2022 HISTORY: ORDERING SYSTEM PROVIDED HISTORY: cough, congestion TECHNOLOGIST PROVIDED HISTORY: cough, congestion FINDINGS: Heart size is stable without evidence of vascular congestion. Mild calcific plaque of the thoracic aorta. Subtle haziness over the lung bases is probably in large part due to overlying soft tissue.   14 mm density projecting over the right lung base is probably due to overlapping structures. Lungs are otherwise clear. No new focal infiltrates are seen. No significant pleural effusions. Monitor leads overlie the chest.     No acute cardiopulmonary process suspected. Subtle 14 mm density right lung base likely related to overlapping structures. Recommend follow-up PA and lateral views. If it persists, CT is recommended. Physical Examination:        PHYSICAL EXAM:  General Appearance  Alert , awake, not in acute distress  Psych: Normal mood and affect, normal behavior, not agitated, maintaining good eye contact   HEENT - Head is normocephalic, atraumatic. Neck: supple, no rigidity, normal ROM, no neck swellings, normal thyroid gland  Lungs - Bilateral equal air entry, no wheezes, rales or rhonchi, aeration good  Cardiovascular - Heart sounds are normal.  Regular rhythm, normal rate without murmur, gallop or rub.   Abdomen - Soft, nontender, nondistended, no masses or organomegaly  Neurologic - There are no new focal motor or sensory deficits  Skin - No bruising or bleeding on exposed skin area  Extremities - No cyanosis, clubbing or edema    Assessment:        Primary Problem  Unstable angina [facility])    Active Hospital Problems    Diagnosis Date Noted    Body mass index (BMI) 45.0-49.9, adult [facility]) [Z68.42] 02/23/2017     Priority: High    Unstable angina ([facility] [I20.0] 01/31/2023     Priority: Medium    Chronic diastolic congestive heart failure ([facility] [I50.32] 07/24/2020    Type 2 diabetes mellitus, without long-term current use of insulin ([facility] [E11.9] 01/14/2020    Acquired hypothyroidism [E03.9]     Benign hypertension with CKD (chronic kidney disease) stage III (HCC) [I12.9, N18.30]        Plan:        Fatigue and exertional dyspnea, suggestive of unstable angina with underlying atrial fibrillation  - Patient troponins were negative  - Continue patient on home dose Xarelto  - Patient on aspirin, atorvastatin  - Patient on metoprolol for atrial fibrillation  - Correct etiology are consulted and appreciate recommendations  - Stress test pending, patient n.p.o.   - Repeat echo  - Patient sounds are good. Repeat chest x-ray and previous x-ray showed no acute cardiopulmonary processes plan  - Patient was started on antibiotics for possible pneumonia. - Pro-Abebe was 0.04  Hypothyroidism  - Continue home dose of levothyroxine  Hyperlipidemia  - Patient on atorvastatin  Benign hypertension  - Patient on lisinopril  Obstructive sleep apnea  - Patient on home BiPAP, she does not know the settings. - We will order BiPAP overnight    DVT prophylaxis: already anticoagulated with Xarelto  GI prophylaxis: Protonix 40 mg daily    Tobin Mejias MD  2/1/2023  7:31 AM     Attestation and add on       I have discussed the care of Fariba Mckinney , including pertinent history and exam findings,      2/1/23    with the resident. I have seen and examined the patient and the key elements of all parts of the encounter have been performed by me . I agree with the assessment, plan and orders as documented by the resident. MD MARILOU Choe [address], [address] [place] [address].    Phone [phone]   Fax: [phone]  Answering Service: [phone]

## 2023-02-01 NOTE — PROGRESS NOTES
Physical Therapy  Facility/Department: [address] [place] [address]  Physical Therapy Initial Assessment    Name: Karuna Catherine  : 1950  MRN: [id number]  Date of Service: 2023    Discharge Recommendations:  Therapy recommended at discharge, Patient would benefit from continued therapy after discharge   PT Equipment Recommendations  Equipment Needed: No      Patient Diagnosis(es): The encounter diagnosis was Other fatigue.   Past Medical History:  has a past medical history of Abdominal aortic aneurysm (AAA) without rupture, Adenocarcinoma of endometrium, stage 1 ([facility] Adenomatous polyp of sigmoid colon, 17, JOSHUA (acute kidney injury) ([facility] Allergic rhinitis, Anemia, At high risk for falls, Atrial fibrillation [facility]), Atrial fibrillation with rapid ventricular response ([facility] Atrial fibrillation, new onset ([facility] [facility] CHF (congestive heart failure) ([facility] Class 3 severe obesity due to excess calories without serious comorbidity with body mass index (BMI) of 40.0 to 44.9 in adult [facility]), Colitis, Colon polyp, Diabetes mellitus ([address] Diverticulitis, Diverticulitis of colon with perforation, Endometrial cancer ([facility] [address] [facility] History of TIA (transient ischemic attack), Hyperglycemia, Hyperlipidemia, Hypertension, Hypothyroidism, Legally blind, Lower back pain, Mixed incontinence, Morbid obesity with BMI of 40.0-44.9, adult ([address] CLAROS (nonalcoholic steatohepatitis), Numbness of lip, Obesity, Optic atrophy, Oral phase dysphagia, Osteoarthritis (arthritis due to wear and tear of joints), Osteoarthritis involving multiple joints on both sides of body, Osteoporosis, Perforated bowel ([address] Perforated diverticulum, Postmenopausal bleeding, Primary hypertension, Prolonged Q-T interval on ECG, Rectal bleeding, S/P h-scope, Myosure 17, Tennis elbow, Thickened endometrium, TIA (transient ischemic attack), TLHBSO, bilateral LND 10/24/17, Type 2 diabetes mellitus, without long-term current use of insulin ([address] and UTI due to extended-spectrum beta lactamase (ESBL) producing Escherichia coli. Past Surgical History:  has a past surgical history that includes Thyroid surgery (1980); Colonoscopy (1997); Tonsillectomy; Colonoscopy (06/26/2017); pr colsc flx w/removal lesion by hot bx forceps (N/A, 6/26/2017); Dilation and curettage of uterus (N/A, 9/20/2017); Cataract removal with implant (Bilateral, 2015); vitrectomy; Total abdominal hysterectomy w/ bilateral salpingoophorectomy (10/24/2017); Hysterectomy (N/A, 10/24/2017); Upper gastrointestinal endoscopy (N/A, 8/3/2020); Colonoscopy (N/A, 8/3/2020); and Small intestine surgery (N/A, 8/4/2020). Assessment   Body Structures, Functions, Activity Limitations Requiring Skilled Therapeutic Intervention: Decreased functional mobility ; Decreased balance;Decreased strength;Decreased safe awareness;Decreased endurance  Assessment: continue per POC to maxmize potential for safe D/C  Treatment Diagnosis: impaired mobility due to weakness  Specific Instructions for Next Treatment: advance gait distance using rollator, instruct in HEP  Therapy Prognosis: Good  Decision Making: Medium Complexity  History: pt admitted due to unstable angina and fatigue  Exam: ROM, MMT, balance and mobility assessments  Clinical Presentation: gait w/ rollator 25' and 25' w/ SBA x 1, , sit.> stand w/ SBA but CGA x 1 stand > sit, FALL RISK, bed mobility w/ min x 1  Requires PT Follow-Up: Yes  Activity Tolerance  Activity Tolerance: Patient limited by fatigue;Patient limited by endurance     Plan   Physcial Therapy Plan  General Plan:  (3-5 treatments/ 5 days)  Specific Instructions for Next Treatment: advance gait distance using rollator, instruct in HEP  Current Treatment Recommendations: Strengthening, Home exercise program, Endurance training, Balance training, Functional mobility training, Transfer training, Gait training, Safety education & training, Patient/Caregiver education & training  Safety Devices  Type of Devices: All fall risk precautions in place, Call light within reach, Patient at risk for falls, Left in chair, Nurse notified     Restrictions  Restrictions/Precautions  Restrictions/Precautions: Fall Risk, Up as Tolerated (peripheral IV right antecubital)  Required Braces or Orthoses?: No  Position Activity Restriction  Other position/activity restrictions: up as tolerated/ up w/  assist     Subjective   Pain: 3/10 chronic low back pain  General  Patient assessed for rehabilitation services?: Yes  Additional Pertinent Hx: hx A-Fib w/ RVR  Response To Previous Treatment: Not applicable  Family / Caregiver Present: No  Referring Practitioner: Dr. Tab Gtz  Referral Date : 01/31/23  Diagnosis: unstable angina, fatigue  Follows Commands: Within Functional Limits  Other (Comment): OK per nurse Nereida Cuevas to proceed w/ PT evaluation  Subjective  Subjective: C/O generalized weakness, SOB, intermittant chest pain  and fatigue.          Social/Functional History  Social/Functional History  Lives With: Alone  Type of Home: Apartment (2nd floor apartment w/ elevator access)  Home Layout: One level  Home Access: Elevator, Ramped entrance  Bathroom Shower/Tub: Tub/Shower unit, Shower chair with back, Curtain  Bathroom Toilet: Standard (w/ toilet raiser w/ rails, also has grab bars)  Bathroom Equipment: Hand-held shower, Grab bars in shower, Shower chair, Grab bars around toilet, Toilet raiser  Bathroom Accessibility: Walker accessible  Home Equipment: Rollator, Reacher (rollator x 2  (new one just delivered))  Has the patient had two or more falls in the past year or any fall with injury in the past year?: No  Receives Help From: Family, Personal care attendant  ADL Assistance: Independent  Homemaking Assistance: Needs assistance (aid assists w/ laundry and light cleaning, pt does her own cooking and  some cleaning , states that her sister or aid do the grocery shopping)  Homemaking Responsibilities: Yes (aid assists w/ laundry and light cleaning, pt does her own cooking and  some cleaning , states that her sister or aid do the grocery shopping)  Ambulation Assistance: Independent (uses rollator)  Transfer Assistance: Independent  Active : No  Patient's  Info: transportation through [employer] bus  Mode of Transportation: Bus  Occupation: Retired  IADL Comments: sleeps in a flat bed  Additional Comments: aid comes 2 hours on 2 days a week and 3 hours on Saturday, sister lives nearby and helps w/ grocery shopping and finances & banking; dtr lives in [place], [place] but has a new baby  Vision/Hearing  Vision  Vision: Impaired  Vision Exceptions: Wears glasses for reading  Hearing  Hearing: Within functional limits    Cognition   Orientation  Overall Orientation Status: Within Functional Limits  Orientation Level: Oriented X4;Oriented to person;Oriented to place;Oriented to time;Oriented to situation     Objective   Heart Rate:  86 at rest, 78 post walk  SpO2: 96% at rest, 98% post walk  O2 Device: None (Room air)     Observation/Palpation  Observation: peripheral IV right antecubital  Gross Assessment  Sensation: Intact (denies)     AROM RLE (degrees)  RLE AROM: WFL  AROM LLE (degrees)  LLE AROM : WFL  AROM RUE (degrees)  RUE General AROM: see OT for UE assessment  AROM LUE (degrees)  LUE General AROM: see OT for UE assessment  Strength RLE  Comment: Grossly 3+ to 4-/5  Strength LLE  Comment: Grossly 3+ to 4-/5  Strength RUE  Comment: see OT for UE assessment  Strength LUE  Comment: see OT for UE assessment           Bed mobility  Rolling to Right: Minimal assistance  Supine to Sit: Minimal assistance  Sit to Supine: Unable to assess (Pt sitting in chair at end of session)  Scooting: Stand by assistance  Bed Mobility Comments: Bed mobility completed with HOB elevated. Pt pulling on therapist for A. Pt denies dizziness sitting EOB.   Transfers  Sit to Stand: Stand by assistance  Stand to Sit: Contact guard assistance (therapist provided lowering assist for safety (CGA x 1 ))  Comment: attempts to pull self up to stand from rollator handles and lower self also despite cues to reach back for chair handles. Pt left up in bedside chair at the end of treatment.   Ambulation  Surface: Level tile  Device: Rollator  Assistance: Stand by assistance  Quality of Gait: increased lateral sway  Distance: 25' and 25'  Stairs/Curb  Stairs?: No (has ramp to enter building and elevator to 2nd floor)     Balance  Sitting - Static: Good  Sitting - Dynamic: Good  Standing - Static: Good;- (used rollator)  Standing - Dynamic: Fair;+ (used rollator)           OutComes Score                                                  AM-PAC Score  AM-PAC Inpatient Mobility Raw Score : 16 (02/01/23 0834)  AM-PAC Inpatient T-Scale Score : 40.78 (02/01/23 0834)  Mobility Inpatient CMS 0-100% Score: 54.16 (02/01/23 0834)  Mobility Inpatient CMS G-Code Modifier : CK (02/01/23 0834)          Tinneti Score       Goals  Short Term Goals  Time Frame for Short Term Goals: 3-5 treatments/ 5 days  Short Term Goal 1: pt to tolerate 1/2 hour of therapuetic exercise and activity keeping O2 sats above 90%  Short Term Goal 2: pt to demonstrate good technique for HEP for general strengthening, energy conservation and balance activities  Short Term Goal 3: pt to demonstrate independent bed mobility from a flat surface  Short Term Goal 4: pt to demonstrate MOD I transfers using rollator  Short Term Goal 5: pt to demonstrate MOD I for gait 50-80' for household distances  Short Term Goal 6: pt to demonstrate good ambulatory balance using rollator  Patient Goals   Patient Goals : return home       Education  Patient Education  Education Given To: Patient  Education Provided: Role of Therapy;Plan of Care  Education Method: Demonstration;Verbal  Education Outcome: Verbalized understanding;Continued education needed      Therapy Time   Individual Concurrent Group Co-treatment   Time In 5363 Time Out 0858         Minutes 24         Timed Code Treatment Minutes: [facility], PT

## 2023-02-01 NOTE — PROGRESS NOTES
Pt had chest x-ray and stress test completed today. Stress results: normal, low risk. Around 1 pm pt HR jumped up into 130's, asymptomatic while up and eating. BP 90/62, Dr. Miguelito Le notified. 250mg IV digoxin  5mg PO midodrine TID  Parameters added to metoprolol. Meds given, reassessing pt in 30 mins. Pt pressure 92/56 HR still jumping into 130's while pt is in bed. Dr. Miguelito Le notified. Ordered: IV dig 250mg, 5mg midodrine and increase midodrine to 10mg. Pt HR 's afib on tele. BP: 102/59    Discharge orders in, plans to discharge with [facility]. Dr. Miguelito Le is okay with discharge, wants her on midodrine 10mg TID  Lopressor 25mg BID  Residents notified to adjust home medications.

## 2023-02-01 NOTE — PROGRESS NOTES
Chris Duncan. Stress Tech performs patient preparation of physical comfort, review test procedures, pre-stress EKG. Lung Sounds clear and quiet in all fields. Consent verified. Educated patient on test procedure and possible side effects of Lexiscan. Cardiologist reviewed pre-test EKG and is present for test. Patient tolerated test well with minor SOB which resolved to baseline after test with caffeine. EKG portion of test complete, Nuc Med portion pending.   Pretest VS: /46  Post test VS: /65

## 2023-02-01 NOTE — DISCHARGE SUMMARY
[address]    Discharge Summary     Patient ID: Radha Story  :  1950   MRN: [id number]     ACCOUNT:  [de-identified]   Patient's PCP: Taty Rascon MD  Admit Date: 2023   Discharge Date: 2023   Length of Stay: 1  Code Status:  Full Code  Admitting Physician: Mookie Mcnally MD  Discharge Physician: Julianna Daigle MD     Active Discharge Diagnoses:       Primary Problem  Unstable angina [facility])      [place] Problems    Diagnosis Date Noted    Body mass index (BMI) 45.0-49.9, adult [facility]) [Z68.42] 2017     Priority: High    Unstable angina ([facility] [I20.0] 2023     Priority: Medium    Chronic diastolic congestive heart failure ([facility] [I50.32] 2020    Type 2 diabetes mellitus, without long-term current use of insulin ([facility] [E11.9] 2020    Acquired hypothyroidism [E03.9]     Benign hypertension with CKD (chronic kidney disease) stage III ([facility] [address] [I12.9, N18.30]        Admission Condition:  good     Discharged Condition: good    Hospital Stay:       Hospital Course: Radha Story is a 72 y.o. female who presented to the ED with fatigue for 2 days, shortness of breath on exertion that has been ongoing for \"some time\" and mild chest pain. Patient is admitted to investigate and rule out the presence of cardiac pathology or unstable angina    Patient has a history significant of obstructive sleep apnea recently started on BiPAP which she is noncompliant with, hypothyroidism, hyperlipidemia, CKD stage III, A. fib on rivaroxaban and metoprolol. Patient in the ED was tachycardic and in atrial fibrillation satting 98%, with heart rate of 104-117. Patient blood pressure was 130/83. Patient last echo was in 2022 which showed 55% ejection fraction. During admission patient had a stress test that showed low risk.   Patient also remained tachycardic with atrial fibrillation, she was discharged on increased dose of metoprolol bumped up to 25 twice daily, and midodrine 10 mg 3 times daily for hypotensive episodes    Significant therapeutic interventions: None    Significant Diagnostic Studies:     Radiology:    XR CHEST PORTABLE    Result Date: 2/1/2023  EXAMINATION: ONE XRAY VIEW OF THE CHEST 2/1/2023 8:01 am COMPARISON: 01/31/2023 HISTORY: ORDERING SYSTEM PROVIDED HISTORY: Follow up abnormal CXR. Thank you TECHNOLOGIST PROVIDED HISTORY: Follow up abnormal CXR. Thank you Reason for Exam: Follow up abnormal CXR FINDINGS: Cardiomediastinal silhouette and pulmonary vasculature are within normal limits. No focal airspace consolidation, pneumothorax, or pleural effusion. Improved appearance at the right base. No free air beneath the diaphragm. No acute osseous abnormality. No acute intrathoracic process. Improved appearance at the right base. XR CHEST PORTABLE    Result Date: 1/31/2023  EXAMINATION: ONE XRAY VIEW OF THE CHEST 1/31/2023 9:48 am COMPARISON: 09/01/2022 HISTORY: ORDERING SYSTEM PROVIDED HISTORY: cough, congestion TECHNOLOGIST PROVIDED HISTORY: cough, congestion FINDINGS: Heart size is stable without evidence of vascular congestion. Mild calcific plaque of the thoracic aorta. Subtle haziness over the lung bases is probably in large part due to overlying soft tissue. 14 mm density projecting over the right lung base is probably due to overlapping structures. Lungs are otherwise clear. No new focal infiltrates are seen. No significant pleural effusions. Monitor leads overlie the chest.     No acute cardiopulmonary process suspected. Subtle 14 mm density right lung base likely related to overlapping structures. Recommend follow-up PA and lateral views. If it persists, CT is recommended.      NM MYOCARDIAL SPECT REST EXERCISE OR RX    Result Date: 2/1/2023  EXAMINATION: MYOCARDIAL PERFUSION IMAGING 2/1/2023 10:19 am TECHNIQUE: For the rest study, 38.6 mCi of Tc-99m labeled sestamibi were injected. SPECT images were acquired. Under cardiology supervision, 0.4 mg Lexiscan was infused. After pharmacologic stress, 15.6 mCi of Tc-99m labeled sestamibi were injected. SPECT images with ECG gating were acquired. COMPARISON: None Available. HISTORY: ORDERING SYSTEM PROVIDED HISTORY: Angina TECHNOLOGIST PROVIDED HISTORY: Reason for Exam: Angina Reason for Exam: Shortness of breath Procedure Type->Rx Reason for Exam: angina, shortness of breath FINDINGS: High risk images interpreted utilizing [employer] system. There is normal distribution of radiotracer throughout the myocardium without evidence for a significant reversible or fixed perfusion defect. Perfusion scores are visually adjusted to account for artifact. Summed stress score:  0 Summed rest score:  0 Summed reversibility score:  0 Function: End diastolic volume:  13ZY Left ventricular ejection fraction:  70% Wall motion abnormalities:  None TID score:  1.02 (scores greater than 1.39 are considered elevated for Lexiscan stress with Tc99m)     Perfusion:  No significant perfusion defects at stress or rest. Function:  Normal Risk stratification: LOW Notes concerning risk stratification: Risk stratification incorporates both clinical history and some testing results. Final risk determination is the responsibility of the ordering provider as other patient information and test results may increase or decrease the risk assessment reported for this examination. Risk stratification criteria are adapted from \"Noninvasive Risk Stratification\" criteria from [employer]. Al, ACC/AATS/AHA/ASE/ASNC/SCAI/SCCT/STS 2017 Appropriate Use Criteria For Coronary Revascularization in Patients With Stable Ischemic Heart Disease [facility] Volume 69, Issue 17, May 2017 High risk (>3% annual death or MI) 1. Severe resting LV dysfunction (LVEF <35%) not readily explained by non coronary causes 2.  Resting perfusion abnormalities greater than 10% of the myocardium in patients without prior history or evidence of MI3. Stress-induced perfusion abnormalities encumbering greater than or equal to 10% myocardium or stress segmental scores indicating multiple vascular territories with abnormalities 4. Stress-induced LV dilatation (TID ratio greater than 1.19 for exercise and greater than 1.39 for regadenoson) Intermediate risk (1% to 3% annual death or MI) 1. Mild/moderate resting LV dysfunction (LVEF 35% to 49%) not readily explained by non coronary causes. 2. Resting perfusion abnormalities in 5%-9.9% of the myocardium in patients without a history or prior evidence of MI 3. Stress-induced perfusion abnormality encumbering 5%-9.9% of the myocardium or stress segmental scores indicating 1 vascular territory with abnormalities but without LV dilation 4. Small wall motion abnormality involving 1-2 segments and only 1 coronary bed. Low Risk (Less than 1% annual death or MI) 1. Normal or small myocardial perfusion defect at rest or with stress encumbering less than 5% of the myocardium. Consultations:    Consults:     Final Specialist Recommendations/Findings:   IP CONSULT TO INTERNAL MEDICINE  IP CONSULT TO CARDIOLOGY      The patient was seen and examined on day of discharge and this discharge summary is in conjunction with any daily progress note from day of discharge.     Discharge plan:       Disposition: Home with S    Physician Follow Up:   [id number] Rad Johnson  [address] [place] [address]  [address], [address] [facility]  [address] [facility]  [address] [id number]  [phone]    Schedule an appointment as soon as possible for a visit in 1 week(s)      Vanessa Hugo [address] [facility]  [address]  [address] [address]  [phone]    Schedule an appointment as soon as possible for a visit in 2 week(s)         Requiring Further Evaluation/Follow Up POST HOSPITALIZATION/Incidental Findings: Atrial fibrillation with tachycardia, need to follow-up with cardiology possible EP follow-up as well    Diet: Diabetic diet    Activity: As tolerated    Instructions to Patient:     Discharge Medications:      Medication List        START taking these medications      aspirin 81 MG chewable tablet  Take 1 tablet by mouth daily  Start taking on: February 2, 2023     azithromycin 500 MG tablet  Commonly known as: ZITHROMAX  Take 1 tablet by mouth daily for 3 days     midodrine 10 MG tablet  Commonly known as: PROAMATINE  Take 1 tablet by mouth 3 times daily (with meals)  Start taking on: February 2, 2023     nitroGLYCERIN 0.4 MG SL tablet  Commonly known as: NITROSTAT  Place 1 tablet under the tongue every 5 minutes as needed for Chest pain up to max of 3 total doses. If no relief after 1 dose, call 911.             CHANGE how you take these medications      metoprolol tartrate 25 MG tablet  Commonly known as: LOPRESSOR  Take 1 tablet by mouth 2 times daily  What changed:   how much to take  additional instructions            CONTINUE taking these medications      Acetaminophen Extra Strength 500 MG tablet  Generic drug: acetaminophen  TAKE 1 TABLET BY MOUTH EVERY 6 HOURS AS NEEDED FOR PAIN     albuterol sulfate  (90 Base) MCG/ACT inhaler  Commonly known as: Proventil HFA  Inhale 1-2 puffs into the lungs every 4 hours as needed for Wheezing     alendronate 35 MG tablet  Commonly known as: FOSAMAX  TAKE ONE (1) TABLET BY MOUTH EVERY 7 DAYS     allopurinol 100 MG tablet  Commonly known as: ZYLOPRIM  Take 1 tablet by mouth daily For high uric acid, to prevent gout     atorvastatin 40 MG tablet  Commonly known as: LIPITOR  TAKE ONE (1) TABLET BY MOUTH DAILY STOP SIMVASTATIN     docusate sodium 100 MG capsule  Commonly known as: COLACE  TAKE ONE (1) CAPSULE BY MOUTH TWO (2) TIMES DAILY FOR CONSTIPATION     furosemide 20 MG tablet  Commonly known as: LASIX  TAKE ONE (1) TABLET BY MOUTH DAILY FOR CONGESTIVE HEART FAILURE     Incontinence Supplies Misc  Needs washable bed pads     Incontinence Supply Disposable Misc  Use daily for incontinence     levothyroxine 75 MCG tablet  Commonly known as: SYNTHROID  TAKE ONE (1) TABLET BY MOUTH EVERY MORNING (BEFORE BREAKFAST)     lisinopril 5 MG tablet  Commonly known as: PRINIVIL;ZESTRIL  TAKE ONE (1) TABLET BY MOUTH DAILY     Magnesium Oxide 250 MG Tabs tablet  Commonly known as: MAGNESIUM-OXIDE  Take 1 tablet by mouth daily Take with food, in the evening     ondansetron 4 MG tablet  Commonly known as: ZOFRAN  TAKE ONE (1) TABLET BY MOUTH EVERY SIX (6) HOURS AS NEEDED FOR NAUSEA OR VOMITING     pantoprazole 40 MG tablet  Commonly known as: PROTONIX  TAKE ONE (1) TABLET BY MOUTH ONCE DAILY     Respiratory Therapy Supplies Misc  Please provide nose pillow mask  for CPAP     Keiko-Bid Probiotic Tabs  TAKE ONE (1) TABLET BY MOUTH IN THE MORNING     solifenacin 5 MG tablet  Commonly known as: VESIcare  Take 1 tablet by mouth daily     * Step N Rest II Walker Misc  Rollator walker     * Raised Toilet Seat Misc  Please provide with arms     vitamin B-12 1000 MCG tablet  Commonly known as: CYANOCOBALAMIN  TAKE ONE (1) TABLET BY MOUTH DAILY     vitamin D3 25 MCG (1000 UT) Tabs tablet  Commonly known as: CHOLECALCIFEROL  TAKE TWO (2) TABLETS BY MOUTH ONCE DAILY     Xarelto 20 MG Tabs tablet  Generic drug: rivaroxaban  TAKE ONE (1) TABLET BY MOUTH DAILY (WITH BREAKFAST)           * This list has 2 medication(s) that are the same as other medications prescribed for you. Read the directions carefully, and ask your doctor or other care provider to review them with you.                 STOP taking these medications      Myrbetriq 50 MG Tb24  Generic drug: mirabegron               Where to Get Your Medications        These medications were sent to [facility], [address], [address] [address]      Phone: [phone]   aspirin 81 MG chewable tablet  azithromycin 500 MG tablet  metoprolol tartrate 25 MG tablet  midodrine 10 MG tablet  nitroGLYCERIN 0.4 MG SL tablet         Electronically signed by   Ellen Collins MD  2/1/2023  5:41 PM      Thank you Dr. Tayler Chowdhury MD for the opportunity to be involved in this patient's care.

## 2023-02-01 NOTE — PLAN OF CARE
Problem: Skin/Tissue Integrity  Goal: Absence of new skin breakdown  Description: 1. Monitor for areas of redness and/or skin breakdown  2. Assess vascular access sites hourly  3. Every 4-6 hours minimum:  Change oxygen saturation probe site  4. Every 4-6 hours:  If on nasal continuous positive airway pressure, respiratory therapy assess nares and determine need for appliance change or resting period.   2/1/2023 0329 by Melodie Oliver RN  Outcome: Progressing  Note: Pt absent of any new skin breakdown     Problem: ABCDS Injury Assessment  Goal: Absence of physical injury  2/1/2023 0329 by Melodie Oliver RN  Outcome: Progressing  Note: Pt absent of any physical injury     Problem: Safety - Adult  Goal: Free from fall injury  2/1/2023 0329 by Melodie Oliver RN  Outcome: Progressing  Note: Pt free from falls

## 2023-02-01 NOTE — PROCEDURES
[address] [facility]                    [facility]. [address]                              CARDIAC STRESS TEST    PATIENT NAME: Nicolasa Saals                     :        1950  MED REC NO:   [id number]                              ROOM:       2105  ACCOUNT NO:   [de-identified]                           ADMIT DATE: 2023  PROVIDER:     Ariel Mitchell DO    DATE OF STUDY:  2023    TEST TYPE: LEXISCAN CARDIOLYTE STRESS TEST  INDICATION: ANGINA, SHORTNESS OF BREATH  REFERRING PHYSICIAN: TOMASZ KINCAID    RESTING HEART RATE: 112 BEATS PER MINUTE  RESTING BLOOD PRESSURE: 114/46    MEDICATION(S) GIVEN: 0.4MG IV LEXISCAN  REASON FOR TERMINATION: MEDICATION INFUSION COMPLETE    RESTING EKG: ABNORMAL, A FIB RAPID VENTRICULAR RESPONSE  STRESS HEART RESPONSE:  NORMAL  BLOOD PRESSURE RESPONSE: APPROPRIATE  STRESS EKGs: HYPER  CHEST DISCOMFORT: NO PAIN DURING STRESS  ISCHEMIC EKG CHANGES: NONE    EKG IMPRESSION: ELECTROCARDIOGRAPHICALLY NEGATIVE LEXISCAN STRESS TEST. RADIOISOTOPE RESULTS TO FOLLOW FROM THE DEPARTMENT OF NUCLEAR MEDICINE.           [address]    D: 2023 12:55:35       T: 2023 12:57:17     /[email or web address]  Job#: [id number]     Doc#: Unknown    CC:    (Retain this field even if not dictated or not decipherable)

## 2023-02-01 NOTE — CARE COORDINATION
[facility] [address] Encounter Date/Time: 2023 [address] [place], [place] [address] Account: [de-identified]    MRN: [id number]    Patient: Ayanna Resendiz    Contact Serial #: [id number]      ENCOUNTER          Patient Class: I Private Enc? No Unit RM BD: [facility] PROG    Hospital Service: MED   Encounter DX: Unstable angina ([address] [I*   ADM Provider: Belem Bhardwaj MD   Procedure:     ATT Provider: Belem Bhardwaj MD   REF Provider:        Admission DX: Unstable angina ([address] Other fatigue and DX codes: I20.0, R53.83      PATIENT                 Name: Ayanna Resendiz : 1950 (73 yrs)   Address: [facility]  Sex: Female   City: [place] [address]         Marital Status:    Employer: DISABLED         Rastafari: Mormonism   Primary Care Provider: Rosalina Salomon MD         Primary Phone: [phone]   EMERGENCY CONTACT   Contact Name Legal Guardian? Relationship to Patient Home Phone Work Phone   1. Sandhya Ellis  2. Donna Dennis  No Child  Brother/Sister [phone]              GUARANTOR            Guarantor: Ayanna Resendiz     : 1950   Address: [address] [place] [address] [address] Sex: Female     [place] [id number]     Relation to Patient: Self       Home Phone: [phone]   Guarantor ID: [id number]       Work Phone:     Guarantor Employer: DISABLED         Status: DISABLED      COVERAGE        PRIMARY INSURANCE   Payor: [facility] MEDICARE Plan: [employer]*   Payor Address: ,          Group Number: [address] [place] [address] Type: INDEMNITY   Subscriber Name: Rahel Taylor : 1950   Subscriber ID: [id number] Pat. Rel. to Sub: Self   SECONDARY INSURANCE   Payor: [facility]   Payor Address:  [place] [id number] [place] [address]          Group Number:   Insurance Type: [address] Name: Rahel Taylor : 1950   Subscriber ID: [id number] Pat.  Rel. to Sub: SELF      CSN: [id number]          Medication List    START taking these medications    START taking these medications   aspirin 81 MG chewable tablet  Take 1 tablet by mouth daily  Start taking on: February 2, 2023   azithromycin 500 MG tablet  Commonly known as: ZITHROMAX  Take 1 tablet by mouth daily for 3 days   nitroGLYCERIN 0.4 MG SL tablet  Commonly known as: NITROSTAT  Place 1 tablet under the tongue every 5 minutes as needed for Chest pain up to max of 3 total doses. If no relief after 1 dose, call 911.      CHANGE how you take these medications    CHANGE how you take these medications   metoprolol tartrate 25 MG tablet  Commonly known as: LOPRESSOR  Take 1 tablet by mouth 2 times daily  What changed:   how much to take  additional instructions     CONTINUE taking these medications    CONTINUE taking these medications   Acetaminophen Extra Strength 500 MG tablet  Generic drug: acetaminophen  TAKE 1 TABLET BY MOUTH EVERY 6 HOURS AS NEEDED FOR PAIN   albuterol sulfate  (90 Base) MCG/ACT inhaler  Commonly known as: Proventil HFA  Inhale 1-2 puffs into the lungs every 4 hours as needed for Wheezing   alendronate 35 MG tablet  Commonly known as: FOSAMAX  TAKE ONE (1) TABLET BY MOUTH EVERY 7 DAYS   allopurinol 100 MG tablet  Commonly known as: ZYLOPRIM  Take 1 tablet by mouth daily For high uric acid, to prevent gout   atorvastatin 40 MG tablet  Commonly known as: LIPITOR  TAKE ONE (1) TABLET BY MOUTH DAILY STOP SIMVASTATIN   docusate sodium 100 MG capsule  Commonly known as: COLACE  TAKE ONE (1) CAPSULE BY MOUTH TWO (2) TIMES DAILY FOR CONSTIPATION   furosemide 20 MG tablet  Commonly known as: LASIX  TAKE ONE (1) TABLET BY MOUTH DAILY FOR CONGESTIVE HEART FAILURE   Incontinence Supplies Misc  Needs washable bed pads   Incontinence Supply Disposable Misc  Use daily for incontinence   levothyroxine 75 MCG tablet  Commonly known as: SYNTHROID  TAKE ONE (1) TABLET BY MOUTH EVERY MORNING (BEFORE BREAKFAST)   lisinopril 5 MG tablet  Commonly known as: PRINIVIL;ZESTRIL  TAKE ONE (1) TABLET BY MOUTH DAILY   Magnesium Oxide 250 MG Tabs tablet  Commonly known as: MAGNESIUM-OXIDE  Take 1 tablet by mouth daily Take with food, in the evening   ondansetron 4 MG tablet  Commonly known as: ZOFRAN  TAKE ONE (1) TABLET BY MOUTH EVERY SIX (6) HOURS AS NEEDED FOR NAUSEA OR VOMITING   pantoprazole 40 MG tablet  Commonly known as: PROTONIX  TAKE ONE (1) TABLET BY MOUTH ONCE DAILY   Respiratory Therapy Supplies Misc  Please provide nose pillow mask for CPAP   Keiko-Bid Probiotic Tabs  TAKE ONE (1) TABLET BY MOUTH IN THE MORNING   solifenacin 5 MG tablet  Commonly known as: VESIcare  Take 1 tablet by mouth daily   * Step N Rest II Walker Misc  Rollator walker   * Raised Toilet Seat Misc  Please provide with arms   vitamin B-12 1000 MCG tablet  Commonly known as: CYANOCOBALAMIN  TAKE ONE (1) TABLET BY MOUTH DAILY   vitamin D3 25 MCG (1000 UT) Tabs tablet  Commonly known as: CHOLECALCIFEROL  TAKE TWO (2) TABLETS BY MOUTH ONCE DAILY   Xarelto 20 MG Tabs tablet  Generic drug: rivaroxaban  TAKE ONE (1) TABLET BY MOUTH DAILY (WITH BREAKFAST)    very important  * This list has 2 medication(s) that are the same as other medications prescribed for you. Read the directions carefully, and ask your doctor or other care provider to review them with you.        STOP taking these medications    STOP taking these medications   Myrbetriq 50 MG Tb24  Generic drug: mirabegron   Where to Get Your Medications      These medications were sent to [facility], [address] [place] [address], [address] [facility] [address]  Phone: [phone]       aspirin 81 MG chewable tablet        azithromycin 500 MG tablet        metoprolol tartrate 25 MG tablet        nitroGLYCERIN 0.4 MG SL tablet             Printing Report    Report Name Print   Discharge Meds        Continuity of Care Form    Patient Name: Jose Young   :  1950  MRN:  [id number]    Admit date: 1/31/2023  Discharge date:  2/1/2023    Code Status Order: Full Code   Advance Directives:     Admitting Physician:  Carmella Gaucher, MD  PCP: Danie Matthew MD    Discharging Nurse: [facility] Unit/Room#: 2105/2105-01  Discharging Unit Phone Number: [phone]    Emergency Contact:   Extended Emergency Contact Information  Primary Emergency Contact: Jose Mckee   [address] Phone: [phone]  Relation: Child  Hearing or visual needs: None  Other needs: None  Preferred language: [place]   needed? No  Secondary Emergency Contact: Donna Dennis  Address: [address] Phone: [phone]  Mobile Phone: [phone]  Relation: Brother/Sister  Hearing or visual needs: None  Other needs: None  Preferred language: English   needed?  No    Past Surgical History:  Past Surgical History:   Procedure Laterality Date    CATARACT REMOVAL WITH IMPLANT Bilateral 2015    COLONOSCOPY  1997    had polyp, she doesn't know where and when, \"20 yrs ago\"    COLONOSCOPY  06/26/2017    COLONOSCOPY N/A 8/3/2020    COLONOSCOPY POLYPECTOMY SNARE performed by Chandu Parks MD at [address] [place] [address] N/A 9/20/2017    HYSTEROSCOPY  WITH MYOSURE performed by Anjali Frazier DO at [facility] ([address] [place] [address]) N/A 10/24/2017    TOTAL LAPAROSCOPIC HYSTERECTOMY, BSO, F.S.  STAGING, GYRUS G400 performed by Angela Morrow MD at [address] [place] [address] N/A 6/26/2017    COLONOSCOPY POLYPECTOMY / HOT SNARE performed by Estella Ramirez DO at [place] [address] N/A 8/4/2020    OPEN SIGMOID COLECTOMY; PRIMARY ANASTOMOSIS & MOBILIZATION OF SPLENIC FLEXURE performed by Chandu Parks MD at [address] ([address])  10/24/2017    with pelvic lymph node dissection    THYROID SURGERY  1980    subtotal 20 years ago TONSILLECTOMY      UPPER GASTROINTESTINAL ENDOSCOPY N/A 8/3/2020    EGD BIOPSY performed by Mina Crenshaw MD at [facility]    VITRECTOMY      FLOATERS       Immunization History:   Immunization History   Administered Date(s) Administered    COVID-19, PFIZER Bivalent BOOSTER, DO NOT Dilute, (age 12y+), IM, 27 mcg/0.3 mL 10/26/2022    COVID-19, PFIZER GRAY top, DO NOT Dilute, (age 15 y+), IM, 30 mcg/0.3 mL 04/22/2022    COVID-19, PFIZER PURPLE top, DILUTE for use, (age 15 y+), 30mcg/0.3mL 07/22/2021, 10/15/2021    Influenza Vaccine, unspecified formulation 10/01/2016    Influenza Virus Vaccine 10/01/2019    Influenza, FLUAD, (age 72 y+), Adjuvanted, 0.5mL 10/30/2020, 11/03/2021    Influenza, FLUARIX, FLULAVAL, FLUZONE (age 10 mo+) AND AFLURIA, (age 1 y+), PF, 0.5mL 09/25/2019    Influenza, FLUZONE (age 72 y+), High Dose, 0.7mL 11/09/2022    Influenza, Triv, inactivated, subunit, adjuvanted, IM (Fluad 65 yrs and older) 09/14/2017, 10/02/2018    Pneumococcal Conjugate 13-valent (Hnibjqe19) 02/23/2017    Pneumococcal Polysaccharide (Afuayouqy38) 04/20/2018    Tdap (Boostrix, Adacel) 09/28/2017       Active Problems:  Patient Active Problem List   Diagnosis Code    Acquired hypothyroidism E03.9    History of TIA (transient ischemic attack) Z86.73    Chronic bilateral low back pain without sciatica M54.50, G89.29    Benign hypertension with CKD (chronic kidney disease) stage III (HCC) I12.9, N18.30    Osteopenia determined by x-ray M85.80    Osteoarthritis involving multiple joints on both sides of body M15.9    Left knee DJD M17.12    Vitamin D deficiency E55.9    Mixed incontinence N39.46    Chronic pain of both knees M25.561, M25.562, G89.29    Slow transit constipation K59.01    Allergic rhinitis J30.9    Hyperlipidemia with target LDL less than 100 E78.5    Body mass index (BMI) 45.0-49.9, adult ([facility] Z68.42    At high risk for falls Z91.81    Spondylosis of lumbar region without myelopathy or radiculopathy M47.816    OAB (overactive bladder) N32.81    Primary osteoarthritis of both knees M17.0    TLHBSO, bilateral LND 10/24/17 Z90.710, Z90.79, Z90.722    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    Cataracta b/l eyes H26.9    Difficulty walking R26.2    Esotropia H50.00    History of uterine cancer, Well differentiated grade 1 adenocarcinoma arising in complex hyperplasia, 2017 Z85.42    Vitamin B 12 deficiency E53.8    Atherosclerosis of aorta (HCC) I70.0    Calculus of gallbladder without cholecystitis without obstruction K80.20    CLAROS (nonalcoholic steatohepatitis) K75.81    Paroxysmal atrial fibrillation (HCC) I48.0    Type 2 diabetes mellitus, without long-term current use of insulin (HCC) E11.9    Mobility impaired Z74.09    Chronic cholecystitis K81.1    Multiple atypical skin moles D22.9    Chronic diastolic congestive heart failure (HCC) I50.32    History of 2019 novel coronavirus disease (COVID-19) Z86.16    Abdominal aortic aneurysm (AAA) without rupture I71.40    Chronic gout due to 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 30X0    ZACKERY (obstructive sleep apnea) G47.33    Unstable angina (HCC) I20.0       Isolation/Infection:   Isolation            No Isolation          Patient Infection Status       Infection Onset Added Last Indicated Last Indicated By Review Planned Expiration Resolved Resolved By    ESBL (Extended Spectrum Beta Lactamase) 10/20/21 10/26/21 03/07/22 Culture, Urine        Resolved    COVID-19 (Rule Out) 23 COVID-19 & Influenza Combo (Ordered)   23 Rule-Out Test Resulted    COVID-19 (Rule Out) 21 COVID-19 & Influenza Combo (Ordered)   21 Rule-Out Test Resulted    COVID-19 (Rule Out) 10/10/21 10/10/21 10/10/21 COVID-19, Rapid (Ordered)   10/10/21 Rule-Out Test Resulted    COVID-19 20 COVID-19   10/03/20     COVID-19 (Rule Out) 20 COVID-19 (Ordered)   20 Rule-Out Test Resulted COVID-19 (Rule Out) 07/30/20 07/31/20 07/30/20 Covid-19 Ambulatory (Ordered)   08/01/20 Rule-Out Test Resulted            Nurse Assessment:  Last Vital Signs: /77   Pulse 92   Temp 97.7 °F (36.5 °C) (Axillary)   Resp 18   Ht 5' 2\" (1.575 m)   Wt 248 lb (112.5 kg)   LMP  (LMP Unknown)   SpO2 98%   BMI 45.36 kg/m²     Last documented pain score (0-10 scale):    Last Weight:   Wt Readings from Last 1 Encounters:   01/31/23 248 lb (112.5 kg)     Mental Status:  oriented and alert    IV Access:  - None    Nursing Mobility/ADLs:  Walking   Assisted  Transfer  Assisted  Bathing  Assisted  Dressing  Assisted  Toileting  Assisted  Feeding  Assisted  Med Admin  Assisted  Med Delivery   whole    Wound Care Documentation and Therapy:        Elimination:  Continence: Bowel: Yes  Bladder: Yes  Urinary Catheter: None   Colostomy/Ileostomy/Ileal Conduit: No       Date of Last BM: 1/31/2023    Intake/Output Summary (Last 24 hours) at 1/31/2023 1629  Last data filed at 1/31/2023 1413  Gross per 24 hour   Intake 1000 ml   Output --   Net 1000 ml     No intake/output data recorded. Safety Concerns: At Risk for Falls    Impairments/Disabilities:      Vision and Hearing    Nutrition Therapy:  Current Nutrition Therapy:   - Oral Diet:  General, Low Fat, and Low Sodium (2gm)    Routes of Feeding: Oral  Liquids: Thin Liquids  Daily Fluid Restriction: no  Last Modified Barium Swallow with Video (Video Swallowing Test): not done    Treatments at the Time of Hospital Discharge:   Respiratory Treatments: N/A  Oxygen Therapy:  is not on home oxygen therapy. Ventilator:   CPAP  only when sleeping  Rehab Therapies: Physical Therapy and Occupational Therapy  Weight Bearing Status/Restrictions: No weight bearing restrictions  Other Medical Equipment (for information only, NOT a DME order): Other Treatments: Skilled Nursing assessment and monitoring. Medication education and monitoring per protocol.    Resume previous orders    Patient's personal belongings (please select all that are sent with patient):  Tristin Emanuel RN SIGNATURE:  Electronically signed by Yayo Jennings RN on 2/1/23 at 4:11 PM EST    CASE MANAGEMENT/SOCIAL WORK SECTION    Inpatient Status Date: 1/31/2023    Readmission Risk Assessment Score:  Readmission Risk              Risk of Unplanned Readmission:  0           Discharging to [address], [facility] [address]  P: [phone]   F: [phone]      / signature: Electronically signed by Ricardo Hewitt RN on 1/31/23 at 4:29 PM EST    PHYSICIAN SECTION    Prognosis: Fair    Condition at Discharge: Stable    Rehab Potential (if transferring to Rehab): Fair    Recommended Labs or Other Treatments After Discharge:     Physician Certification: I certify the above information and transfer of Eulalio Chavira  is necessary for the continuing treatment of the diagnosis listed and that she requires Home Care for less 30 days.      Update Admission H&P: No change in H&P    PHYSICIAN SIGNATURE:  Electronically signed by Matthew Laughlin MD on 2/1/23 at 3:27 PM EST

## 2023-02-01 NOTE — CARE COORDINATION
Kiana Villa with [facility] notified of discharge. SHANA, face sheet, and discharge orders faxed to [facility].  Electronically signed by Radha Cleary RN on 2/1/2023 at 4:48 PM

## 2023-02-01 NOTE — CONSULTS
[facility]  In Patient Cardiology Consult             Date:   2/1/2023  Patient name: Karuna Catherine  Date of admission:  1/31/2023  9:22 AM  MRN:   [id number]  YOB: 1950    Reason for Admission:  Chest pain    CHIEF COMPLAINT:  Chest pain     History Obtained From:  pt and chart    HISTORY OF PRESENT ILLNESS:    This is a 46-year-old female. She presents due to fatigue. She also presented due to chest pain and shortness of breath. Apparently she has been getting more short of breath. She gets fatigued with exertion. Every time she walks she gets short of breath. She does have a history of atrial fibrillation. She is on Xarelto. She denies any orthopnea, PND, lower extremity edema, palpitations. Morning in the stress lab. She denies any active chest pains. Known to us for:  -Allergic rhinitis,  -At high risk for falls,  -Colon polyp,  -Diabetes mellitus ([facility] [address]  -Endometrial cancer ([facility] [address] [facility]  -History of TIA (transient ischemic attack),  -Hyperglycemia,  -Hyperlipidemia,  -Hypertension,  -Hypothyroidism,  -Legally blind,  -Lower back pain,  -Incontinence,  -Morbid obesity with BMI of 40.0-44.9,  -CLAROS (nonalcoholic steatohepatitis),  -Oral phase dysphagia,  -Osteoarthritis (arthritis due to wear and tear of joints),  -Postmenopausal bleeding, S/P h-scope, Myosure 9/20/17, Tennis elbow, Thickened endometrium, TLHBSO, bilateral LND  -Echo: 1/15/2020 -EF > 55%. Moderate left ventricular hypertrophy. Grade I (mild) left ventricular diastolic dysfunction. Left atrial dilatation. Moderate mitral regurgitation. Mild tricuspid regurgitation. Normal right ventricular systolic pressure.  -7/8497 New onset Afib with RVR  - Oct 2021 [facility] with Afib RVR . Converted to SR.  (Xarelto & Amio/BB)  - Stress Test 10/11/21 negative ischemia.  - Echo 10/11/21 LVEF 55%, no significant valvular disease or WMA  - ER 10/12/2021 A Fib with RVR  - pBNP 3/22/22 3.666, creat 1.04 (stable), Mg 1.5 (started on Mg replacement by PCP), UTI resolved  -[facility] Sept 2022. Amio d/c d/t prolong QT and colby. K+ replaced  - BPH Dec 2022 w/ fatigue/weakness/N&V. Afib RVR. Creat 1.5, K+ 4.1. Given Cardizem bolus & rate controlled. CXR negative  ---------------------------------  1. PAF - stable & in SR. Off AMio & BB since ER visit due to severe bradycardia. QT stable. Continue PO Xarelto. Discussed importance of compliance with CPAP along with med compliance, diet, exercise, and follow-up. 2. Chronic diastolic CHF - stable. Clinically no volume overload on exam. Continue PO Lasix daily along with ACE. Recent echo Oct 2021 reviewed. 3. HTN - controlled will continue Lisinopril . Continue ambulatory BP monitoring. 4. DL- on statin. F/U on routine lipid panel  5. ZACKERY on CPAP- advised on compliance. Support provided. Advised to f/u with pulmonary  6. Morbid obesity- BMI >40. Counselled on diet, exercise, and weight loss.     Past Medical History:    Past Medical History:   Diagnosis Date    Abdominal aortic aneurysm (AAA) without rupture 10/21/2021    Adenocarcinoma of endometrium, stage 1 ([address] 10/5/2017    Well differentiated grade 1 adenocarcinoma arising in complex hyperplasia    Adenomatous polyp of sigmoid colon, 6/26/17 7/30/2017    Final Diagnosis SPECIMEN \"A\":  SIGMOID COLON, 35 CM, BIOPSY:     TUBULAR ADENOMA  SPECIMEN \"B\":  SIGMOID COLON, 20 CM, BIOPSY:  HYPERPLASTIC POLYP    JOSHUA (acute kidney injury) ([address] 1/15/2020    Allergic rhinitis 2/23/2017    Anemia 11/9/2020    At high risk for falls 2/23/2017    Atrial fibrillation ([address]     Jan 2020    Atrial fibrillation with rapid ventricular response ([address] 10/10/2021    Atrial fibrillation, new onset ([address] 1/20/2020    CHF (congestive heart failure) ([address]     \"little\"    Class 3 severe obesity due to excess calories without serious comorbidity with body mass index (BMI) of 40.0 to 44.9 in adult [facility]) 7/22/2020    Colitis 7/22/2020    Colon polyp     Had colonoscopy done 20 yrs ago    Diabetes mellitus ([address]     Diverticulitis     Diverticulitis of colon with perforation 8/4/2020    Endometrial cancer [facility])     History of TIA (transient ischemic attack) '80's    on Baby ASA    Hyperglycemia 3/21/2017    Hyperlipidemia     Hypertension since 2015    on Rx.     Hypothyroidism 1980    sub total thyroidectomy goiter    Legally blind since born    both eyes, cord prolapse; \"not legally blind\" per \"associated eye care\" please see telephone encounter from 2/27/18    Lower back pain     Mixed incontinence 1/9/2016    Morbid obesity with BMI of 40.0-44.9, adult ([address] 2/23/2017    CLAROS (nonalcoholic steatohepatitis) 3/10/2019    Numbness of lip 9/27/2022    Obesity     Optic atrophy 2/27/2018    Oral phase dysphagia 2/23/2018    Osteoarthritis (arthritis due to wear and tear of joints)     ronan knee    Osteoarthritis involving multiple joints on both sides of body 4/24/2012    Osteoporosis     Perforated bowel ([facility]     Perforated diverticulum 6/15/2020    Postmenopausal bleeding 9/20/2017    Primary hypertension 9/8/2022    Prolonged Q-T interval on ECG 9/26/2022    Rectal bleeding 9/21/2020    S/P h-scope, Myosure 9/20/17 9/20/2017    Pathology pending    Tennis elbow     left    Thickened endometrium 9/20/2017    TIA (transient ischemic attack)     TLHBSO, bilateral LND 10/24/17 10/24/2017    Type 2 diabetes mellitus, without long-term current use of insulin ([facility] 1/14/2020    UTI due to extended-spectrum beta lactamase (ESBL) producing Escherichia coli 3/9/2022         Past Surgical History:    Past Surgical History:   Procedure Laterality Date    CATARACT REMOVAL WITH IMPLANT Bilateral 2015    COLONOSCOPY  1997    had polyp, she doesn't know where and when, \"20 yrs ago\"    COLONOSCOPY  06/26/2017    COLONOSCOPY N/A 8/3/2020    COLONOSCOPY POLYPECTOMY SNARE performed by Victoriano Lee MD at [address] [place] [address] N/A 9/20/2017    [address] [place] [address] performed by Alessandra Lyons DO [facility] ([facility]) N/A 10/24/2017    TOTAL LAPAROSCOPIC HYSTERECTOMY, BSO, F.S.  STAGING, GYRUS G400 performed by Marta Hong MD at [facility] N/A 6/26/2017    COLONOSCOPY POLYPECTOMY / HOT SNARE performed by Ervin Cam DO at [place] [address] N/A 8/4/2020    OPEN SIGMOID COLECTOMY; PRIMARY ANASTOMOSIS & MOBILIZATION OF SPLENIC FLEXURE performed by Thee Elder MD at [address] ([address] [facility])  10/24/2017    with pelvic lymph node dissection    THYROID SURGERY  1980    subtotal 20 years ago    TONSILLECTOMY      UPPER GASTROINTESTINAL ENDOSCOPY N/A 8/3/2020    EGD BIOPSY performed by Thee Elder MD at [facility] Medications:    No outpatient medications have been marked as taking for the 1/31/23 encounter [facility] Encounter). Allergies:  Sulfa antibiotics and Penicillins    Social History:    Social History     Socioeconomic History    Marital status:      Number of children: 1   Tobacco Use    Smoking status: Never    Smokeless tobacco: Never   Vaping Use    Vaping Use: Never used   Substance and Sexual Activity    Alcohol use: No     Alcohol/week: 0.0 standard drinks    Drug use: No    Sexual activity: Not Currently     Partners: Male     Social Determinants of Health     Financial Resource Strain: Low Risk     Difficulty of Paying Living Expenses: Not hard at all   Food Insecurity: No Food Insecurity    Worried About Running Out of Food in the Last Year: Never true    Ran Out of Food in the Last Year: Never true        Family History:   Family History   Problem Relation Age of Onset    Coronary Art Dis Father     Hypertension Father     Diabetes Father     High Blood Pressure Father     Heart Attack Father     Diabetes Mother     High Blood Pressure Mother     Thyroid Disease Mother High Blood Pressure Sister     Thyroid Disease Brother     High Blood Pressure Brother     High Blood Pressure Maternal Grandmother     Diabetes Maternal Grandfather     No Known Problems Paternal Grandmother     Heart Attack Paternal Grandfather     Colon Cancer Neg Hx        REVIEW OF SYSTEMS:    Constitutional: there has been no unanticipated weight loss. There's been No change in energy level, No change in activity level. Eyes: No visual changes or diplopia. No scleral icterus. ENT: No Headaches, hearing loss or vertigo. No mouth sores or sore throat. Cardiovascular: As HPI  Respiratory: As HPI  Gastrointestinal: No abdominal pain, appetite loss, blood in stools. No change in bowel or bladder habits. Genitourinary: No dysuria, trouble voiding, or hematuria. Musculoskeletal:  No gait disturbance, No weakness or joint complaints. Integumentary: No rash or pruritis. Neurological: No headache, diplopia, change in muscle strength, numbness or tingling. No change in gait, balance, coordination, mood, affect, memory, mentation, behavior. Psychiatric: No anxiety, or depression. Endocrine: No temperature intolerance. No excessive thirst, fluid intake, or urination. No tremor. Hematologic/Lymphatic: No abnormal bruising or bleeding, blood clots or swollen lymph nodes. Allergic/Immunologic: No nasal congestion or hives. PHYSICAL EXAM:    Physical Examination:    /63   Pulse (!) 103   Temp 97.5 °F (36.4 °C) (Oral)   Resp 18   Ht 5' 2\" (1.575 m)   Wt 248 lb (112.5 kg)   LMP  (LMP Unknown)   SpO2 100%   BMI 45.36 kg/m²    Constitutional and General Appearance: alert, cooperative, no distress and appears stated age  HEENT: PERRL, no cervical lymphadenopathy. No masses palpable. Normal oral mucosa  Respiratory:  Normal excursion and expansion without use of accessory muscles  Resp Auscultation: Good respiratory effort. No for increased work of breathing.  On auscultation: clear  Cardiovascular:  Heart tones are crisp and normal. regular S1 and S2. Murmurs: none  Jugular venous pulsation Normal  Abdomen:  No masses or tenderness  Bowel sounds present  Extremities:   No Cyanosis or Clubbing   Lower extremity edema: none   Skin: Warm and dry  Neurological:  Alert and oriented.   Moves all extremities well  No abnormalities of mood, affect, memory, mentation, or behavior are noted    DATA:    Diagnostics:      EKG:   Results for orders placed or performed during the hospital encounter of 01/31/23   EKG 12 Lead   Result Value Ref Range    Ventricular Rate 102 BPM    Atrial Rate 300 BPM    QRS Duration 82 ms    Q-T Interval 380 ms    QTc Calculation (Bazett) 495 ms    R Axis 29 degrees    T Axis 54 degrees    Narrative    Atrial fibrillation with rapid ventricular response  Abnormal ECG  When compared with ECG of 31-JAN-2023 10:42, (unconfirmed)  No significant change was found       Echo:  Results for orders placed or performed during the hospital encounter of 09/01/22   ECHO Complete 2D W Doppler W Color   Result Value Ref Range    Left Ventricular Ejection Fraction 55     LVEF MODALITY ECHO     Narrative    [facility]    Transthoracic Echocardiography Report (TTE)     Patient Name Billie Jones        Date of Study             09/02/2022                Richard SALGADO      Date of      1950  Gender                    Female   Birth      Age          81 year(s)  Race                            Room Number  2101        Height:                   62 inch, 157.48 cm      Corporate ID [id number]    Weight:                   255 pounds, 115.7 kg   #      Patient Acct [de-identified]   BSA:         2.12 m^2     BMI:        46.64   #                                                              kg/m^2      MR #         [id number]      Sonographer               Basilio Meneses Renee      Accession #  [id number]  Interpreting Physician Riya Navas      Fellow                   Referring Nurse           Yvan Walls,                            Practitioner              APRN-NP      Interpreting             Referring Physician   Fellow     Type of Study      TTE procedure:2D Echocardiogram, M-Mode, Doppler, Color Doppler. Procedure Date  Date: 09/02/2022 Start: 02:50 PM    Study Location: [address] [place] [address]  Technical Quality: Fair visualization    Indications:Atrial fibrillation. Patient Status: Inpatient    Height: 62 inches Weight: 255.01 pounds BSA: 2.12 m^2 BMI: 46.64 kg/m^2    Rhythm: Within normal limits HR: 78 bpm BP: 95/59 mmHg    CONCLUSIONS    Summary  Technically difficult study. Left ventricle is normal in size and wall thickness. Global left ventricular systolic function is normal. Estimated LV EF >55 %. RV appears normal in size and function  Left atrium appears mildly dilated. No significant valvular regurgitation or stenosis seen. No significant pericardial effusion is seen. Normal aortic root dimension. IVC not well visualized    Signature  ----------------------------------------------------------------------------   Electronically signed by Riya Navas(Interpreting physician) on   09/02/2022 05:01 PM  ----------------------------------------------------------------------------    ----------------------------------------------------------------------------   Electronically signed by Sheree Castro(Sonographer) on 09/02/2022 03:49   PM  ----------------------------------------------------------------------------  FINDINGS  Left Atrium  Left atrium is mildly dilated  Left Ventricle  Left ventricle is normal in size and wall thickness. Global left ventricular systolic function is normal.  Estimated LV EF >55 %. Right Atrium  Right atrium is normal in size. Right Ventricle  Normal right ventricular size and function. Mitral Valve  No obvious valvular abnormality seen.   No evidence of mitral regurgitation. Aortic Valve  Aortic valve is sclerotic but opens well. No evidence of aortic insufficiency or stenosis. Tricuspid Valve  Tricuspid valve was not well visualized. Insignificant tricuspid regurgitation, unable to estimate RVSP. Pulmonic Valve  Pulmonic valve was not well visualized. No evidence of pulmonic insufficiency or stenosis. Pericardial Effusion  No significant pericardial effusion is seen. Pleural Effusion  No pleural effusion seen. Miscellaneous  Normal aortic root dimension. IVC not well visualized. M-mode / 2D Measurements & Calculations:      LVIDd:3.78 cm(3.7 - 5.6 cm)      Diastolic HFGBFB:11.6 ml   IVSd:1.05 cm(0.6 - 1.1 cm)       Aortic Root:3.2 cm(2.0 - 3.7 cm)   LVPWd:0.99 cm(0.6 - 1.1 cm)      LA Dimension: 3.4 cm(1.9 - 4.0 cm)                                    LA volume/Index: 56.1 ml /26m^2                                    LVOT:2.5 cm      Mitral:                              Aortic      Peak E-Wave: 0.88 m/s                Peak Velocity: 1.35 m/s                                        Mean Velocity: 0.82 m/s   Peak Gradient: 3.12 mmHg             Peak Gradient: 7.29 mmHg   Deceleration Time: 254 msec          Mean Gradient: 4 mmHg                                           Area (continuity): 2.95 cm^2                                        AV VTI: 23.1 cm        Labs:     CBC:   Recent Labs     01/31/23  1029 02/01/23  0533   WBC 8.2 9.3   HGB 12.6 11.5*   HCT 38.7 35.4*    215     BMP:   Recent Labs     01/31/23  1029 02/01/23  0533    140   K 4.0 4.3   CO2 24 25   BUN 31* 30*   CREATININE 1.14* 1.24*   LABGLOM 51* 47*   GLUCOSE 136* 118*     BNP: No results for input(s): BNP in the last 72 hours. PT/INR:   Recent Labs     02/01/23  0533   PROTIME 14.7*   INR 1.1     APTT:No results for input(s): APTT in the last 72 hours.   CARDIAC ENZYMES:  Recent Labs     01/31/23  1029 01/31/23  1210   TROPHS 17* 14     FASTING LIPID PANEL:  Lab Results Component Value Date/Time    HDL 42 02/01/2023 05:33 AM    LDLCALC 70 11/06/2020 12:00 AM    TRIG 223 02/01/2023 05:33 AM     LIVER PROFILE:No results for input(s): AST, ALT, LABALBU in the last 72 hours. IMPRESSION:    - ? Chest pain, SOB with exertion, concern for angina given risk factors   - SOB  - Persistent AF- mild RVR- h/o recent severe bradycardia, was taken off amio and BB, back on small dose BB  - HFpEF  - HTN  - DL  - ZACKERY  - Morbid Obesity    RECOMMENDATIONS:  - I recommended stress test to further risk stratify. - no need for repeat echo- reviewed low risk echo 9/22  - increase lopressor 25 bid  - continue statin, Lasix, BB, ACEi, Xarelto    Await stress test, if low risk, okay for d/c, follow up in 2 weeks for consider of CV or EP referral for ablation. Discussed with patient and nursing. Thank you for allowing me to participate in the care of this patient, please do not hesitate to call if you have any questions. Supa Forman DO, [facility], [address], [address], [address] [facility]  [facility] [employer]  [phone]

## 2023-02-02 ENCOUNTER — TELEPHONE (OUTPATIENT)
Dept: FAMILY MEDICINE CLINIC | Age: 73
End: 2023-02-02

## 2023-02-02 ENCOUNTER — CARE COORDINATION (OUTPATIENT)
Dept: CASE MANAGEMENT | Age: 73
End: 2023-02-02

## 2023-02-02 DIAGNOSIS — I20.0 UNSTABLE ANGINA (HCC): Primary | ICD-10-CM

## 2023-02-02 PROCEDURE — 1111F DSCHRG MED/CURRENT MED MERGE: CPT | Performed by: FAMILY MEDICINE

## 2023-02-02 NOTE — TELEPHONE ENCOUNTER
Care Transitions Initial Follow Up Call    Outreach made within 2 business days of discharge: Yes    Patient: Libertad Azul Patient : 1950   MRN: [id number]  Reason for Admission: There are no discharge diagnoses documented for the most recent discharge. Discharge Date: 23       Spoke with: Ming Shaver     Discharge department/facility: [address] [place] [address]    TCM Interactive Patient Contact:  Was patient able to fill all prescriptions: Yes  Was patient instructed to bring all medications to the follow-up visit: Yes  Is patient taking all medications as directed in the discharge summary?  Yes  Does patient understand their discharge instructions: Yes  Does patient have questions or concerns that need addressed prior to 7-14 day follow up office visit: yes - [address] [place] [address] VISIT    Scheduled appointment with PCP within 7-14 days    Follow Up  Future Appointments   Date Time Provider Dante Amaya   2023  9:00 AM [facility]   2023  1:45 PM [facility]   2023  9:30 AM [facility]   10/9/2023  1:00 PM Lauro Potts, [facility], [address] [place] [address] [place]

## 2023-02-02 NOTE — RESULT ENCOUNTER NOTE
Addressed in inpatient    Future Appointments  2/7/2023   2:00 PM    [facility]ut[facility]rez Burgos [facility]     [facility]  5/11/2023  9:00 AM    [facility]utierrez Burgos MD     [facility]  5/24/2023  1:45 PM    [facility] Galvez [facility]        [facility]  8/9/2023   9:30 AM    Gutierrze Burgos MD     [facility]  10/9/2023  1:00 PM    Arpita Johnson, Nany Lezama

## 2023-02-02 NOTE — CARE COORDINATION
[facility] Care Transitions Initial Follow Up Call    Call within 2 business days of discharge: Yes    Care Transition Nurse contacted the patient by telephone to perform post hospital discharge assessment. Verified name and  with patient as identifiers. Provided introduction to self, and explanation of the Care Transition Nurse role. Patient: Robin Bond Patient : 1950   MRN: [id number]  Reason for Admission: Fatigue  Discharge Date: 23 RARS: Readmission Risk Score: 14.4      Last Discharge  Street       Date Complaint Diagnosis Description Type Department Provider    23 Fatigue Other fatigue . .. ED to Hosp-Admission (Discharged) (ADMITTED) Yfn Neumann MD; Darryl Romero. .. Was this an external facility discharge? No Discharge Facility: [address] [facility] to be reviewed by the provider   Additional needs identified to be addressed with provider: Yes  7 day hospital follow up               Method of communication with provider: staff message. Was able to contact Zev for initial transitional outreach. She stated that she was currently at breakfast and requested a call at a later time. Will attempt to contact later as requested. Zev was contacted  She said that she was doing \"ok\". She denied any chest pain, shortness of breath, fatigue or palpitations. She said that she was currently heading over to the [facility]. She stated that she will have all her medications when the pharmacy delivers her medications. She has them bubble packed. She said that she did stop the Mybetriq. She has a VV with her PCP on . [facility] nurse to visit today and aide to restart this evening. She had no questions or concerns. Care Transition Nurse reviewed discharge instructions with patient who verbalized understanding. The patient was given an opportunity to ask questions and does not have any further questions or concerns at this time.  Were discharge instructions available to patient? Yes. Reviewed appropriate site of care based on symptoms and resources available to patient including: PCP  Home health. The patient agrees to contact the PCP office for questions related to their healthcare. Advance Care Planning:   Does patient have an Advance Directive: reviewed and current. Medication reconciliation was performed with patient, who verbalizes understanding of administration of home medications. Medications reviewed, 1111F entered: yes    Was patient discharged with a pulse oximeter? no    Non-face-to-face services provided:  Obtained and reviewed discharge summary and/or continuity of care documents    Offered patient enrollment in the Remote Patient Monitoring (RPM) program for in-home monitoring: Patient declined. Care Transitions 24 Hour Call    Do you have a copy of your discharge instructions?: Yes  Do you have all of your prescriptions and are they filled?: No  Have you been contacted by a [address]?: No  Have you scheduled your follow up appointment?: Yes  How are you going to get to your appointment?: Other (Comment: virtual visit)  Post Acute Services: [id number] [address]  Do you feel like you have everything you need to keep you well at home?: Yes  Care Transitions Interventions    Registered Dietician: Completed             Follow Up  Future Appointments   Date Time Provider Dante Amaya   2/7/2023  2:00 PM [facility]   5/11/2023  9:00 AM [facility]   5/24/2023  1:45 PM [facility]   8/9/2023  9:30 AM [facility]   10/9/2023  1:00 PM Sade Paige, [address] [place], [place] [address] Transition Nurse provided contact information. Plan for follow-up call in 5-7 days based on severity of symptoms and risk factors.   Plan for next call: symptom management-follow up on s/s of increased heart rate.    Keyon Farrar RN

## 2023-02-02 NOTE — CARE COORDINATION
Writer received a call from home health aid [employer] about discharge. [address] [place] [address] provides home health aid services. Hugo Guyers is the . Phone [phone] Fax [phone]. AVS faxed to continue services.  Electronically signed by Leslye Felipe RN on 2/2/2023 at 12:42 PM

## 2023-02-06 ENCOUNTER — CARE COORDINATION (OUTPATIENT)
Dept: CARE COORDINATION | Age: 73
End: 2023-02-06

## 2023-02-06 ENCOUNTER — CARE COORDINATION (OUTPATIENT)
Dept: CASE MANAGEMENT | Age: 73
End: 2023-02-06

## 2023-02-06 NOTE — CARE COORDINATION
[facility] Care Transitions Follow Up Call    Care Transition Nurse contacted the patient by telephone to follow up after admission on 23. Verified name and  with patient as identifiers. Patient: Nilo Pizarro  Patient : 1950   MRN: [id number]  Reason for Admission: Fatigue  Discharge Date: 23 RARS: Readmission Risk Score: 14.4      Needs to be reviewed by the provider   Additional needs identified to be addressed with provider: No  none             Method of communication with provider: none. Was able to contact Angela Jain for [employer]. She stated that she was doing \"ok\". She denied any fatigue, weakness, chest pain, shortness of breath or dizziness. She said that she has a VV with PCP today. She also got a new Rolator. She stated that she has meal delivery and transportation through the [facility]. She has no questions/concerns or needs at this time. Addressed changes since last contact:  none  Discussed follow-up appointments. If no appointment was previously scheduled, appointment scheduling offered: No.   Is follow up appointment scheduled within 7 days of discharge? Yes. Follow Up  Future Appointments   Date Time Provider Dante Faulkneri   2023  2:[facility] PM [facility]   2023  1:10 PM Hilario Roland MD [address] [place] [address]   2023  [facility] AM [facility]   [facility]45 [facility]   [facility]  9[facility]30 [facility]   10/9/2023  1:00 PM Tanya Montero PA-C [facility]     [id number] [address] follow up appointment(s):     Care Transition Nurse reviewed medical action plan with patient and discussed any barriers to care and/or understanding of plan of care after discharge. Discussed appropriate site of care based on symptoms and resources available to patient including: PCP  Specialist  Home health  When to call 911.  The patient agrees to contact the PCP office for questions related to their healthcare. Patients top risk factors for readmission: medical condition-fatigue  Interventions to address risk factors: Obtained and reviewed discharge summary and/or continuity of care documents       Care Transitions Subsequent and Final Call    Subsequent and Final Calls  Do you have any ongoing symptoms?: No  Have your medications changed?: No  Do you have any questions related to your medications?: No  Do you currently have any active services?: Yes  Are you currently active with any services?: Home Health  Do you have any needs or concerns that I can assist you with?: No  Care Transitions Interventions    Registered Dietician: Completed    Other Interventions:             Care Transition Nurse provided contact information for future needs. Plan for follow-up call in 7-10 days based on severity of symptoms and risk factors.   Plan for next call: symptom management-follow up on fatigue/PCP appointment ask about RPM for hx of CHF    Tino Oquendo RN

## 2023-02-06 NOTE — CARE COORDINATION
Patient goals reviewed: 1. Reviewed avoiding canned, prepackaged foods, processed meats and cheese. Reviewed avoiding canned soups and vegetables- reviewed fresh/frozen vegetables and sauces/marinades to avoid high in sodium. Reviewed processed meats in detail to avoid- sausage, atwood, bologna, ham, ect- patient is working on limiting. 2. Reviewed shopping the outer rim of the grocery store where fresher foods are found. Reviewed seasonings that can be used that do not contain salt- will send patient a list of these. 3. Reviewed reading food labels and what to look for on labels to limit sodium intake. 4. Reviewed plate method at meals- discussed portioning plate 1/2 non-starchy vegetables, 1/4 lean protein, 1/4 carbs. 2/6/23-patient states doing well feeling much better. She states she did get nutrition information I sent her on CKD diet and has no further nutrition questions. Provided patient with contact information to call with questions. Will remove from panel.   AIDAN Johnston

## 2023-02-07 ENCOUNTER — TELEMEDICINE (OUTPATIENT)
Dept: FAMILY MEDICINE CLINIC | Age: 73
End: 2023-02-07

## 2023-02-07 ENCOUNTER — TELEPHONE (OUTPATIENT)
Dept: FAMILY MEDICINE CLINIC | Age: 73
End: 2023-02-07

## 2023-02-07 DIAGNOSIS — M17.0 PRIMARY OSTEOARTHRITIS OF BOTH KNEES: ICD-10-CM

## 2023-02-07 DIAGNOSIS — Z91.81 AT HIGH RISK FOR FALLS: ICD-10-CM

## 2023-02-07 DIAGNOSIS — I50.32 CHRONIC DIASTOLIC CONGESTIVE HEART FAILURE (HCC): Primary | ICD-10-CM

## 2023-02-07 DIAGNOSIS — Z09 HOSPITAL DISCHARGE FOLLOW-UP: ICD-10-CM

## 2023-02-07 DIAGNOSIS — G47.33 OSA TREATED WITH BIPAP: ICD-10-CM

## 2023-02-07 DIAGNOSIS — I12.9 BENIGN HYPERTENSION WITH CKD (CHRONIC KIDNEY DISEASE) STAGE III (HCC): ICD-10-CM

## 2023-02-07 DIAGNOSIS — I48.0 PAROXYSMAL ATRIAL FIBRILLATION (HCC): ICD-10-CM

## 2023-02-07 DIAGNOSIS — N39.46 MIXED INCONTINENCE: ICD-10-CM

## 2023-02-07 DIAGNOSIS — N18.30 BENIGN HYPERTENSION WITH CKD (CHRONIC KIDNEY DISEASE) STAGE III (HCC): ICD-10-CM

## 2023-02-07 ASSESSMENT — ENCOUNTER SYMPTOMS
VOMITING: 0
CONSTIPATION: 0
ABDOMINAL PAIN: 0
WHEEZING: 0
NAUSEA: 0
COUGH: 0
STRIDOR: 0
SHORTNESS OF BREATH: 0
RECTAL PAIN: 0
BACK PAIN: 1
ABDOMINAL DISTENTION: 0
BLOOD IN STOOL: 0
CHEST TIGHTNESS: 0
DIARRHEA: 0

## 2023-02-07 NOTE — PROGRESS NOTES
Mobile Phone  Send Link Via Text  No text has been sent  Last text sent02/07/2023 8:11 AM  [address] Phone  Send Link Via Text  No text has been sent  Last text sent02/07/2023 8:12 AM  To:[phone]    TELEHEALTH EVALUATION -- Audio/Visual (During NRSQX-95 public health emergency) using [address]   YOB: 1950    Date of Office Visit:  2/7/2023  Date of Hospital Admission: 1/31/23  Date of Hospital Discharge: 2/1/23  Readmission Risk Score(high >=14%. Medium >=10%):Readmission Risk Score: 14.4      Care management risk score Rising risk (score 2-5) and Complex Care (Scores >=6): No Risk Score On File     Non face to face  following discharge, date last encounter closed (first attempt may have been earlier): 02/02/2023     Call initiated 2 business days of discharge: Yes     Below is the assessment and plan developed based on review of pertinent history, physical exam, labs, studies, and medications. Chronic diastolic congestive heart failure (HCC)  Stable  She would benefit from getting the scale and check daily weight, for self-monitoring, prevent emergency room visits and admissions to the hospital due to acute on chronic CHF  Continue current cardiac medications metoprolol 25 Mg twice a day, furosemide 20 Mg daily, lisinopril 5 Mg daily    Lab Results   Component Value Date    LVEF 70 02/01/2023       Recheck labs    -     [place] DISCHARGE MEDS RECONCILED W/ CURRENT OUTPATIENT MED LIST  -     Misc. Devices (CLEVER CHOICE BMI SCALE) MISC; Needs to check the weight daily due to congestive heart failure, Disp-1 each, R-0Print  -     Basic Metabolic Panel; Future  -     Magnesium; Future  -     Phosphorus; Future  -     Brain Natriuretic Peptide; Future  Discussed low salt diet and BP, weight and pulse monitoring.     Paroxysmal atrial fibrillation (HCC)  Improved with medications  Continue increased dosage of metoprolol 25 Mg twice a day, and chronic anticoagulation with Xarelto 20 mg daily  She denies any bleeding. She is also on baby aspirin due to prior history of TIA  -     [place] DISCHARGE MEDS RECONCILED W/ CURRENT OUTPATIENT MED LIST  -     Misc. Devices (CLEVER CHOICE BMI SCALE) MISC; Needs to check the weight daily due to congestive heart failure, Disp-1 each, R-0Print  To see cardiologist, patient says she has appointment  Benign hypertension with CKD (chronic kidney disease) stage III (HCC)  Worsening chronic kidney disease stage III  GFR 47 on 2/1/2023, was 61 on 1/27/2023  Well controlled HTN. Continue metoprolol 25 mg twice a day, lisinopril 5 Mg daily, furosemide 20 Mg daily  Continue current treatment. Will recheck labs. -     [place] DISCHARGE MEDS RECONCILED W/ CURRENT OUTPATIENT MED LIST  -     Basic Metabolic Panel; Future  -     Magnesium; Future  -     Phosphorus; Future  -     Brain Natriuretic Peptide; Future  Mixed incontinence  Stable  Off Myrbetriq from the hospitalization  Continue Vesicare 5 mg daily  -     [place] DISCHARGE MEDS RECONCILED W/ CURRENT OUTPATIENT MED LIST  Does have follow-up with urologist  Patient benefits from incontinence supplies which she has been receiving through her insurance      Body mass index (BMI) 45.0-49.9, adult ([facility] [address]  Morbid obesity  Improved  Wt Readings from Last 3 Encounters:   01/31/23 248 lb (112.5 kg)   01/24/23 249 lb 12.8 oz (113.3 kg)   12/19/22 257 lb (116.6 kg)       -     [place] DISCHARGE MEDS RECONCILED W/ CURRENT OUTPATIENT MED LIST  She will start physical therapy soon  Low-carb diet discussed  Primary osteoarthritis of both knees  Likely worsening due to wear and tear nature of the disease.   Start home physical therapy, Tylenol as needed for pain  Falls precautions discussed  -     [place] DISCHARGE MEDS RECONCILED W/ CURRENT OUTPATIENT MED LIST  At high risk for falls  Stable  Ambulates with rollator, start home physical therapy and Occupational Therapy, falls precautions discussed  -     [address] [place] [address] discharge follow-up  -     [place] DISCHARGE MEDS RECONCILED W/ CURRENT OUTPATIENT MED LIST  Continue with home visiting nurse weekly for monitoring, will try to get her a scale, she will contact Tamia oTbar her  and find out where to fax the scale  Blood work within a week nonfasting  We will fax the orders to visiting nurse  To start physical therapy and Occupational Therapy to prevent falls and to increase strength, and to prevent readmission to the hospital    Obstructive sleep apnea treated with BiPAP  Compliant with the BiPAP, cannot use it per her report  Patient will call pulmonologist to get the nasal pillows order, unable to use the BiPAP well  Importance of using the BiPAP to prevent A-fib with RVR discussed, patient says she was not aware    Future Appointments   Date Time Provider Dante Amaya   2/20/2023  1:10 PM Chaparro Sibley MD [address] [place] [address]   5/11/2023  9:00 AM [facility]   5/24/2023  1:45 PM Warden Jamison MD [facility]   8/9/2023  9:30 AM Keri Swartz MD [facility]   10/9/2023  1:00 PM [facility]       Patient to call Dr. Tiburcio Jiménez for the BiPAP nasal pillows. Please fax orders to [facility] and talk with the nurse Gabriel Leyva to have blood work within 1 week not fasting, patient to call with message from Tamia Tobar her  where to fax the scale. Medical Decision Making: high complexity  Return for KEEP APPT. On this date 2/7/2023 I have spent 45 minutes reviewing previous notes, test results and face to face with the patient discussing the diagnosis and importance of compliance with the treatment plan as well as documenting on the day of the visit. Subjective:   HPI    Inpatient course: Discharge summary reviewed- see chart.     Interval history/Current status: improved      Patient was admitted 1/31/2023 through 2/1/2023 at [facility] due to worsening fatigue, shortness of breath on exertion, mild chest pains, unstable angina. She did admit that she was unable to use the BiPAP. In the emergency room patient was found to be tachycardic with atrial fibrillation with RVR, saturating at 98%, with heart rate 104-117. Blood pressure was normal 130/83 in ER. During the admission, stress test was done and showed low risk. Patient remained tachycardic, blood pressure metoprolol medication was bumped up to 25 Mg twice a day. She was also started on midodrine 10 mg 3 times a day  for hypotensive episodes. Myrbetriq was stopped. In the hospital chest x-ray 2/1/2023 was done, showing no pneumonia no pulmonary congestion  Stress test 2/1/2023 low risk  Patient was also given azithromycin too. Had cardiology evaluation while in the hospital.    Today she is alone at home. She says home care started, John visiting nurse is coming once a week at least.  Will start PT and OT today  Has [address] home care  She says she cannot open the bottle for Zpack, has 2 more to take. Hypertension, congestive heart failure, Paroxismal AFib :    she  is not exercising, will start PT today,  and is adherent to low salt diet. Blood pressure is well controlled at home. Cardiac symptoms fatigue. Sometimes weakness, but not so much like when she went to the hospital.  Patient denies chest pain, chest pressure/discomfort, claudication, dyspnea, exertional chest pressure/discomfort, irregular heart beat, lower extremity edema, near-syncope, orthopnea, palpitations, paroxysmal nocturnal dyspnea, syncope, and tachypnea. Cardiovascular risk factors: advanced age (older than 54 for men, 72 for women), diabetes mellitus, dyslipidemia, hypertension, and obesity (BMI >= 30 kg/m2). Use of agents associated with hypertension: thyroid hormones.  History of target organ damage: chronic kidney disease, heart failure, transient ischemic attack, and AAA . She reports she does not have a scale. She reports having appointment coming up with cardiologist.  Patient says she does not have a scale. Patient says her home visiting nurse is not checking her weight because she does not have a scale either but could try to call them to bring the scale during the visit. She does have a  who could help her with a scale, she says she will call her and let us know where to fax the order for her scale      blood pressure is Normal.  125 over 75 mmHg    BP Readings from Last 3 Encounters:   02/01/23 (!) 102/59   01/24/23 126/78   11/23/22 114/64        Pulse is  75 bpm  .    Pulse Readings from Last 3 Encounters:   02/01/23 95   01/24/23 68   11/23/22 55     Morbid obesity per BMI. Patient says she has been eating low-carb diet usually  Weight is 248 pounds, overall improving  Wt Readings from Last 3 Encounters:   01/31/23 248 lb (112.5 kg)   01/24/23 249 lb 12.8 oz (113.3 kg)   12/19/22 257 lb (116.6 kg)         Sleep Apnea:  Current treatment: BiPAP. Compliance: noncompliant: Patient says she has been using the BiPAP  max 1 hr/day, unable to use it. Awaiting for nasal pillows, her pulmonologist has been taking care of that. Residual symptoms include: morning fatigue and daytime fatigue, snoring and apnea. Patient reports urine incontinence same as before, Myrbetriq was stopped in the hospital.  She reports frequency, urgency of urination and urine incontinence, wearing incontinence supplies. Has appointment with urologist      Patient also has bilateral knee pain, same as before. Has been having osteoarthritis for many years. Has difficulty climbing up stairs which she has been avoiding. Patient has been only taking Tylenol as needed when severe pain. She denies swelling in her knees. She denies giving out on her knees. She does use a rollator, walker with seat.   Patient says she is afraid of falling and always walks with walker even inside of the house or she has to hold onto the walls.       Patient Active Problem List   Diagnosis    Acquired hypothyroidism    History of TIA (transient ischemic attack)    Chronic bilateral low back pain without sciatica    Benign hypertension with CKD (chronic kidney disease) stage III (HCC)    Osteopenia determined by x-ray    Osteoarthritis involving multiple joints on both sides of body    Left knee DJD    Vitamin D deficiency    Mixed incontinence    Chronic pain of both knees    Slow transit constipation    Allergic rhinitis    Hyperlipidemia with target LDL less than 100    Body mass index (BMI) 45.0-49.9, adult ([facility] [address]    At high risk for falls    Spondylosis of lumbar region without myelopathy or radiculopathy    OAB (overactive bladder)    Primary osteoarthritis of both knees    TLHBSO, bilateral LND 10/24/17    Optic atrophy of both eyes    Cataracta b/l eyes    Difficulty walking    Esotropia    History of uterine cancer, Well differentiated grade 1 adenocarcinoma arising in complex hyperplasia, 2017    Vitamin B 12 deficiency    Atherosclerosis of aorta (HCC)    Calculus of gallbladder without cholecystitis without obstruction    CLAROS (nonalcoholic steatohepatitis)    Paroxysmal atrial fibrillation (HCC)    Type 2 diabetes mellitus, without long-term current use of insulin ([facility] [address]    Mobility impaired    Chronic cholecystitis    Multiple atypical skin moles    Chronic diastolic congestive heart failure ([facility] [address]    History of 2019 novel coronavirus disease (COVID-19)    Abdominal aortic aneurysm (AAA) without rupture    Chronic gout due to renal impairment without tophus    ZACKERY (obstructive sleep apnea)    Unstable angina (HCC)       Medications listed as started at the time of discharge from hospital      START taking these medications       aspirin 81 MG chewable tablet  Take 1 tablet by mouth daily  Start taking on: February 2, 2023      azithromycin 500 MG tablet  Commonly known as: ZITHROMAX  Take 1 tablet by mouth daily for 3 days      midodrine 10 MG tablet  Commonly known as: PROAMATINE  Take 1 tablet by mouth 3 times daily (with meals)  Start taking on: February 2, 2023      nitroGLYCERIN 0.4 MG SL tablet  Commonly known as: NITROSTAT  Place 1 tablet under the tongue every 5 minutes as needed for Chest pain up to max of 3 total doses. If no relief after 1 dose, call 911.                 CHANGE how you take these medications       metoprolol tartrate 25 MG tablet  Commonly known as: LOPRESSOR  Take 1 tablet by mouth 2 times daily  What changed:   how much to take  additional instructions                CONTINUE taking these medications       Acetaminophen Extra Strength 500 MG tablet  Generic drug: acetaminophen  TAKE 1 TABLET BY MOUTH EVERY 6 HOURS AS NEEDED FOR PAIN      albuterol sulfate  (90 Base) MCG/ACT inhaler  Commonly known as: Proventil HFA  Inhale 1-2 puffs into the lungs every 4 hours as needed for Wheezing      alendronate 35 MG tablet  Commonly known as: FOSAMAX  TAKE ONE (1) TABLET BY MOUTH EVERY 7 DAYS      allopurinol 100 MG tablet  Commonly known as: ZYLOPRIM  Take 1 tablet by mouth daily For high uric acid, to prevent gout      atorvastatin 40 MG tablet  Commonly known as: LIPITOR  TAKE ONE (1) TABLET BY MOUTH DAILY STOP SIMVASTATIN      docusate sodium 100 MG capsule  Commonly known as: COLACE  TAKE ONE (1) CAPSULE BY MOUTH TWO (2) TIMES DAILY FOR CONSTIPATION      furosemide 20 MG tablet  Commonly known as: LASIX  TAKE ONE (1) TABLET BY MOUTH DAILY FOR CONGESTIVE HEART FAILURE      Incontinence Supplies Misc  Needs washable bed pads      Incontinence Supply Disposable Misc  Use daily for incontinence      levothyroxine 75 MCG tablet  Commonly known as: SYNTHROID  TAKE ONE (1) TABLET BY MOUTH EVERY MORNING (BEFORE BREAKFAST)      lisinopril 5 MG tablet  Commonly known as: PRINIVIL;ZESTRIL  TAKE ONE (1) TABLET BY MOUTH DAILY Magnesium Oxide 250 MG Tabs tablet  Commonly known as: MAGNESIUM-OXIDE  Take 1 tablet by mouth daily Take with food, in the evening      ondansetron 4 MG tablet  Commonly known as: ZOFRAN  TAKE ONE (1) TABLET BY MOUTH EVERY SIX (6) HOURS AS NEEDED FOR NAUSEA OR VOMITING      pantoprazole 40 MG tablet  Commonly known as: PROTONIX  TAKE ONE (1) TABLET BY MOUTH ONCE DAILY      Respiratory Therapy Supplies Misc  Please provide nose pillow mask  for CPAP      Keiko-Bid Probiotic Tabs  TAKE ONE (1) TABLET BY MOUTH IN THE MORNING      solifenacin 5 MG tablet  Commonly known as: VESIcare  Take 1 tablet by mouth daily      * Step N Rest II Walker Misc  Rollator walker      * Raised Toilet Seat Misc  Please provide with arms      vitamin B-12 1000 MCG tablet  Commonly known as: CYANOCOBALAMIN  TAKE ONE (1) TABLET BY MOUTH DAILY      vitamin D3 25 MCG (1000 UT) Tabs tablet  Commonly known as: CHOLECALCIFEROL  TAKE TWO (2) TABLETS BY MOUTH ONCE DAILY      Xarelto 20 MG Tabs tablet  Generic drug: rivaroxaban  TAKE ONE (1) TABLET BY MOUTH DAILY (WITH BREAKFAST)              * This list has 2 medication(s) that are the same as other medications prescribed for you. Read the directions carefully, and ask your doctor or other care provider to review them with you. STOP taking these medications       Myrbetriq 50 MG Tb24  Generic drug: mirabegron             Medications marked \"taking\" at this time  Outpatient Medications Marked as Taking for the 2/7/23 encounter (Telemedicine) with Kd Noonan MD   Medication Sig Dispense Refill    aspirin 81 MG chewable tablet Take 1 tablet by mouth daily 30 tablet 3    nitroGLYCERIN (NITROSTAT) 0.4 MG SL tablet Place 1 tablet under the tongue every 5 minutes as needed for Chest pain up to max of 3 total doses.  If no relief after 1 dose, call 911. 25 tablet 3    metoprolol tartrate (LOPRESSOR) 25 MG tablet Take 1 tablet by mouth 2 times daily 60 tablet 3    midodrine (PROAMATINE) 10 MG tablet Take 1 tablet by mouth 3 times daily (with meals) 3 tablet 1    allopurinol (ZYLOPRIM) 100 MG tablet Take 1 tablet by mouth daily For high uric acid, to prevent gout 90 tablet 1    atorvastatin (LIPITOR) 40 MG tablet TAKE ONE (1) TABLET BY MOUTH DAILY STOP SIMVASTATIN 90 tablet 3    ondansetron (ZOFRAN) 4 MG tablet TAKE ONE (1) TABLET BY MOUTH EVERY SIX (6) HOURS AS NEEDED FOR NAUSEA OR VOMITING 24 tablet 0    alendronate (FOSAMAX) 35 MG tablet TAKE ONE (1) TABLET BY MOUTH EVERY 7 DAYS (Patient taking differently: Indications: takes on fridays) 12 tablet 3    lisinopril (PRINIVIL;ZESTRIL) 5 MG tablet TAKE ONE (1) TABLET BY MOUTH DAILY 30 tablet 3    XARELTO 20 MG TABS tablet TAKE ONE (1) TABLET BY MOUTH DAILY (WITH BREAKFAST) 30 tablet 10    furosemide (LASIX) 20 MG tablet TAKE ONE (1) TABLET BY MOUTH DAILY FOR CONGESTIVE HEART FAILURE 90 tablet 3    Respiratory Therapy Supplies MISC Please provide nose pillow mask  for CPAP 1 each 0    Incontinence Supply Disposable MISC Use daily for incontinence 100 each 3    docusate sodium (COLACE) 100 MG capsule TAKE ONE (1) CAPSULE BY MOUTH TWO (2) TIMES DAILY FOR CONSTIPATION 180 capsule 3    levothyroxine (SYNTHROID) 75 MCG tablet TAKE ONE (1) TABLET BY MOUTH EVERY MORNING (BEFORE BREAKFAST) 90 tablet 3    solifenacin (VESICARE) 5 MG tablet Take 1 tablet by mouth daily 90 tablet 3    Incontinence Supplies MISC Needs washable bed pads 30 each 3    albuterol sulfate HFA (PROVENTIL HFA) 108 (90 Base) MCG/ACT inhaler Inhale 1-2 puffs into the lungs every 4 hours as needed for Wheezing 18 g 0    Misc. Devices (STEP N REST II WALKER) MISC Rollator walker 1 each 0    Misc.  Devices (RAISED TOILET SEAT) MISC Please provide with arms 1 each 0    vitamin D3 (CHOLECALCIFEROL) 25 MCG (1000 UT) TABS tablet TAKE TWO (2) TABLETS BY MOUTH ONCE DAILY 60 tablet 11    Probiotic Product (ILSSY-BID PROBIOTIC) TABS TAKE ONE (1) TABLET BY MOUTH IN THE MORNING 30 tablet 8 pantoprazole (PROTONIX) 40 MG tablet TAKE ONE (1) TABLET BY MOUTH ONCE DAILY 30 tablet 8    vitamin B-12 (CYANOCOBALAMIN) 1000 MCG tablet TAKE ONE (1) TABLET BY MOUTH DAILY 90 tablet 3    Magnesium Oxide (MAGNESIUM-OXIDE) 250 MG TABS tablet Take 1 tablet by mouth daily Take with food, in the evening 90 tablet 3    ACETAMINOPHEN EXTRA STRENGTH 500 MG tablet TAKE 1 TABLET BY MOUTH EVERY 6 HOURS AS NEEDED FOR PAIN 120 tablet 11        Medications patient taking as of now reconciled against medications ordered at time of hospital discharge: Yes    Review of Systems   Constitutional:  Positive for fatigue. Negative for activity change, appetite change, chills, diaphoresis, fever and unexpected weight change. Eyes:  Positive for visual disturbance (chronic, stable). Respiratory:  Negative for cough, chest tightness, shortness of breath, wheezing and stridor. Cardiovascular:  Negative for chest pain, palpitations and leg swelling. Gastrointestinal:  Negative for abdominal distention, abdominal pain, blood in stool, constipation, diarrhea, nausea, rectal pain and vomiting. Endocrine: Negative for cold intolerance, heat intolerance, polydipsia, polyphagia and polyuria. Genitourinary:  Positive for frequency and urgency. Negative for difficulty urinating, dysuria and flank pain. Urine incontinence   Musculoskeletal:  Positive for arthralgias, back pain and gait problem. Ambulates with rollator walker with seat   Neurological:  Positive for weakness (Episodes of intermittent weakness) and numbness (feet). Negative for syncope, speech difficulty, light-headedness and headaches. Hematological:  Does not bruise/bleed easily.      Objective:        PHYSICAL EXAMINATION:    Vital Signs: (As obtained by patient/caregiver or practitioner observation)  -vital signs stable and within normal limits except morbid obesity per BMI, BMI 45.36 kg/M  Patient-Reported Vitals 2/7/2023   Patient-Reported Weight 248 lbs   Patient-Reported Height 5 ft 2 in   Patient-Reported Systolic 261   Patient-Reported Diastolic 70   Patient-Reported Pulse 65   Patient-Reported Temperature -   Patient-Reported SpO2 -           Intensity of pain is: 3-4 out of 10      Constitutional: [x] Appears well-developed and well-nourished [x] No apparent distress      [x] Abnormal-morbid obesity      Mental status  [x] Alert and awake  [x] Oriented to person/place/time [x]Able to follow commands      Eyes:  EOM    [x]  Normal  [] Abnormal-  Sclera  [x]  Normal  [] Abnormal -         Discharge [x]  None visible  [] Abnormal -    HENT:   [x] Normocephalic, atraumatic. [] Abnormal   [x] Mouth/Throat: Mucous membranes are moist.     External Ears [x] Normal  [] Abnormal-     Neck: [x] No visualized mass     Pulmonary/Chest: [x] Respiratory effort normal.  [x] No visualized signs of difficulty breathing or respiratory distress        [] Abnormal        Musculoskeletal:   [] Normal gait with no signs of ataxia         [x] Normal range of motion of neck        [x] Abnormal-ambulatory walker inside of the house needed to sit immediately after a few steps    Neurological:        [x] No Facial Asymmetry (Cranial nerve 7 motor function) (limited exam to video visit)          [x] No gaze palsy        [] Abnormal-            Skin:        [x] No significant exanthematous lesions or discoloration noted on facial skin         [] Abnormal-            Psychiatric:      [x] No Hallucinations       [x]Mood is normal      [x]Behavior is normal      [x]Judgment is normal      [x]Thought content is normal       [] Abnormal-     Other pertinent observable physical exam findings-none    Due to this being a TeleHealth encounter, evaluation of the following organ systems is limited: Vitals/Constitutional/EENT/Resp/CV/GI//MS/Neuro/Skin/Heme-Lymph-Imm. I personally reviewed most recent labs reviewed with the patient and all questions fully answered.    Mild anemia, likely anemia of chronic kidney disease  Worsening chronic kidney disease stage III  Hyperglycemia well controlled, diabetes mellitus type 2 well-controlled    Uric acid increase  High triglycerides    Otherwise labs within normal limits      Lab Results   Component Value Date    WBC 9.3 02/01/2023    HGB 11.5 (L) 02/01/2023    HCT 35.4 (L) 02/01/2023    MCV 88.5 02/01/2023     02/01/2023       Lab Results   Component Value Date/Time     02/01/2023 05:33 AM    K 4.3 02/01/2023 05:33 AM    K 4.1 10/10/2021 06:00 PM     02/01/2023 05:33 AM    CO2 25 02/01/2023 05:33 AM    BUN 30 02/01/2023 05:33 AM    CREATININE 1.24 02/01/2023 05:33 AM    GLUCOSE 118 02/01/2023 05:33 AM    GLUCOSE 114 03/20/2012 10:40 AM    CALCIUM 8.5 02/01/2023 05:33 AM        Lab Results   Component Value Date    URICACID 6.8 (H) 01/27/2023         Lab Results   Component Value Date    ALT 13 01/27/2023    AST 19 01/27/2023    ALKPHOS 75 01/27/2023    BILITOT 0.4 01/27/2023       Lab Results   Component Value Date    TSH 2.12 01/27/2023       Lab Results   Component Value Date    CHOL 158 02/01/2023    CHOL 196 09/02/2022    CHOL 174 10/20/2021     Lab Results   Component Value Date    TRIG 223 (H) 02/01/2023    TRIG 225 (H) 09/02/2022    TRIG 180 (H) 10/20/2021     Lab Results   Component Value Date    HDL 42 02/01/2023    HDL 54 01/27/2023    HDL 47 09/02/2022     Lab Results   Component Value Date    LDLCALC 70 11/06/2020    LDLCHOLESTEROL 71 02/01/2023    LDLCHOLESTEROL 84 01/27/2023    LDLCHOLESTEROL 104 09/02/2022       Lab Results   Component Value Date    CHOLHDLRATIO 3.8 02/01/2023    CHOLHDLRATIO 3.3 01/27/2023    CHOLHDLRATIO 4.2 09/02/2022       Lab Results   Component Value Date    LABA1C 5.8 01/24/2023       Lab Results   Component Value Date    JCZUXSLJ56 1472 (H) 01/27/2023       Lab Results   Component Value Date    FOLATE 14.2 03/22/2022       Lab Results   Component Value Date/Time    MG 1.9 02/01/2023 05:33 AM Lab Results   Component Value Date    VITD25 45.0 01/27/2023       Glen Chris, was evaluated through a synchronous (real-time) audio-video encounter. The patient (or guardian if applicable) is aware that this is a billable service, which includes applicable co-pays. This Virtual Visit was conducted with patient's (and/or legal guardian's) consent. The visit was conducted pursuant to the emergency declaration under the [address] [place] [address] authority and the [employer] and [employer] General Act. Patient identification was verified, and a caregiver was present when appropriate. The patient was located at Home: [address] [place] [address]  Provider was located at [facility] ([place] [address]): [place] [id number] [id number]       An electronic signature was used to authenticate this note. Electronically signed by Ashwini Amador MD on 2/7/23 at 8:37 PM EST

## 2023-02-07 NOTE — TELEPHONE ENCOUNTER
Incoming call came from home health care facilty [facility]. Nurse [address] is calling to see, TO LET  KNOW THAT THEY WILL CONTINUE GOING OUT TO HOME 2X A WEEK AND THAN 3 TIMES A WEEK UNTIL DISCHARGE. Please give a call back at [phone]  Facility did give permission (Voicemail is secure) to leave a detail message with a verbal answer if provider will sign off. Please advise, Thank you! HE ALSO WAS NOT ABLE TO RELAY MESSAGE IN REGARD TO BLOOD WORK SO I CALLED [facility]. AND REACHED [facility] AND LEFT DETAIL MESSAGE ALSO WILL FAX LAB ORDERS. Return for KEEP APPT. Check out comments: Patient to call Dr. Greta Platt for the BiPAP nasal pillows. [facility] Maninder Esparza to have blood work within 1 week not fasting, patient to call with message from Pedro Scott her  work to fax the scale.

## 2023-02-07 NOTE — TELEPHONE ENCOUNTER
Noted. Thank you!   I agree with [address] [place] [address] Dr living home care and home blood work within 1 week    Thank you for the detailed message for care coordination    Future Appointments   Date Time Provider Dante Amaya   2/20/2023  1:10 PM Eva Raymond MD [address] [place] [address]   5/11/2023  9:00 AM Deonna Sabillon MD [facility]   5/24/2023  1:45 PM [facility]   8/9/2023  9:30 AM [facility]   10/9/2023  1:00 PM [place], [address] [place] [address]

## 2023-02-13 ENCOUNTER — CARE COORDINATION (OUTPATIENT)
Dept: CASE MANAGEMENT | Age: 73
End: 2023-02-13

## 2023-02-13 NOTE — CARE COORDINATION
[facility] Care Transitions Follow Up Call    Patient Current Location:  Home: [address] [place] [address]    Care Transition Nurse contacted the patient by telephone to follow up after admission on 23. Verified name and  with patient as identifiers. Patient: Maxim Perez  Patient : 1950   MRN: [id number]  Reason for Admission: Fatigue/Covid-19 (23)  Discharge Date: 23 RARS: Readmission Risk Score: 14.4      Needs to be reviewed by the provider   Additional needs identified to be addressed with provider: No  none             Method of communication with provider: none. Was able to contact Griselda Vale for [employer]. Noted that pt was at [facility] ED this am.  She said that  night she was having back pain, cough and sneezing. She said that she called the EMS. She said that she tested positive for Covid. She said that her cough was a dry cough, back pain was better, sneezing has resolved, no chest pain, no shortness of breath, no swelling, just fatigued and weak. She is aware of isolation. She said that the Ed ordered her a Medrol bing and her sister will be picking it up. [address] aware of ED visit and a nurse visit to be scheduled for tomorrow or Wednesday. María aware. Griselda Vale had no further questions or concerns. Addressed changes since last contact:  medications-ED medications  [address] Living to confirm ED visit and Covid diagnosis  Discussed follow-up appointments. If no appointment was previously scheduled, appointment scheduling offered: No.   Is follow up appointment scheduled within 7 days of discharge? Yes.     Follow Up  Future Appointments   Date Time Provider Dante Amaya   2023 10:10 AM Crys Engel MD [address] [place] [address]   2023  9:00 AM Laxmi Blankenship MD [facility]   2023  1:45 PM Lorenzo Alexandra MD [facility]   2023  9:30 AM Laxmi Blankenship MD [facility]   10/9/2023  1:00 PM [facility] Fernando [facility]     [id number] follow up appointment(s):     Care Transition Nurse reviewed medical action plan and red flags with patient and discussed any barriers to care and/or understanding of plan of care after discharge. Discussed appropriate site of care based on symptoms and resources available to patient including: PCP  Home health  When to call 911. The patient agrees to contact the PCP office for questions related to their healthcare. Patients top risk factors for readmission: functional physical ability and medical condition-Fatigue/Covid  Interventions to address risk factors: Communication with home health agencies or other community services the patient is currently using-[facility] to see post ED visit 2/14 or 2/15    Offered patient enrollment in the Remote Patient Monitoring (RPM) program for in-home monitoring:  did not address . Care Transitions Subsequent and Final Call    Subsequent and Final Calls  Do you have any ongoing symptoms?: Yes  Onset of Patient-reported symptoms: Yesterday  Patient-reported symptoms: Cough, Pain  Have your medications changed?: Yes  Patient Reports: Medrol pack  Do you have any questions related to your medications?: No  Do you currently have any active services?: Yes  Are you currently active with any services?: Home Health  Do you have any needs or concerns that I can assist you with?: No  Care Transitions Interventions    Registered Dietician: Completed    Other Interventions:             Care Transition Nurse provided contact information for future needs. Plan for follow-up call in 1-2 days based on severity of symptoms and risk factors. Plan for next call: symptom management-follow up on Covid symptoms.     Adriane Rosen RN

## 2023-02-14 ENCOUNTER — HOSPITAL ENCOUNTER (OUTPATIENT)
Age: 73
Setting detail: SPECIMEN
Discharge: HOME | End: 2023-02-14
Payer: MEDICARE

## 2023-02-14 LAB
ANION GAP SERPL CALCULATED.3IONS-SCNC: 13 MMOL/L (ref 9–17)
BNP SERPL-MCNC: 2271 PG/ML
BUN SERPL-MCNC: 24 MG/DL (ref 8–23)
CALCIUM SERPL-MCNC: 9.1 MG/DL (ref 8.6–10.4)
CHLORIDE SERPL-SCNC: 100 MMOL/L (ref 98–107)
CO2 SERPL-SCNC: 22 MMOL/L (ref 20–31)
CREAT SERPL-MCNC: 0.95 MG/DL (ref 0.5–0.9)
GFR SERPL CREATININE-BSD FRML MDRD: >60 ML/MIN/1.73M2
GLUCOSE SERPL-MCNC: 107 MG/DL (ref 70–99)
MAGNESIUM SERPL-MCNC: 2.4 MG/DL (ref 1.6–2.6)
PHOSPHATE SERPL-MCNC: 2.4 MG/DL (ref 2.6–4.5)
POTASSIUM SERPL-SCNC: 4.2 MMOL/L (ref 3.7–5.3)
SODIUM SERPL-SCNC: 135 MMOL/L (ref 135–144)

## 2023-02-14 PROCEDURE — 83880 ASSAY OF NATRIURETIC PEPTIDE: CPT

## 2023-02-14 PROCEDURE — 80048 BASIC METABOLIC PNL TOTAL CA: CPT

## 2023-02-14 PROCEDURE — 83735 ASSAY OF MAGNESIUM: CPT

## 2023-02-14 PROCEDURE — 84100 ASSAY OF PHOSPHORUS: CPT

## 2023-02-15 ENCOUNTER — CARE COORDINATION (OUTPATIENT)
Dept: CASE MANAGEMENT | Age: 73
End: 2023-02-15

## 2023-02-15 DIAGNOSIS — U07.1 COVID-19 VIRUS INFECTION: Primary | ICD-10-CM

## 2023-02-15 DIAGNOSIS — I50.32 CHRONIC DIASTOLIC CONGESTIVE HEART FAILURE (HCC): ICD-10-CM

## 2023-02-15 RX ORDER — FUROSEMIDE 20 MG/1
20 TABLET ORAL DAILY
Qty: 90 TABLET | Refills: 3 | Status: SHIPPED | OUTPATIENT
Start: 2023-02-15 | End: 2023-02-15 | Stop reason: SDUPTHER

## 2023-02-15 RX ORDER — BENZONATATE 100 MG/1
100 CAPSULE ORAL 3 TIMES DAILY PRN
Qty: 21 CAPSULE | Refills: 0 | Status: SHIPPED | OUTPATIENT
Start: 2023-02-15 | End: 2023-02-22

## 2023-02-15 RX ORDER — FUROSEMIDE 40 MG/1
40 TABLET ORAL DAILY
Qty: 90 TABLET | Refills: 3 | Status: SHIPPED | OUTPATIENT
Start: 2023-02-15

## 2023-02-15 NOTE — RESULT ENCOUNTER NOTE
Please notify patient: Patient was seen for COVID-19 infection, apparently she was not admitted, will need a follow-up  Please check with patient if they gave her Paxlovid or not? ?     Blood glucose well controlled 107  Chronic kidney disease stage III improved  She is in flareup of congestive heart failure on 2/14/2023, I will refer her to the care coordination right now--we will increase furosemide from 20 Mg once a day to 40 Mg daily for 1 week    Otherwise labs within normal limits  continue current treatment    Future Appointments  2/27/2023  10:10 AM   Crys Engel MD         [facility]  5/11/2023  9:00 AM    Laxmi Blankenship MD     [facility]  5/24/2023  1:45 PM    Lorenzo Alexandra MD        [facility]  8/9/2023   9:30 AM    Laxmi Blankenship MD     [facility]  10/9/2023  1:00 PM    Armida Pitts [place] [address]

## 2023-02-15 NOTE — CARE COORDINATION
[facility] Care Transitions Follow Up Call    Patient Current Location:  Home: [address] [place] [address]    Care Transition Nurse contacted the patient by telephone to follow up after admission on 23. Verified name and  with patient as identifiers. Patient: Ger Ruiz  Patient : 1950   MRN: [id number]  Reason for Admission: Fatigue/ ED visit [address] [place] [address] (23)  Discharge Date: 23 RARS: Readmission Risk Score: 14.4      Needs to be reviewed by the provider   Additional needs identified to be addressed with provider: Yes  medications-informed of medications bubble packed and will need order for incrase in dosage sent to pharmacy             Method of communication with provider: phone. Was able to contact Margaretmary Osler for [employer]. She stated that she was doing \"better\". She denied nay shortness of breath, has \"a little\" lower leg swelling  no sneezing, and her cough was better. She said that she has just been resting. Reviewed daily weights and she stated that she did not own a scale. Called her  with Mariaa Arredondo and had to leave message about getting a scale. Noted in chart that Margaretmary Osler had lab work and PCP wanted to increase her lasix dose to 40 daily x week. Margaretmary Osler said that her medications are bubble packed and she would need extra pills to increase the dose. Call placed to PCP office and spoke with Alia Sheriff. She said that there was a note to PCP that Margaretmary Osler would need a new order for the increased Lasix. Alia Sheriff said that PCP just has not seen it yet and will sign when she has a chance. Margaretmary Osler was called and update on calls to PCP office and Mariaa Arredondo. She had no further questions/concerns. Addressed changes since last contact:  medications-updated on needing order of increase in lasix  DME-called CM with [facility] for possible scale. Discussed follow-up appointments.  If no appointment was previously scheduled, appointment scheduling offered: No. Is follow up appointment scheduled within 7 days of discharge? Yes. Follow Up  Future Appointments   Date Time Provider Dante Amaya   2/17/2023  3:30 PM Aleah Humphrey MD [facility]   2/27/2023 10:10 AM Roxine Libman, MD [address] [place] [address]   5/11/2023  9:00 AM Aleah Humphrey MD [facility]   5/24/2023  1:45 PM Kenroy Abarca MD [facility]   8/9/2023  9:30 AM Aleah Humphrey MD [facility]   10/9/2023  1:00 PM Ghislaine Khan PA-C GYN Oncology [facility]     [id number] follow up appointment(s):     Care Transition Nurse reviewed medical action plan and red flags with patient and discussed any barriers to care and/or understanding of plan of care after discharge. Discussed appropriate site of care based on symptoms and resources available to patient including: PCP  Specialist  Home health  When to call 911. The patient agrees to contact the PCP office for questions related to their healthcare. Patients top risk factors for readmission: functional physical ability and medical condition-fatigue/ Covid/CHF  Interventions to address risk factors: Obtained and reviewed discharge summary and/or continuity of care documents    Offered patient enrollment in the Remote Patient Monitoring (RPM) program for in-home monitoring: Patient declined. Care Transitions Subsequent and Final Call    Subsequent and Final Calls  Do you have any ongoing symptoms?: Yes  Onset of Patient-reported symptoms: In the past 7 days  Patient-reported symptoms: Cough  Have your medications changed?: Yes  Patient Reports: Lasix increased to 40 mg x 1 week.  then return to 20 mg daily  Do you have any questions related to your medications?: No  Do you currently have any active services?: Yes  Are you currently active with any services?: Home Health  Do you have any needs or concerns that I can assist you with?: No  Care Transitions Interventions    Registered Dietician: Completed    Other Interventions: Care Transition Nurse provided contact information for future needs. Plan for follow-up call in 7-10 days based on severity of symptoms and risk factors.   Plan for next call: symptom management-follow up on s/s covid/chf  PCP ED follow up visit    Kathe Dawson RN

## 2023-02-15 NOTE — RESULT ENCOUNTER NOTE
Noted, please see my note below and give patient the results and instructions  Future Appointments  2/27/2023  10:10 AM   Justyn Hawk MD         [facility]  5/11/2023  9:00 AM    Prosper Dougherty MD     [facility]  5/24/2023  1:45 PM    Marycarmen Camilo MD        [facility]  8/9/2023   9:30 AM    Prosper Dougherty MD     [facility]  10/9/2023  1:00 PM    Nany Jernigan

## 2023-02-15 NOTE — PROGRESS NOTES
Patient was seen in the emergency room on 2/13/2023 she tested positive for COVID-19  Prior records reviewed in [address] she was brought by EMS    ReviewED result note, patient requested Paxlovid and Lasix to be refilled      I had to leave a voicemail at the phone #[phone] that I sent 2 prescriptions to 2 different pharmacies, and to read the instructions carefully. I said to call our office tomorrow morning and that I will contact her daughter to pick the medications from [facility]    Her daughter phone could not accept any messages at this time, she did not answer at 7 0 1 PM      I LVM ON HER CELL PHONE with her voice recorded to  the medications from [facility] as soon as she can because the medication works within the first 5 days, and to read the instructions carefully because it interacts with Vesicare and Xarelto, to call our office at [phone] tomorrow morning  To do blood work in a week  Lasix to [facility] , THEY will deliver        1. Chronic diastolic congestive heart failure (HCC)  SENT TO [facility]  - furosemide (LASIX) 20 MG tablet; Take 1 tablet by mouth daily Take for congestive heart failure  Dispense: 90 tablet; Refill: 3  - Basic Metabolic Panel; Future  - Magnesium; Future  - Brain Natriuretic Peptide; Future    2. COVID-19 virus infection  SENT TO [facility] IN 1 WEEK    - nirmatrelvir/ritonavir 300/100 (PAXLOVID) 20 x 150 MG & 10 x 100MG TBPK; Take 3 tablets (two 150 mg nirmatrelvir and one 100 mg ritonavir tablets) by mouth every 12 hours for 5 days. STOP VESICARE FOR 7 DAYS. TAKE HALF TABLE XARELTO FOR 7 DAYS  Dispense: 30 tablet; Refill: 0  - benzonatate (TESSALON PERLES) 100 MG capsule; Take 1 capsule by mouth 3 times daily as needed for Cough  Dispense: 21 capsule; Refill: 0  - Basic Metabolic Panel; Future  - Magnesium; Future  - Brain Natriuretic Peptide;  Future

## 2023-02-16 ENCOUNTER — CARE COORDINATION (OUTPATIENT)
Dept: CARE COORDINATION | Age: 73
End: 2023-02-16

## 2023-02-16 NOTE — RESULT ENCOUNTER NOTE
Noted, I will change furosemide to 40 mg I will resend it right now  Future Appointments  2/17/2023  3:30 PM    Elvis Stout MD     [facility]  2/27/2023  10:10 AM   Herbert Mcmanus MD         [address]  5/11/2023  9:00 AM    Elvis Stout MD     [facility]  5/24/2023  1:45 PM    Emma Boss MD        [facility]  8/9/2023   9:30 AM    Elvis Stout MD     [facility]  10/9/2023  1:00 PM    Zamzam Zamora [address]

## 2023-02-16 NOTE — RESULT ENCOUNTER NOTE
Please notify patient: Prescriptions sent to 2 different pharmacies, due to [address] not available at [facility]  To read the instructions carefully for Paxlovid, to stop Vesicare for 7 days, and Xarelto half tablet for 7 days because of the interaction  Recheck blood work in 1 week  If worsening shortness of breath when going from room to room to go back to the emergency room, but to go to [facility] emergency room      =======================  JAMSHID  Patient was seen in the emergency room on 2/13/2023 in [address] she tested positive for COVID-19  Prior records reviewed in [address] she was brought by EMS    ReviewED result note, patient requested Paxlovid and Lasix to be refilled      I had to leave a voicemail at the phone #[phone] that I sent 2 prescriptions to 2 different pharmacies, and to read the instructions carefully. I said to call our office tomorrow morning and that I will contact her daughter to pick the medications from [facility]    Her daughter phone could not accept any messages at this time, she did not answer at 7 0 1 PM      I LVM ON HER CELL PHONE with her voice recorded to  the medications from [facility] as soon as she can because the medication works within the first 5 days, and to read the instructions carefully because it interacts with Vesicare and Xarelto, to call our office at [phone] tomorrow morning  To do blood work in a week  Lasix to [facility] , THEY will deliver        1. Chronic diastolic congestive heart failure (HCC)  SENT TO [facility]  - furosemide (LASIX) 20 MG tablet; Take 1 tablet by mouth daily Take for congestive heart failure  Dispense: 90 tablet; Refill: 3  - Basic Metabolic Panel; Future  - Magnesium; Future  - Brain Natriuretic Peptide; Future    2. COVID-19 virus infection  SENT TO [facility] IN 1 WEEK    - nirmatrelvir/ritonavir 300/100 (PAXLOVID) 20 x 150 MG & 10 x 100MG TBPK;  Take 3 tablets (two 150 mg nirmatrelvir and one 100 mg ritonavir tablets) by mouth every 12 hours for 5 days. STOP VESICARE FOR 7 DAYS. TAKE HALF TABLE XARELTO FOR 7 DAYS  Dispense: 30 tablet; Refill: 0  - benzonatate (TESSALON PERLES) 100 MG capsule; Take 1 capsule by mouth 3 times daily as needed for Cough  Dispense: 21 capsule; Refill: 0  - Basic Metabolic Panel; Future  - Magnesium; Future  - Brain Natriuretic Peptide;  Future      Future Appointments  2/17/2023  3:30 PM    Oscar Stanley MD     [facility]  2/27/2023  10:10 AM   Harris Schmidt MD         [email or web address]  5/11/2023  9:00 AM    Osacr Stanley MD     [facility]  5/24/2023  1:45 PM    Scott Singh MD        [facility]  8/9/2023   9:30 AM    Oscar Stanley MD     [facility]  10/9/2023  1:00 PM    Jackee Felty, [address]

## 2023-02-17 ENCOUNTER — TELEMEDICINE (OUTPATIENT)
Dept: FAMILY MEDICINE CLINIC | Age: 73
End: 2023-02-17
Payer: MEDICARE

## 2023-02-17 DIAGNOSIS — I50.33 ACUTE ON CHRONIC DIASTOLIC CONGESTIVE HEART FAILURE (HCC): ICD-10-CM

## 2023-02-17 DIAGNOSIS — U07.1 COVID-19 VIRUS INFECTION: Primary | ICD-10-CM

## 2023-02-17 PROCEDURE — 99443 PR PHYS/QHP TELEPHONE EVALUATION 21-30 MIN: CPT | Performed by: FAMILY MEDICINE

## 2023-02-17 NOTE — PROGRESS NOTES
[address] Phone  Send Link Via Text  No text has been sent  Last text sent02/17/2023 3:32 PM  To:[phone]      Called home number at 3:40 PM ALEK Stevens contacted provider via telephone encounter. Krystal Buck is a 72 y.o. female, Established patient, evaluated via telephone on 2/17/2023 for ED Follow-up (COVID 19)  . Documentation:  I communicated with the patient and/or health care decision maker about emergency room follow-up, congestive heart failure. Details of this discussion including any medical advice provided:     ASSESSMENT/PLAN:    1. COVID-19 virus infection  Ongoing, stable but not improving    Just started the Paxlovid and lasix 40 MG today due to home visiting nurse who came today to identify the pills in the pill PACK  Patient confirms today that today is the fifth day of sickness. , day 0 on 02/13/2023 when she was diagnosed  Patient confirms to me that she will  been holding Vesicare and 1/2 tab of Xarelto for 1 week, as the nurse told her she did that in the [facility]  Does have albuterol, I encouraged her to use it every 6 hours 2 puffs as needed for shortness of breath or coughing spells  I told her that she needs Labs next week, will fax to [facility] for cough, increase proteins in diet and rest  Patient says home visiting nurse will come next week  -     CBC with Auto Differential; Future  -     Comprehensive Metabolic Panel; Future  -     Magnesium; Future  On Medrol pack from emergency room, prior chest x-ray in the emergency room normal  Records reviewed through [facility]    2.   Acute on chronic diastolic congestive heart failure (HCC)  Worsening  Acute on chronic CHF, BNP 2271 up from 148 on 1/31/2023, and 187 on 2/13/2023  Lasix was increased today from 20 mg to 40 mg per verbal order from me, she just started it today  Recheck labs next week, whenever her nurse comes  Continue metoprolol 25 Mg twice a day,  Check labs next week  -     CBC with Auto Differential; Future  -     Comprehensive Metabolic Panel; Future  -     Magnesium; Future  -     Brain Natriuretic Peptide; Future  Lab Results   Component Value Date    LVEF 70 02/01/2023     Avoid salt, close monitoring through home visiting nurse and care coordination for vital signs    3. Body mass index (BMI) 45.0-49.9, adult (HCC)  Stable  We will continue to monitor  Due to comorbidities, she is at a high risk to have more severe COVID-19 infection  Patient is fully vaccinated and boosted against both flu shot and COVID    Cris Ramírez received counseling on the following healthy behaviors: nutrition, exercise, medication adherence, and falls precautions  Reviewed prior labs and health maintenance  Discussed use, benefit, and side effects of prescribed medications. Barriers to medication compliance addressed. Patient given educational materials - see patient instructions  Was a self-tracking handout given in paper form or via [employer]? Yes  All patient questions answered. Patient voiced understanding. The patient's past medical,surgical, social, and family history as well as her current medications and allergies were reviewed as documented in today's encounter. Medications, labs, diagnostic studies, consultations and follow-up as documented in this encounter. Return for KEEP APPT.     Fax to [facility]      Future Appointments   Date Time Provider Dante Amaya   2/27/2023 10:10 AM Daryle Poet, MD [address] [place] [address]   5/11/2023  9:00 AM [facility]   5/24/2023  1:45 PM [facility]   8/9/2023  9:30 AM [facility]   10/9/2023  1:00 PM Priscila Lorenzana PA-C GYN Oncology [facility]       Consent:  She is aware that that she may receive a bill for this telephone service, depending on her insurance coverage, and has provided verbal consent to proceed: Yes      Documentation and HPI:  I communicated with the patient about: ED Follow-up (COVID 19)     Patient was seen in emergency room on 2/13/2023 in [address], and found to have COVID-19 infection. Apparently she was given methylprednisolone at that time. Chest x-ray done 2/13/2023, no pleural effusions, no pneumonia. Labs done in the emergency room showed white blood cell count normal, mild anemia  CMP within normal limits except mildly increased glucose 118  Troponin normal  Lactate normal        María reports: That today she does not feel worse. Patient reports her home visiting nurse just came today and Paxlovid started today  She says she had a nurse today who took away vesicare and cut down Xarelto in half tablet. Patient reports she feels tired, she has been sleeping more, she says voice is horse, not coughing much and has some clear mucus. Denies wheezing or chest pain. Per her understanding, her nurse is coming back next week  She says, \" on the finger 98%\"  and \"lungs were clear\" per her visiting nurse today. Blood pressure was good, 125/70. Patient denies swelling in her legs. She does sleep upright because she breathes better she says. Patient reports appetite is good    Reports mild sweating. Denies fever or chills. Temp was 80 F today when the nurse came. Denies dyspnea on exertion when going to room to room    Patient reports compliance with her medications she just started furosemide 40 mg today due to having pills packed. The patient's past medical, surgical, social, and family history as well as her current medications and allergies were reviewed as documented in today's encounter. Prior to Visit Medications    Medication Sig Taking? Authorizing Provider   nirmatrelvir/ritonavir 300/100 (PAXLOVID) 20 x 150 MG & 10 x 100MG TBPK Take 3 tablets (two 150 mg nirmatrelvir and one 100 mg ritonavir tablets) by mouth every 12 hours for 5 days. STOP VESICARE FOR 7 DAYS.  TAKE HALF TABLE XARELTO FOR 7 DAYS Yes Radha Vallecillo MD   benzonatate (TESSALON PERLES) 100 MG capsule Take 1 capsule by mouth 3 times daily as needed for Cough Yes Radha Vallecillo MD   furosemide (LASIX) 40 MG tablet Take 1 tablet by mouth daily Take for congestive heart failure. Dose increased 2/15/2023 Yes Radha Vallecillo MD   Misc. Devices (CLEVER CHOICE BMI SCALE) MISC Needs to check the weight daily due to congestive heart failure Yes Radha Vallecillo MD   aspirin 81 MG chewable tablet Take 1 tablet by mouth daily Yes Ana Roy MD   nitroGLYCERIN (NITROSTAT) 0.4 MG SL tablet Place 1 tablet under the tongue every 5 minutes as needed for Chest pain up to max of 3 total doses. If no relief after 1 dose, call 911. Yes Ana Roy MD   metoprolol tartrate (LOPRESSOR) 25 MG tablet Take 1 tablet by mouth 2 times daily Yes Ana Roy MD   midodrine (PROAMATINE) 10 MG tablet Take 1 tablet by mouth 3 times daily (with meals) Yes Daniele Reyes MD   allopurinol (ZYLOPRIM) 100 MG tablet Take 1 tablet by mouth daily For high uric acid, to prevent gout Yes Radha Vallecillo MD   atorvastatin (LIPITOR) 40 MG tablet TAKE ONE (1) TABLET BY MOUTH DAILY STOP SIMVASTATIN Yes Radha Vallecillo MD   ondansetron (ZOFRAN) 4 MG tablet TAKE ONE (1) TABLET BY MOUTH EVERY SIX (6) HOURS AS NEEDED FOR NAUSEA OR VOMITING Yes Radha Vallecillo MD   alendronate (FOSAMAX) 35 MG tablet TAKE ONE (1) TABLET BY MOUTH EVERY 7 DAYS  Patient taking differently: Indications: takes on fridays Yes Radha Vallecillo MD   lisinopril (PRINIVIL;ZESTRIL) 5 MG tablet TAKE ONE (1) TABLET BY MOUTH DAILY Yes Radha Vallecillo MD   XARELTO 20 MG TABS tablet TAKE ONE (1) TABLET BY MOUTH DAILY (WITH BREAKFAST) Yes Radha Vallecillo MD   Respiratory Therapy Supplies MISC Please provide nose pillow mask  for CPAP Yes Yanelis Perez MD   Incontinence Supply Disposable MISC Use daily for incontinence Yes Yanelis Perez MD   docusate  sodium (COLACE) 100 MG capsule TAKE ONE (1) CAPSULE BY MOUTH TWO (2) TIMES DAILY FOR CONSTIPATION Yes Steven Colin MD   levothyroxine (SYNTHROID) 75 MCG tablet TAKE ONE (1) TABLET BY MOUTH EVERY MORNING (BEFORE BREAKFAST) Yes Steven Colin MD   solifenacin (VESICARE) 5 MG tablet Take 1 tablet by mouth daily Yes Miguelina Zimmerman APRN - CNP   Incontinence Supplies MISC Needs washable bed pads Yes Steven Colin MD   albuterol sulfate HFA (PROVENTIL HFA) 108 (90 Base) MCG/ACT inhaler Inhale 1-2 puffs into the lungs every 4 hours as needed for Wheezing Yes Jeanie Gtz MD   [facility]. Devices (STEP N REST II WALKER) MISC Rollator walker Yes Jeanie Gtz MD   [facility]. Devices (RAISED TOILET SEAT) [facility] Please provide with arms Yes Jeanie Gtz MD   vitamin D3 (CHOLECALCIFEROL) 25 MCG (1000 UT) TABS tablet TAKE TWO (2) TABLETS BY MOUTH ONCE DAILY Yes Steven Colin MD   Probiotic Product (LISSY-BID PROBIOTIC) TABS TAKE ONE (1) TABLET BY MOUTH IN THE MORNING Yes Steven Colin MD   pantoprazole (PROTONIX) 40 MG tablet TAKE ONE (1) TABLET BY MOUTH ONCE DAILY Yes Steven Colin MD   vitamin B-12 (CYANOCOBALAMIN) 1000 MCG tablet TAKE ONE (1) TABLET BY MOUTH DAILY Yes Steven Colin MD   Magnesium Oxide (MAGNESIUM-OXIDE) 250 MG TABS tablet Take 1 tablet by mouth daily Take with food, in the evening Yes Radha Vallecillo MD   ACETAMINOPHEN EXTRA STRENGTH 500 MG tablet TAKE 1 TABLET BY MOUTH EVERY 6 HOURS AS NEEDED FOR PAIN Yes Steven Colin MD       -vital signs stable and within normal limits except morbid obesity per BMI, BMI 45.36 kg/M2  Patient-Reported Vitals 2/7/2023   Patient-Reported Weight 248 lbs   Patient-Reported Height 5 ft 2 in   Patient-Reported Systolic 071   Patient-Reported Diastolic 70   Patient-Reported Pulse 65   Patient-Reported Temperature -   Patient-Reported SpO2 -          No orders of the defined types were placed in this encounter.       Orders Placed This Encounter   Procedures    CBC with Auto Differential     Standing Status:   Future     Standing Expiration Date:   2/17/2024    Comprehensive Metabolic Panel     Standing Status:   Future     Standing Expiration Date:   2/17/2024    Magnesium     Standing Status:   Future     Standing Expiration Date:   2/17/2024    Brain Natriuretic Peptide     Standing Status:   Future     Standing Expiration Date:   2/17/2024         There are no discontinued medications. I affirm this is a Patient Initiated Episode with an Established Patient who has not had a related appointment within my department in the past 7 days or scheduled within the next 24 hours. Patient identification was verified at the start of the visit: Yes      Total Time: minutes: 21-30 minutes    Krista Rothman was evaluated through a synchronous (real-time) audio encounter. Patient identification was verified at the start of the visit. She (or guardian if applicable) is aware that this is a billable service, which includes applicable co-pays. This visit was conducted with the patient's (and/or legal guardian's) verbal consent. She has not had a related appointment within my department in the past 7 days or scheduled within the next 24 hours. The patient was located at Home: [address] [place] [address]. The provider was located at [facility] (Appt Dept): [place] [address]  [address] [address]. Celina Chavez MD      An electronic signature was used to authenticate this note.   Electronically signed by Celina Chavez MD on 2/17/2023  at 7:29 PM.

## 2023-02-18 NOTE — CARE COORDINATION
Referral received from provider for Care Management due to her CHF. She was also recently diagnosed with Covid. The provider has sent a prescription for Paxlovid to her pharmacy. Called Mira Mckeon and introduced self and reason for call. She is currently being managed by our Care Transitions team. Informed her of prescriptions for furosemide at [facility]; and Paxlovid and [place] at [place]. Reviewed instructions regarding Vesicare and Xarelto. She stated that her sister is going to  the medications later and she will have the home care nurse help her with identifying the vesicare and xarelto in her pill box. Did remind her that both pharmacies can deliver if her sister is not able to  the medications. Informed her of blood work that she will need to have done in 1 week. Instructed her to go back to the ER if worsening shortness of breath when going from room to room. She verbalized understanding.

## 2023-02-20 ENCOUNTER — CARE COORDINATION (OUTPATIENT)
Dept: CASE MANAGEMENT | Age: 73
End: 2023-02-20

## 2023-02-20 NOTE — CARE COORDINATION
[facility] Care Transitions Follow Up Call    Patient Current Location: [address]  Transition Nurse contacted the patient by telephone to follow up after admission on 23. Verified name and  with patient as identifiers. Patient: Dajuan Villanueva  Patient : 1950   MRN: [id number]  Reason for Admission: Fatigue/ ED visit [address] [place] [address] (23)  Discharge Date: 23 RARS: Readmission Risk Score: 14.4      Needs to be reviewed by the provider   Additional needs identified to be addressed with provider: No  none             Method of communication with provider: none. Was able to contact Fallon Silva for [employer]. She stated that she was doing better. She denied any shortness of breath, cough is now occasional, no weakness, no N/V, no diarrhea and no lower extremity swelling. She said that she has a few more days of the Paxlovid and the visiting nurse adjusted her [address] [facility] as PCP prescribed. She will be having a nurse visit this week, but did not know the day. She had no questions/concerns or needs at this time. Addressed changes since last contact:  none  Discussed follow-up appointments. If no appointment was previously scheduled, appointment scheduling offered: No.   Is follow up appointment scheduled within 7 days of discharge? Yes. Follow Up  Future Appointments   Date Time Provider Dante Falukneri   2023 10:10 AM Justa Rivero MD [address] [place] [address]   2023  9:00 AM [facility]   2023  1:45 PM [facility]   2023  9:30 AM [facility]   10/9/2023  1:00 PM Kyle Bhat PA-C GYN Oncology [facility]     [id number] [address] follow up appointment(s):     Care Transition Nurse reviewed medical action plan with patient and discussed any barriers to care and/or understanding of plan of care after discharge.  Discussed appropriate site of care based on symptoms and resources available to patient including: PCP  Specialist  Home health  When to call 911. The patient agrees to contact the PCP office for questions related to their healthcare. Patients top risk factors for readmission: functional physical ability and medical condition-fatigue/covid/CHF  Interventions to address risk factors: Obtained and reviewed discharge summary and/or continuity of care documents    Offered patient enrollment in the Remote Patient Monitoring (RPM) program for in-home monitoring: Patient declined. Care Transitions Subsequent and Final Call    Subsequent and Final Calls  Do you have any ongoing symptoms?: No  Have your medications changed?: Yes  Patient Reports: paxlovid for 5 days and 1/2 dose of Xarelto for 7 day and holding Vesicare for 7 days  Do you have any questions related to your medications?: No  Do you currently have any active services?: Yes  Are you currently active with any services?: Home Health  Do you have any needs or concerns that I can assist you with?: No  Care Transitions Interventions    Registered Dietician: Completed    Other Interventions:             Care Transition Nurse provided contact information for future needs. Plan for follow-up call in 3-5 days based on severity of symptoms and risk factors.   Plan for next call:  final outreach  hand off to [address] [place] [address]    Umer Westfall RN

## 2023-02-21 ENCOUNTER — TELEPHONE (OUTPATIENT)
Dept: FAMILY MEDICINE CLINIC | Age: 73
End: 2023-02-21

## 2023-02-21 DIAGNOSIS — B34.9 ACUTE VIRAL SYNDROME: Primary | ICD-10-CM

## 2023-02-21 DIAGNOSIS — K21.9 GASTROESOPHAGEAL REFLUX DISEASE WITHOUT ESOPHAGITIS: ICD-10-CM

## 2023-02-21 RX ORDER — PROBIOTIC PRODUCT - TAB 1B-250 MG
TAB ORAL
Qty: 28 TABLET | Refills: 8 | Status: SHIPPED | OUTPATIENT
Start: 2023-02-21

## 2023-02-21 RX ORDER — PANTOPRAZOLE SODIUM 40 MG/1
TABLET, DELAYED RELEASE ORAL
Qty: 28 TABLET | Refills: 8 | Status: SHIPPED | OUTPATIENT
Start: 2023-02-21

## 2023-02-21 NOTE — TELEPHONE ENCOUNTER
[facility] Clinical Support Pool  Subject: Referral Request     Reason for referral request? Has had Covid for about a week Can she get an   order to have a Covid test to see if she is now negative   Provider patient wants to be referred to(if known):     Provider Phone Number(if known):      Additional Information for Provider?   ---------------------------------------------------------------------------   --------------   [id number] "[employer] [facility]     [phone]; OK to leave message on voicemail   ----------------------------------------------------------------------

## 2023-02-21 NOTE — TELEPHONE ENCOUNTER
Doesn't need to recheck  I did order tests for next time to have in the house       Diagnosis Orders   1.  Acute viral syndrome  COVID-19 Antigen Test KIT           Future Appointments   Date Time Provider Dante Faulkneri   2/27/2023 10:10 AM Dora Camacho MD [address] [place] [address]   5/11/2023  9:00 AM Rosalina Salomon MD [facility]   5/24/2023  1:45 PM Libertad Reinoso MD [facility]   8/9/2023  9:30 AM Rosalina Salomon MD [facility]   10/9/2023  1:00 PM Radha Morales, [facility]

## 2023-02-23 ENCOUNTER — CARE COORDINATION (OUTPATIENT)
Dept: CASE MANAGEMENT | Age: 73
End: 2023-02-23

## 2023-02-23 NOTE — CARE COORDINATION
[facility] Care Transitions Follow Up Call    Patient Current Location:  Home: [address] [place] [address] [address] [place] [address]    Care Transition Nurse contacted the patient by telephone to follow up after admission on 23. Verified name and  with patient as identifiers. Patient: Libertad Azul  Patient : 1950   MRN: [id number]  Reason for Admission: Fatigue/ ED visit [address] [place] [address] (23)  Discharge Date: 23 RARS: Readmission Risk Score: 14.4      Needs to be reviewed by the provider   Additional needs identified to be addressed with provider: No  none             Method of communication with provider: none. Was able to contact [employer]. She stated that she was doing \"ok\". She denied any shortness of breath, cough, fever/chills, sneezing, swelling or chest pain. She said that her nurse is to come today and she will be doing a repeat Covid test.  Tayler Gorman said that she rescheduled her appointments with urology and cardiology. She had no questions or concerns  Informed that PCP would like ACM to follow pt after transitions ends next week   She is agreeable. Addressed changes since last contact:  none  Discussed follow-up appointments. If no appointment was previously scheduled, appointment scheduling offered: No.   Is follow up appointment scheduled within 7 days of discharge? Yes. Follow Up  Future Appointments   Date Time Provider Dante Amaya   2023  9:00 AM [facility]   2023  1:45 PM [facility]   2023  9:30 AM [facility]   10/9/2023  1:00 PM Lauro Potts PA-C GYN Oncology [facility]     [id number] [address] follow up appointment(s):     Care Transition Nurse reviewed medical action plan and red flags with patient and discussed any barriers to care and/or understanding of plan of care after discharge.  Discussed appropriate site of care based on symptoms and resources available to patient including: PCP  Specialist  Home health  When to call 911. The patient agrees to contact the PCP office for questions related to their healthcare. Patients top risk factors for readmission: functional physical ability, lack of knowledge about disease, and medical condition-fatigue/covid/chf  Interventions to address risk factors: Obtained and reviewed discharge summary and/or continuity of care documents    Offered patient enrollment in the Remote Patient Monitoring (RPM) program for in-home monitoring: Patient declined. Care Transitions Subsequent and Final Call    Subsequent and Final Calls  Do you have any ongoing symptoms?: No  Have your medications changed?: No  Do you have any questions related to your medications?: No  Do you currently have any active services?: No  Are you currently active with any services?: Home Health  Do you have any needs or concerns that I can assist you with?: No  Care Transitions Interventions    Registered Dietician: Completed    Other Interventions:             Care Transition Nurse provided contact information for future needs. Plan for follow-up call in 7-10 days based on severity of symptoms and risk factors.   Plan for next call:  final outreach  hand off to [address] [place] [address] on covid/chf symptoms and is doing daily weight not    Jose F Guy RN

## 2023-02-25 LAB — SARS-COV-2: NEGATIVE

## 2023-02-27 NOTE — RESULT ENCOUNTER NOTE
Noted, negative COVID test  Future Appointments  5/11/2023  9:00 AM    Abiodun Bravo MD     [facility]  5/24/2023  1:45 PM    [facility]ila Pandya [facility]  8/9/2023   9:30 AM    Abiodun Bravo MD     [facility]  10/9/2023  1:00 PM    José Miguel Lucero, [address] [address]

## 2023-03-01 ENCOUNTER — CARE COORDINATION (OUTPATIENT)
Dept: CASE MANAGEMENT | Age: 73
End: 2023-03-01

## 2023-03-01 NOTE — CARE COORDINATION
[facility] Care Transitions Follow Up Call    Patient: Soledad Warren  Patient : 1950   MRN: [id number]  Reason for Admission: Fatigue/ ED visit [address] [place] [address] (23)  Discharge Date: 23 RARS: Readmission Risk Score: 14.4      Needs to be reviewed by the provider   Additional needs identified to be addressed with provider: No  none             Method of communication with provider: none. Attempt to reach patient for Care Transitions. No answer and mail box full.   Will attempt to contact at a later time/date    Follow Up  Future Appointments   Date Time Provider Dante Amaya   2023  9:00 AM Rudy Eastman MD [facility]   2023  1:45 PM Rocio James MD [address]   2023  9:30 AM Rudy Eastman MD [facility]   10/9/2023  1:00 PM Hattie Devlin PA-C GYN Oncology [place] Transitions Subsequent and Final Call    Subsequent and Final Calls  Are you currently active with any services?: Home Health  Care Transitions Interventions    Registered Dietician: Completed    Other Interventions:               Qasim Zhao RN

## 2023-03-02 ENCOUNTER — CARE COORDINATION (OUTPATIENT)
Dept: CASE MANAGEMENT | Age: 73
End: 2023-03-02

## 2023-03-02 NOTE — CARE COORDINATION
[facility] Care Transitions Follow Up Call    Patient Current Location:  Home: [address] [place] [address]    Care Transition Nurse contacted the patient by telephone to follow up after admission on 23. Verified name and  with patient as identifiers. Patient: Glen Chris  Patient : 1950   MRN: [id number]  Reason for Admission: Fatigue/ ED visit [address] [place] [address] (23)  Discharge Date: 23 RARS: Readmission Risk Score: 14.4      Needs to be reviewed by the provider   Additional needs identified to be addressed with provider: Yes  Update that pt discharged from [address] [place] [address] of communication with provider: staff message. Was able to contact Angelina Rocha for final outreach. She stated that she just got home from being admitted to [facility].  She said that Monday morning she was sneezing, coughing and had back pain which it made it difficult to stand up. She said that her heart rate was 130-140 when she went into the hospital.  She said that they gave her some fluids, Cardizem for her heart and said that she did not have an infection. She said that she was sent home with a new medication and was told to restart her metoprolol. Hospital is not a [address] and unable to view discharge paper work. She stated that she was doing much better. Reminded to follow up with PCP and cardio. [address] called and they stated that they did receive SHANA and will be reaching out to Angelina Rocha to continue care  Final call will hand off to [facility] [place] for further outreaches. Addressed changes since last contact:  none  Discussed follow-up appointments. If no appointment was previously scheduled, appointment scheduling offered: Yes. Is follow up appointment scheduled within 7 days of discharge? Yes.     Follow Up  Future Appointments   Date Time Provider Dante Amaya   2023  9:00 AM [facility]   2023  1:45 PM [facility]   8/9/2023  9:30 AM [facility]   10/9/2023  1:00 PM Renaldo Marks PA-C GYN Oncology [facility]     [facility] follow up appointment(s):     Care Transition Nurse reviewed medical action plan with patient and discussed any barriers to care and/or understanding of plan of care after discharge. Discussed appropriate site of care based on symptoms and resources available to patient including: PCP  Specialist  Home health  When to call 911. The patient agrees to contact the PCP office for questions related to their healthcare. Patients top risk factors for readmission: medical condition-fatigue/covid/chf  Interventions to address risk factors: Obtained and reviewed discharge summary and/or continuity of care documents and Communication with home health agencies or other community services the patient is currently using-verified that [address] will continue    Offered patient enrollment in the Remote Patient Monitoring (RPM) program for in-home monitoring: Patient declined. Care Transitions Subsequent and Final Call    Subsequent and Final Calls  Do you have any ongoing symptoms?: Yes  Onset of Patient-reported symptoms: Today  Patient-reported symptoms: Weakness  Have your medications changed?: Yes  Patient Reports: but pt does not know what one was, restarted her metoprolol  Do you have any questions related to your medications?: No  Do you currently have any active services?: Yes  Are you currently active with any services?: Home Health  Do you have any needs or concerns that I can assist you with?: No  Care Transitions Interventions    Registered Dietician: Completed    Other Interventions:             Care Transition Nurse provided contact information for future needs. No further follow-up call indicated based on severity of symptoms and risk factors.   Plan for next call:  final call hand off to [phone], RN

## 2023-03-03 ENCOUNTER — TELEPHONE (OUTPATIENT)
Dept: FAMILY MEDICINE CLINIC | Age: 73
End: 2023-03-03

## 2023-03-03 DIAGNOSIS — E53.8 VITAMIN B 12 DEFICIENCY: ICD-10-CM

## 2023-03-03 RX ORDER — LANOLIN ALCOHOL/MO/W.PET/CERES
CREAM (GRAM) TOPICAL
Qty: 90 TABLET | Refills: 3 | Status: SHIPPED | OUTPATIENT
Start: 2023-03-03

## 2023-03-03 NOTE — TELEPHONE ENCOUNTER
Please Approve or Refuse. Send to Pharmacy per Pt's Request:      Next Visit Date:  3/3/2023   Last Visit Date: 2/17/2023    Hemoglobin A1C (%)   Date Value   01/24/2023 5.8   09/02/2022 5.9   03/22/2022 5.0             ( goal A1C is < 7)   BP Readings from Last 3 Encounters:   02/01/23 (!) 102/59   01/24/23 126/78   11/23/22 114/64          (goal 120/80)  BUN   Date Value Ref Range Status   02/14/2023 24 (H) 8 - 23 mg/dL Final     Creatinine   Date Value Ref Range Status   02/14/2023 0.95 (H) 0.50 - 0.90 mg/dL Final     Potassium   Date Value Ref Range Status   02/14/2023 4.2 3.7 - 5.3 mmol/L Final     Potassium reflex Magnesium   Date Value Ref Range Status   10/10/2021 4.1 3.5 - 5.2 meq/L Final     Comment:     Low level specimen hemolysis is present as indicated by the interference  level index on the Roche analyzer. The reported K+ level may be falsely  increased. If clinically warranted, recollection of the specimen is  suggested.

## 2023-03-03 NOTE — TELEPHONE ENCOUNTER
Klaus Rick, RN  P pn [facility] Clinical Support Pool  Just wanted to update that Janneth Pruett was just discharged today from [facility]. Unclear what for.   She was admitted 3/27-3/2  [facility] as home care

## 2023-03-03 NOTE — TELEPHONE ENCOUNTER
Care Transitions Initial Follow Up Call    Outreach made within 2 business days of discharge: Yes    Patient: Xavier Anthony Patient : 1950   MRN: [id number]  Reason for Admission: There are no discharge diagnoses documented for the most recent discharge. Discharge Date: 23       Spoke with: NO ANSWER     Discharge department/facility: [address] [place] [address]     TCM Interactive Patient Contact:  Was patient able to fill all prescriptions:   Was patient instructed to bring all medications to the follow-up visit:   Is patient taking all medications as directed in the discharge summary?    Does patient understand their discharge instructions:   Does patient have questions or concerns that need addressed prior to 7-14 day follow up office visit:     Follow Up  Future Appointments   Date Time Provider Dante Amaya   2023  9:00 AM Keri Swartz MD [facility]   2023  1:45 PM Warden Jamison MD [facility]   2023  9:30 AM Keri Swartz MD [facility]   10/9/2023  1:00 PM Melita Cummings PA-C GYN Oncology [place]

## 2023-03-07 ENCOUNTER — TELEPHONE (OUTPATIENT)
Dept: FAMILY MEDICINE CLINIC | Age: 73
End: 2023-03-07

## 2023-03-07 NOTE — TELEPHONE ENCOUNTER
Noted. Thank you!   I agree with home physical therapy    Future Appointments   Date Time Provider Dante Amaya   5/11/2023  9:00 AM Katelyn Wilder MD [facility]   5/24/2023  1:45 PM Ananth Wolf MD [facility]   8/9/2023  9:30 AM Katelyn Wilder MD [facility]   10/9/2023  1:00 PM Jonathon Metcalf, [address] [place] [address]

## 2023-03-07 NOTE — TELEPHONE ENCOUNTER
Incoming call came from home health care facilty [facility]. Nurse ibarra is calling to see, if provider will be able to sign off on home health care orders/PT order's for patient. Also to make aware          Please advise, Thank you!

## 2023-03-08 DIAGNOSIS — I50.32 CHRONIC DIASTOLIC CONGESTIVE HEART FAILURE (HCC): ICD-10-CM

## 2023-03-08 RX ORDER — FUROSEMIDE 40 MG/1
20 TABLET ORAL DAILY
Qty: 90 TABLET | Refills: 3 | Status: SHIPPED | OUTPATIENT
Start: 2023-03-08 | End: 2023-03-09 | Stop reason: DRUGHIGH

## 2023-03-08 NOTE — TELEPHONE ENCOUNTER
Incoming call came from home health care facilty [facility]. OCCUPATIONAL THERAPY PROVIDER , HAD EVALUATION TODAY AND HER BLOOD PRESSURE WAS CHECKED 3 TIMES AND WAS AROUND 89-63, ALL 3 B/P WAS 80'S/60'S. PT HAS NO OTHER SYMPTOMS. Please give a call back at [phone]. Please advise, Thank you!

## 2023-03-08 NOTE — PROGRESS NOTES
Chief Complaint   Patient presents with   • GI Problem         History of Present Illness  Patient is a 44-year-old male who presents today for Evaluation.     Patient presents today with concerns about GI symptoms that have been present over the last 4 years.  Reports abdominal bloating that is present on a constant basis.  He has not noted any other dietary triggers that make this better or worse.  He reports fullness and early satiety associated with this as well as nausea but denies any vomiting.    Reports a history of GERD.  Symptoms have been present for years.  Currently symptoms are well controlled on omeprazole.    He reports increased stool frequency, generally has 3-4 bowel movements per day.  Reports this has been his baseline.  Blood in the stool or melena.    He reports a family history of colon cancer in his maternal grandmother and his mother has had a cholecystectomy.     Result Review :       Referral to Gastroenterology for Abdominal cramping; Loose stools (08/20/2020)   US Gallbladder (11/26/2018 08:01)      Vital Signs:   /84   Pulse 84   Temp 97.6 °F (36.4 °C)   Wt 126 kg (277 lb 3.2 oz)   SpO2 97%   BMI 36.57 kg/m²     Body mass index is 36.57 kg/m².     Physical Exam  Vitals reviewed.   Constitutional:       General: He is not in acute distress.     Appearance: He is well-developed.   HENT:      Head: Normocephalic and atraumatic.   Pulmonary:      Effort: Pulmonary effort is normal. No respiratory distress.   Abdominal:      General: Abdomen is flat. Bowel sounds are normal.      Palpations: Abdomen is soft.      Tenderness: There is no abdominal tenderness.      Comments: Diastases recti noted on exam.   Skin:     General: Skin is dry.      Coloration: Skin is not pale.   Neurological:      Mental Status: He is alert and oriented to person, place, and time.   Psychiatric:         Thought Content: Thought content normal.           Assessment and Plan    Diagnoses and all orders  Diagnosis Orders   1.  Chronic diastolic congestive heart failure (HCC)  furosemide (LASIX) 40 MG tablet           Future Appointments   Date Time Provider [facility]   5/11/2023  9:00 AM Cade Deluca MD [facility]   5/24/2023  1:45 PM Kirk Hubbard MD [facility]   8/9/2023  9:30 AM Cade Deluca MD [facility]   10/9/2023  1:00 PM Ian Garay, [address] [place] [address] for this visit:    1. Bloating (Primary)  -     CT Abdomen Pelvis With Contrast; Future  -     CBC & Differential  -     Comprehensive Metabolic Panel  -     Celiac Disease Panel    2. Gastroesophageal reflux disease, unspecified whether esophagitis present    3. Nausea  -     CT Abdomen Pelvis With Contrast; Future    4. Early satiety  -     CT Abdomen Pelvis With Contrast; Future    5. Family history of colon cancer    6. Encounter for screening for malignant neoplasm of colon         Discussion  Patient presents today for evaluation of abdominal bloating with nausea, early satiety, and longstanding history of reflux.  Recommended CT scan to evaluate for any obstructive or inflammatory source of symptoms.  Recommended EGD to assess for any evidence of gastritis, peptic ulcer disease, H. pylori infection, or celiac disease that could be contributing to symptoms and recommended colonoscopy for colon cancer screening due to his family history of colon cancer.  If testing negative and symptoms persist, may consider gastric emptying study, HIDA scan, or hydrogen breath test to assess for small intestinal bacterial overgrowth.          Follow Up   Return for Follow up to review results after testing complete.    Patient Instructions   Schedule CT scan for further evaluation of symptoms.    Schedule EGD for further evaluation of symptoms.     Schedule colonoscopy for screening.

## 2023-03-08 NOTE — TELEPHONE ENCOUNTER
Noted. Thank you!   Please let the patient know and the home visiting nurse know cut down furosemide from 40 mg to 20 mg, new prescription symptomatic    Somehow it was sent in another encounter  Recheck blood pressure  Future Appointments   Date Time Provider Dante Monique   5/11/2023  9:00 AM Scott Gates MD [facility]   5/24/2023  1:45 PM Patrick Lucas MD [facility]   8/9/2023  9:30 AM Scott Gates MD [facility]   10/9/2023  1:00 PM Priscila Lorenzana, [address] [place] [address]

## 2023-03-09 ENCOUNTER — HOSPITAL ENCOUNTER (OUTPATIENT)
Age: 73
Setting detail: SPECIMEN
Discharge: HOME | End: 2023-03-09
Payer: MEDICARE

## 2023-03-09 DIAGNOSIS — I12.9 BENIGN HYPERTENSION WITH CKD (CHRONIC KIDNEY DISEASE) STAGE III (HCC): Primary | ICD-10-CM

## 2023-03-09 DIAGNOSIS — I50.32 CHRONIC DIASTOLIC CONGESTIVE HEART FAILURE (HCC): ICD-10-CM

## 2023-03-09 DIAGNOSIS — N18.30 BENIGN HYPERTENSION WITH CKD (CHRONIC KIDNEY DISEASE) STAGE III (HCC): Primary | ICD-10-CM

## 2023-03-09 LAB
ANION GAP SERPL CALCULATED.3IONS-SCNC: 11 MMOL/L (ref 9–17)
BUN SERPL-MCNC: 29 MG/DL (ref 8–23)
CALCIUM SERPL-MCNC: 9.2 MG/DL (ref 8.6–10.4)
CHLORIDE SERPL-SCNC: 95 MMOL/L (ref 98–107)
CO2 SERPL-SCNC: 30 MMOL/L (ref 20–31)
CREAT SERPL-MCNC: 1.45 MG/DL (ref 0.5–0.9)
GFR SERPL CREATININE-BSD FRML MDRD: 39 ML/MIN/1.73M2
GLUCOSE SERPL-MCNC: 149 MG/DL (ref 70–99)
POTASSIUM SERPL-SCNC: 4.3 MMOL/L (ref 3.7–5.3)
SODIUM SERPL-SCNC: 136 MMOL/L (ref 135–144)

## 2023-03-09 PROCEDURE — 80048 BASIC METABOLIC PNL TOTAL CA: CPT

## 2023-03-09 RX ORDER — FUROSEMIDE 20 MG/1
20 TABLET ORAL DAILY
Qty: 90 TABLET | Refills: 3 | Status: SHIPPED | OUTPATIENT
Start: 2023-03-09

## 2023-03-09 NOTE — TELEPHONE ENCOUNTER
Called out to pt, phone line kept ringing unable to leave a vm. Called out to [facility] # [phone], spoke to nurse creed and gave him information and new medication change. He did take down medication information.

## 2023-03-09 NOTE — TELEPHONE ENCOUNTER
[facility] contacted office and asked if medication can be changed back to the 20 MG tablet instead of the 40 mg tablet and having the pt cut it in half.

## 2023-03-09 NOTE — TELEPHONE ENCOUNTER
Called out to pt, phone line kept ringing unable to leave a vm.     Called out to [facility] # [phone], to

## 2023-03-10 NOTE — RESULT ENCOUNTER NOTE
Please notify patient: worsening Chronic kidney disease     Blood Glucose 149  Cutting down lasix to 20 mg will help    Recheck BMP on Monday, new labs placed, might need to be faxed to home care nurse      Future Appointments  5/11/2023  9:00 AM    Katelyn Wilder MD     [facility]  5/24/2023  1:45 PM    Ananth Wolf MD        [facility]  8/9/2023   9:30 AM    Katelyn Wilder MD     [facility]  10/9/2023  1:00 PM    [facility]

## 2023-03-17 ENCOUNTER — TELEPHONE (OUTPATIENT)
Dept: PULMONOLOGY | Age: 73
End: 2023-03-17

## 2023-03-17 DIAGNOSIS — G47.33 OBSTRUCTIVE SLEEP APNEA: Primary | ICD-10-CM

## 2023-03-17 NOTE — TELEPHONE ENCOUNTER
Pt called and asked for order for nasal pillows instead of full face mask be sent to [facility], can you place order and I'll fax to [facility]

## 2023-03-18 ENCOUNTER — TELEPHONE (OUTPATIENT)
Dept: FAMILY MEDICINE CLINIC | Age: 73
End: 2023-03-18

## 2023-03-18 NOTE — TELEPHONE ENCOUNTER
Called back to [phone] for Key Chiang     Spoke with Edd from [facility]  She has concerned that patient is not taking any Lasix and wants clarification regarding Lasix. Radha Gurrola says she has no Lasix in the house. She looks throughout the house and there was no lasix at all, doesn't have it in the house. I explained to her that blood work was done, per result note on 3/9/2022 said to cut down the Lasix and I sent a prescription for Lasix 20 Mg on 3/9/2022 to [facility]. Radha Gurrola thinks the new shipment was not received so probably that is why she is not taking the Lasix at all    I suggested Labs on Tuesday, which were supposed to be done this week actually, orders in the computer, but she never received the orders for BMP, magnesium, phosphorus, BNP, she will do them this coming Tuesday    I also gave verbal order to extend home care for her for a couple of weeks more.

## 2023-03-28 ENCOUNTER — HOSPITAL ENCOUNTER (OUTPATIENT)
Age: 73
Setting detail: SPECIMEN
Discharge: HOME | End: 2023-03-28
Payer: MEDICARE

## 2023-03-28 ENCOUNTER — TELEPHONE (OUTPATIENT)
Dept: FAMILY MEDICINE CLINIC | Age: 73
End: 2023-03-28

## 2023-03-28 DIAGNOSIS — N18.30 BENIGN HYPERTENSION WITH CKD (CHRONIC KIDNEY DISEASE) STAGE III (HCC): Primary | ICD-10-CM

## 2023-03-28 DIAGNOSIS — N17.9 AKI (ACUTE KIDNEY INJURY) (HCC): ICD-10-CM

## 2023-03-28 DIAGNOSIS — I12.9 BENIGN HYPERTENSION WITH CKD (CHRONIC KIDNEY DISEASE) STAGE III (HCC): Primary | ICD-10-CM

## 2023-03-28 DIAGNOSIS — I50.32 CHRONIC DIASTOLIC CONGESTIVE HEART FAILURE (HCC): ICD-10-CM

## 2023-03-28 LAB
ANION GAP SERPL CALCULATED.3IONS-SCNC: 14 MMOL/L (ref 9–17)
BNP SERPL-MCNC: 667 PG/ML
BUN SERPL-MCNC: 41 MG/DL (ref 8–23)
CALCIUM SERPL-MCNC: 9.8 MG/DL (ref 8.6–10.4)
CHLORIDE SERPL-SCNC: 100 MMOL/L (ref 98–107)
CO2 SERPL-SCNC: 25 MMOL/L (ref 20–31)
CREAT SERPL-MCNC: 1.82 MG/DL (ref 0.5–0.9)
GFR SERPL CREATININE-BSD FRML MDRD: 29 ML/MIN/1.73M2
GLUCOSE SERPL-MCNC: 101 MG/DL (ref 70–99)
MAGNESIUM SERPL-MCNC: 1.8 MG/DL (ref 1.6–2.6)
PHOSPHATE SERPL-MCNC: 5.4 MG/DL (ref 2.6–4.5)
POTASSIUM SERPL-SCNC: 4 MMOL/L (ref 3.7–5.3)
SODIUM SERPL-SCNC: 139 MMOL/L (ref 135–144)

## 2023-03-28 PROCEDURE — 84100 ASSAY OF PHOSPHORUS: CPT

## 2023-03-28 PROCEDURE — 83880 ASSAY OF NATRIURETIC PEPTIDE: CPT

## 2023-03-28 PROCEDURE — 83735 ASSAY OF MAGNESIUM: CPT

## 2023-03-28 PROCEDURE — 80048 BASIC METABOLIC PNL TOTAL CA: CPT

## 2023-03-28 NOTE — TELEPHONE ENCOUNTER
Received a call from the lab stating that they need a CPT code for patients lab draw for the BNP. I am unsure of where to find that or if you can let me know what it is, I can call her back and let her know. Thank you!

## 2023-03-28 NOTE — TELEPHONE ENCOUNTER
CHF  Because they have reentered the orders        MD Celina Varghese MD            Associated Diagnoses    Benign hypertension with CKD (chronic kidney disease) stage III (HCC)  - Primary       Chronic diastolic congestive heart failure [facility])               Brain Natriuretic Peptide: Patient Communication     Not Released  Not seen

## 2023-03-28 NOTE — RESULT ENCOUNTER NOTE
Please notify patient: Greatly improved congestive heart failure markers  Kidney function slightly worsening, with high phosphorus, she may be a bit dehydrated, to make sure she drinks 6 x 8 ounce glasses of water every day    Needs to make sure she does not take more than 20 mg furosemide daily  I will also refer her to nephrologist, Dr. Hugh Evangelista  Repeat the labs in 1 week      Future Appointments  5/11/2023  9:00 AM    Geronimo Rey MD     [facility]  5/24/2023  1:45 PM    Petr Bolden MD        [facility]  8/9/2023   9:30 AM    Geronimo Rey MD     [facility]  10/12/2023 10:30 AM   Raymond Su PA-C        GYN Oncology        [facility]y

## 2023-04-03 NOTE — TELEPHONE ENCOUNTER
Please Approve or Refuse. Send to Pharmacy per Pt's Request:      Next Visit Date:  5/11/2023   Last Visit Date: 2/17/2023    Hemoglobin A1C (%)   Date Value   01/24/2023 5.8   09/02/2022 5.9   03/22/2022 5.0             ( goal A1C is < 7)   BP Readings from Last 3 Encounters:   02/01/23 (!) 102/59   01/24/23 126/78   11/23/22 114/64          (goal 120/80)  BUN   Date Value Ref Range Status   03/28/2023 41 (H) 8 - 23 mg/dL Final     Creatinine   Date Value Ref Range Status   03/28/2023 1.82 (H) 0.50 - 0.90 mg/dL Final     Potassium   Date Value Ref Range Status   03/28/2023 4.0 3.7 - 5.3 mmol/L Final     Potassium reflex Magnesium   Date Value Ref Range Status   10/10/2021 4.1 3.5 - 5.2 meq/L Final     Comment:     Low level specimen hemolysis is present as indicated by the interference  level index on the Roche analyzer. The reported K+ level may be falsely  increased. If clinically warranted, recollection of the specimen is  suggested.

## 2023-04-05 PROBLEM — I50.9 CHF (CONGESTIVE HEART FAILURE) (HCC): Status: ACTIVE | Noted: 2023-04-05

## 2023-04-24 ENCOUNTER — CARE COORDINATION (OUTPATIENT)
Dept: CARE COORDINATION | Age: 73
End: 2023-04-24

## 2023-04-24 PROBLEM — A41.9 SEPSIS (HCC): Status: ACTIVE | Noted: 2023-02-28

## 2023-04-25 NOTE — CARE COORDINATION
Ambulatory Care Coordination Note  2023    Patient Current Location:  Home: [address] [place] [address]     ACM contacted the patient by telephone. Verified name and  with patient as identifiers. Provided introduction to self, and explanation of the ACM role. Challenges to be reviewed by the provider   Additional needs identified to be addressed with provider: No  none               Method of communication with provider: none. ACM: Morales Munoz RN  Summary  Date Care Coordination Episode Started:  2023    Reason for Call Today:     CC Follow Up    Reason patient is in Care Coordination:     PCP Referral  JOHANNY 96%  Diabetes  CHF  A fib  CKD    Topics Discussed Today:     CHF- Tayler Gorman states she is doing well. Denies any symptoms. Had appt with nephrology yesterday. She said she has to get some lab work; a 24 hour urine test, and an ultrasound before going back to see him in 2 months. General- states her blood sugars have been good. She is done with home care. Still has HHA with [employer] [facility]. Is getting around ok. Denies any falls since last contact. Reviewed upcoming appts. She has transportation. Asked about the CPAP and nasal pillows. She said she got the nasal pillows but hasn't used it yet. She stated her CPAP fell on the floor and her daughter has the pieces to put it back together. Interventions completed today:    Assessments completed today:     Fall risk  Initial assessment  Medication reconciliation  SDOH  Diabetes, CHF, General Health     Care Coordinator plan of care: Take medications as prescribed  Keep upcoming appts with PCP, and pulmonology  Complete lab work prior to nephrology appt  Schedule renal ultrasound   Will follow up next week to assess symptoms     Offered patient enrollment in the Remote Patient Monitoring (RPM) program for in-home monitoring:  Had remote monitoring with [address] [place] [address]  .     Lab Results       None

## 2023-04-26 ENCOUNTER — TELEPHONE (OUTPATIENT)
Dept: FAMILY MEDICINE CLINIC | Age: 73
End: 2023-04-26

## 2023-04-26 ENCOUNTER — CARE COORDINATION (OUTPATIENT)
Dept: CARE COORDINATION | Age: 73
End: 2023-04-26

## 2023-04-26 NOTE — CARE COORDINATION
Ambulatory Care Coordination Note  2023    Patient Current Location:  Home: [address] [place] [address]     ACM contacted the patient by telephone. Verified name and  with patient as identifiers. Provided introduction to self, and explanation of the ACM role. Challenges to be reviewed by the provider   Additional needs identified to be addressed with provider: No  none               Method of communication with provider: none. ACM: Joelle Hernandez RN  Sharlene Said fell this morning and hit her head. Her PCP wants her to go to the ED to be evaluated in case of a brain bleed. She is on ASA and Xarelto. Called Sharlene Said and instructed her to go to the ED to be evaluated for possible brain bleed because of the fall, hitting her head, and being on ASA and Xarelto. She stated \"I feel fine and don't think I need to go there\". I explained the severity of having a potential bleed and encouraged her to have someone take her to the ED to be evaluated. She said she has no one to take her. She said that was why she called the office to see if the home care nurse could come out to check on her. Explained that home care had signed off and they would not be able to make a same day visit for this. Instructed her to call EMS to take her and she said \"they were here this morning. They helped me up and checked me over\". She repeated that she felt fine. Asked if her daughter lived close and she stated \"yes, she would be able to take me if I thought I needed to go. Thank you for checking on me\". She said she fell while trying to take something to one of her neighbors in the apartment building. She set her walker aside and walked without it and lost her balance. ACM will call later to check on her. Offered patient enrollment in the Remote Patient Monitoring (RPM) program for in-home monitoring: NA.     Lab Results       None            Care Coordination Interventions    Referral from Primary Care Provider:

## 2023-04-26 NOTE — CARE COORDINATION
Nnamdi Echavarria again to see how she was doing. She said she feels great! She denies any headaches, dizziness, blurry vision, or any other symptoms. She still states she feels ok and doesn't think she needs to go to the ED. Instructed her if she feels the least bit funny, or has any new symptoms, to call 911 right away. She verbalized understanding.

## 2023-04-27 NOTE — TELEPHONE ENCOUNTER
Incoming call came from patient    Subjective: Caller states: \"fell and hit my head\"     Current Symptoms:  Patient is complaining of onset symptoms of a fall recently and she has a bump on her head . Onset:  Been ongoing for the past 1 day happened this morning. Associated Symptoms: no other symptoms just a lump on her head     Pain Severity: stated no pain        What has been tried: she will like [facility] nurse to come to her home.                Future Appointments   Date Time Provider Dante Monique   5/11/2023  9:00 AM Tayler Chowdhury MD [facility]   5/24/2023  1:45 PM Brent Christensen MD [facility]well   6/27/2023 11:45 AM Lana Nazario MD [facility]   8/9/2023  9:30 AM Tayler Chowdhury MD [facility]   10/12/2023 10:30 AM Kyle Bhat PA-C GYN Oncology [facility]
Noted. Thank you!     Future Appointments   Date Time Provider Dante Amaya   5/11/2023  9:00 AM [facility]   5/24/2023  1:45 PM Amaury Ordonez MD [facility]   6/20/2023 11:00 AM [facility] ULTRASOUND RM [address] [facility] Radiolog   6/27/2023 11:45 AM Jessica Esparza MD [facility]   8/9/2023  9:30 AM [facility]   10/12/2023 10:30 AM Rafe Meigs, PA-C GYN Oncology [facility]
MOUTH DAILY (WITH BREAKFAST) 30 tablet 10    Respiratory Therapy Supplies MISC Please provide nose pillow mask  for CPAP 1 each 0    Incontinence Supply Disposable MISC Use daily for incontinence 100 each 3    docusate sodium (COLACE) 100 MG capsule TAKE ONE (1) CAPSULE BY MOUTH TWO (2) TIMES DAILY FOR CONSTIPATION 180 capsule 3    levothyroxine (SYNTHROID) 75 MCG tablet TAKE ONE (1) TABLET BY MOUTH EVERY MORNING (BEFORE BREAKFAST) 90 tablet 3    solifenacin (VESICARE) 5 MG tablet Take 1 tablet by mouth daily 90 tablet 3    albuterol sulfate HFA (PROVENTIL HFA) 108 (90 Base) MCG/ACT inhaler Inhale 1-2 puffs into the lungs every 4 hours as needed for Wheezing 18 g 0    Misc. Devices (STEP N REST II WALKER) MISC Rollator walker 1 each 0    Misc. Devices (RAISED TOILET SEAT) MISC Please provide with arms 1 each 0    vitamin D3 (CHOLECALCIFEROL) 25 MCG (1000 UT) TABS tablet TAKE TWO (2) TABLETS BY MOUTH ONCE DAILY 60 tablet 11    Magnesium Oxide (MAGNESIUM-OXIDE) 250 MG TABS tablet Take 1 tablet by mouth daily Take with food, in the evening 90 tablet 3    ACETAMINOPHEN EXTRA STRENGTH 500 MG tablet TAKE 1 TABLET BY MOUTH EVERY 6 HOURS AS NEEDED FOR PAIN 120 tablet 11     No current facility-administered medications on file prior to visit.            Future Appointments   Date Time Provider Dante Amaya   5/11/2023  9:00 AM [facility]   5/24/2023  1:45 PM [facility]   6/27/2023 11:45 AM Milton Mahoney MD [facility]   8/9/2023  9:30 AM [facility]   10/12/2023 10:30 AM Thompson Sanders PA-C GYN Oncology [facility]

## 2023-05-08 ENCOUNTER — TELEPHONE (OUTPATIENT)
Dept: FAMILY MEDICINE CLINIC | Age: 73
End: 2023-05-08

## 2023-05-08 NOTE — TELEPHONE ENCOUNTER
Incoming call came from [address] [place] [address] Avenue:  10 [facility].. Nurse Petra Chase is calling to see, if provider. [place] will be able to sign off on home health care orders PT/OT order's for patient. Recent Hospital Discharge:    Recommended Disposition per Home Health facility:     Please give a call back at [phone] . Facility did give permission (Voicemail is secure) to leave a detail message with a (verbal response/answer) if provider will sign off. Please advise, Thank you!       Future Appointments   Date Time Provider Dante Amaya   5/11/2023  9:00 AM [facility]   5/24/2023  1:45 PM [facility]   6/20/2023 11:00 AM [facility] ULTRASOUND RM [address] [facility] Radiolog   6/27/2023 11:45 AM Cecilia Kim MD [facility]   8/9/2023  9:30 AM MD [facility]   10/12/2023 10:30 AM Yvonne Harp PA-C GYN Oncology [facility]

## 2023-05-08 NOTE — TELEPHONE ENCOUNTER
Yes, will sign her orders for home care    Future Appointments   Date Time Provider Dante Amaya   5/11/2023  9:00 AM Mickey Mcgill MD [facility]   5/24/2023  1:45 PM Vandana Costello MD [facility]   6/20/2023 11:00 AM [facility] ULTRASOUND RM [address] 145 [facility] Radiolog   6/27/2023 11:45 AM Juliet Cordero MD [facility]   8/9/2023  9:30 AM Mickey Mcgill MD [facility]   10/12/2023 10:30 AM Kristine Hernandez PA-C GYN Oncology [facility]

## 2023-05-11 ENCOUNTER — OFFICE VISIT (OUTPATIENT)
Dept: FAMILY MEDICINE CLINIC | Age: 73
End: 2023-05-11
Payer: MEDICARE

## 2023-05-11 VITALS
TEMPERATURE: 98.6 F | HEIGHT: 62 IN | WEIGHT: 250.6 LBS | BODY MASS INDEX: 46.12 KG/M2 | HEART RATE: 54 BPM | OXYGEN SATURATION: 96 % | SYSTOLIC BLOOD PRESSURE: 108 MMHG | DIASTOLIC BLOOD PRESSURE: 64 MMHG

## 2023-05-11 DIAGNOSIS — D68.69 SECONDARY HYPERCOAGULABLE STATE (HCC): ICD-10-CM

## 2023-05-11 DIAGNOSIS — M54.50 CHRONIC BILATERAL LOW BACK PAIN WITHOUT SCIATICA: ICD-10-CM

## 2023-05-11 DIAGNOSIS — Z91.81 AT HIGH RISK FOR FALLS: ICD-10-CM

## 2023-05-11 DIAGNOSIS — Z00.00 MEDICARE ANNUAL WELLNESS VISIT, SUBSEQUENT: Primary | ICD-10-CM

## 2023-05-11 DIAGNOSIS — G89.29 CHRONIC BILATERAL LOW BACK PAIN WITHOUT SCIATICA: ICD-10-CM

## 2023-05-11 DIAGNOSIS — E11.65 TYPE 2 DIABETES MELLITUS WITH HYPERGLYCEMIA, WITHOUT LONG-TERM CURRENT USE OF INSULIN (HCC): ICD-10-CM

## 2023-05-11 DIAGNOSIS — G47.33 OSA TREATED WITH BIPAP: ICD-10-CM

## 2023-05-11 DIAGNOSIS — Z23 ENCOUNTER FOR IMMUNIZATION: ICD-10-CM

## 2023-05-11 DIAGNOSIS — I50.32 CHRONIC DIASTOLIC CONGESTIVE HEART FAILURE (HCC): ICD-10-CM

## 2023-05-11 DIAGNOSIS — I48.0 PAROXYSMAL ATRIAL FIBRILLATION (HCC): ICD-10-CM

## 2023-05-11 DIAGNOSIS — Z12.31 ENCOUNTER FOR SCREENING MAMMOGRAM FOR BREAST CANCER: ICD-10-CM

## 2023-05-11 DIAGNOSIS — R26.2 DIFFICULTY WALKING: ICD-10-CM
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
## 2023-05-11 DIAGNOSIS — M17.0 PRIMARY OSTEOARTHRITIS OF BOTH KNEES: ICD-10-CM

## 2023-05-11 DIAGNOSIS — H47.20 OPTIC ATROPHY OF BOTH EYES: ICD-10-CM

## 2023-05-11 LAB — HBA1C MFR BLD: 4.9 %

## 2023-05-11 PROCEDURE — 3044F HG A1C LEVEL LT 7.0%: CPT | Performed by: FAMILY MEDICINE

## 2023-05-11 PROCEDURE — 3017F COLORECTAL CA SCREEN DOC REV: CPT | Performed by: FAMILY MEDICINE

## 2023-05-11 PROCEDURE — 1123F ACP DISCUSS/DSCN MKR DOCD: CPT | Performed by: FAMILY MEDICINE

## 2023-05-11 PROCEDURE — G0439 PPPS, SUBSEQ VISIT: HCPCS | Performed by: FAMILY MEDICINE

## 2023-05-11 PROCEDURE — 83036 HEMOGLOBIN GLYCOSYLATED A1C: CPT | Performed by: FAMILY MEDICINE

## 2023-05-11 RX ORDER — ZOSTER VACCINE RECOMBINANT, ADJUVANTED 50 MCG/0.5
0.5 KIT INTRAMUSCULAR SEE ADMIN INSTRUCTIONS
Qty: 0.5 ML | Refills: 0 | Status: SHIPPED | OUTPATIENT
Start: 2023-05-11 | End: 2023-05-12

## 2023-05-11 SDOH — ECONOMIC STABILITY: FOOD INSECURITY: WITHIN THE PAST 12 MONTHS, THE FOOD YOU BOUGHT JUST DIDN'T LAST AND YOU DIDN'T HAVE MONEY TO GET MORE.: NEVER TRUE

## 2023-05-11 SDOH — ECONOMIC STABILITY: FOOD INSECURITY: WITHIN THE PAST 12 MONTHS, YOU WORRIED THAT YOUR FOOD WOULD RUN OUT BEFORE YOU GOT MONEY TO BUY MORE.: NEVER TRUE

## 2023-05-11 SDOH — ECONOMIC STABILITY: HOUSING INSECURITY
IN THE LAST 12 MONTHS, WAS THERE A TIME WHEN YOU DID NOT HAVE A STEADY PLACE TO SLEEP OR SLEPT IN A SHELTER (INCLUDING NOW)?: NO

## 2023-05-11 SDOH — ECONOMIC STABILITY: INCOME INSECURITY: HOW HARD IS IT FOR YOU TO PAY FOR THE VERY BASICS LIKE FOOD, HOUSING, MEDICAL CARE, AND HEATING?: NOT HARD AT ALL

## 2023-05-11 ASSESSMENT — PATIENT HEALTH QUESTIONNAIRE - PHQ9
2. FEELING DOWN, DEPRESSED OR HOPELESS: 0
SUM OF ALL RESPONSES TO PHQ9 QUESTIONS 1 & 2: 0
SUM OF ALL RESPONSES TO PHQ QUESTIONS 1-9: 0
1. LITTLE INTEREST OR PLEASURE IN DOING THINGS: 0
DEPRESSION UNABLE TO ASSESS: PT REFUSES

## 2023-05-15 PROBLEM — I50.9 CHF (CONGESTIVE HEART FAILURE) (HCC): Status: RESOLVED | Noted: 2023-04-05 | Resolved: 2023-05-15

## 2023-05-15 PROBLEM — U07.1 COVID-19 VIRUS INFECTION: Status: RESOLVED | Noted: 2020-08-10 | Resolved: 2023-05-15

## 2023-05-15 PROBLEM — K81.1 CHRONIC CHOLECYSTITIS: Status: RESOLVED | Noted: 2020-07-22 | Resolved: 2023-05-15

## 2023-05-15 PROBLEM — I20.0 UNSTABLE ANGINA (HCC): Status: RESOLVED | Noted: 2023-01-31 | Resolved: 2023-05-15

## 2023-05-15 PROBLEM — A41.9 SEPSIS (HCC): Status: RESOLVED | Noted: 2023-02-28 | Resolved: 2023-05-15

## 2023-05-15 PROBLEM — H26.9 CATARACTA: Status: RESOLVED | Noted: 2018-02-27 | Resolved: 2023-05-15

## 2023-05-16 DIAGNOSIS — E55.9 VITAMIN D DEFICIENCY: ICD-10-CM

## 2023-05-17 RX ORDER — MELATONIN
Qty: 56 TABLET | Refills: 11 | Status: SHIPPED | OUTPATIENT
Start: 2023-05-17

## 2023-05-17 RX ORDER — ASPIRIN 81 MG
TABLET,CHEWABLE ORAL
Qty: 30 TABLET | Refills: 3 | OUTPATIENT
Start: 2023-05-17

## 2023-05-18 RX ORDER — ASPIRIN 81 MG
TABLET,CHEWABLE ORAL
Qty: 30 TABLET | Refills: 3 | OUTPATIENT
Start: 2023-05-18

## 2023-05-22 RX ORDER — ASPIRIN 81 MG
TABLET,CHEWABLE ORAL
Qty: 30 TABLET | Refills: 11 | Status: SHIPPED | OUTPATIENT
Start: 2023-05-22

## 2023-05-22 NOTE — TELEPHONE ENCOUNTER
Please Approve or Refuse. Send to Pharmacy per Pt's Request: med-x     Next Visit Date:  8/9/2023   Last Visit Date: 5/11/2023    Hemoglobin A1C (%)   Date Value   05/11/2023 4.9   01/24/2023 5.8   09/02/2022 5.9             ( goal A1C is < 7)   BP Readings from Last 3 Encounters:   05/11/23 108/64   04/24/23 110/60   02/01/23 (!) 102/59          (goal 120/80)  BUN   Date Value Ref Range Status   03/28/2023 41 (H) 8 - 23 mg/dL Final     Creatinine   Date Value Ref Range Status   03/28/2023 1.82 (H) 0.50 - 0.90 mg/dL Final     Potassium   Date Value Ref Range Status   03/28/2023 4.0 3.7 - 5.3 mmol/L Final     Potassium reflex Magnesium   Date Value Ref Range Status   10/10/2021 4.1 3.5 - 5.2 meq/L Final     Comment:     Low level specimen hemolysis is present as indicated by the interference  level index on the Roche analyzer. The reported K+ level may be falsely  increased. If clinically warranted, recollection of the specimen is  suggested.

## 2023-05-30 ENCOUNTER — TELEPHONE (OUTPATIENT)
Dept: FAMILY MEDICINE CLINIC | Age: 73
End: 2023-05-30

## 2023-05-30 NOTE — TELEPHONE ENCOUNTER
[facility]    Outreach made within 2 business days of discharge: Yes    Patient: Cha Client Patient : 1950   MRN: [id number]  Reason for Admission: Afib, weakness, fatigue  Discharge Date: 2023       Spoke with: Patient    If Virtual visit made for hospital follow up, advise patient to make sure to have family member present to help assist with visit. Please make sure mobile number is updated in patient chart. Discharge department/facility: [facility] Interactive Patient Contact:  Was patient able to fill all prescriptions: Yes  Was patient instructed to bring all medications to the follow-up visit: Yes  Is patient taking all medications as directed in the discharge summary?  Yes  Does patient understand their discharge instructions: Yes  Does patient have questions or concerns that need addressed prior to 7-14 day follow up office visit: no    Scheduled appointment with PCP within 7-14 days    Follow Up  Future Appointments   Date Time Provider Dante Amaya   2023 10:00 AM Clyde Collins MD [facility]   2023 11:00 AM [facility] ULTRASOUND RM [address] [facility] Radiolog   2023 11:45 AM Ghislaine Acosta MD [facility]   2023  8:30 AM Francis Rodrigues DO [facility]   2023  9:30 AM Clyde Collins MD [facility]   10/12/2023 10:30 AM Lyudmila Wilcox PA-C GYN Oncology [facility]   2024 11:00 AM Clyde Collins MD [facility] [address] [place] [address]

## 2023-05-31 ENCOUNTER — TELEPHONE (OUTPATIENT)
Dept: FAMILY MEDICINE CLINIC | Age: 73
End: 2023-05-31

## 2023-05-31 DIAGNOSIS — I95.9 HYPOTENSION, UNSPECIFIED HYPOTENSION TYPE: Primary | ICD-10-CM

## 2023-05-31 RX ORDER — MIDODRINE HYDROCHLORIDE 10 MG/1
10 TABLET ORAL
Qty: 90 TABLET | Refills: 5 | Status: SHIPPED | OUTPATIENT
Start: 2023-05-31

## 2023-05-31 NOTE — TELEPHONE ENCOUNTER
Please let the patient know to  prescription from the pharmacy. Orders Placed This Encounter   Medications    midodrine (PROAMATINE) 10 MG tablet     Sig: Take 1 tablet by mouth 3 times daily (with meals)     Dispense:  90 tablet     Refill:  [address] [facility] [phone] [address] [facility] [address]  Phone: [phone] Fax: [phone]  No orders of the defined types were placed in this encounter. Future Appointments   Date Time Provider Dante Amaya   6/20/2023 11:00 AM [facility] ULTRASOUND RM [address] [facility] Radiolog   6/27/2023 11:45 AM Nancy Parrish MD [facility]   7/6/2023  8:30 AM Francis Rodrigues DO [facility] C   8/9/2023  9:30 AM [facility]   10/12/2023 10:30 AM Sharon Salgado PA-C GYN Oncology [facility]   5/14/2024 11:00 AM [facility]elin [facility]       Thank you!

## 2023-05-31 NOTE — TELEPHONE ENCOUNTER
Question/Problem: PT is concerned because she is taking this medication   Midodrine that she thinks keeps her heat rate low and also metoprolol   tartrate that keeps is rising and she is concerned that this is not good   and would like someone from the office to give her a call to discuss.  PT   states that she is not having any side effects but she is concerned with   mixing both medications and wants to discuss this, please reach out to the   PT

## 2023-05-31 NOTE — TELEPHONE ENCOUNTER
----- Message from Sai Gurrola sent at 5/31/2023  9:21 AM EDT -----  Subject: Hospital Follow Up    QUESTIONS  What hospital was the Patient Discharged from? Discharged from [facility] -   Afib  Date of Discharge? 2023-05-26  Discharge Location? Home  Reason for hospitalization as patient stated? AFIB   What question does the patient have, if applicable? PT originally had a   follow scheduled for 5.31.2023 but she needed to cancel and reschedule. Please reach out to the PT to schedule.   ---------------------------------------------------------------------------  --------------  CALL BACK INFO  What is the best way for the office to contact you? OK to leave message on   voicemail  Preferred Call Back Phone Number? [phone]  ---------------------------------------------------------------------------  --------------  SCRIPT ANSWERS  Relationship to Patient?  Self

## 2023-05-31 NOTE — TELEPHONE ENCOUNTER
Patient needs a nurse visit for blood pressure and pulse check    Please let the patient know  Midodrine is for low blood pressure and is given to her by her cardiologist  Metoprolol is given to her for atrial fibrillation that is why her heart rate is slower  When having atrial fibrillation episodes, the monitor would not show the real heart rate      Should also consider to make an earlier appointment with cardiologist at least to his nurse practitioner at [facility] cardiology to review the medications and if they have any changes to make regarding midodrine and beta-blocker    BP Readings from Last 3 Encounters:   05/11/23 108/64   04/24/23 110/60   02/01/23 (!) 102/59     Pulse Readings from Last 3 Encounters:   05/11/23 54   04/24/23 59   02/01/23 95         Future Appointments   Date Time Provider Dante Amaya   6/20/2023 11:00 AM ST ULTRASOUND RM [address] [facility] Radiolog   6/27/2023 11:45 AM Tommy Florentino MD [facility]   7/6/2023  8:30 AM Francis Rodrigues DO [facility] T[facility] LE[facility] C   8/9/2023  9:30 AM [facility]   10/12/2023 10:30 AM Zac Colvin PA-C [facility]   5/14/2024 11:00 AM MD [facility]

## 2023-05-31 NOTE — TELEPHONE ENCOUNTER
Denise BECKWITH [facility] Clinical Support Pool  Subject: Medication Problem     Medication: midodrine (PROAMATINE) 10 MG tablet   Dosage: one tablet a day   Ordering Provider: brianne     Question/Problem: PT is concerned because she is taking this medication   Midodrine that she thinks keeps her heat rate low and also metoprolol   tartrate that keeps is rising and she is concerned that this is not good   and would like someone from the office to give her a call to discuss.  PT   states that she is not having any side effects but she is concerned with   mixing both medications and wants to discuss this, please reach out to the   PT       [address] [phone]     ---------------------------------------------------------------------------   --------------   [address]   [phone]; OK to leave message on voicemail   ---------------------------------------------------------------------------   --------------     SCRIPT ANSWERS   Relationship to Patient: Self

## 2023-06-09 ENCOUNTER — NURSE ONLY (OUTPATIENT)
Dept: FAMILY MEDICINE CLINIC | Age: 73
End: 2023-06-09
Payer: MEDICARE

## 2023-06-09 VITALS — DIASTOLIC BLOOD PRESSURE: 70 MMHG | OXYGEN SATURATION: 94 % | HEART RATE: 74 BPM | SYSTOLIC BLOOD PRESSURE: 102 MMHG

## 2023-06-09 DIAGNOSIS — I12.9 BENIGN HYPERTENSION WITH CKD (CHRONIC KIDNEY DISEASE) STAGE III (HCC): Primary | ICD-10-CM

## 2023-06-09 DIAGNOSIS — N18.30 BENIGN HYPERTENSION WITH CKD (CHRONIC KIDNEY DISEASE) STAGE III (HCC): Primary | ICD-10-CM

## 2023-06-09 PROCEDURE — 99211 OFF/OP EST MAY X REQ PHY/QHP: CPT | Performed by: FAMILY MEDICINE

## 2023-06-09 NOTE — PROGRESS NOTES
Chief Complaint   Patient presents with    Blood Pressure Check        Patient is here for blood pressure recheck. 1. Benign hypertension with CKD (chronic kidney disease) stage III (HCC)        Well controlled BP   Continue current treatment. BP Readings from Last 3 Encounters:   06/09/23 102/70   05/11/23 108/64   04/24/23 110/60       Pulse Readings from Last 3 Encounters:   06/09/23 74   05/11/23 54   04/24/23 59     Perfect served after hours  Spoke with daughter Liseth Ro at 4:25 PM, 6/9/2023 , she called after hours concerned regarding if she should continue to take midodrine and lisinopril simultaneously. Discussed with the daughter that patient takes medications for congestive heart failure as well. The daughter says that she has been taking midodrine 10 mg 3 times a day and lisinopril 5 Mg daily. He did take them today as well, advised to continue the same  Advised to monitor the blood pressure at home. If low blood pressures, mostly can cut down the lisinopril from 5 mg to 2.5 mg  Her daughter understands.   She will make her mom take her blood pressure and write down    Future Appointments   Date Time Provider Dante Amaya   6/20/2023 11:00 AM [facility] ULTRASOUND RM [address] [facility] Radiolog   6/27/2023 11:45 AM Gerhardt Settles, MD [facility]   7/6/2023  8:30 AM DO RJ He TCC OREG [facility] LEYVA C   8/9/2023  9:30 AM Gutierrez Burgos MD fp [facility]   10/12/2023 10:30 AM Arpita Johnson PA-C GYN Oncology [facility]   5/14/2024 11:00 AM Gutierrez Burgos MD [facility]

## 2023-06-09 NOTE — PROGRESS NOTES
Fariba Mckinney is being seen today for a Blood Pressure Recheck.      Fariba Mckinney was seen 06/09/23  and her Blood Pressure was:   BP Readings from Last 1 Encounters:   06/09/23 102/70           Amarjit Lewis MA

## 2023-06-20 ENCOUNTER — HOSPITAL ENCOUNTER (OUTPATIENT)
Dept: ULTRASOUND IMAGING | Age: 73
Discharge: HOME | End: 2023-06-22
Payer: MEDICARE

## 2023-06-20 ENCOUNTER — HOSPITAL ENCOUNTER (OUTPATIENT)
Age: 73
Discharge: HOME | End: 2023-06-20
Payer: MEDICARE

## 2023-06-20 DIAGNOSIS — I12.9 BENIGN HYPERTENSION WITH CKD (CHRONIC KIDNEY DISEASE) STAGE IV (HCC): ICD-10-CM

## 2023-06-20 DIAGNOSIS — I50.32 CHRONIC DIASTOLIC CONGESTIVE HEART FAILURE (HCC): ICD-10-CM

## 2023-06-20 DIAGNOSIS — N18.4 CKD (CHRONIC KIDNEY DISEASE) STAGE 4, GFR 15-29 ML/MIN (HCC): ICD-10-CM

## 2023-06-20 DIAGNOSIS — E13.22 SECONDARY DIABETES MELLITUS WITH STAGE 4 CHRONIC KIDNEY DISEASE (HCC): ICD-10-CM

## 2023-06-20 DIAGNOSIS — N18.4 BENIGN HYPERTENSION WITH CKD (CHRONIC KIDNEY DISEASE) STAGE IV (HCC): ICD-10-CM

## 2023-06-20 DIAGNOSIS — N18.4 SECONDARY DIABETES MELLITUS WITH STAGE 4 CHRONIC KIDNEY DISEASE (HCC): ICD-10-CM

## 2023-06-20 DIAGNOSIS — E03.9 ACQUIRED HYPOTHYROIDISM: ICD-10-CM

## 2023-06-20 DIAGNOSIS — E11.65 TYPE 2 DIABETES MELLITUS WITH HYPERGLYCEMIA, WITHOUT LONG-TERM CURRENT USE OF INSULIN (HCC): ICD-10-CM

## 2023-06-20 DIAGNOSIS — E83.39 HYPERPHOSPHATEMIA: ICD-10-CM

## 2023-06-20 LAB
25(OH)D3 SERPL-MCNC: 38 NG/ML
ANION GAP SERPL CALCULATED.3IONS-SCNC: 13 MMOL/L (ref 9–17)
ANION GAP SERPL CALCULATED.3IONS-SCNC: 13 MMOL/L (ref 9–17)
BNP SERPL-MCNC: 1102 PG/ML
BUN SERPL-MCNC: 25 MG/DL (ref 8–23)
BUN SERPL-MCNC: 25 MG/DL (ref 8–23)
C3 SERPL-MCNC: 169 MG/DL (ref 90–180)
C4 SERPL-MCNC: 30 MG/DL (ref 10–40)
CALCIUM SERPL-MCNC: 9.8 MG/DL (ref 8.6–10.4)
CALCIUM SERPL-MCNC: 9.9 MG/DL (ref 8.6–10.4)
CHLORIDE SERPL-SCNC: 102 MMOL/L (ref 98–107)
CHLORIDE SERPL-SCNC: 102 MMOL/L (ref 98–107)
CO2 SERPL-SCNC: 23 MMOL/L (ref 20–31)
CO2 SERPL-SCNC: 23 MMOL/L (ref 20–31)
CREAT SERPL-MCNC: 0.94 MG/DL (ref 0.5–0.9)
CREAT SERPL-MCNC: 0.96 MG/DL (ref 0.5–0.9)
FREE KAPPA/LAMBDA RATIO: 1.36 (ref 0.26–1.65)
GFR SERPL CREATININE-BSD FRML MDRD: >60 ML/MIN/1.73M2
GFR SERPL CREATININE-BSD FRML MDRD: >60 ML/MIN/1.73M2
GLUCOSE SERPL-MCNC: 152 MG/DL (ref 70–99)
HCT VFR BLD AUTO: 35.9 % (ref 36–46)
HGB BLD-MCNC: 11.5 G/DL (ref 12–16)
KAPPA LC FREE SER-MCNC: 4.09 MG/DL (ref 0.37–1.94)
LAMBDA LC FREE SERPL-MCNC: 3.01 MG/DL (ref 0.57–2.63)
MAGNESIUM SERPL-MCNC: 1.7 MG/DL (ref 1.6–2.6)
PHOSPHATE SERPL-MCNC: 3 MG/DL (ref 2.6–4.5)
POTASSIUM SERPL-SCNC: 4.4 MMOL/L (ref 3.7–5.3)
POTASSIUM SERPL-SCNC: 4.4 MMOL/L (ref 3.7–5.3)
SODIUM SERPL-SCNC: 138 MMOL/L (ref 135–144)
SODIUM SERPL-SCNC: 138 MMOL/L (ref 135–144)

## 2023-06-20 PROCEDURE — 84100 ASSAY OF PHOSPHORUS: CPT

## 2023-06-20 PROCEDURE — 85018 HEMOGLOBIN: CPT

## 2023-06-20 PROCEDURE — 86225 DNA ANTIBODY NATIVE: CPT

## 2023-06-20 PROCEDURE — 80048 BASIC METABOLIC PNL TOTAL CA: CPT

## 2023-06-20 PROCEDURE — 84520 ASSAY OF UREA NITROGEN: CPT

## 2023-06-20 PROCEDURE — 83880 ASSAY OF NATRIURETIC PEPTIDE: CPT

## 2023-06-20 PROCEDURE — 82565 ASSAY OF CREATININE: CPT

## 2023-06-20 PROCEDURE — 82306 VITAMIN D 25 HYDROXY: CPT

## 2023-06-20 PROCEDURE — 86038 ANTINUCLEAR ANTIBODIES: CPT

## 2023-06-20 PROCEDURE — 36415 COLL VENOUS BLD VENIPUNCTURE: CPT

## 2023-06-20 PROCEDURE — 76770 US EXAM ABDO BACK WALL COMP: CPT

## 2023-06-20 PROCEDURE — 86160 COMPLEMENT ANTIGEN: CPT

## 2023-06-20 PROCEDURE — 83735 ASSAY OF MAGNESIUM: CPT

## 2023-06-20 PROCEDURE — 86162 COMPLEMENT TOTAL (CH50): CPT

## 2023-06-20 PROCEDURE — 85014 HEMATOCRIT: CPT

## 2023-06-20 PROCEDURE — 80051 ELECTROLYTE PANEL: CPT

## 2023-06-20 PROCEDURE — 83521 IG LIGHT CHAINS FREE EACH: CPT

## 2023-06-20 PROCEDURE — 82310 ASSAY OF CALCIUM: CPT

## 2023-06-20 RX ORDER — LEVOTHYROXINE SODIUM 0.07 MG/1
TABLET ORAL
Qty: 90 TABLET | Refills: 3 | Status: SHIPPED | OUTPATIENT
Start: 2023-06-20

## 2023-06-20 NOTE — RESULT ENCOUNTER NOTE
Please notify patient: Blood glucose 152 mildly high  Chronic kidney disease stage III greatly improved from 2 months ago  Congestive heart failure is slightly worsening  She is currently taking furosemide 20 Mg daily, she needs to take 40 Mg daily for 2 days then resume 20 Mg daily, and to eat 1 or 2 bananas every day when taking increased dosage of furosemide  She will have blood work for nephrologist next week    Lab Results       Component                Value               Date/Time                  NA                       138                 06/20/2023 10:59 AM        K                        4.4                 06/20/2023 10:59 AM        K                        4.1                 10/10/2021 06:00 PM        CL                       102                 06/20/2023 10:59 AM        CO2                      23                  06/20/2023 10:59 AM        BUN                      25                  06/20/2023 10:59 AM        CREATININE               0.96                06/20/2023 10:59 AM        GLUCOSE                  152                 06/20/2023 10:58 AM        GLUCOSE                  114                 03/20/2012 10:40 AM        CALCIUM                  9.8                 06/20/2023 10:59 AM        LABGLOM                  >60                 06/20/2023 10:59 AM      Future Appointments  6/27/2023  11:45 AM   Socorro Carney MD     [facility]al[facility]rv        [facility]  7/6/2023   8:30 AM    Francis Rodrigues DO              [facility] TCC OREG        [facility]FL LEYVA C  8/9/2023   9:30 AM    Justino Bautista MD     [facility]  10/12/2023 10:30 AM   Deja Novak PA-C        [facility]  5/14/2024  11:00 AM   Justino Bautista MD     [facility]

## 2023-06-20 NOTE — TELEPHONE ENCOUNTER
Please Approve or Refuse. Send to Pharmacy per Pt's Request: med-x     Next Visit Date:  8/9/2023   Last Visit Date: 5/11/2023    Hemoglobin A1C (%)   Date Value   05/11/2023 4.9   01/24/2023 5.8   09/02/2022 5.9             ( goal A1C is < 7)   BP Readings from Last 3 Encounters:   06/09/23 102/70   05/11/23 108/64   04/24/23 110/60          (goal 120/80)  BUN   Date Value Ref Range Status   03/28/2023 41 (H) 8 - 23 mg/dL Final     Creatinine   Date Value Ref Range Status   03/28/2023 1.82 (H) 0.50 - 0.90 mg/dL Final     Potassium   Date Value Ref Range Status   03/28/2023 4.0 3.7 - 5.3 mmol/L Final     Potassium reflex Magnesium   Date Value Ref Range Status   10/10/2021 4.1 3.5 - 5.2 meq/L Final     Comment:     Low level specimen hemolysis is present as indicated by the interference  level index on the Roche analyzer. The reported K+ level may be falsely  increased. If clinically warranted, recollection of the specimen is  suggested.

## 2023-06-22 ENCOUNTER — TELEPHONE (OUTPATIENT)
Dept: FAMILY MEDICINE CLINIC | Age: 73
End: 2023-06-22

## 2023-06-22 NOTE — TELEPHONE ENCOUNTER
Care Transitions Initial Follow Up Call    Outreach made within 2 business days of discharge: Yes    Patient: Franck Valero Patient : 1950   MRN: [id number]  Reason for Admission: There are no discharge diagnoses documented for the most recent discharge. Discharge Date: 23       Spoke with: Jeffrey Torres    Discharge department/facility: [facility] Interactive Patient Contact:  Was patient able to fill all prescriptions: Yes  Was patient instructed to bring all medications to the follow-up visit: Yes  Is patient taking all medications as directed in the discharge summary?  Yes  Does patient understand their discharge instructions: Yes  Does patient have questions or concerns that need addressed prior to 7-14 day follow up office visit: no    Scheduled appointment with PCP within 7-14 days    Follow Up  Future Appointments   Date Time Provider Dante Amaya   2023 11:45 AM Lane Ramos MD [facility]   2023 11:15 AM [facility]   2023  8:30 AM Francis Rodrigues DO [facility] T[facility] O[facility] LE[facility]VA C   2023  9:30 AM [facility]ke [facility]   10/12/2023 10:30 AM Tess Harvey PA-C GYN Oncology [facility]   2024 11:00 AM Myke Justice MD [facility] [address] [place]

## 2023-06-23 LAB
ANA SER QL IA: NEGATIVE
COMPLEMENT TOTAL (CH50): 85.3 U/ML (ref 38.7–89.9)
DSDNA IGG SER QL IA: <0.5 IU/ML
NUCLEAR IGG SER IA-RTO: 0.1 U/ML

## 2023-06-24 DIAGNOSIS — M25.561 CHRONIC PAIN OF BOTH KNEES: ICD-10-CM

## 2023-06-24 DIAGNOSIS — G89.29 CHRONIC BILATERAL LOW BACK PAIN WITHOUT SCIATICA: ICD-10-CM

## 2023-06-24 DIAGNOSIS — G89.29 CHRONIC PAIN OF BOTH KNEES: ICD-10-CM

## 2023-06-24 DIAGNOSIS — M25.562 CHRONIC PAIN OF BOTH KNEES: ICD-10-CM

## 2023-06-24 DIAGNOSIS — M54.50 CHRONIC BILATERAL LOW BACK PAIN WITHOUT SCIATICA: ICD-10-CM

## 2023-06-24 DIAGNOSIS — M17.0 PRIMARY OSTEOARTHRITIS OF BOTH KNEES: ICD-10-CM

## 2023-06-26 RX ORDER — PSEUDOEPHED/ACETAMINOPH/DIPHEN 30MG-500MG
TABLET ORAL
Qty: 120 TABLET | Refills: 4 | Status: SHIPPED | OUTPATIENT
Start: 2023-06-26

## 2023-06-26 RX ORDER — LORATADINE 10 MG/1
TABLET ORAL
Qty: 28 TABLET | Refills: 9 | Status: SHIPPED | OUTPATIENT
Start: 2023-06-26

## 2023-06-26 RX ORDER — DILTIAZEM HYDROCHLORIDE 180 MG/1
CAPSULE, COATED, EXTENDED RELEASE ORAL
Qty: 30 CAPSULE | Refills: 2 | Status: SHIPPED | OUTPATIENT
Start: 2023-06-26

## 2023-07-12 DIAGNOSIS — M85.80 OSTEOPENIA DETERMINED BY X-RAY: ICD-10-CM

## 2023-07-12 RX ORDER — ALENDRONATE SODIUM 35 MG/1
TABLET ORAL
Qty: 12 TABLET | Refills: 3 | Status: SHIPPED | OUTPATIENT
Start: 2023-07-12

## 2023-07-16 NOTE — TELEPHONE ENCOUNTER
I cant find which Ophthalmology office she sees in her chart, YECENIA left for patient to call the office. 18

## 2023-07-17 PROBLEM — I48.91 ATRIAL FIBRILLATION WITH RVR (HCC): Status: ACTIVE | Noted: 2023-07-17

## 2023-07-20 ENCOUNTER — HOSPITAL ENCOUNTER (INPATIENT)
Age: 73
LOS: 6 days | Discharge: HOME | DRG: 309 | End: 2023-07-26
Attending: INTERNAL MEDICINE | Admitting: STUDENT IN AN ORGANIZED HEALTH CARE EDUCATION/TRAINING PROGRAM
Payer: MEDICARE

## 2023-07-20 DIAGNOSIS — I48.91 ATRIAL FIBRILLATION WITH RAPID VENTRICULAR RESPONSE (HCC): Primary | ICD-10-CM

## 2023-07-20 DIAGNOSIS — K92.2 UPPER GI BLEED: ICD-10-CM

## 2023-07-20 DIAGNOSIS — I48.91 ATRIAL FIBRILLATION, UNSPECIFIED TYPE (HCC): ICD-10-CM

## 2023-07-20 PROBLEM — I45.5 SINUS PAUSE: Status: ACTIVE | Noted: 2023-07-20

## 2023-07-20 LAB
ALBUMIN SERPL-MCNC: 3.3 G/DL (ref 3.5–5.2)
ALBUMIN/GLOB SERPL: 1.1 {RATIO} (ref 1–2.5)
ALP SERPL-CCNC: 79 U/L (ref 35–104)
ALT SERPL-CCNC: 10 U/L (ref 5–33)
ANION GAP SERPL CALCULATED.3IONS-SCNC: 16 MMOL/L (ref 9–17)
AST SERPL-CCNC: 23 U/L
BASOPHILS # BLD: 0.07 K/UL (ref 0–0.2)
BASOPHILS NFR BLD: 1 % (ref 0–2)
BILIRUB SERPL-MCNC: 0.3 MG/DL (ref 0.3–1.2)
BUN SERPL-MCNC: 17 MG/DL (ref 8–23)
CALCIUM SERPL-MCNC: 9.3 MG/DL (ref 8.6–10.4)
CHLORIDE SERPL-SCNC: 101 MMOL/L (ref 98–107)
CO2 SERPL-SCNC: 23 MMOL/L (ref 20–31)
CREAT SERPL-MCNC: 0.8 MG/DL (ref 0.5–0.9)
EOSINOPHIL # BLD: 0.24 K/UL (ref 0–0.44)
EOSINOPHILS RELATIVE PERCENT: 3 % (ref 1–4)
ERYTHROCYTE [DISTWIDTH] IN BLOOD BY AUTOMATED COUNT: 14.6 % (ref 11.8–14.4)
GFR SERPL CREATININE-BSD FRML MDRD: >60 ML/MIN/1.73M2
GLUCOSE SERPL-MCNC: 115 MG/DL (ref 70–99)
HCT VFR BLD AUTO: 30.6 % (ref 36.3–47.1)
HGB BLD-MCNC: 9.5 G/DL (ref 11.9–15.1)
IMM GRANULOCYTES # BLD AUTO: 0.04 K/UL (ref 0–0.3)
IMM GRANULOCYTES NFR BLD: 1 %
LYMPHOCYTES NFR BLD: 1.73 K/UL (ref 1.1–3.7)
LYMPHOCYTES RELATIVE PERCENT: 20 % (ref 24–43)
MCH RBC QN AUTO: 26.2 PG (ref 25.2–33.5)
MCHC RBC AUTO-ENTMCNC: 31 G/DL (ref 28.4–34.8)
MCV RBC AUTO: 84.3 FL (ref 82.6–102.9)
MONOCYTES NFR BLD: 0.55 K/UL (ref 0.1–1.2)
MONOCYTES NFR BLD: 6 % (ref 3–12)
NEUTROPHILS NFR BLD: 69 % (ref 36–65)
NEUTS SEG NFR BLD: 6.23 K/UL (ref 1.5–8.1)
NRBC BLD-RTO: 0.2 PER 100 WBC
PLATELET # BLD AUTO: 201 K/UL (ref 138–453)
PMV BLD AUTO: 10.9 FL (ref 8.1–13.5)
POTASSIUM SERPL-SCNC: 4.8 MMOL/L (ref 3.7–5.3)
PROT SERPL-MCNC: 6.3 G/DL (ref 6.4–8.3)
RBC # BLD AUTO: 3.63 M/UL (ref 3.95–5.11)
RBC # BLD: ABNORMAL 10*6/UL
SODIUM SERPL-SCNC: 140 MMOL/L (ref 135–144)
TROPONIN I SERPL HS-MCNC: 11 NG/L (ref 0–14)
TSH SERPL DL<=0.05 MIU/L-ACNC: 1.69 UIU/ML (ref 0.3–5)
WBC OTHER # BLD: 8.9 K/UL (ref 3.5–11.3)

## 2023-07-20 PROCEDURE — 2500000003 HC RX 250 WO HCPCS: Performed by: STUDENT IN AN ORGANIZED HEALTH CARE EDUCATION/TRAINING PROGRAM

## 2023-07-20 PROCEDURE — 6360000002 HC RX W HCPCS: Performed by: STUDENT IN AN ORGANIZED HEALTH CARE EDUCATION/TRAINING PROGRAM

## 2023-07-20 PROCEDURE — 85027 COMPLETE CBC AUTOMATED: CPT

## 2023-07-20 PROCEDURE — 2580000003 HC RX 258: Performed by: STUDENT IN AN ORGANIZED HEALTH CARE EDUCATION/TRAINING PROGRAM

## 2023-07-20 PROCEDURE — 80053 COMPREHEN METABOLIC PANEL: CPT

## 2023-07-20 PROCEDURE — 93005 ELECTROCARDIOGRAM TRACING: CPT | Performed by: INTERNAL MEDICINE

## 2023-07-20 PROCEDURE — 93005 ELECTROCARDIOGRAM TRACING: CPT | Performed by: STUDENT IN AN ORGANIZED HEALTH CARE EDUCATION/TRAINING PROGRAM

## 2023-07-20 PROCEDURE — 2060000000 HC ICU INTERMEDIATE R&B

## 2023-07-20 PROCEDURE — 99223 1ST HOSP IP/OBS HIGH 75: CPT | Performed by: INTERNAL MEDICINE

## 2023-07-20 PROCEDURE — 6370000000 HC RX 637 (ALT 250 FOR IP): Performed by: STUDENT IN AN ORGANIZED HEALTH CARE EDUCATION/TRAINING PROGRAM

## 2023-07-20 PROCEDURE — 84443 ASSAY THYROID STIM HORMONE: CPT

## 2023-07-20 PROCEDURE — 36415 COLL VENOUS BLD VENIPUNCTURE: CPT

## 2023-07-20 PROCEDURE — 84484 ASSAY OF TROPONIN QUANT: CPT

## 2023-07-20 PROCEDURE — 99223 1ST HOSP IP/OBS HIGH 75: CPT | Performed by: STUDENT IN AN ORGANIZED HEALTH CARE EDUCATION/TRAINING PROGRAM

## 2023-07-20 RX ORDER — SODIUM CHLORIDE 0.9 % (FLUSH) 0.9 %
10 SYRINGE (ML) INJECTION PRN
Status: DISCONTINUED | OUTPATIENT
Start: 2023-07-20 | End: 2023-07-26 | Stop reason: HOSPADM

## 2023-07-20 RX ORDER — ACETAMINOPHEN 325 MG/1
650 TABLET ORAL EVERY 6 HOURS PRN
Status: DISCONTINUED | OUTPATIENT
Start: 2023-07-20 | End: 2023-07-26 | Stop reason: HOSPADM

## 2023-07-20 RX ORDER — PANTOPRAZOLE SODIUM 40 MG/1
40 TABLET, DELAYED RELEASE ORAL DAILY
Status: DISCONTINUED | OUTPATIENT
Start: 2023-07-20 | End: 2023-07-21

## 2023-07-20 RX ORDER — MIDODRINE HYDROCHLORIDE 5 MG/1
10 TABLET ORAL
Status: DISCONTINUED | OUTPATIENT
Start: 2023-07-20 | End: 2023-07-26 | Stop reason: HOSPADM

## 2023-07-20 RX ORDER — SODIUM CHLORIDE 9 MG/ML
INJECTION, SOLUTION INTRAVENOUS PRN
Status: DISCONTINUED | OUTPATIENT
Start: 2023-07-20 | End: 2023-07-26 | Stop reason: HOSPADM

## 2023-07-20 RX ORDER — SODIUM CHLORIDE 9 MG/ML
INJECTION, SOLUTION INTRAVENOUS CONTINUOUS
Status: ACTIVE | OUTPATIENT
Start: 2023-07-20 | End: 2023-07-20

## 2023-07-20 RX ORDER — ASPIRIN 81 MG/1
81 TABLET, CHEWABLE ORAL DAILY
Status: DISCONTINUED | OUTPATIENT
Start: 2023-07-20 | End: 2023-07-26 | Stop reason: HOSPADM

## 2023-07-20 RX ORDER — LEVOTHYROXINE SODIUM 0.07 MG/1
75 TABLET ORAL DAILY
Status: DISCONTINUED | OUTPATIENT
Start: 2023-07-20 | End: 2023-07-26 | Stop reason: HOSPADM

## 2023-07-20 RX ORDER — ALLOPURINOL 100 MG/1
100 TABLET ORAL DAILY
Status: DISCONTINUED | OUTPATIENT
Start: 2023-07-20 | End: 2023-07-26 | Stop reason: HOSPADM

## 2023-07-20 RX ORDER — POLYETHYLENE GLYCOL 3350 17 G/17G
17 POWDER, FOR SOLUTION ORAL DAILY PRN
Status: DISCONTINUED | OUTPATIENT
Start: 2023-07-20 | End: 2023-07-21 | Stop reason: SDUPTHER

## 2023-07-20 RX ORDER — DILTIAZEM HYDROCHLORIDE 5 MG/ML
10 INJECTION INTRAVENOUS ONCE
Status: COMPLETED | OUTPATIENT
Start: 2023-07-20 | End: 2023-07-20

## 2023-07-20 RX ORDER — ENOXAPARIN SODIUM 150 MG/ML
1 INJECTION SUBCUTANEOUS 2 TIMES DAILY
Status: DISCONTINUED | OUTPATIENT
Start: 2023-07-20 | End: 2023-07-25

## 2023-07-20 RX ORDER — SODIUM CHLORIDE 0.9 % (FLUSH) 0.9 %
10 SYRINGE (ML) INJECTION EVERY 12 HOURS SCHEDULED
Status: DISCONTINUED | OUTPATIENT
Start: 2023-07-20 | End: 2023-07-26 | Stop reason: HOSPADM

## 2023-07-20 RX ORDER — ACETAMINOPHEN 650 MG/1
650 SUPPOSITORY RECTAL EVERY 6 HOURS PRN
Status: DISCONTINUED | OUTPATIENT
Start: 2023-07-20 | End: 2023-07-26 | Stop reason: HOSPADM

## 2023-07-20 RX ORDER — ONDANSETRON 2 MG/ML
4 INJECTION INTRAMUSCULAR; INTRAVENOUS EVERY 6 HOURS PRN
Status: DISCONTINUED | OUTPATIENT
Start: 2023-07-20 | End: 2023-07-21 | Stop reason: SDUPTHER

## 2023-07-20 RX ORDER — DILTIAZEM HYDROCHLORIDE 180 MG/1
180 CAPSULE, COATED, EXTENDED RELEASE ORAL DAILY
Status: DISCONTINUED | OUTPATIENT
Start: 2023-07-20 | End: 2023-07-26 | Stop reason: HOSPADM

## 2023-07-20 RX ORDER — ONDANSETRON 4 MG/1
4 TABLET, ORALLY DISINTEGRATING ORAL EVERY 8 HOURS PRN
Status: DISCONTINUED | OUTPATIENT
Start: 2023-07-20 | End: 2023-07-21 | Stop reason: SDUPTHER

## 2023-07-20 RX ORDER — ATORVASTATIN CALCIUM 40 MG/1
40 TABLET, FILM COATED ORAL DAILY
Status: DISCONTINUED | OUTPATIENT
Start: 2023-07-20 | End: 2023-07-26 | Stop reason: HOSPADM

## 2023-07-20 RX ADMIN — MIDODRINE HYDROCHLORIDE 10 MG: 5 TABLET ORAL at 16:23

## 2023-07-20 RX ADMIN — DILTIAZEM HYDROCHLORIDE 10 MG: 5 INJECTION, SOLUTION INTRAVENOUS at 11:05

## 2023-07-20 RX ADMIN — LEVOTHYROXINE SODIUM 75 MCG: 75 TABLET ORAL at 12:27

## 2023-07-20 RX ADMIN — DILTIAZEM HYDROCHLORIDE 180 MG: 180 CAPSULE, COATED, EXTENDED RELEASE ORAL at 11:08

## 2023-07-20 RX ADMIN — ATORVASTATIN CALCIUM 40 MG: 40 TABLET, FILM COATED ORAL at 11:09

## 2023-07-20 RX ADMIN — ENOXAPARIN SODIUM 105 MG: 150 INJECTION SUBCUTANEOUS at 11:16

## 2023-07-20 RX ADMIN — SODIUM CHLORIDE, PRESERVATIVE FREE 10 ML: 5 INJECTION INTRAVENOUS at 11:05

## 2023-07-20 RX ADMIN — SODIUM CHLORIDE, PRESERVATIVE FREE 10 ML: 5 INJECTION INTRAVENOUS at 20:27

## 2023-07-20 RX ADMIN — ALLOPURINOL 100 MG: 100 TABLET ORAL at 11:08

## 2023-07-20 RX ADMIN — ASPIRIN 81 MG 81 MG: 81 TABLET ORAL at 11:08

## 2023-07-20 RX ADMIN — PANTOPRAZOLE SODIUM 40 MG: 40 TABLET, DELAYED RELEASE ORAL at 11:08

## 2023-07-20 RX ADMIN — SODIUM CHLORIDE: 9 INJECTION, SOLUTION INTRAVENOUS at 11:20

## 2023-07-20 RX ADMIN — MIDODRINE HYDROCHLORIDE 10 MG: 5 TABLET ORAL at 11:09

## 2023-07-20 RX ADMIN — ENOXAPARIN SODIUM 105 MG: 150 INJECTION SUBCUTANEOUS at 20:24

## 2023-07-20 NOTE — CARE COORDINATION
07/20/23 1259   Readmission Assessment   Number of Days since last admission? 8-30 days   Previous Disposition Home Alone   Who is being Interviewed Patient   What was the patient's/caregiver's perception as to why they think they needed to return back to the hospital? Other (Comment)  (Short of breath/afib)   Did you visit your Primary Care Physician after you left the hospital, before you returned this time? No   Why weren't you able to visit your PCP? Did not have an appointment   Did you see a specialist, such as Cardiac, Pulmonary, Orthopedic Physician, etc. after you left the hospital? Yes   Who advised the patient to return to the hospital? Self-referral   Does the patient report anything that got in the way of taking their medications? No   In our efforts to provide the best possible care to you and others like you, can you think of anything that we could have done to help you after you left the hospital the first time, so that you might not have needed to return so soon?  Other (Comment)  (nothing)

## 2023-07-20 NOTE — CARE COORDINATION
Case Management Assessment  Initial Evaluation    Date/Time of Evaluation: 7/20/2023 12:49 PM  Assessment Completed by: Saida Webber    If patient is discharged prior to next notation, then this note serves as note for discharge by case management. Patient Name: Chana Dancer                   YOB: 1950  Diagnosis: Atrial fibrillation with rapid ventricular response ([address]) [I48.91]                   Date / Time: 7/20/2023  8:43 AM    Patient Admission Status: Inpatient   Readmission Risk (Low < 19, Mod (19-27), High > 27): Readmission Risk Score: 14.3    Current PCP: Marline Pedroza MD  PCP verified by CM? Yes    History Provided by: Patient  Patient Orientation: Alert and Oriented    Patient Cognition: Alert    Hospitalization in the last 30 days (Readmission):  No    If yes, Readmission Assessment in CM Navigator will be completed. Advance Directives:      Code Status: Full Code   Patient's Primary Decision Maker is: Named in Scanned ACP Document    Primary Decision Maker: [address] [place] [address] Child - [phone]    Secondary Decision Maker: Lou Margie - Brother/Sister - [phone]    Discharge Planning:    Patient lives with: Alone Type of Home: Apartment  Primary Care Giver: Self  Patient Support Systems include: Family Members, Children   Current Financial resources: Medicare, Medicaid  Current community resources:    Current services prior to admission: C-pap, Durable Medical Equipment            Current DME: Walker            Type of Home Care services:       ADLS  Prior functional level: Shopping (Sister, daughter, or SR ctr transport.)  Current functional level: Shopping    PT AM-PAC:   /24  OT AM-PAC:   /24    Family can provide assistance at DC: Yes  Would you like Case Management to discuss the discharge plan with any other family members/significant others, and if so, who?  No  Plans to Return to Present Housing: Unknown at present  Other Identified Issues/Barriers to RETURNING

## 2023-07-20 NOTE — H&P
MORNING (BEFORE BREAKFAST) 6/20/23   Jamie Zhao MD   allopurinol (ZYLOPRIM) 100 MG tablet TAKE ONE (1) TABLET BY MOUTH DAILY FOR HIGH URIC ACID, TO PREVENT GOUT 6/13/23   Jamie Zhao MD   midodrine (PROAMATINE) 10 MG tablet Take 1 tablet by mouth 3 times daily (with meals) 5/31/23   Radha Vallecillo MD   ASPIRIN LOW DOSE 81 MG chewable tablet CHEW ONE (1) TABLET BY MOUTH DAILY 5/22/23   Jamie Zhao MD   vitamin D3 (CHOLECALCIFEROL) 25 MCG (1000 UT) TABS tablet TAKE TWO (2) TABLETS BY MOUTH ONCE DAILY 5/17/23   Jamie Zhao MD   furosemide (LASIX) 20 MG tablet Take 1 tablet by mouth daily 3/9/23   Jamie Zaho MD   vitamin B-12 (CYANOCOBALAMIN) 1000 MCG tablet TAKE ONE (1) TABLET BY MOUTH DAILY 3/3/23   Jamie Zhao MD   pantoprazole (PROTONIX) 40 MG tablet TAKE ONE (1) TABLET BY MOUTH ONCE DAILY 2/21/23   Jamie Zhao MD   Probiotic Product (LISSY-BID PROBIOTIC) TABS TAKE ONE (1) TABLET BY MOUTH IN THE MORNING 2/21/23   Jamie Zhao MD   [facility] Devices (CLEVER CHOICE BMI SCALE) MISC Needs to check the weight daily due to congestive heart failure 2/7/23   Jamie Zhao MD   nitroGLYCERIN (NITROSTAT) 0.4 MG SL tablet Place 1 tablet under the tongue every 5 minutes as needed for Chest pain up to max of 3 total doses.  If no relief after 1 dose, call 911. 2/1/23   Norma Salinas MD   atorvastatin (LIPITOR) 40 MG tablet TAKE ONE (1) TABLET BY MOUTH DAILY STOP SIMVASTATIN 1/25/23   Radha Vallecillo MD   ondansetron (ZOFRAN) 4 MG tablet TAKE ONE (1) TABLET BY MOUTH EVERY SIX (6) HOURS AS NEEDED FOR NAUSEA OR VOMITING 1/11/23   Jamie Zhao MD   XARELTO 20 MG TABS tablet TAKE ONE (1) TABLET BY MOUTH DAILY (WITH BREAKFAST) 11/30/22   Jamie Zhao MD   Respiratory Therapy Supplies MISC Please provide nose pillow mask  for CPAP 10/20/22   Surya Cunningham MD   Incontinence Supply Disposable MISC Use daily for incontinence 10/20/22   Meeta Brown

## 2023-07-20 NOTE — PLAN OF CARE
Problem: Chronic Conditions and Co-morbidities  Goal: Patient's chronic conditions and co-morbidity symptoms are monitored and maintained or improved  Outcome: Progressing  Flowsheets (Taken 7/20/2023 1414)  Care Plan - Patient's Chronic Conditions and Co-Morbidity Symptoms are Monitored and Maintained or Improved: Monitor and assess patient's chronic conditions and comorbid symptoms for stability, deterioration, or improvement     Problem: Discharge Planning  Goal: Discharge to home or other facility with appropriate resources  Outcome: Progressing  Flowsheets (Taken 7/20/2023 1415)  Discharge to home or other facility with appropriate resources: Identify barriers to discharge with patient and caregiver     Problem: Cardiovascular - Adult  Goal: Maintains optimal cardiac output and hemodynamic stability  Outcome: Progressing  Goal: Absence of cardiac dysrhythmias or at baseline  Outcome: Progressing  Flowsheets (Taken 7/20/2023 1415)  Absence of cardiac dysrhythmias or at baseline: Monitor cardiac rate and rhythm

## 2023-07-21 PROBLEM — D50.9 MICROCYTIC ANEMIA: Status: ACTIVE | Noted: 2020-11-09

## 2023-07-21 PROBLEM — Z86.010 H/O COLONOSCOPY WITH POLYPECTOMY: Status: ACTIVE | Noted: 2023-07-21

## 2023-07-21 PROBLEM — Z86.0100 H/O COLONOSCOPY WITH POLYPECTOMY: Status: ACTIVE | Noted: 2023-07-21

## 2023-07-21 PROBLEM — K22.70 BARRETT'S ESOPHAGUS: Status: ACTIVE | Noted: 2023-07-21

## 2023-07-21 PROBLEM — Z98.890 H/O COLONOSCOPY WITH POLYPECTOMY: Status: ACTIVE | Noted: 2023-07-21

## 2023-07-21 LAB
ANION GAP SERPL CALCULATED.3IONS-SCNC: 12 MMOL/L (ref 9–17)
BASOPHILS # BLD: 0.08 K/UL (ref 0–0.2)
BASOPHILS NFR BLD: 1 % (ref 0–2)
BUN SERPL-MCNC: 17 MG/DL (ref 8–23)
CALCIUM SERPL-MCNC: 8.7 MG/DL (ref 8.6–10.4)
CHLORIDE SERPL-SCNC: 103 MMOL/L (ref 98–107)
CO2 SERPL-SCNC: 24 MMOL/L (ref 20–31)
CREAT SERPL-MCNC: 0.7 MG/DL (ref 0.5–0.9)
EKG ATRIAL RATE: 375 BPM
EKG Q-T INTERVAL: 400 MS
EKG QRS DURATION: 84 MS
EKG QTC CALCULATION (BAZETT): 494 MS
EKG R AXIS: 55 DEGREES
EKG T AXIS: 69 DEGREES
EKG VENTRICULAR RATE: 92 BPM
EOSINOPHIL # BLD: 0.29 K/UL (ref 0–0.44)
EOSINOPHILS RELATIVE PERCENT: 3 % (ref 1–4)
ERYTHROCYTE [DISTWIDTH] IN BLOOD BY AUTOMATED COUNT: 14.7 % (ref 11.8–14.4)
FERRITIN SERPL-MCNC: 13 NG/ML (ref 13–150)
FOLATE SERPL-MCNC: 10.1 NG/ML
GFR SERPL CREATININE-BSD FRML MDRD: >60 ML/MIN/1.73M2
GLUCOSE SERPL-MCNC: 109 MG/DL (ref 70–99)
HCT VFR BLD AUTO: 29.6 % (ref 36.3–47.1)
HCT VFR BLD AUTO: 30.7 % (ref 36.3–47.1)
HGB BLD-MCNC: 8.6 G/DL (ref 11.9–15.1)
HGB BLD-MCNC: 9.4 G/DL (ref 11.9–15.1)
IMM GRANULOCYTES # BLD AUTO: 0.03 K/UL (ref 0–0.3)
IMM GRANULOCYTES NFR BLD: 0 %
IRON SATN MFR SERPL: 7 % (ref 20–55)
IRON SERPL-MCNC: 20 UG/DL (ref 37–145)
LYMPHOCYTES NFR BLD: 2.22 K/UL (ref 1.1–3.7)
LYMPHOCYTES RELATIVE PERCENT: 26 % (ref 24–43)
MCH RBC QN AUTO: 26.1 PG (ref 25.2–33.5)
MCHC RBC AUTO-ENTMCNC: 30.6 G/DL (ref 28.4–34.8)
MCV RBC AUTO: 85.3 FL (ref 82.6–102.9)
MONOCYTES NFR BLD: 0.66 K/UL (ref 0.1–1.2)
MONOCYTES NFR BLD: 8 % (ref 3–12)
NEUTROPHILS NFR BLD: 62 % (ref 36–65)
NEUTS SEG NFR BLD: 5.37 K/UL (ref 1.5–8.1)
NRBC BLD-RTO: 0.2 PER 100 WBC
PLATELET # BLD AUTO: 187 K/UL (ref 138–453)
PMV BLD AUTO: 12.6 FL (ref 8.1–13.5)
POTASSIUM SERPL-SCNC: 4.5 MMOL/L (ref 3.7–5.3)
RBC # BLD AUTO: 3.6 M/UL (ref 3.95–5.11)
RBC # BLD: ABNORMAL 10*6/UL
SODIUM SERPL-SCNC: 139 MMOL/L (ref 135–144)
TIBC SERPL-MCNC: 293 UG/DL (ref 250–450)
UNSATURATED IRON BINDING CAPACITY: 273 UG/DL (ref 112–347)
VIT B12 SERPL-MCNC: 973 PG/ML (ref 232–1245)
WBC OTHER # BLD: 8.7 K/UL (ref 3.5–11.3)

## 2023-07-21 PROCEDURE — 82607 VITAMIN B-12: CPT

## 2023-07-21 PROCEDURE — 83550 IRON BINDING TEST: CPT

## 2023-07-21 PROCEDURE — 99233 SBSQ HOSP IP/OBS HIGH 50: CPT | Performed by: SURGERY

## 2023-07-21 PROCEDURE — 6370000000 HC RX 637 (ALT 250 FOR IP): Performed by: STUDENT IN AN ORGANIZED HEALTH CARE EDUCATION/TRAINING PROGRAM

## 2023-07-21 PROCEDURE — 2580000003 HC RX 258: Performed by: STUDENT IN AN ORGANIZED HEALTH CARE EDUCATION/TRAINING PROGRAM

## 2023-07-21 PROCEDURE — 82746 ASSAY OF FOLIC ACID SERUM: CPT

## 2023-07-21 PROCEDURE — 2060000000 HC ICU INTERMEDIATE R&B

## 2023-07-21 PROCEDURE — 83540 ASSAY OF IRON: CPT

## 2023-07-21 PROCEDURE — 82728 ASSAY OF FERRITIN: CPT

## 2023-07-21 PROCEDURE — 85018 HEMOGLOBIN: CPT

## 2023-07-21 PROCEDURE — 36415 COLL VENOUS BLD VENIPUNCTURE: CPT

## 2023-07-21 PROCEDURE — 6360000002 HC RX W HCPCS: Performed by: STUDENT IN AN ORGANIZED HEALTH CARE EDUCATION/TRAINING PROGRAM

## 2023-07-21 PROCEDURE — 99232 SBSQ HOSP IP/OBS MODERATE 35: CPT | Performed by: STUDENT IN AN ORGANIZED HEALTH CARE EDUCATION/TRAINING PROGRAM

## 2023-07-21 PROCEDURE — 80048 BASIC METABOLIC PNL TOTAL CA: CPT

## 2023-07-21 PROCEDURE — 85014 HEMATOCRIT: CPT

## 2023-07-21 PROCEDURE — 85027 COMPLETE CBC AUTOMATED: CPT

## 2023-07-21 RX ORDER — DIGOXIN 0.25 MG/ML
250 INJECTION INTRAMUSCULAR; INTRAVENOUS EVERY 6 HOURS
Status: COMPLETED | OUTPATIENT
Start: 2023-07-21 | End: 2023-07-21

## 2023-07-21 RX ADMIN — MIDODRINE HYDROCHLORIDE 10 MG: 5 TABLET ORAL at 11:38

## 2023-07-21 RX ADMIN — ASPIRIN 81 MG 81 MG: 81 TABLET ORAL at 08:44

## 2023-07-21 RX ADMIN — SODIUM CHLORIDE, PRESERVATIVE FREE 10 ML: 5 INJECTION INTRAVENOUS at 08:45

## 2023-07-21 RX ADMIN — MIDODRINE HYDROCHLORIDE 10 MG: 5 TABLET ORAL at 08:44

## 2023-07-21 RX ADMIN — PANTOPRAZOLE SODIUM 40 MG: 40 TABLET, DELAYED RELEASE ORAL at 08:44

## 2023-07-21 RX ADMIN — LEVOTHYROXINE SODIUM 75 MCG: 75 TABLET ORAL at 11:37

## 2023-07-21 RX ADMIN — MIDODRINE HYDROCHLORIDE 10 MG: 5 TABLET ORAL at 16:03

## 2023-07-21 RX ADMIN — DIGOXIN 250 MCG: 0.25 INJECTION INTRAMUSCULAR; INTRAVENOUS at 20:46

## 2023-07-21 RX ADMIN — ENOXAPARIN SODIUM 105 MG: 150 INJECTION SUBCUTANEOUS at 08:38

## 2023-07-21 RX ADMIN — ALLOPURINOL 100 MG: 100 TABLET ORAL at 08:45

## 2023-07-21 RX ADMIN — ATORVASTATIN CALCIUM 40 MG: 40 TABLET, FILM COATED ORAL at 08:44

## 2023-07-21 RX ADMIN — DILTIAZEM HYDROCHLORIDE 180 MG: 180 CAPSULE, COATED, EXTENDED RELEASE ORAL at 08:37

## 2023-07-21 RX ADMIN — ENOXAPARIN SODIUM 105 MG: 150 INJECTION SUBCUTANEOUS at 20:46

## 2023-07-21 RX ADMIN — DIGOXIN 250 MCG: 0.25 INJECTION INTRAMUSCULAR; INTRAVENOUS at 13:56

## 2023-07-21 NOTE — PLAN OF CARE
Problem: Chronic Conditions and Co-morbidities  Goal: Patient's chronic conditions and co-morbidity symptoms are monitored and maintained or improved  Outcome: Progressing     Problem: Discharge Planning  Goal: Discharge to home or other facility with appropriate resources  Outcome: Progressing     Problem: Cardiovascular - Adult  Goal: Maintains optimal cardiac output and hemodynamic stability  Outcome: Progressing     Problem: Skin/Tissue Integrity  Goal: Absence of new skin breakdown  Description: 1. Monitor for areas of redness and/or skin breakdown  2. Assess vascular access sites hourly  3. Every 4-6 hours minimum:  Change oxygen saturation probe site  4. Every 4-6 hours:  If on nasal continuous positive airway pressure, respiratory therapy assess nares and determine need for appliance change or resting period.   Outcome: Progressing

## 2023-07-21 NOTE — PLAN OF CARE
Problem: Chronic Conditions and Co-morbidities  Goal: Patient's chronic conditions and co-morbidity symptoms are monitored and maintained or improved  7/21/2023 1109 by Rima Chilel RN  Flowsheets (Taken 7/21/2023 1109)  Care Plan - Patient's Chronic Conditions and Co-Morbidity Symptoms are Monitored and Maintained or Improved: Monitor and assess patient's chronic conditions and comorbid symptoms for stability, deterioration, or improvement  7/21/2023 0522 by Brooke Soto RN  Outcome: Progressing     Problem: Discharge Planning  Goal: Discharge to home or other facility with appropriate resources  7/21/2023 1109 by Rima Chilel RN  Outcome: Progressing  Flowsheets (Taken 7/21/2023 1109)  Discharge to home or other facility with appropriate resources: Identify barriers to discharge with patient and caregiver  7/21/2023 0522 by Brooke Soto RN  Outcome: Progressing     Problem: Cardiovascular - Adult  Goal: Maintains optimal cardiac output and hemodynamic stability  7/21/2023 1109 by Rima Chilel RN  Outcome: Progressing  Flowsheets (Taken 7/21/2023 1109)  Maintains optimal cardiac output and hemodynamic stability: Monitor blood pressure and heart rate  7/21/2023 0522 by Brooke Soto RN  Outcome: Progressing  Goal: Absence of cardiac dysrhythmias or at baseline  Outcome: Progressing  Flowsheets (Taken 7/21/2023 1109)  Absence of cardiac dysrhythmias or at baseline: Monitor cardiac rate and rhythm

## 2023-07-22 PROBLEM — Z86.010 HISTORY OF COLON POLYPS: Status: ACTIVE | Noted: 2023-07-22

## 2023-07-22 PROBLEM — Z86.0100 HISTORY OF COLON POLYPS: Status: ACTIVE | Noted: 2023-07-22

## 2023-07-22 LAB
HCT VFR BLD AUTO: 27.9 % (ref 36.3–47.1)
HCT VFR BLD AUTO: 28.6 % (ref 36.3–47.1)
HCT VFR BLD AUTO: 29.8 % (ref 36.3–47.1)
HGB BLD-MCNC: 8.4 G/DL (ref 11.9–15.1)
HGB BLD-MCNC: 8.5 G/DL (ref 11.9–15.1)
HGB BLD-MCNC: 8.9 G/DL (ref 11.9–15.1)

## 2023-07-22 PROCEDURE — 2060000000 HC ICU INTERMEDIATE R&B

## 2023-07-22 PROCEDURE — 97161 PT EVAL LOW COMPLEX 20 MIN: CPT

## 2023-07-22 PROCEDURE — A4216 STERILE WATER/SALINE, 10 ML: HCPCS | Performed by: STUDENT IN AN ORGANIZED HEALTH CARE EDUCATION/TRAINING PROGRAM

## 2023-07-22 PROCEDURE — 85014 HEMATOCRIT: CPT

## 2023-07-22 PROCEDURE — 99233 SBSQ HOSP IP/OBS HIGH 50: CPT | Performed by: INTERNAL MEDICINE

## 2023-07-22 PROCEDURE — 6370000000 HC RX 637 (ALT 250 FOR IP): Performed by: STUDENT IN AN ORGANIZED HEALTH CARE EDUCATION/TRAINING PROGRAM

## 2023-07-22 PROCEDURE — 6360000002 HC RX W HCPCS: Performed by: STUDENT IN AN ORGANIZED HEALTH CARE EDUCATION/TRAINING PROGRAM

## 2023-07-22 PROCEDURE — 97116 GAIT TRAINING THERAPY: CPT

## 2023-07-22 PROCEDURE — 99232 SBSQ HOSP IP/OBS MODERATE 35: CPT | Performed by: STUDENT IN AN ORGANIZED HEALTH CARE EDUCATION/TRAINING PROGRAM

## 2023-07-22 PROCEDURE — C9113 INJ PANTOPRAZOLE SODIUM, VIA: HCPCS | Performed by: STUDENT IN AN ORGANIZED HEALTH CARE EDUCATION/TRAINING PROGRAM

## 2023-07-22 PROCEDURE — 36415 COLL VENOUS BLD VENIPUNCTURE: CPT

## 2023-07-22 PROCEDURE — 99222 1ST HOSP IP/OBS MODERATE 55: CPT | Performed by: INTERNAL MEDICINE

## 2023-07-22 PROCEDURE — 2580000003 HC RX 258: Performed by: STUDENT IN AN ORGANIZED HEALTH CARE EDUCATION/TRAINING PROGRAM

## 2023-07-22 PROCEDURE — 85018 HEMOGLOBIN: CPT

## 2023-07-22 RX ADMIN — SODIUM CHLORIDE, PRESERVATIVE FREE 10 ML: 5 INJECTION INTRAVENOUS at 10:17

## 2023-07-22 RX ADMIN — DILTIAZEM HYDROCHLORIDE 180 MG: 180 CAPSULE, COATED, EXTENDED RELEASE ORAL at 10:16

## 2023-07-22 RX ADMIN — ATORVASTATIN CALCIUM 40 MG: 40 TABLET, FILM COATED ORAL at 10:16

## 2023-07-22 RX ADMIN — SODIUM CHLORIDE, PRESERVATIVE FREE 40 MG: 5 INJECTION INTRAVENOUS at 11:46

## 2023-07-22 RX ADMIN — MIDODRINE HYDROCHLORIDE 10 MG: 5 TABLET ORAL at 11:45

## 2023-07-22 RX ADMIN — SODIUM CHLORIDE, PRESERVATIVE FREE 40 MG: 5 INJECTION INTRAVENOUS at 01:00

## 2023-07-22 RX ADMIN — ALLOPURINOL 100 MG: 100 TABLET ORAL at 10:16

## 2023-07-22 RX ADMIN — ASPIRIN 81 MG 81 MG: 81 TABLET ORAL at 10:16

## 2023-07-22 RX ADMIN — MIDODRINE HYDROCHLORIDE 10 MG: 5 TABLET ORAL at 16:57

## 2023-07-22 RX ADMIN — MIDODRINE HYDROCHLORIDE 10 MG: 5 TABLET ORAL at 10:16

## 2023-07-22 RX ADMIN — IRON SUCROSE 200 MG: 20 INJECTION, SOLUTION INTRAVENOUS at 10:21

## 2023-07-22 RX ADMIN — LEVOTHYROXINE SODIUM 75 MCG: 75 TABLET ORAL at 11:46

## 2023-07-22 NOTE — PLAN OF CARE
Problem: Chronic Conditions and Co-morbidities  Goal: Patient's chronic conditions and co-morbidity symptoms are monitored and maintained or improved  Outcome: Progressing     Problem: Discharge Planning  Goal: Discharge to home or other facility with appropriate resources  Outcome: Progressing     Problem: Cardiovascular - Adult  Goal: Maintains optimal cardiac output and hemodynamic stability  Outcome: Progressing  Goal: Absence of cardiac dysrhythmias or at baseline  Outcome: Progressing     Problem: Skin/Tissue Integrity  Goal: Absence of new skin breakdown  Description: 1. Monitor for areas of redness and/or skin breakdown  2. Assess vascular access sites hourly  3. Every 4-6 hours minimum:  Change oxygen saturation probe site  4. Every 4-6 hours:  If on nasal continuous positive airway pressure, respiratory therapy assess nares and determine need for appliance change or resting period.   Outcome: Progressing     Problem: Safety - Adult  Goal: Free from fall injury  Outcome: Progressing  Flowsheets (Taken 7/21/2023 2000)  Free From Fall Injury: Instruct family/caregiver on patient safety

## 2023-07-22 NOTE — PLAN OF CARE
Problem: Chronic Conditions and Co-morbidities  Goal: Patient's chronic conditions and co-morbidity symptoms are monitored and maintained or improved  7/22/2023 1315 by Krista Borrero RN  Outcome: Progressing     Problem: Discharge Planning  Goal: Discharge to home or other facility with appropriate resources  7/22/2023 1315 by Krista Borrero RN  Outcome: Progressing     Problem: Cardiovascular - Adult  Goal: Maintains optimal cardiac output and hemodynamic stability  7/22/2023 1315 by Krista Borrero RN  Outcome: Progressing     Problem: Cardiovascular - Adult  Goal: Absence of cardiac dysrhythmias or at baseline  7/22/2023 1315 by Krista Borrero RN  Outcome: Progressing     Problem: Skin/Tissue Integrity  Goal: Absence of new skin breakdown  Description: 1. Monitor for areas of redness and/or skin breakdown  2. Assess vascular access sites hourly  3. Every 4-6 hours minimum:  Change oxygen saturation probe site  4. Every 4-6 hours:  If on nasal continuous positive airway pressure, respiratory therapy assess nares and determine need for appliance change or resting period.   7/22/2023 1315 by Krista Borrero RN  Outcome: Progressing     Problem: Safety - Adult  Goal: Free from fall injury  7/22/2023 1315 by Krista Borrero RN  Outcome: Progressing

## 2023-07-23 LAB
ANION GAP SERPL CALCULATED.3IONS-SCNC: 10 MMOL/L (ref 9–17)
BASOPHILS # BLD: 0.06 K/UL (ref 0–0.2)
BASOPHILS NFR BLD: 1 % (ref 0–2)
BUN SERPL-MCNC: 17 MG/DL (ref 8–23)
CALCIUM SERPL-MCNC: 9.2 MG/DL (ref 8.6–10.4)
CHLORIDE SERPL-SCNC: 104 MMOL/L (ref 98–107)
CO2 SERPL-SCNC: 26 MMOL/L (ref 20–31)
CREAT SERPL-MCNC: 0.9 MG/DL (ref 0.5–0.9)
EOSINOPHIL # BLD: 0.18 K/UL (ref 0–0.44)
EOSINOPHILS RELATIVE PERCENT: 2 % (ref 1–4)
ERYTHROCYTE [DISTWIDTH] IN BLOOD BY AUTOMATED COUNT: 14.7 % (ref 11.8–14.4)
GFR SERPL CREATININE-BSD FRML MDRD: >60 ML/MIN/1.73M2
GLUCOSE SERPL-MCNC: 181 MG/DL (ref 70–99)
HCT VFR BLD AUTO: 27.8 % (ref 36.3–47.1)
HCT VFR BLD AUTO: 34.2 % (ref 36.3–47.1)
HGB BLD-MCNC: 8.3 G/DL (ref 11.9–15.1)
HGB BLD-MCNC: 9.4 G/DL (ref 11.9–15.1)
IMM GRANULOCYTES # BLD AUTO: 0.03 K/UL (ref 0–0.3)
IMM GRANULOCYTES NFR BLD: 0 %
LYMPHOCYTES NFR BLD: 1.88 K/UL (ref 1.1–3.7)
LYMPHOCYTES RELATIVE PERCENT: 24 % (ref 24–43)
MCH RBC QN AUTO: 25.9 PG (ref 25.2–33.5)
MCHC RBC AUTO-ENTMCNC: 29.9 G/DL (ref 28.4–34.8)
MCV RBC AUTO: 86.9 FL (ref 82.6–102.9)
MONOCYTES NFR BLD: 0.58 K/UL (ref 0.1–1.2)
MONOCYTES NFR BLD: 8 % (ref 3–12)
NEUTROPHILS NFR BLD: 65 % (ref 36–65)
NEUTS SEG NFR BLD: 4.97 K/UL (ref 1.5–8.1)
NRBC BLD-RTO: 0 PER 100 WBC
PLATELET # BLD AUTO: 295 K/UL (ref 138–453)
PMV BLD AUTO: 10.5 FL (ref 8.1–13.5)
POTASSIUM SERPL-SCNC: 4 MMOL/L (ref 3.7–5.3)
RBC # BLD AUTO: 3.2 M/UL (ref 3.95–5.11)
RBC # BLD: ABNORMAL 10*6/UL
SODIUM SERPL-SCNC: 140 MMOL/L (ref 135–144)
WBC OTHER # BLD: 7.7 K/UL (ref 3.5–11.3)

## 2023-07-23 PROCEDURE — 99233 SBSQ HOSP IP/OBS HIGH 50: CPT | Performed by: INTERNAL MEDICINE

## 2023-07-23 PROCEDURE — 2060000000 HC ICU INTERMEDIATE R&B

## 2023-07-23 PROCEDURE — 2580000003 HC RX 258: Performed by: STUDENT IN AN ORGANIZED HEALTH CARE EDUCATION/TRAINING PROGRAM

## 2023-07-23 PROCEDURE — 6370000000 HC RX 637 (ALT 250 FOR IP): Performed by: INTERNAL MEDICINE

## 2023-07-23 PROCEDURE — A4216 STERILE WATER/SALINE, 10 ML: HCPCS | Performed by: STUDENT IN AN ORGANIZED HEALTH CARE EDUCATION/TRAINING PROGRAM

## 2023-07-23 PROCEDURE — 80048 BASIC METABOLIC PNL TOTAL CA: CPT

## 2023-07-23 PROCEDURE — 85027 COMPLETE CBC AUTOMATED: CPT

## 2023-07-23 PROCEDURE — 6360000002 HC RX W HCPCS: Performed by: STUDENT IN AN ORGANIZED HEALTH CARE EDUCATION/TRAINING PROGRAM

## 2023-07-23 PROCEDURE — 6370000000 HC RX 637 (ALT 250 FOR IP): Performed by: STUDENT IN AN ORGANIZED HEALTH CARE EDUCATION/TRAINING PROGRAM

## 2023-07-23 PROCEDURE — 99232 SBSQ HOSP IP/OBS MODERATE 35: CPT | Performed by: STUDENT IN AN ORGANIZED HEALTH CARE EDUCATION/TRAINING PROGRAM

## 2023-07-23 PROCEDURE — 85018 HEMOGLOBIN: CPT

## 2023-07-23 PROCEDURE — C9113 INJ PANTOPRAZOLE SODIUM, VIA: HCPCS | Performed by: STUDENT IN AN ORGANIZED HEALTH CARE EDUCATION/TRAINING PROGRAM

## 2023-07-23 PROCEDURE — 36415 COLL VENOUS BLD VENIPUNCTURE: CPT

## 2023-07-23 PROCEDURE — 85014 HEMATOCRIT: CPT

## 2023-07-23 RX ORDER — BISACODYL 5 MG/1
20 TABLET, DELAYED RELEASE ORAL ONCE
Status: COMPLETED | OUTPATIENT
Start: 2023-07-23 | End: 2023-07-23

## 2023-07-23 RX ADMIN — LEVOTHYROXINE SODIUM 75 MCG: 75 TABLET ORAL at 08:12

## 2023-07-23 RX ADMIN — SODIUM CHLORIDE, PRESERVATIVE FREE 10 ML: 5 INJECTION INTRAVENOUS at 00:54

## 2023-07-23 RX ADMIN — ALLOPURINOL 100 MG: 100 TABLET ORAL at 08:12

## 2023-07-23 RX ADMIN — MIDODRINE HYDROCHLORIDE 10 MG: 5 TABLET ORAL at 12:05

## 2023-07-23 RX ADMIN — ASPIRIN 81 MG 81 MG: 81 TABLET ORAL at 08:12

## 2023-07-23 RX ADMIN — MIDODRINE HYDROCHLORIDE 10 MG: 5 TABLET ORAL at 16:06

## 2023-07-23 RX ADMIN — SODIUM CHLORIDE, PRESERVATIVE FREE 10 ML: 5 INJECTION INTRAVENOUS at 22:58

## 2023-07-23 RX ADMIN — SODIUM CHLORIDE, PRESERVATIVE FREE 40 MG: 5 INJECTION INTRAVENOUS at 00:52

## 2023-07-23 RX ADMIN — DILTIAZEM HYDROCHLORIDE 180 MG: 180 CAPSULE, COATED, EXTENDED RELEASE ORAL at 08:12

## 2023-07-23 RX ADMIN — IRON SUCROSE 200 MG: 20 INJECTION, SOLUTION INTRAVENOUS at 08:23

## 2023-07-23 RX ADMIN — MIDODRINE HYDROCHLORIDE 10 MG: 5 TABLET ORAL at 08:12

## 2023-07-23 RX ADMIN — POLYETHYLENE GLYCOL 3350, SODIUM SULFATE ANHYDROUS, SODIUM BICARBONATE, SODIUM CHLORIDE, POTASSIUM CHLORIDE 4000 ML: 236; 22.74; 6.74; 5.86; 2.97 POWDER, FOR SOLUTION ORAL at 16:03

## 2023-07-23 RX ADMIN — SODIUM CHLORIDE, PRESERVATIVE FREE 10 ML: 5 INJECTION INTRAVENOUS at 08:12

## 2023-07-23 RX ADMIN — BISACODYL 20 MG: 5 TABLET, COATED ORAL at 12:05

## 2023-07-23 RX ADMIN — SODIUM CHLORIDE, PRESERVATIVE FREE 40 MG: 5 INJECTION INTRAVENOUS at 12:05

## 2023-07-23 RX ADMIN — ATORVASTATIN CALCIUM 40 MG: 40 TABLET, FILM COATED ORAL at 08:12

## 2023-07-24 LAB
ANION GAP SERPL CALCULATED.3IONS-SCNC: 14 MMOL/L (ref 9–17)
BASOPHILS # BLD: 0.05 K/UL (ref 0–0.2)
BASOPHILS NFR BLD: 1 % (ref 0–2)
BUN SERPL-MCNC: 10 MG/DL (ref 8–23)
CALCIUM SERPL-MCNC: 8.9 MG/DL (ref 8.6–10.4)
CHLORIDE SERPL-SCNC: 102 MMOL/L (ref 98–107)
CO2 SERPL-SCNC: 22 MMOL/L (ref 20–31)
CREAT SERPL-MCNC: 0.7 MG/DL (ref 0.5–0.9)
EOSINOPHIL # BLD: 0.19 K/UL (ref 0–0.44)
EOSINOPHILS RELATIVE PERCENT: 2 % (ref 1–4)
ERYTHROCYTE [DISTWIDTH] IN BLOOD BY AUTOMATED COUNT: 15.1 % (ref 11.8–14.4)
GFR SERPL CREATININE-BSD FRML MDRD: >60 ML/MIN/1.73M2
GLUCOSE SERPL-MCNC: 98 MG/DL (ref 70–99)
HCT VFR BLD AUTO: 30.5 % (ref 36.3–47.1)
HGB BLD-MCNC: 8.9 G/DL (ref 11.9–15.1)
IMM GRANULOCYTES # BLD AUTO: 0.03 K/UL (ref 0–0.3)
IMM GRANULOCYTES NFR BLD: 0 %
LYMPHOCYTES NFR BLD: 2.02 K/UL (ref 1.1–3.7)
LYMPHOCYTES RELATIVE PERCENT: 24 % (ref 24–43)
MCH RBC QN AUTO: 26.4 PG (ref 25.2–33.5)
MCHC RBC AUTO-ENTMCNC: 29.2 G/DL (ref 28.4–34.8)
MCV RBC AUTO: 90.5 FL (ref 82.6–102.9)
MONOCYTES NFR BLD: 0.7 K/UL (ref 0.1–1.2)
MONOCYTES NFR BLD: 8 % (ref 3–12)
NEUTROPHILS NFR BLD: 65 % (ref 36–65)
NEUTS SEG NFR BLD: 5.47 K/UL (ref 1.5–8.1)
NRBC BLD-RTO: 0 PER 100 WBC
PLATELET # BLD AUTO: 330 K/UL (ref 138–453)
PMV BLD AUTO: 10.3 FL (ref 8.1–13.5)
POTASSIUM SERPL-SCNC: 3.7 MMOL/L (ref 3.7–5.3)
RBC # BLD AUTO: 3.37 M/UL (ref 3.95–5.11)
RBC # BLD: ABNORMAL 10*6/UL
SODIUM SERPL-SCNC: 138 MMOL/L (ref 135–144)
WBC OTHER # BLD: 8.5 K/UL (ref 3.5–11.3)

## 2023-07-24 PROCEDURE — 2580000003 HC RX 258: Performed by: STUDENT IN AN ORGANIZED HEALTH CARE EDUCATION/TRAINING PROGRAM

## 2023-07-24 PROCEDURE — A4216 STERILE WATER/SALINE, 10 ML: HCPCS | Performed by: STUDENT IN AN ORGANIZED HEALTH CARE EDUCATION/TRAINING PROGRAM

## 2023-07-24 PROCEDURE — 3609010300 HC COLONOSCOPY W/BIOPSY SINGLE/MULTIPLE: Performed by: INTERNAL MEDICINE

## 2023-07-24 PROCEDURE — 3609017100 HC EGD: Performed by: INTERNAL MEDICINE

## 2023-07-24 PROCEDURE — 2060000000 HC ICU INTERMEDIATE R&B

## 2023-07-24 PROCEDURE — 6360000002 HC RX W HCPCS: Performed by: STUDENT IN AN ORGANIZED HEALTH CARE EDUCATION/TRAINING PROGRAM

## 2023-07-24 PROCEDURE — 36415 COLL VENOUS BLD VENIPUNCTURE: CPT

## 2023-07-24 PROCEDURE — 99152 MOD SED SAME PHYS/QHP 5/>YRS: CPT | Performed by: INTERNAL MEDICINE

## 2023-07-24 PROCEDURE — C9113 INJ PANTOPRAZOLE SODIUM, VIA: HCPCS | Performed by: STUDENT IN AN ORGANIZED HEALTH CARE EDUCATION/TRAINING PROGRAM

## 2023-07-24 PROCEDURE — 6370000000 HC RX 637 (ALT 250 FOR IP): Performed by: STUDENT IN AN ORGANIZED HEALTH CARE EDUCATION/TRAINING PROGRAM

## 2023-07-24 PROCEDURE — 0DJ08ZZ INSPECTION OF UPPER INTESTINAL TRACT, VIA NATURAL OR ARTIFICIAL OPENING ENDOSCOPIC: ICD-10-PCS | Performed by: INTERNAL MEDICINE

## 2023-07-24 PROCEDURE — 0DBK8ZX EXCISION OF ASCENDING COLON, VIA NATURAL OR ARTIFICIAL OPENING ENDOSCOPIC, DIAGNOSTIC: ICD-10-PCS | Performed by: INTERNAL MEDICINE

## 2023-07-24 PROCEDURE — 88305 TISSUE EXAM BY PATHOLOGIST: CPT

## 2023-07-24 PROCEDURE — 6360000002 HC RX W HCPCS

## 2023-07-24 PROCEDURE — 2709999900 HC NON-CHARGEABLE SUPPLY: Performed by: INTERNAL MEDICINE

## 2023-07-24 PROCEDURE — 99232 SBSQ HOSP IP/OBS MODERATE 35: CPT | Performed by: STUDENT IN AN ORGANIZED HEALTH CARE EDUCATION/TRAINING PROGRAM

## 2023-07-24 PROCEDURE — 6360000002 HC RX W HCPCS: Performed by: INTERNAL MEDICINE

## 2023-07-24 PROCEDURE — 99233 SBSQ HOSP IP/OBS HIGH 50: CPT | Performed by: NURSE PRACTITIONER

## 2023-07-24 PROCEDURE — 80048 BASIC METABOLIC PNL TOTAL CA: CPT

## 2023-07-24 PROCEDURE — 6360000002 HC RX W HCPCS: Performed by: NURSE PRACTITIONER

## 2023-07-24 PROCEDURE — 43235 EGD DIAGNOSTIC BRUSH WASH: CPT | Performed by: INTERNAL MEDICINE

## 2023-07-24 PROCEDURE — 85027 COMPLETE CBC AUTOMATED: CPT

## 2023-07-24 PROCEDURE — 45380 COLONOSCOPY AND BIOPSY: CPT | Performed by: INTERNAL MEDICINE

## 2023-07-24 RX ORDER — MIDAZOLAM HYDROCHLORIDE 1 MG/ML
INJECTION INTRAMUSCULAR; INTRAVENOUS
Status: COMPLETED
Start: 2023-07-24 | End: 2023-07-24

## 2023-07-24 RX ORDER — FENTANYL CITRATE 50 UG/ML
50 INJECTION, SOLUTION INTRAMUSCULAR; INTRAVENOUS ONCE
Status: COMPLETED | OUTPATIENT
Start: 2023-07-24 | End: 2023-07-24

## 2023-07-24 RX ORDER — FENTANYL CITRATE 50 UG/ML
INJECTION, SOLUTION INTRAMUSCULAR; INTRAVENOUS
Status: COMPLETED
Start: 2023-07-24 | End: 2023-07-24

## 2023-07-24 RX ORDER — MIDAZOLAM HYDROCHLORIDE 1 MG/ML
INJECTION INTRAMUSCULAR; INTRAVENOUS
Status: DISPENSED
Start: 2023-07-24 | End: 2023-07-24

## 2023-07-24 RX ORDER — DIGOXIN 0.25 MG/ML
250 INJECTION INTRAMUSCULAR; INTRAVENOUS EVERY 4 HOURS
Status: COMPLETED | OUTPATIENT
Start: 2023-07-24 | End: 2023-07-24

## 2023-07-24 RX ORDER — FENTANYL CITRATE 50 UG/ML
INJECTION, SOLUTION INTRAMUSCULAR; INTRAVENOUS
Status: DISPENSED
Start: 2023-07-24 | End: 2023-07-24

## 2023-07-24 RX ORDER — FENTANYL CITRATE 50 UG/ML
100 INJECTION, SOLUTION INTRAMUSCULAR; INTRAVENOUS ONCE
Status: COMPLETED | OUTPATIENT
Start: 2023-07-24 | End: 2023-07-24

## 2023-07-24 RX ORDER — MIDAZOLAM HYDROCHLORIDE 2 MG/2ML
2 INJECTION, SOLUTION INTRAMUSCULAR; INTRAVENOUS ONCE
Status: COMPLETED | OUTPATIENT
Start: 2023-07-24 | End: 2023-07-24

## 2023-07-24 RX ORDER — MIDAZOLAM HYDROCHLORIDE 2 MG/2ML
4 INJECTION, SOLUTION INTRAMUSCULAR; INTRAVENOUS ONCE
Status: COMPLETED | OUTPATIENT
Start: 2023-07-24 | End: 2023-07-24

## 2023-07-24 RX ADMIN — MIDAZOLAM HYDROCHLORIDE 4 MG: 2 INJECTION, SOLUTION INTRAMUSCULAR; INTRAVENOUS at 10:38

## 2023-07-24 RX ADMIN — MIDAZOLAM HYDROCHLORIDE 2 MG: 2 INJECTION, SOLUTION INTRAMUSCULAR; INTRAVENOUS at 10:39

## 2023-07-24 RX ADMIN — MIDAZOLAM HYDROCHLORIDE 2 MG: 1 INJECTION, SOLUTION INTRAMUSCULAR; INTRAVENOUS at 10:39

## 2023-07-24 RX ADMIN — MIDODRINE HYDROCHLORIDE 10 MG: 5 TABLET ORAL at 16:42

## 2023-07-24 RX ADMIN — SODIUM CHLORIDE, PRESERVATIVE FREE 40 MG: 5 INJECTION INTRAVENOUS at 12:20

## 2023-07-24 RX ADMIN — MIDAZOLAM HYDROCHLORIDE 4 MG: 1 INJECTION, SOLUTION INTRAMUSCULAR; INTRAVENOUS at 10:38

## 2023-07-24 RX ADMIN — ENOXAPARIN SODIUM 105 MG: 150 INJECTION SUBCUTANEOUS at 21:57

## 2023-07-24 RX ADMIN — DIGOXIN 250 MCG: 0.25 INJECTION INTRAMUSCULAR; INTRAVENOUS at 16:42

## 2023-07-24 RX ADMIN — FENTANYL CITRATE 100 MCG: 50 INJECTION, SOLUTION INTRAMUSCULAR; INTRAVENOUS at 09:05

## 2023-07-24 RX ADMIN — DIGOXIN 250 MCG: 0.25 INJECTION INTRAMUSCULAR; INTRAVENOUS at 13:06

## 2023-07-24 RX ADMIN — FENTANYL CITRATE 50 MCG: 50 INJECTION, SOLUTION INTRAMUSCULAR; INTRAVENOUS at 09:22

## 2023-07-24 RX ADMIN — ASPIRIN 81 MG 81 MG: 81 TABLET ORAL at 08:17

## 2023-07-24 RX ADMIN — ALLOPURINOL 100 MG: 100 TABLET ORAL at 08:16

## 2023-07-24 RX ADMIN — DILTIAZEM HYDROCHLORIDE 180 MG: 180 CAPSULE, COATED, EXTENDED RELEASE ORAL at 08:16

## 2023-07-24 RX ADMIN — SODIUM CHLORIDE, PRESERVATIVE FREE 10 ML: 5 INJECTION INTRAVENOUS at 22:07

## 2023-07-24 RX ADMIN — MIDODRINE HYDROCHLORIDE 10 MG: 5 TABLET ORAL at 12:20

## 2023-07-24 RX ADMIN — SODIUM CHLORIDE, PRESERVATIVE FREE 10 ML: 5 INJECTION INTRAVENOUS at 11:55

## 2023-07-24 RX ADMIN — ATORVASTATIN CALCIUM 40 MG: 40 TABLET, FILM COATED ORAL at 08:16

## 2023-07-24 RX ADMIN — MIDODRINE HYDROCHLORIDE 10 MG: 5 TABLET ORAL at 08:16

## 2023-07-24 RX ADMIN — LEVOTHYROXINE SODIUM 75 MCG: 75 TABLET ORAL at 08:16

## 2023-07-24 RX ADMIN — IRON SUCROSE 200 MG: 20 INJECTION, SOLUTION INTRAVENOUS at 10:18

## 2023-07-24 NOTE — OP NOTE
[address]  EGD & COLONOSCOPY      Patient:   Mirlande Dill    :    1950    Referring/PCP: Jose Chavez MD  Procedure:   Colonoscopy with polypectomy (cold biopsy); Esophagogastroduodenoscopy  --diagnostic  Facility:  [address] [place] [address]  Date:     2023  Endoscopist:  Tommie Chahal MD, [facility]    Indication:  iron deficiency anemia. Postprocedure diagnosis: Mild gastritis, hiatal hernia, ascending colon polyp, colocolonic anastomosis    Anesthesia:  Moderate sedation- Versed 6 mg and Fentanyl 150 mcg administered by ICU nurse under my supervision with close monitoring of vitals every 3 minutes. Total time spent on sedation was 58 minutes. Complications: None    EBL: None from the procedure    Specimen: Sent to the lab    Description of Procedure:  Informed consent was obtained from the patient after explanation of the procedure including indications, description of the procedure,  benefits and possible risks and complications of the procedure, and alternatives. Questions were answered. The patient's history was reviewed and a directed physical examination was performed prior to the procedure. Patient was monitored throughout the procedure with pulse oximetry and periodic assessment of vital signs. Patient was sedated as noted above. With the patient in the left lateral decubitus position, the Olympus videoendoscope was placed in the patient's mouth and under direct visualization passed into the esophagus. Visualization of the esophagus, stomach, and duodenum was performed during both introduction and withdrawal of the endoscope and retroflexed view of the proximal stomach was obtained. The scope was passed to the second portion of the duodenum. The patient tolerated the procedure well and was taken to the recovery area in good condition. Findings[de-identified]   Esophagus: normal.   Stomach: Small sliding hiatal hernia.   Stomach and mild gastric

## 2023-07-24 NOTE — PLAN OF CARE
Problem: Chronic Conditions and Co-morbidities  Goal: Patient's chronic conditions and co-morbidity symptoms are monitored and maintained or improved  7/24/2023 0438 by Verna Hernandez  Outcome: Progressing  7/24/2023 0437 by Verna Hernandez  Outcome: Progressing     Problem: Discharge Planning  Goal: Discharge to home or other facility with appropriate resources  7/24/2023 0438 by Verna Hernandez  Outcome: Progressing  7/24/2023 0437 by Verna Hernandez  Outcome: Progressing     Problem: Cardiovascular - Adult  Goal: Maintains optimal cardiac output and hemodynamic stability  7/24/2023 0438 by MAXIMILIANO DE  Outcome: Progressing  7/24/2023 0437 by Verna Hernandez  Outcome: Progressing  Goal: Absence of cardiac dysrhythmias or at baseline  7/24/2023 0438 by MAXIMILIANO DE  Outcome: Progressing  7/24/2023 0437 by Verna Hernandez  Outcome: Progressing     Problem: Skin/Tissue Integrity  Goal: Absence of new skin breakdown  Description: 1. Monitor for areas of redness and/or skin breakdown  2. Assess vascular access sites hourly  3. Every 4-6 hours minimum:  Change oxygen saturation probe site  4. Every 4-6 hours:  If on nasal continuous positive airway pressure, respiratory therapy assess nares and determine need for appliance change or resting period.   7/24/2023 0438 by Verna Hernandez  Outcome: Progressing  7/24/2023 0437 by Verna Hernandez  Outcome: Progressing     Problem: Safety - Adult  Goal: Free from fall injury  7/24/2023 0438 by Verna Hernandez  Outcome: Progressing  7/24/2023 0437 by Verna Hernandez  Outcome: Progressing  Flowsheets (Taken 7/24/2023 0047)  Free From Fall Injury: Instruct family/caregiver on patient safety

## 2023-07-24 NOTE — CARE COORDINATION
[address] Flow/Interdisciplinary Rounds Progress Note    Quality Flow Rounds held on July 24, 2023 at [address] [facility] Attending:  CM and     Barriers to Discharge: Needs Colonoscopy and EGD today    Anticipated Discharge Date:  Unknown     Anticipated Discharge Disposition: Home    Readmission Risk              Risk of Unplanned Readmission:  20           Discussed patient goal for the day, patient clinical progression, and barriers to discharge. The following Goal(s) of the Day/Commitment(s) have been identified:  Other  Scheduled for colonoscopy and EGD today.       Deepak Johnson RN  July 24, 2023

## 2023-07-24 NOTE — PLAN OF CARE
Problem: Chronic Conditions and Co-morbidities  Goal: Patient's chronic conditions and co-morbidity symptoms are monitored and maintained or improved  7/24/2023 1352 by Jolly Martin RN  Outcome: Progressing     Problem: Discharge Planning  Goal: Discharge to home or other facility with appropriate resources  7/24/2023 1352 by Jolly Martin RN  Outcome: Progressing     Problem: Cardiovascular - Adult  Goal: Maintains optimal cardiac output and hemodynamic stability  7/24/2023 1352 by Jolly Martin RN  Outcome: Progressing     Problem: Cardiovascular - Adult  Goal: Absence of cardiac dysrhythmias or at baseline  7/24/2023 1352 by Jolly Martin RN  Outcome: Progressing     Problem: Skin/Tissue Integrity  Goal: Absence of new skin breakdown  Description: 1. Monitor for areas of redness and/or skin breakdown  2. Assess vascular access sites hourly  3. Every 4-6 hours minimum:  Change oxygen saturation probe site  4. Every 4-6 hours:  If on nasal continuous positive airway pressure, respiratory therapy assess nares and determine need for appliance change or resting period.   7/24/2023 1352 by Jolly Martin RN  Outcome: Progressing     Problem: Safety - Adult  Goal: Free from fall injury  7/24/2023 1352 by Jolly Martin RN  Outcome: Progressing

## 2023-07-25 ENCOUNTER — APPOINTMENT (OUTPATIENT)
Age: 73
DRG: 309 | End: 2023-07-25
Attending: INTERNAL MEDICINE
Payer: MEDICARE

## 2023-07-25 ENCOUNTER — ANESTHESIA (OUTPATIENT)
Dept: INPATIENT UNIT | Age: 73
DRG: 309 | End: 2023-07-25
Payer: MEDICARE

## 2023-07-25 ENCOUNTER — ANESTHESIA EVENT (OUTPATIENT)
Dept: INPATIENT UNIT | Age: 73
DRG: 309 | End: 2023-07-25
Payer: MEDICARE

## 2023-07-25 PROBLEM — D64.9 ACUTE ON CHRONIC ANEMIA: Status: ACTIVE | Noted: 2023-07-25

## 2023-07-25 LAB
BASOPHILS # BLD: 0.05 K/UL (ref 0–0.2)
BASOPHILS NFR BLD: 1 % (ref 0–2)
ECHO BSA: 2.21 M2
ECHO BSA: 2.22 M2
ECHO LV EF PHYSICIAN: 55 %
EOSINOPHIL # BLD: 0.18 K/UL (ref 0–0.44)
EOSINOPHILS RELATIVE PERCENT: 2 % (ref 1–4)
ERYTHROCYTE [DISTWIDTH] IN BLOOD BY AUTOMATED COUNT: 15.4 % (ref 11.8–14.4)
HCT VFR BLD AUTO: 31 % (ref 36.3–47.1)
HGB BLD-MCNC: 9 G/DL (ref 11.9–15.1)
IMM GRANULOCYTES # BLD AUTO: 0.03 K/UL (ref 0–0.3)
IMM GRANULOCYTES NFR BLD: 0 %
LYMPHOCYTES NFR BLD: 1.17 K/UL (ref 1.1–3.7)
LYMPHOCYTES RELATIVE PERCENT: 15 % (ref 24–43)
MCH RBC QN AUTO: 25.9 PG (ref 25.2–33.5)
MCHC RBC AUTO-ENTMCNC: 29 G/DL (ref 28.4–34.8)
MCV RBC AUTO: 89.3 FL (ref 82.6–102.9)
METER GLUCOSE: 127 MG/DL (ref 65–105)
MONOCYTES NFR BLD: 0.63 K/UL (ref 0.1–1.2)
MONOCYTES NFR BLD: 8 % (ref 3–12)
NEUTROPHILS NFR BLD: 74 % (ref 36–65)
NEUTS SEG NFR BLD: 5.88 K/UL (ref 1.5–8.1)
NRBC BLD-RTO: 0 PER 100 WBC
PLATELET # BLD AUTO: 272 K/UL (ref 138–453)
PMV BLD AUTO: 10.2 FL (ref 8.1–13.5)
RBC # BLD AUTO: 3.47 M/UL (ref 3.95–5.11)
RBC # BLD: ABNORMAL 10*6/UL
WBC OTHER # BLD: 7.9 K/UL (ref 3.5–11.3)

## 2023-07-25 PROCEDURE — C9113 INJ PANTOPRAZOLE SODIUM, VIA: HCPCS | Performed by: STUDENT IN AN ORGANIZED HEALTH CARE EDUCATION/TRAINING PROGRAM

## 2023-07-25 PROCEDURE — 6360000002 HC RX W HCPCS: Performed by: STUDENT IN AN ORGANIZED HEALTH CARE EDUCATION/TRAINING PROGRAM

## 2023-07-25 PROCEDURE — 6360000002 HC RX W HCPCS: Performed by: INTERNAL MEDICINE

## 2023-07-25 PROCEDURE — 6370000000 HC RX 637 (ALT 250 FOR IP): Performed by: INTERNAL MEDICINE

## 2023-07-25 PROCEDURE — 93312 ECHO TRANSESOPHAGEAL: CPT

## 2023-07-25 PROCEDURE — 93312 ECHO TRANSESOPHAGEAL: CPT | Performed by: INTERNAL MEDICINE

## 2023-07-25 PROCEDURE — 92960 CARDIOVERSION ELECTRIC EXT: CPT | Performed by: INTERNAL MEDICINE

## 2023-07-25 PROCEDURE — 5A09357 ASSISTANCE WITH RESPIRATORY VENTILATION, LESS THAN 24 CONSECUTIVE HOURS, CONTINUOUS POSITIVE AIRWAY PRESSURE: ICD-10-PCS | Performed by: STUDENT IN AN ORGANIZED HEALTH CARE EDUCATION/TRAINING PROGRAM

## 2023-07-25 PROCEDURE — 5A2204Z RESTORATION OF CARDIAC RHYTHM, SINGLE: ICD-10-PCS | Performed by: INTERNAL MEDICINE

## 2023-07-25 PROCEDURE — A4216 STERILE WATER/SALINE, 10 ML: HCPCS | Performed by: STUDENT IN AN ORGANIZED HEALTH CARE EDUCATION/TRAINING PROGRAM

## 2023-07-25 PROCEDURE — 2700000000 HC OXYGEN THERAPY PER DAY

## 2023-07-25 PROCEDURE — 99153 MOD SED SAME PHYS/QHP EA: CPT | Performed by: INTERNAL MEDICINE

## 2023-07-25 PROCEDURE — 85027 COMPLETE CBC AUTOMATED: CPT

## 2023-07-25 PROCEDURE — 99152 MOD SED SAME PHYS/QHP 5/>YRS: CPT | Performed by: INTERNAL MEDICINE

## 2023-07-25 PROCEDURE — 99232 SBSQ HOSP IP/OBS MODERATE 35: CPT | Performed by: STUDENT IN AN ORGANIZED HEALTH CARE EDUCATION/TRAINING PROGRAM

## 2023-07-25 PROCEDURE — 99233 SBSQ HOSP IP/OBS HIGH 50: CPT | Performed by: NURSE PRACTITIONER

## 2023-07-25 PROCEDURE — 36415 COLL VENOUS BLD VENIPUNCTURE: CPT

## 2023-07-25 PROCEDURE — 2709999900 HC NON-CHARGEABLE SUPPLY: Performed by: INTERNAL MEDICINE

## 2023-07-25 PROCEDURE — 2580000003 HC RX 258: Performed by: STUDENT IN AN ORGANIZED HEALTH CARE EDUCATION/TRAINING PROGRAM

## 2023-07-25 PROCEDURE — 6370000000 HC RX 637 (ALT 250 FOR IP): Performed by: STUDENT IN AN ORGANIZED HEALTH CARE EDUCATION/TRAINING PROGRAM

## 2023-07-25 PROCEDURE — 2060000000 HC ICU INTERMEDIATE R&B

## 2023-07-25 PROCEDURE — 94660 CPAP INITIATION&MGMT: CPT

## 2023-07-25 PROCEDURE — B246ZZ4 ULTRASONOGRAPHY OF RIGHT AND LEFT HEART, TRANSESOPHAGEAL: ICD-10-PCS | Performed by: INTERNAL MEDICINE

## 2023-07-25 PROCEDURE — 82947 ASSAY GLUCOSE BLOOD QUANT: CPT

## 2023-07-25 RX ORDER — SODIUM CHLORIDE 0.9 % (FLUSH) 0.9 %
5-40 SYRINGE (ML) INJECTION PRN
Status: CANCELLED | OUTPATIENT
Start: 2023-07-25

## 2023-07-25 RX ORDER — FENTANYL CITRATE 50 UG/ML
INJECTION, SOLUTION INTRAMUSCULAR; INTRAVENOUS PRN
Status: DISCONTINUED | OUTPATIENT
Start: 2023-07-25 | End: 2023-07-25 | Stop reason: HOSPADM

## 2023-07-25 RX ORDER — FUROSEMIDE 20 MG/1
20 TABLET ORAL DAILY
Status: DISCONTINUED | OUTPATIENT
Start: 2023-07-25 | End: 2023-07-26 | Stop reason: HOSPADM

## 2023-07-25 RX ORDER — LIDOCAINE HYDROCHLORIDE 20 MG/ML
JELLY TOPICAL PRN
Status: DISCONTINUED | OUTPATIENT
Start: 2023-07-25 | End: 2023-07-25 | Stop reason: HOSPADM

## 2023-07-25 RX ORDER — SODIUM CHLORIDE 9 MG/ML
INJECTION, SOLUTION INTRAVENOUS PRN
Status: CANCELLED | OUTPATIENT
Start: 2023-07-25

## 2023-07-25 RX ORDER — SODIUM CHLORIDE 0.9 % (FLUSH) 0.9 %
5-40 SYRINGE (ML) INJECTION EVERY 12 HOURS SCHEDULED
Status: CANCELLED | OUTPATIENT
Start: 2023-07-25

## 2023-07-25 RX ORDER — MIDAZOLAM HYDROCHLORIDE 1 MG/ML
INJECTION INTRAMUSCULAR; INTRAVENOUS PRN
Status: DISCONTINUED | OUTPATIENT
Start: 2023-07-25 | End: 2023-07-25 | Stop reason: HOSPADM

## 2023-07-25 RX ADMIN — RIVAROXABAN 20 MG: 20 TABLET, FILM COATED ORAL at 17:34

## 2023-07-25 RX ADMIN — ALLOPURINOL 100 MG: 100 TABLET ORAL at 11:34

## 2023-07-25 RX ADMIN — SODIUM CHLORIDE, PRESERVATIVE FREE 10 ML: 5 INJECTION INTRAVENOUS at 11:35

## 2023-07-25 RX ADMIN — LEVOTHYROXINE SODIUM 75 MCG: 75 TABLET ORAL at 06:51

## 2023-07-25 RX ADMIN — SODIUM CHLORIDE, PRESERVATIVE FREE 40 MG: 5 INJECTION INTRAVENOUS at 13:17

## 2023-07-25 RX ADMIN — AMIODARONE HYDROCHLORIDE 150 MG: 50 INJECTION, SOLUTION INTRAVENOUS at 13:10

## 2023-07-25 RX ADMIN — SODIUM CHLORIDE, PRESERVATIVE FREE 40 MG: 5 INJECTION INTRAVENOUS at 01:20

## 2023-07-25 RX ADMIN — AMIODARONE HYDROCHLORIDE 1 MG/MIN: 50 INJECTION, SOLUTION INTRAVENOUS at 13:26

## 2023-07-25 RX ADMIN — MIDODRINE HYDROCHLORIDE 10 MG: 5 TABLET ORAL at 11:35

## 2023-07-25 RX ADMIN — MIDODRINE HYDROCHLORIDE 10 MG: 5 TABLET ORAL at 17:33

## 2023-07-25 RX ADMIN — ASPIRIN 81 MG 81 MG: 81 TABLET ORAL at 11:34

## 2023-07-25 RX ADMIN — FUROSEMIDE 20 MG: 20 TABLET ORAL at 13:16

## 2023-07-25 RX ADMIN — ATORVASTATIN CALCIUM 40 MG: 40 TABLET, FILM COATED ORAL at 11:34

## 2023-07-25 RX ADMIN — DILTIAZEM HYDROCHLORIDE 180 MG: 180 CAPSULE, COATED, EXTENDED RELEASE ORAL at 11:34

## 2023-07-25 NOTE — ANESTHESIA PRE PROCEDURE
Department of Anesthesiology  Preprocedure Note       Name:  Jasmyne Peoples   Age:  72 y.o.  :  1950                                          MRN:  [id number]         Date:  2023      Surgeon: Lissette Kiran):  Caitlin London MD    Procedure: Procedure(s):  suzy/cv    Medications prior to admission:   Prior to Admission medications    Medication Sig Start Date End Date Taking? Authorizing Provider   alendronate (FOSAMAX) 35 MG tablet Indications: takes on  Take weekly on an empty stomach, with a full glass of water, do not lay down for 30 minutes. Takes on [address] [facility] [address]   Radha Vallecillo MD   ACETAMINOPHEN EXTRA STRENGTH 500 MG tablet TAKE ONE (1) TABLET BY MOUTH EVERY SIX (6) HOURS AS NEEDED FOR PAIN 23   Li Song MD   dilTIAZem (CARDIZEM CD) 180 MG extended release capsule TAKE ONE (1) CAPSULE (180 MG TOTAL) BY MOUTH IN THE MORNING. 23   Li Song MD   loratadine (CLARITIN) 10 MG tablet TAKE 1 TABLET BY MOUTH ONCE DAILY IN THE MORNING 23   Li Song MD   levothyroxine (SYNTHROID) 75 MCG tablet TAKE ONE (1) TABLET BY MOUTH EVERY MORNING (BEFORE BREAKFAST) 23   Pura Webb MD   allopurinol (ZYLOPRIM) 100 MG tablet TAKE ONE (1) TABLET BY MOUTH DAILY FOR HIGH URIC ACID, TO PREVENT GOUT 23   Pura Webb MD   midodrine (PROAMATINE) 10 MG tablet Take 1 tablet by mouth 3 times daily (with meals) 23   Radha Vallecillo MD   ASPIRIN LOW DOSE 81 MG chewable tablet CHEW ONE (1) TABLET BY MOUTH DAILY 23   Pura Webb MD   vitamin D3 (CHOLECALCIFEROL) 25 MCG (1000 UT) TABS tablet TAKE TWO (2) TABLETS BY MOUTH ONCE DAILY 23   Pura Webb MD   furosemide (LASIX) 20 MG tablet Take 1 tablet by mouth daily 3/9/23   Pura Webb MD   vitamin B-12 (CYANOCOBALAMIN) 1000 MCG tablet TAKE ONE (1) TABLET BY MOUTH DAILY 3/3/23   Pura Webb MD   pantoprazole (PROTONIX) 40 MG tablet TAKE ONE (1) TABLET BY MOUTH ONCE DAILY

## 2023-07-25 NOTE — CARE COORDINATION
[address] Flow/Interdisciplinary Rounds Progress Note    Quality Flow Rounds held on July 25, 2023 at 3500 [facility] Attending:  Bedside Nurse, , , and Nursing Unit Leadership    Barriers to Discharge: Clinical status    Anticipated Discharge Date:   7/26/23    Anticipated Discharge Disposition: Home    Readmission Risk              Risk of Unplanned Readmission:  20           Discussed patient goal for the day, patient clinical progression, and barriers to discharge. RN reports plan for RAI today. Plan is to return home independently at discharge.   Aneudy Kruse RN  July 25, 2023

## 2023-07-25 NOTE — PROCEDURES
[facility]  RAI / Cardioversion procedure Note         Today's Date: 7/25/2023  Indication:     Operators:  Primary: Dr. Bartolo Kern MD  Assistant: Norberto Jiang (Fellow)    Patient seen and examined. History and Physical reviewed. Labs reviewed. After informed consent was obtained with explanation of the risks and benefits, the patient was brought to Cath lab. All sedation was administered by the cardiologist. The oropharynx was pre anesthetisized with cetacaine spray. RAI:    Structures:  LA: Normal  LUCRETIA: No thrombus  RA: Normal  RV:  Normal  LV: Normal in size with normal systolic function   Estimated LVEF: 55%  Aorta: Mild atheromatous disease arch  Percardium: No pericardial effusion  Septum: No intracardiac shunt via color Doppler. No intracardiac shunt via injection of agitated saline. Valves:  Mitral Valve: Structurally normal. Mild regurgitation is identified. Aortic Valve: The aortic valve is trileaflet with mild calcification and opens adequately. Mild regurgitiation is identified. Tricuspid valve: Structurally normal. Mild regurgitation is identified. Pulmonary valve: Normal. No significant regurgitation    No valvular vegetations or thrombus identified. Summary:     1. A RAI was performed without complications. 2. Normal Lv size with hyperdynamic left ventricular systolic function. Estimated LVEF 55 - 60%  3. No thrombus or valvular vegetation identified  4. Proceed with cardioversion      CARDIOVERSION:  After an adequate level of sedation was achieved, 200J and 360J in biphasic synchronized delivery was administered. no change in rhythm. The patient awoke without complications. A post procedure 12 L ECG was ordered and reviewed. The patient will continue with the discharge meds and has been instructed to follow-up with Dr. Bartolo Kern for continued long term care and cardiovascular management. There were no complications encountered.       Norberto Jiang,

## 2023-07-25 NOTE — PLAN OF CARE
Problem: Chronic Conditions and Co-morbidities  Goal: Patient's chronic conditions and co-morbidity symptoms are monitored and maintained or improved  Outcome: Progressing     Problem: Discharge Planning  Goal: Discharge to home or other facility with appropriate resources  Outcome: Progressing     Problem: Cardiovascular - Adult  Goal: Maintains optimal cardiac output and hemodynamic stability  Outcome: Progressing  Goal: Absence of cardiac dysrhythmias or at baseline  Outcome: Progressing     Problem: Skin/Tissue Integrity  Goal: Absence of new skin breakdown  Description: 1. Monitor for areas of redness and/or skin breakdown  2. Assess vascular access sites hourly  3. Every 4-6 hours minimum:  Change oxygen saturation probe site  4. Every 4-6 hours:  If on nasal continuous positive airway pressure, respiratory therapy assess nares and determine need for appliance change or resting period.   Outcome: Progressing     Problem: Safety - Adult  Goal: Free from fall injury  Outcome: Progressing

## 2023-07-26 VITALS
BODY MASS INDEX: 45.45 KG/M2 | DIASTOLIC BLOOD PRESSURE: 84 MMHG | OXYGEN SATURATION: 96 % | WEIGHT: 247 LBS | HEART RATE: 87 BPM | RESPIRATION RATE: 15 BRPM | SYSTOLIC BLOOD PRESSURE: 114 MMHG | HEIGHT: 62 IN | TEMPERATURE: 98.4 F

## 2023-07-26 LAB
ANION GAP SERPL CALCULATED.3IONS-SCNC: 15 MMOL/L (ref 9–17)
BASOPHILS # BLD: 0.06 K/UL (ref 0–0.2)
BASOPHILS NFR BLD: 1 % (ref 0–2)
BUN SERPL-MCNC: 10 MG/DL (ref 8–23)
CALCIUM SERPL-MCNC: 9.4 MG/DL (ref 8.6–10.4)
CHLORIDE SERPL-SCNC: 98 MMOL/L (ref 98–107)
CO2 SERPL-SCNC: 24 MMOL/L (ref 20–31)
CREAT SERPL-MCNC: 0.8 MG/DL (ref 0.5–0.9)
EOSINOPHIL # BLD: 0.27 K/UL (ref 0–0.44)
EOSINOPHILS RELATIVE PERCENT: 3 % (ref 1–4)
ERYTHROCYTE [DISTWIDTH] IN BLOOD BY AUTOMATED COUNT: 16.8 % (ref 11.8–14.4)
GFR SERPL CREATININE-BSD FRML MDRD: >60 ML/MIN/1.73M2
GLUCOSE SERPL-MCNC: 125 MG/DL (ref 70–99)
HCT VFR BLD AUTO: 31.4 % (ref 36.3–47.1)
HGB BLD-MCNC: 10 G/DL (ref 11.9–15.1)
IMM GRANULOCYTES # BLD AUTO: 0.03 K/UL (ref 0–0.3)
IMM GRANULOCYTES NFR BLD: 0 %
LYMPHOCYTES NFR BLD: 1.74 K/UL (ref 1.1–3.7)
LYMPHOCYTES RELATIVE PERCENT: 22 % (ref 24–43)
MCH RBC QN AUTO: 28.1 PG (ref 25.2–33.5)
MCHC RBC AUTO-ENTMCNC: 31.8 G/DL (ref 28.4–34.8)
MCV RBC AUTO: 88.2 FL (ref 82.6–102.9)
MONOCYTES NFR BLD: 0.58 K/UL (ref 0.1–1.2)
MONOCYTES NFR BLD: 7 % (ref 3–12)
NEUTROPHILS NFR BLD: 67 % (ref 36–65)
NEUTS SEG NFR BLD: 5.42 K/UL (ref 1.5–8.1)
NRBC BLD-RTO: 0 PER 100 WBC
PLATELET # BLD AUTO: 624 K/UL (ref 138–453)
PMV BLD AUTO: 11.4 FL (ref 8.1–13.5)
POTASSIUM SERPL-SCNC: 3.8 MMOL/L (ref 3.7–5.3)
RBC # BLD AUTO: 3.56 M/UL (ref 3.95–5.11)
RBC # BLD: ABNORMAL 10*6/UL
SODIUM SERPL-SCNC: 137 MMOL/L (ref 135–144)
SURGICAL PATHOLOGY REPORT: NORMAL
WBC OTHER # BLD: 8.1 K/UL (ref 3.5–11.3)

## 2023-07-26 PROCEDURE — 97110 THERAPEUTIC EXERCISES: CPT

## 2023-07-26 PROCEDURE — 85027 COMPLETE CBC AUTOMATED: CPT

## 2023-07-26 PROCEDURE — 6370000000 HC RX 637 (ALT 250 FOR IP): Performed by: STUDENT IN AN ORGANIZED HEALTH CARE EDUCATION/TRAINING PROGRAM

## 2023-07-26 PROCEDURE — 97530 THERAPEUTIC ACTIVITIES: CPT

## 2023-07-26 PROCEDURE — 80048 BASIC METABOLIC PNL TOTAL CA: CPT

## 2023-07-26 PROCEDURE — 6360000002 HC RX W HCPCS: Performed by: STUDENT IN AN ORGANIZED HEALTH CARE EDUCATION/TRAINING PROGRAM

## 2023-07-26 PROCEDURE — A4216 STERILE WATER/SALINE, 10 ML: HCPCS | Performed by: STUDENT IN AN ORGANIZED HEALTH CARE EDUCATION/TRAINING PROGRAM

## 2023-07-26 PROCEDURE — 2580000003 HC RX 258: Performed by: STUDENT IN AN ORGANIZED HEALTH CARE EDUCATION/TRAINING PROGRAM

## 2023-07-26 PROCEDURE — 99233 SBSQ HOSP IP/OBS HIGH 50: CPT | Performed by: NURSE PRACTITIONER

## 2023-07-26 PROCEDURE — 99239 HOSP IP/OBS DSCHRG MGMT >30: CPT | Performed by: STUDENT IN AN ORGANIZED HEALTH CARE EDUCATION/TRAINING PROGRAM

## 2023-07-26 PROCEDURE — C9113 INJ PANTOPRAZOLE SODIUM, VIA: HCPCS | Performed by: STUDENT IN AN ORGANIZED HEALTH CARE EDUCATION/TRAINING PROGRAM

## 2023-07-26 PROCEDURE — 36415 COLL VENOUS BLD VENIPUNCTURE: CPT

## 2023-07-26 PROCEDURE — 6370000000 HC RX 637 (ALT 250 FOR IP): Performed by: NURSE PRACTITIONER

## 2023-07-26 RX ORDER — AMIODARONE HYDROCHLORIDE 200 MG/1
200 TABLET ORAL DAILY
Status: DISCONTINUED | OUTPATIENT
Start: 2023-07-26 | End: 2023-07-26 | Stop reason: HOSPADM

## 2023-07-26 RX ORDER — METOPROLOL SUCCINATE 25 MG/1
25 TABLET, EXTENDED RELEASE ORAL NIGHTLY
Qty: 30 TABLET | Refills: 3 | Status: SHIPPED | OUTPATIENT
Start: 2023-07-26

## 2023-07-26 RX ORDER — METOPROLOL SUCCINATE 25 MG/1
25 TABLET, EXTENDED RELEASE ORAL NIGHTLY
Status: DISCONTINUED | OUTPATIENT
Start: 2023-07-26 | End: 2023-07-26 | Stop reason: HOSPADM

## 2023-07-26 RX ORDER — AMIODARONE HYDROCHLORIDE 200 MG/1
200 TABLET ORAL DAILY
Qty: 30 TABLET | Refills: 2 | Status: SHIPPED | OUTPATIENT
Start: 2023-07-27

## 2023-07-26 RX ADMIN — SODIUM CHLORIDE, PRESERVATIVE FREE 40 MG: 5 INJECTION INTRAVENOUS at 13:24

## 2023-07-26 RX ADMIN — MIDODRINE HYDROCHLORIDE 10 MG: 5 TABLET ORAL at 08:14

## 2023-07-26 RX ADMIN — ASPIRIN 81 MG 81 MG: 81 TABLET ORAL at 08:14

## 2023-07-26 RX ADMIN — AMIODARONE HYDROCHLORIDE 200 MG: 200 TABLET ORAL at 13:25

## 2023-07-26 RX ADMIN — ALLOPURINOL 100 MG: 100 TABLET ORAL at 08:14

## 2023-07-26 RX ADMIN — LEVOTHYROXINE SODIUM 75 MCG: 75 TABLET ORAL at 06:43

## 2023-07-26 RX ADMIN — ATORVASTATIN CALCIUM 40 MG: 40 TABLET, FILM COATED ORAL at 08:14

## 2023-07-26 RX ADMIN — SODIUM CHLORIDE, PRESERVATIVE FREE 40 MG: 5 INJECTION INTRAVENOUS at 01:02

## 2023-07-26 RX ADMIN — MIDODRINE HYDROCHLORIDE 10 MG: 5 TABLET ORAL at 11:22

## 2023-07-26 RX ADMIN — FUROSEMIDE 20 MG: 20 TABLET ORAL at 08:14

## 2023-07-26 RX ADMIN — AMIODARONE HYDROCHLORIDE 0.5 MG/MIN: 50 INJECTION, SOLUTION INTRAVENOUS at 00:04

## 2023-07-26 RX ADMIN — DILTIAZEM HYDROCHLORIDE 180 MG: 180 CAPSULE, COATED, EXTENDED RELEASE ORAL at 08:14

## 2023-07-26 NOTE — DISCHARGE INSTRUCTIONS
Started on amiodarone, metoprolol  Continue cardizem  Call cardiology office if bp< 90 systolic  If hr lower than 55, then hold medications and call cardiology

## 2023-07-26 NOTE — CARE COORDINATION
[address] Flow/Interdisciplinary Rounds Progress Note    Quality Flow Rounds held on 2023 at 3500 [facility] Attending:  Bedside Nurse, , and Nursing Unit Leadership    Barriers to Discharge: clinical status    Anticipated Discharge Date:   23    Anticipated Discharge Disposition: home independent    Readmission Risk              Risk of Unplanned Readmission:  20           Discussed patient goal for the day, patient clinical progression, and barriers to discharge. RN reports plan for d/c today after PO amio started. 1600- Patient just received meds to beds and states that she no longer has transportation home. Patient states that she has no money for cab fare and that she would be unable to get a ride home until tomorrow. [employer] booked for patient transportation home.       Discharge [address] Case Management Department  Written by: Clare Mccallum RN    Patient Name: Mari Raoms  Attending Provider: Ed Jones MD  Admit Date: 2023  8:43 AM  MRN: [id number]  Account: [de-identified]                     : 1950  Discharge Date: 23      Disposition: home    Clare Mccallum RN

## 2023-07-26 NOTE — PLAN OF CARE
Problem: Discharge Planning  Goal: Discharge to home or other facility with appropriate resources  Outcome: Progressing  Flowsheets (Taken 7/25/2023 2000)  Discharge to home or other facility with appropriate resources: Identify barriers to discharge with patient and caregiver     Problem: Cardiovascular - Adult  Goal: Maintains optimal cardiac output and hemodynamic stability  Outcome: Progressing  Flowsheets (Taken 7/25/2023 2000)  Maintains optimal cardiac output and hemodynamic stability: Monitor blood pressure and heart rate  Goal: Absence of cardiac dysrhythmias or at baseline  Outcome: Progressing     Problem: Skin/Tissue Integrity  Goal: Absence of new skin breakdown  Description: 1. Monitor for areas of redness and/or skin breakdown  2. Assess vascular access sites hourly  3. Every 4-6 hours minimum:  Change oxygen saturation probe site  4. Every 4-6 hours:  If on nasal continuous positive airway pressure, respiratory therapy assess nares and determine need for appliance change or resting period.   Outcome: Progressing     Problem: Safety - Adult  Goal: Free from fall injury  Outcome: Progressing  Flowsheets (Taken 7/25/2023 1945)  Free From Fall Injury: Instruct family/caregiver on patient safety     Problem: Respiratory - Adult  Goal: Achieves optimal ventilation and oxygenation  7/26/2023 0202 by Chris Snow RCP  Outcome: Progressing

## 2023-07-26 NOTE — RESULT ENCOUNTER NOTE
Noted management per GI  Will discuss at appointment as well  Tubular adenoma repeat colonoscopy in  5 years      Future Appointments  8/9/2023   9:30 AM    Oskar Jiménez MD     [facility]  8/24/2023  10:30 AM   Francis Rodrigues DO              [facility]        [facility]  8/31/2023  11:15 AM   Foreign Haynes MD     [facility] RenalSrv        [facility] Renal Se  9/12/2023  10:30 AM   Marc Donovan APRN* [facility]        [facility]  10/12/2023 10:30 AM   Britta Alegre PA-C        GYN Oncology        [facility]  5/14/2024  11:00 AM   Oskar Jiménez MD     [facility]

## 2023-07-26 NOTE — DISCHARGE SUMMARY
[facility]  Office: [address], DO, Amauri Curet, DO, Ed Fischer, DO, Alicia Whitewright Blood, DO, Shaka Alberto MD, Keith Thompson MD, Isabell Castaneda MD, Estevan Guillen MD,  Sudarshan Kuhn MD, Patel Prajapati MD, Estrella Heath, DO, Rivas Meza MD,  Go Brito MD, Imelda Kurtz MD, Joselyn Elder DO, Joe Lazo MD,  Nikita Lozano DO, Jesus Hager MD, Colten Crook MD, Mari Varghese MD, Nayana Frias MD,  Karsten Ugalde MD, Brennan Contreras MD, Shanon Ngo, DO, Philis Halsted, MD,  Evelyn Hawkins MD, Jose John, CNP,  Shante Swift, CNP, Nilda Michael, CNP, Virgil Alamo, CNP,  Cheryle Balding, [facility], Valdemar Peabody, CNP, Ely Chapa, CNP, Maria Guadalupe Hogue, CNP, Elpidio Rosario, CNP, Hui Ramsey, CNP, Aliya Lake, PA-C, Liliya Valenzuela, CNS, Vicente Mitchell, CNP, Marcia Figueroa, [facility]    Discharge Summary     Patient ID: Cookie Young  :  1950   MRN: [id number]     ACCOUNT:  [de-identified]   Patient's PCP: Tara Hernandez MD  Admit Date: 2023   Discharge Date: 2023     Length of Stay: 6  Code Status:  Full Code  Admitting Physician: Chepe Brothers MD  Discharge Physician: Chepe Brothers MD     Active Discharge Diagnoses:     Hospital Problem Lists:  Principal Problem:    Atrial fibrillation with rapid ventricular response [facility])  Active Problems:    Body mass index (BMI) 45.0-49.9, adult (HCC)    Sinus pause    ZACKERY treated with BiPAP    Hypothyroid    History of TIA (transient ischemic attack)    Stage 3b chronic kidney disease ([facility])    Hyperlipidemia with target LDL less than 100    Type 2 diabetes mellitus, without long-term current use of insulin (HCC)    Chronic diastolic congestive heart failure (HCC)    Microcytic anemia    Chronic gout due to renal impairment without tophus    Secondary hypercoagulable state ([address])    H/O colonoscopy with

## 2023-07-26 NOTE — PLAN OF CARE
Pt given written discharge instructions, verbalized understanding of material including new medications and follow up appointments that need to be made. PIV removed per protocol with no complications. Cab called by , Sofia ROA. Pt to be discharged per Dr Tarsha Saldaña when cab arrives.

## 2023-07-27 ENCOUNTER — CARE COORDINATION (OUTPATIENT)
Dept: CASE MANAGEMENT | Age: 73
End: 2023-07-27

## 2023-07-27 ENCOUNTER — TELEPHONE (OUTPATIENT)
Dept: FAMILY MEDICINE CLINIC | Age: 73
End: 2023-07-27

## 2023-07-27 LAB
EKG ATRIAL RATE: 375 BPM
EKG Q-T INTERVAL: 400 MS
EKG QRS DURATION: 84 MS
EKG QTC CALCULATION (BAZETT): 494 MS
EKG R AXIS: 55 DEGREES
EKG T AXIS: 69 DEGREES
EKG VENTRICULAR RATE: 92 BPM

## 2023-07-27 PROCEDURE — 93010 ELECTROCARDIOGRAM REPORT: CPT | Performed by: INTERNAL MEDICINE

## 2023-07-27 NOTE — TELEPHONE ENCOUNTER
Incoming call came from [address] [place] [address]:  [facility]. Nurse CREED is calling to see, if provider. Malini Weiner will be able to sign off on home health care orders PT/OT order's for patient. Nurse stated that they are okay with a verbal consent. Stated that office can call back . Please give a call back at [phone]. Facility did give permission (Voicemail is secure) to leave a detail message with a (verbal response/answer) if provider will sign off. Please advise, Thank you!       Future Appointments   Date Time Provider [address]   8/7/2023  8:30 AM [facility]   8/9/2023  9:30 [facility]   8/15/2023  9:30 AM MAX Allen - CNP [facility]   8/24/2023 10:30 AM Francis Rodrigues DO [facility]   8/31/2023 11:15 AM Gloria Sanchez MD [facility] Renal[facility]rv [facility] Ren[facility] Se   9/12/2023 10:30 AM MAX Allen - CNP [facility]   10/12/2023 10:30 AM Dariana Angel PA-C GYN Oncology [facility]   5/14/2024 11:00 AM [facility]

## 2023-07-27 NOTE — TELEPHONE ENCOUNTER
[address] [facility],1St Floor Transitions Initial Follow Up Call    Outreach made within 2 business days of discharge: Yes    Patient: Radha Mathew Patient : 1950   MRN: [id number]  Reason for Admission: There are no discharge diagnoses documented for the most recent discharge. Discharge Date: 23       Spoke with: Spence Meckel    If Virtual visit made for hospital follow up, advise patient to make sure to have family member present to help assist with visit. Please make sure mobile number is updated in patient chart. Discharge department/facility:  2023 - 2023 (6 days)  Babs Montana MD  Last attending  Treatment team Atrial fibrillation with rapid ventricular response [facility])  Principal problem       TCM Interactive Patient Contact:  Was patient able to fill all prescriptions: Yes  Was patient instructed to bring all medications to the follow-up visit: Yes  Is patient taking all medications as directed in the discharge summary?  Yes  Does patient understand their discharge instructions: Yes  Does patient have questions or concerns that need addressed prior to 7-14 day follow up office visit: yes - was unable to get in sooner, will place on wait-list.     Scheduled appointment with PCP within 7-14 days    Follow Up  Future Appointments   Date Time Provider [address]   2023  8:30 AM [facility]   2023  9:30 AM Seble Allen MD [facility]   8/15/2023  9:30 AM MAX Allen CNP [facility]   2023 10:30 AM Francis Rodrigues DO [facility]   2023 11:15 AM MD RJ Peña Renal[facility]rv [facility]   2023 10:30 AM MAX Allen CNP [facility]   10/12/2023 10:30 AM Dariana Angel PA-C GYN Oncology [facility]   2024 11:00 AM Seble Allen MD [address] Home

## 2023-07-27 NOTE — CARE COORDINATION
Care Transitions  Initial Outreach Attempt # 1    Call within 2 business days of discharge: Yes   Attempted to reach patient for transitions of care follow up. Unable to reach patient. Left detailed VM with reason for call & contact information. Requested a  call back. Will attempt outreach another day. Patient: Alessandro Mao Patient : 1950 MRN: [id number]    Last Discharge [address]       Date Complaint Diagnosis Description Type Department Provider    23  Atrial fibrillation with rapid ventricular response ([address]) . .. Admission (Discharged) Virgen Garcia MD              Was this an external facility discharge? No Discharge Facility: [facility]    Noted following upcoming appointments from discharge chart review:   [facility] follow up appointment(s):   Future Appointments   Date Time Provider [address]  [facility]   2023  9:30 AM [facility]   2023 10:30 AM Francis Rodrigues DO [facility]   2023 11:15 AM Francis Woodruff MD [facility]   2023 10:30 AM Mamta Jimenez APRN - CNP [facility]   10/12/2023 10:30 AM Princess Low PA-C GYN Oncology [facility]   2024 11:00 AM Lucero Luu MD fp [facility]     Dacia CASANOVA [address] [facility] ,[address] [place] [address]    Care Transitions Nurse  Cell

## 2023-07-27 NOTE — TELEPHONE ENCOUNTER
Noted. Thank you!   Yes we will sign the order for home care    Future Appointments   Date Time Provider [address]   8/7/2023  8:30 AM [facility]   8/9/2023  9:30 AM [facility]   8/15/2023  9:30 AM MAX Cannon CNP [facility]   8/24/2023 10:30 AM Francis Rodrigues DO [facility]   8/31/2023 11:15 AM Alyssa Snow MD [facility]   9/12/2023 10:30 AM MAX Cannon CNP [facility]   10/12/2023 10:30 AM Dayan Vaughan PA-C GYN Oncology [facility]   5/14/2024 11:00 AM Nabil Rich MD [facility]

## 2023-07-28 ENCOUNTER — CARE COORDINATION (OUTPATIENT)
Dept: CASE MANAGEMENT | Age: 73
End: 2023-07-28

## 2023-07-28 NOTE — CARE COORDINATION
[facility] Care Transitions Initial Follow Up Call    Call within 2 business days of discharge: Yes    Patient Current Location:  Home: [address] [place] [address]    Care Transition Nurse contacted the patient by telephone to perform post hospital discharge assessment. Verified name and  with patient as identifiers. Provided introduction to self, and explanation of the Care Transition Nurse role. Patient: Jasmyne Peoples Patient : 1950   MRN: [id number]  Reason for Admission: Afib with RVR  Discharge Date: 23 RARS: Readmission Risk Score: 15.4      Last Discharge [address]       Date Complaint Diagnosis Description Type Department Provider    23  Atrial fibrillation with rapid ventricular response ([address]) . .. Admission (Discharged) Johnathan Saini MD            Was this an external facility discharge? No Discharge Facility: [facility]    Challenges to be reviewed by the provider   Additional needs identified to be addressed with provider: No  none               Method of communication with provider: none. [facility]. Pt was discharged on  from [address] [place] [address], was a transfer from [facility] for A fib with RVR. Cardioversion failed x 2 attempts, will need OP ablation. Stated she is doing OK, no increased SOB, palpitations, CP/pressure. Discussed to hold medications if pulse below 55 or systolic BP below 90. Stated she was unaware, verbalized understanding. Pt has new medications amiodarone and metoprolol. Writer asked pt if she is checking vitals at home, stated \"yes\" then stated she needs to end call, getting another call. Patient ended call. Unable to review medications, appts, discuss RPM. Will follow up for transitions. Care Transition Nurse reviewed discharge instructions with patient who verbalized understanding. The patient was given an opportunity to ask questions and does not have any further questions or concerns at this time.  Were discharge

## 2023-07-31 ENCOUNTER — CARE COORDINATION (OUTPATIENT)
Dept: CASE MANAGEMENT | Age: 73
End: 2023-07-31

## 2023-07-31 DIAGNOSIS — I48.91 ATRIAL FIBRILLATION WITH RAPID VENTRICULAR RESPONSE (HCC): Primary | ICD-10-CM

## 2023-07-31 PROCEDURE — 1111F DSCHRG MED/CURRENT MED MERGE: CPT | Performed by: FAMILY MEDICINE

## 2023-07-31 NOTE — DISCHARGE INSTR - COC
- hydrate with half strength apple juice/gatorade.     Continuity of Care Form    Patient Name: Mortimer Lars   :  1950  MRN:  [id number]    Admit date:  6/15/2020  Discharge date:  2020    Code Status Order: Full Code   Advance Directives:   [facility] Documentation     Date/Time Healthcare Directive Type of Healthcare Directive Copy in [facility] [address] Agent's Name Healthcare Agent's Phone Number    20 0039  No, patient does not have an advance directive for healthcare treatment -- -- -- -- --          Admitting Physician:  Manju Celis MD  PCP: Bang De La O MD    Discharging Nurse: [facility] Unit/Room#: 2064/2064-01  Discharging Unit Phone Number: [phone]    Emergency Contact:   Extended Emergency Contact Information  Primary Emergency Contact: Donna Dennis  Address: [address] Phone: [phone]  Mobile Phone: [phone]  Relation: Brother/Sister  Hearing or visual needs: None  Other needs: None  Preferred language: English   needed? No  Secondary Emergency Contact: Lam Baldwin [address] Phone: [phone]  Relation: Child  Hearing or visual needs: None  Other needs: None  Preferred language: English   needed?  No    Past Surgical History:  Past Surgical History:   Procedure Laterality Date    CATARACT REMOVAL WITH IMPLANT Bilateral     COLONOSCOPY      had polyp, she doesn't know where and when, \"20 yrs ago\"    COLONOSCOPY  2017    DILATION AND CURETTAGE OF UTERUS N/A 2017    HYSTEROSCOPY  WITH MYOSURE performed by Sailaja Alicia DO at [address] N/A 10/24/2017    TOTAL LAPAROSCOPIC HYSTERECTOMY, BSO, F.S.  STAGING, GYRUS G400 performed by Clarisa Dumont MD at [address] N/A 2017    COLONOSCOPY POLYPECTOMY / HOT SNARE performed by Kendra Henao DO at [place] [address] AND BSO  10/24/2017    with pelvic lymph node dissection    THYROID SURGERY  1980    subtotal 20 years ago    TONSILLECTOMY      VITRECTOMY      FLOATERS       Immunization History:   Immunization History   Administered Date(s) Administered    Influenza Vaccine, unspecified formulation 10/01/2016    Influenza Virus Vaccine 10/01/2019    Influenza, Triv, inactivated, subunit, adjuvanted, IM (Fluad 65 yrs and older) 09/14/2017, 10/02/2018    Pneumococcal Conjugate 13-valent ([id number]) 02/23/2017    Pneumococcal Polysaccharide ([id number]) 04/20/2018    Tdap (Boostrix, Adacel) 09/28/2017       Active Problems:  Patient Active Problem List   Diagnosis Code    Acquired hypothyroidism E03.9    History of TIA (transient ischemic attack) Z86.73    Chronic bilateral low back pain without sciatica M54.5, G89.29    Essential hypertension I10    Osteopenia determined by x-ray M85.80    Osteoarthritis involving multiple joints on both sides of body M15.9    Left knee DJD M17.12    Prediabetes R73.03    Vitamin D deficiency E55.9    Mixed incontinence N39.46    Chronic pain of both knees M25.561, M25.562, G89.29    Slow transit constipation K59.01    Allergic rhinitis J30.9    Hyperlipidemia with target LDL less than 100 E78.5    Morbid obesity with BMI of 40.0-44.9, adult (HCC) E66.01, Z68.41    At high risk for falls Z91.81    Dense breast tissue on mammogram R92.2    Hyperglycemia R73.9    Spondylosis of lumbar region without myelopathy or radiculopathy M47.816    OAB (overactive bladder) N32.81    Primary osteoarthritis of both knees M17.0    Chronic fatigue  R53.82    Adenomatous polyp of sigmoid colon, 6/26/17 D12.5    Mixed stress and urge urinary incontinence N39.46    TLHBSO, bilateral LND 10/24/17 Z90.710, Z90.79, Z90.722    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    Cataracta b/l eyes H26.9    Difficulty walking R26.2    Esotropia H50.00    Nystagmus H55.00    History of uterine cancer, Well differentiated grade 1 adenocarcinoma arising in complex hyperplasia, 2017 Z85.42    Vitamin B 12 deficiency E53.8    Aortic calcification (HCC) I70.0    Calculus of gallbladder without cholecystitis without obstruction K80.20    CLAROS (nonalcoholic steatohepatitis) K75.81    Optic atrophy H47.20    Cough present for greater than 3 weeks R05    Dyspepsia R10.13    A-fib (HCC) I48.91    Type 2 diabetes mellitus, without long-term current use of insulin (HCC) E11.9    JOSHUA (acute kidney injury) ([facility] N17.9    Atrial fibrillation, new onset ([facility] I48.91    Mobility impaired Z74.09    Perforated diverticulum K57.80       Isolation/Infection:   Isolation          No Isolation        Patient Infection Status     None to display          Nurse Assessment:  Last Vital Signs: BP (!) 141/52   Pulse 78   Temp 100 °F (37.8 °C) (Oral)   Resp 16   Ht 5' 2\" (1.575 m)   Wt 250 lb 3.6 oz (113.5 kg)   LMP  (LMP Unknown)   SpO2 95%   BMI 45.77 kg/m²     Last documented pain score (0-10 scale): Pain Level: 0(fever)  Last Weight:   Wt Readings from Last 1 Encounters:   06/16/20 250 lb 3.6 oz (113.5 kg)     Mental Status:  oriented, alert and coherent    IV Access:  - None    Nursing Mobility/ADLs:  Walking   Assisted  Transfer  Assisted  Bathing  Assisted  Dressing  Assisted  Toileting  Assisted  Feeding  Independent  Med Admin  Independent  Med Delivery   whole    Wound Care Documentation and Therapy:  Incision 10/24/17 Abdomen (Active)   Number of days: 965        Elimination:  Continence:   · Bowel: Yes  · Bladder: No  Urinary Catheter: None   Colostomy/Ileostomy/Ileal Conduit: No       Date of Last BM: 6/21/2020    Intake/Output Summary (Last 24 hours) at 6/16/2020 1001  Last data filed at 6/16/2020 0727  Gross per 24 hour   Intake 1180 ml   Output --   Net 1180 ml     No intake/output data recorded. Safety Concerns:      At Risk for Falls    Impairments/Disabilities:      None    Nutrition Therapy:  Current Nutrition Therapy:   - Oral Diet:  Low Fiber    Routes of Feeding: Oral  Liquids: No Restrictions  Daily Fluid Restriction: no  Last Modified Barium Swallow with Video (Video Swallowing Test): not done    Treatments at the Time of Hospital Discharge:   Respiratory Treatments: see orders  Oxygen Therapy:  is not on home oxygen therapy. Ventilator:    - No ventilator support    Rehab Therapies: Physical Therapy and Occupational Therapy  Weight Bearing Status/Restrictions: No weight bearing restirctions  Other Medical Equipment (for information only, NOT a DME order):  walker and bedside commode  Other Treatments: Skilled Nursing assessment, Medication Education and Monitoring    Patient's personal belongings (please select all that are sent with patient):  clothing, seated walker    RN SIGNATURE:  Electronically signed by Luis Mccoy RN on 6/22/20 at 3:36 PM EDT    CASE MANAGEMENT/SOCIAL WORK SECTION    Inpatient Status Date: 6/15/2020    Readmission Risk Assessment Score:  Readmission Risk              Risk of Unplanned Readmission:        19           Discharging to Facility/ Agency:    [facility]. [address] [place] [address]  [phone]  [facility]  [address] [facility] [address]  [place] [address]  Phone [phone]  Fax  [phone]    [facility] 3 X a week from [employer].     / signature: Electronically signed by Michael Perez RN on 6/16/20 at 10:01 AM EDT    PHYSICIAN SECTION    Prognosis: {Prognosis:8591651647}    Condition at Discharge: [facility] Patient Condition:043940006}    Rehab Potential (if transferring to Rehab): {Prognosis:5359358198}    Recommended Labs or Other Treatments After Discharge: ***    Physician Certification: I certify the above information and transfer of Yudy Shore  is necessary for the continuing treatment of the diagnosis listed and that she requires {Admit to Appropriate Level of Care:87021} for {GREATER/LESS:669418402} 30 days.      Update Admission H&P: {CHP DME Changes in QOGJF:734891663}    PHYSICIAN SIGNATURE:  Electronically signed by Zari Hoskins MD on 6/22/20 at 2:44 PM EDT

## 2023-07-31 NOTE — CARE COORDINATION
[facility] Care Transitions Follow Up Call    Patient Current Location:  Home: [address] [place] [address] Care Coordinator contacted the patient by telephone to follow up after admission on . Verified name and  with patient as identifiers. Patient: Clyde Hinson  Patient : 1950   MRN: [id number]  Reason for Admission: Afib with RVR  Discharge Date: 23 RARS: Readmission Risk Score: 15.4      Needs to be reviewed by the provider   Additional needs identified to be addressed with provider: Yes  Spoke with pt regarding  &  appointment can they be done together? She has los of F/U appointments back to back. She said she will call office. Method of communication with provider: chart routing. Subsequent transitional call. Spoke to : Thiago Gaitan today . She reports nurse from [place] living was out today.  something over 70 p. 76. She denies CP, sob, palpitations, HA, dizziness, nausea,fever/ chills  vomiting. She has diarrhea it's okay it's because I had a colonoscopy/. Appetite  good. No problems voiding. Medication review completed 1111f  Discussed RPM declined reports she check her own BP     Discussed HFU she does have back to back appointments. Does not need transportation. Will send pcp a message regarding appointments. She states I'm going to call. Writer asked pt to get paper so she could write appointments down. After reviewing appointments she hung up phone. Addressed changes since last contact:  medications-review, HFU appointments   Discussed follow-up appointments. If no appointment was previously scheduled, appointment scheduling offered: Yes. Is follow up appointment scheduled within 7 days of discharge?  No.    Follow Up  Future Appointments   Date Time Provider [address]   2023  8:30 AM [facility]   2023  9:30 AM [facility]id[facility]   8/15/2023  9:30 AM MAX Olivera -

## 2023-08-02 ENCOUNTER — TELEPHONE (OUTPATIENT)
Dept: FAMILY MEDICINE CLINIC | Age: 73
End: 2023-08-02

## 2023-08-02 NOTE — TELEPHONE ENCOUNTER
Noted. Thank you!   I agree for speech therapy and memory evaluation    Future Appointments   Date Time Provider [address]   8/7/2023  8:30 AM [facility]   8/9/2023  9:30 [facility]   8/15/2023  9:30 AM Albino Lama, APRN - CNP [facility]   8/24/2023 10:30 AM Francis Rodrigues DO [facility]   8/31/2023 11:15 AM Jose E Mcleod MD [facility]   9/12/2023 10:30 AM Albino Lama APRN - CNP [facility]   10/12/2023 10:30 AM Mihaela Hyman PA-C GYN Oncology [facility]   5/14/2024 11:00 AM Basilio Farfan MD [facility]

## 2023-08-02 NOTE — TELEPHONE ENCOUNTER
Brett calls stating the nurse that sees patient feels she has some persistent issues with memory. They are requesting a order for speech therapy to help patient.

## 2023-08-04 DIAGNOSIS — K59.01 SLOW TRANSIT CONSTIPATION: ICD-10-CM

## 2023-08-04 RX ORDER — DOCUSATE SODIUM 100 MG/1
CAPSULE, LIQUID FILLED ORAL
Qty: 180 CAPSULE | Refills: 3 | Status: SHIPPED | OUTPATIENT
Start: 2023-08-04

## 2023-08-04 NOTE — TELEPHONE ENCOUNTER
Please Approve or Refuse. Send to Pharmacy per Pt's Request:      Next Visit Date:  8/7/2023   Last Visit Date: 5/11/2023    Hemoglobin A1C (%)   Date Value   05/11/2023 4.9   01/24/2023 5.8   09/02/2022 5.9             ( goal A1C is < 7)   BP Readings from Last 3 Encounters:   07/26/23 114/84   07/06/23 112/64   06/09/23 102/70          (goal 120/80)  BUN   Date Value Ref Range Status   07/26/2023 10 8 - 23 mg/dL Final     Creatinine   Date Value Ref Range Status   07/26/2023 0.8 0.5 - 0.9 mg/dL Final     Potassium   Date Value Ref Range Status   07/26/2023 3.8 3.7 - 5.3 mmol/L Final     Potassium reflex Magnesium   Date Value Ref Range Status   10/10/2021 4.1 3.5 - 5.2 meq/L Final     Comment:     Low level specimen hemolysis is present as indicated by the interference  level index on the Roche analyzer. The reported K+ level may be falsely  increased. If clinically warranted, recollection of the specimen is  suggested.

## 2023-08-07 ENCOUNTER — CARE COORDINATION (OUTPATIENT)
Dept: CASE MANAGEMENT | Age: 73
End: 2023-08-07

## 2023-08-07 NOTE — CARE COORDINATION
[address] [facility] Care Transitions Follow Up Call      Patient: Beltran Barry  Patient : 1950   MRN: [id number]  Reason for Admission: Afib with RVR  Discharge Date: 23 RARS: Readmission Risk Score: 15.4      Needs to be reviewed by the provider   Additional needs identified to be addressed with provider: No  none             Method of communication with provider: none. 1st attempt to reach patient for ED follow up and Care Transitions. No answer and no voice mail. Will attempt to contact at a later date/time.     Follow Up  Future Appointments   Date Time Provider [address]   2023  9:30 AM MD mahamed Barrera sc [facility]   8/15/2023  9:30 AM MAX Billings CNP [facility]   2023 10:30 AM Francis Rodrigues DO [facility]   2023 11:15 AM Twin Munson MD [facility]   2023 10:30 AM MAX Billings CNP [facility]   10/12/2023 10:30 AM Dipika Jaime PA-C GYN Oncology [facility]   2024 11:00 AM MD mahamed Barrera [facility] Transitions Subsequent and Final Call    Subsequent and Final Calls  Are you currently active with any services?: Home Health  Care Transitions Interventions    Registered Dietician: Completed    Other Interventions:           Plan for next call:  follow up on cardio related symptoms from A fib RVR, remind pt about  PCP appointment on 23    Cameron Huff RN

## 2023-08-08 ENCOUNTER — CARE COORDINATION (OUTPATIENT)
Dept: CASE MANAGEMENT | Age: 73
End: 2023-08-08

## 2023-08-08 NOTE — CARE COORDINATION
[facility] Care Transitions Follow Up Call    Patient Current Location:  Home: [address] [place] [address]    Care Transition Nurse contacted the patient by telephone to follow up after admission on 23. Verified name and  with patient as identifiers. Patient: Mirela Lee  Patient : 1950   MRN: [id number]  Reason for Admission: Afib with RVR  Discharge Date: 23 RARS: Readmission Risk Score: 15.4      Needs to be reviewed by the provider   Additional needs identified to be addressed with provider: No  none             Method of communication with provider: none. Was able to contact Darcia Leventhal for transitional outreach and ED follow up. She said that she was doing better. She went to the ED for shortness of breath. She stated that she no longer is experiencing shortness of breath, no chest pain/palpitations, no swelling and continues with \"a little\" diarrhea. She said that home care continues with therapy. She will be going to see her PCP tomorrow. She had no further questions/concerns. Addressed changes since last contact:   ED follow up from [facility]  Discussed follow-up appointments. If no appointment was previously scheduled, appointment scheduling offered: Yes. Is follow up appointment scheduled within 7 days of discharge?  No.    Follow Up  Future Appointments   Date Time Provider [facility] [address]   2023  9:30 AM MD mahamed Sandoval [facility]   8/15/2023  9:30 AM MAX Wells CNP [facility]   2023 10:30 AM Francis Rodrigues DO [facility]   2023 11:15 AM Maria Del Rosario Flanneyr MD [facility]   2023 10:30 AM MAX Wells CNP [facility]   10/12/2023 10:30 AM Yunior Andrews PA-C GYN Oncology [facility]   2024 11:00 AM MD mahamed Sandoval sc [address] [place] Transition Nurse reviewed medical action plan with patient and discussed any barriers to care and/or

## 2023-08-15 ENCOUNTER — CARE COORDINATION (OUTPATIENT)
Dept: CASE MANAGEMENT | Age: 73
End: 2023-08-15

## 2023-08-15 NOTE — CARE COORDINATION
[facility] Care Transitions Follow Up Call      Patient: Isadora Ospina  Patient : 1950   MRN: [id number]  Reason for Admission: Afib with RVR  Discharge Date: 23 RARS: Readmission Risk Score: 15.4      Needs to be reviewed by the provider   Additional needs identified to be addressed with provider: No  none             Method of communication with provider: none. Attempt to reach patient for Care Transitions. No answer and no voice mail. Will attempt to contact at a later time/date. Follow Up  Future Appointments   Date Time Provider [address]   2023 10:30 AM Francis Rodrigues DO [facility]   2023 11:15 AM Celeste Us MD [facility]   10/12/2023 10:30 AM Karly Carrillo PA-C GYN Oncology [facility]   2024 11:00 AM Carissa Sierra MD [facility] [address] Transitions Subsequent and Final Call    Subsequent and Final Calls  Are you currently active with any services?: Home Health  Care Transitions Interventions    Registered Dietician: Completed    Other Interventions:             Plan for next call: symptom management-any s/s from Afib. If unable to reach for 2nd attempt. Hand off to [facility].     Reta Grover RN

## 2023-08-16 ENCOUNTER — CARE COORDINATION (OUTPATIENT)
Dept: CASE MANAGEMENT | Age: 73
End: 2023-08-16

## 2023-08-16 NOTE — CARE COORDINATION
[facility] Care Transitions Follow Up Call        Patient: Damian Becker  Patient : 1950   MRN: [id number]  Reason for Admission: Afib with RVR  Discharge Date: 23 RARS: Readmission Risk Score: 15.4      Needs to be reviewed by the provider   Additional needs identified to be addressed with provider: No  none             Method of communication with provider: none. Final attempt to reach patient for care transitions. LM requesting return call. Contact information provided. Episode resolved at this time. Follow Up  Future Appointments   Date Time Provider [address]   2023 10:30 AM Francis Rodrigues DO [facility]   2023 11:15 AM Priyank Luevano MD [facility] Renal[facility] Ren[facility]   10/12/2023 10:30 AM Victoriano Cochran PA-C GYN Oncology [facility]   2024 11:00 AM Hawa Giles MD [facility] Transitions Subsequent and Final Call    Subsequent and Final Calls  Are you currently active with any services?: Home Health  Care Transitions Interventions    Registered Dietician: Completed    Other Interventions:               Plan for next call: referral to ambulatory care manager-Jaylyn Rey  If not return call final call.     Juani Walker, JANINA

## 2023-08-22 RX ORDER — AMIODARONE HYDROCHLORIDE 400 MG/1
TABLET ORAL
Qty: 60 TABLET | Refills: 0 | OUTPATIENT
Start: 2023-08-22

## 2023-08-31 PROBLEM — M81.0 AGE-RELATED OSTEOPOROSIS WITHOUT CURRENT PATHOLOGICAL FRACTURE: Status: ACTIVE | Noted: 2020-06-22

## 2023-08-31 PROBLEM — M62.81 MUSCLE WEAKNESS (GENERALIZED): Status: ACTIVE | Noted: 2020-06-23

## 2023-08-31 PROBLEM — H54.8 LEGAL BLINDNESS, AS DEFINED IN USA: Status: ACTIVE | Noted: 2020-06-22

## 2023-08-31 PROBLEM — N17.9 ACUTE KIDNEY FAILURE, UNSPECIFIED (HCC): Status: ACTIVE | Noted: 2020-06-22

## 2023-08-31 PROBLEM — K57.80 DIVERTICULITIS OF INTESTINE, PART UNSPECIFIED, WITH PERFORATION AND ABSCESS WITHOUT BLEEDING: Status: ACTIVE | Noted: 2020-06-22

## 2023-09-05 RX ORDER — DILTIAZEM HYDROCHLORIDE 180 MG/1
CAPSULE, COATED, EXTENDED RELEASE ORAL
Qty: 30 CAPSULE | Refills: 2 | Status: SHIPPED | OUTPATIENT
Start: 2023-09-05

## 2023-09-05 NOTE — TELEPHONE ENCOUNTER
Please Approve or Refuse. Send to Pharmacy per Pt's Request: med-x     Next Visit Date:  Visit date not found   Last Visit Date: 5/11/2023    Hemoglobin A1C (%)   Date Value   05/11/2023 4.9   01/24/2023 5.8   09/02/2022 5.9             ( goal A1C is < 7)   BP Readings from Last 3 Encounters:   08/31/23 112/68   07/26/23 114/84   07/06/23 112/64          (goal 120/80)  BUN   Date Value Ref Range Status   07/26/2023 10 8 - 23 mg/dL Final     Creatinine   Date Value Ref Range Status   07/26/2023 0.8 0.5 - 0.9 mg/dL Final     Potassium   Date Value Ref Range Status   07/26/2023 3.8 3.7 - 5.3 mmol/L Final     Potassium reflex Magnesium   Date Value Ref Range Status   10/10/2021 4.1 3.5 - 5.2 meq/L Final     Comment:     Low level specimen hemolysis is present as indicated by the interference  level index on the Roche analyzer. The reported K+ level may be falsely  increased. If clinically warranted, recollection of the specimen is  suggested.

## 2023-09-08 ENCOUNTER — TELEPHONE (OUTPATIENT)
Dept: FAMILY MEDICINE CLINIC | Age: 73
End: 2023-09-08

## 2023-09-08 NOTE — TELEPHONE ENCOUNTER
Patient needs an appointment for diabetes, in a few weeks, and to document urine incontinence, I will send the paper    Also to schedule the mammogram      Future Appointments   Date Time Provider [address] [place] [address]   10/5/2023 10:15 AM DO RJ He   10/12/2023 10:30 AM Dariana Angel PA-C [address] [place] [address]   5/14/2024 11:00 AM Seble Allen MD fp [facility]

## 2023-09-24 ENCOUNTER — CARE COORDINATION (OUTPATIENT)
Dept: CARE COORDINATION | Age: 73
End: 2023-09-24

## 2023-10-02 ENCOUNTER — CARE COORDINATION (OUTPATIENT)
Dept: CARE COORDINATION | Age: 73
End: 2023-10-02

## 2023-10-02 NOTE — CARE COORDINATION
Called Go[facility] Johannyjulia to discuss ACM program and enrollment. She declines services. She lives alone in her apartment. Uses her walker. She uses the [employer] transportation for appts, and is aware of the [facility] and insurance provided transportation. No concerns with medication costs. She states her daughter comes over to fill a pill dispenser. Has f/u with cardiology this Thursday, Gyn, and PCP later in the month. Verified dates and times with her. She denies any symptoms. Denies any needs or concerns but will keep ACM's number handy if any needs in the future.

## 2023-10-18 ENCOUNTER — TELEPHONE (OUTPATIENT)
Dept: GYNECOLOGIC ONCOLOGY | Age: 73
End: 2023-10-18

## 2023-10-26 ENCOUNTER — OFFICE VISIT (OUTPATIENT)
Dept: FAMILY MEDICINE CLINIC | Age: 73
End: 2023-10-26
Payer: MEDICARE

## 2023-10-26 VITALS
SYSTOLIC BLOOD PRESSURE: 122 MMHG | DIASTOLIC BLOOD PRESSURE: 84 MMHG | WEIGHT: 249 LBS | BODY MASS INDEX: 45.82 KG/M2 | TEMPERATURE: 97.4 F | OXYGEN SATURATION: 97 % | HEART RATE: 96 BPM | HEIGHT: 62 IN

## 2023-10-26 DIAGNOSIS — R26.2 DIFFICULTY WALKING: ICD-10-CM

## 2023-10-26 DIAGNOSIS — M47.816 SPONDYLOSIS OF LUMBAR REGION WITHOUT MYELOPATHY OR RADICULOPATHY: ICD-10-CM

## 2023-10-26 DIAGNOSIS — I48.11 LONGSTANDING PERSISTENT ATRIAL FIBRILLATION (HCC): ICD-10-CM

## 2023-10-26 DIAGNOSIS — M17.0 PRIMARY OSTEOARTHRITIS OF BOTH KNEES: ICD-10-CM

## 2023-10-26 DIAGNOSIS — I50.32 CHRONIC DIASTOLIC CONGESTIVE HEART FAILURE (HCC): ICD-10-CM

## 2023-10-26 DIAGNOSIS — M81.0 AGE-RELATED OSTEOPOROSIS WITHOUT CURRENT PATHOLOGICAL FRACTURE: ICD-10-CM

## 2023-10-26 DIAGNOSIS — E11.22 TYPE 2 DIABETES MELLITUS WITH STAGE 3B CHRONIC KIDNEY DISEASE, WITHOUT LONG-TERM CURRENT USE OF INSULIN (HCC): Primary | ICD-10-CM

## 2023-10-26 DIAGNOSIS — Z91.81 AT HIGH RISK FOR FALLS: ICD-10-CM

## 2023-10-26 DIAGNOSIS — N18.32 TYPE 2 DIABETES MELLITUS WITH STAGE 3B CHRONIC KIDNEY DISEASE, WITHOUT LONG-TERM CURRENT USE OF INSULIN (HCC): Primary | ICD-10-CM

## 2023-10-26 DIAGNOSIS — D64.9 ACUTE ON CHRONIC ANEMIA: ICD-10-CM

## 2023-10-26 PROBLEM — N17.9 ACUTE KIDNEY FAILURE, UNSPECIFIED (HCC): Status: RESOLVED | Noted: 2020-06-22 | Resolved: 2023-10-26

## 2023-10-26 PROBLEM — Z86.16 HISTORY OF 2019 NOVEL CORONAVIRUS DISEASE (COVID-19): Status: RESOLVED | Noted: 2020-09-15 | Resolved: 2023-10-26

## 2023-10-26 LAB — HBA1C MFR BLD: 6.1 %

## 2023-10-26 PROCEDURE — 1090F PRES/ABSN URINE INCON ASSESS: CPT | Performed by: FAMILY MEDICINE

## 2023-10-26 PROCEDURE — G8427 DOCREV CUR MEDS BY ELIG CLIN: HCPCS | Performed by: FAMILY MEDICINE

## 2023-10-26 PROCEDURE — 3017F COLORECTAL CA SCREEN DOC REV: CPT | Performed by: FAMILY MEDICINE

## 2023-10-26 PROCEDURE — G8399 PT W/DXA RESULTS DOCUMENT: HCPCS | Performed by: FAMILY MEDICINE

## 2023-10-26 PROCEDURE — 1036F TOBACCO NON-USER: CPT | Performed by: FAMILY MEDICINE

## 2023-10-26 PROCEDURE — 1123F ACP DISCUSS/DSCN MKR DOCD: CPT | Performed by: FAMILY MEDICINE

## 2023-10-26 PROCEDURE — 3044F HG A1C LEVEL LT 7.0%: CPT | Performed by: FAMILY MEDICINE

## 2023-10-26 PROCEDURE — 99214 OFFICE O/P EST MOD 30 MIN: CPT | Performed by: FAMILY MEDICINE

## 2023-10-26 PROCEDURE — 83036 HEMOGLOBIN GLYCOSYLATED A1C: CPT | Performed by: FAMILY MEDICINE

## 2023-10-26 PROCEDURE — G8417 CALC BMI ABV UP PARAM F/U: HCPCS | Performed by: FAMILY MEDICINE

## 2023-10-26 PROCEDURE — 3288F FALL RISK ASSESSMENT DOCD: CPT | Performed by: FAMILY MEDICINE

## 2023-10-26 PROCEDURE — G8484 FLU IMMUNIZE NO ADMIN: HCPCS | Performed by: FAMILY MEDICINE

## 2023-10-26 PROCEDURE — 2022F DILAT RTA XM EVC RTNOPTHY: CPT | Performed by: FAMILY MEDICINE

## 2023-10-26 PROCEDURE — 0518F FALL PLAN OF CARE DOCD: CPT | Performed by: FAMILY MEDICINE

## 2023-10-26 RX ORDER — PNV NO.95/FERROUS FUM/FOLIC AC 28MG-0.8MG
TABLET ORAL
COMMUNITY
Start: 2023-10-02

## 2023-10-26 ASSESSMENT — ENCOUNTER SYMPTOMS
TROUBLE SWALLOWING: 0
SORE THROAT: 0
RHINORRHEA: 0
SHORTNESS OF BREATH: 1
COUGH: 0
SINUS PRESSURE: 0
VOMITING: 0
NAUSEA: 0
BLOOD IN STOOL: 0
ABDOMINAL DISTENTION: 0
CHEST TIGHTNESS: 0
CONSTIPATION: 0
COLOR CHANGE: 0
RECTAL PAIN: 0
DIARRHEA: 0
EYE REDNESS: 0
ABDOMINAL PAIN: 0
STRIDOR: 0
BACK PAIN: 1
WHEEZING: 0

## 2023-10-26 NOTE — PROGRESS NOTES
Hepatitis B vaccine (1 of 3 - Risk 3-dose series) Never done    COVID-19 Vaccine (5 - Pfizer series) 02/26/2023    Breast cancer screen  04/05/2023    Flu vaccine (1) 08/01/2023    A1C test (Diabetic or Prediabetic)  08/11/2023    Diabetic foot exam  01/24/2024    Diabetic Alb to Cr ratio (uACR) test  01/27/2024    Lipids  02/01/2024    DEXA (modify frequency per FRAX score)  04/05/2024    Depression Screen  05/11/2024    Annual Wellness Visit (AWV)  05/11/2024    GFR test (Diabetes, CKD 3-4, OR last GFR 15-59)  07/26/2024    DTaP/Tdap/Td vaccine (2 - Td or Tdap) 09/28/2027    Colorectal Cancer Screen  07/24/2028    Pneumococcal 65+ years Vaccine  Completed    Hepatitis C screen  Completed    Hepatitis A vaccine  Aged Out    Hib vaccine  Aged Out    Meningococcal (ACWY) vaccine  Aged Out
07/20/2023    AST 23 07/20/2023    ALKPHOS 79 07/20/2023    BILITOT 0.3 07/20/2023       Lab Results   Component Value Date    TSH 1.69 07/20/2023       Lab Results   Component Value Date    CHOL 158 02/01/2023    CHOL 196 09/02/2022    CHOL 174 10/20/2021     Lab Results   Component Value Date    TRIG 223 (H) 02/01/2023    TRIG 225 (H) 09/02/2022    TRIG 180 (H) 10/20/2021     Lab Results   Component Value Date    HDL 42 02/01/2023    HDL 54 01/27/2023    HDL 47 09/02/2022     Lab Results   Component Value Date    LDLCALC 70 11/06/2020    LDLCHOLESTEROL 71 02/01/2023    LDLCHOLESTEROL 84 01/27/2023    LDLCHOLESTEROL 104 09/02/2022     Lab Results   Component Value Date    VLDL NOT REPORTED (H) 10/20/2021    VLDL 28 11/06/2020    VLDL NOT REPORTED (H) 01/28/2020     Lab Results   Component Value Date    CHOLHDLRATIO 3.8 02/01/2023    CHOLHDLRATIO 3.3 01/27/2023    CHOLHDLRATIO 4.2 09/02/2022       Lab Results   Component Value Date    LABA1C 6.1 10/26/2023       Lab Results   Component Value Date    UVQMRDEY50 973 07/21/2023       Lab Results   Component Value Date    FOLATE 10.1 07/21/2023       Lab Results   Component Value Date    IRON 20 (L) 07/21/2023    TIBC 293 07/21/2023    FERRITIN 13 07/21/2023       Lab Results   Component Value Date    VITD25 38.0 06/20/2023             Current Outpatient Medications   Medication Sig Dispense Refill    magnesium oxide (MAG-OX) 250 MG TABS tablet       Lift Chair MISC by Does not apply route 1 each 0    rivaroxaban (XARELTO) 20 MG TABS tablet TAKE ONE (1) TABLET BY MOUTH DAILY (WITH BREAKFAST) 30 tablet 11    dilTIAZem (CARDIZEM CD) 180 MG extended release capsule TAKE ONE (1) CAPSULE (180 MG TOTAL) BY MOUTH IN THE MORNING.  30 capsule 2    midodrine (PROAMATINE) 10 MG tablet Take 1 tablet by mouth 3 times daily (with meals) 270 tablet 3    amiodarone (CORDARONE) 200 MG tablet Take 1 tablet by mouth daily 90 tablet 3    docusate sodium (COLACE) 100 MG capsule TAKE ONE (1)

## 2023-10-30 DIAGNOSIS — K21.9 GASTROESOPHAGEAL REFLUX DISEASE WITHOUT ESOPHAGITIS: ICD-10-CM

## 2023-10-30 RX ORDER — DILTIAZEM HYDROCHLORIDE 180 MG/1
CAPSULE, COATED, EXTENDED RELEASE ORAL
Qty: 30 CAPSULE | Refills: 2 | Status: SHIPPED | OUTPATIENT
Start: 2023-10-30

## 2023-10-30 RX ORDER — PANTOPRAZOLE SODIUM 40 MG/1
TABLET, DELAYED RELEASE ORAL
Qty: 28 TABLET | Refills: 8 | OUTPATIENT
Start: 2023-10-30

## 2023-10-30 NOTE — TELEPHONE ENCOUNTER
Please Approve or Refuse. Send to Pharmacy per Pt's Request:      Next Visit Date:  3/1/2024   Last Visit Date: 10/26/2023    Hemoglobin A1C (%)   Date Value   10/26/2023 6.1   05/11/2023 4.9   01/24/2023 5.8             ( goal A1C is < 7)   BP Readings from Last 3 Encounters:   10/26/23 122/84   10/05/23 (!) 122/50   08/31/23 112/68          (goal 120/80)  BUN   Date Value Ref Range Status   07/26/2023 10 8 - 23 mg/dL Final     Creatinine   Date Value Ref Range Status   07/26/2023 0.8 0.5 - 0.9 mg/dL Final     Potassium   Date Value Ref Range Status   07/26/2023 3.8 3.7 - 5.3 mmol/L Final     Potassium reflex Magnesium   Date Value Ref Range Status   10/10/2021 4.1 3.5 - 5.2 meq/L Final     Comment:     Low level specimen hemolysis is present as indicated by the interference  level index on the Roche analyzer. The reported K+ level may be falsely  increased. If clinically warranted, recollection of the specimen is  suggested.

## 2023-10-30 NOTE — TELEPHONE ENCOUNTER
Due to mild chronic kidney disease and osteopenia/osteoporosis, we need to change pantoprazole to Pepcid, does she agree? Otherwise I cannot refill that pantoprazole.   This is for heartburn, if no more heartburn we can stop it altogether

## 2023-11-01 ENCOUNTER — OFFICE VISIT (OUTPATIENT)
Dept: GYNECOLOGIC ONCOLOGY | Age: 73
End: 2023-11-01
Payer: MEDICARE

## 2023-11-01 VITALS
BODY MASS INDEX: 45.82 KG/M2 | SYSTOLIC BLOOD PRESSURE: 142 MMHG | WEIGHT: 249 LBS | HEART RATE: 58 BPM | DIASTOLIC BLOOD PRESSURE: 72 MMHG | HEIGHT: 62 IN | OXYGEN SATURATION: 97 %

## 2023-11-01 DIAGNOSIS — Z85.42 HISTORY OF UTERINE CANCER: Primary | ICD-10-CM

## 2023-11-01 PROCEDURE — 1090F PRES/ABSN URINE INCON ASSESS: CPT | Performed by: PHYSICIAN ASSISTANT

## 2023-11-01 PROCEDURE — G8484 FLU IMMUNIZE NO ADMIN: HCPCS | Performed by: PHYSICIAN ASSISTANT

## 2023-11-01 PROCEDURE — 1123F ACP DISCUSS/DSCN MKR DOCD: CPT | Performed by: PHYSICIAN ASSISTANT

## 2023-11-01 PROCEDURE — G8427 DOCREV CUR MEDS BY ELIG CLIN: HCPCS | Performed by: PHYSICIAN ASSISTANT

## 2023-11-01 PROCEDURE — 3017F COLORECTAL CA SCREEN DOC REV: CPT | Performed by: PHYSICIAN ASSISTANT

## 2023-11-01 PROCEDURE — 1036F TOBACCO NON-USER: CPT | Performed by: PHYSICIAN ASSISTANT

## 2023-11-01 PROCEDURE — 3288F FALL RISK ASSESSMENT DOCD: CPT | Performed by: PHYSICIAN ASSISTANT

## 2023-11-01 PROCEDURE — G8399 PT W/DXA RESULTS DOCUMENT: HCPCS | Performed by: PHYSICIAN ASSISTANT

## 2023-11-01 PROCEDURE — G8417 CALC BMI ABV UP PARAM F/U: HCPCS | Performed by: PHYSICIAN ASSISTANT

## 2023-11-01 PROCEDURE — 99214 OFFICE O/P EST MOD 30 MIN: CPT | Performed by: PHYSICIAN ASSISTANT

## 2023-11-01 PROCEDURE — 0518F FALL PLAN OF CARE DOCD: CPT | Performed by: PHYSICIAN ASSISTANT

## 2023-11-01 ASSESSMENT — ENCOUNTER SYMPTOMS
BACK PAIN: 1
RESPIRATORY NEGATIVE: 1
EYES NEGATIVE: 1
GASTROINTESTINAL NEGATIVE: 1

## 2023-11-01 NOTE — PROGRESS NOTES
[address] [facility] [place] [facility], [address]  [address] [facility] [address] [facility] present for her endometrial cancer surveillance visit. CC/HPI: She is a  female with a history of stage 1A grade 1 endometrioid adenocarcinoma and has undergone surgery of total laparoscopic hysterectomy, bilateral salpingo-oophorectomy, with pelvic and paraaortic lymphadenectomy with peritoneal biopsy completed in 2017. There is no pre operative  antigen level on file. Final pathology revealed endometrioid adenocarcinoma of the endometrium, 3.8 CM grade 1. Superficial myometrial invasion. Bilateral tubes and ovaries were negative for malignancy. Pelvic and para-aortic lymph node biopsies were negative for neoplasm. No lymphovascular invasion present. Pelvic washings were also negative for malignancy. Mismatch repair testing did confirm \"positive MLH1 promoter methylation is usually associated with sporadic and not syndromal occurrences of endometrial adenocarcinoma\". Therefore she was surgically staged FIGO IA grade 1 endometrioid adenocarcinoma of the uterus. She did not require any adjuvant treatment and was placed on surveillance. She was hospitalized in 7902 for complicated sigmoid diverticulitis. She underwent EDG and colonoscopy, and ultimately and open sigmoid colectomy with primary anastomosis on 2020 by Dr. Shantel Nichole. Pathology revealed tubular adenomas, no evidence of malignancy. She has a history atrial fibrillation and sinus bradycardia. She remains on Xarelto. She continues to follow with her cardiologist Dr. Joanne Hammans. Today, she is doing well. She has no concerns. She denies abdominal or pelvic pain she has no vaginal bleeding or discharge no vulvar irritation or pain. She reports normal urination and bowel habits. She is ambulating with rolling walker at baseline. She has no chest pain or shortness of breath.     She is over due for

## 2023-11-01 NOTE — PROGRESS NOTES
Review of Systems   Constitutional:  Positive for fatigue. HENT:  Negative. Eyes: Negative. Respiratory: Negative. Cardiovascular: Negative. Gastrointestinal: Negative. Endocrine: Positive for hot flashes (night sweats). Genitourinary: Negative. Musculoskeletal:  Positive for back pain. Skin: Negative. Neurological: Negative. Hematological:  Bruises/bleeds easily.

## 2023-12-07 ENCOUNTER — TELEPHONE (OUTPATIENT)
Dept: FAMILY MEDICINE CLINIC | Age: 73
End: 2023-12-07

## 2023-12-07 DIAGNOSIS — I48.0 PAROXYSMAL ATRIAL FIBRILLATION (HCC): Primary | ICD-10-CM

## 2023-12-07 DIAGNOSIS — H47.20 OPTIC ATROPHY OF BOTH EYES: ICD-10-CM

## 2023-12-07 DIAGNOSIS — R26.2 DIFFICULTY WALKING: ICD-10-CM

## 2023-12-07 DIAGNOSIS — Z86.73 HISTORY OF TIA (TRANSIENT ISCHEMIC ATTACK): ICD-10-CM

## 2023-12-07 DIAGNOSIS — M17.0 PRIMARY OSTEOARTHRITIS OF BOTH KNEES: ICD-10-CM

## 2023-12-07 NOTE — TELEPHONE ENCOUNTER
PATIENT CALLED ASKING IF HOME HEALTH CAN BE ORDERED DUE TO PATIENT HAS TO WEAR A HEART MONITOR AND WOULD LIKE ASSISTANCE WITH THIS.  PLEASE ADVISE

## 2023-12-07 NOTE — TELEPHONE ENCOUNTER
Order printed in med room I will sign     Diagnosis Orders   1. Paroxysmal atrial fibrillation ([address])  UNABLE TO FIND      2. Primary osteoarthritis of both knees  UNABLE TO FIND      3. Optic atrophy of both eyes  UNABLE TO FIND      4. Difficulty walking  UNABLE TO FIND      5.  History of TIA (transient ischemic attack)  UNABLE TO FIND           Future Appointments   Date Time Provider [address]  [facility]   3/1/2024 11:30 AM MD mahamed Deras [facility]   4/4/2024 10:00 AM Francis Rodrigues, DO [facility]   5/14/2024 11:00 AM MD mhaamed Deras [facility]

## 2023-12-07 NOTE — TELEPHONE ENCOUNTER
Called [facility] and LM on Director of Nursing  giving verbal orders to have a nurse come out to house to help assisnt with heart monitor. I did ask for a return call to make sure they received message.

## 2023-12-07 NOTE — TELEPHONE ENCOUNTER
DON called me back and they do not currently see Thiago Rank for anything besides aide services. They would need an order to send someone out to assist with the heart monitor. Please advise.

## 2023-12-07 NOTE — TELEPHONE ENCOUNTER
Noted. Thank you!   Please call her home care and give verbal order    Future Appointments   Date Time Provider [address]   3/1/2024 11:30 AM [facility]   4/4/2024 10:00 AM Francis Rodrigues, DO [facility]   5/14/2024 11:00 AM [facility]

## 2023-12-26 DIAGNOSIS — K21.9 GASTROESOPHAGEAL REFLUX DISEASE WITHOUT ESOPHAGITIS: ICD-10-CM

## 2023-12-26 DIAGNOSIS — E78.5 HYPERLIPIDEMIA WITH TARGET LDL LESS THAN 100: ICD-10-CM

## 2023-12-26 RX ORDER — ATORVASTATIN CALCIUM 40 MG/1
40 TABLET, FILM COATED ORAL EVERY EVENING
Qty: 90 TABLET | Refills: 3 | Status: SHIPPED | OUTPATIENT
Start: 2023-12-26

## 2023-12-26 RX ORDER — PROBIOTIC PRODUCT - TAB 1B-250 MG
TAB ORAL
Qty: 90 TABLET | Refills: 3 | Status: SHIPPED | OUTPATIENT
Start: 2023-12-26

## 2023-12-26 NOTE — TELEPHONE ENCOUNTER
Please Approve or Refuse. Send to Pharmacy per Pt's Request: MED-X     Next Visit Date:  3/1/2024   Last Visit Date: 10/26/2023    Hemoglobin A1C (%)   Date Value   10/26/2023 6.1   05/11/2023 4.9   01/24/2023 5.8             ( goal A1C is < 7)   BP Readings from Last 3 Encounters:   11/01/23 (!) 142/72   10/26/23 122/84   10/05/23 (!) 122/50          (goal 120/80)  BUN   Date Value Ref Range Status   07/26/2023 10 8 - 23 mg/dL Final     Creatinine   Date Value Ref Range Status   07/26/2023 0.8 0.5 - 0.9 mg/dL Final     Potassium   Date Value Ref Range Status   07/26/2023 3.8 3.7 - 5.3 mmol/L Final     Potassium reflex Magnesium   Date Value Ref Range Status   10/10/2021 4.1 3.5 - 5.2 meq/L Final     Comment:     Low level specimen hemolysis is present as indicated by the interference  level index on the Roche analyzer. The reported K+ level may be falsely  increased. If clinically warranted, recollection of the specimen is  suggested.

## 2023-12-28 ENCOUNTER — TELEPHONE (OUTPATIENT)
Dept: ORTHOPEDIC SURGERY | Age: 73
End: 2023-12-28

## 2023-12-28 NOTE — TELEPHONE ENCOUNTER
The patient called in stating that she is unable to bear weight on the left leg due to severe pain. She states that she cannot move the left leg, but then clarifies and states that she can bend it a little, but the pain is too severe to put weight on it and she is not able to leave her chair. She also adds that she just recently started a new medication for AFIB. I advised the patient that she should go to the emergency room for evaluation. The patient verbalized understanding and said she would go to the ER.

## 2023-12-29 ENCOUNTER — TELEMEDICINE (OUTPATIENT)
Dept: FAMILY MEDICINE CLINIC | Age: 73
End: 2023-12-29
Payer: MEDICARE

## 2023-12-29 ENCOUNTER — TELEPHONE (OUTPATIENT)
Dept: FAMILY MEDICINE CLINIC | Age: 73
End: 2023-12-29

## 2023-12-29 DIAGNOSIS — I12.9 BENIGN HYPERTENSION WITH CKD (CHRONIC KIDNEY DISEASE), STAGE II: ICD-10-CM

## 2023-12-29 DIAGNOSIS — Z12.31 ENCOUNTER FOR SCREENING MAMMOGRAM FOR BREAST CANCER: ICD-10-CM

## 2023-12-29 DIAGNOSIS — M85.80 OSTEOPENIA DETERMINED BY X-RAY: ICD-10-CM

## 2023-12-29 DIAGNOSIS — M17.0 PRIMARY OSTEOARTHRITIS OF BOTH KNEES: Primary | ICD-10-CM

## 2023-12-29 DIAGNOSIS — I50.32 CHRONIC DIASTOLIC CONGESTIVE HEART FAILURE (HCC): ICD-10-CM

## 2023-12-29 DIAGNOSIS — N18.32 TYPE 2 DIABETES MELLITUS WITH STAGE 3B CHRONIC KIDNEY DISEASE, WITHOUT LONG-TERM CURRENT USE OF INSULIN (HCC): ICD-10-CM

## 2023-12-29 DIAGNOSIS — N18.2 BENIGN HYPERTENSION WITH CKD (CHRONIC KIDNEY DISEASE), STAGE II: ICD-10-CM

## 2023-12-29 DIAGNOSIS — E11.22 TYPE 2 DIABETES MELLITUS WITH STAGE 3B CHRONIC KIDNEY DISEASE, WITHOUT LONG-TERM CURRENT USE OF INSULIN (HCC): ICD-10-CM

## 2023-12-29 PROCEDURE — G8427 DOCREV CUR MEDS BY ELIG CLIN: HCPCS | Performed by: FAMILY MEDICINE

## 2023-12-29 PROCEDURE — 3288F FALL RISK ASSESSMENT DOCD: CPT | Performed by: FAMILY MEDICINE

## 2023-12-29 PROCEDURE — 3044F HG A1C LEVEL LT 7.0%: CPT | Performed by: FAMILY MEDICINE

## 2023-12-29 PROCEDURE — 1123F ACP DISCUSS/DSCN MKR DOCD: CPT | Performed by: FAMILY MEDICINE

## 2023-12-29 PROCEDURE — 2022F DILAT RTA XM EVC RTNOPTHY: CPT | Performed by: FAMILY MEDICINE

## 2023-12-29 PROCEDURE — 99214 OFFICE O/P EST MOD 30 MIN: CPT | Performed by: FAMILY MEDICINE

## 2023-12-29 PROCEDURE — 0518F FALL PLAN OF CARE DOCD: CPT | Performed by: FAMILY MEDICINE

## 2023-12-29 PROCEDURE — 1090F PRES/ABSN URINE INCON ASSESS: CPT | Performed by: FAMILY MEDICINE

## 2023-12-29 PROCEDURE — G8399 PT W/DXA RESULTS DOCUMENT: HCPCS | Performed by: FAMILY MEDICINE

## 2023-12-29 PROCEDURE — 3017F COLORECTAL CA SCREEN DOC REV: CPT | Performed by: FAMILY MEDICINE

## 2023-12-29 RX ORDER — GABAPENTIN 100 MG/1
CAPSULE ORAL
Qty: 18 CAPSULE | Refills: 0 | Status: SHIPPED | OUTPATIENT
Start: 2023-12-29 | End: 2023-12-29

## 2023-12-29 RX ORDER — LIDOCAINE 40 MG/G
CREAM TOPICAL
Qty: 120 G | Refills: 3 | Status: SHIPPED | OUTPATIENT
Start: 2023-12-29

## 2023-12-29 NOTE — PROGRESS NOTES
125 07/26/2023 08:28 AM    GLUCOSE 114 03/20/2012 10:40 AM    CALCIUM 9.4 07/26/2023 08:28 AM        Lab Results   Component Value Date    ALT 10 07/20/2023    AST 23 07/20/2023    ALKPHOS 79 07/20/2023    BILITOT 0.3 07/20/2023       Lab Results   Component Value Date    TSH 1.69 07/20/2023       Lab Results   Component Value Date    CHOL 158 02/01/2023    CHOL 196 09/02/2022    CHOL 174 10/20/2021     Lab Results   Component Value Date    TRIG 223 (H) 02/01/2023    TRIG 225 (H) 09/02/2022    TRIG 180 (H) 10/20/2021     Lab Results   Component Value Date    HDL 42 02/01/2023    HDL 54 01/27/2023    HDL 47 09/02/2022     Lab Results   Component Value Date    LDLCALC 70 11/06/2020    LDLCHOLESTEROL 71 02/01/2023    LDLCHOLESTEROL 84 01/27/2023    LDLCHOLESTEROL 104 09/02/2022       Lab Results   Component Value Date    CHOLHDLRATIO 3.8 02/01/2023    CHOLHDLRATIO 3.3 01/27/2023    CHOLHDLRATIO 4.2 09/02/2022       Lab Results   Component Value Date    LABA1C 6.1 10/26/2023       Lab Results   Component Value Date    KGEHVKBO28 973 07/21/2023       Lab Results   Component Value Date    FOLATE 10.1 07/21/2023       Lab Results   Component Value Date    IRON 20 (L) 07/21/2023    TIBC 293 07/21/2023    FERRITIN 13 07/21/2023       Lab Results   Component Value Date    VITD25 38.0 06/20/2023       On this date 12/29/2023 I have spent 39 minutes reviewing previous notes, test results and face to face (virtual) with the patient discussing the diagnosis and importance of compliance with the treatment plan as well as documenting on the day of the visit.     Electronically signed by Seble Allen MD on 12/29/23 at 3:58 PM EST

## 2023-12-29 NOTE — TELEPHONE ENCOUNTER
Please call the pharmacy and cancel the prescription      Future Appointments   Date Time Provider Department Center   1/8/2024  1:30 PM Ulices Kellogg PA [facility]   3/1/2024 11:30 AM [facility]   4/4/2024 10:00 AM Francis Rodrigues DO [facility]   5/14/2024 11:00 AM [facility]

## 2023-12-29 NOTE — TELEPHONE ENCOUNTER
[facility] Clinical Support Pool  Subject: Medication Problem     Medication: gabapentin (NEURONTIN) 100 MG capsule  Dosage: 100 Mg capsul  Ordering Provider: Radha Vallecillo    Question/Problem: patient does not want to start this medication and would  like to have canceled and removed.      Pharmacy: [facility] - [place], [place] - [address] - P [phone] -  F [phone]    ---------------------------------------------------------------------------  --------------  CALL BACK INFO  [phone]; OK to leave message on voicemail  ---------------------------------------------------------------------------  --------------    SCRIPT ANSWERS  Relationship to Patient: Self

## 2023-12-29 NOTE — TELEPHONE ENCOUNTER
Noted. Thank you!  Patient told me Dr. Perez is taking care of that.  Order for lift chair was ordered 10/26/2023, I printed it with her progress note  Future Appointments   Date Time Provider Department Center   1/8/2024  1:30 PM Ulices Kellogg PA [facility]   3/1/2024 11:30 AM Radha Vallecillo MD [facility]   4/4/2024 10:00 AM Francis Rodrigues DO [facility]   5/14/2024 11:00 AM [facility]

## 2023-12-29 NOTE — TELEPHONE ENCOUNTER
Jackelyn Nobles pn [facility] Clinical Support Pool  Subject: Referral Request    Reason for referral request? Pt is requesting an order for a lift chair or  a rollator walker. Please contact pt regarding this request.  Provider patient wants to be referred to(if known):    Provider Phone Number(if known):    Additional Information for Provider?  ---------------------------------------------------------------------------  --------------  CALL BACK INFO    [phone]; OK to leave message on [email or web address]

## 2024-01-04 ENCOUNTER — TELEPHONE (OUTPATIENT)
Dept: FAMILY MEDICINE CLINIC | Age: 74
End: 2024-01-04

## 2024-01-04 NOTE — TELEPHONE ENCOUNTER
Received a fax from [facility] that the office notes for rollator walker do not meet guidelines. I have attached the fax.

## 2024-01-04 NOTE — TELEPHONE ENCOUNTER
Please check with the company if they could fix her walker instead    Future Appointments   Date Time Provider Department Center   1/8/2024  1:30 PM Ulices Kellogg PA [facility]   3/1/2024 11:30 AM [facility]   4/4/2024 10:00 AM Francis Rodrigues DO [facility]   5/14/2024 11:00 AM [facility]

## 2024-01-08 ENCOUNTER — OFFICE VISIT (OUTPATIENT)
Dept: ORTHOPEDIC SURGERY | Age: 74
End: 2024-01-08
Payer: MEDICARE

## 2024-01-08 VITALS — BODY MASS INDEX: 45.82 KG/M2 | RESPIRATION RATE: 14 BRPM | HEIGHT: 62 IN | WEIGHT: 249 LBS

## 2024-01-08 DIAGNOSIS — M17.11 PRIMARY OSTEOARTHRITIS OF RIGHT KNEE: Primary | ICD-10-CM

## 2024-01-08 DIAGNOSIS — R11.0 NAUSEA: ICD-10-CM

## 2024-01-08 PROCEDURE — G8484 FLU IMMUNIZE NO ADMIN: HCPCS | Performed by: PHYSICIAN ASSISTANT

## 2024-01-08 PROCEDURE — 1090F PRES/ABSN URINE INCON ASSESS: CPT | Performed by: PHYSICIAN ASSISTANT

## 2024-01-08 PROCEDURE — 3017F COLORECTAL CA SCREEN DOC REV: CPT | Performed by: PHYSICIAN ASSISTANT

## 2024-01-08 PROCEDURE — 99213 OFFICE O/P EST LOW 20 MIN: CPT | Performed by: PHYSICIAN ASSISTANT

## 2024-01-08 PROCEDURE — G8417 CALC BMI ABV UP PARAM F/U: HCPCS | Performed by: PHYSICIAN ASSISTANT

## 2024-01-08 PROCEDURE — 3288F FALL RISK ASSESSMENT DOCD: CPT | Performed by: PHYSICIAN ASSISTANT

## 2024-01-08 PROCEDURE — 0518F FALL PLAN OF CARE DOCD: CPT | Performed by: PHYSICIAN ASSISTANT

## 2024-01-08 PROCEDURE — G8427 DOCREV CUR MEDS BY ELIG CLIN: HCPCS | Performed by: PHYSICIAN ASSISTANT

## 2024-01-08 PROCEDURE — 1036F TOBACCO NON-USER: CPT | Performed by: PHYSICIAN ASSISTANT

## 2024-01-08 PROCEDURE — 1123F ACP DISCUSS/DSCN MKR DOCD: CPT | Performed by: PHYSICIAN ASSISTANT

## 2024-01-08 PROCEDURE — G8399 PT W/DXA RESULTS DOCUMENT: HCPCS | Performed by: PHYSICIAN ASSISTANT

## 2024-01-08 PROCEDURE — 20610 DRAIN/INJ JOINT/BURSA W/O US: CPT | Performed by: PHYSICIAN ASSISTANT

## 2024-01-08 RX ORDER — BUPIVACAINE HYDROCHLORIDE 2.5 MG/ML
2 INJECTION, SOLUTION INFILTRATION; PERINEURAL ONCE
Status: COMPLETED | OUTPATIENT
Start: 2024-01-08 | End: 2024-01-08

## 2024-01-08 RX ORDER — BETAMETHASONE SODIUM PHOSPHATE AND BETAMETHASONE ACETATE 3; 3 MG/ML; MG/ML
12 INJECTION, SUSPENSION INTRA-ARTICULAR; INTRALESIONAL; INTRAMUSCULAR; SOFT TISSUE ONCE
Status: COMPLETED | OUTPATIENT
Start: 2024-01-08 | End: 2024-01-08

## 2024-01-08 RX ORDER — ONDANSETRON 4 MG/1
TABLET, FILM COATED ORAL
Qty: 24 TABLET | Refills: 0 | Status: SHIPPED | OUTPATIENT
Start: 2024-01-08

## 2024-01-08 RX ORDER — LIDOCAINE HYDROCHLORIDE 10 MG/ML
2 INJECTION, SOLUTION INFILTRATION; PERINEURAL ONCE
Status: COMPLETED | OUTPATIENT
Start: 2024-01-08 | End: 2024-01-08

## 2024-01-08 RX ADMIN — LIDOCAINE HYDROCHLORIDE 2 ML: 10 INJECTION, SOLUTION INFILTRATION; PERINEURAL at 13:31

## 2024-01-08 RX ADMIN — BUPIVACAINE HYDROCHLORIDE 5 MG: 2.5 INJECTION, SOLUTION INFILTRATION; PERINEURAL at 13:29

## 2024-01-08 RX ADMIN — BETAMETHASONE SODIUM PHOSPHATE AND BETAMETHASONE ACETATE 12 MG: 3; 3 INJECTION, SUSPENSION INTRA-ARTICULAR; INTRALESIONAL; INTRAMUSCULAR; SOFT TISSUE at 13:28

## 2024-01-08 NOTE — PROGRESS NOTES
2 cc of 6 mg/mL Celestone into the right knee. A band aid was applied to the injection site. she tolerated the injection with no immediate adverse reactions.      Electronically signed by JEANNE Perdomo on 1/8/24 at 1:06 PM EST        Please note that this chart was generated using voice recognition Dragon dictation software.  Although every effort was made to ensure the accuracy of this automated transcription, some errors in transcription may have occurred.

## 2024-01-08 NOTE — TELEPHONE ENCOUNTER
Please Approve or Refuse.  Send to Pharmacy per Pt's Request: [facility]     Next Visit Date:  3/1/2024   Last Visit Date: 12/29/2023    Hemoglobin A1C (%)   Date Value   10/26/2023 6.1   05/11/2023 4.9   01/24/2023 5.8             ( goal A1C is < 7)   BP Readings from Last 3 Encounters:   11/01/23 (!) 142/72   10/26/23 122/84   10/05/23 (!) 122/50          (goal 120/80)  BUN   Date Value Ref Range Status   07/26/2023 10 8 - 23 mg/dL Final     Creatinine   Date Value Ref Range Status   07/26/2023 0.8 0.5 - 0.9 mg/dL Final     Potassium   Date Value Ref Range Status   07/26/2023 3.8 3.7 - 5.3 mmol/L Final     Potassium reflex Magnesium   Date Value Ref Range Status   10/10/2021 4.1 3.5 - 5.2 meq/L Final     Comment:     Low level specimen hemolysis is present as indicated by the interference  level index on the Roche analyzer.  The reported K+ level may be falsely  increased.  If clinically warranted, recollection of the specimen is  suggested.

## 2024-01-09 ENCOUNTER — HOSPITAL ENCOUNTER (OUTPATIENT)
Age: 74
Setting detail: SPECIMEN
Discharge: HOME | End: 2024-01-09

## 2024-01-09 LAB
ANION GAP SERPL CALCULATED.3IONS-SCNC: 14 MMOL/L (ref 9–17)
BUN SERPL-MCNC: 19 MG/DL (ref 8–23)
CALCIUM SERPL-MCNC: 9.6 MG/DL (ref 8.6–10.4)
CHLORIDE SERPL-SCNC: 97 MMOL/L (ref 98–107)
CO2 SERPL-SCNC: 24 MMOL/L (ref 20–31)
CREAT SERPL-MCNC: 0.9 MG/DL (ref 0.5–0.9)
ERYTHROCYTE [DISTWIDTH] IN BLOOD BY AUTOMATED COUNT: 15.5 % (ref 11.8–14.4)
GFR SERPL CREATININE-BSD FRML MDRD: >60 ML/MIN/1.73M2
GLUCOSE SERPL-MCNC: 166 MG/DL (ref 70–99)
HCT VFR BLD AUTO: 39 % (ref 36.3–47.1)
HGB BLD-MCNC: 12.1 G/DL (ref 11.9–15.1)
MCH RBC QN AUTO: 27.1 PG (ref 25.2–33.5)
MCHC RBC AUTO-ENTMCNC: 31 G/DL (ref 28.4–34.8)
MCV RBC AUTO: 87.2 FL (ref 82.6–102.9)
NRBC BLD-RTO: 0 PER 100 WBC
PLATELET # BLD AUTO: 365 K/UL (ref 138–453)
PMV BLD AUTO: 11 FL (ref 8.1–13.5)
POTASSIUM SERPL-SCNC: 3.8 MMOL/L (ref 3.7–5.3)
RBC # BLD AUTO: 4.47 M/UL (ref 3.95–5.11)
SODIUM SERPL-SCNC: 135 MMOL/L (ref 135–144)
WBC OTHER # BLD: 12.2 K/UL (ref 3.5–11.3)

## 2024-01-09 PROCEDURE — 85027 COMPLETE CBC AUTOMATED: CPT

## 2024-01-09 PROCEDURE — P9603 ONE-WAY ALLOW PRORATED MILES: HCPCS

## 2024-01-09 PROCEDURE — 36415 COLL VENOUS BLD VENIPUNCTURE: CPT

## 2024-01-09 PROCEDURE — 80048 BASIC METABOLIC PNL TOTAL CA: CPT

## 2024-01-16 ENCOUNTER — HOSPITAL ENCOUNTER (OUTPATIENT)
Age: 74
Setting detail: SPECIMEN
Discharge: HOME | End: 2024-01-16

## 2024-01-16 LAB
ANION GAP SERPL CALCULATED.3IONS-SCNC: 11 MMOL/L (ref 9–17)
BASOPHILS # BLD: 0.08 K/UL (ref 0–0.2)
BASOPHILS NFR BLD: 1 % (ref 0–2)
BUN SERPL-MCNC: 22 MG/DL (ref 8–23)
CALCIUM SERPL-MCNC: 9.4 MG/DL (ref 8.6–10.4)
CHLORIDE SERPL-SCNC: 97 MMOL/L (ref 98–107)
CO2 SERPL-SCNC: 28 MMOL/L (ref 20–31)
CREAT SERPL-MCNC: 0.9 MG/DL (ref 0.5–0.9)
EOSINOPHIL # BLD: 0.12 K/UL (ref 0–0.44)
EOSINOPHILS RELATIVE PERCENT: 1 % (ref 1–4)
ERYTHROCYTE [DISTWIDTH] IN BLOOD BY AUTOMATED COUNT: 16 % (ref 11.8–14.4)
ERYTHROCYTE [DISTWIDTH] IN BLOOD BY AUTOMATED COUNT: 16 % (ref 11.8–14.4)
GFR SERPL CREATININE-BSD FRML MDRD: >60 ML/MIN/1.73M2
GLUCOSE SERPL-MCNC: 101 MG/DL (ref 70–99)
HCT VFR BLD AUTO: 37.7 % (ref 36.3–47.1)
HCT VFR BLD AUTO: 37.7 % (ref 36.3–47.1)
HGB BLD-MCNC: 11.4 G/DL (ref 11.9–15.1)
HGB BLD-MCNC: 11.5 G/DL (ref 11.9–15.1)
IMM GRANULOCYTES # BLD AUTO: 0.04 K/UL (ref 0–0.3)
IMM GRANULOCYTES NFR BLD: 0 %
LYMPHOCYTES NFR BLD: 2.08 K/UL (ref 1.1–3.7)
LYMPHOCYTES RELATIVE PERCENT: 20 % (ref 24–43)
MCH RBC QN AUTO: 27 PG (ref 25.2–33.5)
MCH RBC QN AUTO: 27.3 PG (ref 25.2–33.5)
MCHC RBC AUTO-ENTMCNC: 30.2 G/DL (ref 28.4–34.8)
MCHC RBC AUTO-ENTMCNC: 30.5 G/DL (ref 28.4–34.8)
MCV RBC AUTO: 89.3 FL (ref 82.6–102.9)
MCV RBC AUTO: 89.3 FL (ref 82.6–102.9)
MONOCYTES NFR BLD: 0.58 K/UL (ref 0.1–1.2)
MONOCYTES NFR BLD: 6 % (ref 3–12)
NEUTROPHILS NFR BLD: 72 % (ref 36–65)
NEUTS SEG NFR BLD: 7.72 K/UL (ref 1.5–8.1)
NRBC BLD-RTO: 0 PER 100 WBC
NRBC BLD-RTO: 0 PER 100 WBC
PLATELET # BLD AUTO: 374 K/UL (ref 138–453)
PLATELET # BLD AUTO: 382 K/UL (ref 138–453)
PMV BLD AUTO: 10.2 FL (ref 8.1–13.5)
PMV BLD AUTO: 10.3 FL (ref 8.1–13.5)
POTASSIUM SERPL-SCNC: 3.8 MMOL/L (ref 3.7–5.3)
RBC # BLD AUTO: 4.22 M/UL (ref 3.95–5.11)
RBC # BLD AUTO: 4.22 M/UL (ref 3.95–5.11)
RBC # BLD: ABNORMAL 10*6/UL
SODIUM SERPL-SCNC: 136 MMOL/L (ref 135–144)
WBC OTHER # BLD: 10.2 K/UL (ref 3.5–11.3)
WBC OTHER # BLD: 10.6 K/UL (ref 3.5–11.3)

## 2024-01-16 PROCEDURE — 85025 COMPLETE CBC W/AUTO DIFF WBC: CPT

## 2024-01-16 PROCEDURE — 85027 COMPLETE CBC AUTOMATED: CPT

## 2024-01-16 PROCEDURE — P9603 ONE-WAY ALLOW PRORATED MILES: HCPCS

## 2024-01-16 PROCEDURE — 36415 COLL VENOUS BLD VENIPUNCTURE: CPT

## 2024-01-16 PROCEDURE — 80048 BASIC METABOLIC PNL TOTAL CA: CPT

## 2024-01-16 NOTE — PATIENT INSTRUCTIONS
The patient's goals for the shift include      The clinical goals for the shift include pain control      Problem: Pain - Adult  Goal: Verbalizes/displays adequate comfort level or baseline comfort level  Outcome: Progressing     Problem: Safety - Adult  Goal: Free from fall injury  Outcome: Progressing     Problem: Discharge Planning  Goal: Discharge to home or other facility with appropriate resources  Outcome: Progressing     Problem: Chronic Conditions and Co-morbidities  Goal: Patient's chronic conditions and co-morbidity symptoms are monitored and maintained or improved  Outcome: Progressing     Problem: Skin  Goal: Decreased wound size/increased tissue granulation at next dressing change  Outcome: Progressing  Goal: Participates in plan/prevention/treatment measures  Outcome: Progressing  Goal: Prevent/manage excess moisture  Outcome: Progressing  Goal: Prevent/minimize sheer/friction injuries  Outcome: Progressing  Goal: Promote/optimize nutrition  Outcome: Progressing  Goal: Promote skin healing  Outcome: Progressing     Problem: Pain  Goal: Takes deep breaths with improved pain control throughout the shift  Outcome: Progressing  Goal: Turns in bed with improved pain control throughout the shift  Outcome: Progressing  Goal: Walks with improved pain control throughout the shift  Outcome: Progressing  Goal: Performs ADL's with improved pain control throughout shift  Outcome: Progressing  Goal: Participates in PT with improved pain control throughout the shift  Outcome: Progressing  Goal: Free from opioid side effects throughout the shift  Outcome: Progressing  Goal: Free from acute confusion related to pain meds throughout the shift  Outcome: Progressing     Problem: Fall/Injury  Goal: Not fall by end of shift  Outcome: Progressing  Goal: Be free from injury by end of the shift  Outcome: Progressing  Goal: Verbalize understanding of personal risk factors for fall in the hospital  Outcome: Progressing  Goal:  Uterine Cancer: Care Instructions  Your Care Instructions  Uterine cancer is the rapid growth of abnormal cells that line the uterus. It also is called endometrial cancer. These cells may spread to nearby organs, lymph glands, or distant organs. Uterine cancer can be cured most often when found early. Treatment may include surgery to remove the uterus, ovaries, fallopian tubes, and sometimes the pelvic lymph nodes. Radiation and hormones to stop cancer growth also are sometimes used. Chemotherapy may be used if the cancer has spread. Having cancer can be scary. You may feel many emotions and may need some help coping. Seek out family, friends, and counselors for support. You can do things at home to make yourself feel better while you go through treatment. You also can call the [employer] (6-343.340.4550) or visit its website at [employer] for more information. Follow-up care is a key part of your treatment and safety. Be sure to make and go to all appointments, and call your doctor if you are having problems. It's also a good idea to know your test results and keep a list of the medicines you take. How can you care for yourself at home? · Take your medicines exactly as prescribed. Call your doctor if you think you are having a problem with your medicine. You may get medicine for nausea and vomiting if you have these side effects. · Eat healthy food. If you do not feel like eating, try to eat food that has protein and extra calories to keep up your strength and prevent weight loss. Drink liquid meal replacements for extra calories and protein. Try to eat your main meal early. · Get some physical activity every day, but do not get too tired. Keep doing the hobbies you enjoy as your energy allows. · Take steps to control your stress and workload. Learn relaxation techniques. ¨ Share your feelings. Stress and tension affect our emotions.  By expressing your feelings to others, you may be Verbalize understanding of risk factor reduction measures to prevent injury from fall in the home  Outcome: Progressing  Goal: Use assistive devices by end of the shift  Outcome: Progressing  Goal: Pace activities to prevent fatigue by end of the shift  Outcome: Progressing     Problem: Pain  Goal: My pain/discomfort is manageable  Outcome: Progressing     Problem: Safety  Goal: Patient will be injury free during hospitalization  Outcome: Progressing  Goal: I will remain free of falls  Outcome: Progressing     Problem: Daily Care  Goal: Daily care needs are met  Outcome: Progressing     Problem: Psychosocial Needs  Goal: Demonstrates ability to cope with hospitalization/illness  Outcome: Progressing  Goal: Collaborate with me, my family, and caregiver to identify my specific goals  Outcome: Progressing  Flowsheets (Taken 1/16/2024 4783)  Cultural Requests During Hospitalization: none  Spiritual Requests During Hospitalization: none     Problem: Discharge Barriers  Goal: My discharge needs are met  Outcome: Progressing        able to understand and cope with them. ¨ Consider joining a support group. Talking about a problem with your spouse, a good friend, or other people with similar problems is a good way to reduce tension and stress. ¨ Express yourself through art. Try writing, dance, art, or crafts to relieve tension. Some dance, writing, or art groups may be available just for people who have cancer. ¨ Be kind to your body and mind. Getting enough sleep, eating a healthy diet, and taking time to do things you enjoy can contribute to an overall feeling of balance in your life and help reduce stress. ¨ Get help if you need it. Discuss your concerns with your doctor or counselor. · If you are vomiting or have diarrhea:  ¨ Drink plenty of fluids (enough so that your urine is light yellow or clear like water) to prevent dehydration. Choose water and other caffeine-free clear liquids. If you have kidney, heart, or liver disease and have to limit fluids, talk with your doctor before you increase the amount of fluids you drink. ¨ When you are able to eat, try clear soups, mild foods, and liquids until all symptoms are gone for 12 to 48 hours. Other good choices include dry toast, crackers, cooked cereal, and gelatin dessert, such as Jell-O.  · Take care of your urinary tract to prevent problems such as infection, which can be caused by uterine cancer and its treatment. Limit drinks with caffeine, drink plenty of fluids, and urinate every 3 to 4 hours. · If you have not already done so, prepare a list of advance directives. Advance directives are instructions to your doctor and family members about what kind of care you want if you become unable to speak or express yourself. When should you call for help? Call 911 anytime you think you may need emergency care. For example, call if:  · You passed out (lost consciousness). · You have severe belly pain.   Call your doctor now or seek immediate medical care if:  · You have severe vaginal bleeding. You are passing blood clots and soaking through a pad each hour for 2 or more hours. · You have belly pain. · You are dizzy or lightheaded, or you feel like you may faint. · You have a fever. · You have severe constipation. · You stop urinating. · You have severe vomiting that you cannot control. Watch closely for changes in your health, and be sure to contact your doctor if:  · You have spotting of blood from your vagina. · You feel very sad, anxious or both. Where can you learn more? Go to https://[email or web address].[email or web address] org and sign in to your [employer] account. Enter 0367 [id number] in the [facility] box to learn more about \"Uterine Cancer: Care Instructions. \"     If you do not have an account, please click on the \"Sign Up Now\" link. Current as of: November 28, 2016  Content Version: 11.3  © 8898-9194 [employer]. Care instructions adapted under license by [facility] ([facility]). If you have questions about a medical condition or this instruction, always ask your healthcare professional. [place] [address] any warranty or liability for your use of this information. Laparoscopic Hysterectomy: Before Your Surgery  What is a laparoscopic hysterectomy? A hysterectomy is surgery to take out the uterus. In some cases, the ovaries and fallopian tubes also are taken out at the same time. The doctor makes one or more small cuts in the belly. These cuts are called incisions. They let the doctor insert tools to do the surgery. One of these tools is a tube with a light on it. It's called a laparoscope, or scope. The scope and the other tools allow the doctor to free the uterus. The doctor then removes the uterus through the small cuts. In a total hysterectomy, the doctor takes out the uterus and the cervix. In a supracervical hysterectomy, only the uterus is taken out. Most women go home in 1 to 2 days. You may need about 4 to 6 weeks to fully recover.   After the surgery, you will not have periods. You will not be able to get pregnant. If there is a chance that you will want to have a baby, talk to your doctor about other treatment options. Your doctor may advise you to take hormone pills if your ovaries are removed. Your doctor will talk to you about the risks and benefits of hormones. He or she will also tell you how long to take them. This surgery probably won't lower your interest in sex. In fact, some women enjoy sex more. This may be because they no longer have to worry about birth control or heavy bleeding. Follow-up care is a key part of your treatment and safety. Be sure to make and go to all appointments, and call your doctor if you are having problems. It's also a good idea to know your test results and keep a list of the medicines you take. What happens before surgery? Surgery can be stressful. This information will help you understand what you can expect. And it will help you safely prepare for surgery. Preparing for surgery  · Understand exactly what surgery is planned, along with the risks, benefits, and other options. · Tell your doctors ALL the medicines, vitamins, supplements, and herbal remedies you take. Some of these can increase the risk of bleeding or interact with anesthesia. · If you take blood thinners, such as warfarin (Coumadin), clopidogrel (Plavix), or aspirin, be sure to talk to your doctor. He or she will tell you if you should stop taking these medicines before your surgery. Make sure that you understand exactly what your doctor wants you to do. · Your doctor will tell you which medicines to take or stop before your surgery. You may need to stop taking certain medicines a week or more before surgery. So talk to your doctor as soon as you can. · If you have an advance directive, let your doctor know. It may include a living will and a durable power of  for health care.  Bring a copy to the hospital. If you don't have one, you may want to prepare one. It lets your doctor and loved ones know your health care wishes. Doctors advise that everyone prepare these papers before any type of surgery or procedure. What happens on the day of surgery? · Follow the instructions exactly about when to stop eating and drinking. If you don't, your surgery may be canceled. If your doctor told you to take your medicines on the day of surgery, take them with only a sip of water. · Take a bath or shower before you come in for your surgery. Do not apply lotions, perfumes, deodorants, or nail polish. · Do not shave the surgical site yourself. · Take off all jewelry and piercings. And take out contact lenses, if you wear them. At the hospital or surgery center  · Bring a picture ID. · The area for surgery is often marked to make sure there are no errors. · You will be kept comfortable and safe by your anesthesia provider. You will be asleep during the surgery. · The surgery will take about 2 to 4 hours. Going home  · Be sure you have someone to drive you home. Anesthesia and pain medicine make it unsafe for you to drive. · You will be given more specific instructions about recovering from your surgery. They will cover things like diet, wound care, follow-up care, driving, and getting back to your normal routine. When should you call your doctor? · You have questions or concerns. · You don't understand how to prepare for your surgery. · You become ill before the surgery (such as fever, flu, or a cold). · You need to reschedule or have changed your mind about having the surgery. Where can you learn more? Go to https://[email or web address].[email or web address] org and sign in to your [employer] account. Enter D130 in the [employer] box to learn more about \"Laparoscopic Hysterectomy: Before Your Surgery. \"     If you do not have an account, please click on the \"Sign Up Now\" link.   Current as of: November 23, 2016  Content Version: 11.3  © 4706-6621 [employer]. Care instructions adapted under license by [facility] ([facility]). If you have questions about a medical condition or this instruction, always ask your healthcare professional. [address] any warranty or liability for your use of this information.

## 2024-01-22 DIAGNOSIS — E03.9 ACQUIRED HYPOTHYROIDISM: ICD-10-CM

## 2024-01-22 RX ORDER — LEVOTHYROXINE SODIUM 0.07 MG/1
75 TABLET ORAL
Qty: 90 TABLET | Refills: 3 | Status: SHIPPED | OUTPATIENT
Start: 2024-01-22

## 2024-01-22 NOTE — TELEPHONE ENCOUNTER
Please Approve or Refuse.  Send to Pharmacy per Pt's Request: med-x     Next Visit Date:  2/1/2024   Last Visit Date: 12/29/2023    Hemoglobin A1C (%)   Date Value   10/26/2023 6.1   05/11/2023 4.9   01/24/2023 5.8             ( goal A1C is < 7)   BP Readings from Last 3 Encounters:   11/01/23 (!) 142/72   10/26/23 122/84   10/05/23 (!) 122/50          (goal 120/80)  BUN   Date Value Ref Range Status   01/16/2024 22 8 - 23 mg/dL Final     Creatinine   Date Value Ref Range Status   01/16/2024 0.9 0.5 - 0.9 mg/dL Final     Potassium   Date Value Ref Range Status   01/16/2024 3.8 3.7 - 5.3 mmol/L Final     Potassium reflex Magnesium   Date Value Ref Range Status   10/10/2021 4.1 3.5 - 5.2 meq/L Final     Comment:     Low level specimen hemolysis is present as indicated by the interference  level index on the Roche analyzer.  The reported K+ level may be falsely  increased.  If clinically warranted, recollection of the specimen is  suggested.

## 2024-01-23 ENCOUNTER — TELEPHONE (OUTPATIENT)
Dept: FAMILY MEDICINE CLINIC | Age: 74
End: 2024-01-23

## 2024-01-23 NOTE — TELEPHONE ENCOUNTER
Noted. Thank you!  Please give verbal order to continue home physical therapy  Future Appointments   Date Time Provider Department Center   2/1/2024 10:30 AM [facility]   4/4/2024 10:00 AM Francis Rodrigues DO [facility]   5/14/2024 11:00 AM Radha Vallecillo MD fp [facility]

## 2024-01-23 NOTE — TELEPHONE ENCOUNTER
HOME HEALTH PT/[facility] CALLED ASKING IF HE CAN LORIN VERBAL ORDER TO CONTINUE THERAPY WITH PATIENT PLEASE ADVISE.  STATED [employer] IS SECURE AND WE CAN LEAVE DETAILED MESSAGE

## 2024-01-25 DIAGNOSIS — M47.816 SPONDYLOSIS OF LUMBAR REGION WITHOUT MYELOPATHY OR RADICULOPATHY: ICD-10-CM

## 2024-01-25 DIAGNOSIS — M17.12 PRIMARY OSTEOARTHRITIS OF LEFT KNEE: ICD-10-CM

## 2024-01-25 DIAGNOSIS — M15.9 OSTEOARTHRITIS INVOLVING MULTIPLE JOINTS ON BOTH SIDES OF BODY: ICD-10-CM

## 2024-01-25 DIAGNOSIS — M54.50 CHRONIC BILATERAL LOW BACK PAIN WITHOUT SCIATICA: ICD-10-CM

## 2024-01-25 DIAGNOSIS — G89.29 CHRONIC BILATERAL LOW BACK PAIN WITHOUT SCIATICA: ICD-10-CM

## 2024-01-25 RX ORDER — LIDOCAINE 50 MG/G
PATCH TOPICAL
Refills: 5 | OUTPATIENT
Start: 2024-01-25

## 2024-02-07 DIAGNOSIS — M85.80 OSTEOPENIA DETERMINED BY X-RAY: ICD-10-CM

## 2024-02-07 DIAGNOSIS — E03.9 ACQUIRED HYPOTHYROIDISM: ICD-10-CM

## 2024-02-07 DIAGNOSIS — E55.9 VITAMIN D DEFICIENCY: ICD-10-CM

## 2024-02-07 DIAGNOSIS — E78.5 HYPERLIPIDEMIA WITH TARGET LDL LESS THAN 100: ICD-10-CM

## 2024-02-07 RX ORDER — ALENDRONATE SODIUM 35 MG/1
TABLET ORAL
Qty: 12 TABLET | Refills: 3 | Status: SHIPPED | OUTPATIENT
Start: 2024-02-07

## 2024-02-07 NOTE — TELEPHONE ENCOUNTER
Vitamin D deficiency     Mixed incontinence     Chronic pain of both knees     Slow transit constipation     Allergic rhinitis     Hyperlipidemia with target LDL less than 100     Body mass index (BMI) 45.0-49.9, adult (HCC)     At high risk for falls     Spondylosis of lumbar region without myelopathy or radiculopathy     OAB (overactive bladder)     Primary osteoarthritis of both knees     TLHBSO, bilateral LND 10/24/17     Optic atrophy of both eyes     Difficulty walking     Esotropia     History of uterine cancer, Well differentiated grade 1 adenocarcinoma arising in complex hyperplasia, 2017     Vitamin B 12 deficiency     Atherosclerosis of aorta (HCC)     Calculus of gallbladder without cholecystitis without obstruction     CLAROS (nonalcoholic steatohepatitis)     Paroxysmal atrial fibrillation (HCC)     Type 2 diabetes mellitus, without long-term current use of insulin (HCC)     Mobility impaired     Multiple atypical skin moles     Chronic diastolic congestive heart failure (HCC)     Microcytic anemia     Abdominal aortic aneurysm (AAA) without rupture (HCC)     Chronic gout due to renal impairment without tophus     ZACKERY treated with BiPAP     Secondary hypercoagulable state (HCC)     Atrial fibrillation with RVR (HCC)     Atrial fibrillation with rapid ventricular response (HCC)     Sinus pause     H/O colonoscopy with polypectomy     Farooq's esophagus     History of colon polyps     Acute on chronic anemia     Age-related osteoporosis without current pathological fracture     Diverticulitis of intestine, part unspecified, with perforation and abscess without bleeding     Legal blindness, as defined in [place]     Muscle weakness (generalized)     Benign hypertension with CKD (chronic kidney disease), stage II

## 2024-02-08 RX ORDER — ATORVASTATIN CALCIUM 40 MG/1
40 TABLET, FILM COATED ORAL DAILY
Qty: 28 TABLET | Refills: 8 | Status: SHIPPED | OUTPATIENT
Start: 2024-02-08

## 2024-02-08 RX ORDER — MELATONIN
2 DAILY
Qty: 56 TABLET | Refills: 8 | Status: SHIPPED | OUTPATIENT
Start: 2024-02-08

## 2024-02-08 RX ORDER — LEVOTHYROXINE SODIUM 0.07 MG/1
TABLET ORAL
Qty: 28 TABLET | Refills: 8 | Status: SHIPPED | OUTPATIENT
Start: 2024-02-08

## 2024-02-08 RX ORDER — LANOLIN ALCOHOL/MO/W.PET/CERES
400 CREAM (GRAM) TOPICAL DAILY
Qty: 28 TABLET | Refills: 8 | Status: SHIPPED | OUTPATIENT
Start: 2024-02-08 | End: 2024-02-08 | Stop reason: SDUPTHER

## 2024-02-08 RX ORDER — OMEPRAZOLE 20 MG/1
20 CAPSULE, DELAYED RELEASE ORAL DAILY
Qty: 28 CAPSULE | Refills: 8 | Status: SHIPPED | OUTPATIENT
Start: 2024-02-08

## 2024-02-08 RX ORDER — DILTIAZEM HYDROCHLORIDE 180 MG/1
180 CAPSULE, COATED, EXTENDED RELEASE ORAL 2 TIMES DAILY
Qty: 56 CAPSULE | Refills: 8 | Status: SHIPPED | OUTPATIENT
Start: 2024-02-08

## 2024-02-08 NOTE — TELEPHONE ENCOUNTER
Last Visit:  12/29/2023     Next Visit Date:  Future Appointments   Date Time Provider Department Center   2/29/2024 12:30 PM Angel Bañuelos, MAX - CNP fp [facility]   4/4/2024 10:00 AM Francis Rodrigues DO [facility]   5/14/2024 11:00 AM Radha Vallecillo MD [facility]       Health Maintenance   Topic Date Due    Diabetic retinal exam  Never done    Shingles vaccine (1 of 2) Never done    Respiratory Syncytial Virus (RSV) Pregnant or age 60 yrs+ (1 - 1-dose 60+ series) Never done    Breast cancer screen  04/05/2023    Flu vaccine (1) 08/01/2023    COVID-19 Vaccine (5 - 2023-24 season) 09/01/2023    Annual Wellness Visit (Medicare Advantage)  01/01/2024    Diabetic foot exam  01/24/2024    A1C test (Diabetic or Prediabetic)  01/26/2024    Diabetic Alb to Cr ratio (uACR) test  01/27/2024    Lipids  02/01/2024    DEXA (modify frequency per FRAX score)  04/05/2024    Depression Screen  05/11/2024    GFR test (Diabetes, CKD 3-4, OR last GFR 15-59)  01/16/2025    DTaP/Tdap/Td vaccine (2 - Td or Tdap) 09/28/2027    Colorectal Cancer Screen  07/24/2028    Pneumococcal 65+ years Vaccine  Completed    Hepatitis C screen  Completed    Hepatitis A vaccine  Aged Out    Hepatitis B vaccine  Aged Out    Hib vaccine  Aged Out    Polio vaccine  Aged Out    Meningococcal (ACWY) vaccine  Aged Out       Hemoglobin A1C (%)   Date Value   10/26/2023 6.1   05/11/2023 4.9   01/24/2023 5.8             ( goal A1C is < 7)   No components found for: \"LABMICR\"  LDL Cholesterol (mg/dL)   Date Value   02/01/2023 71   01/27/2023 84     LDL Calculated (mg/dL)   Date Value   11/06/2020 70       (goal LDL is <100)   AST (U/L)   Date Value   07/20/2023 23     ALT (U/L)   Date Value   07/20/2023 10     BUN (mg/dL)   Date Value   01/16/2024 22     BP Readings from Last 3 Encounters:   11/01/23 (!) 142/72   10/26/23 122/84   10/05/23 (!) 122/50          (goal 120/80)    All Future Testing planned in [facility]  Lab Frequency Next Occurrence

## 2024-02-09 DIAGNOSIS — K59.01 SLOW TRANSIT CONSTIPATION: ICD-10-CM

## 2024-02-09 RX ORDER — DOCUSATE SODIUM 100 MG/1
CAPSULE, LIQUID FILLED ORAL
Qty: 180 CAPSULE | Refills: 3 | Status: SHIPPED | OUTPATIENT
Start: 2024-02-09

## 2024-02-09 NOTE — TELEPHONE ENCOUNTER
Please Approve or Refuse.  Send to Pharmacy per Pt's Request:      Next Visit Date:  5/14/2024   Last Visit Date: 12/29/2023    Hemoglobin A1C (%)   Date Value   10/26/2023 6.1   05/11/2023 4.9   01/24/2023 5.8             ( goal A1C is < 7)   BP Readings from Last 3 Encounters:   11/01/23 (!) 142/72   10/26/23 122/84   10/05/23 (!) 122/50          (goal 120/80)  BUN   Date Value Ref Range Status   01/16/2024 22 8 - 23 mg/dL Final     Creatinine   Date Value Ref Range Status   01/16/2024 0.9 0.5 - 0.9 mg/dL Final     Potassium   Date Value Ref Range Status   01/16/2024 3.8 3.7 - 5.3 mmol/L Final     Potassium reflex Magnesium   Date Value Ref Range Status   10/10/2021 4.1 3.5 - 5.2 meq/L Final     Comment:     Low level specimen hemolysis is present as indicated by the interference  level index on the Roche analyzer.  The reported K+ level may be falsely  increased.  If clinically warranted, recollection of the specimen is  suggested.

## 2024-02-12 DIAGNOSIS — M85.80 OSTEOPENIA DETERMINED BY X-RAY: ICD-10-CM

## 2024-02-12 RX ORDER — ALENDRONATE SODIUM 35 MG/1
TABLET ORAL
Qty: 12 TABLET | Refills: 3 | OUTPATIENT
Start: 2024-02-12

## 2024-02-15 DIAGNOSIS — E55.9 VITAMIN D DEFICIENCY: ICD-10-CM

## 2024-02-15 DIAGNOSIS — E78.5 HYPERLIPIDEMIA WITH TARGET LDL LESS THAN 100: ICD-10-CM

## 2024-02-15 RX ORDER — MELATONIN
2 DAILY
Qty: 56 TABLET | Refills: 4 | Status: SHIPPED | OUTPATIENT
Start: 2024-02-15

## 2024-02-15 RX ORDER — LANOLIN ALCOHOL/MO/W.PET/CERES
400 CREAM (GRAM) TOPICAL DAILY
Qty: 28 TABLET | Refills: 0 | OUTPATIENT
Start: 2024-02-15

## 2024-02-15 RX ORDER — ATORVASTATIN CALCIUM 40 MG/1
40 TABLET, FILM COATED ORAL DAILY
Qty: 28 TABLET | Refills: 4 | Status: SHIPPED | OUTPATIENT
Start: 2024-02-15

## 2024-02-19 RX ORDER — LANOLIN ALCOHOL/MO/W.PET/CERES
400 CREAM (GRAM) TOPICAL DAILY
Qty: 28 TABLET | Refills: 0 | Status: SHIPPED | OUTPATIENT
Start: 2024-02-19

## 2024-02-19 NOTE — TELEPHONE ENCOUNTER
Please Approve or Refuse.  Send to Pharmacy per Pt's Request: MED-X     Next Visit Date:  5/14/2024   Last Visit Date: 12/29/2023    Hemoglobin A1C (%)   Date Value   10/26/2023 6.1   05/11/2023 4.9   01/24/2023 5.8             ( goal A1C is < 7)   BP Readings from Last 3 Encounters:   11/01/23 (!) 142/72   10/26/23 122/84   10/05/23 (!) 122/50          (goal 120/80)  BUN   Date Value Ref Range Status   01/16/2024 22 8 - 23 mg/dL Final     Creatinine   Date Value Ref Range Status   01/16/2024 0.9 0.5 - 0.9 mg/dL Final     Potassium   Date Value Ref Range Status   01/16/2024 3.8 3.7 - 5.3 mmol/L Final     Potassium reflex Magnesium   Date Value Ref Range Status   10/10/2021 4.1 3.5 - 5.2 meq/L Final     Comment:     Low level specimen hemolysis is present as indicated by the interference  level index on the Roche analyzer.  The reported K+ level may be falsely  increased.  If clinically warranted, recollection of the specimen is  suggested.

## 2024-02-22 ENCOUNTER — TELEPHONE (OUTPATIENT)
Dept: FAMILY MEDICINE CLINIC | Age: 74
End: 2024-02-22

## 2024-02-23 NOTE — TELEPHONE ENCOUNTER
We cannot do more physical therapy , insurance usually covers certain sessions and pt need re-evaluation in office.

## 2024-02-27 RX ORDER — LANOLIN ALCOHOL/MO/W.PET/CERES
400 CREAM (GRAM) TOPICAL DAILY
Qty: 28 TABLET | Refills: 0 | Status: SHIPPED | OUTPATIENT
Start: 2024-02-27

## 2024-03-07 ENCOUNTER — OFFICE VISIT (OUTPATIENT)
Dept: ORTHOPEDIC SURGERY | Age: 74
End: 2024-03-07
Payer: MEDICARE

## 2024-03-07 VITALS — HEIGHT: 62 IN | BODY MASS INDEX: 44.16 KG/M2 | RESPIRATION RATE: 14 BRPM | WEIGHT: 240 LBS

## 2024-03-07 DIAGNOSIS — M17.11 PRIMARY OSTEOARTHRITIS OF RIGHT KNEE: Primary | ICD-10-CM

## 2024-03-07 PROCEDURE — 1090F PRES/ABSN URINE INCON ASSESS: CPT | Performed by: PHYSICIAN ASSISTANT

## 2024-03-07 PROCEDURE — G8417 CALC BMI ABV UP PARAM F/U: HCPCS | Performed by: PHYSICIAN ASSISTANT

## 2024-03-07 PROCEDURE — G8399 PT W/DXA RESULTS DOCUMENT: HCPCS | Performed by: PHYSICIAN ASSISTANT

## 2024-03-07 PROCEDURE — 3288F FALL RISK ASSESSMENT DOCD: CPT | Performed by: PHYSICIAN ASSISTANT

## 2024-03-07 PROCEDURE — G8427 DOCREV CUR MEDS BY ELIG CLIN: HCPCS | Performed by: PHYSICIAN ASSISTANT

## 2024-03-07 PROCEDURE — 3017F COLORECTAL CA SCREEN DOC REV: CPT | Performed by: PHYSICIAN ASSISTANT

## 2024-03-07 PROCEDURE — 1123F ACP DISCUSS/DSCN MKR DOCD: CPT | Performed by: PHYSICIAN ASSISTANT

## 2024-03-07 PROCEDURE — 1036F TOBACCO NON-USER: CPT | Performed by: PHYSICIAN ASSISTANT

## 2024-03-07 PROCEDURE — 99212 OFFICE O/P EST SF 10 MIN: CPT | Performed by: PHYSICIAN ASSISTANT

## 2024-03-07 PROCEDURE — G8484 FLU IMMUNIZE NO ADMIN: HCPCS | Performed by: PHYSICIAN ASSISTANT

## 2024-03-07 PROCEDURE — 0518F FALL PLAN OF CARE DOCD: CPT | Performed by: PHYSICIAN ASSISTANT

## 2024-03-07 NOTE — PROGRESS NOTES
[facility]                Ulices Kellogg PA-C            [address], [address]               [place], [place] [address]           Dept Phone: [phone]           Dept Fax:  [phone] [address] [facility]                       [address]           [place], [place] [address]          Dept Phone: [phone]           Dept Fax:  [phone]      Chief Compliant:  Chief Complaint   Patient presents with    Knee Pain     right        History of Present Illness:  This is a 74 y.o. female who presents to the clinic today for evaluation of had concerns including Knee Pain (right).     Ms. Arevalo is a pleasant 75-year-old female who returns today for reevaluation of chronic right knee pain.  Patient with evidence of severe osteoarthritis of the right knee with bone-on-bone apposition of the lateral compartment.  Last seen in January 2024 and underwent corticosteroid injection which did provide significant relief of pain.  She states she does have pain here and there especially when rising from seated position after sitting for an extended period time but overall happy with her improvement to this point.    She is also utilizing Tylenol arthritis dose and lidocaine patches as needed which do seem to help.       Past History:    Current Outpatient Medications:     nitroGLYCERIN (NITROSTAT) 0.4 MG SL tablet, PLACE ONE (1) TABLET UNDER THE TONGUE EVERY FIVE (5) MINUTES AS NEEDED FOR CHEST PAIN UP TO MAX OF THREE (3) TOTAL DOSES. IF NO RELIEF AFTER ONE (1) DOSE, CALL 911., Disp: 25 tablet, Rfl: 0    magnesium oxide (MAG-OX) 400 (240 Mg) MG tablet, TAKE ONE (1) TABLET BY MOUTH ONCE DAILY, Disp: 28 tablet, Rfl: 0    atorvastatin (LIPITOR) 40 MG tablet, TAKE 1 TABLET BY MOUTH ONCE DAILY, Disp: 28 tablet, Rfl: 4    vitamin D3 (CHOLECALCIFEROL) 25 MCG (1000 UT) TABS tablet, TAKE 2 TABLETS BY MOUTH ONCE DAILY, Disp: 56 tablet, Rfl: 4    docusate sodium (COLACE) 100 MG capsule, TAKE  no

## 2024-03-08 NOTE — TELEPHONE ENCOUNTER
Patient called stating she got the ok from her cardiologist to start with the Voltaren Gel.  She is asking for this to be sent to [facility] for home delivery.  Please return her call if any questions/concerns.    Thank you.

## 2024-03-14 DIAGNOSIS — E83.42 HYPOMAGNESEMIA: Primary | ICD-10-CM

## 2024-03-14 RX ORDER — LANOLIN ALCOHOL/MO/W.PET/CERES
400 CREAM (GRAM) TOPICAL DAILY
Qty: 28 TABLET | Refills: 5 | Status: SHIPPED | OUTPATIENT
Start: 2024-03-14

## 2024-03-18 ENCOUNTER — TELEPHONE (OUTPATIENT)
Dept: ORTHOPEDIC SURGERY | Age: 74
End: 2024-03-18

## 2024-03-18 NOTE — TELEPHONE ENCOUNTER
Pt informed she needs to make an appt to be seen.  Made pt an appt with Sweta Mayers for 4/2/24.  Pt declined any sooner appts

## 2024-03-18 NOTE — TELEPHONE ENCOUNTER
Patient called asking if Ulices would be willing to write her a script for an xray of her neck and right arm.  She states she's been experiencing neck pain, right shoulder pain, right arm pain and tingling in her fingers.  She declined to schedule an appointment at this time in hopes he would be able to send an order for her to have this done at [facility] on Thursday.    Please advise.    Thank you.

## 2024-03-21 ENCOUNTER — HOSPITAL ENCOUNTER (OUTPATIENT)
Dept: WOMENS IMAGING | Age: 74
Discharge: HOME | End: 2024-03-23
Attending: FAMILY MEDICINE
Payer: MEDICARE

## 2024-03-21 DIAGNOSIS — Z12.31 ENCOUNTER FOR SCREENING MAMMOGRAM FOR BREAST CANCER: ICD-10-CM

## 2024-03-21 PROCEDURE — 77063 BREAST TOMOSYNTHESIS BI: CPT

## 2024-03-26 ENCOUNTER — TELEPHONE (OUTPATIENT)
Dept: FAMILY MEDICINE CLINIC | Age: 74
End: 2024-03-26

## 2024-03-26 NOTE — TELEPHONE ENCOUNTER
Noted. Thank you!    Future Appointments   Date Time Provider Department Center   4/2/2024  8:30 AM Sweta Mayers PA [facility]   4/4/2024 10:00 AM Francis Rodrigues DO [facility]   4/24/2024  2:45 PM Reyes Chopra MD [facility]   5/9/2024  9:45 AM Ulices Kellogg PA [facility]   5/14/2024 11:00 AM Radha Vallecillo MD [facility]

## 2024-03-26 NOTE — TELEPHONE ENCOUNTER
Incoming call came from Home Health Care Facility:  [facility].   Nurse CHANCE is calling to see, TO LET  KNOW PT VITALS WERE STABLE, SHE WAS IN GOOD CONDITION WHEN WENT TO HER HOME. PULSE WAS AROUND 102, HER OXYGEN WAS READING 97%, ALSO STATED KEPT GOING IN AND OUT OF AFIB BUT PT STATED THAT SHE WAS FINE. THEY WILL RETURN IN 60 DAYS.  EVERY 60 DAYS THEY COME TO HOME TO VISIT PT FOR HOME HEALTH CARE.   Recommended Disposition per Home Health facility: [facility]    Please give a call back at [phone]  IF ANY FURTHER QUESTIONS.     Please advise, Thank you!      Future Appointments   Date Time Provider Department Center   4/2/2024  8:30 AM Sweta Mayers PA [facility]   4/4/2024 10:00 AM Francis Rodrigues DO [facility]   4/24/2024  2:45 PM Reyes Chopra MD [facility]   5/9/2024  9:45 AM Ulices Kellogg PA [facility]   5/14/2024 11:00 AM Radha Vallecillo MD [facility]

## 2024-04-02 ENCOUNTER — OFFICE VISIT (OUTPATIENT)
Dept: ORTHOPEDIC SURGERY | Age: 74
End: 2024-04-02
Payer: MEDICARE

## 2024-04-02 ENCOUNTER — TELEPHONE (OUTPATIENT)
Dept: ORTHOPEDIC SURGERY | Age: 74
End: 2024-04-02

## 2024-04-02 VITALS — BODY MASS INDEX: 46.01 KG/M2 | WEIGHT: 250 LBS | HEIGHT: 62 IN

## 2024-04-02 DIAGNOSIS — M54.2 NECK PAIN: Primary | ICD-10-CM

## 2024-04-02 DIAGNOSIS — M47.812 CERVICAL SPONDYLOSIS: ICD-10-CM

## 2024-04-02 DIAGNOSIS — M79.644 PAIN OF RIGHT THUMB: ICD-10-CM

## 2024-04-02 PROCEDURE — 1123F ACP DISCUSS/DSCN MKR DOCD: CPT | Performed by: PHYSICIAN ASSISTANT

## 2024-04-02 PROCEDURE — G8428 CUR MEDS NOT DOCUMENT: HCPCS | Performed by: PHYSICIAN ASSISTANT

## 2024-04-02 PROCEDURE — 1090F PRES/ABSN URINE INCON ASSESS: CPT | Performed by: PHYSICIAN ASSISTANT

## 2024-04-02 PROCEDURE — 3288F FALL RISK ASSESSMENT DOCD: CPT | Performed by: PHYSICIAN ASSISTANT

## 2024-04-02 PROCEDURE — 0518F FALL PLAN OF CARE DOCD: CPT | Performed by: PHYSICIAN ASSISTANT

## 2024-04-02 PROCEDURE — 3017F COLORECTAL CA SCREEN DOC REV: CPT | Performed by: PHYSICIAN ASSISTANT

## 2024-04-02 PROCEDURE — G8417 CALC BMI ABV UP PARAM F/U: HCPCS | Performed by: PHYSICIAN ASSISTANT

## 2024-04-02 PROCEDURE — 1036F TOBACCO NON-USER: CPT | Performed by: PHYSICIAN ASSISTANT

## 2024-04-02 PROCEDURE — 99213 OFFICE O/P EST LOW 20 MIN: CPT | Performed by: PHYSICIAN ASSISTANT

## 2024-04-02 PROCEDURE — G8399 PT W/DXA RESULTS DOCUMENT: HCPCS | Performed by: PHYSICIAN ASSISTANT

## 2024-04-02 NOTE — TELEPHONE ENCOUNTER
Zunilda from [facility] called to state they received referral for pt. Sweta Mayers PA-C's NPI and [facility] address provided.

## 2024-04-02 NOTE — PROGRESS NOTES
Patient ID: María Arevalo is a 74 y.o. female    Chief Compliant:  Chief Complaint   Patient presents with    Neck Pain    Arm Pain     Rt shld pain that extends into hand      HPI:  Patient is a 74 y.o. female who presents to the clinic today for evaluation of neck pain and right arm pain.  Patient reports that this pain has been ongoing for the past 3 months.  Patient seems to have more right thumb pain than the right arm pain itself.  Patient is describing pain to the base of the right thumb.  Texting really aggravates the pain.  In regards to her neck pain this has been intermittent and reports the pain starts in her neck and goes down to the end of her shoulder.  She denies any sharp shooting pain down her arm denies any numbness or tingling down her upper extremities.  She has been taking Tylenol arthritis that seems to help with both her neck and right thumb pain when she takes this.  Patient did have physical therapy coming to her home that was helping with her overall gait stability and strength.  Does have a history of CHF and diabetes with her last A1c being 6.1 on 10/26/2023.  Patient is currently on Xarelto.  She previously saw Russ Kellogg PA-C on 3/7/2024 for osteoarthritis of her right knee she is scheduled to have another steroid injection in the next month or 2.    Review of Systems   Constitutional: Negative for fever, chills, sweats.   Eyes: Negative for changes in vision, or pain.   HENT: Negative for ear ache, epistaxis, or sore throat.  Respiratory/Cardio: Negative for Chest pain, palpitations, SOB, or cough.  Gastrointestinal: Negative for abdominal pain, N/V/D.   Genitourinary: Negative for dysuria, frequency, urgency, or hematuria.   Neurological: Negative for headache, numbness, or weakness.   Integumentary: Negative for rash, itching, laceration, or abrasion.   Musculoskeletal: Positive for Neck Pain and Arm Pain (Rt shld pain that extends into hand)    Past History:    Current

## 2024-05-07 ENCOUNTER — TELEPHONE (OUTPATIENT)
Dept: ORTHOPEDIC SURGERY | Age: 74
End: 2024-05-07

## 2024-05-07 NOTE — TELEPHONE ENCOUNTER
Contacted patient to inform her that she is currently scheduled for a 9:45 am and 10:15 am appointment on Thursday, 5/9/24. Encouraged patient to call office back so that one of the appointment times can be cancelled.

## 2024-05-09 ENCOUNTER — OFFICE VISIT (OUTPATIENT)
Dept: ORTHOPEDIC SURGERY | Age: 74
End: 2024-05-09
Payer: MEDICARE

## 2024-05-09 VITALS — HEIGHT: 62 IN | RESPIRATION RATE: 14 BRPM | BODY MASS INDEX: 46.01 KG/M2 | WEIGHT: 250 LBS

## 2024-05-09 DIAGNOSIS — M17.12 PRIMARY OSTEOARTHRITIS OF LEFT KNEE: Primary | ICD-10-CM

## 2024-05-09 DIAGNOSIS — M17.11 PRIMARY OSTEOARTHRITIS OF RIGHT KNEE: ICD-10-CM

## 2024-05-09 PROCEDURE — G8417 CALC BMI ABV UP PARAM F/U: HCPCS | Performed by: PHYSICIAN ASSISTANT

## 2024-05-09 PROCEDURE — 0518F FALL PLAN OF CARE DOCD: CPT | Performed by: PHYSICIAN ASSISTANT

## 2024-05-09 PROCEDURE — 20610 DRAIN/INJ JOINT/BURSA W/O US: CPT | Performed by: PHYSICIAN ASSISTANT

## 2024-05-09 PROCEDURE — 1123F ACP DISCUSS/DSCN MKR DOCD: CPT | Performed by: PHYSICIAN ASSISTANT

## 2024-05-09 PROCEDURE — 1090F PRES/ABSN URINE INCON ASSESS: CPT | Performed by: PHYSICIAN ASSISTANT

## 2024-05-09 PROCEDURE — 1036F TOBACCO NON-USER: CPT | Performed by: PHYSICIAN ASSISTANT

## 2024-05-09 PROCEDURE — 3017F COLORECTAL CA SCREEN DOC REV: CPT | Performed by: PHYSICIAN ASSISTANT

## 2024-05-09 PROCEDURE — G8427 DOCREV CUR MEDS BY ELIG CLIN: HCPCS | Performed by: PHYSICIAN ASSISTANT

## 2024-05-09 PROCEDURE — 99213 OFFICE O/P EST LOW 20 MIN: CPT | Performed by: PHYSICIAN ASSISTANT

## 2024-05-09 PROCEDURE — 3288F FALL RISK ASSESSMENT DOCD: CPT | Performed by: PHYSICIAN ASSISTANT

## 2024-05-09 PROCEDURE — G8399 PT W/DXA RESULTS DOCUMENT: HCPCS | Performed by: PHYSICIAN ASSISTANT

## 2024-05-09 RX ORDER — BETAMETHASONE SODIUM PHOSPHATE AND BETAMETHASONE ACETATE 3; 3 MG/ML; MG/ML
12 INJECTION, SUSPENSION INTRA-ARTICULAR; INTRALESIONAL; INTRAMUSCULAR; SOFT TISSUE ONCE
Status: COMPLETED | OUTPATIENT
Start: 2024-05-09 | End: 2024-05-09

## 2024-05-09 RX ORDER — LIDOCAINE HYDROCHLORIDE 10 MG/ML
2 INJECTION, SOLUTION INFILTRATION; PERINEURAL ONCE
Status: COMPLETED | OUTPATIENT
Start: 2024-05-09 | End: 2024-05-09

## 2024-05-09 RX ORDER — BUPIVACAINE HYDROCHLORIDE 2.5 MG/ML
2 INJECTION, SOLUTION INFILTRATION; PERINEURAL ONCE
Status: COMPLETED | OUTPATIENT
Start: 2024-05-09 | End: 2024-05-09

## 2024-05-09 RX ADMIN — LIDOCAINE HYDROCHLORIDE 2 ML: 10 INJECTION, SOLUTION INFILTRATION; PERINEURAL at 10:48

## 2024-05-09 RX ADMIN — LIDOCAINE HYDROCHLORIDE 2 ML: 10 INJECTION, SOLUTION INFILTRATION; PERINEURAL at 10:47

## 2024-05-09 RX ADMIN — BETAMETHASONE SODIUM PHOSPHATE AND BETAMETHASONE ACETATE 12 MG: 3; 3 INJECTION, SUSPENSION INTRA-ARTICULAR; INTRALESIONAL; INTRAMUSCULAR; SOFT TISSUE at 10:46

## 2024-05-09 RX ADMIN — BUPIVACAINE HYDROCHLORIDE 5 MG: 2.5 INJECTION, SOLUTION INFILTRATION; PERINEURAL at 10:31

## 2024-05-09 RX ADMIN — BETAMETHASONE SODIUM PHOSPHATE AND BETAMETHASONE ACETATE 12 MG: 3; 3 INJECTION, SUSPENSION INTRA-ARTICULAR; INTRALESIONAL; INTRAMUSCULAR; SOFT TISSUE at 10:47

## 2024-05-09 NOTE — PROGRESS NOTES
using voice recognition Dragon dictation software.  Although every effort was made to ensure the accuracy of this automated transcription, some errors in transcription may have occurred.

## 2024-05-14 ENCOUNTER — OFFICE VISIT (OUTPATIENT)
Dept: FAMILY MEDICINE CLINIC | Age: 74
End: 2024-05-14
Payer: MEDICARE

## 2024-05-14 VITALS
SYSTOLIC BLOOD PRESSURE: 134 MMHG | OXYGEN SATURATION: 98 % | DIASTOLIC BLOOD PRESSURE: 82 MMHG | TEMPERATURE: 97.3 F | WEIGHT: 264.8 LBS | HEART RATE: 86 BPM | HEIGHT: 62 IN | BODY MASS INDEX: 48.73 KG/M2

## 2024-05-14 DIAGNOSIS — I50.32 CHRONIC DIASTOLIC CONGESTIVE HEART FAILURE (HCC): ICD-10-CM

## 2024-05-14 DIAGNOSIS — Z78.0 POSTMENOPAUSAL STATE: ICD-10-CM

## 2024-05-14 DIAGNOSIS — R26.2 DIFFICULTY WALKING: ICD-10-CM

## 2024-05-14 DIAGNOSIS — R09.89 DECREASED DORSALIS PEDIS PULSE: ICD-10-CM

## 2024-05-14 DIAGNOSIS — I12.9 BENIGN HYPERTENSION WITH CKD (CHRONIC KIDNEY DISEASE), STAGE II: ICD-10-CM

## 2024-05-14 DIAGNOSIS — E78.2 MIXED HYPERLIPIDEMIA: ICD-10-CM

## 2024-05-14 DIAGNOSIS — I70.0 ATHEROSCLEROSIS OF AORTA (HCC): ICD-10-CM

## 2024-05-14 DIAGNOSIS — N18.2 TYPE 2 DIABETES MELLITUS WITH STAGE 2 CHRONIC KIDNEY DISEASE, WITHOUT LONG-TERM CURRENT USE OF INSULIN (HCC): ICD-10-CM

## 2024-05-14 DIAGNOSIS — N18.2 BENIGN HYPERTENSION WITH CKD (CHRONIC KIDNEY DISEASE), STAGE II: ICD-10-CM

## 2024-05-14 DIAGNOSIS — M17.0 PRIMARY OSTEOARTHRITIS OF BOTH KNEES: ICD-10-CM

## 2024-05-14 DIAGNOSIS — I48.0 PAROXYSMAL ATRIAL FIBRILLATION (HCC): ICD-10-CM

## 2024-05-14 DIAGNOSIS — E11.22 TYPE 2 DIABETES MELLITUS WITH STAGE 2 CHRONIC KIDNEY DISEASE, WITHOUT LONG-TERM CURRENT USE OF INSULIN (HCC): ICD-10-CM

## 2024-05-14 DIAGNOSIS — Z00.00 MEDICARE ANNUAL WELLNESS VISIT, SUBSEQUENT: Primary | ICD-10-CM

## 2024-05-14 DIAGNOSIS — M85.80 OSTEOPENIA DETERMINED BY X-RAY: ICD-10-CM

## 2024-05-14 PROBLEM — I13.0 HYPERTENSIVE HEART AND CHRONIC KIDNEY DISEASE WITH HEART FAILURE AND STAGE 1 THROUGH STAGE 4 CHRONIC KIDNEY DISEASE, OR UNSPECIFIED CHRONIC KIDNEY DISEASE (HCC): Status: ACTIVE | Noted: 2024-01-02

## 2024-05-14 PROBLEM — D68.69 SECONDARY HYPERCOAGULABLE STATE (HCC): Status: RESOLVED | Noted: 2023-05-11 | Resolved: 2024-05-14

## 2024-05-14 PROBLEM — K57.80 DIVERTICULITIS OF INTESTINE, PART UNSPECIFIED, WITH PERFORATION AND ABSCESS WITHOUT BLEEDING: Status: RESOLVED | Noted: 2020-06-22 | Resolved: 2024-05-14

## 2024-05-14 PROBLEM — E87.6 HYPOKALEMIA: Status: ACTIVE | Noted: 2024-05-07

## 2024-05-14 PROBLEM — R26.9 UNSPECIFIED ABNORMALITIES OF GAIT AND MOBILITY: Status: RESOLVED | Noted: 2024-01-02 | Resolved: 2024-05-14

## 2024-05-14 PROBLEM — R26.9 UNSPECIFIED ABNORMALITIES OF GAIT AND MOBILITY: Status: ACTIVE | Noted: 2024-01-02

## 2024-05-14 PROBLEM — M17.11 ARTHRITIS OF RIGHT KNEE: Status: ACTIVE | Noted: 2024-01-02

## 2024-05-14 PROBLEM — I48.91 ATRIAL FIBRILLATION WITH RAPID VENTRICULAR RESPONSE (HCC): Status: RESOLVED | Noted: 2023-07-20 | Resolved: 2024-05-14

## 2024-05-14 PROBLEM — Z90.710 ACQUIRED ABSENCE OF BOTH CERVIX AND UTERUS: Status: ACTIVE | Noted: 2024-01-02

## 2024-05-14 PROBLEM — D64.9 ACUTE ON CHRONIC ANEMIA: Status: RESOLVED | Noted: 2023-07-25 | Resolved: 2024-05-14

## 2024-05-14 PROBLEM — Z98.49 CATARACT EXTRACTION STATUS, UNSPECIFIED EYE: Status: ACTIVE | Noted: 2024-01-02

## 2024-05-14 PROBLEM — R52 INTRACTABLE PAIN: Status: ACTIVE | Noted: 2023-12-31

## 2024-05-14 PROBLEM — I48.91 ATRIAL FIBRILLATION WITH RVR (HCC): Status: RESOLVED | Noted: 2023-07-17 | Resolved: 2024-05-14

## 2024-05-14 PROCEDURE — 2022F DILAT RTA XM EVC RTNOPTHY: CPT | Performed by: FAMILY MEDICINE

## 2024-05-14 PROCEDURE — G0439 PPPS, SUBSEQ VISIT: HCPCS | Performed by: FAMILY MEDICINE

## 2024-05-14 PROCEDURE — 3046F HEMOGLOBIN A1C LEVEL >9.0%: CPT | Performed by: FAMILY MEDICINE

## 2024-05-14 PROCEDURE — 99214 OFFICE O/P EST MOD 30 MIN: CPT | Performed by: FAMILY MEDICINE

## 2024-05-14 PROCEDURE — 3017F COLORECTAL CA SCREEN DOC REV: CPT | Performed by: FAMILY MEDICINE

## 2024-05-14 PROCEDURE — 0518F FALL PLAN OF CARE DOCD: CPT | Performed by: FAMILY MEDICINE

## 2024-05-14 PROCEDURE — G8417 CALC BMI ABV UP PARAM F/U: HCPCS | Performed by: FAMILY MEDICINE

## 2024-05-14 PROCEDURE — G8399 PT W/DXA RESULTS DOCUMENT: HCPCS | Performed by: FAMILY MEDICINE

## 2024-05-14 PROCEDURE — G8427 DOCREV CUR MEDS BY ELIG CLIN: HCPCS | Performed by: FAMILY MEDICINE

## 2024-05-14 PROCEDURE — 1036F TOBACCO NON-USER: CPT | Performed by: FAMILY MEDICINE

## 2024-05-14 PROCEDURE — 3288F FALL RISK ASSESSMENT DOCD: CPT | Performed by: FAMILY MEDICINE

## 2024-05-14 PROCEDURE — 1123F ACP DISCUSS/DSCN MKR DOCD: CPT | Performed by: FAMILY MEDICINE

## 2024-05-14 PROCEDURE — 1090F PRES/ABSN URINE INCON ASSESS: CPT | Performed by: FAMILY MEDICINE

## 2024-05-14 SDOH — ECONOMIC STABILITY: FOOD INSECURITY: WITHIN THE PAST 12 MONTHS, YOU WORRIED THAT YOUR FOOD WOULD RUN OUT BEFORE YOU GOT MONEY TO BUY MORE.: NEVER TRUE

## 2024-05-14 SDOH — ECONOMIC STABILITY: INCOME INSECURITY: HOW HARD IS IT FOR YOU TO PAY FOR THE VERY BASICS LIKE FOOD, HOUSING, MEDICAL CARE, AND HEATING?: NOT HARD AT ALL

## 2024-05-14 SDOH — ECONOMIC STABILITY: FOOD INSECURITY: WITHIN THE PAST 12 MONTHS, THE FOOD YOU BOUGHT JUST DIDN'T LAST AND YOU DIDN'T HAVE MONEY TO GET MORE.: NEVER TRUE

## 2024-05-14 ASSESSMENT — ENCOUNTER SYMPTOMS
SHORTNESS OF BREATH: 1
VOMITING: 0
ABDOMINAL DISTENTION: 0
COUGH: 0
ABDOMINAL PAIN: 0
WHEEZING: 0
NAUSEA: 0
CONSTIPATION: 0
DIARRHEA: 0

## 2024-05-14 ASSESSMENT — PATIENT HEALTH QUESTIONNAIRE - PHQ9
2. FEELING DOWN, DEPRESSED OR HOPELESS: NOT AT ALL
SUM OF ALL RESPONSES TO PHQ9 QUESTIONS 1 & 2: 0
SUM OF ALL RESPONSES TO PHQ QUESTIONS 1-9: 0
1. LITTLE INTEREST OR PLEASURE IN DOING THINGS: NOT AT ALL

## 2024-05-14 ASSESSMENT — LIFESTYLE VARIABLES
HOW MANY STANDARD DRINKS CONTAINING ALCOHOL DO YOU HAVE ON A TYPICAL DAY: PATIENT DOES NOT DRINK
HOW OFTEN DO YOU HAVE A DRINK CONTAINING ALCOHOL: NEVER

## 2024-05-14 NOTE — PROGRESS NOTES
Visit Information    Have you changed or started any medications since your last visit including any over-the-counter medicines, vitamins, or herbal medicines? no   Have you stopped taking any of your medications? Is so, why? -  no  Are you having any side effects from any of your medications? - no    Have you seen any other physician or provider since your last visit?  no   Have you had any other diagnostic tests since your last visit?  yes -    Have you been seen in the emergency room and/or had an admission in a hospital since we last saw you?  yes -    Have you had your routine dental cleaning in the past 6 months?  no     Do you have an active MyChart account? If no, what is the barrier?  Yes    Patient Care Team:  Radha Vallecillo MD as PCP - General (Family Medicine)  Radha Vallecillo MD as PCP - Empaneled Provider  Ford Samano MD as Referring Physician (Orthopedic Surgery)  Edy Rasmussen MD as Surgeon (Orthopedic Surgery)  Jaison Henderson MD as Consulting Physician (Endocrinology)  Rosita Holcomb DO as Consulting Physician (Obstetrics & Gynecology)  Roshan Ram MD as Consulting Physician (Urology)  Cynthia Phoenix MD as Consulting Physician (Otolaryngology)  Karen Mead DO as Consulting Physician (General Surgery)  Cuate Rodrigues DO as Consulting Physician (Cardiology)  Raman Garduno MD as Surgeon (Ophthalmology)  Mary Leblanc MD as Consulting Physician (Dermatology)  Zachary Borges MD as Consulting Physician (Infectious Diseases)  Nithya Kevin PA-C as Physician Assistant (Gynecological Oncology)  Reyes Chopra MD as Consulting Physician (Pulmonary Disease)  Raman Garduno MD as Surgeon (Ophthalmology)    Medical History Review  Past Medical, Family, and Social History reviewed and does contribute to the patient presenting condition    Health Maintenance   Topic Date Due    Diabetic retinal exam  Never done    Shingles vaccine (1 of 2) Never 
[facility]   8/20/2024  9:00 AM [facility]   10/17/2024 10:00 AM Francis Rodrigues, DO [facility]   5/20/2025  9:30 AM Radha Vallecillo MD [facility]         SUBJECTIVE/OBJECTIVE:    Patient reports increasing difficulty walking inside of the house and outside of the house and she wants to have evaluation for power chair or electric scooter, she says she will have space inside of the house.  She wants to discuss power mobility device.  Currently she cannot do cooking, getting room to room, housekeeping, shopping.  She reports her knees are getting worse, she has been seeing orthopedics  Intensity of the pain 8 out of 10 in her knees, worse when she tries to walk      Hypertension, congestive heart failure, paroxysmal atrial fibrillation, chronic kidney disease stage II: Patient here for follow-up.  She is not exercising and is adherent to low salt diet.    Blood pressure is well controlled at home.   Cardiac symptoms chest pressure/discomfort, fatigue, and lower extremity edema, dyspnea with intense exertion.     Patient reports occasionally getting left side, lower chest pain when lying down, didn't need nitroglycerine, it went away by itself  She reports she did not have chest pains in a while.  Patient denies exertional chest pressure/discomfort, irregular heart beat, near-syncope, orthopnea, palpitations, paroxysmal nocturnal dyspnea, syncope, and tachypnea.    Cardiovascular risk factors: advanced age (older than 55 for men, 65 for women), diabetes mellitus, dyslipidemia, hypertension, obesity (BMI >= 30 kg/m2), and sedentary lifestyle.   Use of agents associated with hypertension: thyroid hormones.  History of target organ damage: chronic kidney disease and heart failure.CKD stage 2 now.  She also has AAA which is small      For A-fib with multiple episodes of RVR, she says she is not taking amiodarone, she is questioning if she should take it, I advised her to contact

## 2024-05-14 NOTE — PATIENT INSTRUCTIONS
learn more about \"A Healthy Heart: Care Instructions.\"  Current as of: June 24, 2023               Content Version: 14.0  © 7033-3044 [employer].   Care instructions adapted under license by [employer]. If you have questions about a medical condition or this instruction, always ask your healthcare professional. [employer] disclaims any warranty or liability for your use of this information.      Personalized Preventive Plan for María Arevalo - 5/14/2024  Medicare offers a range of preventive health benefits. Some of the tests and screenings are paid in full while other may be subject to a deductible, co-insurance, and/or copay.    Some of these benefits include a comprehensive review of your medical history including lifestyle, illnesses that may run in your family, and various assessments and screenings as appropriate.    After reviewing your medical record and screening and assessments performed today your provider may have ordered immunizations, labs, imaging, and/or referrals for you.  A list of these orders (if applicable) as well as your Preventive Care list are included within your After Visit Summary for your review.    Other Preventive Recommendations:    A preventive eye exam performed by an eye specialist is recommended every 1-2 years to screen for glaucoma; cataracts, macular degeneration, and other eye disorders.  A preventive dental visit is recommended every 6 months.  Try to get at least 150 minutes of exercise per week or 10,000 steps per day on a pedometer .  Order or download the FREE \"Exercise & Physical Activity: Your Everyday Guide\" from [employer]. Call 1-773.486.8564 or search [employer] online.  You need 2337-9978 mg of calcium and 8573-8572 IU of vitamin D per day. It is possible to meet your calcium requirement with diet alone, but a vitamin D supplement is usually necessary to meet this goal.  When exposed to the

## 2024-05-28 ENCOUNTER — TELEPHONE (OUTPATIENT)
Dept: FAMILY MEDICINE CLINIC | Age: 74
End: 2024-05-28

## 2024-05-28 ENCOUNTER — OFFICE VISIT (OUTPATIENT)
Dept: ORTHOPEDIC SURGERY | Age: 74
End: 2024-05-28
Payer: MEDICARE

## 2024-05-28 VITALS — WEIGHT: 264 LBS | BODY MASS INDEX: 48.58 KG/M2 | HEIGHT: 62 IN | RESPIRATION RATE: 14 BRPM

## 2024-05-28 DIAGNOSIS — M79.645 PAIN OF LEFT THUMB: Primary | ICD-10-CM

## 2024-05-28 DIAGNOSIS — M79.644 PAIN OF RIGHT THUMB: ICD-10-CM

## 2024-05-28 PROCEDURE — G8399 PT W/DXA RESULTS DOCUMENT: HCPCS | Performed by: PHYSICIAN ASSISTANT

## 2024-05-28 PROCEDURE — 1123F ACP DISCUSS/DSCN MKR DOCD: CPT | Performed by: PHYSICIAN ASSISTANT

## 2024-05-28 PROCEDURE — 1036F TOBACCO NON-USER: CPT | Performed by: PHYSICIAN ASSISTANT

## 2024-05-28 PROCEDURE — G8417 CALC BMI ABV UP PARAM F/U: HCPCS | Performed by: PHYSICIAN ASSISTANT

## 2024-05-28 PROCEDURE — 3017F COLORECTAL CA SCREEN DOC REV: CPT | Performed by: PHYSICIAN ASSISTANT

## 2024-05-28 PROCEDURE — 99213 OFFICE O/P EST LOW 20 MIN: CPT | Performed by: PHYSICIAN ASSISTANT

## 2024-05-28 PROCEDURE — 0518F FALL PLAN OF CARE DOCD: CPT | Performed by: PHYSICIAN ASSISTANT

## 2024-05-28 PROCEDURE — G8427 DOCREV CUR MEDS BY ELIG CLIN: HCPCS | Performed by: PHYSICIAN ASSISTANT

## 2024-05-28 PROCEDURE — 1090F PRES/ABSN URINE INCON ASSESS: CPT | Performed by: PHYSICIAN ASSISTANT

## 2024-05-28 PROCEDURE — 3288F FALL RISK ASSESSMENT DOCD: CPT | Performed by: PHYSICIAN ASSISTANT

## 2024-05-28 NOTE — PROGRESS NOTES
respiratory distress.   Neurological:      Mental Status: Alert and oriented to person, place, and time.      Sensory: No sensory deficit.   Psychiatric:         Behavior: Behavior normal.         Thought Content: Thought content normal.  Skin:     General: Skin is warm and dry.   Musculoskeletal:      Comments: Normal gait  Not able to elicit any tenderness even with grind test.   is intact radial pulses 2+.    Diagnostic imaging:    AP lateral right hand demonstrates very mild to moderate CMC arthritis no other osseous abnormality to note.    AP lateral left hand demonstrates mild to moderate CMC arthritis no other bony osseous abnormality.    Assessment and Plan:  1. Pain of left thumb    2. Pain of right thumb    3. Chronic pain of left thumb    74-year-old female presenting with bilateral thumb pain right greater than left.  This has been ongoing for a while x-rays were taken today that demonstrate CMC arthritis bilaterally.  We did discuss natural history and course of treatment at this time.  Her symptoms are consistent with CMC arthritis at this time her pain is relatively well-maintained with Tylenol arthritis.  I spoke with her about activity modification, rest, ice, heat as well as bracing.  Patient is comfortable with conservative treatment at this time and I will see her back on an as-needed basis.        Follow up IZAIAH Mayers PA-C    5/31/2024 10:03 AM        Please note that this chart was generated using voice recognition Dragon dictation software.  Although every effort was made to ensure the accuracy of this automated transcription, some errors in transcription may have occurred.

## 2024-05-28 NOTE — TELEPHONE ENCOUNTER
Noted. Thank you!  I agree with the plan    Future Appointments   Date Time Provider Department Center   5/31/2024 10:30 AM [facility] DEXA RM [facility] MAMMO [facility] Radiolog   5/31/2024 11:00 AM [facility] VASCULAR 2 ST[facility] VASCLAB [facility] Radiolog   6/18/2024 11:00 AM Jay Brothers, PT [facility] PT St    8/7/2024  3:00 PM Reyes Chopra MD [facility]   8/20/2024  9:00 AM Radha Vallecillo MD [facility]   10/17/2024 10:00 AM Francis Rodrigues DO [facility]   5/20/2025  9:30 AM Radha Vallecillo MD [facility]

## 2024-05-28 NOTE — TELEPHONE ENCOUNTER
[facility]   SAW HER SATURDAY STATED SHE LOOKED VERRY WELL AND DID WELL   REQUIRED TO SEE PATIENT EVERY 60 DAYS SHE WILL SEE HER AGAIN  IN TWO MONTHS

## 2024-05-31 ENCOUNTER — HOSPITAL ENCOUNTER (OUTPATIENT)
Age: 74
Discharge: HOME | End: 2024-05-31
Attending: FAMILY MEDICINE
Payer: MEDICARE

## 2024-05-31 ENCOUNTER — HOSPITAL ENCOUNTER (OUTPATIENT)
Dept: VASCULAR LAB | Age: 74
End: 2024-05-31
Attending: FAMILY MEDICINE
Payer: MEDICARE

## 2024-05-31 ENCOUNTER — HOSPITAL ENCOUNTER (OUTPATIENT)
Dept: WOMENS IMAGING | Age: 74
End: 2024-05-31
Attending: FAMILY MEDICINE
Payer: MEDICARE

## 2024-05-31 DIAGNOSIS — M85.80 OSTEOPENIA DETERMINED BY X-RAY: ICD-10-CM

## 2024-05-31 DIAGNOSIS — N18.2 BENIGN HYPERTENSION WITH CKD (CHRONIC KIDNEY DISEASE), STAGE II: ICD-10-CM

## 2024-05-31 DIAGNOSIS — E11.22 TYPE 2 DIABETES MELLITUS WITH STAGE 2 CHRONIC KIDNEY DISEASE, WITHOUT LONG-TERM CURRENT USE OF INSULIN (HCC): ICD-10-CM

## 2024-05-31 DIAGNOSIS — R09.89 DECREASED DORSALIS PEDIS PULSE: ICD-10-CM

## 2024-05-31 DIAGNOSIS — I70.0 ATHEROSCLEROSIS OF AORTA (HCC): ICD-10-CM

## 2024-05-31 DIAGNOSIS — Z78.0 POSTMENOPAUSAL STATE: ICD-10-CM

## 2024-05-31 DIAGNOSIS — N18.2 TYPE 2 DIABETES MELLITUS WITH STAGE 2 CHRONIC KIDNEY DISEASE, WITHOUT LONG-TERM CURRENT USE OF INSULIN (HCC): ICD-10-CM

## 2024-05-31 DIAGNOSIS — I12.9 BENIGN HYPERTENSION WITH CKD (CHRONIC KIDNEY DISEASE), STAGE II: ICD-10-CM

## 2024-05-31 DIAGNOSIS — I50.32 CHRONIC DIASTOLIC CONGESTIVE HEART FAILURE (HCC): ICD-10-CM

## 2024-05-31 DIAGNOSIS — M1A.30X0 CHRONIC GOUT DUE TO RENAL IMPAIRMENT WITHOUT TOPHUS, UNSPECIFIED SITE: ICD-10-CM

## 2024-05-31 DIAGNOSIS — E78.2 MIXED HYPERLIPIDEMIA: Primary | ICD-10-CM

## 2024-05-31 PROBLEM — N18.30 BENIGN HYPERTENSIVE HEART AND CKD, STAGE 3 (GFR 30-59), W CHF (HCC): Status: ACTIVE | Noted: 2023-12-29

## 2024-05-31 PROBLEM — I13.0 BENIGN HYPERTENSIVE HEART AND CKD, STAGE 3 (GFR 30-59), W CHF (HCC): Status: ACTIVE | Noted: 2023-12-29

## 2024-05-31 LAB
25(OH)D3 SERPL-MCNC: 43 NG/ML (ref 30–100)
ALBUMIN SERPL-MCNC: 4.6 G/DL (ref 3.5–5.2)
ALP SERPL-CCNC: 87 U/L (ref 35–104)
ALT SERPL-CCNC: 23 U/L (ref 5–33)
ANION GAP SERPL CALCULATED.3IONS-SCNC: 15 MMOL/L (ref 9–17)
ANION GAP SERPL CALCULATED.3IONS-SCNC: 15 MMOL/L (ref 9–17)
AST SERPL-CCNC: 22 U/L
BASOPHILS # BLD: 0.1 K/UL (ref 0–0.2)
BASOPHILS NFR BLD: 1 % (ref 0–2)
BILIRUB SERPL-MCNC: 0.8 MG/DL (ref 0.3–1.2)
BNP SERPL-MCNC: 133 PG/ML
BUN SERPL-MCNC: 28 MG/DL (ref 8–23)
BUN SERPL-MCNC: 28 MG/DL (ref 8–23)
CALCIUM SERPL-MCNC: 10.1 MG/DL (ref 8.6–10.4)
CALCIUM SERPL-MCNC: 10.1 MG/DL (ref 8.6–10.4)
CHLORIDE SERPL-SCNC: 100 MMOL/L (ref 98–107)
CHLORIDE SERPL-SCNC: 100 MMOL/L (ref 98–107)
CHOLEST SERPL-MCNC: 217 MG/DL
CHOLESTEROL/HDL RATIO: 3.3
CO2 SERPL-SCNC: 23 MMOL/L (ref 20–31)
CO2 SERPL-SCNC: 23 MMOL/L (ref 20–31)
CREAT SERPL-MCNC: 1 MG/DL (ref 0.5–0.9)
CREAT SERPL-MCNC: 1 MG/DL (ref 0.5–0.9)
CREAT UR-MCNC: 350 MG/DL (ref 28–217)
EOSINOPHIL # BLD: 0.1 K/UL (ref 0–0.4)
EOSINOPHILS RELATIVE PERCENT: 1 % (ref 0–4)
ERYTHROCYTE [DISTWIDTH] IN BLOOD BY AUTOMATED COUNT: 17.5 % (ref 11.5–14.9)
EST. AVERAGE GLUCOSE BLD GHB EST-MCNC: 131 MG/DL
FOLATE SERPL-MCNC: 14.5 NG/ML (ref 4.8–24.2)
GFR, ESTIMATED: 60 ML/MIN/1.73M2
GFR, ESTIMATED: 60 ML/MIN/1.73M2
GLUCOSE SERPL-MCNC: 144 MG/DL (ref 70–99)
GLUCOSE SERPL-MCNC: 144 MG/DL (ref 70–99)
HBA1C MFR BLD: 6.2 % (ref 4–6)
HCT VFR BLD AUTO: 41 % (ref 36–46)
HDLC SERPL-MCNC: 65 MG/DL
HGB BLD-MCNC: 13.2 G/DL (ref 12–16)
LDLC SERPL CALC-MCNC: 112 MG/DL (ref 0–130)
LYMPHOCYTES NFR BLD: 1.3 K/UL (ref 1–4.8)
LYMPHOCYTES RELATIVE PERCENT: 17 % (ref 24–44)
MAGNESIUM SERPL-MCNC: 2 MG/DL (ref 1.6–2.6)
MCH RBC QN AUTO: 28.5 PG (ref 26–34)
MCHC RBC AUTO-ENTMCNC: 32.2 G/DL (ref 31–37)
MCV RBC AUTO: 88.6 FL (ref 80–100)
MICROALBUMIN UR-MCNC: 24 MG/L (ref 0–20)
MICROALBUMIN/CREAT UR-RTO: 7 MCG/MG CREAT (ref 0–25)
MONOCYTES NFR BLD: 0.4 K/UL (ref 0.1–1.3)
MONOCYTES NFR BLD: 5 % (ref 1–7)
NEUTROPHILS NFR BLD: 76 % (ref 36–66)
NEUTS SEG NFR BLD: 6.1 K/UL (ref 1.3–9.1)
PHOSPHATE SERPL-MCNC: 3.6 MG/DL (ref 2.6–4.5)
PLATELET # BLD AUTO: 291 K/UL (ref 150–450)
PMV BLD AUTO: 8.2 FL (ref 6–12)
POTASSIUM SERPL-SCNC: 4.6 MMOL/L (ref 3.7–5.3)
POTASSIUM SERPL-SCNC: 4.6 MMOL/L (ref 3.7–5.3)
PROT SERPL-MCNC: 8 G/DL (ref 6.4–8.3)
RBC # BLD AUTO: 4.62 M/UL (ref 4–5.2)
SODIUM SERPL-SCNC: 138 MMOL/L (ref 135–144)
SODIUM SERPL-SCNC: 138 MMOL/L (ref 135–144)
TRIGL SERPL-MCNC: 199 MG/DL
TSH SERPL DL<=0.05 MIU/L-ACNC: 1.76 UIU/ML (ref 0.3–5)
URATE SERPL-MCNC: 7.1 MG/DL (ref 2.4–5.7)
VAS LEFT ABI: 1.36
VAS LEFT ARM BP: 130 MMHG
VAS LEFT CALF PRESSURE: 205 MMHG
VAS LEFT DORSALIS PEDIS BP: 173 MMHG
VAS LEFT PTA BP: 188 MMHG
VAS LEFT TBI: 0.93
VAS LEFT TOE PRESSURE: 129 MMHG
VAS RIGHT ABI: 1.27
VAS RIGHT ARM BP: 138 MMHG
VAS RIGHT CALF PRESSURE: 194 MMHG
VAS RIGHT DORSALIS PEDIS BP: 171 MMHG
VAS RIGHT PTA BP: 175 MMHG
VAS RIGHT TBI: 0.88
VAS RIGHT TOE PRESSURE: 122 MMHG
VIT B12 SERPL-MCNC: 733 PG/ML (ref 232–1245)
WBC OTHER # BLD: 8 K/UL (ref 3.5–11)

## 2024-05-31 PROCEDURE — 82746 ASSAY OF FOLIC ACID SERUM: CPT

## 2024-05-31 PROCEDURE — 83036 HEMOGLOBIN GLYCOSYLATED A1C: CPT

## 2024-05-31 PROCEDURE — 77080 DXA BONE DENSITY AXIAL: CPT

## 2024-05-31 PROCEDURE — 82306 VITAMIN D 25 HYDROXY: CPT

## 2024-05-31 PROCEDURE — 84100 ASSAY OF PHOSPHORUS: CPT

## 2024-05-31 PROCEDURE — 84550 ASSAY OF BLOOD/URIC ACID: CPT

## 2024-05-31 PROCEDURE — 80053 COMPREHEN METABOLIC PANEL: CPT

## 2024-05-31 PROCEDURE — 36415 COLL VENOUS BLD VENIPUNCTURE: CPT

## 2024-05-31 PROCEDURE — 83880 ASSAY OF NATRIURETIC PEPTIDE: CPT

## 2024-05-31 PROCEDURE — 85025 COMPLETE CBC W/AUTO DIFF WBC: CPT

## 2024-05-31 PROCEDURE — 84443 ASSAY THYROID STIM HORMONE: CPT

## 2024-05-31 PROCEDURE — 80048 BASIC METABOLIC PNL TOTAL CA: CPT

## 2024-05-31 PROCEDURE — 93924 LWR XTR VASC STDY BILAT: CPT

## 2024-05-31 PROCEDURE — 82570 ASSAY OF URINE CREATININE: CPT

## 2024-05-31 PROCEDURE — 82043 UR ALBUMIN QUANTITATIVE: CPT

## 2024-05-31 PROCEDURE — 80061 LIPID PANEL: CPT

## 2024-05-31 PROCEDURE — 83735 ASSAY OF MAGNESIUM: CPT

## 2024-05-31 PROCEDURE — 82607 VITAMIN B-12: CPT

## 2024-05-31 RX ORDER — ALLOPURINOL 100 MG/1
100 TABLET ORAL DAILY
Qty: 90 TABLET | Refills: 3 | Status: SHIPPED | OUTPATIENT
Start: 2024-05-31

## 2024-05-31 RX ORDER — ATORVASTATIN CALCIUM 40 MG/1
40 TABLET, FILM COATED ORAL DAILY
Qty: 90 TABLET | Refills: 4 | Status: SHIPPED | OUTPATIENT
Start: 2024-05-31

## 2024-05-31 NOTE — RESULT ENCOUNTER NOTE
Noted, duplicate, mild chronic kidney disease stage IIIa, worse than before  Future Appointments  6/18/2024  11:00 AM   Jay Brothers, PT        [facility]         [facility]  8/7/2024   3:00 PM    Reyes hCopra MD       [facility]  8/20/2024  9:00 AM    Radha Vallecillo MD    [facility]  10/17/2024 10:00 AM   Francis Rodrigues DO             [facility]        [facility]  5/20/2025  9:30 AM    Radha Vallecillo MD    [facility]

## 2024-05-31 NOTE — RESULT ENCOUNTER NOTE
Please notify patient normal scan of the legs, no blockages in the arteries in her legs      Future Appointments  6/18/2024  11:00 AM   Jay Brothers, PT        [facility]         [facility]  8/7/2024   3:00 PM    Reyes Chopra MD       [facility]  8/20/2024  9:00 AM    Radha Vallecillo MD    [facility]  10/17/2024 10:00 AM   Francis Rodrigues,              AFL T[facility]        [facility]  5/20/2025  9:30 AM    Radha Vallecillo MD    [facility]

## 2024-05-31 NOTE — RESULT ENCOUNTER NOTE
Please notify patient: Increase proteins in the urine from diabetes  Diabetes slightly worsening, stay away from pop, hemoglobin A1c 6.2  Uric acid worsening it can lead to gout and kidney stones, again avoid pop.  For gout she is supposed to take allopurinol but I think she ran out so I will refill that, as I believe she ran out 6 months ago  Chronic kidney disease stage III, previously it was very mild stage II now is very mild stage III A, will continue to monitor, absolutely to avoid ibuprofen, naproxen, Aleve and Motrin  Blood glucose 144  Worsening lipids and triglycerides from prior, I will refill atorvastatin, she might have ran out compliance with medication is necessary    Otherwise labs within normal limits  continue current treatment    Future Appointments  6/18/2024  11:00 AM   Jay Brothers, PT        [facility]         [facility]  8/7/2024   3:00 PM    [facility]  8/20/2024  9:00 AM    [facility]  10/17/2024 10:00 AM   Francis Rodrigues DO             [facility]        [facility]  5/20/2025  9:30 AM    Radha Vallecillo MD    [facility]

## 2024-06-01 NOTE — RESULT ENCOUNTER NOTE
Please notify patient: DEXA scan shows osteopenia  .  Given her Fosamax 35 Mg weekly, is she taking it?    Lab Results       Component                Value               Date                       VITD25                   43.0                05/31/2024                Future Appointments  6/18/2024  11:00 AM   Jay Brothers, PT        [facility]         [facility]  8/7/2024   3:00 PM    [facility]       [facility]  8/20/2024  9:00 AM    Radha Vallecillo MD    [facility]  10/17/2024 10:00 AM   Francis Rodrigues,              A[facility]        [facility]  5/20/2025  9:30 AM    Radha Vallecillo MD    [facility]

## 2024-06-07 DIAGNOSIS — M85.80 OSTEOPENIA DETERMINED BY X-RAY: ICD-10-CM

## 2024-06-07 RX ORDER — ALENDRONATE SODIUM 35 MG/1
TABLET ORAL
Qty: 12 TABLET | Refills: 3 | Status: SHIPPED | OUTPATIENT
Start: 2024-06-07

## 2024-06-07 NOTE — RESULT ENCOUNTER NOTE
Noted, I will send Fosamax to the pharmacy to make sure she has it, sent to [facility]  Future Appointments  6/18/2024  11:00 AM   Jay Brothers, PT        [facility]         [facility]  8/7/2024   3:00 PM    [facility]       [facility]  8/20/2024  9:00 AM    [facility]  10/17/2024 10:00 AM   Francis Rodrigues,              AFL T[facility]        [facility]  5/20/2025  9:30 AM    Rad[facility]illo [facility]    [facility]

## 2024-06-26 DIAGNOSIS — R11.0 NAUSEA: ICD-10-CM

## 2024-06-26 RX ORDER — ONDANSETRON 4 MG/1
TABLET, FILM COATED ORAL
Qty: 24 TABLET | Refills: 0 | Status: SHIPPED | OUTPATIENT
Start: 2024-06-26

## 2024-06-26 NOTE — TELEPHONE ENCOUNTER
Please Approve or Refuse.  Send to Pharmacy per Pt's Request:      Next Visit Date:  8/20/2024   Last Visit Date: 5/14/2024    Hemoglobin A1C (%)   Date Value   05/31/2024 6.2 (H)   10/26/2023 6.1   05/11/2023 4.9             ( goal A1C is < 7)   BP Readings from Last 3 Encounters:   05/14/24 134/82   04/04/24 112/70   11/01/23 (!) 142/72          (goal 120/80)  BUN   Date Value Ref Range Status   05/31/2024 28 (H) 8 - 23 mg/dL Final     Creatinine   Date Value Ref Range Status   05/31/2024 1.0 (H) 0.5 - 0.9 mg/dL Final     Potassium   Date Value Ref Range Status   05/31/2024 4.6 3.7 - 5.3 mmol/L Final     Potassium reflex Magnesium   Date Value Ref Range Status   10/10/2021 4.1 3.5 - 5.2 meq/L Final     Comment:     Low level specimen hemolysis is present as indicated by the interference  level index on the Roche analyzer.  The reported K+ level may be falsely  increased.  If clinically warranted, recollection of the specimen is  suggested.

## 2024-07-02 ENCOUNTER — TELEPHONE (OUTPATIENT)
Dept: FAMILY MEDICINE CLINIC | Age: 74
End: 2024-07-02

## 2024-07-02 NOTE — TELEPHONE ENCOUNTER
Pt called in stating she would like the order for the wheelchair to be sent to [facility] , fax #[phone]

## 2024-07-22 ENCOUNTER — HOSPITAL ENCOUNTER (OUTPATIENT)
Dept: PHYSICAL THERAPY | Age: 74
Setting detail: THERAPIES SERIES
Discharge: HOME | End: 2024-07-22
Attending: FAMILY MEDICINE
Payer: MEDICARE

## 2024-07-22 PROCEDURE — 97542 WHEELCHAIR MNGMENT TRAINING: CPT

## 2024-07-22 PROCEDURE — 97162 PT EVAL MOD COMPLEX 30 MIN: CPT

## 2024-07-24 ENCOUNTER — TELEPHONE (OUTPATIENT)
Dept: FAMILY MEDICINE CLINIC | Age: 74
End: 2024-07-24

## 2024-07-24 NOTE — TELEPHONE ENCOUNTER
Noted. Thank you!    Future Appointments   Date Time Provider Department Center   8/7/2024  3:00 PM [facility]   8/20/2024  9:00 AM Radha Vallecillo MD [facility]   10/17/2024 10:00 AM Francis Rodrigues DO AFL T[facility]   11/5/2024 10:00 AM Nithya Kevin PAMercedesC GYN Oncology TOP   5/20/2025  9:30 AM Radha Vallecillo MD [facility]

## 2024-07-24 NOTE — TELEPHONE ENCOUNTER
FYI:  NURSE CALLED STATING SHE VISITS PATIENT EVERY 60 DAYS FOR EVALUATION AND WANTED TO REPORT PATIENT IS DOING AND LOOKING VERY WELL/NO CONCERNS.

## 2024-08-09 DIAGNOSIS — E83.42 HYPOMAGNESEMIA: ICD-10-CM

## 2024-08-09 RX ORDER — LANOLIN ALCOHOL/MO/W.PET/CERES
400 CREAM (GRAM) TOPICAL DAILY
Qty: 28 TABLET | Refills: 5 | Status: SHIPPED | OUTPATIENT
Start: 2024-08-09

## 2024-08-16 PROBLEM — I25.10 ARTERIOSCLEROTIC HEART DISEASE: Status: ACTIVE | Noted: 2024-08-16

## 2024-08-16 PROBLEM — D72.829 LEUKOCYTOSIS: Status: ACTIVE | Noted: 2024-08-16

## 2024-09-06 DIAGNOSIS — E55.9 VITAMIN D DEFICIENCY: ICD-10-CM

## 2024-09-06 RX ORDER — CHOLECALCIFEROL (VITAMIN D3) 25 MCG
TABLET ORAL
Qty: 56 TABLET | Refills: 8 | Status: SHIPPED | OUTPATIENT
Start: 2024-09-06

## 2024-09-06 NOTE — TELEPHONE ENCOUNTER
Please Approve or Refuse.  Send to Pharmacy per Pt's Request: [facility]     Next Visit Date:  1/3/2025   Last Visit Date: 5/14/2024    Hemoglobin A1C (%)   Date Value   05/31/2024 6.2 (H)   10/26/2023 6.1   05/11/2023 4.9             ( goal A1C is < 7)   BP Readings from Last 3 Encounters:   05/14/24 134/82   04/04/24 112/70   11/01/23 (!) 142/72          (goal 120/80)  BUN   Date Value Ref Range Status   05/31/2024 28 (H) 8 - 23 mg/dL Final     Creatinine   Date Value Ref Range Status   05/31/2024 1.0 (H) 0.5 - 0.9 mg/dL Final     Potassium   Date Value Ref Range Status   05/31/2024 4.6 3.7 - 5.3 mmol/L Final     Potassium reflex Magnesium   Date Value Ref Range Status   10/10/2021 4.1 3.5 - 5.2 meq/L Final     Comment:     Low level specimen hemolysis is present as indicated by the interference  level index on the Roche analyzer.  The reported K+ level may be falsely  increased.  If clinically warranted, recollection of the specimen is  suggested.

## 2024-09-09 ENCOUNTER — TELEPHONE (OUTPATIENT)
Dept: FAMILY MEDICINE CLINIC | Age: 74
End: 2024-09-09

## 2024-09-12 ENCOUNTER — TELEPHONE (OUTPATIENT)
Dept: FAMILY MEDICINE CLINIC | Age: 74
End: 2024-09-12

## 2024-09-24 ENCOUNTER — TELEPHONE (OUTPATIENT)
Dept: FAMILY MEDICINE CLINIC | Age: 74
End: 2024-09-24

## 2024-09-26 DIAGNOSIS — E03.9 ACQUIRED HYPOTHYROIDISM: ICD-10-CM

## 2024-09-26 RX ORDER — LEVOTHYROXINE SODIUM 75 UG/1
TABLET ORAL
Qty: 28 TABLET | Refills: 8 | Status: SHIPPED | OUTPATIENT
Start: 2024-09-26

## 2024-10-04 ENCOUNTER — OFFICE VISIT (OUTPATIENT)
Dept: FAMILY MEDICINE CLINIC | Age: 74
End: 2024-10-04

## 2024-10-04 ENCOUNTER — HOSPITAL ENCOUNTER (OUTPATIENT)
Age: 74
Setting detail: SPECIMEN
Discharge: HOME | End: 2024-10-04

## 2024-10-04 VITALS
HEART RATE: 107 BPM | WEIGHT: 264 LBS | SYSTOLIC BLOOD PRESSURE: 112 MMHG | DIASTOLIC BLOOD PRESSURE: 74 MMHG | OXYGEN SATURATION: 97 % | HEIGHT: 62 IN | BODY MASS INDEX: 48.58 KG/M2

## 2024-10-04 DIAGNOSIS — I48.0 PAROXYSMAL ATRIAL FIBRILLATION (HCC): Primary | ICD-10-CM

## 2024-10-04 DIAGNOSIS — N18.32 TYPE 2 DIABETES MELLITUS WITH STAGE 3B CHRONIC KIDNEY DISEASE, WITHOUT LONG-TERM CURRENT USE OF INSULIN (HCC): ICD-10-CM

## 2024-10-04 DIAGNOSIS — N39.0 URINARY TRACT INFECTION WITHOUT HEMATURIA, SITE UNSPECIFIED: ICD-10-CM

## 2024-10-04 DIAGNOSIS — E11.22 TYPE 2 DIABETES MELLITUS WITH STAGE 3B CHRONIC KIDNEY DISEASE, WITHOUT LONG-TERM CURRENT USE OF INSULIN (HCC): ICD-10-CM

## 2024-10-04 DIAGNOSIS — Z09 HOSPITAL DISCHARGE FOLLOW-UP: Primary | ICD-10-CM

## 2024-10-04 LAB
BILIRUBIN, POC: NEGATIVE
BLOOD URINE, POC: NORMAL
CLARITY, POC: NORMAL
COLOR, POC: NORMAL
GLUCOSE URINE, POC: NORMAL
KETONES, POC: NEGATIVE MG/DL
LEUKOCYTE EST, POC: NEGATIVE
NITRITE, POC: NEGATIVE
PH, POC: 6
PROTEIN, POC: NEGATIVE MG/DL
SPECIFIC GRAVITY, POC: 1.02
UROBILINOGEN, POC: 0.2 MG/DL

## 2024-10-04 RX ORDER — DILTIAZEM HYDROCHLORIDE 180 MG/1
CAPSULE, COATED, EXTENDED RELEASE ORAL
Qty: 56 CAPSULE | Refills: 8 | Status: SHIPPED | OUTPATIENT
Start: 2024-10-04

## 2024-10-04 RX ORDER — SPIRONOLACTONE 25 MG/1
25 TABLET ORAL DAILY
COMMUNITY
Start: 2024-07-17

## 2024-10-04 RX ORDER — CEPHALEXIN 500 MG/1
500 CAPSULE ORAL
COMMUNITY
Start: 2024-09-06

## 2024-10-04 NOTE — PROGRESS NOTES
\"Have you been to the ER, urgent care clinic since your last visit?  Hospitalized since your last visit?\"    YES - When: approximately 1 ago.  Where and Why: [facility].    “Have you seen or consulted any other health care providers outside our system since your last visit?”    NO      “Have you had a diabetic eye exam?”    NO     No diabetic eye exam on file             Visit Information    Have you changed or started any medications since your last visit including any over-the-counter medicines, vitamins, or herbal medicines? yes -     Have you stopped taking any of your medications? Is so, why? -  no  Are you having any side effects from any of your medications? - no    Have you seen any other physician or provider since your last visit?  no   Have you had any other diagnostic tests since your last visit?  yes -     Have you been seen in the emergency room and/or had an admission in a hospital since we last saw you?  no   Have you had your routine dental cleaning in the past 6 months?  no     Do you have an active MyChart account? If no, what is the barrier?  Yes    Patient Care Team:  Radha Vallecillo MD as PCP - General (Family Medicine)  Radha Vallecillo MD as PCP - Empaneled Provider  Ford Samano MD as Referring Physician (Orthopedic Surgery)  Edy Rasmussen MD as Surgeon (Orthopedic Surgery)  Jaison Henderson MD as Consulting Physician (Endocrinology)  Rosita Holcomb DO as Consulting Physician (Obstetrics & Gynecology)  Roshan Ram MD as Consulting Physician (Urology)  Cynthia Phoenix MD as Consulting Physician (Otolaryngology)  Karen Mead DO as Consulting Physician (General Surgery)  Cuate Rodrigues SDO as Consulting Physician (Cardiology)  Mary Leblanc MD as Consulting Physician (Dermatology)  Nithya Kevin PA-C as Physician Assistant (Gynecological Oncology)  Reyes Chopra MD as Consulting Physician (Pulmonary Disease)  Raman Garduno MD

## 2024-10-04 NOTE — TELEPHONE ENCOUNTER
Please Approve or Refuse.  Send to Pharmacy per Pt's Request: [facility]     Next Visit Date:  1/3/2025   Last Visit Date: 10/4/2024    Hemoglobin A1C (%)   Date Value   05/31/2024 6.2 (H)   10/26/2023 6.1   05/11/2023 4.9             ( goal A1C is < 7)   BP Readings from Last 3 Encounters:   10/04/24 112/74   05/14/24 134/82   04/04/24 112/70          (goal 120/80)  BUN   Date Value Ref Range Status   05/31/2024 28 (H) 8 - 23 mg/dL Final     Creatinine   Date Value Ref Range Status   05/31/2024 1.0 (H) 0.5 - 0.9 mg/dL Final     Potassium   Date Value Ref Range Status   05/31/2024 4.6 3.7 - 5.3 mmol/L Final     Potassium reflex Magnesium   Date Value Ref Range Status   10/10/2021 4.1 3.5 - 5.2 meq/L Final     Comment:     Low level specimen hemolysis is present as indicated by the interference  level index on the Roche analyzer.  The reported K+ level may be falsely  increased.  If clinically warranted, recollection of the specimen is  suggested.

## 2024-10-04 NOTE — PROGRESS NOTES
Post-Discharge Transitional Care  Follow Up      María Arevalo   YOB: 1950    Date of Office Visit:  10/4/2024  Date of Hospital Admission: 7/20/23  Date of Hospital Discharge: 7/26/23  Risk of hospital readmission (high >=14%. Medium >=10%) :No data recorded    Care management risk score Rising risk (score 2-5) and Complex Care (Scores >=6): No Risk Score On File     Non face to face  following discharge, date last encounter closed (first attempt may have been earlier): *No documented post hospital discharge outreach found in the last 14 days    Call initiated 2 business days of discharge: *No response recorded in the last 14 days    ASSESSMENT/PLAN:   Hospital discharge follow-up  -     NE DISCHARGE MEDS RECONCILED W/ CURRENT OUTPATIENT MED LIST  Urinary tract infection without hematuria, site unspecified  -Recent urinary tract infection  -Will recheck today in the office  -UA does look clear but will send out for culture  -Asymptomatic at this time  -It was noted that she had quite a bit of glucose in her urine previously, she is refusing an A1c check today in the office, she has juice and cookies with her, I did stressed the importance of diet changes  -     CBC with Auto Differential; Future  -     Comprehensive Metabolic Panel; Future  -     POCT Urinalysis No Micro (Auto)  -     Culture, Urine; Future  Type 2 diabetes mellitus with stage 3b chronic kidney disease, without long-term current use of insulin (HCC)  -Unsure if controlled or not  -Refusing A1c today in the office but states she will obtain with blood work  -Stressed the importance of diet changes, patient currently has juice and cookies with her in the room  -     Hemoglobin A1C; Future    Medical Decision Making: moderate complexity  Return in 13 weeks (on 1/3/2025) for with Dr Vallecillo .    On this date 10/4/2024 I have spent 15 minutes reviewing previous notes, test results and face to face with the patient discussing the

## 2024-10-06 LAB
MICROORGANISM SPEC CULT: ABNORMAL
SERVICE CMNT-IMP: ABNORMAL
SPECIMEN DESCRIPTION: ABNORMAL

## 2024-10-07 ENCOUNTER — TELEPHONE (OUTPATIENT)
Dept: FAMILY MEDICINE CLINIC | Age: 74
End: 2024-10-07

## 2024-10-07 DIAGNOSIS — N39.0 URINARY TRACT INFECTION WITHOUT HEMATURIA, SITE UNSPECIFIED: Primary | ICD-10-CM

## 2024-10-07 RX ORDER — SULFAMETHOXAZOLE/TRIMETHOPRIM 800-160 MG
1 TABLET ORAL 2 TIMES DAILY
Qty: 14 TABLET | Refills: 0 | Status: SHIPPED | OUTPATIENT
Start: 2024-10-07 | End: 2024-10-14

## 2024-10-07 NOTE — TELEPHONE ENCOUNTER
PHARMACY SENT OVER E PRESCRIPTION STATING RX SENT FOR BACTRIM DS PATIENT IS ALLERGIC TO SULFA/FROM SCANNED TO ENCOUNTER

## 2024-10-07 NOTE — TELEPHONE ENCOUNTER
Patient has an intolerance, as she experiences nausea.  She just needs to ensure that she is eating and drinking alongside taking the medication.  I am very limited to the antibiotic that I can use for her for this urinary tract infection, if she cannot tolerate Bactrim then she will need to go back to the hospital for IV antibiotics.  Please have this conversation with the patient, please have her discuss this with the pharmacist.  If she has had an actual allergic reaction that entailed hives or anaphylaxis then we need to update that in the chart.

## 2024-10-08 DIAGNOSIS — B96.29 UTI DUE TO EXTENDED-SPECTRUM BETA LACTAMASE (ESBL) PRODUCING ESCHERICHIA COLI: Primary | ICD-10-CM

## 2024-10-08 DIAGNOSIS — N39.0 UTI DUE TO EXTENDED-SPECTRUM BETA LACTAMASE (ESBL) PRODUCING ESCHERICHIA COLI: Primary | ICD-10-CM

## 2024-10-08 DIAGNOSIS — Z16.12 UTI DUE TO EXTENDED-SPECTRUM BETA LACTAMASE (ESBL) PRODUCING ESCHERICHIA COLI: Primary | ICD-10-CM

## 2024-10-08 LAB
MICROORGANISM SPEC CULT: ABNORMAL
SERVICE CMNT-IMP: ABNORMAL
SPECIMEN DESCRIPTION: ABNORMAL

## 2024-10-09 ENCOUNTER — HOSPITAL ENCOUNTER (OUTPATIENT)
Age: 74
Setting detail: SPECIMEN
Discharge: HOME | End: 2024-10-09
Payer: MEDICARE

## 2024-10-09 LAB
ALBUMIN SERPL-MCNC: 4.3 G/DL (ref 3.5–5.2)
ALP SERPL-CCNC: 101 U/L (ref 35–104)
ALT SERPL-CCNC: 19 U/L (ref 10–35)
ANION GAP SERPL CALCULATED.3IONS-SCNC: 15 MMOL/L (ref 9–16)
AST SERPL-CCNC: 17 U/L (ref 10–35)
BASOPHILS # BLD: 0.1 K/UL (ref 0–0.2)
BASOPHILS NFR BLD: 1 % (ref 0–2)
BILIRUB SERPL-MCNC: 0.4 MG/DL (ref 0–1.2)
BUN SERPL-MCNC: 19 MG/DL (ref 8–23)
CALCIUM SERPL-MCNC: 10.2 MG/DL (ref 8.6–10.4)
CHLORIDE SERPL-SCNC: 99 MMOL/L (ref 98–107)
CO2 SERPL-SCNC: 25 MMOL/L (ref 20–31)
CREAT SERPL-MCNC: 1.3 MG/DL (ref 0.7–1.2)
EOSINOPHIL # BLD: 0.1 K/UL (ref 0–0.4)
EOSINOPHILS RELATIVE PERCENT: 1 % (ref 0–4)
ERYTHROCYTE [DISTWIDTH] IN BLOOD BY AUTOMATED COUNT: 15.4 % (ref 11.5–14.9)
GFR, ESTIMATED: 44 ML/MIN/1.73M2
GLUCOSE SERPL-MCNC: 127 MG/DL (ref 74–99)
HCT VFR BLD AUTO: 43 % (ref 36–46)
HGB BLD-MCNC: 14.4 G/DL (ref 12–16)
LYMPHOCYTES NFR BLD: 1.7 K/UL (ref 1–4.8)
LYMPHOCYTES RELATIVE PERCENT: 21 % (ref 24–44)
MCH RBC QN AUTO: 30.8 PG (ref 26–34)
MCHC RBC AUTO-ENTMCNC: 33.5 G/DL (ref 31–37)
MCV RBC AUTO: 92 FL (ref 80–100)
MONOCYTES NFR BLD: 0.5 K/UL (ref 0.1–1.3)
MONOCYTES NFR BLD: 6 % (ref 1–7)
NEUTROPHILS NFR BLD: 71 % (ref 36–66)
NEUTS SEG NFR BLD: 5.9 K/UL (ref 1.3–9.1)
PLATELET # BLD AUTO: 267 K/UL (ref 150–450)
PMV BLD AUTO: 9.2 FL (ref 6–12)
POTASSIUM SERPL-SCNC: 4.4 MMOL/L (ref 3.7–5.3)
PROT SERPL-MCNC: 7.4 G/DL (ref 6.6–8.7)
RBC # BLD AUTO: 4.67 M/UL (ref 4–5.2)
SODIUM SERPL-SCNC: 139 MMOL/L (ref 136–145)
WBC OTHER # BLD: 8.3 K/UL (ref 3.5–11)

## 2024-10-09 PROCEDURE — 85025 COMPLETE CBC W/AUTO DIFF WBC: CPT

## 2024-10-09 PROCEDURE — 83036 HEMOGLOBIN GLYCOSYLATED A1C: CPT

## 2024-10-09 PROCEDURE — 80053 COMPREHEN METABOLIC PANEL: CPT

## 2024-10-10 LAB
EST. AVERAGE GLUCOSE BLD GHB EST-MCNC: 131 MG/DL
HBA1C MFR BLD: 6.2 % (ref 4–6)

## 2024-10-10 NOTE — RESULT ENCOUNTER NOTE
Please notify patient that lab looks to be pretty close to baseline, slightly worsening kidney function but improved glucose.  Would benefit from seeing nephrology if not already, please let me know if she sees a nephrologist and if she be interested in seeing one.    Awaiting A1c results.

## 2024-10-11 DIAGNOSIS — N18.9 CHRONIC KIDNEY DISEASE, UNSPECIFIED CKD STAGE: Primary | ICD-10-CM

## 2024-10-11 NOTE — RESULT ENCOUNTER NOTE
Please notify patient that A1c unchanged.  Continue current regimen.  Discussed lifestyle modifications at her most recent visit.

## 2024-11-22 ENCOUNTER — TELEPHONE (OUTPATIENT)
Dept: FAMILY MEDICINE CLINIC | Age: 74
End: 2024-11-22

## 2024-11-22 NOTE — TELEPHONE ENCOUNTER
Quick question, she was seen by Angel recently so I can place a referral for home care, however she wants the referral for teaching help to use the power wheelchair?!  With the insurance cover this?  Or should I send her outpatient at the physical therapist instead?    Future Appointments   Date Time Provider Department Center   1/3/2025 10:30 AM Radha Vallecillo MD fp [facility] DEP   1/8/2025 11:15 AM Zachary Borges MD INF DIS [facility] [facility]   1/15/2025  3:00 PM Reyes Chopra MD RESP [place] [facility]   1/21/2025 10:00 AM Nithya Kevin PA-C GYN Oncology [facility]   2/5/2025  1:45 PM Francis Rodrigues DO AFL TCC [facility] AFL [facility]   5/20/2025  9:30 AM Radha Vallecillo MD fp [facility] DEP

## 2024-11-22 NOTE — TELEPHONE ENCOUNTER
[employer] called.    She would like to know if she can get a referral for Home Health to come to her house and teach her how to use her power wheelchair when she receives it?    Please advise.    Thank you.

## 2024-11-22 NOTE — TELEPHONE ENCOUNTER
Noted. Thank you!  Please let the patient know when it comes, the person who brings it to her, is supposed to show for everything.  Our care coordinator Jaylyn look into it and physical therapy would not do that for her she checked      Jaylyn Rey RN  You38 minutes ago (12:19 PM)     RM  I just spoke with [employer], and was told that the power wheelchair will be delivered to her on 11/25, and the gentleman will show her how to use and maneuver the device.       Future Appointments   Date Time Provider Department Center   1/3/2025 10:30 AM Radha Vallecillo MD fp [facility] ECC DEP   1/8/2025 11:15 AM [facility]   1/15/2025  3:00 PM Reyes Chopra MD RESP [place] [facility]   1/21/2025 10:00 AM Nithya Kevin PA-C GYN Oncology [facility]   2/5/2025  1:45 PM Francis Rodrigues DO AFL TCC [facility] AFL [facility] C   5/20/2025  9:30 AM Radha Vallecillo MD fp [facility] DEP

## 2024-12-17 ENCOUNTER — OUTSIDE SERVICES (OUTPATIENT)
Dept: FAMILY MEDICINE CLINIC | Age: 74
End: 2024-12-17

## 2024-12-17 DIAGNOSIS — Z91.81 HISTORY OF FALLING: ICD-10-CM

## 2024-12-17 DIAGNOSIS — N18.32 TYPE 2 DIABETES MELLITUS WITH STAGE 3B CHRONIC KIDNEY DISEASE, WITHOUT LONG-TERM CURRENT USE OF INSULIN (HCC): ICD-10-CM

## 2024-12-17 DIAGNOSIS — H47.20 OPTIC ATROPHY OF BOTH EYES: ICD-10-CM

## 2024-12-17 DIAGNOSIS — M17.0 PRIMARY OSTEOARTHRITIS OF BOTH KNEES: ICD-10-CM

## 2024-12-17 DIAGNOSIS — I50.32 CHRONIC DIASTOLIC CONGESTIVE HEART FAILURE (HCC): Primary | ICD-10-CM

## 2024-12-17 DIAGNOSIS — I48.0 PAROXYSMAL ATRIAL FIBRILLATION (HCC): ICD-10-CM

## 2024-12-17 DIAGNOSIS — E11.22 TYPE 2 DIABETES MELLITUS WITH STAGE 3B CHRONIC KIDNEY DISEASE, WITHOUT LONG-TERM CURRENT USE OF INSULIN (HCC): ICD-10-CM

## 2024-12-18 NOTE — PROGRESS NOTES
This patient is currently under my care and considered medically homebound, requiring skilled Home Health services. I have reviewed the patient's status and plan of care.     Has the patient been seen within the last 90 days?  yes, seen on 10/4/2020 for posthospital follow-up by Angel Bañuelos    Certification dates from 11/25/2024 through 1/23/2025    Date of signing the paperwork 12/17/2024    1. Chronic diastolic congestive heart failure (HCC)    2. Paroxysmal atrial fibrillation (HCC)    3. Type 2 diabetes mellitus with stage 3b chronic kidney disease, without long-term current use of insulin (HCC)    4. Primary osteoarthritis of both knees    5. Optic atrophy of both eyes    6. History of falling            Future Appointments   Date Time Provider Department Center   1/3/2025 10:30 AM Radha Vallecillo MD fp [facility] ECC DEP   1/8/2025 11:15 AM Zachary Borges MD INF DIS [facility]   1/15/2025  3:00 PM Reyes Chopra MD RESP [place] [facility]   1/21/2025 10:00 AM Nithya Kevin PAAMRITA GYN Oncology [facility]   2/5/2025  1:45 PM Francis Rodrigues,  AFL TCC [facility] AFL [facility] C   5/20/2025  9:30 AM Radha Vallecillo MD fp [facility] ECC DEP       Time spent completing forms?  5 minutes

## 2024-12-20 DIAGNOSIS — R11.0 NAUSEA: ICD-10-CM

## 2024-12-20 RX ORDER — ONDANSETRON 4 MG/1
TABLET, FILM COATED ORAL
Qty: 24 TABLET | Refills: 2 | Status: SHIPPED | OUTPATIENT
Start: 2024-12-20

## 2025-01-15 ENCOUNTER — TELEPHONE (OUTPATIENT)
Dept: FAMILY MEDICINE CLINIC | Age: 75
End: 2025-01-15

## 2025-01-15 NOTE — TELEPHONE ENCOUNTER
Care Transitions Initial Follow Up Call    Call within 2 business days of discharge: Yes     Patient: María Arevalo Patient : 1950 MRN: [id number]       Discharge department/facility:    2025  [facility] -Emergency Department  María Arevalo - 75 y.o. Female; born 1950January ischarge Summary, generated on 2025January 2025   Reason for Visit    Reason for Visit -  Reason Comments   Shortness of Breath          Follow Up  Future Appointments   Date Time Provider Department Center   2025 10:00 AM Nithya Kevin PA-C GYN Oncology [facility]   2025  2:00 PM Radha Vallecillo MD fp [facility] ECC DEP   2025  1:45 PM Francis Rodrigues DO AFL TCC [facility] AFL [facility] C   2025 11:30 AM Zachary Borges MD INF DIS [facility]   2025  9:30 AM Radha Vallecillo MD fp [facility] ECC DEP       Valentina Das

## 2025-01-23 DIAGNOSIS — E83.42 HYPOMAGNESEMIA: ICD-10-CM

## 2025-01-23 DIAGNOSIS — K21.9 GASTROESOPHAGEAL REFLUX DISEASE WITHOUT ESOPHAGITIS: ICD-10-CM

## 2025-01-23 RX ORDER — LANOLIN ALCOHOL/MO/W.PET/CERES
400 CREAM (GRAM) TOPICAL DAILY
Qty: 28 TABLET | Refills: 5 | Status: SHIPPED | OUTPATIENT
Start: 2025-01-23

## 2025-01-23 RX ORDER — PROBIOTIC PRODUCT - TAB 1B-250 MG
TAB ORAL
Qty: 90 TABLET | Refills: 3 | Status: SHIPPED | OUTPATIENT
Start: 2025-01-23

## 2025-01-25 ENCOUNTER — TELEPHONE (OUTPATIENT)
Dept: FAMILY MEDICINE CLINIC | Age: 75
End: 2025-01-25

## 2025-01-25 DIAGNOSIS — I45.5 SINUS PAUSE: ICD-10-CM

## 2025-01-25 DIAGNOSIS — I48.0 PAROXYSMAL ATRIAL FIBRILLATION (HCC): Primary | ICD-10-CM

## 2025-01-27 ENCOUNTER — TELEPHONE (OUTPATIENT)
Dept: FAMILY MEDICINE CLINIC | Age: 75
End: 2025-01-27

## 2025-01-27 NOTE — TELEPHONE ENCOUNTER
María called back to the office because she last week needed to give us the name of the medication she is concerned about interacting with her Cardizem medication.    She doesn't know who prescribed the Benzonate (either Dr Vallecillo or Dr Rodrigues) she could not find a name on the bottle and I cannot find it on her medication list. She said it was prescribed to her over a month ago?    Could we call her back and let her know if she can take them both safely?    Thank you.    **UPDATE: She was prescribed the Benzonate at an ED Visit on 10/23/24 at [facility].    She still has them because she was not taking them as prescribed and the cough is still lingering.    **ANOTHER QUESTION:  OTC Dietary Supplement, [place] - Calcium, Magnesium and Zinc + Vitamin D, can she takes these?    **UPDATE: She is scheduled for 2/14/25 to have an OV to discuss all these concerns and has missed appointments in the past.

## 2025-01-27 NOTE — TELEPHONE ENCOUNTER
I updated her on the not taking the Benzonate and Calcium Supplement.    She then had me cancel her 2/14/25 appointment due to it being at the same time as her [place] and she wants to go to that instead.    So next appointment here is 5/20/25 JAELYN     Thank you.

## 2025-01-27 NOTE — TELEPHONE ENCOUNTER
Noted. Thank you!    Future Appointments   Date Time Provider Department Center   2/3/2025  9:30 AM Nithya Kevin PA-C GYN Oncology [facility]   2/5/2025  1:45 PM Francis Rodrigues DO AFL TCC [facility] AFL [facility] C   2/12/2025 11:30 AM Zachary Borges MD INF DIS [facility]   5/20/2025  9:30 AM Radha Vallecillo MD fp [facility]CC DEP

## 2025-01-27 NOTE — TELEPHONE ENCOUNTER
Ok not to take benzonatate     Do not take that supplement with calcium      Future Appointments   Date Time Provider Department Center   2/3/2025  9:30 AM Nithya Kevin PA-C GYN Oncology [facility]   2/5/2025  1:45 PM Francis Rodrigues,  AFL TCC [facility] AFL [facility] C   2/12/2025 11:30 AM Zachary Borges MD INF DIS [place]   2/14/2025 10:30 AM Radha Vallecillo MD fp [facility] ECC DEP   5/20/2025  9:30 AM Radha Vallecillo MD fp [facility] ECC DEP

## 2025-02-03 ENCOUNTER — TELEPHONE (OUTPATIENT)
Dept: GYNECOLOGIC ONCOLOGY | Age: 75
End: 2025-02-03

## 2025-02-03 NOTE — TELEPHONE ENCOUNTER
Left voicemail for patient to call office to reschedule missed appointment on 2/3/25. Mailed letter.

## 2025-02-10 ENCOUNTER — OFFICE VISIT (OUTPATIENT)
Dept: GYNECOLOGIC ONCOLOGY | Age: 75
End: 2025-02-10
Payer: MEDICARE

## 2025-02-10 VITALS
HEART RATE: 86 BPM | WEIGHT: 260 LBS | OXYGEN SATURATION: 99 % | SYSTOLIC BLOOD PRESSURE: 146 MMHG | TEMPERATURE: 97.2 F | HEIGHT: 62 IN | DIASTOLIC BLOOD PRESSURE: 92 MMHG | BODY MASS INDEX: 47.84 KG/M2

## 2025-02-10 DIAGNOSIS — Z85.42 HISTORY OF UTERINE CANCER: Primary | ICD-10-CM

## 2025-02-10 DIAGNOSIS — Z12.31 SCREENING MAMMOGRAM FOR BREAST CANCER: ICD-10-CM

## 2025-02-10 PROCEDURE — G8399 PT W/DXA RESULTS DOCUMENT: HCPCS | Performed by: PHYSICIAN ASSISTANT

## 2025-02-10 PROCEDURE — 3017F COLORECTAL CA SCREEN DOC REV: CPT | Performed by: PHYSICIAN ASSISTANT

## 2025-02-10 PROCEDURE — 1123F ACP DISCUSS/DSCN MKR DOCD: CPT | Performed by: PHYSICIAN ASSISTANT

## 2025-02-10 PROCEDURE — 1090F PRES/ABSN URINE INCON ASSESS: CPT | Performed by: PHYSICIAN ASSISTANT

## 2025-02-10 PROCEDURE — 1160F RVW MEDS BY RX/DR IN RCRD: CPT | Performed by: PHYSICIAN ASSISTANT

## 2025-02-10 PROCEDURE — 1036F TOBACCO NON-USER: CPT | Performed by: PHYSICIAN ASSISTANT

## 2025-02-10 PROCEDURE — G8427 DOCREV CUR MEDS BY ELIG CLIN: HCPCS | Performed by: PHYSICIAN ASSISTANT

## 2025-02-10 PROCEDURE — G8417 CALC BMI ABV UP PARAM F/U: HCPCS | Performed by: PHYSICIAN ASSISTANT

## 2025-02-10 PROCEDURE — 99214 OFFICE O/P EST MOD 30 MIN: CPT | Performed by: PHYSICIAN ASSISTANT

## 2025-02-10 PROCEDURE — 1159F MED LIST DOCD IN RCRD: CPT | Performed by: PHYSICIAN ASSISTANT

## 2025-02-10 ASSESSMENT — ENCOUNTER SYMPTOMS
BACK PAIN: 1
CONSTIPATION: 1
EYES NEGATIVE: 1
SHORTNESS OF BREATH: 1

## 2025-02-10 NOTE — PROGRESS NOTES
Review of Systems   Constitutional:  Positive for fatigue.   HENT:  Negative.     Eyes: Negative.    Respiratory:  Positive for shortness of breath.    Cardiovascular: Negative.    Gastrointestinal:  Positive for constipation.   Endocrine: Negative.    Genitourinary: Negative.     Musculoskeletal:  Positive for back pain and neck pain.   Skin:  Positive for itching (whole body).   Neurological:  Positive for extremity weakness (today) and headaches.   Hematological:  Bruises/bleeds easily.

## 2025-02-10 NOTE — PROGRESS NOTES
[facility] Gynecologic Oncology  [address]  [place] [address]    María Arevalo present for her endometrial cancer surveillance visit.     CC/HPI: She is a  postmenopausal female has a history of stage 1A grade 1 endometrioid adenocarcinoma and has undergone a total hysterectomy, bilateral salpingo-oophorectomy, with pelvic and paraaortic lymphadenectomy with peritoneal biopsy completed in 2017.    PMH significant for atrial fibrillation and sinus bradycardia managed with Xarelto, she is followed by her cardiologist, Dr. Rodrigues. Also reports a history of abnormal pap smears dating back to .    She was initially referred by Dr. Holcomb in  for management of postmenopausal bleeding in May 2017. 2017 Pelvic ultrasound with findings of 7.8 x 3.5 x 4 cm uterus and the 1.8 cm thickened endometrial stripe. The patient was taken for D&C on 2017 and the endometrial curetting showed grade 1 endometrial cancer in a background of complex atypical hyperplasia.     Due to significant pelvic US and D&C findings, she underwent surgical management with a hysterectomy on 10/24/2017. Final pathology revealed endometrioid adenocarcinoma of the endometrium, 3.8 CM grade 1. Superficial myometrial invasion. Bilateral tubes and ovaries were negative for malignancy. Pelvic and para-aortic lymph node biopsies were negative for neoplasm. No lymphovascular invasion present. Pelvic washings were also negative for malignancy.     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 No further adjuvant therapy required.     Her past surveillance visits have been uncomplicated to date.     She is up to date for her mammogram screening, last obtained 3/21/2024 BI-RADS 1 with follow up in 12 months. 2024 DEXA consistent with osteopenia.     Last ED visit 2025 with concerns

## 2025-02-12 ENCOUNTER — OFFICE VISIT (OUTPATIENT)
Dept: INFECTIOUS DISEASES | Age: 75
End: 2025-02-12
Payer: MEDICARE

## 2025-02-12 VITALS
BODY MASS INDEX: 47.84 KG/M2 | OXYGEN SATURATION: 97 % | SYSTOLIC BLOOD PRESSURE: 115 MMHG | WEIGHT: 260 LBS | RESPIRATION RATE: 16 BRPM | HEIGHT: 62 IN | DIASTOLIC BLOOD PRESSURE: 76 MMHG | HEART RATE: 85 BPM

## 2025-02-12 DIAGNOSIS — H02.9 LESION OF LEFT UPPER EYELID: Primary | ICD-10-CM

## 2025-02-12 PROCEDURE — 1159F MED LIST DOCD IN RCRD: CPT | Performed by: INTERNAL MEDICINE

## 2025-02-12 PROCEDURE — G8399 PT W/DXA RESULTS DOCUMENT: HCPCS | Performed by: INTERNAL MEDICINE

## 2025-02-12 PROCEDURE — 1036F TOBACCO NON-USER: CPT | Performed by: INTERNAL MEDICINE

## 2025-02-12 PROCEDURE — 99214 OFFICE O/P EST MOD 30 MIN: CPT | Performed by: INTERNAL MEDICINE

## 2025-02-12 PROCEDURE — G8427 DOCREV CUR MEDS BY ELIG CLIN: HCPCS | Performed by: INTERNAL MEDICINE

## 2025-02-12 PROCEDURE — G8417 CALC BMI ABV UP PARAM F/U: HCPCS | Performed by: INTERNAL MEDICINE

## 2025-02-12 PROCEDURE — 3017F COLORECTAL CA SCREEN DOC REV: CPT | Performed by: INTERNAL MEDICINE

## 2025-02-12 PROCEDURE — 1090F PRES/ABSN URINE INCON ASSESS: CPT | Performed by: INTERNAL MEDICINE

## 2025-02-12 PROCEDURE — 1123F ACP DISCUSS/DSCN MKR DOCD: CPT | Performed by: INTERNAL MEDICINE

## 2025-02-12 RX ORDER — DOXYCYCLINE HYCLATE 100 MG
100 TABLET ORAL 2 TIMES DAILY
Qty: 14 TABLET | Refills: 0 | Status: SHIPPED | OUTPATIENT
Start: 2025-02-12 | End: 2025-02-19

## 2025-02-12 NOTE — PROGRESS NOTES
Value Ref Range Status   07/25/2023 127 (H) 65 - 105 mg/dL Final        Imaging Studies:                           All appropriate imaging studies and reports reviewed: Yes  No results found.                Assessment:   Left upper eyelid lesion, questionable infection  History of ESBL producing E. coli UTI, asymptomatic for UTI currently  Paroxysmal atrial fibrillation  Chronic kidney disease  Hypertension    Recommendations:   P.o. doxycycline for 1 week  Follow-up with dermatology, consider biopsy of the left eyelid lesion if no improvement.  Thank you for allowing me to participate in the care of your patient.  Please feel free to contact me with any questions or concerns.     Zachary Borges MD

## 2025-02-24 ENCOUNTER — OFFICE VISIT (OUTPATIENT)
Dept: ORTHOPEDIC SURGERY | Age: 75
End: 2025-02-24
Payer: MEDICARE

## 2025-02-24 VITALS — HEIGHT: 62 IN | WEIGHT: 255.6 LBS | BODY MASS INDEX: 47.04 KG/M2 | RESPIRATION RATE: 14 BRPM

## 2025-02-24 DIAGNOSIS — M25.531 RIGHT WRIST PAIN: Primary | ICD-10-CM

## 2025-02-24 PROCEDURE — 99204 OFFICE O/P NEW MOD 45 MIN: CPT

## 2025-02-24 PROCEDURE — 3017F COLORECTAL CA SCREEN DOC REV: CPT

## 2025-02-24 PROCEDURE — 1036F TOBACCO NON-USER: CPT

## 2025-02-24 PROCEDURE — 1125F AMNT PAIN NOTED PAIN PRSNT: CPT

## 2025-02-24 PROCEDURE — G8428 CUR MEDS NOT DOCUMENT: HCPCS

## 2025-02-24 PROCEDURE — G8417 CALC BMI ABV UP PARAM F/U: HCPCS

## 2025-02-24 PROCEDURE — G8399 PT W/DXA RESULTS DOCUMENT: HCPCS

## 2025-02-24 PROCEDURE — 1090F PRES/ABSN URINE INCON ASSESS: CPT

## 2025-02-24 PROCEDURE — 1123F ACP DISCUSS/DSCN MKR DOCD: CPT

## 2025-02-24 NOTE — PROGRESS NOTES
ORTHOPEDIC PATIENT EVALUATION - New Patient      HPI / Chief Complaint  María Arevalo is a 75 y.o. female who presents for evaluation of right wrist pain.  Patient states that she has been doing pain into the right wrist for approximately the last 2 to 3 weeks.  She denies any fall or inciting injury or trauma.  She states that it feels like arthritic pain.  She did present to the ED on 2/20/2025 and had x-rays taken which showed no acute abnormality and was placed in a removable wrist brace and encouraged to follow-up for further evaluation.  She states that the pain is localized near the base of the thumb and into the dorsal aspect of the wrist.  She states that the wrist brace has been providing her relief and has been taking extra strength Tylenol which has also been effective for her.  She denies any numbness or tingling.  Denies any radiation of the pain outside of the wrist/thumb region.  States that using the hand and wrist without the brace does make it worse.    Past Medical History  María  has a past medical history of Abdominal aortic aneurysm (AAA) without rupture, Acute on chronic anemia, Adenocarcinoma of endometrium, stage 1 (HCC), Adenomatous polyp of sigmoid colon, 6/26/17, JOSHUA (acute kidney injury), Allergic rhinitis, Anemia, At high risk for falls, Atrial fibrillation (HCC), Atrial fibrillation with rapid ventricular response (HCC), Atrial fibrillation, new onset (HCC), Benign hypertensive heart and CKD, stage 3 (GFR 30-59), w CHF (HCC), Cataracta b/l eyes, CHF (congestive heart failure) (HCC), Chronic cholecystitis, Class 3 severe obesity due to excess calories without serious comorbidity with body mass index (BMI) of 40.0 to 44.9 in adult, Colitis, Colon polyp, COVID-19 virus infection, Diabetes mellitus (HCC), Diverticulitis, Diverticulitis of colon with perforation, Diverticulitis of intestine, part unspecified, with perforation and abscess without bleeding, Endometrial cancer (HCC),

## 2025-03-03 ENCOUNTER — CARE COORDINATION (OUTPATIENT)
Dept: CARE COORDINATION | Age: 75
End: 2025-03-03

## 2025-03-03 ENCOUNTER — TELEPHONE (OUTPATIENT)
Dept: FAMILY MEDICINE CLINIC | Age: 75
End: 2025-03-03

## 2025-03-03 NOTE — TELEPHONE ENCOUNTER
Noted. Thank you!  She will be assessed tomorrow at the appointment by Dr. Perez    Future Appointments   Date Time Provider Department Center   3/4/2025 10:45 AM Yanelis Perez MD fp [facility] DEP   3/21/2025  8:30 AM SCHEDULE, AFL T[facility] [place] NUCLEAR MED [facility]   3/21/2025 10:30 AM SCHEDULE, AFL TCC [place] ECHO AFL TCC [facility]   5/20/2025  9:30 AM Radha Vallecillo MD fp [facility] DEP   5/22/2025  2:45 PM [facility]

## 2025-03-03 NOTE — TELEPHONE ENCOUNTER
Patient called in and wanted me to ask you if it was ok for her to take mucinex or other cold medication over the counter with her heart condition.

## 2025-03-03 NOTE — TELEPHONE ENCOUNTER
Can only take Mucinex or Claritin or Zyrtec, without decongestants  What exactly symptoms that she have?    Future Appointments   Date Time Provider Department Center   3/4/2025 10:45 AM Yanelis Perez MD fp [facility] ECC DEP   3/21/2025  8:30 AM SCHEDULE, AFL TCC [place] NUCLEAR MED AFL TCC [facility]   3/21/2025 10:30 AM SCHEDULE, AFL TCC [place] ECHO AFL TCC [facility]   5/20/2025  9:30 AM Radha Vallecillo MD fp [facility] DEP   5/22/2025  2:45 PM Reji Gabriel, [facility]

## 2025-03-04 ENCOUNTER — OFFICE VISIT (OUTPATIENT)
Dept: FAMILY MEDICINE CLINIC | Age: 75
End: 2025-03-04
Payer: MEDICARE

## 2025-03-04 VITALS
BODY MASS INDEX: 47.66 KG/M2 | HEART RATE: 76 BPM | SYSTOLIC BLOOD PRESSURE: 126 MMHG | OXYGEN SATURATION: 97 % | WEIGHT: 259 LBS | HEIGHT: 62 IN | DIASTOLIC BLOOD PRESSURE: 80 MMHG | TEMPERATURE: 96.8 F

## 2025-03-04 DIAGNOSIS — J01.40 ACUTE NON-RECURRENT PANSINUSITIS: ICD-10-CM

## 2025-03-04 DIAGNOSIS — G47.33 OBSTRUCTIVE SLEEP APNEA: ICD-10-CM

## 2025-03-04 DIAGNOSIS — Z12.31 ENCOUNTER FOR SCREENING MAMMOGRAM FOR BREAST CANCER: ICD-10-CM

## 2025-03-04 DIAGNOSIS — I50.32 CHRONIC DIASTOLIC CONGESTIVE HEART FAILURE (HCC): ICD-10-CM

## 2025-03-04 DIAGNOSIS — R05.3 CHRONIC COUGH: Primary | ICD-10-CM

## 2025-03-04 DIAGNOSIS — N18.32 TYPE 2 DIABETES MELLITUS WITH STAGE 3B CHRONIC KIDNEY DISEASE, WITHOUT LONG-TERM CURRENT USE OF INSULIN (HCC): ICD-10-CM

## 2025-03-04 DIAGNOSIS — M25.531 RIGHT WRIST PAIN: ICD-10-CM

## 2025-03-04 DIAGNOSIS — E11.22 TYPE 2 DIABETES MELLITUS WITH STAGE 3B CHRONIC KIDNEY DISEASE, WITHOUT LONG-TERM CURRENT USE OF INSULIN (HCC): ICD-10-CM

## 2025-03-04 PROCEDURE — 2022F DILAT RTA XM EVC RTNOPTHY: CPT | Performed by: FAMILY MEDICINE

## 2025-03-04 PROCEDURE — 1123F ACP DISCUSS/DSCN MKR DOCD: CPT | Performed by: FAMILY MEDICINE

## 2025-03-04 PROCEDURE — 1036F TOBACCO NON-USER: CPT | Performed by: FAMILY MEDICINE

## 2025-03-04 PROCEDURE — 3017F COLORECTAL CA SCREEN DOC REV: CPT | Performed by: FAMILY MEDICINE

## 2025-03-04 PROCEDURE — G8417 CALC BMI ABV UP PARAM F/U: HCPCS | Performed by: FAMILY MEDICINE

## 2025-03-04 PROCEDURE — 1159F MED LIST DOCD IN RCRD: CPT | Performed by: FAMILY MEDICINE

## 2025-03-04 PROCEDURE — G8427 DOCREV CUR MEDS BY ELIG CLIN: HCPCS | Performed by: FAMILY MEDICINE

## 2025-03-04 PROCEDURE — G8399 PT W/DXA RESULTS DOCUMENT: HCPCS | Performed by: FAMILY MEDICINE

## 2025-03-04 PROCEDURE — 3046F HEMOGLOBIN A1C LEVEL >9.0%: CPT | Performed by: FAMILY MEDICINE

## 2025-03-04 PROCEDURE — 99214 OFFICE O/P EST MOD 30 MIN: CPT | Performed by: FAMILY MEDICINE

## 2025-03-04 PROCEDURE — 1090F PRES/ABSN URINE INCON ASSESS: CPT | Performed by: FAMILY MEDICINE

## 2025-03-04 PROCEDURE — 1160F RVW MEDS BY RX/DR IN RCRD: CPT | Performed by: FAMILY MEDICINE

## 2025-03-04 RX ORDER — FLUTICASONE PROPIONATE 50 MCG
1 SPRAY, SUSPENSION (ML) NASAL DAILY
Qty: 32 G | Refills: 1 | Status: SHIPPED | OUTPATIENT
Start: 2025-03-04

## 2025-03-04 RX ORDER — GUAIFENESIN 600 MG/1
600 TABLET, EXTENDED RELEASE ORAL 2 TIMES DAILY
Qty: 30 TABLET | Refills: 0 | Status: SHIPPED | OUTPATIENT
Start: 2025-03-04

## 2025-03-04 SDOH — ECONOMIC STABILITY: FOOD INSECURITY: WITHIN THE PAST 12 MONTHS, THE FOOD YOU BOUGHT JUST DIDN'T LAST AND YOU DIDN'T HAVE MONEY TO GET MORE.: PATIENT DECLINED

## 2025-03-04 SDOH — ECONOMIC STABILITY: FOOD INSECURITY: WITHIN THE PAST 12 MONTHS, YOU WORRIED THAT YOUR FOOD WOULD RUN OUT BEFORE YOU GOT MONEY TO BUY MORE.: NEVER TRUE

## 2025-03-04 ASSESSMENT — ENCOUNTER SYMPTOMS
STRIDOR: 0
TROUBLE SWALLOWING: 0
CHEST TIGHTNESS: 0
ABDOMINAL DISTENTION: 0
BLOOD IN STOOL: 0
CONSTIPATION: 0
VOMITING: 0
WHEEZING: 0
RHINORRHEA: 0
NAUSEA: 0
EYE REDNESS: 0
COLOR CHANGE: 0
SINUS PRESSURE: 1
ABDOMINAL PAIN: 0
RECTAL PAIN: 0
DIARRHEA: 0
SHORTNESS OF BREATH: 0
SORE THROAT: 0
BACK PAIN: 1
COUGH: 1

## 2025-03-04 ASSESSMENT — PATIENT HEALTH QUESTIONNAIRE - PHQ9
1. LITTLE INTEREST OR PLEASURE IN DOING THINGS: NOT AT ALL
2. FEELING DOWN, DEPRESSED OR HOPELESS: NOT AT ALL
SUM OF ALL RESPONSES TO PHQ QUESTIONS 1-9: 0

## 2025-03-04 NOTE — CARE COORDINATION
[facility] D[facility]at[facility] [phone]            Follow Up:   No further Ambulatory Care Management follow-up scheduled at this time.  Patient  has Ambulatory Care Manager's contact information for any further questions, concerns or needs.

## 2025-03-04 NOTE — PROGRESS NOTES
stool, constipation, diarrhea, nausea, rectal pain and vomiting.   Endocrine: Negative for polydipsia, polyphagia and polyuria.   Genitourinary:  Negative for difficulty urinating, flank pain, frequency and urgency.   Musculoskeletal:  Positive for arthralgias, back pain and gait problem. Negative for myalgias and neck pain.   Skin:  Negative for color change, rash and wound.   Allergic/Immunologic: Negative for food allergies and immunocompromised state.   Neurological:  Positive for weakness and numbness. Negative for dizziness, speech difficulty, light-headedness and headaches.   Psychiatric/Behavioral:  Negative for agitation, behavioral problems, decreased concentration, dysphoric mood, hallucinations, sleep disturbance and suicidal ideas. The patient is nervous/anxious.        Physical Exam  Lungs are clear.  There is no swelling in the legs.      Physical Exam  Vitals and nursing note reviewed.   Constitutional:       General: She is not in acute distress.     Appearance: Normal appearance. She is well-developed. She is obese. She is not diaphoretic.   HENT:      Head: Normocephalic and atraumatic.      Nose: Congestion present. No nasal tenderness.      Right Sinus: Frontal sinus tenderness present. No maxillary sinus tenderness.      Left Sinus: Frontal sinus tenderness present. No maxillary sinus tenderness.      Mouth/Throat:      Mouth: Mucous membranes are moist.   Eyes:      Conjunctiva/sclera: Conjunctivae normal.      Pupils: Pupils are equal, round, and reactive to light.   Neck:      Thyroid: No thyromegaly.   Cardiovascular:      Rate and Rhythm: Normal rate and regular rhythm.      Heart sounds: Normal heart sounds. No murmur heard.  Pulmonary:      Effort: Pulmonary effort is normal. No respiratory distress.      Breath sounds: Normal breath sounds. No decreased breath sounds, wheezing, rhonchi or rales.   Abdominal:      General: Bowel sounds are normal.      Palpations: Abdomen is soft.       regular rate and rhythm

## 2025-03-06 ENCOUNTER — OFFICE VISIT (OUTPATIENT)
Dept: ORTHOPEDIC SURGERY | Age: 75
End: 2025-03-06

## 2025-03-06 VITALS — WEIGHT: 259 LBS | HEIGHT: 62 IN | BODY MASS INDEX: 47.66 KG/M2 | RESPIRATION RATE: 14 BRPM

## 2025-03-06 DIAGNOSIS — M17.12 PRIMARY OSTEOARTHRITIS OF LEFT KNEE: ICD-10-CM

## 2025-03-06 DIAGNOSIS — M17.11 PRIMARY OSTEOARTHRITIS OF RIGHT KNEE: Primary | ICD-10-CM

## 2025-03-06 RX ORDER — LIDOCAINE HYDROCHLORIDE 10 MG/ML
4 INJECTION, SOLUTION INFILTRATION; PERINEURAL ONCE
Status: COMPLETED | OUTPATIENT
Start: 2025-03-06 | End: 2025-03-06

## 2025-03-06 RX ORDER — TRIAMCINOLONE ACETONIDE 40 MG/ML
40 INJECTION, SUSPENSION INTRA-ARTICULAR; INTRAMUSCULAR ONCE
Status: COMPLETED | OUTPATIENT
Start: 2025-03-06 | End: 2025-03-06

## 2025-03-06 RX ADMIN — TRIAMCINOLONE ACETONIDE 40 MG: 40 INJECTION, SUSPENSION INTRA-ARTICULAR; INTRAMUSCULAR at 13:26

## 2025-03-06 RX ADMIN — LIDOCAINE HYDROCHLORIDE 4 ML: 10 INJECTION, SOLUTION INFILTRATION; PERINEURAL at 13:27

## 2025-03-06 RX ADMIN — TRIAMCINOLONE ACETONIDE 40 MG: 40 INJECTION, SUSPENSION INTRA-ARTICULAR; INTRAMUSCULAR at 13:27

## 2025-03-06 RX ADMIN — LIDOCAINE HYDROCHLORIDE 4 ML: 10 INJECTION, SOLUTION INFILTRATION; PERINEURAL at 13:28

## 2025-03-06 NOTE — PROGRESS NOTES
benefits of the procedure she consented to proceed. her right knee was prepped using chlorhexadine solution. Using aseptic technique, with the patient seated in the chair and through an inferolateral approach, her right knee joint was injected with a 5 cc mixture of 1cc 40mg/ml kenalog and 4 cc of 1% lidocaine without epinephrine. A band aid was applied to the injection site. she tolerated the injection with no immediate adverse reactions.     Procedure: Left intraarticular knee injection  Following an appropriate discussion with the patient regarding the risks and benefits of the procedure she consented to proceed. her left knee was prepped using chlorhexadine solution. Using aseptic technique, with the patient seated in the chair and through an inferolateral approach, her right knee joint was injected with a 5 cc mixture of 1cc 40mg/ml kenalog and 4 cc of 1% lidocaine without epinephrine. A band aid was applied to the injection site. she tolerated the injection with no immediate adverse reactions.     Total time spent on visit: 35 minutes          This note is created with the assistance of a speech recognition program.  While intending to generate adocument that actually reflects the content of the visit, the document can still have some errors including those of syntax and sound a like substitutions which may escape proof reading.  It such instances, actual meaningcan be extrapolated by contextual diversion.

## 2025-03-10 ENCOUNTER — TELEPHONE (OUTPATIENT)
Dept: FAMILY MEDICINE CLINIC | Age: 75
End: 2025-03-10

## 2025-03-10 DIAGNOSIS — K59.01 SLOW TRANSIT CONSTIPATION: ICD-10-CM

## 2025-03-10 DIAGNOSIS — J30.89 NON-SEASONAL ALLERGIC RHINITIS DUE TO OTHER ALLERGIC TRIGGER: Primary | ICD-10-CM

## 2025-03-10 RX ORDER — IPRATROPIUM BROMIDE 21 UG/1
2 SPRAY, METERED NASAL EVERY 12 HOURS
Qty: 30 ML | Refills: 0 | Status: SHIPPED | OUTPATIENT
Start: 2025-03-10

## 2025-03-10 RX ORDER — DOCUSATE SODIUM 100 MG/1
CAPSULE, LIQUID FILLED ORAL
Qty: 180 CAPSULE | Refills: 3 | Status: SHIPPED | OUTPATIENT
Start: 2025-03-10

## 2025-03-10 NOTE — TELEPHONE ENCOUNTER
Please let the patient know to  prescription from the pharmacy.      Orders Placed This Encounter   Medications    ipratropium (ATROVENT) 0.03 % nasal spray     Si sprays by Each Nostril route in the morning and 2 sprays in the evening. Stop Flonase.     Dispense:  30 mL     Refill:  0         [facility] - [place], [place] - [address] [phone] - F [phone]  [address] [address]  Phone: [phone] Fax: [phone]    No orders of the defined types were placed in this encounter.      Future Appointments   Date Time Provider Department Center   3/21/2025  8:30 AM SCHEDULE, AFL TCC [place] NUCLEAR MED AFL T[facility]   3/21/2025 10:30 AM SCHEDULE, AFL TCC [place] ECHO AFL TCC [facility]   2025  9:30 AM Radha Vallecillo MD fp [facility]   2025  2:45 PM [facility]       Thank you!

## 2025-03-10 NOTE — TELEPHONE ENCOUNTER
What color is the mucus?    Future Appointments   Date Time Provider Department Center   3/21/2025  8:30 AM SCHEDULE, AFL TCC [place] NUCLEAR MED AFL TCC [facility] AFL [facility] C   3/21/2025 10:30 AM SCHEDULE, AFL TCC [place] ECHO AFL TCC [facility] C   5/20/2025  9:30 AM Radha Vallecillo MD fp [facility] ECC DEP   5/22/2025  2:45 PM [facility]

## 2025-03-10 NOTE — TELEPHONE ENCOUNTER
Pt called asking if there is something else she can be prescribed other than the fluticasone (FLONASE) 50 MCG/ACT nasal spray because she's still congested she would like something different

## 2025-03-11 DIAGNOSIS — K59.01 SLOW TRANSIT CONSTIPATION: ICD-10-CM

## 2025-03-11 RX ORDER — DOCUSATE SODIUM 100 MG/1
CAPSULE, LIQUID FILLED ORAL
Qty: 180 CAPSULE | Refills: 3 | OUTPATIENT
Start: 2025-03-11

## 2025-03-13 ENCOUNTER — TELEPHONE (OUTPATIENT)
Dept: FAMILY MEDICINE CLINIC | Age: 75
End: 2025-03-13

## 2025-03-13 DIAGNOSIS — R35.0 FREQUENCY OF URINATION: Primary | ICD-10-CM

## 2025-03-13 NOTE — TELEPHONE ENCOUNTER
To do the urine test   Diagnosis Orders   1. Frequency of urination  Urinalysis with Microscopic           Future Appointments   Date Time Provider Department Center   3/21/2025  8:30 AM SCHEDULE, AFL TCC [place] NUCLEAR MED AFL TCC [facility]   3/21/2025 10:30 AM SCHEDULE, AFL TCC [place] ECHO AFL TCC [facility] C   3/25/2025  8:00 AM STC DIGITAL RM [facility] MAMMO [facility] Radiolog   4/22/2025 10:20 AM Ford Samano MD SC Ortho [facility]   5/20/2025  9:30 AM Radha Vallecillo MD fp [facility] ECC DEP   5/22/2025  2:45 PM Reji Gabriel PA-C [facility]

## 2025-03-13 NOTE — TELEPHONE ENCOUNTER
María called.    For the past 2 or 3 weeks, she has been going to the bathroom very frequently like she cannot stop. But no burning or pain when she goes.    Her daughter advise for her to call Dr Vallecillo.    Please advise for treatment.    Thank you.

## 2025-03-18 ENCOUNTER — TELEPHONE (OUTPATIENT)
Dept: FAMILY MEDICINE CLINIC | Age: 75
End: 2025-03-18

## 2025-03-18 NOTE — TELEPHONE ENCOUNTER
Patient called in and wanted to know if she could get a urine test she said she keeps urinating frequently and can barley make it to the bathroom, she does not know how to do my chart but she wanted me to ask if you could send a urine test in for her to do for when she gets her mammogram.

## 2025-03-18 NOTE — TELEPHONE ENCOUNTER
Noted. Thank you!  Urine test and labs in the computer  Future Appointments   Date Time Provider Department Center   3/21/2025  8:30 AM SCHEDULE, AFL TCC [place] NUCLEAR MED AFL TCC [facility] AFL [facility] C   3/21/2025 10:30 AM SCHEDULE, AFL TCC [place] ECHO AFL TCC [facility] AFL [facility] C   3/25/2025  8:00 AM STC DIGITAL RM STCZ MAMMO [facility] Radiolog   4/22/2025 10:20 AM Ford Samano MD SC Ortho [facility]   5/20/2025  9:30 AM Radha Vallecillo MD  sc [facility] ECC DEP   5/22/2025  2:45 PM Reji Gabriel PA-C [facility]

## 2025-03-21 ENCOUNTER — HOSPITAL ENCOUNTER (OUTPATIENT)
Age: 75
Setting detail: SPECIMEN
Discharge: HOME | End: 2025-03-21

## 2025-03-21 ENCOUNTER — RESULTS FOLLOW-UP (OUTPATIENT)
Dept: FAMILY MEDICINE CLINIC | Age: 75
End: 2025-03-21

## 2025-03-21 DIAGNOSIS — E11.22 TYPE 2 DIABETES MELLITUS WITH STAGE 3B CHRONIC KIDNEY DISEASE, WITHOUT LONG-TERM CURRENT USE OF INSULIN (HCC): ICD-10-CM

## 2025-03-21 DIAGNOSIS — N18.32 TYPE 2 DIABETES MELLITUS WITH STAGE 3B CHRONIC KIDNEY DISEASE, WITHOUT LONG-TERM CURRENT USE OF INSULIN (HCC): ICD-10-CM

## 2025-03-21 DIAGNOSIS — I50.32 CHRONIC DIASTOLIC CONGESTIVE HEART FAILURE (HCC): ICD-10-CM

## 2025-03-21 DIAGNOSIS — R35.0 FREQUENCY OF URINATION: ICD-10-CM

## 2025-03-21 LAB
BACTERIA URNS QL MICRO: ABNORMAL
BILIRUB UR QL STRIP: NEGATIVE
BNP SERPL-MCNC: 373 PG/ML (ref 0–125)
CLARITY UR: CLEAR
COLOR UR: YELLOW
EPI CELLS #/AREA URNS HPF: ABNORMAL /HPF (ref 0–5)
EST. AVERAGE GLUCOSE BLD GHB EST-MCNC: 134 MG/DL
GLUCOSE UR STRIP-MCNC: ABNORMAL MG/DL
HBA1C MFR BLD: 6.3 % (ref 4–6)
HGB UR QL STRIP.AUTO: ABNORMAL
KETONES UR STRIP-MCNC: NEGATIVE MG/DL
LEFT VENTRICULAR EJECTION FRACTION MODE: NORMAL
LEUKOCYTE ESTERASE UR QL STRIP: NEGATIVE
LV EF: 63 % (ref 63–?)
NITRITE UR QL STRIP: NEGATIVE
PH UR STRIP: 5 [PH] (ref 5–8)
PROT UR STRIP-MCNC: ABNORMAL MG/DL
RBC #/AREA URNS HPF: ABNORMAL /HPF (ref 0–2)
SP GR UR STRIP: 1.04 (ref 1–1.03)
UROBILINOGEN UR STRIP-ACNC: NORMAL EU/DL (ref 0–1)
WBC #/AREA URNS HPF: ABNORMAL /HPF (ref 0–5)

## 2025-03-21 NOTE — RESULT ENCOUNTER NOTE
Please notify patient: Urine test does not show significant changes to say that she has a UTI, but she is a bit dehydrated, she should drink 1 or 2 more glasses of water every day    Future Appointments  3/25/2025  8:00 AM    ST DIGITAL RM             ST[facility] MAMMO          [facility] Radiolog  4/22/2025  10:20 AM   Ford Samano MD         SC Ortho            [facility]  5/20/2025  9:30 AM    Radha Vallecillo MD    [facility]  5/22/2025  2:45 PM    [facility] Gabriel PA-C         [facility]

## 2025-03-23 ENCOUNTER — RESULTS FOLLOW-UP (OUTPATIENT)
Dept: FAMILY MEDICINE CLINIC | Age: 75
End: 2025-03-23

## 2025-03-25 ENCOUNTER — RESULTS FOLLOW-UP (OUTPATIENT)
Dept: FAMILY MEDICINE CLINIC | Age: 75
End: 2025-03-25

## 2025-03-25 ENCOUNTER — HOSPITAL ENCOUNTER (OUTPATIENT)
Dept: WOMENS IMAGING | Age: 75
Discharge: HOME | End: 2025-03-27
Payer: MEDICARE

## 2025-03-25 DIAGNOSIS — Z12.31 ENCOUNTER FOR SCREENING MAMMOGRAM FOR BREAST CANCER: ICD-10-CM

## 2025-03-25 PROCEDURE — 77063 BREAST TOMOSYNTHESIS BI: CPT

## 2025-03-28 ENCOUNTER — TELEPHONE (OUTPATIENT)
Dept: FAMILY MEDICINE CLINIC | Age: 75
End: 2025-03-28

## 2025-03-28 NOTE — TELEPHONE ENCOUNTER
LEFT MESSAGE ON MACHINE FOR PATIENT TO RETURN CALL FOR APPOINTMENT #1        ----- Message from Richard BECKWITH sent at 3/28/2025  3:08 PM EDT -----  Regarding: ECC Appointment Request  ECC Appointment Request    Patient needs appointment for ECC Appointment Type: Hospital Follow Up.    Patient Requested Dates(s):next weeks  Patient Requested Time:morning 10:00am or 11:00am  Provider Name:Radha Vallecillo MD        Reason for Appointment Request:the patient need's hospital follow up appointment. try to contact the practice but the practice did not answer  --------------------------------------------------------------------------------------------------------------------------    Relationship to Patient: Self     Call Back Information: OK to leave message on voicemail  Preferred Call Back Number:[phone]

## 2025-04-11 ENCOUNTER — OFFICE VISIT (OUTPATIENT)
Dept: FAMILY MEDICINE CLINIC | Age: 75
End: 2025-04-11
Payer: MEDICARE

## 2025-04-11 VITALS
WEIGHT: 266.8 LBS | BODY MASS INDEX: 49.1 KG/M2 | HEIGHT: 62 IN | SYSTOLIC BLOOD PRESSURE: 128 MMHG | DIASTOLIC BLOOD PRESSURE: 76 MMHG | OXYGEN SATURATION: 98 % | HEART RATE: 124 BPM | TEMPERATURE: 98.6 F

## 2025-04-11 DIAGNOSIS — I12.9 BENIGN HYPERTENSION WITH CKD (CHRONIC KIDNEY DISEASE) STAGE III (HCC): ICD-10-CM

## 2025-04-11 DIAGNOSIS — M85.80 OSTEOPENIA DETERMINED BY X-RAY: ICD-10-CM

## 2025-04-11 DIAGNOSIS — N18.30 BENIGN HYPERTENSION WITH CKD (CHRONIC KIDNEY DISEASE) STAGE III (HCC): ICD-10-CM

## 2025-04-11 DIAGNOSIS — I50.32 CHRONIC DIASTOLIC CONGESTIVE HEART FAILURE (HCC): ICD-10-CM

## 2025-04-11 DIAGNOSIS — E78.2 MIXED HYPERLIPIDEMIA: ICD-10-CM

## 2025-04-11 DIAGNOSIS — I95.9 HYPOTENSION, UNSPECIFIED HYPOTENSION TYPE: ICD-10-CM

## 2025-04-11 DIAGNOSIS — N18.31 TYPE 2 DIABETES MELLITUS WITH STAGE 3A CHRONIC KIDNEY DISEASE, WITHOUT LONG-TERM CURRENT USE OF INSULIN (HCC): ICD-10-CM

## 2025-04-11 DIAGNOSIS — I48.91 ATRIAL FIBRILLATION WITH RVR (HCC): Primary | ICD-10-CM

## 2025-04-11 DIAGNOSIS — Z09 HOSPITAL DISCHARGE FOLLOW-UP: ICD-10-CM

## 2025-04-11 DIAGNOSIS — E11.22 TYPE 2 DIABETES MELLITUS WITH STAGE 3A CHRONIC KIDNEY DISEASE, WITHOUT LONG-TERM CURRENT USE OF INSULIN (HCC): ICD-10-CM

## 2025-04-11 PROBLEM — Z79.01 LONG TERM CURRENT USE OF ANTICOAGULANT THERAPY: Status: ACTIVE | Noted: 2020-06-22

## 2025-04-11 PROBLEM — K57.90 DIVERTICULAR DISEASE: Status: ACTIVE | Noted: 2020-06-22

## 2025-04-11 PROBLEM — I25.10 ATHEROSCLEROSIS OF CORONARY ARTERY WITHOUT ANGINA PECTORIS: Status: ACTIVE | Noted: 2020-06-22

## 2025-04-11 PROCEDURE — 1036F TOBACCO NON-USER: CPT | Performed by: FAMILY MEDICINE

## 2025-04-11 PROCEDURE — 3044F HG A1C LEVEL LT 7.0%: CPT | Performed by: FAMILY MEDICINE

## 2025-04-11 PROCEDURE — 1126F AMNT PAIN NOTED NONE PRSNT: CPT | Performed by: FAMILY MEDICINE

## 2025-04-11 PROCEDURE — G8399 PT W/DXA RESULTS DOCUMENT: HCPCS | Performed by: FAMILY MEDICINE

## 2025-04-11 PROCEDURE — 1090F PRES/ABSN URINE INCON ASSESS: CPT | Performed by: FAMILY MEDICINE

## 2025-04-11 PROCEDURE — G8417 CALC BMI ABV UP PARAM F/U: HCPCS | Performed by: FAMILY MEDICINE

## 2025-04-11 PROCEDURE — G8427 DOCREV CUR MEDS BY ELIG CLIN: HCPCS | Performed by: FAMILY MEDICINE

## 2025-04-11 PROCEDURE — 99215 OFFICE O/P EST HI 40 MIN: CPT | Performed by: FAMILY MEDICINE

## 2025-04-11 PROCEDURE — 2022F DILAT RTA XM EVC RTNOPTHY: CPT | Performed by: FAMILY MEDICINE

## 2025-04-11 PROCEDURE — 1123F ACP DISCUSS/DSCN MKR DOCD: CPT | Performed by: FAMILY MEDICINE

## 2025-04-11 PROCEDURE — 3017F COLORECTAL CA SCREEN DOC REV: CPT | Performed by: FAMILY MEDICINE

## 2025-04-11 PROCEDURE — 1111F DSCHRG MED/CURRENT MED MERGE: CPT | Performed by: FAMILY MEDICINE

## 2025-04-11 RX ORDER — AMIODARONE HYDROCHLORIDE 200 MG/1
200 TABLET ORAL DAILY
Qty: 30 TABLET | Refills: 3 | Status: SHIPPED | OUTPATIENT
Start: 2025-04-11

## 2025-04-11 RX ORDER — MIDODRINE HYDROCHLORIDE 10 MG/1
10 TABLET ORAL
Qty: 90 TABLET | Refills: 3 | Status: SHIPPED | OUTPATIENT
Start: 2025-04-11 | End: 2025-04-15 | Stop reason: ALTCHOICE

## 2025-04-11 SDOH — ECONOMIC STABILITY: FOOD INSECURITY: WITHIN THE PAST 12 MONTHS, YOU WORRIED THAT YOUR FOOD WOULD RUN OUT BEFORE YOU GOT MONEY TO BUY MORE.: NEVER TRUE

## 2025-04-11 SDOH — ECONOMIC STABILITY: FOOD INSECURITY: WITHIN THE PAST 12 MONTHS, THE FOOD YOU BOUGHT JUST DIDN'T LAST AND YOU DIDN'T HAVE MONEY TO GET MORE.: NEVER TRUE

## 2025-04-11 ASSESSMENT — PATIENT HEALTH QUESTIONNAIRE - PHQ9
SUM OF ALL RESPONSES TO PHQ QUESTIONS 1-9: 0
SUM OF ALL RESPONSES TO PHQ QUESTIONS 1-9: 0
1. LITTLE INTEREST OR PLEASURE IN DOING THINGS: NOT AT ALL
SUM OF ALL RESPONSES TO PHQ QUESTIONS 1-9: 0
SUM OF ALL RESPONSES TO PHQ QUESTIONS 1-9: 0
2. FEELING DOWN, DEPRESSED OR HOPELESS: NOT AT ALL

## 2025-04-11 NOTE — PROGRESS NOTES
Visit Information    Have you changed or started any medications since your last visit including any over-the-counter medicines, vitamins, or herbal medicines? no   Have you stopped taking any of your medications? Is so, why? -  no  Are you having any side effects from any of your medications? - no    Have you seen any other physician or provider since your last visit?  yes -    Have you had any other diagnostic tests since your last visit?  yes -    Have you been seen in the emergency room and/or had an admission in a hospital since we last saw you?  yes -    Have you had your routine dental cleaning in the past 6 months?  no     Do you have an active MyChart account? If no, what is the barrier?  Yes    Patient Care Team:  Radha Vallecillo MD as PCP - General (Family Medicine)  Radha Vallecillo MD as PCP - Empaneled Provider  Ford Samano MD as Referring Physician (Orthopedic Surgery)  Edy Rasmussen MD as Surgeon (Orthopedic Surgery)  Jaison Henderson MD as Consulting Physician (Endocrinology)  Rosita Holcomb DO as Consulting Physician (Obstetrics & Gynecology)  Roshan Ram MD as Consulting Physician (Urology)  Cynthia Phoenix MD as Consulting Physician (Otolaryngology)  Karen Mead DO as Consulting Physician (General Surgery)  Cuate Rodrigues DO as Consulting Physician (Cardiology)  Nithya Kevin PA-C as Physician Assistant (Gynecological Oncology)  Raman Garduno MD as Surgeon (Ophthalmology)  Francis Rodrigues DO as Consulting Physician (Cardiology)  Ulices Kellogg PA (Physician Assistant)  Gary Yarbrough, DPM as Surgeon (Podiatry)  Zachary Borges MD as Consulting Physician (Infectious Diseases)  Mary Leblanc MD as Consulting Physician (Dermatology)  Reyes Chopra MD as Consulting Physician (Pulmonary Disease)    Medical History Review  Past Medical, Family, and Social History reviewed and does contribute to the patient presenting condition    Health 
AI, including the recording.      An electronic signature was used to authenticate this note.    --Radha Vallecillo MD     Patient Active Problem List   Diagnosis    Hypothyroid    History of TIA (transient ischemic attack)    Chronic bilateral low back pain without sciatica    Benign hypertension with CKD (chronic kidney disease) stage III (HCC)    Osteopenia determined by x-ray    Osteoarthritis involving multiple joints on both sides of body    Left knee DJD    Vitamin D deficiency    Mixed incontinence    Chronic pain of both knees    Slow transit constipation    Allergic rhinitis    Mixed hyperlipidemia    Body mass index (BMI) 45.0-49.9, adult    History of falling    Spondylosis of lumbar region without myelopathy or radiculopathy    OAB (overactive bladder)    Primary osteoarthritis of both knees    TLHBSO, bilateral LND 10/24/17    Optic atrophy of both eyes    Difficulty walking    Esotropia    History of uterine cancer, Well differentiated grade 1 adenocarcinoma arising in complex hyperplasia, 2017    Vitamin B 12 deficiency    Atherosclerosis of aorta    Calculus of gallbladder without cholecystitis without obstruction    CLAROS (nonalcoholic steatohepatitis)    Paroxysmal atrial fibrillation (HCC)    Type 2 diabetes mellitus, without long-term current use of insulin (HCC)    Mobility impaired    Multiple atypical skin moles    Chronic diastolic congestive heart failure (HCC)    Microcytic anemia    Abdominal aortic aneurysm (AAA) without rupture    UTI due to extended-spectrum beta lactamase (ESBL) producing Escherichia coli    Chronic gout due to renal impairment without tophus    ZACKERY treated with BiPAP    Sinus pause    H/O colonoscopy with polypectomy    Farooq's esophagus    History of colon polyps    Age-related osteoporosis without current pathological fracture    Legal blindness, as defined in [place]    Muscle weakness (generalized)    Benign hypertensive heart and CKD, stage 3 (GFR 30-59), w CHF

## 2025-04-13 ENCOUNTER — TELEPHONE (OUTPATIENT)
Dept: FAMILY MEDICINE CLINIC | Age: 75
End: 2025-04-13

## 2025-04-16 ENCOUNTER — TELEPHONE (OUTPATIENT)
Dept: FAMILY MEDICINE CLINIC | Age: 75
End: 2025-04-16

## 2025-04-16 NOTE — TELEPHONE ENCOUNTER
[facility] called stating she wants to also do physical and occupational therapy they just need a verbal     Call back [phone] Genia

## 2025-04-17 ENCOUNTER — OFFICE VISIT (OUTPATIENT)
Dept: ORTHOPEDIC SURGERY | Age: 75
End: 2025-04-17
Payer: MEDICARE

## 2025-04-17 VITALS — HEIGHT: 62 IN | BODY MASS INDEX: 48.95 KG/M2 | RESPIRATION RATE: 14 BRPM | WEIGHT: 266 LBS

## 2025-04-17 DIAGNOSIS — M25.531 RIGHT WRIST PAIN: ICD-10-CM

## 2025-04-17 DIAGNOSIS — M18.11 ARTHRITIS OF CARPOMETACARPAL (CMC) JOINT OF RIGHT THUMB: Primary | ICD-10-CM

## 2025-04-17 PROCEDURE — 3017F COLORECTAL CA SCREEN DOC REV: CPT

## 2025-04-17 PROCEDURE — 1036F TOBACCO NON-USER: CPT

## 2025-04-17 PROCEDURE — G8417 CALC BMI ABV UP PARAM F/U: HCPCS

## 2025-04-17 PROCEDURE — 99213 OFFICE O/P EST LOW 20 MIN: CPT

## 2025-04-17 PROCEDURE — 1125F AMNT PAIN NOTED PAIN PRSNT: CPT

## 2025-04-17 PROCEDURE — 1123F ACP DISCUSS/DSCN MKR DOCD: CPT

## 2025-04-17 PROCEDURE — G8399 PT W/DXA RESULTS DOCUMENT: HCPCS

## 2025-04-17 PROCEDURE — G8428 CUR MEDS NOT DOCUMENT: HCPCS

## 2025-04-17 PROCEDURE — 1090F PRES/ABSN URINE INCON ASSESS: CPT

## 2025-04-17 NOTE — PROGRESS NOTES
ORTHOPEDIC PATIENT EVALUATION - Follow-Up      HPI / Chief Complaint  María Arevalo is a 75 y.o. female who presents for follow-up regarding her right wrist pain.  I last saw the patient approximately 2 months ago and diagnosed her with what appeared to be a right wrist sprain as well as a mild first CMC joint arthritis.  We did initiate conservative management at that time with a thumb spica wrist brace while active as well as topical Voltaren gel over the affected area.  She presents today statin that the pain is still localized to the base of the thumb.  She has been wearing the thumb spica wrist brace most of the time while active but taken off her certain activities.  She states that she is still able to wear while playing bingo which does help reduce her pain.  States that she has not tried the topical Voltaren gel.  She states that the pain is about a 3/10 and does fluctuate throughout the day.  Denies any numbness or tingling.  Denies any pain into the true wrist or additional digits.    Past Medical History  María  has a past medical history of Abdominal aortic aneurysm (AAA) without rupture, Acute on chronic anemia, Adenocarcinoma of endometrium, stage 1 (HCC), Adenomatous polyp of sigmoid colon, 6/26/17, JOSHUA (acute kidney injury), Allergic rhinitis, Anemia, At high risk for falls, Atrial fibrillation (HCC), Atrial fibrillation with rapid ventricular response (HCC), Atrial fibrillation, new onset (HCC), Benign hypertensive heart and CKD, stage 3 (GFR 30-59), w CHF (HCC), Cataracta b/l eyes, CHF (congestive heart failure) (HCC), Chronic cholecystitis, Class 3 severe obesity due to excess calories without serious comorbidity with body mass index (BMI) of 40.0 to 44.9 in adult, Colitis, Colon polyp, COVID-19 virus infection, Diabetes mellitus (HCC), Diverticulitis, Diverticulitis of colon with perforation, Diverticulitis of intestine, part unspecified, with perforation and abscess without bleeding,

## 2025-04-18 ENCOUNTER — HOSPITAL ENCOUNTER (OUTPATIENT)
Age: 75
Setting detail: SPECIMEN
Discharge: HOME | End: 2025-04-18
Payer: MEDICARE

## 2025-04-18 LAB
ALBUMIN SERPL-MCNC: 3.8 G/DL (ref 3.5–5.2)
ALP SERPL-CCNC: 55 U/L (ref 35–104)
ALT SERPL-CCNC: 18 U/L (ref 10–35)
ANION GAP SERPL CALCULATED.3IONS-SCNC: 14 MMOL/L (ref 9–16)
AST SERPL-CCNC: 17 U/L (ref 10–35)
BASOPHILS # BLD: 0.1 K/UL (ref 0–0.2)
BASOPHILS NFR BLD: 1 % (ref 0–2)
BILIRUB SERPL-MCNC: 0.2 MG/DL (ref 0–1.2)
BNP SERPL-MCNC: 593 PG/ML (ref 0–300)
BUN SERPL-MCNC: 22 MG/DL (ref 8–23)
CALCIUM SERPL-MCNC: 8.9 MG/DL (ref 8.6–10.4)
CHLORIDE SERPL-SCNC: 103 MMOL/L (ref 98–107)
CO2 SERPL-SCNC: 22 MMOL/L (ref 20–31)
CREAT SERPL-MCNC: 0.9 MG/DL (ref 0.7–1.2)
DIGOXIN SERPL-MCNC: 0.8 NG/ML (ref 0.8–2)
EOSINOPHIL # BLD: 0 K/UL (ref 0–0.4)
EOSINOPHILS RELATIVE PERCENT: 1 % (ref 0–4)
ERYTHROCYTE [DISTWIDTH] IN BLOOD BY AUTOMATED COUNT: 15.6 % (ref 11.5–14.9)
FOLATE SERPL-MCNC: 10.2 NG/ML (ref 4.8–24.2)
GFR, ESTIMATED: 68 ML/MIN/1.73M2
GLUCOSE SERPL-MCNC: 107 MG/DL (ref 74–99)
HCT VFR BLD AUTO: 38.2 % (ref 36–46)
HGB BLD-MCNC: 12.7 G/DL (ref 12–16)
LYMPHOCYTES NFR BLD: 1.4 K/UL (ref 1–4.8)
LYMPHOCYTES RELATIVE PERCENT: 18 % (ref 24–44)
MAGNESIUM SERPL-MCNC: 1.8 MG/DL (ref 1.6–2.4)
MCH RBC QN AUTO: 31.5 PG (ref 26–34)
MCHC RBC AUTO-ENTMCNC: 33.3 G/DL (ref 31–37)
MCV RBC AUTO: 94.6 FL (ref 80–100)
MONOCYTES NFR BLD: 0.4 K/UL (ref 0.1–1.3)
MONOCYTES NFR BLD: 5 % (ref 1–7)
NEUTROPHILS NFR BLD: 75 % (ref 36–66)
NEUTS SEG NFR BLD: 5.8 K/UL (ref 1.3–9.1)
PLATELET # BLD AUTO: 253 K/UL (ref 150–450)
PMV BLD AUTO: 8 FL (ref 6–12)
POTASSIUM SERPL-SCNC: 4.1 MMOL/L (ref 3.7–5.3)
PROT SERPL-MCNC: 6.7 G/DL (ref 6.6–8.7)
RBC # BLD AUTO: 4.04 M/UL (ref 4–5.2)
SODIUM SERPL-SCNC: 139 MMOL/L (ref 136–145)
TSH SERPL DL<=0.05 MIU/L-ACNC: 2.94 UIU/ML (ref 0.27–4.2)
URATE SERPL-MCNC: 7.5 MG/DL (ref 2.4–5.7)
VIT B12 SERPL-MCNC: 241 PG/ML (ref 232–1245)
WBC OTHER # BLD: 7.8 K/UL (ref 3.5–11)

## 2025-04-18 PROCEDURE — 82746 ASSAY OF FOLIC ACID SERUM: CPT

## 2025-04-18 PROCEDURE — 84443 ASSAY THYROID STIM HORMONE: CPT

## 2025-04-18 PROCEDURE — 80053 COMPREHEN METABOLIC PANEL: CPT

## 2025-04-18 PROCEDURE — 83880 ASSAY OF NATRIURETIC PEPTIDE: CPT

## 2025-04-18 PROCEDURE — 84550 ASSAY OF BLOOD/URIC ACID: CPT

## 2025-04-18 PROCEDURE — 82607 VITAMIN B-12: CPT

## 2025-04-18 PROCEDURE — 85025 COMPLETE CBC W/AUTO DIFF WBC: CPT

## 2025-04-18 PROCEDURE — 83735 ASSAY OF MAGNESIUM: CPT

## 2025-04-18 PROCEDURE — 80162 ASSAY OF DIGOXIN TOTAL: CPT

## 2025-04-19 ENCOUNTER — RESULTS FOLLOW-UP (OUTPATIENT)
Dept: FAMILY MEDICINE CLINIC | Age: 75
End: 2025-04-19

## 2025-04-19 DIAGNOSIS — M1A.30X0 CHRONIC GOUT DUE TO RENAL IMPAIRMENT WITHOUT TOPHUS, UNSPECIFIED SITE: ICD-10-CM

## 2025-04-19 DIAGNOSIS — E53.8 VITAMIN B 12 DEFICIENCY: Primary | ICD-10-CM

## 2025-04-19 RX ORDER — ALLOPURINOL 100 MG/1
200 TABLET ORAL DAILY
Qty: 180 TABLET | Refills: 3 | Status: SHIPPED | OUTPATIENT
Start: 2025-04-19 | End: 2025-04-26 | Stop reason: DRUGHIGH

## 2025-04-19 NOTE — RESULT ENCOUNTER NOTE
Please notify patient: The marker for congestive heart failure is stable  Blood glucose well-controlled 107  Kidney disease stage II improved  Uric acid is worsening, I am not sure if she is taking the allopurinol 100 Mg daily?  I will increase the dosage to 200 Mg daily  Vitamin B12 is low needs to start taking vitamin B12 1000 mcg daily, I will send to the pharmacy but may not be covered      Otherwise labs within normal limits  continue current treatment    Future Appointments  4/22/2025  10:20 AM   Ford Samano MD         [facility]  5/2/2025   10:00 AM   Radha Vallecillo MD    [facility] DEP  5/20/2025  9:30 AM    Radha Vallecillo MD    [facility] DEP  5/22/2025  2:45 PM    Reji Gabriel PA-C [facility]              [facility]  7/15/2025  10:45 AM   Edinson Grajeda, MAX - * [facility]        [facility]

## 2025-04-20 ENCOUNTER — TELEPHONE (OUTPATIENT)
Dept: FAMILY MEDICINE CLINIC | Age: 75
End: 2025-04-20

## 2025-04-20 RX ORDER — DIGOXIN 125 MCG
125 TABLET ORAL DAILY
COMMUNITY
Start: 2025-04-17 | End: 2025-04-20 | Stop reason: ALTCHOICE

## 2025-04-20 RX ORDER — SPIRONOLACTONE 25 MG/1
25 TABLET ORAL DAILY
COMMUNITY
Start: 2025-04-17 | End: 2025-04-20 | Stop reason: ALTCHOICE

## 2025-04-22 ENCOUNTER — OFFICE VISIT (OUTPATIENT)
Dept: ORTHOPEDIC SURGERY | Age: 75
End: 2025-04-22
Payer: MEDICARE

## 2025-04-22 VITALS
SYSTOLIC BLOOD PRESSURE: 109 MMHG | BODY MASS INDEX: 47.84 KG/M2 | HEIGHT: 62 IN | DIASTOLIC BLOOD PRESSURE: 73 MMHG | WEIGHT: 260 LBS

## 2025-04-22 DIAGNOSIS — M54.50 LOW BACK PAIN, UNSPECIFIED BACK PAIN LATERALITY, UNSPECIFIED CHRONICITY, UNSPECIFIED WHETHER SCIATICA PRESENT: Primary | ICD-10-CM

## 2025-04-22 DIAGNOSIS — M54.50 ACUTE MIDLINE LOW BACK PAIN WITHOUT SCIATICA: ICD-10-CM

## 2025-04-22 PROCEDURE — 1036F TOBACCO NON-USER: CPT | Performed by: ORTHOPAEDIC SURGERY

## 2025-04-22 PROCEDURE — 1090F PRES/ABSN URINE INCON ASSESS: CPT | Performed by: ORTHOPAEDIC SURGERY

## 2025-04-22 PROCEDURE — 99203 OFFICE O/P NEW LOW 30 MIN: CPT | Performed by: ORTHOPAEDIC SURGERY

## 2025-04-22 PROCEDURE — 1123F ACP DISCUSS/DSCN MKR DOCD: CPT | Performed by: ORTHOPAEDIC SURGERY

## 2025-04-22 PROCEDURE — G8428 CUR MEDS NOT DOCUMENT: HCPCS | Performed by: ORTHOPAEDIC SURGERY

## 2025-04-22 PROCEDURE — 3017F COLORECTAL CA SCREEN DOC REV: CPT | Performed by: ORTHOPAEDIC SURGERY

## 2025-04-22 PROCEDURE — G8399 PT W/DXA RESULTS DOCUMENT: HCPCS | Performed by: ORTHOPAEDIC SURGERY

## 2025-04-22 PROCEDURE — G8417 CALC BMI ABV UP PARAM F/U: HCPCS | Performed by: ORTHOPAEDIC SURGERY

## 2025-04-22 NOTE — PROGRESS NOTES
09/20/2017    Pathology pending    Sepsis (HCC) 02/28/2023    Tennis elbow     left    Thickened endometrium 09/20/2017    TIA (transient ischemic attack)     TLHBSO, bilateral LND 10/24/17 10/24/2017    Type 2 diabetes mellitus, without long-term current use of insulin (HCC) 01/14/2020    Unstable angina (HCC) 01/31/2023    UTI due to extended-spectrum beta lactamase (ESBL) producing Escherichia coli 03/09/2022     Past Surgical History:   Procedure Laterality Date    CATARACT REMOVAL WITH IMPLANT Bilateral 2015    COLONOSCOPY  1997    had polyp, she doesn't know where and when, \"20 yrs ago\"    COLONOSCOPY  06/26/2017    COLONOSCOPY N/A 8/3/2020    COLONOSCOPY POLYPECTOMY SNARE performed by Polo Tovar MD at [facility] [place] ENDO    COLONOSCOPY N/A 7/24/2023    COLONOSCOPY WITH BIOPSY performed by Troy Chadwick MD at [facility]    DILATION AND CURETTAGE OF UTERUS N/A 9/20/2017    HYSTEROSCOPY  WITH MYOSURE performed by Rosita Holcomb DO at [facility] [place] OR    EP DEVICE PROCEDURE N/A 7/25/2023    suzy/cv performed by Riky Carrion MD at [facility]    HYSTERECTOMY (CERVIX STATUS UNKNOWN) N/A 10/24/2017    TOTAL LAPAROSCOPIC HYSTERECTOMY, BSO, F.S.  STAGING, GYRUS G400 performed by Dalia Cadet MD at [facility] OR    NE COLSC FLX W/REMOVAL LESION BY HOT BX FORCEPS N/A 6/26/2017    COLONOSCOPY POLYPECTOMY / HOT SNARE performed by Blaire Booker DO at [facility] [place] OR    SMALL INTESTINE SURGERY N/A 8/4/2020    OPEN SIGMOID COLECTOMY; PRIMARY ANASTOMOSIS & MOBILIZATION OF SPLENIC FLEXURE performed by Polo Tovar MD at [facility] [place] OR    [facility] AND BSO (CERVIX REMOVED)  10/24/2017    with pelvic lymph node dissection    THYROID SURGERY  1980    subtotal 20 years ago    TONSILLECTOMY      UPPER GASTROINTESTINAL ENDOSCOPY N/A 8/3/2020    EGD BIOPSY performed by Polo Tovar MD at [facility] [place] ENDO    UPPER GASTROINTESTINAL ENDOSCOPY N/A 7/24/2023    EGD ESOPHAGOGASTRODUODENOSCOPY performed by Troy Chadwick MD at [facility]

## 2025-04-25 NOTE — TELEPHONE ENCOUNTER
Called and spoke with the pharmacy, they had those medications discontinued.  
Noted, printed and given to the staff to call  Future Appointments   Date Time Provider Department Center   5/2/2025 10:00 AM Radha Vallecillo MD [facility] DEP   5/20/2025  9:30 AM Radha Vallecillo MD [facility] DEP   5/22/2025  2:45 PM Reji Gabriel PA-C [facility]   6/10/2025 10:00 AM Ford Samano MD [facility]   7/15/2025 10:45 AM Edinson Grajeda, APRN - NP [facility]       
Noted. Thank you!    Future Appointments   Date Time Provider Department Center   5/2/2025 10:00 AM Radha Vallecillo MD [facility] DEP   5/20/2025  9:30 AM Radha Vallecillo MD [facility] DEP   5/22/2025  2:45 PM Reji Gabriel PA-C [facility]   6/10/2025 10:00 AM Ford Samano MD [facility]   7/15/2025 10:45 AM Edinson Grajeda, APRN - NP [facility]       
PROBIOTIC) TABS TAKE ONE (1) TABLET BY MOUTH IN THE MORNING 90 tablet 3    ondansetron (ZOFRAN) 4 MG tablet TAKE ONE (1) TABLET BY MOUTH EVERY SIX (6) HOURS AS NEEDED FOR NAUSEA OR VOMITING 24 tablet 2    levothyroxine (SYNTHROID) 75 MCG tablet TAKE ONE (1) TABLET BY MOUTH ONCE DAILY -BOTTLE 28 tablet 8    vitamin D3 (CHOLECALCIFEROL) 25 MCG (1000 UT) TABS tablet TAKE TWO (2) TABLETS BY MOUTH ONCE DAILY 56 tablet 8    alendronate (FOSAMAX) 35 MG tablet INDICATIONS: TAKES ON FRIDAYS TAKE WEEKLY ON AN EMPTY STOMACH, WITH A FULL GLASS OF WATER, DO NOT LAY DOWN FOR 30 MINUTES.TAKES ON FRIDAYS ---LEAVE IN BOX 12 tablet 3    atorvastatin (LIPITOR) 40 MG tablet Take 1 tablet by mouth daily For high cholesterol to prevent strokes and heart attacks 90 tablet 4    nitroGLYCERIN (NITROSTAT) 0.4 MG SL tablet PLACE ONE (1) TABLET UNDER THE TONGUE EVERY FIVE (5) MINUTES AS NEEDED FOR CHEST PAIN UP TO MAX OF THREE (3) TOTAL DOSES. IF NO RELIEF AFTER ONE (1) DOSE, CALL 911. 25 tablet 0    omeprazole (PRILOSEC) 20 MG delayed release capsule TAKE 1 CAPSULE BY MOUTH ONCE DAILY 28 capsule 8    lidocaine (LMX) 4 % cream 5 g topical 2-3 times a day as needed for pain, maximum 20 g/day 120 g 3    camphor-menthol-methyl salicylate (BENGAY ULTRA STRENGTH) 4-10-30 % CREA cream Apply topically 3 times daily as needed for Pain 113 g 0    UNABLE TO FIND This is an order to send someone out to assist with the heart monitor.  Patient is  homebound.  Thank you! 1 each 0    Lift Chair MISC by Does not apply route 1 each 0    ACETAMINOPHEN EXTRA STRENGTH 500 MG tablet TAKE ONE (1) TABLET BY MOUTH EVERY SIX (6) HOURS AS NEEDED FOR PAIN 120 tablet 4    loratadine (CLARITIN) 10 MG tablet TAKE 1 TABLET BY MOUTH ONCE DAILY IN THE MORNING 28 tablet 9    Misc. Devices (CLEVER CHOICE BMI SCALE) MISC Needs to check the weight daily due to congestive heart failure 1 each 0    Respiratory Therapy Supplies MISC Please provide nose pillow mask  for CPAP 1 each 0

## 2025-04-26 DIAGNOSIS — M1A.30X0 CHRONIC GOUT DUE TO RENAL IMPAIRMENT WITHOUT TOPHUS, UNSPECIFIED SITE: Primary | ICD-10-CM

## 2025-04-26 RX ORDER — ALLOPURINOL 300 MG/1
300 TABLET ORAL DAILY
Qty: 90 TABLET | Refills: 3 | Status: SHIPPED | OUTPATIENT
Start: 2025-04-26

## 2025-04-26 NOTE — PROGRESS NOTES
Diagnosis Orders   1. 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

## 2025-04-26 NOTE — RESULT ENCOUNTER NOTE
Please notify patient: I increased the dosage of allopurinol from 200 mg to 300 mg, new prescription sent to the pharmacy    Uric acid got too high  Future Appointments  5/2/2025   10:00 AM   Radha Vallecillo MD    [facility] DEP  5/20/2025  9:30 AM    Radha Vallecillo MD    [facility] DEP  5/22/2025  2:45 PM    Reji Gabriel PA-C [facility]              [facility]  6/10/2025  10:00 AM   Ford Samano MD         [facility]            [facility]  7/15/2025  10:45 AM   Edinson Grajeda, APRN - * [facility]        [facility]

## 2025-04-28 NOTE — RESULT ENCOUNTER NOTE
Noted  Future Appointments  5/2/2025   10:00 AM   Radha Vallecillo MD    [facility] DEP  5/20/2025  9:30 AM    Radha Vallecillo MD    [facility] DEP  5/22/2025  2:45 PM    Reji Gabriel PA-C         [facility]              [facility]  6/10/2025  10:00 AM   Ford Samano MD         [facility]            [facility]  7/15/2025  10:45 AM   Edinson Grajeda APRN - * [facility]        [facility]

## 2025-05-02 ENCOUNTER — OFFICE VISIT (OUTPATIENT)
Dept: FAMILY MEDICINE CLINIC | Age: 75
End: 2025-05-02
Payer: MEDICARE

## 2025-05-02 VITALS
HEIGHT: 62 IN | OXYGEN SATURATION: 98 % | HEART RATE: 104 BPM | WEIGHT: 278.6 LBS | DIASTOLIC BLOOD PRESSURE: 68 MMHG | TEMPERATURE: 98.6 F | BODY MASS INDEX: 51.27 KG/M2 | SYSTOLIC BLOOD PRESSURE: 120 MMHG

## 2025-05-02 DIAGNOSIS — M17.0 PRIMARY OSTEOARTHRITIS OF BOTH KNEES: ICD-10-CM

## 2025-05-02 DIAGNOSIS — I13.0 BENIGN HYPERTENSIVE HEART AND CKD, STAGE 3 (GFR 30-59), W CHF (HCC): ICD-10-CM

## 2025-05-02 DIAGNOSIS — R06.09 DOE (DYSPNEA ON EXERTION): Primary | ICD-10-CM

## 2025-05-02 DIAGNOSIS — E03.9 ACQUIRED HYPOTHYROIDISM: ICD-10-CM

## 2025-05-02 DIAGNOSIS — J30.89 SEASONAL ALLERGIC RHINITIS DUE TO OTHER ALLERGIC TRIGGER: ICD-10-CM

## 2025-05-02 DIAGNOSIS — E53.8 VITAMIN B 12 DEFICIENCY: ICD-10-CM

## 2025-05-02 DIAGNOSIS — M85.80 OSTEOPENIA DETERMINED BY X-RAY: ICD-10-CM

## 2025-05-02 DIAGNOSIS — I50.32 CHRONIC DIASTOLIC CONGESTIVE HEART FAILURE (HCC): ICD-10-CM

## 2025-05-02 DIAGNOSIS — N18.30 BENIGN HYPERTENSIVE HEART AND CKD, STAGE 3 (GFR 30-59), W CHF (HCC): ICD-10-CM

## 2025-05-02 DIAGNOSIS — E78.2 MIXED HYPERLIPIDEMIA: ICD-10-CM

## 2025-05-02 DIAGNOSIS — I48.0 PAROXYSMAL ATRIAL FIBRILLATION (HCC): ICD-10-CM

## 2025-05-02 PROCEDURE — 1090F PRES/ABSN URINE INCON ASSESS: CPT | Performed by: FAMILY MEDICINE

## 2025-05-02 PROCEDURE — 1160F RVW MEDS BY RX/DR IN RCRD: CPT | Performed by: FAMILY MEDICINE

## 2025-05-02 PROCEDURE — 1126F AMNT PAIN NOTED NONE PRSNT: CPT | Performed by: FAMILY MEDICINE

## 2025-05-02 PROCEDURE — G8417 CALC BMI ABV UP PARAM F/U: HCPCS | Performed by: FAMILY MEDICINE

## 2025-05-02 PROCEDURE — G8427 DOCREV CUR MEDS BY ELIG CLIN: HCPCS | Performed by: FAMILY MEDICINE

## 2025-05-02 PROCEDURE — 3017F COLORECTAL CA SCREEN DOC REV: CPT | Performed by: FAMILY MEDICINE

## 2025-05-02 PROCEDURE — 99214 OFFICE O/P EST MOD 30 MIN: CPT | Performed by: FAMILY MEDICINE

## 2025-05-02 PROCEDURE — 1159F MED LIST DOCD IN RCRD: CPT | Performed by: FAMILY MEDICINE

## 2025-05-02 PROCEDURE — G8399 PT W/DXA RESULTS DOCUMENT: HCPCS | Performed by: FAMILY MEDICINE

## 2025-05-02 PROCEDURE — 1036F TOBACCO NON-USER: CPT | Performed by: FAMILY MEDICINE

## 2025-05-02 PROCEDURE — 1123F ACP DISCUSS/DSCN MKR DOCD: CPT | Performed by: FAMILY MEDICINE

## 2025-05-02 RX ORDER — LEVOTHYROXINE SODIUM 75 UG/1
75 TABLET ORAL
Qty: 90 TABLET | Refills: 3 | Status: SHIPPED | OUTPATIENT
Start: 2025-05-02

## 2025-05-02 RX ORDER — LORATADINE 10 MG/1
10 TABLET ORAL EVERY MORNING
Qty: 90 TABLET | Refills: 3 | Status: SHIPPED | OUTPATIENT
Start: 2025-05-02

## 2025-05-02 RX ORDER — ALENDRONATE SODIUM 35 MG/1
TABLET ORAL
Qty: 12 TABLET | Refills: 3 | Status: SHIPPED | OUTPATIENT
Start: 2025-05-02

## 2025-05-02 RX ORDER — ALBUTEROL SULFATE 90 UG/1
1-2 INHALANT RESPIRATORY (INHALATION) EVERY 4 HOURS PRN
Qty: 8 G | Refills: 0 | Status: SHIPPED | OUTPATIENT
Start: 2025-05-02

## 2025-05-02 RX ORDER — MIDODRINE HYDROCHLORIDE 10 MG/1
10 TABLET ORAL 3 TIMES DAILY
COMMUNITY
Start: 2025-04-24 | End: 2025-05-02 | Stop reason: DRUGHIGH

## 2025-05-02 RX ORDER — GUAIFENESIN 600 MG/1
600 TABLET, EXTENDED RELEASE ORAL 2 TIMES DAILY PRN
Qty: 60 TABLET | Refills: 0 | Status: SHIPPED | OUTPATIENT
Start: 2025-05-02

## 2025-05-02 SDOH — ECONOMIC STABILITY: FOOD INSECURITY: WITHIN THE PAST 12 MONTHS, THE FOOD YOU BOUGHT JUST DIDN'T LAST AND YOU DIDN'T HAVE MONEY TO GET MORE.: NEVER TRUE

## 2025-05-02 SDOH — ECONOMIC STABILITY: FOOD INSECURITY: WITHIN THE PAST 12 MONTHS, YOU WORRIED THAT YOUR FOOD WOULD RUN OUT BEFORE YOU GOT MONEY TO BUY MORE.: NEVER TRUE

## 2025-05-02 ASSESSMENT — PATIENT HEALTH QUESTIONNAIRE - PHQ9
SUM OF ALL RESPONSES TO PHQ QUESTIONS 1-9: 0
1. LITTLE INTEREST OR PLEASURE IN DOING THINGS: NOT AT ALL
SUM OF ALL RESPONSES TO PHQ QUESTIONS 1-9: 0
2. FEELING DOWN, DEPRESSED OR HOPELESS: NOT AT ALL

## 2025-05-02 NOTE — ASSESSMENT & PLAN NOTE
Chronic and inadequately controlled, continue Lipitor 40 Mg daily, check lipid panel    Orders:    Lipid Panel; Future

## 2025-05-02 NOTE — PROGRESS NOTES
María Arevalo (:  1950) is a 75 y.o. female, Established patient, here for evaluation of the following chief complaint(s):   Referral - General (DISCUSS PULMONOLOGIST/NEPHROLOGIST ), Discuss Medications (DISCUSS WATER PILL), Shortness of Breath, and Congestive Heart Failure           Assessment & Plan        Assessment & Plan  MARTINES (dyspnea on exertion)  Chronic and ongoing failing to improve    - Albuterol inhaler will be provided for use as needed for dyspnea on exertion.  - Pulmonary function test will be ordered.  - Referral to a pulmonologist will be made.  - No wheezing reported.  Continue treatment for congestive heart failure and atrial fibrillation which are contributing to dyspnea on exertion  Orders:    [facility] - Kip Garcias MD, Pulmonology, [place]    Full PFT Study With Bronchodilator; Future    albuterol sulfate HFA (PROVENTIL HFA) 108 (90 Base) MCG/ACT inhaler; Inhale 1-2 puffs into the lungs every 4 hours as needed for Wheezing or Shortness of Breath    Benign hypertensive heart and CKD, stage 3 (GFR 30-59), w CHF (HCC)  Chronic hypertension, well-controlled, continue current medications metoprolol 25 Mg twice a day, with diltiazem 360 Mg daily.  Chronic kidney disease stage II, stable, avoid NSAIDs, refer to nephrologist, will continue to monitor  Referral to Dr. Rivero, nephrologist will be made.  - Kidney function stage II noted with mild decrease.  - Blood work ordered to monitor kidney function.  Orders:    [facility]    [facility] ([facility]) - Hunter Rivero MD, Nephrology, [place]    Basic Metabolic Panel; Future    Magnesium; Future    Phosphorus; Future    Brain Natriuretic Peptide; Future    Chronic diastolic congestive heart failure (HCC)  Chronic and stable  Continue metoprolol 25 Mg twice a day  Lab Results   Component Value Date    LVEF 63 2025    LVEFMODE Echo 2025

## 2025-05-02 NOTE — ASSESSMENT & PLAN NOTE
Chronic and worsening  Falls precautions discussed, ambulate with walker, start home physical therapy  Tylenol as needed, Bengay as needed    Orders:    [facility]

## 2025-05-02 NOTE — ASSESSMENT & PLAN NOTE
Chronic, hopefully improving  - Bone density test from 05/31/2024 indicates severe osteopenia with a T-score of -2.3.  - Continues current treatment regimen with Fosamax once a week.  - No heartburn or acid reflux reported with Fosamax use.  - Recent bone density test confirms the need for ongoing treatment.    Orders:    [facility] - [facility]    alendronate (FOSAMAX) 35 MG tablet; INDICATIONS: TAKES ON FRIDAYS TAKE WEEKLY ON AN EMPTY STOMACH, WITH A FULL GLASS OF WATER, DO NOT LAY DOWN FOR 30 MINUTES.TAKES ON FRIDAYS ---LEAVE IN BOX

## 2025-05-02 NOTE — ASSESSMENT & PLAN NOTE
Chronic and improved with medication but still mildly tachycardic today  - Continues taking amiodarone 200 mg daily, diltiazem 360 Mg daily, and metoprolol 25 mg twice a day as prescribed by the cardiologist for rate control.  Continue rivaroxaban 20 Mg daily for chronic anticoagulation  - Digoxin has been discontinued.  To make sure she does not have it in the pill pack due to high interaction with amiodarone  - Regular heart rhythm observed during the examination.  - No falls reported in the past year.    Orders:    [facility] - [facility]

## 2025-05-02 NOTE — ASSESSMENT & PLAN NOTE
Chronic and stable  Continue metoprolol 25 Mg twice a day  Lab Results   Component Value Date    LVEF 63 03/21/2025    LVEFMODE Echo 03/21/2025     Continue follow-up with cardiologist as scheduled      Orders:    [facility] - [facility]    Brain Natriuretic Peptide; Future

## 2025-05-02 NOTE — ASSESSMENT & PLAN NOTE
Chronic and improved with medication  Refill for Synthroid 75 mcg will be provided.  - Synthroid is taken first thing in the morning.  - Medication verification completed.  - Synthroid refill ordered.  Orders:    [facility] - [facility]    levothyroxine (SYNTHROID) 75 MCG tablet; Take 1 tablet by mouth every morning (before breakfast)

## 2025-05-02 NOTE — PROGRESS NOTES
Visit Information    Have you changed or started any medications since your last visit including any over-the-counter medicines, vitamins, or herbal medicines? no   Have you stopped taking any of your medications? Is so, why? -  no  Are you having any side effects from any of your medications? - no    Have you seen any other physician or provider since your last visit?  yes -    Have you had any other diagnostic tests since your last visit?  yes -    Have you been seen in the emergency room and/or had an admission in a hospital since we last saw you?  no   Have you had your routine dental cleaning in the past 6 months?  no     Do you have an active MyChart account? If no, what is the barrier?  Yes      “Have you had a diabetic eye exam?”    NO     No diabetic eye exam on file   Patient Care Team:  Radha Vallecillo MD as PCP - General (Family Medicine)  Radha Vallecillo MD as PCP - Empaneled Provider  Ford Samano MD as Referring Physician (Orthopedic Surgery)  Edy Rasmussen MD as Surgeon (Orthopedic Surgery)  Jasion Henderson MD as Consulting Physician (Endocrinology)  Rosita Holcomb DO as Consulting Physician (Obstetrics & Gynecology)  Roshan Ram MD as Consulting Physician (Urology)  Cynthia Phoenix MD as Consulting Physician (Otolaryngology)  Karen Mead DO as Consulting Physician (General Surgery)  Cuate Rodrigues DO as Consulting Physician (Cardiology)  Nithya Kevin PA-C as Physician Assistant (Gynecological Oncology)  Raman Garduno MD as Surgeon (Ophthalmology)  Francis Rodrigues DO as Consulting Physician (Cardiology)  Ulices Kellogg PA (Physician Assistant)  Gary Yarbrough DPM as Surgeon (Podiatry)  Zachary Borges MD as Consulting Physician (Infectious Diseases)  Mary Leblanc MD as Consulting Physician (Dermatology)  Reyes Chopra MD as Consulting Physician (Pulmonary Disease)    Medical History Review  Past Medical, Family, and Social History

## 2025-05-02 NOTE — ASSESSMENT & PLAN NOTE
Chronic and worsening, refill loratadine, she also has Atrovent nasal spray    Orders:    guaiFENesin (MUCINEX) 600 MG extended release tablet; Take 1 tablet by mouth 2 times daily as needed for Congestion    loratadine (CLARITIN) 10 MG tablet; Take 1 tablet by mouth every morning

## 2025-05-02 NOTE — ASSESSMENT & PLAN NOTE
Chronic and ongoing, she is refusing B12 injections  - Commences daily regimen of B12 1000 mcg supplements.  - Importance of B12 supplementation emphasized to prevent dementia.  Orders:    cyanocobalamin ([employer] VITAMIN B12) 1000 MCG tablet; Take 1 tablet by mouth daily

## 2025-05-02 NOTE — ASSESSMENT & PLAN NOTE
Chronic hypertension, well-controlled, continue current medications metoprolol 25 Mg twice a day, with diltiazem 360 Mg daily.  Chronic kidney disease stage II, stable, avoid NSAIDs, refer to nephrologist, will continue to monitor  Referral to Dr. Rivero, nephrologist will be made.  - Kidney function stage II noted with mild decrease.  - Blood work ordered to monitor kidney function.  Orders:    [facility] ([facility]) - Hunter Rivero MD, Nephrology, [place]    Basic Metabolic Panel; Future    Magnesium; Future    Phosphorus; Future    Brain Natriuretic Peptide; Future

## 2025-05-06 ASSESSMENT — ENCOUNTER SYMPTOMS
BACK PAIN: 1
CONSTIPATION: 1

## 2025-05-07 ENCOUNTER — HOSPITAL ENCOUNTER (OUTPATIENT)
Age: 75
Setting detail: SPECIMEN
Discharge: HOME | End: 2025-05-07
Payer: MEDICARE

## 2025-05-07 LAB
ANION GAP SERPL CALCULATED.3IONS-SCNC: 13 MMOL/L (ref 9–16)
BACTERIA URNS QL MICRO: ABNORMAL
BILIRUB UR QL STRIP: NEGATIVE
CALCIUM SERPL-MCNC: 9.4 MG/DL (ref 8.6–10.4)
CASTS #/AREA URNS LPF: ABNORMAL /LPF
CHLORIDE SERPL-SCNC: 100 MMOL/L (ref 98–107)
CLARITY UR: CLEAR
CO2 SERPL-SCNC: 23 MMOL/L (ref 20–31)
COLOR UR: YELLOW
CREAT UR-MCNC: 124 MG/DL (ref 28–217)
EPI CELLS #/AREA URNS HPF: ABNORMAL /HPF
GLUCOSE UR STRIP-MCNC: NEGATIVE MG/DL
HCT VFR BLD AUTO: 41.2 % (ref 36–46)
HGB BLD-MCNC: 13.7 G/DL (ref 12–16)
HGB UR QL STRIP.AUTO: NEGATIVE
KETONES UR STRIP-MCNC: NEGATIVE MG/DL
LEUKOCYTE ESTERASE UR QL STRIP: NEGATIVE
MAGNESIUM SERPL-MCNC: 1.9 MG/DL (ref 1.6–2.4)
MICROALBUMIN UR-MCNC: <12 MG/L (ref 0–20)
MICROALBUMIN/CREAT UR-RTO: NORMAL MCG/MG CREAT (ref 0–25)
NITRITE UR QL STRIP: NEGATIVE
PH UR STRIP: 6 [PH] (ref 5–8)
PHOSPHATE SERPL-MCNC: 3.2 MG/DL (ref 2.5–4.5)
POTASSIUM SERPL-SCNC: 4.2 MMOL/L (ref 3.7–5.3)
PROT UR STRIP-MCNC: NEGATIVE MG/DL
RBC #/AREA URNS HPF: ABNORMAL /HPF
SODIUM SERPL-SCNC: 136 MMOL/L (ref 136–145)
SP GR UR STRIP: 1.02 (ref 1–1.03)
UROBILINOGEN UR STRIP-ACNC: NORMAL EU/DL (ref 0–1)
WBC #/AREA URNS HPF: ABNORMAL /HPF

## 2025-05-07 PROCEDURE — 82570 ASSAY OF URINE CREATININE: CPT

## 2025-05-07 PROCEDURE — 84100 ASSAY OF PHOSPHORUS: CPT

## 2025-05-07 PROCEDURE — 81001 URINALYSIS AUTO W/SCOPE: CPT

## 2025-05-07 PROCEDURE — 82043 UR ALBUMIN QUANTITATIVE: CPT

## 2025-05-07 PROCEDURE — 82310 ASSAY OF CALCIUM: CPT

## 2025-05-07 PROCEDURE — 85014 HEMATOCRIT: CPT

## 2025-05-07 PROCEDURE — 83735 ASSAY OF MAGNESIUM: CPT

## 2025-05-07 PROCEDURE — 80051 ELECTROLYTE PANEL: CPT

## 2025-05-07 PROCEDURE — 85018 HEMOGLOBIN: CPT

## 2025-05-14 ENCOUNTER — OUTSIDE SERVICES (OUTPATIENT)
Dept: FAMILY MEDICINE CLINIC | Age: 75
End: 2025-05-14
Payer: MEDICARE

## 2025-05-14 DIAGNOSIS — I48.0 PAROXYSMAL ATRIAL FIBRILLATION (HCC): ICD-10-CM

## 2025-05-14 DIAGNOSIS — N18.30 BENIGN HYPERTENSIVE HEART AND CKD, STAGE 3 (GFR 30-59), W CHF (HCC): ICD-10-CM

## 2025-05-14 DIAGNOSIS — M17.0 PRIMARY OSTEOARTHRITIS OF BOTH KNEES: ICD-10-CM

## 2025-05-14 DIAGNOSIS — E78.2 MIXED HYPERLIPIDEMIA: ICD-10-CM

## 2025-05-14 DIAGNOSIS — E03.9 ACQUIRED HYPOTHYROIDISM: ICD-10-CM

## 2025-05-14 DIAGNOSIS — E11.22 TYPE 2 DIABETES MELLITUS WITH STAGE 3A CHRONIC KIDNEY DISEASE, WITHOUT LONG-TERM CURRENT USE OF INSULIN (HCC): ICD-10-CM

## 2025-05-14 DIAGNOSIS — J30.89 SEASONAL ALLERGIC RHINITIS DUE TO OTHER ALLERGIC TRIGGER: ICD-10-CM

## 2025-05-14 DIAGNOSIS — I13.0 BENIGN HYPERTENSIVE HEART AND CKD, STAGE 3 (GFR 30-59), W CHF (HCC): ICD-10-CM

## 2025-05-14 DIAGNOSIS — N18.31 TYPE 2 DIABETES MELLITUS WITH STAGE 3A CHRONIC KIDNEY DISEASE, WITHOUT LONG-TERM CURRENT USE OF INSULIN (HCC): ICD-10-CM

## 2025-05-14 DIAGNOSIS — M85.80 OSTEOPENIA DETERMINED BY X-RAY: ICD-10-CM

## 2025-05-14 DIAGNOSIS — I50.32 CHRONIC DIASTOLIC CONGESTIVE HEART FAILURE (HCC): ICD-10-CM

## 2025-05-14 DIAGNOSIS — R06.09 DOE (DYSPNEA ON EXERTION): Primary | ICD-10-CM

## 2025-05-14 PROCEDURE — G0180 MD CERTIFICATION HHA PATIENT: HCPCS | Performed by: FAMILY MEDICINE

## 2025-05-14 NOTE — PROGRESS NOTES
This patient is currently under my care and considered medically homebound, requiring skilled Home Health services. I have reviewed the patient's status and plan of care.     Has the patient been seen within the last 90 days?  yes, 5/2/2025, and 4/11/2025    Certification from 4/16/2025 through 6/14/2025      1. MARTINES (dyspnea on exertion)    2. Benign hypertensive heart and CKD, stage 3 (GFR 30-59), w CHF ([facility])    3. Chronic diastolic congestive heart failure (HCC)    4. Paroxysmal atrial fibrillation ([facility])    5. Primary osteoarthritis of both knees    6. Osteopenia determined by x-ray    7. Acquired hypothyroidism    8. Seasonal allergic rhinitis due to other allergic trigger    9. Mixed hyperlipidemia    10. Type 2 diabetes mellitus with stage 3a chronic kidney disease, without long-term current use of insulin ([facility])            Time spent completing forms?  7 minutes

## 2025-05-15 DIAGNOSIS — E55.9 VITAMIN D DEFICIENCY: ICD-10-CM

## 2025-05-15 RX ORDER — CHOLECALCIFEROL (VITAMIN D3) 25 MCG
TABLET ORAL
Qty: 56 TABLET | Refills: 8 | Status: SHIPPED | OUTPATIENT
Start: 2025-05-15

## 2025-05-15 NOTE — TELEPHONE ENCOUNTER
Please Approve or Refuse.  Send to Pharmacy per Pt's Request:      Next Visit Date:  5/20/2025   Last Visit Date: 5/2/2025    Hemoglobin A1C (%)   Date Value   03/21/2025 6.3 (H)   10/09/2024 6.2 (H)   05/31/2024 6.2 (H)             ( goal A1C is < 7)   BP Readings from Last 3 Encounters:   05/15/25 (!) 113/59   05/02/25 120/68   04/22/25 109/73          (goal 120/80)  BUN   Date Value Ref Range Status   04/18/2025 22 8 - 23 mg/dL Final     Creatinine   Date Value Ref Range Status   04/18/2025 0.9 0.7 - 1.2 mg/dL Final     Potassium   Date Value Ref Range Status   05/07/2025 4.2 3.7 - 5.3 mmol/L Final     Comment:     Specimen hemolysis has exceeded the interference as defined by Roche. Value may be falsely   increased. Suggest recollection if clinically indicated.       Potassium reflex Magnesium   Date Value Ref Range Status   10/10/2021 4.1 3.5 - 5.2 meq/L Final     Comment:     Low level specimen hemolysis is present as indicated by the interference  level index on the Roche analyzer.  The reported K+ level may be falsely  increased.  If clinically warranted, recollection of the specimen is  suggested.

## 2025-05-20 ENCOUNTER — OFFICE VISIT (OUTPATIENT)
Dept: FAMILY MEDICINE CLINIC | Age: 75
End: 2025-05-20
Payer: MEDICARE

## 2025-05-20 VITALS
OXYGEN SATURATION: 97 % | WEIGHT: 273.2 LBS | DIASTOLIC BLOOD PRESSURE: 76 MMHG | BODY MASS INDEX: 50.27 KG/M2 | HEART RATE: 119 BPM | SYSTOLIC BLOOD PRESSURE: 118 MMHG | TEMPERATURE: 98.7 F | HEIGHT: 62 IN

## 2025-05-20 DIAGNOSIS — N18.30 BENIGN HYPERTENSION WITH CKD (CHRONIC KIDNEY DISEASE) STAGE III (HCC): ICD-10-CM

## 2025-05-20 DIAGNOSIS — E83.42 HYPOMAGNESEMIA: ICD-10-CM

## 2025-05-20 DIAGNOSIS — M17.0 PRIMARY OSTEOARTHRITIS OF BOTH KNEES: ICD-10-CM

## 2025-05-20 DIAGNOSIS — R09.89 DECREASED DORSALIS PEDIS PULSE: ICD-10-CM

## 2025-05-20 DIAGNOSIS — I48.19 PERSISTENT ATRIAL FIBRILLATION (HCC): ICD-10-CM

## 2025-05-20 DIAGNOSIS — R26.2 DIFFICULTY WALKING: ICD-10-CM

## 2025-05-20 DIAGNOSIS — Z00.00 MEDICARE ANNUAL WELLNESS VISIT, SUBSEQUENT: Primary | ICD-10-CM

## 2025-05-20 DIAGNOSIS — N18.31 TYPE 2 DIABETES MELLITUS WITH STAGE 3A CHRONIC KIDNEY DISEASE, WITHOUT LONG-TERM CURRENT USE OF INSULIN (HCC): ICD-10-CM

## 2025-05-20 DIAGNOSIS — I50.32 CHRONIC DIASTOLIC CONGESTIVE HEART FAILURE (HCC): ICD-10-CM

## 2025-05-20 DIAGNOSIS — G47.33 OSA TREATED WITH BIPAP: ICD-10-CM

## 2025-05-20 DIAGNOSIS — I12.9 BENIGN HYPERTENSION WITH CKD (CHRONIC KIDNEY DISEASE) STAGE III (HCC): ICD-10-CM

## 2025-05-20 DIAGNOSIS — E11.22 TYPE 2 DIABETES MELLITUS WITH STAGE 3A CHRONIC KIDNEY DISEASE, WITHOUT LONG-TERM CURRENT USE OF INSULIN (HCC): ICD-10-CM

## 2025-05-20 PROCEDURE — 3044F HG A1C LEVEL LT 7.0%: CPT | Performed by: FAMILY MEDICINE

## 2025-05-20 PROCEDURE — G0439 PPPS, SUBSEQ VISIT: HCPCS | Performed by: FAMILY MEDICINE

## 2025-05-20 PROCEDURE — 1090F PRES/ABSN URINE INCON ASSESS: CPT | Performed by: FAMILY MEDICINE

## 2025-05-20 PROCEDURE — 1160F RVW MEDS BY RX/DR IN RCRD: CPT | Performed by: FAMILY MEDICINE

## 2025-05-20 PROCEDURE — 1126F AMNT PAIN NOTED NONE PRSNT: CPT | Performed by: FAMILY MEDICINE

## 2025-05-20 PROCEDURE — 1159F MED LIST DOCD IN RCRD: CPT | Performed by: FAMILY MEDICINE

## 2025-05-20 PROCEDURE — 99214 OFFICE O/P EST MOD 30 MIN: CPT | Performed by: FAMILY MEDICINE

## 2025-05-20 PROCEDURE — G8417 CALC BMI ABV UP PARAM F/U: HCPCS | Performed by: FAMILY MEDICINE

## 2025-05-20 PROCEDURE — G8399 PT W/DXA RESULTS DOCUMENT: HCPCS | Performed by: FAMILY MEDICINE

## 2025-05-20 PROCEDURE — 3017F COLORECTAL CA SCREEN DOC REV: CPT | Performed by: FAMILY MEDICINE

## 2025-05-20 PROCEDURE — 1123F ACP DISCUSS/DSCN MKR DOCD: CPT | Performed by: FAMILY MEDICINE

## 2025-05-20 PROCEDURE — 1036F TOBACCO NON-USER: CPT | Performed by: FAMILY MEDICINE

## 2025-05-20 PROCEDURE — 2022F DILAT RTA XM EVC RTNOPTHY: CPT | Performed by: FAMILY MEDICINE

## 2025-05-20 PROCEDURE — G8427 DOCREV CUR MEDS BY ELIG CLIN: HCPCS | Performed by: FAMILY MEDICINE

## 2025-05-20 RX ORDER — LANOLIN ALCOHOL/MO/W.PET/CERES
400 CREAM (GRAM) TOPICAL DAILY
Qty: 28 TABLET | Refills: 5 | Status: SHIPPED | OUTPATIENT
Start: 2025-05-20

## 2025-05-20 SDOH — ECONOMIC STABILITY: FOOD INSECURITY: WITHIN THE PAST 12 MONTHS, YOU WORRIED THAT YOUR FOOD WOULD RUN OUT BEFORE YOU GOT MONEY TO BUY MORE.: NEVER TRUE

## 2025-05-20 SDOH — ECONOMIC STABILITY: FOOD INSECURITY: WITHIN THE PAST 12 MONTHS, THE FOOD YOU BOUGHT JUST DIDN'T LAST AND YOU DIDN'T HAVE MONEY TO GET MORE.: NEVER TRUE

## 2025-05-20 ASSESSMENT — ENCOUNTER SYMPTOMS
CONSTIPATION: 0
ABDOMINAL PAIN: 0
ABDOMINAL DISTENTION: 0
VOMITING: 0
SHORTNESS OF BREATH: 1
BACK PAIN: 1
BLOOD IN STOOL: 0
CHEST TIGHTNESS: 0
NAUSEA: 0
WHEEZING: 0
COUGH: 1

## 2025-05-20 ASSESSMENT — LIFESTYLE VARIABLES
HOW OFTEN DO YOU HAVE A DRINK CONTAINING ALCOHOL: MONTHLY OR LESS
HOW MANY STANDARD DRINKS CONTAINING ALCOHOL DO YOU HAVE ON A TYPICAL DAY: 1 OR 2

## 2025-05-20 ASSESSMENT — PATIENT HEALTH QUESTIONNAIRE - PHQ9
SUM OF ALL RESPONSES TO PHQ QUESTIONS 1-9: 0
1. LITTLE INTEREST OR PLEASURE IN DOING THINGS: NOT AT ALL
2. FEELING DOWN, DEPRESSED OR HOPELESS: NOT AT ALL
SUM OF ALL RESPONSES TO PHQ QUESTIONS 1-9: 0

## 2025-05-20 ASSESSMENT — VISUAL ACUITY
OD_CC: 20/70
OS_CC: 20/40

## 2025-05-20 NOTE — PATIENT INSTRUCTIONS
medicines you take.  Where can you learn more?  Go to https://www.[employer]edic[employer].net/patientEd and enter G551 to learn more about \"Learning About Vision Tests.\"  Current as of: July 31, 2024  Content Version: 14.4  © 9160-1769 [employer].   Care instructions adapted under license by [employer]. If you have questions about a medical condition or this instruction, always ask your healthcare professional. [employer] disclaims any warranty or liability for your use of this information.         Learning About Activities of Daily Living  What are activities of daily living?     Activities of daily living (ADLs) are the basic self-care tasks you do every day. These include eating, bathing, dressing, and moving around.  As you age, and if you have health problems, you may find that it's harder to do some of these tasks. If so, your doctor can suggest ideas that may help.  To measure what kind of help you may need, your doctor will ask how well you are able to do ADLs. Let your doctor know if there are any tasks that you are having trouble doing. This is an important first step to getting help. And when you have the help you need, you can stay as independent as possible.  How will a doctor assess your ADLs?  Asking about ADLs is part of a routine health checkup your doctor will likely do as you age. Your health check might be done in a doctor's office, in your home, or at a hospital. The goal is to find out if you are having any problems that could make it hard to care for yourself or that make it unsafe for you to be on your own.  To measure your ADLs, your doctor will ask how hard it is for you to do routine tasks. Your doctor may also want to know if you have changed the way you do a task because of a health problem. Your doctor may watch how you:  Walk back and forth.  Keep your balance while you stand or walk.  Move from sitting to standing or from a bed to a chair.  Button or unbutton a shirt or

## 2025-05-20 NOTE — ASSESSMENT & PLAN NOTE
Chronic hypertension, well-controlled, continue current cardiac medications  Chronic kidney disease stage IIIa, GFR 57 on 5/15/2025 report in [facility]  Will continue to monitor   Continue metoprolol 25 Mg twice a day or 25 Mg in the morning and 12.5 Mg p.m. if heart rate less than 50, diltiazem 360 Mg daily    Orders:    ESTABLISHED, MOD MDM, 30-39 MIN [[id number]]

## 2025-05-20 NOTE — PROGRESS NOTES
Medicare Annual Wellness Visit    María Arevalo is here for Medicare AWV, Congestive Heart Failure, Atrial Fibrillation, and Knee Pain (Bilateral)    Assessment & Plan   Medicare annual wellness visit, subsequent  Persistent atrial fibrillation (HCC)  Assessment & Plan:         Metoprolol 25 mg and 12/5 mg if HR less than 50 BMP  Orders:    ESTABLISHED, MOD MDM, 30-39 MIN [[id number]]    magnesium oxide (MAG-OX) 400 (240 Mg) MG tablet; Take 1 tablet by mouth daily    Orders:  -     ESTABLISHED, MOD MDM, 30-39 MIN [[id number]]  -     magnesium oxide (MAG-OX) 400 (240 Mg) MG tablet; Take 1 tablet by mouth daily, Disp-28 tablet, R-5Normal  Benign hypertension with CKD (chronic kidney disease) stage III (HCC)  Assessment & Plan:       Orders:    ESTABLISHED, MOD MDM, 30-39 MIN [[id number]]    Orders:  -     ESTABLISHED, MOD MDM, 30-39 MIN [[id number]]  Type 2 diabetes mellitus with stage 3a chronic kidney disease, without long-term current use of insulin (HCC)  Assessment & Plan:       Orders:    HM DIABETES FOOT EXAM    ESTABLISHED, MOD MDM, 30-39 MIN [[id number]]    [facility] -  Referral to Care Management    Vascular lower arterial complete physiologic Doppler at rest; Future    Orders:  -     HM DIABETES FOOT EXAM  -     ESTABLISHED, MOD MDM, 30-39 MIN [[id number]]  -     [facility] -  Referral to Care Management  -     Vascular lower arterial complete physiologic Doppler at rest; Future  Chronic diastolic congestive heart failure (HCC)  Assessment & Plan:       Orders:    ESTABLISHED, MOD MDM, 30-39 MIN [[id number]]    [facility] -  Referral to Care Management    Orders:  -     ESTABLISHED, MOD MDM, 30-39 MIN [[id number]]  -     [facility] -  Referral to Care Management  Primary osteoarthritis of both knees  Assessment & Plan:       Orders:    ESTABLISHED, MOD MDM, 30-39 MIN [[id number]]    Orders:  -     ESTABLISHED, MOD MDM, 30-39 MIN [[id number]]  Difficulty walking  Assessment & Plan:       Orders:    ESTABLISHED, MOD MDM, 30-39 MIN [[id number]]    Orders:  -     ESTABLISHED, MOD 
Medical, Family, and Social History reviewed and does contribute to the patient presenting condition    Health Maintenance   Topic Date Due    Diabetic retinal exam  Never done    Shingles vaccine (1 of 2) Never done    Respiratory Syncytial Virus (RSV) Pregnant or age 60 yrs+ (1 - Risk 60-74 years 1-dose series) Never done    COVID-19 Vaccine (6 - 2024-25 season) 09/01/2024    Annual Wellness Visit (Medicare Advantage)  01/01/2025    Diabetic foot exam  05/14/2025    Lipids  05/31/2025    Flu vaccine (Season Ended) 03/04/2026 (Originally 8/1/2025)    A1C test (Diabetic or Prediabetic)  06/21/2025    Breast cancer screen  03/25/2026    GFR test (Diabetes, CKD 3-4, OR last GFR 15-59)  04/18/2026    Depression Screen  05/02/2026    Diabetic Alb to Cr ratio (uACR) test  05/07/2026    DEXA (modify frequency per FRAX score)  05/31/2026    DTaP/Tdap/Td vaccine (2 - Td or Tdap) 09/28/2027    Colorectal Cancer Screen  07/24/2028    Pneumococcal 50+ years Vaccine  Completed    Hepatitis C screen  Completed    Hepatitis A vaccine  Aged Out    Hepatitis B vaccine  Aged Out    Hib vaccine  Aged Out    Polio vaccine  Aged Out    Meningococcal (ACWY) vaccine  Aged Out    Meningococcal B vaccine  Aged Out

## 2025-05-20 NOTE — ASSESSMENT & PLAN NOTE
Chronic with frequent flareups and emergency room visits related to acute on chronic CHF due to uncontrolled or poorly controlled A-fib  She reports fatigue, but her shortness of breath has shown improvement. No leg swelling was observed during this visit.   - She does not experience constipation and is currently on medication for urinary incontinence. There have been no recent exacerbations of her allergies.   She experienced dizziness and was diagnosed with acute on chronic CHF during her recent ER visit. Her BNP was slightly elevated.  - She should continue her current medications, including metoprolol 25 mg twice a day and diltiazem 360 mg daily.  - She should monitor her symptoms and seek medical attention if they worsen.  - Referral to care coordinator for CHF management and self-monitoring due to multiple emergency room visits, multiple comorbidities, and admissions.    Lab Results   Component Value Date    LVEF 63 03/21/2025    LVEFMODE Echo 03/21/2025             Orders:    ESTABLISHED, MOD MDM, 30-39 MIN [[id number]]    [facility] -  Referral to Care Management

## 2025-05-20 NOTE — ASSESSMENT & PLAN NOTE
Chronic, not well-controlled, with multiple admissions and emergency room visits   She has continuous atrial fibrillation. Her heart rate was 119 today, 106 during her recent ER visit on 05/15/2025 for dizziness and acute on chronic CHF.  She says she skipped taking the metoprolol second dosage in the evening due to bradycardia  - She is currently on rivaroxaban 20 mg daily and amiodarone 200 mg once a day. She should continue these medications as prescribed.  Currently her daughter is holding of metoprolol tartrate in the evening when the heart rate is low, which might be exacerbating her A-fib with RVR even at this time, discussed with patient extensively.  Alternatively I did suggest to cut down the metoprolol in half for the evening dosage if the heart rate is less than 50 bpm, written instructions also given, patient reported the instructions back to me    - The importance of using her BiPAP machine was discussed, as it will help manage her atrial fibrillation and prevent her heart rate from dropping at night.  She is currently not using the BiPAP, advised to look for it, she thinks she might have thrown the whole machine away, this machine should not be older than 2 years, so the insurance will not cover  - Referral to pulmonologist for BiPAP management.    Metoprolol 25 mg and 12.5 mg if HR less than 50 BMP--- written instructions given, compliance with twice a day regimen of metoprolol discussed.  Continue amiodarone 200 Mg daily    - Her potassium levels were borderline low at 3.6 during her recent ER visit.  - She should eat one banana daily to help maintain her potassium levels.  - A prescription for magnesium 400 mg daily has been sent to the pharmacy to help stabilize her potassium levels.  - If not covered, she should purchase magnesium over the counter.     Her magnesium levels were borderline low during her recent ER visit.  - A prescription for magnesium 400 mg daily has been sent to the

## 2025-05-20 NOTE — ASSESSMENT & PLAN NOTE
Chronic and worsening  She utilizes a walker with a seat and has an electric wheelchair at home, which she is still acclimating to use. Occupational therapy is assisting her in this regard. She has reported a recent exacerbation of her back pain, and an x-ray revealed degenerative changes.  She will start home physical therapy as well  Orders:    ESTABLISHED, MOD MDM, 30-39 MIN [[id number]]

## 2025-05-20 NOTE — ASSESSMENT & PLAN NOTE
Ongoing associated with hypokalemia, she is not sure if she takes magnesium or not, her daughter is preparing her medications  - Her potassium levels were borderline low at 3.6 during her recent ER visit on 5/15/2025  - She should eat one banana daily to help maintain her potassium levels.  - A prescription for magnesium 400 mg daily has been sent to the pharmacy to help stabilize her potassium levels.  - If not covered, she should purchase magnesium over the counter.  . Hypomagnesemia.  On 5/15/2025, 1.8  - Her magnesium levels were borderline low during her recent ER visit.  - A prescription for magnesium 400 mg daily has been sent to the pharmacy.  - If not covered, she should purchase it over the counter.  - Magnesium will help stabilize potassium levels.  Orders:    ESTABLISHED, MOD MDM, 30-39 MIN [[id number]]    magnesium oxide (MAG-OX) 400 (240 Mg) MG tablet; Take 1 tablet by mouth daily

## 2025-05-20 NOTE — ASSESSMENT & PLAN NOTE
New, during the exam     Pulses in both feet were not palpable. A vascular test will be ordered to assess for any arterial blockages.  Orders:    ESTABLISHED, MOD MDM, 30-39 MIN [[id number]]    Vascular lower arterial complete physiologic Doppler at rest; Future

## 2025-05-20 NOTE — ASSESSMENT & PLAN NOTE
Chronic not well-controlled because she does not use the BiPAP  Not having the BIPAP, she thinks she might have thrown it away, advised to have her daughter help her find it  She does have appointment with pulmonologist, we will leave it up to the pulmonologist if possible to order a new CPAP/BiPAP whichever is appropriate, if insurance will cover again if not, might need nocturnal oxygen evaluation, again we will leave it up to pulmonologist  Orders:    ESTABLISHED, MOD MDM, 30-39 MIN [[id number]]

## 2025-05-20 NOTE — ASSESSMENT & PLAN NOTE
Chronic type 2 diabetes mellitus well-controlled, diet controlled only  Chronic kidney disease stage IIIa slightly worsening, will continue to monitor    Lab Results   Component Value Date    LABA1C 6.3 (H) 03/21/2025    LABA1C 6.2 (H) 10/09/2024    LABA1C 6.2 (H) 05/31/2024        Pulses in both feet were not palpable. A vascular test will be ordered to assess for any arterial blockages.  Mild numbness in her feet is present.   Referral for vascular testing to assess circulation in legs.  - Foot exam revealed a large bunion on the left great toe.  She benefits from diabetic shoes, might be due for a new prescription    Diabetic shoes and custom insoles are medically necessary.  Patient is under comprehensive care for diabetes.    She is currently on allopurinol 300 mg daily for gout.  - She should continue this medication as prescribed.  - Delivery of gout medication expected tomorrow.  - Continue monitoring for gout symptoms.  Orders:     DIABETES FOOT EXAM    ESTABLISHED, MOD MDM, 30-39 MIN [[id number]]    [facility] -  Referral to Care Management    Vascular lower arterial complete physiologic Doppler at rest; Future

## 2025-05-20 NOTE — ASSESSMENT & PLAN NOTE
Chronic likely worsening due to wear-and-tear nature of the disease  - She reports flare-ups of back pain, and knee pain and recent x-rays showed degenerative changes.  - She should continue using Voltaren gel as needed for knee pain and take Tylenol as needed for general pain relief.   She utilizes a walker with a seat and has an electric wheelchair at home, which she is still acclimating to use. Occupational therapy is assisting her in this regard. She has reported a recent exacerbation of her back pain, and an x-ray revealed degenerative changes.  She will start home physical therapy as well  Orders:    ESTABLISHED, MOD MDM, 30-39 MIN [[id number]]

## 2025-05-21 ASSESSMENT — ENCOUNTER SYMPTOMS
APNEA: 1
DIARRHEA: 1

## 2025-05-22 ENCOUNTER — CARE COORDINATION (OUTPATIENT)
Dept: CARE COORDINATION | Age: 75
End: 2025-05-22

## 2025-05-22 NOTE — CARE COORDINATION
Ambulatory Care Coordination Note     5/22/2025 4:20 PM     Patient outreach attempt by this ACM today to offer care management services. ACM was unable to reach the patient by telephone today;   left voice message requesting a return phone call to this ACM.     ACM: Jaylyn Rey RN     Care Summary Note: first attempt to reach patient for enrollment     PCP/Specialist follow up:   Future Appointments         Provider Specialty Dept Phone    6/2/2025 2:00 PM Reji Gabriel PA-C Dermatology [phone]    6/10/2025 10:00 AM Ford Samano MD Orthopedic Surgery [phone]    6/11/2025 2:20 PM Reyes Chopra MD Pulmonology [phone]    7/15/2025 10:45 AM Edinson Grajeda, APRN - NP Cardiology [phone]    9/24/2025 10:30 AM Radha Vallecillo MD Family Medicine [phone]    5/21/2026 9:30 AM Radha Vallecillo MD Family Medicine [phone]            Follow Up:   Plan for next ACM outreach in approximately 1 week to complete:  - outreach attempt to offer care management services.             negative normal/ROM intact/normal gait/strength 5/5 bilateral upper extremities/strength 5/5 bilateral lower extremities

## 2025-05-27 ENCOUNTER — CARE COORDINATION (OUTPATIENT)
Dept: CARE COORDINATION | Age: 75
End: 2025-05-27

## 2025-05-27 NOTE — CARE COORDINATION
Ambulatory Care Coordination Note     2025 1:18 PM     Patient Current Location:  Home: [facility]   [address]ment [address] [place] [id number]     This patient was received as a referral from Provider.    ACM contacted the patient by telephone. Verified name and  with patient as identifiers. Provided introduction to self, and explanation of the ACM role.   Patient declined care management services at this time.          ACM: Jaylyn Rey RN     Challenges to be reviewed by the provider   Additional needs identified to be addressed with provider No                  Method of communication with provider: none.    Utilization: Initial Call - N/A    Care Summary Note: Spoke to María. Introduced self and reason for call. She was hesitant to talk. States she has transportation for appts. She denies any difficulties with obtaining medications. States she is doing ok and denies any need for Care Management. Declines services. Reviewed upcoming appts. She said she had to reschedule her dermatology appt because they sent a cab that was too difficult to get in. She is using a vehicle with a wheelchair lift for the next appt. She asked if ACM could mail a copy of her upcoming appts. Printed AVS to office and asked JUSTUS Gonzales to mail to patient.     Offered patient enrollment in the Remote Patient Monitoring (RPM) program for in-home monitoring: Patient is not eligible for RPM program because: patient declines enrollment in Care Management  .     Assessments Completed:   General Assessment    Do you have any symptoms that are causing concern?: No          Medications Reviewed:   Not completed during this call: declined care management     Advance Care Planning:   Not reviewed during this call     Care Planning:   Not completed during this call    PCP/Specialist follow up:   Future Appointments         Provider Specialty Dept Phone    2025 2:00 PM Reji Gabriel PA-C Dermatology [phone]    6/10/2025 10:00 AM  Her/She

## 2025-05-30 ENCOUNTER — TELEPHONE (OUTPATIENT)
Dept: FAMILY MEDICINE CLINIC | Age: 75
End: 2025-05-30

## 2025-05-30 DIAGNOSIS — I50.32 CHRONIC DIASTOLIC CONGESTIVE HEART FAILURE (HCC): ICD-10-CM

## 2025-05-30 DIAGNOSIS — I12.9 BENIGN HYPERTENSION WITH CKD (CHRONIC KIDNEY DISEASE) STAGE III (HCC): Primary | ICD-10-CM

## 2025-05-30 DIAGNOSIS — N18.30 BENIGN HYPERTENSION WITH CKD (CHRONIC KIDNEY DISEASE) STAGE III (HCC): Primary | ICD-10-CM

## 2025-05-30 RX ORDER — FUROSEMIDE 20 MG/1
20 TABLET ORAL DAILY PRN
Qty: 30 TABLET | Refills: 0 | Status: SHIPPED | OUTPATIENT
Start: 2025-05-30

## 2025-05-30 RX ORDER — POTASSIUM CHLORIDE 1500 MG/1
20 TABLET, EXTENDED RELEASE ORAL DAILY PRN
Qty: 30 TABLET | Refills: 0 | Status: SHIPPED | OUTPATIENT
Start: 2025-05-30

## 2025-05-30 NOTE — TELEPHONE ENCOUNTER
The nurse from [facility] called and wanted to let you know the patients right foot is swollen about a 2+, and the left foot is a 1+. Please advise if there is anything you want her to do, Thank you

## 2025-05-30 NOTE — TELEPHONE ENCOUNTER
Please let the patient know to  prescription from the pharmacy.  Please let the nurse know as well       Orders Placed This Encounter   Medications    furosemide (LASIX) 20 MG tablet     Sig: Take 1 tablet by mouth daily as needed (For leg swelling)     Dispense:  30 tablet     Refill:  0    potassium chloride (KLOR-CON M) 20 MEQ extended release tablet     Sig: Take 1 tablet by mouth daily as needed (Leg swelling) Take with food every time you take furosemide     Dispense:  30 tablet     Refill:  0         [facility] - [place], [place] - [address] [address] [facility] [phone] - F [phone]  [address] [facility] [place] [address]  Phone: [phone] Fax: [phone]      No orders of the defined types were placed in this encounter.      Future Appointments   Date Time Provider Department Center   6/2/2025  2:00 PM Reji Gabriel PA-C [facility]ER[facility]   6/4/2025 11:00 AM [facility] VASCULAR 2 STCZ VASCLAB [facility] Radiolog   6/10/2025 10:00 AM Ford Samano MD [facility] Ortho [facility]   6/11/2025  2:20 PM Reyes Chopra MD [facility]   7/15/2025 10:45 AM Edinson Grajeda, MAX - NP AFL TCC [facility] C   9/24/2025 10:30 AM Radha Vallecillo MD [facility] ECC DEP   5/21/2026  9:30 AM Radha Vallecillo MD [facility] ECC DEP       Thank you!

## 2025-06-12 DIAGNOSIS — E78.2 MIXED HYPERLIPIDEMIA: ICD-10-CM

## 2025-06-12 RX ORDER — ATORVASTATIN CALCIUM 40 MG/1
40 TABLET, FILM COATED ORAL DAILY
Qty: 90 TABLET | Refills: 3 | Status: SHIPPED | OUTPATIENT
Start: 2025-06-12

## 2025-06-16 ENCOUNTER — HOSPITAL ENCOUNTER (OUTPATIENT)
Dept: VASCULAR LAB | Age: 75
Discharge: HOME | End: 2025-06-18
Attending: FAMILY MEDICINE
Payer: MEDICARE

## 2025-06-16 ENCOUNTER — RESULTS FOLLOW-UP (OUTPATIENT)
Dept: FAMILY MEDICINE CLINIC | Age: 75
End: 2025-06-16

## 2025-06-16 DIAGNOSIS — R09.89 DECREASED DORSALIS PEDIS PULSE: ICD-10-CM

## 2025-06-16 DIAGNOSIS — E11.22 TYPE 2 DIABETES MELLITUS WITH STAGE 3A CHRONIC KIDNEY DISEASE, WITHOUT LONG-TERM CURRENT USE OF INSULIN (HCC): ICD-10-CM

## 2025-06-16 DIAGNOSIS — N18.31 TYPE 2 DIABETES MELLITUS WITH STAGE 3A CHRONIC KIDNEY DISEASE, WITHOUT LONG-TERM CURRENT USE OF INSULIN (HCC): ICD-10-CM

## 2025-06-16 LAB
VAS LEFT ABI: 1.3
VAS LEFT ARM BP: 115 MMHG
VAS LEFT CALF PRESSURE: 196 MMHG
VAS LEFT DORSALIS PEDIS BP: 124 MMHG
VAS LEFT PTA BP: 150 MMHG
VAS LEFT TBI: 0.83
VAS LEFT TOE PRESSURE: 96 MMHG
VAS RIGHT ABI: 1.11
VAS RIGHT ARM BP: 113 MMHG
VAS RIGHT CALF PRESSURE: 132 MMHG
VAS RIGHT DORSALIS PEDIS BP: 127 MMHG
VAS RIGHT PTA BP: 128 MMHG
VAS RIGHT TBI: 0.91
VAS RIGHT TOE PRESSURE: 105 MMHG

## 2025-06-16 PROCEDURE — 93923 UPR/LXTR ART STDY 3+ LVLS: CPT

## 2025-06-16 PROCEDURE — 93923 UPR/LXTR ART STDY 3+ LVLS: CPT | Performed by: SURGERY

## 2025-06-17 ENCOUNTER — TELEPHONE (OUTPATIENT)
Dept: FAMILY MEDICINE CLINIC | Age: 75
End: 2025-06-17

## 2025-06-17 NOTE — TELEPHONE ENCOUNTER
Patient called back after I went over results, she said she wanted to know what made her legs swell, why did they swell and can she get some kind of therapy or something to help with them. Please advise.

## 2025-06-17 NOTE — TELEPHONE ENCOUNTER
Her legs are swelling due to CHF or eating salty foods?   Elevate legs  Could talk with her nurse for wrapping the legs      Future Appointments   Date Time Provider Department Center   7/15/2025 10:45 AM Edinson Grajeda, MAX - NP AFL TCC [facility] AFL LEYVA C   9/19/2025 11:15 [facility]   9/24/2025 10:30 AM Radha Vallecillo MD fp [facility] ECC DEP   5/21/2026  9:30 AM Radha Vallecillo MD fp [facility] DEP

## 2025-06-26 DIAGNOSIS — I50.32 CHRONIC DIASTOLIC CONGESTIVE HEART FAILURE (HCC): ICD-10-CM

## 2025-06-26 DIAGNOSIS — N18.30 BENIGN HYPERTENSION WITH CKD (CHRONIC KIDNEY DISEASE) STAGE III (HCC): ICD-10-CM

## 2025-06-26 DIAGNOSIS — I12.9 BENIGN HYPERTENSION WITH CKD (CHRONIC KIDNEY DISEASE) STAGE III (HCC): ICD-10-CM

## 2025-06-27 RX ORDER — POTASSIUM CHLORIDE 1500 MG/1
TABLET, EXTENDED RELEASE ORAL
Qty: 30 TABLET | Refills: 4 | Status: SHIPPED | OUTPATIENT
Start: 2025-06-27

## 2025-06-27 RX ORDER — FUROSEMIDE 20 MG/1
TABLET ORAL
Qty: 30 TABLET | Refills: 4 | Status: SHIPPED | OUTPATIENT
Start: 2025-06-27

## 2025-06-27 NOTE — TELEPHONE ENCOUNTER
Please Approve or Refuse.  Send to Pharmacy per Pt's Request:      Next Visit Date:  9/24/2025   Last Visit Date: 5/20/2025    Hemoglobin A1C (%)   Date Value   03/21/2025 6.3 (H)   10/09/2024 6.2 (H)   05/31/2024 6.2 (H)             ( goal A1C is < 7)   BP Readings from Last 3 Encounters:   05/20/25 118/76   05/15/25 (!) 113/59   05/02/25 120/68          (goal 120/80)  BUN   Date Value Ref Range Status   04/18/2025 22 8 - 23 mg/dL Final     Creatinine   Date Value Ref Range Status   04/18/2025 0.9 0.7 - 1.2 mg/dL Final     Potassium   Date Value Ref Range Status   05/07/2025 4.2 3.7 - 5.3 mmol/L Final     Comment:     Specimen hemolysis has exceeded the interference as defined by Roche. Value may be falsely   increased. Suggest recollection if clinically indicated.       Potassium reflex Magnesium   Date Value Ref Range Status   10/10/2021 4.1 3.5 - 5.2 meq/L Final     Comment:     Low level specimen hemolysis is present as indicated by the interference  level index on the Roche analyzer.  The reported K+ level may be falsely  increased.  If clinically warranted, recollection of the specimen is  suggested.

## 2025-07-03 ENCOUNTER — TELEPHONE (OUTPATIENT)
Dept: FAMILY MEDICINE CLINIC | Age: 75
End: 2025-07-03

## 2025-07-03 NOTE — TELEPHONE ENCOUNTER
Patient called in and wanted to let you know she has hard lump on her right side towards her back golf ball size around her waist area ,it doesn't hurt but she wanted to know what you should think she should do for it, she has an appointment with you in September.

## 2025-07-03 NOTE — TELEPHONE ENCOUNTER
Go to Urgent care   [facility] walk-in care   [address] [place] [address]     Phone #[phone] , on the side of prior ILIA[place] Sharif, across the empty parking lot.  Hours:  Monday through Friday 8 AM to 8 PM  Saturday and Sunday 10 AM to 4 PM.      Future Appointments   Date Time Provider Department Center   7/22/2025 10:15 AM Edinson Grajeda, MAX - NP AFL TCC [facility] AFL AUTUMN C   9/19/2025 11:15 AM Reji Gabriel PA-C S[facility]DI[facility] TOLPP   9/24/2025 10:30 AM Radha Vallecillo MD fp [facility] ECC DEP   5/21/2026  9:30 AM Radha Vallecillo MD fp [facility] ECC DEP

## 2025-07-10 DIAGNOSIS — J30.89 SEASONAL ALLERGIC RHINITIS DUE TO OTHER ALLERGIC TRIGGER: ICD-10-CM

## 2025-07-10 DIAGNOSIS — J30.89 NON-SEASONAL ALLERGIC RHINITIS DUE TO OTHER ALLERGIC TRIGGER: ICD-10-CM

## 2025-07-10 DIAGNOSIS — I48.91 ATRIAL FIBRILLATION WITH RVR (HCC): ICD-10-CM

## 2025-07-10 RX ORDER — GUAIFENESIN 600 MG/1
TABLET, EXTENDED RELEASE ORAL
Qty: 60 TABLET | Refills: 2 | Status: SHIPPED | OUTPATIENT
Start: 2025-07-10

## 2025-07-10 RX ORDER — AMIODARONE HYDROCHLORIDE 200 MG/1
200 TABLET ORAL DAILY
Qty: 30 TABLET | Refills: 2 | Status: SHIPPED | OUTPATIENT
Start: 2025-07-10

## 2025-07-10 RX ORDER — IPRATROPIUM BROMIDE 21 UG/1
SPRAY, METERED NASAL
Qty: 30 EACH | Refills: 2 | Status: SHIPPED | OUTPATIENT
Start: 2025-07-10

## (undated) DEVICE — SEALER ENDOSCP NANO COAT OPN DIV CRV L JAW LIGASURE IMPACT

## (undated) DEVICE — Z INACTIVE USE 2527070 DRAPE SURG W40XL44IN UNDERBUTTOCK SMS POLYPR W/ PCH BK DISP

## (undated) DEVICE — BARRIER, ABSORBABLE, ADHESION: Brand: SEPRAFILM®

## (undated) DEVICE — DECANTER FLD 9IN ST BG FOR ASEP TRNSF OF FLD

## (undated) DEVICE — GLOVE ORANGE PI 7 1/2   MSG9075

## (undated) DEVICE — AIRLIFE™ NASAL OXYGEN CANNULA CURVED, FLARED TIP, WITH 7 FEET (2.1 M) CRUSH RESISTANT TUBING, OVER-THE-EAR STYLE: Brand: AIRLIFE™

## (undated) DEVICE — COVER,TABLE,60X90,STERILE: Brand: MEDLINE

## (undated) DEVICE — BLADE ES L6IN ELASTOMERIC COAT EXT DURABLE BEND UPTO 90DEG

## (undated) DEVICE — SVMMC GYN MIN PK

## (undated) DEVICE — DEFENDO AIR WATER SUCTION AND BIOPSY VALVE KIT FOR  OLYMPUS: Brand: DEFENDO AIR/WATER/SUCTION AND BIOPSY VALVE

## (undated) DEVICE — CYSTO/BLADDER IRRIGATION SET, REGULATING CLAMP

## (undated) DEVICE — LEGGINGS, PAIR, 33X51 XL, STERILE: Brand: MEDLINE

## (undated) DEVICE — Z INACTIVE USE 2653177 SPONGE GZ W2XL2IN NONWOVEN 4 PLY FASTER WICKING ABIL AVANT

## (undated) DEVICE — FORCEPS BX L240CM WRK CHN 2.8MM STD CAP W/ NDL MIC MESH

## (undated) DEVICE — GLOVE SURG BEAD CUF 7 STD PF WHT STRL TRIUMPH LT LTX

## (undated) DEVICE — APPLICATOR ENDOSCP L34CM W/ S STL CANN PLAS OBT STYL FOR

## (undated) DEVICE — 60 ML SYRINGE LUER-LOCK TIP: Brand: MONOJECT

## (undated) DEVICE — PROTECTOR ULN NRV PUR FOAM HK LOOP STRP ANATOMICALLY

## (undated) DEVICE — ST CHARLES PERI-GYN PACK: Brand: MEDLINE INDUSTRIES, INC.

## (undated) DEVICE — ELECTRODE LAPAROSCOPIC LHOOK

## (undated) DEVICE — JELLY,LUBE,STERILE,FLIP TOP,TUBE,2-OZ: Brand: MEDLINE

## (undated) DEVICE — TRI-LUMEN FILTERED TUBE SET WITH ACTIVATED CHARCOAL FILTER: Brand: AIRSEAL

## (undated) DEVICE — GLOVE SURG SZ 7 L12IN FNGR THK75MIL WHT LTX POLYMER BEAD

## (undated) DEVICE — SOLUTION ANTIFOG VIS SYS CLEARIFY LAPSCP

## (undated) DEVICE — 1200CC GUARDIAN II: Brand: GUARDIAN

## (undated) DEVICE — SYRINGE, LUER LOCK, 10ML: Brand: MEDLINE

## (undated) DEVICE — ST CHARLES MAJOR ABDOMINAL PK: Brand: MEDLINE INDUSTRIES, INC.

## (undated) DEVICE — MEDI-VAC YANKAUER SUCTION HANDLE W/BULBOUS TIP: Brand: CARDINAL HEALTH

## (undated) DEVICE — BLANKET WRM W29.9XL79.1IN UP BODY FORC AIR MISTRAL-AIR

## (undated) DEVICE — SOLUTION IV IRRIG WATER 1000ML POUR BRL 2F7114

## (undated) DEVICE — ENDO KIT W/SYRINGE: Brand: MEDLINE INDUSTRIES, INC.

## (undated) DEVICE — SPONGE LAP W18XL18IN WHT COT 4 PLY FLD STRUNG RADPQ DISP ST

## (undated) DEVICE — Z DUP USE 2257490 ADHESIVE SKIN CLSRE 036ML TPCL 2CTL CNCRLTE HIGH VSCSTY DRMB

## (undated) DEVICE — GLOVE ORANGE PI 7   MSG9070

## (undated) DEVICE — Device

## (undated) DEVICE — SUTURE MCRYL + SZ 4-0 L27IN ABSRB UD L19MM PS-2 3/8 CIR MCP426H

## (undated) DEVICE — TROCAR: Brand: KII FIOS FIRST ENTRY

## (undated) DEVICE — RELOAD STPL L45MM VASCULAR/MEDIUM TISS TAN CRV TIP ARTC

## (undated) DEVICE — ST CHARLES GEN LAPAROSCOPY PK: Brand: MEDLINE INDUSTRIES, INC.

## (undated) DEVICE — STERILE LATEX POWDER-FREE SURGICAL GLOVESWITH NITRILE COATING: Brand: PROTEXIS

## (undated) DEVICE — DEVICE SUT SHFT L34CM DIA 10MM 2 JAW LD UNIT ENDOSTCH

## (undated) DEVICE — COVER,MAYO STAND,XL,STERILE: Brand: MEDLINE

## (undated) DEVICE — FORCEPS ES L33CM DIA5MM CUT ERGO CUT BLDE TRIG HND ACT

## (undated) DEVICE — SINGLE PORT MANIFOLD: Brand: NEPTUNE 2

## (undated) DEVICE — Z INACTIVE USE 2660664 SOLUTION IRRIG 3000ML 0.9% SOD CHL USP UROMATIC PLAS CONT

## (undated) DEVICE — SOLUTION SCRB 4OZ 10% POVIDONE IOD ANTIMIC BTL

## (undated) DEVICE — AIRSEAL 5 MM ACCESS PORT AND LOW PROFILE OBTURATOR WITH BLADELESS OPTICAL TIP, 120 MM LENGTH: Brand: AIRSEAL

## (undated) DEVICE — MEDI-VAC NON-CONDUCTIVE SUCTION TUBING: Brand: CARDINAL HEALTH

## (undated) DEVICE — POLYP TRAP: Brand: TRAPEASE®

## (undated) DEVICE — GLOVE ORANGE PI 8 1/2   MSG9085

## (undated) DEVICE — MEDI-VAC TUBING CONNECTOR 5-IN-1 STRAIGHT POLYPROPYLENE: Brand: CARDINAL HEALTH

## (undated) DEVICE — SUTURE VCRL + SZ 0 L27IN ABSRB VLT L26MM UR-6 5/8 CIR VCP603H

## (undated) DEVICE — Z CONVERTED USE 2271043 CONTAINER SPEC COLL 4OZ SCR ON LID PEEL PCH

## (undated) DEVICE — TRAY CATHETER 16FR F INCLUDE BARDX IC COMPLT CARE DRNGE BG

## (undated) DEVICE — BITEBLOCK 54FR W/ DENT RIM BLOX

## (undated) DEVICE — 40580 - THE PINK PAD - ADVANCED TRENDELENBURG POSITIONING KIT: Brand: 40580 - THE PINK PAD - ADVANCED TRENDELENBURG POSITIONING KIT

## (undated) DEVICE — NO USE 18 MONTHS GOWN STD LG  1153D

## (undated) DEVICE — STAPLER INT DIA29MM CLS STPL H1.5-2.2MM OPN LEG L5.2MM 26

## (undated) DEVICE — SUTURE PDS II SZ 0 L27IN ABSRB VLT UR-6 L26MM 1/2 CIR D7185

## (undated) DEVICE — SOLUTION SURG PREP POV IOD 7.5% 4 OZ

## (undated) DEVICE — DEVICE SUT W/ SZ 0 L48IN VLT POLYSRB SUT DISP ES-9 ENDO

## (undated) DEVICE — GEL US 20GM NONIRRITATING OVERWRAPPED FILE PCH TRNSMIT

## (undated) DEVICE — PAD,NON-ADHERENT,3X8,STERILE,LF,1/PK: Brand: MEDLINE

## (undated) DEVICE — MEDI-TRACE CADENCE ADULT, DEFIBRILLATION ELECTRODE -RTS  (10 PR/PK) - PHYSIO-CONTROL: Brand: MEDI-TRACE CADENCE

## (undated) DEVICE — BINDER ABD 3XL H9XL71 82IN E UNISX 3 PNL

## (undated) DEVICE — PREP SOL PVP IODINE 4%  4 OZ/BTL

## (undated) DEVICE — BAG SPEC LAP H6IN DIA3IN 250ML 10 12MM CANN ATTCH MEM WIRE

## (undated) DEVICE — SNARE ENDOSCP L240CM LOOP W13MM DIA2.4MM SHT THROW SM OVL

## (undated) DEVICE — TROCARS: Brand: KII® BALLOON BLUNT TIP SYSTEM

## (undated) DEVICE — COVER,MAYO STAND,STERILE: Brand: MEDLINE

## (undated) DEVICE — ERBE NESSY® OMEGA PLATE USA (85+23)CM² , WITH CABLE 3 M: Brand: ERBE

## (undated) DEVICE — Z DISCONTINUED BY MEDLINE USE 2711682 TRAY SKIN PREP DRY W/ PREM GLV

## (undated) DEVICE — GLOVE ORANGE PI 8   MSG9080

## (undated) DEVICE — SYRINGE IRRIG 60ML SFT PLIABLE BLB EZ TO GRP 1 HND USE W/

## (undated) DEVICE — GLOVE ORTHO 7 1/2   MSG9475

## (undated) DEVICE — Z INACTIVE USE 2641839 CLIP INT M L POLYMER LOK LIG HEM O LOK

## (undated) DEVICE — SYRINGE, LUER SLIP, 20ML: Brand: MEDLINE

## (undated) DEVICE — SUTURE PERMAHAND SZ 0 L30IN NONABSORBABLE BLK FSL L30MM 3/8 680H

## (undated) DEVICE — SUTURE PDS II SZ 0 L60IN ABSRB VLT L48MM CTX 1/2 CIR Z990G

## (undated) DEVICE — Device: Brand: LEVEL 1

## (undated) DEVICE — GOWN,AURORA,NONREINFORCED,LARGE: Brand: MEDLINE

## (undated) DEVICE — Device: Brand: UTERINE ELEVATOR PRO

## (undated) DEVICE — INSUFFLATION NEEDLE TO ESTABLISH PNEUMOPERITONEUM.: Brand: INSUFFLATION NEEDLE

## (undated) DEVICE — Z DISCONTINUED USE 2716239 STAPLER INT STPL 51MM CUT LN L40MM STD TISS CRV CUT CR40B

## (undated) DEVICE — KIT DRN FLAT W/ 100CC EVAC 10MM FULL PERF

## (undated) DEVICE — SOLUTION IV IRRIG POUR BRL 0.9% SODIUM CHL 2F7124

## (undated) DEVICE — 1016 S-DRAPE IRRIG POUCH 10/BOX: Brand: STERI-DRAPE™

## (undated) DEVICE — CHLORAPREP 26ML ORANGE

## (undated) DEVICE — Z DUP USE 2392665 BLADE LARYNGOSCOPE STAT GLIDESCOPE GVL 4

## (undated) DEVICE — ARM DRAPE

## (undated) DEVICE — SOLUTION PREP POVIDONE IOD FOR SKIN MUCOUS MEM PRIOR TO

## (undated) DEVICE — Z DISCONTINUED USE 2423037 APPLICATOR MEDICATED 3 CC CHLORHEXIDINE GLUC 2% CHLORAPREP

## (undated) DEVICE — TOWEL,OR,DSP,ST,NATURAL,DLX,4/PK,20PK/CS: Brand: MEDLINE

## (undated) DEVICE — DRAPE IRRIG FLD WRM W44XL44IN W/ AORN STD PRTBL INTRATEMP

## (undated) DEVICE — PACK LAP BASIC

## (undated) DEVICE — TROCAR: Brand: KII SLEEVE

## (undated) DEVICE — SKIN AFFIX SURG ADHESIVE 72/CS 0.55ML: Brand: MEDLINE

## (undated) DEVICE — STAPLER INT 12MM 60MM CART SHT NEW KNF BLDE W/ EVERY FIRING

## (undated) DEVICE — RELOAD STPL 3.5MM L60MM 0DEG UNIV TISS PUR TI 6 ROW LIN

## (undated) DEVICE — SET ENDOSCP SEAL HYSTEROSCOPE RIG OUTFLO CHN DISP MYOSURE

## (undated) DEVICE — DEVICE TISS REMOVING 17OZ L25.25IN DIA3MM INTUTER CTRL UNIT

## (undated) DEVICE — SUTURE PDS + SZ 4 0 L27IN ABSRB VLT L26MM SH 1 2 CIR PDP315H

## (undated) DEVICE — TUBE ET DIA7MM ORAL NSL CUF MURPHY EYE HI LO RADPQ LN DISP

## (undated) DEVICE — MERCY HEALTH ST CHARLES: Brand: MEDLINE INDUSTRIES, INC.

## (undated) DEVICE — GLOVE SURG SZ 65 THK91MIL LTX FREE SYN POLYISOPRENE

## (undated) DEVICE — STAPLER INT L75MM CUT LN L73MM STPL LN L77MM BLU B FRM 8

## (undated) DEVICE — CANNULA SEAL

## (undated) DEVICE — BLADELESS OBTURATOR: Brand: WECK VISTA

## (undated) DEVICE — SCISSOR SURG CRV ENDOCUT TIP FOR LAP DISP

## (undated) DEVICE — NEEDLE ECHOGENIC 20GA L4IN INSUL W/ 30DEG BVL EXTN SET